# Patient Record
Sex: FEMALE | Race: WHITE | Employment: PART TIME | URBAN - METROPOLITAN AREA
[De-identification: names, ages, dates, MRNs, and addresses within clinical notes are randomized per-mention and may not be internally consistent; named-entity substitution may affect disease eponyms.]

---

## 2018-08-05 ENCOUNTER — HOSPITAL ENCOUNTER (EMERGENCY)
Age: 53
Discharge: HOME | End: 2018-08-05
Payer: COMMERCIAL

## 2018-08-05 ENCOUNTER — APPOINTMENT (OUTPATIENT)
Dept: GENERAL RADIOLOGY | Age: 53
End: 2018-08-05
Payer: COMMERCIAL

## 2018-08-05 VITALS
TEMPERATURE: 98.2 F | DIASTOLIC BLOOD PRESSURE: 66 MMHG | SYSTOLIC BLOOD PRESSURE: 136 MMHG | RESPIRATION RATE: 16 BRPM | HEART RATE: 70 BPM | OXYGEN SATURATION: 98 % | WEIGHT: 184 LBS | HEIGHT: 62 IN | BODY MASS INDEX: 33.86 KG/M2

## 2018-08-05 DIAGNOSIS — S16.1XXA ACUTE STRAIN OF NECK MUSCLE, INITIAL ENCOUNTER: ICD-10-CM
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
## 2018-08-05 DIAGNOSIS — M79.641 RIGHT HAND PAIN: ICD-10-CM

## 2018-08-05 PROCEDURE — 6360000002 HC RX W HCPCS: Performed by: PHYSICIAN ASSISTANT

## 2018-08-05 PROCEDURE — 96372 THER/PROPH/DIAG INJ SC/IM: CPT

## 2018-08-05 PROCEDURE — 73130 X-RAY EXAM OF HAND: CPT

## 2018-08-05 PROCEDURE — 99285 EMERGENCY DEPT VISIT HI MDM: CPT

## 2018-08-05 RX ORDER — ATORVASTATIN CALCIUM 40 MG/1
40 TABLET, FILM COATED ORAL DAILY
COMMUNITY

## 2018-08-05 RX ORDER — METOPROLOL TARTRATE 50 MG/1
10 TABLET, FILM COATED ORAL 2 TIMES DAILY
COMMUNITY

## 2018-08-05 RX ORDER — ORPHENADRINE CITRATE 30 MG/ML
60 INJECTION INTRAMUSCULAR; INTRAVENOUS ONCE
Status: COMPLETED | OUTPATIENT
Start: 2018-08-05 | End: 2018-08-05

## 2018-08-05 RX ORDER — LEVOTHYROXINE SODIUM 112 UG/1
112 TABLET ORAL DAILY
COMMUNITY

## 2018-08-05 RX ORDER — KETOROLAC TROMETHAMINE 30 MG/ML
30 INJECTION, SOLUTION INTRAMUSCULAR; INTRAVENOUS ONCE
Status: COMPLETED | OUTPATIENT
Start: 2018-08-05 | End: 2018-08-05

## 2018-08-05 RX ORDER — INSULIN GLARGINE 100 [IU]/ML
INJECTION, SOLUTION SUBCUTANEOUS NIGHTLY
COMMUNITY
End: 2020-02-13

## 2018-08-05 RX ORDER — NAPROXEN 500 MG/1
500 TABLET ORAL 2 TIMES DAILY
Qty: 14 TABLET | Refills: 0 | Status: SHIPPED | OUTPATIENT
Start: 2018-08-05 | End: 2018-11-17 | Stop reason: ALTCHOICE

## 2018-08-05 RX ORDER — GLIPIZIDE 10 MG/1
10 TABLET ORAL
COMMUNITY
End: 2020-02-13

## 2018-08-05 RX ORDER — ORPHENADRINE CITRATE 100 MG/1
100 TABLET, EXTENDED RELEASE ORAL 2 TIMES DAILY
Qty: 20 TABLET | Refills: 0 | Status: SHIPPED | OUTPATIENT
Start: 2018-08-05 | End: 2018-08-15

## 2018-08-05 RX ADMIN — KETOROLAC TROMETHAMINE 30 MG: 30 INJECTION, SOLUTION INTRAMUSCULAR at 17:36

## 2018-08-05 RX ADMIN — ORPHENADRINE CITRATE 60 MG: 30 INJECTION INTRAMUSCULAR; INTRAVENOUS at 17:36

## 2018-08-05 ASSESSMENT — PAIN DESCRIPTION - FREQUENCY: FREQUENCY: CONTINUOUS

## 2018-08-05 ASSESSMENT — PAIN DESCRIPTION - PAIN TYPE
TYPE: ACUTE PAIN
TYPE: ACUTE PAIN

## 2018-08-05 ASSESSMENT — PAIN DESCRIPTION - LOCATION
LOCATION: HAND
LOCATION: HAND

## 2018-08-05 ASSESSMENT — PAIN DESCRIPTION - ORIENTATION
ORIENTATION: RIGHT
ORIENTATION: RIGHT

## 2018-08-05 ASSESSMENT — PAIN DESCRIPTION - DESCRIPTORS: DESCRIPTORS: SHARP

## 2018-08-05 ASSESSMENT — PAIN SCALES - GENERAL
PAINLEVEL_OUTOF10: 10
PAINLEVEL_OUTOF10: 7
PAINLEVEL_OUTOF10: 10

## 2018-08-05 ASSESSMENT — PAIN DESCRIPTION - PROGRESSION: CLINICAL_PROGRESSION: GRADUALLY WORSENING
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
## 2018-08-05 NOTE — ED NOTES
Home going instructions with verbalized understanding , scripts given x 2     Dunia De La Garza RN  08/05/18 [id number]

## 2018-08-05 NOTE — LETTER
[address] [facility] Emergency Department  [facility] [address]  Phone: [phone]               August 5, 2018    Patient: Kayleen Wallace   YOB: 1965   Date of Visit: 8/5/2018       To Whom It May Concern:    Kayleen Wallace was seen and treated in our emergency department on 8/5/2018. Daughter here with her.       Sincerely,       Ankit Lopez RN         Signature:__________________________________

## 2018-11-17 ENCOUNTER — APPOINTMENT (OUTPATIENT)
Dept: GENERAL RADIOLOGY | Age: 53
End: 2018-11-17
Payer: COMMERCIAL

## 2018-11-17 ENCOUNTER — HOSPITAL ENCOUNTER (EMERGENCY)
Age: 53
Discharge: HOME | End: 2018-11-17
Attending: FAMILY MEDICINE
Payer: COMMERCIAL

## 2018-11-17 VITALS
DIASTOLIC BLOOD PRESSURE: 84 MMHG | HEIGHT: 62 IN | HEART RATE: 81 BPM | BODY MASS INDEX: 33.68 KG/M2 | SYSTOLIC BLOOD PRESSURE: 152 MMHG | TEMPERATURE: 98.1 F | OXYGEN SATURATION: 93 % | RESPIRATION RATE: 16 BRPM | WEIGHT: 183 LBS

## 2018-11-17 DIAGNOSIS — S20.212A CONTUSION OF RIB ON LEFT SIDE, INITIAL ENCOUNTER: Primary | ICD-10-CM

## 2018-11-17 PROCEDURE — 99284 EMERGENCY DEPT VISIT MOD MDM: CPT

## 2018-11-17 PROCEDURE — 71101 X-RAY EXAM UNILAT RIBS/CHEST: CPT

## 2018-11-17 PROCEDURE — 6370000000 HC RX 637 (ALT 250 FOR IP): Performed by: FAMILY MEDICINE

## 2018-11-17 RX ORDER — IBUPROFEN 600 MG/1
600 TABLET ORAL ONCE
Status: COMPLETED | OUTPATIENT
Start: 2018-11-17 | End: 2018-11-17

## 2018-11-17 RX ORDER — HYDROCODONE BITARTRATE AND ACETAMINOPHEN 5; 325 MG/1; MG/1
1 TABLET ORAL EVERY 6 HOURS PRN
Qty: 12 TABLET | Refills: 0 | Status: SHIPPED | OUTPATIENT
Start: 2018-11-17 | End: 2018-11-20

## 2018-11-17 RX ADMIN — IBUPROFEN 600 MG: 600 TABLET ORAL at 10:31

## 2018-11-17 ASSESSMENT — PAIN DESCRIPTION - ORIENTATION: ORIENTATION: LEFT

## 2018-11-17 ASSESSMENT — PAIN DESCRIPTION - LOCATION
LOCATION: CHEST;RIB CAGE
LOCATION: CHEST;RIB CAGE

## 2018-11-17 ASSESSMENT — PAIN DESCRIPTION - DESCRIPTORS
DESCRIPTORS: ACHING
DESCRIPTORS: ACHING

## 2018-11-17 ASSESSMENT — PAIN DESCRIPTION - PAIN TYPE
TYPE: ACUTE PAIN
TYPE: ACUTE PAIN

## 2018-11-17 ASSESSMENT — PAIN DESCRIPTION - ONSET: ONSET: ON-GOING

## 2018-11-17 ASSESSMENT — PAIN DESCRIPTION - PROGRESSION: CLINICAL_PROGRESSION: GRADUALLY IMPROVING

## 2018-11-17 ASSESSMENT — PAIN SCALES - GENERAL
PAINLEVEL_OUTOF10: 4
PAINLEVEL_OUTOF10: 8

## 2018-11-17 ASSESSMENT — PAIN DESCRIPTION - FREQUENCY
FREQUENCY: CONTINUOUS
FREQUENCY: CONTINUOUS

## 2018-11-19 ENCOUNTER — APPOINTMENT (OUTPATIENT)
Dept: CT IMAGING | Age: 53
End: 2018-11-19
Payer: COMMERCIAL

## 2018-11-19 ENCOUNTER — HOSPITAL ENCOUNTER (EMERGENCY)
Age: 53
Discharge: HOME | End: 2018-11-19
Payer: COMMERCIAL

## 2018-11-19 VITALS
SYSTOLIC BLOOD PRESSURE: 119 MMHG | HEIGHT: 62 IN | BODY MASS INDEX: 33.13 KG/M2 | HEART RATE: 70 BPM | OXYGEN SATURATION: 96 % | WEIGHT: 180 LBS | TEMPERATURE: 97.9 F | DIASTOLIC BLOOD PRESSURE: 53 MMHG | RESPIRATION RATE: 20 BRPM

## 2018-11-19 DIAGNOSIS — S20.212A CONTUSION OF RIB ON LEFT SIDE, INITIAL ENCOUNTER: Primary | ICD-10-CM

## 2018-11-19 DIAGNOSIS — E16.2 HYPOGLYCEMIA: ICD-10-CM

## 2018-11-19 LAB
CO2: 30 MMOL/L (ref 22–29)
GFR AFRICAN AMERICAN: >60
GFR NON-AFRICAN AMERICAN: >60 ML/MIN/1.73
GLUCOSE BLD-MCNC: 61 MG/DL (ref 74–99)
POC ANION GAP: 12 MMOL/L (ref 7–16)
POC BUN: 9 MG/DL (ref 8–23)
POC CHLORIDE: 100 MMOL/L (ref 100–108)
POC CREATININE: 0.8 MG/DL (ref 0.5–1)
POC POTASSIUM: 3.4 MMOL/L (ref 3.5–5)
POC SODIUM: 142 MMOL/L (ref 132–146)

## 2018-11-19 PROCEDURE — 6360000004 HC RX CONTRAST MEDICATION: Performed by: RADIOLOGY

## 2018-11-19 PROCEDURE — 82565 ASSAY OF CREATININE: CPT

## 2018-11-19 PROCEDURE — 74177 CT ABD & PELVIS W/CONTRAST: CPT

## 2018-11-19 PROCEDURE — 84520 ASSAY OF UREA NITROGEN: CPT

## 2018-11-19 PROCEDURE — 82947 ASSAY GLUCOSE BLOOD QUANT: CPT

## 2018-11-19 PROCEDURE — 99284 EMERGENCY DEPT VISIT MOD MDM: CPT

## 2018-11-19 PROCEDURE — 71260 CT THORAX DX C+: CPT

## 2018-11-19 PROCEDURE — 80051 ELECTROLYTE PANEL: CPT

## 2018-11-19 RX ADMIN — IOPAMIDOL 80 ML: 755 INJECTION, SOLUTION INTRAVENOUS at 16:34

## 2018-11-19 ASSESSMENT — PAIN DESCRIPTION - LOCATION: LOCATION: RIB CAGE

## 2018-11-19 ASSESSMENT — PAIN DESCRIPTION - DESCRIPTORS: DESCRIPTORS: ACHING

## 2018-11-19 ASSESSMENT — PAIN DESCRIPTION - FREQUENCY: FREQUENCY: CONTINUOUS

## 2018-11-19 ASSESSMENT — PAIN DESCRIPTION - ONSET: ONSET: ON-GOING

## 2018-11-19 ASSESSMENT — PAIN DESCRIPTION - ORIENTATION: ORIENTATION: LEFT

## 2018-11-19 ASSESSMENT — PAIN DESCRIPTION - PAIN TYPE: TYPE: ACUTE PAIN

## 2019-10-29 ENCOUNTER — HOSPITAL ENCOUNTER (OUTPATIENT)
Dept: MRI IMAGING | Age: 54
Discharge: HOME | End: 2019-10-31
Payer: COMMERCIAL

## 2019-10-29 DIAGNOSIS — M75.101 TEAR OF RIGHT ROTATOR CUFF, UNSPECIFIED TEAR EXTENT, UNSPECIFIED WHETHER TRAUMATIC: ICD-10-CM

## 2019-10-29 PROCEDURE — 73221 MRI JOINT UPR EXTREM W/O DYE: CPT

## 2019-11-01 ENCOUNTER — OFFICE VISIT (OUTPATIENT)
Dept: ORTHOPEDIC SURGERY | Age: 54
End: 2019-11-01
Payer: COMMERCIAL

## 2019-11-01 VITALS — WEIGHT: 178 LBS | BODY MASS INDEX: 32.76 KG/M2 | HEIGHT: 62 IN

## 2019-11-01 DIAGNOSIS — M75.111 NONTRAUMATIC INCOMPLETE TEAR OF RIGHT ROTATOR CUFF: Primary | ICD-10-CM

## 2019-11-01 PROCEDURE — 99204 OFFICE O/P NEW MOD 45 MIN: CPT | Performed by: ORTHOPAEDIC SURGERY

## 2020-02-13 ENCOUNTER — APPOINTMENT (OUTPATIENT)
Dept: CT IMAGING | Age: 55
End: 2020-02-13
Payer: COMMERCIAL

## 2020-02-13 ENCOUNTER — HOSPITAL ENCOUNTER (EMERGENCY)
Age: 55
Discharge: HOME | End: 2020-02-13
Attending: EMERGENCY MEDICINE
Payer: COMMERCIAL

## 2020-02-13 VITALS
TEMPERATURE: 98.2 F | HEART RATE: 76 BPM | SYSTOLIC BLOOD PRESSURE: 132 MMHG | BODY MASS INDEX: 32.2 KG/M2 | RESPIRATION RATE: 16 BRPM | DIASTOLIC BLOOD PRESSURE: 74 MMHG | HEIGHT: 62 IN | WEIGHT: 175 LBS | OXYGEN SATURATION: 97 %

## 2020-02-13 LAB
ANION GAP SERPL CALCULATED.3IONS-SCNC: 12 MMOL/L (ref 7–16)
BASOPHILS ABSOLUTE: 0.06 E9/L (ref 0–0.2)
BASOPHILS RELATIVE PERCENT: 0.7 % (ref 0–2)
BILIRUBIN URINE: NEGATIVE
BLOOD, URINE: NEGATIVE
BUN BLDV-MCNC: 10 MG/DL (ref 6–20)
CALCIUM SERPL-MCNC: 9.6 MG/DL (ref 8.6–10.2)
CHLORIDE BLD-SCNC: 96 MMOL/L (ref 98–107)
CLARITY: CLEAR
CO2: 26 MMOL/L (ref 22–29)
COLOR: YELLOW
CREAT SERPL-MCNC: 0.8 MG/DL (ref 0.5–1)
EOSINOPHILS ABSOLUTE: 0.26 E9/L (ref 0.05–0.5)
EOSINOPHILS RELATIVE PERCENT: 2.9 % (ref 0–6)
GFR AFRICAN AMERICAN: >60
GFR NON-AFRICAN AMERICAN: >60 ML/MIN/1.73
GLUCOSE BLD-MCNC: 415 MG/DL (ref 74–99)
GLUCOSE URINE: >=1000 MG/DL
HCT VFR BLD CALC: 43 % (ref 34–48)
HEMOGLOBIN: 15.1 G/DL (ref 11.5–15.5)
IMMATURE GRANULOCYTES #: 0.03 E9/L
IMMATURE GRANULOCYTES %: 0.3 % (ref 0–5)
KETONES, URINE: NEGATIVE MG/DL
LEUKOCYTE ESTERASE, URINE: NEGATIVE
LYMPHOCYTES ABSOLUTE: 2.41 E9/L (ref 1.5–4)
LYMPHOCYTES RELATIVE PERCENT: 27 % (ref 20–42)
MCH RBC QN AUTO: 31.9 PG (ref 26–35)
MCHC RBC AUTO-ENTMCNC: 35.1 % (ref 32–34.5)
MCV RBC AUTO: 90.7 FL (ref 80–99.9)
MONOCYTES ABSOLUTE: 0.65 E9/L (ref 0.1–0.95)
MONOCYTES RELATIVE PERCENT: 7.3 % (ref 2–12)
NEUTROPHILS ABSOLUTE: 5.53 E9/L (ref 1.8–7.3)
NEUTROPHILS RELATIVE PERCENT: 61.8 % (ref 43–80)
NITRITE, URINE: NEGATIVE
PDW BLD-RTO: 12.4 FL (ref 11.5–15)
PH UA: 6.5 (ref 5–9)
PLATELET # BLD: 248 E9/L (ref 130–450)
PMV BLD AUTO: 11.1 FL (ref 7–12)
POTASSIUM REFLEX MAGNESIUM: 4 MMOL/L (ref 3.5–5)
PROTEIN UA: NEGATIVE MG/DL
RBC # BLD: 4.74 E12/L (ref 3.5–5.5)
SODIUM BLD-SCNC: 134 MMOL/L (ref 132–146)
SPECIFIC GRAVITY UA: 1.02 (ref 1–1.03)
UROBILINOGEN, URINE: 1 E.U./DL
WBC # BLD: 8.9 E9/L (ref 4.5–11.5)

## 2020-02-13 PROCEDURE — 36415 COLL VENOUS BLD VENIPUNCTURE: CPT

## 2020-02-13 PROCEDURE — 6360000004 HC RX CONTRAST MEDICATION: Performed by: RADIOLOGY

## 2020-02-13 PROCEDURE — 74177 CT ABD & PELVIS W/CONTRAST: CPT

## 2020-02-13 PROCEDURE — 6360000002 HC RX W HCPCS: Performed by: EMERGENCY MEDICINE

## 2020-02-13 PROCEDURE — 80048 BASIC METABOLIC PNL TOTAL CA: CPT

## 2020-02-13 PROCEDURE — 99284 EMERGENCY DEPT VISIT MOD MDM: CPT

## 2020-02-13 PROCEDURE — 85025 COMPLETE CBC W/AUTO DIFF WBC: CPT

## 2020-02-13 PROCEDURE — 81003 URINALYSIS AUTO W/O SCOPE: CPT

## 2020-02-13 PROCEDURE — 96374 THER/PROPH/DIAG INJ IV PUSH: CPT

## 2020-02-13 PROCEDURE — 2580000003 HC RX 258: Performed by: RADIOLOGY

## 2020-02-13 RX ORDER — KETOROLAC TROMETHAMINE 30 MG/ML
15 INJECTION, SOLUTION INTRAMUSCULAR; INTRAVENOUS ONCE
Status: COMPLETED | OUTPATIENT
Start: 2020-02-13 | End: 2020-02-13

## 2020-02-13 RX ORDER — SODIUM CHLORIDE 0.9 % (FLUSH) 0.9 %
10 SYRINGE (ML) INJECTION PRN
Status: DISCONTINUED | OUTPATIENT
Start: 2020-02-13 | End: 2020-02-13 | Stop reason: HOSPADM

## 2020-02-13 RX ADMIN — IOPAMIDOL 100 ML: 755 INJECTION, SOLUTION INTRAVENOUS at 19:46

## 2020-02-13 RX ADMIN — Medication 10 ML: at 19:46

## 2020-02-13 RX ADMIN — KETOROLAC TROMETHAMINE 15 MG: 30 INJECTION, SOLUTION INTRAMUSCULAR at 18:33

## 2020-02-13 ASSESSMENT — PAIN DESCRIPTION - LOCATION
LOCATION: ABDOMEN
LOCATION: GROIN

## 2020-02-13 ASSESSMENT — PAIN DESCRIPTION - ONSET
ONSET: ON-GOING
ONSET: ON-GOING

## 2020-02-13 ASSESSMENT — PAIN - FUNCTIONAL ASSESSMENT: PAIN_FUNCTIONAL_ASSESSMENT: PREVENTS OR INTERFERES SOME ACTIVE ACTIVITIES AND ADLS

## 2020-02-13 ASSESSMENT — PAIN SCALES - GENERAL
PAINLEVEL_OUTOF10: 6
PAINLEVEL_OUTOF10: 6
PAINLEVEL_OUTOF10: 3

## 2020-02-13 ASSESSMENT — PAIN DESCRIPTION - PROGRESSION
CLINICAL_PROGRESSION: GRADUALLY IMPROVING
CLINICAL_PROGRESSION: RESOLVED

## 2020-02-13 ASSESSMENT — PAIN DESCRIPTION - ORIENTATION
ORIENTATION: LEFT;LOWER
ORIENTATION: LEFT

## 2020-02-13 ASSESSMENT — PAIN DESCRIPTION - PAIN TYPE
TYPE: ACUTE PAIN
TYPE: ACUTE PAIN

## 2020-02-13 ASSESSMENT — PAIN DESCRIPTION - DESCRIPTORS
DESCRIPTORS: CONSTANT
DESCRIPTORS: ACHING

## 2020-02-13 ASSESSMENT — PAIN DESCRIPTION - FREQUENCY
FREQUENCY: CONTINUOUS
FREQUENCY: CONTINUOUS

## 2020-02-13 NOTE — ED PROVIDER NOTES
35.1 (H) 32.0 - 34.5 %    RDW 12.4 11.5 - 15.0 fL    Platelets 167 686 - 910 E9/L    MPV 11.1 7.0 - 12.0 fL    Neutrophils % 61.8 43.0 - 80.0 %    Immature Granulocytes % 0.3 0.0 - 5.0 %    Lymphocytes % 27.0 20.0 - 42.0 %    Monocytes % 7.3 2.0 - 12.0 %    Eosinophils % 2.9 0.0 - 6.0 %    Basophils % 0.7 0.0 - 2.0 %    Neutrophils Absolute 5.53 1.80 - 7.30 E9/L    Immature Granulocytes # 0.03 E9/L    Lymphocytes Absolute 2.41 1.50 - 4.00 E9/L    Monocytes Absolute 0.65 0.10 - 0.95 E9/L    Eosinophils Absolute 0.26 0.05 - 0.50 E9/L    Basophils Absolute 0.06 0.00 - 0.20 E9/L   Urinalysis, reflex to microscopic   Result Value Ref Range    Color, UA Yellow Straw/Yellow    Clarity, UA Clear Clear    Glucose, Ur >=1000 (A) Negative mg/dL    Bilirubin Urine Negative Negative    Ketones, Urine Negative Negative mg/dL    Specific Gravity, UA 1.020 1.005 - 1.030    Blood, Urine Negative Negative    pH, UA 6.5 5.0 - 9.0    Protein, UA Negative Negative mg/dL    Urobilinogen, Urine 1.0 <2.0 E.U./dL    Nitrite, Urine Negative Negative    Leukocyte Esterase, Urine Negative Negative   Basic Metabolic Panel w/ Reflex to MG   Result Value Ref Range    Sodium 134 132 - 146 mmol/L    Potassium reflex Magnesium 4.0 3.5 - 5.0 mmol/L    Chloride 96 (L) 98 - 107 mmol/L    CO2 26 22 - 29 mmol/L    Anion Gap 12 7 - 16 mmol/L    Glucose 415 (H) 74 - 99 mg/dL    BUN 10 6 - 20 mg/dL    CREATININE 0.8 0.5 - 1.0 mg/dL    GFR Non-African American >60 >=60 mL/min/1.73    GFR African American >60     Calcium 9.6 8.6 - 10.2 mg/dL       RADIOLOGY:  Interpreted by Radiologist.  CT ABDOMEN PELVIS W IV CONTRAST Additional Contrast? None   Final Result   1. No acute findings within the abdomen or pelvis by CT. 2.  The appendix is not visualized however there are no inflammatory   changes in the right lower quadrant.    3.  Stable in size although mildly complex cyst within the right   inferior pole measuring 2.2 cm which could be followed up with a

## 2020-10-01 ENCOUNTER — HOSPITAL ENCOUNTER (OUTPATIENT)
Dept: GENERAL RADIOLOGY | Age: 55
Discharge: HOME | End: 2020-10-03
Payer: COMMERCIAL

## 2020-10-01 ENCOUNTER — HOSPITAL ENCOUNTER (OUTPATIENT)
Age: 55
Discharge: HOME | End: 2020-10-03
Payer: COMMERCIAL

## 2020-10-01 PROCEDURE — 73630 X-RAY EXAM OF FOOT: CPT

## 2021-08-28 ENCOUNTER — HOSPITAL ENCOUNTER (EMERGENCY)
Age: 56
Discharge: HOME | End: 2021-08-28
Attending: EMERGENCY MEDICINE
Payer: COMMERCIAL

## 2021-08-28 ENCOUNTER — APPOINTMENT (OUTPATIENT)
Dept: ULTRASOUND IMAGING | Age: 56
End: 2021-08-28
Payer: COMMERCIAL

## 2021-08-28 VITALS
WEIGHT: 178 LBS | RESPIRATION RATE: 16 BRPM | SYSTOLIC BLOOD PRESSURE: 135 MMHG | TEMPERATURE: 97.4 F | BODY MASS INDEX: 32.76 KG/M2 | HEIGHT: 62 IN | OXYGEN SATURATION: 96 % | DIASTOLIC BLOOD PRESSURE: 62 MMHG | HEART RATE: 94 BPM

## 2021-08-28 DIAGNOSIS — M25.559 PAIN IN JOINT INVOLVING PELVIC REGION AND THIGH, UNSPECIFIED LATERALITY: ICD-10-CM

## 2021-08-28 DIAGNOSIS — L53.9 ERYTHEMA: Primary | ICD-10-CM

## 2021-08-28 LAB
ALBUMIN SERPL-MCNC: 4.1 G/DL (ref 3.5–5.2)
ALP BLD-CCNC: 131 U/L (ref 35–104)
ALT SERPL-CCNC: 22 U/L (ref 0–32)
ANION GAP SERPL CALCULATED.3IONS-SCNC: 9 MMOL/L (ref 7–16)
APTT: 27.7 SEC (ref 24.5–35.1)
AST SERPL-CCNC: 17 U/L (ref 0–31)
BASOPHILS ABSOLUTE: 0.02 E9/L (ref 0–0.2)
BASOPHILS RELATIVE PERCENT: 0.5 % (ref 0–2)
BILIRUB SERPL-MCNC: 0.4 MG/DL (ref 0–1.2)
BILIRUBIN URINE: NEGATIVE
BLOOD, URINE: NEGATIVE
BUN BLDV-MCNC: 7 MG/DL (ref 6–20)
CALCIUM SERPL-MCNC: 9.4 MG/DL (ref 8.6–10.2)
CHLORIDE BLD-SCNC: 97 MMOL/L (ref 98–107)
CLARITY: CLEAR
CO2: 26 MMOL/L (ref 22–29)
COLOR: YELLOW
CREAT SERPL-MCNC: 0.7 MG/DL (ref 0.5–1)
EOSINOPHILS ABSOLUTE: 0.11 E9/L (ref 0.05–0.5)
EOSINOPHILS RELATIVE PERCENT: 2.5 % (ref 0–6)
GFR AFRICAN AMERICAN: >60
GFR NON-AFRICAN AMERICAN: >60 ML/MIN/1.73
GLUCOSE BLD-MCNC: 415 MG/DL (ref 74–99)
GLUCOSE URINE: >=1000 MG/DL
HCT VFR BLD CALC: 44.8 % (ref 34–48)
HEMOGLOBIN: 15.9 G/DL (ref 11.5–15.5)
IMMATURE GRANULOCYTES #: 0.02 E9/L
IMMATURE GRANULOCYTES %: 0.5 % (ref 0–5)
INR BLD: 1
KETONES, URINE: NEGATIVE MG/DL
LACTIC ACID: 1.5 MMOL/L (ref 0.5–2.2)
LEUKOCYTE ESTERASE, URINE: NEGATIVE
LIPASE: 41 U/L (ref 13–60)
LYMPHOCYTES ABSOLUTE: 1.05 E9/L (ref 1.5–4)
LYMPHOCYTES RELATIVE PERCENT: 24.2 % (ref 20–42)
MCH RBC QN AUTO: 31.9 PG (ref 26–35)
MCHC RBC AUTO-ENTMCNC: 35.5 % (ref 32–34.5)
MCV RBC AUTO: 90 FL (ref 80–99.9)
MONOCYTES ABSOLUTE: 0.39 E9/L (ref 0.1–0.95)
MONOCYTES RELATIVE PERCENT: 9 % (ref 2–12)
NEUTROPHILS ABSOLUTE: 2.74 E9/L (ref 1.8–7.3)
NEUTROPHILS RELATIVE PERCENT: 63.3 % (ref 43–80)
NITRITE, URINE: NEGATIVE
PDW BLD-RTO: 13.1 FL (ref 11.5–15)
PH UA: 7 (ref 5–9)
PLATELET # BLD: 245 E9/L (ref 130–450)
PMV BLD AUTO: 10.3 FL (ref 7–12)
POTASSIUM SERPL-SCNC: 4.3 MMOL/L (ref 3.5–5)
PROTEIN UA: NEGATIVE MG/DL
PROTHROMBIN TIME: 11.5 SEC (ref 9.3–12.4)
RBC # BLD: 4.98 E12/L (ref 3.5–5.5)
SODIUM BLD-SCNC: 132 MMOL/L (ref 132–146)
SPECIFIC GRAVITY UA: 1.01 (ref 1–1.03)
TOTAL PROTEIN: 7.7 G/DL (ref 6.4–8.3)
UROBILINOGEN, URINE: 0.2 E.U./DL
WBC # BLD: 4.3 E9/L (ref 4.5–11.5)

## 2021-08-28 PROCEDURE — 85730 THROMBOPLASTIN TIME PARTIAL: CPT

## 2021-08-28 PROCEDURE — 80053 COMPREHEN METABOLIC PANEL: CPT

## 2021-08-28 PROCEDURE — 83605 ASSAY OF LACTIC ACID: CPT

## 2021-08-28 PROCEDURE — 93970 EXTREMITY STUDY: CPT

## 2021-08-28 PROCEDURE — 81003 URINALYSIS AUTO W/O SCOPE: CPT

## 2021-08-28 PROCEDURE — 83690 ASSAY OF LIPASE: CPT

## 2021-08-28 PROCEDURE — 36415 COLL VENOUS BLD VENIPUNCTURE: CPT

## 2021-08-28 PROCEDURE — 85610 PROTHROMBIN TIME: CPT

## 2021-08-28 PROCEDURE — 85025 COMPLETE CBC W/AUTO DIFF WBC: CPT

## 2021-08-28 PROCEDURE — 99284 EMERGENCY DEPT VISIT MOD MDM: CPT

## 2021-08-28 RX ORDER — IBUPROFEN 800 MG/1
800 TABLET ORAL EVERY 6 HOURS PRN
Qty: 21 TABLET | Refills: 0 | Status: SHIPPED | OUTPATIENT
Start: 2021-08-28

## 2021-08-28 ASSESSMENT — PAIN DESCRIPTION - DESCRIPTORS: DESCRIPTORS: BURNING

## 2021-08-28 ASSESSMENT — PAIN SCALES - GENERAL
PAINLEVEL_OUTOF10: 8
PAINLEVEL_OUTOF10: 4

## 2021-08-28 ASSESSMENT — PAIN DESCRIPTION - LOCATION
LOCATION: LEG
LOCATION: LEG

## 2021-08-28 ASSESSMENT — PAIN DESCRIPTION - ORIENTATION
ORIENTATION: RIGHT;LEFT;LOWER
ORIENTATION: RIGHT;LEFT;LOWER

## 2021-08-28 ASSESSMENT — PAIN DESCRIPTION - FREQUENCY: FREQUENCY: CONTINUOUS

## 2021-08-28 NOTE — ED NOTES
FIRST PROVIDER CONTACT ASSESSMENT NOTE      Department of Emergency Medicine   Admit Date: 8/28/2021  7:15 PM    Chief Complaint: Other (warm redareas to lower ext) and Cellulitis (bilateral lower extremities)      History of Present Illness:    Courtney Hunter is a 60 y.o. female who presents to the ED for erythematous and painful rash to the bilateral lower legs no open area no drainage or discharge. It is not pruritic.   Denies any fever.        -----------------END OF FIRST PROVIDER CONTACT ASSESSMENT NOTE--------------  Electronically signed by SARAH Swanson CNP   DD: 8/28/21               SARAH Tao CNP  08/28/21 [id number]

## 2021-08-29 NOTE — ED NOTES
PT STATES PAIN INCREASES WHEN AREA TO LOWER LEGS TOUCHED. ALERT.  AMBULATORY     Rachel Espino LPN  08/51/43 8541

## 2021-08-29 NOTE — ED PROVIDER NOTES
ED Attending  CC: No    HPI:  8/28/21,   Time: 8:59 PM EDT         Roxi So is a 60 y.o. female presenting to the ED for erythema to the bilateral lower extremities, beginning few days ago. The complaint has been persistent, mild in severity, and worsened by nothing. Presents for concern and complaints of a painful erythematous patches which is located to the bilateral lower extremities which began a few days ago. She denies any causative etiology. Denies any pruritus. Denies any chest pain or shortness of breath. ROS:   Pertinent positives and negatives are stated within HPI, all other systems reviewed and are negative.  --------------------------------------------- PAST HISTORY ---------------------------------------------  Past Medical History:  has a past medical history of Diabetes mellitus ([facility] [address] Hyperlipidemia, Hypertension, and Restless leg. Past Surgical History:  has a past surgical history that includes Cholecystectomy; Rotator cuff repair; and Coronary angioplasty with stent. Social History:  reports that she has been smoking. She has never used smokeless tobacco. She reports current alcohol use. She reports that she does not use drugs. Family History: family history is not on file. The patients home medications have been reviewed.     Allergies: Codeine, Prednisone, and Tetracyclines & related    -------------------------------------------------- RESULTS -------------------------------------------------  All laboratory and radiology results have been personally reviewed by myself   LABS:  Results for orders placed or performed during the hospital encounter of 08/28/21   CBC Auto Differential   Result Value Ref Range    WBC 4.3 (L) 4.5 - 11.5 E9/L    RBC 4.98 3.50 - 5.50 E12/L    Hemoglobin 15.9 (H) 11.5 - 15.5 g/dL    Hematocrit 44.8 34.0 - 48.0 %    MCV 90.0 80.0 - 99.9 fL    MCH 31.9 26.0 - 35.0 pg    MCHC 35.5 (H) 32.0 - 34.5 %    RDW 13.1 11.5 - 15.0 fL    Platelets 163 384 - 450 E9/L    MPV 10.3 7.0 - 12.0 fL    Neutrophils % 63.3 43.0 - 80.0 %    Immature Granulocytes % 0.5 0.0 - 5.0 %    Lymphocytes % 24.2 20.0 - 42.0 %    Monocytes % 9.0 2.0 - 12.0 %    Eosinophils % 2.5 0.0 - 6.0 %    Basophils % 0.5 0.0 - 2.0 %    Neutrophils Absolute 2.74 1.80 - 7.30 E9/L    Immature Granulocytes # 0.02 E9/L    Lymphocytes Absolute 1.05 (L) 1.50 - 4.00 E9/L    Monocytes Absolute 0.39 0.10 - 0.95 E9/L    Eosinophils Absolute 0.11 0.05 - 0.50 E9/L    Basophils Absolute 0.02 0.00 - 0.20 E9/L   Comprehensive Metabolic Panel   Result Value Ref Range    Sodium 132 132 - 146 mmol/L    Potassium 4.3 3.5 - 5.0 mmol/L    Chloride 97 (L) 98 - 107 mmol/L    CO2 26 22 - 29 mmol/L    Anion Gap 9 7 - 16 mmol/L    Glucose 415 (H) 74 - 99 mg/dL    BUN 7 6 - 20 mg/dL    CREATININE 0.7 0.5 - 1.0 mg/dL    GFR Non-African American >60 >=60 mL/min/1.73    GFR African American >60     Calcium 9.4 8.6 - 10.2 mg/dL    Total Protein 7.7 6.4 - 8.3 g/dL    Albumin 4.1 3.5 - 5.2 g/dL    Total Bilirubin 0.4 0.0 - 1.2 mg/dL    Alkaline Phosphatase 131 (H) 35 - 104 U/L    ALT 22 0 - 32 U/L    AST 17 0 - 31 U/L   Lactic Acid, Plasma   Result Value Ref Range    Lactic Acid 1.5 0.5 - 2.2 mmol/L   Lipase   Result Value Ref Range    Lipase 41 13 - 60 U/L   Urinalysis   Result Value Ref Range    Color, UA Yellow Straw/Yellow    Clarity, UA Clear Clear    Glucose, Ur >=1000 (A) Negative mg/dL    Bilirubin Urine Negative Negative    Ketones, Urine Negative Negative mg/dL    Specific Gravity, UA 1.010 1.005 - 1.030    Blood, Urine Negative Negative    pH, UA 7.0 5.0 - 9.0    Protein, UA Negative Negative mg/dL    Urobilinogen, Urine 0.2 <2.0 E.U./dL    Nitrite, Urine Negative Negative    Leukocyte Esterase, Urine Negative Negative   Protime-INR   Result Value Ref Range    Protime 11.5 9.3 - 12.4 sec    INR 1.0    APTT   Result Value Ref Range    aPTT 27.7 24.5 - 35.1 sec       RADIOLOGY:  Interpreted by Radiologist.  US DUP LOWER EXTREMITIES BILATERAL VENOUS   Final Result   No evidence of DVT in either lower extremity.             ------------------------- NURSING NOTES AND VITALS REVIEWED ---------------------------   The nursing notes within the ED encounter and vital signs as below have been reviewed. /63   Pulse 78   Temp 97.5 °F (36.4 °C)   Resp 18   Ht 5' 2\" (1.575 m)   Wt 178 lb (80.7 kg)   SpO2 96%   BMI 32.56 kg/m²   Oxygen Saturation Interpretation: Normal      ---------------------------------------------------PHYSICAL EXAM--------------------------------------      Constitutional/General: Alert and oriented x3, well appearing, non toxic in NAD  Head: NC/AT  Eyes: PERRL, EOMI  Mouth: Oropharynx clear, handling secretions, no trismus  Neck: Supple, full ROM, no meningeal signs  Pulmonary: Lungs clear to auscultation bilaterally, no wheezes, rales, or rhonchi. Not in respiratory distress  Cardiovascular:  Regular rate and rhythm, no murmurs, gallops, or rubs. 2+ distal pulses  Abdomen: Soft, non tender, non distended,   Extremities: Moves all extremities x 4. Warm and well perfused. Has a large erythematous patches to the lower extremities which is flat nonurticarial in appearance there is no break or open areas on the skin. It is nonpruritic. Is located to the lower extremities bilaterally. No other locations noted. Pedal pulse are palpable 2+ capillary refill less than seconds. No calf tenderness on exam.  Skin: warm and dry without rash  Neurologic: GCS 15,  Psych: Normal Affect      ------------------------------ ED COURSE/MEDICAL DECISION MAKING----------------------  Medications - No data to display      Medical Decision Making:    Patient informed of negative ultrasound results, and normal lab work other than the elevated glucose. There is no anion gap no concern for ketoacidosis.   She was examined by Dr. Adarsh Manrique, recommend following up with primary care physician use Tylenol ibuprofen as needed for discomfort if any change or worsening symptoms to return back to the emergency room. Counseling: The emergency provider has spoken with the patient and discussed todays results, in addition to providing specific details for the plan of care and counseling regarding the diagnosis and prognosis. Questions are answered at this time and they are agreeable with the plan.      --------------------------------- IMPRESSION AND DISPOSITION ---------------------------------    IMPRESSION  1. Erythema    2.  Pain in joint involving pelvic region and thigh, unspecified laterality        DISPOSITION  Disposition: Discharge to home  Patient condition is good                  SARAH Tong CNP  08/28/21 [id number]

## 2022-10-24 ENCOUNTER — HOSPITAL ENCOUNTER (OUTPATIENT)
Dept: ULTRASOUND IMAGING | Age: 57
Discharge: HOME | End: 2022-10-26
Payer: COMMERCIAL

## 2022-10-24 DIAGNOSIS — I65.29 STENOSIS OF CAROTID ARTERY, UNSPECIFIED LATERALITY: ICD-10-CM

## 2022-10-24 PROCEDURE — 93880 EXTRACRANIAL BILAT STUDY: CPT

## 2022-10-28 PROBLEM — I10 PRIMARY HYPERTENSION: Status: ACTIVE | Noted: 2022-10-28

## 2022-10-28 PROBLEM — E66.8 MODERATE OBESITY: Status: ACTIVE | Noted: 2022-10-28

## 2022-10-28 PROBLEM — E78.00 PURE HYPERCHOLESTEROLEMIA: Status: ACTIVE | Noted: 2022-10-28

## 2022-10-28 PROBLEM — Z79.4 TYPE 2 DIABETES MELLITUS WITHOUT COMPLICATION, WITH LONG-TERM CURRENT USE OF INSULIN (HCC): Status: ACTIVE | Noted: 2022-10-28

## 2022-10-28 PROBLEM — E11.9 TYPE 2 DIABETES MELLITUS WITHOUT COMPLICATION, WITHOUT LONG-TERM CURRENT USE OF INSULIN (HCC): Status: ACTIVE | Noted: 2022-10-28

## 2022-10-28 PROBLEM — E66.9 MODERATE OBESITY: Status: ACTIVE | Noted: 2022-10-28

## 2022-11-18 ENCOUNTER — OFFICE VISIT (OUTPATIENT)
Dept: CARDIOLOGY CLINIC | Age: 57
End: 2022-11-18
Payer: COMMERCIAL

## 2022-11-18 VITALS
DIASTOLIC BLOOD PRESSURE: 60 MMHG | BODY MASS INDEX: 33.68 KG/M2 | WEIGHT: 183 LBS | HEIGHT: 62 IN | HEART RATE: 77 BPM | RESPIRATION RATE: 16 BRPM | SYSTOLIC BLOOD PRESSURE: 112 MMHG

## 2022-11-18 DIAGNOSIS — E78.00 PURE HYPERCHOLESTEROLEMIA: ICD-10-CM

## 2022-11-18 DIAGNOSIS — I25.118 CORONARY ARTERY DISEASE OF NATIVE ARTERY OF NATIVE HEART WITH STABLE ANGINA PECTORIS (HCC): Primary | ICD-10-CM

## 2022-11-18 DIAGNOSIS — J44.9 CHRONIC OBSTRUCTIVE PULMONARY DISEASE, UNSPECIFIED COPD TYPE (HCC): ICD-10-CM

## 2022-11-18 DIAGNOSIS — E11.9 TYPE 2 DIABETES MELLITUS WITHOUT COMPLICATION, WITH LONG-TERM CURRENT USE OF INSULIN (HCC): ICD-10-CM

## 2022-11-18 DIAGNOSIS — I10 PRIMARY HYPERTENSION: ICD-10-CM

## 2022-11-18 DIAGNOSIS — Z79.4 TYPE 2 DIABETES MELLITUS WITHOUT COMPLICATION, WITH LONG-TERM CURRENT USE OF INSULIN (HCC): ICD-10-CM

## 2022-11-18 DIAGNOSIS — E66.8 MODERATE OBESITY: ICD-10-CM

## 2022-11-18 PROCEDURE — 3074F SYST BP LT 130 MM HG: CPT | Performed by: INTERNAL MEDICINE

## 2022-11-18 PROCEDURE — 93000 ELECTROCARDIOGRAM COMPLETE: CPT | Performed by: INTERNAL MEDICINE

## 2022-11-18 PROCEDURE — 3078F DIAST BP <80 MM HG: CPT | Performed by: INTERNAL MEDICINE

## 2022-11-18 PROCEDURE — 99205 OFFICE O/P NEW HI 60 MIN: CPT | Performed by: INTERNAL MEDICINE

## 2022-11-18 RX ORDER — GABAPENTIN 400 MG/1
400 CAPSULE ORAL 3 TIMES DAILY
COMMUNITY

## 2022-11-18 RX ORDER — ROPINIROLE 3 MG/1
TABLET, FILM COATED ORAL
COMMUNITY
Start: 2022-10-10

## 2022-11-18 RX ORDER — LISINOPRIL 10 MG/1
10 TABLET ORAL DAILY
COMMUNITY

## 2022-11-18 RX ORDER — CITALOPRAM 40 MG/1
TABLET ORAL
COMMUNITY
Start: 2022-11-03

## 2022-11-18 RX ORDER — LEVOTHYROXINE SODIUM 0.2 MG/1
TABLET ORAL
COMMUNITY
Start: 2022-11-03

## 2022-11-18 RX ORDER — ALOGLIPTIN 25 MG/1
25 TABLET, FILM COATED ORAL DAILY
COMMUNITY

## 2022-11-18 RX ORDER — ATORVASTATIN CALCIUM 80 MG/1
80 TABLET, FILM COATED ORAL DAILY
Qty: 30 TABLET | Refills: 4 | Status: SHIPPED | COMMUNITY
Start: 2022-11-18

## 2022-11-18 RX ORDER — INSULIN DETEMIR 100 [IU]/ML
INJECTION, SOLUTION SUBCUTANEOUS 2 TIMES DAILY
COMMUNITY

## 2022-11-18 RX ORDER — PANTOPRAZOLE SODIUM 20 MG/1
20 TABLET, DELAYED RELEASE ORAL DAILY
COMMUNITY

## 2022-11-18 RX ORDER — NITROGLYCERIN 0.4 MG/1
0.4 TABLET SUBLINGUAL EVERY 5 MIN PRN
Qty: 25 TABLET | Refills: 3 | Status: SHIPPED | OUTPATIENT
Start: 2022-11-18

## 2022-11-18 RX ORDER — ROPINIROLE 1 MG/1
TABLET, FILM COATED ORAL
COMMUNITY
Start: 2022-10-10

## 2022-11-18 NOTE — PROGRESS NOTES
OUTPATIENT CARDIOLOGY CONSULT    Name: Riley Falcon    Age: 59 y.o. Date of Service: 11/18/2022    Reason for Consultation: Coronary atherosclerosis, chronic obstructive lung disease, hypertension, moderate obesity    Referring Physician: Mindy Stoll DO    History of Present Illness: The patient is a 49-year-old white female with a known history of coronary atherosclerosis and prior coronary angiography in August, 2015 at [facility] in [place], [place] with coronary intervention of her left anterior descending and further records of her cardiovascular care unavailable for review. She additionally has a history of hypertension, diabetes, hyperlipidemia and chronic obstructive lung disease with ongoing tobacco consumption. She relates her initial symptoms that of intrascapular discomfort associated with dyspnea and diaphoresis and within the past month, she has had 1 unprovoked episode of similar symptomatology with a duration of approximately 10 minutes prior to spontaneous resolution and with no subsequent recurrences. She is otherwise active with functional capabilities of approximately 5 METs and no additional manifestations of decompensated left ventricular systolic dysfunction or volume overload. At the time of her present evaluation she is normotensive with suboptimal control of her diabetes with most recent glycosylated hemoglobin levels of approximately 12% and a mixed hyperlipidemia picture with cholesterol levels in excess of 200 mg/dL and triglyceride levels in excess of 400 mg/dL      Review of Systems: The remainder of a complete multisystem review including consitutional, central nervous, respiratory, circulatory, gastrointestinal, genitourinary, endocrinologic, hematologic, musculoskeletal and psychiatric are negative.     Past Medical History:  Past Medical History:   Diagnosis Date    Anxiety     Arthritis     Asthma     Back pain     Depressive disorder GERD (gastroesophageal reflux disease)     Graves' disease     Heart disease     Hyperlipidemia     Hypertension     Hypothyroidism     Myocardial infarction (HCC)     Neuropathy     Osteoporosis     Pain in right hand     Restless leg     Type 2 diabetes mellitus without complication (HCC)        Past Surgical History:  Past Surgical History:   Procedure Laterality Date    CHOLECYSTECTOMY      CORONARY ANGIOPLASTY WITH STENT PLACEMENT      ELBOW ARTHROSCOPY      FINGER TRIGGER RELEASE      ROTATOR CUFF REPAIR         Family History:  Family History   Problem Relation Age of Onset    Kidney Disease Mother     Other Mother         CORONARY ARTERIOSCLEROSIS    Arthritis Mother     Osteoarthritis Mother     Rheum Arthritis Mother     Heart Disease Father     Thyroid Disease Sister     Arthritis Sister        Social History:  Social History     Socioeconomic History    Marital status:      Spouse name: Not on file    Number of children: Not on file    Years of education: Not on file    Highest education level: Not on file   Occupational History    Not on file   Tobacco Use    Smoking status: Every Day     Packs/day: 1.00     Types: Cigarettes    Smokeless tobacco: Never   Substance and Sexual Activity    Alcohol use: Yes     Comment: socially    Drug use: No    Sexual activity: Not on file   Other Topics Concern    Not on file   Social History Narrative    Not on file     Social Determinants of Health     Financial Resource Strain: Not on file   Food Insecurity: Not on file   Transportation Needs: Not on file   Physical Activity: Not on file   Stress: Not on file   Social Connections: Not on file   Intimate Partner Violence: Not on file   Housing Stability: Not on file       Allergies:   Allergies   Allergen Reactions    Bee Venom     Codeine Other (See Comments)     \"i just black out\"    Doxycycline     Metformin And Related     Prednisone Other (See Comments)     Muscle cramps    Tetracyclines & Related Other (See Comments)       Current Medications:  Current Outpatient Medications   Medication Sig Dispense Refill    alogliptin (NESINA) 25 MG TABS tablet Take 25 mg by mouth daily      citalopram (CELEXA) 40 MG tablet       levothyroxine (SYNTHROID) 200 MCG tablet       rOPINIRole (REQUIP) 3 MG tablet       rOPINIRole (REQUIP) 1 MG tablet       gabapentin (NEURONTIN) 400 MG capsule Take 400 mg by mouth 3 times daily. insulin detemir (LEVEMIR) 100 UNIT/ML injection vial Inject into the skin 2 times daily 90 units      lisinopril (PRINIVIL;ZESTRIL) 10 MG tablet Take 10 mg by mouth daily      pantoprazole (PROTONIX) 20 MG tablet Take 20 mg by mouth daily      metoprolol tartrate (LOPRESSOR) 50 MG tablet Take 25 mg by mouth 2 times daily      atorvastatin (LIPITOR) 40 MG tablet Take 40 mg by mouth daily      aspirin 81 MG tablet Take 81 mg by mouth daily      ibuprofen (ADVIL;MOTRIN) 800 MG tablet Take 1 tablet by mouth every 6 hours as needed for Pain (Patient not taking: Reported on 11/18/2022) 21 tablet 0    insulin glargine (LANTUS;BASAGLAR) 100 UNIT/ML injection pen Inject 30 Units into the skin 2 times daily 50 units AM and 30 units PM (Patient not taking: Reported on 11/18/2022)      levothyroxine (SYNTHROID) 112 MCG tablet Take 112 mcg by mouth Daily (Patient not taking: Reported on 11/18/2022)       No current facility-administered medications for this visit. Physical Exam:  /60   Pulse 77   Resp 16   Ht 5' 2\" (1.575 m)   Wt 183 lb (83 kg)   BMI 33.47 kg/m²   Wt Readings from Last 3 Encounters:   11/18/22 183 lb (83 kg)   08/28/21 178 lb (80.7 kg)   02/13/20 175 lb (79.4 kg)     The patient is awake, alert and in no discomfort or distress. No gross musculoskeletal deformity or lymphadenopathy are present. No significant skin or nail changes are present. Gross examination of head, eyes, nose and throat are negative. Jugular venous pressure is normal and no carotid bruits are present.  No thyromegaly is noted. Normal respiratory effort is noted with no accessory muscle usage present. Lung fields are clear to ascultation. Cardiac examination is notable for a regular rate and rhythm with no palpable thrill. No gallop rhythm or cardiac murmur are identified. A benign abdominal examination is present with the exception of obesity and no masses or organomegaly. A normal abdominal aortic pulsation is present. Intact pulses are present throughout all extremities and no peripheral edema is present. No focal neurologic deficits are present. Laboratory Tests:  Lab Results   Component Value Date    CREATININE 0.7 08/28/2021    BUN 7 08/28/2021     08/28/2021    K 4.3 08/28/2021    CL 97 (L) 08/28/2021    CO2 26 08/28/2021     No results found for: BNP  Lab Results   Component Value Date/Time    WBC 4.3 08/28/2021 07:18 PM    RBC 4.98 08/28/2021 07:18 PM    HGB 15.9 08/28/2021 07:18 PM    HCT 44.8 08/28/2021 07:18 PM    MCV 90.0 08/28/2021 07:18 PM    MCH 31.9 08/28/2021 07:18 PM    MCHC 35.5 08/28/2021 07:18 PM    RDW 13.1 08/28/2021 07:18 PM     08/28/2021 07:18 PM    MPV 10.3 08/28/2021 07:18 PM     No results for input(s): ALKPHOS, ALT, AST, PROT, BILITOT, BILIDIR, LABALBU in the last 72 hours.   No results found for: MG  Lab Results   Component Value Date/Time    PROTIME 11.5 08/28/2021 07:40 PM    INR 1.0 08/28/2021 07:40 PM     Most recent laboratory studies obtained from her primary care physician in October, 2022 include that of a hemoglobin level of 14.7 g/dL with normal renal function BUN and creatinine of 11 and 0.7 mg/dL with no electrolyte abnormalities and as above referenced a glycosylated hemoglobin level of approximately 12% and total cholesterol levels of in excess of 200 mg/dL as well as triglyceride levels in excess of 410 mg/dL    Cardiac Tests:  ECG: A resting electrocardiogram demonstrates evidence of sinus rhythm with left axis deviation and a delayed precordial transition      ASSESSMENT / PLAN: On a clinical basis, the patient presents with documented coronary atherosclerosis and prior percutaneous intervention as well as that of symptoms recently suggestive of an episode of unprovoked angina. Presently, with the exception of providing her a prescription for sublingual nitroglycerin, I have not altered her existing ischemic medical regimen and have extensively discussed her needs of careful symptom monitoring. Attempts are in progress to obtain records of her prior cardiovascular care, particularly that of her coronary angiography to assist further management. On both a diagnostic and prognostic basis, I recommended the performance of a gated vasodilator myocardial perfusion imaging study will provide additional recommendations as appropriate following its completion and review. I addition of extensively discussed her needs of appropriate lifestyle modification inclusive of smoking cessation both to reduce risk of further adverse effects on her chronic obstructive lung disease as well as that of her atherosclerotic risk in addition to that of weight reduction to benefit diastolic cardiac performance and reducing risk of the development of obstructive sleep apnea with associated adverse cardiovascular effects. Continued aggressive risk factor modification of blood pressure, diabetes and serum lipids will remain essential to reducing risk of future atherosclerotic development with recommended consideration of the addition of a SGLT2 inhibitor to her existing medical regimen both to assist management of her diabetes as well as reducing her risk of adverse cardiovascular effects and at present based on her most recent lipid status, I have increased her atorvastatin dosage to 80 mg daily with potential future management necessitating consideration of the addition of ezetimibe and/or a PCSK9 inhibitor to her existing medical regimen.   Unless dictated by the findings of perfusion imaging, I plan her clinical reassessment in approximately 3 months and would happily reevaluate her in the interim should additional cardiovascular difficulties or concerns arise. Follow-up office visit in 3 months. Thank you for allowing me to participate in your patient's care. Please feel free to contact me if you have any questions or concerns. Note: This report was completed utilizing a computerized voice recognition software. Every effort has been made to insure accuracy, however; inadvertent computerized transcription errors may be present. Arabella Frausto.  Becky Partida, [address] [place] [address]

## 2022-12-06 ENCOUNTER — TELEPHONE (OUTPATIENT)
Dept: CARDIOLOGY | Age: 57
End: 2022-12-06

## 2022-12-06 NOTE — TELEPHONE ENCOUNTER
Left message on voice mail to remind patient of [facility] stress test appointment on December 8, 2022 at 1030. Instructions for test,holding diabetic meds, and COVID-19 preprocedure information left on voice mail. Asked patient to call with any questions or if unable to keep appointment.

## 2022-12-07 ENCOUNTER — TELEPHONE (OUTPATIENT)
Dept: CARDIOLOGY | Age: 57
End: 2022-12-07

## 2022-12-07 NOTE — TELEPHONE ENCOUNTER
Patient cancelling stress test for tomorrow due to eligibility with insurance. Will call back to reschedule.

## 2022-12-08 ENCOUNTER — HOSPITAL ENCOUNTER (OUTPATIENT)
Dept: CARDIOLOGY | Age: 57
Discharge: HOME | End: 2022-12-08

## 2023-03-21 ENCOUNTER — TELEPHONE (OUTPATIENT)
Dept: CARDIOLOGY | Age: 58
End: 2023-03-21

## 2023-03-21 NOTE — TELEPHONE ENCOUNTER
Spoke with patient and confirmed pharmacological stress test appointment on 3/23/23 at 1030. Instructions for test including,hold diabetic medications the morning of test ,bring inhaler, and COVID-19 preprocedure information reviewed with patient, questions answered. Patient verbalized understanding.

## 2023-03-23 ENCOUNTER — HOSPITAL ENCOUNTER (OUTPATIENT)
Dept: CARDIOLOGY | Age: 58
Discharge: HOME | End: 2023-03-23
Payer: COMMERCIAL

## 2023-03-23 VITALS
SYSTOLIC BLOOD PRESSURE: 131 MMHG | RESPIRATION RATE: 16 BRPM | HEIGHT: 62 IN | HEART RATE: 71 BPM | DIASTOLIC BLOOD PRESSURE: 65 MMHG | BODY MASS INDEX: 33.68 KG/M2 | WEIGHT: 183 LBS

## 2023-03-23 PROCEDURE — A9502 TC99M TETROFOSMIN: HCPCS | Performed by: INTERNAL MEDICINE

## 2023-03-23 PROCEDURE — 78452 HT MUSCLE IMAGE SPECT MULT: CPT

## 2023-03-23 PROCEDURE — 2580000003 HC RX 258: Performed by: INTERNAL MEDICINE

## 2023-03-23 PROCEDURE — 3430000000 HC RX DIAGNOSTIC RADIOPHARMACEUTICAL: Performed by: INTERNAL MEDICINE

## 2023-03-23 PROCEDURE — 93017 CV STRESS TEST TRACING ONLY: CPT

## 2023-03-23 PROCEDURE — 6360000002 HC RX W HCPCS: Performed by: INTERNAL MEDICINE

## 2023-03-23 RX ORDER — SODIUM CHLORIDE 0.9 % (FLUSH) 0.9 %
10 SYRINGE (ML) INJECTION PRN
Status: DISCONTINUED | OUTPATIENT
Start: 2023-03-23 | End: 2023-03-24 | Stop reason: HOSPADM

## 2023-03-23 RX ORDER — MELOXICAM 15 MG/1
1 TABLET ORAL DAILY
COMMUNITY
Start: 2023-01-04

## 2023-03-23 RX ADMIN — TETROFOSMIN 34.8 MILLICURIE: 0.23 INJECTION, POWDER, LYOPHILIZED, FOR SOLUTION INTRAVENOUS at 11:29

## 2023-03-23 RX ADMIN — TETROFOSMIN 10.7 MILLICURIE: 0.23 INJECTION, POWDER, LYOPHILIZED, FOR SOLUTION INTRAVENOUS at 10:31

## 2023-03-23 RX ADMIN — Medication 10 ML: at 10:32

## 2023-03-23 RX ADMIN — REGADENOSON 0.4 MG: 0.08 INJECTION, SOLUTION INTRAVENOUS at 11:29

## 2023-03-23 RX ADMIN — Medication 10 ML: at 11:29

## 2023-03-23 NOTE — PROCEDURES
the short, vertical long, and horizontal long axis demonstrated A mild defect was present in the apex wall(s) that was  small size on the post regadeson images      The resting images are show no change. Gated SPECT left ventricular ejection fraction was calculated to be 67%, with normal myocardial thickening and wall motion. Impression:    Electrocardiographically normal regadenoson infusion with a clinically  non-ischemic response  Myocardial perfusion imaging was abnormal.    The abnormality was a a small sized fixed defect in the apex wall suggestive of a prior MI vs artifact. Overall left ventricular systolic function was normal without regional wall motion abnormalities. 4. Low risk general pharmacologic stress test.      No prior study available to compare. Thank you for sending your patient to this [employer].      Electronically signed by Keon Benitez MD on 3/24/2023 at 8:44 AM

## 2023-03-23 NOTE — DISCHARGE INSTRUCTIONS
[address]      Instructions to Patients    The following are the instructions for patients who have had a procedure in our office today. Patient name: Annalise Cody    Radionuclide Activity: 40mCi of 99mTc-Tetrofosmin    Date Administered: 3/23/2023    Expires: 48 hours after scheduled appointment time      Patient may resume normal activity unless otherwise instructed. Patient may resume medications as normal.  If the need should arise, patient may call [phone] between the hours of 7:00am-3:00pm.  After hours there is at least one physician on-call at all times for those patients needing assistance. Patients may call [phone] and the answering service will direct the patient to one of our physicians for assistance. After the patient's test if they are going to be leaving from an airport in the near future they should take this letter with them to verify the test and radionuclide used for their test.      This letter verifies that the above named bearer received an injection of a radionuclide for medical purpose/usage only.         Electronically signed by Juliette Velez on 3/23/2023 at 11:25 AM

## 2023-03-24 ENCOUNTER — TELEPHONE (OUTPATIENT)
Dept: CARDIOLOGY CLINIC | Age: 58
End: 2023-03-24

## 2023-03-24 NOTE — TELEPHONE ENCOUNTER
----- Message from Hi Rose MD sent at 3/24/2023  9:05 AM EDT -----  Please notify patient that stress test showed no abnormalities suggestive of progression of her known artery problems and normal heart function.   Continue as directed and follow-up as scheduled

## 2024-10-08 ENCOUNTER — HOSPITAL ENCOUNTER (INPATIENT)
Age: 59
LOS: 3 days | Discharge: HOME | DRG: 321 | End: 2024-10-11
Attending: INTERNAL MEDICINE | Admitting: STUDENT IN AN ORGANIZED HEALTH CARE EDUCATION/TRAINING PROGRAM
Payer: COMMERCIAL

## 2024-10-08 ENCOUNTER — APPOINTMENT (OUTPATIENT)
Age: 59
End: 2024-10-08
Payer: MEDICAID

## 2024-10-08 ENCOUNTER — APPOINTMENT (OUTPATIENT)
Dept: GENERAL RADIOLOGY | Age: 59
End: 2024-10-08
Payer: MEDICAID

## 2024-10-08 ENCOUNTER — HOSPITAL ENCOUNTER (EMERGENCY)
Age: 59
Discharge: ANOTHER HOSPITAL | End: 2024-10-08
Attending: EMERGENCY MEDICINE
Payer: MEDICAID

## 2024-10-08 VITALS
DIASTOLIC BLOOD PRESSURE: 66 MMHG | RESPIRATION RATE: 21 BRPM | SYSTOLIC BLOOD PRESSURE: 111 MMHG | BODY MASS INDEX: 27.6 KG/M2 | HEIGHT: 62 IN | TEMPERATURE: 97.4 F | WEIGHT: 150 LBS | OXYGEN SATURATION: 96 % | HEART RATE: 83 BPM

## 2024-10-08 DIAGNOSIS — I21.11 ACUTE ST ELEVATION MYOCARDIAL INFARCTION (STEMI) INVOLVING RIGHT CORONARY ARTERY (HCC): Primary | ICD-10-CM

## 2024-10-08 DIAGNOSIS — I21.3 ST ELEVATION MYOCARDIAL INFARCTION (STEMI), UNSPECIFIED ARTERY (HCC): Primary | ICD-10-CM

## 2024-10-08 DIAGNOSIS — N39.0 ACUTE UTI: ICD-10-CM

## 2024-10-08 DIAGNOSIS — I50.20 HFREF (HEART FAILURE WITH REDUCED EJECTION FRACTION) (HCC): ICD-10-CM

## 2024-10-08 DIAGNOSIS — R07.9 CHEST PAIN, UNSPECIFIED TYPE: ICD-10-CM

## 2024-10-08 DIAGNOSIS — I50.9 ACUTE CONGESTIVE HEART FAILURE, UNSPECIFIED HEART FAILURE TYPE (HCC): ICD-10-CM

## 2024-10-08 LAB
ALBUMIN SERPL-MCNC: 3.5 G/DL (ref 3.5–5.2)
ALP SERPL-CCNC: 208 U/L (ref 35–104)
ALT SERPL-CCNC: 21 U/L (ref 0–32)
ANION GAP SERPL CALCULATED.3IONS-SCNC: 8 MMOL/L (ref 7–16)
AST SERPL-CCNC: 18 U/L (ref 0–31)
B PARAP IS1001 DNA NPH QL NAA+NON-PROBE: NOT DETECTED
B PERT DNA SPEC QL NAA+PROBE: NOT DETECTED
BACTERIA URNS QL MICRO: ABNORMAL
BASOPHILS # BLD: 0.05 K/UL (ref 0–0.2)
BASOPHILS NFR BLD: 1 % (ref 0–2)
BILIRUB DIRECT SERPL-MCNC: <0.2 MG/DL (ref 0–0.3)
BILIRUB INDIRECT SERPL-MCNC: ABNORMAL MG/DL (ref 0–1)
BILIRUB SERPL-MCNC: 0.5 MG/DL (ref 0–1.2)
BILIRUB UR QL STRIP: NEGATIVE
BNP SERPL-MCNC: 2895 PG/ML (ref 0–450)
BUN SERPL-MCNC: 5 MG/DL (ref 6–23)
C PNEUM DNA NPH QL NAA+NON-PROBE: NOT DETECTED
CALCIUM SERPL-MCNC: 9.2 MG/DL (ref 8.6–10.2)
CHLORIDE SERPL-SCNC: 93 MMOL/L (ref 98–107)
CLARITY UR: ABNORMAL
CO2 SERPL-SCNC: 32 MMOL/L (ref 22–29)
COLOR UR: YELLOW
CREAT SERPL-MCNC: 0.5 MG/DL (ref 0.5–1)
ECHO AO ASC DIAM: 2.4 CM
ECHO AO ASCENDING AORTA INDEX: 1.42 CM/M2
ECHO AV AREA PEAK VELOCITY: 1.5 CM2
ECHO AV AREA VTI: 1.6 CM2
ECHO AV AREA/BSA PEAK VELOCITY: 0.9 CM2/M2
ECHO AV AREA/BSA VTI: 0.9 CM2/M2
ECHO AV MEAN GRADIENT: 9 MMHG
ECHO AV MEAN VELOCITY: 1.4 M/S
ECHO AV PEAK GRADIENT: 17 MMHG
ECHO AV PEAK VELOCITY: 2 M/S
ECHO AV VELOCITY RATIO: 0.5
ECHO AV VTI: 39.9 CM
ECHO BSA: 1.73 M2
ECHO EST RA PRESSURE: 8 MMHG
ECHO LA DIAMETER INDEX: 2.37 CM/M2
ECHO LA DIAMETER: 4 CM
ECHO LA VOL A-L A2C: 74 ML (ref 22–52)
ECHO LA VOL A-L A4C: 67 ML (ref 22–52)
ECHO LA VOL BP: 68 ML (ref 22–52)
ECHO LA VOL MOD A2C: 70 ML (ref 22–52)
ECHO LA VOL MOD A4C: 62 ML (ref 22–52)
ECHO LA VOL/BSA BIPLANE: 40 ML/M2 (ref 16–34)
ECHO LA VOLUME AREA LENGTH: 73 ML
ECHO LA VOLUME INDEX A-L A2C: 44 ML/M2 (ref 16–34)
ECHO LA VOLUME INDEX A-L A4C: 40 ML/M2 (ref 16–34)
ECHO LA VOLUME INDEX AREA LENGTH: 43 ML/M2 (ref 16–34)
ECHO LA VOLUME INDEX MOD A2C: 41 ML/M2 (ref 16–34)
ECHO LA VOLUME INDEX MOD A4C: 37 ML/M2 (ref 16–34)
ECHO LV EDV A2C: 115 ML
ECHO LV EDV A4C: 123 ML
ECHO LV EDV BP: 122 ML (ref 56–104)
ECHO LV EDV INDEX A4C: 73 ML/M2
ECHO LV EDV INDEX BP: 72 ML/M2
ECHO LV EDV NDEX A2C: 68 ML/M2
ECHO LV EJECTION FRACTION A2C: 47 %
ECHO LV EJECTION FRACTION A4C: 47 %
ECHO LV EJECTION FRACTION BIPLANE: 48 % (ref 55–100)
ECHO LV ESV A2C: 62 ML
ECHO LV ESV A4C: 66 ML
ECHO LV ESV BP: 63 ML (ref 19–49)
ECHO LV ESV INDEX A2C: 37 ML/M2
ECHO LV ESV INDEX A4C: 39 ML/M2
ECHO LV ESV INDEX BP: 37 ML/M2
ECHO LV FRACTIONAL SHORTENING: 14 % (ref 28–44)
ECHO LV INTERNAL DIMENSION DIASTOLE INDEX: 3.02 CM/M2
ECHO LV INTERNAL DIMENSION DIASTOLIC: 5.1 CM (ref 3.9–5.3)
ECHO LV INTERNAL DIMENSION SYSTOLIC INDEX: 2.6 CM/M2
ECHO LV INTERNAL DIMENSION SYSTOLIC: 4.4 CM
ECHO LV IVSD: 1 CM (ref 0.6–0.9)
ECHO LV MASS 2D: 188 G (ref 67–162)
ECHO LV MASS INDEX 2D: 111.3 G/M2 (ref 43–95)
ECHO LV POSTERIOR WALL DIASTOLIC: 1 CM (ref 0.6–0.9)
ECHO LV RELATIVE WALL THICKNESS RATIO: 0.39
ECHO LVOT AREA: 3.1 CM2
ECHO LVOT AV VTI INDEX: 0.5
ECHO LVOT DIAM: 2 CM
ECHO LVOT MEAN GRADIENT: 2 MMHG
ECHO LVOT PEAK GRADIENT: 4 MMHG
ECHO LVOT PEAK VELOCITY: 1 M/S
ECHO LVOT STROKE VOLUME INDEX: 37.2 ML/M2
ECHO LVOT SV: 62.8 ML
ECHO LVOT VTI: 20 CM
ECHO MV AREA PHT: 3.9 CM2
ECHO MV AREA VTI: 2 CM2
ECHO MV EROA PISA: 0.2 CM2
ECHO MV LVOT VTI INDEX: 1.57
ECHO MV MAX VELOCITY: 1.5 M/S
ECHO MV MEAN GRADIENT: 3 MMHG
ECHO MV MEAN VELOCITY: 0.8 M/S
ECHO MV PEAK GRADIENT: 9 MMHG
ECHO MV PRESSURE HALF TIME (PHT): 55.9 MS
ECHO MV REGURGITANT ALIASING (NYQUIST) VELOCITY: 36 CM/S
ECHO MV REGURGITANT RADIUS PISA: 0.59 CM
ECHO MV REGURGITANT VELOCITY PISA: 4.3 M/S
ECHO MV REGURGITANT VOLUME PISA: 23.77 ML
ECHO MV REGURGITANT VTIA: 129.9 CM
ECHO MV VTI: 31.3 CM
ECHO PV MAX VELOCITY: 1 M/S
ECHO PV MEAN GRADIENT: 2 MMHG
ECHO PV MEAN VELOCITY: 0.7 M/S
ECHO PV PEAK GRADIENT: 4 MMHG
ECHO PV VTI: 16.9 CM
ECHO PVEIN A DURATION: 99 MS
ECHO PVEIN A VELOCITY: 0.2 M/S
ECHO PVEIN PEAK D VELOCITY: 0.9 M/S
ECHO PVEIN PEAK S VELOCITY: 0.4 M/S
ECHO PVEIN S/D RATIO: 0.4
ECHO RIGHT VENTRICULAR SYSTOLIC PRESSURE (RVSP): 52 MMHG
ECHO RV LONGITUDINAL DIMENSION: 6.7 CM
ECHO RV MID DIMENSION: 1.9 CM
ECHO RV TAPSE: 2 CM (ref 1.7–?)
ECHO TV REGURGITANT MAX VELOCITY: 3.32 M/S
ECHO TV REGURGITANT PEAK GRADIENT: 44 MMHG
EKG ATRIAL RATE: 81 BPM
EKG P AXIS: 9 DEGREES
EKG P-R INTERVAL: 130 MS
EKG Q-T INTERVAL: 386 MS
EKG QRS DURATION: 78 MS
EKG QTC CALCULATION (BAZETT): 448 MS
EKG R AXIS: -26 DEGREES
EKG T AXIS: 56 DEGREES
EKG VENTRICULAR RATE: 81 BPM
EOSINOPHIL # BLD: 0.12 K/UL (ref 0.05–0.5)
EOSINOPHILS RELATIVE PERCENT: 1 % (ref 0–6)
EPI CELLS #/AREA URNS HPF: ABNORMAL /HPF
ERYTHROCYTE [DISTWIDTH] IN BLOOD BY AUTOMATED COUNT: 13.2 % (ref 11.5–15)
ERYTHROCYTE [DISTWIDTH] IN BLOOD BY AUTOMATED COUNT: 13.4 % (ref 11.5–15)
FLUAV RNA NPH QL NAA+NON-PROBE: NOT DETECTED
FLUAV RNA RESP QL NAA+PROBE: NOT DETECTED
FLUBV RNA NPH QL NAA+NON-PROBE: NOT DETECTED
FLUBV RNA RESP QL NAA+PROBE: NOT DETECTED
GFR, ESTIMATED: >90 ML/MIN/1.73M2
GLUCOSE BLD-MCNC: 134 MG/DL (ref 74–99)
GLUCOSE BLD-MCNC: 370 MG/DL (ref 74–99)
GLUCOSE BLD-MCNC: 395 MG/DL (ref 74–99)
GLUCOSE SERPL-MCNC: 117 MG/DL (ref 74–99)
GLUCOSE UR STRIP-MCNC: NEGATIVE MG/DL
HADV DNA NPH QL NAA+NON-PROBE: NOT DETECTED
HBA1C MFR BLD: 12.3 % (ref 4–5.6)
HCOV 229E RNA NPH QL NAA+NON-PROBE: NOT DETECTED
HCOV HKU1 RNA NPH QL NAA+NON-PROBE: NOT DETECTED
HCOV NL63 RNA NPH QL NAA+NON-PROBE: NOT DETECTED
HCOV OC43 RNA NPH QL NAA+NON-PROBE: NOT DETECTED
HCT VFR BLD AUTO: 32.7 % (ref 34–48)
HCT VFR BLD AUTO: 39.9 % (ref 34–48)
HGB BLD-MCNC: 11.2 G/DL (ref 11.5–15.5)
HGB BLD-MCNC: 13.4 G/DL (ref 11.5–15.5)
HGB UR QL STRIP.AUTO: ABNORMAL
HMPV RNA NPH QL NAA+NON-PROBE: NOT DETECTED
HPIV1 RNA NPH QL NAA+NON-PROBE: NOT DETECTED
HPIV2 RNA NPH QL NAA+NON-PROBE: NOT DETECTED
HPIV3 RNA NPH QL NAA+NON-PROBE: NOT DETECTED
HPIV4 RNA NPH QL NAA+NON-PROBE: NOT DETECTED
IMM GRANULOCYTES # BLD AUTO: 0.05 K/UL (ref 0–0.58)
IMM GRANULOCYTES NFR BLD: 1 % (ref 0–5)
INR PPP: 1.2
KETONES UR STRIP-MCNC: NEGATIVE MG/DL
LEFT VENTRICULAR EJECTION FRACTION HIGH VALUE: 45 %
LEFT VENTRICULAR EJECTION FRACTION MODE: NORMAL
LEUKOCYTE ESTERASE UR QL STRIP: ABNORMAL
LV EF: 40 %
LYMPHOCYTES NFR BLD: 1.18 K/UL (ref 1.5–4)
LYMPHOCYTES RELATIVE PERCENT: 12 % (ref 20–42)
M PNEUMO DNA NPH QL NAA+NON-PROBE: NOT DETECTED
MAGNESIUM SERPL-MCNC: 1.7 MG/DL (ref 1.6–2.6)
MCH RBC QN AUTO: 30.6 PG (ref 26–35)
MCH RBC QN AUTO: 31 PG (ref 26–35)
MCHC RBC AUTO-ENTMCNC: 33.6 G/DL (ref 32–34.5)
MCHC RBC AUTO-ENTMCNC: 34.3 G/DL (ref 32–34.5)
MCV RBC AUTO: 90.6 FL (ref 80–99.9)
MCV RBC AUTO: 91.1 FL (ref 80–99.9)
MONOCYTES NFR BLD: 0.58 K/UL (ref 0.1–0.95)
MONOCYTES NFR BLD: 6 % (ref 2–12)
NEUTROPHILS NFR BLD: 80 % (ref 43–80)
NEUTS SEG NFR BLD: 8.03 K/UL (ref 1.8–7.3)
NITRITE UR QL STRIP: NEGATIVE
PARTIAL THROMBOPLASTIN TIME: 29.5 SEC (ref 24.5–35.1)
PARTIAL THROMBOPLASTIN TIME: 31.4 SEC (ref 24.5–35.1)
PH UR STRIP: 7 [PH] (ref 5–9)
PLATELET # BLD AUTO: 354 K/UL (ref 130–450)
PLATELET # BLD AUTO: 416 K/UL (ref 130–450)
PMV BLD AUTO: 10 FL (ref 7–12)
PMV BLD AUTO: 10.3 FL (ref 7–12)
POTASSIUM SERPL-SCNC: 3.7 MMOL/L (ref 3.5–5)
PROCALCITONIN SERPL-MCNC: 0.54 NG/ML (ref 0–0.08)
PROT SERPL-MCNC: 7.8 G/DL (ref 6.4–8.3)
PROT UR STRIP-MCNC: NEGATIVE MG/DL
PROTHROMBIN TIME: 12.7 SEC (ref 9.3–12.4)
RBC # BLD AUTO: 3.61 M/UL (ref 3.5–5.5)
RBC # BLD AUTO: 4.38 M/UL (ref 3.5–5.5)
RBC #/AREA URNS HPF: ABNORMAL /HPF
RSV RNA NPH QL NAA+NON-PROBE: NOT DETECTED
RV+EV RNA NPH QL NAA+NON-PROBE: NOT DETECTED
SARS-COV-2 RNA NPH QL NAA+NON-PROBE: NOT DETECTED
SARS-COV-2 RNA RESP QL NAA+PROBE: NOT DETECTED
SODIUM SERPL-SCNC: 133 MMOL/L (ref 132–146)
SOURCE: NORMAL
SP GR UR STRIP: <1.005 (ref 1–1.03)
SPECIMEN DESCRIPTION: NORMAL
SPECIMEN DESCRIPTION: NORMAL
SPECIMEN SOURCE: NORMAL
STREP A, MOLECULAR: NEGATIVE
T3FREE SERPL-MCNC: 2.11 PG/ML (ref 2–4.4)
T4 FREE SERPL-MCNC: 0.9 NG/DL (ref 0.9–1.7)
TROPONIN I SERPL HS-MCNC: 438 NG/L (ref 0–9)
TROPONIN I SERPL HS-MCNC: 446 NG/L (ref 0–9)
TSH SERPL DL<=0.05 MIU/L-ACNC: 7.29 UIU/ML (ref 0.27–4.2)
UROBILINOGEN UR STRIP-ACNC: 1 EU/DL (ref 0–1)
WBC #/AREA URNS HPF: ABNORMAL /HPF
WBC OTHER # BLD: 10 K/UL (ref 4.5–11.5)
WBC OTHER # BLD: 9.4 K/UL (ref 4.5–11.5)

## 2024-10-08 PROCEDURE — 81001 URINALYSIS AUTO W/SCOPE: CPT

## 2024-10-08 PROCEDURE — 94640 AIRWAY INHALATION TREATMENT: CPT

## 2024-10-08 PROCEDURE — 71045 X-RAY EXAM CHEST 1 VIEW: CPT

## 2024-10-08 PROCEDURE — 0202U NFCT DS 22 TRGT SARS-COV-2: CPT

## 2024-10-08 PROCEDURE — 2140000000 HC CCU INTERMEDIATE R&B

## 2024-10-08 PROCEDURE — 87651 STREP A DNA AMP PROBE: CPT

## 2024-10-08 PROCEDURE — 83735 ASSAY OF MAGNESIUM: CPT

## 2024-10-08 PROCEDURE — 96374 THER/PROPH/DIAG INJ IV PUSH: CPT

## 2024-10-08 PROCEDURE — 87077 CULTURE AEROBIC IDENTIFY: CPT

## 2024-10-08 PROCEDURE — 87636 SARSCOV2 & INF A&B AMP PRB: CPT

## 2024-10-08 PROCEDURE — 36415 COLL VENOUS BLD VENIPUNCTURE: CPT

## 2024-10-08 PROCEDURE — 84439 ASSAY OF FREE THYROXINE: CPT

## 2024-10-08 PROCEDURE — 83036 HEMOGLOBIN GLYCOSYLATED A1C: CPT

## 2024-10-08 PROCEDURE — 85027 COMPLETE CBC AUTOMATED: CPT

## 2024-10-08 PROCEDURE — 82962 GLUCOSE BLOOD TEST: CPT

## 2024-10-08 PROCEDURE — 93005 ELECTROCARDIOGRAM TRACING: CPT

## 2024-10-08 PROCEDURE — 96376 TX/PRO/DX INJ SAME DRUG ADON: CPT

## 2024-10-08 PROCEDURE — 6360000002 HC RX W HCPCS: Performed by: STUDENT IN AN ORGANIZED HEALTH CARE EDUCATION/TRAINING PROGRAM

## 2024-10-08 PROCEDURE — 93306 TTE W/DOPPLER COMPLETE: CPT

## 2024-10-08 PROCEDURE — 6370000000 HC RX 637 (ALT 250 FOR IP): Performed by: HOSPITALIST

## 2024-10-08 PROCEDURE — 96365 THER/PROPH/DIAG IV INF INIT: CPT

## 2024-10-08 PROCEDURE — 6370000000 HC RX 637 (ALT 250 FOR IP)

## 2024-10-08 PROCEDURE — 82248 BILIRUBIN DIRECT: CPT

## 2024-10-08 PROCEDURE — 85610 PROTHROMBIN TIME: CPT

## 2024-10-08 PROCEDURE — 96366 THER/PROPH/DIAG IV INF ADDON: CPT

## 2024-10-08 PROCEDURE — 6360000002 HC RX W HCPCS

## 2024-10-08 PROCEDURE — 80053 COMPREHEN METABOLIC PANEL: CPT

## 2024-10-08 PROCEDURE — 99223 1ST HOSP IP/OBS HIGH 75: CPT | Performed by: STUDENT IN AN ORGANIZED HEALTH CARE EDUCATION/TRAINING PROGRAM

## 2024-10-08 PROCEDURE — 85025 COMPLETE CBC W/AUTO DIFF WBC: CPT

## 2024-10-08 PROCEDURE — 93010 ELECTROCARDIOGRAM REPORT: CPT | Performed by: INTERNAL MEDICINE

## 2024-10-08 PROCEDURE — 85730 THROMBOPLASTIN TIME PARTIAL: CPT

## 2024-10-08 PROCEDURE — 96375 TX/PRO/DX INJ NEW DRUG ADDON: CPT

## 2024-10-08 PROCEDURE — 84484 ASSAY OF TROPONIN QUANT: CPT

## 2024-10-08 PROCEDURE — 93306 TTE W/DOPPLER COMPLETE: CPT | Performed by: INTERNAL MEDICINE

## 2024-10-08 PROCEDURE — 84481 FREE ASSAY (FT-3): CPT

## 2024-10-08 PROCEDURE — 84443 ASSAY THYROID STIM HORMONE: CPT

## 2024-10-08 PROCEDURE — 2580000003 HC RX 258

## 2024-10-08 PROCEDURE — 84145 PROCALCITONIN (PCT): CPT

## 2024-10-08 PROCEDURE — 83880 ASSAY OF NATRIURETIC PEPTIDE: CPT

## 2024-10-08 PROCEDURE — 2580000003 HC RX 258: Performed by: STUDENT IN AN ORGANIZED HEALTH CARE EDUCATION/TRAINING PROGRAM

## 2024-10-08 PROCEDURE — 87086 URINE CULTURE/COLONY COUNT: CPT

## 2024-10-08 PROCEDURE — 99285 EMERGENCY DEPT VISIT HI MDM: CPT

## 2024-10-08 PROCEDURE — 6370000000 HC RX 637 (ALT 250 FOR IP): Performed by: STUDENT IN AN ORGANIZED HEALTH CARE EDUCATION/TRAINING PROGRAM

## 2024-10-08 RX ORDER — HEPARIN SODIUM 10000 [USP'U]/100ML
5-30 INJECTION, SOLUTION INTRAVENOUS CONTINUOUS
Status: DISCONTINUED | OUTPATIENT
Start: 2024-10-08 | End: 2024-10-08 | Stop reason: HOSPADM

## 2024-10-08 RX ORDER — ACETAMINOPHEN 650 MG/1
650 SUPPOSITORY RECTAL EVERY 6 HOURS PRN
Status: DISCONTINUED | OUTPATIENT
Start: 2024-10-08 | End: 2024-10-09

## 2024-10-08 RX ORDER — BENZONATATE 100 MG/1
100 CAPSULE ORAL 3 TIMES DAILY PRN
Status: DISCONTINUED | OUTPATIENT
Start: 2024-10-08 | End: 2024-10-11 | Stop reason: HOSPADM

## 2024-10-08 RX ORDER — SODIUM CHLORIDE 0.9 % (FLUSH) 0.9 %
5-40 SYRINGE (ML) INJECTION EVERY 12 HOURS SCHEDULED
Status: DISCONTINUED | OUTPATIENT
Start: 2024-10-08 | End: 2024-10-10

## 2024-10-08 RX ORDER — IPRATROPIUM BROMIDE AND ALBUTEROL SULFATE 2.5; .5 MG/3ML; MG/3ML
1 SOLUTION RESPIRATORY (INHALATION) EVERY 4 HOURS PRN
Status: DISCONTINUED | OUTPATIENT
Start: 2024-10-08 | End: 2024-10-11 | Stop reason: HOSPADM

## 2024-10-08 RX ORDER — HEPARIN SODIUM 1000 [USP'U]/ML
4000 INJECTION, SOLUTION INTRAVENOUS; SUBCUTANEOUS ONCE
Status: DISCONTINUED | OUTPATIENT
Start: 2024-10-08 | End: 2024-10-09

## 2024-10-08 RX ORDER — SODIUM CHLORIDE 0.9 % (FLUSH) 0.9 %
5-40 SYRINGE (ML) INJECTION PRN
Status: DISCONTINUED | OUTPATIENT
Start: 2024-10-08 | End: 2024-10-11 | Stop reason: HOSPADM

## 2024-10-08 RX ORDER — POLYETHYLENE GLYCOL 3350 17 G/17G
17 POWDER, FOR SOLUTION ORAL DAILY PRN
Status: DISCONTINUED | OUTPATIENT
Start: 2024-10-08 | End: 2024-10-09

## 2024-10-08 RX ORDER — DEXTROSE MONOHYDRATE 100 MG/ML
INJECTION, SOLUTION INTRAVENOUS CONTINUOUS PRN
Status: DISCONTINUED | OUTPATIENT
Start: 2024-10-08 | End: 2024-10-11 | Stop reason: HOSPADM

## 2024-10-08 RX ORDER — HEPARIN SODIUM 10000 [USP'U]/100ML
5-30 INJECTION, SOLUTION INTRAVENOUS CONTINUOUS
Status: DISCONTINUED | OUTPATIENT
Start: 2024-10-08 | End: 2024-10-09

## 2024-10-08 RX ORDER — GUAIFENESIN 400 MG/1
400 TABLET ORAL 4 TIMES DAILY PRN
Status: DISCONTINUED | OUTPATIENT
Start: 2024-10-08 | End: 2024-10-10

## 2024-10-08 RX ORDER — MAGNESIUM SULFATE IN WATER 40 MG/ML
2000 INJECTION, SOLUTION INTRAVENOUS PRN
Status: DISCONTINUED | OUTPATIENT
Start: 2024-10-08 | End: 2024-10-11 | Stop reason: HOSPADM

## 2024-10-08 RX ORDER — HEPARIN SODIUM 1000 [USP'U]/ML
4000 INJECTION, SOLUTION INTRAVENOUS; SUBCUTANEOUS ONCE
Status: COMPLETED | OUTPATIENT
Start: 2024-10-08 | End: 2024-10-08

## 2024-10-08 RX ORDER — INSULIN LISPRO 100 [IU]/ML
0-8 INJECTION, SOLUTION INTRAVENOUS; SUBCUTANEOUS
Status: DISCONTINUED | OUTPATIENT
Start: 2024-10-08 | End: 2024-10-09

## 2024-10-08 RX ORDER — PANTOPRAZOLE SODIUM 20 MG/1
20 TABLET, DELAYED RELEASE ORAL
Status: DISCONTINUED | OUTPATIENT
Start: 2024-10-09 | End: 2024-10-11 | Stop reason: HOSPADM

## 2024-10-08 RX ORDER — HEPARIN SODIUM 1000 [USP'U]/ML
4000 INJECTION, SOLUTION INTRAVENOUS; SUBCUTANEOUS PRN
Status: DISCONTINUED | OUTPATIENT
Start: 2024-10-08 | End: 2024-10-08 | Stop reason: HOSPADM

## 2024-10-08 RX ORDER — POTASSIUM CHLORIDE 7.45 MG/ML
10 INJECTION INTRAVENOUS PRN
Status: DISCONTINUED | OUTPATIENT
Start: 2024-10-08 | End: 2024-10-11 | Stop reason: HOSPADM

## 2024-10-08 RX ORDER — HEPARIN SODIUM 1000 [USP'U]/ML
2000 INJECTION, SOLUTION INTRAVENOUS; SUBCUTANEOUS PRN
Status: DISCONTINUED | OUTPATIENT
Start: 2024-10-08 | End: 2024-10-08 | Stop reason: HOSPADM

## 2024-10-08 RX ORDER — GLUCAGON 1 MG/ML
1 KIT INJECTION PRN
Status: DISCONTINUED | OUTPATIENT
Start: 2024-10-08 | End: 2024-10-11 | Stop reason: HOSPADM

## 2024-10-08 RX ORDER — 0.9 % SODIUM CHLORIDE 0.9 %
500 INTRAVENOUS SOLUTION INTRAVENOUS ONCE
Status: COMPLETED | OUTPATIENT
Start: 2024-10-08 | End: 2024-10-08

## 2024-10-08 RX ORDER — ALBUTEROL SULFATE 0.83 MG/ML
2.5 SOLUTION RESPIRATORY (INHALATION) EVERY 4 HOURS PRN
Status: DISCONTINUED | OUTPATIENT
Start: 2024-10-08 | End: 2024-10-11 | Stop reason: HOSPADM

## 2024-10-08 RX ORDER — ROPINIROLE 1 MG/1
1 TABLET, FILM COATED ORAL DAILY PRN
Status: DISCONTINUED | OUTPATIENT
Start: 2024-10-09 | End: 2024-10-11 | Stop reason: HOSPADM

## 2024-10-08 RX ORDER — DEXAMETHASONE SODIUM PHOSPHATE 10 MG/ML
10 INJECTION INTRAMUSCULAR; INTRAVENOUS ONCE
Status: COMPLETED | OUTPATIENT
Start: 2024-10-08 | End: 2024-10-08

## 2024-10-08 RX ORDER — SENNOSIDES A AND B 8.6 MG/1
1 TABLET, FILM COATED ORAL DAILY PRN
Status: DISCONTINUED | OUTPATIENT
Start: 2024-10-08 | End: 2024-10-11 | Stop reason: HOSPADM

## 2024-10-08 RX ORDER — ACETAMINOPHEN 325 MG/1
650 TABLET ORAL EVERY 6 HOURS PRN
Status: DISCONTINUED | OUTPATIENT
Start: 2024-10-08 | End: 2024-10-09

## 2024-10-08 RX ORDER — ONDANSETRON 2 MG/ML
4 INJECTION INTRAMUSCULAR; INTRAVENOUS EVERY 6 HOURS PRN
Status: DISCONTINUED | OUTPATIENT
Start: 2024-10-08 | End: 2024-10-11 | Stop reason: HOSPADM

## 2024-10-08 RX ORDER — INSULIN GLARGINE 100 [IU]/ML
75 INJECTION, SOLUTION SUBCUTANEOUS NIGHTLY
Status: DISCONTINUED | OUTPATIENT
Start: 2024-10-08 | End: 2024-10-08

## 2024-10-08 RX ORDER — POTASSIUM CHLORIDE 1500 MG/1
40 TABLET, EXTENDED RELEASE ORAL PRN
Status: DISCONTINUED | OUTPATIENT
Start: 2024-10-08 | End: 2024-10-11 | Stop reason: HOSPADM

## 2024-10-08 RX ORDER — FUROSEMIDE 10 MG/ML
20 INJECTION INTRAMUSCULAR; INTRAVENOUS DAILY
Status: DISCONTINUED | OUTPATIENT
Start: 2024-10-09 | End: 2024-10-09

## 2024-10-08 RX ORDER — IPRATROPIUM BROMIDE AND ALBUTEROL SULFATE 2.5; .5 MG/3ML; MG/3ML
3 SOLUTION RESPIRATORY (INHALATION) ONCE
Status: COMPLETED | OUTPATIENT
Start: 2024-10-08 | End: 2024-10-08

## 2024-10-08 RX ORDER — ASPIRIN 81 MG/1
81 TABLET ORAL DAILY
Status: DISCONTINUED | OUTPATIENT
Start: 2024-10-09 | End: 2024-10-11 | Stop reason: HOSPADM

## 2024-10-08 RX ORDER — LANOLIN ALCOHOL/MO/W.PET/CERES
3 CREAM (GRAM) TOPICAL NIGHTLY PRN
Status: DISCONTINUED | OUTPATIENT
Start: 2024-10-08 | End: 2024-10-11 | Stop reason: HOSPADM

## 2024-10-08 RX ORDER — GABAPENTIN 400 MG/1
400 CAPSULE ORAL 3 TIMES DAILY
Status: DISCONTINUED | OUTPATIENT
Start: 2024-10-08 | End: 2024-10-11 | Stop reason: HOSPADM

## 2024-10-08 RX ORDER — ATORVASTATIN CALCIUM 40 MG/1
80 TABLET, FILM COATED ORAL DAILY
Status: DISCONTINUED | OUTPATIENT
Start: 2024-10-09 | End: 2024-10-11 | Stop reason: HOSPADM

## 2024-10-08 RX ORDER — INSULIN GLARGINE 100 [IU]/ML
55 INJECTION, SOLUTION SUBCUTANEOUS 2 TIMES DAILY
Status: DISCONTINUED | OUTPATIENT
Start: 2024-10-08 | End: 2024-10-09

## 2024-10-08 RX ORDER — FUROSEMIDE 10 MG/ML
20 INJECTION INTRAMUSCULAR; INTRAVENOUS ONCE
Status: DISCONTINUED | OUTPATIENT
Start: 2024-10-08 | End: 2024-10-08

## 2024-10-08 RX ORDER — METOPROLOL TARTRATE 25 MG/1
25 TABLET, FILM COATED ORAL 2 TIMES DAILY
Status: DISCONTINUED | OUTPATIENT
Start: 2024-10-08 | End: 2024-10-09

## 2024-10-08 RX ORDER — FUROSEMIDE 10 MG/ML
40 INJECTION INTRAMUSCULAR; INTRAVENOUS ONCE
Status: COMPLETED | OUTPATIENT
Start: 2024-10-08 | End: 2024-10-08

## 2024-10-08 RX ORDER — HEPARIN SODIUM 1000 [USP'U]/ML
4000 INJECTION, SOLUTION INTRAVENOUS; SUBCUTANEOUS PRN
Status: DISCONTINUED | OUTPATIENT
Start: 2024-10-08 | End: 2024-10-11 | Stop reason: HOSPADM

## 2024-10-08 RX ORDER — SODIUM CHLORIDE 9 MG/ML
INJECTION, SOLUTION INTRAVENOUS PRN
Status: DISCONTINUED | OUTPATIENT
Start: 2024-10-08 | End: 2024-10-10

## 2024-10-08 RX ORDER — INSULIN LISPRO 100 [IU]/ML
0-4 INJECTION, SOLUTION INTRAVENOUS; SUBCUTANEOUS NIGHTLY
Status: DISCONTINUED | OUTPATIENT
Start: 2024-10-08 | End: 2024-10-09

## 2024-10-08 RX ORDER — LISINOPRIL 10 MG/1
10 TABLET ORAL DAILY
Status: DISCONTINUED | OUTPATIENT
Start: 2024-10-09 | End: 2024-10-09

## 2024-10-08 RX ORDER — INSULIN GLARGINE 100 [IU]/ML
55 INJECTION, SOLUTION SUBCUTANEOUS 2 TIMES DAILY
Status: DISCONTINUED | OUTPATIENT
Start: 2024-10-08 | End: 2024-10-08

## 2024-10-08 RX ORDER — SODIUM CHLORIDE 9 MG/ML
INJECTION, SOLUTION INTRAVENOUS
Status: COMPLETED
Start: 2024-10-08 | End: 2024-10-08

## 2024-10-08 RX ORDER — FUROSEMIDE 10 MG/ML
20 INJECTION INTRAMUSCULAR; INTRAVENOUS DAILY
Status: DISCONTINUED | OUTPATIENT
Start: 2024-10-08 | End: 2024-10-08

## 2024-10-08 RX ORDER — ONDANSETRON 4 MG/1
4 TABLET, ORALLY DISINTEGRATING ORAL EVERY 8 HOURS PRN
Status: DISCONTINUED | OUTPATIENT
Start: 2024-10-08 | End: 2024-10-11 | Stop reason: HOSPADM

## 2024-10-08 RX ORDER — NICOTINE 21 MG/24HR
1 PATCH, TRANSDERMAL 24 HOURS TRANSDERMAL DAILY
Status: DISCONTINUED | OUTPATIENT
Start: 2024-10-08 | End: 2024-10-11 | Stop reason: HOSPADM

## 2024-10-08 RX ORDER — HEPARIN SODIUM 1000 [USP'U]/ML
30 INJECTION, SOLUTION INTRAVENOUS; SUBCUTANEOUS PRN
Status: DISCONTINUED | OUTPATIENT
Start: 2024-10-08 | End: 2024-10-11 | Stop reason: HOSPADM

## 2024-10-08 RX ORDER — NITROGLYCERIN 0.4 MG/1
0.4 TABLET SUBLINGUAL EVERY 5 MIN PRN
Status: DISCONTINUED | OUTPATIENT
Start: 2024-10-08 | End: 2024-10-11 | Stop reason: HOSPADM

## 2024-10-08 RX ORDER — ENOXAPARIN SODIUM 100 MG/ML
40 INJECTION SUBCUTANEOUS DAILY
Status: CANCELLED | OUTPATIENT
Start: 2024-10-08

## 2024-10-08 RX ORDER — ASPIRIN 81 MG/1
324 TABLET, CHEWABLE ORAL ONCE
Status: COMPLETED | OUTPATIENT
Start: 2024-10-08 | End: 2024-10-08

## 2024-10-08 RX ORDER — LEVOTHYROXINE SODIUM 200 UG/1
200 TABLET ORAL DAILY
Status: DISCONTINUED | OUTPATIENT
Start: 2024-10-09 | End: 2024-10-11 | Stop reason: HOSPADM

## 2024-10-08 RX ORDER — INSULIN LISPRO 100 [IU]/ML
12 INJECTION, SOLUTION INTRAVENOUS; SUBCUTANEOUS
Status: DISCONTINUED | OUTPATIENT
Start: 2024-10-08 | End: 2024-10-09

## 2024-10-08 RX ADMIN — IPRATROPIUM BROMIDE AND ALBUTEROL SULFATE 3 DOSE: .5; 2.5 SOLUTION RESPIRATORY (INHALATION) at 08:51

## 2024-10-08 RX ADMIN — WATER 1000 MG: 1 INJECTION INTRAMUSCULAR; INTRAVENOUS; SUBCUTANEOUS at 11:18

## 2024-10-08 RX ADMIN — HEPARIN SODIUM 2000 UNITS: 1000 INJECTION INTRAVENOUS; SUBCUTANEOUS at 18:43

## 2024-10-08 RX ADMIN — Medication 500 ML: at 09:03

## 2024-10-08 RX ADMIN — INSULIN LISPRO 4 UNITS: 100 INJECTION, SOLUTION INTRAVENOUS; SUBCUTANEOUS at 21:46

## 2024-10-08 RX ADMIN — INSULIN GLARGINE 55 UNITS: 100 INJECTION, SOLUTION SUBCUTANEOUS at 17:43

## 2024-10-08 RX ADMIN — GABAPENTIN 400 MG: 400 CAPSULE ORAL at 21:46

## 2024-10-08 RX ADMIN — SODIUM CHLORIDE 500 ML: 9 INJECTION, SOLUTION INTRAVENOUS at 09:03

## 2024-10-08 RX ADMIN — SODIUM CHLORIDE, PRESERVATIVE FREE 10 ML: 5 INJECTION INTRAVENOUS at 21:46

## 2024-10-08 RX ADMIN — HEPARIN SODIUM 4000 UNITS: 1000 INJECTION INTRAVENOUS; SUBCUTANEOUS at 11:47

## 2024-10-08 RX ADMIN — FUROSEMIDE 40 MG: 10 INJECTION, SOLUTION INTRAMUSCULAR; INTRAVENOUS at 17:43

## 2024-10-08 RX ADMIN — DEXAMETHASONE SODIUM PHOSPHATE 10 MG: 10 INJECTION INTRAMUSCULAR; INTRAVENOUS at 11:44

## 2024-10-08 RX ADMIN — ROPINIROLE HYDROCHLORIDE 3 MG: 2 TABLET, FILM COATED ORAL at 23:33

## 2024-10-08 RX ADMIN — HEPARIN SODIUM AND DEXTROSE 12 UNITS/KG/HR: 10000; 5 INJECTION INTRAVENOUS at 11:51

## 2024-10-08 RX ADMIN — INSULIN LISPRO 8 UNITS: 100 INJECTION, SOLUTION INTRAVENOUS; SUBCUTANEOUS at 17:44

## 2024-10-08 RX ADMIN — INSULIN LISPRO 12 UNITS: 100 INJECTION, SOLUTION INTRAVENOUS; SUBCUTANEOUS at 17:43

## 2024-10-08 RX ADMIN — ASPIRIN 324 MG: 81 TABLET, CHEWABLE ORAL at 08:59

## 2024-10-08 RX ADMIN — HEPARIN SODIUM 12 UNITS/KG/HR: 10000 INJECTION, SOLUTION INTRAVENOUS at 17:45

## 2024-10-08 ASSESSMENT — LIFESTYLE VARIABLES
HOW OFTEN DO YOU HAVE A DRINK CONTAINING ALCOHOL: NEVER
HOW MANY STANDARD DRINKS CONTAINING ALCOHOL DO YOU HAVE ON A TYPICAL DAY: PATIENT DOES NOT DRINK

## 2024-10-08 NOTE — ED NOTES
33yoF, current smoker, with PMHx of IDDM, COPD, HTN, , PAD with L SFA occlusion and rest pain/hx L 5th metatarsal tissue loss, s/p L SFA stent 8/11/21 w/recurrent thrombosis, s/p L CFA-BK pop bypass w/in situ GSV by Dr Sharon Puckett 9/14/21  Pt presents today for results review of lower extremity arterial duplex done 5/31/22      -Educated patient on pathophysiology of peripheral arterial disease, management/treatment and importance of continued surveillance    -Discussed risk factor modification, including adequate glycemic and lipid control, smoking cessation, blood pressure, and lifestyle modifications    -Will obtain EZEKIEL in 3 months per protocol   -Continue medical optimization with daily ASA 81mg and statin therapy with LDL goal <100    -Continue Xarelto PAD dosing for bypass patency   -Follow up in 3 months after EZEKIEL for results review  -Instructed patient to call the office with questions, concerns, or new symptoms  Report to cath lab at this time.

## 2024-10-08 NOTE — ED PROVIDER NOTES
(A) 16 - 34 ml/m2    LA Size Index 2.37 cm/m2    Ascending Aorta Index 1.42 cm/m2    AV Velocity Ratio 0.50     LVOT:AV VTI Index 0.50     VENTURA/BSA VTI 0.9 cm2/m2    VENTURA/BSA Peak Velocity 0.9 cm2/m2    MR Regurg Volume PISA 23.77 mL    MV:LVOT VTI Index 1.57     TAPSE 2.0 1.7 cm       Radiology reviewed and interpreted by me:  XR CHEST PORTABLE   Final Result   Bilateral small pleural effusions with prominent interstitial markings and   mild cardiomegaly present.             Procedures     None    [facility] and ED course   History is obtained from patient for patient's acute onset of moderate viral-like symptoms    Differential diagnoses: EKG ordered to evaluate patient's current cardiac rate, rhythm, and QT interval. CBC was ordered as part of my assessment for possible infection, anemia or thrombocytopenia. BMP to assess electrolytes, kidney function or any metabolic derangements. Urinalysis to evaluate for a UTI. Troponin as a marker for myocardial ischemia or heart strain. Chest x-ray for any possible signs of, but without limitation to, pneumonia, pleural effusions, cardiomegaly, pneumothorax, atelectasis, rib or sternal abnormalities including fractures.     ED Course as of 10/08/24 1227   Tue Oct 08, 2024   0843 POSTERIOR EKG interpreted by me: Normal sinus rhythm, rate 83, left axis deviation, normal ID interval, normal QRS duration, QTc is 427, there are ST changes including ST elevations in the inferolateral leads including 2, 3, aVF, V4, V5, V6 with ST depressions in V1-V3 most notably in V2 []   0904 Patient without chest pain, discussed with interventional cardiology, plan to obtain echo here and workup with blood work, and case will be discussed again after that []   1224 Discussed case with internal medicine downw plan to transfer to intermediate floor []      ED Course User Index  [] Wil Echeverria MD       EKG obtained during ED visit is noted in the ED course.  I interpreted the patient's

## 2024-10-08 NOTE — PROGRESS NOTES
Messaged attending regarding admission orders and clarification if pt supposed to go to cath lab.     Per Dr. Eden, no need for emergent cath per cardiology     Will await admission orders    Justine Booth RN

## 2024-10-08 NOTE — H&P
deformity. Pupils equal, round, and reactive to light.  Extra ocular muscles intact. Conjunctivae/corneas clear.  Neck: Supple, with full range of motion. No jugular venous distention. Trachea midline.  Respiratory: Diminished breath sounds, bilateral wheezing scattered Rales and rhonchi  Cardiovascular: S1, S2, no murmur, rubs or gallops, 1+ pitting edema  Abdomen: Soft, nontender, no organomegaly  Musculoskeletal: No clubbing, cyanosis, no gross skeletal deformity. Brisk capillary refill.   Skin: Normal skin color.  No rashes or lesions.  Neurologic:  Neurovascularly intact without any focal sensory/motor deficits. Cranial nerves: II-XII intact, grossly non-focal.  Psychiatric: Alert and oriented, thought content appropriate, normal insight    Reviewed EKG and CXR personally    CBC:   Recent Labs     10/08/24  0846 10/08/24  1015   WBC 10.0 9.4   RBC 4.38 3.61   HGB 13.4 11.2*   HCT 39.9 32.7*   MCV 91.1 90.6   RDW 13.2 13.4    354     BMP:   Recent Labs     10/08/24  0846      K 3.7   CL 93*   CO2 32*   BUN 5*   CREATININE 0.5   MG 1.7     LFT:  Recent Labs     10/08/24  0846   ALKPHOS 208*   ALT 21   AST 18   BILITOT 0.5     CE:  No results for input(s): \"CKTOTAL\", \"TROPONINI\" in the last 72 hours.  PT/INR:   Recent Labs     10/08/24  0846 10/08/24  1015   INR 1.2  --    APTT  --  29.5     BNP: No results for input(s): \"BNP\" in the last 72 hours.  ESR: No results found for: \"SEDRATE\"  CRP: No results found for: \"CRP\"  D Dimer: No results found for: \"DDIMER\"   Folate and B12: No results found for: \"UOPCHAQT87\", No results found for: \"FOLATE\"  Lactic Acid:   Lab Results   Component Value Date    LACTA 1.5 08/28/2021     Thyroid Studies: No results found for: \"TSH\", \"C8BHKTQ\", \"THYROIDAB\"    Oupatient labs:  Lab Results   Component Value Date    INR 1.2 10/08/2024       Urinalysis:    Lab Results   Component Value Date/Time    NITRU NEGATIVE 10/08/2024 08:46 AM    WBCUA 10 TO 20 10/08/2024 08:46 AM     Coronary artery disease of native artery of native heart with stable angina pectoris (HCC)  Resolved Problems:    * No resolved hospital problems. *  Hypothyroidism  GERD  Anxiety and depression  Restless leg syndrome  Neuropathy  Acute CHF (HFrEF)  Hx of CAD with stents       PLAN:  -Monitor on telemetry floor  -Appears volume overloaded, IV Lasix 20 mg x 1  -Diurese as able, started on Lasix 20mg IV daily   -CHF clinic referral  -Cardiology following, appreciate recommendations  -Continue with heparin drip  -Continue aspirin, statin  - Optimize GDMT  -Monitor on telemetry  -Input/output, daily weight, fluid and salt restrictions  -PT/OT eval  -Keep n.p.o. midnight for tentative procedure tomorrow  -Resume home medication as appropriate  -Check A1c, lipid panel in am , TSH, free T3, free T4  -Endocrinology consulted for insulin regimen adjustment  -Start insulin regimen, MDSSI, adjust as needed, hypoglycemia protocol  -Will benefit from weight loss, outpatient sleep study  -Breathing treatment as needed, incentive spirometry, encourage deep breathing and coughing, flutter valve  -check RPP, procal, sputum culture   -Chest physiotherapy           Diet: ADULT DIET; Regular; Low Sodium (2 gm)  Diet NPO Exceptions are: Sips of Water with Meds  Code Status: Full Code      DVT Prophylaxis: []Lovenox [x]Heparin []PCD [] Warfarin/NOAC []Encouraged ambulation    Disposition: [x]Med/Surg [] Intermediate [] ICU/CCU  Admit status: [] Observation [x] Inpatient     +++++++++++++++++++++++++++++++++++++++++++++++++  Ha Carrillo MD  [facility]  [place], [place]  +++++++++++++++++++++++++++++++++++++++++++++++++  NOTE: This report was transcribed using voice recognition software. Every effort was made to ensure accuracy; however, inadvertent computerized transcription errors may be present.

## 2024-10-08 NOTE — PROGRESS NOTES
4 Eyes Skin Assessment     NAME:  Debra Maher  YOB: 1965  MEDICAL RECORD NUMBER:  [id number]    The patient is being assessed for  Admission    I agree that at least one RN has performed a thorough Head to Toe Skin Assessment on the patient. ALL assessment sites listed below have been assessed.      Areas assessed by both nurses:    Head, Face, Ears, Shoulders, Back, Chest, Arms, Elbows, Hands, Sacrum. Buttock, Coccyx, Ischium, and Legs. Feet and Heels        Does the Patient have a Wound? No noted wound(s)       Chris Prevention initiated by RN: No  Wound Care Orders initiated by RN: No    Pressure Injury (Stage 3,4, Unstageable, DTI, NWPT, and Complex wounds) if present, place Wound referral order by RN under : No    New Ostomies, if present place, Ostomy referral order under : No     Nurse 1 eSignature: Electronically signed by Justine Booth RN on 10/8/24 at 7:21 PM EDT    **SHARE this note so that the co-signing nurse can place an eSignature**    Nurse 2 eSignature: Electronically signed by Juana Whitaker RN on 10/8/24 at 7:33 PM EDT

## 2024-10-09 PROBLEM — E78.5 HYPERLIPIDEMIA: Status: ACTIVE | Noted: 2024-10-09

## 2024-10-09 PROBLEM — Z91.119 DIETARY NONCOMPLIANCE: Status: ACTIVE | Noted: 2024-10-09

## 2024-10-09 PROBLEM — E03.9 PRIMARY HYPOTHYROIDISM: Status: ACTIVE | Noted: 2024-10-09

## 2024-10-09 PROBLEM — E11.65 POORLY CONTROLLED TYPE 2 DIABETES MELLITUS (HCC): Status: ACTIVE | Noted: 2022-10-28

## 2024-10-09 LAB
ABO + RH BLD: NORMAL
ACTIVATED CLOTTING TIME, LOW RANGE: 215 SEC
ACTIVATED CLOTTING TIME, LOW RANGE: 254 SEC
ACTIVATED CLOTTING TIME, LOW RANGE: 379 SEC
ACTIVATED CLOTTING TIME, LOW RANGE: >400 SEC
ALBUMIN SERPL-MCNC: 3.1 G/DL (ref 3.5–5.2)
ALP SERPL-CCNC: 209 U/L (ref 35–104)
ALT SERPL-CCNC: 19 U/L (ref 0–32)
ANION GAP SERPL CALCULATED.3IONS-SCNC: 11 MMOL/L (ref 7–16)
ANION GAP SERPL CALCULATED.3IONS-SCNC: 11 MMOL/L (ref 7–16)
ARM BAND NUMBER: NORMAL
AST SERPL-CCNC: 19 U/L (ref 0–31)
BASOPHILS # BLD: 0.05 K/UL (ref 0–0.2)
BASOPHILS # BLD: 0.05 K/UL (ref 0–0.2)
BASOPHILS NFR BLD: 0 % (ref 0–2)
BASOPHILS NFR BLD: 1 % (ref 0–2)
BILIRUB DIRECT SERPL-MCNC: <0.2 MG/DL (ref 0–0.3)
BILIRUB INDIRECT SERPL-MCNC: ABNORMAL MG/DL (ref 0–1)
BILIRUB SERPL-MCNC: 0.4 MG/DL (ref 0–1.2)
BLOOD BANK SAMPLE EXPIRATION: NORMAL
BLOOD GROUP ANTIBODIES SERPL: NEGATIVE
BUN SERPL-MCNC: 10 MG/DL (ref 6–23)
BUN SERPL-MCNC: 8 MG/DL (ref 6–23)
CALCIUM SERPL-MCNC: 8.6 MG/DL (ref 8.6–10.2)
CALCIUM SERPL-MCNC: 9.2 MG/DL (ref 8.6–10.2)
CHLORIDE SERPL-SCNC: 96 MMOL/L (ref 98–107)
CHLORIDE SERPL-SCNC: 98 MMOL/L (ref 98–107)
CHOLEST SERPL-MCNC: 136 MG/DL
CO2 SERPL-SCNC: 29 MMOL/L (ref 22–29)
CO2 SERPL-SCNC: 29 MMOL/L (ref 22–29)
CREAT SERPL-MCNC: 0.5 MG/DL (ref 0.5–1)
CREAT SERPL-MCNC: 0.6 MG/DL (ref 0.5–1)
EKG ATRIAL RATE: 76 BPM
EKG P AXIS: 18 DEGREES
EKG P-R INTERVAL: 130 MS
EKG Q-T INTERVAL: 412 MS
EKG QRS DURATION: 76 MS
EKG QTC CALCULATION (BAZETT): 463 MS
EKG R AXIS: -27 DEGREES
EKG T AXIS: 51 DEGREES
EKG VENTRICULAR RATE: 76 BPM
EOSINOPHIL # BLD: 0.04 K/UL (ref 0.05–0.5)
EOSINOPHIL # BLD: 0.06 K/UL (ref 0.05–0.5)
EOSINOPHILS RELATIVE PERCENT: 0 % (ref 0–6)
EOSINOPHILS RELATIVE PERCENT: 1 % (ref 0–6)
ERYTHROCYTE [DISTWIDTH] IN BLOOD BY AUTOMATED COUNT: 13.6 % (ref 11.5–15)
ERYTHROCYTE [DISTWIDTH] IN BLOOD BY AUTOMATED COUNT: 13.8 % (ref 11.5–15)
GFR, ESTIMATED: >90 ML/MIN/1.73M2
GFR, ESTIMATED: >90 ML/MIN/1.73M2
GLUCOSE BLD-MCNC: 115 MG/DL (ref 74–99)
GLUCOSE BLD-MCNC: 134 MG/DL (ref 74–99)
GLUCOSE BLD-MCNC: 207 MG/DL (ref 74–99)
GLUCOSE BLD-MCNC: 62 MG/DL (ref 74–99)
GLUCOSE BLD-MCNC: 75 MG/DL (ref 74–99)
GLUCOSE BLD-MCNC: 85 MG/DL (ref 74–99)
GLUCOSE SERPL-MCNC: 189 MG/DL (ref 74–99)
GLUCOSE SERPL-MCNC: 67 MG/DL (ref 74–99)
HCT VFR BLD AUTO: 36.6 % (ref 34–48)
HCT VFR BLD AUTO: 38.1 % (ref 34–48)
HDLC SERPL-MCNC: 43 MG/DL
HGB BLD-MCNC: 12 G/DL (ref 11.5–15.5)
HGB BLD-MCNC: 12.2 G/DL (ref 11.5–15.5)
IMM GRANULOCYTES # BLD AUTO: 0.05 K/UL (ref 0–0.58)
IMM GRANULOCYTES # BLD AUTO: 0.06 K/UL (ref 0–0.58)
IMM GRANULOCYTES NFR BLD: 1 % (ref 0–5)
IMM GRANULOCYTES NFR BLD: 1 % (ref 0–5)
LACTATE BLDV-SCNC: 1.9 MMOL/L (ref 0.5–2.2)
LDLC SERPL CALC-MCNC: 71 MG/DL
LYMPHOCYTES NFR BLD: 1.39 K/UL (ref 1.5–4)
LYMPHOCYTES NFR BLD: 1.59 K/UL (ref 1.5–4)
LYMPHOCYTES RELATIVE PERCENT: 14 % (ref 20–42)
LYMPHOCYTES RELATIVE PERCENT: 16 % (ref 20–42)
MAGNESIUM SERPL-MCNC: 1.8 MG/DL (ref 1.6–2.6)
MCH RBC QN AUTO: 29.9 PG (ref 26–35)
MCH RBC QN AUTO: 30.5 PG (ref 26–35)
MCHC RBC AUTO-ENTMCNC: 32 G/DL (ref 32–34.5)
MCHC RBC AUTO-ENTMCNC: 32.8 G/DL (ref 32–34.5)
MCV RBC AUTO: 92.9 FL (ref 80–99.9)
MCV RBC AUTO: 93.4 FL (ref 80–99.9)
MONOCYTES NFR BLD: 0.54 K/UL (ref 0.1–0.95)
MONOCYTES NFR BLD: 0.55 K/UL (ref 0.1–0.95)
MONOCYTES NFR BLD: 5 % (ref 2–12)
MONOCYTES NFR BLD: 6 % (ref 2–12)
NEUTROPHILS NFR BLD: 77 % (ref 43–80)
NEUTROPHILS NFR BLD: 80 % (ref 43–80)
NEUTS SEG NFR BLD: 6.87 K/UL (ref 1.8–7.3)
NEUTS SEG NFR BLD: 9.01 K/UL (ref 1.8–7.3)
PARTIAL THROMBOPLASTIN TIME: 39.6 SEC (ref 24.5–35.1)
PARTIAL THROMBOPLASTIN TIME: 40.7 SEC (ref 24.5–35.1)
PHOSPHATE SERPL-MCNC: 3.9 MG/DL (ref 2.5–4.5)
PHOSPHATE SERPL-MCNC: 4.5 MG/DL (ref 2.5–4.5)
PLATELET # BLD AUTO: 375 K/UL (ref 130–450)
PLATELET # BLD AUTO: 400 K/UL (ref 130–450)
PMV BLD AUTO: 10.3 FL (ref 7–12)
PMV BLD AUTO: 10.7 FL (ref 7–12)
POTASSIUM SERPL-SCNC: 3.7 MMOL/L (ref 3.5–5)
POTASSIUM SERPL-SCNC: 3.9 MMOL/L (ref 3.5–5)
PROT SERPL-MCNC: 7 G/DL (ref 6.4–8.3)
RBC # BLD AUTO: 3.94 M/UL (ref 3.5–5.5)
RBC # BLD AUTO: 4.08 M/UL (ref 3.5–5.5)
SODIUM SERPL-SCNC: 136 MMOL/L (ref 132–146)
SODIUM SERPL-SCNC: 138 MMOL/L (ref 132–146)
TRIGL SERPL-MCNC: 112 MG/DL
TROPONIN I SERPL HS-MCNC: 483 NG/L (ref 0–9)
TSH SERPL DL<=0.05 MIU/L-ACNC: 9.51 UIU/ML (ref 0.27–4.2)
VLDLC SERPL CALC-MCNC: 22 MG/DL
WBC OTHER # BLD: 11.3 K/UL (ref 4.5–11.5)
WBC OTHER # BLD: 9 K/UL (ref 4.5–11.5)

## 2024-10-09 PROCEDURE — 80061 LIPID PANEL: CPT

## 2024-10-09 PROCEDURE — 92941 PRQ TRLML REVSC TOT OCCL AMI: CPT | Performed by: INTERNAL MEDICINE

## 2024-10-09 PROCEDURE — 2580000003 HC RX 258: Performed by: STUDENT IN AN ORGANIZED HEALTH CARE EDUCATION/TRAINING PROGRAM

## 2024-10-09 PROCEDURE — 82248 BILIRUBIN DIRECT: CPT

## 2024-10-09 PROCEDURE — 82962 GLUCOSE BLOOD TEST: CPT

## 2024-10-09 PROCEDURE — 7100000011 HC PHASE II RECOVERY - ADDTL 15 MIN: Performed by: INTERNAL MEDICINE

## 2024-10-09 PROCEDURE — 99291 CRITICAL CARE FIRST HOUR: CPT | Performed by: INTERNAL MEDICINE

## 2024-10-09 PROCEDURE — 6360000004 HC RX CONTRAST MEDICATION: Performed by: INTERNAL MEDICINE

## 2024-10-09 PROCEDURE — 92928 PRQ TCAT PLMT NTRAC ST 1 LES: CPT | Performed by: INTERNAL MEDICINE

## 2024-10-09 PROCEDURE — 94669 MECHANICAL CHEST WALL OSCILL: CPT

## 2024-10-09 PROCEDURE — 85347 COAGULATION TIME ACTIVATED: CPT

## 2024-10-09 PROCEDURE — 84443 ASSAY THYROID STIM HORMONE: CPT

## 2024-10-09 PROCEDURE — C1725 CATH, TRANSLUMIN NON-LASER: HCPCS | Performed by: INTERNAL MEDICINE

## 2024-10-09 PROCEDURE — C1769 GUIDE WIRE: HCPCS | Performed by: INTERNAL MEDICINE

## 2024-10-09 PROCEDURE — 6370000000 HC RX 637 (ALT 250 FOR IP): Performed by: INTERNAL MEDICINE

## 2024-10-09 PROCEDURE — 6370000000 HC RX 637 (ALT 250 FOR IP): Performed by: STUDENT IN AN ORGANIZED HEALTH CARE EDUCATION/TRAINING PROGRAM

## 2024-10-09 PROCEDURE — 6370000000 HC RX 637 (ALT 250 FOR IP): Performed by: HOSPITALIST

## 2024-10-09 PROCEDURE — 6360000002 HC RX W HCPCS

## 2024-10-09 PROCEDURE — 86850 RBC ANTIBODY SCREEN: CPT

## 2024-10-09 PROCEDURE — 93010 ELECTROCARDIOGRAM REPORT: CPT | Performed by: INTERNAL MEDICINE

## 2024-10-09 PROCEDURE — 84100 ASSAY OF PHOSPHORUS: CPT

## 2024-10-09 PROCEDURE — 99222 1ST HOSP IP/OBS MODERATE 55: CPT | Performed by: INTERNAL MEDICINE

## 2024-10-09 PROCEDURE — 97161 PT EVAL LOW COMPLEX 20 MIN: CPT

## 2024-10-09 PROCEDURE — 83735 ASSAY OF MAGNESIUM: CPT

## 2024-10-09 PROCEDURE — 86900 BLOOD TYPING SEROLOGIC ABO: CPT

## 2024-10-09 PROCEDURE — 2709999900 HC NON-CHARGEABLE SUPPLY: Performed by: INTERNAL MEDICINE

## 2024-10-09 PROCEDURE — APPSS180 APP SPLIT SHARED TIME > 60 MINUTES: Performed by: NURSE PRACTITIONER

## 2024-10-09 PROCEDURE — 36415 COLL VENOUS BLD VENIPUNCTURE: CPT

## 2024-10-09 PROCEDURE — C1874 STENT, COATED/COV W/DEL SYS: HCPCS | Performed by: INTERNAL MEDICINE

## 2024-10-09 PROCEDURE — 84484 ASSAY OF TROPONIN QUANT: CPT

## 2024-10-09 PROCEDURE — 83605 ASSAY OF LACTIC ACID: CPT

## 2024-10-09 PROCEDURE — 2500000003 HC RX 250 WO HCPCS: Performed by: INTERNAL MEDICINE

## 2024-10-09 PROCEDURE — 86901 BLOOD TYPING SEROLOGIC RH(D): CPT

## 2024-10-09 PROCEDURE — B2111ZZ FLUOROSCOPY OF MULTIPLE CORONARY ARTERIES USING LOW OSMOLAR CONTRAST: ICD-10-PCS | Performed by: INTERNAL MEDICINE

## 2024-10-09 PROCEDURE — 97165 OT EVAL LOW COMPLEX 30 MIN: CPT

## 2024-10-09 PROCEDURE — 5A2204Z RESTORATION OF CARDIAC RHYTHM, SINGLE: ICD-10-PCS | Performed by: STUDENT IN AN ORGANIZED HEALTH CARE EDUCATION/TRAINING PROGRAM

## 2024-10-09 PROCEDURE — 80048 BASIC METABOLIC PNL TOTAL CA: CPT

## 2024-10-09 PROCEDURE — 85025 COMPLETE CBC W/AUTO DIFF WBC: CPT

## 2024-10-09 PROCEDURE — 86738 MYCOPLASMA ANTIBODY: CPT

## 2024-10-09 PROCEDURE — 92960 CARDIOVERSION ELECTRIC EXT: CPT | Performed by: INTERNAL MEDICINE

## 2024-10-09 PROCEDURE — 2700000000 HC OXYGEN THERAPY PER DAY

## 2024-10-09 PROCEDURE — 97530 THERAPEUTIC ACTIVITIES: CPT

## 2024-10-09 PROCEDURE — 2000000000 HC ICU R&B

## 2024-10-09 PROCEDURE — 99232 SBSQ HOSP IP/OBS MODERATE 35: CPT | Performed by: STUDENT IN AN ORGANIZED HEALTH CARE EDUCATION/TRAINING PROGRAM

## 2024-10-09 PROCEDURE — 93454 CORONARY ARTERY ANGIO S&I: CPT | Performed by: INTERNAL MEDICINE

## 2024-10-09 PROCEDURE — 2580000003 HC RX 258: Performed by: INTERNAL MEDICINE

## 2024-10-09 PROCEDURE — C1894 INTRO/SHEATH, NON-LASER: HCPCS | Performed by: INTERNAL MEDICINE

## 2024-10-09 PROCEDURE — 3E033XZ INTRODUCTION OF VASOPRESSOR INTO PERIPHERAL VEIN, PERCUTANEOUS APPROACH: ICD-10-PCS | Performed by: STUDENT IN AN ORGANIZED HEALTH CARE EDUCATION/TRAINING PROGRAM

## 2024-10-09 PROCEDURE — 2580000003 HC RX 258

## 2024-10-09 PROCEDURE — 6360000002 HC RX W HCPCS: Performed by: STUDENT IN AN ORGANIZED HEALTH CARE EDUCATION/TRAINING PROGRAM

## 2024-10-09 PROCEDURE — 4A023N7 MEASUREMENT OF CARDIAC SAMPLING AND PRESSURE, LEFT HEART, PERCUTANEOUS APPROACH: ICD-10-PCS | Performed by: INTERNAL MEDICINE

## 2024-10-09 PROCEDURE — 6360000002 HC RX W HCPCS: Performed by: INTERNAL MEDICINE

## 2024-10-09 PROCEDURE — 99255 IP/OBS CONSLTJ NEW/EST HI 80: CPT | Performed by: INTERNAL MEDICINE

## 2024-10-09 PROCEDURE — 94640 AIRWAY INHALATION TREATMENT: CPT

## 2024-10-09 PROCEDURE — 93005 ELECTROCARDIOGRAM TRACING: CPT | Performed by: NURSE PRACTITIONER

## 2024-10-09 PROCEDURE — C1887 CATHETER, GUIDING: HCPCS | Performed by: INTERNAL MEDICINE

## 2024-10-09 PROCEDURE — 027034Z DILATION OF CORONARY ARTERY, ONE ARTERY WITH DRUG-ELUTING INTRALUMINAL DEVICE, PERCUTANEOUS APPROACH: ICD-10-PCS | Performed by: INTERNAL MEDICINE

## 2024-10-09 PROCEDURE — 80053 COMPREHEN METABOLIC PANEL: CPT

## 2024-10-09 PROCEDURE — 85730 THROMBOPLASTIN TIME PARTIAL: CPT

## 2024-10-09 PROCEDURE — 7100000010 HC PHASE II RECOVERY - FIRST 15 MIN: Performed by: INTERNAL MEDICINE

## 2024-10-09 DEVICE — STENT ONYXNG22534UX ONYX 2.25X34RX
Type: IMPLANTABLE DEVICE | Status: FUNCTIONAL
Brand: ONYX FRONTIER™

## 2024-10-09 RX ORDER — SODIUM CHLORIDE 9 MG/ML
INJECTION, SOLUTION INTRAVENOUS PRN
Status: DISCONTINUED | OUTPATIENT
Start: 2024-10-09 | End: 2024-10-10

## 2024-10-09 RX ORDER — DEXTROSE MONOHYDRATE 50 MG/ML
INJECTION, SOLUTION INTRAVENOUS CONTINUOUS
Status: ACTIVE | OUTPATIENT
Start: 2024-10-09 | End: 2024-10-10

## 2024-10-09 RX ORDER — ACETAMINOPHEN 325 MG/1
650 TABLET ORAL EVERY 4 HOURS PRN
Status: DISCONTINUED | OUTPATIENT
Start: 2024-10-09 | End: 2024-10-11 | Stop reason: HOSPADM

## 2024-10-09 RX ORDER — MIDAZOLAM HYDROCHLORIDE 1 MG/ML
INJECTION INTRAMUSCULAR; INTRAVENOUS PRN
Status: DISCONTINUED | OUTPATIENT
Start: 2024-10-09 | End: 2024-10-09 | Stop reason: HOSPADM

## 2024-10-09 RX ORDER — ACETAMINOPHEN 325 MG/1
650 TABLET ORAL EVERY 6 HOURS PRN
Status: DISCONTINUED | OUTPATIENT
Start: 2024-10-09 | End: 2024-10-09

## 2024-10-09 RX ORDER — 0.9 % SODIUM CHLORIDE 0.9 %
INTRAVENOUS SOLUTION INTRAVENOUS CONTINUOUS PRN
Status: COMPLETED | OUTPATIENT
Start: 2024-10-09 | End: 2024-10-09

## 2024-10-09 RX ORDER — 0.9 % SODIUM CHLORIDE 0.9 %
250 INTRAVENOUS SOLUTION INTRAVENOUS ONCE
Status: COMPLETED | OUTPATIENT
Start: 2024-10-09 | End: 2024-10-09

## 2024-10-09 RX ORDER — ACETAMINOPHEN 650 MG/1
650 SUPPOSITORY RECTAL EVERY 6 HOURS PRN
Status: DISCONTINUED | OUTPATIENT
Start: 2024-10-09 | End: 2024-10-09

## 2024-10-09 RX ORDER — CLOPIDOGREL 300 MG/1
600 TABLET, FILM COATED ORAL ONCE
Status: COMPLETED | OUTPATIENT
Start: 2024-10-10 | End: 2024-10-10

## 2024-10-09 RX ORDER — SODIUM CHLORIDE 0.9 % (FLUSH) 0.9 %
5-40 SYRINGE (ML) INJECTION EVERY 12 HOURS SCHEDULED
Status: DISCONTINUED | OUTPATIENT
Start: 2024-10-09 | End: 2024-10-10

## 2024-10-09 RX ORDER — VERAPAMIL HYDROCHLORIDE 2.5 MG/ML
INJECTION, SOLUTION INTRAVENOUS PRN
Status: DISCONTINUED | OUTPATIENT
Start: 2024-10-09 | End: 2024-10-09 | Stop reason: HOSPADM

## 2024-10-09 RX ORDER — SODIUM CHLORIDE 0.9 % (FLUSH) 0.9 %
5-40 SYRINGE (ML) INJECTION EVERY 12 HOURS SCHEDULED
Status: DISCONTINUED | OUTPATIENT
Start: 2024-10-09 | End: 2024-10-11 | Stop reason: HOSPADM

## 2024-10-09 RX ORDER — INSULIN LISPRO 100 [IU]/ML
0-4 INJECTION, SOLUTION INTRAVENOUS; SUBCUTANEOUS
Status: DISCONTINUED | OUTPATIENT
Start: 2024-10-09 | End: 2024-10-11 | Stop reason: HOSPADM

## 2024-10-09 RX ORDER — INSULIN GLARGINE 100 [IU]/ML
15 INJECTION, SOLUTION SUBCUTANEOUS EVERY MORNING
Status: DISCONTINUED | OUTPATIENT
Start: 2024-10-10 | End: 2024-10-10

## 2024-10-09 RX ORDER — SODIUM CHLORIDE 0.9 % (FLUSH) 0.9 %
5-40 SYRINGE (ML) INJECTION PRN
Status: DISCONTINUED | OUTPATIENT
Start: 2024-10-09 | End: 2024-10-11 | Stop reason: HOSPADM

## 2024-10-09 RX ORDER — 0.9 % SODIUM CHLORIDE 0.9 %
250 INTRAVENOUS SOLUTION INTRAVENOUS ONCE
Status: DISCONTINUED | OUTPATIENT
Start: 2024-10-09 | End: 2024-10-10

## 2024-10-09 RX ORDER — METOPROLOL SUCCINATE 50 MG/1
50 TABLET, EXTENDED RELEASE ORAL DAILY
Status: DISCONTINUED | OUTPATIENT
Start: 2024-10-10 | End: 2024-10-09

## 2024-10-09 RX ORDER — HEPARIN SODIUM 10000 [USP'U]/ML
INJECTION, SOLUTION INTRAVENOUS; SUBCUTANEOUS PRN
Status: DISCONTINUED | OUTPATIENT
Start: 2024-10-09 | End: 2024-10-09 | Stop reason: HOSPADM

## 2024-10-09 RX ORDER — IOPAMIDOL 755 MG/ML
INJECTION, SOLUTION INTRAVASCULAR PRN
Status: DISCONTINUED | OUTPATIENT
Start: 2024-10-09 | End: 2024-10-09 | Stop reason: HOSPADM

## 2024-10-09 RX ORDER — METOPROLOL SUCCINATE 25 MG/1
25 TABLET, EXTENDED RELEASE ORAL 2 TIMES DAILY
Status: DISCONTINUED | OUTPATIENT
Start: 2024-10-10 | End: 2024-10-11 | Stop reason: HOSPADM

## 2024-10-09 RX ORDER — CLOPIDOGREL BISULFATE 75 MG/1
75 TABLET ORAL DAILY
Status: DISCONTINUED | OUTPATIENT
Start: 2024-10-11 | End: 2024-10-11 | Stop reason: HOSPADM

## 2024-10-09 RX ORDER — POLYETHYLENE GLYCOL 3350 17 G/17G
17 POWDER, FOR SOLUTION ORAL DAILY PRN
Status: DISCONTINUED | OUTPATIENT
Start: 2024-10-09 | End: 2024-10-11 | Stop reason: HOSPADM

## 2024-10-09 RX ORDER — METOPROLOL SUCCINATE 50 MG/1
50 TABLET, EXTENDED RELEASE ORAL DAILY
Status: DISCONTINUED | OUTPATIENT
Start: 2024-10-09 | End: 2024-10-09

## 2024-10-09 RX ORDER — EPTIFIBATIDE 0.75 MG/ML
2 INJECTION, SOLUTION INTRAVENOUS CONTINUOUS
Status: ACTIVE | OUTPATIENT
Start: 2024-10-09 | End: 2024-10-09

## 2024-10-09 RX ORDER — EPTIFIBATIDE 0.75 MG/ML
INJECTION, SOLUTION INTRAVENOUS CONTINUOUS PRN
Status: COMPLETED | OUTPATIENT
Start: 2024-10-09 | End: 2024-10-09

## 2024-10-09 RX ADMIN — AMIODARONE HYDROCHLORIDE 0.5 MG/MIN: 50 INJECTION, SOLUTION INTRAVENOUS at 22:07

## 2024-10-09 RX ADMIN — AMIODARONE HYDROCHLORIDE 150 MG: 50 INJECTION, SOLUTION INTRAVENOUS at 16:16

## 2024-10-09 RX ADMIN — SODIUM CHLORIDE, PRESERVATIVE FREE 10 ML: 5 INJECTION INTRAVENOUS at 08:08

## 2024-10-09 RX ADMIN — INSULIN LISPRO 1 UNITS: 100 INJECTION, SOLUTION INTRAVENOUS; SUBCUTANEOUS at 17:51

## 2024-10-09 RX ADMIN — GABAPENTIN 400 MG: 400 CAPSULE ORAL at 20:18

## 2024-10-09 RX ADMIN — CEFTRIAXONE SODIUM 1000 MG: 1 INJECTION, POWDER, FOR SOLUTION INTRAMUSCULAR; INTRAVENOUS at 17:53

## 2024-10-09 RX ADMIN — METOPROLOL TARTRATE 25 MG: 25 TABLET, FILM COATED ORAL at 08:07

## 2024-10-09 RX ADMIN — LISINOPRIL 10 MG: 10 TABLET ORAL at 08:07

## 2024-10-09 RX ADMIN — HEPARIN SODIUM 16 UNITS/KG/HR: 10000 INJECTION, SOLUTION INTRAVENOUS at 00:59

## 2024-10-09 RX ADMIN — LEVOTHYROXINE SODIUM 200 MCG: 0.2 TABLET ORAL at 06:10

## 2024-10-09 RX ADMIN — IPRATROPIUM BROMIDE 0.5 MG: 0.5 SOLUTION RESPIRATORY (INHALATION) at 21:28

## 2024-10-09 RX ADMIN — TICAGRELOR 90 MG: 90 TABLET ORAL at 20:19

## 2024-10-09 RX ADMIN — GABAPENTIN 400 MG: 400 CAPSULE ORAL at 16:08

## 2024-10-09 RX ADMIN — SODIUM CHLORIDE, PRESERVATIVE FREE 10 ML: 5 INJECTION INTRAVENOUS at 20:19

## 2024-10-09 RX ADMIN — Medication 250 ML: at 13:57

## 2024-10-09 RX ADMIN — ROPINIROLE HYDROCHLORIDE 1 MG: 1 TABLET, FILM COATED ORAL at 16:19

## 2024-10-09 RX ADMIN — HEPARIN SODIUM 2000 UNITS: 1000 INJECTION INTRAVENOUS; SUBCUTANEOUS at 08:46

## 2024-10-09 RX ADMIN — GABAPENTIN 400 MG: 400 CAPSULE ORAL at 08:06

## 2024-10-09 RX ADMIN — SODIUM CHLORIDE 250 ML: 9 INJECTION, SOLUTION INTRAVENOUS at 11:48

## 2024-10-09 RX ADMIN — ROPINIROLE HYDROCHLORIDE 3 MG: 2 TABLET, FILM COATED ORAL at 21:28

## 2024-10-09 RX ADMIN — FUROSEMIDE 20 MG: 10 INJECTION, SOLUTION INTRAMUSCULAR; INTRAVENOUS at 08:07

## 2024-10-09 RX ADMIN — ASPIRIN 81 MG: 81 TABLET, COATED ORAL at 08:07

## 2024-10-09 RX ADMIN — PANTOPRAZOLE SODIUM 20 MG: 20 TABLET, DELAYED RELEASE ORAL at 06:10

## 2024-10-09 RX ADMIN — AMIODARONE HYDROCHLORIDE 1 MG/MIN: 50 INJECTION, SOLUTION INTRAVENOUS at 16:28

## 2024-10-09 RX ADMIN — INSULIN LISPRO 12 UNITS: 100 INJECTION, SOLUTION INTRAVENOUS; SUBCUTANEOUS at 17:51

## 2024-10-09 RX ADMIN — HEPARIN SODIUM 2000 UNITS: 1000 INJECTION INTRAVENOUS; SUBCUTANEOUS at 00:58

## 2024-10-09 RX ADMIN — DEXTROSE MONOHYDRATE: 50 INJECTION, SOLUTION INTRAVENOUS at 09:27

## 2024-10-09 RX ADMIN — ATORVASTATIN CALCIUM 80 MG: 80 TABLET, FILM COATED ORAL at 08:07

## 2024-10-09 RX ADMIN — HEPARIN SODIUM 18 UNITS/KG/HR: 10000 INJECTION, SOLUTION INTRAVENOUS at 08:44

## 2024-10-09 NOTE — PROGRESS NOTES
Physical Therapy  Physical Therapy Initial Assessment     Name: Debra Maher  : 1965  MRN: [id number]      Date of Service: 10/9/2024    Evaluating PT:  Geremias Cameron PT, DPT    Room #:  6501/6501-A  Diagnosis:  STEMI (ST elevation myocardial infarction) (HCC) [I21.3]  PMHx/PSHx:  anxiety, arthritis, asthma, depressive disorder, GERD, Graves disease, heart disease, heart disease, HLD, HTN MI, neuropathy, hypothyroidism, osteoporosis, DM, RLS, COPD  Procedure/Surgery:  N/A  Precautions:  fall risk, O2, monitor BP  Equipment Owned: none  Equipment Needs:  TBD    SUBJECTIVE:    Pt lives with handicap son in a 2 story home with a ramp to enter.  Bed/bath is on main floor.  Laundry is in the basement with a full flight of stairs and 1 handrail. Pt ambulated with no AD PTA.    OBJECTIVE:   Initial Evaluation  Date: 10/09/24 Treatment Short Term/ Long Term   Goals   AM-PAC 6 Clicks      Was pt agreeable to Eval/treatment? yes     Does pt have pain? No pain     Bed Mobility  Rolling: NT  Supine to sit: SBA  Sit to supine: NT  Scooting: SBA  Rolling: Ind  Supine to sit: Ind  Sit to supine: Ind  Scooting: Ind   Transfers Sit to stand: SBA  Stand to sit: SBA  Stand pivot: Christofer with no AD  Sit to stand: Ind  Stand to sit: Ind  Stand pivot: Ind with AAD   Ambulation    40ft x2 with no AD Christofer  300ft with AAD Ind   Stair negotiation: ascended and descended NT  12 steps with 1 handrail Ind     Strength/ROM:   BLE gross strength 4/5  BLE ROM WFL    Balance:   Static Sitting: Ind  Dynamic Sitting: SBA  Static Standing: Christofer  Dynamic Standing: Christofer    Pt is A & O x 4  Sensation:  Pt denies numbness and tingling to extremities  Edema:  None noted    Vitals:  SpO2 and HR were stable during session  BP supine 87/51, HR 65  BP sitting EOB 88/51, HR 69  BP standing 89/52, HR 67  BP sitting after ambulation 91/51, HR 66    Therapeutic Exercises:    Bed mobility: supine<>sit, cued for EOB positioning  Transfers: STS x2, cued for  evaluation 14684  [] PT Re-evaluation 78171  [] Gait training 60795 -- minutes  [] Manual therapy 48962 -- minutes  [x] Therapeutic activities 28167 9 minutes  [] Therapeutic exercises 96137 -- minutes  [] Neuromuscular reeducation 78761 -- minutes     Rubén Cain, JONATHAN Cameron, PT, DPT  [id number]

## 2024-10-09 NOTE — PROGRESS NOTES
transfer/mobility training/DME recommendations for increased independence, safety, and fall prevention  * Patient/Family education to increase follow through with safety techniques and functional independence  * Recommendation of environmental modifications for increased safety with functional transfers/mobility and ADLs  * Therapeutic exercise to improve motor endurance, ROM, and functional strength for ADLs/functional transfers  * Therapeutic activities to facilitate/challenge dynamic balance, stand tolerance for increased safety and independence with ADLs  * Therapeutic activities to facilitate gross/fine motor skills for increased independence with ADLs  * Neuro-muscular re-education: facilitation of righting/equilibrium reactions, midline orientation, scapular stability/mobility, normalization of muscle tone, and facilitation of volitional active controled movement    Recommended Adaptive Equipment:  TBD     Home Living: Pt lives with son (handicap) in a 2 story home with ramp entrance and 1st floor set-up except for laundry in basement     Bathroom setup: tub/shower combo    Equipment owned: none    Prior Level of Function: Independent  with ADLs , Independent  with IADLs; ambulated without AD   Driving: yes   Occupation: na    Pain Level: Pt denies pain this session    Cognition: A&O: 4/4; Follows 2 step directions   Memory:  good   Sequencing:  good   Problem solving:  good   Judgement/safety:  fair     Functional Assessment:  AM-PAC Daily Activity Raw Score: 19/24   Initial Eval Status  Date: 10/9/24 Treatment Status  Date: STGs = LTGs  Time frame: 10-14 days   Feeding Independent      Grooming Stand by Assist   Independent    UB Dressing Stand by Assist   Independent    LB Dressing Minimal Assist   Independent    Bathing Minimal Assist  Independent    Toileting Minimal Assist   Independent    Bed Mobility  Supine to sit: Stand by Assist   Sit to supine: NT  Supine to sit: Independent   Sit to supine:  tasks while cuing on sequencing, modified techniques, posture, safety and energy conservation techniques. Skilled monitoring of BP, HR, O2 sats and pts response to treatment (see above).        Rehab Potential: Good  for established goals     Patient / Family Goal: return home      Patient and/or family were instructed on functional diagnosis, prognosis/goals and OT plan of care. Demonstrated fair+ understanding.     Eval Complexity: Low    Time In: 935  Time Out: 1000  Total Treatment Time: 10 minutes    Min Units   OT Eval Low 97165  x  1   OT Eval Medium 81806      OT Eval High 51034      OT Re-Eval 49363       Therapeutic Ex 46821       Therapeutic Activities 95554  8  1   ADL/Self Care 56486  2     Orthotic Management 83605       Manual 61447     Neuro Re-Ed 76265       Non-Billable Time          Evaluation Time additionally includes thorough review of current medical information, gathering information on past medical history/social history and prior level of function, interpretation of standardized testing/informal observation of tasks, assessment of data and development of plan of care and goals.          Shaq Lombardo OTR/L #[id number]

## 2024-10-09 NOTE — PROGRESS NOTES
Patient admitted to MICU with the following belongings:  Glasses, Purse, Cell Phone, Cell Phone , Personal Electronic Device, Pants, Shirt, Socks, and Shoes,  for laptop. The following belongings admitted with the patient, ALL are with the patient at bedside

## 2024-10-09 NOTE — CONSULTS
10/09/2024 04:50 PM    CO2 29 10/09/2024 04:50 PM    BUN 10 10/09/2024 04:50 PM     CMP:    Lab Results   Component Value Date/Time     10/09/2024 04:50 PM    K 3.7 10/09/2024 04:50 PM    K 4.0 02/13/2020 06:33 PM    CL 96 10/09/2024 04:50 PM    CO2 29 10/09/2024 04:50 PM    BUN 10 10/09/2024 04:50 PM     CBC:    Lab Results   Component Value Date/Time    WBC 9.0 10/09/2024 04:50 PM    RBC 3.94 10/09/2024 04:50 PM    HGB 12.0 10/09/2024 04:50 PM    HCT 36.6 10/09/2024 04:50 PM    MCV 92.9 10/09/2024 04:50 PM    RDW 13.8 10/09/2024 04:50 PM     10/09/2024 04:50 PM     PT/INR:  No results found for: \"PTINR\"  PT/INR Warfarin:  No components found for: \"PTPATWAR\", \"PTINRWAR\"  PTT:    Lab Results   Component Value Date/Time    APTT 40.7 10/09/2024 07:01 AM    APTT 27.7 08/28/2021 07:40 PM     PTT Heparin:  No components found for: \"APTTHEP\"  Magnesium:    Lab Results   Component Value Date/Time    MG 1.8 10/09/2024 04:50 PM     TSH:    Lab Results   Component Value Date/Time    TSH 9.51 10/09/2024 04:50 PM     TROPONIN:  No components found for: \"TROP\"  BNP:  No results found for: \"BNP\"  FASTING LIPID PANEL:    Lab Results   Component Value Date/Time    CHOL 136 10/09/2024 07:01 AM    HDL 43 10/09/2024 07:01 AM    TRIG 112 10/09/2024 07:01 AM     No orders to display       I have personally reviewed the laboratory, cardiac diagnostic and radiographic testing as outlined above:    IMPRESSION:  Late presentation lateral wall ST elevation MI: Chest pain-free, actually she presented to the hospital with sore throat, stated she had systemic flulike symptoms several days ago, flat troponin, will need cardiac catheterization, procedure, alternatives, risks, benefits, discussed with her, she is willing to proceed.  Cardiomyopathy: EF 40-45% with wall motion abnormalities, as above.  History of CAD: S/p remote PCI to LAD, details are not known.  Mitral valve regurgitation: Mild to moderate  Tricuspid valve  transcribed using voice recognition software. Every effort was made to ensure accuracy; however, inadvertent computerized transcription errors may be present

## 2024-10-09 NOTE — DISCHARGE INSTRUCTIONS
HEART FAILURE  / CONGESTIVE HEART FAILURE  DISCHARGE INSTRUCTIONS:  GUIDELINES TO FOLLOW AT HOME    Self- Managed Care:     MEDICATIONS:  Take your medication as directed. If you are experiencing any side effects, inform your doctor, Do not stop taking any of your medications without letting your doctor know.   Check with your doctor before taking any over-the-counter medications / herbal / or dietary supplements. They may interfere with your other medications.  Do not take ibuprofen (Advil or Motrin) and naproxen (Aleve) without talking to your doctor first. They could make your heart failure worse.         WEIGHT MONITORING:   Weigh yourself everyday (with the same scale) around the same time of the day and write it down. (you can chart them on a calendar or keep track of them on paper.   Notify your doctor of a weight gain of 3 pounds or more in 1 day   OR a total of 5 pounds or more in 1 week    Take your weight record to your doctor visits  Also, the same goes if you loose more than 3# in one day, let your heart doctor know.         DIET:   Cardiac heart healthy diet- Low saturated / low trans fat, no added salt, caffeine restricted, Low sodium diet-   No more than 2,000mg (2 grams) of salt / sodium per day (which equals to a little less than  a teaspoon of salt)  If your doctor wants you on a fluid restriction...it is usually recommended a fluid limit of 2,000cc -  Fluid restriction- 2,000 ml (milliliters) = 64 ounces = you can have 8 glasses of fluid per day (each glass 8 ounces)    Follow a low salt diet - avoid using salt at the table, avoid / limit use of canned soups, processed / packaged foods, salted snacks, olives and pickles.  Do not use a salt substitute without checking with your doctor, they may contain a high amount of potassioum. (Mrs. Dash is safe to use).    Limit the use of alcohol       CALL YOUR DOCTOR THE FIRST DAY YOU NOTICE ANY OF THESE   SYMPTOMS:  You have a weight  whether you need another dose.       My Goal for Self-management of Heart Failure Includes 5 steps :    1. Notice a change in symptoms ( weight gain, short of breath, leg swelling, decreased activity level, bloating....)    2. Evaluate the change: (use the Heart Failure Zones )     3. Decide to take action: decide what your options are, such as: (call your doctor for an extra visit, take a prescribed medication, such as your water pill if your doctor has given you directions to do so, CALL YOUR DOCTOR)    4. Come up with a strategy:  (now you call the doctor for advice / appointment. This is where you take action!!!  Do not wait, catch the symptom early and treat it before it worsens.    5. Evaluate the response: The next day, check your Heart Failure Zones: are you in the GREEN ZONE (safe zone)?  Worsening symptoms of YELLOW ZONE? Or have you moved to the RED ZONE and need to call 911 or go to the Emergency Room for evaluation? Call your doctor's office to update them on your symptoms of heart failure.               Cardiac Rehabilitation: Discharge instructions        Cardiac rehabilitation is a program for people who have a heart problem, such as a heart attack, coronary stent placed, heart failure, or a heart valve disease. The program includes exercise, lifestyle changes, education, and emotional support. Cardiac rehab can help you improve the quality of your life through better overall health. It can help you lose weight and feel better about yourself.    On your cardiac rehab team, you may have your doctor, a nurse specialist, an exercise physiologist, and a dietitian. They will design your cardiac rehab program specifically for you. You will learn how to reduce your risk for heart problems, how to manage stress, and how to eat a heart-healthy diet. By the end of the program, you will be ready to maintain a healthier lifestyle on your own.    Follow-up care is a key part of your treatment and safety. Be sure

## 2024-10-09 NOTE — PROGRESS NOTES
4 Eyes Skin Assessment     NAME:  Debra Maher  YOB: 1965  MEDICAL RECORD NUMBER:  [id number]    The patient is being assessed for  Admission    I agree that at least one RN has performed a thorough Head to Toe Skin Assessment on the patient. ALL assessment sites listed below have been assessed.      Areas assessed by both nurses:    Head, Face, Ears, Shoulders, Back, Chest, Arms, Elbows, Hands, Sacrum. Buttock, Coccyx, Ischium, Legs. Feet and Heels, and Under Medical Devices         Does the Patient have a Wound? No noted wound(s)       Chris Prevention initiated by RN: No  Wound Care Orders initiated by RN: No    Pressure Injury (Stage 3,4, Unstageable, DTI, NWPT, and Complex wounds) if present, place Wound referral order by RN under : No    New Ostomies, if present place, Ostomy referral order under : No     Nurse 1 eSignature: Electronically signed by Gonzalo Escoto RN on 10/9/24 at 5:07 PM EDT    **SHARE this note so that the co-signing nurse can place an eSignature**    Nurse 2 eSignature: Electronically signed by Jacobo Leroy RN on 10/9/24 at 6:38 PM EDT

## 2024-10-09 NOTE — CARE COORDINATION
10/9/24  Spoke with patient regarding transition of care.  Patient is a transfer from [facility] for STEMI with plan for a LHC. Patient on a heparin drip.  Respiratory panel is negative. Patient is pending therapy evals.  Patient states she lives with and cares for her handicapped son.  Patient voices she was independent with all ADL's and drove prior to admit.  Patient does not own any DME.  PCP is Dr. Hernandez and pharmacy choice is [facility].  Discharge goal is to return home when medically stable.  Patient does not anticipate any needs post discharge.  SW/CM to follow.    Electronically signed by SUZANNE Garcia on 10/9/2024 at 10:43 AM     Case Management Assessment  Initial Evaluation    Date/Time of Evaluation: 10/9/2024 10:52 AM  Assessment Completed by: SUZANNE Garcia    If patient is discharged prior to next notation, then this note serves as note for discharge by case management.    Patient Name: Debra Maher                   YOB: 1965  Diagnosis: STEMI (ST elevation myocardial infarction) (HCC) [I21.3]                   Date / Time: 10/8/2024  2:22 PM    Patient Admission Status: Inpatient   Readmission Risk (Low < 19, Mod (19-27), High > 27): Readmission Risk Score: 8.1    Current PCP: Wolf Hernandez, DO  PCP verified by CM? Yes    Chart Reviewed: Yes      History Provided by: Patient  Patient Orientation: Alert and Oriented, Person, Place, Situation    Patient Cognition: Alert    Hospitalization in the last 30 days (Readmission):  No    If yes, Readmission Assessment in  Navigator will be completed.    Advance Directives:      Code Status: Full Code   Patient's Primary Decision Maker is:        Discharge Planning:    Patient lives with: Alone Type of Home: House  Primary Care Giver: Self  Patient Support Systems include: Friends/Neighbors   Current Financial resources:    Current community resources:    Current services prior to admission: None

## 2024-10-09 NOTE — DISCHARGE INSTR - DIET
extra small banana (1 carbohydrate serving)  1 tablespoon peanut butter   Daily Sum  Nutrient Unit Value   Macronutrients   Energy kcal 1989   Energy kJ 8315   Protein g 85   Total lipid (fat) g 78   Carbohydrate, by difference g 258   Fiber, total dietary g 42   Sugars, total g 68   Minerals   Calcium, Ca mg 1343   Iron, Fe mg 16   Sodium, Na mg 1805   Vitamins   Vitamin C, total ascorbic acid mg 167   Vitamin A, IU IU 98098   Vitamin D    Lipids   Fatty acids, total saturated g 12   Fatty acids, total monounsaturated g 31   Fatty acids, total polyunsaturated g 25   Cholesterol mg 0        Heart Healthy Consistent Carbohydrate Vegetarian (Lacto-Ovo) Sample 1-Day Menu View Nutrient Info  Breakfast 1 cup oatmeal, cooked (2 carbohydrate servings)  ¾ cup blueberries (1 carbohydrate serving)  11 almonds, without salt  1 cup 1% milk (1 carbohydrate serving)  1 cup coffee   Morning Snack 1 cup fat-free plain yogurt (1 carbohydrate serving)   Lunch 1 whole wheat bun (1½ carbohydrate servings)  1 black bean burger (1 carbohydrate servings)  1 slice cheddar cheese, low sodium  2 slices tomatoes  2 leaves lettuce  1 teaspoon mustard  1 small pear (1½ carbohydrate servings)  1 cup green tea, unsweetened   Afternoon Snack 1/3 cup trail mix with nuts, seeds, and raisins, without salt (1½ carbohydrate servinga)   Evening Meal ½ cup meatless chicken  2/3 cup brown rice, cooked (2 carbohydrate servings)  1 cup broccoli, cooked (2/3 carbohydrate serving)  ½ cup carrots, cooked (1/3 carbohydrate serving)  2 teaspoons olive oil  1 teaspoon balsamic vinegar  1 whole wheat dinner roll (1 carbohydrate serving)  1 teaspoon margarine, soft, tub  1 cup 1% milk (1 carbohydrate serving)   Evening Snack 1 extra small banana (1 carbohydrate serving)  1 tablespoon peanut butter   Daily Sum  Nutrient Unit Value   Macronutrients   Energy kcal 2093   Energy kJ 8755   Protein g 97   Total lipid (fat) g 81   Carbohydrate, by difference g 269    Fiber, total dietary g 39   Sugars, total g 96   Minerals   Calcium, Ca mg 1571   Iron, Fe mg 15   Sodium, Na mg 1950   Vitamins   Vitamin C, total ascorbic acid mg 139   Vitamin A, IU IU 85577   Vitamin D    Lipids   Fatty acids, total saturated g 20   Fatty acids, total monounsaturated g 33   Fatty acids, total polyunsaturated g 20   Cholesterol mg 57

## 2024-10-09 NOTE — CONSULTS
for CHF, made aware of signs and symptoms of worsening HF and to notify provider immediately of change in symptoms.     Heart Failure Home Instructions placed in patient's discharge instructions    Per AHA guidelines patient to be closely monitored following discharge with 7 day follow up appointment    Scheduling with the CHF clinic New consult to CHF clinic, appointment scheduled    Future Appointments   Date Time Provider Department Center   10/14/2024 11:30 AM Ashlee Hart, APRN - CNP [facility] [address]   10/23/2024  9:45 AM [facility] ROOM 1 [facility]           Patient Education:  Self Monitoring/management:  Reviewed the introduction to Heart Failure, the HF zones, signs and symptoms to report on day 1 of onset, medications, medication compliance, the importance of obtaining daily weights, following a low sodium diet, reading food labels for the sodium content, keeping physician appointments, and smoking cessation.  Discussed writing / tracking daily weights on a calendar / log because a 5 pound gain in 1 week can sneak up if you are not tracking it.   Advised patient they can reduce the risk for Heart Failure exacerbations by modifying / controlling the risk factors.  Discussed self-managed care which includes the following: take medications as prescribed, report any intolerable side effects of medications to the medical provider (PCP or cardiologist), do not just stop taking the medication; follow a cardiac heart healthy / low sodium diet; weigh yourself daily, exercise regularly- per doctor recommendation and not to smoke or use an excess amount of alcohol.  Discussed calling the cardiologist / doctor with a weight gain of 3 pounds in one day or a total of 5 pounds or more in one week. Also, if you should have a significant weight loss of 3# or more in one day to call the doctor, they may need to decrease or hold the diuretic dose. On days you feels nauseated and not eating / drinking, having

## 2024-10-09 NOTE — CONSULTS
[phone]   MPV 10.0 10.3 10.7     Recent Labs     10/08/24  0846 10/09/24  0701    138   K 3.7 3.9   CL 93* 98   CO2 32* 29   BUN 5* 8   CREATININE 0.5 0.5   GLUCOSE 117* 67*   CALCIUM 9.2 9.2   BILITOT 0.5 0.4   ALKPHOS 208* 209*   AST 18 19   ALT 21 19     No results found for: \"BHYDRXBUT\"  Lab Results   Component Value Date/Time    LABA1C 12.3 10/08/2024 04:06 PM     Lab Results   Component Value Date/Time    TSH 7.29 (H) 10/08/2024 04:06 PM    T4FREE 0.9 10/08/2024 04:06 PM    FT3 2.11 10/08/2024 04:06 PM     Lab Results   Component Value Date/Time    LABA1C 12.3 10/08/2024 04:06 PM    GLUCOSE 67 10/09/2024 07:01 AM     Lab Results   Component Value Date/Time    TRIG 112 10/09/2024 07:01 AM    HDL 43 10/09/2024 07:01 AM    CHOL 136 10/09/2024 07:01 AM       Blood culture   No results found for: \"BC\"    Radiology:  No orders to display       Medical Records/Labs/Images review:   I personally reviewed and summarized previous records   All labs and imaging were reviewed independently     ASSESSMENT & PLAN   Debra Maher, a 59 y.o.-old female seen today for inpatient diabetes management    Diabetes Mellitus type 2  Patient's diabetes is uncontrolled with HbA1c of 12.4 % and Her DM is brittle.  Blood glucose is below the target range; currently hypoglycemic at 67.  We recommend the following DM regimen  Start on Insulin Lantus 15 units daily from Tomorrow  Put on LDSS ACHS   Continue glucose check with meals and at bedtime  Will titrate insulin dose based on the blood glucose trend & insulin requirement  Upon discharge, I will arrange for the patient to be seen in the endocrinology clinic for routine diabetes maintenance and prevention    Dietary noncompliance  Discussed with patient the importance of eating consistent carbohydrate meals, avoiding high glycemic index food. Also, discussed with patient the risk and negative consequences of dietary noncompliance on blood glucose control, blood pressure and

## 2024-10-09 NOTE — CONSULTS
Met with patient and discussed that their physician has ordered a referral to our outpatient Phase II Cardiac Rehabilitation program. Reviewed the benefits of cardiac rehabilitation based on their diagnosis and personal risk factors. Patient demonstrates moderate interest in Cardiac Rehabilitation at this time. Cardiac Rehabilitation brochure provided to patient/family. The Cardiac Rehabilitation Program has been provided the patient's referral information and pertinent patient details and history. The patient may call [facility] Cardiac Rehabilitation at [phone] for additional information or questions. Contact information for [facility] Cardiac Rehabilitation and other choices close to the patient's residence have been provided in the discharge instructions so that the patient may call and schedule an appointment when cleared by their physician. Thank you for the referral.

## 2024-10-09 NOTE — PROGRESS NOTES
Nutrition Education    Provided educational handouts and sample meal plans on cardiac/diabetic diet.    Recommend outpatient nutritional counseling for further education.        Kojo Feliz RD, LD  Contact Number: [phone]

## 2024-10-09 NOTE — PROGRESS NOTES
[facility] Quality Flow/Interdisciplinary Rounds Progress Note        Quality Flow Rounds held on October 9, 2024    Disciplines Attending:  Bedside Nurse, , , and Nursing Unit Leadership    Debra Maher was admitted on 10/8/2024  2:22 PM    Anticipated Discharge Date:       Disposition:    Chris Score:  Chris Scale Score: 20    Readmission Risk              Risk of Unplanned Readmission:  10           Discussed patient goal for the day, patient clinical progression, and barriers to discharge.  The following Goal(s) of the Day/Commitment(s) have been identified:  Diagnostics - Report Results and Labs - Report Results      Justine Booth RN  October 9, 2024

## 2024-10-09 NOTE — PROGRESS NOTES
[facility] Hospitalist Progress Note    SYNOPSIS: Patient admitted on 10/8/2024 for STEMI (ST elevation myocardial infarction) (HCC)    Ms. Debra Maher, a 59 y.o. year old female  who  has a past medical history of Anxiety, Arthritis, Asthma, Back pain, Depressive disorder, GERD (gastroesophageal reflux disease), Graves' disease, Heart disease, Hyperlipidemia, Hypertension, Hypothyroidism, Myocardial infarction (HCC), Neuropathy, Osteoporosis, Pain in right hand, Restless leg, and Type 2 diabetes mellitus without complication (HCC).      This is a 59-year-old lady with past medical history mentioned above, who presented to [facility] ED with concerns of generalized body ache, dehydration, leg swelling for past couple of days.  She stated that it started off as a stomach bug 2 weeks ago.  Last week she started developing leg swelling and sore throat.  She denied any chest pain, only had mild shortness of breath.  She does have a history of COPD and does smoke cigarettes.  Denies any fever, chills, nausea, vomiting, diaphoresis.  She does not have any sick contacts recently.  Does not have any history of CHF.  She also endorses history of orthopnea and chest discomfort.     In the ED,  Vital signs stable, initial blood pressure 84/69, requiring 2 L oxygen via nasal cannula  Lab work-sodium 133 potassium 3.7, chloride 93, bicarb 32, BUN/creatinine 5/0.5, magnesium 1.7, blood glucose 117, proBNP 2895 troponin 446, 438, LFT unremarkable except for   CBC unremarkable, hemoglobin, hematocrit 11.2/32.7  PT/INR 12.7/1.2  COVID-19, influenza A/B- negative  Urinalysis negative for UTI, bacteriuria  Chest x-ray bilateral small pleural effusion with prominent interstitial markings and mild cardiomegaly.  EKG with normal sinus rhythm.  ST elevation MI on lateral leads  Echocardiogram LVEF 40 to 45%.  Severe hypokinesis of following segments:basal anterolateral, basal inferolateral, mid anterolateral,  paresthesia  Derm:  Rashes, ulcers, excoriations, bruising  Extremities:  Decreased ROM, peripheral edema, mottling      OBJECTIVE:    BP (!) 73/41   Pulse 63   Temp 97.9 °F (36.6 °C) (Temporal)   Resp 17   Ht 1.575 m (5' 2\")   Wt 68 kg (150 lb)   SpO2 93%   BMI 27.44 kg/m²     General appearance:  awake, alert, and oriented to person, place, time, and purpose; appears stated age and cooperative; no apparent distress no labored breathing  HEENT:  Conjunctivae/corneas clear.   Neck: Supple. No jugular venous distention.   Respiratory: symmetrical; clear to auscultation bilaterally; no wheezes; some rhonchi; minimal bibasilar rales  Cardiovascular: rhythm regular; rate controlled; no murmurs  Abdomen: Soft, nontender, nondistended  Extremities:  peripheral pulses present; no peripheral edema; no ulcers  Musculoskeletal: No clubbing, cyanosis, no bilateral lower extremity edema. Brisk capillary refill.   Skin:  No rashes  on visible skin  Neurologic: awake, alert and following commands     ASSESSMENT and PLAN:    Acute hypoxic respiratory failure  ST elevation MI  History coronary artery disease s/p stents  Type 2 diabetes mellitus, uncontrolled A1c 12  Hyperlipidemia  COPD  Morbid obesity  Hypothyroidism  GERD  Restless leg syndrome   Diabetic neuropathy  Acute CHF (HFrEF)  Anxiety/depression  Subclnical Hypothyroidism     Plan  -Monitor on telemetry floor  -Appears volume overloaded, s/p IV Lasix 40mg x1   -Diurese as able   -CHF clinic referral  -Cardiology following, appreciate recommendations  -Continue with heparin drip  -Continue aspirin, statin  -Optimize GDMT, holding parameters for antiHTN meds   -Monitor on telemetry  -Input/output, daily weight, fluid and salt restrictions  -PT/OT eval  -Resume home medication as appropriate  -A1c 12.3, lipid panel reviewed , TSH 7.29, normal free T3, free T4  -Endocrinology following, appreciate recommendations   -insulin regimen, MDSSI, adjust as needed,

## 2024-10-09 NOTE — PLAN OF CARE
Patient's chart updated to reflect:      .    - HF care plan, HF education points and HF discharge instructions.  -Orders: 2 gram sodium diet, daily weights, I/O.  -PCP and cardiology follow up appointments to be scheduled within 7 days of hospital discharge.  -CHF education session will be provided to the patient prior to hospital discharge.    Tonie Martinez RN   Heart Failure Navigator

## 2024-10-10 ENCOUNTER — APPOINTMENT (OUTPATIENT)
Dept: GENERAL RADIOLOGY | Age: 59
DRG: 321 | End: 2024-10-10
Attending: INTERNAL MEDICINE
Payer: COMMERCIAL

## 2024-10-10 LAB
ANION GAP SERPL CALCULATED.3IONS-SCNC: 13 MMOL/L (ref 7–16)
BASOPHILS # BLD: 0.05 K/UL (ref 0–0.2)
BASOPHILS NFR BLD: 1 % (ref 0–2)
BNP SERPL-MCNC: 1887 PG/ML (ref 0–125)
BUN SERPL-MCNC: 11 MG/DL (ref 6–23)
CALCIUM SERPL-MCNC: 8.3 MG/DL (ref 8.6–10.2)
CHLORIDE SERPL-SCNC: 97 MMOL/L (ref 98–107)
CO2 SERPL-SCNC: 26 MMOL/L (ref 22–29)
CREAT SERPL-MCNC: 0.7 MG/DL (ref 0.5–1)
EKG ATRIAL RATE: 156 BPM
EKG Q-T INTERVAL: 326 MS
EKG QRS DURATION: 74 MS
EKG QTC CALCULATION (BAZETT): 475 MS
EKG R AXIS: -28 DEGREES
EKG T AXIS: 78 DEGREES
EKG VENTRICULAR RATE: 128 BPM
EOSINOPHIL # BLD: 0.09 K/UL (ref 0.05–0.5)
EOSINOPHILS RELATIVE PERCENT: 1 % (ref 0–6)
ERYTHROCYTE [DISTWIDTH] IN BLOOD BY AUTOMATED COUNT: 13.9 % (ref 11.5–15)
GFR, ESTIMATED: >90 ML/MIN/1.73M2
GLUCOSE BLD-MCNC: 127 MG/DL (ref 74–99)
GLUCOSE BLD-MCNC: 177 MG/DL (ref 74–99)
GLUCOSE BLD-MCNC: 226 MG/DL (ref 74–99)
GLUCOSE BLD-MCNC: 262 MG/DL (ref 74–99)
GLUCOSE BLD-MCNC: 71 MG/DL (ref 74–99)
GLUCOSE BLD-MCNC: 98 MG/DL (ref 74–99)
GLUCOSE SERPL-MCNC: 160 MG/DL (ref 74–99)
HCT VFR BLD AUTO: 33.4 % (ref 34–48)
HGB BLD-MCNC: 10.7 G/DL (ref 11.5–15.5)
IMM GRANULOCYTES # BLD AUTO: 0.06 K/UL (ref 0–0.58)
IMM GRANULOCYTES NFR BLD: 1 % (ref 0–5)
LYMPHOCYTES NFR BLD: 1.15 K/UL (ref 1.5–4)
LYMPHOCYTES RELATIVE PERCENT: 15 % (ref 20–42)
MCH RBC QN AUTO: 29.9 PG (ref 26–35)
MCHC RBC AUTO-ENTMCNC: 32 G/DL (ref 32–34.5)
MCV RBC AUTO: 93.3 FL (ref 80–99.9)
MONOCYTES NFR BLD: 0.63 K/UL (ref 0.1–0.95)
MONOCYTES NFR BLD: 8 % (ref 2–12)
MYCOPLASMA AB,IGM: NORMAL
NEUTROPHILS NFR BLD: 74 % (ref 43–80)
NEUTS SEG NFR BLD: 5.49 K/UL (ref 1.8–7.3)
PHOSPHATE SERPL-MCNC: 4.4 MG/DL (ref 2.5–4.5)
PLATELET # BLD AUTO: 331 K/UL (ref 130–450)
PMV BLD AUTO: 10.2 FL (ref 7–12)
POTASSIUM SERPL-SCNC: 3.7 MMOL/L (ref 3.5–5)
RBC # BLD AUTO: 3.58 M/UL (ref 3.5–5.5)
SODIUM SERPL-SCNC: 136 MMOL/L (ref 132–146)
WBC OTHER # BLD: 7.5 K/UL (ref 4.5–11.5)

## 2024-10-10 PROCEDURE — 99233 SBSQ HOSP IP/OBS HIGH 50: CPT | Performed by: INTERNAL MEDICINE

## 2024-10-10 PROCEDURE — 99291 CRITICAL CARE FIRST HOUR: CPT | Performed by: INTERNAL MEDICINE

## 2024-10-10 PROCEDURE — 97535 SELF CARE MNGMENT TRAINING: CPT

## 2024-10-10 PROCEDURE — 71045 X-RAY EXAM CHEST 1 VIEW: CPT

## 2024-10-10 PROCEDURE — 97530 THERAPEUTIC ACTIVITIES: CPT

## 2024-10-10 PROCEDURE — 82962 GLUCOSE BLOOD TEST: CPT

## 2024-10-10 PROCEDURE — 83880 ASSAY OF NATRIURETIC PEPTIDE: CPT

## 2024-10-10 PROCEDURE — 6370000000 HC RX 637 (ALT 250 FOR IP): Performed by: STUDENT IN AN ORGANIZED HEALTH CARE EDUCATION/TRAINING PROGRAM

## 2024-10-10 PROCEDURE — 2700000000 HC OXYGEN THERAPY PER DAY

## 2024-10-10 PROCEDURE — 99232 SBSQ HOSP IP/OBS MODERATE 35: CPT | Performed by: STUDENT IN AN ORGANIZED HEALTH CARE EDUCATION/TRAINING PROGRAM

## 2024-10-10 PROCEDURE — 6360000002 HC RX W HCPCS: Performed by: STUDENT IN AN ORGANIZED HEALTH CARE EDUCATION/TRAINING PROGRAM

## 2024-10-10 PROCEDURE — 6360000002 HC RX W HCPCS

## 2024-10-10 PROCEDURE — 6370000000 HC RX 637 (ALT 250 FOR IP)

## 2024-10-10 PROCEDURE — 6370000000 HC RX 637 (ALT 250 FOR IP): Performed by: INTERNAL MEDICINE

## 2024-10-10 PROCEDURE — 2580000003 HC RX 258

## 2024-10-10 PROCEDURE — 80048 BASIC METABOLIC PNL TOTAL CA: CPT

## 2024-10-10 PROCEDURE — 94640 AIRWAY INHALATION TREATMENT: CPT

## 2024-10-10 PROCEDURE — 84100 ASSAY OF PHOSPHORUS: CPT

## 2024-10-10 PROCEDURE — 85025 COMPLETE CBC W/AUTO DIFF WBC: CPT

## 2024-10-10 PROCEDURE — 93010 ELECTROCARDIOGRAM REPORT: CPT | Performed by: INTERNAL MEDICINE

## 2024-10-10 PROCEDURE — 2060000000 HC ICU INTERMEDIATE R&B

## 2024-10-10 PROCEDURE — 99232 SBSQ HOSP IP/OBS MODERATE 35: CPT | Performed by: INTERNAL MEDICINE

## 2024-10-10 PROCEDURE — 6370000000 HC RX 637 (ALT 250 FOR IP): Performed by: HOSPITALIST

## 2024-10-10 PROCEDURE — 2580000003 HC RX 258: Performed by: INTERNAL MEDICINE

## 2024-10-10 RX ORDER — POTASSIUM CHLORIDE 1500 MG/1
40 TABLET, EXTENDED RELEASE ORAL ONCE
Status: COMPLETED | OUTPATIENT
Start: 2024-10-10 | End: 2024-10-10

## 2024-10-10 RX ORDER — GUAIFENESIN 400 MG/1
400 TABLET ORAL 2 TIMES DAILY WITH MEALS
Status: DISCONTINUED | OUTPATIENT
Start: 2024-10-10 | End: 2024-10-11 | Stop reason: HOSPADM

## 2024-10-10 RX ORDER — BENZONATATE 100 MG/1
100 CAPSULE ORAL 2 TIMES DAILY
Status: DISCONTINUED | OUTPATIENT
Start: 2024-10-10 | End: 2024-10-11 | Stop reason: HOSPADM

## 2024-10-10 RX ORDER — INSULIN GLARGINE 100 [IU]/ML
15 INJECTION, SOLUTION SUBCUTANEOUS NIGHTLY
Status: DISCONTINUED | OUTPATIENT
Start: 2024-10-10 | End: 2024-10-11 | Stop reason: HOSPADM

## 2024-10-10 RX ORDER — MAGNESIUM SULFATE IN WATER 40 MG/ML
2000 INJECTION, SOLUTION INTRAVENOUS ONCE
Status: COMPLETED | OUTPATIENT
Start: 2024-10-10 | End: 2024-10-10

## 2024-10-10 RX ADMIN — INSULIN LISPRO 1 UNITS: 100 INJECTION, SOLUTION INTRAVENOUS; SUBCUTANEOUS at 18:01

## 2024-10-10 RX ADMIN — GABAPENTIN 400 MG: 400 CAPSULE ORAL at 08:52

## 2024-10-10 RX ADMIN — BENZONATATE 100 MG: 100 CAPSULE ORAL at 08:52

## 2024-10-10 RX ADMIN — INSULIN GLARGINE 15 UNITS: 100 INJECTION, SOLUTION SUBCUTANEOUS at 23:43

## 2024-10-10 RX ADMIN — CLOPIDOGREL BISULFATE 600 MG: 300 TABLET, FILM COATED ORAL at 08:51

## 2024-10-10 RX ADMIN — GABAPENTIN 400 MG: 400 CAPSULE ORAL at 20:48

## 2024-10-10 RX ADMIN — BENZONATATE 100 MG: 100 CAPSULE ORAL at 15:36

## 2024-10-10 RX ADMIN — IPRATROPIUM BROMIDE 0.5 MG: 0.5 SOLUTION RESPIRATORY (INHALATION) at 20:44

## 2024-10-10 RX ADMIN — MAGNESIUM SULFATE HEPTAHYDRATE 2000 MG: 40 INJECTION, SOLUTION INTRAVENOUS at 09:11

## 2024-10-10 RX ADMIN — METOPROLOL SUCCINATE 25 MG: 50 TABLET, EXTENDED RELEASE ORAL at 20:08

## 2024-10-10 RX ADMIN — ROPINIROLE HYDROCHLORIDE 3 MG: 2 TABLET, FILM COATED ORAL at 22:15

## 2024-10-10 RX ADMIN — METOPROLOL SUCCINATE 25 MG: 50 TABLET, EXTENDED RELEASE ORAL at 08:52

## 2024-10-10 RX ADMIN — ATORVASTATIN CALCIUM 80 MG: 80 TABLET, FILM COATED ORAL at 08:52

## 2024-10-10 RX ADMIN — AMIODARONE HYDROCHLORIDE 0.5 MG/MIN: 50 INJECTION, SOLUTION INTRAVENOUS at 02:04

## 2024-10-10 RX ADMIN — GABAPENTIN 400 MG: 400 CAPSULE ORAL at 14:05

## 2024-10-10 RX ADMIN — GUAIFENESIN 400 MG: 400 TABLET ORAL at 15:36

## 2024-10-10 RX ADMIN — CEFTRIAXONE SODIUM 1000 MG: 1 INJECTION, POWDER, FOR SOLUTION INTRAMUSCULAR; INTRAVENOUS at 17:34

## 2024-10-10 RX ADMIN — ASPIRIN 81 MG: 81 TABLET, COATED ORAL at 08:52

## 2024-10-10 RX ADMIN — INSULIN LISPRO 2 UNITS: 100 INJECTION, SOLUTION INTRAVENOUS; SUBCUTANEOUS at 20:13

## 2024-10-10 RX ADMIN — IPRATROPIUM BROMIDE 0.5 MG: 0.5 SOLUTION RESPIRATORY (INHALATION) at 16:16

## 2024-10-10 RX ADMIN — IPRATROPIUM BROMIDE 0.5 MG: 0.5 SOLUTION RESPIRATORY (INHALATION) at 05:58

## 2024-10-10 RX ADMIN — SODIUM CHLORIDE, PRESERVATIVE FREE 10 ML: 5 INJECTION INTRAVENOUS at 20:10

## 2024-10-10 RX ADMIN — LEVOTHYROXINE SODIUM 200 MCG: 0.2 TABLET ORAL at 06:10

## 2024-10-10 RX ADMIN — INSULIN GLARGINE 15 UNITS: 100 INJECTION, SOLUTION SUBCUTANEOUS at 09:12

## 2024-10-10 RX ADMIN — POTASSIUM CHLORIDE 40 MEQ: 1500 TABLET, EXTENDED RELEASE ORAL at 08:52

## 2024-10-10 RX ADMIN — ROPINIROLE HYDROCHLORIDE 1 MG: 1 TABLET, FILM COATED ORAL at 14:05

## 2024-10-10 RX ADMIN — PANTOPRAZOLE SODIUM 20 MG: 20 TABLET, DELAYED RELEASE ORAL at 06:10

## 2024-10-10 RX ADMIN — BENZONATATE 100 MG: 100 CAPSULE ORAL at 20:08

## 2024-10-10 RX ADMIN — GUAIFENESIN 400 MG: 400 TABLET ORAL at 08:52

## 2024-10-10 NOTE — CARE COORDINATION
Care Coordination los 2 day.  Transfer from 65 on 10/9/24- post cath lab. Per ICU team, pt is out of afib, plan to stop IV amio today and start oral tomorrow. Would benefit from therapy evals when medically appropriate. Plan per previous  SW is home at discharge. Will follow for transition of care needs    Electronically signed by Tomeka Green RN on 10/10/2024 at 1:36 PM

## 2024-10-10 NOTE — PROGRESS NOTES
Her DM is brittle.  We recommend the following DM regimen  Lantus 15 units daily from Tomorrow  LDSS ACHS   Continue glucose check with meals and at bedtime  Will titrate insulin dose based on the blood glucose trend & insulin requirement  Upon discharge, I will arrange for the patient to be seen in the endocrinology clinic for routine diabetes maintenance and prevention    Dietary noncompliance  Discussed with patient the importance of eating consistent carbohydrate meals, avoiding high glycemic index food. Also, discussed with patient the risk and negative consequences of dietary noncompliance on blood glucose control, blood pressure and weight    Hypothyroidism   Most recent 10/07/2024: TSH 7.29 with Free T4 0.9; This seems to Non- compliance to medication  Continue with the Synthroid 200 mcg once a daily for now  Will need to adjust the Synthroid dose during discharge.  Will follow up TFT few weeks as a routine follow up after patient is discharged.      Hyperlipidemia  Continue with Lipitor 80 Mg once a daily.    Interdisciplinary plan for communication with healthcare providers:   Consult recommendations were discussed with the Primary Service/Nursing staff      The above issues were reviewed with the patient who understood and agreed with the plan.    Thank you for allowing us to participate in the care of this patient. Please do not hesitate to contact us with any additional questions.     ,I saw the patient and discussed the management with the resident physician Arnoldo Nevarez MD. I reviewed and agree with the findings and plan as documented in the resident's note     Felix Yu MD  Endocrinologist, [facility]   [facility]  [address] [facility] [place]  [address] [address]  Dept: [phone]  Loc: [phone]   Phone: [phone]  Fax: [phone]  --------------------------------------------  An electronic signature was used to authenticate

## 2024-10-10 NOTE — PROGRESS NOTES
A  Simulated seated EOB Independent    Toileting Minimal Assist   Min A per previous session Independent    Bed Mobility  Supine to sit: Stand by Assist   Sit to supine: NT Supine to sit: SBA  Supine to sit: Independent   Sit to supine: Independent    Functional Transfers Stand by Assist   Sit<>stand: SBA  Stand pivot: Min A w/o AD Independent    Functional Mobility Minimal Assist      Ambulated in room and hallway without AD: + unsteadiness with LOB noted Min A w/o AD & SBA w/ IV pole to<>from hallway  Independent    Balance Sitting:     Static:  SBA    Dynamic:SBA  Standing: SBA - min A Sitting:     Static: Sup    Dynamic:SBA  Standing: Min A w/o AD; SBA w/ IV pole      Activity Tolerance F  Fair+ tolerance w/ light activity G   Visual/  Perceptual Glasses: yes  wfl                       Hand Dominance right    Strength ROM Additional Info:    RUE   4/5 wfl good  and wfl FMC/dexterity noted during ADL tasks      LUE 4/5 wfl good  and wfl FMC/dexterity noted during ADL tasks      Hearing: wfl  Sensation:wfl  Tone: wfl  Edema:none noted      RASS: 0  CAM-ICU: (NT) Delirium    Vitals:   HR at rest: 76 bpm HR at end of session: 75 bpm   SpO2 at rest: --% SpO2 at end of session 98% on RA   BP at rest: 105/49 mmHg BP at end of session 108/55 mmHg   BP seated /59.    Comments:  Cleared by RN to see pt. Upon arrival patient supine in bed and agreeable to OT session. At end of session, patient seated in bedside chair with call light and phone within reach, all lines and tubes intact.  Overall patient demonstrated decreased independence and safety during completion of ADL/functional transfer/mobility tasks. Therapist facilitated ADL tasks, functional transfers, functional mobility, bed mobility to address safety awareness, implementation of fall prevention strategies, & functional engagement throughout ADLs. Pt would benefit from continued skilled OT to increase safety and independence with completion of  ADL/IADL tasks for functional independence and quality of life.    Treatment: OT treatment provided this date includes:   ADL-  Instruction/training on safety and adapted techniques for completion of ADLs: Therapist facilitated & pt educated on activity modifications/adaptations to promote implementation of fall prevention strategies, EC/WS strategies, & safety awareness throughout ADLs.   Mobility-  Instruction/training on safety and improved independence with bed mobility/functional transfers and functional mobility w/o AD - intermittent use of IV pole to maintain balance.   Sitting EOB ~10 minutes to improve dynamic sitting balance and activity tolerance during ADLs.   Activity tolerance- Instruction/training on energy conservation/work simplification for completion of ADLs.    Skilled positioning/alignment-  Therapist facilitated proper positioning/alignment throughout session to maintain skin/joint integrity & proper body mechanics.   Skilled monitoring of vitals - To maximize safe participation throughout functional activities.    Pt has made fair progress towards set goals.   Continue with current plan of care      Treatment Time In:1:25p            Treatment Time Out: 1:48a                Treatment Charges: Mins Units   Ther Ex  [id number]     Manual Therapy 59324     Thera Activities 30198 15 1   ADL/Home Mgt 15788 8 1   Neuro Re-ed [id number]     Group Therapy      Orthotic manage/training  [id number]     Non-Billable Time     Total Timed Treatment 23 2         Maria Isabel Lu OTR/L; [id number]

## 2024-10-10 NOTE — PLAN OF CARE
Problem: Chronic Conditions and Co-morbidities  Goal: Patient's chronic conditions and co-morbidity symptoms are monitored and maintained or improved  10/9/2024 2101 by Judah Sr RN  Outcome: Progressing  Flowsheets (Taken 10/9/2024 2000)  Care Plan - Patient's Chronic Conditions and Co-Morbidity Symptoms are Monitored and Maintained or Improved:   Monitor and assess patient's chronic conditions and comorbid symptoms for stability, deterioration, or improvement   Collaborate with multidisciplinary team to address chronic and comorbid conditions and prevent exacerbation or deterioration  10/9/2024 0824 by Patrica Morris RN  Outcome: Progressing     Problem: Discharge Planning  Goal: Discharge to home or other facility with appropriate resources  10/9/2024 2101 by Judah Sr RN  Outcome: Progressing  Flowsheets (Taken 10/9/2024 2000)  Discharge to home or other facility with appropriate resources: Identify barriers to discharge with patient and caregiver  10/9/2024 0824 by Patrica Morris RN  Outcome: Progressing     Problem: Safety - Adult  Goal: Free from fall injury  10/9/2024 2101 by Judah Sr RN  Outcome: Progressing  Flowsheets (Taken 10/9/2024 2100)  Free From Fall Injury: Instruct family/caregiver on patient safety  10/9/2024 0824 by Patrica Morris RN  Outcome: Progressing     Problem: ABCDS Injury Assessment  Goal: Absence of physical injury  Outcome: Progressing  Flowsheets (Taken 10/9/2024 2100)  Absence of Physical Injury: Implement safety measures based on patient assessment

## 2024-10-10 NOTE — CONSULTS
Met with patient and discussed that their physician has ordered a referral to our outpatient Phase II Cardiac Rehabilitation program. Reviewed the benefits of cardiac rehabilitation based on their diagnosis and personal risk factors. Patient demonstrates moderate interest in Cardiac Rehabilitation at this time.  Cardiac Rehabilitation brochure provided to patient/family. The Cardiac Rehabilitation Program has been provided the patient's referral information and pertinent patient details and history. The patient may call [facility] at [phone] for additional information or questions. Contact information for [facility] and other choices close to the patient's residence have been provided in the discharge instructions so that the patient may call and schedule an appointment when cleared by their physician. Thank you for the referral.

## 2024-10-10 NOTE — PROGRESS NOTES
Physical Therapy    Physical Therapy Treatment     Name: Debra Maher  : 1965  MRN: [id number]      Date of Service: 10/10/2024    Evaluating PT:  Geremias Cameron PT, DPT    Room #:  4426/4426-A  Diagnosis:  STEMI (ST elevation myocardial infarction) (HCC) [I21.3]  PMHx/PSHx:  anxiety, arthritis, asthma, depressive disorder, GERD, Graves disease, heart disease, heart disease, HLD, HTN MI, neuropathy, hypothyroidism, osteoporosis, DM, RLS, COPD  Procedure/Surgery:  Heart cath 10/9  Precautions:  Falls, O2, Monitor BP  Equipment Owned: None  Equipment Needs:  TBD    SUBJECTIVE:    Pt lives with handicap son in a 2 story home with a ramp to enter.  Bed/bath is on main floor.  Laundry is in the basement with a full flight of stairs and 1 handrail. Pt ambulated with no AD PTA.    OBJECTIVE:   Initial Evaluation  Date: 10/09/24 Treatment  10/10/24  Short Term/ Long Term   Goals   AM-PAC 6 Clicks     Was pt agreeable to Eval/treatment? yes Yes     Does pt have pain? No pain No c/o pain     Bed Mobility  Rolling: NT  Supine to sit: SBA  Sit to supine: NT  Scooting: SBA Rolling: NT  Supine to sit: SBA  Sit to supine: NT  Scooting: SBA Rolling: Ind  Supine to sit: Ind  Sit to supine: Ind  Scooting: Ind   Transfers Sit to stand: SBA  Stand to sit: SBA  Stand pivot: Christofer with no AD Sit to stand: SBA  Stand to sit: SBA  Stand pivot: Min A with no AD; SBA with IV pole  Sit to stand: Ind  Stand to sit: Ind  Stand pivot: Ind with AAD   Ambulation    40ft x2 with no AD Christofer 30 feet with no AD Min A;    90 feet with IV pole SBA  300ft with AAD Ind   Stair negotiation: ascended and descended NT NT 12 steps with 1 handrail Ind     Strength/ROM:   BLE gross strength WFL  BLE ROM WFL    Balance:   Static Sitting: SBA  Dynamic Sitting: SBA    Static Standing: SBA  Dynamic Standing: Min A with no AD;  SBA with IV pole     Pt is A & O x 4  Sensation:  Pt denies numbness and tingling to extremities  Edema:  None

## 2024-10-10 NOTE — PROGRESS NOTES
[place], [place]  +++++++++++++++++++++++++++++++++++++++++++++++++  NOTE: This report was transcribed using voice recognition software. Every effort was made to ensure accuracy; however, inadvertent computerized transcription errors may be present.

## 2024-10-10 NOTE — PROGRESS NOTES
[facility]  Internal Medicine Residency Program  Progress Note - ICU      Patient:  Debra Maher 59 y.o. female   MRN: [id number]       Date of Service: 10/10/2024    CC: No chief complaint on file.    Overnight events: SBP >80, off Levo     Subjective     Patient was seen and examined at bedside. She is in sinus rhythm off pressures, mild productive cough, saturating 92 % on room air.     Objective     I & O - 24hr:    Intake/Output Summary (Last 24 hours) at 10/10/2024 0826  Last data filed at 10/10/2024 0700  Gross per 24 hour   Intake 655.76 ml   Output 2110 ml   Net -1454.24 ml     Net IO Since Admission: -1,182.17 mL [10/10/24 0826]    Physical Exam  Vitals: BP (!) 112/51   Pulse 73   Temp 98 °F (36.7 °C) (Temporal)   Resp 20   Ht 1.575 m (5' 2\")   Wt 70.2 kg (154 lb 12.2 oz)   SpO2 92%   BMI 28.31 kg/m²     General Appearance: alert, appears stated age, and cooperative  HEENT:  Head: Normocephalic, no lesions, without obvious abnormality.  Lung: rales bibasilar and rhonchi bilaterally  Heart: regular rate and rhythm and S1, S2 normal  Abdomen: soft, non-tender; bowel sounds normal; no masses,  no organomegaly  Extremities:  extremities normal, atraumatic, no cyanosis or edema  Neurologic: Alert, oriented, thought content appropriate    Diet:   ADULT DIET; Regular; 4 carb choices (60 gm/meal); Low Fat/Low Chol/High Fiber/2 gm Na      Pertinent Labs & Imaging Studies     Labs    Recent Labs     10/09/24  0701 10/09/24  1650 10/10/24  0416   WBC 11.3 9.0 7.5   HGB 12.2 12.0 10.7*   HCT 38.1 36.6 33.4*   MCV 93.4 92.9 93.3    375 331     Recent Labs     10/08/24  0846 10/09/24  0701 10/09/24  1650 10/10/24  0416    138 136 136   K 3.7 3.9 3.7 3.7   CL 93* 98 96* 97*   CO2 32* 29 29 26   BUN 5* 8 10 11   CREATININE 0.5 0.5 0.6 0.7   MG 1.7  --  1.8  --    PHOS  --  3.9 4.5 4.4     Recent Labs     10/09/24  0701 10/09/24  1650 10/10/24  0416   GLUCOSE 67* 189* 160*

## 2024-10-11 VITALS
SYSTOLIC BLOOD PRESSURE: 108 MMHG | OXYGEN SATURATION: 92 % | WEIGHT: 154.76 LBS | HEIGHT: 62 IN | DIASTOLIC BLOOD PRESSURE: 53 MMHG | TEMPERATURE: 97.8 F | HEART RATE: 75 BPM | BODY MASS INDEX: 28.48 KG/M2 | RESPIRATION RATE: 16 BRPM

## 2024-10-11 LAB
ANION GAP SERPL CALCULATED.3IONS-SCNC: 11 MMOL/L (ref 7–16)
BASOPHILS # BLD: 0.05 K/UL (ref 0–0.2)
BASOPHILS NFR BLD: 1 % (ref 0–2)
BUN SERPL-MCNC: 10 MG/DL (ref 6–23)
CALCIUM SERPL-MCNC: 8.5 MG/DL (ref 8.6–10.2)
CHLORIDE SERPL-SCNC: 99 MMOL/L (ref 98–107)
CO2 SERPL-SCNC: 26 MMOL/L (ref 22–29)
CREAT SERPL-MCNC: 0.6 MG/DL (ref 0.5–1)
EOSINOPHIL # BLD: 0.08 K/UL (ref 0.05–0.5)
EOSINOPHILS RELATIVE PERCENT: 1 % (ref 0–6)
ERYTHROCYTE [DISTWIDTH] IN BLOOD BY AUTOMATED COUNT: 13.8 % (ref 11.5–15)
GFR, ESTIMATED: >90 ML/MIN/1.73M2
GLUCOSE BLD-MCNC: 251 MG/DL (ref 74–99)
GLUCOSE SERPL-MCNC: 221 MG/DL (ref 74–99)
HCT VFR BLD AUTO: 38.5 % (ref 34–48)
HGB BLD-MCNC: 12.1 G/DL (ref 11.5–15.5)
IMM GRANULOCYTES # BLD AUTO: 0.05 K/UL (ref 0–0.58)
IMM GRANULOCYTES NFR BLD: 1 % (ref 0–5)
LYMPHOCYTES NFR BLD: 1.14 K/UL (ref 1.5–4)
LYMPHOCYTES RELATIVE PERCENT: 15 % (ref 20–42)
MCH RBC QN AUTO: 30.2 PG (ref 26–35)
MCHC RBC AUTO-ENTMCNC: 31.4 G/DL (ref 32–34.5)
MCV RBC AUTO: 96 FL (ref 80–99.9)
MICROORGANISM SPEC CULT: ABNORMAL
MICROORGANISM SPEC CULT: ABNORMAL
MONOCYTES NFR BLD: 0.67 K/UL (ref 0.1–0.95)
MONOCYTES NFR BLD: 9 % (ref 2–12)
NEUTROPHILS NFR BLD: 73 % (ref 43–80)
NEUTS SEG NFR BLD: 5.44 K/UL (ref 1.8–7.3)
PHOSPHATE SERPL-MCNC: 3.6 MG/DL (ref 2.5–4.5)
PLATELET # BLD AUTO: 357 K/UL (ref 130–450)
PMV BLD AUTO: 10.3 FL (ref 7–12)
POTASSIUM SERPL-SCNC: 4.8 MMOL/L (ref 3.5–5)
RBC # BLD AUTO: 4.01 M/UL (ref 3.5–5.5)
SERVICE CMNT-IMP: ABNORMAL
SODIUM SERPL-SCNC: 136 MMOL/L (ref 132–146)
SPECIMEN DESCRIPTION: ABNORMAL
WBC OTHER # BLD: 7.4 K/UL (ref 4.5–11.5)

## 2024-10-11 PROCEDURE — 94669 MECHANICAL CHEST WALL OSCILL: CPT

## 2024-10-11 PROCEDURE — 36415 COLL VENOUS BLD VENIPUNCTURE: CPT

## 2024-10-11 PROCEDURE — 99232 SBSQ HOSP IP/OBS MODERATE 35: CPT | Performed by: INTERNAL MEDICINE

## 2024-10-11 PROCEDURE — 85025 COMPLETE CBC W/AUTO DIFF WBC: CPT

## 2024-10-11 PROCEDURE — 2700000000 HC OXYGEN THERAPY PER DAY

## 2024-10-11 PROCEDURE — 82962 GLUCOSE BLOOD TEST: CPT

## 2024-10-11 PROCEDURE — 97530 THERAPEUTIC ACTIVITIES: CPT

## 2024-10-11 PROCEDURE — 6370000000 HC RX 637 (ALT 250 FOR IP): Performed by: INTERNAL MEDICINE

## 2024-10-11 PROCEDURE — 99239 HOSP IP/OBS DSCHRG MGMT >30: CPT | Performed by: STUDENT IN AN ORGANIZED HEALTH CARE EDUCATION/TRAINING PROGRAM

## 2024-10-11 PROCEDURE — 94640 AIRWAY INHALATION TREATMENT: CPT

## 2024-10-11 PROCEDURE — 97535 SELF CARE MNGMENT TRAINING: CPT

## 2024-10-11 PROCEDURE — 84100 ASSAY OF PHOSPHORUS: CPT

## 2024-10-11 PROCEDURE — 80048 BASIC METABOLIC PNL TOTAL CA: CPT

## 2024-10-11 PROCEDURE — 6370000000 HC RX 637 (ALT 250 FOR IP): Performed by: STUDENT IN AN ORGANIZED HEALTH CARE EDUCATION/TRAINING PROGRAM

## 2024-10-11 PROCEDURE — 6360000002 HC RX W HCPCS

## 2024-10-11 PROCEDURE — 6370000000 HC RX 637 (ALT 250 FOR IP)

## 2024-10-11 RX ORDER — CLOPIDOGREL BISULFATE 75 MG/1
75 TABLET ORAL DAILY
Qty: 30 TABLET | Refills: 3 | Status: SHIPPED | OUTPATIENT
Start: 2024-10-12 | End: 2024-10-11

## 2024-10-11 RX ORDER — LANOLIN ALCOHOL/MO/W.PET/CERES
3 CREAM (GRAM) TOPICAL NIGHTLY PRN
Qty: 30 TABLET | Refills: 0 | Status: SHIPPED | OUTPATIENT
Start: 2024-10-11 | End: 2024-11-10

## 2024-10-11 RX ORDER — CLOPIDOGREL BISULFATE 75 MG/1
75 TABLET ORAL DAILY
Qty: 90 TABLET | Refills: 3 | Status: SHIPPED | OUTPATIENT
Start: 2024-10-12

## 2024-10-11 RX ORDER — GUAIFENESIN 400 MG/1
400 TABLET ORAL 2 TIMES DAILY WITH MEALS
Qty: 56 TABLET | Refills: 0 | Status: SHIPPED | OUTPATIENT
Start: 2024-10-11

## 2024-10-11 RX ORDER — ALBUTEROL SULFATE 90 UG/1
2 INHALANT RESPIRATORY (INHALATION) 4 TIMES DAILY PRN
Qty: 18 G | Refills: 0 | Status: SHIPPED | OUTPATIENT
Start: 2024-10-11

## 2024-10-11 RX ORDER — METOPROLOL SUCCINATE 25 MG/1
25 TABLET, EXTENDED RELEASE ORAL 2 TIMES DAILY
Qty: 30 TABLET | Refills: 3 | Status: SHIPPED | OUTPATIENT
Start: 2024-10-11

## 2024-10-11 RX ORDER — INSULIN GLARGINE 100 [IU]/ML
15 INJECTION, SOLUTION SUBCUTANEOUS NIGHTLY
Qty: 5 ADJUSTABLE DOSE PRE-FILLED PEN SYRINGE | Refills: 3 | Status: SHIPPED | OUTPATIENT
Start: 2024-10-11

## 2024-10-11 RX ORDER — ATORVASTATIN CALCIUM 80 MG/1
80 TABLET, FILM COATED ORAL DAILY
Qty: 90 TABLET | Refills: 4 | Status: SHIPPED | OUTPATIENT
Start: 2024-10-11

## 2024-10-11 RX ORDER — SENNOSIDES A AND B 8.6 MG/1
1 TABLET, FILM COATED ORAL DAILY PRN
Qty: 30 TABLET | Refills: 0 | Status: SHIPPED | OUTPATIENT
Start: 2024-10-11 | End: 2024-11-10

## 2024-10-11 RX ORDER — BENZONATATE 100 MG/1
100 CAPSULE ORAL 2 TIMES DAILY
Qty: 40 CAPSULE | Refills: 0 | Status: SHIPPED | OUTPATIENT
Start: 2024-10-11 | End: 2024-10-31

## 2024-10-11 RX ORDER — NICOTINE 21 MG/24HR
1 PATCH, TRANSDERMAL 24 HOURS TRANSDERMAL DAILY
Qty: 30 PATCH | Refills: 3 | Status: SHIPPED | OUTPATIENT
Start: 2024-10-12

## 2024-10-11 RX ORDER — PEN NEEDLE, DIABETIC 32 GX 1/4"
NEEDLE, DISPOSABLE MISCELLANEOUS
Qty: 100 EACH | Refills: 3 | Status: SHIPPED | OUTPATIENT
Start: 2024-10-11

## 2024-10-11 RX ADMIN — BENZONATATE 100 MG: 100 CAPSULE ORAL at 08:46

## 2024-10-11 RX ADMIN — GUAIFENESIN 400 MG: 400 TABLET ORAL at 08:46

## 2024-10-11 RX ADMIN — IPRATROPIUM BROMIDE 0.5 MG: 0.5 SOLUTION RESPIRATORY (INHALATION) at 10:17

## 2024-10-11 RX ADMIN — CLOPIDOGREL BISULFATE 75 MG: 75 TABLET ORAL at 08:46

## 2024-10-11 RX ADMIN — ATORVASTATIN CALCIUM 80 MG: 80 TABLET, FILM COATED ORAL at 08:46

## 2024-10-11 RX ADMIN — INSULIN LISPRO 2 UNITS: 100 INJECTION, SOLUTION INTRAVENOUS; SUBCUTANEOUS at 08:47

## 2024-10-11 RX ADMIN — ASPIRIN 81 MG: 81 TABLET, COATED ORAL at 08:47

## 2024-10-11 RX ADMIN — INSULIN LISPRO 2 UNITS: 100 INJECTION, SOLUTION INTRAVENOUS; SUBCUTANEOUS at 11:44

## 2024-10-11 RX ADMIN — METOPROLOL SUCCINATE 25 MG: 50 TABLET, EXTENDED RELEASE ORAL at 08:46

## 2024-10-11 RX ADMIN — LEVOTHYROXINE SODIUM 200 MCG: 0.2 TABLET ORAL at 08:46

## 2024-10-11 RX ADMIN — GABAPENTIN 400 MG: 400 CAPSULE ORAL at 08:46

## 2024-10-11 NOTE — PROGRESS NOTES
Patient is seen in follow-up for    Subjective:     Ms. Maher      ROS:  CONSTITUTIONAL:  negative for  fevers, chills  HEENT:  negative for earaches, nasal congestion and epistaxis  RESPIRATORY:  negative for  dry cough, cough with sputum,wheezing and hemoptysis  GASTROINTESTINAL:  negative for nausea, vomiting  MUSCULOSKELETAL:  negative for  myalgias, arthralgias  NEUROLOGICAL:  negative for visual disturbance, dysphagia    Medication side effects: {side effects:52834::\"none\"}    Scheduled Meds:   guaiFENesin  400 mg Oral BID WC    benzonatate  100 mg Oral BID    insulin glargine  15 Units SubCUTAneous Nightly    insulin lispro  0-4 Units SubCUTAneous 4x Daily AC & HS    ipratropium  0.5 mg Nebulization 4x Daily RT    sodium chloride flush  5-40 mL IntraVENous 2 times per day    metoprolol succinate  25 mg Oral BID    cefTRIAXone (ROCEPHIN) IV  1,000 mg IntraVENous Q24H    clopidogrel  75 mg Oral Daily    nicotine  1 patch TransDERmal Daily    aspirin  81 mg Oral Daily    atorvastatin  80 mg Oral Daily    gabapentin  400 mg Oral TID    levothyroxine  200 mcg Oral Daily    pantoprazole  20 mg Oral QAM AC     Continuous Infusions:   amiodarone Stopped (10/10/24 1958)    dextrose       PRN Meds:sodium chloride flush, polyethylene glycol, sodium chloride flush, acetaminophen, sodium chloride flush, potassium chloride **OR** potassium alternative oral replacement **OR** potassium chloride, magnesium sulfate, ondansetron **OR** ondansetron, melatonin, senna, heparin (porcine), heparin (porcine), nitroGLYCERIN, glucose, dextrose bolus **OR** dextrose bolus, glucagon (rDNA), dextrose, benzonatate, [Held by provider] albuterol, [Held by provider] ipratropium 0.5 mg-albuterol 2.5 mg, rOPINIRole, rOPINIRole    I/O last 3 completed shifts:  In: 655.8 [I.V.:655.8]  Out: 3050 [Urine:3050]  No intake/output data recorded.      Objective:      Physical Exam:   {Exam, Complete:65971}  CONSTITUTIONAL:  awake, alert, cooperative, no

## 2024-10-11 NOTE — PROGRESS NOTES
CLINICAL PHARMACY NOTE: MEDS TO BEDS    Total # of Prescriptions Filled:  14   The following medications were delivered to the patient:  Clopidogrel 75 mg  Eliquis 5 mg  True plus [id number]  Metoprolol succ er 25  Benzonatate 100 mg  Chest congestion relief 400 mg  Melatonin 3 mg  Clopidogrel 75 mg  Senna 8.6 mg  Atorvastatin 80 mg  Basaglar kwikpen   Albuterol fernando hfa  Nicotine 21mg/24hr    Additional Documentation:   Delivered tp pt

## 2024-10-11 NOTE — PLAN OF CARE
Problem: Chronic Conditions and Co-morbidities  Goal: Patient's chronic conditions and co-morbidity symptoms are monitored and maintained or improved  Outcome: Progressing     Problem: Discharge Planning  Goal: Discharge to home or other facility with appropriate resources  Outcome: Progressing     Problem: Safety - Adult  Goal: Free from fall injury  Outcome: Progressing  Flowsheets (Taken 10/10/2024 2109)  Free From Fall Injury: Instruct family/caregiver on patient safety     Problem: ABCDS Injury Assessment  Goal: Absence of physical injury  Outcome: Progressing  Flowsheets (Taken 10/10/2024 2109)  Absence of Physical Injury: Implement safety measures based on patient assessment

## 2024-10-11 NOTE — DISCHARGE SUMMARY
Hospitalist Discharge Summary    Patient ID: Debra Maher   Patient : 1965  Patient's PCP: Wolf Hernandez DO    Admit Date: 10/8/2024   Admitting Physician: Ha Carrillo MD    Discharge Date:  10/11/2024   Discharge Physician: Ha Carrillo MD   Discharge Condition: Stable  Discharge Disposition: Home      Hospital course in brief:  (Please refer to daily progress notes for a comprehensive review of the hospitalization by requesting medical records)    Ms. Debra Maher, a 59 y.o. year old female  who  has a past medical history of Anxiety, Arthritis, Asthma, Back pain, Depressive disorder, GERD (gastroesophageal reflux disease), Graves' disease, Heart disease, Hyperlipidemia, Hypertension, Hypothyroidism, Myocardial infarction (HCC), Neuropathy, Osteoporosis, Pain in right hand, Restless leg, and Type 2 diabetes mellitus without complication (HCC).      This is a 59-year-old lady with past medical history mentioned above, who presented to [facility] ED with concerns of generalized body ache, dehydration, leg swelling for past couple of days.  She stated that it started off as a stomach bug 2 weeks ago.  Last week she started developing leg swelling and sore throat.  She denied any chest pain, only had mild shortness of breath.  She does have a history of COPD and does smoke cigarettes.  Denies any fever, chills, nausea, vomiting, diaphoresis.  She does not have any sick contacts recently.  Does not have any history of CHF.  She also endorses history of orthopnea and chest discomfort.     In the ED,  Vital signs stable, initial blood pressure 84/69, requiring 2 L oxygen via nasal cannula  Lab work-sodium 133 potassium 3.7, chloride 93, bicarb 32, BUN/creatinine 5/0.5, magnesium 1.7, blood glucose 117, proBNP 2895 troponin 446, 438, LFT unremarkable except for   CBC unremarkable, hemoglobin, hematocrit 11.2/32.7  PT/INR 12.7/1.2  COVID-19, influenza A/B- negative  Urinalysis  ALLERGIES:  Ciprofloxacin; Clindamycin; Nitrofurantoin monohyd/m-cryst; Penicillins; Sulfa (sulfonamide antibiotics); Cephalosporins; Hydrocod-cpm-pe-acetaminophen; and Hydrocortisone    CHIEF COMPLAINT:  This 69 y.o. female comes for evaluation for excision of a biopsy-proven melanoma in situ on the left arm. Consultation requested by Ashlie Collazo MD.    HISTORY OF PRESENT ILLNESS:   Location: left arm  Duration: several years  Quality: was persistent  Context: status post biopsy by Dr. Collazo; path = as noted below, [facility]     Prior Treatment: none  See also the handwritten notes scanned to chart for additional details.    Patient Name MIKY BLANDON Accession # [id number]  FINAL PATHOLOGIC DIAGNOSIS  1. Skin, left arm, shave biopsy:  - EVOLVING MALIGNANT MELANOMA IN SITU, ARISING IN ASSOCIATION WITH A DYSPLASTIC COMPOUND  NEVUS.  - TUMOR EXTENDS TO THE LATERAL MARGIN.  MICROSCOPIC DESCRIPTION: Sections show a proliferation of variably atypical junctional melanocytes consisting  of rare small nests and prominent single cells, exhibiting confluent growth along the dermoepidermal junction and  pagetoid spread. There is a small and well circumscribed dermal component to this melanocytic proliferation  consisting of nests and cords of melanocytes within the dermis showing architectural symmetry, maturation, and no  cytological atypia. Mart 1 immunohistochemical stain highlights the compound melanocytic proliferation, including  areas of confluent growth of melanocytes along the dermoepidermal junction and pagetoid spread. HMB-45  exhibits similar though somewhat sparser staining within the junctional component of the lesion. It fails to stain the  dermal component of the lesion. Ki-67 shows a negligible proliferative index within the dermal component of the  lesion. Appropriately reactive controls were reviewed.  Diagnosed by: Eileen Chirinos M.D.  (Electronically Signed: 2018-09-11  "16:22:29)    Defibrillator: No  Pacemaker: No  Artificial heart valves: No  Artificial joints: No    REVIEW OF SYSTEMS:   General: general health good  Skin: has no previous history of skin cancer(s)  CV: has a "heart aneurysm"; no hypertension, no artificial valves, has no chest pain  Resp: has exertional shortness of breath  Endo: has no diabetes  Hem/Lymph: taking prescribed anticoagulants-ASPIRIN, has easy bruising/bleeding  Allergy/Immuno: has allergies as noted above  GI: has no history of hepatitis  MS: as noted above     PAST MEDICAL HISTORY:  Past Medical History:   Diagnosis Date    Depression     Diverticulosis     Genital herpes     GERD (gastroesophageal reflux disease)     Gout     History of use of hearing aid in left ear     Osteopenia     RAD (reactive airway disease)     Rheumatic fever     UC (ulcerative colitis)        PAST SURGICAL HISTORY:  Past Surgical History:   Procedure Laterality Date    APPENDECTOMY      CHOLECYSTECTOMY      COLONOSCOPY  2013    every 2 yrs    HYSTERECTOMY      parital - benign - fibroids    Rectocele  1983    with hysterectomy        SOCIAL HISTORY:  Dependencies: smoking status as noted below  Social History     Tobacco Use    Smoking status: Never Smoker    Smokeless tobacco: Never Used   Substance Use Topics    Alcohol use: Yes     Comment: rarely     Drug use: No       PERTINENT MEDICATIONS:  See medications list.    Current Outpatient Medications:     acyclovir 5% (ZOVIRAX) 5 % ointment, Apply topically 5 (five) times daily., Disp: 15 g, Rfl: 1    ascorbic acid (VITAMIN C) 500 MG tablet, Take 500 mg by mouth once daily., Disp: , Rfl:     aspirin (ECOTRIN) 81 MG EC tablet, Take 81 mg by mouth once daily., Disp: , Rfl:     fish oil-omega-3 fatty acids 300-1,000 mg capsule, Take by mouth once daily., Disp: , Rfl:     calcium-vitamin D3 500 mg(1,250mg) -200 unit per tablet, Take 1 tablet by mouth 2 (two) times daily with meals., Disp: , Rfl:    "  conjugated estrogens (PREMARIN) vaginal cream, Place 0.5 g vaginally 3 (three) times a week., Disp: 30 g, Rfl: 3    cyanocobalamin, vitamin B-12, (VITAMIN B-12) 50 mcg tablet, Take 50 mcg by mouth once daily., Disp: , Rfl:     gabapentin (NEURONTIN) 300 MG capsule, Take 1 capsule (300 mg total) by mouth every evening., Disp: 30 capsule, Rfl: 1    mesalamine (ASACOL) 800 mg TbEC, Take 800 mg by mouth 2 (two) times daily. , Disp: , Rfl:     milk thistle 175 mg tablet, Take 175 mg by mouth once daily., Disp: , Rfl:     multivitamin capsule, Take 1 capsule by mouth once daily., Disp: , Rfl:     sertraline (ZOLOFT) 100 MG tablet, TAKE ONE TABLET BY MOUTH TWICE DAILY, Disp: 180 tablet, Rfl: 1    TURMERIC ROOT EXTRACT ORAL, Take by mouth once daily., Disp: , Rfl:     valACYclovir (VALTREX) 1000 MG tablet, Take 1 tablet (1,000 mg total) by mouth daily as needed (outbreak)., Disp: 10 tablet, Rfl: 0      ALLERGIES:  Ciprofloxacin; Clindamycin; Nitrofurantoin monohyd/m-cryst; Penicillins; Sulfa (sulfonamide antibiotics); Cephalosporins; Hydrocod-cpm-pe-acetaminophen; and Hydrocortisone    EXAM:  See also the handwritten notes/diagrams scanned to chart for additional details.  Constitutional  General appearance: well-developed, well-nourished, well-kempt older white female    Eyes  Inspection of conjunctivae and lids reveals no abnormalities; sclerae anicteric  Neurologic/Psychiatric  Alert,  normal orientation to time, place, person  Normal mood and affect with no evidence of depression, anxiety, agitation  Skin: see photo(s)  Head: examination reveals moderate chronic solar damage  Neck: examination reveals moderate chronic solar damage  Right upper extremity: examination reveals moderate chronic solar damage; few ecchymosis/ecchymoses   Left upper extremity: examination reveals moderate chronic solar damage; few ecchymosis/ecchymoses; on her left lateral arm there is an approximately 1-1.2 cm pink scar which she  confirms as the site of recent biopsy    ASSESSMENT: melanoma in situ of the left arm  chronic solar damage to areas as noted above    PLAN:  The diagnosis and management options, and risks and benefits of the alternatives, including observation/non-treatment and surgical excision with paraffin-embedded section margin evaluation, either standard excision or staged excision with overnight tissue processing, were discussed at length with the patient. In particular, the discussion included, but was not limited to, the following:    I reviewed with her the diagnosis of melanoma in situ. I discussed the anticipated natural history of such lesions. I discussed the possibility that such lesions, if left untreated, could evolve into an invasive melanoma. I reassured her that the lesion was not an invasive melanoma at present.    I also discussed with her the management options, including but not limited to those detailed below.    One alternative at this point would be to defer further treatment and observe the lesion. With some small in situ melanomas of this kind, it is possible that a biopsy can be sufficient to definitively treat them. Alternatively, some such lesions may be slow growing and may not require immediate treatment. The potential advantage of this choice would be to avoid the need for possibly unnecessary additional surgery. Among the potential disadvantages of this would be the possibility of recurrence/enlargement of the lesion, and/or progression and more extensive spread of the lesion or recurrence at a later date, which might necessitate a larger and more complex surgery.    Excisional surgery can be an effective treatment for melanoma in situ. This would involve excision of the lesion with margin evaluation by submitting the specimen to a pathologist for marginal assessment by fixed-tissue processing. The potential advantage of this technique is that it offers a way of treating the lesion with  histologic confirmation of tumor removal. Among the disadvantages of this treatment are the possible need for re-excision if marginal involvement is identified, and the general potential risks of surgery, including allergic reactions to the anesthetic and other materials used, infection, injury to nerves in the area with consequent loss of sensation or muscle function, and scarring or distortion of surrounding structures.    In light of the size and nature of this lesion, surgical excision with margin evaluation for definitive removal was felt to be the most appropriate treatment choice.    All questions were answered to her satisfaction. She fully understands the aims, risks, alternatives, and possible complications, and has elected to proceed with the surgery, and verbally consented to do so. The procedure will be scheduled in the near future.    Routine pre-op instructions were given to her.    I instructed her to continue  ASA prior to surgery.  --------------------------------------  Note: Some or all of this note may have been generated using voice recognition software. There may be voice recognition errors including grammatical and/or spelling errors found in the text. Attempts were made to correct these errors prior to signature.          * This list has 2 medication(s) that are the same as other medications prescribed for you. Read the directions carefully, and ask your doctor or other care provider to review them with you.                STOP taking these medications      aspirin 81 MG tablet     Levemir 100 UNIT/ML injection vial  Generic drug: insulin detemir     lisinopril 10 MG tablet  Commonly known as: PRINIVIL;ZESTRIL     meloxicam 15 MG tablet  Commonly known as: MOBIC     metoprolol tartrate 50 MG tablet  Commonly known as: LOPRESSOR               Where to Get Your Medications        These medications were sent to [facility] [address] - P [phone] - F [phone] [address], [facility] [address]      Phone: [phone]   albuterol sulfate  (90 Base) MCG/ACT inhaler  apixaban 5 MG Tabs tablet  atorvastatin 80 MG tablet  BD Pen Needle Micro U/F 32G X 6 MM Misc  benzonatate 100 MG capsule  clopidogrel 75 MG tablet  empagliflozin 10 MG tablet  guaiFENesin 400 MG tablet  Lantus SoloStar 100 UNIT/ML injection pen  melatonin 3 MG Tabs tablet  metoprolol succinate 25 MG extended release tablet  nicotine 21 MG/24HR  senna 8.6 MG tablet         Time Spent on discharge is more than 45 minutes in the examination, evaluation, counseling and review of medications and discharge plan.    +++++++++++++++++++++++++++++++++++++++++++++++++  Ha Carrillo MD  [facility] - [place], [place]  +++++++++++++++++++++++++++++++++++++++++++++++++  NOTE: This report was transcribed using voice recognition software. Every effort was made to ensure accuracy; however, inadvertent computerized transcription errors may be present.

## 2024-10-11 NOTE — PROGRESS NOTES
Pulse ox was 94% on room air at rest.  Ambulated patient on room air.  Oxygen saturation was 76% on room air while ambulating.  Oxygen applied  Recovery pulse ox was 93% on 2 liters of oxygen while ambulating.

## 2024-10-11 NOTE — PROGRESS NOTES
Message sent to Cardiology regarding Dr. Carrillo asking if patient will need to go home on PO amiodarone.

## 2024-10-11 NOTE — PROGRESS NOTES
OCCUPATIONAL THERAPY TREATMENT NOTE    [facility]  OT BEDSIDE TREATMENT NOTE      Date:10/11/2024  Patient Name: Debra Maher  MRN: [id number]  : 1965  Room: [facility]     Evaluating OT: Shaq Lombardo OTR/L #[id number]      Referring Provider: Ha Carrillo MD   Specific Provider Orders/Date: OT eval and treat 10/8/24     Diagnosis: STEMI (ST elevation myocardial infarction) (HCC) [I21.3]   Pt admitted to hospital with body aches, dehydration and leg swelling      Pertinent Medical History:  has a past medical history of Anxiety, Arthritis, Asthma, Back pain, CAD (coronary artery disease), Depressive disorder, GERD (gastroesophageal reflux disease), Graves' disease, Heart disease, Hyperlipidemia, Hypertension, Hypothyroidism, Myocardial infarction (HCC), Neuropathy, Osteoporosis, Pain in right hand, Restless leg, and Type 2 diabetes mellitus without complication (HCC).     Surgeries: 10/9/24 Heart cath.     Precautions:  Fall Risk, monitor BP,    Assessment of current deficits    [x] Functional mobility          [x]ADLs           [x] Strength                  []Cognition    [x] Functional transfers        [x] IADLs         [x] Safety Awareness   [x]Endurance    [] Fine Coordination                        [x] Balance      [] Vision/perception   []Sensation      []Gross Motor Coordination            [] ROM           [] Delirium                   [] Motor Control      OT PLAN OF CARE   OT POC based on physician orders, patient diagnosis and results of clinical assessment     Frequency/Duration 1-3 days/wk for 2 weeks PRN   Specific OT Treatment Interventions to include:   * Instruction/training on adapted ADL techniques and AE recommendations to increase functional independence within precautions       * Training on energy conservation strategies, correct breathing pattern and techniques to improve independence/tolerance for self-care routine  * Functional transfer/mobility training/DME  seated EOB Independent    Toileting Minimal Assist   SBA   Independent    Bed Mobility  Supine to sit: Stand by Assist   Sit to supine: NT Supine to sit: SBA  Supine to sit: Independent   Sit to supine: Independent    Functional Transfers Stand by Assist   Sit<>stand: SBA  Stand pivot: Min A w/o AD Independent    Functional Mobility Minimal Assist      Ambulated in room and hallway without AD: + unsteadiness with LOB noted SBA w/o AD  For > household distance. Independent    Balance Sitting:     Static:  SBA    Dynamic:SBA  Standing: SBA - min A Sitting:     Static: Sup    Dynamic:SBA  Standing: Min A w/o AD; SBA w/ IV pole      Activity Tolerance F  Fair+ tolerance w/ light activity G   Visual/  Perceptual Glasses: yes  wfl                       Hand Dominance right    Strength ROM Additional Info:    RUE   4/5 wfl good  and wfl FMC/dexterity noted during ADL tasks      LUE 4/5 wfl good  and wfl FMC/dexterity noted during ADL tasks      Hearing: wfl  Sensation:wfl  Tone: wfl  Edema:none noted      RASS: 0  CAM-ICU: (NT) Delirium    Vitals:   Pt on RA at beginning of session with decrease in SpO2 during functional mobility to 77%, recovering to 96% with seated rest break, 2L O2, and PLB techniques.    Comments:  Cleared by RN to see pt. Upon arrival patient supine in bed and agreeable to OT session. At end of session, patient seated in at bedside with call light and phone within reach, all lines and tubes intact.  Overall patient demonstrated decreased independence and safety during completion of ADL/functional transfer/mobility tasks. Therapist facilitated ADL tasks, functional transfers, functional mobility, bed mobility to address safety awareness, implementation of fall prevention strategies, & functional engagement throughout ADLs. Pt would benefit from continued skilled OT to increase safety and independence with completion of ADL/IADL tasks for functional independence and quality of

## 2024-10-11 NOTE — PROGRESS NOTES
Physical Therapy    Physical Therapy Treatment     Name: Debra Maher  : 1965  MRN: [id number]      Date of Service: 10/11/2024    Evaluating PT:  Geremias Cameron PT, DPT    Room #:  8513/8513-A  Diagnosis:  STEMI (ST elevation myocardial infarction) (HCC) [I21.3]  PMHx/PSHx:  anxiety, arthritis, asthma, depressive disorder, GERD, Graves disease, heart disease, heart disease, HLD, HTN MI, neuropathy, hypothyroidism, osteoporosis, DM, RLS, COPD  Procedure/Surgery:  Heart cath 10/9  Precautions:  Falls, O2, Monitor BP  Equipment Owned: None  Equipment Needs:  TBD    SUBJECTIVE:    Pt lives with handicap son in a 2 story home with a ramp to enter.  Bed/bath is on main floor.  Laundry is in the basement with a full flight of stairs and 1 handrail. Pt ambulated with no AD PTA.    OBJECTIVE:   Initial Evaluation  Date: 10/09/24 Treatment  10/11/24  Short Term/ Long Term   Goals   AM-PAC 6 Clicks     Was pt agreeable to Eval/treatment? yes Yes     Does pt have pain? No pain No c/o pain     Bed Mobility  Rolling: NT  Supine to sit: SBA  Sit to supine: NT  Scooting: SBA Rolling: NT  Supine to sit: SBA  Sit to supine: NT  Scooting: SBA Rolling: Ind  Supine to sit: Ind  Sit to supine: Ind  Scooting: Ind   Transfers Sit to stand: SBA  Stand to sit: SBA  Stand pivot: Christofer with no AD Sit to stand: SBA  Stand to sit: SBA  Stand pivot: SBA with no AD Sit to stand: Ind  Stand to sit: Ind  Stand pivot: Ind with AAD   Ambulation    40ft x2 with no AD Christofer 100 feet x 2 reps with SBA no AD 300ft with AAD Ind   Stair negotiation: ascended and descended NT 4 reps with 1 rail Min A 12 steps with 1 handrail Ind     Strength/ROM:   BLE gross strength WFL  BLE ROM WFL    Balance:   Static Sitting: SBA  Dynamic Sitting: SBA    Static Standing: SBA  Dynamic Standing: SBA    Pt is A & O x 4  Sensation:  Pt denies numbness and tingling to extremities  Edema:  None noted    Vitals:  SpO2 77% on room air ambulating and negotiating  stairs  SpO2 recovered to 88% on room air with seated rest and PLB  SpO2 recovered to 98% with placement of 2 L nasal cannula         Therapeutic Exercises:    STS x 3    Patient education  Pt educated on safety during mobility, wearing O2 and monitoring SpO2, pacing activity, taking adequate rest breaks     Patient response to education:   Pt verbalized understanding Pt demonstrated skill Pt requires further education in this area   yes partial yes     ASSESSMENT:    Conditions Requiring Skilled Therapeutic Intervention:    [x]Decreased strength     [x]Decreased ROM  [x]Decreased functional mobility  [x]Decreased balance   [x]Decreased endurance   [x]Decreased posture  []Decreased sensation  [x]Decreased coordination   []Decreased vision  [x]Decreased safety awareness   []Increased pain       Comments Upon entry pt agreeable, states she has been up throughout the day.   Pt demonstrates improved balance today with ambulation and stair negotiation.  Pt reported SOB after completing stairs so given seated rest and SPO2 was noted to be at 77%.  OT monitored pt and cued on PLB while PT gathered nasal cannula and O2 tank.  Pt recovered to 88% on RA but requiring 2 L to get back to 98%.  RN was notified.  Pt ambulated back to room on 2 L and maintained SpO2>93%.  Pt left back in room with needs met.     Treatment:  Patient practiced and was instructed in the following treatment:    Bed mobility training - pt given verbal and tactile cues to facilitate proper sequencing and safety during supine>sit to complete task   STS and pivot transfer training - pt educated on proper hand and foot placement, safety, and sequencing to safely complete sit<>stand and pivot transfers to complete task safely   Gait training- pt was given verbal and tactile cues to facilitate postural correction, energy conservation, safety during ambulation as well as provided with physical assistance.  Stair training - Pt educated on proper safety and

## 2024-10-12 LAB — ECHO BSA: 1.73 M2

## 2024-10-12 NOTE — PROGRESS NOTES
Patient is seen in follow-up for her late presentation ST elevation MI    Subjective:     Ms. Maher feels good today  Sitting up in bed no apparent distress    ROS:  CONSTITUTIONAL:  negative for  fevers, chills  HEENT:  negative for earaches, nasal congestion and epistaxis  RESPIRATORY:  negative for  dry cough, cough with sputum,wheezing and hemoptysis  GASTROINTESTINAL:  negative for nausea, vomiting  MUSCULOSKELETAL:  negative for  myalgias, arthralgias  NEUROLOGICAL:  negative for visual disturbance, dysphagia    Medication side effects: none    Scheduled Meds:  Continuous Infusions:  PRN Meds:    I/O last 3 completed shifts:  In: -   Out: 1450 [Urine:1450]  No intake/output data recorded.      Objective:      Physical Exam:   BP (!) 108/53   Pulse 75   Temp 97.8 °F (36.6 °C) (Oral)   Resp 16   Ht 1.575 m (5' 2\")   Wt 70.2 kg (154 lb 12.2 oz)   SpO2 92%   BMI 28.31 kg/m²   CONSTITUTIONAL:  awake, alert, cooperative, no apparent distress, and appears stated age  HEAD:  normocepalic, without obvious abnormality, atraumatic  NECK:  Supple, symmetrical, trachea midline, no adenopathy, thyroid symmetric, not enlarged and no tenderness, skin normal  LUNGS:  No increased work of breathing, No accessory muscle use or intercostal retractions, good air exchange, clear to auscultation bilaterally, no crackles or wheezing  CARDIOVASCULAR:  Normal apical impulse, regular rate and rhythm, normal S1 and S2, no S3 or S4, 3/6 systolic murmur at the apex, no edema, no JVD, no carotid bruit.  ABDOMEN:  Soft, nontender, no masses, no hepatomegaly, no splenomegaly, BS+  MUSCULOSKELETAL:  No clubbing no cyanosis.there is no redness, warmth, or swelling of the joints  full range of motion noted  NEUROLOGIC:  Alert, awake,oriented x3  SKIN:  no bruising or bleeding, normal skin color, texture, turgor and no redness, warmth, or swelling      Cardiographics  I personally reviewed the telemetry monitor strips with the following  interpretation: Sinus rhythm    Echocardiogram: 10/8/2024,    Left Ventricle: Mildly reduced left ventricular systolic function with a visually estimated EF of 40 - 45%. Left ventricle size is normal. Normal wall thickness. Severe hypokinesis of the following segments: basal anterolateral, basal inferolateral, mid anterolateral, mid inferolateral and apical lateral. Abnormal diastolic function.    Right Ventricle: Normal systolic function. TAPSE is 2.0 cm.    Aortic Valve: Mild stenosis of the aortic valve. AV mean gradient is 9 mmHg. AV peak velocity is 2.0 m/s. LVOT:AV VTI Index is 0.50. AV area by continuity VTI is 1.6 cm2. AV Stroke Volume index is 37.2 mL/m2.    Mitral Valve: Mild to moderate regurgitation with a posterior directed jet.    Tricuspid Valve: Mild regurgitation. The estimated RVSP is 52 mmHg.    Left Atrium: Left atrium is moderately dilated.    Extracardiac: Left pleural effusion.    Imaging  XR CHEST PORTABLE   Final Result   Cardiomegaly with increased interstitial markings seen diffusely throughout   the lung fields bilaterally with small bilateral pleural effusions. Findings   to suggest interstitial edema.             Lab Review   Lab Results   Component Value Date/Time     10/11/2024 05:11 AM    K 4.8 10/11/2024 05:11 AM    K 4.0 02/13/2020 06:33 PM    CL 99 10/11/2024 05:11 AM    CO2 26 10/11/2024 05:11 AM    BUN 10 10/11/2024 05:11 AM    CREATININE 0.6 10/11/2024 05:11 AM    GLUCOSE 221 10/11/2024 05:11 AM    CALCIUM 8.5 10/11/2024 05:11 AM     Lab Results   Component Value Date/Time    WBC 7.4 10/11/2024 05:11 AM    HGB 12.1 10/11/2024 05:11 AM    HCT 38.5 10/11/2024 05:11 AM    MCV 96.0 10/11/2024 05:11 AM     10/11/2024 05:11 AM     I have personally reviewed the laboratory, cardiac diagnostic and radiographic testing as outlined above:    Assessment:     Late presentation lateral wall ST elevation MI: S/p PCI to totally occluded left circumflex artery, doing fine,

## 2024-10-12 NOTE — PROGRESS NOTES
Patient is seen in follow-up for her late presentation ST elevation MI    Subjective:     Ms. Maher feels good today  Sitting up in bed no apparent distress    ROS:  CONSTITUTIONAL:  negative for  fevers, chills  HEENT:  negative for earaches, nasal congestion and epistaxis  RESPIRATORY:  negative for  dry cough, cough with sputum,wheezing and hemoptysis  GASTROINTESTINAL:  negative for nausea, vomiting  MUSCULOSKELETAL:  negative for  myalgias, arthralgias  NEUROLOGICAL:  negative for visual disturbance, dysphagia    Medication side effects: none    Scheduled Meds:  Continuous Infusions:  PRN Meds:    I/O last 3 completed shifts:  In: -   Out: 1450 [Urine:1450]  No intake/output data recorded.      Objective:      Physical Exam:   BP (!) 108/53   Pulse 75   Temp 97.8 °F (36.6 °C) (Oral)   Resp 16   Ht 1.575 m (5' 2\")   Wt 70.2 kg (154 lb 12.2 oz)   SpO2 92%   BMI 28.31 kg/m²   CONSTITUTIONAL:  awake, alert, cooperative, no apparent distress, and appears stated age  HEAD:  normocepalic, without obvious abnormality, atraumatic  NECK:  Supple, symmetrical, trachea midline, no adenopathy, thyroid symmetric, not enlarged and no tenderness, skin normal  LUNGS:  No increased work of breathing, No accessory muscle use or intercostal retractions, good air exchange, clear to auscultation bilaterally, no crackles or wheezing  CARDIOVASCULAR:  Normal apical impulse, regular rate and rhythm, normal S1 and S2, no S3 or S4, 3/6 systolic murmur at the apex, no edema, no JVD, no carotid bruit.  ABDOMEN:  Soft, nontender, no masses, no hepatomegaly, no splenomegaly, BS+  MUSCULOSKELETAL:  No clubbing no cyanosis.there is no redness, warmth, or swelling of the joints  full range of motion noted  NEUROLOGIC:  Alert, awake,oriented x3  SKIN:  no bruising or bleeding, normal skin color, texture, turgor and no redness, warmth, or swelling      Cardiographics  I personally reviewed the telemetry monitor strips with the following  continue current treatment  Paroxysmal atrial fibrillation: Now in sinus rhythm.  Cardiomyopathy: EF 40-45% with wall motion abnormalities, as above.  History of CAD: S/p remote PCI to LAD, s/p PCI to left circumflex artery on 10/9/2024,  Mitral valve regurgitation: Mild to moderate  Tricuspid valve regurgitation: Mild  Hypertension: Controlled  Hyperlipidemia: On statin  Tobacco abuse:   Type 2 diabetes mellitus  History of Graves' disease, now she has hypothyroidism on replacement therapy      Recommendations:     1.  Discontinue amiodarone  2.  Increase ambulation as tolerated  3.  Risk of instent thrombosis due to premature discontinuation of either Eliquis or Plavix discussed with patient at length  4.  Outpatient cardiac rehab  5.  Can be discharged from cardiology standpoint on Toprol, Eliquis and Plavix    Discussed with patient    Electronically signed by Noemy Watson MD on 10/11/2024 at 9:56 PM  NOTE: This report was transcribed using voice recognition software. Every effort was made to ensure accuracy; however, inadvertent computerized transcription errors may be present

## 2024-10-14 ENCOUNTER — OFFICE VISIT (OUTPATIENT)
Age: 59
End: 2024-10-14
Payer: COMMERCIAL

## 2024-10-14 VITALS
BODY MASS INDEX: 26.16 KG/M2 | SYSTOLIC BLOOD PRESSURE: 113 MMHG | HEART RATE: 75 BPM | DIASTOLIC BLOOD PRESSURE: 57 MMHG | OXYGEN SATURATION: 95 % | WEIGHT: 143 LBS | RESPIRATION RATE: 16 BRPM

## 2024-10-14 DIAGNOSIS — I25.5 ISCHEMIC CARDIOMYOPATHY: ICD-10-CM

## 2024-10-14 DIAGNOSIS — I50.22 HEART FAILURE WITH MID-RANGE EJECTION FRACTION (HFMEF) (HCC): Primary | ICD-10-CM

## 2024-10-14 DIAGNOSIS — I48.0 PAF (PAROXYSMAL ATRIAL FIBRILLATION) (HCC): ICD-10-CM

## 2024-10-14 PROCEDURE — 36415 COLL VENOUS BLD VENIPUNCTURE: CPT | Performed by: NURSE PRACTITIONER

## 2024-10-14 PROCEDURE — 99204 OFFICE O/P NEW MOD 45 MIN: CPT | Performed by: NURSE PRACTITIONER

## 2024-10-14 RX ORDER — FUROSEMIDE 10 MG/ML
40 INJECTION INTRAMUSCULAR; INTRAVENOUS ONCE
Status: COMPLETED | OUTPATIENT
Start: 2024-10-14 | End: 2024-10-14

## 2024-10-14 RX ORDER — SODIUM CHLORIDE 0.9 % (FLUSH) 0.9 %
5-40 SYRINGE (ML) INJECTION ONCE
Status: COMPLETED | OUTPATIENT
Start: 2024-10-14 | End: 2024-10-14

## 2024-10-14 RX ADMIN — FUROSEMIDE 40 MG: 10 INJECTION, SOLUTION INTRAMUSCULAR; INTRAVENOUS at 12:36

## 2024-10-14 RX ADMIN — Medication 10 ML: at 12:36

## 2024-10-14 NOTE — PROGRESS NOTES
Congestive Heart Failure Clinic   [facility]       Reason for Visit: Heart Failure     Primary Cardiologist: Dr. Lanier        History of Present Illness:     Ms. Maher is a 59 year old female with PMHx of ischemic cardiomyopathy, CAD, chronic HFmrEF, mild-moderate VHD, PAF, HTN, HLD, T2DM, history of Graves' disease and longstanding tobacco abuse.    She was recently hospitalized for late presentation lateral wall ST elevation MI: S/p PCI to totally occluded left circumflex artery (10/12/2024) and new LV dysfunction ( EF 40-45%). She  is following up today in postacute hospitalization follow-up.  Since discharge from the hospital she reports chronic dyspnea with exertion, shortness of breath, or decline in overall functional capacity. She denies orthopnea, PND, nocturnal cough or hemoptysis. She denies abdominal distention or bloating, early satiety, anorexia/change in appetite, unintentional weight loss. She does lower extremity edema. She denies exertional lightheadedness.  She denies palpitations, syncope or near syncope.     Review of systems is negative for chest pain, pressure, discomfort. When ambulating on an incline, She does not leg claudication. History is negative for neurological symptoms including transient loss of vision, asymmetric weakness, aphasia, dysphasia, numbness, tingling.       Patient Active Problem List    Diagnosis Date Noted    Chronic obstructive pulmonary disease (HCC) 11/18/2022     Priority: Medium    Coronary artery disease of native artery of native heart with stable angina pectoris ([facility]) 11/18/2022     Priority: Medium    Primary hypertension 10/28/2022     Priority: Medium    Type 2 diabetes mellitus without complication, without long-term current use of insulin ([facility]) 10/28/2022     Priority: Medium    Pure hypercholesterolemia 10/28/2022     Priority: Medium    Moderate obesity 10/28/2022     Priority: Medium    Syncope 12/16/2024

## 2024-10-14 NOTE — PATIENT INSTRUCTIONS
Get blood work and dose of IV lasix in CHF clinic today  Continue current cardiac medications, once we review your blood work we will call you with medication changes  Anticipate starting Entresto and lasix as needed  Cardiac rehab   Elevate your legs as much as possible  Please continue to attempt to quit smoking   Follow up in CHF clinic in 7-10 days - sooner if needed please call   Follow up with Dr. Lanier as scheduled  Weigh yourself daily    -Stay Hydrated, 64 oz     -Diet should sodium restricted to 2 grams    -Again watch your daily weight trends and if you gain water weight please follow below instructions.    -If you gain 3-5 pounds in 2-3 days OR notice that you are retaining fluid in anyway just like you did before then take an extra dose of your water pill (furosemide/Lasix) every day until you lose the weight or feel better.     -If you notice that you have taken more than 2 extra doses in 1 week then please call and let us know.    -If at any time you feel that you are retaining fluid, your medications are not working, or you feel ill in anyway, then please call us for either same day appointment or the next day, and for instructions. Our goal is to keep you out of the emergency room and the hospital and we have ways to do it. You just need to call us in a timely manner.     -If you become sick for other reasons, and notice that you are not urinating as much, the urine is very dark, you have significant diarrhea or vomiting, then please DO NOT take your water pill and CALL US immediately.

## 2024-10-15 DIAGNOSIS — I50.20 HFREF (HEART FAILURE WITH REDUCED EJECTION FRACTION) (HCC): Primary | ICD-10-CM

## 2024-10-15 LAB
ANION GAP SERPL CALCULATED.3IONS-SCNC: 11 MMOL/L (ref 7–16)
BUN BLDV-MCNC: 19 MG/DL (ref 6–23)
CALCIUM SERPL-MCNC: 9.3 MG/DL (ref 8.6–10.2)
CHLORIDE BLD-SCNC: 92 MMOL/L (ref 98–107)
CO2: 32 MMOL/L (ref 22–29)
CREAT SERPL-MCNC: 0.8 MG/DL (ref 0.5–1)
GFR, ESTIMATED: >60 ML/MIN/1.73M2
GLUCOSE BLD-MCNC: 402 MG/DL (ref 74–99)
NT PRO BNP: 1873 PG/ML (ref 0–450)
POTASSIUM SERPL-SCNC: 4.2 MMOL/L (ref 3.5–5)
SODIUM BLD-SCNC: 135 MMOL/L (ref 132–146)

## 2024-10-15 RX ORDER — FUROSEMIDE 20 MG
20 TABLET ORAL DAILY PRN
Qty: 90 TABLET | Refills: 1 | Status: SHIPPED
Start: 2024-10-15 | End: 2024-10-16 | Stop reason: SDUPTHER

## 2024-10-15 NOTE — RESULT ENCOUNTER NOTE
Labs reviewed  Please have patient start Entresto 24/26 mg BID  Lasix 20 mg daily as needed for swelling and weight gain  Follow up labs in 1 week         Thank you

## 2024-10-16 ENCOUNTER — TELEPHONE (OUTPATIENT)
Dept: OTHER | Age: 59
End: 2024-10-16

## 2024-10-16 DIAGNOSIS — I50.20 HFREF (HEART FAILURE WITH REDUCED EJECTION FRACTION) (HCC): ICD-10-CM

## 2024-10-16 RX ORDER — FUROSEMIDE 20 MG
20 TABLET ORAL DAILY PRN
Qty: 90 TABLET | Refills: 3 | Status: SHIPPED | OUTPATIENT
Start: 2024-10-16

## 2024-10-16 NOTE — TELEPHONE ENCOUNTER
----- Message from SARAH Moscoso CNP sent at 10/15/2024  5:00 PM EDT -----  Labs reviewed  Please have patient start Entresto 24/26 mg BID  Lasix 20 mg daily as needed for swelling and weight gain  Follow up labs in 1 week         Thank you

## 2024-10-16 NOTE — TELEPHONE ENCOUNTER
9:31 AM 10/16/2024 Called patient re: new medication changes. I have reviewed the provider's instructions with the patient, answering all questions to her satisfaction.    Pt prefers to have her medication sent to [facility] pharmacy. Sent an in-basket message to Marlene Sheriff to re send scripts to the preferred pharmacy.     Future Appointments   Date Time Provider Department Center   10/23/2024  9:45 AM [facility] ROOM 1 [facility]   11/26/2024 12:15 PM Felix Yu MD BD ENDO [facility]   12/16/2024 11:30 AM Cornelius Lanier MD [facility]

## 2024-10-17 ENCOUNTER — TELEPHONE (OUTPATIENT)
Dept: OTHER | Age: 59
End: 2024-10-17

## 2024-10-17 NOTE — TELEPHONE ENCOUNTER
Called patient to inform her that Entresto 24/26 mg requires a prior authorization through her insurance and could take up to 5 business days for an approval. Explained that we will call once we know the medication is approved. Patient expressed verbal understanding.

## 2024-10-23 ENCOUNTER — HOSPITAL ENCOUNTER (OUTPATIENT)
Dept: OTHER | Age: 59
Setting detail: THERAPIES SERIES
Discharge: HOME | End: 2024-10-23
Payer: COMMERCIAL

## 2024-10-23 ENCOUNTER — TELEPHONE (OUTPATIENT)
Dept: OTHER | Age: 59
End: 2024-10-23

## 2024-10-23 VITALS
RESPIRATION RATE: 16 BRPM | BODY MASS INDEX: 26.85 KG/M2 | WEIGHT: 146.8 LBS | SYSTOLIC BLOOD PRESSURE: 102 MMHG | DIASTOLIC BLOOD PRESSURE: 58 MMHG | HEART RATE: 68 BPM | OXYGEN SATURATION: 98 %

## 2024-10-23 LAB
ANION GAP SERPL CALCULATED.3IONS-SCNC: 9 MMOL/L (ref 7–16)
BNP SERPL-MCNC: 1614 PG/ML (ref 0–450)
BUN SERPL-MCNC: 8 MG/DL (ref 6–23)
CALCIUM SERPL-MCNC: 8.8 MG/DL (ref 8.6–10.2)
CHLORIDE SERPL-SCNC: 99 MMOL/L (ref 98–107)
CO2 SERPL-SCNC: 26 MMOL/L (ref 22–29)
CREAT SERPL-MCNC: 0.6 MG/DL (ref 0.5–1)
GFR, ESTIMATED: >90 ML/MIN/1.73M2
GLUCOSE SERPL-MCNC: 413 MG/DL (ref 74–99)
POTASSIUM SERPL-SCNC: 4.1 MMOL/L (ref 3.5–5)
SODIUM SERPL-SCNC: 134 MMOL/L (ref 132–146)

## 2024-10-23 PROCEDURE — 99205 OFFICE O/P NEW HI 60 MIN: CPT

## 2024-10-23 PROCEDURE — 80048 BASIC METABOLIC PNL TOTAL CA: CPT

## 2024-10-23 PROCEDURE — 83880 ASSAY OF NATRIURETIC PEPTIDE: CPT

## 2024-10-23 PROCEDURE — 36415 COLL VENOUS BLD VENIPUNCTURE: CPT

## 2024-10-23 ASSESSMENT — PATIENT HEALTH QUESTIONNAIRE - PHQ9
SUM OF ALL RESPONSES TO PHQ QUESTIONS 1-9: 0
2. FEELING DOWN, DEPRESSED OR HOPELESS: NOT AT ALL
SUM OF ALL RESPONSES TO PHQ9 QUESTIONS 1 & 2: 0
SUM OF ALL RESPONSES TO PHQ QUESTIONS 1-9: 0
1. LITTLE INTEREST OR PLEASURE IN DOING THINGS: NOT AT ALL

## 2024-10-23 NOTE — TELEPHONE ENCOUNTER
Called patient to notify her blood sugar was 413, she stated she ate breakfast prior to blood being drawn and her repeat blood glucose when she returned home was 220 at 1300 today.

## 2024-10-23 NOTE — PROGRESS NOTES
Congestive Heart Failure Clinic   [facility]      Referring Provider:   Primary Care Physician: Wolf Hernandez DO   Cardiologist:   Nephrologist: n/a      HISTORY OF PRESENT ILLNESS:     Debra Maher is a 59 y.o. (1965) female with a history of HFmrEF(EF 41%-49%)  Pre Cupid:     Lab Results   Component Value Date    LVEF 40 10/08/2024     Post Cupid:    Lab Results   Component Value Date    EFBP 48 (A) 10/08/2024         She presents to the CHF clinic for ongoing evaluation and monitoring of heart failure.    In the CHF clinic today she denies any adverse symptoms except:  [] Dizziness or lightheadedness   [] Syncope or near syncope  [] Recent Fall  [] Shortness of breath at rest   [] Dyspnea with exertion  [] Decline in functional capacity (unable to perform activities they had previously been able to do)  [x] Fatigue   [] Orthopnea  [] PND  [] Nocturnal cough  [] Hemoptysis  [] Chest pain, pressure, or discomfort  [] Palpitations  [] Abdominal distention  [] Abdominal bloating  [] Early satiety  [] Blood in stool   [] Diarrhea  [] Constipation  [] Nausea/Vomiting  [] Decreased urinary response to oral diuretic   [] Scrotal swelling   [] Lower extremity edema  [] Used PRN doses of oral diuretic   [] Weight gain    Wt Readings from Last 3 Encounters:   10/23/24 66.6 kg (146 lb 12.8 oz)   10/14/24 64.9 kg (143 lb)   10/10/24 70.2 kg (154 lb 12.2 oz)           SOCIAL HISTORY:  [x] Denies tobacco, alcohol or illicit drug abuse  [] Tobacco use:  [] ETOH use:  [] Illicit drug use:        MEDICATIONS:    Allergies   Allergen Reactions    Bee Venom     Codeine Other (See Comments)     \"i just black out\"    Doxycycline     Metformin And Related     Prednisone Other (See Comments)     Muscle cramps    Tetracyclines & Related Other (See Comments)     Prior to Visit Medications    Medication Sig Taking? Authorizing Provider   insulin glargine

## 2024-11-04 RX ORDER — NITROGLYCERIN 0.4 MG/1
0.4 TABLET SUBLINGUAL EVERY 5 MIN PRN
Qty: 25 TABLET | Refills: 3 | Status: SHIPPED | OUTPATIENT
Start: 2024-11-04

## 2024-11-05 ENCOUNTER — HOSPITAL ENCOUNTER (OUTPATIENT)
Dept: CARDIAC REHAB | Age: 59
Setting detail: THERAPIES SERIES
Discharge: HOME | End: 2024-11-05
Payer: COMMERCIAL

## 2024-11-05 VITALS
WEIGHT: 140.7 LBS | HEIGHT: 62 IN | BODY MASS INDEX: 25.89 KG/M2 | RESPIRATION RATE: 16 BRPM | DIASTOLIC BLOOD PRESSURE: 62 MMHG | SYSTOLIC BLOOD PRESSURE: 112 MMHG | HEART RATE: 76 BPM | OXYGEN SATURATION: 98 %

## 2024-11-05 PROCEDURE — 93797 PHYS/QHP OP CAR RHAB WO ECG: CPT

## 2024-11-05 PROCEDURE — 93798 PHYS/QHP OP CAR RHAB W/ECG: CPT

## 2024-11-05 ASSESSMENT — PATIENT HEALTH QUESTIONNAIRE - PHQ9
SUM OF ALL RESPONSES TO PHQ QUESTIONS 1-9: 1
1. LITTLE INTEREST OR PLEASURE IN DOING THINGS: NOT AT ALL
3. TROUBLE FALLING OR STAYING ASLEEP: NOT AT ALL
8. MOVING OR SPEAKING SO SLOWLY THAT OTHER PEOPLE COULD HAVE NOTICED. OR THE OPPOSITE, BEING SO FIGETY OR RESTLESS THAT YOU HAVE BEEN MOVING AROUND A LOT MORE THAN USUAL: NOT AT ALL
SUM OF ALL RESPONSES TO PHQ9 QUESTIONS 1 & 2: 0
2. FEELING DOWN, DEPRESSED OR HOPELESS: NOT AT ALL
7. TROUBLE CONCENTRATING ON THINGS, SUCH AS READING THE NEWSPAPER OR WATCHING TELEVISION: NOT AT ALL
SUM OF ALL RESPONSES TO PHQ QUESTIONS 1-9: 1
SUM OF ALL RESPONSES TO PHQ QUESTIONS 1-9: 1
5. POOR APPETITE OR OVEREATING: NOT AT ALL
4. FEELING TIRED OR HAVING LITTLE ENERGY: SEVERAL DAYS
9. THOUGHTS THAT YOU WOULD BE BETTER OFF DEAD, OR OF HURTING YOURSELF: NOT AT ALL
SUM OF ALL RESPONSES TO PHQ QUESTIONS 1-9: 1
10. IF YOU CHECKED OFF ANY PROBLEMS, HOW DIFFICULT HAVE THESE PROBLEMS MADE IT FOR YOU TO DO YOUR WORK, TAKE CARE OF THINGS AT HOME, OR GET ALONG WITH OTHER PEOPLE: NOT DIFFICULT AT ALL
6. FEELING BAD ABOUT YOURSELF - OR THAT YOU ARE A FAILURE OR HAVE LET YOURSELF OR YOUR FAMILY DOWN: NOT AT ALL

## 2024-11-05 ASSESSMENT — NEW YORK HEART ASSOCIATION (NYHA) CLASSIFICATION: NYHA FUNCTIONAL CLASS: CLASS I

## 2024-11-05 NOTE — PROGRESS NOTES
Skin:  tenderness and no erythema, rash or wounds noted. Neurovascular: Motor deficit: none. Sensory deficit:   none. Pulse deficit: none. Capillary refill: normal.  Knee:              Tenderness:  none. Swelling: None. Deformity: no.             ROM: full range of motion. Skin:  no erythema, rash or wounds noted. Foot:              Tenderness:  moderate. Swelling: Moderate. Deformity: no.             ROM: full range of motion, diminished range with pain. Skin:  tenderness and no erythema, rash or wounds noted. Gait:  limp. Lymphatics: No lymphangitis or adenopathy noted. Neurological:  Oriented. Motor functions intact. Lab / Imaging Results   (All laboratory and radiology results have been personally reviewed by myself)  Labs:  Results for orders placed or performed during the hospital encounter of 10/12/18   POC Pregnancy Urine Qual   Result Value Ref Range    Preg Test, Ur negative     QC OK? yes      Imaging: All Radiology results interpreted by Radiologist unless otherwise noted. XR ANKLE LEFT (MIN 3 VIEWS)   Final Result   1. There is no fracture or dislocation. 2. Lateral soft tissue swelling. ED Course / Medical Decision Making   Medications - No data to display     Consults:   None    Procedure(s):   none    MDM:       Films were obtained based on moderate suspicion for bony injury as per history/physical findings . Plan is subsequently for symptom control, limited use and  immobilization with appropriate outpatient follow-up. Counseling: The emergency provider has spoken with the patient and discussed todays results, in addition to providing specific details for the plan of care and counseling regarding the diagnosis and prognosis. Questions are answered at this time and they are agreeable with the plan. Assessment      1.  Sprain of left Med(s) Alogliptin 25mg, Empagliflozin 10mg, Lanuts SoloStar 100 unit/ml   Diabetes Med(s) Change   (new pt)   Demonstrates Medication Compliance Yes   Diabetes Management Education   Resource Information Provided Yes   Diabetes Education Referral No   Diabetes Management Goals   Patient Target Goals HgA1c<7%;Follow a carbohydrate controlled diet;Adherence to and understanding of diabetes medication treatment plan   Patient Treatment Goal Patient to learn and implement benefits and habits for improved diabetes management.   Goal Status Initial   Progress Towards Goal Patient to get scheduled with dept dietitian by next 30 day ITP review.   Other Core Component - Lipid Management   Lipids Managed as Core Component No   Not Managed as a Core Component   Date Lipids Drawn 10/09/24   Total 136   HDL 43   LDL 71   Triglycerides 112   Lipid Med(s) Atorvastatin 80mg   Lipid Med Change(s)   (New Pt)   Nutrition Intervention   Dietitian Consult   (Patient to be scheduled)   Education   Learning Barrier Ready to learn   Cardiac Knowledge Test Score 9   Education Schedule Given Yes   Other Core Component - Hypertension   Hypertension Yes   Is BP WDL Yes   Hypertension Managed as Core Component No   Medications   Resource Information Provided Yes   Med(s) Change   (New Pt)   BP Meds Metoprolol Succinate 25mg, Entresto 24-26mg,   ACE/ARB/ARNI Prescribed Yes   ACE/ARB/ARNI Adherent Yes   BB Prescribed Yes   BB Adherent Yes   Antiplatelet/Anticoagulant Prescribed Yes  (Eliquis 5mg, Clopidofrel 75mg)   Antiplatelet/Anticoagulant Adherent Yes   Statins/Anti-hyperlipidemic Prescribed Yes  (Atorvastatin 80mg)   Statins/Anti-hyperlipidemic Adherent Yes   Statins/Anti-hyperlipidemic Intensity High   Other Core Component - Medication Compliance   Medication Compliance Managed as Core Component No   Other Core Component - Other Risk Factor   Other Modifiable Risk Factor Managed as Core Component No

## 2024-11-06 ENCOUNTER — TELEPHONE (OUTPATIENT)
Dept: OTHER | Age: 59
End: 2024-11-06

## 2024-11-06 ENCOUNTER — HOSPITAL ENCOUNTER (OUTPATIENT)
Dept: OTHER | Age: 59
Setting detail: THERAPIES SERIES
Discharge: HOME | End: 2024-11-06
Payer: COMMERCIAL

## 2024-11-06 ENCOUNTER — TELEPHONE (OUTPATIENT)
Dept: ENDOCRINOLOGY | Age: 59
End: 2024-11-06

## 2024-11-06 VITALS
DIASTOLIC BLOOD PRESSURE: 50 MMHG | BODY MASS INDEX: 26.16 KG/M2 | OXYGEN SATURATION: 98 % | HEART RATE: 76 BPM | WEIGHT: 143.03 LBS | SYSTOLIC BLOOD PRESSURE: 94 MMHG | RESPIRATION RATE: 18 BRPM

## 2024-11-06 LAB
ANION GAP SERPL CALCULATED.3IONS-SCNC: 11 MMOL/L (ref 7–16)
BNP SERPL-MCNC: 1353 PG/ML (ref 0–450)
BUN SERPL-MCNC: 11 MG/DL (ref 6–23)
CALCIUM SERPL-MCNC: 9.2 MG/DL (ref 8.6–10.2)
CHLORIDE SERPL-SCNC: 97 MMOL/L (ref 98–107)
CO2 SERPL-SCNC: 24 MMOL/L (ref 22–29)
CREAT SERPL-MCNC: 0.6 MG/DL (ref 0.5–1)
GFR, ESTIMATED: >90 ML/MIN/1.73M2
GLUCOSE SERPL-MCNC: 261 MG/DL (ref 74–99)
POTASSIUM SERPL-SCNC: 4.2 MMOL/L (ref 3.5–5)
SODIUM SERPL-SCNC: 132 MMOL/L (ref 132–146)

## 2024-11-06 PROCEDURE — 36415 COLL VENOUS BLD VENIPUNCTURE: CPT

## 2024-11-06 PROCEDURE — 99214 OFFICE O/P EST MOD 30 MIN: CPT

## 2024-11-06 PROCEDURE — 83880 ASSAY OF NATRIURETIC PEPTIDE: CPT

## 2024-11-06 PROCEDURE — 80048 BASIC METABOLIC PNL TOTAL CA: CPT

## 2024-11-06 NOTE — TELEPHONE ENCOUNTER
----- Message from SARAH Chavez - CNS sent at 11/6/2024  1:05 PM EST -----  CHF clinic visit and labs reviewed   VS: 94/50, 76    Defer GDMT titration due to hypotension   Please remind her to stagger medications

## 2024-11-06 NOTE — TELEPHONE ENCOUNTER
1:23 PM 11/6/2024 Called patient re: lab results and to stagger medications. I have attempted to contact this patient by phone with the following results: no answer and mailbox is full.     Future Appointments   Date Time Provider Department Center   11/21/2024  9:45 AM [facility]   12/16/2024 11:30 AM Cornelius Lanier MD [facility]

## 2024-11-06 NOTE — RESULT ENCOUNTER NOTE
CHF clinic visit and labs reviewed   VS: 94/50, 76    Defer GDMT titration due to hypotension   Please remind her to stagger medications

## 2024-11-06 NOTE — PROGRESS NOTES
(H)   10/14/2024 1,873 (H)      CBC:  WBC (k/uL)   Date Value   10/11/2024 7.4     Hemoglobin (g/dL)   Date Value   10/11/2024 12.1     Hematocrit (%)   Date Value   10/11/2024 38.5     Platelets (k/uL)   Date Value   10/11/2024 357     Iron Studies:  No results found for: \"FERRITIN\", \"IRON\", \"TIBC\"  Hepatic:  AST (U/L)   Date Value   10/09/2024 19     ALT (U/L)   Date Value   10/09/2024 19     Total Bilirubin (mg/dL)   Date Value   10/09/2024 0.4     Alkaline Phosphatase (U/L)   Date Value   10/09/2024 209 (H)     INR:  INR (no units)   Date Value   10/08/2024 1.2         Wt Readings from Last 3 Encounters:   11/06/24 64.9 kg (143 lb 0.4 oz)   11/05/24 63.8 kg (140 lb 11.2 oz)   10/23/24 66.6 kg (146 lb 12.8 oz)           ASSESSMENT/PLAN:    [x] Euvolemic          [] Hypervolemic, with increase from baseline:  [] Shortness of breath/HERNANDEZ  [] JVD  [x] HJR  [] Abnormal lung assessment:   [] Orthopnea  [] PND  [] Decreased urinary response to oral diuretic   [] Scrotal swelling   [] Lower extremity edema  [] Compression stockings provided  [] Decline in functional capacity (unable to perform activities they had previously been able to do)  [] Weight gain     [] IV diuretics given No  [] Provider notified of recurrent IV diuretic use    Additional Notes:     Patient presents to the CHF Clinic three week follow up after starting entresto 24-26 mg BID and add PRN lasix. Pt denies any PRN lasix doses and understands how to utilized the medication. A cardiac referral was placed and she will be starting on 10/30/24. Pt has began therapy and will exercise on T/TH's.  Appear Euvolemic. Stable weights. No complaints.   Follow up in two weeks or PRN.     [x]Lab work obtained    [x] Patient/Family Educated On:  [x] HF zones (Green, Yellow, Red) and aware of when to take action   [x] Daily weights  [] Scale provided   [x] Low sodium diet (2000 mg)  Barriers to compliance  [] Refuses to monitor diet  [] Socioeconomic

## 2024-11-06 NOTE — TELEPHONE ENCOUNTER
Cancelled appointment for 11/26/25 for Uncontrolled DM hospital follow up. Please advise patient for rescheduling recommendations [phone].    US tech at the bedside to perform ECHO

## 2024-11-07 ENCOUNTER — TELEPHONE (OUTPATIENT)
Dept: OTHER | Age: 59
End: 2024-11-07

## 2024-11-07 NOTE — TELEPHONE ENCOUNTER
9:06 AM 11/7/2024 Called patient re: staggering medications. I have reviewed the provider's instructions with the patient, answering all questions to her satisfaction.

## 2024-11-19 ENCOUNTER — TELEPHONE (OUTPATIENT)
Dept: CARDIAC REHAB | Age: 59
End: 2024-11-19

## 2024-11-21 ENCOUNTER — APPOINTMENT (OUTPATIENT)
Dept: OTHER | Age: 59
End: 2024-11-21
Payer: COMMERCIAL

## 2024-11-25 ENCOUNTER — HOSPITAL ENCOUNTER (OUTPATIENT)
Dept: CARDIAC REHAB | Age: 59
Setting detail: THERAPIES SERIES
Discharge: HOME | End: 2024-11-25

## 2024-11-25 ASSESSMENT — NEW YORK HEART ASSOCIATION (NYHA) CLASSIFICATION: NYHA FUNCTIONAL CLASS: CLASS I

## 2024-11-25 NOTE — PROGRESS NOTES
11/25/24 1106   Treatment Diagnosis   Treatment Diagnosis 1 STEMI   STEMI Date 10/08/24   Treatment Diagnosis 2 PCI   PCI Date 10/08/24   Referral Date 10/09/24   Significant Cardiovascular History History of heart failure;Previous PCI;Previous myocardial infarction   NYHA Heart Failure Classification Class I   Co-morbidities Diabetes   Individual Treatment Plan   ITP Visit Type Discharge, did not complete program   Visit #/Total Visits 2/36   Psychosocial Assessment   Stages of Change   (Unable to obtain current PHQ9 score)

## 2024-12-11 ENCOUNTER — HOSPITAL ENCOUNTER (OUTPATIENT)
Dept: OTHER | Age: 59
Setting detail: THERAPIES SERIES
Discharge: HOME | End: 2024-12-11
Payer: COMMERCIAL

## 2024-12-11 VITALS — DIASTOLIC BLOOD PRESSURE: 55 MMHG | SYSTOLIC BLOOD PRESSURE: 119 MMHG | HEART RATE: 77 BPM | RESPIRATION RATE: 18 BRPM

## 2024-12-11 LAB
ANION GAP SERPL CALCULATED.3IONS-SCNC: 10 MMOL/L (ref 7–16)
BNP SERPL-MCNC: 1198 PG/ML (ref 0–450)
BUN SERPL-MCNC: 6 MG/DL (ref 6–23)
CALCIUM SERPL-MCNC: 9.3 MG/DL (ref 8.6–10.2)
CHLORIDE SERPL-SCNC: 101 MMOL/L (ref 98–107)
CO2 SERPL-SCNC: 26 MMOL/L (ref 22–29)
CREAT SERPL-MCNC: 0.6 MG/DL (ref 0.5–1)
GFR, ESTIMATED: >90 ML/MIN/1.73M2
GLUCOSE SERPL-MCNC: 243 MG/DL (ref 74–99)
POTASSIUM SERPL-SCNC: 3.6 MMOL/L (ref 3.5–5)
SODIUM SERPL-SCNC: 137 MMOL/L (ref 132–146)

## 2024-12-11 PROCEDURE — 99214 OFFICE O/P EST MOD 30 MIN: CPT

## 2024-12-11 PROCEDURE — 80048 BASIC METABOLIC PNL TOTAL CA: CPT

## 2024-12-11 PROCEDURE — 36415 COLL VENOUS BLD VENIPUNCTURE: CPT

## 2024-12-11 PROCEDURE — 83880 ASSAY OF NATRIURETIC PEPTIDE: CPT

## 2024-12-11 NOTE — PROGRESS NOTES
alcohol use. Pt states she has been drinking about 64 oz of fluid ( soda, water and juice).   Ortho's performed today +  ( 119/55 mmHg  HR 77) -- Pt took all her medications one hour prior to her appointment today.   Encouraged to stay hydrated.   Stagger medications.   Wear compression stockings.   Denies any current dizziness, chest pain, palpations or lightheadedness.  Syncopal espiodes has not occurred since then.   Pt has an OV with DR. Lanier on 12/16. 11:22 AM Sent an in-basket message to Dr. Lanier re: + ortho's and syncopal episodes.   Follow up in two weeks or PRN.     [x]Lab work obtained    [x] Patient/Family Educated On:  [x] HF zones (Green, Yellow, Red) and aware of when to take action   [x] Daily weights  [] Scale provided   [x] Low sodium diet (2000 mg)  Barriers to compliance  [] Refuses to monitor diet  [] Socioeconomic difficulties  [] Unable to cook for self (use of frozen meals, can goods, etc)  [] CHF CHW consulted  [] Low sodium meal delivery options given to patient  [] Dietician consulted   [] Low sodium recipes provided  [] Sodium free seasoning provided   [x] Fluid intake 6-8 cups (around 64 oz)  [x] Reviewed currently prescribed medications with patient, educated on importance of compliance and answered any questions regarding their medication  [] Pill box provided to patient  [] Patient using pill packing pharmacy   [] CPAP/BiPAP use  [] Low impact exercise / cardiac rehab   [] LifeVest use  [x] Patient aware of signs and symptoms of worsening HF, CHF clinic phone number provided and made aware to call clinic for sooner if evaluation if needed     [] Difficulty affording medications  [] CHF CHW consulted  [] Prescription assistance information given   [] [facility] medication assistance program information given   [] Sample medications provided to patient to help bridge gap until affordability N/A    Scheduled to follow up in CHF clinic on:     Future Appointments   Date

## 2024-12-16 ENCOUNTER — TELEPHONE (OUTPATIENT)
Dept: CARDIOLOGY | Age: 59
End: 2024-12-16

## 2024-12-16 ENCOUNTER — OFFICE VISIT (OUTPATIENT)
Dept: CARDIOLOGY CLINIC | Age: 59
End: 2024-12-16
Payer: COMMERCIAL

## 2024-12-16 VITALS
WEIGHT: 136 LBS | SYSTOLIC BLOOD PRESSURE: 122 MMHG | HEIGHT: 62 IN | BODY MASS INDEX: 25.03 KG/M2 | RESPIRATION RATE: 16 BRPM | DIASTOLIC BLOOD PRESSURE: 70 MMHG | HEART RATE: 87 BPM

## 2024-12-16 DIAGNOSIS — I48.0 PAROXYSMAL ATRIAL FIBRILLATION (HCC): ICD-10-CM

## 2024-12-16 DIAGNOSIS — I25.118 CORONARY ARTERY DISEASE OF NATIVE ARTERY OF NATIVE HEART WITH STABLE ANGINA PECTORIS (HCC): Primary | ICD-10-CM

## 2024-12-16 DIAGNOSIS — R55 SYNCOPE, UNSPECIFIED SYNCOPE TYPE: ICD-10-CM

## 2024-12-16 DIAGNOSIS — I25.5 ISCHEMIC CARDIOMYOPATHY: ICD-10-CM

## 2024-12-16 DIAGNOSIS — E11.9 TYPE 2 DIABETES MELLITUS WITHOUT COMPLICATION, WITHOUT LONG-TERM CURRENT USE OF INSULIN (HCC): ICD-10-CM

## 2024-12-16 DIAGNOSIS — I10 PRIMARY HYPERTENSION: ICD-10-CM

## 2024-12-16 DIAGNOSIS — J44.9 CHRONIC OBSTRUCTIVE PULMONARY DISEASE, UNSPECIFIED COPD TYPE (HCC): ICD-10-CM

## 2024-12-16 DIAGNOSIS — E78.00 PURE HYPERCHOLESTEROLEMIA: ICD-10-CM

## 2024-12-16 PROCEDURE — G8427 DOCREV CUR MEDS BY ELIG CLIN: HCPCS | Performed by: INTERNAL MEDICINE

## 2024-12-16 PROCEDURE — 3078F DIAST BP <80 MM HG: CPT | Performed by: INTERNAL MEDICINE

## 2024-12-16 PROCEDURE — 3046F HEMOGLOBIN A1C LEVEL >9.0%: CPT | Performed by: INTERNAL MEDICINE

## 2024-12-16 PROCEDURE — 3074F SYST BP LT 130 MM HG: CPT | Performed by: INTERNAL MEDICINE

## 2024-12-16 PROCEDURE — 99215 OFFICE O/P EST HI 40 MIN: CPT | Performed by: INTERNAL MEDICINE

## 2024-12-16 PROCEDURE — 3017F COLORECTAL CA SCREEN DOC REV: CPT | Performed by: INTERNAL MEDICINE

## 2024-12-16 PROCEDURE — G8484 FLU IMMUNIZE NO ADMIN: HCPCS | Performed by: INTERNAL MEDICINE

## 2024-12-16 PROCEDURE — 3023F SPIROM DOC REV: CPT | Performed by: INTERNAL MEDICINE

## 2024-12-16 PROCEDURE — 93000 ELECTROCARDIOGRAM COMPLETE: CPT | Performed by: INTERNAL MEDICINE

## 2024-12-16 PROCEDURE — 2022F DILAT RTA XM EVC RTNOPTHY: CPT | Performed by: INTERNAL MEDICINE

## 2024-12-16 PROCEDURE — G8420 CALC BMI NORM PARAMETERS: HCPCS | Performed by: INTERNAL MEDICINE

## 2024-12-16 PROCEDURE — 4004F PT TOBACCO SCREEN RCVD TLK: CPT | Performed by: INTERNAL MEDICINE

## 2024-12-16 NOTE — TELEPHONE ENCOUNTER
CALLED PATIENT 1X AND LEFT MESSAGE TO SCHEDULE ECHO.    Electronically signed by Binta Ojeda on 12/16/2024 at 1:48 PM

## 2024-12-16 NOTE — PROGRESS NOTES
OUTPATIENT CARDIOLOGY FOLLOW-UP    Name: Debra Maher    Age: 59 y.o.    Primary Care Physician: Wolf Hernandez DO    Date of Service: 12/16/2024    Chief Complaint: Coronary atherosclerosis, ischemic cardiomyopathy, syncope, paroxysmal atrial fibrillation, chronic obstructive lung disease, hypertension    Interim History: Since her prior evaluation of approximately 2 years earlier, the patient was hospitalized within the past 2 months initially with symptoms compatible with that of a viral syndrome and at which time evidence of an ST elevation inferolateral myocardial infarction was noted with coronary angiography demonstrating angiographically insignificant disease within the left anterior descending distribution in the face of prior intervention as well as within the right coronary distribution and with total occlusion of the proximal circumflex with recanalization and percutaneous coronary invention with an echocardiogram demonstrating evidence of anterolateral, lateral and inferolateral hypokinesis with mild to moderate left ventricular systolic dysfunction and estimated ejection fraction of 40-45% with moderate left atrial enlargement, mild to moderate mitral regurgitation and moderate pulmonary hypertension with right ventricular systolic pressures of approximately 50-55 mmHg and periinfarct paroxysmal atrial fibrillation with conversion to sinus rhythm with the use of antiarrhythmic therapy.  Subsequent to hospital discharge on optimal medical therapy she denies further symptoms of anginal-like chest discomfort or other ischemic equivalents nor manifestations of decompensated left ventricular systolic dysfunction or volume overload.  She has remained euvolemic during follow-up at the heart failure clinic with needs of discontinuation of her SGLT2 inhibitor on an economic basis.  While on a cruise approximately 1 month earlier, she apparently suffered a syncopal episode with reported associated

## 2024-12-30 ENCOUNTER — TELEPHONE (OUTPATIENT)
Dept: OTHER | Age: 59
End: 2024-12-30

## 2024-12-30 ENCOUNTER — HOSPITAL ENCOUNTER (OUTPATIENT)
Dept: OTHER | Age: 59
Setting detail: THERAPIES SERIES
Discharge: HOME | End: 2024-12-30
Payer: COMMERCIAL

## 2024-12-30 VITALS
HEART RATE: 79 BPM | DIASTOLIC BLOOD PRESSURE: 60 MMHG | BODY MASS INDEX: 25.79 KG/M2 | OXYGEN SATURATION: 98 % | SYSTOLIC BLOOD PRESSURE: 106 MMHG | WEIGHT: 141 LBS | RESPIRATION RATE: 16 BRPM

## 2024-12-30 LAB
ANION GAP SERPL CALCULATED.3IONS-SCNC: 10 MMOL/L (ref 7–16)
BNP SERPL-MCNC: 825 PG/ML (ref 0–450)
BUN SERPL-MCNC: 10 MG/DL (ref 6–23)
CALCIUM SERPL-MCNC: 9.5 MG/DL (ref 8.6–10.2)
CHLORIDE SERPL-SCNC: 90 MMOL/L (ref 98–107)
CO2 SERPL-SCNC: 26 MMOL/L (ref 22–29)
CREAT SERPL-MCNC: 0.7 MG/DL (ref 0.5–1)
GFR, ESTIMATED: >90 ML/MIN/1.73M2
GLUCOSE SERPL-MCNC: 510 MG/DL (ref 74–99)
POTASSIUM SERPL-SCNC: 4.7 MMOL/L (ref 3.5–5)
SODIUM SERPL-SCNC: 126 MMOL/L (ref 132–146)

## 2024-12-30 PROCEDURE — 80048 BASIC METABOLIC PNL TOTAL CA: CPT

## 2024-12-30 PROCEDURE — 99214 OFFICE O/P EST MOD 30 MIN: CPT

## 2024-12-30 PROCEDURE — 36415 COLL VENOUS BLD VENIPUNCTURE: CPT

## 2024-12-30 PROCEDURE — 83880 ASSAY OF NATRIURETIC PEPTIDE: CPT

## 2024-12-30 NOTE — TELEPHONE ENCOUNTER
----- Message from SARAH Moscoso CNP sent at 12/30/2024 10:14 AM EST -----  Labs and CHF clinic note reviewed  Uncontrolled BGL with hyponatremia  Advise ED evaluation     Spoke with patient regarding abnormal lab work and advised ED evaluation. Patient stated \"I don't think I can go today.\" Patient given abnormal lab values and the need to be evaluated by a physician. She said she will think about it.

## 2024-12-30 NOTE — TELEPHONE ENCOUNTER
Received a call from laboratory that patient's glucose is 510 and sodium is 126. Called patient to assess how she is feeling and to report abnormal lab work. No answer and voicemail left to call the CHF Clinic immediately.

## 2024-12-30 NOTE — RESULT ENCOUNTER NOTE
Labs and CHF clinic note reviewed  Uncontrolled BGL with hyponatremia  Advise ED evaluation     Thank you

## 2024-12-30 NOTE — PROGRESS NOTES
Congestive Heart Failure Clinic   [facility]      Referring Provider:   Primary Care Physician: Wolf Hernandez DO   Cardiologist:   Nephrologist: n/a      HISTORY OF PRESENT ILLNESS:     Debra Maher is a 59 y.o. 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    In the CHF clinic today she denies any adverse symptoms except:  [] Dizziness or lightheadedness   [x] Syncope or near syncope --> x2 on her cruise ship (November)  [] Recent Fall  [] Shortness of breath at rest   [x] Dyspnea with exertion  [] Decline in functional capacity (unable to perform activities they had previously been able to do)  [x] Fatigue --- improving  [] Orthopnea  [] PND   [] Nocturnal cough  [] Hemoptysis  [x] Chest pain, pressure, or discomfort-last Tuesday  [] Palpitations  [] Abdominal distention  [] Abdominal bloating  [] Early satiety  [] Blood in stool   [] Diarrhea  [] Constipation  [] Nausea/Vomiting  [] Decreased urinary response to oral diuretic   [] Scrotal swelling   [] Lower extremity edema  [] Used PRN doses of oral diuretic   [] Weight gain    Wt Readings from Last 3 Encounters:   12/30/24 64 kg (141 lb)   12/16/24 61.7 kg (136 lb)   11/06/24 64.9 kg (143 lb 0.4 oz)     SOCIAL HISTORY:  [x] Denies tobacco, alcohol or illicit drug abuse  [] Tobacco use:  [] ETOH use:  [] Illicit drug use:        MEDICATIONS:    Allergies   Allergen Reactions    Bee Venom     Codeine Other (See Comments)     \"i just black out\"    Doxycycline     Metformin And Related     Prednisone Other (See Comments)     Muscle cramps    Tetracyclines & Related Other (See Comments)    Amoxicillin Rash and Other (See Comments)

## 2025-01-03 ENCOUNTER — HOSPITAL ENCOUNTER (EMERGENCY)
Age: 60
Discharge: HOME | End: 2025-01-03
Attending: EMERGENCY MEDICINE
Payer: COMMERCIAL

## 2025-01-03 ENCOUNTER — TELEPHONE (OUTPATIENT)
Dept: OTHER | Age: 60
End: 2025-01-03

## 2025-01-03 ENCOUNTER — APPOINTMENT (OUTPATIENT)
Dept: CT IMAGING | Age: 60
End: 2025-01-03
Payer: COMMERCIAL

## 2025-01-03 ENCOUNTER — APPOINTMENT (OUTPATIENT)
Dept: GENERAL RADIOLOGY | Age: 60
End: 2025-01-03
Payer: COMMERCIAL

## 2025-01-03 VITALS
TEMPERATURE: 97.1 F | HEART RATE: 87 BPM | OXYGEN SATURATION: 99 % | RESPIRATION RATE: 16 BRPM | DIASTOLIC BLOOD PRESSURE: 59 MMHG | SYSTOLIC BLOOD PRESSURE: 104 MMHG

## 2025-01-03 DIAGNOSIS — R31.9 URINARY TRACT INFECTION WITH HEMATURIA, SITE UNSPECIFIED: Primary | ICD-10-CM

## 2025-01-03 DIAGNOSIS — N39.0 URINARY TRACT INFECTION WITH HEMATURIA, SITE UNSPECIFIED: Primary | ICD-10-CM

## 2025-01-03 DIAGNOSIS — J01.10 ACUTE FRONTAL SINUSITIS, RECURRENCE NOT SPECIFIED: ICD-10-CM

## 2025-01-03 LAB
ALBUMIN SERPL-MCNC: 3.6 G/DL (ref 3.5–5.2)
ALP SERPL-CCNC: 177 U/L (ref 35–104)
ALT SERPL-CCNC: 27 U/L (ref 0–32)
ANION GAP SERPL CALCULATED.3IONS-SCNC: 12 MMOL/L (ref 7–16)
AST SERPL-CCNC: 25 U/L (ref 0–31)
BACTERIA URNS QL MICRO: ABNORMAL
BASOPHILS # BLD: 0.06 K/UL (ref 0–0.2)
BASOPHILS NFR BLD: 1 % (ref 0–2)
BILIRUB SERPL-MCNC: 0.3 MG/DL (ref 0–1.2)
BILIRUB UR QL STRIP: NEGATIVE
BUN SERPL-MCNC: 7 MG/DL (ref 6–23)
CALCIUM SERPL-MCNC: 9.2 MG/DL (ref 8.6–10.2)
CHLORIDE SERPL-SCNC: 97 MMOL/L (ref 98–107)
CLARITY UR: ABNORMAL
CO2 SERPL-SCNC: 26 MMOL/L (ref 22–29)
COLOR UR: YELLOW
CREAT SERPL-MCNC: 0.7 MG/DL (ref 0.5–1)
EKG ATRIAL RATE: 85 BPM
EKG P AXIS: 72 DEGREES
EKG P-R INTERVAL: 154 MS
EKG Q-T INTERVAL: 356 MS
EKG QRS DURATION: 80 MS
EKG QTC CALCULATION (BAZETT): 423 MS
EKG R AXIS: -49 DEGREES
EKG T AXIS: 22 DEGREES
EKG VENTRICULAR RATE: 85 BPM
EOSINOPHIL # BLD: 0.11 K/UL (ref 0.05–0.5)
EOSINOPHILS RELATIVE PERCENT: 2 % (ref 0–6)
EPI CELLS #/AREA URNS HPF: ABNORMAL /HPF
ERYTHROCYTE [DISTWIDTH] IN BLOOD BY AUTOMATED COUNT: 13.7 % (ref 11.5–15)
GFR, ESTIMATED: >90 ML/MIN/1.73M2
GLUCOSE BLD-MCNC: 163 MG/DL (ref 74–99)
GLUCOSE BLD-MCNC: 220 MG/DL (ref 74–99)
GLUCOSE SERPL-MCNC: 185 MG/DL (ref 74–99)
GLUCOSE UR STRIP-MCNC: >=1000 MG/DL
HCT VFR BLD AUTO: 40.2 % (ref 34–48)
HGB BLD-MCNC: 13.9 G/DL (ref 11.5–15.5)
HGB UR QL STRIP.AUTO: ABNORMAL
IMM GRANULOCYTES # BLD AUTO: 0.03 K/UL (ref 0–0.58)
IMM GRANULOCYTES NFR BLD: 0 % (ref 0–5)
KETONES UR STRIP-MCNC: NEGATIVE MG/DL
LEUKOCYTE ESTERASE UR QL STRIP: ABNORMAL
LYMPHOCYTES NFR BLD: 1.2 K/UL (ref 1.5–4)
LYMPHOCYTES RELATIVE PERCENT: 17 % (ref 20–42)
MCH RBC QN AUTO: 29.5 PG (ref 26–35)
MCHC RBC AUTO-ENTMCNC: 34.6 G/DL (ref 32–34.5)
MCV RBC AUTO: 85.4 FL (ref 80–99.9)
MONOCYTES NFR BLD: 0.65 K/UL (ref 0.1–0.95)
MONOCYTES NFR BLD: 9 % (ref 2–12)
NEUTROPHILS NFR BLD: 71 % (ref 43–80)
NEUTS SEG NFR BLD: 4.91 K/UL (ref 1.8–7.3)
NITRITE UR QL STRIP: POSITIVE
PH UR STRIP: 5.5 [PH] (ref 5–9)
PLATELET # BLD AUTO: 354 K/UL (ref 130–450)
PMV BLD AUTO: 10.1 FL (ref 7–12)
POTASSIUM SERPL-SCNC: 4.1 MMOL/L (ref 3.5–5)
PROT SERPL-MCNC: 7.3 G/DL (ref 6.4–8.3)
PROT UR STRIP-MCNC: NEGATIVE MG/DL
RBC # BLD AUTO: 4.71 M/UL (ref 3.5–5.5)
RBC #/AREA URNS HPF: ABNORMAL /HPF
SODIUM SERPL-SCNC: 135 MMOL/L (ref 132–146)
SP GR UR STRIP: <1.005 (ref 1–1.03)
TROPONIN I SERPL HS-MCNC: 15 NG/L (ref 0–9)
TROPONIN I SERPL HS-MCNC: 16 NG/L (ref 0–9)
UROBILINOGEN UR STRIP-ACNC: 0.2 EU/DL (ref 0–1)
WBC #/AREA URNS HPF: ABNORMAL /HPF
WBC OTHER # BLD: 7 K/UL (ref 4.5–11.5)

## 2025-01-03 PROCEDURE — 99285 EMERGENCY DEPT VISIT HI MDM: CPT

## 2025-01-03 PROCEDURE — 71045 X-RAY EXAM CHEST 1 VIEW: CPT

## 2025-01-03 PROCEDURE — 6360000004 HC RX CONTRAST MEDICATION: Performed by: RADIOLOGY

## 2025-01-03 PROCEDURE — 84484 ASSAY OF TROPONIN QUANT: CPT

## 2025-01-03 PROCEDURE — 70496 CT ANGIOGRAPHY HEAD: CPT

## 2025-01-03 PROCEDURE — 87086 URINE CULTURE/COLONY COUNT: CPT

## 2025-01-03 PROCEDURE — 87088 URINE BACTERIA CULTURE: CPT

## 2025-01-03 PROCEDURE — 82962 GLUCOSE BLOOD TEST: CPT

## 2025-01-03 PROCEDURE — 2500000003 HC RX 250 WO HCPCS: Performed by: EMERGENCY MEDICINE

## 2025-01-03 PROCEDURE — 70498 CT ANGIOGRAPHY NECK: CPT

## 2025-01-03 PROCEDURE — 80053 COMPREHEN METABOLIC PANEL: CPT

## 2025-01-03 PROCEDURE — 85025 COMPLETE CBC W/AUTO DIFF WBC: CPT

## 2025-01-03 PROCEDURE — 6360000002 HC RX W HCPCS: Performed by: EMERGENCY MEDICINE

## 2025-01-03 PROCEDURE — 93005 ELECTROCARDIOGRAM TRACING: CPT

## 2025-01-03 PROCEDURE — 96375 TX/PRO/DX INJ NEW DRUG ADDON: CPT

## 2025-01-03 PROCEDURE — 2580000003 HC RX 258: Performed by: EMERGENCY MEDICINE

## 2025-01-03 PROCEDURE — 81001 URINALYSIS AUTO W/SCOPE: CPT

## 2025-01-03 PROCEDURE — 96374 THER/PROPH/DIAG INJ IV PUSH: CPT

## 2025-01-03 RX ORDER — 0.9 % SODIUM CHLORIDE 0.9 %
500 INTRAVENOUS SOLUTION INTRAVENOUS ONCE
Status: COMPLETED | OUTPATIENT
Start: 2025-01-03 | End: 2025-01-03

## 2025-01-03 RX ORDER — KETOROLAC TROMETHAMINE 15 MG/ML
15 INJECTION, SOLUTION INTRAMUSCULAR; INTRAVENOUS ONCE
Status: COMPLETED | OUTPATIENT
Start: 2025-01-03 | End: 2025-01-03

## 2025-01-03 RX ORDER — CEFDINIR 300 MG/1
300 CAPSULE ORAL 2 TIMES DAILY
Qty: 14 CAPSULE | Refills: 0 | Status: SHIPPED | OUTPATIENT
Start: 2025-01-03 | End: 2025-01-10

## 2025-01-03 RX ORDER — IOPAMIDOL 755 MG/ML
75 INJECTION, SOLUTION INTRAVASCULAR
Status: COMPLETED | OUTPATIENT
Start: 2025-01-03 | End: 2025-01-03

## 2025-01-03 RX ADMIN — WATER 1000 MG: 1 INJECTION INTRAMUSCULAR; INTRAVENOUS; SUBCUTANEOUS at 14:50

## 2025-01-03 RX ADMIN — KETOROLAC TROMETHAMINE 15 MG: 15 INJECTION, SOLUTION INTRAMUSCULAR; INTRAVENOUS at 14:50

## 2025-01-03 RX ADMIN — IOPAMIDOL 75 ML: 755 INJECTION, SOLUTION INTRAVENOUS at 12:20

## 2025-01-03 RX ADMIN — SODIUM CHLORIDE 500 ML: 9 INJECTION, SOLUTION INTRAVENOUS at 14:35

## 2025-01-03 NOTE — ED PROVIDER NOTES
or any disagreements were addressed in the HPI.        REVIEW OF SYSTEMS :           Positives and Pertinent negatives as per HPI.     SURGICAL HISTORY     Past Surgical History:   Procedure Laterality Date    CARDIAC PROCEDURE N/A 10/9/2024    Left heart cath / coronary angiography performed by Noemy Watson MD at [facility] CARDIAC CATH LAB    CARDIAC PROCEDURE N/A 10/9/2024    Percutaneous coronary intervention performed by Noemy Watson MD at [facility] CARDIAC CATH LAB    CARDIAC PROCEDURE N/A 10/9/2024    Insert stent snow coronary performed by Noemy Watson MD at [facility] CARDIAC CATH LAB    CHOLECYSTECTOMY      CORONARY ANGIOPLASTY WITH STENT PLACEMENT      ELBOW ARTHROSCOPY      EP DEVICE PROCEDURE N/A 10/9/2024    Ep cardioversion performed by Noemy Watson MD at [facility] CARDIAC CATH LAB    FINGER TRIGGER RELEASE      ROTATOR CUFF REPAIR         CURRENTMEDICATIONS       Discharge Medication List as of 1/3/2025  5:25 PM        CONTINUE these medications which have NOT CHANGED    Details   nitroGLYCERIN (NITROSTAT) 0.4 MG SL tablet Place 1 tablet under the tongue every 5 minutes as needed for Chest pain, Disp-25 tablet, R-3Normal      furosemide (LASIX) 20 MG tablet Take 1 tablet by mouth daily as needed (weight gain, shortness of breath, swellin), Disp-90 tablet, R-3Normal      sacubitril-valsartan (ENTRESTO) 24-26 MG per tablet Take 1 tablet by mouth 2 times daily, Disp-60 tablet, R-3Normal      insulin glargine (LANTUS SOLOSTAR) 100 UNIT/ML injection pen Inject 15 Units into the skin nightly, Disp-5 Adjustable Dose Pre-filled Pen Syringe, R-3Normal      Insulin Pen Needle (BD PEN NEEDLE MICRO U/F) 32G X 6 MM MISC Uses with insulin daily, Disp-100 each, R-3Normal      metoprolol succinate (TOPROL XL) 25 MG extended release tablet Take 1 tablet by mouth 2 times daily, Disp-30 tablet, R-3Normal      albuterol sulfate HFA (VENTOLIN HFA) 108 (90 Base) MCG/ACT inhaler Inhale 2 puffs into the lungs 4 times daily as needed for  Wheezing or Shortness of Breath, Disp-18 g, R-0Normal      apixaban (ELIQUIS) 5 MG TABS tablet Take 1 tablet by mouth 2 times daily, Disp-180 tablet, R-3Normal      clopidogrel (PLAVIX) 75 MG tablet Take 1 tablet by mouth daily, Disp-90 tablet, R-3Normal      atorvastatin (LIPITOR) 80 MG tablet Take 1 tablet by mouth daily, Disp-90 tablet, R-4Normal      alogliptin (NESINA) 25 MG TABS tablet Take 1 tablet by mouth dailyHistorical Med      citalopram (CELEXA) 40 MG tablet Take 1 tablet by mouth dailyHistorical Med      levothyroxine (SYNTHROID) 200 MCG tablet Take 1 tablet by mouth DailyHistorical Med      rOPINIRole (REQUIP) 1 MG tablet Takes daily as neededHistorical Med      gabapentin (NEURONTIN) 400 MG capsule Take 1 capsule by mouth 3 times daily.Historical Med      pantoprazole (PROTONIX) 20 MG tablet Take 1 tablet by mouth dailyHistorical Med             ALLERGIES     Bee venom, Codeine, Doxycycline, Metformin and related, Prednisone, Tetracyclines & related, and Amoxicillin    FAMILYHISTORY       Family History   Problem Relation Age of Onset    Kidney Disease Mother     Other Mother         CORONARY ARTERIOSCLEROSIS    Arthritis Mother     Osteoarthritis Mother     Rheum Arthritis Mother     Heart Disease Father         CABG    Thyroid Disease Sister     Arthritis Sister         SOCIAL HISTORY       Social History     Tobacco Use    Smoking status: Every Day     Current packs/day: 0.50     Types: Cigarettes    Smokeless tobacco: Never   Vaping Use    Vaping status: Never Used   Substance Use Topics    Alcohol use: Not Currently     Alcohol/week: 1.0 standard drink of alcohol     Types: 1 Cans of beer per week     Comment: special    Drug use: No       SCREENINGS        Langdon Coma Scale  Eye Opening: Spontaneous  Best Verbal Response: Oriented  Best Motor Response: Obeys commands  Jan Coma Scale Score: 15                WA Assessment  BP: (!) 104/59  Pulse: 87           PHYSICAL EXAM  1 or more

## 2025-01-03 NOTE — ED NOTES
Radiology Procedure Waiver   Name: Debra Maher  : 1965  MRN: [id number]    Date:  1/3/25    Time: 11:57 AM EST    Benefits of immediately proceeding with Radiology exam(s) without pre-testing outweigh the risks or are not indicated as specified below and therefore the following is/are being waived:    [] Pregnancy test   [] Patients LMP on-time and regular.   [] Patient had Tubal Ligation or has other Contraception Device.   [] Patient  is Menopausal or Premenarcheal.    [] Patient had Full or Partial Hysterectomy.    [] Protocol for Iodine allergy    [] MRI Questionnaire     [x] BUN/Creatinine   [] Patient age w/no hx of renal dysfunction.   [] Patient on Dialysis.   [x] Recent Normal Labs.  Electronically signed by Jacobo Alejandre DO on 1/3/25 at 11:57 AM EST

## 2025-01-03 NOTE — TELEPHONE ENCOUNTER
Patient called into the CHF Clinic and stated her blood pressure is 86/44 and she admits to feeling off. Advised patient to call 911 and seek ER since she was advised on 12/30/24 to go to ER for abnormal lab work. She verbalized an understanding.

## 2025-01-05 LAB
MICROORGANISM SPEC CULT: ABNORMAL
MICROORGANISM SPEC CULT: ABNORMAL
SERVICE CMNT-IMP: ABNORMAL
SERVICE CMNT-IMP: ABNORMAL
SPECIMEN DESCRIPTION: ABNORMAL
SPECIMEN DESCRIPTION: ABNORMAL

## 2025-01-06 ENCOUNTER — TELEPHONE (OUTPATIENT)
Dept: CARDIOLOGY CLINIC | Age: 60
End: 2025-01-06

## 2025-01-06 DIAGNOSIS — I48.0 PAROXYSMAL ATRIAL FIBRILLATION (HCC): ICD-10-CM

## 2025-01-06 LAB
EKG ATRIAL RATE: 85 BPM
EKG P AXIS: 72 DEGREES
EKG P-R INTERVAL: 154 MS
EKG Q-T INTERVAL: 356 MS
EKG QRS DURATION: 80 MS
EKG QTC CALCULATION (BAZETT): 423 MS
EKG R AXIS: -49 DEGREES
EKG T AXIS: 22 DEGREES
EKG VENTRICULAR RATE: 85 BPM

## 2025-01-06 PROCEDURE — 93010 ELECTROCARDIOGRAM REPORT: CPT | Performed by: INTERNAL MEDICINE

## 2025-01-06 NOTE — TELEPHONE ENCOUNTER
----- Message from Dr. Cornelius Lanier MD sent at 1/6/2025  6:03 AM EST -----  Please notify patient that arrhythmia monitor was reviewed and demonstrates no electrical events contributing to her syncope.  Continue as directed and follow-up as scheduled

## 2025-01-09 ENCOUNTER — TELEPHONE (OUTPATIENT)
Dept: CARDIOLOGY | Age: 60
End: 2025-01-09

## 2025-01-09 NOTE — TELEPHONE ENCOUNTER
Attempted to contact pt to reschedule appt set for 1/14/25 due to pending insurance authorization for echo through [employer]. Unable to leave VM due to mailbox being full.

## 2025-01-13 ENCOUNTER — TELEPHONE (OUTPATIENT)
Dept: CARDIOLOGY | Age: 60
End: 2025-01-13

## 2025-01-13 NOTE — TELEPHONE ENCOUNTER
CANCELLED ECHO DUE TO AUTHORIZATION PENDING. SPOKE WITH PATIENT INFORMING HER OF THE PENDING AUTHORIZATION.  EXPLAINED TO PATIENT WE WILL CALL TO RESCHEDULE AS SOON AS POSSIBLE.

## 2025-01-27 ENCOUNTER — TELEPHONE (OUTPATIENT)
Dept: OTHER | Age: 60
End: 2025-01-27

## 2025-01-27 NOTE — TELEPHONE ENCOUNTER
7:22 AM 1/27/2025 Cancelled the CHF clinic appointment today. Denies any  s/s symptomatic CHF. Prefers Monday's only.       Future Appointments   Date Time Provider Department Center   2/17/2025  8:15 AM [facility] CHF ROOM 2 [facility]   3/19/2025  8:30 AM Cornelius Lanier MD [facility]

## 2025-02-17 ENCOUNTER — TELEPHONE (OUTPATIENT)
Dept: OTHER | Age: 60
End: 2025-02-17

## 2025-02-17 NOTE — TELEPHONE ENCOUNTER
3:40 PM  Attempted to reach pt to reschedule cancelled CHF clinic appt. No answer. VM left to call back clinic to reschedule.

## 2025-02-26 ENCOUNTER — HOSPITAL ENCOUNTER (OUTPATIENT)
Dept: CARDIOLOGY | Age: 60
Discharge: HOME | End: 2025-02-28
Attending: INTERNAL MEDICINE
Payer: COMMERCIAL

## 2025-02-26 VITALS — BODY MASS INDEX: 25.95 KG/M2 | WEIGHT: 141 LBS | HEIGHT: 62 IN

## 2025-02-26 DIAGNOSIS — I25.118 CORONARY ARTERY DISEASE OF NATIVE ARTERY OF NATIVE HEART WITH STABLE ANGINA PECTORIS: ICD-10-CM

## 2025-02-26 LAB
ECHO AV MEAN GRADIENT: 6 MMHG
ECHO AV MEAN VELOCITY: 1.1 M/S
ECHO AV PEAK GRADIENT: 9 MMHG
ECHO AV PEAK VELOCITY: 1.5 M/S
ECHO AV VTI: 33.7 CM
ECHO BSA: 1.67 M2
ECHO LA DIAMETER INDEX: 2.06 CM/M2
ECHO LA DIAMETER: 3.4 CM
ECHO LA VOL A-L A2C: 59 ML (ref 22–52)
ECHO LA VOL A-L A4C: 57 ML (ref 22–52)
ECHO LA VOL MOD A2C: 56 ML (ref 22–52)
ECHO LA VOL MOD A4C: 52 ML (ref 22–52)
ECHO LA VOLUME AREA LENGTH: 59 ML
ECHO LA VOLUME INDEX A-L A2C: 36 ML/M2 (ref 16–34)
ECHO LA VOLUME INDEX A-L A4C: 35 ML/M2 (ref 16–34)
ECHO LA VOLUME INDEX AREA LENGTH: 36 ML/M2 (ref 16–34)
ECHO LA VOLUME INDEX MOD A2C: 34 ML/M2 (ref 16–34)
ECHO LA VOLUME INDEX MOD A4C: 32 ML/M2 (ref 16–34)
ECHO LV EJECTION FRACTION A2C: 36 %
ECHO LV EJECTION FRACTION A4C: 35 %
ECHO LV FRACTIONAL SHORTENING: 15 % (ref 28–44)
ECHO LV INTERNAL DIMENSION DIASTOLE INDEX: 3.21 CM/M2
ECHO LV INTERNAL DIMENSION DIASTOLIC: 5.3 CM (ref 3.9–5.3)
ECHO LV INTERNAL DIMENSION SYSTOLIC INDEX: 2.73 CM/M2
ECHO LV INTERNAL DIMENSION SYSTOLIC: 4.5 CM
ECHO LV IVSD: 1.2 CM (ref 0.6–0.9)
ECHO LV IVSS: 1.4 CM
ECHO LV MASS 2D: 227.7 G (ref 67–162)
ECHO LV MASS INDEX 2D: 138 G/M2 (ref 43–95)
ECHO LV POSTERIOR WALL DIASTOLIC: 1 CM (ref 0.6–0.9)
ECHO LV POSTERIOR WALL SYSTOLIC: 1 CM
ECHO LV RELATIVE WALL THICKNESS RATIO: 0.38
ECHO MV AREA PHT: 2.4 CM2
ECHO MV MAX VELOCITY: 1.3 M/S
ECHO MV MEAN GRADIENT: 3 MMHG
ECHO MV MEAN VELOCITY: 0.8 M/S
ECHO MV PEAK GRADIENT: 7 MMHG
ECHO MV PRESSURE HALF TIME (PHT): 91.7 MS
ECHO MV VTI: 29.4 CM
ECHO TV REGURGITANT MAX VELOCITY: 2.93 M/S
ECHO TV REGURGITANT PEAK GRADIENT: 34 MMHG
LEFT VENTRICULAR EJECTION FRACTION MODE: NORMAL
LV EF: 40 %

## 2025-02-26 PROCEDURE — 93308 TTE F-UP OR LMTD: CPT

## 2025-03-03 LAB
ECHO AV MEAN GRADIENT: 6 MMHG
ECHO AV MEAN VELOCITY: 1.1 M/S
ECHO AV PEAK GRADIENT: 9 MMHG
ECHO AV PEAK VELOCITY: 1.5 M/S
ECHO AV VTI: 33.7 CM
ECHO BSA: 1.67 M2
ECHO LA DIAMETER INDEX: 2.06 CM/M2
ECHO LA DIAMETER: 3.4 CM
ECHO LA VOL A-L A2C: 59 ML (ref 22–52)
ECHO LA VOL A-L A4C: 57 ML (ref 22–52)
ECHO LA VOL MOD A2C: 56 ML (ref 22–52)
ECHO LA VOL MOD A4C: 52 ML (ref 22–52)
ECHO LA VOLUME AREA LENGTH: 59 ML
ECHO LA VOLUME INDEX A-L A2C: 36 ML/M2 (ref 16–34)
ECHO LA VOLUME INDEX A-L A4C: 35 ML/M2 (ref 16–34)
ECHO LA VOLUME INDEX AREA LENGTH: 36 ML/M2 (ref 16–34)
ECHO LA VOLUME INDEX MOD A2C: 34 ML/M2 (ref 16–34)
ECHO LA VOLUME INDEX MOD A4C: 32 ML/M2 (ref 16–34)
ECHO LV EF PHYSICIAN: 38 %
ECHO LV EJECTION FRACTION A2C: 36 %
ECHO LV EJECTION FRACTION A4C: 35 %
ECHO LV FRACTIONAL SHORTENING: 15 % (ref 28–44)
ECHO LV INTERNAL DIMENSION DIASTOLE INDEX: 3.21 CM/M2
ECHO LV INTERNAL DIMENSION DIASTOLIC: 5.3 CM (ref 3.9–5.3)
ECHO LV INTERNAL DIMENSION SYSTOLIC INDEX: 2.73 CM/M2
ECHO LV INTERNAL DIMENSION SYSTOLIC: 4.5 CM
ECHO LV IVSD: 1.2 CM (ref 0.6–0.9)
ECHO LV IVSS: 1.4 CM
ECHO LV MASS 2D: 227.7 G (ref 67–162)
ECHO LV MASS INDEX 2D: 138 G/M2 (ref 43–95)
ECHO LV POSTERIOR WALL DIASTOLIC: 1 CM (ref 0.6–0.9)
ECHO LV POSTERIOR WALL SYSTOLIC: 1 CM
ECHO LV RELATIVE WALL THICKNESS RATIO: 0.38
ECHO MV AREA PHT: 2.4 CM2
ECHO MV MAX VELOCITY: 1.3 M/S
ECHO MV MEAN GRADIENT: 3 MMHG
ECHO MV MEAN VELOCITY: 0.8 M/S
ECHO MV PEAK GRADIENT: 7 MMHG
ECHO MV PRESSURE HALF TIME (PHT): 91.7 MS
ECHO MV VTI: 29.4 CM
ECHO TV REGURGITANT MAX VELOCITY: 2.93 M/S
ECHO TV REGURGITANT PEAK GRADIENT: 34 MMHG

## 2025-03-10 ENCOUNTER — RESULTS FOLLOW-UP (OUTPATIENT)
Dept: CARDIOLOGY | Age: 60
End: 2025-03-10

## 2025-03-10 NOTE — TELEPHONE ENCOUNTER
----- Message from LUAN RODRIGUEZ MA sent at 3/10/2025  6:20 AM EDT -----  Regarding: FW: Echo    ----- Message -----  From: Federico Quijano MD  Sent: 3/8/2025   7:58 AM EDT  To: Bel Fagan MA  Subject: Echo                                             Echo showed heart function 35-40%  F/u with Dr Lanier  ----- Message -----  From: Federico Quijano MD  Sent: 3/3/2025   6:03 AM EST  To: Cornelius Lanier MD

## 2025-03-11 ENCOUNTER — HOSPITAL ENCOUNTER (INPATIENT)
Age: 60
LOS: 5 days | Discharge: ANOTHER HOSPITAL | DRG: 280 | End: 2025-03-16
Attending: EMERGENCY MEDICINE | Admitting: INTERNAL MEDICINE
Payer: COMMERCIAL

## 2025-03-11 ENCOUNTER — APPOINTMENT (OUTPATIENT)
Dept: CT IMAGING | Age: 60
DRG: 280 | End: 2025-03-11
Payer: COMMERCIAL

## 2025-03-11 ENCOUNTER — APPOINTMENT (OUTPATIENT)
Dept: GENERAL RADIOLOGY | Age: 60
DRG: 280 | End: 2025-03-11
Payer: COMMERCIAL

## 2025-03-11 DIAGNOSIS — I50.23 ACUTE ON CHRONIC SYSTOLIC HEART FAILURE (HCC): ICD-10-CM

## 2025-03-11 DIAGNOSIS — I42.9 CARDIOMYOPATHY, UNSPECIFIED TYPE (HCC): ICD-10-CM

## 2025-03-11 DIAGNOSIS — R79.89 ELEVATED TROPONIN: ICD-10-CM

## 2025-03-11 DIAGNOSIS — I50.9 CONGESTIVE HEART FAILURE, UNSPECIFIED HF CHRONICITY, UNSPECIFIED HEART FAILURE TYPE (HCC): ICD-10-CM

## 2025-03-11 DIAGNOSIS — R55 SYNCOPE AND COLLAPSE: ICD-10-CM
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
## 2025-03-11 DIAGNOSIS — S20.212A CONTUSION OF LEFT CHEST WALL, INITIAL ENCOUNTER: ICD-10-CM

## 2025-03-11 PROBLEM — I50.21 ACUTE SYSTOLIC HEART FAILURE (HCC): Status: ACTIVE | Noted: 2025-03-11

## 2025-03-11 LAB
ABO + RH BLD: NORMAL
ALBUMIN SERPL-MCNC: 3.7 G/DL (ref 3.5–5.2)
ALP SERPL-CCNC: 105 U/L (ref 35–104)
ALT SERPL-CCNC: 13 U/L (ref 0–32)
ANION GAP SERPL CALCULATED.3IONS-SCNC: 11 MMOL/L (ref 7–16)
ARM BAND NUMBER: NORMAL
AST SERPL-CCNC: 12 U/L (ref 0–31)
BASOPHILS # BLD: 0.05 K/UL (ref 0–0.2)
BASOPHILS NFR BLD: 1 % (ref 0–2)
BILIRUB DIRECT SERPL-MCNC: <0.2 MG/DL (ref 0–0.3)
BILIRUB INDIRECT SERPL-MCNC: ABNORMAL MG/DL (ref 0–1)
BILIRUB SERPL-MCNC: 0.6 MG/DL (ref 0–1.2)
BLOOD BANK SAMPLE EXPIRATION: NORMAL
BLOOD GROUP ANTIBODIES SERPL: NEGATIVE
BNP SERPL-MCNC: 3376 PG/ML (ref 0–450)
BUN SERPL-MCNC: 8 MG/DL (ref 6–23)
CALCIUM SERPL-MCNC: 9.1 MG/DL (ref 8.6–10.2)
CHLORIDE SERPL-SCNC: 96 MMOL/L (ref 98–107)
CO2 SERPL-SCNC: 27 MMOL/L (ref 22–29)
CREAT SERPL-MCNC: 0.6 MG/DL (ref 0.5–1)
EOSINOPHIL # BLD: 0.08 K/UL (ref 0.05–0.5)
EOSINOPHILS RELATIVE PERCENT: 1 % (ref 0–6)
ERYTHROCYTE [DISTWIDTH] IN BLOOD BY AUTOMATED COUNT: 14.1 % (ref 11.5–15)
GFR, ESTIMATED: >90 ML/MIN/1.73M2
GLUCOSE BLD-MCNC: 469 MG/DL (ref 74–99)
GLUCOSE SERPL-MCNC: 481 MG/DL (ref 74–99)
HCT VFR BLD AUTO: 37.6 % (ref 34–48)
HGB BLD-MCNC: 13.2 G/DL (ref 11.5–15.5)
IMM GRANULOCYTES # BLD AUTO: 0.05 K/UL (ref 0–0.58)
IMM GRANULOCYTES NFR BLD: 1 % (ref 0–5)
INR PPP: 1.1
LACTATE BLDV-SCNC: 1.6 MMOL/L (ref 0.5–2.2)
LIPASE SERPL-CCNC: 53 U/L (ref 13–60)
LYMPHOCYTES NFR BLD: 1.51 K/UL (ref 1.5–4)
LYMPHOCYTES RELATIVE PERCENT: 23 % (ref 20–42)
MAGNESIUM SERPL-MCNC: 1.6 MG/DL (ref 1.6–2.6)
MCH RBC QN AUTO: 31.2 PG (ref 26–35)
MCHC RBC AUTO-ENTMCNC: 35.1 G/DL (ref 32–34.5)
MCV RBC AUTO: 88.9 FL (ref 80–99.9)
MONOCYTES NFR BLD: 0.43 K/UL (ref 0.1–0.95)
MONOCYTES NFR BLD: 6 % (ref 2–12)
NEUTROPHILS NFR BLD: 68 % (ref 43–80)
NEUTS SEG NFR BLD: 4.55 K/UL (ref 1.8–7.3)
PARTIAL THROMBOPLASTIN TIME: 29.5 SEC (ref 24.5–35.1)
PLATELET # BLD AUTO: 270 K/UL (ref 130–450)
PMV BLD AUTO: 10.5 FL (ref 7–12)
POTASSIUM SERPL-SCNC: 3.8 MMOL/L (ref 3.5–5)
PROT SERPL-MCNC: 6.6 G/DL (ref 6.4–8.3)
PROTHROMBIN TIME: 11.5 SEC (ref 9.3–12.4)
RBC # BLD AUTO: 4.23 M/UL (ref 3.5–5.5)
SODIUM SERPL-SCNC: 134 MMOL/L (ref 132–146)
TROPONIN I SERPL HS-MCNC: 124 NG/L (ref 0–9)
TROPONIN I SERPL HS-MCNC: 134 NG/L (ref 0–9)
TROPONIN I SERPL HS-MCNC: 146 NG/L (ref 0–9)
WBC OTHER # BLD: 6.7 K/UL (ref 4.5–11.5)

## 2025-03-11 PROCEDURE — 6360000004 HC RX CONTRAST MEDICATION: Performed by: RADIOLOGY

## 2025-03-11 PROCEDURE — 6370000000 HC RX 637 (ALT 250 FOR IP)

## 2025-03-11 PROCEDURE — 2500000003 HC RX 250 WO HCPCS: Performed by: INTERNAL MEDICINE

## 2025-03-11 PROCEDURE — 93005 ELECTROCARDIOGRAM TRACING: CPT | Performed by: EMERGENCY MEDICINE

## 2025-03-11 PROCEDURE — 6370000000 HC RX 637 (ALT 250 FOR IP): Performed by: EMERGENCY MEDICINE

## 2025-03-11 PROCEDURE — 83605 ASSAY OF LACTIC ACID: CPT

## 2025-03-11 PROCEDURE — 2580000003 HC RX 258: Performed by: EMERGENCY MEDICINE

## 2025-03-11 PROCEDURE — 84484 ASSAY OF TROPONIN QUANT: CPT

## 2025-03-11 PROCEDURE — 71111 X-RAY EXAM RIBS/CHEST4/> VWS: CPT

## 2025-03-11 PROCEDURE — 86901 BLOOD TYPING SEROLOGIC RH(D): CPT

## 2025-03-11 PROCEDURE — 71260 CT THORAX DX C+: CPT

## 2025-03-11 PROCEDURE — 87081 CULTURE SCREEN ONLY: CPT

## 2025-03-11 PROCEDURE — 82248 BILIRUBIN DIRECT: CPT

## 2025-03-11 PROCEDURE — 6360000002 HC RX W HCPCS: Performed by: EMERGENCY MEDICINE

## 2025-03-11 PROCEDURE — 99223 1ST HOSP IP/OBS HIGH 75: CPT | Performed by: INTERNAL MEDICINE

## 2025-03-11 PROCEDURE — 83880 ASSAY OF NATRIURETIC PEPTIDE: CPT

## 2025-03-11 PROCEDURE — 6360000002 HC RX W HCPCS

## 2025-03-11 PROCEDURE — 2000000000 HC ICU R&B

## 2025-03-11 PROCEDURE — 72125 CT NECK SPINE W/O DYE: CPT

## 2025-03-11 PROCEDURE — 86900 BLOOD TYPING SEROLOGIC ABO: CPT

## 2025-03-11 PROCEDURE — 6370000000 HC RX 637 (ALT 250 FOR IP): Performed by: INTERNAL MEDICINE

## 2025-03-11 PROCEDURE — 6360000002 HC RX W HCPCS: Performed by: INTERNAL MEDICINE

## 2025-03-11 PROCEDURE — 96374 THER/PROPH/DIAG INJ IV PUSH: CPT

## 2025-03-11 PROCEDURE — 85025 COMPLETE CBC W/AUTO DIFF WBC: CPT

## 2025-03-11 PROCEDURE — 71045 X-RAY EXAM CHEST 1 VIEW: CPT

## 2025-03-11 PROCEDURE — 85610 PROTHROMBIN TIME: CPT

## 2025-03-11 PROCEDURE — 72170 X-RAY EXAM OF PELVIS: CPT

## 2025-03-11 PROCEDURE — 96375 TX/PRO/DX INJ NEW DRUG ADDON: CPT

## 2025-03-11 PROCEDURE — 94640 AIRWAY INHALATION TREATMENT: CPT

## 2025-03-11 PROCEDURE — 80053 COMPREHEN METABOLIC PANEL: CPT

## 2025-03-11 PROCEDURE — 83690 ASSAY OF LIPASE: CPT

## 2025-03-11 PROCEDURE — 82962 GLUCOSE BLOOD TEST: CPT

## 2025-03-11 PROCEDURE — 6830039000 HC L3 TRAUMA ALERT

## 2025-03-11 PROCEDURE — 71275 CT ANGIOGRAPHY CHEST: CPT

## 2025-03-11 PROCEDURE — 86850 RBC ANTIBODY SCREEN: CPT

## 2025-03-11 PROCEDURE — 70450 CT HEAD/BRAIN W/O DYE: CPT

## 2025-03-11 PROCEDURE — 83735 ASSAY OF MAGNESIUM: CPT

## 2025-03-11 PROCEDURE — 85730 THROMBOPLASTIN TIME PARTIAL: CPT

## 2025-03-11 PROCEDURE — 99285 EMERGENCY DEPT VISIT HI MDM: CPT

## 2025-03-11 PROCEDURE — 74177 CT ABD & PELVIS W/CONTRAST: CPT

## 2025-03-11 RX ORDER — METOPROLOL SUCCINATE 25 MG/1
25 TABLET, EXTENDED RELEASE ORAL DAILY
Status: DISCONTINUED | OUTPATIENT
Start: 2025-03-12 | End: 2025-03-16 | Stop reason: HOSPADM

## 2025-03-11 RX ORDER — ROPINIROLE 1 MG/1
1 TABLET, FILM COATED ORAL DAILY
Status: DISCONTINUED | OUTPATIENT
Start: 2025-03-12 | End: 2025-03-12

## 2025-03-11 RX ORDER — ROPINIROLE 3 MG/1
3 TABLET, FILM COATED ORAL NIGHTLY
Status: ON HOLD | COMMUNITY

## 2025-03-11 RX ORDER — ENOXAPARIN SODIUM 100 MG/ML
40 INJECTION SUBCUTANEOUS DAILY
Status: DISCONTINUED | OUTPATIENT
Start: 2025-03-11 | End: 2025-03-12

## 2025-03-11 RX ORDER — ATORVASTATIN CALCIUM 80 MG/1
80 TABLET, FILM COATED ORAL DAILY
Status: ON HOLD | COMMUNITY

## 2025-03-11 RX ORDER — ROPINIROLE 2 MG/1
3 TABLET, FILM COATED ORAL NIGHTLY
Status: DISCONTINUED | OUTPATIENT
Start: 2025-03-11 | End: 2025-03-16 | Stop reason: HOSPADM

## 2025-03-11 RX ORDER — ROPINIROLE 1 MG/1
1 TABLET, FILM COATED ORAL 3 TIMES DAILY
Status: ON HOLD | COMMUNITY

## 2025-03-11 RX ORDER — CITALOPRAM HYDROBROMIDE 40 MG/1
40 TABLET ORAL DAILY
Status: ON HOLD | COMMUNITY

## 2025-03-11 RX ORDER — POLYETHYLENE GLYCOL 3350 17 G/17G
17 POWDER, FOR SOLUTION ORAL DAILY PRN
Status: DISCONTINUED | OUTPATIENT
Start: 2025-03-11 | End: 2025-03-16 | Stop reason: HOSPADM

## 2025-03-11 RX ORDER — INSULIN GLARGINE 100 [IU]/ML
15 INJECTION, SOLUTION SUBCUTANEOUS NIGHTLY
Status: ON HOLD | COMMUNITY

## 2025-03-11 RX ORDER — KETOROLAC TROMETHAMINE 30 MG/ML
15 INJECTION, SOLUTION INTRAMUSCULAR; INTRAVENOUS EVERY 6 HOURS PRN
Status: DISCONTINUED | OUTPATIENT
Start: 2025-03-11 | End: 2025-03-16 | Stop reason: HOSPADM

## 2025-03-11 RX ORDER — CITALOPRAM HYDROBROMIDE 20 MG/1
40 TABLET ORAL DAILY
Status: DISCONTINUED | OUTPATIENT
Start: 2025-03-12 | End: 2025-03-16 | Stop reason: HOSPADM

## 2025-03-11 RX ORDER — BUDESONIDE 0.5 MG/2ML
0.5 INHALANT ORAL
Status: DISCONTINUED | OUTPATIENT
Start: 2025-03-11 | End: 2025-03-16 | Stop reason: HOSPADM

## 2025-03-11 RX ORDER — FUROSEMIDE 10 MG/ML
20 INJECTION INTRAMUSCULAR; INTRAVENOUS 2 TIMES DAILY
Status: DISCONTINUED | OUTPATIENT
Start: 2025-03-11 | End: 2025-03-14

## 2025-03-11 RX ORDER — 0.9 % SODIUM CHLORIDE 0.9 %
1000 INTRAVENOUS SOLUTION INTRAVENOUS ONCE
Status: COMPLETED | OUTPATIENT
Start: 2025-03-11 | End: 2025-03-11

## 2025-03-11 RX ORDER — PROMETHAZINE HYDROCHLORIDE 25 MG/1
12.5 TABLET ORAL EVERY 6 HOURS PRN
Status: DISCONTINUED | OUTPATIENT
Start: 2025-03-11 | End: 2025-03-16 | Stop reason: HOSPADM

## 2025-03-11 RX ORDER — DEXTROSE MONOHYDRATE 100 MG/ML
INJECTION, SOLUTION INTRAVENOUS CONTINUOUS PRN
Status: DISCONTINUED | OUTPATIENT
Start: 2025-03-11 | End: 2025-03-16 | Stop reason: HOSPADM

## 2025-03-11 RX ORDER — SODIUM CHLORIDE 9 MG/ML
INJECTION, SOLUTION INTRAVENOUS PRN
Status: DISCONTINUED | OUTPATIENT
Start: 2025-03-11 | End: 2025-03-12

## 2025-03-11 RX ORDER — NITROGLYCERIN 0.4 MG/1
0.4 TABLET SUBLINGUAL EVERY 5 MIN PRN
Status: ON HOLD | COMMUNITY

## 2025-03-11 RX ORDER — METOPROLOL SUCCINATE 25 MG/1
25 TABLET, EXTENDED RELEASE ORAL DAILY
Status: ON HOLD | COMMUNITY

## 2025-03-11 RX ORDER — ATORVASTATIN CALCIUM 40 MG/1
80 TABLET, FILM COATED ORAL DAILY
Status: DISCONTINUED | OUTPATIENT
Start: 2025-03-12 | End: 2025-03-16 | Stop reason: HOSPADM

## 2025-03-11 RX ORDER — PANTOPRAZOLE SODIUM 20 MG/1
20 TABLET, DELAYED RELEASE ORAL DAILY
Status: ON HOLD | COMMUNITY

## 2025-03-11 RX ORDER — FENTANYL CITRATE 50 UG/ML
25 INJECTION, SOLUTION INTRAMUSCULAR; INTRAVENOUS ONCE
Refills: 0 | Status: COMPLETED | OUTPATIENT
Start: 2025-03-11 | End: 2025-03-11

## 2025-03-11 RX ORDER — LEVOTHYROXINE SODIUM 100 UG/1
200 TABLET ORAL DAILY
Status: DISCONTINUED | OUTPATIENT
Start: 2025-03-12 | End: 2025-03-16 | Stop reason: HOSPADM

## 2025-03-11 RX ORDER — FENTANYL CITRATE 50 UG/ML
50 INJECTION, SOLUTION INTRAMUSCULAR; INTRAVENOUS
Status: DISCONTINUED | OUTPATIENT
Start: 2025-03-11 | End: 2025-03-16 | Stop reason: HOSPADM

## 2025-03-11 RX ORDER — SODIUM CHLORIDE 0.9 % (FLUSH) 0.9 %
10 SYRINGE (ML) INJECTION EVERY 12 HOURS SCHEDULED
Status: DISCONTINUED | OUTPATIENT
Start: 2025-03-11 | End: 2025-03-12

## 2025-03-11 RX ORDER — IPRATROPIUM BROMIDE AND ALBUTEROL SULFATE 2.5; .5 MG/3ML; MG/3ML
1 SOLUTION RESPIRATORY (INHALATION)
Status: DISCONTINUED | OUTPATIENT
Start: 2025-03-11 | End: 2025-03-16 | Stop reason: HOSPADM

## 2025-03-11 RX ORDER — IPRATROPIUM BROMIDE AND ALBUTEROL SULFATE 2.5; .5 MG/3ML; MG/3ML
2 SOLUTION RESPIRATORY (INHALATION) ONCE
Status: COMPLETED | OUTPATIENT
Start: 2025-03-11 | End: 2025-03-11

## 2025-03-11 RX ORDER — ACETAMINOPHEN 650 MG/1
650 SUPPOSITORY RECTAL EVERY 6 HOURS PRN
Status: DISCONTINUED | OUTPATIENT
Start: 2025-03-11 | End: 2025-03-16 | Stop reason: HOSPADM

## 2025-03-11 RX ORDER — GABAPENTIN 400 MG/1
400 CAPSULE ORAL 3 TIMES DAILY
Status: DISCONTINUED | OUTPATIENT
Start: 2025-03-11 | End: 2025-03-16 | Stop reason: HOSPADM

## 2025-03-11 RX ORDER — GABAPENTIN 400 MG/1
400 CAPSULE ORAL 3 TIMES DAILY
Status: ON HOLD | COMMUNITY

## 2025-03-11 RX ORDER — FUROSEMIDE 20 MG/1
20 TABLET ORAL DAILY PRN
Status: ON HOLD | COMMUNITY

## 2025-03-11 RX ORDER — ONDANSETRON 2 MG/ML
4 INJECTION INTRAMUSCULAR; INTRAVENOUS EVERY 6 HOURS PRN
Status: DISCONTINUED | OUTPATIENT
Start: 2025-03-11 | End: 2025-03-16 | Stop reason: HOSPADM

## 2025-03-11 RX ORDER — INSULIN LISPRO 100 [IU]/ML
0-4 INJECTION, SOLUTION INTRAVENOUS; SUBCUTANEOUS
Status: DISCONTINUED | OUTPATIENT
Start: 2025-03-11 | End: 2025-03-12

## 2025-03-11 RX ORDER — IOPAMIDOL 755 MG/ML
75 INJECTION, SOLUTION INTRAVASCULAR
Status: COMPLETED | OUTPATIENT
Start: 2025-03-11 | End: 2025-03-11

## 2025-03-11 RX ORDER — SODIUM CHLORIDE 0.9 % (FLUSH) 0.9 %
10 SYRINGE (ML) INJECTION PRN
Status: DISCONTINUED | OUTPATIENT
Start: 2025-03-11 | End: 2025-03-12

## 2025-03-11 RX ORDER — ACETAMINOPHEN 325 MG/1
650 TABLET ORAL EVERY 6 HOURS PRN
Status: DISCONTINUED | OUTPATIENT
Start: 2025-03-11 | End: 2025-03-16 | Stop reason: HOSPADM

## 2025-03-11 RX ORDER — IOPAMIDOL 755 MG/ML
70 INJECTION, SOLUTION INTRAVASCULAR
Status: COMPLETED | OUTPATIENT
Start: 2025-03-11 | End: 2025-03-11

## 2025-03-11 RX ORDER — ASPIRIN 81 MG/1
324 TABLET, CHEWABLE ORAL ONCE
Status: COMPLETED | OUTPATIENT
Start: 2025-03-11 | End: 2025-03-11

## 2025-03-11 RX ORDER — ROPINIROLE 1 MG/1
1 TABLET, FILM COATED ORAL EVERY 12 HOURS
Status: DISCONTINUED | OUTPATIENT
Start: 2025-03-12 | End: 2025-03-11

## 2025-03-11 RX ORDER — GLUCAGON 1 MG/ML
1 KIT INJECTION PRN
Status: DISCONTINUED | OUTPATIENT
Start: 2025-03-11 | End: 2025-03-16 | Stop reason: HOSPADM

## 2025-03-11 RX ORDER — LEVOTHYROXINE SODIUM 200 UG/1
200 TABLET ORAL DAILY
Status: ON HOLD | COMMUNITY

## 2025-03-11 RX ORDER — CLOPIDOGREL BISULFATE 75 MG/1
75 TABLET ORAL DAILY
Status: ON HOLD | COMMUNITY

## 2025-03-11 RX ORDER — FUROSEMIDE 10 MG/ML
10 INJECTION INTRAMUSCULAR; INTRAVENOUS ONCE
Status: COMPLETED | OUTPATIENT
Start: 2025-03-11 | End: 2025-03-11

## 2025-03-11 RX ADMIN — SODIUM CHLORIDE, PRESERVATIVE FREE 10 ML: 5 INJECTION INTRAVENOUS at 22:17

## 2025-03-11 RX ADMIN — WATER 20 MG: 1 INJECTION INTRAMUSCULAR; INTRAVENOUS; SUBCUTANEOUS at 23:11

## 2025-03-11 RX ADMIN — IPRATROPIUM BROMIDE AND ALBUTEROL SULFATE 1 DOSE: .5; 3 SOLUTION RESPIRATORY (INHALATION) at 23:04

## 2025-03-11 RX ADMIN — ASPIRIN 324 MG: 81 TABLET, CHEWABLE ORAL at 15:42

## 2025-03-11 RX ADMIN — KETOROLAC TROMETHAMINE 15 MG: 30 INJECTION, SOLUTION INTRAMUSCULAR; INTRAVENOUS at 22:16

## 2025-03-11 RX ADMIN — FENTANYL CITRATE 25 MCG: 50 INJECTION, SOLUTION INTRAMUSCULAR; INTRAVENOUS at 15:42

## 2025-03-11 RX ADMIN — FUROSEMIDE 10 MG: 10 INJECTION, SOLUTION INTRAMUSCULAR; INTRAVENOUS at 16:20

## 2025-03-11 RX ADMIN — IPRATROPIUM BROMIDE AND ALBUTEROL SULFATE 1 DOSE: .5; 3 SOLUTION RESPIRATORY (INHALATION) at 20:48

## 2025-03-11 RX ADMIN — IOPAMIDOL 70 ML: 755 INJECTION, SOLUTION INTRAVENOUS at 13:41

## 2025-03-11 RX ADMIN — FUROSEMIDE 20 MG: 10 INJECTION, SOLUTION INTRAMUSCULAR; INTRAVENOUS at 23:11

## 2025-03-11 RX ADMIN — INSULIN LISPRO 4 UNITS: 100 INJECTION, SOLUTION INTRAVENOUS; SUBCUTANEOUS at 22:14

## 2025-03-11 RX ADMIN — GABAPENTIN 400 MG: 400 CAPSULE ORAL at 23:41

## 2025-03-11 RX ADMIN — BUDESONIDE 500 MCG: 0.5 INHALANT RESPIRATORY (INHALATION) at 20:48

## 2025-03-11 RX ADMIN — IPRATROPIUM BROMIDE AND ALBUTEROL SULFATE 2 DOSE: .5; 2.5 SOLUTION RESPIRATORY (INHALATION) at 16:08

## 2025-03-11 RX ADMIN — ENOXAPARIN SODIUM 40 MG: 100 INJECTION SUBCUTANEOUS at 23:10

## 2025-03-11 RX ADMIN — SODIUM CHLORIDE 1000 ML: 0.9 INJECTION, SOLUTION INTRAVENOUS at 13:21

## 2025-03-11 RX ADMIN — IOPAMIDOL 75 ML: 755 INJECTION, SOLUTION INTRAVENOUS at 16:38

## 2025-03-11 RX ADMIN — ROPINIROLE HYDROCHLORIDE 3 MG: 2 TABLET, FILM COATED ORAL at 23:41

## 2025-03-11 RX ADMIN — SODIUM CHLORIDE 1000 ML: 0.9 INJECTION, SOLUTION INTRAVENOUS at 12:09

## 2025-03-11 RX ADMIN — FENTANYL CITRATE 50 MCG: 50 INJECTION, SOLUTION INTRAMUSCULAR; INTRAVENOUS at 23:40

## 2025-03-11 ASSESSMENT — PAIN SCALES - GENERAL
PAINLEVEL_OUTOF10: 10
PAINLEVEL_OUTOF10: 10
PAINLEVEL_OUTOF10: 6

## 2025-03-11 ASSESSMENT — PAIN DESCRIPTION - LOCATION
LOCATION: BACK
LOCATION: BACK

## 2025-03-11 ASSESSMENT — LIFESTYLE VARIABLES
HOW OFTEN DO YOU HAVE A DRINK CONTAINING ALCOHOL: MONTHLY OR LESS
HOW MANY STANDARD DRINKS CONTAINING ALCOHOL DO YOU HAVE ON A TYPICAL DAY: PATIENT DOES NOT DRINK

## 2025-03-11 ASSESSMENT — PAIN DESCRIPTION - ORIENTATION: ORIENTATION: LEFT
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
## 2025-03-12 PROBLEM — J96.01 ACUTE HYPOXIC RESPIRATORY FAILURE: Status: ACTIVE | Noted: 2025-03-12

## 2025-03-12 PROBLEM — R79.89 ELEVATED TROPONIN: Status: ACTIVE | Noted: 2025-03-12

## 2025-03-12 PROBLEM — I50.9 CONGESTIVE HEART FAILURE (HCC): Status: ACTIVE | Noted: 2025-03-12

## 2025-03-12 PROBLEM — I50.23 ACUTE ON CHRONIC SYSTOLIC HEART FAILURE (HCC): Status: ACTIVE | Noted: 2025-03-11

## 2025-03-12 PROBLEM — J44.1 COPD WITH ACUTE EXACERBATION (HCC): Status: ACTIVE | Noted: 2025-03-12

## 2025-03-12 PROBLEM — R55 SYNCOPE AND COLLAPSE: Status: ACTIVE | Noted: 2025-03-12

## 2025-03-12 PROBLEM — S20.212A CONTUSION OF LEFT CHEST WALL: Status: ACTIVE | Noted: 2025-03-12

## 2025-03-12 PROBLEM — I25.5 ISCHEMIC CARDIOMYOPATHY: Status: ACTIVE | Noted: 2025-03-12

## 2025-03-12 LAB
ALBUMIN SERPL-MCNC: 2.9 G/DL (ref 3.5–5.2)
ALP SERPL-CCNC: 80 U/L (ref 35–104)
ALT SERPL-CCNC: 9 U/L (ref 0–32)
AMPHET UR QL SCN: NEGATIVE
ANION GAP SERPL CALCULATED.3IONS-SCNC: 11 MMOL/L (ref 7–16)
ANION GAP SERPL CALCULATED.3IONS-SCNC: 11 MMOL/L (ref 7–16)
ANION GAP SERPL CALCULATED.3IONS-SCNC: 12 MMOL/L (ref 7–16)
AST SERPL-CCNC: 8 U/L (ref 0–31)
B-OH-BUTYR SERPL-MCNC: 0.55 MMOL/L (ref 0.02–0.27)
BACTERIA URNS QL MICRO: ABNORMAL
BARBITURATES UR QL SCN: NEGATIVE
BASOPHILS # BLD: 0 K/UL (ref 0–0.2)
BASOPHILS NFR BLD: 0 % (ref 0–2)
BENZODIAZ UR QL: NEGATIVE
BILIRUB SERPL-MCNC: 0.4 MG/DL (ref 0–1.2)
BILIRUB UR QL STRIP: NEGATIVE
BUN SERPL-MCNC: 16 MG/DL (ref 6–23)
BUN SERPL-MCNC: 18 MG/DL (ref 6–23)
BUN SERPL-MCNC: 9 MG/DL (ref 6–23)
BUPRENORPHINE UR QL: NEGATIVE
CALCIUM SERPL-MCNC: 7 MG/DL (ref 8.6–10.2)
CALCIUM SERPL-MCNC: 8.5 MG/DL (ref 8.6–10.2)
CALCIUM SERPL-MCNC: 9.2 MG/DL (ref 8.6–10.2)
CANNABINOIDS UR QL SCN: NEGATIVE
CHLORIDE SERPL-SCNC: 107 MMOL/L (ref 98–107)
CHLORIDE SERPL-SCNC: 95 MMOL/L (ref 98–107)
CHLORIDE SERPL-SCNC: 98 MMOL/L (ref 98–107)
CLARITY UR: ABNORMAL
CO2 SERPL-SCNC: 20 MMOL/L (ref 22–29)
CO2 SERPL-SCNC: 24 MMOL/L (ref 22–29)
CO2 SERPL-SCNC: 25 MMOL/L (ref 22–29)
COCAINE UR QL SCN: NEGATIVE
COLOR UR: YELLOW
CREAT SERPL-MCNC: 0.5 MG/DL (ref 0.5–1)
CREAT SERPL-MCNC: 0.8 MG/DL (ref 0.5–1)
CREAT SERPL-MCNC: 0.9 MG/DL (ref 0.5–1)
EKG ATRIAL RATE: 71 BPM
EKG P AXIS: 25 DEGREES
EKG P-R INTERVAL: 152 MS
EKG Q-T INTERVAL: 380 MS
EKG QRS DURATION: 84 MS
EKG QTC CALCULATION (BAZETT): 412 MS
EKG R AXIS: -39 DEGREES
EKG T AXIS: 110 DEGREES
EKG VENTRICULAR RATE: 71 BPM
EOSINOPHIL # BLD: 0 K/UL (ref 0.05–0.5)
EOSINOPHILS RELATIVE PERCENT: 0 % (ref 0–6)
EPI CELLS #/AREA URNS HPF: ABNORMAL /HPF
ERYTHROCYTE [DISTWIDTH] IN BLOOD BY AUTOMATED COUNT: 14.1 % (ref 11.5–15)
FENTANYL UR QL: POSITIVE
GFR, ESTIMATED: 74 ML/MIN/1.73M2
GFR, ESTIMATED: 79 ML/MIN/1.73M2
GFR, ESTIMATED: >90 ML/MIN/1.73M2
GLUCOSE BLD-MCNC: 142 MG/DL (ref 74–99)
GLUCOSE BLD-MCNC: 181 MG/DL (ref 74–99)
GLUCOSE BLD-MCNC: 217 MG/DL (ref 74–99)
GLUCOSE BLD-MCNC: 291 MG/DL (ref 74–99)
GLUCOSE BLD-MCNC: 357 MG/DL (ref 74–99)
GLUCOSE BLD-MCNC: 396 MG/DL (ref 74–99)
GLUCOSE BLD-MCNC: 446 MG/DL (ref 74–99)
GLUCOSE BLD-MCNC: 486 MG/DL (ref 74–99)
GLUCOSE BLD-MCNC: 499 MG/DL (ref 74–99)
GLUCOSE BLD-MCNC: >500 MG/DL (ref 74–99)
GLUCOSE SERPL-MCNC: 283 MG/DL (ref 74–99)
GLUCOSE SERPL-MCNC: 399 MG/DL (ref 74–99)
GLUCOSE SERPL-MCNC: 468 MG/DL (ref 74–99)
GLUCOSE SERPL-MCNC: 550 MG/DL (ref 74–99)
GLUCOSE UR STRIP-MCNC: 500 MG/DL
HBA1C MFR BLD: 12.7 % (ref 4–5.6)
HCT VFR BLD AUTO: 37.5 % (ref 34–48)
HGB BLD-MCNC: 12.9 G/DL (ref 11.5–15.5)
HGB UR QL STRIP.AUTO: NEGATIVE
KETONES UR STRIP-MCNC: NEGATIVE MG/DL
LEUKOCYTE ESTERASE UR QL STRIP: ABNORMAL
LYMPHOCYTES NFR BLD: 0.26 K/UL (ref 1.5–4)
LYMPHOCYTES RELATIVE PERCENT: 4 % (ref 20–42)
MAGNESIUM SERPL-MCNC: 1.3 MG/DL (ref 1.6–2.6)
MAGNESIUM SERPL-MCNC: 2.3 MG/DL (ref 1.6–2.6)
MAGNESIUM SERPL-MCNC: 2.6 MG/DL (ref 1.6–2.6)
MCH RBC QN AUTO: 31.2 PG (ref 26–35)
MCHC RBC AUTO-ENTMCNC: 34.4 G/DL (ref 32–34.5)
MCV RBC AUTO: 90.6 FL (ref 80–99.9)
METHADONE UR QL: NEGATIVE
MONOCYTES NFR BLD: 0.07 K/UL (ref 0.1–0.95)
MONOCYTES NFR BLD: 1 % (ref 2–12)
NEUTROPHILS NFR BLD: 96 % (ref 43–80)
NEUTS SEG NFR BLD: 7.27 K/UL (ref 1.8–7.3)
NITRITE UR QL STRIP: NEGATIVE
OPIATES UR QL SCN: NEGATIVE
OXYCODONE UR QL SCN: NEGATIVE
PCP UR QL SCN: NEGATIVE
PH UR STRIP: 5.5 [PH] (ref 5–8)
PHOSPHATE SERPL-MCNC: 3 MG/DL (ref 2.5–4.5)
PHOSPHATE SERPL-MCNC: 3.2 MG/DL (ref 2.5–4.5)
PHOSPHATE SERPL-MCNC: 3.5 MG/DL (ref 2.5–4.5)
PLATELET # BLD AUTO: 249 K/UL (ref 130–450)
PMV BLD AUTO: 10.6 FL (ref 7–12)
POTASSIUM SERPL-SCNC: 3.5 MMOL/L (ref 3.5–5)
POTASSIUM SERPL-SCNC: 4.8 MMOL/L (ref 3.5–5)
POTASSIUM SERPL-SCNC: 5.1 MMOL/L (ref 3.5–5)
PROT SERPL-MCNC: 5.2 G/DL (ref 6.4–8.3)
PROT UR STRIP-MCNC: NEGATIVE MG/DL
RBC # BLD AUTO: 4.14 M/UL (ref 3.5–5.5)
RBC # BLD: ABNORMAL 10*6/UL
RBC # BLD: ABNORMAL 10*6/UL
RBC #/AREA URNS HPF: ABNORMAL /HPF
SODIUM SERPL-SCNC: 132 MMOL/L (ref 132–146)
SODIUM SERPL-SCNC: 133 MMOL/L (ref 132–146)
SODIUM SERPL-SCNC: 138 MMOL/L (ref 132–146)
SP GR UR STRIP: 1.01 (ref 1–1.03)
T4 FREE SERPL-MCNC: 1.5 NG/DL (ref 0.9–1.7)
TEST INFORMATION: ABNORMAL
TSH SERPL DL<=0.05 MIU/L-ACNC: 6.57 UIU/ML (ref 0.27–4.2)
UROBILINOGEN UR STRIP-ACNC: 0.2 EU/DL (ref 0–1)
WBC #/AREA URNS HPF: ABNORMAL /HPF
WBC OTHER # BLD: 7.6 K/UL (ref 4.5–11.5)

## 2025-03-12 PROCEDURE — 94640 AIRWAY INHALATION TREATMENT: CPT

## 2025-03-12 PROCEDURE — 99223 1ST HOSP IP/OBS HIGH 75: CPT | Performed by: INTERNAL MEDICINE

## 2025-03-12 PROCEDURE — 6360000002 HC RX W HCPCS: Performed by: INTERNAL MEDICINE

## 2025-03-12 PROCEDURE — 81001 URINALYSIS AUTO W/SCOPE: CPT

## 2025-03-12 PROCEDURE — 2700000000 HC OXYGEN THERAPY PER DAY

## 2025-03-12 PROCEDURE — 82010 KETONE BODYS QUAN: CPT

## 2025-03-12 PROCEDURE — 80307 DRUG TEST PRSMV CHEM ANLYZR: CPT

## 2025-03-12 PROCEDURE — 6360000002 HC RX W HCPCS: Performed by: NURSE PRACTITIONER

## 2025-03-12 PROCEDURE — 2000000000 HC ICU R&B

## 2025-03-12 PROCEDURE — 80053 COMPREHEN METABOLIC PANEL: CPT

## 2025-03-12 PROCEDURE — 2580000003 HC RX 258: Performed by: INTERNAL MEDICINE

## 2025-03-12 PROCEDURE — 2500000003 HC RX 250 WO HCPCS: Performed by: FAMILY MEDICINE

## 2025-03-12 PROCEDURE — 87086 URINE CULTURE/COLONY COUNT: CPT

## 2025-03-12 PROCEDURE — 36415 COLL VENOUS BLD VENIPUNCTURE: CPT

## 2025-03-12 PROCEDURE — 2500000003 HC RX 250 WO HCPCS: Performed by: INTERNAL MEDICINE

## 2025-03-12 PROCEDURE — 6370000000 HC RX 637 (ALT 250 FOR IP): Performed by: NURSE PRACTITIONER

## 2025-03-12 PROCEDURE — P9047 ALBUMIN (HUMAN), 25%, 50ML: HCPCS

## 2025-03-12 PROCEDURE — 84100 ASSAY OF PHOSPHORUS: CPT

## 2025-03-12 PROCEDURE — 6360000002 HC RX W HCPCS

## 2025-03-12 PROCEDURE — 82962 GLUCOSE BLOOD TEST: CPT

## 2025-03-12 PROCEDURE — 83036 HEMOGLOBIN GLYCOSYLATED A1C: CPT

## 2025-03-12 PROCEDURE — 6370000000 HC RX 637 (ALT 250 FOR IP): Performed by: INTERNAL MEDICINE

## 2025-03-12 PROCEDURE — 82947 ASSAY GLUCOSE BLOOD QUANT: CPT

## 2025-03-12 PROCEDURE — APPSS180 APP SPLIT SHARED TIME > 60 MINUTES: Performed by: NURSE PRACTITIONER

## 2025-03-12 PROCEDURE — 83735 ASSAY OF MAGNESIUM: CPT

## 2025-03-12 PROCEDURE — 80048 BASIC METABOLIC PNL TOTAL CA: CPT

## 2025-03-12 PROCEDURE — 6370000000 HC RX 637 (ALT 250 FOR IP)

## 2025-03-12 PROCEDURE — 94660 CPAP INITIATION&MGMT: CPT

## 2025-03-12 PROCEDURE — 84443 ASSAY THYROID STIM HORMONE: CPT

## 2025-03-12 PROCEDURE — 85025 COMPLETE CBC W/AUTO DIFF WBC: CPT

## 2025-03-12 PROCEDURE — 84439 ASSAY OF FREE THYROXINE: CPT

## 2025-03-12 RX ORDER — DEXTROSE MONOHYDRATE AND SODIUM CHLORIDE 5; .45 G/100ML; G/100ML
INJECTION, SOLUTION INTRAVENOUS CONTINUOUS PRN
Status: DISCONTINUED | OUTPATIENT
Start: 2025-03-12 | End: 2025-03-14

## 2025-03-12 RX ORDER — 0.9 % SODIUM CHLORIDE 0.9 %
15 INTRAVENOUS SOLUTION INTRAVENOUS ONCE
Status: DISCONTINUED | OUTPATIENT
Start: 2025-03-12 | End: 2025-03-13

## 2025-03-12 RX ORDER — SODIUM CHLORIDE 0.9 % (FLUSH) 0.9 %
5-40 SYRINGE (ML) INJECTION PRN
Status: DISCONTINUED | OUTPATIENT
Start: 2025-03-12 | End: 2025-03-16 | Stop reason: HOSPADM

## 2025-03-12 RX ORDER — INSULIN LISPRO 100 [IU]/ML
0-8 INJECTION, SOLUTION INTRAVENOUS; SUBCUTANEOUS
Status: DISCONTINUED | OUTPATIENT
Start: 2025-03-12 | End: 2025-03-12

## 2025-03-12 RX ORDER — SODIUM CHLORIDE 9 MG/ML
INJECTION, SOLUTION INTRAVENOUS CONTINUOUS
Status: DISCONTINUED | OUTPATIENT
Start: 2025-03-12 | End: 2025-03-13

## 2025-03-12 RX ORDER — ROPINIROLE 1 MG/1
1 TABLET, FILM COATED ORAL 3 TIMES DAILY
Status: DISCONTINUED | OUTPATIENT
Start: 2025-03-12 | End: 2025-03-16 | Stop reason: HOSPADM

## 2025-03-12 RX ORDER — MAGNESIUM SULFATE IN WATER 40 MG/ML
4000 INJECTION, SOLUTION INTRAVENOUS ONCE
Status: COMPLETED | OUTPATIENT
Start: 2025-03-12 | End: 2025-03-12

## 2025-03-12 RX ORDER — POTASSIUM CHLORIDE 1500 MG/1
40 TABLET, EXTENDED RELEASE ORAL ONCE
Status: COMPLETED | OUTPATIENT
Start: 2025-03-12 | End: 2025-03-12

## 2025-03-12 RX ORDER — SODIUM CHLORIDE 0.9 % (FLUSH) 0.9 %
5-40 SYRINGE (ML) INJECTION 2 TIMES DAILY
Status: DISCONTINUED | OUTPATIENT
Start: 2025-03-12 | End: 2025-03-16 | Stop reason: HOSPADM

## 2025-03-12 RX ORDER — INSULIN GLARGINE 100 [IU]/ML
10 INJECTION, SOLUTION SUBCUTANEOUS 2 TIMES DAILY
Status: DISCONTINUED | OUTPATIENT
Start: 2025-03-12 | End: 2025-03-12

## 2025-03-12 RX ORDER — ENOXAPARIN SODIUM 100 MG/ML
1 INJECTION SUBCUTANEOUS 2 TIMES DAILY
Status: DISCONTINUED | OUTPATIENT
Start: 2025-03-12 | End: 2025-03-16 | Stop reason: HOSPADM

## 2025-03-12 RX ORDER — ASPIRIN 81 MG/1
81 TABLET, CHEWABLE ORAL DAILY
Status: DISCONTINUED | OUTPATIENT
Start: 2025-03-12 | End: 2025-03-12

## 2025-03-12 RX ORDER — POTASSIUM CHLORIDE 7.45 MG/ML
10 INJECTION INTRAVENOUS PRN
Status: DISCONTINUED | OUTPATIENT
Start: 2025-03-12 | End: 2025-03-13

## 2025-03-12 RX ORDER — MAGNESIUM SULFATE IN WATER 40 MG/ML
2000 INJECTION, SOLUTION INTRAVENOUS PRN
Status: DISCONTINUED | OUTPATIENT
Start: 2025-03-12 | End: 2025-03-13

## 2025-03-12 RX ORDER — ALBUMIN (HUMAN) 12.5 G/50ML
25 SOLUTION INTRAVENOUS ONCE
Status: COMPLETED | OUTPATIENT
Start: 2025-03-12 | End: 2025-03-13

## 2025-03-12 RX ORDER — CLOPIDOGREL BISULFATE 75 MG/1
75 TABLET ORAL DAILY
Status: DISCONTINUED | OUTPATIENT
Start: 2025-03-12 | End: 2025-03-16 | Stop reason: HOSPADM

## 2025-03-12 RX ADMIN — FUROSEMIDE 20 MG: 10 INJECTION, SOLUTION INTRAMUSCULAR; INTRAVENOUS at 08:51

## 2025-03-12 RX ADMIN — IPRATROPIUM BROMIDE AND ALBUTEROL SULFATE 1 DOSE: .5; 3 SOLUTION RESPIRATORY (INHALATION) at 09:44

## 2025-03-12 RX ADMIN — POTASSIUM CHLORIDE 10 MEQ: 7.46 INJECTION, SOLUTION INTRAVENOUS at 21:46

## 2025-03-12 RX ADMIN — CITALOPRAM HYDROBROMIDE 40 MG: 20 TABLET ORAL at 08:52

## 2025-03-12 RX ADMIN — SODIUM CHLORIDE: 0.9 INJECTION, SOLUTION INTRAVENOUS at 18:37

## 2025-03-12 RX ADMIN — IPRATROPIUM BROMIDE AND ALBUTEROL SULFATE 1 DOSE: .5; 3 SOLUTION RESPIRATORY (INHALATION) at 18:14

## 2025-03-12 RX ADMIN — FUROSEMIDE 20 MG: 10 INJECTION, SOLUTION INTRAMUSCULAR; INTRAVENOUS at 19:50

## 2025-03-12 RX ADMIN — METOPROLOL SUCCINATE 25 MG: 25 TABLET, EXTENDED RELEASE ORAL at 08:52

## 2025-03-12 RX ADMIN — ROPINIROLE HYDROCHLORIDE 3 MG: 2 TABLET, FILM COATED ORAL at 20:43

## 2025-03-12 RX ADMIN — WATER 20 MG: 1 INJECTION INTRAMUSCULAR; INTRAVENOUS; SUBCUTANEOUS at 07:58

## 2025-03-12 RX ADMIN — INSULIN HUMAN 10 UNITS: 100 INJECTION, SOLUTION PARENTERAL at 13:22

## 2025-03-12 RX ADMIN — FENTANYL CITRATE 50 MCG: 50 INJECTION, SOLUTION INTRAMUSCULAR; INTRAVENOUS at 08:48

## 2025-03-12 RX ADMIN — GABAPENTIN 400 MG: 400 CAPSULE ORAL at 13:39

## 2025-03-12 RX ADMIN — BUDESONIDE 500 MCG: 0.5 INHALANT RESPIRATORY (INHALATION) at 18:15

## 2025-03-12 RX ADMIN — MAGNESIUM SULFATE IN WATER FOR 4000 MG: 40 INJECTION INTRAVENOUS at 07:50

## 2025-03-12 RX ADMIN — GABAPENTIN 400 MG: 400 CAPSULE ORAL at 20:43

## 2025-03-12 RX ADMIN — SODIUM CHLORIDE, PRESERVATIVE FREE 10 ML: 5 INJECTION INTRAVENOUS at 18:14

## 2025-03-12 RX ADMIN — LEVOTHYROXINE SODIUM 200 MCG: 0.1 TABLET ORAL at 06:28

## 2025-03-12 RX ADMIN — POTASSIUM CHLORIDE 10 MEQ: 7.46 INJECTION, SOLUTION INTRAVENOUS at 20:52

## 2025-03-12 RX ADMIN — DEXTROSE AND SODIUM CHLORIDE: 5; .45 INJECTION, SOLUTION INTRAVENOUS at 20:55

## 2025-03-12 RX ADMIN — SODIUM CHLORIDE: 0.9 INJECTION, SOLUTION INTRAVENOUS at 14:41

## 2025-03-12 RX ADMIN — IPRATROPIUM BROMIDE AND ALBUTEROL SULFATE 1 DOSE: .5; 3 SOLUTION RESPIRATORY (INHALATION) at 14:35

## 2025-03-12 RX ADMIN — GABAPENTIN 400 MG: 400 CAPSULE ORAL at 08:53

## 2025-03-12 RX ADMIN — WATER 1000 MG: 1 INJECTION INTRAMUSCULAR; INTRAVENOUS; SUBCUTANEOUS at 10:19

## 2025-03-12 RX ADMIN — INSULIN GLARGINE 10 UNITS: 100 INJECTION, SOLUTION SUBCUTANEOUS at 10:22

## 2025-03-12 RX ADMIN — ASPIRIN 81 MG: 81 TABLET, CHEWABLE ORAL at 08:53

## 2025-03-12 RX ADMIN — IPRATROPIUM BROMIDE AND ALBUTEROL SULFATE 1 DOSE: .5; 3 SOLUTION RESPIRATORY (INHALATION) at 06:14

## 2025-03-12 RX ADMIN — INSULIN LISPRO 4 UNITS: 100 INJECTION, SOLUTION INTRAVENOUS; SUBCUTANEOUS at 07:54

## 2025-03-12 RX ADMIN — SODIUM CHLORIDE, PRESERVATIVE FREE 10 ML: 5 INJECTION INTRAVENOUS at 19:29

## 2025-03-12 RX ADMIN — FENTANYL CITRATE 50 MCG: 50 INJECTION, SOLUTION INTRAMUSCULAR; INTRAVENOUS at 12:22

## 2025-03-12 RX ADMIN — ROPINIROLE HYDROCHLORIDE 1 MG: 1 TABLET, FILM COATED ORAL at 08:53

## 2025-03-12 RX ADMIN — SODIUM CHLORIDE, PRESERVATIVE FREE 10 ML: 5 INJECTION INTRAVENOUS at 19:50

## 2025-03-12 RX ADMIN — ENOXAPARIN SODIUM 70 MG: 100 INJECTION SUBCUTANEOUS at 20:42

## 2025-03-12 RX ADMIN — SODIUM CHLORIDE, PRESERVATIVE FREE 10 ML: 5 INJECTION INTRAVENOUS at 19:28

## 2025-03-12 RX ADMIN — WATER 20 MG: 1 INJECTION INTRAMUSCULAR; INTRAVENOUS; SUBCUTANEOUS at 20:42

## 2025-03-12 RX ADMIN — SODIUM CHLORIDE, PRESERVATIVE FREE 10 ML: 5 INJECTION INTRAVENOUS at 08:50

## 2025-03-12 RX ADMIN — POTASSIUM BICARBONATE 50 MEQ: 978 TABLET, EFFERVESCENT ORAL at 07:55

## 2025-03-12 RX ADMIN — ATORVASTATIN CALCIUM 80 MG: 40 TABLET, FILM COATED ORAL at 08:51

## 2025-03-12 RX ADMIN — POTASSIUM CHLORIDE 10 MEQ: 7.46 INJECTION, SOLUTION INTRAVENOUS at 16:49

## 2025-03-12 RX ADMIN — CLOPIDOGREL BISULFATE 75 MG: 75 TABLET ORAL at 12:22

## 2025-03-12 RX ADMIN — INSULIN HUMAN 10 UNITS: 100 INJECTION, SOLUTION PARENTERAL at 12:15

## 2025-03-12 RX ADMIN — POTASSIUM CHLORIDE 40 MEQ: 1500 TABLET, EXTENDED RELEASE ORAL at 08:50

## 2025-03-12 RX ADMIN — ROPINIROLE HYDROCHLORIDE 1 MG: 1 TABLET, FILM COATED ORAL at 12:43

## 2025-03-12 RX ADMIN — POTASSIUM CHLORIDE 10 MEQ: 7.46 INJECTION, SOLUTION INTRAVENOUS at 15:52

## 2025-03-12 RX ADMIN — ENOXAPARIN SODIUM 40 MG: 100 INJECTION SUBCUTANEOUS at 08:53

## 2025-03-12 RX ADMIN — FENTANYL CITRATE 50 MCG: 50 INJECTION, SOLUTION INTRAMUSCULAR; INTRAVENOUS at 04:35

## 2025-03-12 RX ADMIN — ROPINIROLE HYDROCHLORIDE 1 MG: 1 TABLET, FILM COATED ORAL at 18:12

## 2025-03-12 RX ADMIN — BUDESONIDE 500 MCG: 0.5 INHALANT RESPIRATORY (INHALATION) at 06:14

## 2025-03-12 RX ADMIN — SODIUM CHLORIDE 8.5 UNITS/HR: 9 INJECTION, SOLUTION INTRAVENOUS at 14:45

## 2025-03-12 RX ADMIN — ALBUMIN (HUMAN) 25 G: 0.25 INJECTION, SOLUTION INTRAVENOUS at 22:29

## 2025-03-12 RX ADMIN — FENTANYL CITRATE 50 MCG: 50 INJECTION, SOLUTION INTRAMUSCULAR; INTRAVENOUS at 18:13

## 2025-03-12 ASSESSMENT — PAIN DESCRIPTION - ONSET: ONSET: ON-GOING

## 2025-03-12 ASSESSMENT — PAIN DESCRIPTION - ORIENTATION
ORIENTATION: LEFT
ORIENTATION: RIGHT;LEFT
ORIENTATION: LEFT

## 2025-03-12 ASSESSMENT — PAIN DESCRIPTION - DESCRIPTORS
DESCRIPTORS: ACHING;DISCOMFORT;NAGGING
DESCRIPTORS: ACHING;SPASM;STABBING;THROBBING
DESCRIPTORS: ACHING;DISCOMFORT;NAGGING

## 2025-03-12 ASSESSMENT — PAIN DESCRIPTION - LOCATION
LOCATION: CHEST
LOCATION: BACK
LOCATION: FLANK
LOCATION: FLANK

## 2025-03-12 ASSESSMENT — PAIN - FUNCTIONAL ASSESSMENT
PAIN_FUNCTIONAL_ASSESSMENT: PREVENTS OR INTERFERES WITH MANY ACTIVE NOT PASSIVE ACTIVITIES
PAIN_FUNCTIONAL_ASSESSMENT: PREVENTS OR INTERFERES WITH MANY ACTIVE NOT PASSIVE ACTIVITIES
PAIN_FUNCTIONAL_ASSESSMENT: ACTIVITIES ARE NOT PREVENTED

## 2025-03-12 ASSESSMENT — PAIN DESCRIPTION - FREQUENCY: FREQUENCY: CONTINUOUS

## 2025-03-12 ASSESSMENT — PAIN SCALES - GENERAL
PAINLEVEL_OUTOF10: 10
PAINLEVEL_OUTOF10: 8

## 2025-03-12 ASSESSMENT — PAIN DESCRIPTION - PAIN TYPE: TYPE: ACUTE PAIN
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
## 2025-03-12 NOTE — H&P
03/12/25  0432   WBC 6.7 7.6   RBC 4.23 4.14   HGB 13.2 12.9   HCT 37.6 37.5   MCV 88.9 90.6   MCH 31.2 31.2   MCHC 35.1* 34.4   RDW 14.1 14.1    249   MPV 10.5 10.6     Recent Labs     03/11/25  1151 03/12/25  0432    138   K 3.8 3.5   CL 96* 107   CO2 27 20*   BUN 8 9   CREATININE 0.6 0.5   GLUCOSE 481* 399*   CALCIUM 9.1 7.0*     Recent Labs     03/11/25  2210   POCGLU 469*     Results for orders placed or performed during the hospital encounter of 03/11/25   CBC with Auto Differential   Result Value Ref Range    WBC 6.7 4.5 - 11.5 k/uL    RBC 4.23 3.50 - 5.50 m/uL    Hemoglobin 13.2 11.5 - 15.5 g/dL    Hematocrit 37.6 34.0 - 48.0 %    MCV 88.9 80.0 - 99.9 fL    MCH 31.2 26.0 - 35.0 pg    MCHC 35.1 (H) 32.0 - 34.5 g/dL    RDW 14.1 11.5 - 15.0 %    Platelets 270 130 - 450 k/uL    MPV 10.5 7.0 - 12.0 fL    Neutrophils % 68 43.0 - 80.0 %    Lymphocytes % 23 20.0 - 42.0 %    Monocytes % 6 2.0 - 12.0 %    Eosinophils % 1 0 - 6 %    Basophils % 1 0.0 - 2.0 %    Immature Granulocytes % 1 0.0 - 5.0 %    Neutrophils Absolute 4.55 1.80 - 7.30 k/uL    Lymphocytes Absolute 1.51 1.50 - 4.00 k/uL    Monocytes Absolute 0.43 0.10 - 0.95 k/uL    Eosinophils Absolute 0.08 0.05 - 0.50 k/uL    Basophils Absolute 0.05 0.00 - 0.20 k/uL    Immature Granulocytes Absolute 0.05 0.00 - 0.58 k/uL   Basic Metabolic Panel   Result Value Ref Range    Sodium 134 132 - 146 mmol/L    Potassium 3.8 3.5 - 5.0 mmol/L    Chloride 96 (L) 98 - 107 mmol/L    CO2 27 22 - 29 mmol/L    Anion Gap 11 7 - 16 mmol/L    Glucose 481 (H) 74 - 99 mg/dL    BUN 8 6 - 23 mg/dL    Creatinine 0.6 0.50 - 1.00 mg/dL    Est, Glom Filt Rate >90 >60 mL/min/1.73m2    Calcium 9.1 8.6 - 10.2 mg/dL   Protime-INR   Result Value Ref Range    Protime 11.5 9.3 - 12.4 sec    INR 1.1    APTT   Result Value Ref Range    APTT 29.5 24.5 - 35.1 sec   Magnesium   Result Value Ref Range    Magnesium 1.6 1.6 - 2.6 mg/dL   Hepatic Function Panel   Result Value Ref Range

## 2025-03-13 ENCOUNTER — APPOINTMENT (OUTPATIENT)
Dept: GENERAL RADIOLOGY | Age: 60
DRG: 280 | End: 2025-03-13
Payer: COMMERCIAL

## 2025-03-13 LAB
ALBUMIN SERPL-MCNC: 3.8 G/DL (ref 3.5–5.2)
ALP SERPL-CCNC: 156 U/L (ref 35–104)
ALT SERPL-CCNC: 60 U/L (ref 0–32)
ANION GAP SERPL CALCULATED.3IONS-SCNC: 10 MMOL/L (ref 7–16)
ANION GAP SERPL CALCULATED.3IONS-SCNC: 9 MMOL/L (ref 7–16)
AST SERPL-CCNC: 96 U/L (ref 0–31)
B-OH-BUTYR SERPL-MCNC: 0.1 MMOL/L (ref 0.02–0.27)
BASOPHILS # BLD: 0.02 K/UL (ref 0–0.2)
BASOPHILS NFR BLD: 0 % (ref 0–2)
BILIRUB SERPL-MCNC: 0.5 MG/DL (ref 0–1.2)
BUN SERPL-MCNC: 19 MG/DL (ref 6–23)
BUN SERPL-MCNC: 20 MG/DL (ref 6–23)
CALCIUM SERPL-MCNC: 8.5 MG/DL (ref 8.6–10.2)
CALCIUM SERPL-MCNC: 8.7 MG/DL (ref 8.6–10.2)
CHLORIDE SERPL-SCNC: 99 MMOL/L (ref 98–107)
CHLORIDE SERPL-SCNC: 99 MMOL/L (ref 98–107)
CO2 SERPL-SCNC: 22 MMOL/L (ref 22–29)
CO2 SERPL-SCNC: 24 MMOL/L (ref 22–29)
CREAT SERPL-MCNC: 0.8 MG/DL (ref 0.5–1)
CREAT SERPL-MCNC: 0.8 MG/DL (ref 0.5–1)
EOSINOPHIL # BLD: 0.02 K/UL (ref 0.05–0.5)
EOSINOPHILS RELATIVE PERCENT: 0 % (ref 0–6)
ERYTHROCYTE [DISTWIDTH] IN BLOOD BY AUTOMATED COUNT: 14.5 % (ref 11.5–15)
GFR, ESTIMATED: 81 ML/MIN/1.73M2
GFR, ESTIMATED: 89 ML/MIN/1.73M2
GLUCOSE BLD-MCNC: 114 MG/DL (ref 74–99)
GLUCOSE BLD-MCNC: 121 MG/DL (ref 74–99)
GLUCOSE BLD-MCNC: 136 MG/DL (ref 74–99)
GLUCOSE BLD-MCNC: 140 MG/DL (ref 74–99)
GLUCOSE BLD-MCNC: 159 MG/DL (ref 74–99)
GLUCOSE BLD-MCNC: 170 MG/DL (ref 74–99)
GLUCOSE BLD-MCNC: 172 MG/DL (ref 74–99)
GLUCOSE BLD-MCNC: 183 MG/DL (ref 74–99)
GLUCOSE BLD-MCNC: 208 MG/DL (ref 74–99)
GLUCOSE BLD-MCNC: 224 MG/DL (ref 74–99)
GLUCOSE BLD-MCNC: 236 MG/DL (ref 74–99)
GLUCOSE BLD-MCNC: 253 MG/DL (ref 74–99)
GLUCOSE BLD-MCNC: 283 MG/DL (ref 74–99)
GLUCOSE BLD-MCNC: 411 MG/DL (ref 74–99)
GLUCOSE SERPL-MCNC: 185 MG/DL (ref 74–99)
GLUCOSE SERPL-MCNC: 217 MG/DL (ref 74–99)
HBA1C MFR BLD: 12.3 % (ref 4–5.6)
HCT VFR BLD AUTO: 37.7 % (ref 34–48)
HGB BLD-MCNC: 12.7 G/DL (ref 11.5–15.5)
IMM GRANULOCYTES # BLD AUTO: 0.05 K/UL (ref 0–0.58)
IMM GRANULOCYTES NFR BLD: 0 % (ref 0–5)
LYMPHOCYTES NFR BLD: 1.12 K/UL (ref 1.5–4)
LYMPHOCYTES RELATIVE PERCENT: 10 % (ref 20–42)
MAGNESIUM SERPL-MCNC: 2.2 MG/DL (ref 1.6–2.6)
MAGNESIUM SERPL-MCNC: 2.2 MG/DL (ref 1.6–2.6)
MCH RBC QN AUTO: 31.4 PG (ref 26–35)
MCHC RBC AUTO-ENTMCNC: 33.7 G/DL (ref 32–34.5)
MCV RBC AUTO: 93.3 FL (ref 80–99.9)
MICROORGANISM SPEC CULT: NORMAL
MONOCYTES NFR BLD: 0.87 K/UL (ref 0.1–0.95)
MONOCYTES NFR BLD: 8 % (ref 2–12)
NEUTROPHILS NFR BLD: 81 % (ref 43–80)
NEUTS SEG NFR BLD: 9.06 K/UL (ref 1.8–7.3)
PHOSPHATE SERPL-MCNC: 3.9 MG/DL (ref 2.5–4.5)
PHOSPHATE SERPL-MCNC: 4 MG/DL (ref 2.5–4.5)
PLATELET # BLD AUTO: 308 K/UL (ref 130–450)
PMV BLD AUTO: 10.5 FL (ref 7–12)
POTASSIUM SERPL-SCNC: 5.6 MMOL/L (ref 3.5–5)
POTASSIUM SERPL-SCNC: 5.8 MMOL/L (ref 3.5–5)
PROT SERPL-MCNC: 6.6 G/DL (ref 6.4–8.3)
RBC # BLD AUTO: 4.04 M/UL (ref 3.5–5.5)
SODIUM SERPL-SCNC: 131 MMOL/L (ref 132–146)
SODIUM SERPL-SCNC: 132 MMOL/L (ref 132–146)
SPECIMEN DESCRIPTION: NORMAL
WBC OTHER # BLD: 11.1 K/UL (ref 4.5–11.5)

## 2025-03-13 PROCEDURE — 6360000002 HC RX W HCPCS: Performed by: INTERNAL MEDICINE

## 2025-03-13 PROCEDURE — 80048 BASIC METABOLIC PNL TOTAL CA: CPT

## 2025-03-13 PROCEDURE — 2500000003 HC RX 250 WO HCPCS: Performed by: INTERNAL MEDICINE

## 2025-03-13 PROCEDURE — 85025 COMPLETE CBC W/AUTO DIFF WBC: CPT

## 2025-03-13 PROCEDURE — 6370000000 HC RX 637 (ALT 250 FOR IP): Performed by: NURSE PRACTITIONER

## 2025-03-13 PROCEDURE — 6370000000 HC RX 637 (ALT 250 FOR IP): Performed by: INTERNAL MEDICINE

## 2025-03-13 PROCEDURE — 2700000000 HC OXYGEN THERAPY PER DAY

## 2025-03-13 PROCEDURE — 2500000003 HC RX 250 WO HCPCS: Performed by: FAMILY MEDICINE

## 2025-03-13 PROCEDURE — 99231 SBSQ HOSP IP/OBS SF/LOW 25: CPT | Performed by: FAMILY MEDICINE

## 2025-03-13 PROCEDURE — 6360000002 HC RX W HCPCS: Performed by: NURSE PRACTITIONER

## 2025-03-13 PROCEDURE — 94660 CPAP INITIATION&MGMT: CPT

## 2025-03-13 PROCEDURE — 80053 COMPREHEN METABOLIC PANEL: CPT

## 2025-03-13 PROCEDURE — 2500000003 HC RX 250 WO HCPCS

## 2025-03-13 PROCEDURE — 6370000000 HC RX 637 (ALT 250 FOR IP)

## 2025-03-13 PROCEDURE — 83735 ASSAY OF MAGNESIUM: CPT

## 2025-03-13 PROCEDURE — 83036 HEMOGLOBIN GLYCOSYLATED A1C: CPT

## 2025-03-13 PROCEDURE — 99233 SBSQ HOSP IP/OBS HIGH 50: CPT | Performed by: INTERNAL MEDICINE

## 2025-03-13 PROCEDURE — 99291 CRITICAL CARE FIRST HOUR: CPT | Performed by: INTERNAL MEDICINE

## 2025-03-13 PROCEDURE — 94640 AIRWAY INHALATION TREATMENT: CPT

## 2025-03-13 PROCEDURE — 84100 ASSAY OF PHOSPHORUS: CPT

## 2025-03-13 PROCEDURE — 82962 GLUCOSE BLOOD TEST: CPT

## 2025-03-13 PROCEDURE — 6360000002 HC RX W HCPCS

## 2025-03-13 PROCEDURE — 2580000003 HC RX 258: Performed by: INTERNAL MEDICINE

## 2025-03-13 PROCEDURE — 71045 X-RAY EXAM CHEST 1 VIEW: CPT

## 2025-03-13 PROCEDURE — 2060000000 HC ICU INTERMEDIATE R&B

## 2025-03-13 PROCEDURE — 82010 KETONE BODYS QUAN: CPT

## 2025-03-13 RX ORDER — INSULIN GLARGINE 100 [IU]/ML
8 INJECTION, SOLUTION SUBCUTANEOUS 2 TIMES DAILY
Status: DISCONTINUED | OUTPATIENT
Start: 2025-03-13 | End: 2025-03-14

## 2025-03-13 RX ORDER — ACETYLCYSTEINE 100 MG/ML
4 SOLUTION ORAL; RESPIRATORY (INHALATION) EVERY 4 HOURS
Status: DISCONTINUED | OUTPATIENT
Start: 2025-03-13 | End: 2025-03-15

## 2025-03-13 RX ORDER — NOREPINEPHRINE BITARTRATE 0.06 MG/ML
1-100 INJECTION, SOLUTION INTRAVENOUS CONTINUOUS
Status: DISCONTINUED | OUTPATIENT
Start: 2025-03-13 | End: 2025-03-13

## 2025-03-13 RX ORDER — FUROSEMIDE 10 MG/ML
40 INJECTION INTRAMUSCULAR; INTRAVENOUS ONCE
Status: COMPLETED | OUTPATIENT
Start: 2025-03-13 | End: 2025-03-13

## 2025-03-13 RX ORDER — DEXTROSE MONOHYDRATE 25 G/50ML
12.5 INJECTION, SOLUTION INTRAVENOUS ONCE
Status: COMPLETED | OUTPATIENT
Start: 2025-03-13 | End: 2025-03-13

## 2025-03-13 RX ORDER — INSULIN LISPRO 100 [IU]/ML
0-8 INJECTION, SOLUTION INTRAVENOUS; SUBCUTANEOUS
Status: DISCONTINUED | OUTPATIENT
Start: 2025-03-13 | End: 2025-03-16 | Stop reason: HOSPADM

## 2025-03-13 RX ORDER — METRONIDAZOLE 500 MG/100ML
500 INJECTION, SOLUTION INTRAVENOUS EVERY 8 HOURS
Status: DISCONTINUED | OUTPATIENT
Start: 2025-03-13 | End: 2025-03-14

## 2025-03-13 RX ADMIN — SODIUM CHLORIDE 2.3 UNITS/HR: 9 INJECTION, SOLUTION INTRAVENOUS at 09:11

## 2025-03-13 RX ADMIN — DEXTROSE AND SODIUM CHLORIDE: 5; .45 INJECTION, SOLUTION INTRAVENOUS at 03:52

## 2025-03-13 RX ADMIN — GABAPENTIN 400 MG: 400 CAPSULE ORAL at 21:20

## 2025-03-13 RX ADMIN — BUDESONIDE 500 MCG: 0.5 INHALANT RESPIRATORY (INHALATION) at 17:02

## 2025-03-13 RX ADMIN — CITALOPRAM HYDROBROMIDE 40 MG: 20 TABLET ORAL at 09:14

## 2025-03-13 RX ADMIN — FENTANYL CITRATE 50 MCG: 50 INJECTION, SOLUTION INTRAMUSCULAR; INTRAVENOUS at 11:05

## 2025-03-13 RX ADMIN — FUROSEMIDE 40 MG: 10 INJECTION, SOLUTION INTRAMUSCULAR; INTRAVENOUS at 14:50

## 2025-03-13 RX ADMIN — ROPINIROLE HYDROCHLORIDE 1 MG: 1 TABLET, FILM COATED ORAL at 12:00

## 2025-03-13 RX ADMIN — INSULIN GLARGINE 8 UNITS: 100 INJECTION, SOLUTION SUBCUTANEOUS at 21:22

## 2025-03-13 RX ADMIN — INSULIN HUMAN 5 UNITS: 100 INJECTION, SOLUTION PARENTERAL at 06:46

## 2025-03-13 RX ADMIN — INSULIN LISPRO 4 UNITS: 100 INJECTION, SOLUTION INTRAVENOUS; SUBCUTANEOUS at 21:22

## 2025-03-13 RX ADMIN — FENTANYL CITRATE 50 MCG: 50 INJECTION, SOLUTION INTRAMUSCULAR; INTRAVENOUS at 06:57

## 2025-03-13 RX ADMIN — METRONIDAZOLE 500 MG: 500 INJECTION, SOLUTION INTRAVENOUS at 22:56

## 2025-03-13 RX ADMIN — IPRATROPIUM BROMIDE AND ALBUTEROL SULFATE 1 DOSE: .5; 3 SOLUTION RESPIRATORY (INHALATION) at 13:29

## 2025-03-13 RX ADMIN — FENTANYL CITRATE 50 MCG: 50 INJECTION, SOLUTION INTRAMUSCULAR; INTRAVENOUS at 02:55

## 2025-03-13 RX ADMIN — WATER 20 MG: 1 INJECTION INTRAMUSCULAR; INTRAVENOUS; SUBCUTANEOUS at 21:19

## 2025-03-13 RX ADMIN — ONDANSETRON 4 MG: 2 INJECTION, SOLUTION INTRAMUSCULAR; INTRAVENOUS at 13:46

## 2025-03-13 RX ADMIN — FENTANYL CITRATE 50 MCG: 50 INJECTION, SOLUTION INTRAMUSCULAR; INTRAVENOUS at 21:35

## 2025-03-13 RX ADMIN — NOREPINEPHRINE BITARTRATE 5 MCG/MIN: 64 SOLUTION INTRAVENOUS at 01:36

## 2025-03-13 RX ADMIN — IPRATROPIUM BROMIDE AND ALBUTEROL SULFATE 1 DOSE: .5; 3 SOLUTION RESPIRATORY (INHALATION) at 06:24

## 2025-03-13 RX ADMIN — WATER 1000 MG: 1 INJECTION INTRAMUSCULAR; INTRAVENOUS; SUBCUTANEOUS at 09:12

## 2025-03-13 RX ADMIN — INSULIN LISPRO 2 UNITS: 100 INJECTION, SOLUTION INTRAVENOUS; SUBCUTANEOUS at 16:00

## 2025-03-13 RX ADMIN — INSULIN GLARGINE 8 UNITS: 100 INJECTION, SOLUTION SUBCUTANEOUS at 10:07

## 2025-03-13 RX ADMIN — WATER 20 MG: 1 INJECTION INTRAMUSCULAR; INTRAVENOUS; SUBCUTANEOUS at 09:25

## 2025-03-13 RX ADMIN — KETOROLAC TROMETHAMINE 15 MG: 30 INJECTION, SOLUTION INTRAMUSCULAR; INTRAVENOUS at 18:07

## 2025-03-13 RX ADMIN — DEXTROSE MONOHYDRATE 12.5 G: 25 INJECTION, SOLUTION INTRAVENOUS at 06:51

## 2025-03-13 RX ADMIN — FUROSEMIDE 20 MG: 10 INJECTION, SOLUTION INTRAMUSCULAR; INTRAVENOUS at 17:56

## 2025-03-13 RX ADMIN — IPRATROPIUM BROMIDE AND ALBUTEROL SULFATE 1 DOSE: .5; 3 SOLUTION RESPIRATORY (INHALATION) at 09:33

## 2025-03-13 RX ADMIN — ROPINIROLE HYDROCHLORIDE 1 MG: 1 TABLET, FILM COATED ORAL at 15:58

## 2025-03-13 RX ADMIN — IPRATROPIUM BROMIDE AND ALBUTEROL SULFATE 1 DOSE: .5; 3 SOLUTION RESPIRATORY (INHALATION) at 22:39

## 2025-03-13 RX ADMIN — ENOXAPARIN SODIUM 70 MG: 100 INJECTION SUBCUTANEOUS at 21:18

## 2025-03-13 RX ADMIN — ATORVASTATIN CALCIUM 80 MG: 40 TABLET, FILM COATED ORAL at 09:13

## 2025-03-13 RX ADMIN — LEVOTHYROXINE SODIUM 200 MCG: 0.1 TABLET ORAL at 05:44

## 2025-03-13 RX ADMIN — GABAPENTIN 400 MG: 400 CAPSULE ORAL at 13:48

## 2025-03-13 RX ADMIN — ROPINIROLE HYDROCHLORIDE 1 MG: 1 TABLET, FILM COATED ORAL at 09:13

## 2025-03-13 RX ADMIN — ROPINIROLE HYDROCHLORIDE 3 MG: 2 TABLET, FILM COATED ORAL at 21:19

## 2025-03-13 RX ADMIN — GABAPENTIN 400 MG: 400 CAPSULE ORAL at 09:13

## 2025-03-13 RX ADMIN — IPRATROPIUM BROMIDE AND ALBUTEROL SULFATE 1 DOSE: .5; 3 SOLUTION RESPIRATORY (INHALATION) at 17:02

## 2025-03-13 RX ADMIN — ACETYLCYSTEINE 400 MG: 100 INHALANT RESPIRATORY (INHALATION) at 17:02

## 2025-03-13 RX ADMIN — ENOXAPARIN SODIUM 70 MG: 100 INJECTION SUBCUTANEOUS at 09:12

## 2025-03-13 RX ADMIN — METRONIDAZOLE 500 MG: 500 INJECTION, SOLUTION INTRAVENOUS at 15:09

## 2025-03-13 RX ADMIN — CLOPIDOGREL BISULFATE 75 MG: 75 TABLET ORAL at 09:14

## 2025-03-13 RX ADMIN — SODIUM CHLORIDE, PRESERVATIVE FREE 10 ML: 5 INJECTION INTRAVENOUS at 16:14

## 2025-03-13 RX ADMIN — SODIUM CHLORIDE, PRESERVATIVE FREE 10 ML: 5 INJECTION INTRAVENOUS at 09:15

## 2025-03-13 RX ADMIN — BUDESONIDE 500 MCG: 0.5 INHALANT RESPIRATORY (INHALATION) at 06:24

## 2025-03-13 RX ADMIN — FUROSEMIDE 20 MG: 10 INJECTION, SOLUTION INTRAMUSCULAR; INTRAVENOUS at 09:13

## 2025-03-13 RX ADMIN — ACETYLCYSTEINE 400 MG: 100 INHALANT RESPIRATORY (INHALATION) at 22:39

## 2025-03-13 ASSESSMENT — PAIN DESCRIPTION - DESCRIPTORS
DESCRIPTORS: CRAMPING;DISCOMFORT;GNAWING
DESCRIPTORS: ACHING;SORE;DISCOMFORT;THROBBING
DESCRIPTORS: DISCOMFORT;NAGGING;TENDER
DESCRIPTORS: ACHING;DISCOMFORT;NAGGING
DESCRIPTORS: ACHING;DISCOMFORT;SORE

## 2025-03-13 ASSESSMENT — PAIN DESCRIPTION - ORIENTATION
ORIENTATION: LEFT

## 2025-03-13 ASSESSMENT — PAIN SCALES - GENERAL
PAINLEVEL_OUTOF10: 2
PAINLEVEL_OUTOF10: 10
PAINLEVEL_OUTOF10: 0
PAINLEVEL_OUTOF10: 0
PAINLEVEL_OUTOF10: 10
PAINLEVEL_OUTOF10: 5
PAINLEVEL_OUTOF10: 10
PAINLEVEL_OUTOF10: 6

## 2025-03-13 ASSESSMENT — PAIN DESCRIPTION - LOCATION
LOCATION: FLANK
LOCATION: RIB CAGE
LOCATION: BACK

## 2025-03-13 ASSESSMENT — PAIN - FUNCTIONAL ASSESSMENT: PAIN_FUNCTIONAL_ASSESSMENT: PREVENTS OR INTERFERES WITH MANY ACTIVE NOT PASSIVE ACTIVITIES

## 2025-03-14 ENCOUNTER — APPOINTMENT (OUTPATIENT)
Dept: GENERAL RADIOLOGY | Age: 60
DRG: 280 | End: 2025-03-14
Payer: COMMERCIAL

## 2025-03-14 LAB
ALBUMIN SERPL-MCNC: 3.9 G/DL (ref 3.5–5.2)
ALP SERPL-CCNC: 139 U/L (ref 35–104)
ALT SERPL-CCNC: 45 U/L (ref 0–32)
ANION GAP SERPL CALCULATED.3IONS-SCNC: 9 MMOL/L (ref 7–16)
AST SERPL-CCNC: 26 U/L (ref 0–31)
BASOPHILS # BLD: 0 K/UL (ref 0–0.2)
BASOPHILS NFR BLD: 0 % (ref 0–2)
BILIRUB SERPL-MCNC: 0.5 MG/DL (ref 0–1.2)
BUN SERPL-MCNC: 22 MG/DL (ref 6–23)
CALCIUM SERPL-MCNC: 8.9 MG/DL (ref 8.6–10.2)
CHLORIDE SERPL-SCNC: 96 MMOL/L (ref 98–107)
CO2 SERPL-SCNC: 27 MMOL/L (ref 22–29)
CREAT SERPL-MCNC: 0.7 MG/DL (ref 0.5–1)
EOSINOPHIL # BLD: 0.08 K/UL (ref 0.05–0.5)
EOSINOPHILS RELATIVE PERCENT: 1 % (ref 0–6)
ERYTHROCYTE [DISTWIDTH] IN BLOOD BY AUTOMATED COUNT: 14.5 % (ref 11.5–15)
GFR, ESTIMATED: >90 ML/MIN/1.73M2
GLUCOSE BLD-MCNC: 221 MG/DL (ref 74–99)
GLUCOSE BLD-MCNC: 232 MG/DL (ref 74–99)
GLUCOSE BLD-MCNC: 314 MG/DL (ref 74–99)
GLUCOSE BLD-MCNC: 342 MG/DL (ref 74–99)
GLUCOSE SERPL-MCNC: 265 MG/DL (ref 74–99)
HCT VFR BLD AUTO: 39.1 % (ref 34–48)
HGB BLD-MCNC: 13.1 G/DL (ref 11.5–15.5)
LYMPHOCYTES NFR BLD: 0.57 K/UL (ref 1.5–4)
LYMPHOCYTES RELATIVE PERCENT: 6 % (ref 20–42)
MAGNESIUM SERPL-MCNC: 1.9 MG/DL (ref 1.6–2.6)
MCH RBC QN AUTO: 31.4 PG (ref 26–35)
MCHC RBC AUTO-ENTMCNC: 33.5 G/DL (ref 32–34.5)
MCV RBC AUTO: 93.8 FL (ref 80–99.9)
MICROORGANISM SPEC CULT: ABNORMAL
MICROORGANISM SPEC CULT: ABNORMAL
MONOCYTES NFR BLD: 0.49 K/UL (ref 0.1–0.95)
MONOCYTES NFR BLD: 5 % (ref 2–12)
NEUTROPHILS NFR BLD: 88 % (ref 43–80)
NEUTS SEG NFR BLD: 8.35 K/UL (ref 1.8–7.3)
PHOSPHATE SERPL-MCNC: 4.5 MG/DL (ref 2.5–4.5)
PLATELET # BLD AUTO: 241 K/UL (ref 130–450)
PMV BLD AUTO: 10.8 FL (ref 7–12)
POTASSIUM SERPL-SCNC: 5.6 MMOL/L (ref 3.5–5)
PROT SERPL-MCNC: 7.1 G/DL (ref 6.4–8.3)
RBC # BLD AUTO: 4.17 M/UL (ref 3.5–5.5)
RBC # BLD: NORMAL 10*6/UL
SERVICE CMNT-IMP: ABNORMAL
SODIUM SERPL-SCNC: 132 MMOL/L (ref 132–146)
SPECIMEN DESCRIPTION: ABNORMAL
WBC OTHER # BLD: 9.5 K/UL (ref 4.5–11.5)

## 2025-03-14 PROCEDURE — 6370000000 HC RX 637 (ALT 250 FOR IP)

## 2025-03-14 PROCEDURE — 6370000000 HC RX 637 (ALT 250 FOR IP): Performed by: INTERNAL MEDICINE

## 2025-03-14 PROCEDURE — 6370000000 HC RX 637 (ALT 250 FOR IP): Performed by: NURSE PRACTITIONER

## 2025-03-14 PROCEDURE — 6360000002 HC RX W HCPCS: Performed by: NURSE PRACTITIONER

## 2025-03-14 PROCEDURE — 2700000000 HC OXYGEN THERAPY PER DAY

## 2025-03-14 PROCEDURE — 2500000003 HC RX 250 WO HCPCS: Performed by: INTERNAL MEDICINE

## 2025-03-14 PROCEDURE — 6360000002 HC RX W HCPCS: Performed by: INTERNAL MEDICINE

## 2025-03-14 PROCEDURE — 2500000003 HC RX 250 WO HCPCS: Performed by: FAMILY MEDICINE

## 2025-03-14 PROCEDURE — 36415 COLL VENOUS BLD VENIPUNCTURE: CPT

## 2025-03-14 PROCEDURE — 2500000003 HC RX 250 WO HCPCS

## 2025-03-14 PROCEDURE — 84100 ASSAY OF PHOSPHORUS: CPT

## 2025-03-14 PROCEDURE — 94660 CPAP INITIATION&MGMT: CPT

## 2025-03-14 PROCEDURE — 99233 SBSQ HOSP IP/OBS HIGH 50: CPT | Performed by: INTERNAL MEDICINE

## 2025-03-14 PROCEDURE — P9047 ALBUMIN (HUMAN), 25%, 50ML: HCPCS

## 2025-03-14 PROCEDURE — 99291 CRITICAL CARE FIRST HOUR: CPT | Performed by: INTERNAL MEDICINE

## 2025-03-14 PROCEDURE — 83735 ASSAY OF MAGNESIUM: CPT

## 2025-03-14 PROCEDURE — 82962 GLUCOSE BLOOD TEST: CPT

## 2025-03-14 PROCEDURE — 6360000002 HC RX W HCPCS

## 2025-03-14 PROCEDURE — 99238 HOSP IP/OBS DSCHRG MGMT 30/<: CPT | Performed by: FAMILY MEDICINE

## 2025-03-14 PROCEDURE — 80053 COMPREHEN METABOLIC PANEL: CPT

## 2025-03-14 PROCEDURE — 71045 X-RAY EXAM CHEST 1 VIEW: CPT

## 2025-03-14 PROCEDURE — 94640 AIRWAY INHALATION TREATMENT: CPT

## 2025-03-14 PROCEDURE — 2060000000 HC ICU INTERMEDIATE R&B

## 2025-03-14 PROCEDURE — 85025 COMPLETE CBC W/AUTO DIFF WBC: CPT

## 2025-03-14 RX ORDER — FUROSEMIDE 10 MG/ML
20 INJECTION INTRAMUSCULAR; INTRAVENOUS ONCE
Status: COMPLETED | OUTPATIENT
Start: 2025-03-14 | End: 2025-03-14

## 2025-03-14 RX ORDER — FUROSEMIDE 10 MG/ML
40 INJECTION INTRAMUSCULAR; INTRAVENOUS 2 TIMES DAILY
Status: DISCONTINUED | OUTPATIENT
Start: 2025-03-14 | End: 2025-03-15

## 2025-03-14 RX ORDER — INSULIN GLARGINE 100 [IU]/ML
10 INJECTION, SOLUTION SUBCUTANEOUS 2 TIMES DAILY
Status: DISCONTINUED | OUTPATIENT
Start: 2025-03-14 | End: 2025-03-15

## 2025-03-14 RX ORDER — MAGNESIUM SULFATE 1 G/100ML
1000 INJECTION INTRAVENOUS ONCE
Status: COMPLETED | OUTPATIENT
Start: 2025-03-14 | End: 2025-03-14

## 2025-03-14 RX ORDER — DEXTROSE MONOHYDRATE 25 G/50ML
12.5 INJECTION, SOLUTION INTRAVENOUS ONCE
Status: COMPLETED | OUTPATIENT
Start: 2025-03-14 | End: 2025-03-14

## 2025-03-14 RX ORDER — ALBUMIN (HUMAN) 12.5 G/50ML
25 SOLUTION INTRAVENOUS ONCE
Status: COMPLETED | OUTPATIENT
Start: 2025-03-14 | End: 2025-03-14

## 2025-03-14 RX ADMIN — WATER 20 MG: 1 INJECTION INTRAMUSCULAR; INTRAVENOUS; SUBCUTANEOUS at 20:50

## 2025-03-14 RX ADMIN — GABAPENTIN 400 MG: 400 CAPSULE ORAL at 20:51

## 2025-03-14 RX ADMIN — INSULIN GLARGINE 10 UNITS: 100 INJECTION, SOLUTION SUBCUTANEOUS at 20:50

## 2025-03-14 RX ADMIN — INSULIN HUMAN 5 UNITS: 100 INJECTION, SOLUTION PARENTERAL at 06:38

## 2025-03-14 RX ADMIN — INSULIN LISPRO 2 UNITS: 100 INJECTION, SOLUTION INTRAVENOUS; SUBCUTANEOUS at 20:49

## 2025-03-14 RX ADMIN — KETOROLAC TROMETHAMINE 15 MG: 30 INJECTION, SOLUTION INTRAMUSCULAR; INTRAVENOUS at 14:32

## 2025-03-14 RX ADMIN — ATORVASTATIN CALCIUM 80 MG: 40 TABLET, FILM COATED ORAL at 08:09

## 2025-03-14 RX ADMIN — FENTANYL CITRATE 50 MCG: 50 INJECTION, SOLUTION INTRAMUSCULAR; INTRAVENOUS at 02:19

## 2025-03-14 RX ADMIN — BUDESONIDE 500 MCG: 0.5 INHALANT RESPIRATORY (INHALATION) at 16:03

## 2025-03-14 RX ADMIN — GABAPENTIN 400 MG: 400 CAPSULE ORAL at 14:30

## 2025-03-14 RX ADMIN — WATER 20 MG: 1 INJECTION INTRAMUSCULAR; INTRAVENOUS; SUBCUTANEOUS at 08:23

## 2025-03-14 RX ADMIN — INSULIN LISPRO 2 UNITS: 100 INJECTION, SOLUTION INTRAVENOUS; SUBCUTANEOUS at 16:11

## 2025-03-14 RX ADMIN — ACETYLCYSTEINE 400 MG: 100 INHALANT RESPIRATORY (INHALATION) at 04:23

## 2025-03-14 RX ADMIN — ROPINIROLE HYDROCHLORIDE 3 MG: 2 TABLET, FILM COATED ORAL at 20:54

## 2025-03-14 RX ADMIN — KETOROLAC TROMETHAMINE 15 MG: 30 INJECTION, SOLUTION INTRAMUSCULAR; INTRAVENOUS at 08:29

## 2025-03-14 RX ADMIN — FUROSEMIDE 20 MG: 10 INJECTION, SOLUTION INTRAMUSCULAR; INTRAVENOUS at 10:10

## 2025-03-14 RX ADMIN — INSULIN LISPRO 6 UNITS: 100 INJECTION, SOLUTION INTRAVENOUS; SUBCUTANEOUS at 08:07

## 2025-03-14 RX ADMIN — METOPROLOL SUCCINATE 25 MG: 25 TABLET, EXTENDED RELEASE ORAL at 08:09

## 2025-03-14 RX ADMIN — IPRATROPIUM BROMIDE AND ALBUTEROL SULFATE 1 DOSE: .5; 3 SOLUTION RESPIRATORY (INHALATION) at 09:08

## 2025-03-14 RX ADMIN — METRONIDAZOLE 500 MG: 500 INJECTION, SOLUTION INTRAVENOUS at 06:11

## 2025-03-14 RX ADMIN — BUDESONIDE 500 MCG: 0.5 INHALANT RESPIRATORY (INHALATION) at 04:23

## 2025-03-14 RX ADMIN — FUROSEMIDE 20 MG: 10 INJECTION, SOLUTION INTRAMUSCULAR; INTRAVENOUS at 08:10

## 2025-03-14 RX ADMIN — ROPINIROLE HYDROCHLORIDE 1 MG: 1 TABLET, FILM COATED ORAL at 08:09

## 2025-03-14 RX ADMIN — GABAPENTIN 400 MG: 400 CAPSULE ORAL at 08:09

## 2025-03-14 RX ADMIN — INSULIN LISPRO 6 UNITS: 100 INJECTION, SOLUTION INTRAVENOUS; SUBCUTANEOUS at 10:58

## 2025-03-14 RX ADMIN — CITALOPRAM HYDROBROMIDE 40 MG: 20 TABLET ORAL at 08:09

## 2025-03-14 RX ADMIN — FENTANYL CITRATE 50 MCG: 50 INJECTION, SOLUTION INTRAMUSCULAR; INTRAVENOUS at 22:25

## 2025-03-14 RX ADMIN — ACETYLCYSTEINE 400 MG: 100 INHALANT RESPIRATORY (INHALATION) at 01:25

## 2025-03-14 RX ADMIN — ENOXAPARIN SODIUM 70 MG: 100 INJECTION SUBCUTANEOUS at 08:09

## 2025-03-14 RX ADMIN — ACETAMINOPHEN 650 MG: 325 TABLET ORAL at 06:43

## 2025-03-14 RX ADMIN — INSULIN GLARGINE 8 UNITS: 100 INJECTION, SOLUTION SUBCUTANEOUS at 08:23

## 2025-03-14 RX ADMIN — ACETYLCYSTEINE 400 MG: 100 INHALANT RESPIRATORY (INHALATION) at 09:08

## 2025-03-14 RX ADMIN — DEXTROSE MONOHYDRATE 12.5 G: 25 INJECTION, SOLUTION INTRAVENOUS at 06:38

## 2025-03-14 RX ADMIN — LEVOTHYROXINE SODIUM 200 MCG: 0.1 TABLET ORAL at 06:08

## 2025-03-14 RX ADMIN — MIDODRINE HYDROCHLORIDE 15 MG: 10 TABLET ORAL at 18:41

## 2025-03-14 RX ADMIN — IPRATROPIUM BROMIDE AND ALBUTEROL SULFATE 1 DOSE: .5; 3 SOLUTION RESPIRATORY (INHALATION) at 04:23

## 2025-03-14 RX ADMIN — ONDANSETRON 4 MG: 2 INJECTION, SOLUTION INTRAMUSCULAR; INTRAVENOUS at 19:21

## 2025-03-14 RX ADMIN — ALBUMIN (HUMAN) 25 G: 25 SOLUTION INTRAVENOUS at 22:48

## 2025-03-14 RX ADMIN — WATER 1000 MG: 1 INJECTION INTRAMUSCULAR; INTRAVENOUS; SUBCUTANEOUS at 10:10

## 2025-03-14 RX ADMIN — ROPINIROLE HYDROCHLORIDE 1 MG: 1 TABLET, FILM COATED ORAL at 12:09

## 2025-03-14 RX ADMIN — CLOPIDOGREL BISULFATE 75 MG: 75 TABLET ORAL at 08:09

## 2025-03-14 RX ADMIN — ENOXAPARIN SODIUM 70 MG: 100 INJECTION SUBCUTANEOUS at 20:50

## 2025-03-14 RX ADMIN — IPRATROPIUM BROMIDE AND ALBUTEROL SULFATE 1 DOSE: .5; 3 SOLUTION RESPIRATORY (INHALATION) at 21:42

## 2025-03-14 RX ADMIN — SODIUM CHLORIDE, PRESERVATIVE FREE 10 ML: 5 INJECTION INTRAVENOUS at 08:17

## 2025-03-14 RX ADMIN — IPRATROPIUM BROMIDE AND ALBUTEROL SULFATE 1 DOSE: .5; 3 SOLUTION RESPIRATORY (INHALATION) at 16:03

## 2025-03-14 RX ADMIN — ROPINIROLE HYDROCHLORIDE 1 MG: 1 TABLET, FILM COATED ORAL at 16:11

## 2025-03-14 RX ADMIN — MAGNESIUM SULFATE 1000 MG: 1 INJECTION INTRAVENOUS at 06:36

## 2025-03-14 RX ADMIN — ACETYLCYSTEINE 400 MG: 100 INHALANT RESPIRATORY (INHALATION) at 16:03

## 2025-03-14 ASSESSMENT — PAIN SCALES - GENERAL
PAINLEVEL_OUTOF10: 1
PAINLEVEL_OUTOF10: 9
PAINLEVEL_OUTOF10: 4
PAINLEVEL_OUTOF10: 2
PAINLEVEL_OUTOF10: 9
PAINLEVEL_OUTOF10: 10
PAINLEVEL_OUTOF10: 7
PAINLEVEL_OUTOF10: 9
PAINLEVEL_OUTOF10: 3
PAINLEVEL_OUTOF10: 0

## 2025-03-14 ASSESSMENT — PAIN SCALES - WONG BAKER: WONGBAKER_NUMERICALRESPONSE: NO HURT

## 2025-03-14 ASSESSMENT — PAIN DESCRIPTION - LOCATION
LOCATION: RIB CAGE
LOCATION: RIB CAGE
LOCATION: HIP;RIB CAGE
LOCATION: RIB CAGE
LOCATION: RIB CAGE
LOCATION: BACK;RIB CAGE
LOCATION: HEAD

## 2025-03-14 ASSESSMENT — PAIN DESCRIPTION - DESCRIPTORS
DESCRIPTORS: ACHING;DISCOMFORT;SORE
DESCRIPTORS: DISCOMFORT;ACHING;THROBBING
DESCRIPTORS: ACHING;DULL;DISCOMFORT
DESCRIPTORS: ACHING;DISCOMFORT;TENDER;SORE
DESCRIPTORS: GNAWING;DISCOMFORT;CRAMPING
DESCRIPTORS: DISCOMFORT;CRAMPING;THROBBING
DESCRIPTORS: ACHING;THROBBING;TENDER;SORE

## 2025-03-14 ASSESSMENT — PAIN DESCRIPTION - ORIENTATION
ORIENTATION: LEFT
ORIENTATION: ANTERIOR;POSTERIOR
ORIENTATION: RIGHT
ORIENTATION: LEFT
ORIENTATION: LEFT
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
## 2025-03-15 ENCOUNTER — APPOINTMENT (OUTPATIENT)
Dept: GENERAL RADIOLOGY | Age: 60
DRG: 280 | End: 2025-03-15
Payer: COMMERCIAL

## 2025-03-15 LAB
ALBUMIN SERPL-MCNC: 3.8 G/DL (ref 3.5–5.2)
ALP SERPL-CCNC: 108 U/L (ref 35–104)
ALT SERPL-CCNC: 30 U/L (ref 0–32)
ANION GAP SERPL CALCULATED.3IONS-SCNC: 10 MMOL/L (ref 7–16)
ANION GAP SERPL CALCULATED.3IONS-SCNC: 7 MMOL/L (ref 7–16)
ANION GAP SERPL CALCULATED.3IONS-SCNC: 9 MMOL/L (ref 7–16)
AST SERPL-CCNC: 14 U/L (ref 0–31)
BASOPHILS # BLD: 0 K/UL (ref 0–0.2)
BASOPHILS NFR BLD: 0 % (ref 0–2)
BILIRUB SERPL-MCNC: 0.5 MG/DL (ref 0–1.2)
BUN SERPL-MCNC: 27 MG/DL (ref 6–23)
BUN SERPL-MCNC: 30 MG/DL (ref 6–23)
BUN SERPL-MCNC: 31 MG/DL (ref 6–23)
CALCIUM SERPL-MCNC: 9 MG/DL (ref 8.6–10.2)
CALCIUM SERPL-MCNC: 9 MG/DL (ref 8.6–10.2)
CALCIUM SERPL-MCNC: 9.1 MG/DL (ref 8.6–10.2)
CHLORIDE SERPL-SCNC: 93 MMOL/L (ref 98–107)
CHLORIDE SERPL-SCNC: 94 MMOL/L (ref 98–107)
CHLORIDE SERPL-SCNC: 94 MMOL/L (ref 98–107)
CO2 SERPL-SCNC: 29 MMOL/L (ref 22–29)
CO2 SERPL-SCNC: 32 MMOL/L (ref 22–29)
CO2 SERPL-SCNC: 32 MMOL/L (ref 22–29)
CREAT SERPL-MCNC: 0.6 MG/DL (ref 0.5–1)
CREAT SERPL-MCNC: 0.7 MG/DL (ref 0.5–1)
CREAT SERPL-MCNC: 0.7 MG/DL (ref 0.5–1)
EOSINOPHIL # BLD: 0 K/UL (ref 0.05–0.5)
EOSINOPHILS RELATIVE PERCENT: 0 % (ref 0–6)
ERYTHROCYTE [DISTWIDTH] IN BLOOD BY AUTOMATED COUNT: 14.4 % (ref 11.5–15)
GFR, ESTIMATED: >90 ML/MIN/1.73M2
GLUCOSE BLD-MCNC: 204 MG/DL (ref 74–99)
GLUCOSE BLD-MCNC: 259 MG/DL (ref 74–99)
GLUCOSE BLD-MCNC: 323 MG/DL (ref 74–99)
GLUCOSE BLD-MCNC: 344 MG/DL (ref 74–99)
GLUCOSE SERPL-MCNC: 268 MG/DL (ref 74–99)
GLUCOSE SERPL-MCNC: 279 MG/DL (ref 74–99)
GLUCOSE SERPL-MCNC: 288 MG/DL (ref 74–99)
HCT VFR BLD AUTO: 36.5 % (ref 34–48)
HGB BLD-MCNC: 12.3 G/DL (ref 11.5–15.5)
LYMPHOCYTES NFR BLD: 0.64 K/UL (ref 1.5–4)
LYMPHOCYTES RELATIVE PERCENT: 6 % (ref 20–42)
MAGNESIUM SERPL-MCNC: 2 MG/DL (ref 1.6–2.6)
MCH RBC QN AUTO: 31.5 PG (ref 26–35)
MCHC RBC AUTO-ENTMCNC: 33.7 G/DL (ref 32–34.5)
MCV RBC AUTO: 93.6 FL (ref 80–99.9)
MONOCYTES NFR BLD: 0.18 K/UL (ref 0.1–0.95)
MONOCYTES NFR BLD: 2 % (ref 2–12)
NEUTROPHILS NFR BLD: 92 % (ref 43–80)
NEUTS SEG NFR BLD: 9.68 K/UL (ref 1.8–7.3)
PHOSPHATE SERPL-MCNC: 3.8 MG/DL (ref 2.5–4.5)
PLATELET # BLD AUTO: 217 K/UL (ref 130–450)
PMV BLD AUTO: 10.6 FL (ref 7–12)
POTASSIUM SERPL-SCNC: 4.6 MMOL/L (ref 3.5–5)
POTASSIUM SERPL-SCNC: 5 MMOL/L (ref 3.5–5)
POTASSIUM SERPL-SCNC: 6.2 MMOL/L (ref 3.5–5)
PROT SERPL-MCNC: 6.5 G/DL (ref 6.4–8.3)
RBC # BLD AUTO: 3.9 M/UL (ref 3.5–5.5)
RBC # BLD: ABNORMAL 10*6/UL
SODIUM SERPL-SCNC: 132 MMOL/L (ref 132–146)
SODIUM SERPL-SCNC: 133 MMOL/L (ref 132–146)
SODIUM SERPL-SCNC: 135 MMOL/L (ref 132–146)
WBC OTHER # BLD: 10.5 K/UL (ref 4.5–11.5)

## 2025-03-15 PROCEDURE — 85025 COMPLETE CBC W/AUTO DIFF WBC: CPT

## 2025-03-15 PROCEDURE — 84100 ASSAY OF PHOSPHORUS: CPT

## 2025-03-15 PROCEDURE — 2500000003 HC RX 250 WO HCPCS: Performed by: INTERNAL MEDICINE

## 2025-03-15 PROCEDURE — 99233 SBSQ HOSP IP/OBS HIGH 50: CPT | Performed by: INTERNAL MEDICINE

## 2025-03-15 PROCEDURE — 82962 GLUCOSE BLOOD TEST: CPT

## 2025-03-15 PROCEDURE — 2060000000 HC ICU INTERMEDIATE R&B

## 2025-03-15 PROCEDURE — 6370000000 HC RX 637 (ALT 250 FOR IP)

## 2025-03-15 PROCEDURE — 83735 ASSAY OF MAGNESIUM: CPT

## 2025-03-15 PROCEDURE — 80048 BASIC METABOLIC PNL TOTAL CA: CPT

## 2025-03-15 PROCEDURE — 99233 SBSQ HOSP IP/OBS HIGH 50: CPT | Performed by: STUDENT IN AN ORGANIZED HEALTH CARE EDUCATION/TRAINING PROGRAM

## 2025-03-15 PROCEDURE — 6360000002 HC RX W HCPCS: Performed by: NURSE PRACTITIONER

## 2025-03-15 PROCEDURE — 94660 CPAP INITIATION&MGMT: CPT

## 2025-03-15 PROCEDURE — 6360000002 HC RX W HCPCS

## 2025-03-15 PROCEDURE — 2500000003 HC RX 250 WO HCPCS: Performed by: FAMILY MEDICINE

## 2025-03-15 PROCEDURE — 6360000002 HC RX W HCPCS: Performed by: INTERNAL MEDICINE

## 2025-03-15 PROCEDURE — 6370000000 HC RX 637 (ALT 250 FOR IP): Performed by: INTERNAL MEDICINE

## 2025-03-15 PROCEDURE — 71045 X-RAY EXAM CHEST 1 VIEW: CPT

## 2025-03-15 PROCEDURE — 80053 COMPREHEN METABOLIC PANEL: CPT

## 2025-03-15 PROCEDURE — 99291 CRITICAL CARE FIRST HOUR: CPT | Performed by: INTERNAL MEDICINE

## 2025-03-15 PROCEDURE — 2580000003 HC RX 258: Performed by: INTERNAL MEDICINE

## 2025-03-15 PROCEDURE — 94640 AIRWAY INHALATION TREATMENT: CPT

## 2025-03-15 PROCEDURE — 2700000000 HC OXYGEN THERAPY PER DAY

## 2025-03-15 PROCEDURE — 6370000000 HC RX 637 (ALT 250 FOR IP): Performed by: NURSE PRACTITIONER

## 2025-03-15 RX ORDER — METOLAZONE 2.5 MG/1
2.5 TABLET ORAL 2 TIMES DAILY
Status: DISCONTINUED | OUTPATIENT
Start: 2025-03-15 | End: 2025-03-16 | Stop reason: HOSPADM

## 2025-03-15 RX ORDER — INSULIN GLARGINE 100 [IU]/ML
15 INJECTION, SOLUTION SUBCUTANEOUS 2 TIMES DAILY
Status: DISCONTINUED | OUTPATIENT
Start: 2025-03-15 | End: 2025-03-16 | Stop reason: HOSPADM

## 2025-03-15 RX ORDER — PROCHLORPERAZINE EDISYLATE 5 MG/ML
10 INJECTION INTRAMUSCULAR; INTRAVENOUS EVERY 6 HOURS PRN
Status: DISCONTINUED | OUTPATIENT
Start: 2025-03-15 | End: 2025-03-16 | Stop reason: HOSPADM

## 2025-03-15 RX ORDER — DOPAMINE HYDROCHLORIDE 320 MG/100ML
2 INJECTION, SOLUTION INTRAVENOUS CONTINUOUS
Status: DISCONTINUED | OUTPATIENT
Start: 2025-03-15 | End: 2025-03-16 | Stop reason: HOSPADM

## 2025-03-15 RX ADMIN — ROPINIROLE HYDROCHLORIDE 1 MG: 1 TABLET, FILM COATED ORAL at 09:52

## 2025-03-15 RX ADMIN — IPRATROPIUM BROMIDE AND ALBUTEROL SULFATE 1 DOSE: .5; 3 SOLUTION RESPIRATORY (INHALATION) at 08:52

## 2025-03-15 RX ADMIN — KETOROLAC TROMETHAMINE 15 MG: 30 INJECTION, SOLUTION INTRAMUSCULAR; INTRAVENOUS at 06:40

## 2025-03-15 RX ADMIN — METOLAZONE 2.5 MG: 2.5 TABLET ORAL at 12:20

## 2025-03-15 RX ADMIN — INSULIN LISPRO 6 UNITS: 100 INJECTION, SOLUTION INTRAVENOUS; SUBCUTANEOUS at 17:34

## 2025-03-15 RX ADMIN — PROCHLORPERAZINE EDISYLATE 10 MG: 5 INJECTION INTRAMUSCULAR; INTRAVENOUS at 18:31

## 2025-03-15 RX ADMIN — FENTANYL CITRATE 50 MCG: 50 INJECTION, SOLUTION INTRAMUSCULAR; INTRAVENOUS at 13:57

## 2025-03-15 RX ADMIN — SODIUM CHLORIDE, PRESERVATIVE FREE 10 ML: 5 INJECTION INTRAVENOUS at 09:51

## 2025-03-15 RX ADMIN — GABAPENTIN 400 MG: 400 CAPSULE ORAL at 20:21

## 2025-03-15 RX ADMIN — BUDESONIDE 500 MCG: 0.5 INHALANT RESPIRATORY (INHALATION) at 05:02

## 2025-03-15 RX ADMIN — INSULIN LISPRO 4 UNITS: 100 INJECTION, SOLUTION INTRAVENOUS; SUBCUTANEOUS at 12:26

## 2025-03-15 RX ADMIN — ONDANSETRON 4 MG: 2 INJECTION, SOLUTION INTRAMUSCULAR; INTRAVENOUS at 04:23

## 2025-03-15 RX ADMIN — IPRATROPIUM BROMIDE AND ALBUTEROL SULFATE 1 DOSE: .5; 3 SOLUTION RESPIRATORY (INHALATION) at 16:59

## 2025-03-15 RX ADMIN — ROPINIROLE HYDROCHLORIDE 3 MG: 2 TABLET, FILM COATED ORAL at 20:26

## 2025-03-15 RX ADMIN — SODIUM CHLORIDE, PRESERVATIVE FREE 10 ML: 5 INJECTION INTRAVENOUS at 20:23

## 2025-03-15 RX ADMIN — IPRATROPIUM BROMIDE AND ALBUTEROL SULFATE 1 DOSE: .5; 3 SOLUTION RESPIRATORY (INHALATION) at 05:02

## 2025-03-15 RX ADMIN — ACETYLCYSTEINE 400 MG: 100 INHALANT RESPIRATORY (INHALATION) at 05:02

## 2025-03-15 RX ADMIN — BUDESONIDE 500 MCG: 0.5 INHALANT RESPIRATORY (INHALATION) at 16:59

## 2025-03-15 RX ADMIN — CITALOPRAM HYDROBROMIDE 40 MG: 20 TABLET ORAL at 09:52

## 2025-03-15 RX ADMIN — IPRATROPIUM BROMIDE AND ALBUTEROL SULFATE 1 DOSE: .5; 3 SOLUTION RESPIRATORY (INHALATION) at 00:47

## 2025-03-15 RX ADMIN — INSULIN GLARGINE 15 UNITS: 100 INJECTION, SOLUTION SUBCUTANEOUS at 20:20

## 2025-03-15 RX ADMIN — MIDODRINE HYDROCHLORIDE 15 MG: 10 TABLET ORAL at 12:20

## 2025-03-15 RX ADMIN — ENOXAPARIN SODIUM 70 MG: 100 INJECTION SUBCUTANEOUS at 20:21

## 2025-03-15 RX ADMIN — ACETYLCYSTEINE 400 MG: 100 INHALANT RESPIRATORY (INHALATION) at 00:48

## 2025-03-15 RX ADMIN — ATORVASTATIN CALCIUM 80 MG: 40 TABLET, FILM COATED ORAL at 09:52

## 2025-03-15 RX ADMIN — GABAPENTIN 400 MG: 400 CAPSULE ORAL at 09:52

## 2025-03-15 RX ADMIN — WATER 20 MG: 1 INJECTION INTRAMUSCULAR; INTRAVENOUS; SUBCUTANEOUS at 08:44

## 2025-03-15 RX ADMIN — MIDODRINE HYDROCHLORIDE 15 MG: 10 TABLET ORAL at 17:11

## 2025-03-15 RX ADMIN — ROPINIROLE HYDROCHLORIDE 1 MG: 1 TABLET, FILM COATED ORAL at 12:20

## 2025-03-15 RX ADMIN — DOPAMINE HYDROCHLORIDE IN DEXTROSE 2 MCG/KG/MIN: 3.2 INJECTION, SOLUTION INTRAVENOUS at 12:18

## 2025-03-15 RX ADMIN — INSULIN GLARGINE 10 UNITS: 100 INJECTION, SOLUTION SUBCUTANEOUS at 08:55

## 2025-03-15 RX ADMIN — INSULIN LISPRO 6 UNITS: 100 INJECTION, SOLUTION INTRAVENOUS; SUBCUTANEOUS at 08:56

## 2025-03-15 RX ADMIN — GABAPENTIN 400 MG: 400 CAPSULE ORAL at 13:30

## 2025-03-15 RX ADMIN — ENOXAPARIN SODIUM 70 MG: 100 INJECTION SUBCUTANEOUS at 08:44

## 2025-03-15 RX ADMIN — IPRATROPIUM BROMIDE AND ALBUTEROL SULFATE 1 DOSE: .5; 3 SOLUTION RESPIRATORY (INHALATION) at 12:58

## 2025-03-15 RX ADMIN — PROCHLORPERAZINE EDISYLATE 10 MG: 5 INJECTION INTRAMUSCULAR; INTRAVENOUS at 06:49

## 2025-03-15 RX ADMIN — MIDODRINE HYDROCHLORIDE 15 MG: 10 TABLET ORAL at 09:52

## 2025-03-15 RX ADMIN — INSULIN LISPRO 2 UNITS: 100 INJECTION, SOLUTION INTRAVENOUS; SUBCUTANEOUS at 21:21

## 2025-03-15 RX ADMIN — KETOROLAC TROMETHAMINE 15 MG: 30 INJECTION, SOLUTION INTRAMUSCULAR; INTRAVENOUS at 18:24

## 2025-03-15 RX ADMIN — ACETYLCYSTEINE 400 MG: 100 INHALANT RESPIRATORY (INHALATION) at 08:52

## 2025-03-15 RX ADMIN — ROPINIROLE HYDROCHLORIDE 1 MG: 1 TABLET, FILM COATED ORAL at 17:11

## 2025-03-15 RX ADMIN — LEVOTHYROXINE SODIUM 200 MCG: 0.1 TABLET ORAL at 05:28

## 2025-03-15 RX ADMIN — FUROSEMIDE 5 MG/HR: 10 INJECTION, SOLUTION INTRAMUSCULAR; INTRAVENOUS at 13:12

## 2025-03-15 RX ADMIN — CLOPIDOGREL BISULFATE 75 MG: 75 TABLET ORAL at 09:52

## 2025-03-15 RX ADMIN — METOLAZONE 2.5 MG: 2.5 TABLET ORAL at 20:22

## 2025-03-15 ASSESSMENT — PAIN DESCRIPTION - DESCRIPTORS
DESCRIPTORS: GNAWING;DISCOMFORT;CRAMPING
DESCRIPTORS: ACHING;NAGGING;DISCOMFORT
DESCRIPTORS: ACHING;DISCOMFORT;NAGGING

## 2025-03-15 ASSESSMENT — PAIN SCALES - GENERAL
PAINLEVEL_OUTOF10: 0
PAINLEVEL_OUTOF10: 9
PAINLEVEL_OUTOF10: 9
PAINLEVEL_OUTOF10: 0

## 2025-03-15 ASSESSMENT — PAIN DESCRIPTION - ORIENTATION
ORIENTATION: RIGHT;LEFT;LOWER
ORIENTATION: LEFT
ORIENTATION: LEFT

## 2025-03-15 ASSESSMENT — PAIN DESCRIPTION - LOCATION
LOCATION: RIB CAGE
LOCATION: BACK
LOCATION: RIB CAGE

## 2025-03-16 ENCOUNTER — HOSPITAL ENCOUNTER (INPATIENT)
Age: 60
LOS: 9 days | Discharge: HOME | DRG: 276 | End: 2025-03-25
Attending: STUDENT IN AN ORGANIZED HEALTH CARE EDUCATION/TRAINING PROGRAM | Admitting: INTERNAL MEDICINE
Payer: COMMERCIAL

## 2025-03-16 VITALS
RESPIRATION RATE: 15 BRPM | TEMPERATURE: 98.5 F | OXYGEN SATURATION: 90 % | DIASTOLIC BLOOD PRESSURE: 61 MMHG | HEART RATE: 95 BPM | SYSTOLIC BLOOD PRESSURE: 99 MMHG | WEIGHT: 147.2 LBS | BODY MASS INDEX: 27.09 KG/M2 | HEIGHT: 62 IN

## 2025-03-16 DIAGNOSIS — R06.02 SHORTNESS OF BREATH: Primary | ICD-10-CM

## 2025-03-16 DIAGNOSIS — J42 CHRONIC BRONCHITIS, UNSPECIFIED CHRONIC BRONCHITIS TYPE (HCC): ICD-10-CM

## 2025-03-16 DIAGNOSIS — I48.0 PAROXYSMAL ATRIAL FIBRILLATION (HCC): ICD-10-CM

## 2025-03-16 PROBLEM — I42.9 CARDIOMYOPATHY: Status: ACTIVE | Noted: 2025-03-16

## 2025-03-16 LAB
AADO2: 610.9 MMHG
ALBUMIN SERPL-MCNC: 3.9 G/DL (ref 3.5–5.2)
ALBUMIN SERPL-MCNC: 4 G/DL (ref 3.5–5.2)
ALP SERPL-CCNC: 124 U/L (ref 35–104)
ALP SERPL-CCNC: 131 U/L (ref 35–104)
ALT SERPL-CCNC: 31 U/L (ref 0–32)
ALT SERPL-CCNC: 34 U/L (ref 0–32)
ANION GAP SERPL CALCULATED.3IONS-SCNC: 12 MMOL/L (ref 7–16)
ANION GAP SERPL CALCULATED.3IONS-SCNC: 17 MMOL/L (ref 7–16)
AST SERPL-CCNC: 17 U/L (ref 0–31)
AST SERPL-CCNC: 33 U/L (ref 0–31)
B.E.: 16.1 MMOL/L (ref -3–3)
BASOPHILS # BLD: 0.02 K/UL (ref 0–0.2)
BASOPHILS # BLD: 0.04 K/UL (ref 0–0.2)
BASOPHILS NFR BLD: 0 % (ref 0–2)
BASOPHILS NFR BLD: 0 % (ref 0–2)
BILIRUB SERPL-MCNC: 0.7 MG/DL (ref 0–1.2)
BILIRUB SERPL-MCNC: 0.9 MG/DL (ref 0–1.2)
BUN SERPL-MCNC: 26 MG/DL (ref 6–23)
BUN SERPL-MCNC: 31 MG/DL (ref 6–23)
CALCIUM SERPL-MCNC: 10 MG/DL (ref 8.6–10.2)
CALCIUM SERPL-MCNC: 9.4 MG/DL (ref 8.6–10.2)
CHLORIDE SERPL-SCNC: 83 MMOL/L (ref 98–107)
CHLORIDE SERPL-SCNC: 92 MMOL/L (ref 98–107)
CO2 SERPL-SCNC: 34 MMOL/L (ref 22–29)
CO2 SERPL-SCNC: 37 MMOL/L (ref 22–29)
COHB: 0.9 % (ref 0–1.5)
CREAT SERPL-MCNC: 0.6 MG/DL (ref 0.5–1)
CREAT SERPL-MCNC: 0.6 MG/DL (ref 0.5–1)
CRITICAL: ABNORMAL
DATE ANALYZED: ABNORMAL
DATE OF COLLECTION: ABNORMAL
EOSINOPHIL # BLD: 0.02 K/UL (ref 0.05–0.5)
EOSINOPHIL # BLD: 0.04 K/UL (ref 0.05–0.5)
EOSINOPHILS RELATIVE PERCENT: 0 % (ref 0–6)
EOSINOPHILS RELATIVE PERCENT: 0 % (ref 0–6)
ERYTHROCYTE [DISTWIDTH] IN BLOOD BY AUTOMATED COUNT: 14.2 % (ref 11.5–15)
ERYTHROCYTE [DISTWIDTH] IN BLOOD BY AUTOMATED COUNT: 14.5 % (ref 11.5–15)
FIO2: 100 %
GFR, ESTIMATED: >90 ML/MIN/1.73M2
GFR, ESTIMATED: >90 ML/MIN/1.73M2
GLUCOSE BLD-MCNC: 175 MG/DL (ref 74–99)
GLUCOSE BLD-MCNC: 182 MG/DL (ref 74–99)
GLUCOSE BLD-MCNC: 188 MG/DL (ref 74–99)
GLUCOSE BLD-MCNC: 237 MG/DL (ref 74–99)
GLUCOSE SERPL-MCNC: 149 MG/DL (ref 74–99)
GLUCOSE SERPL-MCNC: 173 MG/DL (ref 74–99)
HCO3: 41.9 MMOL/L (ref 22–26)
HCT VFR BLD AUTO: 41.8 % (ref 34–48)
HCT VFR BLD AUTO: 42.7 % (ref 34–48)
HGB BLD-MCNC: 14.2 G/DL (ref 11.5–15.5)
HGB BLD-MCNC: 14.7 G/DL (ref 11.5–15.5)
HHB: 12.4 % (ref 0–5)
IMM GRANULOCYTES # BLD AUTO: 0.03 K/UL (ref 0–0.58)
IMM GRANULOCYTES # BLD AUTO: 0.04 K/UL (ref 0–0.58)
IMM GRANULOCYTES NFR BLD: 0 % (ref 0–5)
IMM GRANULOCYTES NFR BLD: 0 % (ref 0–5)
LAB: ABNORMAL
LACTATE BLDV-SCNC: 1.2 MMOL/L (ref 0.5–2.2)
LYMPHOCYTES NFR BLD: 1.13 K/UL (ref 1.5–4)
LYMPHOCYTES NFR BLD: 1.16 K/UL (ref 1.5–4)
LYMPHOCYTES RELATIVE PERCENT: 10 % (ref 20–42)
LYMPHOCYTES RELATIVE PERCENT: 10 % (ref 20–42)
Lab: 1538
MAGNESIUM SERPL-MCNC: 1.5 MG/DL (ref 1.6–2.6)
MCH RBC QN AUTO: 31.3 PG (ref 26–35)
MCH RBC QN AUTO: 31.3 PG (ref 26–35)
MCHC RBC AUTO-ENTMCNC: 34 G/DL (ref 32–34.5)
MCHC RBC AUTO-ENTMCNC: 34.4 G/DL (ref 32–34.5)
MCV RBC AUTO: 90.9 FL (ref 80–99.9)
MCV RBC AUTO: 92.3 FL (ref 80–99.9)
METHB: 0.3 % (ref 0–1.5)
MODE: ABNORMAL
MONOCYTES NFR BLD: 1.03 K/UL (ref 0.1–0.95)
MONOCYTES NFR BLD: 1.15 K/UL (ref 0.1–0.95)
MONOCYTES NFR BLD: 10 % (ref 2–12)
MONOCYTES NFR BLD: 9 % (ref 2–12)
NEUTROPHILS NFR BLD: 80 % (ref 43–80)
NEUTROPHILS NFR BLD: 80 % (ref 43–80)
NEUTS SEG NFR BLD: 8.67 K/UL (ref 1.8–7.3)
NEUTS SEG NFR BLD: 9.41 K/UL (ref 1.8–7.3)
O2 SATURATION: 87.4 % (ref 92–98.5)
O2HB: 86.4 % (ref 94–97)
OPERATOR ID: ABNORMAL
PATIENT TEMP: 37 C
PCO2: 53.2 MMHG (ref 35–45)
PFO2: 0.49 MMHG/%
PH BLOOD GAS: 7.51 (ref 7.35–7.45)
PHOSPHATE SERPL-MCNC: 3.2 MG/DL (ref 2.5–4.5)
PLATELET # BLD AUTO: 312 K/UL (ref 130–450)
PLATELET # BLD AUTO: 357 K/UL (ref 130–450)
PMV BLD AUTO: 10.2 FL (ref 7–12)
PMV BLD AUTO: 10.3 FL (ref 7–12)
PO2: 48.9 MMHG (ref 75–100)
POTASSIUM SERPL-SCNC: 3.8 MMOL/L (ref 3.5–5)
POTASSIUM SERPL-SCNC: 4 MMOL/L (ref 3.5–5)
PROT SERPL-MCNC: 7.1 G/DL (ref 6.4–8.3)
PROT SERPL-MCNC: 7.5 G/DL (ref 6.4–8.3)
RBC # BLD AUTO: 4.53 M/UL (ref 3.5–5.5)
RBC # BLD AUTO: 4.7 M/UL (ref 3.5–5.5)
RI(T): 12.49
SODIUM SERPL-SCNC: 137 MMOL/L (ref 132–146)
SODIUM SERPL-SCNC: 138 MMOL/L (ref 132–146)
SOURCE, BLOOD GAS: ABNORMAL
THB: 15.2 G/DL (ref 11.5–16.5)
TIME ANALYZED: 1552
WBC OTHER # BLD: 10.9 K/UL (ref 4.5–11.5)
WBC OTHER # BLD: 11.8 K/UL (ref 4.5–11.5)

## 2025-03-16 PROCEDURE — 80053 COMPREHEN METABOLIC PANEL: CPT

## 2025-03-16 PROCEDURE — 82805 BLOOD GASES W/O2 SATURATION: CPT

## 2025-03-16 PROCEDURE — 82962 GLUCOSE BLOOD TEST: CPT

## 2025-03-16 PROCEDURE — 2500000003 HC RX 250 WO HCPCS: Performed by: STUDENT IN AN ORGANIZED HEALTH CARE EDUCATION/TRAINING PROGRAM

## 2025-03-16 PROCEDURE — 2700000000 HC OXYGEN THERAPY PER DAY

## 2025-03-16 PROCEDURE — 84100 ASSAY OF PHOSPHORUS: CPT

## 2025-03-16 PROCEDURE — 6360000002 HC RX W HCPCS

## 2025-03-16 PROCEDURE — 2500000003 HC RX 250 WO HCPCS: Performed by: INTERNAL MEDICINE

## 2025-03-16 PROCEDURE — 6360000002 HC RX W HCPCS: Performed by: INTERNAL MEDICINE

## 2025-03-16 PROCEDURE — 6370000000 HC RX 637 (ALT 250 FOR IP): Performed by: STUDENT IN AN ORGANIZED HEALTH CARE EDUCATION/TRAINING PROGRAM

## 2025-03-16 PROCEDURE — 6370000000 HC RX 637 (ALT 250 FOR IP)

## 2025-03-16 PROCEDURE — 2500000003 HC RX 250 WO HCPCS: Performed by: FAMILY MEDICINE

## 2025-03-16 PROCEDURE — 5A09357 ASSISTANCE WITH RESPIRATORY VENTILATION, LESS THAN 24 CONSECUTIVE HOURS, CONTINUOUS POSITIVE AIRWAY PRESSURE: ICD-10-PCS | Performed by: INTERNAL MEDICINE

## 2025-03-16 PROCEDURE — 6370000000 HC RX 637 (ALT 250 FOR IP): Performed by: INTERNAL MEDICINE

## 2025-03-16 PROCEDURE — 51702 INSERT TEMP BLADDER CATH: CPT

## 2025-03-16 PROCEDURE — 83605 ASSAY OF LACTIC ACID: CPT

## 2025-03-16 PROCEDURE — 36415 COLL VENOUS BLD VENIPUNCTURE: CPT

## 2025-03-16 PROCEDURE — 85025 COMPLETE CBC W/AUTO DIFF WBC: CPT

## 2025-03-16 PROCEDURE — 2580000003 HC RX 258: Performed by: INTERNAL MEDICINE

## 2025-03-16 PROCEDURE — 6360000002 HC RX W HCPCS: Performed by: STUDENT IN AN ORGANIZED HEALTH CARE EDUCATION/TRAINING PROGRAM

## 2025-03-16 PROCEDURE — 94660 CPAP INITIATION&MGMT: CPT

## 2025-03-16 PROCEDURE — 94640 AIRWAY INHALATION TREATMENT: CPT

## 2025-03-16 PROCEDURE — 99223 1ST HOSP IP/OBS HIGH 75: CPT | Performed by: INTERNAL MEDICINE

## 2025-03-16 PROCEDURE — 2000000000 HC ICU R&B

## 2025-03-16 PROCEDURE — 99239 HOSP IP/OBS DSCHRG MGMT >30: CPT | Performed by: STUDENT IN AN ORGANIZED HEALTH CARE EDUCATION/TRAINING PROGRAM

## 2025-03-16 PROCEDURE — 6360000002 HC RX W HCPCS: Performed by: NURSE PRACTITIONER

## 2025-03-16 PROCEDURE — 83735 ASSAY OF MAGNESIUM: CPT

## 2025-03-16 PROCEDURE — 5A0955A ASSISTANCE WITH RESPIRATORY VENTILATION, GREATER THAN 96 CONSECUTIVE HOURS, HIGH NASAL FLOW/VELOCITY: ICD-10-PCS | Performed by: INTERNAL MEDICINE

## 2025-03-16 PROCEDURE — 99291 CRITICAL CARE FIRST HOUR: CPT | Performed by: INTERNAL MEDICINE

## 2025-03-16 RX ORDER — DEXTROSE MONOHYDRATE 100 MG/ML
INJECTION, SOLUTION INTRAVENOUS CONTINUOUS PRN
Status: CANCELLED | OUTPATIENT
Start: 2025-03-16

## 2025-03-16 RX ORDER — ROPINIROLE 1 MG/1
3 TABLET, FILM COATED ORAL NIGHTLY
Status: CANCELLED | OUTPATIENT
Start: 2025-03-16

## 2025-03-16 RX ORDER — ONDANSETRON 2 MG/ML
4 INJECTION INTRAMUSCULAR; INTRAVENOUS EVERY 6 HOURS PRN
Status: DISCONTINUED | OUTPATIENT
Start: 2025-03-16 | End: 2025-03-25 | Stop reason: HOSPADM

## 2025-03-16 RX ORDER — ATORVASTATIN CALCIUM 40 MG/1
80 TABLET, FILM COATED ORAL NIGHTLY
Status: DISCONTINUED | OUTPATIENT
Start: 2025-03-16 | End: 2025-03-16

## 2025-03-16 RX ORDER — SODIUM CHLORIDE 0.9 % (FLUSH) 0.9 %
5-40 SYRINGE (ML) INJECTION 2 TIMES DAILY
Status: CANCELLED | OUTPATIENT
Start: 2025-03-16

## 2025-03-16 RX ORDER — ROPINIROLE 1 MG/1
1 TABLET, FILM COATED ORAL 3 TIMES DAILY
Status: DISCONTINUED | OUTPATIENT
Start: 2025-03-16 | End: 2025-03-25 | Stop reason: HOSPADM

## 2025-03-16 RX ORDER — PROMETHAZINE HYDROCHLORIDE 25 MG/1
12.5 TABLET ORAL EVERY 6 HOURS PRN
Status: DISCONTINUED | OUTPATIENT
Start: 2025-03-16 | End: 2025-03-25 | Stop reason: HOSPADM

## 2025-03-16 RX ORDER — ACETAMINOPHEN 325 MG/1
650 TABLET ORAL EVERY 6 HOURS PRN
Status: DISCONTINUED | OUTPATIENT
Start: 2025-03-16 | End: 2025-03-25 | Stop reason: HOSPADM

## 2025-03-16 RX ORDER — INSULIN GLARGINE 100 [IU]/ML
15 INJECTION, SOLUTION SUBCUTANEOUS 2 TIMES DAILY
Status: CANCELLED | OUTPATIENT
Start: 2025-03-16

## 2025-03-16 RX ORDER — KETOROLAC TROMETHAMINE 30 MG/ML
15 INJECTION, SOLUTION INTRAMUSCULAR; INTRAVENOUS EVERY 6 HOURS PRN
Status: ACTIVE | OUTPATIENT
Start: 2025-03-16 | End: 2025-03-16

## 2025-03-16 RX ORDER — MAGNESIUM SULFATE IN WATER 40 MG/ML
4000 INJECTION, SOLUTION INTRAVENOUS ONCE
Status: COMPLETED | OUTPATIENT
Start: 2025-03-16 | End: 2025-03-17

## 2025-03-16 RX ORDER — INSULIN GLARGINE 100 [IU]/ML
15 INJECTION, SOLUTION SUBCUTANEOUS 2 TIMES DAILY
Status: DISCONTINUED | OUTPATIENT
Start: 2025-03-16 | End: 2025-03-20

## 2025-03-16 RX ORDER — CITALOPRAM HYDROBROMIDE 20 MG/1
40 TABLET ORAL DAILY
Status: DISCONTINUED | OUTPATIENT
Start: 2025-03-17 | End: 2025-03-25 | Stop reason: HOSPADM

## 2025-03-16 RX ORDER — GLUCAGON 1 MG/ML
1 KIT INJECTION PRN
Status: DISCONTINUED | OUTPATIENT
Start: 2025-03-16 | End: 2025-03-25 | Stop reason: HOSPADM

## 2025-03-16 RX ORDER — ONDANSETRON 2 MG/ML
4 INJECTION INTRAMUSCULAR; INTRAVENOUS EVERY 6 HOURS PRN
Status: CANCELLED | OUTPATIENT
Start: 2025-03-16

## 2025-03-16 RX ORDER — DOPAMINE HYDROCHLORIDE 160 MG/100ML
2 INJECTION, SOLUTION INTRAVENOUS CONTINUOUS
Status: DISCONTINUED | OUTPATIENT
Start: 2025-03-16 | End: 2025-03-16

## 2025-03-16 RX ORDER — GABAPENTIN 400 MG/1
400 CAPSULE ORAL 3 TIMES DAILY
Status: CANCELLED | OUTPATIENT
Start: 2025-03-16

## 2025-03-16 RX ORDER — ACETAMINOPHEN 650 MG/1
650 SUPPOSITORY RECTAL EVERY 6 HOURS PRN
Status: DISCONTINUED | OUTPATIENT
Start: 2025-03-16 | End: 2025-03-25 | Stop reason: HOSPADM

## 2025-03-16 RX ORDER — MIDODRINE HYDROCHLORIDE 5 MG/1
15 TABLET ORAL
Status: DISCONTINUED | OUTPATIENT
Start: 2025-03-16 | End: 2025-03-16

## 2025-03-16 RX ORDER — ARFORMOTEROL TARTRATE 15 UG/2ML
15 SOLUTION RESPIRATORY (INHALATION)
Status: DISCONTINUED | OUTPATIENT
Start: 2025-03-16 | End: 2025-03-25 | Stop reason: HOSPADM

## 2025-03-16 RX ORDER — POLYETHYLENE GLYCOL 3350 17 G/17G
17 POWDER, FOR SOLUTION ORAL DAILY PRN
Status: CANCELLED | OUTPATIENT
Start: 2025-03-16

## 2025-03-16 RX ORDER — INSULIN LISPRO 100 [IU]/ML
0-8 INJECTION, SOLUTION INTRAVENOUS; SUBCUTANEOUS
Status: DISCONTINUED | OUTPATIENT
Start: 2025-03-16 | End: 2025-03-19

## 2025-03-16 RX ORDER — ROPINIROLE 1 MG/1
1 TABLET, FILM COATED ORAL 3 TIMES DAILY
Status: CANCELLED | OUTPATIENT
Start: 2025-03-16

## 2025-03-16 RX ORDER — INSULIN LISPRO 100 [IU]/ML
0-8 INJECTION, SOLUTION INTRAVENOUS; SUBCUTANEOUS
Status: CANCELLED | OUTPATIENT
Start: 2025-03-16

## 2025-03-16 RX ORDER — ATORVASTATIN CALCIUM 40 MG/1
80 TABLET, FILM COATED ORAL NIGHTLY
Status: DISCONTINUED | OUTPATIENT
Start: 2025-03-17 | End: 2025-03-25 | Stop reason: HOSPADM

## 2025-03-16 RX ORDER — BUDESONIDE 0.5 MG/2ML
0.5 INHALANT ORAL
Status: CANCELLED | OUTPATIENT
Start: 2025-03-16

## 2025-03-16 RX ORDER — SODIUM CHLORIDE 0.9 % (FLUSH) 0.9 %
5-40 SYRINGE (ML) INJECTION PRN
Status: DISCONTINUED | OUTPATIENT
Start: 2025-03-16 | End: 2025-03-20

## 2025-03-16 RX ORDER — ACETAMINOPHEN 325 MG/1
650 TABLET ORAL EVERY 6 HOURS PRN
Status: CANCELLED | OUTPATIENT
Start: 2025-03-16

## 2025-03-16 RX ORDER — POLYETHYLENE GLYCOL 3350 17 G/17G
17 POWDER, FOR SOLUTION ORAL DAILY PRN
Status: DISCONTINUED | OUTPATIENT
Start: 2025-03-16 | End: 2025-03-25 | Stop reason: HOSPADM

## 2025-03-16 RX ORDER — GLUCAGON 1 MG/ML
1 KIT INJECTION PRN
Status: CANCELLED | OUTPATIENT
Start: 2025-03-16

## 2025-03-16 RX ORDER — MAGNESIUM SULFATE IN WATER 40 MG/ML
4000 INJECTION, SOLUTION INTRAVENOUS ONCE
Status: COMPLETED | OUTPATIENT
Start: 2025-03-16 | End: 2025-03-16

## 2025-03-16 RX ORDER — SODIUM CHLORIDE 0.9 % (FLUSH) 0.9 %
5-40 SYRINGE (ML) INJECTION PRN
Status: CANCELLED | OUTPATIENT
Start: 2025-03-16

## 2025-03-16 RX ORDER — KETOROLAC TROMETHAMINE 15 MG/ML
15 INJECTION, SOLUTION INTRAMUSCULAR; INTRAVENOUS EVERY 6 HOURS PRN
Status: CANCELLED | OUTPATIENT
Start: 2025-03-16 | End: 2025-03-16

## 2025-03-16 RX ORDER — IPRATROPIUM BROMIDE AND ALBUTEROL SULFATE 2.5; .5 MG/3ML; MG/3ML
1 SOLUTION RESPIRATORY (INHALATION)
Status: DISCONTINUED | OUTPATIENT
Start: 2025-03-16 | End: 2025-03-25 | Stop reason: HOSPADM

## 2025-03-16 RX ORDER — ACETAMINOPHEN 650 MG/1
650 SUPPOSITORY RECTAL EVERY 6 HOURS PRN
Status: CANCELLED | OUTPATIENT
Start: 2025-03-16

## 2025-03-16 RX ORDER — DEXTROSE MONOHYDRATE 100 MG/ML
INJECTION, SOLUTION INTRAVENOUS CONTINUOUS PRN
Status: DISCONTINUED | OUTPATIENT
Start: 2025-03-16 | End: 2025-03-25 | Stop reason: HOSPADM

## 2025-03-16 RX ORDER — METOLAZONE 2.5 MG/1
2.5 TABLET ORAL 2 TIMES DAILY
Status: CANCELLED | OUTPATIENT
Start: 2025-03-16

## 2025-03-16 RX ORDER — ENOXAPARIN SODIUM 100 MG/ML
1 INJECTION SUBCUTANEOUS 2 TIMES DAILY
Status: CANCELLED | OUTPATIENT
Start: 2025-03-16

## 2025-03-16 RX ORDER — BUDESONIDE 0.5 MG/2ML
0.5 INHALANT ORAL
Status: DISCONTINUED | OUTPATIENT
Start: 2025-03-16 | End: 2025-03-25 | Stop reason: HOSPADM

## 2025-03-16 RX ORDER — LEVOTHYROXINE SODIUM 200 UG/1
200 TABLET ORAL DAILY
Status: DISCONTINUED | OUTPATIENT
Start: 2025-03-17 | End: 2025-03-25 | Stop reason: HOSPADM

## 2025-03-16 RX ORDER — PROCHLORPERAZINE EDISYLATE 5 MG/ML
10 INJECTION INTRAMUSCULAR; INTRAVENOUS EVERY 6 HOURS PRN
Status: DISCONTINUED | OUTPATIENT
Start: 2025-03-16 | End: 2025-03-25 | Stop reason: HOSPADM

## 2025-03-16 RX ORDER — IPRATROPIUM BROMIDE AND ALBUTEROL SULFATE 2.5; .5 MG/3ML; MG/3ML
1 SOLUTION RESPIRATORY (INHALATION)
Status: CANCELLED | OUTPATIENT
Start: 2025-03-16

## 2025-03-16 RX ORDER — SODIUM CHLORIDE 0.9 % (FLUSH) 0.9 %
5-40 SYRINGE (ML) INJECTION 2 TIMES DAILY
Status: DISCONTINUED | OUTPATIENT
Start: 2025-03-16 | End: 2025-03-20

## 2025-03-16 RX ORDER — ATORVASTATIN CALCIUM 40 MG/1
80 TABLET, FILM COATED ORAL DAILY
Status: CANCELLED | OUTPATIENT
Start: 2025-03-17

## 2025-03-16 RX ORDER — CITALOPRAM HYDROBROMIDE 20 MG/1
40 TABLET ORAL DAILY
Status: CANCELLED | OUTPATIENT
Start: 2025-03-17

## 2025-03-16 RX ORDER — PROCHLORPERAZINE EDISYLATE 5 MG/ML
10 INJECTION INTRAMUSCULAR; INTRAVENOUS EVERY 6 HOURS PRN
Status: CANCELLED | OUTPATIENT
Start: 2025-03-16

## 2025-03-16 RX ORDER — FENTANYL CITRATE 50 UG/ML
50 INJECTION, SOLUTION INTRAMUSCULAR; INTRAVENOUS
Refills: 0 | Status: CANCELLED | OUTPATIENT
Start: 2025-03-16

## 2025-03-16 RX ORDER — DOPAMINE HYDROCHLORIDE 320 MG/100ML
2 INJECTION, SOLUTION INTRAVENOUS CONTINUOUS
Status: CANCELLED | OUTPATIENT
Start: 2025-03-16

## 2025-03-16 RX ORDER — METOLAZONE 2.5 MG/1
2.5 TABLET ORAL 2 TIMES DAILY
Status: DISCONTINUED | OUTPATIENT
Start: 2025-03-16 | End: 2025-03-17

## 2025-03-16 RX ORDER — PROMETHAZINE HYDROCHLORIDE 25 MG/1
12.5 TABLET ORAL EVERY 6 HOURS PRN
Status: CANCELLED | OUTPATIENT
Start: 2025-03-16

## 2025-03-16 RX ORDER — FENTANYL CITRATE 50 UG/ML
50 INJECTION, SOLUTION INTRAMUSCULAR; INTRAVENOUS
Refills: 0 | Status: DISCONTINUED | OUTPATIENT
Start: 2025-03-16 | End: 2025-03-22

## 2025-03-16 RX ORDER — LEVOTHYROXINE SODIUM 100 UG/1
200 TABLET ORAL DAILY
Status: CANCELLED | OUTPATIENT
Start: 2025-03-17

## 2025-03-16 RX ORDER — ENOXAPARIN SODIUM 100 MG/ML
1 INJECTION SUBCUTANEOUS 2 TIMES DAILY
Status: DISCONTINUED | OUTPATIENT
Start: 2025-03-16 | End: 2025-03-18

## 2025-03-16 RX ADMIN — IPRATROPIUM BROMIDE AND ALBUTEROL SULFATE 1 DOSE: .5; 3 SOLUTION RESPIRATORY (INHALATION) at 06:14

## 2025-03-16 RX ADMIN — GABAPENTIN 400 MG: 100 CAPSULE ORAL at 20:55

## 2025-03-16 RX ADMIN — BUDESONIDE 500 MCG: 0.5 INHALANT RESPIRATORY (INHALATION) at 06:14

## 2025-03-16 RX ADMIN — SODIUM CHLORIDE, PRESERVATIVE FREE 10 ML: 5 INJECTION INTRAVENOUS at 08:14

## 2025-03-16 RX ADMIN — FENTANYL CITRATE 50 MCG: 50 INJECTION, SOLUTION INTRAMUSCULAR; INTRAVENOUS at 09:14

## 2025-03-16 RX ADMIN — MAGNESIUM SULFATE HEPTAHYDRATE 4000 MG: 40 INJECTION, SOLUTION INTRAVENOUS at 21:08

## 2025-03-16 RX ADMIN — INSULIN GLARGINE 15 UNITS: 100 INJECTION, SOLUTION SUBCUTANEOUS at 08:22

## 2025-03-16 RX ADMIN — ROPINIROLE HYDROCHLORIDE 1 MG: 2 TABLET, FILM COATED ORAL at 15:19

## 2025-03-16 RX ADMIN — FENTANYL CITRATE 50 MCG: 50 INJECTION INTRAMUSCULAR; INTRAVENOUS at 16:15

## 2025-03-16 RX ADMIN — INSULIN LISPRO 2 UNITS: 100 INJECTION, SOLUTION INTRAVENOUS; SUBCUTANEOUS at 06:53

## 2025-03-16 RX ADMIN — ACETAZOLAMIDE SODIUM 250 MG: 500 INJECTION, POWDER, LYOPHILIZED, FOR SOLUTION INTRAVENOUS at 20:55

## 2025-03-16 RX ADMIN — FENTANYL CITRATE 50 MCG: 50 INJECTION, SOLUTION INTRAMUSCULAR; INTRAVENOUS at 01:36

## 2025-03-16 RX ADMIN — IPRATROPIUM BROMIDE AND ALBUTEROL SULFATE 1 DOSE: 2.5; .5 SOLUTION RESPIRATORY (INHALATION) at 19:50

## 2025-03-16 RX ADMIN — BUDESONIDE INHALATION 500 MCG: 0.5 SUSPENSION RESPIRATORY (INHALATION) at 19:49

## 2025-03-16 RX ADMIN — GABAPENTIN 400 MG: 100 CAPSULE ORAL at 15:18

## 2025-03-16 RX ADMIN — ROPINIROLE HYDROCHLORIDE 1 MG: 1 TABLET, FILM COATED ORAL at 12:19

## 2025-03-16 RX ADMIN — INSULIN GLARGINE 15 UNITS: 100 INJECTION, SOLUTION SUBCUTANEOUS at 20:54

## 2025-03-16 RX ADMIN — METOLAZONE 2.5 MG: 2.5 TABLET ORAL at 20:55

## 2025-03-16 RX ADMIN — INSULIN LISPRO 2 UNITS: 100 INJECTION, SOLUTION INTRAVENOUS; SUBCUTANEOUS at 12:18

## 2025-03-16 RX ADMIN — FUROSEMIDE 5 MG/HR: 10 INJECTION, SOLUTION INTRAMUSCULAR; INTRAVENOUS at 09:17

## 2025-03-16 RX ADMIN — WATER 40 MG: 1 INJECTION INTRAMUSCULAR; INTRAVENOUS; SUBCUTANEOUS at 22:55

## 2025-03-16 RX ADMIN — CITALOPRAM HYDROBROMIDE 40 MG: 20 TABLET ORAL at 08:13

## 2025-03-16 RX ADMIN — METOLAZONE 2.5 MG: 2.5 TABLET ORAL at 08:13

## 2025-03-16 RX ADMIN — ATORVASTATIN CALCIUM 80 MG: 40 TABLET, FILM COATED ORAL at 08:13

## 2025-03-16 RX ADMIN — SODIUM CHLORIDE, PRESERVATIVE FREE 10 ML: 5 INJECTION INTRAVENOUS at 21:20

## 2025-03-16 RX ADMIN — GABAPENTIN 400 MG: 400 CAPSULE ORAL at 08:13

## 2025-03-16 RX ADMIN — ARFORMOTEROL TARTRATE 15 MCG: 15 SOLUTION RESPIRATORY (INHALATION) at 19:49

## 2025-03-16 RX ADMIN — ROPINIROLE HYDROCHLORIDE 1 MG: 1 TABLET, FILM COATED ORAL at 08:11

## 2025-03-16 RX ADMIN — MAGNESIUM SULFATE IN WATER FOR 4000 MG: 40 INJECTION INTRAVENOUS at 08:09

## 2025-03-16 RX ADMIN — LEVOTHYROXINE SODIUM 200 MCG: 0.1 TABLET ORAL at 06:05

## 2025-03-16 RX ADMIN — MIDODRINE HYDROCHLORIDE 15 MG: 10 TABLET ORAL at 12:19

## 2025-03-16 RX ADMIN — FENTANYL CITRATE 50 MCG: 50 INJECTION INTRAMUSCULAR; INTRAVENOUS at 20:54

## 2025-03-16 RX ADMIN — ROPINIROLE HYDROCHLORIDE 3 MG: 2 TABLET, FILM COATED ORAL at 21:03

## 2025-03-16 RX ADMIN — INSULIN LISPRO 2 UNITS: 100 INJECTION, SOLUTION INTRAVENOUS; SUBCUTANEOUS at 20:54

## 2025-03-16 RX ADMIN — IPRATROPIUM BROMIDE AND ALBUTEROL SULFATE 1 DOSE: .5; 3 SOLUTION RESPIRATORY (INHALATION) at 09:31

## 2025-03-16 RX ADMIN — WATER 40 MG: 1 INJECTION INTRAMUSCULAR; INTRAVENOUS; SUBCUTANEOUS at 15:19

## 2025-03-16 RX ADMIN — MIDODRINE HYDROCHLORIDE 15 MG: 10 TABLET ORAL at 08:12

## 2025-03-16 ASSESSMENT — PAIN DESCRIPTION - LOCATION
LOCATION: RIB CAGE
LOCATION: ABDOMEN

## 2025-03-16 ASSESSMENT — PAIN SCALES - GENERAL
PAINLEVEL_OUTOF10: 9
PAINLEVEL_OUTOF10: 0
PAINLEVEL_OUTOF10: 0
PAINLEVEL_OUTOF10: 8
PAINLEVEL_OUTOF10: 0
PAINLEVEL_OUTOF10: 4
PAINLEVEL_OUTOF10: 8
PAINLEVEL_OUTOF10: 0
PAINLEVEL_OUTOF10: 5
PAINLEVEL_OUTOF10: 0
PAINLEVEL_OUTOF10: 9
PAINLEVEL_OUTOF10: 5
PAINLEVEL_OUTOF10: 4

## 2025-03-16 ASSESSMENT — PAIN DESCRIPTION - ORIENTATION
ORIENTATION: LEFT
ORIENTATION: LEFT
ORIENTATION: LEFT;RIGHT
ORIENTATION: LEFT
ORIENTATION: RIGHT

## 2025-03-16 ASSESSMENT — PAIN - FUNCTIONAL ASSESSMENT
PAIN_FUNCTIONAL_ASSESSMENT: PREVENTS OR INTERFERES SOME ACTIVE ACTIVITIES AND ADLS
PAIN_FUNCTIONAL_ASSESSMENT: PREVENTS OR INTERFERES SOME ACTIVE ACTIVITIES AND ADLS
PAIN_FUNCTIONAL_ASSESSMENT: ACTIVITIES ARE NOT PREVENTED

## 2025-03-16 ASSESSMENT — PAIN DESCRIPTION - DESCRIPTORS
DESCRIPTORS: ACHING;DISCOMFORT;STABBING
DESCRIPTORS: ACHING;BURNING
DESCRIPTORS: ACHING;DISCOMFORT
DESCRIPTORS: ACHING;DISCOMFORT;SORE
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
## 2025-03-16 NOTE — PLAN OF CARE
Problem: Discharge Planning  Goal: Discharge to home or other facility with appropriate resources  3/14/2025 0044 by Mirtha Packer RN  Outcome: Progressing     Problem: Safety - Adult  Goal: Free from fall injury  3/14/2025 0044 by Mirtha Packer RN  Outcome: Progressing     Problem: Respiratory - Adult  Goal: Achieves optimal ventilation and oxygenation  3/14/2025 0044 by Mirtha Packer, RN  Outcome: Progressing     
  Problem: Discharge Planning  Goal: Discharge to home or other facility with appropriate resources  3/14/2025 0855 by Caroline Sandoval RN  Outcome: Progressing  3/14/2025 0044 by Mirtha Packer RN  Outcome: Progressing  3/14/2025 0018 by Mirtha Packer RN  Outcome: Progressing     Problem: Safety - Adult  Goal: Free from fall injury  3/14/2025 0855 by Caroline Sandoval RN  Outcome: Progressing  3/14/2025 0044 by Mirtha Packer RN  Outcome: Progressing  3/14/2025 0018 by Mirtha Pacekr RN  Outcome: Progressing     Problem: Respiratory - Adult  Goal: Achieves optimal ventilation and oxygenation  3/14/2025 0855 by Caroline Sandoval RN  Outcome: Progressing  3/14/2025 0044 by Mirtha Packer RN  Outcome: Progressing  3/14/2025 0018 by Mirtha Packer RN  Outcome: Progressing     Problem: Metabolic/Fluid and Electrolytes - Adult  Goal: Glucose maintained within prescribed range  Outcome: Progressing     Problem: Metabolic/Fluid and Electrolytes - Adult  Goal: Electrolytes maintained within normal limits  Outcome: Progressing     Problem: ABCDS Injury Assessment  Goal: Absence of physical injury  Outcome: Progressing     Problem: Chronic Conditions and Co-morbidities  Goal: Patient's chronic conditions and co-morbidity symptoms are monitored and maintained or improved  Outcome: Progressing     Problem: Pain  Goal: Verbalizes/displays adequate comfort level or baseline comfort level  Outcome: Progressing     Problem: Skin/Tissue Integrity  Goal: Skin integrity remains intact  Description: 1.  Monitor for areas of redness and/or skin breakdown  2.  Assess vascular access sites hourly  3.  Every 4-6 hours minimum:  Change oxygen saturation probe site  4.  Every 4-6 hours:  If on nasal continuous positive airway pressure, respiratory therapy assess nares and determine need for appliance change or resting period  Outcome: Progressing     Problem: Anxiety  Goal: Will report anxiety at manageable 
  Problem: Discharge Planning  Goal: Discharge to home or other facility with appropriate resources  Outcome: Progressing     Problem: Safety - Adult  Goal: Free from fall injury  3/16/2025 1110 by Chrystal Read RN  Outcome: Progressing  3/16/2025 0023 by Alyx Reynoso RN  Outcome: Progressing     Problem: Respiratory - Adult  Goal: Achieves optimal ventilation and oxygenation  3/16/2025 1110 by Chrystal Read RN  Outcome: Progressing  3/16/2025 0023 by Alyx Reynoso RN  Outcome: Progressing     Problem: Metabolic/Fluid and Electrolytes - Adult  Goal: Glucose maintained within prescribed range  3/16/2025 1110 by Chrystal Read RN  Outcome: Progressing  3/16/2025 0023 by Alyx Reynoso RN  Outcome: Progressing  Goal: Electrolytes maintained within normal limits  Outcome: Progressing     Problem: ABCDS Injury Assessment  Goal: Absence of physical injury  Outcome: Progressing     Problem: Chronic Conditions and Co-morbidities  Goal: Patient's chronic conditions and co-morbidity symptoms are monitored and maintained or improved  Outcome: Progressing     Problem: Pain  Goal: Verbalizes/displays adequate comfort level or baseline comfort level  Outcome: Progressing     Problem: Skin/Tissue Integrity  Goal: Skin integrity remains intact  Description: 1.  Monitor for areas of redness and/or skin breakdown  2.  Assess vascular access sites hourly  3.  Every 4-6 hours minimum:  Change oxygen saturation probe site  4.  Every 4-6 hours:  If on nasal continuous positive airway pressure, respiratory therapy assess nares and determine need for appliance change or resting period  Outcome: Progressing     Problem: Anxiety  Goal: Will report anxiety at manageable levels  Description: INTERVENTIONS:  1. Administer medication as ordered  2. Teach and rehearse alternative coping skills  3. Provide emotional support with 1:1 interaction with staff  Outcome: Progressing     Problem: Coping  Goal: Pt/Family 
  Problem: Discharge Planning  Goal: Discharge to home or other facility with appropriate resources  Outcome: Progressing     Problem: Safety - Adult  Goal: Free from fall injury  Outcome: Progressing     Problem: Respiratory - Adult  Goal: Achieves optimal ventilation and oxygenation  Outcome: Progressing     
  Problem: Respiratory - Adult  Goal: Achieves optimal ventilation and oxygenation  3/16/2025 0023 by Alyx Reynoso RN  Outcome: Progressing  3/15/2025 1033 by Chrystal Read RN  Outcome: Progressing     Problem: Infection - Adult  Goal: Absence of infection at discharge  3/16/2025 0023 by Alyx Reynoso RN  Outcome: Progressing  3/15/2025 1033 by Chrystal Read RN  Outcome: Progressing     Problem: Safety - Adult  Goal: Free from fall injury  3/16/2025 0023 by Alyx Reynoso RN  Outcome: Progressing  3/15/2025 1033 by Chrystal Read RN  Outcome: Progressing     
  Problem: Respiratory - Adult  Goal: Achieves optimal ventilation and oxygenation  Outcome: Progressing     Problem: Metabolic/Fluid and Electrolytes - Adult  Goal: Glucose maintained within prescribed range  Outcome: Progressing     Problem: Safety - Adult  Goal: Free from fall injury  Outcome: Progressing     
  Problem: Safety - Adult  Goal: Free from fall injury  3/12/2025 1050 by Cornelius Ornelas RN  Outcome: Progressing  3/12/2025 0008 by Magi Galloway RN  Outcome: Progressing     Problem: Respiratory - Adult  Goal: Achieves optimal ventilation and oxygenation  3/12/2025 1050 by Cornelius Ornelas RN  Outcome: Progressing  3/12/2025 0008 by Magi Galloway RN  Outcome: Progressing     Problem: Metabolic/Fluid and Electrolytes - Adult  Goal: Glucose maintained within prescribed range  3/12/2025 1050 by Cornelius Ornelas RN  Outcome: Progressing  3/12/2025 0008 by Magi Galloway RN  Outcome: Progressing     Problem: ABCDS Injury Assessment  Goal: Absence of physical injury  Outcome: Progressing     Problem: Nutrition Deficit:  Goal: Optimize nutritional status  3/12/2025 1050 by Cornelius Ornelas RN  Outcome: Progressing  3/12/2025 1015 by Candie Baird, TAMIKA, LD  Outcome: Progressing     
  Problem: Safety - Adult  Goal: Free from fall injury  Outcome: Progressing     Problem: Respiratory - Adult  Goal: Achieves optimal ventilation and oxygenation  Outcome: Progressing     
Patient's chart updated to reflect:        - HF care plan, HF education points and HF discharge instructions.  -Orders: daily weights, I/O.  -PCP and cardiology follow up appointments to be scheduled within 7 days of hospital discharge.  -CHF education session will be provided to the patient prior to hospital discharge.    most recent EF:  Lab Results   Component Value Date    LVEF 40 02/26/2025    LVEFMODE Echo 02/26/2025       Rosy Tatum, RN MSN,RN  Heart Failure Navigator    No future appointments.       
Provide emotional support, including active listening and acknowledgement of concerns of patient and caregivers  3. Reduce environmental stimuli, as able  4. Instruct patient/family in relaxation techniques, as appropriate  5. Assess for spiritual pain/suffering and initiate Spiritual Care, Psychosocial Clinical Specialist consults as needed  Outcome: Progressing     Problem: Infection - Adult  Goal: Absence of infection at discharge  Outcome: Progressing  Goal: Absence of infection during hospitalization  Outcome: Progressing     Problem: Cardiovascular - Adult  Goal: Maintains optimal cardiac output and hemodynamic stability  Outcome: Progressing     
comorbid symptoms are exacerbated and prevent overall improvement and discharge     Problem: Pain  Goal: Verbalizes/displays adequate comfort level or baseline comfort level  Outcome: Progressing  Flowsheets (Taken 3/12/2025 1601)  Verbalizes/displays adequate comfort level or baseline comfort level: Encourage patient to monitor pain and request assistance     Problem: Skin/Tissue Integrity  Goal: Skin integrity remains intact  Description: 1.  Monitor for areas of redness and/or skin breakdown  2.  Assess vascular access sites hourly  3.  Every 4-6 hours minimum:  Change oxygen saturation probe site  4.  Every 4-6 hours:  If on nasal continuous positive airway pressure, respiratory therapy assess nares and determine need for appliance change or resting period  Outcome: Progressing     
expanders as ordered   Administer vasoactive medications as ordered     Problem: Nutrition Deficit:  Goal: Optimize nutritional status  3/12/2025 2116 by ProtomasterPepito, RN  Outcome: Completed

## 2025-03-16 NOTE — H&P
coughing, sputum production, hemoptysis, wheezing, orthopnea.  Gastrointestinal:  Nausea, vomiting, diarrhea, heartburn, constipation, abdominal pain, hematemesis, hematochezia, melena, acholic stools  Genito-Urinary:  Dysuria, urgency, frequency, hematuria  Musculoskeletal:  Joint pain, joint stiffness, joint swelling, muscle pain  Neurology:  Headache, focal neurological deficits, weakness, numbness, paresthesia  Derm:  Rashes, ulcers, excoriations, bruising  Extremities:  Decreased ROM, peripheral edema, mottling    Physical Examination  Vitals:  BP (!) 140/74   Pulse (!) 104   Temp 98 °F (36.7 °C) (Temporal)   Resp 21   Ht 1.575 m (5' 2\")   Wt 64.8 kg (142 lb 13.7 oz)   SpO2 (!) 87%   BMI 26.13 kg/m²   General Appearance:  awake, alert, and oriented to person, place, time, and purpose; appears stated age and cooperative;  Optiflow  HEENT:  NCAT; PERRL; conjunctiva pink, sclera clear  Neck:  no adenopathy, bruit, JVD, tenderness, masses, or nodules; supple, symmetrical, trachea midline, thyroid not enlarged  Lung:  clear to auscultation bilaterally; no use of accessory muscles; no rhonchi, rales, or crackles  Heart:   coarse bilaterally  Abdomen:  soft, nontender, nondistended; normoactive bowel sounds; no organomegaly  Extremities:  extremities normal, atraumatic, no cyanosis or edema  Musculokeletal:  no joint swelling, no muscle tenderness. ROM normal in all joints of extremities.   Neurologic:  mental status A&Ox3, thought content appropriate; CN II-XII grossly intact; sensation intact, motor strength 5/5 globally; no slurred speech    Laboratory Data  Recent Results (from the past 24 hours)   Basic Metabolic Panel    Collection Time: 03/15/25  5:31 PM   Result Value Ref Range    Sodium 132 132 - 146 mmol/L    Potassium 6.2 (H) 3.5 - 5.0 mmol/L    Chloride 93 (L) 98 - 107 mmol/L    CO2 29 22 - 29 mmol/L    Anion Gap 10 7 - 16 mmol/L    Glucose 288 (H) 74 - 99 mg/dL    BUN 30 (H) 6 - 23 mg/dL

## 2025-03-17 ENCOUNTER — APPOINTMENT (OUTPATIENT)
Dept: GENERAL RADIOLOGY | Age: 60
DRG: 276 | End: 2025-03-17
Attending: STUDENT IN AN ORGANIZED HEALTH CARE EDUCATION/TRAINING PROGRAM
Payer: COMMERCIAL

## 2025-03-17 ENCOUNTER — APPOINTMENT (OUTPATIENT)
Age: 60
DRG: 276 | End: 2025-03-17
Attending: INTERNAL MEDICINE
Payer: COMMERCIAL

## 2025-03-17 PROBLEM — J96.00 ACUTE RESPIRATORY FAILURE: Status: ACTIVE | Noted: 2025-03-17

## 2025-03-17 PROBLEM — I50.20 HFREF (HEART FAILURE WITH REDUCED EJECTION FRACTION) (HCC): Status: ACTIVE | Noted: 2025-03-17

## 2025-03-17 PROBLEM — I48.0 PAROXYSMAL ATRIAL FIBRILLATION (HCC): Status: ACTIVE | Noted: 2025-03-17

## 2025-03-17 PROBLEM — R06.02 SHORTNESS OF BREATH: Status: ACTIVE | Noted: 2025-03-17

## 2025-03-17 LAB
AADO2: 602.8 MMHG
ALBUMIN SERPL-MCNC: 3.9 G/DL (ref 3.5–5.2)
ALP SERPL-CCNC: 124 U/L (ref 35–104)
ALT SERPL-CCNC: 32 U/L (ref 0–32)
ANION GAP SERPL CALCULATED.3IONS-SCNC: 12 MMOL/L (ref 7–16)
ANION GAP SERPL CALCULATED.3IONS-SCNC: 14 MMOL/L (ref 7–16)
ANION GAP SERPL CALCULATED.3IONS-SCNC: 18 MMOL/L (ref 7–16)
AST SERPL-CCNC: 22 U/L (ref 0–31)
B.E.: 17.2 MMOL/L (ref -3–3)
BASOPHILS # BLD: 0.01 K/UL (ref 0–0.2)
BASOPHILS NFR BLD: 0 % (ref 0–2)
BILIRUB SERPL-MCNC: 1 MG/DL (ref 0–1.2)
BNP SERPL-MCNC: 4240 PG/ML (ref 0–125)
BUN SERPL-MCNC: 34 MG/DL (ref 6–23)
BUN SERPL-MCNC: 38 MG/DL (ref 6–23)
BUN SERPL-MCNC: 40 MG/DL (ref 6–23)
CALCIUM SERPL-MCNC: 9.5 MG/DL (ref 8.6–10.2)
CALCIUM SERPL-MCNC: 9.5 MG/DL (ref 8.6–10.2)
CALCIUM SERPL-MCNC: 9.7 MG/DL (ref 8.6–10.2)
CHLORIDE SERPL-SCNC: 79 MMOL/L (ref 98–107)
CHLORIDE SERPL-SCNC: 82 MMOL/L (ref 98–107)
CHLORIDE SERPL-SCNC: 83 MMOL/L (ref 98–107)
CO2 SERPL-SCNC: 36 MMOL/L (ref 22–29)
CO2 SERPL-SCNC: 38 MMOL/L (ref 22–29)
CO2 SERPL-SCNC: 40 MMOL/L (ref 22–29)
COHB: 1 % (ref 0–1.5)
CREAT SERPL-MCNC: 0.7 MG/DL (ref 0.5–1)
CREAT SERPL-MCNC: 0.8 MG/DL (ref 0.5–1)
CREAT SERPL-MCNC: 0.8 MG/DL (ref 0.5–1)
CRITICAL: ABNORMAL
CRP SERPL HS-MCNC: 31 MG/L (ref 0–5)
CRP SERPL HS-MCNC: 32 MG/L (ref 0–5)
DATE ANALYZED: ABNORMAL
DATE OF COLLECTION: ABNORMAL
ECHO AO ASC DIAM: 2.4 CM
ECHO AO ASCENDING AORTA INDEX: 1.45 CM/M2
ECHO AV AREA PEAK VELOCITY: 1.5 CM2
ECHO AV AREA VTI: 1.6 CM2
ECHO AV AREA/BSA PEAK VELOCITY: 0.9 CM2/M2
ECHO AV AREA/BSA VTI: 1 CM2/M2
ECHO AV CUSP MM: 1.5 CM
ECHO AV MEAN GRADIENT: 6 MMHG
ECHO AV MEAN VELOCITY: 1.1 M/S
ECHO AV PEAK GRADIENT: 10 MMHG
ECHO AV PEAK VELOCITY: 1.6 M/S
ECHO AV VELOCITY RATIO: 0.5
ECHO AV VTI: 28.7 CM
ECHO BSA: 1.68 M2
ECHO EST RA PRESSURE: 3 MMHG
ECHO LA DIAMETER INDEX: 2.48 CM/M2
ECHO LA DIAMETER: 4.1 CM
ECHO LA VOL A-L A2C: 34 ML (ref 22–52)
ECHO LA VOL A-L A4C: 63 ML (ref 22–52)
ECHO LA VOL BP: 47 ML (ref 22–52)
ECHO LA VOL MOD A2C: 33 ML (ref 22–52)
ECHO LA VOL MOD A4C: 60 ML (ref 22–52)
ECHO LA VOL/BSA BIPLANE: 28 ML/M2 (ref 16–34)
ECHO LA VOLUME AREA LENGTH: 50 ML
ECHO LA VOLUME INDEX A-L A2C: 21 ML/M2 (ref 16–34)
ECHO LA VOLUME INDEX A-L A4C: 38 ML/M2 (ref 16–34)
ECHO LA VOLUME INDEX AREA LENGTH: 30 ML/M2 (ref 16–34)
ECHO LA VOLUME INDEX MOD A2C: 20 ML/M2 (ref 16–34)
ECHO LA VOLUME INDEX MOD A4C: 36 ML/M2 (ref 16–34)
ECHO LV EDV A2C: 98 ML
ECHO LV EDV A4C: 107 ML
ECHO LV EDV BP: 104 ML (ref 56–104)
ECHO LV EDV INDEX A4C: 65 ML/M2
ECHO LV EDV INDEX BP: 63 ML/M2
ECHO LV EDV NDEX A2C: 59 ML/M2
ECHO LV EF PHYSICIAN: 30 %
ECHO LV EJECTION FRACTION A2C: 42 %
ECHO LV EJECTION FRACTION A4C: 35 %
ECHO LV EJECTION FRACTION BIPLANE: 39 % (ref 55–100)
ECHO LV ESV A2C: 57 ML
ECHO LV ESV A4C: 69 ML
ECHO LV ESV BP: 63 ML (ref 19–49)
ECHO LV ESV INDEX A2C: 35 ML/M2
ECHO LV ESV INDEX A4C: 42 ML/M2
ECHO LV ESV INDEX BP: 38 ML/M2
ECHO LV FRACTIONAL SHORTENING: 18 % (ref 28–44)
ECHO LV INTERNAL DIMENSION DIASTOLE INDEX: 2.67 CM/M2
ECHO LV INTERNAL DIMENSION DIASTOLIC: 4.4 CM (ref 3.9–5.3)
ECHO LV INTERNAL DIMENSION SYSTOLIC INDEX: 2.18 CM/M2
ECHO LV INTERNAL DIMENSION SYSTOLIC: 3.6 CM
ECHO LV IVSD: 1 CM (ref 0.6–0.9)
ECHO LV IVSS: 1 CM
ECHO LV MASS 2D: 147.8 G (ref 67–162)
ECHO LV MASS INDEX 2D: 89.6 G/M2 (ref 43–95)
ECHO LV POSTERIOR WALL DIASTOLIC: 1 CM (ref 0.6–0.9)
ECHO LV POSTERIOR WALL SYSTOLIC: 1.1 CM
ECHO LV RELATIVE WALL THICKNESS RATIO: 0.45
ECHO LVOT AREA: 3.1 CM2
ECHO LVOT AV VTI INDEX: 0.5
ECHO LVOT DIAM: 2 CM
ECHO LVOT MEAN GRADIENT: 1 MMHG
ECHO LVOT PEAK GRADIENT: 2 MMHG
ECHO LVOT PEAK VELOCITY: 0.8 M/S
ECHO LVOT STROKE VOLUME INDEX: 27.4 ML/M2
ECHO LVOT SV: 45.2 ML
ECHO LVOT VTI: 14.4 CM
ECHO MV "A" WAVE DURATION: 122.7 MSEC
ECHO MV A VELOCITY: 0.72 M/S
ECHO MV AREA PHT: 5.4 CM2
ECHO MV AREA VTI: 1.9 CM2
ECHO MV E DECELERATION TIME (DT): 174.7 MS
ECHO MV E VELOCITY: 0.94 M/S
ECHO MV E/A RATIO: 1.31
ECHO MV LVOT VTI INDEX: 1.65
ECHO MV MAX VELOCITY: 1.2 M/S
ECHO MV MEAN GRADIENT: 2 MMHG
ECHO MV MEAN VELOCITY: 0.7 M/S
ECHO MV PEAK GRADIENT: 5 MMHG
ECHO MV PRESSURE HALF TIME (PHT): 40.5 MS
ECHO MV VTI: 23.8 CM
ECHO PULMONARY ARTERY SYSTOLIC PRESSURE (PASP): 39 MMHG
ECHO PV MAX VELOCITY: 0.9 M/S
ECHO PV MEAN GRADIENT: 2 MMHG
ECHO PV MEAN VELOCITY: 0.6 M/S
ECHO PV PEAK GRADIENT: 3 MMHG
ECHO PV VTI: 16.7 CM
ECHO PVEIN A DURATION: 85.6 MS
ECHO PVEIN A VELOCITY: 0.2 M/S
ECHO PVEIN PEAK D VELOCITY: 0.5 M/S
ECHO PVEIN PEAK S VELOCITY: 0.3 M/S
ECHO PVEIN S/D RATIO: 0.6
ECHO RIGHT VENTRICULAR SYSTOLIC PRESSURE (RVSP): 39 MMHG
ECHO RV INTERNAL DIMENSION: 2.5 CM
ECHO TV REGURGITANT MAX VELOCITY: 3.01 M/S
ECHO TV REGURGITANT PEAK GRADIENT: 36 MMHG
EOSINOPHIL # BLD: 0 K/UL (ref 0.05–0.5)
EOSINOPHILS RELATIVE PERCENT: 0 % (ref 0–6)
ERYTHROCYTE [DISTWIDTH] IN BLOOD BY AUTOMATED COUNT: 13.7 % (ref 11.5–15)
ERYTHROCYTE [DISTWIDTH] IN BLOOD BY AUTOMATED COUNT: 13.9 % (ref 11.5–15)
ERYTHROCYTE [SEDIMENTATION RATE] IN BLOOD BY WESTERGREN METHOD: 57 MM/HR (ref 0–20)
FIO2: 100 %
GFR, ESTIMATED: 81 ML/MIN/1.73M2
GFR, ESTIMATED: 81 ML/MIN/1.73M2
GFR, ESTIMATED: >90 ML/MIN/1.73M2
GLUCOSE BLD-MCNC: 190 MG/DL (ref 74–99)
GLUCOSE BLD-MCNC: 268 MG/DL (ref 74–99)
GLUCOSE BLD-MCNC: 274 MG/DL (ref 74–99)
GLUCOSE BLD-MCNC: 279 MG/DL (ref 74–99)
GLUCOSE SERPL-MCNC: 148 MG/DL (ref 74–99)
GLUCOSE SERPL-MCNC: 195 MG/DL (ref 74–99)
GLUCOSE SERPL-MCNC: 278 MG/DL (ref 74–99)
HCO3: 43.2 MMOL/L (ref 22–26)
HCT VFR BLD AUTO: 39.7 % (ref 34–48)
HCT VFR BLD AUTO: 42.5 % (ref 34–48)
HGB BLD-MCNC: 13.8 G/DL (ref 11.5–15.5)
HGB BLD-MCNC: 14.6 G/DL (ref 11.5–15.5)
HHB: 10.5 % (ref 0–5)
IMM GRANULOCYTES # BLD AUTO: 0.06 K/UL (ref 0–0.58)
IMM GRANULOCYTES NFR BLD: 0 % (ref 0–5)
INR PPP: 1.1
LAB: ABNORMAL
LYMPHOCYTES NFR BLD: 0.65 K/UL (ref 1.5–4)
LYMPHOCYTES RELATIVE PERCENT: 5 % (ref 20–42)
Lab: 1210
MAGNESIUM SERPL-MCNC: 2.3 MG/DL (ref 1.6–2.6)
MAGNESIUM SERPL-MCNC: 3 MG/DL (ref 1.6–2.6)
MCH RBC QN AUTO: 30.9 PG (ref 26–35)
MCH RBC QN AUTO: 31.4 PG (ref 26–35)
MCHC RBC AUTO-ENTMCNC: 34.4 G/DL (ref 32–34.5)
MCHC RBC AUTO-ENTMCNC: 34.8 G/DL (ref 32–34.5)
MCV RBC AUTO: 89.9 FL (ref 80–99.9)
MCV RBC AUTO: 90.4 FL (ref 80–99.9)
METHB: 0.2 % (ref 0–1.5)
MODE: ABNORMAL
MONOCYTES NFR BLD: 0.76 K/UL (ref 0.1–0.95)
MONOCYTES NFR BLD: 5 % (ref 2–12)
NEUTROPHILS NFR BLD: 90 % (ref 43–80)
NEUTS SEG NFR BLD: 12.8 K/UL (ref 1.8–7.3)
O2 SATURATION: 89.4 % (ref 92–98.5)
O2HB: 88.3 % (ref 94–97)
OPERATOR ID: 7490
PATIENT TEMP: 37 C
PCO2: 55.4 MMHG (ref 35–45)
PFO2: 0.55 MMHG/%
PH BLOOD GAS: 7.51 (ref 7.35–7.45)
PHOSPHATE SERPL-MCNC: 4.7 MG/DL (ref 2.5–4.5)
PLATELET # BLD AUTO: 326 K/UL (ref 130–450)
PLATELET # BLD AUTO: 352 K/UL (ref 130–450)
PMV BLD AUTO: 10.3 FL (ref 7–12)
PMV BLD AUTO: 10.5 FL (ref 7–12)
PO2: 54.8 MMHG (ref 75–100)
POTASSIUM SERPL-SCNC: 3.1 MMOL/L (ref 3.5–5)
POTASSIUM SERPL-SCNC: 3.6 MMOL/L (ref 3.5–5)
POTASSIUM SERPL-SCNC: 3.7 MMOL/L (ref 3.5–5)
PROCALCITONIN SERPL-MCNC: 0.09 NG/ML (ref 0–0.08)
PROT SERPL-MCNC: 7.2 G/DL (ref 6.4–8.3)
PROTHROMBIN TIME: 11.8 SEC (ref 9.3–12.4)
RBC # BLD AUTO: 4.39 M/UL (ref 3.5–5.5)
RBC # BLD AUTO: 4.73 M/UL (ref 3.5–5.5)
RI(T): 11
SODIUM SERPL-SCNC: 130 MMOL/L (ref 132–146)
SODIUM SERPL-SCNC: 133 MMOL/L (ref 132–146)
SODIUM SERPL-SCNC: 139 MMOL/L (ref 132–146)
SOURCE, BLOOD GAS: ABNORMAL
THB: 14.6 G/DL (ref 11.5–16.5)
TIME ANALYZED: 1214
WBC OTHER # BLD: 11.9 K/UL (ref 4.5–11.5)
WBC OTHER # BLD: 14.3 K/UL (ref 4.5–11.5)

## 2025-03-17 PROCEDURE — 2000000000 HC ICU R&B

## 2025-03-17 PROCEDURE — 6370000000 HC RX 637 (ALT 250 FOR IP): Performed by: STUDENT IN AN ORGANIZED HEALTH CARE EDUCATION/TRAINING PROGRAM

## 2025-03-17 PROCEDURE — 86140 C-REACTIVE PROTEIN: CPT

## 2025-03-17 PROCEDURE — 6360000002 HC RX W HCPCS: Performed by: STUDENT IN AN ORGANIZED HEALTH CARE EDUCATION/TRAINING PROGRAM

## 2025-03-17 PROCEDURE — 85610 PROTHROMBIN TIME: CPT

## 2025-03-17 PROCEDURE — 85652 RBC SED RATE AUTOMATED: CPT

## 2025-03-17 PROCEDURE — 99254 IP/OBS CNSLTJ NEW/EST MOD 60: CPT | Performed by: INTERNAL MEDICINE

## 2025-03-17 PROCEDURE — APPSS60 APP SPLIT SHARED TIME 46-60 MINUTES

## 2025-03-17 PROCEDURE — 84145 PROCALCITONIN (PCT): CPT

## 2025-03-17 PROCEDURE — 82805 BLOOD GASES W/O2 SATURATION: CPT

## 2025-03-17 PROCEDURE — 84100 ASSAY OF PHOSPHORUS: CPT

## 2025-03-17 PROCEDURE — 83735 ASSAY OF MAGNESIUM: CPT

## 2025-03-17 PROCEDURE — 99291 CRITICAL CARE FIRST HOUR: CPT | Performed by: STUDENT IN AN ORGANIZED HEALTH CARE EDUCATION/TRAINING PROGRAM

## 2025-03-17 PROCEDURE — 94640 AIRWAY INHALATION TREATMENT: CPT

## 2025-03-17 PROCEDURE — 82962 GLUCOSE BLOOD TEST: CPT

## 2025-03-17 PROCEDURE — 94660 CPAP INITIATION&MGMT: CPT

## 2025-03-17 PROCEDURE — 6370000000 HC RX 637 (ALT 250 FOR IP): Performed by: INTERNAL MEDICINE

## 2025-03-17 PROCEDURE — 85025 COMPLETE CBC W/AUTO DIFF WBC: CPT

## 2025-03-17 PROCEDURE — 2500000003 HC RX 250 WO HCPCS: Performed by: NURSE PRACTITIONER

## 2025-03-17 PROCEDURE — 2500000003 HC RX 250 WO HCPCS: Performed by: STUDENT IN AN ORGANIZED HEALTH CARE EDUCATION/TRAINING PROGRAM

## 2025-03-17 PROCEDURE — 2700000000 HC OXYGEN THERAPY PER DAY

## 2025-03-17 PROCEDURE — 83880 ASSAY OF NATRIURETIC PEPTIDE: CPT

## 2025-03-17 PROCEDURE — 6360000002 HC RX W HCPCS: Performed by: NURSE PRACTITIONER

## 2025-03-17 PROCEDURE — 85027 COMPLETE CBC AUTOMATED: CPT

## 2025-03-17 PROCEDURE — 80048 BASIC METABOLIC PNL TOTAL CA: CPT

## 2025-03-17 PROCEDURE — 93306 TTE W/DOPPLER COMPLETE: CPT

## 2025-03-17 PROCEDURE — 71045 X-RAY EXAM CHEST 1 VIEW: CPT

## 2025-03-17 PROCEDURE — 94669 MECHANICAL CHEST WALL OSCILL: CPT

## 2025-03-17 PROCEDURE — 2500000003 HC RX 250 WO HCPCS: Performed by: INTERNAL MEDICINE

## 2025-03-17 PROCEDURE — 80053 COMPREHEN METABOLIC PANEL: CPT

## 2025-03-17 PROCEDURE — 6360000002 HC RX W HCPCS: Performed by: INTERNAL MEDICINE

## 2025-03-17 PROCEDURE — 99291 CRITICAL CARE FIRST HOUR: CPT | Performed by: INTERNAL MEDICINE

## 2025-03-17 PROCEDURE — 99233 SBSQ HOSP IP/OBS HIGH 50: CPT | Performed by: INTERNAL MEDICINE

## 2025-03-17 PROCEDURE — 2580000003 HC RX 258: Performed by: STUDENT IN AN ORGANIZED HEALTH CARE EDUCATION/TRAINING PROGRAM

## 2025-03-17 RX ORDER — METOPROLOL SUCCINATE 50 MG/1
25 TABLET, EXTENDED RELEASE ORAL DAILY
Status: DISCONTINUED | OUTPATIENT
Start: 2025-03-17 | End: 2025-03-25 | Stop reason: HOSPADM

## 2025-03-17 RX ORDER — MAGNESIUM SULFATE IN WATER 40 MG/ML
2000 INJECTION, SOLUTION INTRAVENOUS PRN
Status: DISCONTINUED | OUTPATIENT
Start: 2025-03-17 | End: 2025-03-25 | Stop reason: HOSPADM

## 2025-03-17 RX ORDER — POTASSIUM CHLORIDE 7.45 MG/ML
10 INJECTION INTRAVENOUS
Status: DISPENSED | OUTPATIENT
Start: 2025-03-17 | End: 2025-03-17

## 2025-03-17 RX ORDER — POTASSIUM CHLORIDE 7.45 MG/ML
10 INJECTION INTRAVENOUS PRN
Status: DISCONTINUED | OUTPATIENT
Start: 2025-03-17 | End: 2025-03-25 | Stop reason: HOSPADM

## 2025-03-17 RX ADMIN — ROPINIROLE HYDROCHLORIDE 1 MG: 2 TABLET, FILM COATED ORAL at 07:49

## 2025-03-17 RX ADMIN — INSULIN GLARGINE 15 UNITS: 100 INJECTION, SOLUTION SUBCUTANEOUS at 20:25

## 2025-03-17 RX ADMIN — BUDESONIDE INHALATION 500 MCG: 0.5 SUSPENSION RESPIRATORY (INHALATION) at 08:26

## 2025-03-17 RX ADMIN — INSULIN LISPRO 4 UNITS: 100 INJECTION, SOLUTION INTRAVENOUS; SUBCUTANEOUS at 20:26

## 2025-03-17 RX ADMIN — ARFORMOTEROL TARTRATE 15 MCG: 15 SOLUTION RESPIRATORY (INHALATION) at 08:26

## 2025-03-17 RX ADMIN — POTASSIUM CHLORIDE 10 MEQ: 7.46 INJECTION, SOLUTION INTRAVENOUS at 13:29

## 2025-03-17 RX ADMIN — CITALOPRAM HYDROBROMIDE 40 MG: 20 TABLET ORAL at 07:49

## 2025-03-17 RX ADMIN — IPRATROPIUM BROMIDE AND ALBUTEROL SULFATE 1 DOSE: 2.5; .5 SOLUTION RESPIRATORY (INHALATION) at 19:52

## 2025-03-17 RX ADMIN — INSULIN GLARGINE 15 UNITS: 100 INJECTION, SOLUTION SUBCUTANEOUS at 07:49

## 2025-03-17 RX ADMIN — WATER 40 MG: 1 INJECTION INTRAMUSCULAR; INTRAVENOUS; SUBCUTANEOUS at 06:12

## 2025-03-17 RX ADMIN — FENTANYL CITRATE 50 MCG: 50 INJECTION INTRAMUSCULAR; INTRAVENOUS at 18:23

## 2025-03-17 RX ADMIN — ACETAZOLAMIDE SODIUM 250 MG: 500 INJECTION, POWDER, LYOPHILIZED, FOR SOLUTION INTRAVENOUS at 09:41

## 2025-03-17 RX ADMIN — WATER 40 MG: 1 INJECTION INTRAMUSCULAR; INTRAVENOUS; SUBCUTANEOUS at 18:44

## 2025-03-17 RX ADMIN — POTASSIUM CHLORIDE 10 MEQ: 7.46 INJECTION, SOLUTION INTRAVENOUS at 16:12

## 2025-03-17 RX ADMIN — POTASSIUM BICARBONATE 40 MEQ: 782 TABLET, EFFERVESCENT ORAL at 20:25

## 2025-03-17 RX ADMIN — ARFORMOTEROL TARTRATE 15 MCG: 15 SOLUTION RESPIRATORY (INHALATION) at 19:52

## 2025-03-17 RX ADMIN — POTASSIUM BICARBONATE 40 MEQ: 782 TABLET, EFFERVESCENT ORAL at 10:04

## 2025-03-17 RX ADMIN — FUROSEMIDE 5 MG/HR: 10 INJECTION, SOLUTION INTRAVENOUS at 01:50

## 2025-03-17 RX ADMIN — POTASSIUM CHLORIDE 10 MEQ: 7.46 INJECTION, SOLUTION INTRAVENOUS at 12:07

## 2025-03-17 RX ADMIN — ROPINIROLE HYDROCHLORIDE 3 MG: 2 TABLET, FILM COATED ORAL at 20:25

## 2025-03-17 RX ADMIN — POTASSIUM CHLORIDE 10 MEQ: 7.46 INJECTION, SOLUTION INTRAVENOUS at 06:23

## 2025-03-17 RX ADMIN — SODIUM CHLORIDE, PRESERVATIVE FREE 10 ML: 5 INJECTION INTRAVENOUS at 20:25

## 2025-03-17 RX ADMIN — IPRATROPIUM BROMIDE AND ALBUTEROL SULFATE 1 DOSE: 2.5; .5 SOLUTION RESPIRATORY (INHALATION) at 15:34

## 2025-03-17 RX ADMIN — ATORVASTATIN CALCIUM 80 MG: 40 TABLET, FILM COATED ORAL at 20:25

## 2025-03-17 RX ADMIN — ROPINIROLE HYDROCHLORIDE 1 MG: 2 TABLET, FILM COATED ORAL at 16:10

## 2025-03-17 RX ADMIN — IPRATROPIUM BROMIDE AND ALBUTEROL SULFATE 1 DOSE: 2.5; .5 SOLUTION RESPIRATORY (INHALATION) at 08:26

## 2025-03-17 RX ADMIN — INSULIN LISPRO 4 UNITS: 100 INJECTION, SOLUTION INTRAVENOUS; SUBCUTANEOUS at 17:47

## 2025-03-17 RX ADMIN — INSULIN LISPRO 4 UNITS: 100 INJECTION, SOLUTION INTRAVENOUS; SUBCUTANEOUS at 12:11

## 2025-03-17 RX ADMIN — FENTANYL CITRATE 50 MCG: 50 INJECTION INTRAMUSCULAR; INTRAVENOUS at 23:17

## 2025-03-17 RX ADMIN — LEVOTHYROXINE SODIUM 200 MCG: 0.2 TABLET ORAL at 06:12

## 2025-03-17 RX ADMIN — METOLAZONE 2.5 MG: 2.5 TABLET ORAL at 07:49

## 2025-03-17 RX ADMIN — GABAPENTIN 400 MG: 100 CAPSULE ORAL at 20:25

## 2025-03-17 RX ADMIN — IPRATROPIUM BROMIDE AND ALBUTEROL SULFATE 1 DOSE: 2.5; .5 SOLUTION RESPIRATORY (INHALATION) at 11:42

## 2025-03-17 RX ADMIN — FENTANYL CITRATE 50 MCG: 50 INJECTION INTRAMUSCULAR; INTRAVENOUS at 09:41

## 2025-03-17 RX ADMIN — SODIUM CHLORIDE, PRESERVATIVE FREE 10 ML: 5 INJECTION INTRAVENOUS at 07:51

## 2025-03-17 RX ADMIN — BUDESONIDE INHALATION 500 MCG: 0.5 SUSPENSION RESPIRATORY (INHALATION) at 19:52

## 2025-03-17 RX ADMIN — GABAPENTIN 400 MG: 100 CAPSULE ORAL at 07:49

## 2025-03-17 RX ADMIN — FENTANYL CITRATE 50 MCG: 50 INJECTION INTRAMUSCULAR; INTRAVENOUS at 04:09

## 2025-03-17 RX ADMIN — INSULIN LISPRO 2 UNITS: 100 INJECTION, SOLUTION INTRAVENOUS; SUBCUTANEOUS at 06:12

## 2025-03-17 RX ADMIN — FENTANYL CITRATE 50 MCG: 50 INJECTION INTRAMUSCULAR; INTRAVENOUS at 13:23

## 2025-03-17 ASSESSMENT — PAIN SCALES - GENERAL
PAINLEVEL_OUTOF10: 7
PAINLEVEL_OUTOF10: 5
PAINLEVEL_OUTOF10: 6
PAINLEVEL_OUTOF10: 0
PAINLEVEL_OUTOF10: 10
PAINLEVEL_OUTOF10: 5
PAINLEVEL_OUTOF10: 3
PAINLEVEL_OUTOF10: 7
PAINLEVEL_OUTOF10: 0
PAINLEVEL_OUTOF10: 6

## 2025-03-17 ASSESSMENT — PAIN DESCRIPTION - LOCATION
LOCATION: RIB CAGE
LOCATION: ABDOMEN
LOCATION: ABDOMEN
LOCATION: LEG
LOCATION: ABDOMEN

## 2025-03-17 ASSESSMENT — PAIN DESCRIPTION - ORIENTATION
ORIENTATION: LEFT;RIGHT
ORIENTATION: LEFT
ORIENTATION: LEFT;MID
ORIENTATION: LEFT
ORIENTATION: LEFT;MID
ORIENTATION: RIGHT;LEFT
ORIENTATION: LEFT;RIGHT

## 2025-03-17 ASSESSMENT — PAIN DESCRIPTION - FREQUENCY: FREQUENCY: CONTINUOUS

## 2025-03-17 ASSESSMENT — PAIN DESCRIPTION - DESCRIPTORS
DESCRIPTORS: ACHING;THROBBING;BURNING
DESCRIPTORS: ACHING;SORE;DISCOMFORT
DESCRIPTORS: ACHING;CRAMPING;DISCOMFORT
DESCRIPTORS: ACHING;THROBBING;BURNING
DESCRIPTORS: ACHING;DISCOMFORT;CRAMPING
DESCRIPTORS: ACHING;CRAMPING;BURNING
DESCRIPTORS: ACHING;DISCOMFORT

## 2025-03-17 ASSESSMENT — PAIN - FUNCTIONAL ASSESSMENT
PAIN_FUNCTIONAL_ASSESSMENT: ACTIVITIES ARE NOT PREVENTED
PAIN_FUNCTIONAL_ASSESSMENT: PREVENTS OR INTERFERES SOME ACTIVE ACTIVITIES AND ADLS
PAIN_FUNCTIONAL_ASSESSMENT: ACTIVITIES ARE NOT PREVENTED

## 2025-03-17 ASSESSMENT — PAIN DESCRIPTION - ONSET: ONSET: ON-GOING

## 2025-03-17 NOTE — CARE COORDINATION
Case Management Assessment  Initial Evaluation    Date/Time of Evaluation: 3/17/2025 4:02 PM  Assessment Completed by: Tomeka rGeen RN    If patient is discharged prior to next notation, then this note serves as note for discharge by case management.    Patient Name: Debra Maher                   YOB: 1965  Diagnosis: Syncope [R55]  Acute respiratory failure (HCC) [J96.00]                   Date / Time: 3/16/2025  2:08 PM    Patient Admission Status: Inpatient   Readmission Risk (Low < 19, Mod (19-27), High > 27): Readmission Risk Score: 16.4    Current PCP: Wolf Hernandez, DO  PCP verified by CM? Yes    Chart Reviewed: Yes      History Provided by: Patient  Patient Orientation: Alert and Oriented    Patient Cognition: Alert    Hospitalization in the last 30 days (Readmission):  Yes    If yes, Readmission Assessment in CM Navigator will be completed.    Advance Directives:      Code Status: Full Code   Patient's Primary Decision Maker is: Patient Declined (Legal Next of Kin Remains as Decision Maker)      Discharge Planning:    Patient lives with: Alone Type of Home: House  Primary Care Giver: Self  Patient Support Systems include: Friends/Neighbors   Current Financial resources:    Current community resources:    Current services prior to admission: None            Current DME:              Type of Home Care services:  None    ADLS  Prior functional level: Independent in ADLs/IADLs  Current functional level:      PT AM-PAC:   /24  OT AM-PAC:   /24    Family can provide assistance at DC: No  Would you like Case Management to discuss the discharge plan with any other family members/significant others, and if so, who? No  Plans to Return to Present Housing: Yes  Other Identified Issues/Barriers to RETURNING to current housing: dc  Potential Assistance needed at discharge: N/A            Potential DME:    Patient expects to discharge to: House  Plan for transportation at discharge:
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
## 2025-03-17 NOTE — PROCEDURES
PROCEDURE NOTE  Date: 3/17/2025   Name: Debra Maher  YOB: 1965    Procedures: Right Thoracentesis  Discussed patient and IR procedure with bedside RN, all questions answered. Need PT/INR drawn and within parameters, will call if able to send for patient.Patient is an add on case.

## 2025-03-18 ENCOUNTER — APPOINTMENT (OUTPATIENT)
Dept: INTERVENTIONAL RADIOLOGY/VASCULAR | Age: 60
DRG: 276 | End: 2025-03-18
Attending: STUDENT IN AN ORGANIZED HEALTH CARE EDUCATION/TRAINING PROGRAM
Payer: COMMERCIAL

## 2025-03-18 ENCOUNTER — APPOINTMENT (OUTPATIENT)
Dept: GENERAL RADIOLOGY | Age: 60
DRG: 276 | End: 2025-03-18
Attending: STUDENT IN AN ORGANIZED HEALTH CARE EDUCATION/TRAINING PROGRAM
Payer: COMMERCIAL

## 2025-03-18 LAB
ALBUMIN SERPL-MCNC: 4.1 G/DL (ref 3.5–5.2)
ALP SERPL-CCNC: 108 U/L (ref 35–104)
ALT SERPL-CCNC: 27 U/L (ref 0–32)
ANION GAP SERPL CALCULATED.3IONS-SCNC: 14 MMOL/L (ref 7–16)
ANION GAP SERPL CALCULATED.3IONS-SCNC: 16 MMOL/L (ref 7–16)
ANION GAP SERPL CALCULATED.3IONS-SCNC: 17 MMOL/L (ref 7–16)
APPEARANCE FLD: NORMAL
AST SERPL-CCNC: 15 U/L (ref 0–31)
BASOPHILS # BLD: 0.01 K/UL (ref 0–0.2)
BASOPHILS NFR BLD: 0 % (ref 0–2)
BILIRUB SERPL-MCNC: 1 MG/DL (ref 0–1.2)
BODY FLD TYPE: NORMAL
BUN SERPL-MCNC: 36 MG/DL (ref 6–23)
BUN SERPL-MCNC: 38 MG/DL (ref 6–23)
BUN SERPL-MCNC: 41 MG/DL (ref 6–23)
CALCIUM SERPL-MCNC: 9.6 MG/DL (ref 8.6–10.2)
CALCIUM SERPL-MCNC: 9.7 MG/DL (ref 8.6–10.2)
CALCIUM SERPL-MCNC: 9.8 MG/DL (ref 8.6–10.2)
CHLORIDE SERPL-SCNC: 84 MMOL/L (ref 98–107)
CHLORIDE SERPL-SCNC: 84 MMOL/L (ref 98–107)
CHLORIDE SERPL-SCNC: 85 MMOL/L (ref 98–107)
CHOLEST SERPL-MCNC: 145 MG/DL
CLOT CHECK: NORMAL
CO2 SERPL-SCNC: 29 MMOL/L (ref 22–29)
CO2 SERPL-SCNC: 32 MMOL/L (ref 22–29)
CO2 SERPL-SCNC: 34 MMOL/L (ref 22–29)
COLOR FLD: YELLOW
CREAT SERPL-MCNC: 0.7 MG/DL (ref 0.5–1)
EOSINOPHIL # BLD: 0 K/UL (ref 0.05–0.5)
EOSINOPHILS RELATIVE PERCENT: 0 % (ref 0–6)
ERYTHROCYTE [DISTWIDTH] IN BLOOD BY AUTOMATED COUNT: 13.7 % (ref 11.5–15)
GFR, ESTIMATED: >90 ML/MIN/1.73M2
GLUCOSE BLD-MCNC: 161 MG/DL (ref 74–99)
GLUCOSE BLD-MCNC: 203 MG/DL (ref 74–99)
GLUCOSE BLD-MCNC: 209 MG/DL (ref 74–99)
GLUCOSE BLD-MCNC: 331 MG/DL (ref 74–99)
GLUCOSE BLD-MCNC: 338 MG/DL (ref 74–99)
GLUCOSE FLD-MCNC: 160 MG/DL
GLUCOSE SERPL-MCNC: 158 MG/DL (ref 74–99)
GLUCOSE SERPL-MCNC: 177 MG/DL (ref 74–99)
GLUCOSE SERPL-MCNC: 215 MG/DL (ref 74–99)
HCT VFR BLD AUTO: 39.6 % (ref 34–48)
HDLC SERPL-MCNC: 56 MG/DL
HGB BLD-MCNC: 13.7 G/DL (ref 11.5–15.5)
IMM GRANULOCYTES # BLD AUTO: 0.06 K/UL (ref 0–0.58)
IMM GRANULOCYTES NFR BLD: 1 % (ref 0–5)
INR PPP: 1
LDH FLD L TO P-CCNC: 151 U/L
LDLC SERPL CALC-MCNC: 71 MG/DL
LYMPHOCYTES NFR BLD: 0.94 K/UL (ref 1.5–4)
LYMPHOCYTES RELATIVE PERCENT: 9 % (ref 20–42)
MAGNESIUM SERPL-MCNC: 2.1 MG/DL (ref 1.6–2.6)
MCH RBC QN AUTO: 31.1 PG (ref 26–35)
MCHC RBC AUTO-ENTMCNC: 34.6 G/DL (ref 32–34.5)
MCV RBC AUTO: 89.8 FL (ref 80–99.9)
MONOCYTES NFR BLD: 0.73 K/UL (ref 0.1–0.95)
MONOCYTES NFR BLD: 7 % (ref 2–12)
MONOCYTES NFR FLD: 90 %
NEUTROPHILS NFR BLD: 83 % (ref 43–80)
NEUTROPHILS NFR FLD: 11 %
NEUTS SEG NFR BLD: 8.41 K/UL (ref 1.8–7.3)
PARTIAL THROMBOPLASTIN TIME: 27 SEC (ref 24.5–35.1)
PHOSPHATE SERPL-MCNC: 3.8 MG/DL (ref 2.5–4.5)
PLATELET # BLD AUTO: 327 K/UL (ref 130–450)
PMV BLD AUTO: 10.1 FL (ref 7–12)
POTASSIUM SERPL-SCNC: 3 MMOL/L (ref 3.5–5)
POTASSIUM SERPL-SCNC: 3.6 MMOL/L (ref 3.5–5)
POTASSIUM SERPL-SCNC: 3.7 MMOL/L (ref 3.5–5)
PROT FLD-MCNC: 3 G/DL
PROT SERPL-MCNC: 7.1 G/DL (ref 6.4–8.3)
PROTHROMBIN TIME: 11.3 SEC (ref 9.3–12.4)
RBC # BLD AUTO: 4.41 M/UL (ref 3.5–5.5)
RBC # FLD: <2000 CELLS/UL
SODIUM SERPL-SCNC: 131 MMOL/L (ref 132–146)
SODIUM SERPL-SCNC: 132 MMOL/L (ref 132–146)
SODIUM SERPL-SCNC: 132 MMOL/L (ref 132–146)
SPECIMEN TYPE: NORMAL
T4 FREE SERPL-MCNC: 1.6 NG/DL (ref 0.9–1.7)
TRIGL SERPL-MCNC: 89 MG/DL
TSH SERPL DL<=0.05 MIU/L-ACNC: 2.01 UIU/ML (ref 0.27–4.2)
VLDLC SERPL CALC-MCNC: 18 MG/DL
WBC # FLD: 230 CELLS/UL
WBC OTHER # BLD: 10.2 K/UL (ref 4.5–11.5)

## 2025-03-18 PROCEDURE — 6360000002 HC RX W HCPCS: Performed by: INTERNAL MEDICINE

## 2025-03-18 PROCEDURE — 84439 ASSAY OF FREE THYROXINE: CPT

## 2025-03-18 PROCEDURE — 6360000002 HC RX W HCPCS: Performed by: STUDENT IN AN ORGANIZED HEALTH CARE EDUCATION/TRAINING PROGRAM

## 2025-03-18 PROCEDURE — 85025 COMPLETE CBC W/AUTO DIFF WBC: CPT

## 2025-03-18 PROCEDURE — 85730 THROMBOPLASTIN TIME PARTIAL: CPT

## 2025-03-18 PROCEDURE — 0W993ZZ DRAINAGE OF RIGHT PLEURAL CAVITY, PERCUTANEOUS APPROACH: ICD-10-PCS | Performed by: PHYSICIAN ASSISTANT

## 2025-03-18 PROCEDURE — 99232 SBSQ HOSP IP/OBS MODERATE 35: CPT | Performed by: INTERNAL MEDICINE

## 2025-03-18 PROCEDURE — 82962 GLUCOSE BLOOD TEST: CPT

## 2025-03-18 PROCEDURE — 94669 MECHANICAL CHEST WALL OSCILL: CPT

## 2025-03-18 PROCEDURE — 83615 LACTATE (LD) (LDH) ENZYME: CPT

## 2025-03-18 PROCEDURE — 87070 CULTURE OTHR SPECIMN AEROBIC: CPT

## 2025-03-18 PROCEDURE — 83986 ASSAY PH BODY FLUID NOS: CPT

## 2025-03-18 PROCEDURE — 2500000003 HC RX 250 WO HCPCS: Performed by: NURSE PRACTITIONER

## 2025-03-18 PROCEDURE — 2700000000 HC OXYGEN THERAPY PER DAY

## 2025-03-18 PROCEDURE — 99291 CRITICAL CARE FIRST HOUR: CPT | Performed by: INTERNAL MEDICINE

## 2025-03-18 PROCEDURE — 6370000000 HC RX 637 (ALT 250 FOR IP): Performed by: STUDENT IN AN ORGANIZED HEALTH CARE EDUCATION/TRAINING PROGRAM

## 2025-03-18 PROCEDURE — 6370000000 HC RX 637 (ALT 250 FOR IP)

## 2025-03-18 PROCEDURE — 32555 ASPIRATE PLEURA W/ IMAGING: CPT

## 2025-03-18 PROCEDURE — 84443 ASSAY THYROID STIM HORMONE: CPT

## 2025-03-18 PROCEDURE — 80053 COMPREHEN METABOLIC PANEL: CPT

## 2025-03-18 PROCEDURE — 71045 X-RAY EXAM CHEST 1 VIEW: CPT

## 2025-03-18 PROCEDURE — 84100 ASSAY OF PHOSPHORUS: CPT

## 2025-03-18 PROCEDURE — 2500000003 HC RX 250 WO HCPCS: Performed by: STUDENT IN AN ORGANIZED HEALTH CARE EDUCATION/TRAINING PROGRAM

## 2025-03-18 PROCEDURE — 85610 PROTHROMBIN TIME: CPT

## 2025-03-18 PROCEDURE — 2000000000 HC ICU R&B

## 2025-03-18 PROCEDURE — 82945 GLUCOSE OTHER FLUID: CPT

## 2025-03-18 PROCEDURE — 89051 BODY FLUID CELL COUNT: CPT

## 2025-03-18 PROCEDURE — 94640 AIRWAY INHALATION TREATMENT: CPT

## 2025-03-18 PROCEDURE — C1729 CATH, DRAINAGE: HCPCS

## 2025-03-18 PROCEDURE — 83735 ASSAY OF MAGNESIUM: CPT

## 2025-03-18 PROCEDURE — 99233 SBSQ HOSP IP/OBS HIGH 50: CPT | Performed by: STUDENT IN AN ORGANIZED HEALTH CARE EDUCATION/TRAINING PROGRAM

## 2025-03-18 PROCEDURE — 87205 SMEAR GRAM STAIN: CPT

## 2025-03-18 PROCEDURE — 84157 ASSAY OF PROTEIN OTHER: CPT

## 2025-03-18 PROCEDURE — 6360000002 HC RX W HCPCS: Performed by: PHYSICIAN ASSISTANT

## 2025-03-18 PROCEDURE — 80061 LIPID PANEL: CPT

## 2025-03-18 PROCEDURE — APPSS180 APP SPLIT SHARED TIME > 60 MINUTES: Performed by: NURSE PRACTITIONER

## 2025-03-18 PROCEDURE — 2500000003 HC RX 250 WO HCPCS: Performed by: INTERNAL MEDICINE

## 2025-03-18 PROCEDURE — 6360000002 HC RX W HCPCS: Performed by: NURSE PRACTITIONER

## 2025-03-18 PROCEDURE — 94660 CPAP INITIATION&MGMT: CPT

## 2025-03-18 PROCEDURE — 80048 BASIC METABOLIC PNL TOTAL CA: CPT

## 2025-03-18 RX ORDER — LIDOCAINE HYDROCHLORIDE 20 MG/ML
INJECTION, SOLUTION INFILTRATION; PERINEURAL PRN
Status: COMPLETED | OUTPATIENT
Start: 2025-03-18 | End: 2025-03-18

## 2025-03-18 RX ORDER — ENOXAPARIN SODIUM 100 MG/ML
40 INJECTION SUBCUTANEOUS DAILY
Status: DISCONTINUED | OUTPATIENT
Start: 2025-03-19 | End: 2025-03-19

## 2025-03-18 RX ORDER — BUMETANIDE 0.25 MG/ML
1 INJECTION, SOLUTION INTRAMUSCULAR; INTRAVENOUS 2 TIMES DAILY
Status: DISCONTINUED | OUTPATIENT
Start: 2025-03-18 | End: 2025-03-25 | Stop reason: HOSPADM

## 2025-03-18 RX ADMIN — LEVOTHYROXINE SODIUM 200 MCG: 0.2 TABLET ORAL at 05:54

## 2025-03-18 RX ADMIN — WATER 40 MG: 1 INJECTION INTRAMUSCULAR; INTRAVENOUS; SUBCUTANEOUS at 05:54

## 2025-03-18 RX ADMIN — SODIUM CHLORIDE, PRESERVATIVE FREE 10 ML: 5 INJECTION INTRAVENOUS at 07:44

## 2025-03-18 RX ADMIN — ACETAZOLAMIDE SODIUM 250 MG: 500 INJECTION, POWDER, LYOPHILIZED, FOR SOLUTION INTRAVENOUS at 10:35

## 2025-03-18 RX ADMIN — INSULIN LISPRO 6 UNITS: 100 INJECTION, SOLUTION INTRAVENOUS; SUBCUTANEOUS at 16:51

## 2025-03-18 RX ADMIN — INSULIN GLARGINE 15 UNITS: 100 INJECTION, SOLUTION SUBCUTANEOUS at 07:43

## 2025-03-18 RX ADMIN — BUDESONIDE INHALATION 500 MCG: 0.5 SUSPENSION RESPIRATORY (INHALATION) at 18:24

## 2025-03-18 RX ADMIN — FENTANYL CITRATE 50 MCG: 50 INJECTION INTRAMUSCULAR; INTRAVENOUS at 11:50

## 2025-03-18 RX ADMIN — IPRATROPIUM BROMIDE AND ALBUTEROL SULFATE 1 DOSE: 2.5; .5 SOLUTION RESPIRATORY (INHALATION) at 10:19

## 2025-03-18 RX ADMIN — BUMETANIDE 1 MG: 0.25 INJECTION INTRAMUSCULAR; INTRAVENOUS at 16:42

## 2025-03-18 RX ADMIN — ACETAZOLAMIDE SODIUM 250 MG: 500 INJECTION, POWDER, LYOPHILIZED, FOR SOLUTION INTRAVENOUS at 21:17

## 2025-03-18 RX ADMIN — POTASSIUM BICARBONATE 40 MEQ: 782 TABLET, EFFERVESCENT ORAL at 05:54

## 2025-03-18 RX ADMIN — INSULIN LISPRO 2 UNITS: 100 INJECTION, SOLUTION INTRAVENOUS; SUBCUTANEOUS at 05:54

## 2025-03-18 RX ADMIN — FENTANYL CITRATE 50 MCG: 50 INJECTION INTRAMUSCULAR; INTRAVENOUS at 16:42

## 2025-03-18 RX ADMIN — ROPINIROLE HYDROCHLORIDE 1 MG: 2 TABLET, FILM COATED ORAL at 07:42

## 2025-03-18 RX ADMIN — METOPROLOL SUCCINATE ER TABLETS 25 MG: 50 TABLET, FILM COATED, EXTENDED RELEASE ORAL at 07:46

## 2025-03-18 RX ADMIN — INSULIN LISPRO 2 UNITS: 100 INJECTION, SOLUTION INTRAVENOUS; SUBCUTANEOUS at 21:13

## 2025-03-18 RX ADMIN — ROPINIROLE HYDROCHLORIDE 1 MG: 2 TABLET, FILM COATED ORAL at 11:48

## 2025-03-18 RX ADMIN — ROPINIROLE HYDROCHLORIDE 3 MG: 2 TABLET, FILM COATED ORAL at 21:13

## 2025-03-18 RX ADMIN — FENTANYL CITRATE 50 MCG: 50 INJECTION INTRAMUSCULAR; INTRAVENOUS at 07:43

## 2025-03-18 RX ADMIN — ARFORMOTEROL TARTRATE 15 MCG: 15 SOLUTION RESPIRATORY (INHALATION) at 18:24

## 2025-03-18 RX ADMIN — INSULIN GLARGINE 15 UNITS: 100 INJECTION, SOLUTION SUBCUTANEOUS at 21:13

## 2025-03-18 RX ADMIN — BUMETANIDE 1 MG: 0.25 INJECTION INTRAMUSCULAR; INTRAVENOUS at 10:35

## 2025-03-18 RX ADMIN — GABAPENTIN 400 MG: 100 CAPSULE ORAL at 21:14

## 2025-03-18 RX ADMIN — SODIUM CHLORIDE, PRESERVATIVE FREE 10 ML: 5 INJECTION INTRAVENOUS at 21:14

## 2025-03-18 RX ADMIN — FENTANYL CITRATE 50 MCG: 50 INJECTION INTRAMUSCULAR; INTRAVENOUS at 22:38

## 2025-03-18 RX ADMIN — FENTANYL CITRATE 50 MCG: 50 INJECTION INTRAMUSCULAR; INTRAVENOUS at 03:38

## 2025-03-18 RX ADMIN — ROPINIROLE HYDROCHLORIDE 1 MG: 2 TABLET, FILM COATED ORAL at 16:42

## 2025-03-18 RX ADMIN — LIDOCAINE HYDROCHLORIDE 10 ML: 20 INJECTION, SOLUTION INFILTRATION; PERINEURAL at 09:11

## 2025-03-18 RX ADMIN — GABAPENTIN 400 MG: 100 CAPSULE ORAL at 07:43

## 2025-03-18 RX ADMIN — IPRATROPIUM BROMIDE AND ALBUTEROL SULFATE 1 DOSE: 2.5; .5 SOLUTION RESPIRATORY (INHALATION) at 18:24

## 2025-03-18 RX ADMIN — WATER 40 MG: 1 INJECTION INTRAMUSCULAR; INTRAVENOUS; SUBCUTANEOUS at 18:17

## 2025-03-18 RX ADMIN — IPRATROPIUM BROMIDE AND ALBUTEROL SULFATE 1 DOSE: 2.5; .5 SOLUTION RESPIRATORY (INHALATION) at 15:19

## 2025-03-18 RX ADMIN — ATORVASTATIN CALCIUM 80 MG: 40 TABLET, FILM COATED ORAL at 21:13

## 2025-03-18 RX ADMIN — FENTANYL CITRATE 50 MCG: 50 INJECTION INTRAMUSCULAR; INTRAVENOUS at 19:46

## 2025-03-18 RX ADMIN — CITALOPRAM HYDROBROMIDE 40 MG: 20 TABLET ORAL at 07:42

## 2025-03-18 ASSESSMENT — PAIN DESCRIPTION - DESCRIPTORS
DESCRIPTORS: ACHING;DISCOMFORT;CRAMPING
DESCRIPTORS: ACHING;BURNING;THROBBING
DESCRIPTORS: ACHING;THROBBING;BURNING
DESCRIPTORS: ACHING;THROBBING;BURNING
DESCRIPTORS: DISCOMFORT
DESCRIPTORS: DISCOMFORT

## 2025-03-18 ASSESSMENT — PAIN DESCRIPTION - ORIENTATION
ORIENTATION: LEFT;MID
ORIENTATION: LEFT;MID
ORIENTATION: LEFT
ORIENTATION: LEFT
ORIENTATION: LEFT;RIGHT
ORIENTATION: LEFT;MID

## 2025-03-18 ASSESSMENT — PAIN - FUNCTIONAL ASSESSMENT
PAIN_FUNCTIONAL_ASSESSMENT: ACTIVITIES ARE NOT PREVENTED

## 2025-03-18 ASSESSMENT — PAIN SCALES - GENERAL
PAINLEVEL_OUTOF10: 4
PAINLEVEL_OUTOF10: 8
PAINLEVEL_OUTOF10: 8
PAINLEVEL_OUTOF10: 7
PAINLEVEL_OUTOF10: 8
PAINLEVEL_OUTOF10: 2
PAINLEVEL_OUTOF10: 8
PAINLEVEL_OUTOF10: 2
PAINLEVEL_OUTOF10: 8
PAINLEVEL_OUTOF10: 8
PAINLEVEL_OUTOF10: 9
PAINLEVEL_OUTOF10: 8
PAINLEVEL_OUTOF10: 9

## 2025-03-18 ASSESSMENT — PAIN DESCRIPTION - LOCATION
LOCATION: ABDOMEN
LOCATION: RIB CAGE
LOCATION: ABDOMEN
LOCATION: RIB CAGE

## 2025-03-18 NOTE — BRIEF OP NOTE
Brief-Op Note  ____________________________________________________________      IR  U/S GUIDED THORACENTESIS  [facility] 4WE MICU    Patient Name: Debra Maher   YOB: 1965  Medical Record Number: [id number]  Date of Procedure: 3/18/25  Room/Bed: [address] [place] [address]    Preoperative diagnosis: right Pleural Effusion    Postoperative diagnosis: Same    Consent: The patient was counseled regarding the procedure, it's indications, risks, potential complications and alternatives and any questions were answered.     Procedure:  Image Guided right Thoracentesis.    Anesthesia: Local anesthesia.    Performed by: PAMELA Mcbride under on-site supervision by Emeterio Arzola MD.    Estimated blood loss: Less than 10 mL    Complications: None    Specimen Obtained: A total volume of  450mL was withdrawn    Electronically signed by PAMELA Mcbride   DD: 3/18/25  11:09 AM

## 2025-03-18 NOTE — OP NOTE
Operative Note  ____________________________________________________________      IR U/S GUIDED THORACENTESIS  [facility] 4WE MICU    Patient Name: Debra Maher   YOB: 1965  Medical Record Number: [id number]  Date of Procedure: 3/18/25  [facility]    Preoperative diagnosis: right Pleural Effusion    Postoperative diagnosis: Same    Consent: The patient was counseled regarding the procedure, it's indications, risks, potential complications and alternatives and any questions were answered. Consent was obtained for image guided right thoracentesis for Pleural effusion.     Procedure:  Image Guided right Thoracentesis.    Performed by: PAMELA Mcbride under on-site supervision by Emeterio Arzola MD.    Anesthesia: Local    Estimated blood loss: Less than 10 mL    Complications: None    Specimen Obtained: Pleural Fluid    Procedure: Prior to start of procedure, routine scanning of the posterior thorax was performed using real-time ultrasound and revealed sufficient amount of pleural fluid present. Decision was made to proceed with procedure.  After obtaining consent, a \"Time-out\" was called to verify the correct patient, procedure/location, allergies, relevant medications held for procedure and that all equipment is functioning and available. The patient was then situated in a seated upright position and the appropriate landmarks were identified using ultrasound. The site of maximum fluid collection was marked. The skin over the puncture site in the right lower posterior hemithorax region was prepped with surgical skin prep and draped in a sterile fashion. Local anesthesia was administered by infiltration using 2% Lidocaine without epinephrine administered subcutaneously.  A 6 Panamanian safe-t-centesis catheter was then advanced into the pleural cavity and the needle was withdrawn.  Pleural fluid return was clear straw colored. The catheter was then withdrawn and a sterile dressing was placed over the site.

## 2025-03-18 NOTE — DISCHARGE INSTRUCTIONS
[facility] Electrophysiology ICD Discharge Instructions      Medications:  ***    Incision Care:  Brown (Aquacel) bandage: Leave this dressing on for 7 days. Remove this dressing on Wednesday 3/26/25.  Steri Strips: Located underneath the brown bandage. Do NOT remove the steri strips, they will either fall off on their own, or be removed at your follow up appointment.   Bathing: Keep the incision dry. You may shower tomorrow evening, but do NOT spray the water directly at the site or scrub at the site. Pat dry only. Do NOT submerge incision site for at least 2 weeks and until cleared by Device Clinic.  Daily monitoring: Check the area daily. Notify the office at [phone] or [phone] if you develop any redness, swelling, drainage, warmth, or fever greater than 100 degrees.  No lotions, powders, or creams to site.       Activity:  You may continue regular activity; but limit strenuous movements or stretching of the arm closest to your ICD.    Wear the arm immobilizer at all times for 48 hours and then at night for 4 weeks if you feel you may not be able to follow arm restrictions at night.    Avoid pulling yourself up with that arm.    Do not raise elbow higher than shoulder height, do not lift anything weighing more than 3 pounds with your arm, and do not performing any stretching motions with your arm for 6 weeks.    Do not do any activities such as golfing, vacuuming, or mowing the lawn for 6 weeks.    Prevent any hard blows to the ICD.      Driving:  AFTER cleared by Device Clinic appointment.  Start with local/short trips to familiar places. Avoid highway/ high-speed driving for the first few days after you resume driving.  DO NOT drive until you have stopped taking prescription pain medications.     Possible Defibrillator Interferences:  Avoid high frequency ham radios, arc welders, battery powered tools, and strong electromagnetic fields.  Avoid any device that may electrically stimulate your body;

## 2025-03-18 NOTE — PROCEDURES
PROCEDURE NOTE  Date: 3/18/2025   Name: Debra Maher  YOB: 1965    Procedures      Patient arrived to Radiology department for a Right Tjoracentesis. Allergies, home medications, H&P and fasting instructions reviewed with patient. Vital signs taken.   IV flushed and prn adapter attached. Procedural instructions given, questions answered, understanding expressed and consent signed. Patient given fluoroscopy education, no questions at this time.

## 2025-03-19 ENCOUNTER — APPOINTMENT (OUTPATIENT)
Dept: GENERAL RADIOLOGY | Age: 60
DRG: 276 | End: 2025-03-19
Attending: STUDENT IN AN ORGANIZED HEALTH CARE EDUCATION/TRAINING PROGRAM
Payer: COMMERCIAL

## 2025-03-19 ENCOUNTER — ANESTHESIA EVENT (OUTPATIENT)
Age: 60
End: 2025-03-19
Payer: COMMERCIAL

## 2025-03-19 ENCOUNTER — ANESTHESIA (OUTPATIENT)
Age: 60
End: 2025-03-19
Payer: COMMERCIAL

## 2025-03-19 PROBLEM — I45.9 STOKES-ADAMS SYNCOPE: Status: ACTIVE | Noted: 2025-03-19

## 2025-03-19 LAB
ALBUMIN SERPL-MCNC: 4.6 G/DL (ref 3.5–5.2)
ALP SERPL-CCNC: 100 U/L (ref 35–104)
ALT SERPL-CCNC: 21 U/L (ref 0–32)
ANION GAP SERPL CALCULATED.3IONS-SCNC: 17 MMOL/L (ref 7–16)
ANION GAP SERPL CALCULATED.3IONS-SCNC: 18 MMOL/L (ref 7–16)
AST SERPL-CCNC: 14 U/L (ref 0–31)
BASOPHILS # BLD: 0.01 K/UL (ref 0–0.2)
BASOPHILS NFR BLD: 0 % (ref 0–2)
BILIRUB SERPL-MCNC: 1.2 MG/DL (ref 0–1.2)
BUN SERPL-MCNC: 39 MG/DL (ref 6–23)
BUN SERPL-MCNC: 39 MG/DL (ref 6–23)
CALCIUM SERPL-MCNC: 9.7 MG/DL (ref 8.6–10.2)
CALCIUM SERPL-MCNC: 9.9 MG/DL (ref 8.6–10.2)
CHLORIDE SERPL-SCNC: 83 MMOL/L (ref 98–107)
CHLORIDE SERPL-SCNC: 86 MMOL/L (ref 98–107)
CO2 SERPL-SCNC: 28 MMOL/L (ref 22–29)
CO2 SERPL-SCNC: 29 MMOL/L (ref 22–29)
CREAT SERPL-MCNC: 0.6 MG/DL (ref 0.5–1)
CREAT SERPL-MCNC: 0.7 MG/DL (ref 0.5–1)
ECHO BSA: 1.68 M2
EOSINOPHIL # BLD: 0.01 K/UL (ref 0.05–0.5)
EOSINOPHILS RELATIVE PERCENT: 0 % (ref 0–6)
ERYTHROCYTE [DISTWIDTH] IN BLOOD BY AUTOMATED COUNT: 13.3 % (ref 11.5–15)
GFR, ESTIMATED: >90 ML/MIN/1.73M2
GFR, ESTIMATED: >90 ML/MIN/1.73M2
GLUCOSE BLD-MCNC: 147 MG/DL (ref 74–99)
GLUCOSE BLD-MCNC: 211 MG/DL (ref 74–99)
GLUCOSE BLD-MCNC: 216 MG/DL (ref 74–99)
GLUCOSE BLD-MCNC: 285 MG/DL (ref 74–99)
GLUCOSE SERPL-MCNC: 203 MG/DL (ref 74–99)
GLUCOSE SERPL-MCNC: 234 MG/DL (ref 74–99)
HCT VFR BLD AUTO: 38.8 % (ref 34–48)
HGB BLD-MCNC: 13.7 G/DL (ref 11.5–15.5)
IMM GRANULOCYTES # BLD AUTO: 0.06 K/UL (ref 0–0.58)
IMM GRANULOCYTES NFR BLD: 1 % (ref 0–5)
INR PPP: 1
LYMPHOCYTES NFR BLD: 1.05 K/UL (ref 1.5–4)
LYMPHOCYTES RELATIVE PERCENT: 9 % (ref 20–42)
MAGNESIUM SERPL-MCNC: 2.1 MG/DL (ref 1.6–2.6)
MAGNESIUM SERPL-MCNC: 2.2 MG/DL (ref 1.6–2.6)
MCH RBC QN AUTO: 30.7 PG (ref 26–35)
MCHC RBC AUTO-ENTMCNC: 35.3 G/DL (ref 32–34.5)
MCV RBC AUTO: 87 FL (ref 80–99.9)
MONOCYTES NFR BLD: 0.8 K/UL (ref 0.1–0.95)
MONOCYTES NFR BLD: 7 % (ref 2–12)
NEUTROPHILS NFR BLD: 84 % (ref 43–80)
NEUTS SEG NFR BLD: 9.75 K/UL (ref 1.8–7.3)
PARTIAL THROMBOPLASTIN TIME: 26.6 SEC (ref 24.5–35.1)
PHOSPHATE SERPL-MCNC: 3.7 MG/DL (ref 2.5–4.5)
PLATELET # BLD AUTO: 364 K/UL (ref 130–450)
PMV BLD AUTO: 10.3 FL (ref 7–12)
POTASSIUM SERPL-SCNC: 2.8 MMOL/L (ref 3.5–5)
POTASSIUM SERPL-SCNC: 4.3 MMOL/L (ref 3.5–5)
PROT SERPL-MCNC: 7.7 G/DL (ref 6.4–8.3)
PROTHROMBIN TIME: 11.2 SEC (ref 9.3–12.4)
RBC # BLD AUTO: 4.46 M/UL (ref 3.5–5.5)
REPORT STATUS: NORMAL
SODIUM SERPL-SCNC: 129 MMOL/L (ref 132–146)
SODIUM SERPL-SCNC: 132 MMOL/L (ref 132–146)
WBC OTHER # BLD: 11.7 K/UL (ref 4.5–11.5)

## 2025-03-19 PROCEDURE — 2700000000 HC OXYGEN THERAPY PER DAY

## 2025-03-19 PROCEDURE — 7100000011 HC PHASE II RECOVERY - ADDTL 15 MIN: Performed by: INTERNAL MEDICINE

## 2025-03-19 PROCEDURE — 6360000002 HC RX W HCPCS: Performed by: INTERNAL MEDICINE

## 2025-03-19 PROCEDURE — 2709999900 HC NON-CHARGEABLE SUPPLY: Performed by: INTERNAL MEDICINE

## 2025-03-19 PROCEDURE — 71045 X-RAY EXAM CHEST 1 VIEW: CPT

## 2025-03-19 PROCEDURE — 6370000000 HC RX 637 (ALT 250 FOR IP): Performed by: STUDENT IN AN ORGANIZED HEALTH CARE EDUCATION/TRAINING PROGRAM

## 2025-03-19 PROCEDURE — 80048 BASIC METABOLIC PNL TOTAL CA: CPT

## 2025-03-19 PROCEDURE — 6360000004 HC RX CONTRAST MEDICATION: Performed by: INTERNAL MEDICINE

## 2025-03-19 PROCEDURE — P9047 ALBUMIN (HUMAN), 25%, 50ML: HCPCS

## 2025-03-19 PROCEDURE — 6360000002 HC RX W HCPCS: Performed by: STUDENT IN AN ORGANIZED HEALTH CARE EDUCATION/TRAINING PROGRAM

## 2025-03-19 PROCEDURE — 94640 AIRWAY INHALATION TREATMENT: CPT

## 2025-03-19 PROCEDURE — 0JH608Z INSERTION OF DEFIBRILLATOR GENERATOR INTO CHEST SUBCUTANEOUS TISSUE AND FASCIA, OPEN APPROACH: ICD-10-PCS | Performed by: INTERNAL MEDICINE

## 2025-03-19 PROCEDURE — 2500000003 HC RX 250 WO HCPCS: Performed by: INTERNAL MEDICINE

## 2025-03-19 PROCEDURE — 83735 ASSAY OF MAGNESIUM: CPT

## 2025-03-19 PROCEDURE — 85730 THROMBOPLASTIN TIME PARTIAL: CPT

## 2025-03-19 PROCEDURE — 84100 ASSAY OF PHOSPHORUS: CPT

## 2025-03-19 PROCEDURE — 6370000000 HC RX 637 (ALT 250 FOR IP): Performed by: INTERNAL MEDICINE

## 2025-03-19 PROCEDURE — 82962 GLUCOSE BLOOD TEST: CPT

## 2025-03-19 PROCEDURE — 02H63KZ INSERTION OF DEFIBRILLATOR LEAD INTO RIGHT ATRIUM, PERCUTANEOUS APPROACH: ICD-10-PCS | Performed by: INTERNAL MEDICINE

## 2025-03-19 PROCEDURE — 33249 INSJ/RPLCMT DEFIB W/LEAD(S): CPT | Performed by: INTERNAL MEDICINE

## 2025-03-19 PROCEDURE — 99291 CRITICAL CARE FIRST HOUR: CPT | Performed by: STUDENT IN AN ORGANIZED HEALTH CARE EDUCATION/TRAINING PROGRAM

## 2025-03-19 PROCEDURE — C1898 LEAD, PMKR, OTHER THAN TRANS: HCPCS | Performed by: INTERNAL MEDICINE

## 2025-03-19 PROCEDURE — C1721 AICD, DUAL CHAMBER: HCPCS | Performed by: INTERNAL MEDICINE

## 2025-03-19 PROCEDURE — 2500000003 HC RX 250 WO HCPCS: Performed by: NURSE PRACTITIONER

## 2025-03-19 PROCEDURE — 80053 COMPREHEN METABOLIC PANEL: CPT

## 2025-03-19 PROCEDURE — 3E0102A INTRODUCTION OF ANTI-INFECTIVE ENVELOPE INTO SUBCUTANEOUS TISSUE, OPEN APPROACH: ICD-10-PCS | Performed by: INTERNAL MEDICINE

## 2025-03-19 PROCEDURE — 7100000010 HC PHASE II RECOVERY - FIRST 15 MIN: Performed by: INTERNAL MEDICINE

## 2025-03-19 PROCEDURE — B51N1ZZ FLUOROSCOPY OF LEFT UPPER EXTREMITY VEINS USING LOW OSMOLAR CONTRAST: ICD-10-PCS | Performed by: INTERNAL MEDICINE

## 2025-03-19 PROCEDURE — C1892 INTRO/SHEATH,FIXED,PEEL-AWAY: HCPCS | Performed by: INTERNAL MEDICINE

## 2025-03-19 PROCEDURE — 6360000002 HC RX W HCPCS: Performed by: NURSE PRACTITIONER

## 2025-03-19 PROCEDURE — 2580000003 HC RX 258

## 2025-03-19 PROCEDURE — 2720000010 HC SURG SUPPLY STERILE: Performed by: INTERNAL MEDICINE

## 2025-03-19 PROCEDURE — 3700000001 HC ADD 15 MINUTES (ANESTHESIA): Performed by: INTERNAL MEDICINE

## 2025-03-19 PROCEDURE — 85610 PROTHROMBIN TIME: CPT

## 2025-03-19 PROCEDURE — 2500000003 HC RX 250 WO HCPCS: Performed by: STUDENT IN AN ORGANIZED HEALTH CARE EDUCATION/TRAINING PROGRAM

## 2025-03-19 PROCEDURE — 99233 SBSQ HOSP IP/OBS HIGH 50: CPT | Performed by: STUDENT IN AN ORGANIZED HEALTH CARE EDUCATION/TRAINING PROGRAM

## 2025-03-19 PROCEDURE — C1894 INTRO/SHEATH, NON-LASER: HCPCS | Performed by: INTERNAL MEDICINE

## 2025-03-19 PROCEDURE — 94660 CPAP INITIATION&MGMT: CPT

## 2025-03-19 PROCEDURE — 2000000000 HC ICU R&B

## 2025-03-19 PROCEDURE — C1777 LEAD, AICD, ENDO SINGLE COIL: HCPCS | Performed by: INTERNAL MEDICINE

## 2025-03-19 PROCEDURE — 85025 COMPLETE CBC W/AUTO DIFF WBC: CPT

## 2025-03-19 PROCEDURE — 02HK3KZ INSERTION OF DEFIBRILLATOR LEAD INTO RIGHT VENTRICLE, PERCUTANEOUS APPROACH: ICD-10-PCS | Performed by: INTERNAL MEDICINE

## 2025-03-19 PROCEDURE — 6370000000 HC RX 637 (ALT 250 FOR IP)

## 2025-03-19 PROCEDURE — 99291 CRITICAL CARE FIRST HOUR: CPT | Performed by: INTERNAL MEDICINE

## 2025-03-19 PROCEDURE — 6360000002 HC RX W HCPCS

## 2025-03-19 PROCEDURE — C1889 IMPLANT/INSERT DEVICE, NOC: HCPCS | Performed by: INTERNAL MEDICINE

## 2025-03-19 PROCEDURE — 3700000000 HC ANESTHESIA ATTENDED CARE: Performed by: INTERNAL MEDICINE

## 2025-03-19 DEVICE — ENVELOPE CMRM6133 ABSORB LRG MR
Type: IMPLANTABLE DEVICE | Status: FUNCTIONAL
Brand: TYRX™

## 2025-03-19 DEVICE — INTEGRATED BIPOLAR PACE/SENSE AND DEFIBRILLATION LEAD
Type: IMPLANTABLE DEVICE | Status: FUNCTIONAL
Brand: RELIANCE 4-FRONT™

## 2025-03-19 DEVICE — IMPLANTABLE CARDIOVERTER DEFIBRILLATOR DR
Type: IMPLANTABLE DEVICE | Status: FUNCTIONAL
Brand: VIGILANT™ EL ICD DR

## 2025-03-19 DEVICE — PACE/SENSE LEAD
Type: IMPLANTABLE DEVICE | Status: FUNCTIONAL
Brand: INGEVITY™+

## 2025-03-19 RX ORDER — VANCOMYCIN HYDROCHLORIDE 1 G/20ML
INJECTION, POWDER, LYOPHILIZED, FOR SOLUTION INTRAVENOUS
Status: DISCONTINUED | OUTPATIENT
Start: 2025-03-19 | End: 2025-03-19 | Stop reason: SDUPTHER

## 2025-03-19 RX ORDER — ENOXAPARIN SODIUM 100 MG/ML
40 INJECTION SUBCUTANEOUS DAILY
Status: DISCONTINUED | OUTPATIENT
Start: 2025-03-21 | End: 2025-03-25 | Stop reason: HOSPADM

## 2025-03-19 RX ORDER — SODIUM CHLORIDE 9 MG/ML
INJECTION, SOLUTION INTRAVENOUS
Status: DISCONTINUED | OUTPATIENT
Start: 2025-03-19 | End: 2025-03-19 | Stop reason: SDUPTHER

## 2025-03-19 RX ORDER — SODIUM CHLORIDE 0.9 % (FLUSH) 0.9 %
5-40 SYRINGE (ML) INJECTION EVERY 12 HOURS SCHEDULED
Status: DISCONTINUED | OUTPATIENT
Start: 2025-03-19 | End: 2025-03-25 | Stop reason: HOSPADM

## 2025-03-19 RX ORDER — MIDAZOLAM HYDROCHLORIDE 1 MG/ML
INJECTION, SOLUTION INTRAMUSCULAR; INTRAVENOUS
Status: DISCONTINUED | OUTPATIENT
Start: 2025-03-19 | End: 2025-03-19 | Stop reason: SDUPTHER

## 2025-03-19 RX ORDER — IOPAMIDOL 755 MG/ML
INJECTION, SOLUTION INTRAVASCULAR PRN
Status: DISCONTINUED | OUTPATIENT
Start: 2025-03-19 | End: 2025-03-19 | Stop reason: HOSPADM

## 2025-03-19 RX ORDER — SODIUM CHLORIDE 0.9 % (FLUSH) 0.9 %
5-40 SYRINGE (ML) INJECTION PRN
Status: DISCONTINUED | OUTPATIENT
Start: 2025-03-19 | End: 2025-03-25 | Stop reason: HOSPADM

## 2025-03-19 RX ORDER — PREDNISONE 20 MG/1
40 TABLET ORAL DAILY
Status: COMPLETED | OUTPATIENT
Start: 2025-03-20 | End: 2025-03-24

## 2025-03-19 RX ORDER — MIDODRINE HYDROCHLORIDE 10 MG/1
10 TABLET ORAL
Status: DISCONTINUED | OUTPATIENT
Start: 2025-03-19 | End: 2025-03-25 | Stop reason: HOSPADM

## 2025-03-19 RX ORDER — ALBUMIN (HUMAN) 12.5 G/50ML
25 SOLUTION INTRAVENOUS ONCE
Status: COMPLETED | OUTPATIENT
Start: 2025-03-19 | End: 2025-03-19

## 2025-03-19 RX ORDER — INSULIN LISPRO 100 [IU]/ML
0-16 INJECTION, SOLUTION INTRAVENOUS; SUBCUTANEOUS
Status: DISCONTINUED | OUTPATIENT
Start: 2025-03-19 | End: 2025-03-23

## 2025-03-19 RX ORDER — POTASSIUM CHLORIDE 7.45 MG/ML
10 INJECTION INTRAVENOUS
Status: DISPENSED | OUTPATIENT
Start: 2025-03-19 | End: 2025-03-19

## 2025-03-19 RX ORDER — GLUCAGON 1 MG/ML
1 KIT INJECTION PRN
Status: DISCONTINUED | OUTPATIENT
Start: 2025-03-19 | End: 2025-03-19

## 2025-03-19 RX ORDER — SODIUM CHLORIDE 9 MG/ML
INJECTION, SOLUTION INTRAVENOUS PRN
Status: DISCONTINUED | OUTPATIENT
Start: 2025-03-19 | End: 2025-03-25 | Stop reason: HOSPADM

## 2025-03-19 RX ORDER — FENTANYL CITRATE 50 UG/ML
INJECTION, SOLUTION INTRAMUSCULAR; INTRAVENOUS
Status: DISCONTINUED | OUTPATIENT
Start: 2025-03-19 | End: 2025-03-19 | Stop reason: SDUPTHER

## 2025-03-19 RX ORDER — CEFAZOLIN SODIUM 1 G/3ML
INJECTION, POWDER, FOR SOLUTION INTRAMUSCULAR; INTRAVENOUS
Status: DISCONTINUED | OUTPATIENT
Start: 2025-03-19 | End: 2025-03-19 | Stop reason: SDUPTHER

## 2025-03-19 RX ORDER — DEXTROSE MONOHYDRATE 100 MG/ML
INJECTION, SOLUTION INTRAVENOUS CONTINUOUS PRN
Status: DISCONTINUED | OUTPATIENT
Start: 2025-03-19 | End: 2025-03-19

## 2025-03-19 RX ADMIN — INSULIN LISPRO 4 UNITS: 100 INJECTION, SOLUTION INTRAVENOUS; SUBCUTANEOUS at 11:12

## 2025-03-19 RX ADMIN — VANCOMYCIN HYDROCHLORIDE 1000 MG: 1 INJECTION, POWDER, LYOPHILIZED, FOR SOLUTION INTRAVENOUS at 16:26

## 2025-03-19 RX ADMIN — BUDESONIDE INHALATION 500 MCG: 0.5 SUSPENSION RESPIRATORY (INHALATION) at 08:53

## 2025-03-19 RX ADMIN — GABAPENTIN 400 MG: 100 CAPSULE ORAL at 13:31

## 2025-03-19 RX ADMIN — CITALOPRAM HYDROBROMIDE 40 MG: 20 TABLET ORAL at 08:45

## 2025-03-19 RX ADMIN — GABAPENTIN 400 MG: 100 CAPSULE ORAL at 08:45

## 2025-03-19 RX ADMIN — FENTANYL CITRATE 50 MCG: 50 INJECTION INTRAMUSCULAR; INTRAVENOUS at 05:48

## 2025-03-19 RX ADMIN — INSULIN GLARGINE 15 UNITS: 100 INJECTION, SOLUTION SUBCUTANEOUS at 20:08

## 2025-03-19 RX ADMIN — BUDESONIDE INHALATION 500 MCG: 0.5 SUSPENSION RESPIRATORY (INHALATION) at 20:39

## 2025-03-19 RX ADMIN — INSULIN LISPRO 4 UNITS: 100 INJECTION, SOLUTION INTRAVENOUS; SUBCUTANEOUS at 20:07

## 2025-03-19 RX ADMIN — IPRATROPIUM BROMIDE AND ALBUTEROL SULFATE 1 DOSE: 2.5; .5 SOLUTION RESPIRATORY (INHALATION) at 08:53

## 2025-03-19 RX ADMIN — ATORVASTATIN CALCIUM 80 MG: 40 TABLET, FILM COATED ORAL at 20:08

## 2025-03-19 RX ADMIN — GABAPENTIN 400 MG: 100 CAPSULE ORAL at 20:08

## 2025-03-19 RX ADMIN — ROPINIROLE HYDROCHLORIDE 3 MG: 2 TABLET, FILM COATED ORAL at 20:11

## 2025-03-19 RX ADMIN — POTASSIUM CHLORIDE 10 MEQ: 7.46 INJECTION, SOLUTION INTRAVENOUS at 02:59

## 2025-03-19 RX ADMIN — FENTANYL CITRATE 50 MCG: 50 INJECTION INTRAMUSCULAR; INTRAVENOUS at 01:53

## 2025-03-19 RX ADMIN — SODIUM CHLORIDE, PRESERVATIVE FREE 10 ML: 5 INJECTION INTRAVENOUS at 20:09

## 2025-03-19 RX ADMIN — ACETAZOLAMIDE SODIUM 250 MG: 500 INJECTION, POWDER, LYOPHILIZED, FOR SOLUTION INTRAVENOUS at 10:05

## 2025-03-19 RX ADMIN — ARFORMOTEROL TARTRATE 15 MCG: 15 SOLUTION RESPIRATORY (INHALATION) at 20:39

## 2025-03-19 RX ADMIN — FENTANYL CITRATE 50 MCG: 50 INJECTION INTRAMUSCULAR; INTRAVENOUS at 23:09

## 2025-03-19 RX ADMIN — ROPINIROLE HYDROCHLORIDE 1 MG: 2 TABLET, FILM COATED ORAL at 07:34

## 2025-03-19 RX ADMIN — ROPINIROLE HYDROCHLORIDE 1 MG: 2 TABLET, FILM COATED ORAL at 18:39

## 2025-03-19 RX ADMIN — FENTANYL CITRATE 50 MCG: 50 INJECTION INTRAMUSCULAR; INTRAVENOUS at 09:11

## 2025-03-19 RX ADMIN — INSULIN LISPRO 4 UNITS: 100 INJECTION, SOLUTION INTRAVENOUS; SUBCUTANEOUS at 05:55

## 2025-03-19 RX ADMIN — ACETAZOLAMIDE SODIUM 250 MG: 500 INJECTION, POWDER, LYOPHILIZED, FOR SOLUTION INTRAVENOUS at 20:09

## 2025-03-19 RX ADMIN — FENTANYL CITRATE 25 MCG: 50 INJECTION, SOLUTION INTRAMUSCULAR; INTRAVENOUS at 17:17

## 2025-03-19 RX ADMIN — MIDODRINE HYDROCHLORIDE 10 MG: 10 TABLET ORAL at 11:44

## 2025-03-19 RX ADMIN — ROPINIROLE HYDROCHLORIDE 1 MG: 2 TABLET, FILM COATED ORAL at 11:44

## 2025-03-19 RX ADMIN — FENTANYL CITRATE 50 MCG: 50 INJECTION INTRAMUSCULAR; INTRAVENOUS at 20:09

## 2025-03-19 RX ADMIN — WATER 1000 MG: 1 INJECTION INTRAMUSCULAR; INTRAVENOUS; SUBCUTANEOUS at 23:19

## 2025-03-19 RX ADMIN — IPRATROPIUM BROMIDE AND ALBUTEROL SULFATE 1 DOSE: 2.5; .5 SOLUTION RESPIRATORY (INHALATION) at 20:39

## 2025-03-19 RX ADMIN — LEVOTHYROXINE SODIUM 200 MCG: 0.2 TABLET ORAL at 05:59

## 2025-03-19 RX ADMIN — CEFAZOLIN 2 G: 1 INJECTION, POWDER, FOR SOLUTION INTRAMUSCULAR; INTRAVENOUS at 16:28

## 2025-03-19 RX ADMIN — POTASSIUM BICARBONATE 40 MEQ: 782 TABLET, EFFERVESCENT ORAL at 01:33

## 2025-03-19 RX ADMIN — MIDODRINE HYDROCHLORIDE 10 MG: 10 TABLET ORAL at 18:40

## 2025-03-19 RX ADMIN — BUMETANIDE 1 MG: 0.25 INJECTION INTRAMUSCULAR; INTRAVENOUS at 18:42

## 2025-03-19 RX ADMIN — FENTANYL CITRATE 25 MCG: 50 INJECTION, SOLUTION INTRAMUSCULAR; INTRAVENOUS at 16:31

## 2025-03-19 RX ADMIN — WATER 40 MG: 1 INJECTION INTRAMUSCULAR; INTRAVENOUS; SUBCUTANEOUS at 18:41

## 2025-03-19 RX ADMIN — WATER 40 MG: 1 INJECTION INTRAMUSCULAR; INTRAVENOUS; SUBCUTANEOUS at 05:48

## 2025-03-19 RX ADMIN — POTASSIUM CHLORIDE 10 MEQ: 7.46 INJECTION, SOLUTION INTRAVENOUS at 04:07

## 2025-03-19 RX ADMIN — ACETAMINOPHEN 650 MG: 325 TABLET ORAL at 11:54

## 2025-03-19 RX ADMIN — ALBUMIN (HUMAN) 25 G: 0.25 INJECTION, SOLUTION INTRAVENOUS at 01:47

## 2025-03-19 RX ADMIN — POTASSIUM CHLORIDE 10 MEQ: 7.46 INJECTION, SOLUTION INTRAVENOUS at 01:43

## 2025-03-19 RX ADMIN — SODIUM CHLORIDE: 9 INJECTION, SOLUTION INTRAVENOUS at 16:19

## 2025-03-19 RX ADMIN — ACETAMINOPHEN 650 MG: 325 TABLET ORAL at 18:40

## 2025-03-19 RX ADMIN — IPRATROPIUM BROMIDE AND ALBUTEROL SULFATE 1 DOSE: 2.5; .5 SOLUTION RESPIRATORY (INHALATION) at 12:19

## 2025-03-19 RX ADMIN — ARFORMOTEROL TARTRATE 15 MCG: 15 SOLUTION RESPIRATORY (INHALATION) at 08:53

## 2025-03-19 RX ADMIN — SODIUM CHLORIDE, PRESERVATIVE FREE 10 ML: 5 INJECTION INTRAVENOUS at 08:46

## 2025-03-19 RX ADMIN — MIDAZOLAM 1 MG: 1 INJECTION INTRAMUSCULAR; INTRAVENOUS at 16:31

## 2025-03-19 ASSESSMENT — ENCOUNTER SYMPTOMS: SHORTNESS OF BREATH: 1

## 2025-03-19 ASSESSMENT — PAIN DESCRIPTION - LOCATION
LOCATION: HIP
LOCATION: HEAD
LOCATION: CHEST;SHOULDER
LOCATION: RIB CAGE
LOCATION: CHEST;SHOULDER
LOCATION: RIB CAGE
LOCATION: HEAD

## 2025-03-19 ASSESSMENT — PAIN - FUNCTIONAL ASSESSMENT
PAIN_FUNCTIONAL_ASSESSMENT: ACTIVITIES ARE NOT PREVENTED
PAIN_FUNCTIONAL_ASSESSMENT: PREVENTS OR INTERFERES SOME ACTIVE ACTIVITIES AND ADLS
PAIN_FUNCTIONAL_ASSESSMENT: PREVENTS OR INTERFERES SOME ACTIVE ACTIVITIES AND ADLS
PAIN_FUNCTIONAL_ASSESSMENT: ACTIVITIES ARE NOT PREVENTED
PAIN_FUNCTIONAL_ASSESSMENT: ACTIVITIES ARE NOT PREVENTED

## 2025-03-19 ASSESSMENT — PAIN SCALES - GENERAL
PAINLEVEL_OUTOF10: 10
PAINLEVEL_OUTOF10: 10
PAINLEVEL_OUTOF10: 3
PAINLEVEL_OUTOF10: 4
PAINLEVEL_OUTOF10: 0
PAINLEVEL_OUTOF10: 8
PAINLEVEL_OUTOF10: 0
PAINLEVEL_OUTOF10: 8
PAINLEVEL_OUTOF10: 8
PAINLEVEL_OUTOF10: 0
PAINLEVEL_OUTOF10: 6
PAINLEVEL_OUTOF10: 3
PAINLEVEL_OUTOF10: 0
PAINLEVEL_OUTOF10: 2
PAINLEVEL_OUTOF10: 8
PAINLEVEL_OUTOF10: 8
PAINLEVEL_OUTOF10: 2
PAINLEVEL_OUTOF10: 2
PAINLEVEL_OUTOF10: 9
PAINLEVEL_OUTOF10: 4
PAINLEVEL_OUTOF10: 0
PAINLEVEL_OUTOF10: 2
PAINLEVEL_OUTOF10: 4
PAINLEVEL_OUTOF10: 8
PAINLEVEL_OUTOF10: 10
PAINLEVEL_OUTOF10: 2
PAINLEVEL_OUTOF10: 0
PAINLEVEL_OUTOF10: 0

## 2025-03-19 ASSESSMENT — PAIN DESCRIPTION - DESCRIPTORS
DESCRIPTORS: ACHING;PRESSURE;NAGGING
DESCRIPTORS: ACHING;SHARP;DISCOMFORT
DESCRIPTORS: ACHING;DISCOMFORT
DESCRIPTORS: ACHING;SHARP;DISCOMFORT
DESCRIPTORS: DISCOMFORT;POUNDING;THROBBING
DESCRIPTORS: ACHING
DESCRIPTORS: DISCOMFORT

## 2025-03-19 ASSESSMENT — PAIN DESCRIPTION - ORIENTATION
ORIENTATION: MID
ORIENTATION: LEFT
ORIENTATION: MID
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
## 2025-03-20 ENCOUNTER — APPOINTMENT (OUTPATIENT)
Dept: GENERAL RADIOLOGY | Age: 60
DRG: 276 | End: 2025-03-20
Attending: STUDENT IN AN ORGANIZED HEALTH CARE EDUCATION/TRAINING PROGRAM
Payer: COMMERCIAL

## 2025-03-20 PROBLEM — Z95.810 PRESENCE OF DOUBLE CHAMBER IMPLANTABLE CARDIOVERTER-DEFIBRILLATOR (ICD): Status: ACTIVE | Noted: 2025-03-20

## 2025-03-20 LAB
ALBUMIN SERPL-MCNC: 4.4 G/DL (ref 3.5–5.2)
ALP SERPL-CCNC: 112 U/L (ref 35–104)
ALT SERPL-CCNC: 17 U/L (ref 0–32)
ANION GAP SERPL CALCULATED.3IONS-SCNC: 15 MMOL/L (ref 7–16)
ANION GAP SERPL CALCULATED.3IONS-SCNC: 17 MMOL/L (ref 7–16)
AST SERPL-CCNC: 17 U/L (ref 0–31)
BASOPHILS # BLD: 0.02 K/UL (ref 0–0.2)
BASOPHILS NFR BLD: 0 % (ref 0–2)
BILIRUB SERPL-MCNC: 0.8 MG/DL (ref 0–1.2)
BUN SERPL-MCNC: 32 MG/DL (ref 6–23)
BUN SERPL-MCNC: 33 MG/DL (ref 6–23)
CALCIUM SERPL-MCNC: 9.4 MG/DL (ref 8.6–10.2)
CALCIUM SERPL-MCNC: 9.5 MG/DL (ref 8.6–10.2)
CHLORIDE SERPL-SCNC: 86 MMOL/L (ref 98–107)
CHLORIDE SERPL-SCNC: 87 MMOL/L (ref 98–107)
CO2 SERPL-SCNC: 27 MMOL/L (ref 22–29)
CO2 SERPL-SCNC: 29 MMOL/L (ref 22–29)
CREAT SERPL-MCNC: 0.6 MG/DL (ref 0.5–1)
CREAT SERPL-MCNC: 0.7 MG/DL (ref 0.5–1)
EOSINOPHIL # BLD: 0.01 K/UL (ref 0.05–0.5)
EOSINOPHILS RELATIVE PERCENT: 0 % (ref 0–6)
ERYTHROCYTE [DISTWIDTH] IN BLOOD BY AUTOMATED COUNT: 13.5 % (ref 11.5–15)
GFR, ESTIMATED: >90 ML/MIN/1.73M2
GFR, ESTIMATED: >90 ML/MIN/1.73M2
GLUCOSE BLD-MCNC: 213 MG/DL (ref 74–99)
GLUCOSE BLD-MCNC: 268 MG/DL (ref 74–99)
GLUCOSE BLD-MCNC: 290 MG/DL (ref 74–99)
GLUCOSE BLD-MCNC: 331 MG/DL (ref 74–99)
GLUCOSE SERPL-MCNC: 179 MG/DL (ref 74–99)
GLUCOSE SERPL-MCNC: 269 MG/DL (ref 74–99)
HCT VFR BLD AUTO: 40.1 % (ref 34–48)
HGB BLD-MCNC: 14.3 G/DL (ref 11.5–15.5)
IMM GRANULOCYTES # BLD AUTO: 0.07 K/UL (ref 0–0.58)
IMM GRANULOCYTES NFR BLD: 1 % (ref 0–5)
INR PPP: 1.1
LYMPHOCYTES NFR BLD: 0.92 K/UL (ref 1.5–4)
LYMPHOCYTES RELATIVE PERCENT: 7 % (ref 20–42)
MAGNESIUM SERPL-MCNC: 1.9 MG/DL (ref 1.6–2.6)
MAGNESIUM SERPL-MCNC: 2.2 MG/DL (ref 1.6–2.6)
MCH RBC QN AUTO: 31.4 PG (ref 26–35)
MCHC RBC AUTO-ENTMCNC: 35.7 G/DL (ref 32–34.5)
MCV RBC AUTO: 87.9 FL (ref 80–99.9)
MICROORGANISM SPEC CULT: NO GROWTH
MICROORGANISM/AGENT SPEC: NORMAL
MONOCYTES NFR BLD: 1.16 K/UL (ref 0.1–0.95)
MONOCYTES NFR BLD: 9 % (ref 2–12)
NEUTROPHILS NFR BLD: 84 % (ref 43–80)
NEUTS SEG NFR BLD: 11.53 K/UL (ref 1.8–7.3)
PARTIAL THROMBOPLASTIN TIME: 24.6 SEC (ref 24.5–35.1)
PHOSPHATE SERPL-MCNC: 2.9 MG/DL (ref 2.5–4.5)
PHOSPHATE SERPL-MCNC: 3.3 MG/DL (ref 2.5–4.5)
PLATELET # BLD AUTO: 382 K/UL (ref 130–450)
PMV BLD AUTO: 10.4 FL (ref 7–12)
POTASSIUM SERPL-SCNC: 3.2 MMOL/L (ref 3.5–5)
POTASSIUM SERPL-SCNC: 3.3 MMOL/L (ref 3.5–5)
PROT SERPL-MCNC: 7.4 G/DL (ref 6.4–8.3)
PROTHROMBIN TIME: 11.7 SEC (ref 9.3–12.4)
RBC # BLD AUTO: 4.56 M/UL (ref 3.5–5.5)
SODIUM SERPL-SCNC: 130 MMOL/L (ref 132–146)
SODIUM SERPL-SCNC: 131 MMOL/L (ref 132–146)
SPECIMEN DESCRIPTION: NORMAL
WBC OTHER # BLD: 13.7 K/UL (ref 4.5–11.5)

## 2025-03-20 PROCEDURE — 6370000000 HC RX 637 (ALT 250 FOR IP): Performed by: INTERNAL MEDICINE

## 2025-03-20 PROCEDURE — 80048 BASIC METABOLIC PNL TOTAL CA: CPT

## 2025-03-20 PROCEDURE — 83735 ASSAY OF MAGNESIUM: CPT

## 2025-03-20 PROCEDURE — 2500000003 HC RX 250 WO HCPCS: Performed by: INTERNAL MEDICINE

## 2025-03-20 PROCEDURE — 97162 PT EVAL MOD COMPLEX 30 MIN: CPT

## 2025-03-20 PROCEDURE — 94660 CPAP INITIATION&MGMT: CPT

## 2025-03-20 PROCEDURE — 94669 MECHANICAL CHEST WALL OSCILL: CPT

## 2025-03-20 PROCEDURE — 99291 CRITICAL CARE FIRST HOUR: CPT | Performed by: INTERNAL MEDICINE

## 2025-03-20 PROCEDURE — 84100 ASSAY OF PHOSPHORUS: CPT

## 2025-03-20 PROCEDURE — 85610 PROTHROMBIN TIME: CPT

## 2025-03-20 PROCEDURE — 80053 COMPREHEN METABOLIC PANEL: CPT

## 2025-03-20 PROCEDURE — 6360000002 HC RX W HCPCS: Performed by: INTERNAL MEDICINE

## 2025-03-20 PROCEDURE — 2700000000 HC OXYGEN THERAPY PER DAY

## 2025-03-20 PROCEDURE — 6370000000 HC RX 637 (ALT 250 FOR IP): Performed by: NURSE PRACTITIONER

## 2025-03-20 PROCEDURE — 85730 THROMBOPLASTIN TIME PARTIAL: CPT

## 2025-03-20 PROCEDURE — 85025 COMPLETE CBC W/AUTO DIFF WBC: CPT

## 2025-03-20 PROCEDURE — 2000000000 HC ICU R&B

## 2025-03-20 PROCEDURE — 82962 GLUCOSE BLOOD TEST: CPT

## 2025-03-20 PROCEDURE — 94640 AIRWAY INHALATION TREATMENT: CPT

## 2025-03-20 PROCEDURE — 97166 OT EVAL MOD COMPLEX 45 MIN: CPT

## 2025-03-20 PROCEDURE — 71046 X-RAY EXAM CHEST 2 VIEWS: CPT

## 2025-03-20 PROCEDURE — 99291 CRITICAL CARE FIRST HOUR: CPT | Performed by: STUDENT IN AN ORGANIZED HEALTH CARE EDUCATION/TRAINING PROGRAM

## 2025-03-20 PROCEDURE — 97530 THERAPEUTIC ACTIVITIES: CPT

## 2025-03-20 PROCEDURE — 6360000002 HC RX W HCPCS: Performed by: STUDENT IN AN ORGANIZED HEALTH CARE EDUCATION/TRAINING PROGRAM

## 2025-03-20 RX ORDER — PANTOPRAZOLE SODIUM 40 MG/1
40 TABLET, DELAYED RELEASE ORAL
Status: DISCONTINUED | OUTPATIENT
Start: 2025-03-20 | End: 2025-03-25 | Stop reason: HOSPADM

## 2025-03-20 RX ORDER — INSULIN GLARGINE 100 [IU]/ML
18 INJECTION, SOLUTION SUBCUTANEOUS 2 TIMES DAILY
Status: DISCONTINUED | OUTPATIENT
Start: 2025-03-20 | End: 2025-03-21

## 2025-03-20 RX ORDER — MAGNESIUM SULFATE IN WATER 40 MG/ML
2000 INJECTION, SOLUTION INTRAVENOUS ONCE
Status: COMPLETED | OUTPATIENT
Start: 2025-03-20 | End: 2025-03-20

## 2025-03-20 RX ORDER — POTASSIUM CHLORIDE 1500 MG/1
40 TABLET, EXTENDED RELEASE ORAL ONCE
Status: DISCONTINUED | OUTPATIENT
Start: 2025-03-20 | End: 2025-03-20

## 2025-03-20 RX ORDER — POTASSIUM CHLORIDE 1500 MG/1
40 TABLET, EXTENDED RELEASE ORAL
Status: DISCONTINUED | OUTPATIENT
Start: 2025-03-20 | End: 2025-03-20

## 2025-03-20 RX ADMIN — FENTANYL CITRATE 50 MCG: 50 INJECTION INTRAMUSCULAR; INTRAVENOUS at 18:42

## 2025-03-20 RX ADMIN — ARFORMOTEROL TARTRATE 15 MCG: 15 SOLUTION RESPIRATORY (INHALATION) at 20:03

## 2025-03-20 RX ADMIN — ATORVASTATIN CALCIUM 80 MG: 40 TABLET, FILM COATED ORAL at 22:02

## 2025-03-20 RX ADMIN — MAGNESIUM SULFATE HEPTAHYDRATE 2000 MG: 40 INJECTION, SOLUTION INTRAVENOUS at 09:29

## 2025-03-20 RX ADMIN — FENTANYL CITRATE 50 MCG: 50 INJECTION INTRAMUSCULAR; INTRAVENOUS at 12:00

## 2025-03-20 RX ADMIN — FENTANYL CITRATE 50 MCG: 50 INJECTION INTRAMUSCULAR; INTRAVENOUS at 15:02

## 2025-03-20 RX ADMIN — ACETAMINOPHEN 650 MG: 325 TABLET ORAL at 14:08

## 2025-03-20 RX ADMIN — POLYETHYLENE GLYCOL 3350 17 G: 17 POWDER, FOR SOLUTION ORAL at 09:57

## 2025-03-20 RX ADMIN — IPRATROPIUM BROMIDE AND ALBUTEROL SULFATE 1 DOSE: 2.5; .5 SOLUTION RESPIRATORY (INHALATION) at 12:15

## 2025-03-20 RX ADMIN — POTASSIUM CHLORIDE 10 MEQ: 7.46 INJECTION, SOLUTION INTRAVENOUS at 22:39

## 2025-03-20 RX ADMIN — ROPINIROLE HYDROCHLORIDE 1 MG: 2 TABLET, FILM COATED ORAL at 16:55

## 2025-03-20 RX ADMIN — FENTANYL CITRATE 50 MCG: 50 INJECTION INTRAMUSCULAR; INTRAVENOUS at 02:23

## 2025-03-20 RX ADMIN — INSULIN GLARGINE 15 UNITS: 100 INJECTION, SOLUTION SUBCUTANEOUS at 09:24

## 2025-03-20 RX ADMIN — INSULIN LISPRO 8 UNITS: 100 INJECTION, SOLUTION INTRAVENOUS; SUBCUTANEOUS at 06:03

## 2025-03-20 RX ADMIN — CITALOPRAM HYDROBROMIDE 40 MG: 20 TABLET ORAL at 07:50

## 2025-03-20 RX ADMIN — INSULIN GLARGINE 18 UNITS: 100 INJECTION, SOLUTION SUBCUTANEOUS at 22:01

## 2025-03-20 RX ADMIN — FENTANYL CITRATE 50 MCG: 50 INJECTION INTRAMUSCULAR; INTRAVENOUS at 05:26

## 2025-03-20 RX ADMIN — FENTANYL CITRATE 50 MCG: 50 INJECTION INTRAMUSCULAR; INTRAVENOUS at 08:43

## 2025-03-20 RX ADMIN — GABAPENTIN 400 MG: 100 CAPSULE ORAL at 22:01

## 2025-03-20 RX ADMIN — PANTOPRAZOLE SODIUM 40 MG: 40 TABLET, DELAYED RELEASE ORAL at 09:23

## 2025-03-20 RX ADMIN — WATER 1000 MG: 1 INJECTION INTRAMUSCULAR; INTRAVENOUS; SUBCUTANEOUS at 06:01

## 2025-03-20 RX ADMIN — WATER 1000 MG: 1 INJECTION INTRAMUSCULAR; INTRAVENOUS; SUBCUTANEOUS at 16:52

## 2025-03-20 RX ADMIN — BUMETANIDE 1 MG: 0.25 INJECTION INTRAMUSCULAR; INTRAVENOUS at 16:52

## 2025-03-20 RX ADMIN — INSULIN LISPRO 12 UNITS: 100 INJECTION, SOLUTION INTRAVENOUS; SUBCUTANEOUS at 12:51

## 2025-03-20 RX ADMIN — INSULIN LISPRO 8 UNITS: 100 INJECTION, SOLUTION INTRAVENOUS; SUBCUTANEOUS at 22:01

## 2025-03-20 RX ADMIN — POTASSIUM CHLORIDE 10 MEQ: 7.46 INJECTION, SOLUTION INTRAVENOUS at 03:34

## 2025-03-20 RX ADMIN — ARFORMOTEROL TARTRATE 15 MCG: 15 SOLUTION RESPIRATORY (INHALATION) at 09:04

## 2025-03-20 RX ADMIN — LEVOTHYROXINE SODIUM 200 MCG: 0.2 TABLET ORAL at 05:27

## 2025-03-20 RX ADMIN — INSULIN LISPRO 8 UNITS: 100 INJECTION, SOLUTION INTRAVENOUS; SUBCUTANEOUS at 16:52

## 2025-03-20 RX ADMIN — ROPINIROLE HYDROCHLORIDE 1 MG: 2 TABLET, FILM COATED ORAL at 07:50

## 2025-03-20 RX ADMIN — ROPINIROLE HYDROCHLORIDE 3 MG: 2 TABLET, FILM COATED ORAL at 22:03

## 2025-03-20 RX ADMIN — METOPROLOL SUCCINATE ER TABLETS 25 MG: 50 TABLET, FILM COATED, EXTENDED RELEASE ORAL at 07:48

## 2025-03-20 RX ADMIN — IPRATROPIUM BROMIDE AND ALBUTEROL SULFATE 1 DOSE: 2.5; .5 SOLUTION RESPIRATORY (INHALATION) at 20:02

## 2025-03-20 RX ADMIN — GABAPENTIN 400 MG: 100 CAPSULE ORAL at 07:46

## 2025-03-20 RX ADMIN — BUDESONIDE INHALATION 500 MCG: 0.5 SUSPENSION RESPIRATORY (INHALATION) at 20:03

## 2025-03-20 RX ADMIN — ACETAMINOPHEN 650 MG: 325 TABLET ORAL at 07:46

## 2025-03-20 RX ADMIN — BUMETANIDE 1 MG: 0.25 INJECTION INTRAMUSCULAR; INTRAVENOUS at 07:47

## 2025-03-20 RX ADMIN — IPRATROPIUM BROMIDE AND ALBUTEROL SULFATE 1 DOSE: 2.5; .5 SOLUTION RESPIRATORY (INHALATION) at 16:24

## 2025-03-20 RX ADMIN — POTASSIUM CHLORIDE 10 MEQ: 7.46 INJECTION, SOLUTION INTRAVENOUS at 06:01

## 2025-03-20 RX ADMIN — MIDODRINE HYDROCHLORIDE 10 MG: 10 TABLET ORAL at 07:48

## 2025-03-20 RX ADMIN — MIDODRINE HYDROCHLORIDE 10 MG: 10 TABLET ORAL at 12:49

## 2025-03-20 RX ADMIN — GABAPENTIN 400 MG: 100 CAPSULE ORAL at 14:08

## 2025-03-20 RX ADMIN — MIDODRINE HYDROCHLORIDE 10 MG: 10 TABLET ORAL at 16:52

## 2025-03-20 RX ADMIN — ROPINIROLE HYDROCHLORIDE 1 MG: 2 TABLET, FILM COATED ORAL at 12:48

## 2025-03-20 RX ADMIN — POTASSIUM BICARBONATE 40 MEQ: 782 TABLET, EFFERVESCENT ORAL at 10:31

## 2025-03-20 RX ADMIN — SODIUM CHLORIDE, PRESERVATIVE FREE 10 ML: 5 INJECTION INTRAVENOUS at 09:30

## 2025-03-20 RX ADMIN — BUDESONIDE INHALATION 500 MCG: 0.5 SUSPENSION RESPIRATORY (INHALATION) at 09:04

## 2025-03-20 RX ADMIN — POTASSIUM CHLORIDE 10 MEQ: 7.46 INJECTION, SOLUTION INTRAVENOUS at 20:56

## 2025-03-20 RX ADMIN — SODIUM CHLORIDE, PRESERVATIVE FREE 10 ML: 5 INJECTION INTRAVENOUS at 09:24

## 2025-03-20 RX ADMIN — IPRATROPIUM BROMIDE AND ALBUTEROL SULFATE 1 DOSE: 2.5; .5 SOLUTION RESPIRATORY (INHALATION) at 09:03

## 2025-03-20 RX ADMIN — PREDNISONE 40 MG: 20 TABLET ORAL at 07:46

## 2025-03-20 RX ADMIN — POTASSIUM CHLORIDE 10 MEQ: 7.46 INJECTION, SOLUTION INTRAVENOUS at 23:45

## 2025-03-20 RX ADMIN — POTASSIUM CHLORIDE 10 MEQ: 7.46 INJECTION, SOLUTION INTRAVENOUS at 04:44

## 2025-03-20 RX ADMIN — FENTANYL CITRATE 50 MCG: 50 INJECTION INTRAMUSCULAR; INTRAVENOUS at 22:35

## 2025-03-20 ASSESSMENT — PAIN DESCRIPTION - DESCRIPTORS
DESCRIPTORS: ACHING
DESCRIPTORS: ACHING;DISCOMFORT
DESCRIPTORS: ACHING;DISCOMFORT;SORE
DESCRIPTORS: ACHING;DISCOMFORT;SORE
DESCRIPTORS: ACHING;DISCOMFORT

## 2025-03-20 ASSESSMENT — PAIN - FUNCTIONAL ASSESSMENT
PAIN_FUNCTIONAL_ASSESSMENT: PREVENTS OR INTERFERES SOME ACTIVE ACTIVITIES AND ADLS

## 2025-03-20 ASSESSMENT — PAIN DESCRIPTION - LOCATION
LOCATION: CHEST;SHOULDER
LOCATION: RIB CAGE

## 2025-03-20 ASSESSMENT — PAIN DESCRIPTION - ORIENTATION
ORIENTATION: LEFT

## 2025-03-20 ASSESSMENT — PAIN SCALES - GENERAL
PAINLEVEL_OUTOF10: 10
PAINLEVEL_OUTOF10: 10
PAINLEVEL_OUTOF10: 2
PAINLEVEL_OUTOF10: 3
PAINLEVEL_OUTOF10: 4
PAINLEVEL_OUTOF10: 3
PAINLEVEL_OUTOF10: 10
PAINLEVEL_OUTOF10: 10
PAINLEVEL_OUTOF10: 9
PAINLEVEL_OUTOF10: 10

## 2025-03-20 NOTE — CARE COORDINATION
Care Coordination: LOS 4 day.  9 ltr nc  ptx 10/24 otx 14/24.   S/P thoracentesis 3/18- 450 cc. IV bumex bid. S/P Dual chamber ICD 3/19 EP sign off- follow up device clinic on 4/1/2025. Met with pt to discuss transition of care needs. Plan remains home and friend will  at discharge. Declines C. If home 02 is needed, she has no preference of company- pt has [employer] insurance. Again sent to [facility]    Electronically signed by Tomeka Green RN on 3/20/2025 at 3:22 PM

## 2025-03-20 NOTE — ANESTHESIA POSTPROCEDURE EVALUATION
Department of Anesthesiology  Postprocedure Note    Patient: Debar Maher  MRN: [id number]  YOB: 1965  Date of evaluation: 3/19/2025    Procedure Summary       Date: 03/19/25 Room / Location: [facility] [place] EP LAB 1 / [facility] [place] CARDIAC CATH LAB    Anesthesia Start: 1612 Anesthesia Stop: 1800    Procedure: Insert ICD dual Diagnosis:       Ischemic cardiomyopathy      (Ischemic cardiomyopathy  Ventricular tachycardia)    Providers: Nelida Leon MD Responsible Provider: Yazmin Zamora DO    Anesthesia Type: MAC ASA Status: 4            Anesthesia Type: MAC    Margaret Phase I:      Margaret Phase II:      Anesthesia Post Evaluation    Patient location during evaluation: PACU  Patient participation: complete - patient participated  Level of consciousness: awake  Airway patency: patent  Nausea & Vomiting: no nausea and no vomiting  Cardiovascular status: hemodynamically stable  Respiratory status: acceptable  Hydration status: stable  Pain management: adequate    There were no known notable events for this encounter.

## 2025-03-20 NOTE — PROGRESS NOTES
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  2. No evidence of an acute intra-abdominal or pelvic process.  3. Bilateral nonobstructive punctate nephrolithiasis.  4. No acute fracture.     CT chest with contrast: 3/11/2025:  1. Moderate-sized bilateral pleural effusions with some increased markings   seen inferiorly within the lower lung fields as well as some ground-glass   opacity in the right middle lobe and lung bases. Findings most suggestive of   mild interstitial edema. A superimposed infiltrate cannot be excluded and   clinical correlation is needed.   2. Extensive coronary artery calcification.  No acute chest wall abnormality   peer         CT chest with contrast: 3/11/2025:  1. Moderate-sized bilateral pleural effusions with minor bibasilar   compressive atelectasis as well as  atelectatic changes in both the right   middle lobe and 
carotid bruits  Chest: Decreased breath sounds, +ve wheezing, +ve crackles with +ve left ribs tenderness over ribs   Cardiovascular: Normal S1 , S2, regular rate and rhythm, no murmur, rub or gallop  Abdomen: Normal sounds present, soft, lax with no tenderness, no hepatosplenomegaly, and no masses  Extremities: +ve edema. Pulses are equally present.   Skin: intact, no rashes   Neurologic: Alert and oriented x 3, No focal deficit     Investigations:  Labs, radiological imaging and cardiac work up were personally reviewed and independently interpreted.        ICU STAFF PHYSICIAN NOTE OF PERSONAL INVOLVEMENT IN CARE  As the attending physician, I certify that I personally reviewed the patient's history and personally examined the patient to confirm the physical findings described above, and that I reviewed the relevant imaging studies and available reports.  I also discussed the differential diagnosis and all of the proposed management plans with the patient and individuals accompanying the patient to this visit.  They had the opportunity to ask questions about the proposed management plans and to have those questions answered.    This patient has a high probability of sudden, clinically significant deterioration, which requires the highest level of physician preparedness to intervene urgently.  I managed/supervised life or organ supporting interventions that required frequent physician assessment.  I devoted my full attention to the direct care of this patient for the amount of time indicated below.  Time I spent with family or surrogate(s) is included only if the patient was incapable of providing the necessary information or participating in medical decision-making.  Time devoted to teaching and to any procedures I billed separately is not included.  Critical Care Time: 32 minutes      Electronically signed by Breanna Wills MD on 3/14/2025 at 9:56 AM   
HFrEF  Bilateral pleural effusions noted on CTA chest  proBNP 3376  ACC stage C / NYHA class II  Mild-moderate MR, mild AS noted on TTE 10/8/2024  PAF  Maintaining sinus rhythm.  On OAC with eliquis  HTN -- SBP , most recent /66  HLD -- on statin therapy  T2DM with hyperglycemia on admission.  Hx of Graves disease, now hypothyroid - on HRT. Elevated TSH with normal free T4.  Long standing ongoing tobacco abuse   Hypomagnesemia -- Mg 1.6 --> 1.3 --> replaced --> 2.2  Hypoalbuminemia  Hyperkalemia -- K 3.8 --> 3.5 --> 5.1 --> 4.8 --> 5.6 --> 5.8     Continue IV diuresis for today / monitor BMP and I&O's  Continue plavix  Hold eliquis / continue lovenox until no further procedures planned  Continue Toprol XL as able / entresto and SGLT2i currently on hold  Treatment of hyperkalemia  Monitor labs  Aggressive risk factor modifications  Monitor telemetry (SR, no new arrhythmias)  Serial echocardiograms / I personally reviewed images from 2/26/25 echocardiogram today (EF ~ 35% with WMA's)  Transfer to [facility] for EP evaluation (I discussed case with EP on 3/12/25) / ICD indications reviewed with the patient  Care per ICU team    Greater than 50 minutes was spent counseling the patient, reviewing the rationale for the above recommendations and reviewing the patient's current medication list, problem list and results of all previously ordered testing.      Vin Killian MD  [facility]    
circumflex artery (10/12/2024).    Ischemic cardiomyopathy, most recent TTE 2/26/25 showing LVEF 35-40%.  Acute on chronic HFrEF  Bilateral pleural effusions noted on CTA chest  proBNP 3376  ACC stage C / NYHA class II  Mild-moderate MR, mild AS noted on TTE 10/8/2024  PAF  Maintaining sinus rhythm.  On OAC with eliquis  HTN --current blood pressure 125/69, on 2 mcg/kg/min of dobutamine and midodrine  HLD -- on statin therapy  T2DM with hyperglycemia on admission.  Hx of Graves disease, now hypothyroid - on HRT. Elevated TSH with normal free T4.  Long standing ongoing tobacco abuse   Hypomagnesemia -- Mg 1.6 --> 1.3 --> replaced --> 2.2 --> 1.9>> 2  Hypoalbuminemia  Hyperkalemia -- K 3.8 --> 3.5 --> 5.1 --> 4.8 --> 5.6 --> 5.8 --> 5.6>> 6.2       Plan:   Continue IV diuresis for today / monitor BMP and I&O's  Consider restarting Plavix plavix  Wean off dopamine and midodrine, hemodynamically stable current blood pressure 125/69.  Hold eliquis / continue lovenox until no further procedures planned  Continue Toprol XL as able /continue to hold entresto and SGLT2i currently on hold  Treatment of hyperkalemia as per critical care/primary service.  Monitor labs  Aggressive risk factor modifications  Monitor telemetry (SR, no new arrhythmias)  Serial echocardiograms / I personally reviewed images from 2/26/25 echocardiogram today (EF ~ 35% with WMA's)  Transfer to [facility] for EP evaluation (Dr. Killian already discussed case with EP service on 3/12/25) / ICD indications reviewed with the patient  Rest of the care per ICU team      Greater than 50 minutes was spent counseling the patient, reviewing the rationale for the above recommendations and reviewing the patient's current medication list, problem list and results of all previously ordered testing.      Vin Killian MD  [facility]    
reviewed and independently interpreted.        ICU STAFF PHYSICIAN NOTE OF PERSONAL INVOLVEMENT IN CARE  As the attending physician, I certify that I personally reviewed the patient's history and personally examined the patient to confirm the physical findings described above, and that I reviewed the relevant imaging studies and available reports.  I also discussed the differential diagnosis and all of the proposed management plans with the patient and individuals accompanying the patient to this visit.  They had the opportunity to ask questions about the proposed management plans and to have those questions answered.    This patient has a high probability of sudden, clinically significant deterioration, which requires the highest level of physician preparedness to intervene urgently.  I managed/supervised life or organ supporting interventions that required frequent physician assessment.  I devoted my full attention to the direct care of this patient for the amount of time indicated below.  Time I spent with family or surrogate(s) is included only if the patient was incapable of providing the necessary information or participating in medical decision-making.  Time devoted to teaching and to any procedures I billed separately is not included.  Critical Care Time: 32 minutes      Electronically signed by Breanna Wills MD on 3/16/2025 at 10:26 AM

## 2025-03-21 LAB
ALBUMIN SERPL-MCNC: 4.2 G/DL (ref 3.5–5.2)
ALP SERPL-CCNC: 116 U/L (ref 35–104)
ALT SERPL-CCNC: 13 U/L (ref 0–32)
ANION GAP SERPL CALCULATED.3IONS-SCNC: 17 MMOL/L (ref 7–16)
AST SERPL-CCNC: 18 U/L (ref 0–31)
BASOPHILS # BLD: 0 K/UL (ref 0–0.2)
BASOPHILS NFR BLD: 0 % (ref 0–2)
BILIRUB SERPL-MCNC: 0.7 MG/DL (ref 0–1.2)
BUN SERPL-MCNC: 27 MG/DL (ref 6–23)
CALCIUM SERPL-MCNC: 9.7 MG/DL (ref 8.6–10.2)
CHLORIDE SERPL-SCNC: 90 MMOL/L (ref 98–107)
CO2 SERPL-SCNC: 27 MMOL/L (ref 22–29)
CREAT SERPL-MCNC: 0.6 MG/DL (ref 0.5–1)
EOSINOPHIL # BLD: 0.13 K/UL (ref 0.05–0.5)
EOSINOPHILS RELATIVE PERCENT: 1 % (ref 0–6)
ERYTHROCYTE [DISTWIDTH] IN BLOOD BY AUTOMATED COUNT: 13.4 % (ref 11.5–15)
GFR, ESTIMATED: >90 ML/MIN/1.73M2
GLUCOSE BLD-MCNC: 116 MG/DL (ref 74–99)
GLUCOSE BLD-MCNC: 222 MG/DL (ref 74–99)
GLUCOSE BLD-MCNC: 356 MG/DL (ref 74–99)
GLUCOSE BLD-MCNC: 359 MG/DL (ref 74–99)
GLUCOSE BLD-MCNC: 391 MG/DL (ref 74–99)
GLUCOSE BLD-MCNC: 96 MG/DL (ref 74–99)
GLUCOSE SERPL-MCNC: 85 MG/DL (ref 74–99)
HCT VFR BLD AUTO: 40.4 % (ref 34–48)
HGB BLD-MCNC: 14.4 G/DL (ref 11.5–15.5)
INR PPP: 1
LYMPHOCYTES NFR BLD: 3.51 K/UL (ref 1.5–4)
LYMPHOCYTES RELATIVE PERCENT: 23 % (ref 20–42)
MAGNESIUM SERPL-MCNC: 2.1 MG/DL (ref 1.6–2.6)
MCH RBC QN AUTO: 30.6 PG (ref 26–35)
MCHC RBC AUTO-ENTMCNC: 35.6 G/DL (ref 32–34.5)
MCV RBC AUTO: 85.8 FL (ref 80–99.9)
METAMYELOCYTES ABSOLUTE COUNT: 0.26 K/UL (ref 0–0.12)
METAMYELOCYTES: 2 % (ref 0–1)
MONOCYTES NFR BLD: 1.04 K/UL (ref 0.1–0.95)
MONOCYTES NFR BLD: 7 % (ref 2–12)
NEUTROPHILS NFR BLD: 67 % (ref 43–80)
NEUTS SEG NFR BLD: 10.15 K/UL (ref 1.8–7.3)
PARTIAL THROMBOPLASTIN TIME: 25.6 SEC (ref 24.5–35.1)
PHOSPHATE SERPL-MCNC: 2.1 MG/DL (ref 2.5–4.5)
PLATELET # BLD AUTO: 368 K/UL (ref 130–450)
PMV BLD AUTO: 10.4 FL (ref 7–12)
POTASSIUM SERPL-SCNC: 3.6 MMOL/L (ref 3.5–5)
PROT SERPL-MCNC: 7.2 G/DL (ref 6.4–8.3)
PROTHROMBIN TIME: 11.2 SEC (ref 9.3–12.4)
RBC # BLD AUTO: 4.71 M/UL (ref 3.5–5.5)
RBC # BLD: ABNORMAL 10*6/UL
RBC # BLD: ABNORMAL 10*6/UL
SODIUM SERPL-SCNC: 134 MMOL/L (ref 132–146)
WBC OTHER # BLD: 15.1 K/UL (ref 4.5–11.5)

## 2025-03-21 PROCEDURE — 6360000002 HC RX W HCPCS: Performed by: INTERNAL MEDICINE

## 2025-03-21 PROCEDURE — 85610 PROTHROMBIN TIME: CPT

## 2025-03-21 PROCEDURE — 2000000000 HC ICU R&B

## 2025-03-21 PROCEDURE — 6370000000 HC RX 637 (ALT 250 FOR IP): Performed by: INTERNAL MEDICINE

## 2025-03-21 PROCEDURE — 97530 THERAPEUTIC ACTIVITIES: CPT

## 2025-03-21 PROCEDURE — 2700000000 HC OXYGEN THERAPY PER DAY

## 2025-03-21 PROCEDURE — 80053 COMPREHEN METABOLIC PANEL: CPT

## 2025-03-21 PROCEDURE — 94669 MECHANICAL CHEST WALL OSCILL: CPT

## 2025-03-21 PROCEDURE — 6370000000 HC RX 637 (ALT 250 FOR IP): Performed by: STUDENT IN AN ORGANIZED HEALTH CARE EDUCATION/TRAINING PROGRAM

## 2025-03-21 PROCEDURE — 97535 SELF CARE MNGMENT TRAINING: CPT

## 2025-03-21 PROCEDURE — 85025 COMPLETE CBC W/AUTO DIFF WBC: CPT

## 2025-03-21 PROCEDURE — 2500000003 HC RX 250 WO HCPCS: Performed by: INTERNAL MEDICINE

## 2025-03-21 PROCEDURE — 84100 ASSAY OF PHOSPHORUS: CPT

## 2025-03-21 PROCEDURE — 94640 AIRWAY INHALATION TREATMENT: CPT

## 2025-03-21 PROCEDURE — 83735 ASSAY OF MAGNESIUM: CPT

## 2025-03-21 PROCEDURE — 99291 CRITICAL CARE FIRST HOUR: CPT | Performed by: INTERNAL MEDICINE

## 2025-03-21 PROCEDURE — 6370000000 HC RX 637 (ALT 250 FOR IP): Performed by: NURSE PRACTITIONER

## 2025-03-21 PROCEDURE — 85730 THROMBOPLASTIN TIME PARTIAL: CPT

## 2025-03-21 PROCEDURE — 82962 GLUCOSE BLOOD TEST: CPT

## 2025-03-21 PROCEDURE — 2580000003 HC RX 258: Performed by: INTERNAL MEDICINE

## 2025-03-21 RX ORDER — INSULIN GLARGINE 100 [IU]/ML
18 INJECTION, SOLUTION SUBCUTANEOUS NIGHTLY
Status: DISCONTINUED | OUTPATIENT
Start: 2025-03-21 | End: 2025-03-24

## 2025-03-21 RX ORDER — INSULIN LISPRO 100 [IU]/ML
5 INJECTION, SOLUTION INTRAVENOUS; SUBCUTANEOUS ONCE
Status: COMPLETED | OUTPATIENT
Start: 2025-03-21 | End: 2025-03-21

## 2025-03-21 RX ADMIN — GABAPENTIN 400 MG: 100 CAPSULE ORAL at 15:15

## 2025-03-21 RX ADMIN — FENTANYL CITRATE 50 MCG: 50 INJECTION INTRAMUSCULAR; INTRAVENOUS at 02:35

## 2025-03-21 RX ADMIN — INSULIN LISPRO 5 UNITS: 100 INJECTION, SOLUTION INTRAVENOUS; SUBCUTANEOUS at 21:46

## 2025-03-21 RX ADMIN — IPRATROPIUM BROMIDE AND ALBUTEROL SULFATE 1 DOSE: 2.5; .5 SOLUTION RESPIRATORY (INHALATION) at 20:17

## 2025-03-21 RX ADMIN — SODIUM CHLORIDE, PRESERVATIVE FREE 10 ML: 5 INJECTION INTRAVENOUS at 08:39

## 2025-03-21 RX ADMIN — GABAPENTIN 400 MG: 100 CAPSULE ORAL at 08:30

## 2025-03-21 RX ADMIN — POTASSIUM CHLORIDE 10 MEQ: 7.46 INJECTION, SOLUTION INTRAVENOUS at 01:07

## 2025-03-21 RX ADMIN — ACETAMINOPHEN 650 MG: 325 TABLET ORAL at 15:45

## 2025-03-21 RX ADMIN — SODIUM PHOSPHATE, MONOBASIC, MONOHYDRATE AND SODIUM PHOSPHATE, DIBASIC, ANHYDROUS 15 MMOL: 142; 276 INJECTION, SOLUTION INTRAVENOUS at 08:44

## 2025-03-21 RX ADMIN — ROPINIROLE HYDROCHLORIDE 3 MG: 2 TABLET, FILM COATED ORAL at 21:10

## 2025-03-21 RX ADMIN — ATORVASTATIN CALCIUM 80 MG: 40 TABLET, FILM COATED ORAL at 21:09

## 2025-03-21 RX ADMIN — MIDODRINE HYDROCHLORIDE 10 MG: 10 TABLET ORAL at 12:02

## 2025-03-21 RX ADMIN — INSULIN LISPRO 16 UNITS: 100 INJECTION, SOLUTION INTRAVENOUS; SUBCUTANEOUS at 21:18

## 2025-03-21 RX ADMIN — ROPINIROLE HYDROCHLORIDE 1 MG: 2 TABLET, FILM COATED ORAL at 12:02

## 2025-03-21 RX ADMIN — PANTOPRAZOLE SODIUM 40 MG: 40 TABLET, DELAYED RELEASE ORAL at 06:41

## 2025-03-21 RX ADMIN — INSULIN LISPRO 16 UNITS: 100 INJECTION, SOLUTION INTRAVENOUS; SUBCUTANEOUS at 12:15

## 2025-03-21 RX ADMIN — ACETAMINOPHEN 650 MG: 325 TABLET ORAL at 08:29

## 2025-03-21 RX ADMIN — FENTANYL CITRATE 50 MCG: 50 INJECTION INTRAMUSCULAR; INTRAVENOUS at 12:02

## 2025-03-21 RX ADMIN — FENTANYL CITRATE 50 MCG: 50 INJECTION INTRAMUSCULAR; INTRAVENOUS at 07:39

## 2025-03-21 RX ADMIN — LEVOTHYROXINE SODIUM 200 MCG: 0.2 TABLET ORAL at 06:41

## 2025-03-21 RX ADMIN — BUDESONIDE INHALATION 500 MCG: 0.5 SUSPENSION RESPIRATORY (INHALATION) at 08:13

## 2025-03-21 RX ADMIN — FENTANYL CITRATE 50 MCG: 50 INJECTION INTRAMUSCULAR; INTRAVENOUS at 21:46

## 2025-03-21 RX ADMIN — ARFORMOTEROL TARTRATE 15 MCG: 15 SOLUTION RESPIRATORY (INHALATION) at 08:13

## 2025-03-21 RX ADMIN — IPRATROPIUM BROMIDE AND ALBUTEROL SULFATE 1 DOSE: 2.5; .5 SOLUTION RESPIRATORY (INHALATION) at 08:13

## 2025-03-21 RX ADMIN — ARFORMOTEROL TARTRATE 15 MCG: 15 SOLUTION RESPIRATORY (INHALATION) at 20:17

## 2025-03-21 RX ADMIN — BUMETANIDE 1 MG: 0.25 INJECTION INTRAMUSCULAR; INTRAVENOUS at 08:34

## 2025-03-21 RX ADMIN — MICONAZOLE NITRATE: 20.6 POWDER TOPICAL at 06:39

## 2025-03-21 RX ADMIN — GABAPENTIN 400 MG: 100 CAPSULE ORAL at 21:10

## 2025-03-21 RX ADMIN — PREDNISONE 40 MG: 20 TABLET ORAL at 08:29

## 2025-03-21 RX ADMIN — FENTANYL CITRATE 50 MCG: 50 INJECTION INTRAMUSCULAR; INTRAVENOUS at 18:31

## 2025-03-21 RX ADMIN — ROPINIROLE HYDROCHLORIDE 1 MG: 2 TABLET, FILM COATED ORAL at 17:31

## 2025-03-21 RX ADMIN — INSULIN GLARGINE 18 UNITS: 100 INJECTION, SOLUTION SUBCUTANEOUS at 21:09

## 2025-03-21 RX ADMIN — CITALOPRAM HYDROBROMIDE 40 MG: 20 TABLET ORAL at 08:30

## 2025-03-21 RX ADMIN — BUDESONIDE INHALATION 500 MCG: 0.5 SUSPENSION RESPIRATORY (INHALATION) at 20:17

## 2025-03-21 RX ADMIN — IPRATROPIUM BROMIDE AND ALBUTEROL SULFATE 1 DOSE: 2.5; .5 SOLUTION RESPIRATORY (INHALATION) at 16:26

## 2025-03-21 RX ADMIN — MIDODRINE HYDROCHLORIDE 10 MG: 10 TABLET ORAL at 15:15

## 2025-03-21 RX ADMIN — ENOXAPARIN SODIUM 40 MG: 100 INJECTION SUBCUTANEOUS at 08:30

## 2025-03-21 RX ADMIN — IPRATROPIUM BROMIDE AND ALBUTEROL SULFATE 1 DOSE: 2.5; .5 SOLUTION RESPIRATORY (INHALATION) at 11:36

## 2025-03-21 RX ADMIN — FENTANYL CITRATE 50 MCG: 50 INJECTION INTRAMUSCULAR; INTRAVENOUS at 15:14

## 2025-03-21 RX ADMIN — ROPINIROLE HYDROCHLORIDE 1 MG: 2 TABLET, FILM COATED ORAL at 08:30

## 2025-03-21 RX ADMIN — SODIUM CHLORIDE, PRESERVATIVE FREE 10 ML: 5 INJECTION INTRAVENOUS at 21:10

## 2025-03-21 RX ADMIN — MIDODRINE HYDROCHLORIDE 10 MG: 10 TABLET ORAL at 08:29

## 2025-03-21 RX ADMIN — BUMETANIDE 1 MG: 0.25 INJECTION INTRAMUSCULAR; INTRAVENOUS at 17:31

## 2025-03-21 RX ADMIN — INSULIN LISPRO 4 UNITS: 100 INJECTION, SOLUTION INTRAVENOUS; SUBCUTANEOUS at 17:36

## 2025-03-21 ASSESSMENT — PAIN - FUNCTIONAL ASSESSMENT
PAIN_FUNCTIONAL_ASSESSMENT: PREVENTS OR INTERFERES SOME ACTIVE ACTIVITIES AND ADLS

## 2025-03-21 ASSESSMENT — PAIN SCALES - GENERAL
PAINLEVEL_OUTOF10: 8
PAINLEVEL_OUTOF10: 9
PAINLEVEL_OUTOF10: 10
PAINLEVEL_OUTOF10: 9
PAINLEVEL_OUTOF10: 3
PAINLEVEL_OUTOF10: 10
PAINLEVEL_OUTOF10: 10

## 2025-03-21 ASSESSMENT — PAIN DESCRIPTION - FREQUENCY: FREQUENCY: INTERMITTENT

## 2025-03-21 ASSESSMENT — PAIN DESCRIPTION - LOCATION
LOCATION: CHEST;SHOULDER
LOCATION: ABDOMEN;RIB CAGE
LOCATION: ABDOMEN;RIB CAGE
LOCATION: ABDOMEN
LOCATION: RIB CAGE

## 2025-03-21 ASSESSMENT — PAIN DESCRIPTION - DESCRIPTORS
DESCRIPTORS: ACHING;BURNING;THROBBING
DESCRIPTORS: ACHING;DISCOMFORT
DESCRIPTORS: ACHING;DISCOMFORT;SHARP

## 2025-03-21 ASSESSMENT — PAIN DESCRIPTION - ORIENTATION
ORIENTATION: LEFT
ORIENTATION: LEFT;MID;LOWER
ORIENTATION: LEFT;LOWER
ORIENTATION: LEFT
ORIENTATION: LEFT;MID

## 2025-03-21 ASSESSMENT — PAIN DESCRIPTION - PAIN TYPE: TYPE: ACUTE PAIN

## 2025-03-21 ASSESSMENT — PAIN DESCRIPTION - ONSET: ONSET: ON-GOING

## 2025-03-21 ASSESSMENT — PAIN DESCRIPTION - DIRECTION: RADIATING_TOWARDS: NOT RADIATING

## 2025-03-22 ENCOUNTER — APPOINTMENT (OUTPATIENT)
Dept: GENERAL RADIOLOGY | Age: 60
DRG: 276 | End: 2025-03-22
Attending: STUDENT IN AN ORGANIZED HEALTH CARE EDUCATION/TRAINING PROGRAM
Payer: COMMERCIAL

## 2025-03-22 LAB
ALBUMIN SERPL-MCNC: 3.9 G/DL (ref 3.5–5.2)
ALP SERPL-CCNC: 113 U/L (ref 35–104)
ALT SERPL-CCNC: 13 U/L (ref 0–32)
ANION GAP SERPL CALCULATED.3IONS-SCNC: 12 MMOL/L (ref 7–16)
ANION GAP SERPL CALCULATED.3IONS-SCNC: 17 MMOL/L (ref 7–16)
AST SERPL-CCNC: 20 U/L (ref 0–31)
BASOPHILS # BLD: 0.03 K/UL (ref 0–0.2)
BASOPHILS NFR BLD: 0 % (ref 0–2)
BILIRUB SERPL-MCNC: 0.7 MG/DL (ref 0–1.2)
BUN SERPL-MCNC: 23 MG/DL (ref 6–23)
BUN SERPL-MCNC: 26 MG/DL (ref 6–23)
CALCIUM SERPL-MCNC: 8.9 MG/DL (ref 8.6–10.2)
CALCIUM SERPL-MCNC: 9.1 MG/DL (ref 8.6–10.2)
CHLORIDE SERPL-SCNC: 89 MMOL/L (ref 98–107)
CHLORIDE SERPL-SCNC: 91 MMOL/L (ref 98–107)
CO2 SERPL-SCNC: 28 MMOL/L (ref 22–29)
CO2 SERPL-SCNC: 29 MMOL/L (ref 22–29)
CREAT SERPL-MCNC: 0.6 MG/DL (ref 0.5–1)
CREAT SERPL-MCNC: 0.6 MG/DL (ref 0.5–1)
EOSINOPHIL # BLD: 0.09 K/UL (ref 0.05–0.5)
EOSINOPHILS RELATIVE PERCENT: 1 % (ref 0–6)
ERYTHROCYTE [DISTWIDTH] IN BLOOD BY AUTOMATED COUNT: 13.4 % (ref 11.5–15)
GFR, ESTIMATED: >90 ML/MIN/1.73M2
GFR, ESTIMATED: >90 ML/MIN/1.73M2
GLUCOSE BLD-MCNC: 184 MG/DL (ref 74–99)
GLUCOSE BLD-MCNC: 280 MG/DL (ref 74–99)
GLUCOSE BLD-MCNC: 281 MG/DL (ref 74–99)
GLUCOSE BLD-MCNC: 290 MG/DL (ref 74–99)
GLUCOSE BLD-MCNC: 312 MG/DL (ref 74–99)
GLUCOSE BLD-MCNC: 340 MG/DL (ref 74–99)
GLUCOSE BLD-MCNC: 89 MG/DL (ref 74–99)
GLUCOSE SERPL-MCNC: 271 MG/DL (ref 74–99)
GLUCOSE SERPL-MCNC: 57 MG/DL (ref 74–99)
HCT VFR BLD AUTO: 39.1 % (ref 34–48)
HGB BLD-MCNC: 14 G/DL (ref 11.5–15.5)
IMM GRANULOCYTES # BLD AUTO: 0.07 K/UL (ref 0–0.58)
IMM GRANULOCYTES NFR BLD: 1 % (ref 0–5)
INR PPP: 1
LYMPHOCYTES NFR BLD: 2.34 K/UL (ref 1.5–4)
LYMPHOCYTES RELATIVE PERCENT: 18 % (ref 20–42)
MAGNESIUM SERPL-MCNC: 1.9 MG/DL (ref 1.6–2.6)
MCH RBC QN AUTO: 30.6 PG (ref 26–35)
MCHC RBC AUTO-ENTMCNC: 35.8 G/DL (ref 32–34.5)
MCV RBC AUTO: 85.6 FL (ref 80–99.9)
MONOCYTES NFR BLD: 1.47 K/UL (ref 0.1–0.95)
MONOCYTES NFR BLD: 11 % (ref 2–12)
NEUTROPHILS NFR BLD: 70 % (ref 43–80)
NEUTS SEG NFR BLD: 9.11 K/UL (ref 1.8–7.3)
PARTIAL THROMBOPLASTIN TIME: 25.6 SEC (ref 24.5–35.1)
PHOSPHATE SERPL-MCNC: 1.9 MG/DL (ref 2.5–4.5)
PLATELET # BLD AUTO: 336 K/UL (ref 130–450)
PMV BLD AUTO: 10.4 FL (ref 7–12)
POTASSIUM SERPL-SCNC: 2.6 MMOL/L (ref 3.5–5)
POTASSIUM SERPL-SCNC: 4.6 MMOL/L (ref 3.5–5)
PROT SERPL-MCNC: 6.8 G/DL (ref 6.4–8.3)
PROTHROMBIN TIME: 11 SEC (ref 9.3–12.4)
RBC # BLD AUTO: 4.57 M/UL (ref 3.5–5.5)
SODIUM SERPL-SCNC: 131 MMOL/L (ref 132–146)
SODIUM SERPL-SCNC: 135 MMOL/L (ref 132–146)
WBC OTHER # BLD: 13.1 K/UL (ref 4.5–11.5)

## 2025-03-22 PROCEDURE — 6360000002 HC RX W HCPCS: Performed by: INTERNAL MEDICINE

## 2025-03-22 PROCEDURE — 6370000000 HC RX 637 (ALT 250 FOR IP): Performed by: INTERNAL MEDICINE

## 2025-03-22 PROCEDURE — 2000000000 HC ICU R&B

## 2025-03-22 PROCEDURE — 84100 ASSAY OF PHOSPHORUS: CPT

## 2025-03-22 PROCEDURE — 94669 MECHANICAL CHEST WALL OSCILL: CPT

## 2025-03-22 PROCEDURE — 85610 PROTHROMBIN TIME: CPT

## 2025-03-22 PROCEDURE — 6370000000 HC RX 637 (ALT 250 FOR IP): Performed by: CLINICAL NURSE SPECIALIST

## 2025-03-22 PROCEDURE — 6370000000 HC RX 637 (ALT 250 FOR IP): Performed by: STUDENT IN AN ORGANIZED HEALTH CARE EDUCATION/TRAINING PROGRAM

## 2025-03-22 PROCEDURE — 2500000003 HC RX 250 WO HCPCS: Performed by: INTERNAL MEDICINE

## 2025-03-22 PROCEDURE — 2700000000 HC OXYGEN THERAPY PER DAY

## 2025-03-22 PROCEDURE — 80048 BASIC METABOLIC PNL TOTAL CA: CPT

## 2025-03-22 PROCEDURE — 85730 THROMBOPLASTIN TIME PARTIAL: CPT

## 2025-03-22 PROCEDURE — 94640 AIRWAY INHALATION TREATMENT: CPT

## 2025-03-22 PROCEDURE — 6370000000 HC RX 637 (ALT 250 FOR IP): Performed by: NURSE PRACTITIONER

## 2025-03-22 PROCEDURE — 80053 COMPREHEN METABOLIC PANEL: CPT

## 2025-03-22 PROCEDURE — 71045 X-RAY EXAM CHEST 1 VIEW: CPT

## 2025-03-22 PROCEDURE — 99291 CRITICAL CARE FIRST HOUR: CPT | Performed by: INTERNAL MEDICINE

## 2025-03-22 PROCEDURE — 82962 GLUCOSE BLOOD TEST: CPT

## 2025-03-22 PROCEDURE — 83735 ASSAY OF MAGNESIUM: CPT

## 2025-03-22 PROCEDURE — 85025 COMPLETE CBC W/AUTO DIFF WBC: CPT

## 2025-03-22 RX ORDER — POTASSIUM CHLORIDE 1500 MG/1
40 TABLET, EXTENDED RELEASE ORAL ONCE
Status: COMPLETED | OUTPATIENT
Start: 2025-03-22 | End: 2025-03-22

## 2025-03-22 RX ORDER — OXYCODONE AND ACETAMINOPHEN 5; 325 MG/1; MG/1
1 TABLET ORAL EVERY 4 HOURS PRN
Refills: 0 | Status: DISCONTINUED | OUTPATIENT
Start: 2025-03-22 | End: 2025-03-25 | Stop reason: HOSPADM

## 2025-03-22 RX ORDER — GUAIFENESIN 400 MG/1
400 TABLET ORAL 3 TIMES DAILY
Status: DISCONTINUED | OUTPATIENT
Start: 2025-03-22 | End: 2025-03-25 | Stop reason: HOSPADM

## 2025-03-22 RX ORDER — MICONAZOLE NITRATE 20 MG/G
CREAM TOPICAL EVERY 8 HOURS
Status: DISCONTINUED | OUTPATIENT
Start: 2025-03-22 | End: 2025-03-25 | Stop reason: HOSPADM

## 2025-03-22 RX ORDER — SENNOSIDES A AND B 8.6 MG/1
1 TABLET, FILM COATED ORAL 2 TIMES DAILY
Status: DISCONTINUED | OUTPATIENT
Start: 2025-03-22 | End: 2025-03-25 | Stop reason: HOSPADM

## 2025-03-22 RX ADMIN — OXYCODONE AND ACETAMINOPHEN 1 TABLET: 325; 5 TABLET ORAL at 15:00

## 2025-03-22 RX ADMIN — IPRATROPIUM BROMIDE AND ALBUTEROL SULFATE 1 DOSE: 2.5; .5 SOLUTION RESPIRATORY (INHALATION) at 08:50

## 2025-03-22 RX ADMIN — FENTANYL CITRATE 50 MCG: 50 INJECTION INTRAMUSCULAR; INTRAVENOUS at 03:27

## 2025-03-22 RX ADMIN — CITALOPRAM HYDROBROMIDE 40 MG: 20 TABLET ORAL at 10:04

## 2025-03-22 RX ADMIN — GABAPENTIN 400 MG: 100 CAPSULE ORAL at 20:16

## 2025-03-22 RX ADMIN — ROPINIROLE HYDROCHLORIDE 3 MG: 2 TABLET, FILM COATED ORAL at 20:16

## 2025-03-22 RX ADMIN — SENNOSIDES 8.6 MG: 8.6 TABLET, FILM COATED ORAL at 20:16

## 2025-03-22 RX ADMIN — BUMETANIDE 1 MG: 0.25 INJECTION INTRAMUSCULAR; INTRAVENOUS at 18:49

## 2025-03-22 RX ADMIN — ARFORMOTEROL TARTRATE 15 MCG: 15 SOLUTION RESPIRATORY (INHALATION) at 21:14

## 2025-03-22 RX ADMIN — INSULIN LISPRO 12 UNITS: 100 INJECTION, SOLUTION INTRAVENOUS; SUBCUTANEOUS at 20:15

## 2025-03-22 RX ADMIN — MIDODRINE HYDROCHLORIDE 10 MG: 10 TABLET ORAL at 18:42

## 2025-03-22 RX ADMIN — SODIUM CHLORIDE, PRESERVATIVE FREE 10 ML: 5 INJECTION INTRAVENOUS at 08:06

## 2025-03-22 RX ADMIN — FENTANYL CITRATE 50 MCG: 50 INJECTION INTRAMUSCULAR; INTRAVENOUS at 08:37

## 2025-03-22 RX ADMIN — GABAPENTIN 400 MG: 100 CAPSULE ORAL at 10:03

## 2025-03-22 RX ADMIN — ROPINIROLE HYDROCHLORIDE 1 MG: 2 TABLET, FILM COATED ORAL at 13:08

## 2025-03-22 RX ADMIN — OXYCODONE AND ACETAMINOPHEN 1 TABLET: 325; 5 TABLET ORAL at 20:16

## 2025-03-22 RX ADMIN — SODIUM CHLORIDE, PRESERVATIVE FREE 10 ML: 5 INJECTION INTRAVENOUS at 20:17

## 2025-03-22 RX ADMIN — ROPINIROLE HYDROCHLORIDE 1 MG: 2 TABLET, FILM COATED ORAL at 08:05

## 2025-03-22 RX ADMIN — PANTOPRAZOLE SODIUM 40 MG: 40 TABLET, DELAYED RELEASE ORAL at 06:26

## 2025-03-22 RX ADMIN — POTASSIUM CHLORIDE 40 MEQ: 1500 TABLET, EXTENDED RELEASE ORAL at 11:14

## 2025-03-22 RX ADMIN — ACETAMINOPHEN 650 MG: 325 TABLET ORAL at 04:10

## 2025-03-22 RX ADMIN — BUDESONIDE INHALATION 500 MCG: 0.5 SUSPENSION RESPIRATORY (INHALATION) at 08:50

## 2025-03-22 RX ADMIN — GUAIFENESIN 400 MG: 400 TABLET ORAL at 20:16

## 2025-03-22 RX ADMIN — SENNOSIDES 8.6 MG: 8.6 TABLET, FILM COATED ORAL at 11:14

## 2025-03-22 RX ADMIN — PREDNISONE 40 MG: 20 TABLET ORAL at 10:03

## 2025-03-22 RX ADMIN — IPRATROPIUM BROMIDE AND ALBUTEROL SULFATE 1 DOSE: 2.5; .5 SOLUTION RESPIRATORY (INHALATION) at 15:57

## 2025-03-22 RX ADMIN — POTASSIUM CHLORIDE 10 MEQ: 7.46 INJECTION, SOLUTION INTRAVENOUS at 06:22

## 2025-03-22 RX ADMIN — IPRATROPIUM BROMIDE AND ALBUTEROL SULFATE 1 DOSE: 2.5; .5 SOLUTION RESPIRATORY (INHALATION) at 21:14

## 2025-03-22 RX ADMIN — INSULIN LISPRO 8 UNITS: 100 INJECTION, SOLUTION INTRAVENOUS; SUBCUTANEOUS at 10:08

## 2025-03-22 RX ADMIN — INSULIN GLARGINE 18 UNITS: 100 INJECTION, SOLUTION SUBCUTANEOUS at 20:15

## 2025-03-22 RX ADMIN — GUAIFENESIN 400 MG: 400 TABLET ORAL at 15:00

## 2025-03-22 RX ADMIN — POTASSIUM CHLORIDE 10 MEQ: 7.46 INJECTION, SOLUTION INTRAVENOUS at 10:19

## 2025-03-22 RX ADMIN — ARFORMOTEROL TARTRATE 15 MCG: 15 SOLUTION RESPIRATORY (INHALATION) at 08:50

## 2025-03-22 RX ADMIN — POTASSIUM CHLORIDE 10 MEQ: 7.46 INJECTION, SOLUTION INTRAVENOUS at 08:04

## 2025-03-22 RX ADMIN — BUDESONIDE INHALATION 500 MCG: 0.5 SUSPENSION RESPIRATORY (INHALATION) at 21:14

## 2025-03-22 RX ADMIN — ENOXAPARIN SODIUM 40 MG: 100 INJECTION SUBCUTANEOUS at 10:12

## 2025-03-22 RX ADMIN — IPRATROPIUM BROMIDE AND ALBUTEROL SULFATE 1 DOSE: 2.5; .5 SOLUTION RESPIRATORY (INHALATION) at 03:35

## 2025-03-22 RX ADMIN — LEVOTHYROXINE SODIUM 200 MCG: 0.2 TABLET ORAL at 06:25

## 2025-03-22 RX ADMIN — IPRATROPIUM BROMIDE AND ALBUTEROL SULFATE 1 DOSE: 2.5; .5 SOLUTION RESPIRATORY (INHALATION) at 12:52

## 2025-03-22 RX ADMIN — INSULIN LISPRO 12 UNITS: 100 INJECTION, SOLUTION INTRAVENOUS; SUBCUTANEOUS at 18:59

## 2025-03-22 RX ADMIN — MIDODRINE HYDROCHLORIDE 10 MG: 10 TABLET ORAL at 06:34

## 2025-03-22 RX ADMIN — ATORVASTATIN CALCIUM 80 MG: 40 TABLET, FILM COATED ORAL at 20:16

## 2025-03-22 RX ADMIN — ROPINIROLE HYDROCHLORIDE 1 MG: 2 TABLET, FILM COATED ORAL at 17:00

## 2025-03-22 RX ADMIN — INSULIN LISPRO 4 UNITS: 100 INJECTION, SOLUTION INTRAVENOUS; SUBCUTANEOUS at 13:09

## 2025-03-22 RX ADMIN — GABAPENTIN 400 MG: 100 CAPSULE ORAL at 15:00

## 2025-03-22 RX ADMIN — MIDODRINE HYDROCHLORIDE 10 MG: 10 TABLET ORAL at 11:14

## 2025-03-22 ASSESSMENT — PAIN DESCRIPTION - ORIENTATION
ORIENTATION: LEFT;RIGHT
ORIENTATION: LEFT;LOWER
ORIENTATION: LEFT
ORIENTATION: OTHER (COMMENT)
ORIENTATION: LEFT

## 2025-03-22 ASSESSMENT — PAIN DESCRIPTION - DESCRIPTORS
DESCRIPTORS: ACHING;SORE
DESCRIPTORS: ACHING
DESCRIPTORS: ACHING;DISCOMFORT
DESCRIPTORS: ACHING;SORE;SPASM

## 2025-03-22 ASSESSMENT — PAIN - FUNCTIONAL ASSESSMENT
PAIN_FUNCTIONAL_ASSESSMENT: ACTIVITIES ARE NOT PREVENTED
PAIN_FUNCTIONAL_ASSESSMENT: ACTIVITIES ARE NOT PREVENTED
PAIN_FUNCTIONAL_ASSESSMENT: PREVENTS OR INTERFERES SOME ACTIVE ACTIVITIES AND ADLS
PAIN_FUNCTIONAL_ASSESSMENT: ACTIVITIES ARE NOT PREVENTED
PAIN_FUNCTIONAL_ASSESSMENT: PREVENTS OR INTERFERES SOME ACTIVE ACTIVITIES AND ADLS

## 2025-03-22 ASSESSMENT — PAIN SCALES - GENERAL
PAINLEVEL_OUTOF10: 8
PAINLEVEL_OUTOF10: 6
PAINLEVEL_OUTOF10: 7
PAINLEVEL_OUTOF10: 3
PAINLEVEL_OUTOF10: 9
PAINLEVEL_OUTOF10: 6
PAINLEVEL_OUTOF10: 0
PAINLEVEL_OUTOF10: 2
PAINLEVEL_OUTOF10: 2
PAINLEVEL_OUTOF10: 10
PAINLEVEL_OUTOF10: 10
PAINLEVEL_OUTOF10: 0
PAINLEVEL_OUTOF10: 10

## 2025-03-22 ASSESSMENT — PAIN DESCRIPTION - LOCATION
LOCATION: RIB CAGE;OTHER (COMMENT)
LOCATION: RIB CAGE
LOCATION: ABDOMEN;RIB CAGE
LOCATION: RIB CAGE
LOCATION: HEAD

## 2025-03-23 ENCOUNTER — APPOINTMENT (OUTPATIENT)
Dept: GENERAL RADIOLOGY | Age: 60
DRG: 276 | End: 2025-03-23
Attending: STUDENT IN AN ORGANIZED HEALTH CARE EDUCATION/TRAINING PROGRAM
Payer: COMMERCIAL

## 2025-03-23 LAB
ALBUMIN SERPL-MCNC: 3.9 G/DL (ref 3.5–5.2)
ALP SERPL-CCNC: 111 U/L (ref 35–104)
ALT SERPL-CCNC: 12 U/L (ref 0–32)
ANION GAP SERPL CALCULATED.3IONS-SCNC: 14 MMOL/L (ref 7–16)
AST SERPL-CCNC: 19 U/L (ref 0–31)
BASOPHILS # BLD: 0.01 K/UL (ref 0–0.2)
BASOPHILS NFR BLD: 0 % (ref 0–2)
BILIRUB SERPL-MCNC: 0.7 MG/DL (ref 0–1.2)
BUN SERPL-MCNC: 20 MG/DL (ref 6–23)
CALCIUM SERPL-MCNC: 9 MG/DL (ref 8.6–10.2)
CHLORIDE SERPL-SCNC: 89 MMOL/L (ref 98–107)
CO2 SERPL-SCNC: 30 MMOL/L (ref 22–29)
CREAT SERPL-MCNC: 0.5 MG/DL (ref 0.5–1)
EOSINOPHIL # BLD: 0.05 K/UL (ref 0.05–0.5)
EOSINOPHILS RELATIVE PERCENT: 0 % (ref 0–6)
ERYTHROCYTE [DISTWIDTH] IN BLOOD BY AUTOMATED COUNT: 13.5 % (ref 11.5–15)
GFR, ESTIMATED: >90 ML/MIN/1.73M2
GLUCOSE BLD-MCNC: 155 MG/DL (ref 74–99)
GLUCOSE BLD-MCNC: 344 MG/DL (ref 74–99)
GLUCOSE BLD-MCNC: 401 MG/DL (ref 74–99)
GLUCOSE BLD-MCNC: 443 MG/DL (ref 74–99)
GLUCOSE SERPL-MCNC: 131 MG/DL (ref 74–99)
HCT VFR BLD AUTO: 37.6 % (ref 34–48)
HGB BLD-MCNC: 13.2 G/DL (ref 11.5–15.5)
IMM GRANULOCYTES # BLD AUTO: 0.06 K/UL (ref 0–0.58)
IMM GRANULOCYTES NFR BLD: 1 % (ref 0–5)
INR PPP: 1
LYMPHOCYTES NFR BLD: 2.04 K/UL (ref 1.5–4)
LYMPHOCYTES RELATIVE PERCENT: 16 % (ref 20–42)
MAGNESIUM SERPL-MCNC: 1.8 MG/DL (ref 1.6–2.6)
MCH RBC QN AUTO: 30.9 PG (ref 26–35)
MCHC RBC AUTO-ENTMCNC: 35.1 G/DL (ref 32–34.5)
MCV RBC AUTO: 88.1 FL (ref 80–99.9)
MONOCYTES NFR BLD: 1.18 K/UL (ref 0.1–0.95)
MONOCYTES NFR BLD: 9 % (ref 2–12)
NEUTROPHILS NFR BLD: 74 % (ref 43–80)
NEUTS SEG NFR BLD: 9.62 K/UL (ref 1.8–7.3)
PARTIAL THROMBOPLASTIN TIME: 25.4 SEC (ref 24.5–35.1)
PHOSPHATE SERPL-MCNC: 1.7 MG/DL (ref 2.5–4.5)
PLATELET # BLD AUTO: 315 K/UL (ref 130–450)
PMV BLD AUTO: 10.5 FL (ref 7–12)
POTASSIUM SERPL-SCNC: 3.3 MMOL/L (ref 3.5–5)
PROT SERPL-MCNC: 6.6 G/DL (ref 6.4–8.3)
PROTHROMBIN TIME: 10.9 SEC (ref 9.3–12.4)
RBC # BLD AUTO: 4.27 M/UL (ref 3.5–5.5)
SODIUM SERPL-SCNC: 133 MMOL/L (ref 132–146)
WBC OTHER # BLD: 13 K/UL (ref 4.5–11.5)

## 2025-03-23 PROCEDURE — 6360000002 HC RX W HCPCS: Performed by: INTERNAL MEDICINE

## 2025-03-23 PROCEDURE — 6370000000 HC RX 637 (ALT 250 FOR IP): Performed by: INTERNAL MEDICINE

## 2025-03-23 PROCEDURE — 6370000000 HC RX 637 (ALT 250 FOR IP): Performed by: CLINICAL NURSE SPECIALIST

## 2025-03-23 PROCEDURE — 2060000000 HC ICU INTERMEDIATE R&B

## 2025-03-23 PROCEDURE — 83735 ASSAY OF MAGNESIUM: CPT

## 2025-03-23 PROCEDURE — 82962 GLUCOSE BLOOD TEST: CPT

## 2025-03-23 PROCEDURE — 94669 MECHANICAL CHEST WALL OSCILL: CPT

## 2025-03-23 PROCEDURE — 2700000000 HC OXYGEN THERAPY PER DAY

## 2025-03-23 PROCEDURE — 84100 ASSAY OF PHOSPHORUS: CPT

## 2025-03-23 PROCEDURE — 85730 THROMBOPLASTIN TIME PARTIAL: CPT

## 2025-03-23 PROCEDURE — 80053 COMPREHEN METABOLIC PANEL: CPT

## 2025-03-23 PROCEDURE — 94640 AIRWAY INHALATION TREATMENT: CPT

## 2025-03-23 PROCEDURE — 99291 CRITICAL CARE FIRST HOUR: CPT | Performed by: INTERNAL MEDICINE

## 2025-03-23 PROCEDURE — 71045 X-RAY EXAM CHEST 1 VIEW: CPT

## 2025-03-23 PROCEDURE — 85610 PROTHROMBIN TIME: CPT

## 2025-03-23 PROCEDURE — 2500000003 HC RX 250 WO HCPCS: Performed by: INTERNAL MEDICINE

## 2025-03-23 PROCEDURE — 85025 COMPLETE CBC W/AUTO DIFF WBC: CPT

## 2025-03-23 PROCEDURE — 6370000000 HC RX 637 (ALT 250 FOR IP): Performed by: NURSE PRACTITIONER

## 2025-03-23 PROCEDURE — 6370000000 HC RX 637 (ALT 250 FOR IP): Performed by: STUDENT IN AN ORGANIZED HEALTH CARE EDUCATION/TRAINING PROGRAM

## 2025-03-23 RX ORDER — INSULIN LISPRO 100 [IU]/ML
0-6 INJECTION, SOLUTION INTRAVENOUS; SUBCUTANEOUS
Status: DISCONTINUED | OUTPATIENT
Start: 2025-03-23 | End: 2025-03-24

## 2025-03-23 RX ORDER — INSULIN LISPRO 100 [IU]/ML
5 INJECTION, SOLUTION INTRAVENOUS; SUBCUTANEOUS
Status: DISCONTINUED | OUTPATIENT
Start: 2025-03-23 | End: 2025-03-23

## 2025-03-23 RX ORDER — POTASSIUM CHLORIDE 1500 MG/1
40 TABLET, EXTENDED RELEASE ORAL ONCE
Status: COMPLETED | OUTPATIENT
Start: 2025-03-23 | End: 2025-03-23

## 2025-03-23 RX ORDER — INSULIN LISPRO 100 [IU]/ML
5 INJECTION, SOLUTION INTRAVENOUS; SUBCUTANEOUS ONCE
Status: COMPLETED | OUTPATIENT
Start: 2025-03-23 | End: 2025-03-23

## 2025-03-23 RX ORDER — INSULIN LISPRO 100 [IU]/ML
4 INJECTION, SOLUTION INTRAVENOUS; SUBCUTANEOUS
Status: DISCONTINUED | OUTPATIENT
Start: 2025-03-23 | End: 2025-03-24

## 2025-03-23 RX ADMIN — ROPINIROLE HYDROCHLORIDE 1 MG: 2 TABLET, FILM COATED ORAL at 08:50

## 2025-03-23 RX ADMIN — INSULIN LISPRO 4 UNITS: 100 INJECTION, SOLUTION INTRAVENOUS; SUBCUTANEOUS at 13:01

## 2025-03-23 RX ADMIN — GUAIFENESIN 400 MG: 400 TABLET ORAL at 14:35

## 2025-03-23 RX ADMIN — PANTOPRAZOLE SODIUM 40 MG: 40 TABLET, DELAYED RELEASE ORAL at 06:36

## 2025-03-23 RX ADMIN — IPRATROPIUM BROMIDE AND ALBUTEROL SULFATE 1 DOSE: 2.5; .5 SOLUTION RESPIRATORY (INHALATION) at 16:42

## 2025-03-23 RX ADMIN — INSULIN LISPRO 6 UNITS: 100 INJECTION, SOLUTION INTRAVENOUS; SUBCUTANEOUS at 20:59

## 2025-03-23 RX ADMIN — OXYCODONE AND ACETAMINOPHEN 1 TABLET: 325; 5 TABLET ORAL at 08:55

## 2025-03-23 RX ADMIN — MIDODRINE HYDROCHLORIDE 10 MG: 10 TABLET ORAL at 08:47

## 2025-03-23 RX ADMIN — ROPINIROLE HYDROCHLORIDE 3 MG: 2 TABLET, FILM COATED ORAL at 20:49

## 2025-03-23 RX ADMIN — IPRATROPIUM BROMIDE AND ALBUTEROL SULFATE 1 DOSE: 2.5; .5 SOLUTION RESPIRATORY (INHALATION) at 20:04

## 2025-03-23 RX ADMIN — MICONAZOLE NITRATE: 20.6 POWDER TOPICAL at 08:48

## 2025-03-23 RX ADMIN — SODIUM CHLORIDE, PRESERVATIVE FREE 10 ML: 5 INJECTION INTRAVENOUS at 08:48

## 2025-03-23 RX ADMIN — INSULIN LISPRO 4 UNITS: 100 INJECTION, SOLUTION INTRAVENOUS; SUBCUTANEOUS at 13:02

## 2025-03-23 RX ADMIN — ROPINIROLE HYDROCHLORIDE 1 MG: 2 TABLET, FILM COATED ORAL at 11:27

## 2025-03-23 RX ADMIN — GUAIFENESIN 400 MG: 400 TABLET ORAL at 08:47

## 2025-03-23 RX ADMIN — INSULIN LISPRO 4 UNITS: 100 INJECTION, SOLUTION INTRAVENOUS; SUBCUTANEOUS at 17:32

## 2025-03-23 RX ADMIN — ARFORMOTEROL TARTRATE 15 MCG: 15 SOLUTION RESPIRATORY (INHALATION) at 20:04

## 2025-03-23 RX ADMIN — GABAPENTIN 400 MG: 100 CAPSULE ORAL at 14:34

## 2025-03-23 RX ADMIN — GUAIFENESIN 400 MG: 400 TABLET ORAL at 20:49

## 2025-03-23 RX ADMIN — ATORVASTATIN CALCIUM 80 MG: 40 TABLET, FILM COATED ORAL at 20:49

## 2025-03-23 RX ADMIN — GABAPENTIN 400 MG: 100 CAPSULE ORAL at 20:49

## 2025-03-23 RX ADMIN — PREDNISONE 40 MG: 20 TABLET ORAL at 08:47

## 2025-03-23 RX ADMIN — SENNOSIDES 8.6 MG: 8.6 TABLET, FILM COATED ORAL at 20:50

## 2025-03-23 RX ADMIN — ARFORMOTEROL TARTRATE 15 MCG: 15 SOLUTION RESPIRATORY (INHALATION) at 08:02

## 2025-03-23 RX ADMIN — CITALOPRAM HYDROBROMIDE 40 MG: 20 TABLET ORAL at 08:50

## 2025-03-23 RX ADMIN — SENNOSIDES 8.6 MG: 8.6 TABLET, FILM COATED ORAL at 08:47

## 2025-03-23 RX ADMIN — OXYCODONE AND ACETAMINOPHEN 1 TABLET: 325; 5 TABLET ORAL at 03:33

## 2025-03-23 RX ADMIN — MICONAZOLE NITRATE: 20 CREAM TOPICAL at 20:49

## 2025-03-23 RX ADMIN — ROPINIROLE HYDROCHLORIDE 1 MG: 2 TABLET, FILM COATED ORAL at 16:39

## 2025-03-23 RX ADMIN — GABAPENTIN 400 MG: 100 CAPSULE ORAL at 08:47

## 2025-03-23 RX ADMIN — INSULIN GLARGINE 18 UNITS: 100 INJECTION, SOLUTION SUBCUTANEOUS at 20:59

## 2025-03-23 RX ADMIN — INSULIN LISPRO 6 UNITS: 100 INJECTION, SOLUTION INTRAVENOUS; SUBCUTANEOUS at 17:32

## 2025-03-23 RX ADMIN — IPRATROPIUM BROMIDE AND ALBUTEROL SULFATE 1 DOSE: 2.5; .5 SOLUTION RESPIRATORY (INHALATION) at 08:02

## 2025-03-23 RX ADMIN — METOPROLOL SUCCINATE ER TABLETS 25 MG: 50 TABLET, FILM COATED, EXTENDED RELEASE ORAL at 08:46

## 2025-03-23 RX ADMIN — MICONAZOLE NITRATE: 20 CREAM TOPICAL at 04:26

## 2025-03-23 RX ADMIN — INSULIN LISPRO 5 UNITS: 100 INJECTION, SOLUTION INTRAVENOUS; SUBCUTANEOUS at 17:32

## 2025-03-23 RX ADMIN — MIDODRINE HYDROCHLORIDE 10 MG: 10 TABLET ORAL at 11:27

## 2025-03-23 RX ADMIN — IPRATROPIUM BROMIDE AND ALBUTEROL SULFATE 1 DOSE: 2.5; .5 SOLUTION RESPIRATORY (INHALATION) at 12:11

## 2025-03-23 RX ADMIN — ENOXAPARIN SODIUM 40 MG: 100 INJECTION SUBCUTANEOUS at 08:47

## 2025-03-23 RX ADMIN — BUMETANIDE 1 MG: 0.25 INJECTION INTRAMUSCULAR; INTRAVENOUS at 08:47

## 2025-03-23 RX ADMIN — BUDESONIDE INHALATION 500 MCG: 0.5 SUSPENSION RESPIRATORY (INHALATION) at 20:04

## 2025-03-23 RX ADMIN — MICONAZOLE NITRATE: 20 CREAM TOPICAL at 13:03

## 2025-03-23 RX ADMIN — SODIUM CHLORIDE, PRESERVATIVE FREE 10 ML: 5 INJECTION INTRAVENOUS at 20:49

## 2025-03-23 RX ADMIN — POTASSIUM CHLORIDE 40 MEQ: 1500 TABLET, EXTENDED RELEASE ORAL at 11:26

## 2025-03-23 RX ADMIN — MIDODRINE HYDROCHLORIDE 10 MG: 10 TABLET ORAL at 16:39

## 2025-03-23 RX ADMIN — LEVOTHYROXINE SODIUM 200 MCG: 0.2 TABLET ORAL at 06:36

## 2025-03-23 RX ADMIN — BUDESONIDE INHALATION 500 MCG: 0.5 SUSPENSION RESPIRATORY (INHALATION) at 08:03

## 2025-03-23 ASSESSMENT — PAIN DESCRIPTION - LOCATION
LOCATION: FLANK
LOCATION: FLANK

## 2025-03-23 ASSESSMENT — PAIN DESCRIPTION - ONSET: ONSET: ON-GOING

## 2025-03-23 ASSESSMENT — PAIN - FUNCTIONAL ASSESSMENT
PAIN_FUNCTIONAL_ASSESSMENT: ACTIVITIES ARE NOT PREVENTED
PAIN_FUNCTIONAL_ASSESSMENT: PREVENTS OR INTERFERES SOME ACTIVE ACTIVITIES AND ADLS

## 2025-03-23 ASSESSMENT — PAIN SCALES - GENERAL
PAINLEVEL_OUTOF10: 0
PAINLEVEL_OUTOF10: 10
PAINLEVEL_OUTOF10: 0
PAINLEVEL_OUTOF10: 2
PAINLEVEL_OUTOF10: 10
PAINLEVEL_OUTOF10: 0
PAINLEVEL_OUTOF10: 0
PAINLEVEL_OUTOF10: 5

## 2025-03-23 ASSESSMENT — PAIN DESCRIPTION - ORIENTATION
ORIENTATION: LEFT
ORIENTATION: LEFT

## 2025-03-23 ASSESSMENT — PAIN DESCRIPTION - DESCRIPTORS
DESCRIPTORS: ACHING;THROBBING;SHARP
DESCRIPTORS: THROBBING;ACHING;DISCOMFORT

## 2025-03-23 ASSESSMENT — PAIN DESCRIPTION - FREQUENCY: FREQUENCY: INTERMITTENT
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
## 2025-03-23 NOTE — FLOWSHEET NOTE
Patient up to chair assist X1 , slight dizziness, O2 needed titrated up to 7Lt from 5Lt previously, POX dropped to 88%. BP also dropped, then increased gradually while patient was sitting in the chair. Tolerated 1 hour up and requested to go back to bed.

## 2025-03-24 PROBLEM — R73.9 STEROID-INDUCED HYPERGLYCEMIA: Status: ACTIVE | Noted: 2025-03-24

## 2025-03-24 PROBLEM — T38.0X5A STEROID-INDUCED HYPERGLYCEMIA: Status: ACTIVE | Noted: 2025-03-24

## 2025-03-24 PROBLEM — Z91.119 DIETARY NONCOMPLIANCE: Status: ACTIVE | Noted: 2025-03-24

## 2025-03-24 PROBLEM — E11.65 POORLY CONTROLLED DIABETES MELLITUS (HCC): Status: ACTIVE | Noted: 2025-03-24

## 2025-03-24 LAB
ALBUMIN SERPL-MCNC: 3.4 G/DL (ref 3.5–5.2)
ALP SERPL-CCNC: 106 U/L (ref 35–104)
ALT SERPL-CCNC: 15 U/L (ref 0–32)
ANION GAP SERPL CALCULATED.3IONS-SCNC: 14 MMOL/L (ref 7–16)
AST SERPL-CCNC: 17 U/L (ref 0–31)
B-OH-BUTYR SERPL-MCNC: 0.03 MMOL/L (ref 0.02–0.27)
B.E.: 5.4 MMOL/L (ref -3–3)
BASOPHILS # BLD: 0.02 K/UL (ref 0–0.2)
BASOPHILS NFR BLD: 0 % (ref 0–2)
BILIRUB SERPL-MCNC: 0.6 MG/DL (ref 0–1.2)
BUN SERPL-MCNC: 18 MG/DL (ref 6–23)
CALCIUM SERPL-MCNC: 9 MG/DL (ref 8.6–10.2)
CHLORIDE SERPL-SCNC: 92 MMOL/L (ref 98–107)
CO2 SERPL-SCNC: 26 MMOL/L (ref 22–29)
COHB: 0.5 % (ref 0–1.5)
CORTIS SERPL-MCNC: 2.7 UG/DL (ref 2.7–18.4)
CORTISOL COLLECTION INFO: NORMAL
CREAT SERPL-MCNC: 0.5 MG/DL (ref 0.5–1)
CRITICAL: ABNORMAL
DATE ANALYZED: ABNORMAL
DATE OF COLLECTION: ABNORMAL
EOSINOPHIL # BLD: 0.04 K/UL (ref 0.05–0.5)
EOSINOPHILS RELATIVE PERCENT: 0 % (ref 0–6)
ERYTHROCYTE [DISTWIDTH] IN BLOOD BY AUTOMATED COUNT: 13.3 % (ref 11.5–15)
GFR, ESTIMATED: >90 ML/MIN/1.73M2
GLUCOSE BLD-MCNC: 176 MG/DL (ref 74–99)
GLUCOSE BLD-MCNC: 270 MG/DL (ref 74–99)
GLUCOSE BLD-MCNC: 362 MG/DL (ref 74–99)
GLUCOSE BLD-MCNC: 392 MG/DL (ref 74–99)
GLUCOSE BLD-MCNC: 418 MG/DL (ref 74–99)
GLUCOSE SERPL-MCNC: 231 MG/DL (ref 74–99)
HCO3: 29.4 MMOL/L (ref 22–26)
HCT VFR BLD AUTO: 34.2 % (ref 34–48)
HGB BLD-MCNC: 12 G/DL (ref 11.5–15.5)
HHB: 2.2 % (ref 0–5)
IMM GRANULOCYTES # BLD AUTO: 0.11 K/UL (ref 0–0.58)
IMM GRANULOCYTES NFR BLD: 1 % (ref 0–5)
INR PPP: 1
LAB: ABNORMAL
LYMPHOCYTES NFR BLD: 1.95 K/UL (ref 1.5–4)
LYMPHOCYTES RELATIVE PERCENT: 14 % (ref 20–42)
Lab: 2220
MAGNESIUM SERPL-MCNC: 1.7 MG/DL (ref 1.6–2.6)
MCH RBC QN AUTO: 30.9 PG (ref 26–35)
MCHC RBC AUTO-ENTMCNC: 35.1 G/DL (ref 32–34.5)
MCV RBC AUTO: 88.1 FL (ref 80–99.9)
METHB: 0.3 % (ref 0–1.5)
MODE: ABNORMAL
MONOCYTES NFR BLD: 1.24 K/UL (ref 0.1–0.95)
MONOCYTES NFR BLD: 9 % (ref 2–12)
NEUTROPHILS NFR BLD: 76 % (ref 43–80)
NEUTS SEG NFR BLD: 10.69 K/UL (ref 1.8–7.3)
O2 SATURATION: 97.8 % (ref 92–98.5)
O2HB: 97 % (ref 94–97)
OPERATOR ID: 2593
PARTIAL THROMBOPLASTIN TIME: 25.5 SEC (ref 24.5–35.1)
PATIENT TEMP: 37 C
PCO2: 41 MMHG (ref 35–45)
PH BLOOD GAS: 7.47 (ref 7.35–7.45)
PHOSPHATE SERPL-MCNC: 1.4 MG/DL (ref 2.5–4.5)
PLATELET # BLD AUTO: 281 K/UL (ref 130–450)
PMV BLD AUTO: 10.4 FL (ref 7–12)
PO2: 104 MMHG (ref 75–100)
POTASSIUM SERPL-SCNC: 3.6 MMOL/L (ref 3.5–5)
PROT SERPL-MCNC: 6.3 G/DL (ref 6.4–8.3)
PROTHROMBIN TIME: 11 SEC (ref 9.3–12.4)
RBC # BLD AUTO: 3.88 M/UL (ref 3.5–5.5)
SODIUM SERPL-SCNC: 132 MMOL/L (ref 132–146)
SOURCE, BLOOD GAS: ABNORMAL
THB: 11.8 G/DL (ref 11.5–16.5)
TIME ANALYZED: 2222
WBC OTHER # BLD: 14.1 K/UL (ref 4.5–11.5)

## 2025-03-24 PROCEDURE — 6370000000 HC RX 637 (ALT 250 FOR IP): Performed by: INTERNAL MEDICINE

## 2025-03-24 PROCEDURE — 85610 PROTHROMBIN TIME: CPT

## 2025-03-24 PROCEDURE — 82010 KETONE BODYS QUAN: CPT

## 2025-03-24 PROCEDURE — 80053 COMPREHEN METABOLIC PANEL: CPT

## 2025-03-24 PROCEDURE — 6360000002 HC RX W HCPCS: Performed by: INTERNAL MEDICINE

## 2025-03-24 PROCEDURE — 2700000000 HC OXYGEN THERAPY PER DAY

## 2025-03-24 PROCEDURE — 97530 THERAPEUTIC ACTIVITIES: CPT

## 2025-03-24 PROCEDURE — 85025 COMPLETE CBC W/AUTO DIFF WBC: CPT

## 2025-03-24 PROCEDURE — 6370000000 HC RX 637 (ALT 250 FOR IP): Performed by: CLINICAL NURSE SPECIALIST

## 2025-03-24 PROCEDURE — 6370000000 HC RX 637 (ALT 250 FOR IP)

## 2025-03-24 PROCEDURE — 2500000003 HC RX 250 WO HCPCS: Performed by: INTERNAL MEDICINE

## 2025-03-24 PROCEDURE — 6370000000 HC RX 637 (ALT 250 FOR IP): Performed by: NURSE PRACTITIONER

## 2025-03-24 PROCEDURE — 94640 AIRWAY INHALATION TREATMENT: CPT

## 2025-03-24 PROCEDURE — 2060000000 HC ICU INTERMEDIATE R&B

## 2025-03-24 PROCEDURE — 82962 GLUCOSE BLOOD TEST: CPT

## 2025-03-24 PROCEDURE — 97535 SELF CARE MNGMENT TRAINING: CPT

## 2025-03-24 PROCEDURE — 85730 THROMBOPLASTIN TIME PARTIAL: CPT

## 2025-03-24 PROCEDURE — 84100 ASSAY OF PHOSPHORUS: CPT

## 2025-03-24 PROCEDURE — 94669 MECHANICAL CHEST WALL OSCILL: CPT

## 2025-03-24 PROCEDURE — 83735 ASSAY OF MAGNESIUM: CPT

## 2025-03-24 PROCEDURE — 82533 TOTAL CORTISOL: CPT

## 2025-03-24 PROCEDURE — 82805 BLOOD GASES W/O2 SATURATION: CPT

## 2025-03-24 PROCEDURE — 2580000003 HC RX 258: Performed by: INTERNAL MEDICINE

## 2025-03-24 RX ORDER — INSULIN LISPRO 100 [IU]/ML
5 INJECTION, SOLUTION INTRAVENOUS; SUBCUTANEOUS ONCE
Status: COMPLETED | OUTPATIENT
Start: 2025-03-24 | End: 2025-03-24

## 2025-03-24 RX ORDER — INSULIN LISPRO 100 [IU]/ML
0-8 INJECTION, SOLUTION INTRAVENOUS; SUBCUTANEOUS
Status: DISCONTINUED | OUTPATIENT
Start: 2025-03-24 | End: 2025-03-24

## 2025-03-24 RX ORDER — INSULIN GLARGINE 100 [IU]/ML
20 INJECTION, SOLUTION SUBCUTANEOUS NIGHTLY
Status: DISCONTINUED | OUTPATIENT
Start: 2025-03-24 | End: 2025-03-25

## 2025-03-24 RX ORDER — INSULIN LISPRO 100 [IU]/ML
0-12 INJECTION, SOLUTION INTRAVENOUS; SUBCUTANEOUS
Status: DISCONTINUED | OUTPATIENT
Start: 2025-03-25 | End: 2025-03-25

## 2025-03-24 RX ORDER — INSULIN LISPRO 100 [IU]/ML
6 INJECTION, SOLUTION INTRAVENOUS; SUBCUTANEOUS
Status: DISCONTINUED | OUTPATIENT
Start: 2025-03-24 | End: 2025-03-25

## 2025-03-24 RX ADMIN — SODIUM CHLORIDE, PRESERVATIVE FREE 10 ML: 5 INJECTION INTRAVENOUS at 19:53

## 2025-03-24 RX ADMIN — SODIUM CHLORIDE, PRESERVATIVE FREE 10 ML: 5 INJECTION INTRAVENOUS at 09:02

## 2025-03-24 RX ADMIN — INSULIN LISPRO 8 UNITS: 100 INJECTION, SOLUTION INTRAVENOUS; SUBCUTANEOUS at 21:39

## 2025-03-24 RX ADMIN — ARFORMOTEROL TARTRATE 15 MCG: 15 SOLUTION RESPIRATORY (INHALATION) at 19:43

## 2025-03-24 RX ADMIN — BUDESONIDE INHALATION 500 MCG: 0.5 SUSPENSION RESPIRATORY (INHALATION) at 19:43

## 2025-03-24 RX ADMIN — INSULIN LISPRO 5 UNITS: 100 INJECTION, SOLUTION INTRAVENOUS; SUBCUTANEOUS at 22:56

## 2025-03-24 RX ADMIN — SODIUM PHOSPHATE, MONOBASIC, MONOHYDRATE AND SODIUM PHOSPHATE, DIBASIC, ANHYDROUS 30 MMOL: 142; 276 INJECTION, SOLUTION INTRAVENOUS at 14:08

## 2025-03-24 RX ADMIN — ARFORMOTEROL TARTRATE 15 MCG: 15 SOLUTION RESPIRATORY (INHALATION) at 08:05

## 2025-03-24 RX ADMIN — INSULIN LISPRO 8 UNITS: 100 INJECTION, SOLUTION INTRAVENOUS; SUBCUTANEOUS at 18:31

## 2025-03-24 RX ADMIN — PREDNISONE 40 MG: 20 TABLET ORAL at 09:01

## 2025-03-24 RX ADMIN — PANTOPRAZOLE SODIUM 40 MG: 40 TABLET, DELAYED RELEASE ORAL at 05:58

## 2025-03-24 RX ADMIN — INSULIN LISPRO 4 UNITS: 100 INJECTION, SOLUTION INTRAVENOUS; SUBCUTANEOUS at 09:08

## 2025-03-24 RX ADMIN — MIDODRINE HYDROCHLORIDE 10 MG: 10 TABLET ORAL at 09:01

## 2025-03-24 RX ADMIN — SENNOSIDES 8.6 MG: 8.6 TABLET, FILM COATED ORAL at 09:02

## 2025-03-24 RX ADMIN — BUMETANIDE 1 MG: 0.25 INJECTION INTRAMUSCULAR; INTRAVENOUS at 09:13

## 2025-03-24 RX ADMIN — SENNOSIDES 8.6 MG: 8.6 TABLET, FILM COATED ORAL at 19:53

## 2025-03-24 RX ADMIN — IPRATROPIUM BROMIDE AND ALBUTEROL SULFATE 1 DOSE: 2.5; .5 SOLUTION RESPIRATORY (INHALATION) at 11:25

## 2025-03-24 RX ADMIN — INSULIN LISPRO 4 UNITS: 100 INJECTION, SOLUTION INTRAVENOUS; SUBCUTANEOUS at 13:53

## 2025-03-24 RX ADMIN — GABAPENTIN 400 MG: 100 CAPSULE ORAL at 13:54

## 2025-03-24 RX ADMIN — GABAPENTIN 400 MG: 100 CAPSULE ORAL at 19:53

## 2025-03-24 RX ADMIN — CITALOPRAM HYDROBROMIDE 40 MG: 20 TABLET ORAL at 09:05

## 2025-03-24 RX ADMIN — ROPINIROLE HYDROCHLORIDE 1 MG: 2 TABLET, FILM COATED ORAL at 09:00

## 2025-03-24 RX ADMIN — INSULIN LISPRO 3 UNITS: 100 INJECTION, SOLUTION INTRAVENOUS; SUBCUTANEOUS at 05:58

## 2025-03-24 RX ADMIN — IPRATROPIUM BROMIDE AND ALBUTEROL SULFATE 1 DOSE: 2.5; .5 SOLUTION RESPIRATORY (INHALATION) at 19:43

## 2025-03-24 RX ADMIN — MICONAZOLE NITRATE: 20 CREAM TOPICAL at 04:24

## 2025-03-24 RX ADMIN — MICONAZOLE NITRATE: 20 CREAM TOPICAL at 21:10

## 2025-03-24 RX ADMIN — OXYCODONE AND ACETAMINOPHEN 1 TABLET: 325; 5 TABLET ORAL at 17:28

## 2025-03-24 RX ADMIN — ROPINIROLE HYDROCHLORIDE 1 MG: 2 TABLET, FILM COATED ORAL at 18:09

## 2025-03-24 RX ADMIN — MICONAZOLE NITRATE: 20 CREAM TOPICAL at 13:00

## 2025-03-24 RX ADMIN — GUAIFENESIN 400 MG: 400 TABLET ORAL at 09:01

## 2025-03-24 RX ADMIN — BUDESONIDE INHALATION 500 MCG: 0.5 SUSPENSION RESPIRATORY (INHALATION) at 08:05

## 2025-03-24 RX ADMIN — ENOXAPARIN SODIUM 40 MG: 100 INJECTION SUBCUTANEOUS at 09:09

## 2025-03-24 RX ADMIN — IPRATROPIUM BROMIDE AND ALBUTEROL SULFATE 1 DOSE: 2.5; .5 SOLUTION RESPIRATORY (INHALATION) at 15:46

## 2025-03-24 RX ADMIN — GUAIFENESIN 400 MG: 400 TABLET ORAL at 13:54

## 2025-03-24 RX ADMIN — ATORVASTATIN CALCIUM 80 MG: 40 TABLET, FILM COATED ORAL at 19:53

## 2025-03-24 RX ADMIN — MIDODRINE HYDROCHLORIDE 10 MG: 10 TABLET ORAL at 13:53

## 2025-03-24 RX ADMIN — OXYCODONE AND ACETAMINOPHEN 1 TABLET: 325; 5 TABLET ORAL at 02:05

## 2025-03-24 RX ADMIN — GABAPENTIN 400 MG: 100 CAPSULE ORAL at 09:08

## 2025-03-24 RX ADMIN — MIDODRINE HYDROCHLORIDE 10 MG: 10 TABLET ORAL at 18:09

## 2025-03-24 RX ADMIN — ROPINIROLE HYDROCHLORIDE 3 MG: 2 TABLET, FILM COATED ORAL at 19:52

## 2025-03-24 RX ADMIN — METOPROLOL SUCCINATE ER TABLETS 25 MG: 50 TABLET, FILM COATED, EXTENDED RELEASE ORAL at 09:03

## 2025-03-24 RX ADMIN — INSULIN GLARGINE 20 UNITS: 100 INJECTION, SOLUTION SUBCUTANEOUS at 21:30

## 2025-03-24 RX ADMIN — INSULIN LISPRO 5 UNITS: 100 INJECTION, SOLUTION INTRAVENOUS; SUBCUTANEOUS at 13:52

## 2025-03-24 RX ADMIN — INSULIN LISPRO 6 UNITS: 100 INJECTION, SOLUTION INTRAVENOUS; SUBCUTANEOUS at 18:31

## 2025-03-24 RX ADMIN — ROPINIROLE HYDROCHLORIDE 1 MG: 2 TABLET, FILM COATED ORAL at 13:54

## 2025-03-24 RX ADMIN — GUAIFENESIN 400 MG: 400 TABLET ORAL at 19:53

## 2025-03-24 RX ADMIN — LEVOTHYROXINE SODIUM 200 MCG: 0.2 TABLET ORAL at 05:58

## 2025-03-24 RX ADMIN — IPRATROPIUM BROMIDE AND ALBUTEROL SULFATE 1 DOSE: 2.5; .5 SOLUTION RESPIRATORY (INHALATION) at 08:05

## 2025-03-24 RX ADMIN — BUMETANIDE 1 MG: 0.25 INJECTION INTRAMUSCULAR; INTRAVENOUS at 18:31

## 2025-03-24 ASSESSMENT — PAIN DESCRIPTION - ORIENTATION
ORIENTATION: LEFT
ORIENTATION: LEFT

## 2025-03-24 ASSESSMENT — PAIN SCALES - GENERAL
PAINLEVEL_OUTOF10: 0
PAINLEVEL_OUTOF10: 5
PAINLEVEL_OUTOF10: 3
PAINLEVEL_OUTOF10: 8
PAINLEVEL_OUTOF10: 0
PAINLEVEL_OUTOF10: 10

## 2025-03-24 ASSESSMENT — PAIN - FUNCTIONAL ASSESSMENT: PAIN_FUNCTIONAL_ASSESSMENT: ACTIVITIES ARE NOT PREVENTED

## 2025-03-24 ASSESSMENT — PAIN DESCRIPTION - DESCRIPTORS
DESCRIPTORS: ACHING;SORE;TENDER;DISCOMFORT
DESCRIPTORS: ACHING;SORE;SPASM

## 2025-03-24 ASSESSMENT — PAIN DESCRIPTION - LOCATION
LOCATION: RIB CAGE
LOCATION: RIB CAGE

## 2025-03-24 NOTE — CARE COORDINATION
Care Coordination: LOS 8 day.  4 ltr nc. Bumex iv bid. S/P Dual chamber ICD implant-  working with therapy this morning. If home 02 needed, pt does have [place], (sent to [employer] last week)- she has no preference of DME. Plan is home with disabled son, friend will  at discharge.    Electronically signed by Tomeka Green RN on 3/24/2025 at 10:30 AM     ADDENDUM: met with pt regarding possible home 02- she does not feel she will need it at discharge, she will see how she is doing by tomorrow and reconsider. She feels she did well with therapy. Will follow    PULSE OX ON ROOM AIR SITTING____%  PULSE OX ON ____LITERS SITTING RECOVERY ____%  PULSE OX ON ROOM AIR AMBULATING ____%  PULSE OX ON ____LITERS AMBULATING RECOVERY ___%  RN/ Please document Pulse Ox in PG note the following way;(Cut and Paste, Fill in Blanks). If home o2 is needed.  If > 4 ltr needed to recover, please show insignificant recovery on 4 ltr and then increase for recovery rate

## 2025-03-25 VITALS
RESPIRATION RATE: 19 BRPM | HEIGHT: 62 IN | TEMPERATURE: 98.4 F | SYSTOLIC BLOOD PRESSURE: 121 MMHG | WEIGHT: 135.58 LBS | BODY MASS INDEX: 24.95 KG/M2 | OXYGEN SATURATION: 94 % | HEART RATE: 72 BPM | DIASTOLIC BLOOD PRESSURE: 65 MMHG

## 2025-03-25 LAB
BASOPHILS # BLD: 0.02 K/UL (ref 0–0.2)
BASOPHILS NFR BLD: 0 % (ref 0–2)
EOSINOPHIL # BLD: 0.06 K/UL (ref 0.05–0.5)
EOSINOPHILS RELATIVE PERCENT: 1 % (ref 0–6)
ERYTHROCYTE [DISTWIDTH] IN BLOOD BY AUTOMATED COUNT: 13.5 % (ref 11.5–15)
GLUCOSE BLD-MCNC: 180 MG/DL (ref 74–99)
GLUCOSE BLD-MCNC: 236 MG/DL (ref 74–99)
HCT VFR BLD AUTO: 33.5 % (ref 34–48)
HGB BLD-MCNC: 11.6 G/DL (ref 11.5–15.5)
IMM GRANULOCYTES # BLD AUTO: 0.08 K/UL (ref 0–0.58)
IMM GRANULOCYTES NFR BLD: 1 % (ref 0–5)
LYMPHOCYTES NFR BLD: 2.74 K/UL (ref 1.5–4)
LYMPHOCYTES RELATIVE PERCENT: 22 % (ref 20–42)
MCH RBC QN AUTO: 30.9 PG (ref 26–35)
MCHC RBC AUTO-ENTMCNC: 34.6 G/DL (ref 32–34.5)
MCV RBC AUTO: 89.1 FL (ref 80–99.9)
MONOCYTES NFR BLD: 1.09 K/UL (ref 0.1–0.95)
MONOCYTES NFR BLD: 9 % (ref 2–12)
NEUTROPHILS NFR BLD: 68 % (ref 43–80)
NEUTS SEG NFR BLD: 8.54 K/UL (ref 1.8–7.3)
PLATELET # BLD AUTO: 277 K/UL (ref 130–450)
PMV BLD AUTO: 10.4 FL (ref 7–12)
RBC # BLD AUTO: 3.76 M/UL (ref 3.5–5.5)
WBC OTHER # BLD: 12.5 K/UL (ref 4.5–11.5)

## 2025-03-25 PROCEDURE — 6360000002 HC RX W HCPCS: Performed by: INTERNAL MEDICINE

## 2025-03-25 PROCEDURE — 94640 AIRWAY INHALATION TREATMENT: CPT

## 2025-03-25 PROCEDURE — 6370000000 HC RX 637 (ALT 250 FOR IP): Performed by: INTERNAL MEDICINE

## 2025-03-25 PROCEDURE — 6370000000 HC RX 637 (ALT 250 FOR IP): Performed by: CLINICAL NURSE SPECIALIST

## 2025-03-25 PROCEDURE — 2700000000 HC OXYGEN THERAPY PER DAY

## 2025-03-25 PROCEDURE — 6370000000 HC RX 637 (ALT 250 FOR IP)

## 2025-03-25 PROCEDURE — 85025 COMPLETE CBC W/AUTO DIFF WBC: CPT

## 2025-03-25 PROCEDURE — 82962 GLUCOSE BLOOD TEST: CPT

## 2025-03-25 PROCEDURE — 6370000000 HC RX 637 (ALT 250 FOR IP): Performed by: NURSE PRACTITIONER

## 2025-03-25 RX ORDER — HYDROCORTISONE 20 MG/1
40 TABLET ORAL 2 TIMES DAILY
Status: DISCONTINUED | OUTPATIENT
Start: 2025-03-25 | End: 2025-03-25 | Stop reason: HOSPADM

## 2025-03-25 RX ORDER — ALBUTEROL SULFATE 0.83 MG/ML
2.5 SOLUTION RESPIRATORY (INHALATION) EVERY 6 HOURS PRN
Qty: 120 EACH | Refills: 3 | Status: SHIPPED | OUTPATIENT
Start: 2025-03-25

## 2025-03-25 RX ORDER — INSULIN GLARGINE 100 [IU]/ML
20 INJECTION, SOLUTION SUBCUTANEOUS NIGHTLY
Qty: 10 ML | Refills: 3 | Status: SHIPPED | OUTPATIENT
Start: 2025-03-25

## 2025-03-25 RX ORDER — INSULIN LISPRO 100 [IU]/ML
0-12 INJECTION, SOLUTION INTRAVENOUS; SUBCUTANEOUS
Qty: 1 EACH | Refills: 0 | Status: SHIPPED | OUTPATIENT
Start: 2025-03-25

## 2025-03-25 RX ORDER — INSULIN GLARGINE 100 [IU]/ML
25 INJECTION, SOLUTION SUBCUTANEOUS NIGHTLY
Status: DISCONTINUED | OUTPATIENT
Start: 2025-03-25 | End: 2025-03-25 | Stop reason: HOSPADM

## 2025-03-25 RX ORDER — INSULIN LISPRO 100 [IU]/ML
8 INJECTION, SOLUTION INTRAVENOUS; SUBCUTANEOUS
Status: DISCONTINUED | OUTPATIENT
Start: 2025-03-25 | End: 2025-03-25 | Stop reason: HOSPADM

## 2025-03-25 RX ORDER — ALBUTEROL SULFATE 90 UG/1
2 INHALANT RESPIRATORY (INHALATION) EVERY 6 HOURS PRN
Qty: 54 G | Refills: 1 | Status: SHIPPED | OUTPATIENT
Start: 2025-03-25

## 2025-03-25 RX ORDER — INSULIN LISPRO 100 [IU]/ML
0-16 INJECTION, SOLUTION INTRAVENOUS; SUBCUTANEOUS
Status: DISCONTINUED | OUTPATIENT
Start: 2025-03-25 | End: 2025-03-25 | Stop reason: HOSPADM

## 2025-03-25 RX ORDER — BUMETANIDE 2 MG/1
1 TABLET ORAL 2 TIMES DAILY
Qty: 30 TABLET | Refills: 3 | Status: SHIPPED | OUTPATIENT
Start: 2025-03-25

## 2025-03-25 RX ADMIN — BUMETANIDE 1 MG: 0.25 INJECTION INTRAMUSCULAR; INTRAVENOUS at 08:43

## 2025-03-25 RX ADMIN — ARFORMOTEROL TARTRATE 15 MCG: 15 SOLUTION RESPIRATORY (INHALATION) at 08:31

## 2025-03-25 RX ADMIN — ENOXAPARIN SODIUM 40 MG: 100 INJECTION SUBCUTANEOUS at 08:43

## 2025-03-25 RX ADMIN — INSULIN LISPRO 6 UNITS: 100 INJECTION, SOLUTION INTRAVENOUS; SUBCUTANEOUS at 08:42

## 2025-03-25 RX ADMIN — INSULIN LISPRO 4 UNITS: 100 INJECTION, SOLUTION INTRAVENOUS; SUBCUTANEOUS at 05:33

## 2025-03-25 RX ADMIN — PANTOPRAZOLE SODIUM 40 MG: 40 TABLET, DELAYED RELEASE ORAL at 05:32

## 2025-03-25 RX ADMIN — MICONAZOLE NITRATE: 20 CREAM TOPICAL at 04:24

## 2025-03-25 RX ADMIN — GABAPENTIN 400 MG: 100 CAPSULE ORAL at 08:43

## 2025-03-25 RX ADMIN — BUDESONIDE INHALATION 500 MCG: 0.5 SUSPENSION RESPIRATORY (INHALATION) at 08:31

## 2025-03-25 RX ADMIN — LEVOTHYROXINE SODIUM 200 MCG: 0.2 TABLET ORAL at 05:32

## 2025-03-25 RX ADMIN — INSULIN LISPRO 2 UNITS: 100 INJECTION, SOLUTION INTRAVENOUS; SUBCUTANEOUS at 12:27

## 2025-03-25 RX ADMIN — ROPINIROLE HYDROCHLORIDE 1 MG: 2 TABLET, FILM COATED ORAL at 08:42

## 2025-03-25 RX ADMIN — CITALOPRAM HYDROBROMIDE 40 MG: 20 TABLET ORAL at 08:43

## 2025-03-25 RX ADMIN — GUAIFENESIN 400 MG: 400 TABLET ORAL at 08:43

## 2025-03-25 RX ADMIN — METOPROLOL SUCCINATE ER TABLETS 25 MG: 50 TABLET, FILM COATED, EXTENDED RELEASE ORAL at 08:44

## 2025-03-25 RX ADMIN — IPRATROPIUM BROMIDE AND ALBUTEROL SULFATE 1 DOSE: 2.5; .5 SOLUTION RESPIRATORY (INHALATION) at 08:31

## 2025-03-25 RX ADMIN — SENNOSIDES 8.6 MG: 8.6 TABLET, FILM COATED ORAL at 08:43

## 2025-03-25 RX ADMIN — INSULIN LISPRO 6 UNITS: 100 INJECTION, SOLUTION INTRAVENOUS; SUBCUTANEOUS at 12:28

## 2025-03-25 RX ADMIN — ROPINIROLE HYDROCHLORIDE 1 MG: 2 TABLET, FILM COATED ORAL at 12:28

## 2025-03-25 ASSESSMENT — PAIN SCALES - GENERAL
PAINLEVEL_OUTOF10: 0

## 2025-03-25 NOTE — PLAN OF CARE
Problem: Chronic Conditions and Co-morbidities  Goal: Patient's chronic conditions and co-morbidity symptoms are monitored and maintained or improved  3/17/2025 2129 by Judah Sr RN  Outcome: Progressing  Flowsheets (Taken 3/17/2025 2000)  Care Plan - Patient's Chronic Conditions and Co-Morbidity Symptoms are Monitored and Maintained or Improved:   Monitor and assess patient's chronic conditions and comorbid symptoms for stability, deterioration, or improvement   Collaborate with multidisciplinary team to address chronic and comorbid conditions and prevent exacerbation or deterioration  3/17/2025 0937 by Lexis Singh, RN  Outcome: Progressing     Problem: Discharge Planning  Goal: Discharge to home or other facility with appropriate resources  3/17/2025 2129 by Judah Sr RN  Outcome: Progressing  Flowsheets (Taken 3/17/2025 2000)  Discharge to home or other facility with appropriate resources: Identify barriers to discharge with patient and caregiver  3/17/2025 0937 by Lexis Singh, RN  Outcome: Progressing     Problem: Skin/Tissue Integrity  Goal: Skin integrity remains intact  Description: 1.  Monitor for areas of redness and/or skin breakdown  2.  Assess vascular access sites hourly  3.  Every 4-6 hours minimum:  Change oxygen saturation probe site  4.  Every 4-6 hours:  If on nasal continuous positive airway pressure, respiratory therapy assess nares and determine need for appliance change or resting period  3/17/2025 2129 by Judah Sr, RN  Outcome: Progressing  Flowsheets (Taken 3/17/2025 2000)  Skin Integrity Remains Intact:   Monitor for areas of redness and/or skin breakdown   Every 4-6 hours minimum: Change oxygen saturation probe site   Every 4-6 hours: If on nasal continuous positive airway pressure, respiratory therapy assesses nares and determine need for appliance change or resting period  3/17/2025 0937 by Lexis Singh, RN  Outcome: Progressing     Problem: ABCDS Injury 
  Problem: Chronic Conditions and Co-morbidities  Goal: Patient's chronic conditions and co-morbidity symptoms are monitored and maintained or improved  3/18/2025 0841 by Lexis Singh RN  Outcome: Progressing  3/17/2025 2129 by Judah Sr RN  Outcome: Progressing  Flowsheets (Taken 3/17/2025 2000)  Care Plan - Patient's Chronic Conditions and Co-Morbidity Symptoms are Monitored and Maintained or Improved:   Monitor and assess patient's chronic conditions and comorbid symptoms for stability, deterioration, or improvement   Collaborate with multidisciplinary team to address chronic and comorbid conditions and prevent exacerbation or deterioration     Problem: Discharge Planning  Goal: Discharge to home or other facility with appropriate resources  3/18/2025 0841 by Lexis Singh RN  Outcome: Progressing  3/17/2025 2129 by Judah Sr RN  Outcome: Progressing  Flowsheets (Taken 3/17/2025 2000)  Discharge to home or other facility with appropriate resources: Identify barriers to discharge with patient and caregiver     Problem: Skin/Tissue Integrity  Goal: Skin integrity remains intact  Description: 1.  Monitor for areas of redness and/or skin breakdown  2.  Assess vascular access sites hourly  3.  Every 4-6 hours minimum:  Change oxygen saturation probe site  4.  Every 4-6 hours:  If on nasal continuous positive airway pressure, respiratory therapy assess nares and determine need for appliance change or resting period  3/18/2025 0841 by Lexis Singh RN  Outcome: Progressing  3/17/2025 2129 by Judah Sr RN  Outcome: Progressing  Flowsheets (Taken 3/17/2025 2000)  Skin Integrity Remains Intact:   Monitor for areas of redness and/or skin breakdown   Every 4-6 hours minimum: Change oxygen saturation probe site   Every 4-6 hours: If on nasal continuous positive airway pressure, respiratory therapy assesses nares and determine need for appliance change or resting period     Problem: ABCDS Injury 
  Problem: Chronic Conditions and Co-morbidities  Goal: Patient's chronic conditions and co-morbidity symptoms are monitored and maintained or improved  3/18/2025 2146 by Enma Chavarria RN  Outcome: Progressing     Problem: Discharge Planning  Goal: Discharge to home or other facility with appropriate resources  3/18/2025 2146 by Enma Chavarria RN  Outcome: Progressing     Problem: Skin/Tissue Integrity  Goal: Skin integrity remains intact  Description: 1.  Monitor for areas of redness and/or skin breakdown  2.  Assess vascular access sites hourly  3.  Every 4-6 hours minimum:  Change oxygen saturation probe site  4.  Every 4-6 hours:  If on nasal continuous positive airway pressure, respiratory therapy assess nares and determine need for appliance change or resting period  3/18/2025 2146 by Enma Chavarria RN  Outcome: Progressing     Problem: ABCDS Injury Assessment  Goal: Absence of physical injury  3/18/2025 2146 by Enma Chavarria RN  Outcome: Progressing     Problem: Safety - Adult  Goal: Free from fall injury  3/18/2025 2146 by Enam Chavarria RN  Outcome: Progressing     Problem: Pain  Goal: Verbalizes/displays adequate comfort level or baseline comfort level  3/18/2025 2146 by Enma Chavarria RN  Outcome: Progressing     Problem: Nutrition Deficit:  Goal: Optimize nutritional status  Outcome: Progressing  Flowsheets (Taken 3/18/2025 1238 by Lynsey Webb, MS, RD, LD)  Nutrient intake appropriate for improving, restoring, or maintaining nutritional needs: Recommend appropriate diets, oral nutritional supplements, and vitamin/mineral supplements     Problem: Cardiovascular - Adult  Goal: Maintains optimal cardiac output and hemodynamic stability  Outcome: Not Progressing     Problem: Cardiovascular - Adult  Goal: Absence of cardiac dysrhythmias or at baseline  Outcome: Progressing     Problem: Respiratory - Adult  Goal: Achieves optimal ventilation and 
  Problem: Chronic Conditions and Co-morbidities  Goal: Patient's chronic conditions and co-morbidity symptoms are monitored and maintained or improved  3/19/2025 0823 by Vasquez Oneal RN  Outcome: Progressing     Problem: Skin/Tissue Integrity  Goal: Skin integrity remains intact  Description: 1.  Monitor for areas of redness and/or skin breakdown  2.  Assess vascular access sites hourly  3.  Every 4-6 hours minimum:  Change oxygen saturation probe site  4.  Every 4-6 hours:  If on nasal continuous positive airway pressure, respiratory therapy assess nares and determine need for appliance change or resting period  3/19/2025 0823 by Vasquez Oneal RN  Outcome: Progressing     Problem: ABCDS Injury Assessment  Goal: Absence of physical injury  3/19/2025 0823 by Vasquez Onela RN  Outcome: Progressing     Problem: Safety - Adult  Goal: Free from fall injury  3/19/2025 0823 by Vasquez Oneal RN  Outcome: Progressing     Problem: Pain  Goal: Verbalizes/displays adequate comfort level or baseline comfort level  3/19/2025 0823 by Vasquez Oneal RN  Outcome: Progressing     Problem: Nutrition Deficit:  Goal: Optimize nutritional status  3/19/2025 0823 by Vasquez Oneal RN  Outcome: Progressing     Problem: Respiratory - Adult  Goal: Achieves optimal ventilation and oxygenation  3/19/2025 0823 by Vasquez Oneal RN  Outcome: Progressing       Problem: Cardiovascular - Adult  Goal: Maintains optimal cardiac output and hemodynamic stability  3/18/2025 2146 by Enma Chavarria RN  Outcome: Not Progressing     
  Problem: Chronic Conditions and Co-morbidities  Goal: Patient's chronic conditions and co-morbidity symptoms are monitored and maintained or improved  3/19/2025 2053 by Pastora Borjas RN  Outcome: Progressing  3/19/2025 0823 by Vasquez Onael RN  Outcome: Progressing     Problem: Discharge Planning  Goal: Discharge to home or other facility with appropriate resources  Outcome: Progressing     Problem: Skin/Tissue Integrity  Goal: Skin integrity remains intact  Description: 1.  Monitor for areas of redness and/or skin breakdown  2.  Assess vascular access sites hourly  3.  Every 4-6 hours minimum:  Change oxygen saturation probe site  4.  Every 4-6 hours:  If on nasal continuous positive airway pressure, respiratory therapy assess nares and determine need for appliance change or resting period  3/19/2025 2053 by Pastora Borjas RN  Outcome: Progressing  3/19/2025 0823 by Vasquez Oneal RN  Outcome: Progressing     Problem: ABCDS Injury Assessment  Goal: Absence of physical injury  3/19/2025 2053 by Pastora Borjas RN  Outcome: Progressing  3/19/2025 0823 by Vasquez Oneal RN  Outcome: Progressing     Problem: Safety - Adult  Goal: Free from fall injury  3/19/2025 2053 by Pastora Borjas RN  Outcome: Progressing  3/19/2025 0823 by Vasquez Oneal RN  Outcome: Progressing     Problem: Pain  Goal: Verbalizes/displays adequate comfort level or baseline comfort level  3/19/2025 2053 by Pastora Borjas RN  Outcome: Progressing  3/19/2025 0823 by Vasquez Oneal RN  Outcome: Progressing     Problem: Nutrition Deficit:  Goal: Optimize nutritional status  3/19/2025 2053 by Pastora Borjas RN  Outcome: Progressing  3/19/2025 0823 by Vasquez Oneal RN  Outcome: Progressing     Problem: Cardiovascular - Adult  Goal: Maintains optimal cardiac output and hemodynamic stability  Outcome: Progressing  Goal: Absence of cardiac dysrhythmias or at baseline  Outcome: Progressing     Problem: Respiratory - Adult  Goal: 
  Problem: Chronic Conditions and Co-morbidities  Goal: Patient's chronic conditions and co-morbidity symptoms are monitored and maintained or improved  3/22/2025 1613 by Gabriella Vega RN  Outcome: Not Progressing  3/22/2025 1613 by Gabriella Vega RN  Outcome: Progressing     Problem: Pain  Goal: Verbalizes/displays adequate comfort level or baseline comfort level  3/22/2025 1613 by Gabriella Vega RN  Outcome: Not Progressing  3/22/2025 1613 by Gabriella Vega RN  Outcome: Progressing     Problem: Cardiovascular - Adult  Goal: Maintains optimal cardiac output and hemodynamic stability  3/22/2025 1613 by Gabriella Vega RN  Outcome: Not Progressing  3/22/2025 1613 by Gabriella Vega RN  Outcome: Progressing     
  Problem: Chronic Conditions and Co-morbidities  Goal: Patient's chronic conditions and co-morbidity symptoms are monitored and maintained or improved  3/24/2025 2359 by Sejal Lawson RN  Outcome: Not Progressing  3/24/2025 2353 by Sejal Lawson RN  Outcome: Progressing  Flowsheets (Taken 3/24/2025 2000)  Care Plan - Patient's Chronic Conditions and Co-Morbidity Symptoms are Monitored and Maintained or Improved:   Monitor and assess patient's chronic conditions and comorbid symptoms for stability, deterioration, or improvement   Update acute care plan with appropriate goals if chronic or comorbid symptoms are exacerbated and prevent overall improvement and discharge   Collaborate with multidisciplinary team to address chronic and comorbid conditions and prevent exacerbation or deterioration     Problem: Discharge Planning  Goal: Discharge to home or other facility with appropriate resources  3/24/2025 2359 by Sejal Lawson RN  Outcome: Not Progressing  3/24/2025 2353 by Sejal Lawson RN  Outcome: Progressing  Flowsheets (Taken 3/24/2025 2000)  Discharge to home or other facility with appropriate resources:   Identify barriers to discharge with patient and caregiver   Arrange for needed discharge resources and transportation as appropriate   Identify discharge learning needs (meds, wound care, etc)   Arrange for interpreters to assist at discharge as needed   Refer to discharge planning if patient needs post-hospital services based on physician order or complex needs related to functional status, cognitive ability or social support system     Problem: Nutrition Deficit:  Goal: Optimize nutritional status  3/24/2025 2359 by Sejal Lawson RN  Outcome: Not Progressing  3/24/2025 2353 by Sejal Lawson RN  Outcome: Progressing  Flowsheets (Taken 3/24/2025 1158 by Kojo Feliz, RD, LD)  Nutrient intake appropriate for improving, restoring, or maintaining nutritional needs: Recommend 
  Problem: Chronic Conditions and Co-morbidities  Goal: Patient's chronic conditions and co-morbidity symptoms are monitored and maintained or improved  3/25/2025 1332 by Pastora Lowery RN  Outcome: Completed  3/24/2025 2359 by Sejal Lawson RN  Outcome: Not Progressing  3/24/2025 2353 by Sejal Lawson RN  Outcome: Progressing  Flowsheets (Taken 3/24/2025 2000)  Care Plan - Patient's Chronic Conditions and Co-Morbidity Symptoms are Monitored and Maintained or Improved:   Monitor and assess patient's chronic conditions and comorbid symptoms for stability, deterioration, or improvement   Update acute care plan with appropriate goals if chronic or comorbid symptoms are exacerbated and prevent overall improvement and discharge   Collaborate with multidisciplinary team to address chronic and comorbid conditions and prevent exacerbation or deterioration     Problem: Discharge Planning  Goal: Discharge to home or other facility with appropriate resources  3/25/2025 1332 by Pastora Lowery RN  Outcome: Completed  3/24/2025 2359 by Sejal Lawson RN  Outcome: Not Progressing  3/24/2025 2353 by Sejal Lawson RN  Outcome: Progressing  Flowsheets (Taken 3/24/2025 2000)  Discharge to home or other facility with appropriate resources:   Identify barriers to discharge with patient and caregiver   Arrange for needed discharge resources and transportation as appropriate   Identify discharge learning needs (meds, wound care, etc)   Arrange for interpreters to assist at discharge as needed   Refer to discharge planning if patient needs post-hospital services based on physician order or complex needs related to functional status, cognitive ability or social support system     Problem: Nutrition Deficit:  Goal: Optimize nutritional status  3/25/2025 1332 by Pastora Lowery RN  Outcome: Completed  3/24/2025 2359 by Sejal Lawson RN  Outcome: Not Progressing  3/24/2025 2353 by Sejal Lawson 
  Problem: Chronic Conditions and Co-morbidities  Goal: Patient's chronic conditions and co-morbidity symptoms are monitored and maintained or improved  Outcome: Progressing     Problem: Discharge Planning  Goal: Discharge to home or other facility with appropriate resources  3/20/2025 2307 by Pastora Borjas RN  Outcome: Progressing  3/20/2025 1110 by Ariadna Stallworth RN  Outcome: Progressing  Flowsheets (Taken 3/20/2025 0800)  Discharge to home or other facility with appropriate resources: Identify barriers to discharge with patient and caregiver     Problem: Skin/Tissue Integrity  Goal: Skin integrity remains intact  Description: 1.  Monitor for areas of redness and/or skin breakdown  2.  Assess vascular access sites hourly  3.  Every 4-6 hours minimum:  Change oxygen saturation probe site  4.  Every 4-6 hours:  If on nasal continuous positive airway pressure, respiratory therapy assess nares and determine need for appliance change or resting period  3/20/2025 2307 by Pastora Borjas RN  Outcome: Progressing  3/20/2025 1110 by Aridana Stallworth RN  Outcome: Progressing  Flowsheets (Taken 3/20/2025 0800)  Skin Integrity Remains Intact:   Monitor for areas of redness and/or skin breakdown   Assess vascular access sites hourly   Every 4-6 hours: If on nasal continuous positive airway pressure, respiratory therapy assesses nares and determine need for appliance change or resting period   Every 4-6 hours minimum: Change oxygen saturation probe site     Problem: ABCDS Injury Assessment  Goal: Absence of physical injury  3/20/2025 2307 by Pastora Borjas RN  Outcome: Progressing  3/20/2025 1110 by Ariadna Stallworth RN  Outcome: Progressing     Problem: Safety - Adult  Goal: Free from fall injury  3/20/2025 2307 by Pastora Borjas RN  Outcome: Progressing  3/20/2025 1110 by Ariadna Stallworth RN  Outcome: Progressing     Problem: Pain  Goal: Verbalizes/displays adequate comfort level or baseline comfort level  3/20/2025 1110 by 
  Problem: Chronic Conditions and Co-morbidities  Goal: Patient's chronic conditions and co-morbidity symptoms are monitored and maintained or improved  Outcome: Progressing  Flowsheets (Taken 3/16/2025 2000)  Care Plan - Patient's Chronic Conditions and Co-Morbidity Symptoms are Monitored and Maintained or Improved:   Monitor and assess patient's chronic conditions and comorbid symptoms for stability, deterioration, or improvement   Collaborate with multidisciplinary team to address chronic and comorbid conditions and prevent exacerbation or deterioration     Problem: Discharge Planning  Goal: Discharge to home or other facility with appropriate resources  Outcome: Progressing  Flowsheets (Taken 3/16/2025 2000)  Discharge to home or other facility with appropriate resources: Identify barriers to discharge with patient and caregiver     Problem: Skin/Tissue Integrity  Goal: Skin integrity remains intact  Description: 1.  Monitor for areas of redness and/or skin breakdown  2.  Assess vascular access sites hourly  3.  Every 4-6 hours minimum:  Change oxygen saturation probe site  4.  Every 4-6 hours:  If on nasal continuous positive airway pressure, respiratory therapy assess nares and determine need for appliance change or resting period  Outcome: Progressing  Flowsheets (Taken 3/16/2025 2000)  Skin Integrity Remains Intact:   Monitor for areas of redness and/or skin breakdown   Every 4-6 hours minimum: Change oxygen saturation probe site   Every 4-6 hours: If on nasal continuous positive airway pressure, respiratory therapy assesses nares and determine need for appliance change or resting period     Problem: ABCDS Injury Assessment  Goal: Absence of physical injury  Outcome: Progressing  Flowsheets (Taken 3/16/2025 2100)  Absence of Physical Injury: Implement safety measures based on patient assessment     Problem: Safety - Adult  Goal: Free from fall injury  Outcome: Progressing  Flowsheets (Taken 3/16/2025 
  Problem: Chronic Conditions and Co-morbidities  Goal: Patient's chronic conditions and co-morbidity symptoms are monitored and maintained or improved  Outcome: Progressing  Flowsheets (Taken 3/21/2025 0800 by Ariadna Stallworth RN)  Care Plan - Patient's Chronic Conditions and Co-Morbidity Symptoms are Monitored and Maintained or Improved: Monitor and assess patient's chronic conditions and comorbid symptoms for stability, deterioration, or improvement     Problem: Discharge Planning  Goal: Discharge to home or other facility with appropriate resources  3/21/2025 2033 by Dariana Haskins RN  Outcome: Progressing     Problem: Skin/Tissue Integrity  Goal: Skin integrity remains intact  Description: 1.  Monitor for areas of redness and/or skin breakdown  2.  Assess vascular access sites hourly  3.  Every 4-6 hours minimum:  Change oxygen saturation probe site  4.  Every 4-6 hours:  If on nasal continuous positive airway pressure, respiratory therapy assess nares and determine need for appliance change or resting period  3/21/2025 2033 by Dariana Haskins RN  Outcome: Progressing     Problem: ABCDS Injury Assessment  Goal: Absence of physical injury  3/21/2025 2033 by Dariana Haskins RN  Outcome: Progressing     Problem: Safety - Adult  Goal: Free from fall injury  3/21/2025 2033 by Dariana Haskins RN  Outcome: Progressing     Problem: Pain  Goal: Verbalizes/displays adequate comfort level or baseline comfort level  3/21/2025 2033 by Dariana Haskins RN  Outcome: Progressing     Problem: Nutrition Deficit:  Goal: Optimize nutritional status  Outcome: Progressing     Problem: Cardiovascular - Adult  Goal: Maintains optimal cardiac output and hemodynamic stability  Outcome: Progressing  Flowsheets (Taken 3/21/2025 0800 by Ariadna Stallworth RN)  Maintains optimal cardiac output and hemodynamic stability: Monitor blood pressure and heart rate     Problem: Respiratory - Adult  Goal: Achieves optimal ventilation and oxygenation  Outcome: 
  Problem: Chronic Conditions and Co-morbidities  Goal: Patient's chronic conditions and co-morbidity symptoms are monitored and maintained or improved  Outcome: Progressing  Flowsheets (Taken 3/24/2025 2000)  Care Plan - Patient's Chronic Conditions and Co-Morbidity Symptoms are Monitored and Maintained or Improved:   Monitor and assess patient's chronic conditions and comorbid symptoms for stability, deterioration, or improvement   Update acute care plan with appropriate goals if chronic or comorbid symptoms are exacerbated and prevent overall improvement and discharge   Collaborate with multidisciplinary team to address chronic and comorbid conditions and prevent exacerbation or deterioration     Problem: Discharge Planning  Goal: Discharge to home or other facility with appropriate resources  Outcome: Progressing  Flowsheets (Taken 3/24/2025 2000)  Discharge to home or other facility with appropriate resources:   Identify barriers to discharge with patient and caregiver   Arrange for needed discharge resources and transportation as appropriate   Identify discharge learning needs (meds, wound care, etc)   Arrange for interpreters to assist at discharge as needed   Refer to discharge planning if patient needs post-hospital services based on physician order or complex needs related to functional status, cognitive ability or social support system     Problem: Skin/Tissue Integrity  Goal: Skin integrity remains intact  Description: 1.  Monitor for areas of redness and/or skin breakdown  2.  Assess vascular access sites hourly  3.  Every 4-6 hours minimum:  Change oxygen saturation probe site  4.  Every 4-6 hours:  If on nasal continuous positive airway pressure, respiratory therapy assess nares and determine need for appliance change or resting period  Outcome: Progressing  Flowsheets (Taken 3/24/2025 2000)  Skin Integrity Remains Intact: Monitor for areas of redness and/or skin breakdown     Problem: ABCDS Injury 
  Problem: Discharge Planning  Goal: Discharge to home or other facility with appropriate resources  3/21/2025 1007 by Ariadna Stallworth RN  Outcome: Progressing  Flowsheets (Taken 3/21/2025 0800)  Discharge to home or other facility with appropriate resources: Identify barriers to discharge with patient and caregiver     Problem: Skin/Tissue Integrity  Goal: Skin integrity remains intact  Description: 1.  Monitor for areas of redness and/or skin breakdown  2.  Assess vascular access sites hourly  3.  Every 4-6 hours minimum:  Change oxygen saturation probe site  4.  Every 4-6 hours:  If on nasal continuous positive airway pressure, respiratory therapy assess nares and determine need for appliance change or resting period  3/21/2025 1007 by Ariadna Stallworth, RN  Outcome: Progressing  Flowsheets (Taken 3/21/2025 0800)  Skin Integrity Remains Intact:   Monitor for areas of redness and/or skin breakdown   Assess vascular access sites hourly   Every 4-6 hours minimum: Change oxygen saturation probe site   Every 4-6 hours: If on nasal continuous positive airway pressure, respiratory therapy assesses nares and determine need for appliance change or resting period     Problem: ABCDS Injury Assessment  Goal: Absence of physical injury  3/21/2025 1007 by Ariadna Stallworth, RN  Outcome: Progressing     Problem: Safety - Adult  Goal: Free from fall injury  3/21/2025 1007 by Ariadna Stallworth RN  Outcome: Progressing     Problem: Pain  Goal: Verbalizes/displays adequate comfort level or baseline comfort level  Outcome: Progressing     
  Problem: Discharge Planning  Goal: Discharge to home or other facility with appropriate resources  Outcome: Progressing  Flowsheets (Taken 3/20/2025 0800)  Discharge to home or other facility with appropriate resources: Identify barriers to discharge with patient and caregiver     Problem: Skin/Tissue Integrity  Goal: Skin integrity remains intact  Description: 1.  Monitor for areas of redness and/or skin breakdown  2.  Assess vascular access sites hourly  3.  Every 4-6 hours minimum:  Change oxygen saturation probe site  4.  Every 4-6 hours:  If on nasal continuous positive airway pressure, respiratory therapy assess nares and determine need for appliance change or resting period  Outcome: Progressing  Flowsheets (Taken 3/20/2025 0800)  Skin Integrity Remains Intact:   Monitor for areas of redness and/or skin breakdown   Assess vascular access sites hourly   Every 4-6 hours: If on nasal continuous positive airway pressure, respiratory therapy assesses nares and determine need for appliance change or resting period   Every 4-6 hours minimum: Change oxygen saturation probe site     Problem: ABCDS Injury Assessment  Goal: Absence of physical injury  Outcome: Progressing     Problem: Safety - Adult  Goal: Free from fall injury  Outcome: Progressing     Problem: Pain  Goal: Verbalizes/displays adequate comfort level or baseline comfort level  Outcome: Progressing     Problem: Cardiovascular - Adult  Goal: Maintains optimal cardiac output and hemodynamic stability  Outcome: Progressing  Flowsheets (Taken 3/20/2025 0800)  Maintains optimal cardiac output and hemodynamic stability: Monitor blood pressure and heart rate     Problem: Respiratory - Adult  Goal: Achieves optimal ventilation and oxygenation  Outcome: Progressing  Flowsheets (Taken 3/20/2025 0800)  Achieves optimal ventilation and oxygenation: Assess for changes in respiratory status     
  Problem: Pain  Goal: Verbalizes/displays adequate comfort level or baseline comfort level  Outcome: Not Progressing     Problem: Respiratory - Adult  Goal: Achieves optimal ventilation and oxygenation  Outcome: Not Progressing     Problem: Gastrointestinal - Adult  Goal: Minimal or absence of nausea and vomiting  Outcome: Not Progressing  Goal: Maintains or returns to baseline bowel function  Outcome: Not Progressing     Problem: Metabolic/Fluid and Electrolytes - Adult  Goal: Electrolytes maintained within normal limits  Outcome: Not Progressing  Goal: Hemodynamic stability and optimal renal function maintained  Outcome: Not Progressing  Goal: Glucose maintained within prescribed range  Outcome: Not Progressing     
Assessment  Goal: Absence of physical injury  3/17/2025 0937 by Lexis Singh RN  Outcome: Progressing  3/16/2025 2140 by Judah Sr RN  Outcome: Progressing  Flowsheets (Taken 3/16/2025 2100)  Absence of Physical Injury: Implement safety measures based on patient assessment     Problem: Safety - Adult  Goal: Free from fall injury  3/17/2025 0937 by Lexis Singh RN  Outcome: Progressing  3/16/2025 2140 by Judah Sr RN  Outcome: Progressing  Flowsheets (Taken 3/16/2025 2100)  Free From Fall Injury: Instruct family/caregiver on patient safety     Problem: Pain  Goal: Verbalizes/displays adequate comfort level or baseline comfort level  3/17/2025 0937 by Lexis Singh RN  Outcome: Progressing  3/16/2025 2140 by Judah Sr RN  Outcome: Progressing  Flowsheets (Taken 3/16/2025 2000)  Verbalizes/displays adequate comfort level or baseline comfort level:   Encourage patient to monitor pain and request assistance   Assess pain using appropriate pain scale   Administer analgesics based on type and severity of pain and evaluate response   Implement non-pharmacological measures as appropriate and evaluate response     
Assess and document skin integrity   TWICE DAILY: Assess and document dressing/incision, wound bed, drain sites and surrounding tissue   Implement wound care per orders     Problem: Musculoskeletal - Adult  Goal: Return mobility to safest level of function  Outcome: Progressing  Flowsheets (Taken 3/22/2025 2000)  Return Mobility to Safest Level of Function:   Assess patient stability and activity tolerance for standing, transferring and ambulating with or without assistive devices   Assist with transfers and ambulation using safe patient handling equipment as needed   Obtain physical therapy/occupational therapy consults as needed   Apply continuous passive motion per provider or physical therapy orders to increase flexion toward goal  Goal: Maintain proper alignment of affected body part  Outcome: Progressing  Flowsheets (Taken 3/22/2025 2000)  Maintain proper alignment of affected body part: Support and protect limb and body alignment per provider's orders     Problem: Gastrointestinal - Adult  Goal: Minimal or absence of nausea and vomiting  Outcome: Progressing  Flowsheets (Taken 3/22/2025 2000)  Minimal or absence of nausea and vomiting:   Administer IV fluids as ordered to ensure adequate hydration   Advance diet as tolerated, if ordered   Nutrition consult to assist patient with adequate nutrition and appropriate food choices     Problem: Genitourinary - Adult  Goal: Absence of urinary retention  Outcome: Progressing  Flowsheets (Taken 3/22/2025 2000)  Absence of urinary retention:   Assess patient’s ability to void and empty bladder   Monitor intake/output and perform bladder scan as needed   Place urinary catheter per Licensed Independent Practitioner order if needed     Problem: Infection - Adult  Goal: Absence of infection at discharge  Outcome: Progressing  Flowsheets (Taken 3/22/2025 2000)  Absence of infection at discharge:   Assess and monitor for signs and symptoms of infection   Monitor 
increase activity and self care as tolerated  3/23/2025 1851 by Gabriella Vega RN  Outcome: Progressing     Problem: Gastrointestinal - Adult  Goal: Minimal or absence of nausea and vomiting  3/23/2025 2204 by Sejal Lawson RN  Outcome: Progressing  Flowsheets (Taken 3/23/2025 2000)  Minimal or absence of nausea and vomiting:   Advance diet as tolerated, if ordered   Nutrition consult to assist patient with adequate nutrition and appropriate food choices  3/23/2025 1851 by Gabriella Vega RN  Outcome: Not Progressing  Goal: Maintains adequate nutritional intake  Recent Flowsheet Documentation  Taken 3/23/2025 2000 by Sejal Lawson RN  Maintains adequate nutritional intake:   Obtain nutritional consult as needed   Monitor intake and output, weight and lab values  3/23/2025 1851 by Gabriella Vega RN  Outcome: Progressing     Problem: Genitourinary - Adult  Goal: Absence of urinary retention  3/23/2025 2204 by Sejal Lawson RN  Outcome: Progressing  Flowsheets (Taken 3/23/2025 2000)  Absence of urinary retention: Assess patient’s ability to void and empty bladder  3/23/2025 1851 by Gabriella Vega RN  Outcome: Progressing     Problem: Infection - Adult  Goal: Absence of infection at discharge  Recent Flowsheet Documentation  Taken 3/23/2025 2000 by Sejal Lawson RN  Absence of infection at discharge:   Assess and monitor for signs and symptoms of infection   Monitor lab/diagnostic results   Monitor all insertion sites i.e., indwelling lines, tubes and drains   [place] appropriate cooling/warming therapies per order   Administer medications as ordered  3/23/2025 1851 by Gabriella Vega RN  Outcome: Progressing  Goal: Absence of infection during hospitalization  Recent Flowsheet Documentation  Taken 3/23/2025 2000 by Sejal Lawson RN  Absence of infection during hospitalization:   Assess and monitor for signs and symptoms of infection   Monitor all insertion sites i.e., indwelling lines,

## 2025-03-25 NOTE — PROGRESS NOTES
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 Electronically signed by Saul Anaya RN on 3/16/25 at 2:11 PM EDT    **SHARE this note so that the co-signing nurse can place an eSignature**    Nurse 2 eSignature: Electronically signed by Shauna Pascual RN on 3/16/25 at 2:50 PM EDT              
Patient arrived back to MICU  from CVL s/p permanent pacer placement. Connected to monitor, on 10L HFNC. Vital signs stable.   
Physical Therapy    Physical Therapy Daily Treatment Note       Name: Debra Maher  : 1965  MRN: [id number]      Date of Service: 3/24/2025    Evaluating PT:  Cornelius Bain, PT, DPT  [id number]     Room #:  4407/4407-A  Diagnosis:  Syncope [R55]  Acute respiratory failure [J96.00]  PMHx/PSHx:   has a past medical history of Atrial fibrillation (HCC), CAD (coronary artery disease), CHF (congestive heart failure) ([facility]), Chronic back pain, COPD (chronic obstructive pulmonary disease) ([facility]), Depression, Diabetes mellitus (HCC), GERD (gastroesophageal reflux disease), Graves disease, Restless leg syndrome, Syncope, Thyroid disease, and Tobacco abuse.   Procedure/Surgery:  Thoracentesis 3/18;  Insert ICD dual 3/19   Precautions:  Falls,  ICD precautions, Ambulate patient   Equipment Needs:  TBD     SUBJECTIVE:    Pt lives with her 40 year old son (handicapped) in a 1 story home with 0 stairs to enter.  Bedroom and bathroom are on the 1st level.  Pt ambulated with independent, no AD, works/drives PTA.    OBJECTIVE:   Initial Evaluation  Date: 3/20/25  Treatment  3/24/25 Short Term/ Long Term   Goals   AM-PAC 6 Clicks 10/24 16/24    Was pt agreeable to Eval/treatment? Yes  Yes     Does pt have pain? No c/o pain  No c/o pain     Bed Mobility  Rolling: Min A  Supine to sit: Min A  Sit to supine: Min A  Scooting: Min A Rolling: SBA  Supine to sit: SBA  Sit to supine: SBA  Scooting: SBA Rolling: Independent   Supine to sit: Independent   Sit to supine: Independent   Scooting: Independent    Transfers Sit to stand: NT  Stand to sit: NT  Stand pivot: NT Sit to stand: SBA  Stand to sit: SBA  Stand pivot: SBA Sit to stand: Independent   Stand to sit: Independent   Stand pivot: Independent    Ambulation    NT 50 feet x3 reps with no AD CGA (wheelchair follow) >200 feet with AAD Modified Independent     Stair negotiation: ascended and descended  NT NT >3 steps with 1 rail Modified Independent     ROM BUE:  Per OT eval  BLE:  WFL  
Physical Therapy    Physical Therapy Initial Assessment     Name: Debra Mahre  : 1965  MRN: [id number]      Date of Service: 3/20/2025    Evaluating PT:  Cornelius Bain, PT, DPT  [id number]     Room #:  4407/4407-A  Diagnosis:  Syncope [R55]  Acute respiratory failure (HCC) [J96.00]  PMHx/PSHx:   has a past medical history of Atrial fibrillation (HCC), CAD (coronary artery disease), CHF (congestive heart failure) (HCC), Chronic back pain, COPD (chronic obstructive pulmonary disease) (HCC), Depression, Diabetes mellitus (HCC), GERD (gastroesophageal reflux disease), Graves disease, Restless leg syndrome, Syncope, Thyroid disease, and Tobacco abuse.   Procedure/Surgery:  Thoracentesis 3/18;  Insert ICD dual 3/19   Precautions:  Falls, Shoulder immobilizer, ICD precautions, Ambulate patient   Equipment Needs:  TBD     SUBJECTIVE:    Pt lives with her 40 year old son (handicapped) in a 1 story home with 0 stairs to enter.  Bedroom and bathroom are on the 1st level.  Pt ambulated with independent, no AD, works/drives PTA.    OBJECTIVE:   Initial Evaluation  Date: 3/20/25  Treatment Short Term/ Long Term   Goals   AM-PAC 6 Clicks 10/24     Was pt agreeable to Eval/treatment? Yes      Does pt have pain? No c/o pain      Bed Mobility  Rolling: Min A  Supine to sit: Min A  Sit to supine: Min A  Scooting: Min A  Rolling: Independent   Supine to sit: Independent   Sit to supine: Independent   Scooting: Independent    Transfers Sit to stand: NT  Stand to sit: NT  Stand pivot: NT  Sit to stand: Independent   Stand to sit: Independent   Stand pivot: Independent    Ambulation    NT  >200 feet with AAD Modified Independent     Stair negotiation: ascended and descended  NT  >3 steps with 1 rail Modified Independent     ROM BUE:  Per OT eval  BLE:  WFL     Strength BUE:  Per OT eval  BLE:  WFL     Balance Sitting EOB:  SBA   Dynamic Standing:  NT  Sitting EOB:  Independent    Dynamic Standing:  Modified Independent with AAD if 
Physical Therapy    Pt on PT caseload.  Attempted PT treatment.  Pt in her street clothes sitting EOB awaiting discharge.      Cornelius Bain, PT, DPT  [id number]     
[facility]  Department of Internal Medicine   Internal Medicine Residency   MICU Progress Note    Patient:  Debra Maher 59 y.o. female  MRN: [id number]     Date of Service: 3/24/2025    Allergy: Bee venom, Amoxicillin, Codeine, Doxycycline, Prednisone, Tetracyclines & related, and Metformin and related    Subjective   -Patient seen and examined.  -No acute events overnight were reported.  -No major issues  -Transfer order placed.  Objective     VS: BP (!) 79/56   Pulse 60   Temp 98.4 °F (36.9 °C) (Oral)   Resp 17   Ht 1.575 m (5' 2\")   Wt 61.5 kg (135 lb 9.3 oz)   SpO2 100%   BMI 24.80 kg/m²           I & O - 24hr:   Intake/Output Summary (Last 24 hours) at 3/24/2025 0859  Last data filed at 3/24/2025 0400  Gross per 24 hour   Intake 480 ml   Output 1300 ml   Net -820 ml       Physical Exam:  General Appearance: alert, appears stated age, and cooperative  Neck: no adenopathy, no carotid bruit, no JVD, supple, symmetrical, trachea midline, and thyroid not enlarged, symmetric, no tenderness/mass/nodules  Lung: clear to auscultation bilaterally  Heart: regular rate and rhythm, S1, S2 normal, no murmur, click, rub or gallop  Abdomen: soft, non-tender; bowel sounds normal; no masses,  no organomegaly  Extremities:  extremities normal, atraumatic, no cyanosis or edema  Musculoskeletal: No joint swelling, no muscle tenderness. ROM normal in all joints of extremities.   Neurologic: Mental status: Alert, oriented, thought content appropriate    Lines     site day    Art line   None    TLC None    PICC None    Hemoaccess None      ABG:     Lab Results   Component Value Date/Time    PH 7.510 03/17/2025 12:10 PM    PCO2 55.4 03/17/2025 12:10 PM    PO2 54.8 03/17/2025 12:10 PM    HCO3 43.2 03/17/2025 12:10 PM    BE 17.2 03/17/2025 12:10 PM    THB 14.6 03/17/2025 12:10 PM    O2SAT 89.4 03/17/2025 12:10 PM        Medications     ATB:   Antibiotics  Days   None                Labs   CBC:   Lab Results 
Report called to [place], Portable chest x ray completed in CVL holding area.   
[facility]  Department of Internal Medicine   Internal Medicine Residency   MICU Progress Note    Patient:  Debra Maher 59 y.o. female  MRN: [id number]     Date of Service: 3/25/2025    Allergy: Bee venom, Amoxicillin, Codeine, Doxycycline, Prednisone, Tetracyclines & related, and Metformin and related    Subjective   -Patient seen and examined.  -No acute events overnight were reported.  -Denies major issues    Objective     VS: /65   Pulse 72   Temp 98.4 °F (36.9 °C) (Oral)   Resp 19   Ht 1.575 m (5' 2\")   Wt 61.5 kg (135 lb 9.3 oz)   SpO2 94%   BMI 24.80 kg/m²           I & O - 24hr:   Intake/Output Summary (Last 24 hours) at 3/25/2025 1335  Last data filed at 3/25/2025 1300  Gross per 24 hour   Intake 1706.79 ml   Output 1100 ml   Net 606.79 ml       Physical Exam:  General Appearance: alert, appears stated age, and cooperative  Neck: no adenopathy, no carotid bruit, no JVD, supple, symmetrical, trachea midline, and thyroid not enlarged, symmetric, no tenderness/mass/nodules  Lung: clear to auscultation bilaterally  Heart: regular rate and rhythm, S1, S2 normal, no murmur, click, rub or gallop  Abdomen: soft, non-tender; bowel sounds normal; no masses,  no organomegaly  Extremities:  extremities normal, atraumatic, no cyanosis or edema  Musculoskeletal: No joint swelling, no muscle tenderness. ROM normal in all joints of extremities.   Neurologic: Mental status: Alert, oriented, thought content appropriate    Lines     site day    Art line   None    TLC None    PICC None    Hemoaccess None      ABG:     Lab Results   Component Value Date/Time    PH 7.474 03/24/2025 10:20 PM    PCO2 41.0 03/24/2025 10:20 PM    PO2 104.0 03/24/2025 10:20 PM    HCO3 29.4 03/24/2025 10:20 PM    BE 5.4 03/24/2025 10:20 PM    THB 11.8 03/24/2025 10:20 PM    O2SAT 97.8 03/24/2025 10:20 PM        Medications       ATB:   Antibiotics  Days   None              Labs   CBC:   Lab Results   Component Value 
SpO2 dropping to 87-88%, patient refused bipap at this time. HF NC increased to 15L, SpO2 now 95%.  
Spiritual Health History and Assessment/Progress Note  [facility] [place]    (P) Initial Encounter, Spiritual/Emotional Needs,  ,  ,      Name: Debra Maher MRN: [id number]    Age: 59 y.o.     Sex: female   Language: English   Orthodoxy: None   Acute respiratory failure (HCC)     Date: 3/20/2025                           Spiritual Assessment began in [facility] MICU        Referral/Consult From: (P) Rounding   Encounter Overview/Reason: (P) Initial Encounter, Spiritual/Emotional Needs  Service Provided For: (P) Patient    The patient stated theat she believes in God but has not gone to [employer] since she was much younger.    Camelia, Belief, Meaning:   Patient identifies as spiritual, is connected with a camelia tradition or spiritual practice, and has beliefs or practices that help with coping during difficult times  Family/Friends No family/friends present      Importance and Influence:  Patient has spiritual/personal beliefs that influence decisions regarding their health  Family/Friends No family/friends present    Community:  Patient feels well-supported. Support system includes: Friends  Family/Friends No family/friends present    Assessment and Plan of Care:     Patient Interventions include: Facilitated expression of thoughts and feelings, Explored spiritual coping/struggle/distress, Engaged in theological reflection, and Affirmed coping skills/support systems  Family/Friends Interventions include: No family/friends present    Patient Plan of Care: Other: The  will follow as needed.  Family/Friends Plan of Care: No family/friends present    Electronically signed by Chaplain Deborah on 3/20/2025 at 12:20 PM    
When drawing morning labs unable to fill green topped tube and patient would not allow another attempt at blood draw.  
night sweats, and fatigue  Skin: Denies pigmentation, dark lesions, and rashes   HEENT: Denies hearing loss, tinnitus, ear drainage, epistaxis, sore throat, and hoarseness.  Cardiovascular: Denies palpitations, chest pain, and chest pressure.  Respiratory: Denies cough, dyspnea at rest, hemoptysis, apnea, and choking.  Improved dyspnea, tolerating diuresis  Gastrointestinal: Denies nausea, vomiting, poor appetite, diarrhea, heartburn or reflux  Genitourinary: Denies dysuria, frequency, urgency or hematuria  Musculoskeletal: Denies myalgias, muscle weakness, and bone pain  Neurological: Denies dizziness, vertigo, headache, and focal weakness  Psychological: Denies anxiety and depression  Endocrine: Denies heat intolerance and cold intolerance  Hematopoietic/Lymphatic: Denies bleeding problems and blood transfusions    24-hour events:  DDD ICD 3/19/2025    Objective   OBJECTIVE:   /63   Pulse 70   Temp 98 °F (36.7 °C) (Temporal)   Resp 18   Ht 1.575 m (5' 2\")   Wt 65 kg (143 lb 4.8 oz)   SpO2 98%   BMI 26.21 kg/m²   SpO2 Readings from Last 1 Encounters:   03/20/25 98%        I/O:    Intake/Output Summary (Last 24 hours) at 3/20/2025 0821  Last data filed at 3/19/2025 1900  Gross per 24 hour   Intake 510 ml   Output 905 ml   Net -395 ml     Vent Information  Equipment Changed: Humidification       IPAP: 12 cmH20  CPAP/EPAP: 5 cmH2O     CURRENT MEDS :  Scheduled Meds:   magnesium sulfate  2,000 mg IntraVENous Once    insulin lispro  0-16 Units SubCUTAneous 4x Daily AC & HS    midodrine  10 mg Oral TID WC    predniSONE  40 mg Oral Daily    [START ON 3/21/2025] enoxaparin  40 mg SubCUTAneous Daily    sodium chloride flush  5-40 mL IntraVENous 2 times per day    ceFAZolin  1,000 mg IntraVENous Q8H    bumetanide  1 mg IntraVENous BID    metoprolol succinate  25 mg Oral Daily    budesonide  0.5 mg Nebulization BID RT    citalopram  40 mg Oral Daily    gabapentin  400 mg Oral TID    insulin glargine  15 Units 
pattern and techniques to improve independence/tolerance for self-care routine  * Functional transfer/mobility training/DME recommendations for increased independence, safety, and fall prevention  * Patient/Family education to increase follow through with safety techniques and functional independence  * Recommendation of environmental modifications for increased safety with functional transfers/mobility and ADLs  * Cognitive retraining/development of therapeutic activities to improve problem solving, judgement, memory, and attention for increased safety/participation in ADL/IADL tasks  * Sensory re-education to improve body/limb awareness, maintain/improve skin integrity, and improve hand/UE motor function  * Visual-perceptual training to improve environmental scanning, visual attention/focus, and oculomotor skills for increased safety/independence with functional transfers/mobility and ADLs  * Splinting/positioning for increased function, prevention of contractures, and improve skin integrity  * Therapeutic exercise to improve motor endurance, ROM, and functional strength for ADLs/functional transfers  * Therapeutic activities to facilitate/challenge dynamic balance, stand tolerance for increased safety and independence with ADLs  * Therapeutic activities to facilitate gross/fine motor skills for increased independence with ADLs  * Neuro-muscular re-education: facilitation of righting/equilibrium reactions, midline orientation, scapular stability/mobility, normalization of muscle tone, and facilitation of volitional active controled movement  * Positioning to improve skin integrity, interaction with environment and functional independence  * Delirium prevention/treatment  * Manual techniques for edema management    Recommended Adaptive Equipment: TBD     Home Living: Pt lives with her 40 year old son (handicapped) in a 1 story home with 0 stairs to enter.  Bedroom and bathroom are on the 1st level.  Bathroom setup: 
GERD (gastroesophageal reflux disease)     Graves disease     Restless leg syndrome     Syncope     Thyroid disease     Tobacco abuse      Past Surgical History:   Procedure Laterality Date    CARDIAC CATHETERIZATION  10/12/2024    CARDIOVERSION  10/12/2024    DC cardioversion    EP DEVICE PROCEDURE N/A 3/19/2025    Insert ICD dual performed [place]elida [place] [facility] [place]    THORACENTESIS Right 2025    450cc Yellow fluid      History reviewed. No pertinent family history.  There is no family history of sudden cardiac arrest    Social History     Tobacco Use    Smoking status: Former     Types: Cigarettes     Start date:      Quit date:      Years since quittin.2    Smokeless tobacco: Never   Substance Use Topics    Alcohol use: Not on file       Current Facility-Administered Medications   Medication Dose Route Frequency Provider Last Rate Last Admin    pantoprazole (PROTONIX) tablet 40 mg  40 mg Oral QAM AC Sarbjit Loredo, APRN - CNP   40 mg at 25 0923    insulin glargine (LANTUS) injection vial 18 Units  18 Units SubCUTAneous BID Pito Pompa MD        insulin lispro (HUMALOG,ADMELOG) injection vial 0-16 Units  0-16 Units SubCUTAneous 4x Daily AC & HS Nelida Leon MD   12 Units at 25 1251    midodrine (PROAMATINE) tablet 10 mg  10 mg Oral TID WC Nelida Leon MD   10 mg at 25 1249    predniSONE (DELTASONE) tablet 40 mg  40 mg Oral Daily Nelida Leon MD   40 mg at 25 0746    [START ON 3/21/2025] enoxaparin (LOVENOX) injection 40 mg  40 mg SubCUTAneous Daily Nelida Leon MD        sodium chloride flush 0.9 % injection 5-40 mL  5-40 mL IntraVENous 2 times per day Nelida Leon MD   10 mL at 25 0924    sodium chloride flush 0.9 % injection 5-40 mL  5-40 mL IntraVENous PRN Nelida Loen MD        0.9 % sodium chloride infusion   IntraVENous PRN Nelida Leon MD        ceFAZolin (ANCEF) 1,000 mg in sterile water 10 mL IV syringe  1,000 mg IntraVENous 
PRN Nelida Leon MD        ipratropium 0.5 mg-albuterol 2.5 mg (DUONEB) nebulizer solution 1 Dose  1 Dose Inhalation Q4H WA RT Nelida Leon MD   1 Dose at 03/24/25 0805    levothyroxine (SYNTHROID) tablet 200 mcg  200 mcg Oral Daily Nelida Leon MD   200 mcg at 03/24/25 0558    polyethylene glycol (GLYCOLAX) packet 17 g  17 g Oral Daily PRN Nelida Leon MD   17 g at 03/20/25 0957    prochlorperazine (COMPAZINE) injection 10 mg  10 mg IntraVENous Q6H PRN Nelida Leon MD        promethazine (PHENERGAN) tablet 12.5 mg  12.5 mg Oral Q6H PRN Nelida Leon MD        Or    ondansetron (ZOFRAN) injection 4 mg  4 mg IntraVENous Q6H PRN Nelida Leon MD        rOPINIRole (REQUIP) tablet 1 mg  1 mg Oral TID Nelida Leon MD   1 mg at 03/24/25 0900    rOPINIRole (REQUIP) tablet 3 mg  3 mg Oral Nightly Nelida Leon MD   3 mg at 03/23/25 2049    atorvastatin (LIPITOR) tablet 80 mg  80 mg Oral Nightly Nelida Leon MD   80 mg at 03/23/25 2049    arformoterol tartrate (BROVANA) nebulizer solution 15 mcg  15 mcg Nebulization BID RT Nelida Leon MD   15 mcg at 03/24/25 0805        XR CHEST PORTABLE   Final Result   Improved aeration of the right lower lobe compared to 03/22/2025.         XR CHEST PORTABLE   Final Result   Persistent right lower lobe airspace opacity with improved aeration compared   to 03/20/2025.         XR CHEST (2 VW)   Final Result   No significant change over the past 24 hours.         XR CHEST PORTABLE   Final Result   1. Decreased left pleural effusion. No sign of pneumothorax on the left.   2. Persistent moderate consolidation of the retrocardiac left lower lobe.   This could be either atelectasis or pneumonia.   3. Persistent mild atelectasis in the right lung base.         XR CHEST PORTABLE   Final Result   1. Trace bilateral pleural effusions/thickening.   2. Mild central bronchial wall thickening and chronic interstitial coarsening.         XR CHEST PORTABLE   Final Result   Persistent 
met.    Treatment:  Patient practiced and was instructed in the following treatment:    Bed mobility training - pt given verbal and tactile cues to facilitate proper sequencing and safety during rolling and supine<>sit as well as provided with physical assistance to complete task    Sitting EOB for upright tolerance, postural awareness and BLE ROM   STS and pivot transfer training - pt educated on proper hand and foot placement, safety and sequencing, and use of no AD to safely complete sit<>stand and pivot transfers with hands on assistance to complete task safely    Skilled positioning - Pt placed in the chair position with pillows utilized to facilitate upright posture, joint and skin integrity, and interaction with environment.          PHYSICAL THERAPY PLAN OF CARE:  Pt is making slow progress towards established goals.  Continue PT POC.     Specific instructions for next treatment:  progress as tolerated, monitor SpO2       Time in  1405  Time out  1445    Total Treatment Time  40 minutes     CPT codes:  [] Low Complexity PT evaluation 69922  [] Moderate Complexity PT evaluation 44888  [] High Complexity PT evaluation 25340  [] PT Re-evaluation 86586  [] Gait training 27242 -- minutes  [] Manual therapy 33566 -- minutes  [x] Therapeutic activities 92111 40 minutes  [] Therapeutic exercises 30411 - minutes  [] Neuromuscular reeducation 98563 -- minutes     Cornelius Bain, PT, DPT  [id number]        
   budesonide  0.5 mg Nebulization BID RT    citalopram  40 mg Oral Daily    gabapentin  400 mg Oral TID    ipratropium 0.5 mg-albuterol 2.5 mg  1 Dose Inhalation Q4H WA RT    levothyroxine  200 mcg Oral Daily    rOPINIRole  1 mg Oral TID    rOPINIRole  3 mg Oral Nightly    atorvastatin  80 mg Oral Nightly    arformoterol tartrate  15 mcg Nebulization BID RT     PRN Meds: oxyCODONE-acetaminophen, miconazole, sodium chloride flush, sodium chloride, potassium chloride, magnesium sulfate, sodium phosphate 15 mmol in sodium chloride 0.9 % 250 mL IVPB, acetaminophen **OR** acetaminophen, dextrose, dextrose bolus **OR** dextrose bolus, glucagon (rDNA), glucose, polyethylene glycol, prochlorperazine, promethazine **OR** ondansetron    Labs:     Recent Labs     03/20/25  0503 03/21/25  0439 03/22/25  0400   WBC 13.7* 15.1* 13.1*   HGB 14.3 14.4 14.0   HCT 40.1 40.4 39.1    368 336       Recent Labs     03/20/25  1934 03/21/25  0439 03/22/25  0400   * 134 135   K 3.2* 3.6 2.6*   CL 86* 90* 89*   CO2 29 27 29   BUN 33* 27* 26*   CREATININE 0.6 0.6 0.6   CALCIUM 9.4 9.7 9.1   PHOS 2.9 2.1* 1.9*       Recent Labs     03/20/25  0503 03/21/25  0439 03/22/25  0400   ALKPHOS 112* 116* 113*   ALT 17 13 13   AST 17 18 20   BILITOT 0.8 0.7 0.7       Recent Labs     03/20/25  0503 03/21/25  0439 03/22/25  0400   INR 1.1 1.0 1.0       No results for input(s): \"CKTOTAL\", \"TROPONINI\" in the last 72 hours.    Chronic labs:    Lab Results   Component Value Date    CHOL 145 03/18/2025    TRIG 89 03/18/2025    HDL 56 03/18/2025    TSH 2.01 03/18/2025    INR 1.0 03/22/2025    LABA1C 12.3 (H) 03/13/2025       Radiology: REVIEWED DAILY    +++++++++++++++++++++++++++++++++++++++++++++++++  Ha Carrillo MD  [facility]- [place], [place]  +++++++++++++++++++++++++++++++++++++++++++++++++  NOTE: This report was transcribed using voice recognition software. Every effort was made 
MD  [facility] Hospitalist   [place], [place]  +++++++++++++++++++++++++++++++++++++++++++++++++  NOTE: This report was transcribed using voice recognition software. Every effort was made to ensure accuracy; however, inadvertent computerized transcription errors may be present.       
hours.        3/19/2025  IMPRESSION:  1. Decreased left pleural effusion. No sign of pneumothorax on the left.  2. Persistent moderate consolidation of the retrocardiac left lower lobe.  This could be either atelectasis or pneumonia.  3. Persistent mild atelectasis in the right lung base.      Chest x-ray 3/18/2025  Post right thoracentesis      CXR 3/17/25:  FINDINGS:  Single frontal view of the chest demonstrate bilateral pleural effusions with  bibasilar infiltrates present.  There is no evidence of a pneumothorax.  The  soft tissues and the osseous structures appear unremarkable.  The cardiac  size is normal.     IMPRESSION:  Bilateral pleural effusions with bibasilar infiltrates.      Pulmonary CTA 3/11/2025:  FINDINGS:  Pulmonary Arteries: Pulmonary arteries are adequately opacified for  evaluation.  No evidence of intraluminal filling defect to suggest pulmonary  embolism.  Main pulmonary artery is normal in caliber.     Mediastinum: No evidence of mediastinal lymphadenopathy.  The heart and  pericardium demonstrate no acute abnormality.  There is no acute abnormality  of the thoracic aorta.     Lungs/pleura: There are moderate bilateral pleural effusions with associated  atelectasis.  There is diffuse interlobular septal thickening.  Patchy  ground-glass opacities are again seen within the dependent middle lobe and  lingula.  No pneumothorax.     Upper Abdomen:  Limited images of the upper abdomen demonstrate no acute  abnormality.     Soft Tissues/Bones: No acute bone or soft tissue abnormality.     IMPRESSION:  1. No evidence of pulmonary embolism.  2. Moderate bilateral pleural effusions with associated atelectasis.  3. Diffuse interlobular septal thickening with patchy ground-glass opacities  in the dependent middle lobe and lingula. Findings are suggestive of  pulmonary edema.      Echo 3/17/25:  Left Ventricle: Severely reduced left ventricular systolic function with a visually estimated EF of 30 - 
04:09 AM         Latest Reference Range & Units 03/17/25 12:10   Source:  Blood Arterial   pH, Blood Gas 7.350 - 7.450  7.510 (H)   PCO2 35.0 - 45.0 mmHg 55.4 (H)   pO2 75.0 - 100.0 mmHg 54.8 (L)   HCO3 22.0 - 26.0 mmol/L 43.2 (H)   Base Excess -3.0 - 3.0 mmol/L 17.2 (H)   O2 Sat 92.0 - 98.5 % 89.4 (L)   (H): Data is abnormally high  (L): Data is abnormally low    Micro:  Component  Ref Range & Units (hover)  Resulting Agency   Specimen Description .CLEAN CATCH URINE .VOIDED URINE [facility]   Special Requests Site: Urine [facility]   Culture  Abnormal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

## 2025-03-25 NOTE — CARE COORDINATION
Care Coordination: LOS 9 day. Dc order noted, DME order for nebulizer with albuterol. Spoke to pt, she states she does have a working nebulizer at home with albuterol, Messaged NP and updated her as well. Upon further questioning, pt tells me they have 2 nebulizers that are actually her sons and she calls the doctor and gets refils on albuterol and uses it.  I asked her to  the script as it should be in her name. She verbalized understanding    Electronically signed by Tomeka Green RN on 3/25/2025 at 2:40 PM

## 2025-03-27 ENCOUNTER — TELEPHONE (OUTPATIENT)
Age: 60
End: 2025-03-27

## 2025-03-27 NOTE — TELEPHONE ENCOUNTER
I called Debra to see how she's doing since her ICD insertion on 3/29/35.  She states she's doing fine & that the aquacel dressing was removed yesterday with steris intact. She denies any fevers, redness, bleeding, drainage, or swelling at ICD incision site. I reminded her to continue to wear her sling at night for 4 weeks; along with not abducting the L arm above the shoulder. She's also aware of [facility] Tuesday 4/1/25 at 10:00 am.  She offers no complaints & is thankful for the phone call.

## 2025-03-27 NOTE — CONSULTS
Problem Relation Age of Onset    Kidney Disease Mother     Other Mother         CORONARY ARTERIOSCLEROSIS    Arthritis Mother     Osteoarthritis Mother     Rheum Arthritis Mother     Heart Disease Father         CABG    Thyroid Disease Sister     Arthritis Sister        REVIEW OF SYSTEMS:    Constitutional: No fever, no chills  HEENT: No blurred vision, + sore throat, no rhinorrhea.  Respiratory: + cough, no sputum production, no pleuritic chest pain, no shortness of breath  Cardiology: No angina, + dyspnea on exertion, no palpitation, no leg swelling.   Gastroenterology: No abdominal pain, no nausea or vomiting; no constipation or diarrhea. No hematochezia   Genitourinary: No dysuria, + frequency; no hematuria  Neurology: no focal weakness in extremities, + tingling or numbness sensation  Hematology: no increased bleeding, no bruising, no lymphadenopathy  Skin: no skin changes noticed by patient    Physical Exam   Vitals: BP (!) 98/54   Pulse (!) 122   Temp 97.9 °F (36.6 °C) (Temporal)   Resp 19   Ht 1.575 m (5' 2\")   Wt 68 kg (150 lb)   SpO2 97%   BMI 27.44 kg/m²  Ideal body weight: 50.1 kg (110 lb 7.2 oz)    I & O - 24hr:    Intake/Output Summary (Last 24 hours) at 10/9/2024 1636  Last data filed at 10/9/2024 1407  Gross per 24 hour   Intake 272.07 ml   Output 10 ml   Net 262.07 ml     Net IO Since Admission: 262.07 mL [10/09/24 1636]    General Appearance: alert, appears stated age, and cooperative  HEENT:  Head: Normocephalic, no lesions, without obvious abnormality.  Lung: rales bibasilar  Heart: irregularly irregular rhythm and S1, S2 normal  Abdomen: soft, non-tender; bowel sounds normal; no masses,  no organomegaly  Extremities:  extremities normal, atraumatic, no cyanosis or edema  Neurologic: Alert, oriented, thought content appropriate    Lines     site day    Art line   None    TLC None    PICC None    Hemoaccess None          ABG:   No results found for: \"PHART\", \"PH\", \"NSK1TIB\", \"PCO2\",  Name band; patient's plan of action with the resident. This note reflects my plan of care as I have edited the note to reflect my findings and my assessment and plan. I spent 38 minutes of critical care time completing this encounter. Total CC time included the following:  Independently interviewing the patient (HPI, ROS, PMH, PSH, FMH, SH, allergies and medications).  Direct Bed side Patient care   Independently performing a medical appropriate examination  Ordering medications, tests and/or procedures  Formulating the assessment/plan and reviewing the rationale for the above recommendations  Reviewing available records, results of all previously ordered testing/procedures and current problem list  Counseling/educating the patient/ family   Coordinating care with other healthcare professionals  Documenting clinical information in the patient's electronic health records    Dev Kat MD   10/10/2024  8:24 AM

## 2025-04-01 ENCOUNTER — RESULTS FOLLOW-UP (OUTPATIENT)
Age: 60
End: 2025-04-01

## 2025-04-01 ENCOUNTER — HOSPITAL ENCOUNTER (OUTPATIENT)
Dept: OTHER | Age: 60
Setting detail: THERAPIES SERIES
Discharge: HOME | End: 2025-04-01
Payer: COMMERCIAL

## 2025-04-01 ENCOUNTER — TELEPHONE (OUTPATIENT)
Dept: OTHER | Age: 60
End: 2025-04-01

## 2025-04-01 ENCOUNTER — CLINICAL SUPPORT (OUTPATIENT)
Dept: NON INVASIVE DIAGNOSTICS | Age: 60
End: 2025-04-01

## 2025-04-01 VITALS
DIASTOLIC BLOOD PRESSURE: 54 MMHG | RESPIRATION RATE: 18 BRPM | BODY MASS INDEX: 25.79 KG/M2 | WEIGHT: 141 LBS | HEART RATE: 100 BPM | OXYGEN SATURATION: 98 % | SYSTOLIC BLOOD PRESSURE: 89 MMHG

## 2025-04-01 DIAGNOSIS — Z95.810 ICD (IMPLANTABLE CARDIOVERTER-DEFIBRILLATOR) IN PLACE: ICD-10-CM

## 2025-04-01 DIAGNOSIS — I25.5 ISCHEMIC CARDIOMYOPATHY: Primary | ICD-10-CM

## 2025-04-01 DIAGNOSIS — I50.20 HFREF (HEART FAILURE WITH REDUCED EJECTION FRACTION) (HCC): ICD-10-CM

## 2025-04-01 DIAGNOSIS — I48.0 PAROXYSMAL ATRIAL FIBRILLATION (HCC): ICD-10-CM

## 2025-04-01 LAB
ANION GAP SERPL CALCULATED.3IONS-SCNC: 12 MMOL/L (ref 7–16)
BNP SERPL-MCNC: 5103 PG/ML (ref 0–450)
BUN SERPL-MCNC: 11 MG/DL (ref 6–23)
CALCIUM SERPL-MCNC: 8.7 MG/DL (ref 8.6–10.2)
CHLORIDE SERPL-SCNC: 96 MMOL/L (ref 98–107)
CO2 SERPL-SCNC: 26 MMOL/L (ref 22–29)
CREAT SERPL-MCNC: 0.7 MG/DL (ref 0.5–1)
GFR, ESTIMATED: >90 ML/MIN/1.73M2
GLUCOSE SERPL-MCNC: 495 MG/DL (ref 74–99)
POTASSIUM SERPL-SCNC: 4 MMOL/L (ref 3.5–5)
SODIUM SERPL-SCNC: 134 MMOL/L (ref 132–146)

## 2025-04-01 PROCEDURE — 99214 OFFICE O/P EST MOD 30 MIN: CPT

## 2025-04-01 PROCEDURE — 83880 ASSAY OF NATRIURETIC PEPTIDE: CPT

## 2025-04-01 PROCEDURE — 36415 COLL VENOUS BLD VENIPUNCTURE: CPT

## 2025-04-01 PROCEDURE — 80048 BASIC METABOLIC PNL TOTAL CA: CPT

## 2025-04-01 RX ORDER — METOPROLOL SUCCINATE 25 MG/1
25 TABLET, EXTENDED RELEASE ORAL NIGHTLY
Qty: 90 TABLET | Refills: 1 | Status: SHIPPED
Start: 2025-04-01 | End: 2025-04-07 | Stop reason: SDUPTHER

## 2025-04-01 RX ORDER — CLOPIDOGREL BISULFATE 75 MG/1
75 TABLET ORAL DAILY
Qty: 90 TABLET | Refills: 3 | Status: SHIPPED | OUTPATIENT
Start: 2025-04-01

## 2025-04-01 ASSESSMENT — PATIENT HEALTH QUESTIONNAIRE - PHQ9
1. LITTLE INTEREST OR PLEASURE IN DOING THINGS: NOT AT ALL
SUM OF ALL RESPONSES TO PHQ QUESTIONS 1-9: 0
2. FEELING DOWN, DEPRESSED OR HOPELESS: NOT AT ALL
SUM OF ALL RESPONSES TO PHQ QUESTIONS 1-9: 0

## 2025-04-01 NOTE — PROGRESS NOTES
Congestive Heart Failure Clinic   [facility]      Referring Provider:   Primary Care Physician: Wolf Hernandez DO   Cardiologist:   Nephrologist: n/a      HISTORY OF PRESENT ILLNESS:     Debra Maher is a 59 y.o. (1965) female with a history of HFmrEF(EF 41%-49%)  Pre Cupid:     Lab Results   Component Value Date    LVEF 40 02/26/2025     Post Cupid:    Lab Results   Component Value Date    EFBP 39 (A) 03/17/2025     She presents to the CHF clinic for ongoing evaluation and monitoring of heart failure.    In the CHF clinic today she denies any adverse symptoms except:  [] Dizziness or lightheadedness   [] Syncope or near syncope   [] Recent Fall  [] Shortness of breath at rest   [x] Dyspnea with exertion  [] Decline in functional capacity (unable to perform activities they had previously been able to do)  [x] Fatigue   [] Orthopnea  [] PND   [] Nocturnal cough  [] Hemoptysis  [] Chest pain, pressure, or discomfort   [x] Palpitations intermittent- no pattern   [] Abdominal distention  [] Abdominal bloating  [] Early satiety  [] Blood in stool   [] Diarrhea  [] Constipation  [] Nausea/Vomiting  [] Decreased urinary response to oral diuretic   [] Scrotal swelling   [] Lower extremity edema  [] Used PRN doses of oral diuretic   [] Weight gain    Wt Readings from Last 3 Encounters:   04/01/25 64 kg (141 lb)   03/25/25 61.5 kg (135 lb 9.3 oz)   03/16/25 66.8 kg (147 lb 3.2 oz)     SOCIAL HISTORY:  [x] Denies tobacco, alcohol or illicit drug abuse  [] Tobacco use:  [] ETOH use:  [] Illicit drug use:        MEDICATIONS:    Allergies   Allergen Reactions    Bee Venom Anaphylaxis    Amoxicillin Hives    Bee Venom     Codeine Other (See Comments)     \"i just black out\"    Codeine Other (See Comments)     \"Passed Out\"     Doxycycline     Doxycycline Hives    Metformin And Related     Prednisone Other (See Comments)     Muscle cramps

## 2025-04-01 NOTE — RESULT ENCOUNTER NOTE
Labs and CHF Clinic note reviewed    Wt Readings from Last 3 Encounters:  04/01/25 : 64 kg (141 lb)  03/25/25 : 61.5 kg (135 lb 9.3 oz)  03/16/25 : 66.8 kg (147 lb 3.2 oz)    BP Readings from Last 1 Encounters:  04/01/25 : (!) 89/54    Pulse Readings from Last 1 Encounters:  04/01/25 : 100      Eliquis stopped inpatient per EP, no documented recurrent atrial fibrillation. Looks like plavix was placed on hold when she arrived to hospital as a trauma and then not resumed. Hx of DAMIÁN to LCx in 10/2024.   Spoke with general cardiology (Pat Trevino NP) who evaluated patient in hospital as Dr. Lanier is off - in agreeable to resume plavix 75 mg daily     Patient hypotensive and tachycardic in CHF clinic today, Toprol was reportedly stopped inpatient due to hypotension.     Will decrease Entresto to 0.5 tablet of 24/26 mg BID  Restart Toprol 25 mg nightly   Will continue bumex for now given hypervolemia and rising pro-bnp  Return to CHF clinic Monday as scheduled, consider reducing bumex if remains hypotensive and looks ok from a volume standpoint.     Thank you

## 2025-04-01 NOTE — TELEPHONE ENCOUNTER
general cardiology (Pat Trevino, ANA) who evaluated patient in hospital as Dr. Lanier is off - in agreeable to resume plavix 75 mg daily      Patient hypotensive and tachycardic in CHF clinic today, Toprol was reportedly stopped inpatient due to hypotension.      Will decrease Entresto to 0.5 tablet of 24/26 mg BID  Restart Toprol 25 mg nightly   Will continue bumex for now given hypervolemia and rising pro-bnp    I called Debra with the following updates and she verbalizes understanding.

## 2025-04-07 ENCOUNTER — OFFICE VISIT (OUTPATIENT)
Age: 60
End: 2025-04-07
Payer: COMMERCIAL

## 2025-04-07 ENCOUNTER — TELEPHONE (OUTPATIENT)
Age: 60
End: 2025-04-07

## 2025-04-07 VITALS
BODY MASS INDEX: 27.03 KG/M2 | SYSTOLIC BLOOD PRESSURE: 80 MMHG | DIASTOLIC BLOOD PRESSURE: 46 MMHG | HEART RATE: 63 BPM | RESPIRATION RATE: 16 BRPM | OXYGEN SATURATION: 97 % | WEIGHT: 147.8 LBS

## 2025-04-07 DIAGNOSIS — I50.20 HFREF (HEART FAILURE WITH REDUCED EJECTION FRACTION) (HCC): Primary | ICD-10-CM

## 2025-04-07 LAB
ANION GAP SERPL CALCULATED.3IONS-SCNC: 12 MMOL/L (ref 7–16)
BUN BLDV-MCNC: 22 MG/DL (ref 6–23)
CALCIUM SERPL-MCNC: 9.4 MG/DL (ref 8.6–10.2)
CHLORIDE BLD-SCNC: 101 MMOL/L (ref 98–107)
CO2: 27 MMOL/L (ref 22–29)
CREAT SERPL-MCNC: 0.9 MG/DL (ref 0.5–1)
GFR, ESTIMATED: 73 ML/MIN/1.73M2
GLUCOSE BLD-MCNC: 106 MG/DL (ref 74–99)
NT PRO BNP: 3485 PG/ML (ref 0–125)
POTASSIUM SERPL-SCNC: 4.1 MMOL/L (ref 3.5–5)
SODIUM BLD-SCNC: 140 MMOL/L (ref 132–146)

## 2025-04-07 PROCEDURE — 99214 OFFICE O/P EST MOD 30 MIN: CPT | Performed by: NURSE PRACTITIONER

## 2025-04-07 PROCEDURE — 36415 COLL VENOUS BLD VENIPUNCTURE: CPT | Performed by: NURSE PRACTITIONER

## 2025-04-07 RX ORDER — METOPROLOL SUCCINATE 25 MG/1
12.5 TABLET, EXTENDED RELEASE ORAL NIGHTLY
Qty: 45 TABLET | Refills: 1 | Status: SHIPPED | OUTPATIENT
Start: 2025-04-07

## 2025-04-07 RX ORDER — SODIUM CHLORIDE 0.9 % (FLUSH) 0.9 %
5-40 SYRINGE (ML) INJECTION ONCE
Status: COMPLETED | OUTPATIENT
Start: 2025-04-07 | End: 2025-04-07

## 2025-04-07 RX ORDER — BUMETANIDE 0.25 MG/ML
2 INJECTION, SOLUTION INTRAMUSCULAR; INTRAVENOUS ONCE
Status: COMPLETED | OUTPATIENT
Start: 2025-04-07 | End: 2025-04-07

## 2025-04-07 RX ORDER — MIDODRINE HYDROCHLORIDE 5 MG/1
5 TABLET ORAL 3 TIMES DAILY
Qty: 90 TABLET | Refills: 3 | Status: SHIPPED | OUTPATIENT
Start: 2025-04-07

## 2025-04-07 RX ADMIN — Medication 10 ML: at 10:10

## 2025-04-07 RX ADMIN — BUMETANIDE 2 MG: 0.25 INJECTION, SOLUTION INTRAMUSCULAR; INTRAVENOUS at 10:10

## 2025-04-07 NOTE — PROGRESS NOTES
(10/2024)  PAF (10/2025 in setting of STEMI with no reoccurrence) - therefore off OAC. EP monitoring device.   COPD with ongoing tobacco use   T2DM  Graves' disease, status post treatment now on Synthroid   Pleural Effusion - s/p thoracentesis 3/18/2025   RLS  Depression      PLAN:  Get blood work and give a dose IV Bumex in CHF clinic today  Restart midodrine 5 mg three times daily (was receiving 10 mg TID inpatient however was not discharged on medication)  Decrease Toprol 12.5 mg nightly  Follow up in CHF clinic in 24-48 hours, if you develop any symptoms of dizziness/lightheadedness please report to the ED  If unable to titration GDMT with ongoing hypotension, consider AD[facility] referral.   Follow up with [facility] in 1 month     Ashlee SMITH-CNP  [facility]

## 2025-04-07 NOTE — PATIENT INSTRUCTIONS
Get blood work and give a dose IV Bumex in CHF clinic today  Start midodrine 5 mg three times daily   Follow up in CHF clinic in 24-48 hours, if you develop any symptoms of dizziness/lightheadedness please report to the ED  Follow up with Dr. Killian in 1 month  Weigh yourself daily    -Stay Hydrated, 64 oz     -Diet should sodium restricted to 2 grams    -Again watch your daily weight trends and if you gain water weight please follow below instructions.    -If at any time you feel that you are retaining fluid, your medications are not working, or you feel ill in anyway, then please call us for either same day appointment or the next day, and for instructions. Our goal is to keep you out of the emergency room and the hospital and we have ways to do it. You just need to call us in a timely manner.     -If you become sick for other reasons, and notice that you are not urinating as much, the urine is very dark, you have significant diarrhea or vomiting, then please DO NOT take your water pill and CALL US immediately.

## 2025-04-09 ENCOUNTER — HOSPITAL ENCOUNTER (OUTPATIENT)
Dept: OTHER | Age: 60
Setting detail: THERAPIES SERIES
Discharge: HOME | End: 2025-04-09
Payer: COMMERCIAL

## 2025-04-09 ENCOUNTER — RESULTS FOLLOW-UP (OUTPATIENT)
Age: 60
End: 2025-04-09

## 2025-04-09 VITALS
DIASTOLIC BLOOD PRESSURE: 53 MMHG | BODY MASS INDEX: 26.34 KG/M2 | RESPIRATION RATE: 16 BRPM | HEART RATE: 80 BPM | WEIGHT: 144 LBS | SYSTOLIC BLOOD PRESSURE: 93 MMHG | OXYGEN SATURATION: 94 %

## 2025-04-09 LAB
ALBUMIN SERPL-MCNC: 3.6 G/DL (ref 3.5–5.2)
ALP SERPL-CCNC: 101 U/L (ref 35–104)
ALT SERPL-CCNC: 14 U/L (ref 0–32)
ANION GAP SERPL CALCULATED.3IONS-SCNC: 12 MMOL/L (ref 7–16)
AST SERPL-CCNC: 13 U/L (ref 0–31)
BILIRUB SERPL-MCNC: 0.6 MG/DL (ref 0–1.2)
BNP SERPL-MCNC: 4588 PG/ML (ref 0–450)
BUN SERPL-MCNC: 19 MG/DL (ref 6–23)
CALCIUM SERPL-MCNC: 9.2 MG/DL (ref 8.6–10.2)
CHLORIDE SERPL-SCNC: 98 MMOL/L (ref 98–107)
CO2 SERPL-SCNC: 28 MMOL/L (ref 22–29)
CREAT SERPL-MCNC: 0.9 MG/DL (ref 0.5–1)
GFR, ESTIMATED: 76 ML/MIN/1.73M2
GLUCOSE SERPL-MCNC: 140 MG/DL (ref 74–99)
POTASSIUM SERPL-SCNC: 4.2 MMOL/L (ref 3.5–5)
PROT SERPL-MCNC: 6.5 G/DL (ref 6.4–8.3)
SODIUM SERPL-SCNC: 138 MMOL/L (ref 132–146)

## 2025-04-09 PROCEDURE — 96374 THER/PROPH/DIAG INJ IV PUSH: CPT

## 2025-04-09 PROCEDURE — 99214 OFFICE O/P EST MOD 30 MIN: CPT

## 2025-04-09 PROCEDURE — 80053 COMPREHEN METABOLIC PANEL: CPT

## 2025-04-09 PROCEDURE — 83880 ASSAY OF NATRIURETIC PEPTIDE: CPT

## 2025-04-09 PROCEDURE — 2500000003 HC RX 250 WO HCPCS: Performed by: NURSE PRACTITIONER

## 2025-04-09 PROCEDURE — 36415 COLL VENOUS BLD VENIPUNCTURE: CPT

## 2025-04-09 PROCEDURE — 6360000002 HC RX W HCPCS: Performed by: NURSE PRACTITIONER

## 2025-04-09 RX ORDER — BUMETANIDE 0.25 MG/ML
2 INJECTION, SOLUTION INTRAMUSCULAR; INTRAVENOUS ONCE
Status: DISCONTINUED | OUTPATIENT
Start: 2025-04-09 | End: 2025-04-09 | Stop reason: ALTCHOICE

## 2025-04-09 RX ORDER — BUMETANIDE 2 MG/1
2 TABLET ORAL 2 TIMES DAILY
Qty: 60 TABLET | Refills: 3 | Status: SHIPPED | OUTPATIENT
Start: 2025-04-09

## 2025-04-09 RX ORDER — SODIUM CHLORIDE 0.9 % (FLUSH) 0.9 %
5-40 SYRINGE (ML) INJECTION 2 TIMES DAILY
Status: DISCONTINUED | OUTPATIENT
Start: 2025-04-09 | End: 2025-04-09 | Stop reason: ALTCHOICE

## 2025-04-09 RX ADMIN — SODIUM CHLORIDE, PRESERVATIVE FREE 10 ML: 5 INJECTION INTRAVENOUS at 11:25

## 2025-04-09 RX ADMIN — BUMETANIDE 2 MG: 0.25 INJECTION INTRAMUSCULAR; INTRAVENOUS at 11:24

## 2025-04-09 NOTE — PROGRESS NOTES
Congestive Heart Failure Clinic   [facility]      Referring Provider:   Primary Care Physician: Wolf Hernandez DO   Cardiologist:   Nephrologist: n/a      HISTORY OF PRESENT ILLNESS:     Debra Maher is a 59 y.o. (1965) female with a history of HFmrEF(EF 41%-49%)  Pre Cupid:     Lab Results   Component Value Date    LVEF 40 02/26/2025     Post Cupid:    Lab Results   Component Value Date    EFBP 39 (A) 03/17/2025     She presents to the CHF clinic for ongoing evaluation and monitoring of heart failure.    In the CHF clinic today she denies any adverse symptoms except:  [] Dizziness or lightheadedness-inproved  [] Syncope or near syncope   [] Recent Fall  [] Shortness of breath at rest   [x] Dyspnea with exertion  [] Decline in functional capacity (unable to perform activities they had previously been able to do)  [x] Fatigue   [] Orthopnea  [] PND   [] Nocturnal cough  [] Hemoptysis  [] Chest pain, pressure, or discomfort   [] Palpitations intermittent- no pattern   [] Abdominal distention  [] Abdominal bloating  [] Early satiety  [] Blood in stool   [] Diarrhea  [] Constipation  [] Nausea/Vomiting  [] Decreased urinary response to oral diuretic   [] Scrotal swelling   [] Lower extremity edema  [] Used PRN doses of oral diuretic   [] Weight gain    Wt Readings from Last 3 Encounters:   04/09/25 65.3 kg (144 lb)   04/07/25 67 kg (147 lb 12.8 oz)   04/01/25 64 kg (141 lb)     SOCIAL HISTORY:  [x] Denies tobacco, alcohol or illicit drug abuse  [] Tobacco use:  [] ETOH use:  [] Illicit drug use:        MEDICATIONS:    Allergies   Allergen Reactions    Bee Venom Anaphylaxis    Amoxicillin Hives    Bee Venom     Codeine Other (See Comments)     \"i just black out\"    Codeine Other (See Comments)     \"Passed Out\"     Doxycycline     Doxycycline Hives    Metformin And Related     Prednisone Other (See Comments)     Muscle cramps

## 2025-04-09 NOTE — RESULT ENCOUNTER NOTE
Why is patient not taking Jardiance? Should only cost her $10/month    Please have her increase bumex 2 mg BID and follow up in CHF Clinic Monday     Elevate legs as much as possible and ACE wrap to lower extremities

## 2025-04-11 PROBLEM — R79.89 ELEVATED TROPONIN: Status: RESOLVED | Noted: 2025-03-12 | Resolved: 2025-04-11

## 2025-04-14 ENCOUNTER — HOSPITAL ENCOUNTER (OUTPATIENT)
Dept: OTHER | Age: 60
Setting detail: THERAPIES SERIES
End: 2025-04-14
Payer: COMMERCIAL

## 2025-04-22 ENCOUNTER — RESULTS FOLLOW-UP (OUTPATIENT)
Age: 60
End: 2025-04-22

## 2025-04-22 ENCOUNTER — HOSPITAL ENCOUNTER (OUTPATIENT)
Dept: OTHER | Age: 60
Setting detail: THERAPIES SERIES
Discharge: HOME | End: 2025-04-22
Payer: COMMERCIAL

## 2025-04-22 VITALS
HEART RATE: 74 BPM | SYSTOLIC BLOOD PRESSURE: 96 MMHG | RESPIRATION RATE: 18 BRPM | BODY MASS INDEX: 23.96 KG/M2 | OXYGEN SATURATION: 96 % | DIASTOLIC BLOOD PRESSURE: 48 MMHG | WEIGHT: 131 LBS

## 2025-04-22 DIAGNOSIS — I50.20 HFREF (HEART FAILURE WITH REDUCED EJECTION FRACTION) (HCC): Primary | ICD-10-CM

## 2025-04-22 LAB
ANION GAP SERPL CALCULATED.3IONS-SCNC: 15 MMOL/L (ref 7–16)
BNP SERPL-MCNC: ABNORMAL PG/ML (ref 0–450)
BUN SERPL-MCNC: 12 MG/DL (ref 6–23)
CALCIUM SERPL-MCNC: 9.4 MG/DL (ref 8.6–10.2)
CHLORIDE SERPL-SCNC: 95 MMOL/L (ref 98–107)
CO2 SERPL-SCNC: 27 MMOL/L (ref 22–29)
CREAT SERPL-MCNC: 0.9 MG/DL (ref 0.5–1)
GFR, ESTIMATED: 76 ML/MIN/1.73M2
GLUCOSE SERPL-MCNC: 214 MG/DL (ref 74–99)
POTASSIUM SERPL-SCNC: 3.2 MMOL/L (ref 3.5–5)
SODIUM SERPL-SCNC: 137 MMOL/L (ref 132–146)

## 2025-04-22 PROCEDURE — 83880 ASSAY OF NATRIURETIC PEPTIDE: CPT

## 2025-04-22 PROCEDURE — 36415 COLL VENOUS BLD VENIPUNCTURE: CPT

## 2025-04-22 PROCEDURE — 99214 OFFICE O/P EST MOD 30 MIN: CPT

## 2025-04-22 PROCEDURE — 80048 BASIC METABOLIC PNL TOTAL CA: CPT

## 2025-04-22 RX ORDER — SPIRONOLACTONE 25 MG/1
12.5 TABLET ORAL DAILY
Qty: 45 TABLET | Refills: 1 | Status: SHIPPED | OUTPATIENT
Start: 2025-04-22 | End: 2025-05-20 | Stop reason: SDUPTHER

## 2025-04-22 NOTE — RESULT ENCOUNTER NOTE
Labs and CHF Clinic note reviewed    Wt Readings from Last 3 Encounters:  04/22/25 : 59.4 kg (131 lb)  04/09/25 : 65.3 kg (144 lb)  04/07/25 : 67 kg (147 lb 12.8 oz)    BP Readings from Last 1 Encounters:  04/22/25 : (!) 96/48    Pulse Readings from Last 1 Encounters:  04/22/25 : 74      Current GDMT:  ARNI/ACE I/ARB: Entresto 24/26 mg (1/2 tablet) BID  Hydralazine/Nitrates : No  Beta blocker: Metoprolol Succinate (Toprol)  Aldosterone antagonist: No  SGLT2i: Jardiance 10 mg daily  Diuretic: Bumex 2 mg BID  Supplemental Potassium: No  On midodrine 5 mg TID    Start spironolactone 12.5 mg with lunch   Follow up BMP in 1 week   Follow up in CHF clinic as scheduled (2 weeks is able)    Thank you

## 2025-04-22 NOTE — PROGRESS NOTES
Congestive Heart Failure Clinic   [facility]      Referring Provider:   Primary Care Physician: Wolf Hernandez DO   Cardiologist:   Nephrologist: n/a      HISTORY OF PRESENT ILLNESS:     Debra Maher is a 59 y.o. (1965) female with a history of HFmrEF(EF 41%-49%)  Pre Cupid:     Lab Results   Component Value Date    LVEF 40 02/26/2025     Post Cupid:    Lab Results   Component Value Date    EFBP 39 (A) 03/17/2025     She presents to the CHF clinic for ongoing evaluation and monitoring of heart failure.    In the CHF clinic today she denies any adverse symptoms except:  [] Dizziness or lightheadedness-inproved  [] Syncope or near syncope   [] Recent Fall  [] Shortness of breath at rest   [x] Dyspnea with exertion  [] Decline in functional capacity (unable to perform activities they had previously been able to do)  [x] Fatigue   [] Orthopnea  [] PND   [] Nocturnal cough  [] Hemoptysis  [] Chest pain, pressure, or discomfort   [] Palpitations intermittent- no pattern   [] Abdominal distention  [] Abdominal bloating  [] Early satiety  [] Blood in stool   [] Diarrhea  [] Constipation  [] Nausea/Vomiting  [] Decreased urinary response to oral diuretic   [] Scrotal swelling   [] Lower extremity edema  [] Used PRN doses of oral diuretic   [] Weight gain    Wt Readings from Last 3 Encounters:   04/22/25 59.4 kg (131 lb)   04/09/25 65.3 kg (144 lb)   04/07/25 67 kg (147 lb 12.8 oz)     SOCIAL HISTORY:  [x] Denies tobacco, alcohol or illicit drug abuse  [] Tobacco use:  [] ETOH use:  [] Illicit drug use:        MEDICATIONS:    Allergies   Allergen Reactions    Bee Venom Anaphylaxis    Amoxicillin Hives    Bee Venom     Codeine Other (See Comments)     \"i just black out\"    Codeine Other (See Comments)     \"Passed Out\"     Doxycycline     Doxycycline Hives    Metformin And Related     Prednisone Other (See Comments)     Muscle cramps

## 2025-05-02 ENCOUNTER — CLINICAL SUPPORT (OUTPATIENT)
Dept: NON INVASIVE DIAGNOSTICS | Age: 60
End: 2025-05-02

## 2025-05-02 DIAGNOSIS — I25.5 ISCHEMIC CARDIOMYOPATHY: Primary | ICD-10-CM

## 2025-05-02 DIAGNOSIS — Z95.810 ICD (IMPLANTABLE CARDIOVERTER-DEFIBRILLATOR) IN PLACE: ICD-10-CM

## 2025-05-02 DIAGNOSIS — I48.0 PAROXYSMAL ATRIAL FIBRILLATION (HCC): ICD-10-CM

## 2025-05-02 DIAGNOSIS — I50.20 HFREF (HEART FAILURE WITH REDUCED EJECTION FRACTION) (HCC): ICD-10-CM

## 2025-05-15 ENCOUNTER — HOSPITAL ENCOUNTER (OUTPATIENT)
Dept: OTHER | Age: 60
Setting detail: THERAPIES SERIES
End: 2025-05-15
Payer: COMMERCIAL

## 2025-05-20 ENCOUNTER — RESULTS FOLLOW-UP (OUTPATIENT)
Age: 60
End: 2025-05-20

## 2025-05-20 ENCOUNTER — HOSPITAL ENCOUNTER (OUTPATIENT)
Dept: OTHER | Age: 60
Setting detail: THERAPIES SERIES
Discharge: HOME | End: 2025-05-20
Payer: COMMERCIAL

## 2025-05-20 VITALS
RESPIRATION RATE: 17 BRPM | WEIGHT: 126 LBS | BODY MASS INDEX: 23.05 KG/M2 | OXYGEN SATURATION: 95 % | HEART RATE: 87 BPM | SYSTOLIC BLOOD PRESSURE: 104 MMHG | DIASTOLIC BLOOD PRESSURE: 64 MMHG

## 2025-05-20 DIAGNOSIS — I25.5 ISCHEMIC CARDIOMYOPATHY: Primary | ICD-10-CM

## 2025-05-20 DIAGNOSIS — I50.20 HFREF (HEART FAILURE WITH REDUCED EJECTION FRACTION) (HCC): Primary | ICD-10-CM

## 2025-05-20 LAB
ANION GAP SERPL CALCULATED.3IONS-SCNC: 12 MMOL/L (ref 7–16)
BNP SERPL-MCNC: 8634 PG/ML (ref 0–450)
BUN SERPL-MCNC: 14 MG/DL (ref 6–23)
CALCIUM SERPL-MCNC: 9.4 MG/DL (ref 8.6–10.2)
CHLORIDE SERPL-SCNC: 89 MMOL/L (ref 98–107)
CO2 SERPL-SCNC: 32 MMOL/L (ref 22–29)
CREAT SERPL-MCNC: 0.9 MG/DL (ref 0.5–1)
GFR, ESTIMATED: 72 ML/MIN/1.73M2
GLUCOSE SERPL-MCNC: 271 MG/DL (ref 74–99)
POTASSIUM SERPL-SCNC: 3.3 MMOL/L (ref 3.5–5)
SODIUM SERPL-SCNC: 133 MMOL/L (ref 132–146)

## 2025-05-20 PROCEDURE — 83880 ASSAY OF NATRIURETIC PEPTIDE: CPT

## 2025-05-20 PROCEDURE — 80048 BASIC METABOLIC PNL TOTAL CA: CPT

## 2025-05-20 PROCEDURE — 96374 THER/PROPH/DIAG INJ IV PUSH: CPT

## 2025-05-20 PROCEDURE — 6360000002 HC RX W HCPCS: Performed by: NURSE PRACTITIONER

## 2025-05-20 PROCEDURE — 2500000003 HC RX 250 WO HCPCS: Performed by: NURSE PRACTITIONER

## 2025-05-20 PROCEDURE — 99214 OFFICE O/P EST MOD 30 MIN: CPT

## 2025-05-20 PROCEDURE — 36415 COLL VENOUS BLD VENIPUNCTURE: CPT

## 2025-05-20 RX ORDER — SPIRONOLACTONE 25 MG/1
25 TABLET ORAL DAILY
Qty: 90 TABLET | Refills: 1 | Status: SHIPPED | OUTPATIENT
Start: 2025-05-20

## 2025-05-20 RX ORDER — SODIUM CHLORIDE 0.9 % (FLUSH) 0.9 %
10 SYRINGE (ML) INJECTION PRN
Status: DISCONTINUED | OUTPATIENT
Start: 2025-05-20 | End: 2025-05-20 | Stop reason: ALTCHOICE

## 2025-05-20 RX ORDER — BUMETANIDE 0.25 MG/ML
2 INJECTION, SOLUTION INTRAMUSCULAR; INTRAVENOUS ONCE
Status: DISCONTINUED | OUTPATIENT
Start: 2025-05-20 | End: 2025-05-20 | Stop reason: ALTCHOICE

## 2025-05-20 RX ADMIN — BUMETANIDE 2 MG: 0.25 INJECTION INTRAMUSCULAR; INTRAVENOUS at 08:39

## 2025-05-20 RX ADMIN — SODIUM CHLORIDE, PRESERVATIVE FREE 10 ML: 5 INJECTION INTRAVENOUS at 08:40

## 2025-05-20 NOTE — TELEPHONE ENCOUNTER
Patient scheduled [facility] Dr. Jim Choudhary for evaluation of ADHF.    July 3, 2025 1pm first available     Records faxed [phone]    Phone [phone]    Patient notified

## 2025-05-20 NOTE — TELEPHONE ENCOUNTER
----- Message from SARAH Moscoso CNP sent at 5/20/2025  3:29 PM EDT -----  Franca (in Lashaun's absence)    Please refer patient to [facility] to Dr. Jim Choudhary for evaluation of ADHF.     Thank you !!  ----- Message -----  From: Zabrina Dickson RN  Sent: 5/20/2025   3:13 PM EDT  To: SARAH Christensen CNP    Patient is agreeable to ADHF  ----- Message -----  From: Ashlee Hart APRN - CNP  Sent: 5/20/2025   1:05 PM EDT  To: Zabrina Dickson RN    Labs and CHF clinic note reviewed  Increase spironolactone 25 mg daily   Follow up BMP in 1 week     Recommend ADHF referral is she open to go to [place]?    Thank you

## 2025-05-20 NOTE — PROGRESS NOTES
4,588 (H)      CBC:  WBC (k/uL)   Date Value   03/25/2025 12.5 (H)     Hemoglobin (g/dL)   Date Value   03/25/2025 11.6     Hematocrit (%)   Date Value   03/25/2025 33.5 (L)     Platelets (k/uL)   Date Value   03/25/2025 277     Iron Studies:  No results found for: \"FERRITIN\", \"IRON\", \"TIBC\"  Hepatic:  AST (U/L)   Date Value   04/09/2025 13     ALT (U/L)   Date Value   04/09/2025 14     Total Bilirubin (mg/dL)   Date Value   04/09/2025 0.6     Alkaline Phosphatase (U/L)   Date Value   04/09/2025 101     INR:  INR (no units)   Date Value   03/24/2025 1.0         Wt Readings from Last 3 Encounters:   05/20/25 57.2 kg (126 lb)   04/22/25 59.4 kg (131 lb)   04/09/25 65.3 kg (144 lb)           ASSESSMENT/PLAN:    [] Euvolemic          [x] Hypervolemic, with increase from baseline:  [] Shortness of breath/HERNANDEZ  [] JVD  [] HJR  [] Abnormal lung assessment:   [] Orthopnea  [] PND  [x] Decreased urinary response to oral diuretic   [] Scrotal swelling   [x] Lower extremity edema  [] Compression stockings provided  [x] Decline in functional capacity (unable to perform activities they had previously been able to do)  [] Weight gain     [x] IV diuretics given YES Bumex 2 mg IVP   [] Provider notified of recurrent IV diuretic use    Additional Notes: Patient presents for follow up appointment with a weight loss of 5 pounds. She reports a decreased appetite lately and severe fatigue over the past few weeks. She has worsening edema on assessment. IV Bumex has been administered in the clinic and a short term follow up has been scheduled for close monitoring.     Updated orders from Ashlee MCCULLOUGH:  Increase spironolactone 25 mg daily   Follow up BMP in 1 week       I called Debra with the updates and she verbalizes understanding.     [x]Lab work obtained    [x] Patient/Family Educated On:  [x] HF zones (Green, Yellow, Red) and aware of when to take action   [x] Daily weights  [] Scale provided   [x] Low sodium diet (2000

## 2025-05-20 NOTE — RESULT ENCOUNTER NOTE
Labs and CHF clinic note reviewed  Increase spironolactone 25 mg daily   Follow up BMP in 1 week     Recommend ADHF referral is she open to go to [place]?    Thank you

## 2025-05-22 ENCOUNTER — TELEPHONE (OUTPATIENT)
Dept: CARDIOLOGY | Facility: HOSPITAL | Age: 60
End: 2025-05-22

## 2025-05-30 ENCOUNTER — OFFICE VISIT (OUTPATIENT)
Dept: CARDIOLOGY | Facility: HOSPITAL | Age: 60
End: 2025-05-30
Payer: COMMERCIAL

## 2025-05-30 VITALS
WEIGHT: 124 LBS | OXYGEN SATURATION: 97 % | SYSTOLIC BLOOD PRESSURE: 102 MMHG | HEART RATE: 96 BPM | BODY MASS INDEX: 22.82 KG/M2 | HEIGHT: 62 IN | DIASTOLIC BLOOD PRESSURE: 70 MMHG

## 2025-05-30 DIAGNOSIS — I47.20 VENTRICULAR TACHYCARDIA (MULTI): ICD-10-CM

## 2025-05-30 DIAGNOSIS — I50.20 ACC/AHA STAGE C SYSTOLIC HEART FAILURE: Primary | ICD-10-CM

## 2025-05-30 DIAGNOSIS — I25.5 ISCHEMIC CARDIOMYOPATHY: ICD-10-CM

## 2025-05-30 PROCEDURE — 3008F BODY MASS INDEX DOCD: CPT | Performed by: INTERNAL MEDICINE

## 2025-05-30 PROCEDURE — 99202 OFFICE O/P NEW SF 15 MIN: CPT

## 2025-05-30 PROCEDURE — 1036F TOBACCO NON-USER: CPT | Performed by: INTERNAL MEDICINE

## 2025-05-30 PROCEDURE — 99205 OFFICE O/P NEW HI 60 MIN: CPT | Performed by: INTERNAL MEDICINE

## 2025-05-30 RX ORDER — FLUCONAZOLE 150 MG/1
1 TABLET ORAL
COMMUNITY
Start: 2025-05-09

## 2025-05-30 RX ORDER — SULFAMETHOXAZOLE AND TRIMETHOPRIM 800; 160 MG/1; MG/1
TABLET ORAL
COMMUNITY
Start: 2025-02-13

## 2025-05-30 RX ORDER — BUDESONIDE AND FORMOTEROL FUMARATE DIHYDRATE 80; 4.5 UG/1; UG/1
AEROSOL RESPIRATORY (INHALATION)
COMMUNITY
Start: 2025-05-28

## 2025-05-30 RX ORDER — CITALOPRAM 40 MG/1
1 TABLET ORAL
COMMUNITY
Start: 2025-03-27

## 2025-05-30 RX ORDER — ONDANSETRON 4 MG/1
TABLET, FILM COATED ORAL
COMMUNITY
Start: 2024-09-30

## 2025-05-30 RX ORDER — ROPINIROLE 3 MG/1
TABLET, FILM COATED ORAL
COMMUNITY
Start: 2025-05-28

## 2025-05-30 RX ORDER — FUROSEMIDE 20 MG/1
20 TABLET ORAL AS NEEDED
COMMUNITY
Start: 2024-11-20 | End: 2025-05-30 | Stop reason: WASHOUT

## 2025-05-30 RX ORDER — ATORVASTATIN CALCIUM 80 MG/1
80 TABLET, FILM COATED ORAL DAILY
COMMUNITY

## 2025-05-30 RX ORDER — GABAPENTIN 400 MG/1
CAPSULE ORAL
COMMUNITY

## 2025-05-30 RX ORDER — ALOGLIPTIN 25 MG/1
25 TABLET, FILM COATED ORAL DAILY
COMMUNITY

## 2025-05-30 RX ORDER — ALBUTEROL SULFATE 90 UG/1
2 INHALANT RESPIRATORY (INHALATION) EVERY 6 HOURS PRN
COMMUNITY
Start: 2025-03-25

## 2025-05-30 RX ORDER — BUMETANIDE 2 MG/1
2 TABLET ORAL 2 TIMES DAILY
COMMUNITY
Start: 2025-04-09

## 2025-05-30 RX ORDER — ROPINIROLE 1 MG/1
1 TABLET, FILM COATED ORAL 3 TIMES DAILY
COMMUNITY

## 2025-05-30 RX ORDER — CLOPIDOGREL BISULFATE 75 MG/1
75 TABLET ORAL DAILY
COMMUNITY
Start: 2025-04-01

## 2025-05-30 RX ORDER — METOPROLOL SUCCINATE 25 MG/1
12.5 TABLET, EXTENDED RELEASE ORAL
COMMUNITY
Start: 2025-04-07

## 2025-05-30 RX ORDER — INSULIN GLARGINE 100 [IU]/ML
15 INJECTION, SOLUTION SUBCUTANEOUS NIGHTLY
COMMUNITY
Start: 2024-10-11

## 2025-05-30 RX ORDER — NITROGLYCERIN 0.4 MG/1
0.4 TABLET SUBLINGUAL EVERY 5 MIN PRN
COMMUNITY
Start: 2024-11-04

## 2025-05-30 RX ORDER — LOPERAMIDE HYDROCHLORIDE 2 MG/1
CAPSULE ORAL
COMMUNITY
Start: 2024-10-03

## 2025-05-30 RX ORDER — PANTOPRAZOLE SODIUM 20 MG/1
20 TABLET, DELAYED RELEASE ORAL DAILY
COMMUNITY

## 2025-05-30 RX ORDER — CEFDINIR 300 MG/1
1 CAPSULE ORAL
COMMUNITY
Start: 2025-01-03

## 2025-05-30 RX ORDER — ALBUTEROL SULFATE 0.83 MG/ML
3 SOLUTION RESPIRATORY (INHALATION) EVERY 6 HOURS PRN
COMMUNITY
Start: 2025-03-25

## 2025-05-30 RX ORDER — MIDODRINE HYDROCHLORIDE 5 MG/1
5 TABLET ORAL 3 TIMES DAILY
COMMUNITY
Start: 2025-04-07

## 2025-05-30 RX ORDER — SPIRONOLACTONE 25 MG/1
25 TABLET ORAL DAILY
COMMUNITY
Start: 2025-05-20

## 2025-05-30 RX ORDER — IBUPROFEN 200 MG
TABLET ORAL
COMMUNITY
Start: 2025-04-25

## 2025-05-30 ASSESSMENT — COLUMBIA-SUICIDE SEVERITY RATING SCALE - C-SSRS
6. HAVE YOU EVER DONE ANYTHING, STARTED TO DO ANYTHING, OR PREPARED TO DO ANYTHING TO END YOUR LIFE?: NO
1. IN THE PAST MONTH, HAVE YOU WISHED YOU WERE DEAD OR WISHED YOU COULD GO TO SLEEP AND NOT WAKE UP?: NO
2. HAVE YOU ACTUALLY HAD ANY THOUGHTS OF KILLING YOURSELF?: NO

## 2025-05-30 ASSESSMENT — PATIENT HEALTH QUESTIONNAIRE - PHQ9
2. FEELING DOWN, DEPRESSED OR HOPELESS: NOT AT ALL
SUM OF ALL RESPONSES TO PHQ9 QUESTIONS 1 AND 2: 0
1. LITTLE INTEREST OR PLEASURE IN DOING THINGS: NOT AT ALL

## 2025-05-30 ASSESSMENT — ENCOUNTER SYMPTOMS
DEPRESSION: 0
LOSS OF SENSATION IN FEET: 0
OCCASIONAL FEELINGS OF UNSTEADINESS: 1

## 2025-05-30 ASSESSMENT — PAIN SCALES - GENERAL: PAINLEVEL_OUTOF10: 0-NO PAIN

## 2025-05-30 NOTE — LETTER
May 30, 2025     Sally Glover, APRN-CNP  [address]  [facility] [address]    Patient: Thao Evans   YOB: 1965   Date of Visit: 5/30/2025       Dear Dr. Sally Glover:    Thank you for referring Thao Evans to me for evaluation. Below are my notes for this consultation.  If you have questions, please do not hesitate to call me. I look forward to following your patient along with you.       Sincerely,     Jesus Deluca,       CC: No Recipients  ______________________________________________________________________________________        [facility] Advanced [facility] Failure [facility]  Primary Care Physician: No primary care provider on file.  Referring Provider/Cardiologist: [place] ([facility])    Date of Visit: 05/30/2025  2:00 PM EDT  Location of visit: [facility]     HPI:   Ms. Evans is a 60F with a PMHx sig for CAD s/p PCI (LAD; 2015, Lcx; 2025), stage C systolic HF/ICM/HFrEF s/p ICD, h/o VT with presumed associated syncope, poorly controlled DM, pAF (off of DOAC given the absence of recurrence), dyslipidemia, essential HTN, COPD, and h/o Graves who was referred to the Advanced Heart Failure clinic for consultation.     Currently followed in the HF clinic at [facility] by Sally Glover. She presents today with her sister. She reports progressive symptoms over the last several months. Her most recent hospitalization was in March 2025 for syncope/VT where she underwent an ICD placement and PCI to her LCx.     She reports she is currently intolerant of GDMT and is on midodrine for BP support since her hospital discharge.     She has ongoing fatigue, dyspnea, and early satiety. She also reports progressive weight loss (~60 lbs over the last 6 months). As a result, she states her A1c has dropped from 12 to 8% as of this past week.        PMHx:  CAD s/p PCI (LAD; 2015, Lcx; 2025), stage C systolic HF/ICM/HFrEF s/p ICD, h/o VT with presumed associated syncope, poorly  "controlled DM, pAF (off of DOAC given the absence of recurrence), dyslipidemia, essential HTN, COPD, h/o Graves    SocHx:  Former smoker (mid-May 2025), rare etOH, denies illicits  Lives with son in [place]    FamHx:  Father with CAD        Current Medications[1]    Allergies[2]      Visit Vitals  /70 (BP Location: Left arm, Patient Position: Sitting, BP Cuff Size: Adult)   Pulse 96   Ht 1.575 m (5' 2\")   Wt 56.2 kg (124 lb)   SpO2 97%   BMI 22.68 kg/m²   Smoking Status Former   BSA 1.57 m²        Physical Exam:  On exam Ms. Evans appears somewhat older than her stated age, is alert and oriented x3, and in no acute distress. Her sclera are anicteric and her oropharynx has moist mucous membranes. Her neck is supple and without thyromegaly. The JVP is ~6 cm of water above the right atrium. Her cardiac exam has regular rhythm, normal S1, S2. No S3/4. There are no murmurs. Her lungs are clear to auscultation bilaterally and there is no dullness to percussion. Her abdomen is soft, nontender with normoactive bowel sounds. There is no HJR. The extremities are warm and with 1+ pitting edema. The skin is dry. There is no rash present. The distal pulses are 2+ in all four extremities. Her mood and affect are appropriate for todays encounter.         Cardiac Labs/Diagnostics:    Echo (3/17/25):  LVEF 30-35%  Normal RV size with low normal function  Mild MR  Small pericardial effusion    Cardiac cath (10/12/24):  Patent LAD stent  Occluded pLCx s/p PCI      Impression/Plan:  Ms. Evans is a 60F with a PMHx sig for CAD s/p PCI (LAD; 2015, Lcx; 2025), stage C systolic HF/ICM/HFrEF s/p ICD, h/o VT with presumed associated syncope, poorly controlled DM, pAF (off of DOAC given the absence of recurrence), dyslipidemia, essential HTN, COPD, and h/o Graves who was referred to the Advanced Heart Failure clinic for consultation. At the current time she has functional class III symptoms and appears euvolemic on exam.     1) Stage " C/D acute on chronic systolic HF/ICM/HFrEF (LVEF 30-35%; 3/2025) s/p ICD  Most recent hospitalization in March '25 where she underwent placement of an ICD for VT and PCI to her LCx. Since then she reports progressive symptoms and intolerance of GDMT; currently on midodrine. She also reports significant weight loss (~60 lbs over the last 6 months). She denies any ICD shocks or known recurrence of VT. I discussed admission to the HFICU with a plan for PAC guided evaluation given her inability to tolerate GDMT and symptoms, as we need to understand why she cannot tolerate GDMT (? related to diabetes or progressive RV dysfunction, etc). She and her sister are in agreement. Will work to arrange a direct admission. She will remain on her current medications until admission.         F/U: HOMER Zavala,  Thank you for referring Ms. Evans to the  Advanced Heart Failure Clinic. Please let me know if you have any questions.       ____________________________________________________________  Jesus Deluca, DO  Section of Advanced Heart Failure and Cardiac Transplantation  Division of Cardiovascular Medicine  [facility] [place]  [facility]         [1]  Current Outpatient Medications   Medication Sig Dispense Refill   • albuterol 2.5 mg /3 mL (0.083 %) nebulizer solution Take 3 mL by nebulization every 6 hours if needed for wheezing or shortness of breath.     • albuterol 90 mcg/actuation inhaler Inhale 2 puffs every 6 hours if needed for wheezing or shortness of breath.     • alogliptin (Nesina) 25 mg tablet Take 1 tablet (25 mg) by mouth once daily.     • atorvastatin (Lipitor) 80 mg tablet Take 1 tablet (80 mg) by mouth once daily.     • budesonide-formoterol (Symbicort) 80-4.5 mcg/actuation inhaler INHALE 2 PUFF BY INHALATION ROUTE 2 TIMES EVERY DAY IN THE MORNING AND EVENING     • bumetanide (Bumex) 2 mg tablet Take 1 tablet (2 mg) by mouth 2 times a day.     • cefdinir  (Omnicef) 300 mg capsule Take 1 capsule (300 mg) by mouth every 12 hours.     • citalopram (CeleXA) 40 mg tablet Take 1 tablet (40 mg) by mouth early in the morning..     • clopidogrel (Plavix) 75 mg tablet Take 1 tablet (75 mg) by mouth once daily.     • empagliflozin (Jardiance) 10 mg tablet Take 1 tablet (10 mg) by mouth once daily.     • fluconazole (Diflucan) 150 mg tablet Take 1 tablet (150 mg) by mouth early in the morning..     • furosemide (Lasix) 20 mg tablet Take 1 tablet (20 mg) by mouth if needed.     • gabapentin (Neurontin) 400 mg capsule TAKE 1 CAPSULE BY MOUTH 3 TIMES EVERY DAY FOR DIABETIC NEUROPATHY     • insulin glargine (Lantus Solostar U-100 Insulin) 100 unit/mL (3 mL) pen Inject 15 Units under the skin once daily at bedtime.     • loperamide (Imodium) 2 mg capsule TAKE 2 TABLETS BY MOUTH AFTER 1ST LOOSE STOOL AND 1 TABLET AFTER EACH NEXT BOWEL MOVEMENT; DO NOT EXCEED 16 MG IN 24HRS AS NEEDED FOR DIARRHEA     • metoprolol succinate XL (Toprol-XL) 25 mg 24 hr tablet Take 0.5 tablets (12.5 mg) by mouth.     • midodrine (Proamatine) 5 mg tablet Take 1 tablet (5 mg) by mouth 3 times a day.     • nicotine (Nicoderm CQ) 21 mg/24 hr patch APPLY 1 PATCH TO SKIN DAILY AND REMOVE AT BEDTIME     • nitroglycerin (Nitrostat) 0.4 mg SL tablet Place 1 tablet (0.4 mg) under the tongue every 5 minutes if needed for chest pain.     • ondansetron (Zofran) 4 mg tablet TAKE 1 TABLET BY MOUTH EVERY DAY AS NEEDED FOR NAUSEA     • pantoprazole (ProtoNix) 20 mg EC tablet Take 1 tablet (20 mg) by mouth once daily.     • rOPINIRole (Requip) 1 mg tablet Take 1 tablet (1 mg) by mouth 3 times a day.     • rOPINIRole (Requip) 3 mg tablet take 1 tablet by oral route every day at bedtime     • sacubitriL-valsartan (Entresto) 24-26 mg tablet Take 0.5 tablets by mouth 2 times a day.     • spironolactone (Aldactone) 25 mg tablet Take 1 tablet (25 mg) by mouth once daily.     • sulfamethoxazole-trimethoprim (Bactrim DS) 800-160 mg  "tablet TAKE ONE (1) TABLET BY MOUTH EVERY TWELVE (12) HOURS *ANTIBIOTIC*       No current facility-administered medications for this visit.   [2]  Allergies  Allergen Reactions   • Bee Venom Protein (Honey Bee) Anaphylaxis   • Codeine Other     \"Passed Out\"     \"i just black out\"   • Doxycycline Hives   • Prednisone Hives     Muscle cramps   • Amoxicillin Hives and Rash     \"Felt warm and overheated all over\"        [1]  Current Outpatient Medications   Medication Sig Dispense Refill   • albuterol 2.5 mg /3 mL (0.083 %) nebulizer solution Take 3 mL by nebulization every 6 hours if needed for wheezing or shortness of breath.     • albuterol 90 mcg/actuation inhaler Inhale 2 puffs every 6 hours if needed for wheezing or shortness of breath.     • alogliptin (Nesina) 25 mg tablet Take 1 tablet (25 mg) by mouth once daily.     • atorvastatin (Lipitor) 80 mg tablet Take 1 tablet (80 mg) by mouth once daily.     • budesonide-formoterol (Symbicort) 80-4.5 mcg/actuation inhaler INHALE 2 PUFF BY INHALATION ROUTE 2 TIMES EVERY DAY IN THE MORNING AND EVENING     • bumetanide (Bumex) 2 mg tablet Take 1 tablet (2 mg) by mouth 2 times a day.     • cefdinir (Omnicef) 300 mg capsule Take 1 capsule (300 mg) by mouth every 12 hours.     • citalopram (CeleXA) 40 mg tablet Take 1 tablet (40 mg) by mouth early in the morning..     • clopidogrel (Plavix) 75 mg tablet Take 1 tablet (75 mg) by mouth once daily.     • empagliflozin (Jardiance) 10 mg tablet Take 1 tablet (10 mg) by mouth once daily.     • fluconazole (Diflucan) 150 mg tablet Take 1 tablet (150 mg) by mouth early in the morning..     • furosemide (Lasix) 20 mg tablet Take 1 tablet (20 mg) by mouth if needed.     • gabapentin (Neurontin) 400 mg capsule TAKE 1 CAPSULE BY MOUTH 3 TIMES EVERY DAY FOR DIABETIC NEUROPATHY     • insulin glargine (Lantus Solostar U-100 Insulin) 100 unit/mL (3 mL) pen Inject 15 Units under the skin once daily at bedtime.     • loperamide (Imodium) 2 " "mg capsule TAKE 2 TABLETS BY MOUTH AFTER 1ST LOOSE STOOL AND 1 TABLET AFTER EACH NEXT BOWEL MOVEMENT; DO NOT EXCEED 16 MG IN 24HRS AS NEEDED FOR DIARRHEA     • metoprolol succinate XL (Toprol-XL) 25 mg 24 hr tablet Take 0.5 tablets (12.5 mg) by mouth.     • midodrine (Proamatine) 5 mg tablet Take 1 tablet (5 mg) by mouth 3 times a day.     • nicotine (Nicoderm CQ) 21 mg/24 hr patch APPLY 1 PATCH TO SKIN DAILY AND REMOVE AT BEDTIME     • nitroglycerin (Nitrostat) 0.4 mg SL tablet Place 1 tablet (0.4 mg) under the tongue every 5 minutes if needed for chest pain.     • ondansetron (Zofran) 4 mg tablet TAKE 1 TABLET BY MOUTH EVERY DAY AS NEEDED FOR NAUSEA     • pantoprazole (ProtoNix) 20 mg EC tablet Take 1 tablet (20 mg) by mouth once daily.     • rOPINIRole (Requip) 1 mg tablet Take 1 tablet (1 mg) by mouth 3 times a day.     • rOPINIRole (Requip) 3 mg tablet take 1 tablet by oral route every day at bedtime     • sacubitriL-valsartan (Entresto) 24-26 mg tablet Take 0.5 tablets by mouth 2 times a day.     • spironolactone (Aldactone) 25 mg tablet Take 1 tablet (25 mg) by mouth once daily.     • sulfamethoxazole-trimethoprim (Bactrim DS) 800-160 mg tablet TAKE ONE (1) TABLET BY MOUTH EVERY TWELVE (12) HOURS *ANTIBIOTIC*       No current facility-administered medications for this visit.   [2]  Allergies  Allergen Reactions   • Bee Venom Protein (Honey Bee) Anaphylaxis   • Codeine Other     \"Passed Out\"     \"i just black out\"   • Doxycycline Hives   • Prednisone Hives     Muscle cramps   • Amoxicillin Hives and Rash     \"Felt warm and overheated all over\"   "

## 2025-05-30 NOTE — PROGRESS NOTES
"    [facility] Failure [facility]  Primary Care Physician: No primary care provider on file.  Referring Provider/Cardiologist: Belen ([facility])    Date of Visit: 05/30/2025  2:00 PM EDT  Location of visit: [facility]     HPI:   Ms. Evans is a 60F with a PMHx sig for CAD s/p PCI (LAD; 2015, Lcx; 2025), stage C systolic HF/ICM/HFrEF s/p ICD, h/o VT with presumed associated syncope, poorly controlled DM, pAF (off of DOAC given the absence of recurrence), dyslipidemia, essential HTN, COPD, and h/o Graves who was referred to the Advanced Heart Failure clinic for consultation.     Currently followed in the HF clinic at [facility] by Sally Glover. She presents today with her sister. She reports progressive symptoms over the last several months. Her most recent hospitalization was in March 2025 for syncope/VT where she underwent an ICD placement and PCI to her LCx.     She reports she is currently intolerant of GDMT and is on midodrine for BP support since her hospital discharge.     She has ongoing fatigue, dyspnea, and early satiety. She also reports progressive weight loss (~60 lbs over the last 6 months). As a result, she states her A1c has dropped from 12 to 8% as of this past week.        PMHx:  CAD s/p PCI (LAD; 2015, Lcx; 2025), stage C systolic HF/ICM/HFrEF s/p ICD, h/o VT with presumed associated syncope, poorly controlled DM, pAF (off of DOAC given the absence of recurrence), dyslipidemia, essential HTN, COPD, h/o Graves    SocHx:  Former smoker (mid-May 2025), rare etOH, denies illicits  Lives with son in [place]    FamHx:  Father with CAD        Current Medications[1]    Allergies[2]      Visit Vitals  /70 (BP Location: Left arm, Patient Position: Sitting, BP Cuff Size: Adult)   Pulse 96   Ht 1.575 m (5' 2\")   Wt 56.2 kg (124 lb)   SpO2 97%   BMI 22.68 kg/m²   Smoking Status Former   BSA 1.57 m²        Physical Exam:  On exam Ms. Evans appears somewhat older " than her stated age, is alert and oriented x3, and in no acute distress. Her sclera are anicteric and her oropharynx has moist mucous membranes. Her neck is supple and without thyromegaly. The JVP is ~6 cm of water above the right atrium. Her cardiac exam has regular rhythm, normal S1, S2. No S3/4. There are no murmurs. Her lungs are clear to auscultation bilaterally and there is no dullness to percussion. Her abdomen is soft, nontender with normoactive bowel sounds. There is no HJR. The extremities are warm and with 1+ pitting edema. The skin is dry. There is no rash present. The distal pulses are 2+ in all four extremities. Her mood and affect are appropriate for todays encounter.         Cardiac Labs/Diagnostics:    Echo (3/17/25):  LVEF 30-35%  Normal RV size with low normal function  Mild MR  Small pericardial effusion    Cardiac cath (10/12/24):  Patent LAD stent  Occluded pLCx s/p PCI      Impression/Plan:  Ms. Evans is a 60F with a PMHx sig for CAD s/p PCI (LAD; 2015, Lcx; 2025), stage C systolic HF/ICM/HFrEF s/p ICD, h/o VT with presumed associated syncope, poorly controlled DM, pAF (off of DOAC given the absence of recurrence), dyslipidemia, essential HTN, COPD, and h/o Graves who was referred to the Advanced Heart Failure clinic for consultation. At the current time she has functional class III symptoms and appears euvolemic on exam.     1) Stage C/D acute on chronic systolic HF/ICM/HFrEF (LVEF 30-35%; 3/2025) s/p ICD  Most recent hospitalization in March '25 where she underwent placement of an ICD for VT and PCI to her LCx. Since then she reports progressive symptoms and intolerance of GDMT; currently on midodrine. She also reports significant weight loss (~60 lbs over the last 6 months). She denies any ICD shocks or known recurrence of VT. I discussed admission to the HFICU with a plan for PAC guided evaluation given her inability to tolerate GDMT and symptoms, as we need to understand why she cannot  tolerate GDMT (? related to diabetes or progressive RV dysfunction, etc). She and her sister are in agreement. Will work to arrange a direct admission. She will remain on her current medications until admission.         F/U: HOMER Zavala,  Thank you for referring Ms. Evans to the  Advanced Heart Failure Clinic. Please let me know if you have any questions.       ____________________________________________________________  Jesus Deluca, DO  Section of Advanced Heart Failure and Cardiac Transplantation  Division of Cardiovascular Medicine  [facility]         [1]   Current Outpatient Medications   Medication Sig Dispense Refill    albuterol 2.5 mg /3 mL (0.083 %) nebulizer solution Take 3 mL by nebulization every 6 hours if needed for wheezing or shortness of breath.      albuterol 90 mcg/actuation inhaler Inhale 2 puffs every 6 hours if needed for wheezing or shortness of breath.      alogliptin (Nesina) 25 mg tablet Take 1 tablet (25 mg) by mouth once daily.      atorvastatin (Lipitor) 80 mg tablet Take 1 tablet (80 mg) by mouth once daily.      budesonide-formoterol (Symbicort) 80-4.5 mcg/actuation inhaler INHALE 2 PUFF BY INHALATION ROUTE 2 TIMES EVERY DAY IN THE MORNING AND EVENING      bumetanide (Bumex) 2 mg tablet Take 1 tablet (2 mg) by mouth 2 times a day.      cefdinir (Omnicef) 300 mg capsule Take 1 capsule (300 mg) by mouth every 12 hours.      citalopram (CeleXA) 40 mg tablet Take 1 tablet (40 mg) by mouth early in the morning..      clopidogrel (Plavix) 75 mg tablet Take 1 tablet (75 mg) by mouth once daily.      empagliflozin (Jardiance) 10 mg tablet Take 1 tablet (10 mg) by mouth once daily.      fluconazole (Diflucan) 150 mg tablet Take 1 tablet (150 mg) by mouth early in the morning..      furosemide (Lasix) 20 mg tablet Take 1 tablet (20 mg) by mouth if needed.      gabapentin (Neurontin) 400 mg capsule TAKE 1 CAPSULE BY MOUTH 3  "TIMES EVERY DAY FOR DIABETIC NEUROPATHY      insulin glargine (Lantus Solostar U-100 Insulin) 100 unit/mL (3 mL) pen Inject 15 Units under the skin once daily at bedtime.      loperamide (Imodium) 2 mg capsule TAKE 2 TABLETS BY MOUTH AFTER 1ST LOOSE STOOL AND 1 TABLET AFTER EACH NEXT BOWEL MOVEMENT; DO NOT EXCEED 16 MG IN 24HRS AS NEEDED FOR DIARRHEA      metoprolol succinate XL (Toprol-XL) 25 mg 24 hr tablet Take 0.5 tablets (12.5 mg) by mouth.      midodrine (Proamatine) 5 mg tablet Take 1 tablet (5 mg) by mouth 3 times a day.      nicotine (Nicoderm CQ) 21 mg/24 hr patch APPLY 1 PATCH TO SKIN DAILY AND REMOVE AT BEDTIME      nitroglycerin (Nitrostat) 0.4 mg SL tablet Place 1 tablet (0.4 mg) under the tongue every 5 minutes if needed for chest pain.      ondansetron (Zofran) 4 mg tablet TAKE 1 TABLET BY MOUTH EVERY DAY AS NEEDED FOR NAUSEA      pantoprazole (ProtoNix) 20 mg EC tablet Take 1 tablet (20 mg) by mouth once daily.      rOPINIRole (Requip) 1 mg tablet Take 1 tablet (1 mg) by mouth 3 times a day.      rOPINIRole (Requip) 3 mg tablet take 1 tablet by oral route every day at bedtime      sacubitriL-valsartan (Entresto) 24-26 mg tablet Take 0.5 tablets by mouth 2 times a day.      spironolactone (Aldactone) 25 mg tablet Take 1 tablet (25 mg) by mouth once daily.      sulfamethoxazole-trimethoprim (Bactrim DS) 800-160 mg tablet TAKE ONE (1) TABLET BY MOUTH EVERY TWELVE (12) HOURS *ANTIBIOTIC*       No current facility-administered medications for this visit.   [2]   Allergies  Allergen Reactions    Bee Venom Protein (Honey Bee) Anaphylaxis    Codeine Other     \"Passed Out\"     \"i just black out\"    Doxycycline Hives    Prednisone Hives     Muscle cramps    Amoxicillin Hives and Rash     \"Felt warm and overheated all over\"     "

## 2025-06-02 ENCOUNTER — APPOINTMENT (OUTPATIENT)
Dept: RADIOLOGY | Facility: HOSPITAL | Age: 60
DRG: 291 | End: 2025-06-02
Payer: COMMERCIAL

## 2025-06-02 ENCOUNTER — APPOINTMENT (OUTPATIENT)
Dept: CARDIOLOGY | Facility: HOSPITAL | Age: 60
DRG: 291 | End: 2025-06-02
Payer: COMMERCIAL

## 2025-06-02 ENCOUNTER — HOSPITAL ENCOUNTER (INPATIENT)
Facility: HOSPITAL | Age: 60
End: 2025-06-02
Attending: INTERNAL MEDICINE
Payer: COMMERCIAL

## 2025-06-02 DIAGNOSIS — Z01.810 ENCOUNTER FOR PREPROCEDURAL CARDIOVASCULAR EXAMINATION: ICD-10-CM

## 2025-06-02 DIAGNOSIS — I50.20 ACC/AHA STAGE D SYSTOLIC HEART FAILURE: ICD-10-CM

## 2025-06-02 DIAGNOSIS — N30.00 ACUTE CYSTITIS WITHOUT HEMATURIA: ICD-10-CM

## 2025-06-02 DIAGNOSIS — I50.23 ACUTE ON CHRONIC SYSTOLIC HEART FAILURE: Primary | ICD-10-CM

## 2025-06-02 DIAGNOSIS — Z01.818 ENCOUNTER FOR OTHER PREPROCEDURAL EXAMINATION: ICD-10-CM

## 2025-06-02 DIAGNOSIS — Z95.810 ICD (IMPLANTABLE CARDIOVERTER-DEFIBRILLATOR) IN PLACE: ICD-10-CM

## 2025-06-02 DIAGNOSIS — I25.5 ISCHEMIC CARDIOMYOPATHY: ICD-10-CM

## 2025-06-02 DIAGNOSIS — E03.9 ACQUIRED HYPOTHYROIDISM: ICD-10-CM

## 2025-06-02 DIAGNOSIS — I73.9 PERIPHERAL VASCULAR DISEASE, UNSPECIFIED: ICD-10-CM

## 2025-06-02 PROBLEM — E11.65 TYPE 2 DIABETES MELLITUS WITH HYPERGLYCEMIA, WITH LONG-TERM CURRENT USE OF INSULIN: Status: ACTIVE | Noted: 2025-06-02

## 2025-06-02 PROBLEM — Z79.4 TYPE 2 DIABETES MELLITUS WITH HYPERGLYCEMIA, WITH LONG-TERM CURRENT USE OF INSULIN: Status: ACTIVE | Noted: 2025-06-02

## 2025-06-02 LAB
ABO GROUP (TYPE) IN BLOOD: NORMAL
ALBUMIN SERPL BCP-MCNC: 3.9 G/DL (ref 3.4–5)
ALP SERPL-CCNC: 104 U/L (ref 33–136)
ALT SERPL W P-5'-P-CCNC: 8 U/L (ref 7–45)
ANION GAP BLDMV CALCULATED.4IONS-SCNC: -5 MMO/L (ref 10–25)
ANION GAP BLDMV CALCULATED.4IONS-SCNC: 1 MMO/L (ref 10–25)
ANION GAP SERPL CALC-SCNC: 15 MMOL/L (ref 10–20)
ANTIBODY SCREEN: NORMAL
AORTIC VALVE MEAN GRADIENT: 4 MMHG
AORTIC VALVE PEAK VELOCITY: 1.49 M/S
APTT PPP: 26 SECONDS (ref 26–36)
AST SERPL W P-5'-P-CCNC: 12 U/L (ref 9–39)
AV PEAK GRADIENT: 9 MMHG
AVA (PEAK VEL): 1.23 CM2
AVA (VTI): 1.12 CM2
BASE EXCESS BLDMV CALC-SCNC: 13.1 MMOL/L (ref -2–3)
BASE EXCESS BLDMV CALC-SCNC: 17.7 MMOL/L (ref -2–3)
BASOPHILS # BLD AUTO: 0.07 X10*3/UL (ref 0–0.1)
BASOPHILS NFR BLD AUTO: 0.7 %
BILIRUB SERPL-MCNC: 1.7 MG/DL (ref 0–1.2)
BNP SERPL-MCNC: 1292 PG/ML (ref 0–99)
BODY TEMPERATURE: 37 DEGREES CELSIUS
BODY TEMPERATURE: 37 DEGREES CELSIUS
BUN SERPL-MCNC: 15 MG/DL (ref 6–23)
CA-I BLDMV-SCNC: 1.17 MMOL/L (ref 1.1–1.33)
CA-I BLDMV-SCNC: 1.22 MMOL/L (ref 1.1–1.33)
CALCIUM SERPL-MCNC: 9.9 MG/DL (ref 8.6–10.6)
CARDIAC TROPONIN I PNL SERPL HS: 22 NG/L (ref 0–34)
CHLORIDE BLD-SCNC: 90 MMOL/L (ref 98–107)
CHLORIDE BLD-SCNC: 91 MMOL/L (ref 98–107)
CHLORIDE SERPL-SCNC: 85 MMOL/L (ref 98–107)
CO2 SERPL-SCNC: 36 MMOL/L (ref 21–32)
CREAT SERPL-MCNC: 0.81 MG/DL (ref 0.5–1.05)
EGFRCR SERPLBLD CKD-EPI 2021: 82 ML/MIN/1.73M*2
EJECTION FRACTION: 23 %
EOSINOPHIL # BLD AUTO: 0.05 X10*3/UL (ref 0–0.7)
EOSINOPHIL NFR BLD AUTO: 0.5 %
ERYTHROCYTE [DISTWIDTH] IN BLOOD BY AUTOMATED COUNT: 13.5 % (ref 11.5–14.5)
EST. AVERAGE GLUCOSE BLD GHB EST-MCNC: 200 MG/DL
FERRITIN SERPL-MCNC: 232 NG/ML (ref 8–150)
FOLATE SERPL-MCNC: 20.4 NG/ML
GLUCOSE BLD MANUAL STRIP-MCNC: 187 MG/DL (ref 74–99)
GLUCOSE BLD MANUAL STRIP-MCNC: 308 MG/DL (ref 74–99)
GLUCOSE BLD-MCNC: 165 MG/DL (ref 74–99)
GLUCOSE BLD-MCNC: 314 MG/DL (ref 74–99)
GLUCOSE SERPL-MCNC: 193 MG/DL (ref 74–99)
HBA1C MFR BLD: 8.6 % (ref ?–5.7)
HCO3 BLDMV-SCNC: 39.6 MMOL/L (ref 22–26)
HCO3 BLDMV-SCNC: 43.5 MMOL/L (ref 22–26)
HCT VFR BLD AUTO: 46.8 % (ref 36–46)
HCT VFR BLD EST: 41 % (ref 36–46)
HCT VFR BLD EST: 46 % (ref 36–46)
HGB BLD-MCNC: 15 G/DL (ref 12–16)
HGB BLDMV-MCNC: 13.5 G/DL (ref 12–16)
HGB BLDMV-MCNC: 15.2 G/DL (ref 12–16)
IMM GRANULOCYTES # BLD AUTO: 0.05 X10*3/UL (ref 0–0.7)
IMM GRANULOCYTES NFR BLD AUTO: 0.5 % (ref 0–0.9)
INHALED O2 CONCENTRATION: 21 %
INHALED O2 CONCENTRATION: 21 %
INR PPP: 1 (ref 0.9–1.1)
IRON SATN MFR SERPL: 17 % (ref 25–45)
IRON SERPL-MCNC: 47 UG/DL (ref 35–150)
LACTATE BLDMV-SCNC: 0.6 MMOL/L (ref 0.4–2)
LACTATE BLDMV-SCNC: 1 MMOL/L (ref 0.4–2)
LACTATE SERPL-SCNC: 1.9 MMOL/L (ref 0.4–2)
LEFT ATRIUM VOLUME AREA LENGTH INDEX BSA: 26.8 ML/M2
LEFT VENTRICLE INTERNAL DIMENSION DIASTOLE: 4.1 CM (ref 3.5–6)
LEFT VENTRICULAR OUTFLOW TRACT DIAMETER: 1.8 CM
LYMPHOCYTES # BLD AUTO: 1.26 X10*3/UL (ref 1.2–4.8)
LYMPHOCYTES NFR BLD AUTO: 11.9 %
MAGNESIUM SERPL-MCNC: 1.86 MG/DL (ref 1.6–2.4)
MCH RBC QN AUTO: 29.2 PG (ref 26–34)
MCHC RBC AUTO-ENTMCNC: 32.1 G/DL (ref 32–36)
MCV RBC AUTO: 91 FL (ref 80–100)
MITRAL VALVE E/A RATIO: 2.9
MONOCYTES # BLD AUTO: 1.08 X10*3/UL (ref 0.1–1)
MONOCYTES NFR BLD AUTO: 10.2 %
NEUTROPHILS # BLD AUTO: 8.05 X10*3/UL (ref 1.2–7.7)
NEUTROPHILS NFR BLD AUTO: 76.2 %
NRBC BLD-RTO: 0 /100 WBCS (ref 0–0)
OXYHGB MFR BLDMV: 58.5 % (ref 45–75)
OXYHGB MFR BLDMV: 64.8 % (ref 45–75)
PCO2 BLDMV: 52 MM HG (ref 41–51)
PCO2 BLDMV: 57 MM HG (ref 41–51)
PH BLDMV: 7.45 PH (ref 7.33–7.43)
PH BLDMV: 7.53 PH (ref 7.33–7.43)
PHOSPHATE SERPL-MCNC: 5 MG/DL (ref 2.5–4.9)
PLATELET # BLD AUTO: 237 X10*3/UL (ref 150–450)
PO2 BLDMV: 36 MM HG (ref 35–45)
PO2 BLDMV: 45 MM HG (ref 35–45)
POTASSIUM BLDMV-SCNC: 2.9 MMOL/L (ref 3.5–5.3)
POTASSIUM BLDMV-SCNC: 3.6 MMOL/L (ref 3.5–5.3)
POTASSIUM SERPL-SCNC: 2.8 MMOL/L (ref 3.5–5.3)
PROT SERPL-MCNC: 7.9 G/DL (ref 6.4–8.2)
PROTHROMBIN TIME: 11.4 SECONDS (ref 9.8–12.4)
RBC # BLD AUTO: 5.14 X10*6/UL (ref 4–5.2)
RH FACTOR (ANTIGEN D): NORMAL
RIGHT VENTRICLE FREE WALL PEAK S': 10.1 CM/S
RIGHT VENTRICLE PEAK SYSTOLIC PRESSURE: 41 MMHG
SAO2 % BLDMV: 62 % (ref 45–75)
SAO2 % BLDMV: 68 % (ref 45–75)
SODIUM BLDMV-SCNC: 127 MMOL/L (ref 136–145)
SODIUM BLDMV-SCNC: 127 MMOL/L (ref 136–145)
SODIUM SERPL-SCNC: 133 MMOL/L (ref 136–145)
T4 FREE SERPL-MCNC: 2.05 NG/DL (ref 0.78–1.48)
T4 FREE SERPL-MCNC: 2.14 NG/DL (ref 0.78–1.48)
TIBC SERPL-MCNC: 283 UG/DL (ref 240–445)
TRICUSPID ANNULAR PLANE SYSTOLIC EXCURSION: 1.6 CM
TSH SERPL-ACNC: 0.2 MIU/L (ref 0.44–3.98)
UIBC SERPL-MCNC: 236 UG/DL (ref 110–370)
VIT B12 SERPL-MCNC: 353 PG/ML (ref 211–911)
WBC # BLD AUTO: 10.6 X10*3/UL (ref 4.4–11.3)

## 2025-06-02 PROCEDURE — 82947 ASSAY GLUCOSE BLOOD QUANT: CPT

## 2025-06-02 PROCEDURE — 84295 ASSAY OF SERUM SODIUM: CPT | Performed by: STUDENT IN AN ORGANIZED HEALTH CARE EDUCATION/TRAINING PROGRAM

## 2025-06-02 PROCEDURE — 82435 ASSAY OF BLOOD CHLORIDE: CPT

## 2025-06-02 PROCEDURE — 84484 ASSAY OF TROPONIN QUANT: CPT

## 2025-06-02 PROCEDURE — 82728 ASSAY OF FERRITIN: CPT

## 2025-06-02 PROCEDURE — 71045 X-RAY EXAM CHEST 1 VIEW: CPT

## 2025-06-02 PROCEDURE — 93503 INSERT/PLACE HEART CATHETER: CPT

## 2025-06-02 PROCEDURE — 87081 CULTURE SCREEN ONLY: CPT

## 2025-06-02 PROCEDURE — 84443 ASSAY THYROID STIM HORMONE: CPT

## 2025-06-02 PROCEDURE — 2500000002 HC RX 250 W HCPCS SELF ADMINISTERED DRUGS (ALT 637 FOR MEDICARE OP, ALT 636 FOR OP/ED)

## 2025-06-02 PROCEDURE — 83735 ASSAY OF MAGNESIUM: CPT

## 2025-06-02 PROCEDURE — 99223 1ST HOSP IP/OBS HIGH 75: CPT

## 2025-06-02 PROCEDURE — 2500000004 HC RX 250 GENERAL PHARMACY W/ HCPCS (ALT 636 FOR OP/ED): Performed by: STUDENT IN AN ORGANIZED HEALTH CARE EDUCATION/TRAINING PROGRAM

## 2025-06-02 PROCEDURE — 93306 TTE W/DOPPLER COMPLETE: CPT | Performed by: INTERNAL MEDICINE

## 2025-06-02 PROCEDURE — 2020000001 HC ICU ROOM DAILY

## 2025-06-02 PROCEDURE — 2500000002 HC RX 250 W HCPCS SELF ADMINISTERED DRUGS (ALT 637 FOR MEDICARE OP, ALT 636 FOR OP/ED): Performed by: STUDENT IN AN ORGANIZED HEALTH CARE EDUCATION/TRAINING PROGRAM

## 2025-06-02 PROCEDURE — 80069 RENAL FUNCTION PANEL: CPT | Mod: CCI

## 2025-06-02 PROCEDURE — 86900 BLOOD TYPING SEROLOGIC ABO: CPT

## 2025-06-02 PROCEDURE — 85730 THROMBOPLASTIN TIME PARTIAL: CPT

## 2025-06-02 PROCEDURE — 93283 PRGRMG EVAL IMPLANTABLE DFB: CPT

## 2025-06-02 PROCEDURE — 80053 COMPREHEN METABOLIC PANEL: CPT

## 2025-06-02 PROCEDURE — 36556 INSERT NON-TUNNEL CV CATH: CPT

## 2025-06-02 PROCEDURE — 93010 ELECTROCARDIOGRAM REPORT: CPT | Performed by: INTERNAL MEDICINE

## 2025-06-02 PROCEDURE — 83880 ASSAY OF NATRIURETIC PEPTIDE: CPT

## 2025-06-02 PROCEDURE — 2500000001 HC RX 250 WO HCPCS SELF ADMINISTERED DRUGS (ALT 637 FOR MEDICARE OP): Performed by: STUDENT IN AN ORGANIZED HEALTH CARE EDUCATION/TRAINING PROGRAM

## 2025-06-02 PROCEDURE — 2500000001 HC RX 250 WO HCPCS SELF ADMINISTERED DRUGS (ALT 637 FOR MEDICARE OP)

## 2025-06-02 PROCEDURE — 2500000004 HC RX 250 GENERAL PHARMACY W/ HCPCS (ALT 636 FOR OP/ED)

## 2025-06-02 PROCEDURE — 83605 ASSAY OF LACTIC ACID: CPT

## 2025-06-02 PROCEDURE — 82746 ASSAY OF FOLIC ACID SERUM: CPT

## 2025-06-02 PROCEDURE — 37799 UNLISTED PX VASCULAR SURGERY: CPT

## 2025-06-02 PROCEDURE — 83036 HEMOGLOBIN GLYCOSYLATED A1C: CPT

## 2025-06-02 PROCEDURE — 2500000005 HC RX 250 GENERAL PHARMACY W/O HCPCS: Performed by: NURSE PRACTITIONER

## 2025-06-02 PROCEDURE — 84439 ASSAY OF FREE THYROXINE: CPT | Performed by: STUDENT IN AN ORGANIZED HEALTH CARE EDUCATION/TRAINING PROGRAM

## 2025-06-02 PROCEDURE — 2500000004 HC RX 250 GENERAL PHARMACY W/ HCPCS (ALT 636 FOR OP/ED): Performed by: NURSE PRACTITIONER

## 2025-06-02 PROCEDURE — C8929 TTE W OR WO FOL WCON,DOPPLER: HCPCS

## 2025-06-02 PROCEDURE — 99291 CRITICAL CARE FIRST HOUR: CPT | Performed by: INTERNAL MEDICINE

## 2025-06-02 PROCEDURE — 02HQ33Z INSERTION OF INFUSION DEVICE INTO RIGHT PULMONARY ARTERY, PERCUTANEOUS APPROACH: ICD-10-PCS

## 2025-06-02 PROCEDURE — 86850 RBC ANTIBODY SCREEN: CPT

## 2025-06-02 PROCEDURE — 99291 CRITICAL CARE FIRST HOUR: CPT

## 2025-06-02 PROCEDURE — 84480 ASSAY TRIIODOTHYRONINE (T3): CPT

## 2025-06-02 PROCEDURE — 4B02XTZ MEASUREMENT OF CARDIAC DEFIBRILLATOR, EXTERNAL APPROACH: ICD-10-PCS

## 2025-06-02 PROCEDURE — 85025 COMPLETE CBC W/AUTO DIFF WBC: CPT

## 2025-06-02 PROCEDURE — 36415 COLL VENOUS BLD VENIPUNCTURE: CPT

## 2025-06-02 PROCEDURE — 82607 VITAMIN B-12: CPT

## 2025-06-02 PROCEDURE — 84100 ASSAY OF PHOSPHORUS: CPT

## 2025-06-02 PROCEDURE — 84439 ASSAY OF FREE THYROXINE: CPT

## 2025-06-02 PROCEDURE — 84132 ASSAY OF SERUM POTASSIUM: CPT | Performed by: STUDENT IN AN ORGANIZED HEALTH CARE EDUCATION/TRAINING PROGRAM

## 2025-06-02 PROCEDURE — 83550 IRON BINDING TEST: CPT

## 2025-06-02 PROCEDURE — 85610 PROTHROMBIN TIME: CPT

## 2025-06-02 RX ORDER — INSULIN GLARGINE 100 [IU]/ML
8 INJECTION, SOLUTION SUBCUTANEOUS NIGHTLY
Status: DISCONTINUED | OUTPATIENT
Start: 2025-06-02 | End: 2025-06-03

## 2025-06-02 RX ORDER — SODIUM CHLORIDE, SODIUM LACTATE, POTASSIUM CHLORIDE, CALCIUM CHLORIDE 600; 310; 30; 20 MG/100ML; MG/100ML; MG/100ML; MG/100ML
10 INJECTION, SOLUTION INTRAVENOUS CONTINUOUS
Status: ACTIVE | OUTPATIENT
Start: 2025-06-02 | End: 2025-06-05

## 2025-06-02 RX ORDER — DEXTROSE 50 % IN WATER (D50W) INTRAVENOUS SYRINGE
25
Status: ACTIVE | OUTPATIENT
Start: 2025-06-02

## 2025-06-02 RX ORDER — MAGNESIUM SULFATE HEPTAHYDRATE 40 MG/ML
2 INJECTION, SOLUTION INTRAVENOUS ONCE
Status: COMPLETED | OUTPATIENT
Start: 2025-06-02 | End: 2025-06-02

## 2025-06-02 RX ORDER — POLYETHYLENE GLYCOL 3350 17 G/17G
17 POWDER, FOR SOLUTION ORAL DAILY PRN
Status: ACTIVE | OUTPATIENT
Start: 2025-06-02

## 2025-06-02 RX ORDER — POTASSIUM CHLORIDE 20 MEQ/1
40 TABLET, EXTENDED RELEASE ORAL ONCE
Status: COMPLETED | OUTPATIENT
Start: 2025-06-02 | End: 2025-06-02

## 2025-06-02 RX ORDER — SODIUM NITROPRUSSIDE IN 0.9% SODIUM CHLORIDE 0.5 MG/ML
.25-5 INJECTION INTRAVENOUS CONTINUOUS
Status: DISCONTINUED | OUTPATIENT
Start: 2025-06-02 | End: 2025-06-07

## 2025-06-02 RX ORDER — POTASSIUM CHLORIDE 29.8 MG/ML
40 INJECTION INTRAVENOUS ONCE
Status: COMPLETED | OUTPATIENT
Start: 2025-06-02 | End: 2025-06-02

## 2025-06-02 RX ORDER — BUMETANIDE 1 MG/1
2 TABLET ORAL 2 TIMES DAILY
Status: DISCONTINUED | OUTPATIENT
Start: 2025-06-02 | End: 2025-06-08

## 2025-06-02 RX ORDER — ACETAMINOPHEN 325 MG/1
650 TABLET ORAL EVERY 6 HOURS PRN
Status: DISPENSED | OUTPATIENT
Start: 2025-06-02

## 2025-06-02 RX ORDER — METOPROLOL SUCCINATE 25 MG/1
12.5 TABLET, EXTENDED RELEASE ORAL DAILY
Status: ACTIVE | OUTPATIENT
Start: 2025-06-02

## 2025-06-02 RX ORDER — CITALOPRAM 40 MG/1
40 TABLET ORAL
Status: DISPENSED | OUTPATIENT
Start: 2025-06-03

## 2025-06-02 RX ORDER — FLUTICASONE FUROATE AND VILANTEROL 100; 25 UG/1; UG/1
1 POWDER RESPIRATORY (INHALATION)
Status: DISPENSED | OUTPATIENT
Start: 2025-06-02

## 2025-06-02 RX ORDER — INSULIN LISPRO 100 [IU]/ML
0-5 INJECTION, SOLUTION INTRAVENOUS; SUBCUTANEOUS
Status: DISPENSED | OUTPATIENT
Start: 2025-06-02

## 2025-06-02 RX ORDER — INSULIN LISPRO 100 [IU]/ML
3 INJECTION, SOLUTION INTRAVENOUS; SUBCUTANEOUS
Status: DISCONTINUED | OUTPATIENT
Start: 2025-06-02 | End: 2025-06-05

## 2025-06-02 RX ORDER — OXYCODONE HYDROCHLORIDE 5 MG/1
5 TABLET ORAL EVERY 6 HOURS PRN
Refills: 0 | Status: DISCONTINUED | OUTPATIENT
Start: 2025-06-02 | End: 2025-06-02

## 2025-06-02 RX ORDER — CLOPIDOGREL BISULFATE 75 MG/1
75 TABLET ORAL DAILY
Status: DISPENSED | OUTPATIENT
Start: 2025-06-02

## 2025-06-02 RX ORDER — DEXTROSE 50 % IN WATER (D50W) INTRAVENOUS SYRINGE
12.5
Status: ACTIVE | OUTPATIENT
Start: 2025-06-02

## 2025-06-02 RX ORDER — ENOXAPARIN SODIUM 100 MG/ML
40 INJECTION SUBCUTANEOUS EVERY 24 HOURS
Status: DISPENSED | OUTPATIENT
Start: 2025-06-02

## 2025-06-02 RX ORDER — ROPINIROLE 1 MG/1
1 TABLET, FILM COATED ORAL 3 TIMES DAILY
Status: DISPENSED | OUTPATIENT
Start: 2025-06-02

## 2025-06-02 RX ORDER — AMOXICILLIN 250 MG
2 CAPSULE ORAL 2 TIMES DAILY
Status: DISPENSED | OUTPATIENT
Start: 2025-06-02

## 2025-06-02 RX ORDER — ALOGLIPTIN 25 MG/1
25 TABLET, FILM COATED ORAL DAILY
Status: DISCONTINUED | OUTPATIENT
Start: 2025-06-02 | End: 2025-06-02

## 2025-06-02 RX ORDER — INSULIN GLARGINE 100 [IU]/ML
7 INJECTION, SOLUTION SUBCUTANEOUS NIGHTLY
Status: DISCONTINUED | OUTPATIENT
Start: 2025-06-02 | End: 2025-06-02

## 2025-06-02 RX ORDER — ROPINIROLE 3 MG/1
3 TABLET, FILM COATED ORAL NIGHTLY
Status: DISPENSED | OUTPATIENT
Start: 2025-06-02

## 2025-06-02 RX ORDER — ALBUTEROL SULFATE 0.83 MG/ML
3 SOLUTION RESPIRATORY (INHALATION) EVERY 6 HOURS PRN
Status: ACTIVE | OUTPATIENT
Start: 2025-06-02

## 2025-06-02 RX ORDER — OXYCODONE HYDROCHLORIDE 5 MG/1
5 TABLET ORAL ONCE
Refills: 0 | Status: COMPLETED | OUTPATIENT
Start: 2025-06-02 | End: 2025-06-02

## 2025-06-02 RX ORDER — MIDODRINE HYDROCHLORIDE 5 MG/1
5 TABLET ORAL 3 TIMES DAILY
Status: DISPENSED | OUTPATIENT
Start: 2025-06-02

## 2025-06-02 RX ORDER — SPIRONOLACTONE 25 MG/1
25 TABLET ORAL DAILY
Status: DISPENSED | OUTPATIENT
Start: 2025-06-02

## 2025-06-02 RX ORDER — LIDOCAINE HYDROCHLORIDE 10 MG/ML
INJECTION, SOLUTION EPIDURAL; INFILTRATION; INTRACAUDAL; PERINEURAL
Status: COMPLETED
Start: 2025-06-02 | End: 2025-06-02

## 2025-06-02 RX ORDER — ATORVASTATIN CALCIUM 80 MG/1
80 TABLET, FILM COATED ORAL DAILY
Status: DISPENSED | OUTPATIENT
Start: 2025-06-02

## 2025-06-02 RX ADMIN — ACETAMINOPHEN 650 MG: 325 TABLET ORAL at 22:12

## 2025-06-02 RX ADMIN — SODIUM CHLORIDE, POTASSIUM CHLORIDE, SODIUM LACTATE AND CALCIUM CHLORIDE 10 ML/HR: 600; 310; 30; 20 INJECTION, SOLUTION INTRAVENOUS at 16:44

## 2025-06-02 RX ADMIN — CLOPIDOGREL BISULFATE 75 MG: 75 TABLET, FILM COATED ORAL at 14:35

## 2025-06-02 RX ADMIN — SENNOSIDES AND DOCUSATE SODIUM 2 TABLET: 50; 8.6 TABLET ORAL at 14:34

## 2025-06-02 RX ADMIN — POTASSIUM CHLORIDE 40 MEQ: 1500 TABLET, EXTENDED RELEASE ORAL at 18:50

## 2025-06-02 RX ADMIN — ATORVASTATIN CALCIUM 80 MG: 80 TABLET, FILM COATED ORAL at 14:56

## 2025-06-02 RX ADMIN — PERFLUTREN 3 ML OF DILUTION: 6.52 INJECTION, SUSPENSION INTRAVENOUS at 17:21

## 2025-06-02 RX ADMIN — SPIRONOLACTONE 25 MG: 25 TABLET, FILM COATED ORAL at 14:34

## 2025-06-02 RX ADMIN — GABAPENTIN 400 MG: 300 CAPSULE ORAL at 14:34

## 2025-06-02 RX ADMIN — ROPINIROLE HYDROCHLORIDE 1 MG: 3 TABLET, FILM COATED ORAL at 20:02

## 2025-06-02 RX ADMIN — ROPINIROLE HYDROCHLORIDE 3 MG: 3 TABLET, FILM COATED ORAL at 22:12

## 2025-06-02 RX ADMIN — INSULIN GLARGINE 8 UNITS: 100 INJECTION, SOLUTION SUBCUTANEOUS at 20:02

## 2025-06-02 RX ADMIN — ROPINIROLE HYDROCHLORIDE 1 MG: 3 TABLET, FILM COATED ORAL at 15:51

## 2025-06-02 RX ADMIN — Medication 2 L/MIN: at 22:09

## 2025-06-02 RX ADMIN — SODIUM NITROPRUSSIDE IN 0.9% SODIUM CHLORIDE 0.25 MCG/KG/MIN: 0.5 INJECTION INTRAVENOUS at 21:04

## 2025-06-02 RX ADMIN — Medication 2 L/MIN: at 22:12

## 2025-06-02 RX ADMIN — MAGNESIUM SULFATE HEPTAHYDRATE 2 G: 40 INJECTION, SOLUTION INTRAVENOUS at 18:51

## 2025-06-02 RX ADMIN — OXYCODONE HYDROCHLORIDE 5 MG: 5 TABLET ORAL at 18:30

## 2025-06-02 RX ADMIN — GABAPENTIN 400 MG: 300 CAPSULE ORAL at 22:12

## 2025-06-02 RX ADMIN — ENOXAPARIN SODIUM 40 MG: 100 INJECTION SUBCUTANEOUS at 14:34

## 2025-06-02 RX ADMIN — LIDOCAINE HYDROCHLORIDE 6 ML: 10 INJECTION, SOLUTION EPIDURAL; INFILTRATION; INTRACAUDAL; PERINEURAL at 15:50

## 2025-06-02 RX ADMIN — EMPAGLIFLOZIN 10 MG: 10 TABLET, FILM COATED ORAL at 14:35

## 2025-06-02 RX ADMIN — MIDODRINE HYDROCHLORIDE 5 MG: 5 TABLET ORAL at 14:35

## 2025-06-02 RX ADMIN — INSULIN LISPRO 1 UNITS: 100 INJECTION, SOLUTION INTRAVENOUS; SUBCUTANEOUS at 14:52

## 2025-06-02 RX ADMIN — POTASSIUM CHLORIDE 40 MEQ: 29.8 INJECTION, SOLUTION INTRAVENOUS at 18:54

## 2025-06-02 SDOH — ECONOMIC STABILITY: INCOME INSECURITY: IN THE PAST 12 MONTHS HAS THE ELECTRIC, GAS, OIL, OR WATER COMPANY THREATENED TO SHUT OFF SERVICES IN YOUR HOME?: NO

## 2025-06-02 SDOH — SOCIAL STABILITY: SOCIAL INSECURITY: ARE YOU MARRIED, WIDOWED, DIVORCED, SEPARATED, NEVER MARRIED, OR LIVING WITH A PARTNER?: DIVORCED

## 2025-06-02 SDOH — SOCIAL STABILITY: SOCIAL INSECURITY: HAVE YOU HAD THOUGHTS OF HARMING ANYONE ELSE?: NO

## 2025-06-02 SDOH — HEALTH STABILITY: MENTAL HEALTH
DO YOU FEEL STRESS - TENSE, RESTLESS, NERVOUS, OR ANXIOUS, OR UNABLE TO SLEEP AT NIGHT BECAUSE YOUR MIND IS TROUBLED ALL THE TIME - THESE DAYS?: NOT AT ALL

## 2025-06-02 SDOH — SOCIAL STABILITY: SOCIAL NETWORK: IN A TYPICAL WEEK, HOW MANY TIMES DO YOU TALK ON THE PHONE WITH FAMILY, FRIENDS, OR NEIGHBORS?: THREE TIMES A WEEK

## 2025-06-02 SDOH — HEALTH STABILITY: PHYSICAL HEALTH
HOW OFTEN DO YOU NEED TO HAVE SOMEONE HELP YOU WHEN YOU READ INSTRUCTIONS, PAMPHLETS, OR OTHER WRITTEN MATERIAL FROM YOUR DOCTOR OR PHARMACY?: NEVER

## 2025-06-02 SDOH — SOCIAL STABILITY: SOCIAL INSECURITY
WITHIN THE LAST YEAR, HAVE YOU BEEN HUMILIATED OR EMOTIONALLY ABUSED IN OTHER WAYS BY YOUR PARTNER OR EX-PARTNER?: PATIENT DECLINED

## 2025-06-02 SDOH — ECONOMIC STABILITY: FOOD INSECURITY: WITHIN THE PAST 12 MONTHS, THE FOOD YOU BOUGHT JUST DIDN'T LAST AND YOU DIDN'T HAVE MONEY TO GET MORE.: NEVER TRUE

## 2025-06-02 SDOH — SOCIAL STABILITY: SOCIAL INSECURITY: DO YOU FEEL UNSAFE GOING BACK TO THE PLACE WHERE YOU ARE LIVING?: NO

## 2025-06-02 SDOH — SOCIAL STABILITY: SOCIAL INSECURITY: DO YOU FEEL ANYONE HAS EXPLOITED OR TAKEN ADVANTAGE OF YOU FINANCIALLY OR OF YOUR PERSONAL PROPERTY?: NO

## 2025-06-02 SDOH — SOCIAL STABILITY: SOCIAL INSECURITY: WITHIN THE LAST YEAR, HAVE YOU BEEN AFRAID OF YOUR PARTNER OR EX-PARTNER?: PATIENT DECLINED

## 2025-06-02 SDOH — SOCIAL STABILITY: SOCIAL INSECURITY: ARE THERE ANY APPARENT SIGNS OF INJURIES/BEHAVIORS THAT COULD BE RELATED TO ABUSE/NEGLECT?: NO

## 2025-06-02 SDOH — SOCIAL STABILITY: SOCIAL INSECURITY: ABUSE: ADULT

## 2025-06-02 SDOH — SOCIAL STABILITY: SOCIAL INSECURITY
WITHIN THE LAST YEAR, HAVE YOU BEEN RAPED OR FORCED TO HAVE ANY KIND OF SEXUAL ACTIVITY BY YOUR PARTNER OR EX-PARTNER?: PATIENT DECLINED

## 2025-06-02 SDOH — SOCIAL STABILITY: SOCIAL NETWORK: HOW OFTEN DO YOU GET TOGETHER WITH FRIENDS OR RELATIVES?: ONCE A WEEK

## 2025-06-02 SDOH — HEALTH STABILITY: PHYSICAL HEALTH: ON AVERAGE, HOW MANY MINUTES DO YOU ENGAGE IN EXERCISE AT THIS LEVEL?: 0 MIN

## 2025-06-02 SDOH — SOCIAL STABILITY: SOCIAL NETWORK: HOW OFTEN DO YOU ATTEND MEETINGS OF THE CLUBS OR ORGANIZATIONS YOU BELONG TO?: NEVER

## 2025-06-02 SDOH — SOCIAL STABILITY: SOCIAL INSECURITY
WITHIN THE LAST YEAR, HAVE YOU BEEN KICKED, HIT, SLAPPED, OR OTHERWISE PHYSICALLY HURT BY YOUR PARTNER OR EX-PARTNER?: PATIENT DECLINED

## 2025-06-02 SDOH — ECONOMIC STABILITY: FOOD INSECURITY: WITHIN THE PAST 12 MONTHS, YOU WORRIED THAT YOUR FOOD WOULD RUN OUT BEFORE YOU GOT THE MONEY TO BUY MORE.: NEVER TRUE

## 2025-06-02 SDOH — SOCIAL STABILITY: SOCIAL INSECURITY: ARE YOU OR HAVE YOU BEEN THREATENED OR ABUSED PHYSICALLY, EMOTIONALLY, OR SEXUALLY BY ANYONE?: NO

## 2025-06-02 SDOH — SOCIAL STABILITY: SOCIAL NETWORK
DO YOU BELONG TO ANY CLUBS OR ORGANIZATIONS SUCH AS CHURCH GROUPS, UNIONS, FRATERNAL OR ATHLETIC GROUPS, OR SCHOOL GROUPS?: NO

## 2025-06-02 SDOH — HEALTH STABILITY: PHYSICAL HEALTH: ON AVERAGE, HOW MANY DAYS PER WEEK DO YOU ENGAGE IN MODERATE TO STRENUOUS EXERCISE (LIKE A BRISK WALK)?: 0 DAYS

## 2025-06-02 SDOH — SOCIAL STABILITY: SOCIAL NETWORK: HOW OFTEN DO YOU ATTEND CHURCH OR RELIGIOUS SERVICES?: NEVER

## 2025-06-02 SDOH — SOCIAL STABILITY: SOCIAL INSECURITY: WERE YOU ABLE TO COMPLETE ALL THE BEHAVIORAL HEALTH SCREENINGS?: NO

## 2025-06-02 SDOH — SOCIAL STABILITY: SOCIAL INSECURITY: DOES ANYONE TRY TO KEEP YOU FROM HAVING/CONTACTING OTHER FRIENDS OR DOING THINGS OUTSIDE YOUR HOME?: NO

## 2025-06-02 SDOH — SOCIAL STABILITY: SOCIAL INSECURITY: HAS ANYONE EVER THREATENED TO HURT YOUR FAMILY OR YOUR PETS?: NO

## 2025-06-02 SDOH — SOCIAL STABILITY: SOCIAL INSECURITY: HAVE YOU HAD ANY THOUGHTS OF HARMING ANYONE ELSE?: NO

## 2025-06-02 ASSESSMENT — ENCOUNTER SYMPTOMS
PALPITATIONS: 0
DYSURIA: 0
DIAPHORESIS: 0
LIGHT-HEADEDNESS: 0
UNEXPECTED WEIGHT CHANGE: 0
FATIGUE: 1
COUGH: 0
EYE PAIN: 0
ACTIVITY CHANGE: 0
TROUBLE SWALLOWING: 0
DIARRHEA: 0
FREQUENCY: 0
VOMITING: 0
ABDOMINAL PAIN: 0
CONFUSION: 0
HEADACHES: 0
NUMBNESS: 0
EYE REDNESS: 0
POLYPHAGIA: 0
JOINT SWELLING: 0
CHEST TIGHTNESS: 0
SHORTNESS OF BREATH: 0
BRUISES/BLEEDS EASILY: 0
APPETITE CHANGE: 0
POLYDIPSIA: 0
DIZZINESS: 0
NAUSEA: 0
SORE THROAT: 0
AGITATION: 0

## 2025-06-02 ASSESSMENT — ACTIVITIES OF DAILY LIVING (ADL)
FEEDING YOURSELF: INDEPENDENT
GROOMING: INDEPENDENT
JUDGMENT_ADEQUATE_SAFELY_COMPLETE_DAILY_ACTIVITIES: YES
HEARING - RIGHT EAR: FUNCTIONAL
BATHING: INDEPENDENT
LACK_OF_TRANSPORTATION: NO
HEARING - LEFT EAR: FUNCTIONAL
ADEQUATE_TO_COMPLETE_ADL: YES
TOILETING: INDEPENDENT
PATIENT'S MEMORY ADEQUATE TO SAFELY COMPLETE DAILY ACTIVITIES?: YES
WALKS IN HOME: INDEPENDENT
DRESSING YOURSELF: INDEPENDENT

## 2025-06-02 ASSESSMENT — LIFESTYLE VARIABLES
SKIP TO QUESTIONS 9-10: 1
SUBSTANCE_ABUSE_PAST_12_MONTHS: NO
AUDIT-C TOTAL SCORE: 0
PRESCIPTION_ABUSE_PAST_12_MONTHS: NO
HOW OFTEN DO YOU HAVE A DRINK CONTAINING ALCOHOL: NEVER
AUDIT-C TOTAL SCORE: 0
HOW MANY STANDARD DRINKS CONTAINING ALCOHOL DO YOU HAVE ON A TYPICAL DAY: PATIENT DOES NOT DRINK
HOW OFTEN DO YOU HAVE 6 OR MORE DRINKS ON ONE OCCASION: NEVER

## 2025-06-02 ASSESSMENT — COGNITIVE AND FUNCTIONAL STATUS - GENERAL
DAILY ACTIVITIY SCORE: 24
PATIENT BASELINE BEDBOUND: NO
MOBILITY SCORE: 24

## 2025-06-02 ASSESSMENT — PATIENT HEALTH QUESTIONNAIRE - PHQ9
2. FEELING DOWN, DEPRESSED OR HOPELESS: NOT AT ALL
1. LITTLE INTEREST OR PLEASURE IN DOING THINGS: NOT AT ALL
SUM OF ALL RESPONSES TO PHQ9 QUESTIONS 1 & 2: 0

## 2025-06-02 ASSESSMENT — COLUMBIA-SUICIDE SEVERITY RATING SCALE - C-SSRS
1. IN THE PAST MONTH, HAVE YOU WISHED YOU WERE DEAD OR WISHED YOU COULD GO TO SLEEP AND NOT WAKE UP?: NO
2. HAVE YOU ACTUALLY HAD ANY THOUGHTS OF KILLING YOURSELF?: NO
6. HAVE YOU EVER DONE ANYTHING, STARTED TO DO ANYTHING, OR PREPARED TO DO ANYTHING TO END YOUR LIFE?: NO

## 2025-06-02 ASSESSMENT — PAIN - FUNCTIONAL ASSESSMENT
PAIN_FUNCTIONAL_ASSESSMENT: 0-10

## 2025-06-02 ASSESSMENT — PAIN SCALES - GENERAL
PAINLEVEL_OUTOF10: 5 - MODERATE PAIN
PAINLEVEL_OUTOF10: 0 - NO PAIN

## 2025-06-02 ASSESSMENT — PAIN DESCRIPTION - LOCATION: LOCATION: BACK

## 2025-06-02 NOTE — H&P
[facility]  HFICU HISTORY AND PHYSICAL     Thao Evans/[id number]    Admit Date: 6/2/2025  Hospital Length of Stay: 0   ICU Length of Stay: 1h   Primary Service: HFICU  Primary HF Cardiologist: Dr Deluca   Referring: Dr Deluca     HPI:   Ms. Evans is a 60F with a PMHx sig for CAD s/p PCI (LAD; 2015, Lcx; 2025), stage C systolic HF/ICM/HFrEF s/p ICD, h/o VT with presumed associated syncope, poorly controlled DM, pAF (off of DOAC given the absence of recurrence), dyslipidemia, essential HTN, COPD, and h/o Graves who was recently referred to the Advanced Heart Failure clinic and seen by Dr. Deluca. Her most recent hospitalization was in March 2025 for syncope/VT where she underwent an ICD placement and PCI to her LCx. She reported progressive symptoms over the last several months with ongoing fatigue, dyspnea, and early satiety causing significant progressive weight loss (~60 lbs over the last 6 months). She has also been currently intolerant of GDMT and is on midodrine for BP support since her hospital discharge. Because of worsening symptoms and intolerance of GDMT, she was offered direct admission to HFICU with plan for PAC guided evaluation.     On arrival to HFICU, patient complains of shortness of breath, orthopnea which has been progressively worse since her catheterization/stents, and lightheadedness/dizziness when standing quickly. She has a decrease in PO appetite which has lead to a significant weight loss over the last two months (approx 60 lbs she states?). On exam she is overall luke warm to touch, hands and feet are cool. She has no extremity edema. On CXR it appears that she has bilateral pleural effusions. Plan is for SGC insertion.       Cardiac Tests:  CTA Chest PE 3/11/2025 from OSH -   FINDINGS:   Pulmonary Arteries: Pulmonary arteries are adequately opacified for   evaluation.  No evidence of intraluminal filling defect to suggest pulmonary   embolism.  Main  pulmonary artery is normal in caliber.     Mediastinum: No evidence of mediastinal lymphadenopathy.  The heart and   pericardium demonstrate no acute abnormality.  There is no acute abnormality   of the thoracic aorta.     Lungs/pleura: There are moderate bilateral pleural effusions with associated   atelectasis.  There is diffuse interlobular septal thickening.  Patchy   ground-glass opacities are again seen within the dependent middle lobe and   lingula.  No pneumothorax.     Echocardiogram:  3/17/25 Complete TTE from OSH -    Left Ventricle: Severely reduced left ventricular systolic function   with a visually estimated EF of 30 - 35%. Left ventricle size is normal.   Normal wall thickness. Findings consistent with concentric remodeling.   There are regional wall motion abnormalities. Indeterminate diastolic   function.    Right Ventricle: Right ventricle size is normal. Low normal systolic   function.    Mitral Valve: Mild annular calcification. Mild regurgitation.     Tricuspid Valve: Mild to moderate regurgitation. Mildly elevated RVSP,   consistent with mild pulmonary hypertension. Est RA pressure is 3 mmHg.   The estimated PASP is 39 mmHg.     Left Atrium: Left atrium size is normal.     Right Atrium: Right atrium size is normal.     Pericardium: Small (<1 cm) pericardial effusion present. No indication   of cardiac tamponade.     Image quality is adequate.     Cardiac Catheterization:  10/9/2024 - [facility] from OSH  Coronary Findings Diagnostic Dominance: Right   Left Main: The vessel is moderate in size. The vessel exhibits minimal luminal irregularities.   Left Anterior Descending: The vessel is moderate in size. Mid LAD lesion, 40% stenosed. First Diagonal Branch: The vessel is moderate in size. Second Diagonal Branch: The vessel is small. Third Diagonal Branch: The vessel is small.   Left Circumflex: The vessel is moderate in size. Prox Cx lesion, 100% stenosed. Diagnostic coronary angiography shows negative  for . Lesion is the culprit lesion. The lesion is type C and concentric. The lesion was not previously treated. The stenosis was measured by a visual reading. Pressure wire was not required.. There is moderate plaque burden detected.   Right Coronary Artery: The vessel is moderate in size. Mid RCA lesion, 20% stenosed. Right Posterior Descending Artery: The vessel is small. Right Posterior Atrioventricular Artery: The vessel is small. First Right Posterolateral Branch: The vessel is small. Second Right Posterolateral Branch: The vessel is small. Third Right Posterolateral Branch: The vessel is small.     Intervention   Prox Cx lesion: Stent: A single stent was placed. Drug-eluting stent was successfully placed. The strut is well apposed. Post-Intervention Lesion Assessment: The intervention was successful. Intentional subintimal strategy was not used. Embolic protection device was not deployed. The guidewire was not threaded through a graft to reach the lesion. The guidewire crossed the lesion. Device was deployed. There is no pre-intervention MARION flow. Post-intervention MARION flow is 3. There were no complications. Pressure wire/FFR was not measured. Ultrasound (IVUS) was not performed. CFVR was not performed. No optical coherence tomography (OCT) was performed. There is a 0% residual stenosis post intervention.       Past Medical History:  Medical History[1]    Past Surgical History:  Surgical History[2]    Family History:  Family History[3]    Social History:  Social History     Socioeconomic History    Marital status: Unknown     Spouse name: Not on file    Number of children: Not on file    Years of education: Not on file    Highest education level: Not on file   Occupational History    Not on file   Tobacco Use    Smoking status: Former     Types: Cigarettes     Passive exposure: Never    Smokeless tobacco: Never   Substance and Sexual Activity    Alcohol use: Not on file    Drug use: Not on file    Sexual  activity: Not on file   Other Topics Concern    Not on file   Social History Narrative    Not on file     Social Drivers of Health     Financial Resource Strain: Low Risk  (6/2/2025)    Overall Financial Resource Strain (CARDIA)     Difficulty of Paying Living Expenses: Not hard at all   Food Insecurity: No Food Insecurity (6/2/2025)    Hunger Vital Sign     Worried About Running Out of Food in the Last Year: Never true     Ran Out of Food in the Last Year: Never true   Transportation Needs: No Transportation Needs (6/2/2025)    PRAPARE - Transportation     Lack of Transportation (Medical): No     Lack of Transportation (Non-Medical): No   Physical Activity: Inactive (6/2/2025)    Exercise Vital Sign     Days of Exercise per Week: 0 days     Minutes of Exercise per Session: 0 min   Stress: No Stress Concern Present (6/2/2025)    [employer] [place] [employer] - Occupational Stress Questionnaire     Feeling of Stress : Not at all   Social Connections: Socially Isolated (6/2/2025)    Social Connection and Isolation Panel [NHANES]     Frequency of Communication with Friends and Family: Three times a week     Frequency of Social Gatherings with Friends and Family: Once a week     Attends Sikh Services: Never     Active Member of Clubs or Organizations: No     Attends Club or Organization Meetings: Never     Marital Status:    Intimate Partner Violence: Patient Declined (6/2/2025)    Humiliation, Afraid, Rape, and Kick questionnaire     Fear of Current or Ex-Partner: Patient declined     Emotionally Abused: Patient declined     Physically Abused: Patient declined     Sexually Abused: Patient declined   Housing Stability: Low Risk  (6/2/2025)    Housing Stability Vital Sign     Unable to Pay for Housing in the Last Year: No     Number of Times Moved in the Last Year: 0     Homeless in the Last Year: No       Allergies:  RX Allergies[4]    Prior to Admission Meds:  Prescriptions Prior to  "Admission[5]    Current Medications:  Infusions:     Scheduled:  atorvastatin, 80 mg, Daily  [Held by provider] bumetanide, 2 mg, BID  [START ON 6/3/2025] citalopram, 40 mg, Daily  clopidogrel, 75 mg, Daily  empagliflozin, 10 mg, Daily  enoxaparin, 40 mg, q24h  fluticasone furoate-vilanteroL, 1 puff, Daily  gabapentin, 400 mg, q8h MELYSSA  insulin glargine, 7 Units, Nightly  insulin lispro, 0-5 Units, TID AC  [Held by provider] metoprolol succinate XL, 12.5 mg, Daily  midodrine, 5 mg, TID  rOPINIRole, 1 mg, TID  rOPINIRole, 3 mg, Nightly  sennosides-docusate sodium, 2 tablet, BID  spironolactone, 25 mg, Daily      PRN:  acetaminophen, 650 mg, q6h PRN  albuterol, 3 mL, q6h PRN  dextrose, 12.5 g, q15 min PRN  dextrose, 25 g, q15 min PRN  glucagon, 1 mg, q15 min PRN  glucagon, 1 mg, q15 min PRN  polyethylene glycol, 17 g, Daily PRN        PHYSICAL EXAM:   Visit Vitals  BP 73/59 (BP Location: Left arm, Patient Position: Sitting)   Pulse 86   Temp 37.1 °C (98.8 °F) (Temporal)   Resp 16   Ht 1.575 m (5' 2\")   Wt 54.4 kg (120 lb)   SpO2 96%   BMI 21.95 kg/m²   Smoking Status Former   BSA 1.54 m²       Wt Readings from Last 5 Encounters:   06/02/25 54.4 kg (120 lb)   05/30/25 56.2 kg (124 lb)       INTAKE/OUTPUT:  No intake/output data recorded.     Physical Exam  Constitutional:       General: She is not in acute distress.  HENT:      Mouth/Throat:      Mouth: Mucous membranes are dry.   Eyes:      Pupils: Pupils are equal, round, and reactive to light.   Cardiovascular:      Rate and Rhythm: Normal rate and regular rhythm.      Pulses: Normal pulses.      Heart sounds: Normal heart sounds.   Pulmonary:      Effort: Pulmonary effort is normal. No respiratory distress.   Abdominal:      General: Abdomen is flat. Bowel sounds are normal.      Palpations: Abdomen is soft.   Musculoskeletal:         General: No swelling.      Cervical back: Normal range of motion and neck supple.   Skin:     General: Skin is warm.      Capillary " "Refill: Capillary refill takes 2 to 3 seconds.      Comments: Cool hands and feet    Neurological:      General: No focal deficit present.      Mental Status: She is alert and oriented to person, place, and time.   Psychiatric:         Mood and Affect: Mood normal.         Behavior: Behavior normal.         Review of Systems    DATA:  CMP:No results for input(s): \"NA\", \"K\", \"CL\", \"CO2\", \"ANIONGAP\", \"BUN\", \"CREATININE\", \"EGFR\", \"MG\" in the last 09811 hours.  No results for input(s): \"ALBUMIN\", \"ALT\", \"AST\", \"BILITOT\", \"LIPASE\" in the last 40646 hours.    No lab exists for component: \"CA\"  CBC:No results for input(s): \"WBC\", \"HGB\", \"HCT\", \"PLT\", \"MCV\" in the last 26555 hours.  COAG: No results for input(s): \"PTT\", \"INR\", \"ARIXTRA\" in the last 61302 hours.  ABO: No results for input(s): \"ABO\" in the last 52640 hours.  HEME/ENDO:  Recent Labs     03/13/25  0531 03/12/25  1430   HGBA1C 12.3* 12.7*      CARDIAC: No results for input(s): \"LDH\", \"CKMB\", \"TROPHS\", \"BNP\" in the last 02998 hours.    No lab exists for component: \"CK\", \"CKMBP\"  No results for input(s): \"LACMX\", \"LACTATEART\", \"SO2MV\", \"O2CMX\" in the last 77350 hours.    No lab exists for component: \"S\"  No results for input(s): \"TACROLIMUS\", \"SIROLIMUS\", \"CYCLOSPORINE\" in the last 95769 hours.      ASSESSMENT AND PLAN:   Ms. Evans is a 60F with a PMHx sig for CAD s/p PCI (LAD; 2015, Lcx; 2025), stage C systolic HF/ICM/HFrEF s/p ICD, h/o VT with presumed associated syncope, poorly controlled DM, pAF (off of DOAC given the absence of recurrence), dyslipidemia, essential HTN, COPD, and h/o Graves with progressive HF symptoms and intolerant of GDMT - on midodrine who presents to HFICU as a direct admission for PAC guided evaluation.     Neuro:  #Restless legs  #Anxiety  #Depression  - C/w home celexa   - C/w home gabapentin  - C/w home requip   - Serial neuro and pain assessments   - PO Tylenol PRN for pain  - PT/OT Consult, OOB to chair  - CAM ICU score every " shift  - Sleep/wake cycle normalization     # Physical Status  - Not obese, BMI 22.68 kg/m2   - Reduced Mobility     #Substance abuse  -Alcohol dependence: rare use  -Tobacco dependence: previous smoker     Cardiovascular:  #HFrEF EF 30-35%, ICM, Stage C   #Hypotension, HLD   - TTE 3/2025 at OSH: LVEF 30-35%, normal RV size with low normal function, mild MR, small pericardial effusion  - Repeat TTE ordered  - Admit weight kg admit BNP pending   - Home medications: meto XL 12.5mg daily, spironolactone 25mg daily, jardiance 10mg daily, bumex 2mg bid, midodrine 5mg TID.  - Plan for PAC on arrival to HFICU  - C/w empa and sandi, holding BB for now   - C/w midodrine 5 mg TID   - Holding diuretics until we collect invasive hemodynamics   - Daily standing weights, 2gm sodium diet, 2L fluid restriction, strict I&Os    #CAD s/p PCI (LAD in 2015, LCx in 2025)  - C/w home asa 81mg daily, Plavix   - C/w home statin     #H/o VT  #Paroxysmal A Fib  - Device: s/p CRT-D 3/2025, [employer]   - Holding home BB until we get invasive hemodynamics   - AC: off DOAC given absence of recurrence    #Electrolyte Disturbances  -K goal >4, Mg>2    Pulmonary:   #COPD  #Bilateral pleural effusions (new)  - C/w home inhalers  - Consider consult to IR for tapping effusions   - Monitor and maintain SpO2 > 92%    GI:  #GERD  - C/w home ppi  - Bowel regimen: prn miralax, juliocesar-colace BID   - Diet: carb controlled    :  #No renal dysfunction at baseline  - Baseline Cr 0.5-0.9  - Admit BUN/Cr pending   - I/Os  - Avoid hypotension and nephrotoxic agents    Heme:  #No hematology pathology at baseline  - Labs: CBC, TIBC, ferritin, serum Fe, folate, B12   pending  - If %sat low, order venofer    Endo:  #DM, T2  - hgbA1c pending, last redorded 3 months ago was 12.6   - home medications; insulin - glargine, jardiance, nesina.   - ISS while inpatient, adjust as needed  - Endocrine consult, appreciate recs     #H/o Grave's disease   -TSH, Free T4      "ID:  - Afebrile, nontoxic   - No s/s infx  - Trend temps q4h    PHYSICAL AND OCCUPATIONAL THERAPY: ordered    LINES:  PIVs     DVT: lovenox   VAP BUNDLE: NA  CENTRAL LINE BUNDLE: NA  ULCER PPX: PPI  GLYCEMIC CONTROL: SSI/lantus - endocrine consult   BOWEL CARE: juliocesar-colace, miralax PRN   INDWELLING CATHETER: NA  NUTRITION: Adult diet Consistent Carb; CCD 75 gm/meal      EMERGENCY CONTACT: Extended Emergency Contact Information  Primary Emergency Contact: Daisy Velasco  Mobile Phone: [phone]  Relation: Sister  FAMILY UPDATE: no family at bedside  CODE STATUS: Full Code  DISPO: admit to HFICU    Patient seen and assessed with Dr. Kelly     I personally spent 60 minutes of critical care time directly and personally managing the patient exclusive of separately billable procedures   _________________________________________________  Cordelia Cramer, APRN-CNP         [1] History reviewed. No pertinent past medical history.  [2] History reviewed. No pertinent surgical history.  [3] No family history on file.  [4]   Allergies  Allergen Reactions    Bee Venom Protein (Honey Bee) Anaphylaxis    Codeine Other     \"Passed Out\"     \"i just black out\"    Doxycycline Hives    Prednisone Hives     Muscle cramps    Amoxicillin Hives and Rash     \"Felt warm and overheated all over\"   [5]   Medications Prior to Admission   Medication Sig Dispense Refill Last Dose/Taking    albuterol 2.5 mg /3 mL (0.083 %) nebulizer solution Take 3 mL by nebulization every 6 hours if needed for wheezing or shortness of breath.       albuterol 90 mcg/actuation inhaler Inhale 2 puffs every 6 hours if needed for wheezing or shortness of breath.       alogliptin (Nesina) 25 mg tablet Take 1 tablet (25 mg) by mouth once daily.       atorvastatin (Lipitor) 80 mg tablet Take 1 tablet (80 mg) by mouth once daily.       budesonide-formoterol (Symbicort) 80-4.5 mcg/actuation inhaler INHALE 2 PUFF BY INHALATION ROUTE 2 TIMES EVERY DAY IN THE MORNING " AND EVENING       bumetanide (Bumex) 2 mg tablet Take 1 tablet (2 mg) by mouth 2 times a day.       cefdinir (Omnicef) 300 mg capsule Take 1 capsule (300 mg) by mouth every 12 hours.       citalopram (CeleXA) 40 mg tablet Take 1 tablet (40 mg) by mouth early in the morning..       clopidogrel (Plavix) 75 mg tablet Take 1 tablet (75 mg) by mouth once daily.       empagliflozin (Jardiance) 10 mg tablet Take 1 tablet (10 mg) by mouth once daily.       fluconazole (Diflucan) 150 mg tablet Take 1 tablet (150 mg) by mouth early in the morning..       gabapentin (Neurontin) 400 mg capsule TAKE 1 CAPSULE BY MOUTH 3 TIMES EVERY DAY FOR DIABETIC NEUROPATHY       insulin glargine (Lantus Solostar U-100 Insulin) 100 unit/mL (3 mL) pen Inject 15 Units under the skin once daily at bedtime.       loperamide (Imodium) 2 mg capsule TAKE 2 TABLETS BY MOUTH AFTER 1ST LOOSE STOOL AND 1 TABLET AFTER EACH NEXT BOWEL MOVEMENT; DO NOT EXCEED 16 MG IN 24HRS AS NEEDED FOR DIARRHEA       metoprolol succinate XL (Toprol-XL) 25 mg 24 hr tablet Take 0.5 tablets (12.5 mg) by mouth.       midodrine (Proamatine) 5 mg tablet Take 1 tablet (5 mg) by mouth 3 times a day.       nicotine (Nicoderm CQ) 21 mg/24 hr patch APPLY 1 PATCH TO SKIN DAILY AND REMOVE AT BEDTIME       nitroglycerin (Nitrostat) 0.4 mg SL tablet Place 1 tablet (0.4 mg) under the tongue every 5 minutes if needed for chest pain.       ondansetron (Zofran) 4 mg tablet TAKE 1 TABLET BY MOUTH EVERY DAY AS NEEDED FOR NAUSEA       pantoprazole (ProtoNix) 20 mg EC tablet Take 1 tablet (20 mg) by mouth once daily.       rOPINIRole (Requip) 1 mg tablet Take 1 tablet (1 mg) by mouth 3 times a day.       rOPINIRole (Requip) 3 mg tablet take 1 tablet by oral route every day at bedtime       spironolactone (Aldactone) 25 mg tablet Take 1 tablet (25 mg) by mouth once daily.       sulfamethoxazole-trimethoprim (Bactrim DS) 800-160 mg tablet TAKE ONE (1) TABLET BY MOUTH EVERY TWELVE (12) HOURS  *ANTIBIOTIC*

## 2025-06-02 NOTE — PROGRESS NOTES
Pharmacy Medication History Review    Thao Evans is a 60 y.o. female admitted for ACC/AHA stage D systolic heart failure. Pharmacy reviewed the patient's [id number] medications and allergies for accuracy.    Medications ADDED  N/A  Medications CHANGED  Metoprolol succinate ER 25 mg added frequency once daily  Medications REMOVED/NOT TAKING   Cefdinir 300 mg  Fluconazole 150 mg  Loperamide 2 mg  Nicotine patch 21 mg/24 hr  Ondansetron 4 mg     The list below reflects the updated PTA list.   Prior to Admission Medications   Prescriptions Last Dose Informant   albuterol 2.5 mg /3 mL (0.083 %) nebulizer solution  Self   Sig: Take 3 mL by nebulization every 6 hours if needed for wheezing or shortness of breath.   albuterol 90 mcg/actuation inhaler  Self   Sig: Inhale 2 puffs every 6 hours if needed for wheezing or shortness of breath.   alogliptin (Nesina) 25 mg tablet 6/2/2025 Morning Self   Sig: Take 1 tablet (25 mg) by mouth once daily.   atorvastatin (Lipitor) 80 mg tablet 6/2/2025 Morning Self   Sig: Take 1 tablet (80 mg) by mouth once daily.   budesonide-formoterol (Symbicort) 80-4.5 mcg/actuation inhaler 6/2/2025 Self   Sig: INHALE 2 PUFF BY INHALATION ROUTE 2 TIMES EVERY DAY IN THE MORNING AND EVENING   bumetanide (Bumex) 2 mg tablet 6/2/2025 Morning Self   Sig: Take 1 tablet (2 mg) by mouth 2 times a day.   citalopram (CeleXA) 40 mg tablet 6/2/2025 Morning Self   Sig: Take 1 tablet (40 mg) by mouth early in the morning..   clopidogrel (Plavix) 75 mg tablet 6/2/2025 Morning Self   Sig: Take 1 tablet (75 mg) by mouth once daily.   empagliflozin (Jardiance) 10 mg tablet 6/2/2025 Morning Self   Sig: Take 1 tablet (10 mg) by mouth once daily.   gabapentin (Neurontin) 400 mg capsule 6/2/2025 Morning Self   Sig: TAKE 1 CAPSULE BY MOUTH 3 TIMES EVERY DAY FOR DIABETIC NEUROPATHY   insulin glargine (Lantus Solostar U-100 Insulin) 100 unit/mL (3 mL) pen 6/1/2025 Bedtime Self   Sig: Inject 15 Units under the skin  "once daily at bedtime.   metoprolol succinate XL (Toprol-XL) 25 mg 24 hr tablet 6/1/2025 Bedtime Self   Sig: Take 0.5 tablets (12.5 mg) by mouth once daily at bedtime.   midodrine (Proamatine) 5 mg tablet 6/2/2025 Morning Self   Sig: Take 1 tablet (5 mg) by mouth 3 times a day.   nitroglycerin (Nitrostat) 0.4 mg SL tablet  Self   Sig: Place 1 tablet (0.4 mg) under the tongue every 5 minutes if needed for chest pain.   pantoprazole (ProtoNix) 20 mg EC tablet 6/2/2025 Morning Self   Sig: Take 1 tablet (20 mg) by mouth once daily.   rOPINIRole (Requip) 1 mg tablet 6/2/2025 Self   Sig: Take 1 tablet (1 mg) by mouth 3 times a day.   rOPINIRole (Requip) 3 mg tablet 6/1/2025 Bedtime Self   Sig: take 1 tablet by oral route every day at bedtime   spironolactone (Aldactone) 25 mg tablet 6/2/2025 Morning Self   Sig: Take 1 tablet (25 mg) by mouth once daily.      Facility-Administered Medications: None       The list below reflects the updated allergy list. Please review each documented allergy for additional clarification and justification.  Allergies  Reviewed by James Conley RN on 6/2/2025        Severity Reactions Comments    Bee Venom Protein (honey Bee) High Anaphylaxis     Codeine Not Specified Other \"Passed Out\"  \"i just black out\"    Doxycycline Not Specified Hives     Prednisone Not Specified Hives Muscle cramps    Amoxicillin Low Hives, Rash \"Felt warm and overheated all over\"            Patient accepts M2B at discharge. Pharmacy has been updated to [facility].    Sources  [facility]  Pharmacy dispense history  Patient Interview Good historian  Care Everywhere   Nurse practitioner note [facility] 5/20/25    Additional Comments  N/A    Derek Matias, PharmD  [facility]  [id number] [address]  [facility], [address]  [place] [address]  Phone: [phone]  Please reach out via Secure Chat for questions, or if no response call [employer] or [employer]   "

## 2025-06-02 NOTE — PROCEDURES
Central Line    Date/Time: 6/2/2025 3:53 PM    Performed by: ODALYS Martinez  Authorized by: ODALYS Martinez    Consent:     Consent obtained:  Written    Consent given by:  Patient    Risks, benefits, and alternatives were discussed: yes      Risks discussed:  Arterial puncture, bleeding, incorrect placement, infection, nerve damage and pneumothorax    Alternatives discussed:  No treatment and delayed treatment  Universal protocol:     Procedure explained and questions answered to patient or proxy's satisfaction: yes      Relevant documents present and verified: yes      Test results available: yes      Imaging studies available: yes      Required blood products, implants, devices, and special equipment available: yes      Site/side marked: yes      Immediately prior to procedure, a time out was called: yes      Patient identity confirmed:  Verbally with patient, arm band, provided demographic data and hospital-assigned identification number  Pre-procedure details:     Indication(s): central venous access and hemodynamic monitoring      Hand hygiene: Hand hygiene performed prior to insertion      Sterile barrier technique: All elements of maximal sterile technique followed      Skin preparation:  Chlorhexidine    Skin preparation agent: Skin preparation agent completely dried prior to procedure    Sedation:     Sedation type:  None  Anesthesia:     Anesthesia method:  Local infiltration    Local anesthetic:  Lidocaine 1% w/o epi  Procedure details:     Location:  R internal jugular    Patient position:  Supine    Procedural supplies:  Cordis    Catheter size:  9 Fr    Landmarks identified: yes      Ultrasound guidance: yes      Ultrasound guidance timing: prior to insertion and real time      Sterile ultrasound techniques: Sterile gel and sterile probe covers were used      Number of attempts:  1    Successful placement: yes    Post-procedure details:     Post-procedure:  Dressing applied and  line sutured    Assessment:  Blood return through all ports and no pneumothorax on x-ray    Procedure completion:  Tolerated well, no immediate complications    A time out was preformed. The patients right neck region was prepped and draped in a sterile fashion using chlorhexidine scrub. Anesthia was achieved with 1% lidocaine. The right internal jugular vein was accessed using a micropuncture needle and sheath. Venous blood was withdrawn and the sheath was advanced into the vein and the needle was withdrawn. A larger guidewire was advanced through the sheath. A small incision was made with a 10 blade scalpel and the sheath was exchanged for a dilator with overlying [place] Islander #8 catheter over the guidewire until appropriate dilation was obtained. Dilator was flushed appropriately, taking care to first withdraw any free air in system. We manipulated a [place]-Femi catheter through the venous system, to the pulmonary capillary wedge position monitoring for appropriate RA and RV wave-forms. No ectopy was noted on the monitor. A Wedge pressure was obtained. We reviewed the data and felt that it was adequate. We cleaned and dressed the access site. Post procedure chest x-ray was ordered and will be reviewed for correct placement and signs of pneumothorax. Patient tolerated the procedure well.     [place] was locked at: 45 cm    Opening swan numbers:  BP:  97/55  MAP:  68  CVP:  9  PAP:  48/20 (31)  PAWP:  19   SVR:  1817  CI/CO:  1.87 / 2.86  SVO2:  62%  Medications: PO empagliflozin 10 mg daily; PO midodrine 5 mg TID; PO spironolactone 25 mg daily

## 2025-06-02 NOTE — CONSULTS
Inpatient consult to Endocrinology  Consult performed by: Luisa Dupree PA-C  Consult ordered by: MARLI High-CNP        Reason For Consult  a1c of 12.3 -> help with BS     History Of Present Illness  Ms. Evans is a 60F with a PMHx sig for CAD s/p PCI (LAD; 2015, Lcx; 2025), stage C systolic HF/ICM/HFrEF s/p ICD, h/o VT with presumed associated syncope, poorly controlled DM, pAF (off of DOAC given the absence of recurrence), dyslipidemia, essential HTN, COPD, and h/o Graves who was referred to the Advanced Heart Failure clinic for consultation.      Currently followed in the HF clinic at [facility] by Sally Glover. She presents today with her sister. She reports progressive symptoms over the last several months. Her most recent hospitalization was in March 2025 for syncope/VT where she underwent an ICD placement and PCI to her LCx.      She reports she is currently intolerant of GDMT and is on midodrine for BP support since her hospital discharge.      She has ongoing fatigue, dyspnea, and early satiety. She also reports progressive weight loss (~60 lbs over the last 6 months). As a result, she states her A1c has dropped from 12 to 8% as of this past week.      Diabetes History  Type of diabetes: DM2   Year diagnosed or age: 10-15 years ago  Hospitalizations for DKA or HHS: no  Complications: CAD  Seen by PCP or Endocrinology: PCP  Frequency of glucose checks: twice daily  Glucose review: per patient 150s-200s  Frequency of Hypoglycemia: rare, has not happened in months  Hypoglycemia unawareness: no  Severe hypoglycemia requiring assistance from others:  no    Home Medications  Basal: lantus 15u (of note pt thought this was lispro but reviewed home med rec- has only ever taken basal insulin outpt)  Orals: jardiance 10mg, alogliptin 25mg  Previous meds:  metformin and GLP1 stopped due to GI side effects       Past Medical History  She has a past medical history of CAD (coronary artery disease),  CHF (congestive heart failure), COPD (chronic obstructive pulmonary disease) (Multi), Graves disease, Hypotension, and PAF (paroxysmal atrial fibrillation) (Multi).    Surgical History  She has no past surgical history on file.     Social History  She reports that she has quit smoking. Her smoking use included cigarettes. She has never been exposed to tobacco smoke. She has never used smokeless tobacco. No history on file for alcohol use and drug use.    Family History  Family History[1]     Allergies  Bee venom protein (honey bee), Codeine, Doxycycline, Prednisone, and Amoxicillin    Review of Systems   Constitutional:  Positive for fatigue. Negative for activity change, appetite change, diaphoresis and unexpected weight change.   HENT:  Negative for congestion, sore throat and trouble swallowing.    Eyes:  Negative for pain, redness and visual disturbance.   Respiratory:  Negative for cough, chest tightness and shortness of breath.    Cardiovascular:  Negative for chest pain, palpitations and leg swelling.   Gastrointestinal:  Negative for abdominal pain, diarrhea, nausea and vomiting.   Endocrine: Negative for cold intolerance, heat intolerance, polydipsia, polyphagia and polyuria.   Genitourinary:  Negative for dysuria, frequency and urgency.   Musculoskeletal:  Negative for gait problem and joint swelling.   Skin:  Negative for pallor and rash.   Allergic/Immunologic: Negative for immunocompromised state.   Neurological:  Negative for dizziness, light-headedness, numbness and headaches.   Hematological:  Does not bruise/bleed easily.   Psychiatric/Behavioral:  Negative for agitation, behavioral problems and confusion.    All other systems reviewed and are negative.       Physical Exam  Vitals reviewed.   Constitutional:       General: She is not in acute distress.     Appearance: Normal appearance.   HENT:      Head: Normocephalic and atraumatic.      Nose: Nose normal.      Mouth/Throat:      Mouth: Mucous  "membranes are moist.   Eyes:      Extraocular Movements: Extraocular movements intact.      Conjunctiva/sclera: Conjunctivae normal.      Pupils: Pupils are equal, round, and reactive to light.   Cardiovascular:      Pulses: Normal pulses.   Pulmonary:      Effort: Pulmonary effort is normal. No respiratory distress.   Abdominal:      General: Abdomen is flat. There is no distension.   Musculoskeletal:         General: Normal range of motion.   Skin:     General: Skin is warm and dry.      Findings: No rash.   Neurological:      Mental Status: She is alert and oriented to person, place, and time.   Psychiatric:         Mood and Affect: Mood normal.         Behavior: Behavior normal.          ROS, PMH, FH/SH, surgical history and allergies have been reviewed.    Last Recorded Vitals  Blood pressure 73/59, pulse 86, temperature 37.1 °C (98.8 °F), temperature source Temporal, resp. rate 16, height 1.575 m (5' 2\"), weight 54.4 kg (120 lb), SpO2 96%.    Lab Review  No results found for: \"BILITOT\", \"CALCIUM\", \"CO2\", \"CL\", \"CREATININE\", \"GLUCOSE\", \"ALKPHOS\", \"K\", \"PROT\", \"NA\", \"AST\", \"ALT\", \"BUN\", \"ANIONGAP\", \"MG\", \"PHOS\", \"GGT\", \"LDH\", \"ALBUMIN\", \"AMYLASE\", \"LIPASE\", \"GFRF\", \"GFRMALE\"  No results found for: \"TRIG\", \"CHOL\", \"LDLCALC\", \"HDL\"  Lab Results   Component Value Date    HGBA1C 8.6 (H) 06/02/2025    HGBA1C 12.3 (H) 03/13/2025    HGBA1C 12.7 (H) 03/12/2025     The ASCVD Risk score (Shanta MENDOZA, et al., 2019) failed to calculate for the following reasons:    Risk score cannot be calculated because patient has a medical history suggesting prior/existing ASCVD    Scheduled medications  Scheduled Medications[2]  Continuous medications  Continuous Medications[3]  PRN medications  PRN Medications[4]       Assessment/Plan   Assessment & Plan  ACC/AHA stage D systolic heart failure    Acute on chronic systolic heart failure    Type 2 diabetes mellitus with hyperglycemia, with long-term current use of " insulin      PLAN  Steroids: none  Nutrition: 75g carb diet    - adjust glargine to 8u HS       If NPO: reduce to   - start lispro 3u with meals plus scale       If NPO or glucose < 90: hold  - continue lispro corrective scale #1 with meals, adjust to q4h if NPO or if persistently hyperglycemic   = 0u  151-200 = 1u  201-250 = 2u  251-300 = 3u  301-350 = 4u  351-400 = 5u    -Accuchecks (not BMP) TIDAC and QHS- kindly ensure QHS Accucheck is drawn; it is often missed   - Goal -180  -Hypoglycemia protocol  -Will continue to follow and titrate insulin accordingly    SGLT2i tx may not be utilized in inpt setting unless the following conditions are met. Only HF Cardiology may initiate inpatient SGLT2i use.   1) pt is eating and drinking by mouth with a total daily carb intake exceeding 60g of CHO  2) pt is urinating independently of urinary catheters  3) pt can ambulate freely to the restroom in order to maintain personal hygiene  4) no current concern for UTI/genital or inguinal candidiasis  5) no plans for NPO within the next 48-72hr      Discharge planning:   [] patient may expect to discharge home on glargine (final doses TBD by titration) and jardiance, STOP alogliptin with HF  []will provide CGM sample prior to discharge   []will enroll pt in  pharmacy platinum plan program  []follow up with PCP, anna love in diabetes [facility]         I spent 80 minutes in the professional and overall care of this patient.      Luisa Dupree PA-C         [1] No family history on file.  [2] atorvastatin, 80 mg, oral, Daily  [Held by provider] bumetanide, 2 mg, oral, BID  [START ON 6/3/2025] citalopram, 40 mg, oral, Daily  clopidogrel, 75 mg, oral, Daily  empagliflozin, 10 mg, oral, Daily  enoxaparin, 40 mg, subcutaneous, q24h  fluticasone furoate-vilanteroL, 1 puff, inhalation, Daily  gabapentin, 400 mg, oral, q8h MELYSSA  insulin glargine, 7 Units, subcutaneous, Nightly  insulin lispro, 0-5 Units, subcutaneous, TID  AC  lidocaine PF, , ,   [Held by provider] metoprolol succinate XL, 12.5 mg, oral, Daily  midodrine, 5 mg, oral, TID  rOPINIRole, 1 mg, oral, TID  rOPINIRole, 3 mg, oral, Nightly  sennosides-docusate sodium, 2 tablet, oral, BID  spironolactone, 25 mg, oral, Daily     [3]    [4] PRN medications: acetaminophen, albuterol, dextrose, dextrose, glucagon, glucagon, lidocaine PF, polyethylene glycol

## 2025-06-02 NOTE — SIGNIFICANT EVENT
06/02/25 1516   Pre-Procedure Checklist   Emergent Line Insertion No   Type of Line to be Placed Introducer   Consent Obtained Yes   Emergency Medication Necessary No   Patient Identified with 2 Independent Identifiers Yes   Review of Allergies, Anticoagulation, Relevant Labs, ECG/Telemetry Yes   Risks/Benefits/Alternatives Discussed with Patient/POA/Legal Representative Yes   Stop Sign on Door Yes   Time Out Performed Yes   Catheter Exchange No   Positioning and Preparation Checklist   All People, Including Patient, in the Room with Cap and Mask Yes   Fluoroscopy Used to Identify Vessel and Guide Insertion; Sterile Cover Used No   Ultrasound Used to Identify Vessel and Guide Insertion; Sterile Cover Used Yes   Full Barrier Precautions Followed (Mask, Cap, Gown, Gloves) Yes   Hands Washed Yes   Monitors Attached with Sound Alarms On Yes   Full Body Sterile Drape (Head-to-Toe) Used to Cover Patient Yes   [place] Position (For IJ and Subclavian) Yes   CHG Skin Prep Used and Allowed to Air Dry Prior to Skin Procedure Yes   Procedure Checklist   Blood Aspirated From All Lumens, All Ports Subsequently Flushed Yes   Catheter Caps Placed On All Lumens; Lumens Clamped Yes   Maintain Guidewire Control Throughout, Ensuring Guidewire Removal Yes   Maintain Sterile Field Throughout Insertion Yes   Catheter Secured Yes   Post-Procedure Checklist   Date and Time Written on Dressing Yes   Sharp and Wire Count and Safe Disposal of all Sharps/Wires Yes   Sterile Dressing Applied Per Protocol Yes   X-ray Ordered or ECG Image Yes

## 2025-06-02 NOTE — SIGNIFICANT EVENT
Opening swan numbers:    BP 97/55 (68), CVP 3, PAP 39/18 (27), CO/CI 2.86/1.87, SVR 1817, SVO2 62 % on midodrine 5 mg, empa 10, sandi 25

## 2025-06-03 ENCOUNTER — APPOINTMENT (OUTPATIENT)
Dept: CARDIOLOGY | Facility: HOSPITAL | Age: 60
DRG: 291 | End: 2025-06-03
Payer: COMMERCIAL

## 2025-06-03 ENCOUNTER — APPOINTMENT (OUTPATIENT)
Dept: RADIOLOGY | Facility: HOSPITAL | Age: 60
DRG: 291 | End: 2025-06-03
Payer: COMMERCIAL

## 2025-06-03 ENCOUNTER — DOCUMENTATION (OUTPATIENT)
Dept: TRANSPLANT | Facility: HOSPITAL | Age: 60
End: 2025-06-03
Payer: COMMERCIAL

## 2025-06-03 ENCOUNTER — HOSPITAL ENCOUNTER (INPATIENT)
Age: 60
End: 2025-06-03
Payer: COMMERCIAL

## 2025-06-03 DIAGNOSIS — Z01.818 PRE-TRANSPLANT EVALUATION FOR HEART TRANSPLANT: ICD-10-CM

## 2025-06-03 LAB
ALBUMIN SERPL BCP-MCNC: 3.2 G/DL (ref 3.4–5)
ALBUMIN SERPL BCP-MCNC: 3.4 G/DL (ref 3.4–5)
ANION GAP BLDMV CALCULATED.4IONS-SCNC: 4 MMO/L (ref 10–25)
ANION GAP BLDMV CALCULATED.4IONS-SCNC: 6 MMO/L (ref 10–25)
ANION GAP BLDMV CALCULATED.4IONS-SCNC: 6 MMO/L (ref 10–25)
ANION GAP BLDMV CALCULATED.4IONS-SCNC: 8 MMO/L (ref 10–25)
ANION GAP SERPL CALC-SCNC: 12 MMOL/L
ANION GAP SERPL CALC-SCNC: 12 MMOL/L (ref 10–20)
APPEARANCE UR: CLEAR
BASE EXCESS BLDMV CALC-SCNC: 2.8 MMOL/L (ref -2–3)
BASE EXCESS BLDMV CALC-SCNC: 5.6 MMOL/L (ref -2–3)
BASE EXCESS BLDMV CALC-SCNC: 6.6 MMOL/L (ref -2–3)
BASE EXCESS BLDMV CALC-SCNC: 9.2 MMOL/L (ref -2–3)
BASOPHILS # BLD AUTO: 0.06 X10*3/UL (ref 0–0.1)
BASOPHILS NFR BLD AUTO: 0.7 %
BILIRUB UR STRIP.AUTO-MCNC: NEGATIVE MG/DL
BODY TEMPERATURE: 37 DEGREES CELSIUS
BUN SERPL-MCNC: 15 MG/DL (ref 6–23)
BUN SERPL-MCNC: 17 MG/DL (ref 6–23)
CA-I BLDMV-SCNC: 1.07 MMOL/L (ref 1.1–1.33)
CA-I BLDMV-SCNC: 1.17 MMOL/L (ref 1.1–1.33)
CA-I BLDMV-SCNC: 1.19 MMOL/L (ref 1.1–1.33)
CA-I BLDMV-SCNC: 1.2 MMOL/L (ref 1.1–1.33)
CALCIUM SERPL-MCNC: 8.6 MG/DL (ref 8.6–10.6)
CALCIUM SERPL-MCNC: 8.7 MG/DL (ref 8.6–10.6)
CARDIOLIPIN IGA SERPL-ACNC: 1.3 APL U/ML
CARDIOLIPIN IGG SER IA-ACNC: <1.6 GPL U/ML
CARDIOLIPIN IGM SER IA-ACNC: 2 MPL U/ML
CHLORIDE BLD-SCNC: 95 MMOL/L (ref 98–107)
CHLORIDE BLD-SCNC: 96 MMOL/L (ref 98–107)
CHLORIDE BLD-SCNC: 97 MMOL/L (ref 98–107)
CHLORIDE BLD-SCNC: 99 MMOL/L (ref 98–107)
CHLORIDE SERPL-SCNC: 89 MMOL/L (ref 98–107)
CHLORIDE SERPL-SCNC: 92 MMOL/L (ref 98–107)
CMV IGG AVIDITY SERPL IA-RTO: REACTIVE %
CO2 SERPL-SCNC: 33 MMOL/L (ref 21–32)
CO2 SERPL-SCNC: 33 MMOL/L (ref 21–32)
COLOR UR: ABNORMAL
CREAT SERPL-MCNC: 0.76 MG/DL (ref 0.5–1.05)
CREAT SERPL-MCNC: 0.85 MG/DL (ref 0.5–1.05)
EBV EA IGG SER QL: NEGATIVE
EBV NA AB SER QL: POSITIVE
EBV VCA IGG SER IA-ACNC: POSITIVE
EBV VCA IGM SER IA-ACNC: NEGATIVE
EGFRCR SERPLBLD CKD-EPI 2021: 78 ML/MIN/1.73M*2
EGFRCR SERPLBLD CKD-EPI 2021: 89 ML/MIN/1.73M*2
EOSINOPHIL # BLD AUTO: 0.04 X10*3/UL (ref 0–0.7)
EOSINOPHIL NFR BLD AUTO: 0.5 %
ERYTHROCYTE [DISTWIDTH] IN BLOOD BY AUTOMATED COUNT: 13.2 % (ref 11.5–14.5)
GLUCOSE BLD MANUAL STRIP-MCNC: 189 MG/DL (ref 74–99)
GLUCOSE BLD MANUAL STRIP-MCNC: 236 MG/DL (ref 74–99)
GLUCOSE BLD MANUAL STRIP-MCNC: 244 MG/DL (ref 74–99)
GLUCOSE BLD MANUAL STRIP-MCNC: 270 MG/DL (ref 74–99)
GLUCOSE BLD-MCNC: 203 MG/DL (ref 74–99)
GLUCOSE BLD-MCNC: 213 MG/DL (ref 74–99)
GLUCOSE BLD-MCNC: 222 MG/DL (ref 74–99)
GLUCOSE BLD-MCNC: 253 MG/DL (ref 74–99)
GLUCOSE SERPL-MCNC: 261 MG/DL (ref 74–99)
GLUCOSE SERPL-MCNC: 277 MG/DL (ref 74–99)
GLUCOSE UR STRIP.AUTO-MCNC: ABNORMAL MG/DL
HAV AB SER QL IA: REACTIVE
HBV CORE AB SER QL: NONREACTIVE
HBV SURFACE AB SER-ACNC: 13.2 MIU/ML
HBV SURFACE AG SERPL QL IA: NONREACTIVE
HCO3 BLDMV-SCNC: 27.9 MMOL/L (ref 22–26)
HCO3 BLDMV-SCNC: 30.6 MMOL/L (ref 22–26)
HCO3 BLDMV-SCNC: 32.4 MMOL/L (ref 22–26)
HCO3 BLDMV-SCNC: 35.7 MMOL/L (ref 22–26)
HCT VFR BLD AUTO: 42.1 % (ref 36–46)
HCT VFR BLD EST: 38 % (ref 36–46)
HCT VFR BLD EST: 41 % (ref 36–46)
HCT VFR BLD EST: 41 % (ref 36–46)
HCT VFR BLD EST: 43 % (ref 36–46)
HCV AB SER QL: NONREACTIVE
HERPES SIMPLEX VIRUS 1 IGG: 3.2 INDEX
HERPES SIMPLEX VIRUS 2 IGG: >8 INDEX
HGB BLD-MCNC: 13.6 G/DL (ref 12–16)
HGB BLDMV-MCNC: 12.6 G/DL (ref 12–16)
HGB BLDMV-MCNC: 13.7 G/DL (ref 12–16)
HGB BLDMV-MCNC: 13.7 G/DL (ref 12–16)
HGB BLDMV-MCNC: 14.2 G/DL (ref 12–16)
HIV 1+2 AB+HIV1 P24 AG SERPL QL IA: NONREACTIVE
IGA SERPL-MCNC: 302 MG/DL (ref 70–400)
IGG SERPL-MCNC: 968 MG/DL (ref 700–1600)
IGG1 SER-MCNC: 839 MG/DL (ref 490–1140)
IGG2 SER-MCNC: 225 MG/DL (ref 150–640)
IGG3 SER-MCNC: 32 MG/DL (ref 11–85)
IGG4 SER-MCNC: 46 MG/DL (ref 3–200)
IGM SERPL-MCNC: 104 MG/DL (ref 40–230)
IMM GRANULOCYTES # BLD AUTO: 0.02 X10*3/UL (ref 0–0.7)
IMM GRANULOCYTES NFR BLD AUTO: 0.2 % (ref 0–0.9)
INHALED O2 CONCENTRATION: 21 %
INHALED O2 CONCENTRATION: 24 %
INHALED O2 CONCENTRATION: 28 %
INHALED O2 CONCENTRATION: 32 %
KETONES UR STRIP.AUTO-MCNC: NEGATIVE MG/DL
LACTATE BLDMV-SCNC: 0.5 MMOL/L (ref 0.4–2)
LACTATE BLDMV-SCNC: 0.6 MMOL/L (ref 0.4–2)
LACTATE BLDMV-SCNC: 0.7 MMOL/L (ref 0.4–2)
LACTATE BLDMV-SCNC: 0.7 MMOL/L (ref 0.4–2)
LDH SERPL L TO P-CCNC: 213 U/L (ref 84–246)
LEUKOCYTE ESTERASE UR QL STRIP.AUTO: ABNORMAL
LYMPHOCYTES # BLD AUTO: 0.74 X10*3/UL (ref 1.2–4.8)
LYMPHOCYTES NFR BLD AUTO: 9.1 %
MAGNESIUM SERPL-MCNC: 2.33 MG/DL (ref 1.6–2.4)
MCH RBC QN AUTO: 30 PG (ref 26–34)
MCHC RBC AUTO-ENTMCNC: 32.3 G/DL (ref 32–36)
MCV RBC AUTO: 93 FL (ref 80–100)
MEV IGG SER QL IA: POSITIVE
MONOCYTES # BLD AUTO: 0.76 X10*3/UL (ref 0.1–1)
MONOCYTES NFR BLD AUTO: 9.3 %
MUMPS IGG ANTIBODY INDEX: 1.7 IA
MUV IGG SER IA-ACNC: POSITIVE
NEUTROPHILS # BLD AUTO: 6.54 X10*3/UL (ref 1.2–7.7)
NEUTROPHILS NFR BLD AUTO: 80.2 %
NITRITE UR QL STRIP.AUTO: NEGATIVE
NRBC BLD-RTO: 0 /100 WBCS (ref 0–0)
OXYHGB MFR BLDMV: 53.2 % (ref 45–75)
OXYHGB MFR BLDMV: 59.8 % (ref 45–75)
OXYHGB MFR BLDMV: 63 % (ref 45–75)
OXYHGB MFR BLDMV: 66.2 % (ref 45–75)
PCO2 BLDMV: 44 MM HG (ref 41–51)
PCO2 BLDMV: 45 MM HG (ref 41–51)
PCO2 BLDMV: 50 MM HG (ref 41–51)
PCO2 BLDMV: 55 MM HG (ref 41–51)
PH BLDMV: 7.41 PH (ref 7.33–7.43)
PH BLDMV: 7.42 PH (ref 7.33–7.43)
PH BLDMV: 7.42 PH (ref 7.33–7.43)
PH BLDMV: 7.44 PH (ref 7.33–7.43)
PH UR STRIP.AUTO: 6.5 [PH]
PHOSPHATE SERPL-MCNC: 3.4 MG/DL (ref 2.5–4.9)
PHOSPHATE SERPL-MCNC: 4.1 MG/DL (ref 2.5–4.9)
PLATELET # BLD AUTO: 203 X10*3/UL (ref 150–450)
PO2 BLDMV: 36 MM HG (ref 35–45)
PO2 BLDMV: 40 MM HG (ref 35–45)
PO2 BLDMV: 42 MM HG (ref 35–45)
PO2 BLDMV: 42 MM HG (ref 35–45)
POTASSIUM BLDMV-SCNC: 3.9 MMOL/L (ref 3.5–5.3)
POTASSIUM BLDMV-SCNC: 4.6 MMOL/L (ref 3.5–5.3)
POTASSIUM BLDMV-SCNC: 4.9 MMOL/L (ref 3.5–5.3)
POTASSIUM BLDMV-SCNC: 5.5 MMOL/L (ref 3.5–5.3)
POTASSIUM SERPL-SCNC: 3.5 MMOL/L (ref 3.5–5.3)
POTASSIUM SERPL-SCNC: 5 MMOL/L (ref 3.5–5.3)
PREALB SERPL-MCNC: 13.6 MG/DL (ref 18–40)
PROT UR STRIP.AUTO-MCNC: NEGATIVE MG/DL
RBC # BLD AUTO: 4.53 X10*6/UL (ref 4–5.2)
RBC # UR STRIP.AUTO: ABNORMAL MG/DL
RBC #/AREA URNS AUTO: ABNORMAL /HPF
RUBEOLA IGG ANTIBODY INDEX: 3.8 IA
RUBV IGG SERPL IA-ACNC: 2.5 IA
RUBV IGG SERPL QL IA: POSITIVE
SAO2 % BLDMV: 55 % (ref 45–75)
SAO2 % BLDMV: 62 % (ref 45–75)
SAO2 % BLDMV: 66 % (ref 45–75)
SAO2 % BLDMV: 68 % (ref 45–75)
SODIUM BLDMV-SCNC: 129 MMOL/L (ref 136–145)
SODIUM BLDMV-SCNC: 129 MMOL/L (ref 136–145)
SODIUM BLDMV-SCNC: 130 MMOL/L (ref 136–145)
SODIUM BLDMV-SCNC: 131 MMOL/L (ref 136–145)
SODIUM SERPL-SCNC: 130 MMOL/L (ref 136–145)
SODIUM SERPL-SCNC: 132 MMOL/L (ref 136–145)
SP GR UR STRIP.AUTO: <1.005
SQUAMOUS #/AREA URNS AUTO: ABNORMAL /HPF
T GONDII IGG SER-ACNC: REACTIVE
T3 SERPL-MCNC: 87 NG/DL (ref 60–200)
THYROPEROXIDASE AB SERPL-ACNC: >1000 IU/ML
TREPONEMA PALLIDUM IGG+IGM AB [PRESENCE] IN SERUM OR PLASMA BY IMMUNOASSAY: REACTIVE
UROBILINOGEN UR STRIP.AUTO-MCNC: NORMAL MG/DL
VARICELLA ZOSTER IGG INDEX: 1.9 IA
VZV IGG SER QL IA: POSITIVE
WBC # BLD AUTO: 8.2 X10*3/UL (ref 4.4–11.3)
WBC #/AREA URNS AUTO: >50 /HPF
WBC CLUMPS #/AREA URNS AUTO: ABNORMAL /HPF

## 2025-06-03 PROCEDURE — 81001 URINALYSIS AUTO W/SCOPE: CPT | Performed by: STUDENT IN AN ORGANIZED HEALTH CARE EDUCATION/TRAINING PROGRAM

## 2025-06-03 PROCEDURE — 86780 TREPONEMA PALLIDUM: CPT | Performed by: STUDENT IN AN ORGANIZED HEALTH CARE EDUCATION/TRAINING PROGRAM

## 2025-06-03 PROCEDURE — 2020000001 HC ICU ROOM DAILY

## 2025-06-03 PROCEDURE — 87522 HEPATITIS C REVRS TRNSCRPJ: CPT | Performed by: STUDENT IN AN ORGANIZED HEALTH CARE EDUCATION/TRAINING PROGRAM

## 2025-06-03 PROCEDURE — 82784 ASSAY IGA/IGD/IGG/IGM EACH: CPT | Performed by: STUDENT IN AN ORGANIZED HEALTH CARE EDUCATION/TRAINING PROGRAM

## 2025-06-03 PROCEDURE — 37799 UNLISTED PX VASCULAR SURGERY: CPT | Performed by: STUDENT IN AN ORGANIZED HEALTH CARE EDUCATION/TRAINING PROGRAM

## 2025-06-03 PROCEDURE — 2500000001 HC RX 250 WO HCPCS SELF ADMINISTERED DRUGS (ALT 637 FOR MEDICARE OP)

## 2025-06-03 PROCEDURE — 97165 OT EVAL LOW COMPLEX 30 MIN: CPT | Mod: GO

## 2025-06-03 PROCEDURE — 99232 SBSQ HOSP IP/OBS MODERATE 35: CPT | Performed by: INTERNAL MEDICINE

## 2025-06-03 PROCEDURE — 2500000005 HC RX 250 GENERAL PHARMACY W/O HCPCS: Performed by: NURSE PRACTITIONER

## 2025-06-03 PROCEDURE — 81241 F5 GENE: CPT | Performed by: STUDENT IN AN ORGANIZED HEALTH CARE EDUCATION/TRAINING PROGRAM

## 2025-06-03 PROCEDURE — 87340 HEPATITIS B SURFACE AG IA: CPT | Performed by: STUDENT IN AN ORGANIZED HEALTH CARE EDUCATION/TRAINING PROGRAM

## 2025-06-03 PROCEDURE — 86777 TOXOPLASMA ANTIBODY: CPT | Performed by: STUDENT IN AN ORGANIZED HEALTH CARE EDUCATION/TRAINING PROGRAM

## 2025-06-03 PROCEDURE — 86762 RUBELLA ANTIBODY: CPT | Performed by: STUDENT IN AN ORGANIZED HEALTH CARE EDUCATION/TRAINING PROGRAM

## 2025-06-03 PROCEDURE — 80307 DRUG TEST PRSMV CHEM ANLYZR: CPT | Performed by: STUDENT IN AN ORGANIZED HEALTH CARE EDUCATION/TRAINING PROGRAM

## 2025-06-03 PROCEDURE — 86663 EPSTEIN-BARR ANTIBODY: CPT | Performed by: STUDENT IN AN ORGANIZED HEALTH CARE EDUCATION/TRAINING PROGRAM

## 2025-06-03 PROCEDURE — 82947 ASSAY GLUCOSE BLOOD QUANT: CPT

## 2025-06-03 PROCEDURE — 2500000002 HC RX 250 W HCPCS SELF ADMINISTERED DRUGS (ALT 637 FOR MEDICARE OP, ALT 636 FOR OP/ED)

## 2025-06-03 PROCEDURE — 86803 HEPATITIS C AB TEST: CPT | Performed by: STUDENT IN AN ORGANIZED HEALTH CARE EDUCATION/TRAINING PROGRAM

## 2025-06-03 PROCEDURE — 2500000004 HC RX 250 GENERAL PHARMACY W/ HCPCS (ALT 636 FOR OP/ED): Performed by: NURSE PRACTITIONER

## 2025-06-03 PROCEDURE — 86778 TOXOPLASMA ANTIBODY IGM: CPT | Performed by: STUDENT IN AN ORGANIZED HEALTH CARE EDUCATION/TRAINING PROGRAM

## 2025-06-03 PROCEDURE — 86695 HERPES SIMPLEX TYPE 1 TEST: CPT | Performed by: STUDENT IN AN ORGANIZED HEALTH CARE EDUCATION/TRAINING PROGRAM

## 2025-06-03 PROCEDURE — 87389 HIV-1 AG W/HIV-1&-2 AB AG IA: CPT | Performed by: STUDENT IN AN ORGANIZED HEALTH CARE EDUCATION/TRAINING PROGRAM

## 2025-06-03 PROCEDURE — 87086 URINE CULTURE/COLONY COUNT: CPT | Performed by: STUDENT IN AN ORGANIZED HEALTH CARE EDUCATION/TRAINING PROGRAM

## 2025-06-03 PROCEDURE — 86708 HEPATITIS A ANTIBODY: CPT | Performed by: STUDENT IN AN ORGANIZED HEALTH CARE EDUCATION/TRAINING PROGRAM

## 2025-06-03 PROCEDURE — 86318 IA INFECTIOUS AGENT ANTIBODY: CPT | Performed by: STUDENT IN AN ORGANIZED HEALTH CARE EDUCATION/TRAINING PROGRAM

## 2025-06-03 PROCEDURE — 2500000002 HC RX 250 W HCPCS SELF ADMINISTERED DRUGS (ALT 637 FOR MEDICARE OP, ALT 636 FOR OP/ED): Performed by: STUDENT IN AN ORGANIZED HEALTH CARE EDUCATION/TRAINING PROGRAM

## 2025-06-03 PROCEDURE — 86481 TB AG RESPONSE T-CELL SUSP: CPT | Performed by: STUDENT IN AN ORGANIZED HEALTH CARE EDUCATION/TRAINING PROGRAM

## 2025-06-03 PROCEDURE — 86704 HEP B CORE ANTIBODY TOTAL: CPT | Performed by: STUDENT IN AN ORGANIZED HEALTH CARE EDUCATION/TRAINING PROGRAM

## 2025-06-03 PROCEDURE — 84134 ASSAY OF PREALBUMIN: CPT | Performed by: STUDENT IN AN ORGANIZED HEALTH CARE EDUCATION/TRAINING PROGRAM

## 2025-06-03 PROCEDURE — G0452 MOLECULAR PATHOLOGY INTERPR: HCPCS | Performed by: STUDENT IN AN ORGANIZED HEALTH CARE EDUCATION/TRAINING PROGRAM

## 2025-06-03 PROCEDURE — 85305 CLOT INHIBIT PROT S TOTAL: CPT | Performed by: STUDENT IN AN ORGANIZED HEALTH CARE EDUCATION/TRAINING PROGRAM

## 2025-06-03 PROCEDURE — 86645 CMV ANTIBODY IGM: CPT | Performed by: STUDENT IN AN ORGANIZED HEALTH CARE EDUCATION/TRAINING PROGRAM

## 2025-06-03 PROCEDURE — 80323 ALKALOIDS NOS: CPT | Performed by: STUDENT IN AN ORGANIZED HEALTH CARE EDUCATION/TRAINING PROGRAM

## 2025-06-03 PROCEDURE — 85018 HEMOGLOBIN: CPT

## 2025-06-03 PROCEDURE — 2500000002 HC RX 250 W HCPCS SELF ADMINISTERED DRUGS (ALT 637 FOR MEDICARE OP, ALT 636 FOR OP/ED): Performed by: NURSE PRACTITIONER

## 2025-06-03 PROCEDURE — 86664 EPSTEIN-BARR NUCLEAR ANTIGEN: CPT | Performed by: STUDENT IN AN ORGANIZED HEALTH CARE EDUCATION/TRAINING PROGRAM

## 2025-06-03 PROCEDURE — 86787 VARICELLA-ZOSTER ANTIBODY: CPT | Performed by: STUDENT IN AN ORGANIZED HEALTH CARE EDUCATION/TRAINING PROGRAM

## 2025-06-03 PROCEDURE — 83735 ASSAY OF MAGNESIUM: CPT | Performed by: STUDENT IN AN ORGANIZED HEALTH CARE EDUCATION/TRAINING PROGRAM

## 2025-06-03 PROCEDURE — 37799 UNLISTED PX VASCULAR SURGERY: CPT

## 2025-06-03 PROCEDURE — 2500000004 HC RX 250 GENERAL PHARMACY W/ HCPCS (ALT 636 FOR OP/ED)

## 2025-06-03 PROCEDURE — 86147 CARDIOLIPIN ANTIBODY EA IG: CPT | Performed by: STUDENT IN AN ORGANIZED HEALTH CARE EDUCATION/TRAINING PROGRAM

## 2025-06-03 PROCEDURE — 86765 RUBEOLA ANTIBODY: CPT | Performed by: STUDENT IN AN ORGANIZED HEALTH CARE EDUCATION/TRAINING PROGRAM

## 2025-06-03 PROCEDURE — 71045 X-RAY EXAM CHEST 1 VIEW: CPT | Performed by: RADIOLOGY

## 2025-06-03 PROCEDURE — 81382 HLA II TYPING 1 LOC HR: CPT | Mod: OUT | Performed by: STUDENT IN AN ORGANIZED HEALTH CARE EDUCATION/TRAINING PROGRAM

## 2025-06-03 PROCEDURE — 71045 X-RAY EXAM CHEST 1 VIEW: CPT

## 2025-06-03 PROCEDURE — 85302 CLOT INHIBIT PROT C ANTIGEN: CPT | Performed by: STUDENT IN AN ORGANIZED HEALTH CARE EDUCATION/TRAINING PROGRAM

## 2025-06-03 PROCEDURE — 93005 ELECTROCARDIOGRAM TRACING: CPT

## 2025-06-03 PROCEDURE — 86735 MUMPS ANTIBODY: CPT | Performed by: STUDENT IN AN ORGANIZED HEALTH CARE EDUCATION/TRAINING PROGRAM

## 2025-06-03 PROCEDURE — 86644 CMV ANTIBODY: CPT | Performed by: STUDENT IN AN ORGANIZED HEALTH CARE EDUCATION/TRAINING PROGRAM

## 2025-06-03 PROCEDURE — 99291 CRITICAL CARE FIRST HOUR: CPT | Performed by: INTERNAL MEDICINE

## 2025-06-03 PROCEDURE — 84100 ASSAY OF PHOSPHORUS: CPT | Performed by: NURSE PRACTITIONER

## 2025-06-03 PROCEDURE — 86833 HLA CLASS II HIGH DEFIN QUAL: CPT | Mod: OUT | Performed by: STUDENT IN AN ORGANIZED HEALTH CARE EDUCATION/TRAINING PROGRAM

## 2025-06-03 PROCEDURE — 86706 HEP B SURFACE ANTIBODY: CPT | Performed by: STUDENT IN AN ORGANIZED HEALTH CARE EDUCATION/TRAINING PROGRAM

## 2025-06-03 PROCEDURE — 83615 LACTATE (LD) (LDH) ENZYME: CPT | Performed by: STUDENT IN AN ORGANIZED HEALTH CARE EDUCATION/TRAINING PROGRAM

## 2025-06-03 PROCEDURE — 2500000001 HC RX 250 WO HCPCS SELF ADMINISTERED DRUGS (ALT 637 FOR MEDICARE OP): Performed by: STUDENT IN AN ORGANIZED HEALTH CARE EDUCATION/TRAINING PROGRAM

## 2025-06-03 PROCEDURE — 86376 MICROSOMAL ANTIBODY EACH: CPT

## 2025-06-03 PROCEDURE — 85025 COMPLETE CBC W/AUTO DIFF WBC: CPT | Performed by: STUDENT IN AN ORGANIZED HEALTH CARE EDUCATION/TRAINING PROGRAM

## 2025-06-03 PROCEDURE — 86696 HERPES SIMPLEX TYPE 2 TEST: CPT | Performed by: STUDENT IN AN ORGANIZED HEALTH CARE EDUCATION/TRAINING PROGRAM

## 2025-06-03 RX ORDER — INSULIN GLARGINE 100 [IU]/ML
10 INJECTION, SOLUTION SUBCUTANEOUS NIGHTLY
Status: DISPENSED | OUTPATIENT
Start: 2025-06-03

## 2025-06-03 RX ORDER — LEVOTHYROXINE SODIUM 200 UG/1
200 TABLET ORAL DAILY
Status: ON HOLD | COMMUNITY

## 2025-06-03 RX ORDER — POTASSIUM CHLORIDE 29.8 MG/ML
40 INJECTION INTRAVENOUS ONCE
Status: COMPLETED | OUTPATIENT
Start: 2025-06-03 | End: 2025-06-03

## 2025-06-03 RX ORDER — HYDRALAZINE HYDROCHLORIDE 25 MG/1
25 TABLET, FILM COATED ORAL EVERY 8 HOURS SCHEDULED
Status: DISCONTINUED | OUTPATIENT
Start: 2025-06-03 | End: 2025-06-04

## 2025-06-03 RX ORDER — POTASSIUM CHLORIDE 20 MEQ/1
40 TABLET, EXTENDED RELEASE ORAL ONCE
Status: COMPLETED | OUTPATIENT
Start: 2025-06-03 | End: 2025-06-03

## 2025-06-03 RX ORDER — ISOSORBIDE DINITRATE 10 MG/1
5 TABLET ORAL
Status: DISCONTINUED | OUTPATIENT
Start: 2025-06-03 | End: 2025-06-03

## 2025-06-03 RX ORDER — HYDRALAZINE HYDROCHLORIDE 10 MG/1
10 TABLET, FILM COATED ORAL EVERY 8 HOURS SCHEDULED
Status: DISCONTINUED | OUTPATIENT
Start: 2025-06-03 | End: 2025-06-03

## 2025-06-03 RX ORDER — ISOSORBIDE DINITRATE 10 MG/1
10 TABLET ORAL
Status: DISCONTINUED | OUTPATIENT
Start: 2025-06-03 | End: 2025-06-04

## 2025-06-03 RX ADMIN — Medication 2 L/MIN: at 20:00

## 2025-06-03 RX ADMIN — GABAPENTIN 400 MG: 300 CAPSULE ORAL at 21:16

## 2025-06-03 RX ADMIN — HYDRALAZINE HYDROCHLORIDE 25 MG: 25 TABLET ORAL at 21:16

## 2025-06-03 RX ADMIN — INSULIN GLARGINE 10 UNITS: 100 INJECTION, SOLUTION SUBCUTANEOUS at 21:18

## 2025-06-03 RX ADMIN — ACETAMINOPHEN 650 MG: 325 TABLET ORAL at 05:29

## 2025-06-03 RX ADMIN — INSULIN LISPRO 1 UNITS: 100 INJECTION, SOLUTION INTRAVENOUS; SUBCUTANEOUS at 11:47

## 2025-06-03 RX ADMIN — INSULIN LISPRO 3 UNITS: 100 INJECTION, SOLUTION INTRAVENOUS; SUBCUTANEOUS at 08:00

## 2025-06-03 RX ADMIN — POTASSIUM CHLORIDE 40 MEQ: 1500 TABLET, EXTENDED RELEASE ORAL at 01:38

## 2025-06-03 RX ADMIN — SPIRONOLACTONE 25 MG: 25 TABLET, FILM COATED ORAL at 08:04

## 2025-06-03 RX ADMIN — CLOPIDOGREL BISULFATE 75 MG: 75 TABLET, FILM COATED ORAL at 08:00

## 2025-06-03 RX ADMIN — INSULIN LISPRO 3 UNITS: 100 INJECTION, SOLUTION INTRAVENOUS; SUBCUTANEOUS at 11:48

## 2025-06-03 RX ADMIN — ISOSORBIDE DINITRATE 10 MG: 10 TABLET ORAL at 18:25

## 2025-06-03 RX ADMIN — ISOSORBIDE DINITRATE 5 MG: 10 TABLET ORAL at 14:18

## 2025-06-03 RX ADMIN — ROPINIROLE HYDROCHLORIDE 1 MG: 3 TABLET, FILM COATED ORAL at 08:00

## 2025-06-03 RX ADMIN — HYDRALAZINE HYDROCHLORIDE 10 MG: 10 TABLET ORAL at 14:19

## 2025-06-03 RX ADMIN — ENOXAPARIN SODIUM 40 MG: 100 INJECTION SUBCUTANEOUS at 14:19

## 2025-06-03 RX ADMIN — POTASSIUM CHLORIDE 40 MEQ: 29.8 INJECTION, SOLUTION INTRAVENOUS at 01:40

## 2025-06-03 RX ADMIN — GABAPENTIN 400 MG: 300 CAPSULE ORAL at 05:30

## 2025-06-03 RX ADMIN — SODIUM NITROPRUSSIDE IN 0.9% SODIUM CHLORIDE 2 MCG/KG/MIN: 0.5 INJECTION INTRAVENOUS at 21:17

## 2025-06-03 RX ADMIN — ROPINIROLE HYDROCHLORIDE 1 MG: 3 TABLET, FILM COATED ORAL at 18:25

## 2025-06-03 RX ADMIN — ROPINIROLE HYDROCHLORIDE 1 MG: 3 TABLET, FILM COATED ORAL at 14:19

## 2025-06-03 RX ADMIN — GABAPENTIN 400 MG: 300 CAPSULE ORAL at 14:18

## 2025-06-03 RX ADMIN — EMPAGLIFLOZIN 10 MG: 10 TABLET, FILM COATED ORAL at 08:00

## 2025-06-03 RX ADMIN — SODIUM NITROPRUSSIDE IN 0.9% SODIUM CHLORIDE 1.5 MCG/KG/MIN: 0.5 INJECTION INTRAVENOUS at 10:40

## 2025-06-03 RX ADMIN — IRON SUCROSE 200 MG: 20 INJECTION, SOLUTION INTRAVENOUS at 06:27

## 2025-06-03 RX ADMIN — ISOSORBIDE DINITRATE 5 MG: 10 TABLET ORAL at 10:53

## 2025-06-03 RX ADMIN — SODIUM CHLORIDE, SODIUM LACTATE, POTASSIUM CHLORIDE, AND CALCIUM CHLORIDE 500 ML: 600; 310; 30; 20 INJECTION, SOLUTION INTRAVENOUS at 00:16

## 2025-06-03 RX ADMIN — ROPINIROLE HYDROCHLORIDE 3 MG: 3 TABLET, FILM COATED ORAL at 21:17

## 2025-06-03 RX ADMIN — CITALOPRAM HYDROBROMIDE 40 MG: 40 TABLET ORAL at 05:30

## 2025-06-03 RX ADMIN — INSULIN LISPRO 2 UNITS: 100 INJECTION, SOLUTION INTRAVENOUS; SUBCUTANEOUS at 08:01

## 2025-06-03 RX ADMIN — INSULIN LISPRO 3 UNITS: 100 INJECTION, SOLUTION INTRAVENOUS; SUBCUTANEOUS at 17:16

## 2025-06-03 RX ADMIN — INSULIN LISPRO 2 UNITS: 100 INJECTION, SOLUTION INTRAVENOUS; SUBCUTANEOUS at 17:15

## 2025-06-03 RX ADMIN — ATORVASTATIN CALCIUM 80 MG: 80 TABLET, FILM COATED ORAL at 08:00

## 2025-06-03 ASSESSMENT — PAIN SCALES - GENERAL
PAINLEVEL_OUTOF10: 0 - NO PAIN
PAINLEVEL_OUTOF10: 6
PAINLEVEL_OUTOF10: 0 - NO PAIN

## 2025-06-03 ASSESSMENT — PAIN - FUNCTIONAL ASSESSMENT
PAIN_FUNCTIONAL_ASSESSMENT: 0-10

## 2025-06-03 ASSESSMENT — ACTIVITIES OF DAILY LIVING (ADL)
ADL_ASSISTANCE: INDEPENDENT
BATHING_ASSISTANCE: STAND BY

## 2025-06-03 ASSESSMENT — COGNITIVE AND FUNCTIONAL STATUS - GENERAL
HELP NEEDED FOR BATHING: A LITTLE
DRESSING REGULAR LOWER BODY CLOTHING: A LITTLE
DAILY ACTIVITIY SCORE: 22

## 2025-06-03 ASSESSMENT — PAIN DESCRIPTION - LOCATION: LOCATION: NECK

## 2025-06-03 ASSESSMENT — PAIN DESCRIPTION - ORIENTATION: ORIENTATION: RIGHT

## 2025-06-03 NOTE — PROGRESS NOTES
Topic: Pre Advanced Therapy Patient Education   Attendees: Zoya Harrison RN, Thao Evans (patient), Babatunde Mclean (virtual telephone witness)    Patient given the [facility] Pre Advanced Therapy Education binder. Patient received education regarding the following topics for their pre Heart Transplant and pre Left Ventricular Assist Device evaluation:    The evaluation process including advanced therapy team members and roles, required testing and consults, selection criteria and suitability for OHT and LVAD, OHT listing process and organ offer, hands on LVAD equipment review, psychological and financial considerations for a successful outcome with advanced therapies, and patient responsibilities including adherence to a strict medical regimen.    An overview of the surgical procedure for OHT and LVAD.    The potential for certain risk factors including infection, pneumonia, blood clot formation, organ rejection, failure, and possibility of re transplantation, lifetime immunosuppression and associated risks with OHT, arrhythmias and cardiovascular collapse, multi-organ system failure, depression, post- traumatic stress disorder, anxiety, issues of dependence or feelings of guilt, right ventricular failure with LVAD, and death.   National and Transplant [place] outcomes for one year patient and graft survival from the most recent SRTR program specific report.    Donor risk factors that could affect the success of the transplant and health of the patient including donor age, donor medical and social history, condition of the organ, and the risk of horacio cancer, HIV, Hepatitis B or C, and/or malaria if the infection is not detectable at the time of donation.    Transplants not performed in a Medicare-approved transplant center could affect the patients ability to have immunosuppression medication paid for under Medicare part B.    STS Intermacs and  LVAD implant outcomes for one year patient  survival.    The medical necessity of electricity and a working telephone with implantation of the LVAD.    Available alternatives to OHT and LVAD.    The patient's right to withdraw consent for the evaluations at any time during the process.     Patient was given the opportunity to have questions answered.   Consent signed for Heart Transplant and LVAD?: Yes  If no, which consent was signed?:  Current barriers:   Consent obtained by: Zoya Harrison RN

## 2025-06-03 NOTE — CONSULTS
"Nutrition Initial Assessment:   Nutrition Assessment    Reason for Assessment: Admission nursing screening    Patient is a 60 y.o. female presenting with worsening HF symptoms including fatigue, dyspnea, early satiety.  Currently has a swan in place and is on a nitroprusside drip.     Past medical history includes CAD s/p PCI (LAD; 2015, Lcx; 2025), stage C systolic HF/ICM/HFrEF s/p ICD, h/o VT with presumed associated syncope, poorly controlled DM, pAF (off of DOAC given the absence of recurrence), dyslipidemia, essential HTN, COPD, and h/o Graves       Nutrition History:  Food and Nutrient History: Met with patient.  She reports a poor appetite and intake for a few months PTA.  Says she has lost close to 40# because of this.  Says she has not had an appetite for food and she may take a few bites of things but gets full fast.  Denies GI issues like N/V/D or constipation.  No trouble chewing or swallowing.  Offered patient Ensure/Boost while admitted but she declines for right now.  Discussed that if her intake has not improved at next RDN visit, she will need to consider starting a protein supplement and she understands.       Anthropometrics:  Height: 157.5 cm (5' 2\")   Weight: 54.4 kg (120 lb)      IBW/kg (Dietitian Calculated): 50 kg  Percent of IBW: 109 %     Weight History:     Wt Readings from Last 5 Encounters:   06/02/25 54.4 kg (120 lb)   05/30/25 56.2 kg (124 lb)     Pt reports a usual body weight of 72.7kg a few months ago.  This would be a loss of 25%.     Weight Change %:       Nutrition Focused Physical Exam Findings:    Subcutaneous Fat Loss:   Orbital Fat Pads: Mild-Moderate (slight dark circles and slight hollowing)  Buccal Fat Pads: Mild-Moderate (flat cheeks, minimal bounce)  Triceps: Mild-Moderate (less than ample fat tissue)  Muscle Wasting:  Temporalis: Mild-Moderate (slight depression)  Pectoralis (Clavicular Region): Mild-Moderate (some protrusion of clavicle)  Deltoid/Trapezius: " "Mild-Moderate (slight protrusion of acromion process)  Quadriceps: Severe (depressions on inner and outer thigh)  Gastrocnemius: Severe (minimal muscle definition)  Edema:  Edema: none  Physical Findings:  Skin: Negative    Nutrition Significant Labs:  A1C:  Lab Results   Component Value Date    HGBA1C 8.6 (H) 06/02/2025   , BG POCT trend:   Results from last 7 days   Lab Units 06/03/25  1101 06/03/25  0739 06/02/25  1938 06/02/25  1450   POCT GLUCOSE mg/dL 189* 244* 308* 187*    , Renal Lab Trend:   Results from last 7 days   Lab Units 06/03/25  0842 06/02/25  2349 06/02/25  1346   POTASSIUM mmol/L 5.0 3.5 2.8*   PHOSPHORUS mg/dL 3.4 4.1 5.0*   SODIUM mmol/L 132* 130* 133*   MAGNESIUM mg/dL 2.33  --  1.86   EGFR mL/min/1.73m*2 78 89 82   BUN mg/dL 15 17 15   CREATININE mg/dL 0.85 0.76 0.81    , Vit D: No results found for: \"VITD25\"     Nutrition Specific Medications:  Scheduled medications  atorvastatin, 80 mg, oral, Daily  [Held by provider] bumetanide, 2 mg, oral, BID  citalopram, 40 mg, oral, Daily  clopidogrel, 75 mg, oral, Daily  empagliflozin, 10 mg, oral, Daily  enoxaparin, 40 mg, subcutaneous, q24h  fluticasone furoate-vilanteroL, 1 puff, inhalation, Daily  gabapentin, 400 mg, oral, q8h MELYSAS  hydrALAZINE, 10 mg, oral, q8h MELYSSA  insulin glargine, 10 Units, subcutaneous, Nightly  insulin lispro, 0-5 Units, subcutaneous, TID AC  insulin lispro, 3 Units, subcutaneous, TID AC  iron sucrose, 200 mg, intravenous, Daily  isosorbide dinitrate, 5 mg, oral, TID  [Held by provider] metoprolol succinate XL, 12.5 mg, oral, Daily  [Held by provider] midodrine, 5 mg, oral, TID  rOPINIRole, 1 mg, oral, TID  rOPINIRole, 3 mg, oral, Nightly  sennosides-docusate sodium, 2 tablet, oral, BID  spironolactone, 25 mg, oral, Daily      Continuous medications  lactated Ringer's, 10 mL/hr, Last Rate: 10 mL/hr (06/03/25 1243)  nitroprusside, 0.25-5 mcg/kg/min, Last Rate: 1.5 mcg/kg/min (06/03/25 1243)      PRN medications  PRN " Medications[1]     I/O:   Last BM Date: 06/03/25;          Dietary Orders (From admission, onward)       Start     Ordered    06/02/25 1422  Adult diet Consistent Carb; CCD 75 gm/meal  Diet effective now        Question Answer Comment   Diet type Consistent Carb    Carb diet selection: CCD 75 gm/meal        06/02/25 1421    06/02/25 1328  May Participate in Room Service  ( ROOM SERVICE MAY PARTICIPATE)  Once        Question:  .  Answer:  Yes    06/02/25 1327                     Estimated Needs:   Total Energy Estimated Needs in 24 hours (kCal):  (3852-9876)  Method for Estimating Needs: MSJ= 1255  Total Protein Estimated Needs in 24 Hours (g):  (65-75)  Method for Estimating 24 Hour Protein Needs: 50kg x 1.3-1.5            Nutrition Diagnosis   Malnutrition Diagnosis  Patient has Malnutrition Diagnosis: Yes  Diagnosis Status: New  Malnutrition Diagnosis: Severe malnutrition related to chronic disease or condition  As Evidenced by: pt reports a poor appetite and intake for the last few months with a noted 20% weight loss from her usual body weight; she has areas of moderate to severe fat and muscle loss on physical exam          Nutrition Interventions/Recommendations         Nutrition Prescription:   Continue PO diet as tolerated  RDN to reassess at follow-up if pt is interested in supplements.  Check Vitamin D level.          Nutrition Interventions:    None at this time.        Nutrition Education:   Not appropriate        Nutrition Monitoring and Evaluation   Food/Nutrient Related History Monitoring  Monitoring and Evaluation Plan: Estimated Energy Intake  Estimated Energy Intake: Energy intake 50 -75% of estimated energy needs           Time Spent (min): 60 minutes                 [1] PRN medications: acetaminophen, albuterol, dextrose, dextrose, glucagon, glucagon, oxygen, polyethylene glycol

## 2025-06-03 NOTE — PROGRESS NOTES
Per EV Debra Standard Silver JOSE Plan a/e 02/01/25 no ded 75% 2000.00 oop max. Faxed OP heart eval request to Debra at [phone].

## 2025-06-03 NOTE — PROGRESS NOTES
Occupational Therapy    Evaluation    Patient Name: Thao Evans  MRN: [id number]  Today's Date: 6/3/2025  Room: 07/07  Time Calculation  Start Time: 1036  Stop Time: 1048  Time Calculation (min): 12 min    Assessment  IP OT Assessment  OT Assessment: Overall decreased strength and endurance limiting occupational performance.  Prognosis: Good  Barriers to Discharge Home: No anticipated barriers  Evaluation/Treatment Tolerance: Patient tolerated treatment well  Medical Staff Made Aware: Yes  End of Session Communication: Bedside nurse  End of Session Patient Position: Up in chair, Alarm off, not on at start of session  Plan:  Inpatient Plan  Treatment Interventions: ADL retraining, Functional transfer training, Neuromuscular reeducation, Compensatory technique education  OT Frequency: 1 time per week  OT Discharge Recommendations: No OT needed after discharge  Equipment Recommended upon Discharge:  (None)  OT Recommended Transfer Status: Stand by assist  OT - OK to Discharge: Yes  OT Assessment  OT Assessment Results: Decreased ADL status, Decreased endurance, Decreased functional mobility  Prognosis: Good  Evaluation/Treatment Tolerance: Patient tolerated treatment well  Medical Staff Made Aware: Yes    Subjective   Current Problem:  1. Acute on chronic systolic heart failure        2. Ischemic cardiomyopathy  Transthoracic echo (TTE) complete    Transthoracic echo (TTE) complete    Cardiac device check - Inpatient    Cardiac device check - Inpatient      3. ACC/AHA stage D systolic heart failure  Transthoracic echo (TTE) complete    Transthoracic echo (TTE) complete        General:  Reason for Referral: 61 y/o female p/w worsening symptoms and intolerance of GDMT, she was offered direct admission to HFICU with plan for PAC guided evaluation.  Past Medical History Relevant to Rehab: CAD s/p PCI (LAD; 2015, Lcx; 2025), stage C systolic HF/ICM/HFrEF s/p ICD, h/o VT with presumed associated syncope, poorly controlled  DM, pAF (off of DOAC given the absence of recurrence), dyslipidemia, essential HTN, COPD, and h/o Graves  Prior to Session Communication: Bedside nurse  Patient Position Received: Bed, 3 rail up, Alarm off, not on at start of session  Family/Caregiver Present: No  General Comment: Pt is pleasant and cooperative. She puts forth good effort towards task completion.   Precautions:  Medical Precautions: Cardiac precautions, Fall precautions  Vital Signs:   06/03/25 1036 06/03/25 1048   Vital Signs   Vitals Session Pre OT Post OT   Heart Rate 91 90   Resp 23 (!) 27   SpO2 93 % 93 %   /60 104/63   MAP (mmHg) 76 76   BP Method Automatic Automatic   Patient Position Sitting Sitting     Vitals stable during session      Pain:  Pain Assessment  Pain Assessment: 0-10  0-10 (Numeric) Pain Score: 0 - No pain  Lines/Tubes/Drains:  Introducer 06/02/25 Internal jugular Right (Active)   Number of days: 1       Pulmonary Artery Catheter Internal jugular (Active)   Number of days: 0         Objective   Cognition:  Overall Cognitive Status: Within Functional Limits  Orientation Level: Oriented X4     Confusion Assessment Method (CAM)  Acute Onset and Fluctuating Course (1A): No  Saldaña Agitation Sedation Scale  Saldaña Agitation Sedation Scale (RASS): Alert and calm  Home Living:  Type of Home: House  Lives With:  (Adult son (with special needs))  Home Adaptive Equipment: None  Home Layout: One level  Home Access: No concerns, Level entry  Bathroom Shower/Tub: Tub/shower unit  Bathroom Equipment: Grab bars in shower, Shower chair with back  Home Living Comments: Pt provides full care for w/c bound son with special needs   Prior Function:  Level of [place]: Independent with ADLs and functional transfers, Independent with homemaking with ambulation  Receives Help From: Family  ADL Assistance: Independent  Homemaking Assistance: Independent  Ambulatory Assistance: Independent  Prior Function Comments: (+)drives, denies  falls  IADL History:     ADL:  Grooming Assistance: Independent  Bathing Assistance: Stand by  UE Dressing Assistance: Independent  LE Dressing Assistance: Stand by  Toileting Assistance with Device: Stand by  Activity Tolerance:  Endurance: Decreased tolerance for upright activites  Early Mobility/Exercise Safety Screen: Proceed with mobilization - No exclusion criteria met  Balance:  Static Sitting Balance  Static Sitting-Level of Assistance: Independent  Static Standing Balance  Static Standing-Level of Assistance: Close supervision  Bed Mobility/Transfers: Bed Mobility  Bed Mobility: No  Functional Mobility  Functional Mobility Performed: Yes  Functional Mobility 1  Device 1: No device  Assistance 1: Close supervision  Comments 1: Pt ambulated bed<>bathroom. No LOB or instability.   and Transfers  Transfer: Yes  Transfer 1  Transfer From 1: Sit to  Transfer to 1: Stand  Technique 1: Sit to stand, Stand to sit  Transfer Level of Assistance 1: Close supervision  Transfers 2  Transfer From 2: Bed to  Transfer to 2: Chair with arms  Technique 2: Stand pivot  Transfer Level of Assistance 2: Close supervision  IADL's:      Vision:     and Vision - Complex Assessment  Ocular Range of Motion: Within Functional Limits  Sensation:  Light Touch: No apparent deficits  Strength:  Strength Comments: BUE strength overall WFL  Perception:  Inattention/Neglect: Appears intact  Coordination:  Movements are Fluid and Coordinated: Yes   Hand Function:  Hand Function  Gross Grasp: Functional  Coordination: Functional  Extremities:  ,  ,  , and      Outcome Measures: [facility] Daily Activity  Putting on and taking off regular lower body clothing: A little  Bathing (including washing, rinsing, drying): A little  Putting on and taking off regular upper body clothing: None  Toileting, which includes using toilet, bedpan or urinal: None  Taking care of personal grooming such as brushing teeth: None  Eating Meals: None  Daily Activity - Total  Score: 22    Confusion Assessment Method-ICU (CAM-ICU)  Feature 1: Acute Onset or Fluctuating Course: Negative  Feature 3: Altered Level of Consciousness: Negative  Overall CAM-ICU: Negative   ICU Mobility Screen  Early Mobility/Exercise Safety Screen: Proceed with mobilization - No exclusion criteria met  ICU Mobility Scale: Walking independently with a gait aid,   Saldaña Agitation Sedation Scale  Saldaña Agitation Sedation Scale (RASS): Alert and calm      Education Documentation  Body Mechanics, taught by Shonna Ram OT at 6/3/2025  1:17 PM.  Learner: Patient  Readiness: Acceptance  Method: Explanation  Response: Verbalizes Understanding    Precautions, taught by Shonna Ram OT at 6/3/2025  1:17 PM.  Learner: Patient  Readiness: Acceptance  Method: Explanation  Response: Verbalizes Understanding    ADL Training, taught by Shonna Ram OT at 6/3/2025  1:17 PM.  Learner: Patient  Readiness: Acceptance  Method: Explanation  Response: Verbalizes Understanding    Education Comments  No comments found.        Goals:     Encounter Problems       Encounter Problems (Active)       ADLs       Patient will complete toileting, including clothing management and hygiene, with MOD I in order to maximize functional Indep with task completion.        Start:  06/03/25    Expected End:  06/17/25            Patient will complete lower body dressing with MOD I  for donning and doffing all LE clothes in order to increase Indep with task participation.        Start:  06/03/25    Expected End:  06/17/25               COGNITION/SAFETY       Pt will identify and incorporate 3 EC/WS strategies into daily routines for increased safety and Indep.        Start:  06/03/25    Expected End:  06/17/25               EXERCISE/STRENGTHENING       Pt will increase endurance to tolerate 15-20min of activity with no more than 1 rest break in order to increase ability to engage in ADL completion.        Start:  06/03/25    Expected End:   06/17/25               MOBILITY       Pt will demo increased functional mobility to tolerate tasks necessary to complete ADL routine with MOD I.       Start:  06/03/25    Expected End:  06/17/25 06/03/25 at 1:18 PM   Shonna Ram OT   Rehab Office: [phone]

## 2025-06-03 NOTE — PROGRESS NOTES
HFICU Attending Note    Assessment & Plan  ACC/AHA stage D systolic heart failure    Acute on chronic systolic heart failure    Type 2 diabetes mellitus with hyperglycemia, with long-term current use of insulin      60F with a PMHx sig for CAD s/p PCI (LAD; 2015, Lcx; 2025), stage C systolic HF/ICM/HFrEF s/p ICD, h/o VT with presumed associated syncope, poorly controlled DM, pAF (off of DOAC given the absence of recurrence), dyslipidemia, essential HTN, COPD, and h/o Graves with progressive HF symptoms and intolerant of GDMT - on midodrine who presents to HFICU as a direct admission for PAC guided evaluation     This critically ill patient continues to be at-risk for clinically significant deterioration / failure due to the above mentioned dysfunctional, unstable organ systems.  I have personally identified and managed all complex critical care issues to prevent aforementioned clinical deterioration.  Critical care time is spent at bedside and/or the immediate area and has included, but is not limited to, the review of diagnostic tests, labs, radiographs, serial assessments of hemodynamics, respiratory status, ventilatory management, and family updates.  Time spent in procedures and teaching are reported separately.    Critical care time: __35__ minutes

## 2025-06-03 NOTE — PROGRESS NOTES
[facility]  HFICU PROGRESS NOTE    Thao Evans/[id number]    Admit Date: 6/2/2025  Hospital Length of Stay: 1   ICU Length of Stay: 22h   Primary Service: HFICU  Primary HF Cardiologist: Dr. Deluca  Referring: Dr. Glover ([place])    INTERVAL EVENTS / PERTINENT ROS:   Started on nipride OVN and given 500cc of IVF with low CVP. Feels well this AM. Denies any chest discomfort, SOB. Denies any orthopnea OVN as well. Denies pre-syncopal symptoms. No other acute complaints.     Plan:  - Wean nipride gtt, start po afterload reduction with isordil/hydral  - Open advanced therapies evaluation, email sent.     MEDICATIONS  Infusions:  lactated Ringer's, Last Rate: 10 mL/hr (06/03/25 1041)  nitroprusside, Last Rate: 1.5 mcg/kg/min (06/03/25 1040)      Scheduled:  atorvastatin, 80 mg, Daily  [Held by provider] bumetanide, 2 mg, BID  citalopram, 40 mg, Daily  clopidogrel, 75 mg, Daily  empagliflozin, 10 mg, Daily  enoxaparin, 40 mg, q24h  fluticasone furoate-vilanteroL, 1 puff, Daily  gabapentin, 400 mg, q8h MELYSSA  hydrALAZINE, 10 mg, q8h MELYSSA  insulin glargine, 10 Units, Nightly  insulin lispro, 0-5 Units, TID AC  insulin lispro, 3 Units, TID AC  iron sucrose, 200 mg, Daily  isosorbide dinitrate, 5 mg, TID  [Held by provider] metoprolol succinate XL, 12.5 mg, Daily  [Held by provider] midodrine, 5 mg, TID  rOPINIRole, 1 mg, TID  rOPINIRole, 3 mg, Nightly  sennosides-docusate sodium, 2 tablet, BID  spironolactone, 25 mg, Daily      PRN:  acetaminophen, 650 mg, q6h PRN  albuterol, 3 mL, q6h PRN  dextrose, 12.5 g, q15 min PRN  dextrose, 25 g, q15 min PRN  glucagon, 1 mg, q15 min PRN  glucagon, 1 mg, q15 min PRN  oxygen, , Continuous PRN - O2/gases  polyethylene glycol, 17 g, Daily PRN      Invasive Hemodynamics:    Most Recent Range Past 24hrs   BP (Art)   No data recorded   MAP(Art)   No data recorded   RA/CVP   No data recorded   PA 65/36 PAP  Min: 38/20  Max: 73/43   PA(mean) 47 mmHg PAP  "(Mean)  Min: 24 mmHg  Max: 55 mmHg   PCWP 21 mmHg PCWP (mmHg)  Min: 12 mmHg  Max: 21 mmHg   CO 3.4 L/min CO (L/min)  Min: 3.23 L/min  Max: 3.4 L/min   CI 2.2 L/min/m2 CI (L/min/m2)  Min: 2.11 L/min/m2  Max: 2.2 L/min/m2   Mixed Venous 66 % SVO2 (%)  Min: 66 %  Max: 68 %   SVR  1480 (dyne*sec)/cm5 SVR (dyne*sec)/cm5  Min: 1480 (dyne*sec)/cm5  Max: 1659 (dyne*sec)/cm5    (dyne*sec)/cm5 PVR (dyne*sec)/cm5  Min: 334 (dyne*sec)/cm5  Max: 347 (dyne*sec)/cm5     PHYSICAL EXAM:   Visit Vitals  /63   Pulse 89   Temp 36.1 °C (97 °F)   Resp 19   Ht 1.575 m (5' 2\")   Wt 54.4 kg (120 lb)   SpO2 93%   BMI 21.95 kg/m²   Smoking Status Former   BSA 1.54 m²       Wt Readings from Last 5 Encounters:   06/02/25 54.4 kg (120 lb)   05/30/25 56.2 kg (124 lb)       INTAKE/OUTPUT:  I/O last 3 completed shifts:  In: 1058 (19.4 mL/kg) [P.O.:240; I.V.:218 (4 mL/kg); IV Piggyback:600]  Out: 2225 (40.9 mL/kg) [Urine:2225 (1.1 mL/kg/hr)]  Weight: 54.4 kg      Physical Exam  Constitutional:       General: She is not in acute distress.     Appearance: Normal appearance. She is not ill-appearing.   HENT:      Mouth/Throat:      Mouth: Mucous membranes are moist.   Eyes:      General: No scleral icterus.     Extraocular Movements: Extraocular movements intact.      Conjunctiva/sclera: Conjunctivae normal.   Cardiovascular:      Rate and Rhythm: Normal rate and regular rhythm.      Heart sounds: Normal heart sounds. No murmur heard.  Pulmonary:      Effort: Pulmonary effort is normal. No respiratory distress.      Breath sounds: Normal breath sounds.   Abdominal:      General: Abdomen is flat. There is no distension.      Palpations: Abdomen is soft.      Tenderness: There is no abdominal tenderness.   Musculoskeletal:      Right lower leg: No edema.      Left lower leg: No edema.   Skin:     General: Skin is warm and dry.   Neurological:      General: No focal deficit present.      Mental Status: She is alert and oriented to person, " place, and time. Mental status is at baseline.   Psychiatric:         Mood and Affect: Mood normal.         Behavior: Behavior normal.         Thought Content: Thought content normal.         DATA:  CMP:  Results from last 7 days   Lab Units 06/03/25  0842 06/02/25  2349 06/02/25  1346   SODIUM mmol/L 132* 130* 133*   POTASSIUM mmol/L 5.0 3.5 2.8*   CHLORIDE mmol/L 92* 89* 85*   CO2 mmol/L 33* 33* 36*   ANION GAP mmol/L 12 12 15   BUN mg/dL 15 17 15   CREATININE mg/dL 0.85 0.76 0.81   EGFR mL/min/1.73m*2 78 89 82   MAGNESIUM mg/dL 2.33  --  1.86   ALBUMIN g/dL 3.4 3.2* 3.9   ALT U/L  --   --  8   AST U/L  --   --  12   BILIRUBIN TOTAL mg/dL  --   --  1.7*     CBC:  Results from last 7 days   Lab Units 06/03/25  0842 06/02/25  1346   WBC AUTO x10*3/uL 8.2 10.6   HEMOGLOBIN g/dL 13.6 15.0   HEMATOCRIT % 42.1 46.8*   PLATELETS AUTO x10*3/uL 203 237   MCV fL 93 91     COAG:   Results from last 7 days   Lab Units 06/02/25  1346   INR  1.0     ABO:   ABO TYPE   Date Value Ref Range Status   06/02/2025 O  Final     HEME/ENDO:  Results from last 7 days   Lab Units 06/02/25  1346   FERRITIN ng/mL 232*   IRON SATURATION % 17*   TSH mIU/L 0.20*   HEMOGLOBIN A1C % 8.6*      CARDIAC:   Results from last 7 days   Lab Units 06/02/25  1346   TROPHSCMC ng/L 22   BNP pg/mL 1,292*       ASSESSMENT AND PLAN:   Ms. Evans is a 60F with a PMHx sig for CAD s/p PCI (LAD; 2015, Lcx; 2025), stage C systolic HF/ICM/HFrEF s/p ICD, h/o VT with presumed associated syncope, poorly controlled DM, pAF (off of DOAC given the absence of recurrence), dyslipidemia, essential HTN, COPD, and hypothyroidism with progressive HF symptoms and intolerant of GDMT - on midodrine who presents to HFICU as a direct admission for PAC guided evaluation.      Neuro:  - Serial neuro and pain assessments   - PO Tylenol PRN for pain  - PT/OT Consult, OOB to chair  - CAM ICU score every shift  - Sleep/wake cycle normalization    #Restless legs  - C/w home requip      #Anxiety  #Depression  - C/w home celexa   - C/w home gabapentin     # Physical Status  - Not obese, BMI 22.68 kg/m2   - Reduced Mobility with HF symptoms     #Substance abuse  -Alcohol dependence: rare use  -Tobacco dependence: previous smoker, quit 1.5 months prior to admission date.      Cardiovascular:  #HFrEF EF 30-35%, ICM, Stage C  - TTE 3/2025 at OSH: LVEF 30-35%, normal RV size with low normal function, mild MR, small pericardial effusion  - TTE 6/2/2025 on arrival: LVEF 20-25%/LVIDd 4.1cm, normal RVSF, mild MR/TR, trivial pericardial effusion.  - Admit weight: 54.4kg admit BNP 1292   - [place] [facility] [place]: BP 97/55 (68), CVP 3, PAP 39/18 (27), CO/CI 2.86/1.87, SVR 1817, SVO2 62% on midodrine 5 mg, empa 10, sandi 25.  - Daily [facility] [place] [facility] 6/3: BP 85/58 (67), CVP 5, PAP 53/23 (35), CO 3.35/CI 2.19, SVR 1480, SVO2 66% on nipride gtt 1.5, empa 10, sandi 25.   - Home medications: meto XL 12.5mg daily, spironolactone 25mg daily, jardiance 10mg daily, bumex 2mg bid, midodrine 5mg TID.  - Wean nipride gtt  - Start isordil 5mg TID, hydralazine 10mg TID and uptitrate as able.   - C/w empa and sandi, holding BB for now   - Diuretics: prn hemodynamic measurement, hold for now.   - Open advanced therapies evaluation, email sent out 6/3.  - Daily standing weights, 2gm sodium diet, 2L fluid restriction, strict I&Os    LVAD or Transplant: will order the things below when patient educated and consented.  -BT or BTT: O  -Email sent on 6/3  -Labs  -RHC  -LHC  -CPET  -ECHO  -CT chest  -US abdomen/aorta/iliac/IVC  -Carotid US  -JONATHAN (lower)  -2 view CXR  -Device interrogation  -PFTs  -Colonoscopy/Occult stool  -LVAD/TX education  -CT surgery consult  -Palliative consult  -Nutrition consult   -Dental consult/Orthopantogram   -Physical and Occupational Therapy     #Hypotension  - home midodrine stopped    #HLD   - c/w home statin     #CAD s/p PCI (LAD in 2015, LCx in 2024)  - C/w home asa 81mg daily, Plavix   - C/w home statin      #H/o VT  #Paroxysmal A Fib  - Device: s/p CRT-D 3/2025, [employer]   - Holding home BB until we get invasive hemodynamics   - AC: off DOAC given absence of recurrence     #Electrolyte Disturbances  -K goal >4, Mg>2     Pulmonary:   #COPD  - C/w home inhalers  - Consider consult to IR for tapping effusions   - Monitor and maintain SpO2 > 92%     GI:  #GERD  - C/w home ppi  - Bowel regimen: prn miralax, juliocesar-colace BID   - Diet: carb controlled     :  #No renal dysfunction at baseline  - Baseline Cr 0.5-0.9  - Admit BUN 15/Cr 0.81   - I/Os  - Avoid hypotension and nephrotoxic agents     Heme:  #Fe deficiency in setting of CHF  - Fe 47, %sat 17%, TIBC 283, ferritin 232  - IV venofer ordered 6/3 for total of 5 doses (est end date 6/7)     Endo:  #DM, T2  - hgbA1c 8.6, previously was 12.3 in 3/2025  - home medications: insulin - glargine, jardiance, nesina.  - ISS while inpatient, adjust as needed - glargine 10 units nightly, 3 units premeals, ISS coverage.   - Endocrine consulted     #Hypothyroidism  -TSH 0.2, Free T4 2.05  -hold home levothyroxine 200mcg daily. Will repeat TSH, reflex T4 on Thursday and will touch base with endocrinology prior to adjustment - discussed with endo.       ID:  - Afebrile, nontoxic   - No s/s infx  - Trend temps q4h     PHYSICAL AND OCCUPATIONAL THERAPY: ordered    LINES:  PIVs   [place] Cath: 6/2 -     DVT: lovenox   VAP BUNDLE: NA  CENTRAL LINE BUNDLE: NA  ULCER PPX: home PPI  GLYCEMIC CONTROL: SSI/lantus  BOWEL CARE: juliocesar-colace, miralax PRN   INDWELLING CATHETER: NA  NUTRITION: Adult diet Consistent Carb; CCD 75 gm/meal    EMERGENCY CONTACT: Extended Emergency Contact Information  Primary Emergency Contact: Daisy Velasco  Mobile Phone: [phone]  Relation: Sister  FAMILY UPDATE:  CODE STATUS: Full Code  DISPO: HFICU    Patient seen and assessed with Dr. Kelly.  _________________________________________________  Shonna Sharp DO

## 2025-06-03 NOTE — PROGRESS NOTES
"Thao Evans is a 60 y.o. female on day 1 of admission presenting with ACC/AHA stage D systolic heart failure.    Subjective   NAEON. Pt seen and examined at bedside today. Doing ok in NAD.    I have reviewed histories, allergies and medications have been reviewed and there are no changes       Objective       Last Recorded Vitals  Blood pressure 100/59, pulse 100, temperature 37 °C (98.6 °F), resp. rate 21, height 1.575 m (5' 2\"), weight 54.4 kg (120 lb), SpO2 95%.  Intake/Output last 3 Shifts:  I/O last 3 completed shifts:  In: 2015 (37 mL/kg) [P.O.:940; I.V.:475 (8.7 mL/kg); IV Piggyback:600]  Out: 3925 (72.1 mL/kg) [Urine:3925 (2 mL/kg/hr)]  Weight: 54.4 kg     Relevant Results  Results from last 7 days   Lab Units 06/03/25  1615 06/03/25  1101 06/03/25  0842 06/03/25  0739 06/02/25  2349 06/02/25  1938 06/02/25  1450 06/02/25  1346   POCT GLUCOSE mg/dL 236* 189*  --  244*  --  308* 187*  --    GLUCOSE mg/dL  --   --  261*  --  277*  --   --  193*      Latest Reference Range & Units 06/03/25 08:42   GLUCOSE 74 - 99 mg/dL 261 (H)   SODIUM 136 - 145 mmol/L 132 (L)   POTASSIUM 3.5 - 5.3 mmol/L 5.0   CHLORIDE 98 - 107 mmol/L 92 (L)   Bicarbonate 21 - 32 mmol/L 33 (H)   Anion Gap mmol/L 12   Blood Urea Nitrogen 6 - 23 mg/dL 15   Creatinine 0.50 - 1.05 mg/dL 0.85   EGFR >60 mL/min/1.73m*2 78   Calcium 8.6 - 10.6 mg/dL 8.7   PHOSPHORUS 2.5 - 4.9 mg/dL 3.4   Albumin 3.4 - 5.0 g/dL 3.4         Latest Reference Range & Units 06/02/25 13:46   Thyroxine, Free 0.78 - 1.48 ng/dL  0.78 - 1.48 ng/dL 2.05 (H)  2.14 (H)   Thyroid Stimulating Hormone 0.44 - 3.98 mIU/L 0.20 (L)       Assessment & Plan  ACC/AHA stage D systolic heart failure    Acute on chronic systolic heart failure    Type 2 diabetes mellitus with hyperglycemia, with long-term current use of insulin      Ms. Evans is a 60F with a PMHx sig for CAD s/p PCI (LAD; 2015, Lcx; 2025), stage C systolic HF/ICM/HFrEF s/p ICD, h/o VT with presumed associated syncope, poorly " controlled DM, pAF (off of DOAC given the absence of recurrence), dyslipidemia, essential HTN, COPD, and hypothyroidism, with progressive HF symptoms and intolerant of GDMT - on midodrine who presents to HFICU as a direct admission for PAC guided evaluation.    Endocrine is consulted for hyperglycemia and DM management and hypothyroidism management.        Diabetes History  Type of diabetes: DM2   Year diagnosed or age: 10-15 years ago  Hospitalizations for DKA or HHS: no  Complications: CAD  Seen by PCP or Endocrinology: PCP  Frequency of glucose checks: twice daily  Glucose review: per patient 150s-200s  Frequency of Hypoglycemia: rare, has not happened in months  Hypoglycemia unawareness: no  Severe hypoglycemia requiring assistance from others:  no  Recent A1c is 8.6 in 6/2/25     Home Medications  Basal: lantus 15u (of note pt thought this was lispro but reviewed home med rec- has only ever taken basal insulin outpt)  Orals: jardiance 10mg, alogliptin 25mg  Previous meds:  metformin and GLP1 stopped due to GI side effects    She has ongoing fatigue, dyspnea, and early satiety. She also reports progressive weight loss (~60 lbs over the last 6 months). As a result, she states her A1c has dropped from 12 to 8% as of this past week.        Thyroid Hx:  Patient with Hx of hypothyroidism x 15 years, managed by her PCP  Home dose levothyroxine 200 mcg daily. Takes it adequately   Reports significant weight loss over the past few months (~60 lbs over the last 6 months)  Labs on 6/2 resulted in low TSH at 0.20 and elevated FT4 at 2.05  She has been off levothyroxine since was admitted. Last took on 5/30      #Hyperglycemia 2/2 poorly controlled diabetes, and stress state  -Increase Glargine to 10 units qhs  -c/w prandial Lispro 3 units with meals  -Low dose SSI #1 (1U of Lispro for every 50 above 150) TID w/ meals only; avoid night time correction   -Accuchecks (not BMP) TID and QHS-BG inpatient target  140-180  -Hypoglycemia protocol  -Diabetic Diet- low carb 60 CHO  -Will continue to follow and titrate insulin accordingly      #Hypothyroidism:  -Labs on 6/2 resulted in low TSH at 0.20 and elevated FT4 at 2.05  Due to recent weight loss her current home dose  200 mcg levothyroxine is too high at this time resulting in hyperthyroidism. She Last took LT4 on 5/30  -keep holding levothyroxine for now and recheck TFTs on 6/5  -levothyroxine dose TBD after repeat labs     Plan conveyed to primary team  Case seen, examined, and discussed with Dr. Lundgrin Diana Sawass Najjar, MD  Endocrine fellow

## 2025-06-04 ENCOUNTER — APPOINTMENT (OUTPATIENT)
Dept: RADIOLOGY | Facility: HOSPITAL | Age: 60
DRG: 291 | End: 2025-06-04
Payer: COMMERCIAL

## 2025-06-04 ENCOUNTER — APPOINTMENT (OUTPATIENT)
Dept: RESPIRATORY THERAPY | Facility: HOSPITAL | Age: 60
DRG: 291 | End: 2025-06-04
Payer: COMMERCIAL

## 2025-06-04 ENCOUNTER — LAB REQUISITION (OUTPATIENT)
Dept: LAB | Facility: CLINIC | Age: 60
DRG: 291 | End: 2025-06-04
Payer: COMMERCIAL

## 2025-06-04 ENCOUNTER — APPOINTMENT (OUTPATIENT)
Dept: VASCULAR MEDICINE | Facility: HOSPITAL | Age: 60
DRG: 291 | End: 2025-06-04
Payer: COMMERCIAL

## 2025-06-04 ENCOUNTER — TELEPHONE (OUTPATIENT)
Dept: OTHER | Age: 60
End: 2025-06-04

## 2025-06-04 ENCOUNTER — APPOINTMENT (OUTPATIENT)
Dept: VASCULAR MEDICINE | Facility: HOSPITAL | Age: 60
End: 2025-06-04
Payer: COMMERCIAL

## 2025-06-04 ENCOUNTER — DOCUMENTATION (OUTPATIENT)
Dept: TRANSPLANT | Facility: HOSPITAL | Age: 60
End: 2025-06-04

## 2025-06-04 DIAGNOSIS — I50.9 HEART FAILURE, UNSPECIFIED: ICD-10-CM

## 2025-06-04 LAB
ALBUMIN SERPL BCP-MCNC: 3.1 G/DL (ref 3.4–5)
ANION GAP BLDMV CALCULATED.4IONS-SCNC: 5 MMO/L (ref 10–25)
ANION GAP BLDMV CALCULATED.4IONS-SCNC: 7 MMO/L (ref 10–25)
ANION GAP SERPL CALC-SCNC: 12 MMOL/L (ref 10–20)
BASE EXCESS BLDMV CALC-SCNC: 2.8 MMOL/L (ref -2–3)
BASE EXCESS BLDMV CALC-SCNC: 3.5 MMOL/L (ref -2–3)
BASE EXCESS BLDMV CALC-SCNC: 4.8 MMOL/L (ref -2–3)
BASE EXCESS BLDMV CALC-SCNC: 5.1 MMOL/L (ref -2–3)
BASOPHILS # BLD AUTO: 0.04 X10*3/UL (ref 0–0.1)
BASOPHILS NFR BLD AUTO: 0.6 %
BODY TEMPERATURE: 37 DEGREES CELSIUS
BUN SERPL-MCNC: 10 MG/DL (ref 6–23)
CA-I BLDMV-SCNC: 1.12 MMOL/L (ref 1.1–1.33)
CA-I BLDMV-SCNC: 1.16 MMOL/L (ref 1.1–1.33)
CA-I BLDMV-SCNC: 1.17 MMOL/L (ref 1.1–1.33)
CA-I BLDMV-SCNC: 1.21 MMOL/L (ref 1.1–1.33)
CALCIUM SERPL-MCNC: 8 MG/DL (ref 8.6–10.6)
CHLORIDE BLD-SCNC: 101 MMOL/L (ref 98–107)
CHLORIDE BLD-SCNC: 98 MMOL/L (ref 98–107)
CHLORIDE BLD-SCNC: 98 MMOL/L (ref 98–107)
CHLORIDE BLD-SCNC: 99 MMOL/L (ref 98–107)
CHLORIDE SERPL-SCNC: 100 MMOL/L (ref 98–107)
CO2 SERPL-SCNC: 28 MMOL/L (ref 21–32)
CREAT SERPL-MCNC: 0.61 MG/DL (ref 0.5–1.05)
EGFRCR SERPLBLD CKD-EPI 2021: >90 ML/MIN/1.73M*2
EOSINOPHIL # BLD AUTO: 0.09 X10*3/UL (ref 0–0.7)
EOSINOPHIL NFR BLD AUTO: 1.3 %
ERYTHROCYTE [DISTWIDTH] IN BLOOD BY AUTOMATED COUNT: 13.2 % (ref 11.5–14.5)
GLUCOSE BLD MANUAL STRIP-MCNC: 149 MG/DL (ref 74–99)
GLUCOSE BLD MANUAL STRIP-MCNC: 197 MG/DL (ref 74–99)
GLUCOSE BLD MANUAL STRIP-MCNC: 211 MG/DL (ref 74–99)
GLUCOSE BLD-MCNC: 152 MG/DL (ref 74–99)
GLUCOSE BLD-MCNC: 186 MG/DL (ref 74–99)
GLUCOSE BLD-MCNC: 203 MG/DL (ref 74–99)
GLUCOSE BLD-MCNC: 233 MG/DL (ref 74–99)
GLUCOSE SERPL-MCNC: 168 MG/DL (ref 74–99)
HCO3 BLDMV-SCNC: 28.5 MMOL/L (ref 22–26)
HCO3 BLDMV-SCNC: 29.1 MMOL/L (ref 22–26)
HCO3 BLDMV-SCNC: 30.5 MMOL/L (ref 22–26)
HCO3 BLDMV-SCNC: 31 MMOL/L (ref 22–26)
HCT VFR BLD AUTO: 36.7 % (ref 36–46)
HCT VFR BLD EST: 38 % (ref 36–46)
HCT VFR BLD EST: 38 % (ref 36–46)
HCT VFR BLD EST: 39 % (ref 36–46)
HCT VFR BLD EST: 47 % (ref 36–46)
HCV RNA SERPL NAA+PROBE-ACNC: NOT DETECTED K[IU]/ML
HCV RNA SERPL NAA+PROBE-LOG IU: NORMAL {LOG_IU}/ML
HGB BLD-MCNC: 12.3 G/DL (ref 12–16)
HGB BLDMV-MCNC: 12.5 G/DL (ref 12–16)
HGB BLDMV-MCNC: 12.7 G/DL (ref 12–16)
HGB BLDMV-MCNC: 13 G/DL (ref 12–16)
HGB BLDMV-MCNC: 15.7 G/DL (ref 12–16)
HLA CLS I TYP PNL BLD/T DONR HIGH RES: NORMAL
HLA RESULTS: NORMAL
HLA-A+B+C AB NFR SER: NORMAL %
HLA-DP+DQ+DR AB NFR SER: NORMAL %
HLA-DP2 QL: NORMAL
HLA-DQB1 HIGH RES: NORMAL
HLA-DRB1 HIGH RES: NORMAL
HOLD SPECIMEN: NORMAL
IMM GRANULOCYTES # BLD AUTO: 0.02 X10*3/UL (ref 0–0.7)
IMM GRANULOCYTES NFR BLD AUTO: 0.3 % (ref 0–0.9)
INHALED O2 CONCENTRATION: 24 %
INHALED O2 CONCENTRATION: 32 %
INHALED O2 CONCENTRATION: 36 %
INHALED O2 CONCENTRATION: 40 %
LACTATE BLDMV-SCNC: 0.5 MMOL/L (ref 0.4–2)
LACTATE BLDMV-SCNC: 0.7 MMOL/L (ref 0.4–2)
LYMPHOCYTES # BLD AUTO: 0.94 X10*3/UL (ref 1.2–4.8)
LYMPHOCYTES NFR BLD AUTO: 13.5 %
MAGNESIUM SERPL-MCNC: 2.19 MG/DL (ref 1.6–2.4)
MCH RBC QN AUTO: 29.7 PG (ref 26–34)
MCHC RBC AUTO-ENTMCNC: 33.5 G/DL (ref 32–36)
MCV RBC AUTO: 89 FL (ref 80–100)
MONOCYTES # BLD AUTO: 0.88 X10*3/UL (ref 0.1–1)
MONOCYTES NFR BLD AUTO: 12.6 %
NEUTROPHILS # BLD AUTO: 5 X10*3/UL (ref 1.2–7.7)
NEUTROPHILS NFR BLD AUTO: 71.7 %
NRBC BLD-RTO: 0 /100 WBCS (ref 0–0)
OXYHGB MFR BLDMV: 63.7 % (ref 45–75)
OXYHGB MFR BLDMV: 63.7 % (ref 45–75)
OXYHGB MFR BLDMV: 70.8 % (ref 45–75)
OXYHGB MFR BLDMV: 74.1 % (ref 45–75)
PCO2 BLDMV: 47 MM HG (ref 41–51)
PCO2 BLDMV: 50 MM HG (ref 41–51)
PH BLDMV: 7.39 PH (ref 7.33–7.43)
PH BLDMV: 7.4 PH (ref 7.33–7.43)
PH BLDMV: 7.4 PH (ref 7.33–7.43)
PH BLDMV: 7.42 PH (ref 7.33–7.43)
PHOSPHATE SERPL-MCNC: 3 MG/DL (ref 2.5–4.9)
PLATELET # BLD AUTO: 186 X10*3/UL (ref 150–450)
PO2 BLDMV: 41 MM HG (ref 35–45)
PO2 BLDMV: 42 MM HG (ref 35–45)
PO2 BLDMV: 46 MM HG (ref 35–45)
PO2 BLDMV: 48 MM HG (ref 35–45)
POTASSIUM BLDMV-SCNC: 4.1 MMOL/L (ref 3.5–5.3)
POTASSIUM BLDMV-SCNC: 4.1 MMOL/L (ref 3.5–5.3)
POTASSIUM BLDMV-SCNC: 4.4 MMOL/L (ref 3.5–5.3)
POTASSIUM BLDMV-SCNC: 4.7 MMOL/L (ref 3.5–5.3)
POTASSIUM SERPL-SCNC: 4 MMOL/L (ref 3.5–5.3)
RBC # BLD AUTO: 4.14 X10*6/UL (ref 4–5.2)
RPR SER QL: REACTIVE
RPR SER-TITR: ABNORMAL {TITER}
SAO2 % BLDMV: 65 % (ref 45–75)
SAO2 % BLDMV: 66 % (ref 45–75)
SAO2 % BLDMV: 73 % (ref 45–75)
SAO2 % BLDMV: 76 % (ref 45–75)
SODIUM BLDMV-SCNC: 130 MMOL/L (ref 136–145)
SODIUM BLDMV-SCNC: 131 MMOL/L (ref 136–145)
SODIUM BLDMV-SCNC: 131 MMOL/L (ref 136–145)
SODIUM BLDMV-SCNC: 132 MMOL/L (ref 136–145)
SODIUM SERPL-SCNC: 136 MMOL/L (ref 136–145)
STAPHYLOCOCCUS SPEC CULT: NORMAL
WBC # BLD AUTO: 7 X10*3/UL (ref 4.4–11.3)

## 2025-06-04 PROCEDURE — 99232 SBSQ HOSP IP/OBS MODERATE 35: CPT | Performed by: INTERNAL MEDICINE

## 2025-06-04 PROCEDURE — 2500000004 HC RX 250 GENERAL PHARMACY W/ HCPCS (ALT 636 FOR OP/ED): Performed by: NURSE PRACTITIONER

## 2025-06-04 PROCEDURE — 99291 CRITICAL CARE FIRST HOUR: CPT

## 2025-06-04 PROCEDURE — 94727 GAS DIL/WSHOT DETER LNG VOL: CPT | Performed by: INTERNAL MEDICINE

## 2025-06-04 PROCEDURE — 71046 X-RAY EXAM CHEST 2 VIEWS: CPT

## 2025-06-04 PROCEDURE — 70355 PANORAMIC X-RAY OF JAWS: CPT | Performed by: RADIOLOGY

## 2025-06-04 PROCEDURE — 93979 VASCULAR STUDY: CPT | Performed by: SURGERY

## 2025-06-04 PROCEDURE — 85025 COMPLETE CBC W/AUTO DIFF WBC: CPT | Performed by: STUDENT IN AN ORGANIZED HEALTH CARE EDUCATION/TRAINING PROGRAM

## 2025-06-04 PROCEDURE — 93922 UPR/L XTREMITY ART 2 LEVELS: CPT | Performed by: SURGERY

## 2025-06-04 PROCEDURE — 83520 IMMUNOASSAY QUANT NOS NONAB: CPT | Performed by: STUDENT IN AN ORGANIZED HEALTH CARE EDUCATION/TRAINING PROGRAM

## 2025-06-04 PROCEDURE — 94640 AIRWAY INHALATION TREATMENT: CPT

## 2025-06-04 PROCEDURE — 99291 CRITICAL CARE FIRST HOUR: CPT | Performed by: INTERNAL MEDICINE

## 2025-06-04 PROCEDURE — 82947 ASSAY GLUCOSE BLOOD QUANT: CPT | Performed by: STUDENT IN AN ORGANIZED HEALTH CARE EDUCATION/TRAINING PROGRAM

## 2025-06-04 PROCEDURE — 2500000002 HC RX 250 W HCPCS SELF ADMINISTERED DRUGS (ALT 637 FOR MEDICARE OP, ALT 636 FOR OP/ED)

## 2025-06-04 PROCEDURE — 82947 ASSAY GLUCOSE BLOOD QUANT: CPT

## 2025-06-04 PROCEDURE — 2500000005 HC RX 250 GENERAL PHARMACY W/O HCPCS: Performed by: NURSE PRACTITIONER

## 2025-06-04 PROCEDURE — 70355 PANORAMIC X-RAY OF JAWS: CPT

## 2025-06-04 PROCEDURE — 37799 UNLISTED PX VASCULAR SURGERY: CPT

## 2025-06-04 PROCEDURE — 76700 US EXAM ABDOM COMPLETE: CPT | Performed by: RADIOLOGY

## 2025-06-04 PROCEDURE — 83735 ASSAY OF MAGNESIUM: CPT | Performed by: STUDENT IN AN ORGANIZED HEALTH CARE EDUCATION/TRAINING PROGRAM

## 2025-06-04 PROCEDURE — 76700 US EXAM ABDOM COMPLETE: CPT

## 2025-06-04 PROCEDURE — 2020000001 HC ICU ROOM DAILY

## 2025-06-04 PROCEDURE — 2500000001 HC RX 250 WO HCPCS SELF ADMINISTERED DRUGS (ALT 637 FOR MEDICARE OP): Performed by: STUDENT IN AN ORGANIZED HEALTH CARE EDUCATION/TRAINING PROGRAM

## 2025-06-04 PROCEDURE — 93922 UPR/L XTREMITY ART 2 LEVELS: CPT

## 2025-06-04 PROCEDURE — 94010 BREATHING CAPACITY TEST: CPT | Performed by: INTERNAL MEDICINE

## 2025-06-04 PROCEDURE — 84132 ASSAY OF SERUM POTASSIUM: CPT

## 2025-06-04 PROCEDURE — 93880 EXTRACRANIAL BILAT STUDY: CPT | Performed by: SURGERY

## 2025-06-04 PROCEDURE — 2500000001 HC RX 250 WO HCPCS SELF ADMINISTERED DRUGS (ALT 637 FOR MEDICARE OP)

## 2025-06-04 PROCEDURE — 2500000004 HC RX 250 GENERAL PHARMACY W/ HCPCS (ALT 636 FOR OP/ED)

## 2025-06-04 PROCEDURE — 71046 X-RAY EXAM CHEST 2 VIEWS: CPT | Performed by: RADIOLOGY

## 2025-06-04 PROCEDURE — 84295 ASSAY OF SERUM SODIUM: CPT

## 2025-06-04 PROCEDURE — 93979 VASCULAR STUDY: CPT

## 2025-06-04 PROCEDURE — 2500000002 HC RX 250 W HCPCS SELF ADMINISTERED DRUGS (ALT 637 FOR MEDICARE OP, ALT 636 FOR OP/ED): Performed by: STUDENT IN AN ORGANIZED HEALTH CARE EDUCATION/TRAINING PROGRAM

## 2025-06-04 PROCEDURE — 94727 GAS DIL/WSHOT DETER LNG VOL: CPT

## 2025-06-04 PROCEDURE — 93880 EXTRACRANIAL BILAT STUDY: CPT

## 2025-06-04 RX ORDER — HYDRALAZINE HYDROCHLORIDE 50 MG/1
50 TABLET, FILM COATED ORAL EVERY 8 HOURS SCHEDULED
Status: DISCONTINUED | OUTPATIENT
Start: 2025-06-04 | End: 2025-06-04

## 2025-06-04 RX ORDER — ISOSORBIDE DINITRATE 20 MG/1
40 TABLET ORAL
Status: DISCONTINUED | OUTPATIENT
Start: 2025-06-04 | End: 2025-06-04

## 2025-06-04 RX ORDER — GUAIFENESIN 600 MG/1
600 TABLET, EXTENDED RELEASE ORAL 2 TIMES DAILY PRN
Status: DISPENSED | OUTPATIENT
Start: 2025-06-04

## 2025-06-04 RX ORDER — HYDRALAZINE HYDROCHLORIDE 25 MG/1
25 TABLET, FILM COATED ORAL ONCE
Status: COMPLETED | OUTPATIENT
Start: 2025-06-04 | End: 2025-06-04

## 2025-06-04 RX ORDER — HYDRALAZINE HYDROCHLORIDE 100 MG/1
100 TABLET, FILM COATED ORAL EVERY 8 HOURS SCHEDULED
Status: DISCONTINUED | OUTPATIENT
Start: 2025-06-04 | End: 2025-06-05

## 2025-06-04 RX ORDER — ISOSORBIDE DINITRATE 20 MG/1
20 TABLET ORAL
Status: DISCONTINUED | OUTPATIENT
Start: 2025-06-04 | End: 2025-06-04

## 2025-06-04 RX ORDER — ISOSORBIDE DINITRATE 40 MG/1
40 TABLET ORAL
Status: DISPENSED | OUTPATIENT
Start: 2025-06-05

## 2025-06-04 RX ADMIN — SPIRONOLACTONE 25 MG: 25 TABLET, FILM COATED ORAL at 11:45

## 2025-06-04 RX ADMIN — CITALOPRAM HYDROBROMIDE 40 MG: 40 TABLET ORAL at 05:49

## 2025-06-04 RX ADMIN — ISOSORBIDE DINITRATE 30 MG: 10 TABLET ORAL at 19:13

## 2025-06-04 RX ADMIN — GABAPENTIN 400 MG: 300 CAPSULE ORAL at 14:38

## 2025-06-04 RX ADMIN — ROPINIROLE HYDROCHLORIDE 1 MG: 3 TABLET, FILM COATED ORAL at 08:58

## 2025-06-04 RX ADMIN — ROPINIROLE HYDROCHLORIDE 1 MG: 3 TABLET, FILM COATED ORAL at 13:22

## 2025-06-04 RX ADMIN — ACETAMINOPHEN, CAFFEINE 2 TABLET: 500; 65 TABLET, FILM COATED ORAL at 19:20

## 2025-06-04 RX ADMIN — ATORVASTATIN CALCIUM 80 MG: 80 TABLET, FILM COATED ORAL at 08:58

## 2025-06-04 RX ADMIN — INSULIN LISPRO 2 UNITS: 100 INJECTION, SOLUTION INTRAVENOUS; SUBCUTANEOUS at 11:40

## 2025-06-04 RX ADMIN — ISOSORBIDE DINITRATE 20 MG: 20 TABLET ORAL at 08:58

## 2025-06-04 RX ADMIN — SODIUM NITROPRUSSIDE IN 0.9% SODIUM CHLORIDE 2 MCG/KG/MIN: 0.5 INJECTION INTRAVENOUS at 10:40

## 2025-06-04 RX ADMIN — HYDRALAZINE HYDROCHLORIDE 100 MG: 100 TABLET ORAL at 21:21

## 2025-06-04 RX ADMIN — IRON SUCROSE 200 MG: 20 INJECTION, SOLUTION INTRAVENOUS at 05:49

## 2025-06-04 RX ADMIN — ROPINIROLE HYDROCHLORIDE 3 MG: 3 TABLET, FILM COATED ORAL at 21:21

## 2025-06-04 RX ADMIN — ISOSORBIDE DINITRATE 30 MG: 10 TABLET ORAL at 14:38

## 2025-06-04 RX ADMIN — GABAPENTIN 400 MG: 300 CAPSULE ORAL at 21:21

## 2025-06-04 RX ADMIN — GABAPENTIN 400 MG: 300 CAPSULE ORAL at 05:49

## 2025-06-04 RX ADMIN — EMPAGLIFLOZIN 10 MG: 10 TABLET, FILM COATED ORAL at 08:57

## 2025-06-04 RX ADMIN — INSULIN LISPRO 3 UNITS: 100 INJECTION, SOLUTION INTRAVENOUS; SUBCUTANEOUS at 11:40

## 2025-06-04 RX ADMIN — CLOPIDOGREL BISULFATE 75 MG: 75 TABLET, FILM COATED ORAL at 08:57

## 2025-06-04 RX ADMIN — Medication 5 L/MIN: at 20:00

## 2025-06-04 RX ADMIN — INSULIN LISPRO 3 UNITS: 100 INJECTION, SOLUTION INTRAVENOUS; SUBCUTANEOUS at 07:45

## 2025-06-04 RX ADMIN — FLUTICASONE FUROATE AND VILANTEROL TRIFENATATE 1 PUFF: 100; 25 POWDER RESPIRATORY (INHALATION) at 07:04

## 2025-06-04 RX ADMIN — INSULIN GLARGINE 10 UNITS: 100 INJECTION, SOLUTION SUBCUTANEOUS at 21:22

## 2025-06-04 RX ADMIN — HYDRALAZINE HYDROCHLORIDE 25 MG: 25 TABLET ORAL at 05:49

## 2025-06-04 RX ADMIN — SODIUM CHLORIDE, SODIUM LACTATE, POTASSIUM CHLORIDE, AND CALCIUM CHLORIDE 250 ML: .6; .31; .03; .02 INJECTION, SOLUTION INTRAVENOUS at 19:53

## 2025-06-04 RX ADMIN — SODIUM NITROPRUSSIDE IN 0.9% SODIUM CHLORIDE 1.5 MCG/KG/MIN: 0.5 INJECTION INTRAVENOUS at 17:32

## 2025-06-04 RX ADMIN — ROPINIROLE HYDROCHLORIDE 1 MG: 3 TABLET, FILM COATED ORAL at 19:13

## 2025-06-04 RX ADMIN — HYDRALAZINE HYDROCHLORIDE 100 MG: 100 TABLET ORAL at 14:38

## 2025-06-04 RX ADMIN — HYDRALAZINE HYDROCHLORIDE 25 MG: 25 TABLET ORAL at 08:57

## 2025-06-04 RX ADMIN — SODIUM NITROPRUSSIDE IN 0.9% SODIUM CHLORIDE 2.5 MCG/KG/MIN: 0.5 INJECTION INTRAVENOUS at 03:20

## 2025-06-04 RX ADMIN — INSULIN LISPRO 1 UNITS: 100 INJECTION, SOLUTION INTRAVENOUS; SUBCUTANEOUS at 07:45

## 2025-06-04 RX ADMIN — ENOXAPARIN SODIUM 40 MG: 100 INJECTION SUBCUTANEOUS at 13:22

## 2025-06-04 RX ADMIN — INSULIN LISPRO 3 UNITS: 100 INJECTION, SOLUTION INTRAVENOUS; SUBCUTANEOUS at 18:14

## 2025-06-04 ASSESSMENT — PAIN - FUNCTIONAL ASSESSMENT
PAIN_FUNCTIONAL_ASSESSMENT: 0-10

## 2025-06-04 ASSESSMENT — COGNITIVE AND FUNCTIONAL STATUS - GENERAL
MOBILITY SCORE: 21
WALKING IN HOSPITAL ROOM: A LITTLE
STANDING UP FROM CHAIR USING ARMS: A LITTLE
MOVING TO AND FROM BED TO CHAIR: A LITTLE
DAILY ACTIVITIY SCORE: 24

## 2025-06-04 ASSESSMENT — PAIN SCALES - GENERAL
PAINLEVEL_OUTOF10: 0 - NO PAIN

## 2025-06-04 NOTE — CONSULTS
Inpatient consult to Palliative Care  Consult performed by: MARLI Ponce-CNP  Consult ordered by: Shonna Sharp DO          Palliative Medicine Consult  Complex medical decision making, symptom management, patient/family support    History obtained from chart review including ED note, H&P, patient's daily progress notes, review of lab/test results, and discussion with primary team and bedside RN.    Subjective    History of Present Illness  Ms. Thao Evans is a 60F with a PMHx sig for CAD s/p PCI (LAD; 2015, Lcx; 2025), stage C systolic HF/ICM/HFrEF s/p ICD, h/o VT with presumed associated syncope, poorly controlled DM, pAF (off of DOAC given the absence of recurrence), dyslipidemia, essential HTN, COPD, and hypothyroidism with progressive HF symptoms and intolerant of GDMT - on midodrine who presents to HFICU as a direct admission for PAC guided evaluation. Advanced therapies evaluation was initiated 6/3 for OHT/LVAD.     Introduction to Palliative Medicine  Met with pt and friend Babatunde at bedside.   Patient alert and oriented, has capacity to make their own medical decisions at this time.   Staff present: Shoshana Salter CNP.  Palliative Medicine was introduced as a specialty service for patients with serious illness to help with symptom management, improve quality of life, assist with goals of care conversations, navigate complex decision making, and provide support to patients and families. Support and empathy was provided throughout the encounter. Provided reflective listening and presence.     Symptoms  Pain: denies  Dyspnea: denies  Fatigue: yes, has lacked energy since October 2024  Insomnia: yes, r/t interruptions  Drowsiness: denies  Constipation: denies  Nausea: denies  Appetite: at baseline, eats little, about 1/3 of meal or less.  Anxiety: more nervous and scared about her health/heart  Depression: denies    Palliative Medicine Social History:  The patient is not  and has a son. Pt is a  "caregiver to her 40 yr old son living with cerebral palsy who is WCB. Pt elects her friend Babatunde as ROYCE over a relative or NOK. 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 Pt reports she has not had much energy since but has still been able to be independent with all ADLs/needs and care for her son. Pt reports being very busy with \"life\" but does enjoy the outdoors, gardening and flowers. Pt is not spiritual/Quaker.      Objective    Last Recorded Vitals  BP 99/59   Pulse 96   Temp 36.3 °C (97.3 °F)   Resp 20   Ht 1.575 m (5' 2\")   Wt 56.5 kg (124 lb 9 oz)   SpO2 96%   BMI 22.78 kg/m²      Physical Exam  Constitutional:       Appearance: She is normal weight.   HENT:      Head: Normocephalic.      Mouth/Throat:      Pharynx: Oropharynx is clear.   Cardiovascular:      Heart sounds: Murmur heard.   Pulmonary:      Effort: Pulmonary effort is normal.      Breath sounds: Normal breath sounds.   Abdominal:      General: Bowel sounds are normal. There is distension.   Musculoskeletal:         General: Normal range of motion.   Skin:     General: Skin is warm and dry.   Neurological:      General: No focal deficit present.      Mental Status: She is alert. Mental status is at baseline.   Psychiatric:         Mood and Affect: Mood normal.         Behavior: Behavior normal.         Thought Content: Thought content normal.         Judgment: Judgment normal.          Relevant Results  Results for orders placed or performed during the hospital encounter of 06/02/25 (from the past 24 hours)   POCT GLUCOSE   Result Value Ref Range    POCT Glucose 189 (H) 74 - 99 mg/dL   Blood Gas Mixed Venous Full Panel   Result Value Ref Range    POCT pH, Mixed 7.42 7.33 - 7.43 pH    POCT pCO2, Mixed 50 41 - 51 mm Hg    POCT pO2, Mixed 40 35 - 45 mm Hg    POCT SO2, Mixed 62 45 - 75 %    POCT Oxy Hemoglobin, Mixed 59.8 45.0 - 75.0 %    POCT Hematocrit Calculated, Mixed " 41.0 36.0 - 46.0 %    POCT Sodium, Mixed 130 (L) 136 - 145 mmol/L    POCT Potassium, Mixed 4.6 3.5 - 5.3 mmol/L    POCT Chloride, Mixed 96 (L) 98 - 107 mmol/L    POCT Ionized Calcium, Mixed 1.19 1.10 - 1.33 mmol/L    POCT Glucose, Mixed 222 (H) 74 - 99 mg/dL    POCT Lactate, Mixed 0.7 0.4 - 2.0 mmol/L    POCT Base Excess, Mixed 6.6 (H) -2.0 - 3.0 mmol/L    POCT HCO3 Calculated, Mixed 32.4 (H) 22.0 - 26.0 mmol/L    POCT Hemoglobin, Mixed 13.7 12.0 - 16.0 g/dL    POCT Anion Gap, Mixed 6 (L) 10 - 25 mmo/L    Patient Temperature 37.0 degrees Celsius    FiO2 21 %   POCT GLUCOSE   Result Value Ref Range    POCT Glucose 236 (H) 74 - 99 mg/dL   Blood Gas Mixed Venous Full Panel   Result Value Ref Range    POCT pH, Mixed 7.44 (H) 7.33 - 7.43 pH    POCT pCO2, Mixed 45 41 - 51 mm Hg    POCT pO2, Mixed 36 35 - 45 mm Hg    POCT SO2, Mixed 55 45 - 75 %    POCT Oxy Hemoglobin, Mixed 53.2 45.0 - 75.0 %    POCT Hematocrit Calculated, Mixed 41.0 36.0 - 46.0 %    POCT Sodium, Mixed 129 (L) 136 - 145 mmol/L    POCT Potassium, Mixed 4.9 3.5 - 5.3 mmol/L    POCT Chloride, Mixed 97 (L) 98 - 107 mmol/L    POCT Ionized Calcium, Mixed 1.17 1.10 - 1.33 mmol/L    POCT Glucose, Mixed 213 (H) 74 - 99 mg/dL    POCT Lactate, Mixed 0.7 0.4 - 2.0 mmol/L    POCT Base Excess, Mixed 5.6 (H) -2.0 - 3.0 mmol/L    POCT HCO3 Calculated, Mixed 30.6 (H) 22.0 - 26.0 mmol/L    POCT Hemoglobin, Mixed 13.7 12.0 - 16.0 g/dL    POCT Anion Gap, Mixed 6 (L) 10 - 25 mmo/L    Patient Temperature 37.0 degrees Celsius    FiO2 32 %   Anti-Cardiolipin Antibody (IgA, IgG, and IgM)   Result Value Ref Range    Anticardiolipin IgA 1.3 <20.0 APL U/mL    Anticardiolipin IgG <1.6 <20.0 GPL U/mL    Anticardiolipin IgM 2.0 <20.0 MPL U/mL   Cytomegalovirus IgG   Result Value Ref Range    Cytomegalovirus IgG Reactive (A) Nonreactive   Kelly-Barr Virus Antibody Panel   Result Value Ref Range    EBV VCA, IgG  Positive (A) Negative    EBV VCA, IgM  Negative Negative    EBV Early  Antigen Antibody, IgG Negative Negative    EBV Nuclear Antigen Antibody, IgG Positive (A) Negative   Hepatitis A Antibody, Total   Result Value Ref Range    Hepatitis A  AB-Total Reactive (A) Nonreactive   Hepatitis B Core Antibody, Total   Result Value Ref Range    Hepatitis B Core AB- Total Nonreactive Nonreactive   Hepatitis B Surface Antibody   Result Value Ref Range    Hepatitis B Surface AB 13.2 (H) <10.0 mIU/mL   Hepatitis B Surface Antigen   Result Value Ref Range    Hepatitis B Surface AG Nonreactive Nonreactive   Hepatitis C Antibody   Result Value Ref Range    Hepatitis C AB Nonreactive Nonreactive   HIV 1/2 Antigen/Antibody Screen with Reflex to Confirmation   Result Value Ref Range    HIV 1/2 Antigen/Antibody Screen with Reflex to Confirmation Nonreactive Nonreactive   HSV1 IgG and HSV2 IgG   Result Value Ref Range    HSV 1, IgG 3.2 (H) <0.9 INDEX    HSV 2, IgG >8.0 (H) <0.9 INDEX   IgM   Result Value Ref Range    IgM 104 40 - 230 mg/dL   IgA   Result Value Ref Range    IgA 302 70 - 400 mg/dL   IgG Subclasses (1, 2, 3, and 4)   Result Value Ref Range    IgG 1 839 490 - 1,140 mg/dL    IgG 2 225 150 - 640 mg/dL    IgG 3 32 11 - 85 mg/dL    IgG 4 46 3 - 200 mg/dL    IgG 968 700 - 1,600 mg/dL   Lactate Dehydrogenase   Result Value Ref Range     84 - 246 U/L   Mumps Antibody, IgG   Result Value Ref Range    Mumps, IgG Positive (A) Negative    Mumps, IgG Index 1.7 (H) <=0.8 IA   Prealbumin   Result Value Ref Range    Prealbumin 13.6 (L) 18.0 - 40.0 mg/dL   Rubella Antibody, IgG   Result Value Ref Range    Rubella, IgG Positive Negative    Rubella, IgG Index 2.5 <=0.7 IA IA   Rubeola Antibody, IgG   Result Value Ref Range    Rubeola, IgG Positive     Rubeola, IgG Index 3.8 (H) <=0.8 IA   Syphilis Screen with Reflex   Result Value Ref Range    Syphilis Total Ab Reactive (A) Nonreactive   Toxoplasma IgG   Result Value Ref Range    Toxoplasma IgG Reactive (A) Nonreactive   Varicella Zoster Antibody, IgG    Result Value Ref Range    Varicella Zoster, IgG Positive (A) Negative    Varicella Zoster, IgG Index 1.9 (H) <=0.8 IA   HLA New Non-Kidney Solid Organ Evaluation Panel   Result Value Ref Range    HLA-A,B,C High Resolution Typing      HLA-DRB1 High Resolution Typing      HLA-DQB1 High Resolution Typing      HLA-DPB1 High Resolution Typing      HLA Results Collection only     HLA Class I SP AB ID, HD      HLA Class II SP AB ID, HD     Thyroid Peroxidase Antibody   Result Value Ref Range    Thyroid Peroxidase (TPO) Antibody >1,000 (H) <=60 IU/mL   RPR   Result Value Ref Range    RPR  Reactive (A) Nonreactive    RPR Titer  1:64    POCT GLUCOSE   Result Value Ref Range    POCT Glucose 270 (H) 74 - 99 mg/dL   Urinalysis with Reflex Culture and Microscopic   Result Value Ref Range    Color, Urine Light-Yellow Light-Yellow, Yellow, Dark-Yellow    Appearance, Urine Clear Clear    Specific Gravity, Urine <1.005 (A) 1.005 - 1.035    pH, Urine 6.5 5.0, 5.5, 6.0, 6.5, 7.0, 7.5, 8.0    Protein, Urine NEGATIVE NEGATIVE, 10 (TRACE), 20 (TRACE) mg/dL    Glucose, Urine OVER (4+) (A) Normal mg/dL    Blood, Urine 1.0 (3+) (A) NEGATIVE mg/dL    Ketones, Urine NEGATIVE NEGATIVE mg/dL    Bilirubin, Urine NEGATIVE NEGATIVE mg/dL    Urobilinogen, Urine Normal Normal mg/dL    Nitrite, Urine NEGATIVE NEGATIVE    Leukocyte Esterase, Urine 500 Cesilia/uL (A) NEGATIVE   Extra Urine Gray Tube   Result Value Ref Range    Extra Tube Hold for add-ons.    Microscopic Only, Urine   Result Value Ref Range    WBC, Urine >50 (A) 1-5, NONE /HPF    WBC Clumps, Urine OCCASIONAL Reference range not established. /HPF    RBC, Urine 3-5 NONE, 1-2, 3-5 /HPF    Squamous Epithelial Cells, Urine 1-9 (SPARSE) Reference range not established. /HPF   Blood Gas Mixed Venous Full Panel   Result Value Ref Range    POCT pH, Mixed 7.41 7.33 - 7.43 pH    POCT pCO2, Mixed 44 41 - 51 mm Hg    POCT pO2, Mixed 42 35 - 45 mm Hg    POCT SO2, Mixed 68 45 - 75 %    POCT Oxy  Hemoglobin, Mixed 66.2 45.0 - 75.0 %    POCT Hematocrit Calculated, Mixed 38.0 36.0 - 46.0 %    POCT Sodium, Mixed 131 (L) 136 - 145 mmol/L    POCT Potassium, Mixed 3.9 3.5 - 5.3 mmol/L    POCT Chloride, Mixed 99 98 - 107 mmol/L    POCT Ionized Calcium, Mixed 1.07 (L) 1.10 - 1.33 mmol/L    POCT Glucose, Mixed 253 (H) 74 - 99 mg/dL    POCT Lactate, Mixed 0.5 0.4 - 2.0 mmol/L    POCT Base Excess, Mixed 2.8 -2.0 - 3.0 mmol/L    POCT HCO3 Calculated, Mixed 27.9 (H) 22.0 - 26.0 mmol/L    POCT Hemoglobin, Mixed 12.6 12.0 - 16.0 g/dL    POCT Anion Gap, Mixed 8 (L) 10 - 25 mmo/L    Patient Temperature 37.0 degrees Celsius    FiO2 24 %   CBC and Auto Differential   Result Value Ref Range    WBC 7.0 4.4 - 11.3 x10*3/uL    nRBC 0.0 0.0 - 0.0 /100 WBCs    RBC 4.14 4.00 - 5.20 x10*6/uL    Hemoglobin 12.3 12.0 - 16.0 g/dL    Hematocrit 36.7 36.0 - 46.0 %    MCV 89 80 - 100 fL    MCH 29.7 26.0 - 34.0 pg    MCHC 33.5 32.0 - 36.0 g/dL    RDW 13.2 11.5 - 14.5 %    Platelets 186 150 - 450 x10*3/uL    Neutrophils % 71.7 40.0 - 80.0 %    Immature Granulocytes %, Automated 0.3 0.0 - 0.9 %    Lymphocytes % 13.5 13.0 - 44.0 %    Monocytes % 12.6 2.0 - 10.0 %    Eosinophils % 1.3 0.0 - 6.0 %    Basophils % 0.6 0.0 - 2.0 %    Neutrophils Absolute 5.00 1.20 - 7.70 x10*3/uL    Immature Granulocytes Absolute, Automated 0.02 0.00 - 0.70 x10*3/uL    Lymphocytes Absolute 0.94 (L) 1.20 - 4.80 x10*3/uL    Monocytes Absolute 0.88 0.10 - 1.00 x10*3/uL    Eosinophils Absolute 0.09 0.00 - 0.70 x10*3/uL    Basophils Absolute 0.04 0.00 - 0.10 x10*3/uL   Renal function panel   Result Value Ref Range    Glucose 168 (H) 74 - 99 mg/dL    Sodium 136 136 - 145 mmol/L    Potassium 4.0 3.5 - 5.3 mmol/L    Chloride 100 98 - 107 mmol/L    Bicarbonate 28 21 - 32 mmol/L    Anion Gap 12 10 - 20 mmol/L    Urea Nitrogen 10 6 - 23 mg/dL    Creatinine 0.61 0.50 - 1.05 mg/dL    eGFR >90 >60 mL/min/1.73m*2    Calcium 8.0 (L) 8.6 - 10.6 mg/dL    Phosphorus 3.0 2.5 - 4.9  mg/dL    Albumin 3.1 (L) 3.4 - 5.0 g/dL   Magnesium   Result Value Ref Range    Magnesium 2.19 1.60 - 2.40 mg/dL   Blood Gas Mixed Venous Full Panel   Result Value Ref Range    POCT pH, Mixed 7.40 7.33 - 7.43 pH    POCT pCO2, Mixed 50 41 - 51 mm Hg    POCT pO2, Mixed 41 35 - 45 mm Hg    POCT SO2, Mixed 66 45 - 75 %    POCT Oxy Hemoglobin, Mixed 63.7 45.0 - 75.0 %    POCT Hematocrit Calculated, Mixed 47.0 (H) 36.0 - 46.0 %    POCT Sodium, Mixed 131 (L) 136 - 145 mmol/L    POCT Potassium, Mixed 4.1 3.5 - 5.3 mmol/L    POCT Chloride, Mixed 99 98 - 107 mmol/L    POCT Ionized Calcium, Mixed 1.21 1.10 - 1.33 mmol/L    POCT Glucose, Mixed 186 (H) 74 - 99 mg/dL    POCT Lactate, Mixed 0.5 0.4 - 2.0 mmol/L    POCT Base Excess, Mixed 4.8 (H) -2.0 - 3.0 mmol/L    POCT HCO3 Calculated, Mixed 31.0 (H) 22.0 - 26.0 mmol/L    POCT Hemoglobin, Mixed 15.7 12.0 - 16.0 g/dL    POCT Anion Gap, Mixed 5 (L) 10 - 25 mmo/L    Patient Temperature 37.0 degrees Celsius    FiO2 24 %   POCT GLUCOSE   Result Value Ref Range    POCT Glucose 197 (H) 74 - 99 mg/dL      XR chest 1 view  Narrative: Interpreted By:  Reyes Glez  and Beatrice Godoy   STUDY:  XR CHEST 1 VIEW;  6/3/2025 7:48 am      INDICATION:  Signs/Symptoms:SGC am rounds.          COMPARISON:  XR CHEST 1 VIEW 6/2/2025      ACCESSION NUMBER(S):  [id number]      ORDERING CLINICIAN:  MERY KIMBALL      FINDINGS:  AP radiograph of the chest was provided.      Left subclavian approach dual lead ICD/pacemaker with leads  projecting over the expected location of the right atrium and right  ventricle. Similar position of right IJ approach [place]-Femi catheter  with distal tip projecting over the distal right main pulmonary  artery.      CARDIOMEDIASTINAL SILHOUETTE:  Cardiomediastinal silhouette is obscured. Aortic knob calcific  atherosclerosis.      LUNGS:  Unchanged left pleural effusion and interlobular septal prominence.  No pneumothorax. Trace right pleural effusion.       ABDOMEN:  No remarkable upper abdominal findings.      BONES:  No acute osseous changes.      Impression: 1.  Right IJ approach [place]-Femi catheter tip projects over the  proximal right inferior lobar artery.  2. No significant change in moderate left pleural effusion with left  basilar infiltrate not excluded.  3. Small right pleural effusion.  4. Mild interstitial edema.      I personally reviewed the images/study and agree with the findings as  stated by Walt Barron MD (Resident Physician). This study was  interpreted at [facility],  [place], [place].      MACRO:  None      Signed by: Reyes Young 6/3/2025 3:37 PM  Dictation workstation:   [id number]     No results found for this or any previous visit (from the past 4464 hours).     Allergies  Bee venom protein (honey bee), Codeine, Doxycycline, Prednisone, and Amoxicillin    Scheduled medications  Scheduled Medications[1]  Continuous medications  Continuous Medications[2]  PRN medications  PRN Medications[3]     Assessment/Plan    Ms. Thao Evans is a 60F with a PMHx sig for CAD s/p PCI (LAD; 2015, Lcx; 2025), stage C systolic HF/ICM/HFrEF s/p ICD, h/o VT with presumed associated syncope, poorly controlled DM, pAF (off of DOAC given the absence of recurrence), dyslipidemia, essential HTN, COPD, and hypothyroidism with progressive HF symptoms and intolerant of GDMT - on midodrine who presents to HFICU as a direct admission for PAC guided evaluation. Advanced therapies evaluation was initiated 6/3 for OHT/LVAD.     6/4: Palliative consultation, AT evaluation completed.    Palliative Performance Scale (PPS):  70    ----------------------------------------------------------------------------------------------------------------------------------------------------------------------------------------------------------------------------------------------------------------------------------------------------------------------------------------------------------------------  Advanced Care Planning  Patient and/or family consented to a voluntary Advanced Care Planning meeting.   Serious Illness Assessment and Counseling:  Life Limiting Disease: HFrEF, advanced therapies evaluation posing threat to life or function.     Disease Specific Information Provided/Prognosis Discussed: Patient's current clinical condition, including diagnosis, prognosis, and management plan were discussed.   Counseling provided on HF and trajectory.  Counseling provided on guarded prognosis and what to expect with disease progression of HF.   Counseling provided on the irreversible and progressive nature of  heart failure.     Understanding/Overall Impression: Patient expressing clear understanding of overall health status and severity of illness.     Goals/Hopes: Discussion ensued about patient's goals for their medical care going forward. Allowed patient time to talk about her current quality of life, disease course/progression, and symptom and treatment burden. Discussed goal is to get her energy back.    Fears/Worries/Concerns: Either of the advanced therapies/going through such a surgery.    Patient's Perception of Functional Status: Low energy level but independent prior to hospitalization.    Minimal Acceptable Quality of Life/Maximal Vincent Tolerable for the Possibility of More Time: Counseling provided on the concept of MAO/Maximal Vincent. Patient expressing that they would never want to be in a health state where she was without mobility/dependent on other's for ADLs/toileting needs/bed bound. Patient deems that this would not be an acceptable  quality of life.   Pt's friend Babatunde and elected MPOA is present. Pt and she have not ever talked about pt's health preferences/wishes/wants/do no wants pertaining her her health care (ie trach, peg, if in a vegetative state). They state they will be discussing this further to Babatunde is clear on pt's wishes/deemed QOL.    Advanced Directives:  Surrogate Health Care Decision Maker: Babatunde Mclean (friend) [phone]  HPOA: not on file  Living will: not on file  Patient does not have a HPOA or living will. Counseling provided on benefit of completing advanced directives in order to establish person to make one's medical decision if patient were unable to speak for themselves and to make their health care wishes and treatment limitations to both hospital staff and their designated HPOA. Social work to help patient complete prior to discharge.     Code Status: Decision to keep code status FULL CODE at this time.       I spent 42 minutes in providing separately identifiable ACP services with the patient and/or surrogate decision maker in a voluntary conversation discussing the patient's wishes and goals as detailed in the above note.   ----------------------------------------------------------------------------------------------------------------------------------------------------------------------------------------------------------------------------------------------------------------------------------------------------------------------------------------------------------------------    #Complex Medical Decision Making  #Goals of Care  #Advanced Care Planning  - Code status: FULL CODE  - Surrogate decision maker: Babatunde Mclean (edita) [phone]  - Goals are mix of survival and time and improved quality of life  - 6/4: Met with pt and friendBabatunde, at bedside. Able to glean more information r/t pt's life, wishes, goals, worries, and health preferences. See above/consult note for supportive detail.     Addressed  ROYCE and discussing pt's health preferences.     Preparedness Plan:  This NP had an extensive conversation with the patient in the presence of the elected MPOABabatunde. Palliative care was introduced and it was explained what a preparedness plan entails. All questions were addressed and answered. Patient gave a verbal understanding of the answers provided.     Goals/expectations of OHT/ LVAD implantation: Increased Qol and to feel myrna.    Hemodialysis: Patient verbalized understanding that preexisting renal disease or renal injury during/after OHT/VAD implantation puts patients at risk for dialysis dependent renal failure. PT EXPRESSED SHE ACCEPTS THE RISK.    ICH/stroke: Patient is aware AND ACCEPTING of risk for bleeding or stroke.     LVAD failure: Patient verbalized she IS ACCEPTING OF heroic measures, compressions and intubation, in the event of LVAD failure.     LVAD infection/need for long term antibiotics: Patient is aware of the risk for infection of driveline, device, and blood. Pt is ACCEPTING to being treated with long term antibiotics if this were to occur.    Artifical nutrition and hydration: Patient would ACCEPT a long term feeding tube in the event complications would occur that would require placement.    Blood transfusions: Patient ACCEPTS receiving blood transfusions if needed.     Organ Donation: Patient verbalized SHE WOULD NOT LIKE TO PARTICIPATE IN organ donation in the event this would become a possibility.     Mechanical ventilation: Patient ACCEPTS TO undergo short term ventilation. Pt expressed she would ACCEPT long term mechanical ventilation/trach placement if this were required.    Postoperative rehab plan: PT IS AGREEABLE TO REHABILITATION PRIOR TO GOING HOME IF INDICATED.    Spiritual preferences: NONE    If device implantation over time does not help pt meet pt's goals of care and complications affect her/his view on quality of life, pt was asked about next steps. PT REPLIED SHE  WOULD BE HOPEFUL FOR OHT versus STOPPING THE LVAD.      #HFrEF  #Psychosocial Support  - Agreeable to OHT/LVAD placement when necessary.  - Ongoing pt and family support and care navigation.  - Music Therapy- declined at this time.  - Spiritual Care Support  - Art Therapy- enjoys flowers/gardening.   - Pet Therapy- declined    Plan of Care discussed with: Updated primary and bedside RN on goals of care decision, medication adjustments, and code status     Medical Decision Making was high level due to high complexity of problems, extensive data review, and high risk of management/treatment.     - HFrEF, advanced therapies evaluation posing threat to life or function.   - Reviewed external notes from   - Reviewed results from  which were used in decision making for   - Recommended the following tests:   - Assessment required independent historian: primary  - Independent interpretation of test: labs and imaging  - Discussion of management with: primary and sw  - Drug therapy requiring intensive monitoring for toxicity: insulin  - Decision regarding elective major surgery with identified patient or procedure risk factors: agreeable to oht/vad   - Decision regarding emergency major surgery:   - Decision regarding hospitalization or escalation of hospital-level care:   - Decision not to resuscitate or to de-escalate care because of poor prognosis:   - Parenteral controlled substances:     Thank you for allowing us to participate in the care of this patient. Palliative will continue to follow as needed. Palliative medicine is available Monday-Friday, 8a-6p. Please contact team with any questions or concerns.  Team pager [phone] (weekdays)  Chepe Salter CNP (on EPIC secure chat)         [1] atorvastatin, 80 mg, oral, Daily  [Held by provider] bumetanide, 2 mg, oral, BID  citalopram, 40 mg, oral, Daily  clopidogrel, 75 mg, oral, Daily  empagliflozin, 10 mg, oral, Daily  enoxaparin, 40 mg, subcutaneous, q24h  fluticasone  furoate-vilanteroL, 1 puff, inhalation, Daily  gabapentin, 400 mg, oral, q8h MELYSSA  hydrALAZINE, 50 mg, oral, q8h MELYSSA  insulin glargine, 10 Units, subcutaneous, Nightly  insulin lispro, 0-5 Units, subcutaneous, TID AC  insulin lispro, 3 Units, subcutaneous, TID AC  iron sucrose, 200 mg, intravenous, Daily  isosorbide dinitrate, 20 mg, oral, TID  [Held by provider] metoprolol succinate XL, 12.5 mg, oral, Daily  [Held by provider] midodrine, 5 mg, oral, TID  rOPINIRole, 1 mg, oral, TID  rOPINIRole, 3 mg, oral, Nightly  sennosides-docusate sodium, 2 tablet, oral, BID  spironolactone, 25 mg, oral, Daily    [2] lactated Ringer's, 10 mL/hr, Last Rate: 10 mL/hr (06/04/25 0600)  nitroprusside, 0.25-5 mcg/kg/min, Last Rate: 2.5 mcg/kg/min (06/04/25 0600)    [3] PRN medications: acetaminophen, albuterol, dextrose, dextrose, glucagon, glucagon, oxygen, polyethylene glycol

## 2025-06-04 NOTE — PROGRESS NOTES
Rec'd fax back from [facility] stating IP txp eval services will be covered under IP approval.  If patient discharges prior to being listed for heart transplant then an OP eval will need to be requested after discharge.  Listing approval is also needed.

## 2025-06-04 NOTE — PROGRESS NOTES
HFICU Attending Note    Assessment & Plan  ACC/AHA stage D systolic heart failure    Acute on chronic systolic heart failure    Type 2 diabetes mellitus with hyperglycemia, with long-term current use of insulin    #HFrEF EF 30-35%, ICM, Stage C  - TTE 3/2025 at OSH: LVEF 30-35%, normal RV size with low normal function, mild MR, small pericardial effusion  - TTE 6/2/2025 on arrival: LVEF 20-25%/LVIDd 4.1cm, normal RVSF, mild MR/TR, trivial pericardial effusion.  - Admit weight: 54.4kg admit BNP 1292   - Opening SGC #s 6/2: BP 97/55 (68), CVP 3, PAP 39/18 (27), CO/CI 2.86/1.87, SVR 1817, SVO2 62% on midodrine 5 mg, empa 10, sandi 25.  - Home medications: meto XL 12.5mg daily, spironolactone 25mg daily, jardiance 10mg daily, bumex 2mg bid, midodrine 5mg TID.  - Wean nipride gtt  - uptitrate isordil 10mg TID, hydralazine 25mg TID and uptitrate as able.   - C/w empa and sandi, holding BB for now   - Diuretics: prn hemodynamic measurement, hold for now.   - advanced therapies evaluation, email sent out 6/3.      This critically ill patient continues to be at-risk for clinically significant deterioration / failure due to the above mentioned dysfunctional, unstable organ systems.  I have personally identified and managed all complex critical care issues to prevent aforementioned clinical deterioration.  Critical care time is spent at bedside and/or the immediate area and has included, but is not limited to, the review of diagnostic tests, labs, radiographs, serial assessments of hemodynamics, respiratory status, ventilatory management, and family updates.  Time spent in procedures and teaching are reported separately.    Critical care time: _45___ minutes

## 2025-06-04 NOTE — PROGRESS NOTES
"Thao Evans is a 60 y.o. female on day 2 of admission presenting with ACC/AHA stage D systolic heart failure.    Subjective   PRAKASHEON, pt doing ok in NAD.   I have reviewed histories, allergies and medications have been reviewed and there are no changes       Objective       Last Recorded Vitals  Blood pressure 101/60, pulse 102, temperature 36.3 °C (97.3 °F), resp. rate 18, height 1.575 m (5' 2\"), weight 56.5 kg (124 lb 9 oz), SpO2 92%.  Intake/Output last 3 Shifts:  I/O last 3 completed shifts:  In: 2033.1 (36 mL/kg) [P.O.:700; I.V.:733.1 (13 mL/kg); IV Piggyback:600]  Out: 5225 (92.5 mL/kg) [Urine:5225 (2.6 mL/kg/hr)]  Weight: 56.5 kg     Relevant Results  Results from last 7 days   Lab Units 06/04/25  0734 06/04/25  0547 06/03/25  2013 06/03/25  1615 06/03/25  1101 06/03/25  0842 06/03/25  0739 06/02/25  2349 06/02/25  1450 06/02/25  1346   POCT GLUCOSE mg/dL 197*  --  270* 236* 189*  --  244*  --    < >  --    GLUCOSE mg/dL  --  168*  --   --   --  261*  --  277*  --  193*    < > = values in this interval not displayed.       Assessment & Plan  ACC/AHA stage D systolic heart failure    Acute on chronic systolic heart failure    Type 2 diabetes mellitus with hyperglycemia, with long-term current use of insulin        Ms. Evans is a 60F with a PMHx sig for CAD s/p PCI (LAD; 2015, Lcx; 2025), stage C systolic HF/ICM/HFrEF s/p ICD, h/o VT with presumed associated syncope, poorly controlled DM, pAF (off of DOAC given the absence of recurrence), dyslipidemia, essential HTN, COPD, and hypothyroidism, with progressive HF symptoms and intolerant of GDMT - on midodrine who presents to HFICU as a direct admission for PAC guided evaluation.    Endocrine is consulted for hyperglycemia and DM management and hypothyroidism management.         Diabetes History  Type of diabetes: DM2   Year diagnosed or age: 10-15 years ago  Hospitalizations for DKA or HHS: no  Complications: CAD  Seen by PCP or Endocrinology: PCP  Frequency of " glucose checks: twice daily  Glucose review: per patient 150s-200s  Frequency of Hypoglycemia: rare, has not happened in months  Hypoglycemia unawareness: no  Severe hypoglycemia requiring assistance from others:  no  Recent A1c is 8.6 in 6/2/25     Home Medications  Basal: lantus 15u (of note pt thought this was lispro but reviewed home med rec- has only ever taken basal insulin outpt)  Orals: jardiance 10mg, alogliptin 25mg  Previous meds:  metformin and GLP1 stopped due to GI side effects     She has ongoing fatigue, dyspnea, and early satiety. She also reports progressive weight loss (~60 lbs over the last 6 months). As a result, she states her A1c has dropped from 12 to 8% as of this past week.          Thyroid Hx:  -Patient with Hx of hypothyroidism x 15 years, managed by her PCP  -Home dose levothyroxine 200 mcg daily. Takes it adequately .  -She stated that her levothyroxine dose has been progressively increased over time, but does not recall when her TFTs were last checked (prior to this admission).   -Reports significant weight loss over the past few months (~60 lbs over the last 6 months)  -Labs on 6/2 resulted in low TSH at 0.20 and elevated FT4 at 2.05, She is clinically & biochemically hyperthyroid, indicating overtreatment of her long-standing hypothyroidism.   -She has been off levothyroxine since was admitted. Last took on 5/30        #Hyperglycemia 2/2 poorly controlled diabetes, and stress state  -continue Glargine 10 units qhs  -increase prandial Lispro to 5 units with meals  -Low dose SSI #1 (1U of Lispro for every 50 above 150) TID w/ meals only; avoid night time correction   -Accuchecks (not BMP) TID and QHS-BG inpatient target 140-180  -Hypoglycemia protocol  -Diabetic Diet- low carb 60 CHO  -Will continue to follow and titrate insulin accordingly        #Hypothyroidism:  -Labs on 6/2 resulted in low TSH at 0.20 and elevated FT4 at 2.05  Due to recent weight loss her current home dose  200  mcg levothyroxine is too high at this time resulting in hyperthyroidism. She is clinically & biochemically hyperthyroid  She Last took LT4 on 5/30  -keep holding levothyroxine for now and recheck TFTs on 6/5  -levothyroxine dose TBD after repeat labs on 6/5     Plan conveyed to primary team  Case seen, examined, and discussed with Dr. Lundgrin Diana Sawass Najjar, MD  Endocrine fellow

## 2025-06-04 NOTE — PROGRESS NOTES
"ENCOUNTER    Visit Type Initial Visit  Location: [facility] - inpatient - HFICU    Barriers to Communication / Understanding: none    Accompanied By: N/A    Organ For Transplant: Heart    Device For Implant: VAD    Ethnicity:  White    ADLs: independent, just gets \"tired out\" very easily    Instrumental ADLs: independent, drives, cares for son    Level of Activity Active    DME: none    Knowledge of Health: DM2 - managed via PCP - on oral meds and insulin - sugar monitored with CGM, but still \"all over the place\"    Why Do You Have End Stage Organ Disease: unknown     Knowledge of Transplant / VAD:  Yes Patient Is Able To Make An Informed Decision: Pt was not able to recall education points independently, but recalled them with review from     Patient Understands the Risks of Transplant / VAD:   Yes Rejection  Yes Infection Yes Complications  Yes Death    Patient Understands Recovery and Follow-Up from Transplant / VAD  Yes Length Of State Yes Appointments  Yes Labs  Yes Rehabilitation     Patient Has Identified Goals of Transplant / VAD: get strength and energy back     Overall Compliance  good       Compliance With Medications: good   Managed By: self with weekly pill sorter    Understanding Of Medication:  good   Compliance With Appointments: printed calendar   How Does Patient Handle Health Problems: call doctor office or go to ER    Heart    Cardiac Rehab: Pt states she had been referred, but was unable to participate due to no energy    Fluid Restriction:   None    Anticoagulation Service:   Plavix     SOCIAL HISTORY  : no    Education: graduated HS   Literate: yes, no issues or related dx  Computer literate: yes, has smart phone   Internet access: yes     Sources of Income:    Patient's Current Employment: 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     Will spouse / SO have paid status from employment during recovery: N/A    Other Sources of Income: son gets SSI    Does patient have financial concerns: no      Is patient able to meet current monthly expenses Yes    Resources: son gets food stamps    Patient was provided information on transplant fundraising: yes    Insurance: [employer] plan - pays $0 premium due to income based subsidies    FAMILY SYSTEM    Pt reports being  ~ 15 years ago   Never remarried  No current S.O.   Son John's dad does not have any contact with Pt or assist with John    Children:    John - age 40 - diagnosed with cerebral palsy  Always lived with Pt  Doesn't walk/talk   Bed/WC confined  Does go to day program Mon-Fri 7:30-3  Program transports him   She transfers him herself, no min lift  Feeding tube to supplement + he can eat pureed by mouth  When she's at home, she's 100% his caregiver  She has mult friends helping with his care now   [facility]     Pt had 2 sons that passed away     Parents:    Mom passed age 60 kidney failure  Dad passed age 65 complications of salmonella     Siblings:    1 sister Daisy Velasco - lives [place] - keeps up with Pt, knows she is in hospital     Support & Recovery Plan:  Adequate    Primary Support:    Name: Babatunde Mclean [phone]  Doesn't work  Drives     Vielka Neely [phone]  Doesn't work   Drives     No health problems   Phone:   Age:   Relationship to Patient ***  If employed, can they take time off work {YES,NO:19542} ***  If so, is it paid time off {YES,NO:75885} ***  If not, will this impact your finances {YES,NO:75134} ***  Did they attend education classes {YES,NO:30039} ***  Do they have other caregiver responsibilities (child or eldercare)  Do they have their own conditions which may  prevent them from providing care for you (Medical, psychological, physical limitations)  Are they available on short notice {YES,NO:47049} ***  Are they reliable {YES,NO:30462} ***  Are they responsible {YES,NO:79627} ***  Are they able to understand and process new information {YES,NO:03815}   Do they have reliable transportation or will you allow them to use your vehicle {YES,NO:92161} ***  Are they currently involved in your care {YES,NO:21237} ***  Comments    Secondary Support    Name ***   Phone ***    Age ***    Relationship to Patient ***  If employed, can they take time off work {YES,NO:34733} ***  If so, is it paid time off {YES,NO:09915} ***  If not, will this impact your finances {YES,NO:81584} ***  Did they attend education classes {YES,NO:59864} ***  Do they have other caregiver responsibilities (child or eldercare)  Do they have their own conditions which may prevent them from providing care for you (Medical, psychological, physical limitations)  Are they available on short notice {YES,NO:98638} ***  Are they reliable {YES,NO:66006} ***  Are they responsible {YES,NO:45015} ***  Are they able to understand and process new information {YES,NO:92950}   Do they have reliable transportation or will you allow them to use your vehicle {YES,NO:69158} ***  Are they currently involved in your care {YES,NO:15938} ***  Comments    Housing:  Adequate: yes  Type of Home: owns a house, 3 stories - apt on 2nd floor she rents out  She stays on 1st floor + basement  Distance to [facility]: 1.5 hours  Pets: none   Does Patient Feel Safe in Home: yes     Transportation:  Adequate  # Licensed Drivers in the Home: 1 - Pt  Does Patient Drive: yes  # Reliable Vehicles: 1  Does Patient use Public Transportation: none  Does Patient use Medical Transportation: none  Comments: Pt's vehicle can drive her disabled son John     MENTAL HEALTH    The patient reports their mood as good with a shrug.     Reported Mental Health Diagnosis:  none, but on anti-depressant Celexa via PCP, since 2016 when other son passed away   Family History of Mental Health Concerns: none  What are patient psychosocial stressors: none, just her health     Cognition: no changes/concerns noted by Pt     Current Medications:    Mental Health Meds: Celexa via PCP since 2016  Sleep Meds: none   Pain Meds: none   OTC Meds: none  Past Medications: none     Counseling: none     IOP  Has patient ever been hospitalized for mental health reasons: no    Suicide Assessment:  History of Suicide Ideation: none  History of Suicide Attempt: none  History of Suicidal Ideation in the past 3 months: none    Patient's Reported Trauma History: 2 sons that have passed away    What are patient's coping behaviors: embroidery machines, anything outside when it's nice, maintains a garden     Pentecostalism / Spirituality: none    Attitude toward interviewer Cooperative    Eye Contact Patient maintained good eye contact throughout appointment    Appearance: neatly groomed, wearing hospital gown and eye glasses, appeared somewhat frail    Affect Appropriate    Thought Process Appropriate    Substance Use /Abuse History:    Smoked cigarettes since ~ age 18, 1 PPD, last smoked 3-4 weeks ago    Social alcohol use    No current/history of illicit substance use     Prescription Drug Abuse:   Has patient experienced feelings of addiction: NO  Has patient experienced symptoms of withdrawal: NO  Has patient experienced any side effects? e.g.  hallucinations or delusions: NO    LEGAL ISSUES  None     Citizenship:  [place]  Born in [place]     Advance Directives:   No HPOA   Would want 1) Lance  Don't have anything in place for son legal paperwork wise, but it would also be Lance  Don't have guardianship through court system  She is John's rep payee for social security     IMPRESSION    This Pt is a 60 year old female being evaluated for heart transplant and LVAD on inpatient basis. ADOLFO met with Pt at bedside in  HFICU. She was unaccompanied.     Pt resides in [place], [place] ~ 1 hr and 15 minutes from [facility] in a home she's owned for last 6 years. 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 Pt's son is non-verbal and non-ambulatory and tube fed. Pt transfers her son between his bed and wheelchair. While Pt is in the hospital, she has two close friends Babatunde and Vielka who alternate caring for her son.     Pt is employed as paid caregiver for her son via the [place] home care waiver program. Pt is not receiving any disability benefits for herself. Pt's son receives SSI.    PLAN    Pt is SIPAT score of ___ indicating she is a ___ candidate for transplant and moderate psychosocial risk. SW will remain available to Pt and helpers throughout eval process and present to selection committee when applicable.     AVNI Lozano  Transplant/LVAD    since 2016 when one of her sons passed away. Pt reports no history of any counseling or psychiatry. Pt scored 7 on PHQ9 indicating mild symptoms of depression and 0 on CHULA indicating no clinical symptoms of anxiety. Pt enjoys embroidering, doing anything outside when the weather is nice, and maintaining her garden. Pt reports smoking cigarettes from age 18, ~ 1 PPD, stopping ~ 4 weeks ago. Pt reports social alcohol use and no current or former illicit substance use history whatsoever. Pt denied any criminal history. Pt does not have HPOA docs already, but would like to complete them appointing her friend Babatunde as HPOA. Pt is not na legal guardian for her son John because she states the need has never arisen, but she does manage 100% of his affairs and is his rep payee for SSI payments.     PLAN    Pt is SIPAT score of 25 indicating she is a minimally acceptable candidate for transplant and moderate psychosocial risk. SW will remain available to Pt and helpers throughout eval process and present to selection committee when applicable.     AVNI Lozano  Transplant/LVAD

## 2025-06-04 NOTE — PROGRESS NOTES
[facility]  HFICU PROGRESS NOTE    Thao Evans/[id number]    Admit Date: 6/2/2025  Hospital Length of Stay: 2   ICU Length of Stay: 1d 20h   Primary Service: HFICU  Primary HF Cardiologist: Dr. Deluca  Referring: Dr. Glover ([place])    INTERVAL EVENTS / PERTINENT ROS:     Patient continues on nipride infusion, continues to be on 2.5 mcg/kg/min.     Plan:  - Wean nipride gtt, continue po afterload reduction with isordil/hydral  - Continue advanced therapies evaluation     MEDICATIONS  Infusions:  lactated Ringer's, Last Rate: 10 mL/hr (06/04/25 0600)  nitroprusside, Last Rate: 2.5 mcg/kg/min (06/04/25 0600)      Scheduled:  atorvastatin, 80 mg, Daily  [Held by provider] bumetanide, 2 mg, BID  citalopram, 40 mg, Daily  clopidogrel, 75 mg, Daily  empagliflozin, 10 mg, Daily  enoxaparin, 40 mg, q24h  fluticasone furoate-vilanteroL, 1 puff, Daily  gabapentin, 400 mg, q8h MELYSSA  hydrALAZINE, 50 mg, q8h MELYSSA  insulin glargine, 10 Units, Nightly  insulin lispro, 0-5 Units, TID AC  insulin lispro, 3 Units, TID AC  iron sucrose, 200 mg, Daily  isosorbide dinitrate, 20 mg, TID  [Held by provider] metoprolol succinate XL, 12.5 mg, Daily  [Held by provider] midodrine, 5 mg, TID  rOPINIRole, 1 mg, TID  rOPINIRole, 3 mg, Nightly  sennosides-docusate sodium, 2 tablet, BID  spironolactone, 25 mg, Daily      PRN:  acetaminophen, 650 mg, q6h PRN  albuterol, 3 mL, q6h PRN  dextrose, 12.5 g, q15 min PRN  dextrose, 25 g, q15 min PRN  glucagon, 1 mg, q15 min PRN  glucagon, 1 mg, q15 min PRN  oxygen, , Continuous PRN - O2/gases  polyethylene glycol, 17 g, Daily PRN      Invasive Hemodynamics:    Most Recent Range Past 24hrs   BP (Art)   No data recorded   MAP(Art)   No data recorded   RA/CVP   No data recorded   PA 58/28 PAP  Min: 21/7  Max: 66/39   PA(mean) 41 mmHg PAP (Mean)  Min: 31 mmHg  Max: 52 mmHg   PCWP 20 mmHg PCWP (mmHg)  Min: 20 mmHg  Max: 20 mmHg   CO 4.2 L/min CO (L/min)  Min: 2.97 L/min   "Max: 4.5 L/min   CI 2.7 L/min/m2 CI (L/min/m2)  Min: 1.94 L/min/m2  Max: 3 L/min/m2   Mixed Venous 66 % SVO2 (%)  Min: 55 %  Max: 68 %   SVR  1213 (dyne*sec)/cm5 SVR (dyne*sec)/cm5  Min: 1066 (dyne*sec)/cm5  Max: 1938 (dyne*sec)/cm5    (dyne*sec)/cm5 PVR (dyne*sec)/cm5  Min: 284 (dyne*sec)/cm5  Max: 303 (dyne*sec)/cm5     PHYSICAL EXAM:   Visit Vitals  BP 99/59   Pulse 96   Temp 36.3 °C (97.3 °F)   Resp 20   Ht 1.575 m (5' 2\")   Wt 56.5 kg (124 lb 9 oz)   SpO2 96%   BMI 22.78 kg/m²   Smoking Status Former   BSA 1.57 m²       Wt Readings from Last 5 Encounters:   06/04/25 56.5 kg (124 lb 9 oz)   05/30/25 56.2 kg (124 lb)       INTAKE/OUTPUT:  I/O last 3 completed shifts:  In: 2033.1 (36 mL/kg) [P.O.:700; I.V.:733.1 (13 mL/kg); IV Piggyback:600]  Out: 5225 (92.5 mL/kg) [Urine:5225 (2.6 mL/kg/hr)]  Weight: 56.5 kg      Physical Exam  Constitutional:       General: She is not in acute distress.     Appearance: Normal appearance. She is not ill-appearing.   HENT:      Mouth/Throat:      Mouth: Mucous membranes are moist.   Eyes:      General: No scleral icterus.     Extraocular Movements: Extraocular movements intact.      Conjunctiva/sclera: Conjunctivae normal.   Cardiovascular:      Rate and Rhythm: Normal rate and regular rhythm.      Heart sounds: Normal heart sounds. No murmur heard.  Pulmonary:      Effort: Pulmonary effort is normal. No respiratory distress.      Breath sounds: Normal breath sounds.   Abdominal:      General: Abdomen is flat. There is no distension.      Palpations: Abdomen is soft.      Tenderness: There is no abdominal tenderness.   Musculoskeletal:      Right lower leg: No edema.      Left lower leg: No edema.   Skin:     General: Skin is warm and dry.   Neurological:      General: No focal deficit present.      Mental Status: She is alert and oriented to person, place, and time. Mental status is at baseline.   Psychiatric:         Mood and Affect: Mood normal.         Behavior: " Behavior normal.         Thought Content: Thought content normal.         DATA:  CMP:  Results from last 7 days   Lab Units 06/04/25  0547 06/03/25  0842 06/02/25  2349 06/02/25  1346   SODIUM mmol/L 136 132* 130* 133*   POTASSIUM mmol/L 4.0 5.0 3.5 2.8*   CHLORIDE mmol/L 100 92* 89* 85*   CO2 mmol/L 28 33* 33* 36*   ANION GAP mmol/L 12 12 12 15   BUN mg/dL 10 15 17 15   CREATININE mg/dL 0.61 0.85 0.76 0.81   EGFR mL/min/1.73m*2 >90 78 89 82   MAGNESIUM mg/dL 2.19 2.33  --  1.86   ALBUMIN g/dL 3.1* 3.4 3.2* 3.9   ALT U/L  --   --   --  8   AST U/L  --   --   --  12   BILIRUBIN TOTAL mg/dL  --   --   --  1.7*     CBC:  Results from last 7 days   Lab Units 06/04/25  0547 06/03/25  0842 06/02/25  1346   WBC AUTO x10*3/uL 7.0 8.2 10.6   HEMOGLOBIN g/dL 12.3 13.6 15.0   HEMATOCRIT % 36.7 42.1 46.8*   PLATELETS AUTO x10*3/uL 186 203 237   MCV fL 89 93 91     COAG:   Results from last 7 days   Lab Units 06/02/25  1346   INR  1.0     ABO:   ABO TYPE   Date Value Ref Range Status   06/02/2025 O  Final     HEME/ENDO:  Results from last 7 days   Lab Units 06/02/25  1346   FERRITIN ng/mL 232*   IRON SATURATION % 17*   TSH mIU/L 0.20*   HEMOGLOBIN A1C % 8.6*      CARDIAC:   Results from last 7 days   Lab Units 06/03/25  1743 06/02/25  1346   LD U/L 213  --    TROPHSCMC ng/L  --  22   BNP pg/mL  --  1,292*       ASSESSMENT AND PLAN:   Ms. Evans is a 60F with a PMHx sig for CAD s/p PCI (LAD; 2015, Lcx; 2025), stage C systolic HF/ICM/HFrEF s/p ICD, h/o VT with presumed associated syncope, poorly controlled DM, pAF (off of DOAC given the absence of recurrence), dyslipidemia, essential HTN, COPD, and hypothyroidism with progressive HF symptoms and intolerant of GDMT - on midodrine who presents to HFICU as a direct admission for PAC guided evaluation. Advanced therapies evaluation was initiated 6/3 for OHT/LVAD.      Neuro:  - Serial neuro and pain assessments   - PO Tylenol PRN for pain  - PT/OT Consult, OOB to chair  - CAM ICU score  every shift  - Sleep/wake cycle normalization    #Restless legs  - C/w home requip     #Anxiety  #Depression  - C/w home celexa   - C/w home gabapentin     # Physical Status  - Not obese, BMI 22.68 kg/m2   - Reduced Mobility with HF symptoms     #Substance abuse  -Alcohol dependence: rare use  -Tobacco dependence: previous smoker, quit 1.5 months prior to admission date.      Cardiovascular:  #HFrEF EF 30-35%, ICM, Stage C  - TTE 3/2025 at OSH: LVEF 30-35%, normal RV size with low normal function, mild MR, small pericardial effusion  - TTE 6/2/2025 on arrival: LVEF 20-25%/LVIDd 4.1cm, normal RVSF, mild MR/TR, trivial pericardial effusion.  - Admit weight: 54.4kg admit BNP 1292   - Opening SGC #s 6/2: BP 97/55 (68), CVP 3, PAP 39/18 (27), CO/CI 2.86/1.87, SVR 1817, SVO2 62% on midodrine 5 mg, empa 10, sandi 25.  - Daily SGC #s 6/4: MAP 69, CVP 5, PAP 55/22 (36), CO/CI 4.22/2.71, SVR 1213, SVO2 66% on nipride gtt 2.5, empa 10, sandi 25. Hydralazine 25 mg, Isosorbide 10 mg    - Home medications: meto XL 12.5mg daily, spironolactone 25mg daily, jardiance 10mg daily, bumex 2mg bid, midodrine 5mg TID.  - C/w nipride gtt and wean as able   - C/w hydralazine and isosorbide uptitration to wean off nipride gtt as able   - C/w empa and sandi, holding BB for now   - Diuretics: prn hemodynamic measurement, hold for now.   - Open advanced therapies evaluation, email sent out 6/3, consent signed 6/3   - Daily standing weights, 2gm sodium diet, 2L fluid restriction, strict I&Os    LVAD or Transplant: will order the things below when patient educated and consented.  -DT or BTT: O  -Email sent on 6/3, consent signed 6/3   -Labs 6/3  -RHC complete  -LHC 10/9/25  -ECHO 6/2/25  -CT chest ordered  -US abdomen/aorta/iliac/IVC ordered  -Carotid US ordered  -JONATHAN (lower) ordered  -2 view CXR ordered  -Device interrogation 5/2/25  -PFTs ordered  -Colonoscopy/Occult stool ordered  -LVAD/TX education ordered  -CT surgery consult  ordered  -Palliative consult ordered  -Nutrition consult complete 6/3/25  -Dental consult/Orthopantogram ordered  -Physical and Occupational Therapy following     #Hypotension  - Home midodrine stopped    #HLD   - C/w home statin     #CAD s/p PCI (LAD in 2015, LCx in 2024)  - C/w home asa 81mg daily, Plavix   - C/w home statin     #H/o VT  #Paroxysmal A Fib  - Device: s/p CRT-D 3/2025, 6Scan   - Holding home BB until we get invasive hemodynamics   - AC: off DOAC given absence of recurrence     #Electrolyte Disturbances  - K goal >4, Mg>2     Pulmonary:   #COPD  - C/w home inhalers  - Consider consult to IR for tapping effusions   - Monitor and maintain SpO2 > 92%     GI:  #GERD  - C/w home ppi  - Bowel regimen: prn miralax, juliocesar-colace BID   - Diet: carb controlled     :  #No renal dysfunction at baseline  - Baseline Cr 0.5-0.9  - Admit BUN 15/Cr 0.81   - I/Os  - Avoid hypotension and nephrotoxic agents     Heme:  #Fe deficiency in setting of CHF  - Fe 47, %sat 17%, TIBC 283, ferritin 232  - IV venofer ordered 6/3 for total of 5 doses (est end date 6/7)     Endo:  #DM, T2  - hgbA1c 8.6, previously was 12.3 in 3/2025  - home medications: insulin - glargine, jardiance, nesina.  - ISS while inpatient, adjust as needed - glargine 10 units nightly, 3 units premeals, ISS coverage.   - Endocrine consulted     #Hypothyroidism  - TSH 0.2, Free T4 2.05  - Hold home levothyroxine 200mcg daily. Will repeat TSH, reflex T4 on Thursday and will touch base with endocrinology prior to adjustment - discussed with endo.       ID:  - Afebrile, nontoxic   - No s/s infx  - Trend temps q4h     PHYSICAL AND OCCUPATIONAL THERAPY: ordered    LINES:  PIVs   [place] Cath: 6/2 -     DVT: lovenox   VAP BUNDLE: NA  CENTRAL LINE BUNDLE: NA  ULCER PPX: home PPI  GLYCEMIC CONTROL: SSI/lantus  BOWEL CARE: juliocesar-colace, miralax PRN   INDWELLING CATHETER: NA  NUTRITION: Adult diet Consistent Carb; CCD 75 gm/meal    EMERGENCY CONTACT: Extended  Emergency Contact Information  Primary Emergency Contact: Daisy Velasco  Mobile Phone: [phone]  Relation: Sister  FAMILY UPDATE: family at bedside   CODE STATUS: Full Code  DISPO: HFICU    Patient seen and assessed with Dr. Kelly.  _________________________________________________  MARLI High-CNP

## 2025-06-04 NOTE — PROCEDURES
Spirometry and lung volume testing via nitrogen washout method completed. Patient was unable to perform diffusion testing due to insufficient FVC. Patient height was measured at 63 inches.

## 2025-06-05 ENCOUNTER — APPOINTMENT (OUTPATIENT)
Dept: RADIOLOGY | Facility: HOSPITAL | Age: 60
DRG: 291 | End: 2025-06-05
Payer: COMMERCIAL

## 2025-06-05 LAB
ABO GROUP (TYPE) IN BLOOD: NORMAL
ALBUMIN SERPL BCP-MCNC: 3.4 G/DL (ref 3.4–5)
ALBUMIN SERPL BCP-MCNC: 3.4 G/DL (ref 3.4–5)
AMPHETAMINES SERPL QL SCN: NEGATIVE NG/ML
ANION GAP BLDMV CALCULATED.4IONS-SCNC: 10 MMO/L (ref 10–25)
ANION GAP BLDMV CALCULATED.4IONS-SCNC: 7 MMO/L (ref 10–25)
ANION GAP BLDMV CALCULATED.4IONS-SCNC: 7 MMO/L (ref 10–25)
ANION GAP BLDMV CALCULATED.4IONS-SCNC: 9 MMO/L (ref 10–25)
ANION GAP SERPL CALC-SCNC: 12 MMOL/L (ref 10–20)
ANION GAP SERPL CALC-SCNC: 14 MMOL/L (ref 10–20)
ANNOTATION COMMENT IMP: NORMAL
BARBITURATES SERPL QL SCN: NEGATIVE NG/ML
BASE EXCESS BLDMV CALC-SCNC: 1.2 MMOL/L (ref -2–3)
BASE EXCESS BLDMV CALC-SCNC: 3 MMOL/L (ref -2–3)
BASE EXCESS BLDMV CALC-SCNC: 3.8 MMOL/L (ref -2–3)
BASE EXCESS BLDMV CALC-SCNC: 5.1 MMOL/L (ref -2–3)
BASOPHILS # BLD AUTO: 0.03 X10*3/UL (ref 0–0.1)
BASOPHILS NFR BLD AUTO: 0.4 %
BENZODIAZ SERPL QL SCN: NEGATIVE NG/ML
BODY TEMPERATURE: 37 DEGREES CELSIUS
BUN SERPL-MCNC: 11 MG/DL (ref 6–23)
BUN SERPL-MCNC: 14 MG/DL (ref 6–23)
BUPRENORPHINE SERPL-MCNC: NEGATIVE NG/ML
CA-I BLDMV-SCNC: 1.18 MMOL/L (ref 1.1–1.33)
CA-I BLDMV-SCNC: 1.18 MMOL/L (ref 1.1–1.33)
CA-I BLDMV-SCNC: 1.19 MMOL/L (ref 1.1–1.33)
CA-I BLDMV-SCNC: 1.2 MMOL/L (ref 1.1–1.33)
CALCIUM SERPL-MCNC: 8.6 MG/DL (ref 8.6–10.6)
CALCIUM SERPL-MCNC: 8.7 MG/DL (ref 8.6–10.6)
CANNABINOIDS SERPL QL SCN: NEGATIVE NG/ML
CHLORIDE BLD-SCNC: 96 MMOL/L (ref 98–107)
CHLORIDE BLD-SCNC: 97 MMOL/L (ref 98–107)
CHLORIDE BLD-SCNC: 97 MMOL/L (ref 98–107)
CHLORIDE BLD-SCNC: 99 MMOL/L (ref 98–107)
CHLORIDE SERPL-SCNC: 100 MMOL/L (ref 98–107)
CHLORIDE SERPL-SCNC: 94 MMOL/L (ref 98–107)
CMV IGM SERPL-ACNC: <8 AU/ML
CO2 SERPL-SCNC: 26 MMOL/L (ref 21–32)
CO2 SERPL-SCNC: 30 MMOL/L (ref 21–32)
COCAINE SERPL QL SCN: NEGATIVE NG/ML
CREAT SERPL-MCNC: 0.65 MG/DL (ref 0.5–1.05)
CREAT SERPL-MCNC: 0.72 MG/DL (ref 0.5–1.05)
EGFRCR SERPLBLD CKD-EPI 2021: >90 ML/MIN/1.73M*2
EGFRCR SERPLBLD CKD-EPI 2021: >90 ML/MIN/1.73M*2
EOSINOPHIL # BLD AUTO: 0.02 X10*3/UL (ref 0–0.7)
EOSINOPHIL NFR BLD AUTO: 0.2 %
ERYTHROCYTE [DISTWIDTH] IN BLOOD BY AUTOMATED COUNT: 13.4 % (ref 11.5–14.5)
GLUCOSE BLD MANUAL STRIP-MCNC: 141 MG/DL (ref 74–99)
GLUCOSE BLD MANUAL STRIP-MCNC: 174 MG/DL (ref 74–99)
GLUCOSE BLD MANUAL STRIP-MCNC: 207 MG/DL (ref 74–99)
GLUCOSE BLD MANUAL STRIP-MCNC: 252 MG/DL (ref 74–99)
GLUCOSE BLD-MCNC: 126 MG/DL (ref 74–99)
GLUCOSE BLD-MCNC: 178 MG/DL (ref 74–99)
GLUCOSE BLD-MCNC: 188 MG/DL (ref 74–99)
GLUCOSE BLD-MCNC: 222 MG/DL (ref 74–99)
GLUCOSE SERPL-MCNC: 178 MG/DL (ref 74–99)
GLUCOSE SERPL-MCNC: 180 MG/DL (ref 74–99)
HCO3 BLDMV-SCNC: 27.6 MMOL/L (ref 22–26)
HCO3 BLDMV-SCNC: 29.8 MMOL/L (ref 22–26)
HCO3 BLDMV-SCNC: 30.1 MMOL/L (ref 22–26)
HCO3 BLDMV-SCNC: 31.4 MMOL/L (ref 22–26)
HCT VFR BLD AUTO: 35.4 % (ref 36–46)
HCT VFR BLD EST: 36 % (ref 36–46)
HCT VFR BLD EST: 37 % (ref 36–46)
HCT VFR BLD EST: 38 % (ref 36–46)
HCT VFR BLD EST: 39 % (ref 36–46)
HGB BLD-MCNC: 11.9 G/DL (ref 12–16)
HGB BLDMV-MCNC: 12.1 G/DL (ref 12–16)
HGB BLDMV-MCNC: 12.2 G/DL (ref 12–16)
HGB BLDMV-MCNC: 12.6 G/DL (ref 12–16)
HGB BLDMV-MCNC: 13 G/DL (ref 12–16)
IMM GRANULOCYTES # BLD AUTO: 0.03 X10*3/UL (ref 0–0.7)
IMM GRANULOCYTES NFR BLD AUTO: 0.4 % (ref 0–0.9)
INHALED O2 CONCENTRATION: 36 %
INHALED O2 CONCENTRATION: 38 %
INHALED O2 CONCENTRATION: 38 %
INHALED O2 CONCENTRATION: 40 %
LACTATE BLDMV-SCNC: 0.6 MMOL/L (ref 0.4–2)
LACTATE BLDMV-SCNC: 0.6 MMOL/L (ref 0.4–2)
LACTATE BLDMV-SCNC: 0.8 MMOL/L (ref 0.4–2)
LACTATE BLDMV-SCNC: 0.9 MMOL/L (ref 0.4–2)
LYMPHOCYTES # BLD AUTO: 0.49 X10*3/UL (ref 1.2–4.8)
LYMPHOCYTES NFR BLD AUTO: 6 %
MAGNESIUM SERPL-MCNC: 2.08 MG/DL (ref 1.6–2.4)
MCH RBC QN AUTO: 29.8 PG (ref 26–34)
MCHC RBC AUTO-ENTMCNC: 33.6 G/DL (ref 32–36)
MCV RBC AUTO: 89 FL (ref 80–100)
METHADONE SERPL QL SCN: NEGATIVE NG/ML
METHAMPHET SERPL QL: NEGATIVE NG/ML
MONOCYTES # BLD AUTO: 0.72 X10*3/UL (ref 0.1–1)
MONOCYTES NFR BLD AUTO: 8.8 %
NEUTROPHILS # BLD AUTO: 6.89 X10*3/UL (ref 1.2–7.7)
NEUTROPHILS NFR BLD AUTO: 84.2 %
NIL(NEG) CONTROL SPOT COUNT: NORMAL
NRBC BLD-RTO: 0 /100 WBCS (ref 0–0)
OPIATES SERPL QL SCN: NEGATIVE NG/ML
OXYCODONE SERPL QL: NEGATIVE NG/ML
OXYHGB MFR BLDMV: 65 % (ref 45–75)
OXYHGB MFR BLDMV: 66.8 % (ref 45–75)
OXYHGB MFR BLDMV: 67.9 % (ref 45–75)
OXYHGB MFR BLDMV: 71.9 % (ref 45–75)
PANEL A SPOT COUNT: 0
PANEL B SPOT COUNT: 0
PCO2 BLDMV: 50 MM HG (ref 41–51)
PCO2 BLDMV: 52 MM HG (ref 41–51)
PCO2 BLDMV: 53 MM HG (ref 41–51)
PCO2 BLDMV: 54 MM HG (ref 41–51)
PCP SERPL QL SCN: NEGATIVE NG/ML
PH BLDMV: 7.35 PH (ref 7.33–7.43)
PH BLDMV: 7.35 PH (ref 7.33–7.43)
PH BLDMV: 7.37 PH (ref 7.33–7.43)
PH BLDMV: 7.38 PH (ref 7.33–7.43)
PHOSPHATE SERPL-MCNC: 3.5 MG/DL (ref 2.5–4.9)
PHOSPHATE SERPL-MCNC: 4.2 MG/DL (ref 2.5–4.9)
PLATELET # BLD AUTO: 179 X10*3/UL (ref 150–450)
PO2 BLDMV: 43 MM HG (ref 35–45)
PO2 BLDMV: 44 MM HG (ref 35–45)
PO2 BLDMV: 45 MM HG (ref 35–45)
PO2 BLDMV: 47 MM HG (ref 35–45)
POS CONTROL SPOT COUNT: NORMAL
POTASSIUM BLDMV-SCNC: 3.9 MMOL/L (ref 3.5–5.3)
POTASSIUM BLDMV-SCNC: 4.1 MMOL/L (ref 3.5–5.3)
POTASSIUM BLDMV-SCNC: 4.2 MMOL/L (ref 3.5–5.3)
POTASSIUM BLDMV-SCNC: 4.7 MMOL/L (ref 3.5–5.3)
POTASSIUM SERPL-SCNC: 3.6 MMOL/L (ref 3.5–5.3)
POTASSIUM SERPL-SCNC: 4.5 MMOL/L (ref 3.5–5.3)
RBC # BLD AUTO: 3.99 X10*6/UL (ref 4–5.2)
RH FACTOR (ANTIGEN D): NORMAL
SAO2 % BLDMV: 67 % (ref 45–75)
SAO2 % BLDMV: 68 % (ref 45–75)
SAO2 % BLDMV: 70 % (ref 45–75)
SAO2 % BLDMV: 74 % (ref 45–75)
SODIUM BLDMV-SCNC: 130 MMOL/L (ref 136–145)
SODIUM BLDMV-SCNC: 131 MMOL/L (ref 136–145)
SODIUM BLDMV-SCNC: 131 MMOL/L (ref 136–145)
SODIUM BLDMV-SCNC: 132 MMOL/L (ref 136–145)
SODIUM SERPL-SCNC: 132 MMOL/L (ref 136–145)
SODIUM SERPL-SCNC: 135 MMOL/L (ref 136–145)
T-SPOT. TB INTERPRETATION: NEGATIVE
T3 SERPL-MCNC: 67 NG/DL (ref 60–200)
T4 FREE SERPL-MCNC: 1.23 NG/DL (ref 0.78–1.48)
TSH SERPL-ACNC: 0.19 MIU/L (ref 0.44–3.98)
WBC # BLD AUTO: 8.2 X10*3/UL (ref 4.4–11.3)

## 2025-06-05 PROCEDURE — 2500000004 HC RX 250 GENERAL PHARMACY W/ HCPCS (ALT 636 FOR OP/ED): Performed by: NURSE PRACTITIONER

## 2025-06-05 PROCEDURE — 84443 ASSAY THYROID STIM HORMONE: CPT | Performed by: STUDENT IN AN ORGANIZED HEALTH CARE EDUCATION/TRAINING PROGRAM

## 2025-06-05 PROCEDURE — 2500000004 HC RX 250 GENERAL PHARMACY W/ HCPCS (ALT 636 FOR OP/ED)

## 2025-06-05 PROCEDURE — 82947 ASSAY GLUCOSE BLOOD QUANT: CPT | Performed by: STUDENT IN AN ORGANIZED HEALTH CARE EDUCATION/TRAINING PROGRAM

## 2025-06-05 PROCEDURE — 99291 CRITICAL CARE FIRST HOUR: CPT | Performed by: INTERNAL MEDICINE

## 2025-06-05 PROCEDURE — 87626 HPV SEP HI-RSK TYP&POOL RSLT: CPT

## 2025-06-05 PROCEDURE — 94640 AIRWAY INHALATION TREATMENT: CPT

## 2025-06-05 PROCEDURE — 2500000001 HC RX 250 WO HCPCS SELF ADMINISTERED DRUGS (ALT 637 FOR MEDICARE OP)

## 2025-06-05 PROCEDURE — 74176 CT ABD & PELVIS W/O CONTRAST: CPT | Performed by: RADIOLOGY

## 2025-06-05 PROCEDURE — 84439 ASSAY OF FREE THYROXINE: CPT | Performed by: STUDENT IN AN ORGANIZED HEALTH CARE EDUCATION/TRAINING PROGRAM

## 2025-06-05 PROCEDURE — 88175 CYTOPATH C/V AUTO FLUID REDO: CPT | Mod: TC,GCY

## 2025-06-05 PROCEDURE — 71250 CT THORAX DX C-: CPT

## 2025-06-05 PROCEDURE — 84100 ASSAY OF PHOSPHORUS: CPT | Performed by: STUDENT IN AN ORGANIZED HEALTH CARE EDUCATION/TRAINING PROGRAM

## 2025-06-05 PROCEDURE — 71045 X-RAY EXAM CHEST 1 VIEW: CPT

## 2025-06-05 PROCEDURE — 88141 CYTOPATH C/V INTERPRET: CPT | Performed by: PATHOLOGY

## 2025-06-05 PROCEDURE — 86833 HLA CLASS II HIGH DEFIN QUAL: CPT | Mod: OUT | Performed by: STUDENT IN AN ORGANIZED HEALTH CARE EDUCATION/TRAINING PROGRAM

## 2025-06-05 PROCEDURE — 97162 PT EVAL MOD COMPLEX 30 MIN: CPT | Mod: GP

## 2025-06-05 PROCEDURE — 82947 ASSAY GLUCOSE BLOOD QUANT: CPT

## 2025-06-05 PROCEDURE — 2500000002 HC RX 250 W HCPCS SELF ADMINISTERED DRUGS (ALT 637 FOR MEDICARE OP, ALT 636 FOR OP/ED)

## 2025-06-05 PROCEDURE — 2500000001 HC RX 250 WO HCPCS SELF ADMINISTERED DRUGS (ALT 637 FOR MEDICARE OP): Performed by: STUDENT IN AN ORGANIZED HEALTH CARE EDUCATION/TRAINING PROGRAM

## 2025-06-05 PROCEDURE — 37799 UNLISTED PX VASCULAR SURGERY: CPT | Performed by: STUDENT IN AN ORGANIZED HEALTH CARE EDUCATION/TRAINING PROGRAM

## 2025-06-05 PROCEDURE — 2020000001 HC ICU ROOM DAILY

## 2025-06-05 PROCEDURE — 85025 COMPLETE CBC W/AUTO DIFF WBC: CPT | Performed by: STUDENT IN AN ORGANIZED HEALTH CARE EDUCATION/TRAINING PROGRAM

## 2025-06-05 PROCEDURE — 71250 CT THORAX DX C-: CPT | Performed by: RADIOLOGY

## 2025-06-05 PROCEDURE — 71045 X-RAY EXAM CHEST 1 VIEW: CPT | Performed by: RADIOLOGY

## 2025-06-05 PROCEDURE — 2500000004 HC RX 250 GENERAL PHARMACY W/ HCPCS (ALT 636 FOR OP/ED): Performed by: STUDENT IN AN ORGANIZED HEALTH CARE EDUCATION/TRAINING PROGRAM

## 2025-06-05 PROCEDURE — 84480 ASSAY TRIIODOTHYRONINE (T3): CPT | Performed by: STUDENT IN AN ORGANIZED HEALTH CARE EDUCATION/TRAINING PROGRAM

## 2025-06-05 PROCEDURE — 86901 BLOOD TYPING SEROLOGIC RH(D): CPT | Performed by: STUDENT IN AN ORGANIZED HEALTH CARE EDUCATION/TRAINING PROGRAM

## 2025-06-05 PROCEDURE — 2500000002 HC RX 250 W HCPCS SELF ADMINISTERED DRUGS (ALT 637 FOR MEDICARE OP, ALT 636 FOR OP/ED): Performed by: STUDENT IN AN ORGANIZED HEALTH CARE EDUCATION/TRAINING PROGRAM

## 2025-06-05 PROCEDURE — 97110 THERAPEUTIC EXERCISES: CPT | Mod: GP

## 2025-06-05 PROCEDURE — 37799 UNLISTED PX VASCULAR SURGERY: CPT

## 2025-06-05 PROCEDURE — 83735 ASSAY OF MAGNESIUM: CPT | Performed by: STUDENT IN AN ORGANIZED HEALTH CARE EDUCATION/TRAINING PROGRAM

## 2025-06-05 PROCEDURE — 99232 SBSQ HOSP IP/OBS MODERATE 35: CPT | Performed by: INTERNAL MEDICINE

## 2025-06-05 PROCEDURE — 80069 RENAL FUNCTION PANEL: CPT

## 2025-06-05 RX ORDER — FUROSEMIDE 10 MG/ML
40 INJECTION INTRAMUSCULAR; INTRAVENOUS ONCE
Status: COMPLETED | OUTPATIENT
Start: 2025-06-05 | End: 2025-06-05

## 2025-06-05 RX ORDER — SODIUM CHLORIDE, SODIUM LACTATE, POTASSIUM CHLORIDE, CALCIUM CHLORIDE 600; 310; 30; 20 MG/100ML; MG/100ML; MG/100ML; MG/100ML
10 INJECTION, SOLUTION INTRAVENOUS CONTINUOUS
Status: DISCONTINUED | OUTPATIENT
Start: 2025-06-05 | End: 2025-06-07

## 2025-06-05 RX ORDER — INSULIN LISPRO 100 [IU]/ML
5 INJECTION, SOLUTION INTRAVENOUS; SUBCUTANEOUS
Status: DISCONTINUED | OUTPATIENT
Start: 2025-06-05 | End: 2025-06-06

## 2025-06-05 RX ORDER — HYDRALAZINE HYDROCHLORIDE 25 MG/1
25 TABLET, FILM COATED ORAL EVERY 8 HOURS SCHEDULED
Status: ACTIVE | OUTPATIENT
Start: 2025-06-05

## 2025-06-05 RX ORDER — LEVOTHYROXINE SODIUM 88 UG/1
88 TABLET ORAL DAILY
Status: DISPENSED | OUTPATIENT
Start: 2025-06-06

## 2025-06-05 RX ORDER — POTASSIUM CHLORIDE 20 MEQ/1
40 TABLET, EXTENDED RELEASE ORAL ONCE
Status: COMPLETED | OUTPATIENT
Start: 2025-06-05 | End: 2025-06-05

## 2025-06-05 RX ORDER — HYDRALAZINE HYDROCHLORIDE 25 MG/1
25 TABLET, FILM COATED ORAL ONCE
Status: DISCONTINUED | OUTPATIENT
Start: 2025-06-05 | End: 2025-06-05

## 2025-06-05 RX ADMIN — ISOSORBIDE DINITRATE 40 MG: 20 TABLET ORAL at 08:41

## 2025-06-05 RX ADMIN — INSULIN LISPRO 3 UNITS: 100 INJECTION, SOLUTION INTRAVENOUS; SUBCUTANEOUS at 12:08

## 2025-06-05 RX ADMIN — ROPINIROLE HYDROCHLORIDE 1 MG: 3 TABLET, FILM COATED ORAL at 08:41

## 2025-06-05 RX ADMIN — GABAPENTIN 400 MG: 300 CAPSULE ORAL at 14:09

## 2025-06-05 RX ADMIN — SPIRONOLACTONE 25 MG: 25 TABLET, FILM COATED ORAL at 08:41

## 2025-06-05 RX ADMIN — INSULIN LISPRO 1 UNITS: 100 INJECTION, SOLUTION INTRAVENOUS; SUBCUTANEOUS at 17:01

## 2025-06-05 RX ADMIN — ISOSORBIDE DINITRATE 40 MG: 20 TABLET ORAL at 01:28

## 2025-06-05 RX ADMIN — EMPAGLIFLOZIN 10 MG: 10 TABLET, FILM COATED ORAL at 08:41

## 2025-06-05 RX ADMIN — INSULIN LISPRO 2 UNITS: 100 INJECTION, SOLUTION INTRAVENOUS; SUBCUTANEOUS at 08:42

## 2025-06-05 RX ADMIN — SENNOSIDES AND DOCUSATE SODIUM 2 TABLET: 50; 8.6 TABLET ORAL at 08:41

## 2025-06-05 RX ADMIN — SACUBITRIL AND VALSARTAN 1 TABLET: 49; 51 TABLET, FILM COATED ORAL at 20:20

## 2025-06-05 RX ADMIN — SODIUM NITROPRUSSIDE IN 0.9% SODIUM CHLORIDE 2 MCG/KG/MIN: 0.5 INJECTION INTRAVENOUS at 10:05

## 2025-06-05 RX ADMIN — ROPINIROLE HYDROCHLORIDE 1 MG: 3 TABLET, FILM COATED ORAL at 21:42

## 2025-06-05 RX ADMIN — ROPINIROLE HYDROCHLORIDE 1 MG: 3 TABLET, FILM COATED ORAL at 14:09

## 2025-06-05 RX ADMIN — INSULIN GLARGINE 10 UNITS: 100 INJECTION, SOLUTION SUBCUTANEOUS at 20:25

## 2025-06-05 RX ADMIN — FLUTICASONE FUROATE AND VILANTEROL TRIFENATATE 1 PUFF: 100; 25 POWDER RESPIRATORY (INHALATION) at 07:09

## 2025-06-05 RX ADMIN — CLOPIDOGREL BISULFATE 75 MG: 75 TABLET, FILM COATED ORAL at 08:41

## 2025-06-05 RX ADMIN — ENOXAPARIN SODIUM 40 MG: 100 INJECTION SUBCUTANEOUS at 14:09

## 2025-06-05 RX ADMIN — INSULIN LISPRO 5 UNITS: 100 INJECTION, SOLUTION INTRAVENOUS; SUBCUTANEOUS at 17:01

## 2025-06-05 RX ADMIN — SODIUM CHLORIDE, POTASSIUM CHLORIDE, SODIUM LACTATE AND CALCIUM CHLORIDE 10 ML/HR: 600; 310; 30; 20 INJECTION, SOLUTION INTRAVENOUS at 17:07

## 2025-06-05 RX ADMIN — ROPINIROLE HYDROCHLORIDE 3 MG: 3 TABLET, FILM COATED ORAL at 21:42

## 2025-06-05 RX ADMIN — HYDRALAZINE HYDROCHLORIDE 100 MG: 100 TABLET ORAL at 06:12

## 2025-06-05 RX ADMIN — INSULIN LISPRO 3 UNITS: 100 INJECTION, SOLUTION INTRAVENOUS; SUBCUTANEOUS at 08:42

## 2025-06-05 RX ADMIN — CITALOPRAM HYDROBROMIDE 40 MG: 40 TABLET ORAL at 06:13

## 2025-06-05 RX ADMIN — GABAPENTIN 400 MG: 300 CAPSULE ORAL at 21:45

## 2025-06-05 RX ADMIN — FUROSEMIDE 40 MG: 10 INJECTION INTRAMUSCULAR; INTRAVENOUS at 08:42

## 2025-06-05 RX ADMIN — SACUBITRIL AND VALSARTAN 1 TABLET: 49; 51 TABLET, FILM COATED ORAL at 12:08

## 2025-06-05 RX ADMIN — GABAPENTIN 400 MG: 300 CAPSULE ORAL at 06:12

## 2025-06-05 RX ADMIN — SODIUM NITROPRUSSIDE IN 0.9% SODIUM CHLORIDE 2 MCG/KG/MIN: 0.5 INJECTION INTRAVENOUS at 01:28

## 2025-06-05 RX ADMIN — IRON SUCROSE 200 MG: 20 INJECTION, SOLUTION INTRAVENOUS at 05:57

## 2025-06-05 RX ADMIN — HYDRALAZINE HYDROCHLORIDE 25 MG: 25 TABLET ORAL at 18:22

## 2025-06-05 RX ADMIN — POTASSIUM CHLORIDE 40 MEQ: 1500 TABLET, EXTENDED RELEASE ORAL at 21:43

## 2025-06-05 RX ADMIN — ATORVASTATIN CALCIUM 80 MG: 80 TABLET, FILM COATED ORAL at 08:41

## 2025-06-05 ASSESSMENT — ANXIETY QUESTIONNAIRES
1. FEELING NERVOUS, ANXIOUS, OR ON EDGE: NOT AT ALL
GAD7 TOTAL SCORE: 0
2. NOT BEING ABLE TO STOP OR CONTROL WORRYING: NOT AT ALL
6. BECOMING EASILY ANNOYED OR IRRITABLE: NOT AT ALL
7. FEELING AFRAID AS IF SOMETHING AWFUL MIGHT HAPPEN: NOT AT ALL
3. WORRYING TOO MUCH ABOUT DIFFERENT THINGS: NOT AT ALL
5. BEING SO RESTLESS THAT IT IS HARD TO SIT STILL: NOT AT ALL
IF YOU CHECKED OFF ANY PROBLEMS ON THIS QUESTIONNAIRE, HOW DIFFICULT HAVE THESE PROBLEMS MADE IT FOR YOU TO DO YOUR WORK, TAKE CARE OF THINGS AT HOME, OR GET ALONG WITH OTHER PEOPLE: NOT DIFFICULT AT ALL
4. TROUBLE RELAXING: NOT AT ALL

## 2025-06-05 ASSESSMENT — PATIENT HEALTH QUESTIONNAIRE - PHQ9
7. TROUBLE CONCENTRATING ON THINGS, SUCH AS READING THE NEWSPAPER OR WATCHING TELEVISION: NOT AT ALL
9. THOUGHTS THAT YOU WOULD BE BETTER OFF DEAD, OR OF HURTING YOURSELF: NOT AT ALL
2. FEELING DOWN, DEPRESSED OR HOPELESS: NOT AT ALL
1. LITTLE INTEREST OR PLEASURE IN DOING THINGS: NOT AT ALL
5. POOR APPETITE OR OVEREATING: MORE THAN HALF THE DAYS
8. MOVING OR SPEAKING SO SLOWLY THAT OTHER PEOPLE COULD HAVE NOTICED. OR THE OPPOSITE, BEING SO FIGETY OR RESTLESS THAT YOU HAVE BEEN MOVING AROUND A LOT MORE THAN USUAL: NOT AT ALL
6. FEELING BAD ABOUT YOURSELF - OR THAT YOU ARE A FAILURE OR HAVE LET YOURSELF OR YOUR FAMILY DOWN: NOT AT ALL
4. FEELING TIRED OR HAVING LITTLE ENERGY: MORE THAN HALF THE DAYS
SUM OF ALL RESPONSES TO PHQ QUESTIONS 1-9: 7
3. TROUBLE FALLING OR STAYING ASLEEP OR SLEEPING TOO MUCH: NEARLY EVERY DAY
SUM OF ALL RESPONSES TO PHQ9 QUESTIONS 1 & 2: 0

## 2025-06-05 ASSESSMENT — COGNITIVE AND FUNCTIONAL STATUS - GENERAL
WALKING IN HOSPITAL ROOM: A LITTLE
MOBILITY SCORE: 18
TURNING FROM BACK TO SIDE WHILE IN FLAT BAD: A LITTLE
CLIMB 3 TO 5 STEPS WITH RAILING: A LOT
MOVING TO AND FROM BED TO CHAIR: A LITTLE
STANDING UP FROM CHAIR USING ARMS: A LITTLE

## 2025-06-05 ASSESSMENT — PAIN SCALES - GENERAL
PAINLEVEL_OUTOF10: 0 - NO PAIN

## 2025-06-05 ASSESSMENT — PAIN - FUNCTIONAL ASSESSMENT
PAIN_FUNCTIONAL_ASSESSMENT: 0-10

## 2025-06-05 ASSESSMENT — ACTIVITIES OF DAILY LIVING (ADL): ADL_ASSISTANCE: INDEPENDENT

## 2025-06-05 NOTE — CONSULTS
"Reason For Consult  Ap smear     History Of Present Illness  Thao Evans is a 60 y.o. female  with PMH CAD s/p PCI, stage C systolic HFrEF, DM who is admitted for PAC guided evaluation. Gynecology team consulted for pap smear for pre-surgical clearance. Patient states she has been menopausal since age 48 without any post-menopausal vaginal bleeding or gynecologic concerns.     OBHX: P3, x1 CS, X2  (one in s/o stillbirth)   GynHx: denies h/o abnml pap, not up to date, denies h/o STD  PMH: per EMR   PSH: cholecystectomy, elbow surgery   All :   SocHx: denies tobacco ,alcohol and illicit drug use      Past Medical History  She has a past medical history of CAD (coronary artery disease), CHF (congestive heart failure), COPD (chronic obstructive pulmonary disease) (Multi), Graves disease, Hypotension, and PAF (paroxysmal atrial fibrillation) (Multi).    Surgical History  She has no past surgical history on file.     Social History  She reports that she has quit smoking. Her smoking use included cigarettes. She has never been exposed to tobacco smoke. She has never used smokeless tobacco. No history on file for alcohol use and drug use.    Family History  Family History[1]     Allergies  Bee venom protein (honey bee), Codeine, Doxycycline, Prednisone, and Amoxicillin    Review of Systems  All other review of sysmtems negative      Physical Exam  General: no acute distress   HEENT: normocephalic, atraumatic , satting on 95% on 5L NC   Abdomen: soft, non-tender  SSE: normal appearing cervix, moderate amount of white discharge in posterior vault, no bleeding noted   Extremities: no lesions, skin discoloration or calf tenderness   Neuro: at baseline     Last Recorded Vitals  Blood pressure 105/63, pulse (!) 116, temperature 36.7 °C (98.1 °F), temperature source Temporal, resp. rate 17, height 1.575 m (5' 2\"), weight 57.5 kg (126 lb 10.5 oz), SpO2 95%.    Relevant Results  .  Results for orders placed or performed " during the hospital encounter of 06/02/25 (from the past 24 hours)   POCT GLUCOSE   Result Value Ref Range    POCT Glucose 149 (H) 74 - 99 mg/dL   Blood Gas Mixed Venous Full Panel   Result Value Ref Range    POCT pH, Mixed 7.40 7.33 - 7.43 pH    POCT pCO2, Mixed 47 41 - 51 mm Hg    POCT pO2, Mixed 46 (H) 35 - 45 mm Hg    POCT SO2, Mixed 73 45 - 75 %    POCT Oxy Hemoglobin, Mixed 70.8 45.0 - 75.0 %    POCT Hematocrit Calculated, Mixed 39.0 36.0 - 46.0 %    POCT Sodium, Mixed 130 (L) 136 - 145 mmol/L    POCT Potassium, Mixed 4.4 3.5 - 5.3 mmol/L    POCT Chloride, Mixed 98 98 - 107 mmol/L    POCT Ionized Calcium, Mixed 1.16 1.10 - 1.33 mmol/L    POCT Glucose, Mixed 233 (H) 74 - 99 mg/dL    POCT Lactate, Mixed 0.7 0.4 - 2.0 mmol/L    POCT Base Excess, Mixed 3.5 (H) -2.0 - 3.0 mmol/L    POCT HCO3 Calculated, Mixed 29.1 (H) 22.0 - 26.0 mmol/L    POCT Hemoglobin, Mixed 13.0 12.0 - 16.0 g/dL    POCT Anion Gap, Mixed 7 (L) 10 - 25 mmo/L    Patient Temperature 37.0 degrees Celsius    FiO2 40 %   POCT GLUCOSE   Result Value Ref Range    POCT Glucose 211 (H) 74 - 99 mg/dL   Blood Gas Mixed Venous Full Panel   Result Value Ref Range    POCT pH, Mixed 7.39 7.33 - 7.43 pH    POCT pCO2, Mixed 47 41 - 51 mm Hg    POCT pO2, Mixed 48 (H) 35 - 45 mm Hg    POCT SO2, Mixed 76 (H) 45 - 75 %    POCT Oxy Hemoglobin, Mixed 74.1 45.0 - 75.0 %    POCT Hematocrit Calculated, Mixed 38.0 36.0 - 46.0 %    POCT Sodium, Mixed 132 (L) 136 - 145 mmol/L    POCT Potassium, Mixed 4.1 3.5 - 5.3 mmol/L    POCT Chloride, Mixed 101 98 - 107 mmol/L    POCT Ionized Calcium, Mixed 1.12 1.10 - 1.33 mmol/L    POCT Glucose, Mixed 152 (H) 74 - 99 mg/dL    POCT Lactate, Mixed 0.5 0.4 - 2.0 mmol/L    POCT Base Excess, Mixed 2.8 -2.0 - 3.0 mmol/L    POCT HCO3 Calculated, Mixed 28.5 (H) 22.0 - 26.0 mmol/L    POCT Hemoglobin, Mixed 12.7 12.0 - 16.0 g/dL    POCT Anion Gap, Mixed 7 (L) 10 - 25 mmo/L    Patient Temperature 37.0 degrees Celsius    FiO2 36 %   CBC and Auto  Differential   Result Value Ref Range    WBC 8.2 4.4 - 11.3 x10*3/uL    nRBC 0.0 0.0 - 0.0 /100 WBCs    RBC 3.99 (L) 4.00 - 5.20 x10*6/uL    Hemoglobin 11.9 (L) 12.0 - 16.0 g/dL    Hematocrit 35.4 (L) 36.0 - 46.0 %    MCV 89 80 - 100 fL    MCH 29.8 26.0 - 34.0 pg    MCHC 33.6 32.0 - 36.0 g/dL    RDW 13.4 11.5 - 14.5 %    Platelets 179 150 - 450 x10*3/uL    Neutrophils % 84.2 40.0 - 80.0 %    Immature Granulocytes %, Automated 0.4 0.0 - 0.9 %    Lymphocytes % 6.0 13.0 - 44.0 %    Monocytes % 8.8 2.0 - 10.0 %    Eosinophils % 0.2 0.0 - 6.0 %    Basophils % 0.4 0.0 - 2.0 %    Neutrophils Absolute 6.89 1.20 - 7.70 x10*3/uL    Immature Granulocytes Absolute, Automated 0.03 0.00 - 0.70 x10*3/uL    Lymphocytes Absolute 0.49 (L) 1.20 - 4.80 x10*3/uL    Monocytes Absolute 0.72 0.10 - 1.00 x10*3/uL    Eosinophils Absolute 0.02 0.00 - 0.70 x10*3/uL    Basophils Absolute 0.03 0.00 - 0.10 x10*3/uL   Renal function panel   Result Value Ref Range    Glucose 180 (H) 74 - 99 mg/dL    Sodium 135 (L) 136 - 145 mmol/L    Potassium 4.5 3.5 - 5.3 mmol/L    Chloride 100 98 - 107 mmol/L    Bicarbonate 26 21 - 32 mmol/L    Anion Gap 14 10 - 20 mmol/L    Urea Nitrogen 11 6 - 23 mg/dL    Creatinine 0.65 0.50 - 1.05 mg/dL    eGFR >90 >60 mL/min/1.73m*2    Calcium 8.7 8.6 - 10.6 mg/dL    Phosphorus 4.2 2.5 - 4.9 mg/dL    Albumin 3.4 3.4 - 5.0 g/dL   Magnesium   Result Value Ref Range    Magnesium 2.08 1.60 - 2.40 mg/dL   TSH   Result Value Ref Range    Thyroid Stimulating Hormone 0.19 (L) 0.44 - 3.98 mIU/L   T4, free   Result Value Ref Range    Thyroxine, Free 1.23 0.78 - 1.48 ng/dL   Triiodothyronine, Total   Result Value Ref Range    Triiodothyronine 67 60 - 200 ng/dL   Blood Gas Mixed Venous Full Panel   Result Value Ref Range    POCT pH, Mixed 7.35 7.33 - 7.43 pH    POCT pCO2, Mixed 50 41 - 51 mm Hg    POCT pO2, Mixed 47 (H) 35 - 45 mm Hg    POCT SO2, Mixed 74 45 - 75 %    POCT Oxy Hemoglobin, Mixed 71.9 45.0 - 75.0 %    POCT Hematocrit  Calculated, Mixed 38.0 36.0 - 46.0 %    POCT Sodium, Mixed 132 (L) 136 - 145 mmol/L    POCT Potassium, Mixed 4.7 3.5 - 5.3 mmol/L    POCT Chloride, Mixed 99 98 - 107 mmol/L    POCT Ionized Calcium, Mixed 1.20 1.10 - 1.33 mmol/L    POCT Glucose, Mixed 188 (H) 74 - 99 mg/dL    POCT Lactate, Mixed 0.6 0.4 - 2.0 mmol/L    POCT Base Excess, Mixed 1.2 -2.0 - 3.0 mmol/L    POCT HCO3 Calculated, Mixed 27.6 (H) 22.0 - 26.0 mmol/L    POCT Hemoglobin, Mixed 12.6 12.0 - 16.0 g/dL    POCT Anion Gap, Mixed 10 10 - 25 mmo/L    Patient Temperature 37.0 degrees Celsius    FiO2 36 %   POCT GLUCOSE   Result Value Ref Range    POCT Glucose 207 (H) 74 - 99 mg/dL   ABO/Rh   Result Value Ref Range    ABO TYPE O     Rh TYPE POS    POCT GLUCOSE   Result Value Ref Range    POCT Glucose 252 (H) 74 - 99 mg/dL          Assessment/Plan     Thao Evans is a 60 y.o. female  with PMH CAD s/p PCI, stage C systolic HFrEF, DM who is admitted for PAC guided evaluation. Gynecology team consulted for pap smear for pre-surgical clearance for heart transplant.     Gynecologic Surgical Clearance  -Pap smear performed. Gyn team to follow up results   - Recommend outpatient gynecology follow up for routine care     Stage C HRrEF   -continue care by primary team    Gynecology team signing off. If you have any questions or concerns, please feel free to contact us at [phone]    D/w Dr. Marrero,    Lacey Mata MD, PGY-3          [1] No family history on file.

## 2025-06-05 NOTE — PROGRESS NOTES
"Thao Evans is a 60 y.o. female on day 3 of admission presenting with ACC/AHA stage D systolic heart failure.    Subjective   SRUTHION, pt doing ok in NAD.   I have reviewed histories, allergies and medications have been reviewed and there are no changes       Objective       Last Recorded Vitals  Blood pressure 105/63, pulse (!) 116, temperature 36.2 °C (97.2 °F), resp. rate 17, height 1.575 m (5' 2\"), weight 57.5 kg (126 lb 10.5 oz), SpO2 95%.  Intake/Output last 3 Shifts:  I/O last 3 completed shifts:  In: 1131 (19.7 mL/kg) [I.V.:881 (15.3 mL/kg); IV Piggyback:250]  Out: 3900 (67.9 mL/kg) [Urine:3900 (1.9 mL/kg/hr)]  Weight: 57.4 kg     Relevant Results  Results from last 7 days   Lab Units 06/05/25  1520 06/05/25  1158 06/05/25  0731 06/05/25  0607 06/04/25  2046 06/04/25  1736 06/04/25  0734 06/04/25  0547 06/03/25  1101 06/03/25  0842 06/03/25  0739 06/02/25  2349 06/02/25  1450 06/02/25  1346   POCT GLUCOSE mg/dL 174* 252* 207*  --  211* 149*   < >  --    < >  --    < >  --    < >  --    GLUCOSE mg/dL  --   --   --  180*  --   --   --  168*  --  261*  --  277*  --  193*    < > = values in this interval not displayed.     Component  Ref Range & Units 06:07  (6/5/25) 3 d ago  (6/2/25) 3 d ago  (6/2/25)   Thyroxine, Free  0.78 - 1.48 ng/dL 1.23 2.05 High           Assessment & Plan  ACC/AHA stage D systolic heart failure    Acute on chronic systolic heart failure    Type 2 diabetes mellitus with hyperglycemia, with long-term current use of insulin    Ms. Evans is a 60F with a PMHx sig for CAD s/p PCI (LAD; 2015, Lcx; 2025), stage C systolic HF/ICM/HFrEF s/p ICD, h/o VT with presumed associated syncope, poorly controlled DM, pAF (off of DOAC given the absence of recurrence), dyslipidemia, essential HTN, COPD, and hypothyroidism, with progressive HF symptoms and intolerant of GDMT - on midodrine who presents to HFICU as a direct admission for PAC guided evaluation.    Endocrine is consulted for hyperglycemia and DM " management and hypothyroidism management.         Diabetes History  Type of diabetes: DM2   Year diagnosed or age: 10-15 years ago  Hospitalizations for DKA or HHS: no  Complications: CAD  Seen by PCP or Endocrinology: PCP  Frequency of glucose checks: twice daily  Glucose review: per patient 150s-200s  Frequency of Hypoglycemia: rare, has not happened in months  Hypoglycemia unawareness: no  Severe hypoglycemia requiring assistance from others:  no  Recent A1c is 8.6 in 6/2/25     Home Medications  Basal: lantus 15u (of note pt thought this was lispro but reviewed home med rec- has only ever taken basal insulin outpt)  Orals: jardiance 10mg, alogliptin 25mg  Previous meds:  metformin and GLP1 stopped due to GI side effects     She has ongoing fatigue, dyspnea, and early satiety. She also reports progressive weight loss (~60 lbs over the last 6 months). As a result, she states her A1c has dropped from 12 to 8% as of this past week.          Thyroid Hx:  -Patient with Hx of hypothyroidism x 15 years, managed by her PCP  -Home dose levothyroxine 200 mcg daily. Takes it adequately .  -She stated that her levothyroxine dose has been progressively increased over time, but does not recall when her TFTs were last checked (prior to this admission).   -Reports significant weight loss over the past few months (~60 lbs over the last 6 months)  -Labs on 6/2 resulted in low TSH at 0.20 and elevated FT4 at 2.05, She is clinically & biochemically hyperthyroid, indicating overtreatment of her long-standing hypothyroidism.   -She has been off levothyroxine since was admitted. Last took on 5/30        #Hyperglycemia 2/2 poorly controlled diabetes, and stress state  -continue Glargine 10 units qhs  -increase prandial Lispro to 5 units with meals  -Low dose SSI #1 (1U of Lispro for every 50 above 150) TID w/ meals only; avoid night time correction   -Accuchecks (not BMP) TID and QHS-BG inpatient target 140-180  -Hypoglycemia  protocol  -Diabetic Diet- low carb 60 CHO  -Will continue to follow and titrate insulin accordingly        #Hypothyroidism:  -Labs on 6/2 resulted in low TSH at 0.20 and elevated FT4 at 2.05  Due to recent weight loss her current home dose  200 mcg levothyroxine is too high at this time resulting in hyperthyroidism. She is clinically & biochemically hyperthyroid  She Last took LT4 on 5/30  -Repeat FT4 on 6/5 resulted in normal range at 1.23  -Start levothyroxine 88 mcg tomorrow am, to be given on empty stomach   -repeat TFTs in 6-8 weeks in outpatient with PCP     Plan conveyed to primary team  Case seen, examined, and discussed with Dr. Lundgrin Diana Sawass Najjar, MD  Endocrine fellow       Diana Sawass Najjar, MD

## 2025-06-05 NOTE — PROGRESS NOTES
[facility]  HFICU PROGRESS NOTE    Thao Evans/[id number]    Admit Date: 6/2/2025  Hospital Length of Stay: 3   ICU Length of Stay: 2d 22h   Primary Service: HFICU  Primary HF Cardiologist: Dr. Deluca  Referring: Dr. Gloevr ([place])    INTERVAL EVENTS / PERTINENT ROS:     Nauseous this AM, threw up as soon as her IV Fe infusion started. Breathing stable, now on 5LNC. Otherwise no other acute complaints.     Plan:  - Wean nipride gtt.  - Titrate po afterload reduction with isordil/hydral and start entresto.   - Diuresis with IV lasix 40mg this AM with elevated WP and increasing PAPs.   - Gynecology consulted for PAP smear.   - Continue advanced therapies evaluation.     MEDICATIONS  Infusions:  lactated Ringer's, Last Rate: 10 mL/hr (06/05/25 0600)  nitroprusside, Last Rate: 2 mcg/kg/min (06/05/25 1005)      Scheduled:  atorvastatin, 80 mg, Daily  [Held by provider] bumetanide, 2 mg, BID  citalopram, 40 mg, Daily  clopidogrel, 75 mg, Daily  empagliflozin, 10 mg, Daily  enoxaparin, 40 mg, q24h  fluticasone furoate-vilanteroL, 1 puff, Daily  gabapentin, 400 mg, q8h MELYSSA  hydrALAZINE, 100 mg, q8h MELYSSA  insulin glargine, 10 Units, Nightly  insulin lispro, 0-5 Units, TID AC  insulin lispro, 3 Units, TID AC  [Held by provider] iron sucrose, 200 mg, Daily  isosorbide dinitrate, 40 mg, TID  [Held by provider] metoprolol succinate XL, 12.5 mg, Daily  [Held by provider] midodrine, 5 mg, TID  rOPINIRole, 1 mg, TID  rOPINIRole, 3 mg, Nightly  sacubitriL-valsartan, 1 tablet, BID  sennosides-docusate sodium, 2 tablet, BID  spironolactone, 25 mg, Daily      PRN:  acetaminophen, 650 mg, q6h PRN  albuterol, 3 mL, q6h PRN  dextrose, 12.5 g, q15 min PRN  dextrose, 25 g, q15 min PRN  glucagon, 1 mg, q15 min PRN  glucagon, 1 mg, q15 min PRN  guaiFENesin, 600 mg, BID PRN  oxygen, , Continuous PRN - O2/gases  polyethylene glycol, 17 g, Daily PRN      Invasive Hemodynamics:    Most Recent Range Past 24hrs  "  BP (Art)   No data recorded   MAP(Art) 163 mmHg Arterial Line MAP 1 (mmHg)   Min: 163 mmHg  Max: 163 mmHg   RA/CVP   No data recorded   PA 67/28 PAP  Min: 48/22  Max: 81/49   PA(mean) 46 mmHg PAP (Mean)  Min: 33 mmHg  Max: 63 mmHg   PCWP 26 mmHg PCWP (mmHg)  Min: 23 mmHg  Max: 26 mmHg   CO 5.6 L/min CO (L/min)  Min: 4.52 L/min  Max: 5.8 L/min   CI 3.6 L/min/m2 CI (L/min/m2)  Min: 2.9 L/min/m2  Max: 3.7 L/min/m2   Mixed Venous 74 % SVO2 (%)  Min: 65 %  Max: 76 %   SVR  915 (dyne*sec)/cm5 SVR (dyne*sec)/cm5  Min: 892 (dyne*sec)/cm5  Max: 1327 (dyne*sec)/cm5    (dyne*sec)/cm5 PVR (dyne*sec)/cm5  Min: 237 (dyne*sec)/cm5  Max: 257 (dyne*sec)/cm5     PHYSICAL EXAM:   Visit Vitals  /63   Pulse (!) 116   Temp 36.7 °C (98.1 °F) (Temporal)   Resp 17   Ht 1.575 m (5' 2\")   Wt 57.5 kg (126 lb 10.5 oz)   SpO2 95%   BMI 23.17 kg/m²   Smoking Status Former   BSA 1.59 m²       Wt Readings from Last 5 Encounters:   06/05/25 57.5 kg (126 lb 10.5 oz)   05/30/25 56.2 kg (124 lb)       INTAKE/OUTPUT:  I/O last 3 completed shifts:  In: 1131 (19.7 mL/kg) [I.V.:881 (15.3 mL/kg); IV Piggyback:250]  Out: 3900 (67.9 mL/kg) [Urine:3900 (1.9 mL/kg/hr)]  Weight: 57.4 kg      Physical Exam  Constitutional:       General: She is not in acute distress.     Appearance: Normal appearance. She is not ill-appearing.   HENT:      Mouth/Throat:      Mouth: Mucous membranes are moist.   Eyes:      General: No scleral icterus.     Extraocular Movements: Extraocular movements intact.      Conjunctiva/sclera: Conjunctivae normal.   Cardiovascular:      Rate and Rhythm: Normal rate and regular rhythm.      Heart sounds: Normal heart sounds. No murmur heard.  Pulmonary:      Effort: Pulmonary effort is normal. No respiratory distress.      Breath sounds: Normal breath sounds.      Comments: On 5LNC.  Abdominal:      General: Abdomen is flat. There is no distension.      Palpations: Abdomen is soft.      Tenderness: There is no abdominal " tenderness.   Musculoskeletal:      Right lower leg: No edema.      Left lower leg: No edema.   Skin:     General: Skin is warm and dry.   Neurological:      General: No focal deficit present.      Mental Status: She is alert and oriented to person, place, and time. Mental status is at baseline.   Psychiatric:         Mood and Affect: Mood normal.         Behavior: Behavior normal.         Thought Content: Thought content normal.         DATA:  CMP:  Results from last 7 days   Lab Units 06/05/25  0607 06/04/25  0547 06/03/25  0842 06/02/25  2349 06/02/25  1346   SODIUM mmol/L 135* 136 132* 130* 133*   POTASSIUM mmol/L 4.5 4.0 5.0 3.5 2.8*   CHLORIDE mmol/L 100 100 92* 89* 85*   CO2 mmol/L 26 28 33* 33* 36*   ANION GAP mmol/L 14 12 12 12 15   BUN mg/dL 11 10 15 17 15   CREATININE mg/dL 0.65 0.61 0.85 0.76 0.81   EGFR mL/min/1.73m*2 >90 >90 78 89 82   MAGNESIUM mg/dL 2.08 2.19 2.33  --  1.86   ALBUMIN g/dL 3.4 3.1* 3.4 3.2* 3.9   ALT U/L  --   --   --   --  8   AST U/L  --   --   --   --  12   BILIRUBIN TOTAL mg/dL  --   --   --   --  1.7*     CBC:  Results from last 7 days   Lab Units 06/05/25  0607 06/04/25  0547 06/03/25  0842 06/02/25  1346   WBC AUTO x10*3/uL 8.2 7.0 8.2 10.6   HEMOGLOBIN g/dL 11.9* 12.3 13.6 15.0   HEMATOCRIT % 35.4* 36.7 42.1 46.8*   PLATELETS AUTO x10*3/uL 179 186 203 237   MCV fL 89 89 93 91     COAG:   Results from last 7 days   Lab Units 06/02/25  1346   INR  1.0     ABO:   ABO TYPE   Date Value Ref Range Status   06/02/2025 O  Final     HEME/ENDO:  Results from last 7 days   Lab Units 06/05/25  0607 06/02/25  1346   FERRITIN ng/mL  --  232*   IRON SATURATION %  --  17*   TSH mIU/L 0.19* 0.20*   HEMOGLOBIN A1C %  --  8.6*      CARDIAC:   Results from last 7 days   Lab Units 06/03/25  1743 06/02/25  1346   LD U/L 213  --    TROPHSCMC ng/L  --  22   BNP pg/mL  --  1,292*       ASSESSMENT AND PLAN:   Ms. Evans is a 60F with a PMHx sig for CAD s/p PCI (LAD; 2015, Lcx; 2024), stage C systolic  HF/ICM/HFrEF s/p ICD, h/o VT with presumed associated syncope, poorly controlled DM, pAF (off of DOAC given the absence of recurrence), dyslipidemia, essential HTN, COPD, and hypothyroidism with progressive HF symptoms and intolerant of GDMT - on midodrine who presents to HFICU as a direct admission for PAC guided evaluation. Advanced therapies evaluation was initiated 6/3 for OHT/LVAD.      Neuro:  - Serial neuro and pain assessments   - PO Tylenol PRN for pain  - PT/OT Consult, OOB to chair  - CAM ICU score every shift  - Sleep/wake cycle normalization    #Restless legs  - C/w home requip     #Anxiety  #Depression  - C/w home celexa   - C/w home gabapentin     # Physical Status  - Not obese, BMI 22.68 kg/m2   - Reduced Mobility with HF symptoms     #Substance abuse  -Alcohol dependence: rare use  -Tobacco dependence: previous smoker, quit 1.5 months prior to admission date.      Cardiovascular:  #HFrEF EF 30-35%, ICM, Stage C  - TTE 3/2025 at OSH: LVEF 30-35%, normal RV size with low normal function, mild MR, small pericardial effusion  - TTE 6/2/2025 on arrival: LVEF 20-25%/LVIDd 4.1cm, normal RVSF, mild MR/TR, trivial pericardial effusion.  - Admit weight: 54.4kg admit BNP 1292   - Opening SGC #s 6/2: BP 97/55 (68), CVP 3, PAP 39/18 (27), CO/CI 2.86/1.87, SVR 1817, SVO2 62% on midodrine 5 mg, empa 10, sandi 25.  - Daily SGC #s 6/5: 100/62 (74), CVP 10, PAP 63/29 (44), CO/CI 5.59/3.56. SVO2 66%. On nipride gtt 2, empa 10, sandi 25, hydral 100 mg, isordil 40 mg.   - Home medications: meto XL 12.5mg daily, spironolactone 25mg daily, jardiance 10mg daily, bumex 2mg bid, midodrine 5mg TID.  - wean nipride gtt  - start entresto 49-51mg bid.   - C/w hydralazine 100 and isosorbide 40 TID. Will adjust as needed for GDMT titrations.  - C/w empa 10 and sandi 25mg daily   - holding BB for now   - Diuretics: prn hemodynamic measurement, with increasing O2 requirements/worsening edema on CXR, climbing PCWP will diuresis with  IV lasix 40mg today.   - Open advanced therapies evaluation, email sent out 6/3, consent signed 6/3   - Daily standing weights, 2gm sodium diet, 2L fluid restriction, strict I&Os    LVAD or Transplant: will order the things below when patient educated and consented.  -DT or BTT: O  -Email sent on 6/3, consent signed 6/3   -Labs 6/3  -RHC  -LHC 10/9/25  -ECHO 6/2/25  -CT chest ordered  -US abdomen/aorta/iliac/IVC completed 6/4  -Carotid US completed 6/4  -JONATHAN (lower) completed 6/4  -2 view CXR completed 6/4  -Device interrogation 5/2  -PFTs completed 6/4  -Colonoscopy/Occult stool - discussed with GI, will hold off on inpatient unless approved and will be staying inpatient prior to OR.   -Cancer screening - pap smear, consulted GYN. Mammogram ordered.  -LVAD/TX education  -CT surgery consult ordered  -Palliative consult following.  -Nutrition consult complete 6/3  -Dental consult/Orthopantogram - panorex negative.   -Physical and Occupational Therapy following.    #Hypotension  - Home midodrine stopped    #HLD   - C/w home statin     #CAD s/p PCI (LAD in 2015, LCx in 2024)  - C/w home asa 81mg daily, Plavix   - C/w home statin     #H/o VT  #Paroxysmal A Fib  - Device: s/p CRT-D 3/2025, [employer]   - Holding home BB until we get invasive hemodynamics   - AC: off DOAC given absence of recurrence     #Electrolyte Disturbances  - K goal >4, Mg>2     Pulmonary:   #acute hypoxic respiratory failure - on 5LNC.  #COPD  #Pulmonary edema/pleural effusions  - C/w home inhalers  - IV diuretics as above  - Consider consult to IR for tapping effusions   - Monitor and maintain SpO2 > 92%     GI:  #GERD  - C/w home ppi  - Bowel regimen: prn miralax, juliocesar-colace BID   - Diet: carb controlled     :  #No renal dysfunction at baseline  - Baseline Cr 0.5-0.9  - Admit BUN 15/Cr 0.81   - I/Os  - Avoid hypotension and nephrotoxic agents     Heme:  #Fe deficiency in setting of CHF  - Fe 47, %sat 17%, TIBC 283, ferritin 232  - IV  venofer ordered 6/3 for total of 5 doses (est end date 6/7), hold 6/5 with some nausea after dose this AM.      Endo:  #DM, T2  - hgbA1c 8.6, previously was 12.3 in 3/2025  - home medications: insulin - glargine, jardiance, nesina.  - ISS while inpatient, adjust as needed - glargine 10 units nightly, 3 units premeals, ISS coverage.   - Endocrine consulted     #Hypothyroidism  - TSH 0.2, Free T4 2.05  - Repeat TSH 0.19, free T4 1.23/T3 67.  - Hold home levothyroxine 200mcg daily. Will touch base with restarting at decreased dose with endocrinology today.       ID:  - Afebrile, nontoxic   - No s/s infx  - Trend temps q4h     PHYSICAL AND OCCUPATIONAL THERAPY: ordered    LINES:  PIVs   [place] [facility]: 6/2 -     DVT: lovenox   VAP BUNDLE: NA  CENTRAL LINE BUNDLE: NA  ULCER PPX: home PPI  GLYCEMIC CONTROL: SSI/lantus  BOWEL CARE: juliocesar-colace, miralax PRN   INDWELLING CATHETER: NA  NUTRITION: Adult diet Consistent Carb; CCD 75 gm/meal    EMERGENCY CONTACT: Extended Emergency Contact Information  Primary Emergency Contact: Daisy Velasco  Mobile Phone: [phone]  Relation: Sister  FAMILY UPDATE:  CODE STATUS: Full Code  DISPO: HFICU    Patient seen and assessed with Dr. Kelly.  _________________________________________________  Shonna Sharp DO

## 2025-06-05 NOTE — PROGRESS NOTES
Social Work Note  - HFICU Treatment Plan: direct admit from PAC guided evaluation, in  HFICU for advance therapy evaluation for OHT/LVAD, patient on 5LNC,not on oxygen at home   - Payor source: [employer]    - Support: sister Daisy is listed as NOK and primary contact   -Planned Disposition: Rehab recommendation - Low intensity - patient was informed that outpatient cardiac rehab is being recommended post hospitalization. She is in agreement.   - Additional discharge concerns: none noted at this time, SW will continue to follow   QUYEN Lincoln LSW   - SDOH and Discharge assessments were completed by nursing. When I spoke went over the information from the assessments. NO SDOH or discharge concerns were indentified  Living arrangements - Patient lives alone   Primary support -  SisterDaisy is her emergency contact  Supplies - none . patient was independent prior to admission.   Medical care: Patient PCP is Dr Peace in [place], [place]   Pharmacy: [facility]   Safety - No recent falls. No safety concerns  Housing/food/utiliies/ transportation: No concerns with obtaining food, paying for housing and utilites or going to appointments. She drives herself  Financial Status: [employer] no financial issues were expressed     Request resource: none at this time  NATHAN Lincoln

## 2025-06-06 ENCOUNTER — APPOINTMENT (OUTPATIENT)
Dept: RADIOLOGY | Facility: HOSPITAL | Age: 60
DRG: 291 | End: 2025-06-06
Payer: COMMERCIAL

## 2025-06-06 LAB
ALBUMIN SERPL BCP-MCNC: 3.3 G/DL (ref 3.4–5)
ANION GAP BLDMV CALCULATED.4IONS-SCNC: 6 MMO/L (ref 10–25)
ANION GAP BLDMV CALCULATED.4IONS-SCNC: 8 MMO/L (ref 10–25)
ANION GAP SERPL CALC-SCNC: 12 MMOL/L (ref 10–20)
APPEARANCE UR: CLEAR
BACTERIA UR CULT: ABNORMAL
BASE EXCESS BLDMV CALC-SCNC: 2.4 MMOL/L (ref -2–3)
BASE EXCESS BLDMV CALC-SCNC: 3.8 MMOL/L (ref -2–3)
BASOPHILS # BLD AUTO: 0.04 X10*3/UL (ref 0–0.1)
BASOPHILS NFR BLD AUTO: 0.6 %
BILIRUB UR STRIP.AUTO-MCNC: NEGATIVE MG/DL
BODY TEMPERATURE: 37 DEGREES CELSIUS
BODY TEMPERATURE: 37 DEGREES CELSIUS
BUN SERPL-MCNC: 15 MG/DL (ref 6–23)
CA-I BLDMV-SCNC: 1.2 MMOL/L (ref 1.1–1.33)
CA-I BLDMV-SCNC: 1.23 MMOL/L (ref 1.1–1.33)
CALCIUM SERPL-MCNC: 8.4 MG/DL (ref 8.6–10.6)
CHLORIDE BLD-SCNC: 99 MMOL/L (ref 98–107)
CHLORIDE BLD-SCNC: 99 MMOL/L (ref 98–107)
CHLORIDE SERPL-SCNC: 98 MMOL/L (ref 98–107)
CO2 SERPL-SCNC: 29 MMOL/L (ref 21–32)
COLOR UR: ABNORMAL
CREAT SERPL-MCNC: 0.64 MG/DL (ref 0.5–1.05)
EGFRCR SERPLBLD CKD-EPI 2021: >90 ML/MIN/1.73M*2
EOSINOPHIL # BLD AUTO: 0.07 X10*3/UL (ref 0–0.7)
EOSINOPHIL NFR BLD AUTO: 1.1 %
ERYTHROCYTE [DISTWIDTH] IN BLOOD BY AUTOMATED COUNT: 13.2 % (ref 11.5–14.5)
GLUCOSE BLD MANUAL STRIP-MCNC: 135 MG/DL (ref 74–99)
GLUCOSE BLD MANUAL STRIP-MCNC: 172 MG/DL (ref 74–99)
GLUCOSE BLD MANUAL STRIP-MCNC: 197 MG/DL (ref 74–99)
GLUCOSE BLD MANUAL STRIP-MCNC: 229 MG/DL (ref 74–99)
GLUCOSE BLD MANUAL STRIP-MCNC: 231 MG/DL (ref 74–99)
GLUCOSE BLD-MCNC: 133 MG/DL (ref 74–99)
GLUCOSE BLD-MCNC: 251 MG/DL (ref 74–99)
GLUCOSE SERPL-MCNC: 121 MG/DL (ref 74–99)
GLUCOSE UR STRIP.AUTO-MCNC: ABNORMAL MG/DL
HCO3 BLDMV-SCNC: 29.1 MMOL/L (ref 22–26)
HCO3 BLDMV-SCNC: 30.5 MMOL/L (ref 22–26)
HCT VFR BLD AUTO: 35.6 % (ref 36–46)
HCT VFR BLD EST: 34 % (ref 36–46)
HCT VFR BLD EST: 38 % (ref 36–46)
HGB BLD-MCNC: 11.7 G/DL (ref 12–16)
HGB BLDMV-MCNC: 11.3 G/DL (ref 12–16)
HGB BLDMV-MCNC: 12.6 G/DL (ref 12–16)
HLA RESULTS: NORMAL
HLA-A+B+C AB NFR SER: NORMAL %
HLA-DP+DQ+DR AB NFR SER: NORMAL %
IMM GRANULOCYTES # BLD AUTO: 0.03 X10*3/UL (ref 0–0.7)
IMM GRANULOCYTES NFR BLD AUTO: 0.5 % (ref 0–0.9)
INHALED O2 CONCENTRATION: 24 %
INHALED O2 CONCENTRATION: 40 %
KETONES UR STRIP.AUTO-MCNC: NEGATIVE MG/DL
LACTATE BLDMV-SCNC: 0.4 MMOL/L (ref 0.4–2)
LACTATE BLDMV-SCNC: 1.1 MMOL/L (ref 0.4–2)
LEUKOCYTE ESTERASE UR QL STRIP.AUTO: ABNORMAL
LYMPHOCYTES # BLD AUTO: 0.88 X10*3/UL (ref 1.2–4.8)
LYMPHOCYTES NFR BLD AUTO: 13.5 %
MAGNESIUM SERPL-MCNC: 2.28 MG/DL (ref 1.6–2.4)
MCH RBC QN AUTO: 29.4 PG (ref 26–34)
MCHC RBC AUTO-ENTMCNC: 32.9 G/DL (ref 32–36)
MCV RBC AUTO: 89 FL (ref 80–100)
MGC ASCENT PFT - FEV1 - PRE: 0.74
MGC ASCENT PFT - FEV1 - PREDICTED: 2.24
MGC ASCENT PFT - FVC - PRE: 0.94
MGC ASCENT PFT - FVC - PREDICTED: 2.82
MONOCYTES # BLD AUTO: 0.7 X10*3/UL (ref 0.1–1)
MONOCYTES NFR BLD AUTO: 10.7 %
NEUTROPHILS # BLD AUTO: 4.8 X10*3/UL (ref 1.2–7.7)
NEUTROPHILS NFR BLD AUTO: 73.6 %
NITRITE UR QL STRIP.AUTO: NEGATIVE
NRBC BLD-RTO: 0 /100 WBCS (ref 0–0)
OXYHGB MFR BLDMV: 60.3 % (ref 45–75)
OXYHGB MFR BLDMV: 66.6 % (ref 45–75)
PCO2 BLDMV: 54 MM HG (ref 41–51)
PCO2 BLDMV: 54 MM HG (ref 41–51)
PH BLDMV: 7.34 PH (ref 7.33–7.43)
PH BLDMV: 7.36 PH (ref 7.33–7.43)
PH UR STRIP.AUTO: 6.5 [PH]
PHOSPHATE SERPL-MCNC: 3.1 MG/DL (ref 2.5–4.9)
PLATELET # BLD AUTO: 173 X10*3/UL (ref 150–450)
PO2 BLDMV: 39 MM HG (ref 35–45)
PO2 BLDMV: 43 MM HG (ref 35–45)
POTASSIUM BLDMV-SCNC: 4.1 MMOL/L (ref 3.5–5.3)
POTASSIUM BLDMV-SCNC: 4.8 MMOL/L (ref 3.5–5.3)
POTASSIUM SERPL-SCNC: 4.8 MMOL/L (ref 3.5–5.3)
PROT C AG ACT/NOR PPP IA: 82 % (ref 63–153)
PROT S AG ACT/NOR PPP IA: 137 % (ref 63–126)
PROT UR STRIP.AUTO-MCNC: NEGATIVE MG/DL
RBC # BLD AUTO: 3.98 X10*6/UL (ref 4–5.2)
RBC # UR STRIP.AUTO: ABNORMAL MG/DL
RBC #/AREA URNS AUTO: ABNORMAL /HPF
SAO2 % BLDMV: 62 % (ref 45–75)
SAO2 % BLDMV: 68 % (ref 45–75)
SODIUM BLDMV-SCNC: 131 MMOL/L (ref 136–145)
SODIUM BLDMV-SCNC: 131 MMOL/L (ref 136–145)
SODIUM SERPL-SCNC: 134 MMOL/L (ref 136–145)
SP GR UR STRIP.AUTO: 1.01
T GONDII IGM SER-ACNC: 11.2 AU/ML
TSH RECEP AB SER-ACNC: 1.23 IU/L
UROBILINOGEN UR STRIP.AUTO-MCNC: NORMAL MG/DL
WBC # BLD AUTO: 6.5 X10*3/UL (ref 4.4–11.3)
WBC #/AREA URNS AUTO: ABNORMAL /HPF
WBC CLUMPS #/AREA URNS AUTO: ABNORMAL /HPF
YEAST BUDDING #/AREA UR COMP ASSIST: PRESENT /HPF

## 2025-06-06 PROCEDURE — 99232 SBSQ HOSP IP/OBS MODERATE 35: CPT | Performed by: INTERNAL MEDICINE

## 2025-06-06 PROCEDURE — 2500000002 HC RX 250 W HCPCS SELF ADMINISTERED DRUGS (ALT 637 FOR MEDICARE OP, ALT 636 FOR OP/ED)

## 2025-06-06 PROCEDURE — 2500000001 HC RX 250 WO HCPCS SELF ADMINISTERED DRUGS (ALT 637 FOR MEDICARE OP): Performed by: PHYSICIAN ASSISTANT

## 2025-06-06 PROCEDURE — 99291 CRITICAL CARE FIRST HOUR: CPT | Performed by: INTERNAL MEDICINE

## 2025-06-06 PROCEDURE — 71045 X-RAY EXAM CHEST 1 VIEW: CPT

## 2025-06-06 PROCEDURE — 83605 ASSAY OF LACTIC ACID: CPT

## 2025-06-06 PROCEDURE — 82435 ASSAY OF BLOOD CHLORIDE: CPT

## 2025-06-06 PROCEDURE — 2500000002 HC RX 250 W HCPCS SELF ADMINISTERED DRUGS (ALT 637 FOR MEDICARE OP, ALT 636 FOR OP/ED): Performed by: PHYSICIAN ASSISTANT

## 2025-06-06 PROCEDURE — 2500000005 HC RX 250 GENERAL PHARMACY W/O HCPCS: Performed by: NURSE PRACTITIONER

## 2025-06-06 PROCEDURE — 85025 COMPLETE CBC W/AUTO DIFF WBC: CPT | Performed by: STUDENT IN AN ORGANIZED HEALTH CARE EDUCATION/TRAINING PROGRAM

## 2025-06-06 PROCEDURE — 2500000001 HC RX 250 WO HCPCS SELF ADMINISTERED DRUGS (ALT 637 FOR MEDICARE OP)

## 2025-06-06 PROCEDURE — 2500000004 HC RX 250 GENERAL PHARMACY W/ HCPCS (ALT 636 FOR OP/ED)

## 2025-06-06 PROCEDURE — 81001 URINALYSIS AUTO W/SCOPE: CPT | Performed by: STUDENT IN AN ORGANIZED HEALTH CARE EDUCATION/TRAINING PROGRAM

## 2025-06-06 PROCEDURE — 82947 ASSAY GLUCOSE BLOOD QUANT: CPT

## 2025-06-06 PROCEDURE — G0545 PR INHERENT VISIT TO INPT: HCPCS | Performed by: STUDENT IN AN ORGANIZED HEALTH CARE EDUCATION/TRAINING PROGRAM

## 2025-06-06 PROCEDURE — 77066 DX MAMMO INCL CAD BI: CPT

## 2025-06-06 PROCEDURE — 37799 UNLISTED PX VASCULAR SURGERY: CPT

## 2025-06-06 PROCEDURE — 94640 AIRWAY INHALATION TREATMENT: CPT

## 2025-06-06 PROCEDURE — 2500000004 HC RX 250 GENERAL PHARMACY W/ HCPCS (ALT 636 FOR OP/ED): Performed by: PHYSICIAN ASSISTANT

## 2025-06-06 PROCEDURE — 2500000002 HC RX 250 W HCPCS SELF ADMINISTERED DRUGS (ALT 637 FOR MEDICARE OP, ALT 636 FOR OP/ED): Performed by: STUDENT IN AN ORGANIZED HEALTH CARE EDUCATION/TRAINING PROGRAM

## 2025-06-06 PROCEDURE — 2500000004 HC RX 250 GENERAL PHARMACY W/ HCPCS (ALT 636 FOR OP/ED): Mod: JW | Performed by: STUDENT IN AN ORGANIZED HEALTH CARE EDUCATION/TRAINING PROGRAM

## 2025-06-06 PROCEDURE — 77062 BREAST TOMOSYNTHESIS BI: CPT | Performed by: STUDENT IN AN ORGANIZED HEALTH CARE EDUCATION/TRAINING PROGRAM

## 2025-06-06 PROCEDURE — 83735 ASSAY OF MAGNESIUM: CPT | Performed by: STUDENT IN AN ORGANIZED HEALTH CARE EDUCATION/TRAINING PROGRAM

## 2025-06-06 PROCEDURE — 1100000001 HC PRIVATE ROOM DAILY

## 2025-06-06 PROCEDURE — 82435 ASSAY OF BLOOD CHLORIDE: CPT | Performed by: STUDENT IN AN ORGANIZED HEALTH CARE EDUCATION/TRAINING PROGRAM

## 2025-06-06 PROCEDURE — 77066 DX MAMMO INCL CAD BI: CPT | Performed by: STUDENT IN AN ORGANIZED HEALTH CARE EDUCATION/TRAINING PROGRAM

## 2025-06-06 PROCEDURE — 2500000001 HC RX 250 WO HCPCS SELF ADMINISTERED DRUGS (ALT 637 FOR MEDICARE OP): Performed by: STUDENT IN AN ORGANIZED HEALTH CARE EDUCATION/TRAINING PROGRAM

## 2025-06-06 PROCEDURE — 99223 1ST HOSP IP/OBS HIGH 75: CPT | Performed by: STUDENT IN AN ORGANIZED HEALTH CARE EDUCATION/TRAINING PROGRAM

## 2025-06-06 RX ORDER — CEFTRIAXONE 1 G/50ML
1 INJECTION, SOLUTION INTRAVENOUS EVERY 24 HOURS
Status: DISCONTINUED | OUTPATIENT
Start: 2025-06-06 | End: 2025-06-07

## 2025-06-06 RX ORDER — FUROSEMIDE 10 MG/ML
20 INJECTION INTRAMUSCULAR; INTRAVENOUS ONCE
Status: COMPLETED | OUTPATIENT
Start: 2025-06-06 | End: 2025-06-06

## 2025-06-06 RX ORDER — INSULIN LISPRO 100 [IU]/ML
6 INJECTION, SOLUTION INTRAVENOUS; SUBCUTANEOUS
Status: DISPENSED | OUTPATIENT
Start: 2025-06-07

## 2025-06-06 RX ADMIN — ROPINIROLE HYDROCHLORIDE 1 MG: 3 TABLET, FILM COATED ORAL at 08:24

## 2025-06-06 RX ADMIN — SACUBITRIL AND VALSARTAN 1 TABLET: 49; 51 TABLET, FILM COATED ORAL at 21:13

## 2025-06-06 RX ADMIN — LEVOTHYROXINE SODIUM 88 MCG: 0.09 TABLET ORAL at 05:43

## 2025-06-06 RX ADMIN — INSULIN LISPRO 1 UNITS: 100 INJECTION, SOLUTION INTRAVENOUS; SUBCUTANEOUS at 18:01

## 2025-06-06 RX ADMIN — FLUTICASONE FUROATE AND VILANTEROL TRIFENATATE 1 PUFF: 100; 25 POWDER RESPIRATORY (INHALATION) at 09:02

## 2025-06-06 RX ADMIN — INSULIN LISPRO 2 UNITS: 100 INJECTION, SOLUTION INTRAVENOUS; SUBCUTANEOUS at 12:17

## 2025-06-06 RX ADMIN — IRON SUCROSE 200 MG: 20 INJECTION, SOLUTION INTRAVENOUS at 08:19

## 2025-06-06 RX ADMIN — CITALOPRAM HYDROBROMIDE 40 MG: 40 TABLET ORAL at 05:43

## 2025-06-06 RX ADMIN — INSULIN GLARGINE 10 UNITS: 100 INJECTION, SOLUTION SUBCUTANEOUS at 21:13

## 2025-06-06 RX ADMIN — INSULIN LISPRO 5 UNITS: 100 INJECTION, SOLUTION INTRAVENOUS; SUBCUTANEOUS at 08:22

## 2025-06-06 RX ADMIN — CEFTRIAXONE SODIUM 1 G: 1 INJECTION, SOLUTION INTRAVENOUS at 16:41

## 2025-06-06 RX ADMIN — CLOPIDOGREL BISULFATE 75 MG: 75 TABLET, FILM COATED ORAL at 08:19

## 2025-06-06 RX ADMIN — ROPINIROLE HYDROCHLORIDE 3 MG: 3 TABLET, FILM COATED ORAL at 21:46

## 2025-06-06 RX ADMIN — Medication 3 L/MIN: at 09:02

## 2025-06-06 RX ADMIN — EMPAGLIFLOZIN 10 MG: 10 TABLET, FILM COATED ORAL at 08:19

## 2025-06-06 RX ADMIN — GABAPENTIN 400 MG: 300 CAPSULE ORAL at 13:12

## 2025-06-06 RX ADMIN — SPIRONOLACTONE 25 MG: 25 TABLET, FILM COATED ORAL at 08:19

## 2025-06-06 RX ADMIN — GABAPENTIN 400 MG: 300 CAPSULE ORAL at 21:13

## 2025-06-06 RX ADMIN — ATORVASTATIN CALCIUM 80 MG: 80 TABLET, FILM COATED ORAL at 08:19

## 2025-06-06 RX ADMIN — SODIUM CHLORIDE, POTASSIUM CHLORIDE, SODIUM LACTATE AND CALCIUM CHLORIDE 10 ML/HR: 600; 310; 30; 20 INJECTION, SOLUTION INTRAVENOUS at 16:42

## 2025-06-06 RX ADMIN — ENOXAPARIN SODIUM 40 MG: 100 INJECTION SUBCUTANEOUS at 13:12

## 2025-06-06 RX ADMIN — INSULIN LISPRO 5 UNITS: 100 INJECTION, SOLUTION INTRAVENOUS; SUBCUTANEOUS at 12:17

## 2025-06-06 RX ADMIN — GABAPENTIN 400 MG: 300 CAPSULE ORAL at 05:43

## 2025-06-06 RX ADMIN — ROPINIROLE HYDROCHLORIDE 1 MG: 3 TABLET, FILM COATED ORAL at 13:12

## 2025-06-06 RX ADMIN — SACUBITRIL AND VALSARTAN 1 TABLET: 49; 51 TABLET, FILM COATED ORAL at 08:19

## 2025-06-06 RX ADMIN — FUROSEMIDE 20 MG: 10 INJECTION, SOLUTION INTRAMUSCULAR; INTRAVENOUS at 11:51

## 2025-06-06 ASSESSMENT — ENCOUNTER SYMPTOMS
FACIAL ASYMMETRY: 0
CONFUSION: 0
SHORTNESS OF BREATH: 0
TROUBLE SWALLOWING: 0
NUMBNESS: 0
CHILLS: 0
DIAPHORESIS: 0
DIFFICULTY URINATING: 0
DIZZINESS: 0
COUGH: 0
HEADACHES: 0
ABDOMINAL PAIN: 0
LIGHT-HEADEDNESS: 0
FEVER: 0
DIARRHEA: 0

## 2025-06-06 ASSESSMENT — COGNITIVE AND FUNCTIONAL STATUS - GENERAL
DAILY ACTIVITIY SCORE: 24
STANDING UP FROM CHAIR USING ARMS: A LITTLE
MOVING TO AND FROM BED TO CHAIR: A LITTLE
MOBILITY SCORE: 21
WALKING IN HOSPITAL ROOM: A LITTLE

## 2025-06-06 ASSESSMENT — PAIN SCALES - GENERAL
PAINLEVEL_OUTOF10: 0 - NO PAIN

## 2025-06-06 ASSESSMENT — PAIN - FUNCTIONAL ASSESSMENT
PAIN_FUNCTIONAL_ASSESSMENT: 0-10

## 2025-06-06 NOTE — SIGNIFICANT EVENT
Patient transferred to the floor in stable condition to the [facility] Cardiology service. Upon evaluation, patient is HDS and in no acute distress with no c/o pain or discomfort. Based on chart review and physical examination, patient is appropriate for the HVI/HF floor service at this time. Transfer orders reviewed and updated. Plan of care as stated in transfer note.  To be seen and discussed with staff attending physician in the AM      Ms. Evans is a 60F with a PMHx sig for CAD s/p PCI (LAD; 2015, Lcx; 2024), stage C systolic HF/ICM/HFrEF s/p ICD, h/o VT with presumed associated syncope, poorly controlled DM, pAF (off of DOAC given the absence of recurrence), dyslipidemia, essential HTN, COPD, and hypothyroidism with progressive HF symptoms and intolerant of GDMT - on midodrine who presents to HFICU as a direct admission for PAC guided evaluation. Advanced therapies evaluation was initiated 6/3 for OHT/LVAD.

## 2025-06-06 NOTE — PROGRESS NOTES
[facility]  HFICU PROGRESS NOTE    Thao Evans/[id number]    Admit Date: 6/2/2025  Hospital Length of Stay: 4   ICU Length of Stay: 3d 20h   Primary Service: HFICU  Primary HF Cardiologist: Dr. Deluca  Referring: Dr. Glover ([place])    INTERVAL EVENTS / PERTINENT ROS:     Doing well this AM. Breathing stable, now on 3LNC. Some burning with urination. Otherwise no other acute complaints.     Plan:  - Diuresis with IV lasix 20mg this AM with elevated WP and increasing PAPs.   - Recheck SGC #s at noon and consider pulling SGC and transfer to medical floor if indices stable.   - UA with reflex to culture. Start ceftriaxone for 3 day, hold jardiance while being treated.   - Continue advanced therapies evaluation.     MEDICATIONS  Infusions:  lactated Ringer's, Last Rate: 10 mL/hr (06/05/25 1707)  [Held by provider] nitroprusside, Last Rate: Stopped (06/05/25 1330)      Scheduled:  atorvastatin, 80 mg, Daily  [Held by provider] bumetanide, 2 mg, BID  citalopram, 40 mg, Daily  clopidogrel, 75 mg, Daily  empagliflozin, 10 mg, Daily  enoxaparin, 40 mg, q24h  fluticasone furoate-vilanteroL, 1 puff, Daily  gabapentin, 400 mg, q8h MELYSSA  [Held by provider] hydrALAZINE, 25 mg, q8h MELYSSA  insulin glargine, 10 Units, Nightly  insulin lispro, 0-5 Units, TID AC  insulin lispro, 5 Units, TID AC  iron sucrose, 200 mg, Daily  [Held by provider] isosorbide dinitrate, 40 mg, TID  levothyroxine, 88 mcg, Daily  [Held by provider] metoprolol succinate XL, 12.5 mg, Daily  [Held by provider] midodrine, 5 mg, TID  rOPINIRole, 1 mg, TID  rOPINIRole, 3 mg, Nightly  sacubitriL-valsartan, 1 tablet, BID  sennosides-docusate sodium, 2 tablet, BID  spironolactone, 25 mg, Daily      PRN:  acetaminophen, 650 mg, q6h PRN  albuterol, 3 mL, q6h PRN  dextrose, 12.5 g, q15 min PRN  dextrose, 25 g, q15 min PRN  glucagon, 1 mg, q15 min PRN  glucagon, 1 mg, q15 min PRN  guaiFENesin, 600 mg, BID PRN  oxygen, , Continuous PRN -  "O2/gases  polyethylene glycol, 17 g, Daily PRN      Invasive Hemodynamics:    Most Recent Range Past 24hrs   BP (Art)   No data recorded   MAP(Art) 163 mmHg No data recorded   RA/CVP   No data recorded   PA 52/22 PAP  Min: 52/22  Max: 71/30   PA(mean) 35 mmHg PAP (Mean)  Min: 33 mmHg  Max: 48 mmHg   PCWP 19 mmHg PCWP (mmHg)  Min: 16 mmHg  Max: 19 mmHg   CO 4.31 L/min CO (L/min)  Min: 4.04 L/min  Max: 4.31 L/min   CI 2.75 L/min/m2 CI (L/min/m2)  Min: 2.57 L/min/m2  Max: 2.8 L/min/m2   Mixed Venous 74 % No data recorded   SVR  1243 (dyne*sec)/cm5 SVR (dyne*sec)/cm5  Min: 1016 (dyne*sec)/cm5  Max: 1346 (dyne*sec)/cm5    (dyne*sec)/cm5 PVR (dyne*sec)/cm5  Min: 297 (dyne*sec)/cm5  Max: 376 (dyne*sec)/cm5     PHYSICAL EXAM:   Visit Vitals  /66   Pulse 94   Temp 36 °C (96.8 °F) (Temporal)   Resp 21   Ht 1.575 m (5' 2\")   Wt 57.5 kg (126 lb 10.5 oz)   SpO2 98%   BMI 23.17 kg/m²   Smoking Status Former   BSA 1.59 m²       Wt Readings from Last 5 Encounters:   06/05/25 57.5 kg (126 lb 10.5 oz)   05/30/25 56.2 kg (124 lb)       INTAKE/OUTPUT:  I/O last 3 completed shifts:  In: 581 (10.1 mL/kg) [I.V.:331 (5.8 mL/kg); IV Piggyback:250]  Out: 2800 (48.7 mL/kg) [Urine:2800 (1.4 mL/kg/hr)]  Weight: 57.4 kg      Physical Exam  Constitutional:       General: She is not in acute distress.     Appearance: Normal appearance. She is not ill-appearing.   HENT:      Mouth/Throat:      Mouth: Mucous membranes are moist.   Eyes:      General: No scleral icterus.     Extraocular Movements: Extraocular movements intact.      Conjunctiva/sclera: Conjunctivae normal.   Cardiovascular:      Rate and Rhythm: Normal rate and regular rhythm.      Heart sounds: Normal heart sounds. No murmur heard.  Pulmonary:      Effort: Pulmonary effort is normal. No respiratory distress.      Breath sounds: Normal breath sounds.      Comments: On 3LNC.  Abdominal:      General: Abdomen is flat. There is no distension.      Palpations: Abdomen is " soft.      Tenderness: There is no abdominal tenderness.   Musculoskeletal:      Right lower leg: No edema.      Left lower leg: No edema.   Skin:     General: Skin is warm and dry.   Neurological:      General: No focal deficit present.      Mental Status: She is alert and oriented to person, place, and time. Mental status is at baseline.   Psychiatric:         Mood and Affect: Mood normal.         Behavior: Behavior normal.         Thought Content: Thought content normal.         DATA:  CMP:  Results from last 7 days   Lab Units 06/05/25  1808 06/05/25  0607 06/04/25  0547 06/03/25  0842 06/02/25  2349 06/02/25  1346   SODIUM mmol/L 132* 135* 136 132* 130* 133*   POTASSIUM mmol/L 3.6 4.5 4.0 5.0 3.5 2.8*   CHLORIDE mmol/L 94* 100 100 92* 89* 85*   CO2 mmol/L 30 26 28 33* 33* 36*   ANION GAP mmol/L 12 14 12 12 12 15   BUN mg/dL 14 11 10 15 17 15   CREATININE mg/dL 0.72 0.65 0.61 0.85 0.76 0.81   EGFR mL/min/1.73m*2 >90 >90 >90 78 89 82   MAGNESIUM mg/dL  --  2.08 2.19 2.33  --  1.86   ALBUMIN g/dL 3.4 3.4 3.1* 3.4 3.2* 3.9   ALT U/L  --   --   --   --   --  8   AST U/L  --   --   --   --   --  12   BILIRUBIN TOTAL mg/dL  --   --   --   --   --  1.7*     CBC:  Results from last 7 days   Lab Units 06/06/25  0519 06/05/25  0607 06/04/25  0547 06/03/25  0842 06/02/25  1346   WBC AUTO x10*3/uL 6.5 8.2 7.0 8.2 10.6   HEMOGLOBIN g/dL 11.7* 11.9* 12.3 13.6 15.0   HEMATOCRIT % 35.6* 35.4* 36.7 42.1 46.8*   PLATELETS AUTO x10*3/uL 173 179 186 203 237   MCV fL 89 89 89 93 91     COAG:   Results from last 7 days   Lab Units 06/02/25  1346   INR  1.0     ABO:   ABO TYPE   Date Value Ref Range Status   06/05/2025 O  Final     HEME/ENDO:  Results from last 7 days   Lab Units 06/05/25  0607 06/02/25  1346   FERRITIN ng/mL  --  232*   IRON SATURATION %  --  17*   TSH mIU/L 0.19* 0.20*   HEMOGLOBIN A1C %  --  8.6*      CARDIAC:   Results from last 7 days   Lab Units 06/03/25  1743 06/02/25  1346   LD U/L 213  --    TROPHSCMC ng/L   --  22   BNP pg/mL  --  1,292*       ASSESSMENT AND PLAN:   Ms. Evans is a 60F with a PMHx sig for CAD s/p PCI (LAD; 2015, Lcx; 2024), stage C systolic HF/ICM/HFrEF s/p ICD, h/o VT with presumed associated syncope, poorly controlled DM, pAF (off of DOAC given the absence of recurrence), dyslipidemia, essential HTN, COPD, and hypothyroidism with progressive HF symptoms and intolerant of GDMT - on midodrine who presents to HFICU as a direct admission for PAC guided evaluation. Advanced therapies evaluation was initiated 6/3 for OHT/LVAD.      Neuro:  - Serial neuro and pain assessments   - PO Tylenol PRN for pain  - PT/OT Consult, OOB to chair  - CAM ICU score every shift  - Sleep/wake cycle normalization    #Restless legs  - C/w home requip     #Anxiety  #Depression  - C/w home celexa   - C/w home gabapentin     # Physical Status  - Not obese, BMI 22.68 kg/m2   - Reduced Mobility with HF symptoms     #Substance abuse  -Alcohol dependence: rare use  -Tobacco dependence: previous smoker, quit 1.5 months prior to admission date.      Cardiovascular:  #HFrEF EF 30-35%, ICM, Stage C  - TTE 3/2025 at OSH: LVEF 30-35%, normal RV size with low normal function, mild MR, small pericardial effusion  - TTE 6/2/2025 on arrival: LVEF 20-25%/LVIDd 4.1cm, normal RVSF, mild MR/TR, trivial pericardial effusion.  - Admit weight: 54.4kg admit BNP 1292   - Opening SGC #s 6/2: BP 97/55 (68), CVP 3, PAP 39/18 (27), CO/CI 2.86/1.87, SVR 1817, SVO2 62% on midodrine 5 mg, empa 10, sandi 25.  - Daily SGC #s 6/6: 97/62 (72), CVP 5, PAP 52/22 (35), CO/CI 4.31/2.75. SVO2 68%. On entresto 49-51mg bid,  empa 10, sandi 25.  - Home medications: meto XL 12.5mg daily, spironolactone 25mg daily, jardiance 10mg daily, bumex 2mg bid, midodrine 5mg TID.  - weaned nipride gtt 6/5  - C/w entresto 49-51mg bid.   - C/w hydralazine 100 and isosorbide 40 TID. Will adjust as needed for GDMT titrations.  - Hold empa 10 while getting abx for UTI.   - C/w sandi 25mg  daily   - holding BB for now   - Diuretics: prn hemodynamic measurement, with increasing O2 requirements/worsening edema on CXR, climbing PCWP will diuresis with IV lasix 20mg today and continue to assess.   - Open advanced therapies evaluation, email sent out 6/3, consent signed 6/3   - Daily standing weights, 2gm sodium diet, 2L fluid restriction, strict I&Os    LVAD or Transplant: will order the things below when patient educated and consented.  -DT or BTT: O  -Email sent on 6/3, consent signed 6/3   -Labs 6/3  -RHC  -LHC 10/9/25  -ECHO 6/2/25  -CT chest ordered  -US abdomen/aorta/iliac/IVC completed 6/4  -Carotid US completed 6/4  -JONATHAN (lower) completed 6/4  -2 view CXR completed 6/4  -Device interrogation 5/2  -PFTs completed 6/4  -Colonoscopy/Occult stool - discussed with GI, will hold off on inpatient unless approved and will be staying inpatient prior to OR.   -Cancer screening - pap smear, consulted GYN and pending. Mammogram ordered.  -LVAD/TX education  -CT surgery consult ordered  -Palliative consult following.  -Nutrition consult complete 6/3  -Dental consult/Orthopantogram - panorex negative.   -Physical and Occupational Therapy following.    #Hypotension  - Home midodrine stopped    #HLD   - C/w home statin     #CAD s/p PCI (LAD in 2015, LCx in 2024)  - C/w home asa 81mg daily, Plavix   - C/w home statin     #H/o VT  #Paroxysmal A Fib  - Device: s/p CRT-D 3/2025, [employer]   - Holding home BB until we get invasive hemodynamics   - AC: off DOAC given absence of recurrence     #Electrolyte Disturbances  - K goal >4, Mg>2     Pulmonary:   #acute hypoxic respiratory failure - on 5LNC.  #COPD  #Pulmonary edema/pleural effusions  - C/w home inhalers  - IV diuretics as above  - Consider consult to IR for tapping effusions   - Monitor and maintain SpO2 > 92%     GI:  #GERD  - C/w home ppi  - Bowel regimen: prn miralax, juliocesar-colace BID   - Diet: carb controlled     :  #No renal dysfunction at  baseline  - Baseline Cr 0.5-0.9  - Admit BUN 15/Cr 0.81   - I/Os  - Avoid hypotension and nephrotoxic agents     Heme:  #Fe deficiency in setting of CHF  - Fe 47, %sat 17%, TIBC 283, ferritin 232  - IV venofer ordered 6/3 for total of 5 doses (est end date 6/7), initially held 6/5 with some nausea after dose, but will retrial at slower rate.      Endo:  #DM, T2  - hgbA1c 8.6, previously was 12.3 in 3/2025  - home medications: insulin - glargine, jardiance, nesina.  - ISS while inpatient, adjust as needed - glargine 10 units nightly, 3 units premeals, ISS coverage.   - Endocrine consulted     #Hypothyroidism  - TSH 0.2, Free T4 2.05  - Repeat TSH 0.19, free T4 1.23/T3 67.  - c/w levothyroxine 88mcg daily per endocrinology.   - will need follow up with endocrinology at discharge.      ID:  - Afebrile, nontoxic   - Trend temps q4h    #UTI  Burning with urination  -UA with leuk est, WBC.  -Urine cx from 6/3 (advanced therapies evaluation with e.coli)   -start ceftriaxone 1g every day x3 days.  -hold jardiance while being treated.    #Positive Syphilis screen  -consult ID about further evaluation and management.      PHYSICAL AND OCCUPATIONAL THERAPY: ordered    LINES:  PIVs   [place] Cath: 6/2 - (possibly pull this afternoon).     DVT: lovenox   VAP BUNDLE: NA  CENTRAL LINE BUNDLE: NA  ULCER PPX: home PPI  GLYCEMIC CONTROL: SSI/lantus  BOWEL CARE: juliocesar-colace, miralax PRN   INDWELLING CATHETER: NA  NUTRITION: Adult diet Consistent Carb; CCD 75 gm/meal    EMERGENCY CONTACT: Extended Emergency Contact Information  Primary Emergency Contact: Daisy Velasco  Mobile Phone: [phone]  Relation: Sister  FAMILY UPDATE:  CODE STATUS: Full Code  DISPO: HFICU. Possible transfer to medical floor this afternoon if SGC pulled.     Patient seen and assessed with Dr. Kelly.  _________________________________________________  Shonna Sharp DO

## 2025-06-06 NOTE — PROGRESS NOTES
"Thao Evans is a 60 y.o. female on day 4 of admission presenting with ACC/AHA stage D systolic heart failure.    Subjective   NAEON, pt doing ok in NAD.        Objective         Last Recorded Vitals  Blood pressure 103/63, pulse 96, temperature 36.3 °C (97.3 °F), temperature source Temporal, resp. rate 19, height 1.575 m (5' 2\"), weight 57.5 kg (126 lb 10.5 oz), SpO2 95%.  Intake/Output last 3 Shifts:  I/O last 3 completed shifts:  In: 581 (10.1 mL/kg) [I.V.:331 (5.8 mL/kg); IV Piggyback:250]  Out: 2800 (48.7 mL/kg) [Urine:2800 (1.4 mL/kg/hr)]  Weight: 57.4 kg      Latest Reference Range & Units 06/06/25 05:19   GLUCOSE 74 - 99 mg/dL 121 (H)   SODIUM 136 - 145 mmol/L 134 (L)   POTASSIUM 3.5 - 5.3 mmol/L 4.8   CHLORIDE 98 - 107 mmol/L 98   Bicarbonate 21 - 32 mmol/L 29   Anion Gap 10 - 20 mmol/L 12   Blood Urea Nitrogen 6 - 23 mg/dL 15   Creatinine 0.50 - 1.05 mg/dL 0.64   EGFR >60 mL/min/1.73m*2 >90   Calcium 8.6 - 10.6 mg/dL 8.4 (L)   PHOSPHORUS 2.5 - 4.9 mg/dL 3.1   Albumin 3.4 - 5.0 g/dL 3.3 (L)   MAGNESIUM 1.60 - 2.40 mg/dL 2.28         Assessment & Plan  ACC/AHA stage D systolic heart failure    Acute on chronic systolic heart failure    Type 2 diabetes mellitus with hyperglycemia, with long-term current use of insulin      Ms. Evans is a 60F with a PMHx sig for CAD s/p PCI (LAD; 2015, Lcx; 2025), stage C systolic HF/ICM/HFrEF s/p ICD, h/o VT with presumed associated syncope, poorly controlled DM, pAF (off of DOAC given the absence of recurrence), dyslipidemia, essential HTN, COPD, and hypothyroidism, with progressive HF symptoms and intolerant of GDMT - on midodrine who presents to HFICU as a direct admission for PAC guided evaluation.    Endocrine is consulted for hyperglycemia and DM management and hypothyroidism management.         Diabetes History  Type of diabetes: DM2   Year diagnosed or age: 10-15 years ago  Hospitalizations for DKA or HHS: no  Complications: CAD  Seen by PCP or Endocrinology: " PCP  Frequency of glucose checks: twice daily  Glucose review: per patient 150s-200s  Frequency of Hypoglycemia: rare, has not happened in months  Hypoglycemia unawareness: no  Severe hypoglycemia requiring assistance from others:  no  Recent A1c is 8.6 in 6/2/25     Home Medications  Basal: lantus 15u (of note pt thought this was lispro but reviewed home med rec- has only ever taken basal insulin outpt)  Orals: jardiance 10mg, alogliptin 25mg  Previous meds:  metformin and GLP1 stopped due to GI side effects     She has ongoing fatigue, dyspnea, and early satiety. She also reports progressive weight loss (~60 lbs over the last 6 months). As a result, she states her A1c has dropped from 12 to 8% as of this past week.          Thyroid Hx:  -Patient with Hx of hypothyroidism x 15 years, managed by her PCP  -Home dose levothyroxine 200 mcg daily. Takes it adequately .  -She stated that her levothyroxine dose has been progressively increased over time, but does not recall when her TFTs were last checked (prior to this admission).   -Reports significant weight loss over the past few months (~60 lbs over the last 6 months)  -Labs on 6/2 resulted in low TSH at 0.20 and elevated FT4 at 2.05, She is clinically & biochemically hyperthyroid, indicating overtreatment of her long-standing hypothyroidism.   -She has been off levothyroxine since was admitted. Last took on 5/30        #Hyperglycemia 2/2 poorly controlled diabetes, and stress state  -continue Glargine 10 units qhs  -increase prandial Lispro to 6 units with meals  -Low dose SSI #1 (1U of Lispro for every 50 above 150) TID w/ meals only; avoid night time correction   -Accuchecks (not BMP) TID and QHS-BG inpatient target 140-180  -Hypoglycemia protocol  -Diabetic Diet- low carb 60 CHO  -Will continue to follow and titrate insulin accordingly        #Hypothyroidism:  -Labs on 6/2 resulted in low TSH at 0.20 and elevated FT4 at 2.05  Due to recent weight loss her current  home dose  200 mcg levothyroxine is too high at this time resulting in hyperthyroidism. She is clinically & biochemically hyperthyroid  She Last took LT4 on 5/30  - FT4 on 6/5 resulted in normal range at 1.23  -c/w levothyroxine 88 mcg daily, to be given on empty stomach   -repeat TFTs in 6-8 weeks in outpatient with PCP     Plan conveyed to primary team  Case seen, examined, and discussed with Dr. Lundgrin Diana Sawass Najjar, MD  Endocrine fellow

## 2025-06-06 NOTE — PROGRESS NOTES
Physical Therapy    Physical Therapy Evaluation & Treatment    Patient Name: Thao Evans  MRN: [id number]  Department: [facility] CT  Room: 07/07-A  Today's Date: 6/5/2025   Time Calculation  Start Time: 1031  Stop Time: 1105  Time Calculation (min): 34 min    Assessment/Plan   PT Assessment  PT Assessment Results: Decreased strength, Decreased endurance, Impaired balance, Decreased mobility  Rehab Prognosis: Good  Barriers to Discharge Home: No anticipated barriers  Evaluation/Treatment Tolerance: Patient limited by fatigue  Medical Staff Made Aware: Yes  End of Session Communication: Bedside nurse  End of Session Patient Position: Up in chair, Alarm off, not on at start of session   IP OR SWING BED PT PLAN  Inpatient or Swing Bed: Inpatient  PT Plan  Treatment/Interventions: Transfer training, Gait training, Stair training, Balance training, Strengthening, Endurance training  PT Plan: Ongoing PT  PT Frequency: 4 times per week  PT Discharge Recommendations: Other (comment) (cardiac rehab)  PT Recommended Transfer Status: Assist x1  PT - OK to Discharge: Yes (pending medical team clearance and POC set)      Subjective     PT Visit Info:  PT Received On: 06/05/25  General Visit Information:  General  Reason for Referral: admitted to the HFICU for advanced therapies work-up  Past Medical History Relevant to Rehab: CAD s/p PCI (LAD; 2015, Lcx; 2025), stage C systolic HF/ICM/HFrEF s/p ICD, h/o VT with presumed associated syncope, poorly controlled DM, pAF (off of DOAC given the absence of recurrence), dyslipidemia, essential HTN, COPD, and h/o Graves. EF 20-25%.  Family/Caregiver Present: No  Prior to Session Communication: Bedside nurse  Patient Position Received: Bed, 3 rail up, Alarm off, not on at start of session  General Comment: Pt is pleasant and cooperative. Agreeable to PT. on 5L at rest, up to 6L during ambulation (portable tank only offers 4 or 6L). RN aware. on IV nipride 2mcg.  +swan    Home Living:  Home  Living  Type of Home: House  Lives With:  (Adult son (with special needs))  Home Adaptive Equipment: None  Home Layout: One level  Home Access: No concerns, Level entry  Bathroom Shower/Tub: Tub/shower unit  Bathroom Equipment: Grab bars in shower, Shower chair with back  Home Living Comments: Cares for son, who needs physical assist for all mobility. Has a lift at home, but does not really use it.  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 Had to stop in March 2/2 health.)  Leisure: gardening  Prior Function Comments: (+)drives, denies falls    Precautions:  Precautions  Medical Precautions: Cardiac precautions, Fall precautions           Objective   Pain:  Pain Assessment  Pain Assessment: 0-10  0-10 (Numeric) Pain Score: 0 - No pain    Cognition:  Cognition  Overall Cognitive Status: Within Functional Limits  Arousal/Alertness: Appropriate responses to stimuli  Orientation Level: Oriented X4  Following Commands: Follows all commands and directions without difficulty    Vitals  PRE: /63, , spO2 93% on 5L  DURING: HR max 121, spO2 min 90% on 6L  POST: /58, , spO2 95% on 5L, RR 26    General Assessments:         Activity Tolerance  Endurance: Tolerates 10 - 20 min exercise with multiple rests  Early Mobility/Exercise Safety Screen: Proceed with mobilization - No exclusion criteria met  Rate of Perceived Dyspnea (RPD): 0/10 (sit to stand), 1/10(ambulation)  Rate of Perceived Exertion (RPE): 12/20 (ambulation), 9/20 (sit to stand)    Static Sitting Balance  Static Sitting-Balance Support: No upper extremity  supported, Feet supported  Static Sitting-Level of Assistance: Independent  Dynamic Sitting Balance  Dynamic Sitting-Balance Support: No upper extremity supported, Feet supported  Dynamic Sitting-Level of Assistance: Independent    Static Standing Balance  Static Standing-Balance Support: Left upper extremity supported  Static Standing-Level of Assistance: Close supervision  Dynamic Standing Balance  Dynamic Standing-Balance Support: Left upper extremity supported  Dynamic Standing-Level of Assistance: Contact guard    Functional Assessments:  Bed Mobility  Bed Mobility: Yes  Bed Mobility 1  Bed Mobility 1: Supine to sitting  Level of Assistance 1: Contact guard    Transfers  Transfer: Yes  Transfer 1  Transfer From 1: Sit to  Transfer to 1: Stand  Technique 1: Sit to stand  Transfer Level of Assistance 1: Close supervision  Transfers 2  Transfer From 2: Stand to  Transfer to 2: Sit  Technique 2: Stand to sit  Transfer Level of Assistance 2: Close supervision  Transfers 3  Transfer From 3: Stand to  Transfer to 3: Toilet  Transfer Device 3:  (grab bars)  Transfer Level of Assistance 3: Contact guard  Transfers 4  Transfer From 4: Toilet to  Transfer to 4: Stand  Transfer Device 4:  (grab bars)  Transfer Level of Assistance 4: Contact guard    Ambulation/Gait Training  Ambulation/Gait Training Performed: Yes  Ambulation/Gait Training 1  Surface 1: Level tile  Device 1: IV Pole  Assistance 1: Contact guard  Quality of Gait 1: Narrow base of support, Decreased step length  Comments/Distance (ft) 1: 95 ft, pt with increased fatigue B LE    Extremity/Trunk Assessments:  RLE   RLE :  (ROM WFL; MMT hip abd/add 4-/5, knee ext/flex 4-/5, ankle 4/5)  LLE   LLE :  (ROM WFL; MMT hip abd/add 4-/5, knee ext/flex 4-/5, ankle 4/5)    Treatments:  Therapeutic Exercise  Therapeutic Exercise Performed: Yes  Therapeutic Exercise Activity 1: PT assisted with use of incentive spirometer. Pt able to complete 5x. PT assisted with miguel a  cough to try to expectorate mucus. Pt felt it 'almost' come out, but will work with spirometer later and perform effective cough.    Outcome Measures:  [facility] Basic Mobility  Turning from your back to your side while in a flat bed without using bedrails: None  Moving from lying on your back to sitting on the side of a flat bed without using bedrails: A little  Moving to and from bed to chair (including a wheelchair): A little  Standing up from a chair using your arms (e.g. wheelchair or bedside chair): A little  To walk in hospital room: A little  Climbing 3-5 steps with railing: A lot  Basic Mobility - Total Score: 18           Early Mobility/Exercise Safety Screen: Proceed with mobilization - No exclusion criteria met     E = Exercise and Early Mobility  Early Mobility/Exercise Safety Screen: Proceed with mobilization - No exclusion criteria met  [place]  Sitting Balance: Steady, safe  Arises: Able without using arms  Attempts to Arise: Able to arise, one attempt  Immediate Standing Balance (First 5 Seconds): Steady but uses walker or other support  Standing Balance: Narrow stance without support  Nudged: Steady without walker or other support  Eyes Closed: Steady  Turned 360 Degrees: Steadiness: Unsteady (Grabs, staggers)  Turned 360 Degrees: Continuity of Steps: Continuous  Sitting Down: Safe, smooth motion  Balance Score: 14  Initiation of Gait: No hesitancy  Step Height: R Swing Foot: Right foot complete clears floor  Step Length: R Swing Foot: Passes left stance foot  Step Height: L Swing Foot: Left foot does not clear floor completely with step  Step Length: L Swing Foot: Passes right stance foot  Step Symmetry: Right and left step appear equal  Step Continuity: Steps appear continuous  Path: Mild/moderate deviation or uses walking aid  Trunk: No sway but flexion of knees or back or spreads arms out while walking  Walking Time: Heels almost touching while walking  Gait Score: 9  Total Score: 23    Other  Measures  5x Sit to Stand: from chair, no UE assist.   time: 13.8 seconds, , spO2 93% on 6L    Encounter Problems       Encounter Problems (Active)       PT Problem       indep in sit<>stand       Start:  06/05/25    Expected End:  06/19/25            Pt will perform a 6MWT, with distance greater than 950 ft  with stable vitals, RPD 3/10 or less, RPE 13/20 or less.        Start:  06/05/25    Expected End:  06/19/25            Pt will perform a 5x STS, in 10 seconds or less, with RPD 3/10 or less, RPE 13/20 or less with stable vitals.        Start:  06/05/25    Expected End:  06/19/25            pt will negotiate 12 stairs indep for improved community mobility with HR and stable vitals        Start:  06/05/25    Expected End:  06/19/25               Pain - Adult              Education Documentation  Mobility Training, taught by Teresita Ferris, PT at 6/5/2025  9:05 PM.  Learner: Patient  Readiness: Acceptance  Method: Explanation  Response: Verbalizes Understanding  Comment: POC while IP, reviewed RPD/RPE scales    Education Comments  No comments found.

## 2025-06-06 NOTE — CARE PLAN
The clinical goals for the shift include pt will have reduced SVR by end of shift on 6/6/2025 at 0700.    Over the shift, the patient did not make progress toward the following goals. Barriers to progression include waiting for entresto to take affect but has only received one dose. Recommendations to address these barriers include allow additional time for SVR to come down before adjusting orals.      Problem: Pain - Adult  Goal: Verbalizes/displays adequate comfort level or baseline comfort level  Outcome: Progressing     Problem: Safety - Adult  Goal: Free from fall injury  Outcome: Progressing     Problem: Discharge Planning  Goal: Discharge to home or other facility with appropriate resources  Outcome: Progressing     Problem: Chronic Conditions and Co-morbidities  Goal: Patient's chronic conditions and co-morbidity symptoms are monitored and maintained or improved  Outcome: Progressing     Problem: Nutrition  Goal: Nutrient intake appropriate for maintaining nutritional needs  Outcome: Progressing     Problem: Heart Failure  Goal: Improved gas exchange this shift  Outcome: Progressing  Goal: Improved urinary output this shift  Outcome: Progressing  Goal: Reduction in peripheral edema within 24 hours  Outcome: Progressing  Goal: Report improvement of dyspnea/breathlessness this shift  Outcome: Progressing  Goal: Weight from fluid excess reduced over 2-3 days, then stabilize  Outcome: Progressing  Goal: Increase self care and/or family involvement in 24 hours  Outcome: Progressing

## 2025-06-06 NOTE — SIGNIFICANT EVENT
Closing SGC#s 6/6/2025:    BP 88/51 (63), CVP 11, PAP 62/30 (43), CO 3.98/CI 2.54.   SVO2 62%.    On entresto 49-51mg bid, sandi 25mg daily, jardiance 10mg daily.

## 2025-06-06 NOTE — CARE PLAN
The patient's goals for the shift include GET SOMETHING TO EAT    The clinical goals for the shift include patient will have pain control    Problem: Pain - Adult  Goal: Verbalizes/displays adequate comfort level or baseline comfort level  Outcome: Progressing     Problem: Safety - Adult  Goal: Free from fall injury  Outcome: Progressing     Problem: Discharge Planning  Goal: Discharge to home or other facility with appropriate resources  Outcome: Progressing     Problem: Chronic Conditions and Co-morbidities  Goal: Patient's chronic conditions and co-morbidity symptoms are monitored and maintained or improved  Outcome: Progressing     Problem: Nutrition  Goal: Nutrient intake appropriate for maintaining nutritional needs  Outcome: Progressing

## 2025-06-07 ENCOUNTER — APPOINTMENT (OUTPATIENT)
Dept: RADIOLOGY | Facility: HOSPITAL | Age: 60
DRG: 291 | End: 2025-06-07
Payer: COMMERCIAL

## 2025-06-07 LAB
ALBUMIN SERPL BCP-MCNC: 3.2 G/DL (ref 3.4–5)
ALBUMIN SERPL BCP-MCNC: 3.4 G/DL (ref 3.4–5)
ALP SERPL-CCNC: 83 U/L (ref 33–136)
ALT SERPL W P-5'-P-CCNC: 10 U/L (ref 7–45)
ANABASINE UR-MCNC: <5 NG/ML
ANION GAP SERPL CALC-SCNC: 11 MMOL/L (ref 10–20)
ANION GAP SERPL CALC-SCNC: 12 MMOL/L (ref 10–20)
APPEARANCE UR: ABNORMAL
AST SERPL W P-5'-P-CCNC: 12 U/L (ref 9–39)
BASOPHILS # BLD AUTO: 0.07 X10*3/UL (ref 0–0.1)
BASOPHILS NFR BLD AUTO: 1 %
BILIRUB SERPL-MCNC: 0.3 MG/DL (ref 0–1.2)
BILIRUB UR STRIP.AUTO-MCNC: NEGATIVE MG/DL
BUN SERPL-MCNC: 14 MG/DL (ref 6–23)
BUN SERPL-MCNC: 15 MG/DL (ref 6–23)
CALCIUM SERPL-MCNC: 8.5 MG/DL (ref 8.6–10.6)
CALCIUM SERPL-MCNC: 8.6 MG/DL (ref 8.6–10.6)
CHLORIDE SERPL-SCNC: 100 MMOL/L (ref 98–107)
CHLORIDE SERPL-SCNC: 100 MMOL/L (ref 98–107)
CO2 SERPL-SCNC: 28 MMOL/L (ref 21–32)
CO2 SERPL-SCNC: 28 MMOL/L (ref 21–32)
COLOR UR: COLORLESS
COTININE UR-MCNC: 62 NG/ML
CREAT SERPL-MCNC: 0.62 MG/DL (ref 0.5–1.05)
CREAT SERPL-MCNC: 0.73 MG/DL (ref 0.5–1.05)
EGFRCR SERPLBLD CKD-EPI 2021: >90 ML/MIN/1.73M*2
EGFRCR SERPLBLD CKD-EPI 2021: >90 ML/MIN/1.73M*2
EOSINOPHIL # BLD AUTO: 0.14 X10*3/UL (ref 0–0.7)
EOSINOPHIL NFR BLD AUTO: 2 %
ERYTHROCYTE [DISTWIDTH] IN BLOOD BY AUTOMATED COUNT: 13.2 % (ref 11.5–14.5)
ERYTHROCYTE [DISTWIDTH] IN BLOOD BY AUTOMATED COUNT: 13.5 % (ref 11.5–14.5)
GLUCOSE BLD MANUAL STRIP-MCNC: 122 MG/DL (ref 74–99)
GLUCOSE BLD MANUAL STRIP-MCNC: 181 MG/DL (ref 74–99)
GLUCOSE BLD MANUAL STRIP-MCNC: 194 MG/DL (ref 74–99)
GLUCOSE BLD MANUAL STRIP-MCNC: 89 MG/DL (ref 74–99)
GLUCOSE SERPL-MCNC: 115 MG/DL (ref 74–99)
GLUCOSE SERPL-MCNC: 202 MG/DL (ref 74–99)
GLUCOSE UR STRIP.AUTO-MCNC: ABNORMAL MG/DL
HCT VFR BLD AUTO: 38.3 % (ref 36–46)
HCT VFR BLD AUTO: 39.1 % (ref 36–46)
HGB BLD-MCNC: 12 G/DL (ref 12–16)
HGB BLD-MCNC: 12.3 G/DL (ref 12–16)
HOLD SPECIMEN: NORMAL
IMM GRANULOCYTES # BLD AUTO: 0.03 X10*3/UL (ref 0–0.7)
IMM GRANULOCYTES NFR BLD AUTO: 0.4 % (ref 0–0.9)
KETONES UR STRIP.AUTO-MCNC: NEGATIVE MG/DL
LEUKOCYTE ESTERASE UR QL STRIP.AUTO: ABNORMAL
LYMPHOCYTES # BLD AUTO: 1.33 X10*3/UL (ref 1.2–4.8)
LYMPHOCYTES NFR BLD AUTO: 19.2 %
MAGNESIUM SERPL-MCNC: 1.91 MG/DL (ref 1.6–2.4)
MAGNESIUM SERPL-MCNC: 2.15 MG/DL (ref 1.6–2.4)
MCH RBC QN AUTO: 29.4 PG (ref 26–34)
MCH RBC QN AUTO: 30.1 PG (ref 26–34)
MCHC RBC AUTO-ENTMCNC: 31.3 G/DL (ref 32–36)
MCHC RBC AUTO-ENTMCNC: 31.5 G/DL (ref 32–36)
MCV RBC AUTO: 93 FL (ref 80–100)
MCV RBC AUTO: 96 FL (ref 80–100)
MONOCYTES # BLD AUTO: 0.78 X10*3/UL (ref 0.1–1)
MONOCYTES NFR BLD AUTO: 11.3 %
NEUTROPHILS # BLD AUTO: 4.58 X10*3/UL (ref 1.2–7.7)
NEUTROPHILS NFR BLD AUTO: 66.1 %
NICOTINE UR-MCNC: <15 NG/ML
NITRITE UR QL STRIP.AUTO: NEGATIVE
NRBC BLD-RTO: 0 /100 WBCS (ref 0–0)
NRBC BLD-RTO: 0 /100 WBCS (ref 0–0)
PH UR STRIP.AUTO: 6.5 [PH]
PHOSPHATE SERPL-MCNC: 2.8 MG/DL (ref 2.5–4.9)
PLATELET # BLD AUTO: 220 X10*3/UL (ref 150–450)
PLATELET # BLD AUTO: 232 X10*3/UL (ref 150–450)
POTASSIUM SERPL-SCNC: 3.5 MMOL/L (ref 3.5–5.3)
POTASSIUM SERPL-SCNC: 4.5 MMOL/L (ref 3.5–5.3)
PROT SERPL-MCNC: 6.3 G/DL (ref 6.4–8.2)
PROT UR STRIP.AUTO-MCNC: NEGATIVE MG/DL
RBC # BLD AUTO: 3.99 X10*6/UL (ref 4–5.2)
RBC # BLD AUTO: 4.19 X10*6/UL (ref 4–5.2)
RBC # UR STRIP.AUTO: ABNORMAL MG/DL
RBC #/AREA URNS AUTO: ABNORMAL /HPF
SODIUM SERPL-SCNC: 135 MMOL/L (ref 136–145)
SODIUM SERPL-SCNC: 135 MMOL/L (ref 136–145)
SP GR UR STRIP.AUTO: 1.01
SQUAMOUS #/AREA URNS AUTO: ABNORMAL /HPF
TRANS-3-OH-COTININE UR-MCNC: 668 NG/ML
UROBILINOGEN UR STRIP.AUTO-MCNC: NORMAL MG/DL
WBC # BLD AUTO: 6.7 X10*3/UL (ref 4.4–11.3)
WBC # BLD AUTO: 6.9 X10*3/UL (ref 4.4–11.3)
WBC #/AREA URNS AUTO: >50 /HPF
WBC CLUMPS #/AREA URNS AUTO: ABNORMAL /HPF

## 2025-06-07 PROCEDURE — 71045 X-RAY EXAM CHEST 1 VIEW: CPT

## 2025-06-07 PROCEDURE — 2500000002 HC RX 250 W HCPCS SELF ADMINISTERED DRUGS (ALT 637 FOR MEDICARE OP, ALT 636 FOR OP/ED): Performed by: STUDENT IN AN ORGANIZED HEALTH CARE EDUCATION/TRAINING PROGRAM

## 2025-06-07 PROCEDURE — G0545 PR INHERENT VISIT TO INPT: HCPCS | Performed by: STUDENT IN AN ORGANIZED HEALTH CARE EDUCATION/TRAINING PROGRAM

## 2025-06-07 PROCEDURE — 2500000002 HC RX 250 W HCPCS SELF ADMINISTERED DRUGS (ALT 637 FOR MEDICARE OP, ALT 636 FOR OP/ED): Performed by: PHYSICIAN ASSISTANT

## 2025-06-07 PROCEDURE — 71045 X-RAY EXAM CHEST 1 VIEW: CPT | Performed by: STUDENT IN AN ORGANIZED HEALTH CARE EDUCATION/TRAINING PROGRAM

## 2025-06-07 PROCEDURE — 83735 ASSAY OF MAGNESIUM: CPT | Performed by: PHYSICIAN ASSISTANT

## 2025-06-07 PROCEDURE — 85027 COMPLETE CBC AUTOMATED: CPT | Performed by: PHYSICIAN ASSISTANT

## 2025-06-07 PROCEDURE — 82947 ASSAY GLUCOSE BLOOD QUANT: CPT

## 2025-06-07 PROCEDURE — 84075 ASSAY ALKALINE PHOSPHATASE: CPT | Performed by: PHYSICIAN ASSISTANT

## 2025-06-07 PROCEDURE — 2500000004 HC RX 250 GENERAL PHARMACY W/ HCPCS (ALT 636 FOR OP/ED): Performed by: PHYSICIAN ASSISTANT

## 2025-06-07 PROCEDURE — 2500000001 HC RX 250 WO HCPCS SELF ADMINISTERED DRUGS (ALT 637 FOR MEDICARE OP): Performed by: PHYSICIAN ASSISTANT

## 2025-06-07 PROCEDURE — 87086 URINE CULTURE/COLONY COUNT: CPT | Performed by: STUDENT IN AN ORGANIZED HEALTH CARE EDUCATION/TRAINING PROGRAM

## 2025-06-07 PROCEDURE — 80069 RENAL FUNCTION PANEL: CPT | Performed by: PHYSICIAN ASSISTANT

## 2025-06-07 PROCEDURE — 82435 ASSAY OF BLOOD CHLORIDE: CPT | Performed by: PHYSICIAN ASSISTANT

## 2025-06-07 PROCEDURE — 81001 URINALYSIS AUTO W/SCOPE: CPT | Performed by: STUDENT IN AN ORGANIZED HEALTH CARE EDUCATION/TRAINING PROGRAM

## 2025-06-07 PROCEDURE — 36415 COLL VENOUS BLD VENIPUNCTURE: CPT | Performed by: PHYSICIAN ASSISTANT

## 2025-06-07 PROCEDURE — 99233 SBSQ HOSP IP/OBS HIGH 50: CPT | Performed by: INTERNAL MEDICINE

## 2025-06-07 PROCEDURE — 85025 COMPLETE CBC W/AUTO DIFF WBC: CPT | Performed by: PHYSICIAN ASSISTANT

## 2025-06-07 PROCEDURE — 99233 SBSQ HOSP IP/OBS HIGH 50: CPT | Performed by: STUDENT IN AN ORGANIZED HEALTH CARE EDUCATION/TRAINING PROGRAM

## 2025-06-07 PROCEDURE — 1100000001 HC PRIVATE ROOM DAILY

## 2025-06-07 PROCEDURE — 94640 AIRWAY INHALATION TREATMENT: CPT

## 2025-06-07 RX ORDER — CEFTRIAXONE 2 G/50ML
2 INJECTION, SOLUTION INTRAVENOUS EVERY 24 HOURS
Status: DISPENSED | OUTPATIENT
Start: 2025-06-07 | End: 2025-06-20

## 2025-06-07 RX ORDER — FUROSEMIDE 10 MG/ML
20 INJECTION INTRAMUSCULAR; INTRAVENOUS ONCE
Status: COMPLETED | OUTPATIENT
Start: 2025-06-07 | End: 2025-06-07

## 2025-06-07 RX ORDER — IRON POLYSACCHARIDE COMPLEX 150 MG
150 CAPSULE ORAL DAILY
Status: DISCONTINUED | OUTPATIENT
Start: 2025-06-08 | End: 2025-06-07

## 2025-06-07 RX ORDER — PANTOPRAZOLE SODIUM 20 MG/1
20 TABLET, DELAYED RELEASE ORAL DAILY
Status: DISPENSED | OUTPATIENT
Start: 2025-06-08

## 2025-06-07 RX ADMIN — ROPINIROLE HYDROCHLORIDE 1 MG: 3 TABLET, FILM COATED ORAL at 15:09

## 2025-06-07 RX ADMIN — INSULIN LISPRO 6 UNITS: 100 INJECTION, SOLUTION INTRAVENOUS; SUBCUTANEOUS at 16:36

## 2025-06-07 RX ADMIN — FLUTICASONE FUROATE AND VILANTEROL TRIFENATATE 1 PUFF: 100; 25 POWDER RESPIRATORY (INHALATION) at 10:39

## 2025-06-07 RX ADMIN — CLOPIDOGREL BISULFATE 75 MG: 75 TABLET, FILM COATED ORAL at 10:11

## 2025-06-07 RX ADMIN — INSULIN LISPRO 1 UNITS: 100 INJECTION, SOLUTION INTRAVENOUS; SUBCUTANEOUS at 16:36

## 2025-06-07 RX ADMIN — INSULIN GLARGINE 10 UNITS: 100 INJECTION, SOLUTION SUBCUTANEOUS at 22:04

## 2025-06-07 RX ADMIN — SACUBITRIL AND VALSARTAN 1 TABLET: 49; 51 TABLET, FILM COATED ORAL at 22:04

## 2025-06-07 RX ADMIN — ROPINIROLE HYDROCHLORIDE 1 MG: 3 TABLET, FILM COATED ORAL at 18:21

## 2025-06-07 RX ADMIN — LEVOTHYROXINE SODIUM 88 MCG: 0.09 TABLET ORAL at 06:13

## 2025-06-07 RX ADMIN — INSULIN LISPRO 6 UNITS: 100 INJECTION, SOLUTION INTRAVENOUS; SUBCUTANEOUS at 12:00

## 2025-06-07 RX ADMIN — GABAPENTIN 400 MG: 300 CAPSULE ORAL at 22:04

## 2025-06-07 RX ADMIN — ROPINIROLE HYDROCHLORIDE 3 MG: 3 TABLET, FILM COATED ORAL at 22:04

## 2025-06-07 RX ADMIN — CITALOPRAM HYDROBROMIDE 40 MG: 40 TABLET ORAL at 10:15

## 2025-06-07 RX ADMIN — SPIRONOLACTONE 25 MG: 25 TABLET, FILM COATED ORAL at 10:11

## 2025-06-07 RX ADMIN — ATORVASTATIN CALCIUM 80 MG: 80 TABLET, FILM COATED ORAL at 10:11

## 2025-06-07 RX ADMIN — SACUBITRIL AND VALSARTAN 1 TABLET: 49; 51 TABLET, FILM COATED ORAL at 10:11

## 2025-06-07 RX ADMIN — INSULIN LISPRO 6 UNITS: 100 INJECTION, SOLUTION INTRAVENOUS; SUBCUTANEOUS at 10:11

## 2025-06-07 RX ADMIN — ENOXAPARIN SODIUM 40 MG: 100 INJECTION SUBCUTANEOUS at 15:09

## 2025-06-07 RX ADMIN — GUAIFENESIN 600 MG: 600 TABLET ORAL at 10:12

## 2025-06-07 RX ADMIN — GABAPENTIN 400 MG: 300 CAPSULE ORAL at 06:13

## 2025-06-07 RX ADMIN — FUROSEMIDE 20 MG: 10 INJECTION, SOLUTION INTRAVENOUS at 16:37

## 2025-06-07 RX ADMIN — INSULIN LISPRO 1 UNITS: 100 INJECTION, SOLUTION INTRAVENOUS; SUBCUTANEOUS at 10:10

## 2025-06-07 RX ADMIN — ROPINIROLE HYDROCHLORIDE 1 MG: 3 TABLET, FILM COATED ORAL at 10:16

## 2025-06-07 RX ADMIN — CEFTRIAXONE SODIUM 2 G: 2 INJECTION, SOLUTION INTRAVENOUS at 18:20

## 2025-06-07 RX ADMIN — SENNOSIDES AND DOCUSATE SODIUM 2 TABLET: 50; 8.6 TABLET ORAL at 10:11

## 2025-06-07 RX ADMIN — GABAPENTIN 400 MG: 300 CAPSULE ORAL at 15:13

## 2025-06-07 ASSESSMENT — COGNITIVE AND FUNCTIONAL STATUS - GENERAL
WALKING IN HOSPITAL ROOM: A LITTLE
DAILY ACTIVITIY SCORE: 24
MOVING TO AND FROM BED TO CHAIR: A LITTLE
MOBILITY SCORE: 21
MOVING TO AND FROM BED TO CHAIR: A LITTLE
STANDING UP FROM CHAIR USING ARMS: A LITTLE
STANDING UP FROM CHAIR USING ARMS: A LITTLE
WALKING IN HOSPITAL ROOM: A LITTLE

## 2025-06-07 ASSESSMENT — PAIN SCALES - GENERAL
PAINLEVEL_OUTOF10: 0 - NO PAIN
PAINLEVEL_OUTOF10: 0 - NO PAIN

## 2025-06-07 ASSESSMENT — PAIN - FUNCTIONAL ASSESSMENT: PAIN_FUNCTIONAL_ASSESSMENT: 0-10

## 2025-06-07 NOTE — HOSPITAL COURSE
Ms. Evans is a 60F with a PMHx sig for CAD s/p PCI (LAD; 2015, Lcx; 2024), stage C systolic HF/ICM/HFrEF s/p ICD, h/o VT with presumed associated syncope, poorly controlled DM, pAF (off of DOAC given the absence of recurrence), dyslipidemia, essential HTN, COPD, and hypothyroidism with progressive HF symptoms and intolerant of GDMT - on midodrine who presented to HFICU as a direct admission for PAC guided evaluation. Advanced therapies evaluation was initiated 6/3 for OHT/LVAD.       Floor course:    Ongoing diuresis with IV lasix given ongoing NC O2 requirements.     Started on ceftriaxone for UTI. Dose escalated given UTI and positive syphilis screening. No PCN given allergy at this time.    Patient presented to advanced HF committee on Tuesday ***      Discharge weight: ### kg    After all labs and VS were reviewed the decision was made that the patient was medically stable for discharge.  The patient was discharged in satisfactory condition.    More than 60 minutes were spent in coordinating patient discharge.    encouraged.      Discharge weight: 58.2kg    After all labs and VS were reviewed the decision was made that the patient was medically stable for discharge.  The patient was discharged in satisfactory condition.    More than 60 minutes were spent in coordinating patient discharge.

## 2025-06-07 NOTE — CARE PLAN
The patient's goals for the shift include GET SOMETHING TO EAT    The clinical goals for the shift include pt to have adequate pain control    Problem: Pain - Adult  Goal: Verbalizes/displays adequate comfort level or baseline comfort level  Outcome: Progressing     Problem: Safety - Adult  Goal: Free from fall injury  Outcome: Progressing     Problem: Discharge Planning  Goal: Discharge to home or other facility with appropriate resources  Outcome: Progressing     Problem: Chronic Conditions and Co-morbidities  Goal: Patient's chronic conditions and co-morbidity symptoms are monitored and maintained or improved  Outcome: Progressing     Problem: Nutrition  Goal: Nutrient intake appropriate for maintaining nutritional needs  Outcome: Progressing

## 2025-06-07 NOTE — PROGRESS NOTES
"Thao Evans is a 60 y.o. female on day 5 of admission presenting with ACC/AHA stage D systolic heart failure.    Subjective   Interval History: afebrile overnight, VSS. Seen this AM, feeling well, NAD, breathing ok.     Review of Systems    Objective   Range of Vitals (last 24 hours)  Heart Rate:  []   Temp:  [35.8 °C (96.4 °F)-36.6 °C (97.9 °F)]   Resp:  [0-26]   BP: ()/(49-63)   Weight:  [59 kg (130 lb 1.1 oz)]   SpO2:  [89 %-100 %]   Daily Weight  06/07/25 : 59 kg (130 lb 1.1 oz)    Body mass index is 23.79 kg/m².    Physical Exam    Comfortable appearing, laying in bed, NAD  MMM, has dentures, no ulcerations or lesions   Lungs CTA anteriorly, no wheezing or rhonchi  RRR, S1/S2, no appreciable murmurs  Abd soft, nontender   LE warm to touch, no edema  Alert, answers questions appropriately    Antibiotics  cefTRIAXone - 1 gram/50 mL    Relevant Results  Labs  Results from last 72 hours   Lab Units 06/07/25  0607 06/06/25  0519 06/05/25  0607   WBC AUTO x10*3/uL 6.7 6.5 8.2   HEMOGLOBIN g/dL 12.3 11.7* 11.9*   HEMATOCRIT % 39.1 35.6* 35.4*   PLATELETS AUTO x10*3/uL 220 173 179   NEUTROS PCT AUTO %  --  73.6 84.2   LYMPHS PCT AUTO %  --  13.5 6.0   MONOS PCT AUTO %  --  10.7 8.8   EOS PCT AUTO %  --  1.1 0.2     Results from last 72 hours   Lab Units 06/06/25  0519 06/05/25  1808 06/05/25  0607   SODIUM mmol/L 134* 132* 135*   POTASSIUM mmol/L 4.8 3.6 4.5   CHLORIDE mmol/L 98 94* 100   CO2 mmol/L 29 30 26   BUN mg/dL 15 14 11   CREATININE mg/dL 0.64 0.72 0.65   GLUCOSE mg/dL 121* 178* 180*   CALCIUM mg/dL 8.4* 8.6 8.7   ANION GAP mmol/L 12 12 14   EGFR mL/min/1.73m*2 >90 >90 >90   PHOSPHORUS mg/dL 3.1 3.5 4.2     Results from last 72 hours   Lab Units 06/06/25  0519 06/05/25  1808 06/05/25  0607   ALBUMIN g/dL 3.3* 3.4 3.4     Estimated Creatinine Clearance: 72.1 mL/min (by C-G formula based on SCr of 0.64 mg/dL).  No results found for: \"CRP\"  Microbiology  Susceptibility data from last 14 " days.  Collected Specimen Info Organism Amoxicillin/Clavulanate Ampicillin Ampicillin/Sulbactam Cefazolin Cefazolin (uncomplicated UTIs only) Ciprofloxacin Gentamicin Levofloxacin Nitrofurantoin Piperacillin/Tazobactam   06/03/25 Urine from Clean Catch/Voided Escherichia coli  S  R  I  S  S  S  S  S  S  S     Collected Specimen Info Organism Trimethoprim/Sulfamethoxazole   06/03/25 Urine from Clean Catch/Voided Escherichia coli  S         Imaging         Assessment/Plan     Thao Evans is a 61 y/o woman pmhx sig for CAD, HFrEF with ICM s/p ICD, T2DM last A1c 8.8%, admitted for HF evaluation and work up for advanced therapies, screening syphilis Ab positive w/ RPR 1:64, with known exposure within the past 6 mos. No prior testing or treatment per her report, will try to confirm w/ [facility] although may not be able to reach someone until Monday.     Discussed her amoxicillin reaction which was a few mos ago per her report, prescribed for sinusitis; which she described as an immediate reaction with her hands feeling hot and red; no rash, hives, swelling, itching, lip swelling, trouble breathing, nausea, vomiting, lightheadedness, or other symptoms. She reports after this she stopped the med and was changed to TMP-SMX. I did call her pharmacy, [facility], and can confirm her TMP-SMX prescription, but they have no record of filling amoxicillin or amox-clav before that; when asked the pt remembers an rx in Jan for cefdinir, but is firm that she took amoxicillin after that. Prior to this she had taken amoxicillin many times w/o issue per her report. Her reaction to doxycycline was feeling like her mouth was burning, no mucosal lesions, ulcerations, or sloughing. With regards to syphilis, she does not appear to have now, or in the past, symptoms of rash, HA, vision changes, hearing loss, abd pain, F/C, or genital lesions. At this time since she is asymptomatic and in the absence of  confirming old vs new infection, would treat as late latent infection. Treatment for this is either 3 doses IM PCN once weekly, or alternative regimens 4 weeks po doxycycline, or IV ceftriaxone for 10-14 days. For monitoring she needs repeat RPR in 6 mos, with subsequent checks thereafter.     Discussed her atypical amoxicillin reaction with Allergy in the setting of preparing for amoxicillin challenge.  With her underlying severe heart failure, she is high risk for amoxicillin challenge, and the safer alternative would be desensitization in the ICU.  Unfortunately, she would likely need desensitization prior to each IM penicillin injection.  I discussed these options with the patient, and that the alternative was treated with ceftriaxone now, with monitoring of her RPR in the future.  Since it would be in her benefit to ultimately undergo penicillin allergy delabeling, we discussed it may be beneficial for her to have a formal consult with Allergy as an outpatient to determine the best mode of desensitization, and she agrees.    #Late latent syphilis   #Possible E coli UTI  #Adverse reaction to amoxicillin      Recommendations:  -Continue IV ceftriaxone 2gm q24H for UTI and syphilis, would plan for total 14d course, through 6/20.  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  - While on antibiotics, please continue to monitor CBC with differential and CMP  - Will need repeat RPR in 6 months    Discussed with primary team.  Will sign off at this time, please reach out with any questions      This is a complex infectious disease issue and the following was performed today (for more details please see the above note):   Management decisions reflecting the added complexity (e.g., changes in antimicrobial therapy, infection control strategies). and Disease transmission risk  assessment and mitigation (coordination with labs, public health agencies, or interdisciplinary teams).      Alla Wilkins, DO

## 2025-06-07 NOTE — SIGNIFICANT EVENT
Rapid Response Nurse Note: RADAR alert: 7    Pager time:   Arrival time:   Event end time:   Location: [facility]  [x] Triage by phone or secure messaging    Rapid response initiated by:  [] Rapid response RN [] Family [] Nursing Supervisor [] Physician   [x] RADAR auto page [] Sepsis auto-page [] RN [] RT   [] NP/PA [] Other:     Primary reason for call:   [] BAT [] New CPAP/BiPAP [] Bleeding [] Change in mental status   [] Chest pain [] Code blue [] FiO2 >/= 50% [] HR </= 40 bpm   [] HR >/= 130 bpm [] Hyperglycemia [] Hypoglycemia [x] RADAR    [] RR </= 8 bpm [] RR >/= 30 bpm [] SBP </= 90 mmHg [] SpO2 < 90%   [] Seizure [] Sepsis [] Shortness of breath  [] Staff concern: see comments     Initial VS and/or RADAR VS: T 35.8 °C; ; RR 18; BP 90/63; SPO2 93%.      Interventions:  [x] None [] ABG/VBG [] Assist w/ICU transfer [] BAT paged    [] Bag mask [] Blood [] Cardioversion [] Code Blue   [] Code blue for intubation [] Code status changed [] Chest x-ray [] EKG   [] IV fluid/bolus [] KUB x-ray [] Labs/cultures [] Medication   [] Nebulizer treatment [] NIPPV (CPAP/BiPAP) [] Oxygen [] Oral airway   [] Peripheral IV [] Palliative care consult [] CT/MRI [] Sepsis protocol    [] Suctioned [] Other:     Outcome:  [] Coded and  [] Code blue for intubation [] Coded and transferred to ICU []  on division   [x] Remained on division (no change) [] Remained on division + additional monitoring [] Remained in ED [] Transferred to ED   [] Transferred to ICU [] Transferred to inpatient status [] Transferred for interventions (procedure) [] Transferred to ICU stepdown    [] Transferred to surgery [] Transferred to telemetry [] Sepsis protocol [] STEMI protocol   [] Stroke protocol [x] Bedside nurse instructed to page rapid response for any concerns or acute change in condition/VS     Additional Comments: Reviewed above RADAR VS with bedside RN via phone.  Vital signs within patient's current trends.  No  acute change in condition.  No interventions by rapid response team indicated at this time.  Staff to page rapid response for any concerns or acute change in condition or VS.

## 2025-06-07 NOTE — SIGNIFICANT EVENT
Rapid Response Nurse Note: RADAR alert: 7    Pager time: [phone]  Arrival time: 1215  Event end time: 1220  Location: [place] [id number]  [] Triage by phone or secure messaging    Rapid response initiated by:  [] Rapid response RN [] Family [] Nursing Supervisor [] Physician   [x] RADAR auto page [] Sepsis auto-page [] RN [] RT   [] NP/PA [] Other:     Primary reason for call:   [] BAT [] New CPAP/BiPAP [] Bleeding [] Change in mental status   [] Chest pain [] Code blue [] FiO2 >/= 50% [] HR </= 40 bpm   [] HR >/= 130 bpm [] Hyperglycemia [] Hypoglycemia [x] RADAR    [] RR </= 8 bpm [] RR >/= 30 bpm [] SBP </= 90 mmHg [] SpO2 < 90%   [] Seizure [] Sepsis [] Shortness of breath  [] Staff concern: see comments     Initial VS and/or RADAR VS: T 36.5 °C; HR 95; RR 18; BP 92/54; SPO2 93%.    Providers present at bedside (if applicable):     Name of ICU Provider contacted (if applicable):     Interventions:  [x] None [] ABG/VBG [] Assist w/ICU transfer [] BAT paged    [] Bag mask [] Blood [] Cardioversion [] Code Blue   [] Code blue for intubation [] Code status changed [] Chest x-ray [] EKG   [] IV fluid/bolus [] KUB x-ray [] Labs/cultures [] Medication   [] Nebulizer treatment [] NIPPV (CPAP/BiPAP) [] Oxygen [] Oral airway   [] Peripheral IV [] Palliative care consult [] CT/MRI [] Sepsis protocol    [] Suctioned [] Other:     Outcome:  [] Coded and  [] Code blue for intubation [] Coded and transferred to ICU []  on division   [x] Remained on division (no change) [] Remained on division + additional monitoring [] Remained in ED [] Transferred to ED   [] Transferred to ICU [] Transferred to inpatient status [] Transferred for interventions (procedure) [] Transferred to ICU stepdown    [] Transferred to surgery [] Transferred to telemetry [] Sepsis protocol [] STEMI protocol   [] Stroke protocol [x] Bedside nurse instructed to page rapid response for any concerns or acute change in condition/VS     Additional Comments:      Radar auto-page received for a radar score of 7 with the above listed vital signs.  Vital signs were confirmed and reviewed with the primary RN.  She is at her current baseline and the primary RN has no present concerns.  There are no indications for interventions by Rapid Response at this time.  RN to contact Rapid Response with any future concerns or signs of clinical decompensation.

## 2025-06-07 NOTE — SIGNIFICANT EVENT
Rapid Response Nurse Note: RADAR alert: 7    Pager time: [phone]  Arrival time: 449  Event end time: 508  Location: [facility]  [x] Triage by phone or secure messaging    Rapid response initiated by:  [] Rapid response RN [] Family [] Nursing Supervisor [] Physician   [x] RADAR auto page [] Sepsis auto-page [] RN [] RT   [] NP/PA [] Other:     Primary reason for call:   [] BAT [] New CPAP/BiPAP [] Bleeding [] Change in mental status   [] Chest pain [] Code blue [] FiO2 >/= 50% [] HR </= 40 bpm   [] HR >/= 130 bpm [] Hyperglycemia [] Hypoglycemia [x] RADAR    [] RR </= 8 bpm [] RR >/= 30 bpm [] SBP </= 90 mmHg [] SpO2 < 90%   [] Seizure [] Sepsis [] Shortness of breath  [] Staff concern: see comments     Initial VS and/or RADAR VS: T 36.2 °C; ; RR 18; BP 92/56; SPO2 92%.      Interventions:  [x] None [] ABG/VBG [] Assist w/ICU transfer [] BAT paged    [] Bag mask [] Blood [] Cardioversion [] Code Blue   [] Code blue for intubation [] Code status changed [] Chest x-ray [] EKG   [] IV fluid/bolus [] KUB x-ray [] Labs/cultures [] Medication   [] Nebulizer treatment [] NIPPV (CPAP/BiPAP) [] Oxygen [] Oral airway   [] Peripheral IV [] Palliative care consult [] CT/MRI [] Sepsis protocol    [] Suctioned [] Other:     Outcome:  [] Coded and  [] Code blue for intubation [] Coded and transferred to ICU []  on division   [] Remained on division (no change) [] Remained on division + additional monitoring [] Remained in ED [] Transferred to ED   [] Transferred to ICU [] Transferred to inpatient status [] Transferred for interventions (procedure) [] Transferred to ICU stepdown    [] Transferred to surgery [] Transferred to telemetry [] Sepsis protocol [] STEMI protocol   [] Stroke protocol [x] Bedside nurse instructed to page rapid response for any concerns or acute change in condition/VS     Additional Comments: Reviewed above RADAR VS with bedside RN via phone.  Vital signs within patient's current trends.  No  acute change in condition.  No interventions by rapid response team indicated at this time.  Staff to page rapid response for any concerns or acute change in condition or VS.

## 2025-06-07 NOTE — CONSULTS
Inpatient consult to Infectious Diseases  Consult performed by: Kaleb Salvador MD  Consult ordered by: Melissa Triplett PA-C        Referred by Melissa Triplett PA-C     Primary MD: No primary care provider on file.    Reason For Consult  Syphilis     History Of Present Illness  Thao Evans is a 60 y.o. female presenting with decompensated CHF.    Has history of CAD, uncontrolled diabetes, COPD, and paroxysmal atrial fibrillation.  She also has stage C (now stage D ) congestive heart failure with a previous echo in 2024 showing EF 40 to 45%.   She had syncope episode with a V. tach in March this year and had ICD placed, She also had left circumflex PCI at the same time.  She has been having several months of fatigue and unintended weight loss as well as shortness of breath with orthopnea.   She was directly admitted to ICU getting nitroprusside drip and now transferred to regular floor.  She is now on heart transplant list with a possible LVAD bridge.  As part of pretransplant evaluation, she had syphilis screening which came back positive with both RPR and total antibody test.   Her RPR titer was 1: 64.   She also developed suprapubic pressure yesterday and UA showed WBC 20-50 with E. Coli on the culture and started on IV ceftriaxone.    She never had syphilis screening test in the past.  She has been sexually active with one male partner for last 5 or 6 months.   And her partner recently told him that his syphilis test was positive .   she took either Augmentin or amoxicillin 2 months ago for respiratory infection and immediately felt burning sensation on both hands which resolved spontaneously.  Prior to that, she took amoxicillin many times without any problem.  She also had mild rash from doxycycline.     Denies any recent fever or chill or rash or genital lesion.  Also denies any headache or neck stiffness or blurry vision or decreased hearing.       Past Medical History  She has a past medical history of  CAD (coronary artery disease), CHF (congestive heart failure), COPD (chronic obstructive pulmonary disease) (Multi), Graves disease, Hypotension, and PAF (paroxysmal atrial fibrillation) (Multi).    Surgical History  She has no past surgical history on file.     Social History     Occupational History    Not on file   Tobacco Use    Smoking status: Former     Types: Cigarettes     Passive exposure: Never    Smokeless tobacco: Never   Substance and Sexual Activity    Alcohol use: Not on file    Drug use: Not on file    Sexual activity: Not on file     Travel History   Travel since 05/06/25    No documented travel since 05/06/25            Pets: unknown  Hobbies: unknown    Family History  Family History[1]  Allergies  Bee venom protein (honey bee), Codeine, Doxycycline, Prednisone, and Amoxicillin     Immunization History   Administered Date(s) Administered    Moderna SARS-CoV-2 Vaccination 04/15/2021, 05/13/2021     Medications  Home medications:  Prescriptions Prior to Admission[2]  Current medications:  Scheduled medications  Scheduled Medications[3]  Continuous medications  Continuous Medications[4]  PRN medications  PRN Medications[5]    Review of Systems   Constitutional:  Negative for chills, diaphoresis and fever.   HENT:  Negative for hearing loss and trouble swallowing.    Eyes:  Negative for visual disturbance.   Respiratory:  Negative for cough and shortness of breath.    Gastrointestinal:  Negative for abdominal pain and diarrhea.   Genitourinary:  Negative for difficulty urinating.   Skin:  Negative for rash.   Neurological:  Negative for dizziness, facial asymmetry, light-headedness, numbness and headaches.   Psychiatric/Behavioral:  Negative for confusion.         Objective  Range of Vitals (last 24 hours)  Heart Rate:  []   Temp:  [35.8 °C (96.4 °F)-36.6 °C (97.9 °F)]   Resp:  [0-28]   BP: ()/(49-70)   SpO2:  [89 %-100 %]   Daily Weight  06/05/25 : 57.5 kg (126 lb 10.5 oz)    Body mass  "index is 23.17 kg/m².     Physical Exam  Vitals reviewed.   Constitutional:       General: She is not in acute distress.     Appearance: She is not ill-appearing.   Eyes:      Conjunctiva/sclera: Conjunctivae normal.   Cardiovascular:      Rate and Rhythm: Normal rate and regular rhythm.      Pulses: Normal pulses.      Heart sounds: Normal heart sounds.   Pulmonary:      Effort: Pulmonary effort is normal.      Breath sounds: Normal breath sounds.   Abdominal:      General: Abdomen is flat.      Palpations: Abdomen is soft.   Musculoskeletal:         General: No swelling.      Cervical back: Neck supple.   Skin:     General: Skin is warm.   Neurological:      General: No focal deficit present.      Mental Status: She is alert.   Psychiatric:         Mood and Affect: Mood normal.          Relevant Results  Outside Hospital Results  No  Labs  Results from last 72 hours   Lab Units 06/06/25 0519 06/05/25 0607 06/04/25  0547   WBC AUTO x10*3/uL 6.5 8.2 7.0   HEMOGLOBIN g/dL 11.7* 11.9* 12.3   HEMATOCRIT % 35.6* 35.4* 36.7   PLATELETS AUTO x10*3/uL 173 179 186   NEUTROS PCT AUTO % 73.6 84.2 71.7   LYMPHS PCT AUTO % 13.5 6.0 13.5   MONOS PCT AUTO % 10.7 8.8 12.6   EOS PCT AUTO % 1.1 0.2 1.3     Results from last 72 hours   Lab Units 06/06/25 0519 06/05/25 1808 06/05/25  0607   SODIUM mmol/L 134* 132* 135*   POTASSIUM mmol/L 4.8 3.6 4.5   CHLORIDE mmol/L 98 94* 100   CO2 mmol/L 29 30 26   BUN mg/dL 15 14 11   CREATININE mg/dL 0.64 0.72 0.65   GLUCOSE mg/dL 121* 178* 180*   CALCIUM mg/dL 8.4* 8.6 8.7   ANION GAP mmol/L 12 12 14   EGFR mL/min/1.73m*2 >90 >90 >90   PHOSPHORUS mg/dL 3.1 3.5 4.2     Results from last 72 hours   Lab Units 06/06/25 0519 06/05/25 1808 06/05/25  0607   ALBUMIN g/dL 3.3* 3.4 3.4     Estimated Creatinine Clearance: 72.1 mL/min (by C-G formula based on SCr of 0.64 mg/dL).  No results found for: \"CRP\", \"SEDRATE\"  HIV 1/2 Antigen/Antibody Screen with Reflex to Confirmation   Date Value Ref Range " Status   06/03/2025 Nonreactive Nonreactive Final     Hepatitis C AB   Date Value Ref Range Status   06/03/2025 Nonreactive Nonreactive Final     Comment:     Results from patients taking biotin supplements or receiving high-dose biotin therapy should be interpreted with caution due to possible interference with this test. Providers may contact their local laboratory for further information.     Microbiology  Susceptibility data from last 90 days.  Collected Specimen Info Organism Amoxicillin/Clavulanate Ampicillin Ampicillin/Sulbactam Cefazolin Cefazolin (uncomplicated UTIs only) Ciprofloxacin Gentamicin Levofloxacin Nitrofurantoin Piperacillin/Tazobactam   06/03/25 Urine from Clean Catch/Voided Escherichia coli  S  R  I  S  S  S  S  S  S  S     Collected Specimen Info Organism Trimethoprim/Sulfamethoxazole   06/03/25 Urine from Clean Catch/Voided Escherichia coli  S       Temp Readings from Last 5 Encounters:   06/06/25 35.8 °C (96.4 °F) (Temporal)       Estimated Creatinine Clearance: 72.1 mL/min (by C-G formula based on SCr of 0.64 mg/dL).      INFECTIOUS DISEASE SYNOPSIS AND ASSESSMENT  60 years old female with underlying cardiomyopathy, uncontrolled diabetes presented with decompensated congestive heart failure.   Found to have positive syphilis test with RPR titer 1:64.  She never had syphilis screening before.  But given relatively high RPR titer with current partner with positive syphilis test.  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  But given the fact we do not have a previous syphilis test result, we still shall treat her as late latent syphilis.    She does not have any signs or symptoms of neurosyphilis or ocular syphilis.     She has nonspecific mild reaction (burning sensation on both hands )to amoxicillin (or Augmentin) 2 months ago which does not sound like true allergy.   She is on ceftriaxone for UTI which will cover for late latent syphilis if treated for 10-14 days.     Positive  syphilis test with RPR titer 1:64   Recent exposure to syphilis  Nonspecific reaction to amoxicillin  Congestive heart failure      RECOMMENDATION  Cont IV ceftriaxone, increase to 2gm q24H for now for UTI (and syphilis)  Will confirm with pt in AM re: agreement to allergy testing. If so please use Antibiotic Drug Challenge Order set in EMR and procedure from Inpatient drug challenge procedure:  Confirm no antihistamines or glucocorticoids have been received within 7 days of drug challenge  Hold AM beta blocker if possible  Anaphylaxis kit at bedside  Check baseline vitals  Administer single full dose of challenge drug (500mg amoxicillin)  Recheck vital signs in 1 hour            The case was discussed with my attending , Dr. Alla Wilkins who agreed with my assessment and plan.    BEN VALDEZ M.D /  Infectious disease consult team A  Infectious disease fellow   Please try to reach me through [employer] for any questions.      I saw and evaluated the patient. I personally obtained the key and critical portions of the history and physical exam or was physically present for key and critical portions performed by the resident/fellow. I reviewed the resident/fellow's documentation and discussed the patient with the resident/fellow. I agree with the resident/fellow's medical decision making as documented in the note.     61 y/o woman pmhx sig for CAD, HFrEF with ICM s/p ICD, T2DM last A1c 8.8%, admitted for HF evaluation and work up for advanced therapies. In preparation for possible transplant she had positive syphilis Ab and RPR 1:64. Speaking w/ her today she denies ever being diagnosed or treated for syphilis, but does say her partner of 5-6 mos told her a few weeks ago he was diagnosed. She has not yet had any treatment. We did also discuss PrEP with her (HIV screen neg), and encouraged her to let us know if she was interested.     She reports reaction to doxycycline as feeling like her mouth was hot (no mucosal  sloughing or ulcers), and reaction w/ amoxicillin was hands turning red w/ a burning sensation a few mos ago. Never had rash, hives, lip swelling, trouble breathing, nausea, vomiting, or other symptoms. She reports taking amoxicillin many times before this without issue, which makes me wonder if she took amox-clav and had a reaction to the clavulanate. Regardless with her reaction being atypical and not sounding IgE mediated, I think amoxicillin challenge would be indicated in order to give her the first line treatment for syphilis and avoid PICC line. Will discuss with her again tomorrow to be able to perform this with more support staff. Currently she denies any systemic symptoms suspicious of secondary or neurosyphilis, and would plan to treat as late latent as above.    This is a complex infectious disease issue and the following was performed today (for more details please see the above note):   Disease transmission risk assessment and mitigation (coordination with labs, public health agencies, or interdisciplinary teams).      Alla Wilkins DO         [1] No family history on file.  [2]   Medications Prior to Admission   Medication Sig Dispense Refill Last Dose/Taking    alogliptin (Nesina) 25 mg tablet Take 1 tablet (25 mg) by mouth once daily.   6/2/2025 Morning    atorvastatin (Lipitor) 80 mg tablet Take 1 tablet (80 mg) by mouth once daily.   6/2/2025 Morning    budesonide-formoterol (Symbicort) 80-4.5 mcg/actuation inhaler INHALE 2 PUFF BY INHALATION ROUTE 2 TIMES EVERY DAY IN THE MORNING AND EVENING   6/2/2025    bumetanide (Bumex) 2 mg tablet Take 1 tablet (2 mg) by mouth 2 times a day.   6/2/2025 Morning    citalopram (CeleXA) 40 mg tablet Take 1 tablet (40 mg) by mouth early in the morning..   6/2/2025 Morning    clopidogrel (Plavix) 75 mg tablet Take 1 tablet (75 mg) by mouth once daily.   6/2/2025 Morning    empagliflozin (Jardiance) 10 mg tablet Take 1 tablet (10 mg) by mouth once daily.    6/2/2025 Morning    gabapentin (Neurontin) 400 mg capsule TAKE 1 CAPSULE BY MOUTH 3 TIMES EVERY DAY FOR DIABETIC NEUROPATHY   6/2/2025 Morning    insulin glargine (Lantus Solostar U-100 Insulin) 100 unit/mL (3 mL) pen Inject 15 Units under the skin once daily at bedtime.   6/1/2025 Bedtime    metoprolol succinate XL (Toprol-XL) 25 mg 24 hr tablet Take 0.5 tablets (12.5 mg) by mouth once daily at bedtime.   6/1/2025 Bedtime    midodrine (Proamatine) 5 mg tablet Take 1 tablet (5 mg) by mouth 3 times a day.   6/2/2025 Morning    pantoprazole (ProtoNix) 20 mg EC tablet Take 1 tablet (20 mg) by mouth once daily.   6/2/2025 Morning    rOPINIRole (Requip) 1 mg tablet Take 1 tablet (1 mg) by mouth 3 times a day.   6/2/2025    rOPINIRole (Requip) 3 mg tablet take 1 tablet by oral route every day at bedtime   6/1/2025 Bedtime    spironolactone (Aldactone) 25 mg tablet Take 1 tablet (25 mg) by mouth once daily.   6/2/2025 Morning    albuterol 2.5 mg /3 mL (0.083 %) nebulizer solution Take 3 mL by nebulization every 6 hours if needed for wheezing or shortness of breath.       albuterol 90 mcg/actuation inhaler Inhale 2 puffs every 6 hours if needed for wheezing or shortness of breath.       levothyroxine (Synthroid, Levoxyl) 200 mcg tablet Take 1 tablet (200 mcg) by mouth early in the morning.. Take on an empty stomach at the same time each day, either 30 to 60 minutes prior to breakfast       nitroglycerin (Nitrostat) 0.4 mg SL tablet Place 1 tablet (0.4 mg) under the tongue every 5 minutes if needed for chest pain.      [3] atorvastatin, 80 mg, oral, Daily  [Held by provider] bumetanide, 2 mg, oral, BID  cefTRIAXone, 1 g, intravenous, q24h  citalopram, 40 mg, oral, Daily  clopidogrel, 75 mg, oral, Daily  [Held by provider] empagliflozin, 10 mg, oral, Daily  enoxaparin, 40 mg, subcutaneous, q24h  fluticasone furoate-vilanteroL, 1 puff, inhalation, Daily  gabapentin, 400 mg, oral, q8h MELYSSA  [Held by provider] hydrALAZINE, 25 mg,  oral, q8h MELYSSA  insulin glargine, 10 Units, subcutaneous, Nightly  insulin lispro, 0-5 Units, subcutaneous, TID AC  [START ON 6/7/2025] insulin lispro, 6 Units, subcutaneous, TID AC  [Held by provider] isosorbide dinitrate, 40 mg, oral, TID  levothyroxine, 88 mcg, oral, Daily  [Held by provider] metoprolol succinate XL, 12.5 mg, oral, Daily  [Held by provider] midodrine, 5 mg, oral, TID  rOPINIRole, 1 mg, oral, TID  rOPINIRole, 3 mg, oral, Nightly  sacubitriL-valsartan, 1 tablet, oral, BID  sennosides-docusate sodium, 2 tablet, oral, BID  spironolactone, 25 mg, oral, Daily    [4] lactated Ringer's, 10 mL/hr, Last Rate: 10 mL/hr (06/06/25 1642)  [Held by provider] nitroprusside, 0.25-5 mcg/kg/min, Last Rate: Stopped (06/05/25 1330)    [5] PRN medications: acetaminophen, albuterol, dextrose, dextrose, glucagon, glucagon, guaiFENesin, oxygen, polyethylene glycol

## 2025-06-07 NOTE — CARE PLAN
Problem: Pain - Adult  Goal: Verbalizes/displays adequate comfort level or baseline comfort level  Outcome: Progressing     Problem: Safety - Adult  Goal: Free from fall injury  Outcome: Progressing     Problem: Discharge Planning  Goal: Discharge to home or other facility with appropriate resources  Outcome: Progressing     Problem: Chronic Conditions and Co-morbidities  Goal: Patient's chronic conditions and co-morbidity symptoms are monitored and maintained or improved  Outcome: Progressing     Problem: Nutrition  Goal: Nutrient intake appropriate for maintaining nutritional needs  Outcome: Progressing     Problem: Heart Failure  Goal: Improved gas exchange this shift  Outcome: Progressing  Goal: Improved urinary output this shift  Outcome: Progressing  Goal: Reduction in peripheral edema within 24 hours  Outcome: Progressing  Goal: Report improvement of dyspnea/breathlessness this shift  Outcome: Progressing  Goal: Weight from fluid excess reduced over 2-3 days, then stabilize  Outcome: Progressing  Goal: Increase self care and/or family involvement in 24 hours  Outcome: Progressing   The patient's goals for the shift include GET SOMETHING TO EAT    The clinical goals for the shift include patient will have pain control

## 2025-06-07 NOTE — PROGRESS NOTES
Subjective:  No chest pain, shortness of breath, or orthopnea    Today in brief:  - escalating ceftriaxone to 2g today per ID (given UTI and syphilis)  - Give additional IV lasix 20mg for ongoing pulmonary edema and NC O2 requirements. Monitor RFP and UOP.  -  Consulted IR for possible pleural effusion  thoracentesis.  -  Advanced therapies evaluation initiated. Meeting on Tuesday to discuss candidacy. She will remain inpatient until then and subsequent with further diuresis and evaluation of her ongoing hypoxic respiratory failure management. Remains on 2L [place] O2. Not on oxygen at home at baseline.    Objective:  Vitals:    06/07/25 0440   BP: 92/56   Pulse: 102   Resp: 18   Temp: 36.2 °C (97.2 °F)   SpO2: 92%     Weight         6/2/2025  1300 6/4/2025  0600 6/5/2025  0600 6/7/2025  0440    Weight: 54.4 kg (120 lb) 56.5 kg (124 lb 9 oz) 57.5 kg (126 lb 10.5 oz) 59 kg (130 lb 1.1 oz)            Intake/Output Summary (Last 24 hours) at 6/7/2025 0834  Last data filed at 6/7/2025 0440  Gross per 24 hour   Intake --   Output 700 ml   Net -700 ml     Recent Results (from the past 24 hours)   POCT GLUCOSE    Collection Time: 06/06/25 12:15 PM   Result Value Ref Range    POCT Glucose 229 (H) 74 - 99 mg/dL   Urinalysis with Reflex Microscopic    Collection Time: 06/06/25 12:44 PM   Result Value Ref Range    Color, Urine Light-Yellow Light-Yellow, Yellow, Dark-Yellow    Appearance, Urine Clear Clear    Specific Gravity, Urine 1.010 1.005 - 1.035    pH, Urine 6.5 5.0, 5.5, 6.0, 6.5, 7.0, 7.5, 8.0    Protein, Urine NEGATIVE NEGATIVE, 10 (TRACE), 20 (TRACE) mg/dL    Glucose, Urine OVER (4+) (A) Normal mg/dL    Blood, Urine 0.03 (TRACE) (A) NEGATIVE mg/dL    Ketones, Urine NEGATIVE NEGATIVE mg/dL    Bilirubin, Urine NEGATIVE NEGATIVE mg/dL    Urobilinogen, Urine Normal Normal mg/dL    Nitrite, Urine NEGATIVE NEGATIVE    Leukocyte Esterase, Urine 250 Cesilia/uL (A) NEGATIVE   Microscopic Only, Urine    Collection Time: 06/06/25 12:44  PM   Result Value Ref Range    WBC, Urine 21-50 (A) 1-5, NONE /HPF    WBC Clumps, Urine RARE Reference range not established. /HPF    RBC, Urine 1-2 NONE, 1-2, 3-5 /HPF    Budding Yeast, Urine PRESENT (A) NONE /HPF   Blood Gas Mixed Venous Full Panel    Collection Time: 06/06/25  1:12 PM   Result Value Ref Range    POCT pH, Mixed 7.36 7.33 - 7.43 pH    POCT pCO2, Mixed 54 (H) 41 - 51 mm Hg    POCT pO2, Mixed 39 35 - 45 mm Hg    POCT SO2, Mixed 62 45 - 75 %    POCT Oxy Hemoglobin, Mixed 60.3 45.0 - 75.0 %    POCT Hematocrit Calculated, Mixed 38.0 36.0 - 46.0 %    POCT Sodium, Mixed 131 (L) 136 - 145 mmol/L    POCT Potassium, Mixed 4.1 3.5 - 5.3 mmol/L    POCT Chloride, Mixed 99 98 - 107 mmol/L    POCT Ionized Calcium, Mixed 1.20 1.10 - 1.33 mmol/L    POCT Glucose, Mixed 251 (H) 74 - 99 mg/dL    POCT Lactate, Mixed 1.1 0.4 - 2.0 mmol/L    POCT Base Excess, Mixed 3.8 (H) -2.0 - 3.0 mmol/L    POCT HCO3 Calculated, Mixed 30.5 (H) 22.0 - 26.0 mmol/L    POCT Hemoglobin, Mixed 12.6 12.0 - 16.0 g/dL    POCT Anion Gap, Mixed 6 (L) 10 - 25 mmo/L    Patient Temperature 37.0 degrees Celsius    FiO2 24 %   POCT GLUCOSE    Collection Time: 06/06/25  2:59 PM   Result Value Ref Range    POCT Glucose 197 (H) 74 - 99 mg/dL   POCT GLUCOSE    Collection Time: 06/06/25  4:46 PM   Result Value Ref Range    POCT Glucose 172 (H) 74 - 99 mg/dL   POCT GLUCOSE    Collection Time: 06/06/25  8:53 PM   Result Value Ref Range    POCT Glucose 231 (H) 74 - 99 mg/dL   Urinalysis with Reflex Culture and Microscopic    Collection Time: 06/07/25 12:22 AM   Result Value Ref Range    Color, Urine Colorless (N) Light-Yellow, Yellow, Dark-Yellow    Appearance, Urine Turbid (N) Clear    Specific Gravity, Urine 1.015 1.005 - 1.035    pH, Urine 6.5 5.0, 5.5, 6.0, 6.5, 7.0, 7.5, 8.0    Protein, Urine NEGATIVE NEGATIVE, 10 (TRACE), 20 (TRACE) mg/dL    Glucose, Urine OVER (4+) (A) Normal mg/dL    Blood, Urine 0.06 (1+) (A) NEGATIVE mg/dL    Ketones, Urine  NEGATIVE NEGATIVE mg/dL    Bilirubin, Urine NEGATIVE NEGATIVE mg/dL    Urobilinogen, Urine Normal Normal mg/dL    Nitrite, Urine NEGATIVE NEGATIVE    Leukocyte Esterase, Urine 500 Cesilia/uL (A) NEGATIVE   Microscopic Only, Urine    Collection Time: 06/07/25 12:22 AM   Result Value Ref Range    WBC, Urine >50 (A) 1-5, NONE /HPF    WBC Clumps, Urine FEW Reference range not established. /HPF    RBC, Urine 11-20 (A) NONE, 1-2, 3-5 /HPF    Squamous Epithelial Cells, Urine 1-9 (SPARSE) Reference range not established. /HPF   CBC    Collection Time: 06/07/25  6:07 AM   Result Value Ref Range    WBC 6.7 4.4 - 11.3 x10*3/uL    nRBC 0.0 0.0 - 0.0 /100 WBCs    RBC 4.19 4.00 - 5.20 x10*6/uL    Hemoglobin 12.3 12.0 - 16.0 g/dL    Hematocrit 39.1 36.0 - 46.0 %    MCV 93 80 - 100 fL    MCH 29.4 26.0 - 34.0 pg    MCHC 31.5 (L) 32.0 - 36.0 g/dL    RDW 13.5 11.5 - 14.5 %    Platelets 220 150 - 450 x10*3/uL   Renal function panel    Collection Time: 06/07/25  6:07 AM   Result Value Ref Range    Glucose 202 (H) 74 - 99 mg/dL    Sodium 135 (L) 136 - 145 mmol/L    Potassium 4.5 3.5 - 5.3 mmol/L    Chloride 100 98 - 107 mmol/L    Bicarbonate 28 21 - 32 mmol/L    Anion Gap 12 10 - 20 mmol/L    Urea Nitrogen 14 6 - 23 mg/dL    Creatinine 0.73 0.50 - 1.05 mg/dL    eGFR >90 >60 mL/min/1.73m*2    Calcium 8.6 8.6 - 10.6 mg/dL    Phosphorus 2.8 2.5 - 4.9 mg/dL    Albumin 3.4 3.4 - 5.0 g/dL   Magnesium    Collection Time: 06/07/25  6:07 AM   Result Value Ref Range    Magnesium 2.15 1.60 - 2.40 mg/dL   POCT GLUCOSE    Collection Time: 06/07/25  6:36 AM   Result Value Ref Range    POCT Glucose 194 (H) 74 - 99 mg/dL     Inpatient Medications:  Scheduled Medications[1]  PRN Medications[2]  Continuous Medications[3]    Telemetry 6/7/2025 (personally reviewed): SR 90-ST 100s    Physical exam:  General: NAD  Head/ neck: no JVD  Cardiac: RRR, regular S1 S2 , no murmur, no rub, no gallop  Pulm: CTA bilaterally, no wheezes, rales or rhonchi.    Vascular:  Radial 2+ bilaterally  GI: Non distended  Extremities: no LE edema   Neuro: no focal neuro deficits   Psych: appropriate mood and behavior   Skin: warm and dry     Assessment/Plan   Ms. Evans is a 60F with a PMHx sig for CAD s/p PCI (LAD; 2015, Lcx; 2024), stage C systolic HF/ICM/HFrEF s/p ICD, h/o VT with presumed associated syncope, poorly controlled DM, pAF (off of DOAC given the absence of recurrence), dyslipidemia, essential HTN, COPD, and hypothyroidism with progressive HF symptoms and intolerant of GDMT - on midodrine who presented to HFICU as a direct admission for PAC guided evaluation. Advanced therapies evaluation was initiated 6/3 for OHT/LVAD.     Ischemic HFrEF 30-35%, Stage C  - TTE 3/2025 at OSH: LVEF 30-35%, normal RV size with low normal function, mild MR, small pericardial effusion  - TTE 6/2/25 on arrival: LVEF 20-25%/LVIDd 4.1cm,  mild MR/TR  - Admit wt: 54.4kg admit BNP 1292H  - Opening SGC #s 6/2: BP 97/55 (68), CVP 3, PAP 39/18 (27), CO/CI 2.86/1.87, SVR 1817, SVO2 62% on midodrine 5 mg, empa 10, sandi 25.  - weaned nipride gtt 6/5  - Closing SGC#s 6/6/25: BP 88/51 (63), CVP 11, PAP 62/30 (43), CO 3.98/CI 2.54. SVO2 62%.   - Home medications: meto XL 12.5mg daily, spironolactone 25mg daily, jardiance 10mg daily, bumex 2mg bid, midodrine 5mg TID.  - C/w entresto 49-51mg bid.   - Hold empa 10 while getting abx for UTI.   - C/w sandi 25mg daily   - holding BB for now   - Give additional IV lasix 20mg for ongoing pulmonary edema and NC O2 requirements. Monitor RFP and UOP.  -  Consulted IR for possible pleural effusion  thoracentesis.  - Advanced therapies evaluation initiated. Meeting on Tuesday to discuss candidacy. She will remain inpatient until then and subsequent with further diuresis and evaluation of her ongoing hypoxic respiratory failure management. Remains on 2L [place] O2. Not on oxygen at home at baseline.  - Daily standing weights, 2gm sodium diet, 2L fluid restriction, strict  I&Os    Acute hypoxic respiratory failure - on 2LNC.  COPD  Pulmonary edema/pleural effusions  - C/w home inhalers  - IV diuretics as above  - Consult to IR for pleural effusion thoracentesis candidacy   - Monitor and maintain SpO2 > 92%     Symptomatic UTI  -UA with leuk est, WBC.  -Urine cx from 6/3 (advanced therapies evaluation with e.coli)   - on ceftriaxone > escalating to 2g today per ID (given UTI and syphilis)  -hold jardiance while being treated.     Positive Syphilis screen  -ID following.   - Reported allergy to PCN. Will treat with ceftriaxone (second line) at this time pending further allergy team input     Hx of Hypotension  - Home midodrine discontinued.  - Last 24hrs SBps here [id number]     CAD s/p PCI (LAD 2015, LCx 2024)  Hyperlipidemia  - No evidence of angina  - C/w home Plavix & statin    H/o VT  Paroxysmal A Fib  s/p CRT-D 3/2025, [employer]   - AC: off DOAC given absence of recurrence  - K goal >4, Mg>2     T2DM  - HgbA1c 8.6, previously was 12.3 in 3/2025  - Home medications: insulin glargine, jardiance, alogliptin (nesina).  - Endocrine following  - inpatient: glargine 10 units nightly, 6 units premeals, ISS coverage.     Hypothyroidism  - TSH 0.2L, Free T4 2.05H  - Repeat TSH 0.19L, free T4 1.23WNL/T3 67.  - c/w levothyroxine 88mcg daily per endocrinology.   - will need follow up with endocrinology at discharge.    GERD  - C/w home ppi  - Bowel regimen: prn miralax, juliocesar-colace BID     Iron deficiency  - Fe, TIBC, and ferritin WNL., %sat 17 Low  - IV venofer ordered 6/3     Prior tobacco use  - previous smoker, quit 1.5 months prior to admission date.      Anxiety  Depression  - C/w home celexa   - C/w home gabapentin    Restless legs  - C/w home requip     DVT ppx: lovenox  DISPO: pending respiratory improvement and advanced HF discussion next Tuesday  Code status: Full Code    Patient seen and discussed with Dr. Sakina Galvan.  Melissa Triplett PA-C         [1]   Scheduled  medications   Medication Dose Route Frequency    atorvastatin  80 mg oral Daily    [Held by provider] bumetanide  2 mg oral BID    cefTRIAXone  1 g intravenous q24h    citalopram  40 mg oral Daily    clopidogrel  75 mg oral Daily    [Held by provider] empagliflozin  10 mg oral Daily    enoxaparin  40 mg subcutaneous q24h    fluticasone furoate-vilanteroL  1 puff inhalation Daily    gabapentin  400 mg oral q8h MELYSSA    [Held by provider] hydrALAZINE  25 mg oral q8h MELYSSA    insulin glargine  10 Units subcutaneous Nightly    insulin lispro  0-5 Units subcutaneous TID AC    insulin lispro  6 Units subcutaneous TID AC    [Held by provider] isosorbide dinitrate  40 mg oral TID    levothyroxine  88 mcg oral Daily    [Held by provider] metoprolol succinate XL  12.5 mg oral Daily    [Held by provider] midodrine  5 mg oral TID    rOPINIRole  1 mg oral TID    rOPINIRole  3 mg oral Nightly    sacubitriL-valsartan  1 tablet oral BID    sennosides-docusate sodium  2 tablet oral BID    spironolactone  25 mg oral Daily   [2]   PRN medications   Medication    acetaminophen    albuterol    dextrose    dextrose    glucagon    glucagon    guaiFENesin    oxygen    polyethylene glycol   [3]   Continuous Medications   Medication Dose Last Rate    lactated Ringer's  10 mL/hr 10 mL/hr (06/06/25 1642)    [Held by provider] nitroprusside  0.25-5 mcg/kg/min Stopped (06/05/25 1330)

## 2025-06-08 ENCOUNTER — APPOINTMENT (OUTPATIENT)
Dept: RADIOLOGY | Facility: HOSPITAL | Age: 60
DRG: 291 | End: 2025-06-08
Payer: COMMERCIAL

## 2025-06-08 VITALS
BODY MASS INDEX: 23.94 KG/M2 | WEIGHT: 130.07 LBS | DIASTOLIC BLOOD PRESSURE: 58 MMHG | TEMPERATURE: 96.6 F | HEART RATE: 81 BPM | HEIGHT: 62 IN | RESPIRATION RATE: 18 BRPM | OXYGEN SATURATION: 93 % | SYSTOLIC BLOOD PRESSURE: 95 MMHG

## 2025-06-08 LAB
ALBUMIN SERPL BCP-MCNC: 3.2 G/DL (ref 3.4–5)
ALP SERPL-CCNC: 79 U/L (ref 33–136)
ALT SERPL W P-5'-P-CCNC: 11 U/L (ref 7–45)
ANION GAP SERPL CALC-SCNC: 11 MMOL/L (ref 10–20)
AST SERPL W P-5'-P-CCNC: 10 U/L (ref 9–39)
BACTERIA UR CULT: ABNORMAL
BASOPHILS # BLD AUTO: 0.06 X10*3/UL (ref 0–0.1)
BASOPHILS NFR BLD AUTO: 0.9 %
BILIRUB SERPL-MCNC: 0.2 MG/DL (ref 0–1.2)
BUN SERPL-MCNC: 16 MG/DL (ref 6–23)
CALCIUM SERPL-MCNC: 8.3 MG/DL (ref 8.6–10.6)
CHLORIDE SERPL-SCNC: 101 MMOL/L (ref 98–107)
CO2 SERPL-SCNC: 28 MMOL/L (ref 21–32)
CREAT SERPL-MCNC: 0.71 MG/DL (ref 0.5–1.05)
EGFRCR SERPLBLD CKD-EPI 2021: >90 ML/MIN/1.73M*2
EOSINOPHIL # BLD AUTO: 0.16 X10*3/UL (ref 0–0.7)
EOSINOPHIL NFR BLD AUTO: 2.5 %
ERYTHROCYTE [DISTWIDTH] IN BLOOD BY AUTOMATED COUNT: 13.2 % (ref 11.5–14.5)
GLUCOSE BLD MANUAL STRIP-MCNC: 149 MG/DL (ref 74–99)
GLUCOSE BLD MANUAL STRIP-MCNC: 167 MG/DL (ref 74–99)
GLUCOSE BLD MANUAL STRIP-MCNC: 188 MG/DL (ref 74–99)
GLUCOSE BLD MANUAL STRIP-MCNC: 218 MG/DL (ref 74–99)
GLUCOSE SERPL-MCNC: 227 MG/DL (ref 74–99)
HCT VFR BLD AUTO: 39.8 % (ref 36–46)
HGB BLD-MCNC: 12.2 G/DL (ref 12–16)
IMM GRANULOCYTES # BLD AUTO: 0.04 X10*3/UL (ref 0–0.7)
IMM GRANULOCYTES NFR BLD AUTO: 0.6 % (ref 0–0.9)
LDH SERPL L TO P-CCNC: 166 U/L (ref 84–246)
LYMPHOCYTES # BLD AUTO: 1.22 X10*3/UL (ref 1.2–4.8)
LYMPHOCYTES NFR BLD AUTO: 19 %
MAGNESIUM SERPL-MCNC: 2.52 MG/DL (ref 1.6–2.4)
MCH RBC QN AUTO: 29.1 PG (ref 26–34)
MCHC RBC AUTO-ENTMCNC: 30.7 G/DL (ref 32–36)
MCV RBC AUTO: 95 FL (ref 80–100)
MONOCYTES # BLD AUTO: 0.6 X10*3/UL (ref 0.1–1)
MONOCYTES NFR BLD AUTO: 9.3 %
NEUTROPHILS # BLD AUTO: 4.35 X10*3/UL (ref 1.2–7.7)
NEUTROPHILS NFR BLD AUTO: 67.7 %
NRBC BLD-RTO: 0 /100 WBCS (ref 0–0)
PLATELET # BLD AUTO: 249 X10*3/UL (ref 150–450)
POTASSIUM SERPL-SCNC: 4.4 MMOL/L (ref 3.5–5.3)
PROT SERPL-MCNC: 6.3 G/DL (ref 6.4–8.2)
RBC # BLD AUTO: 4.19 X10*6/UL (ref 4–5.2)
SODIUM SERPL-SCNC: 136 MMOL/L (ref 136–145)
WBC # BLD AUTO: 6.4 X10*3/UL (ref 4.4–11.3)

## 2025-06-08 PROCEDURE — 2500000002 HC RX 250 W HCPCS SELF ADMINISTERED DRUGS (ALT 637 FOR MEDICARE OP, ALT 636 FOR OP/ED): Performed by: NURSE PRACTITIONER

## 2025-06-08 PROCEDURE — 2500000002 HC RX 250 W HCPCS SELF ADMINISTERED DRUGS (ALT 637 FOR MEDICARE OP, ALT 636 FOR OP/ED): Performed by: PHYSICIAN ASSISTANT

## 2025-06-08 PROCEDURE — 2500000005 HC RX 250 GENERAL PHARMACY W/O HCPCS: Performed by: PHYSICIAN ASSISTANT

## 2025-06-08 PROCEDURE — 83615 LACTATE (LD) (LDH) ENZYME: CPT | Performed by: NURSE PRACTITIONER

## 2025-06-08 PROCEDURE — 71045 X-RAY EXAM CHEST 1 VIEW: CPT

## 2025-06-08 PROCEDURE — 2500000004 HC RX 250 GENERAL PHARMACY W/ HCPCS (ALT 636 FOR OP/ED): Performed by: NURSE PRACTITIONER

## 2025-06-08 PROCEDURE — 36415 COLL VENOUS BLD VENIPUNCTURE: CPT | Performed by: PHYSICIAN ASSISTANT

## 2025-06-08 PROCEDURE — 82947 ASSAY GLUCOSE BLOOD QUANT: CPT

## 2025-06-08 PROCEDURE — 83735 ASSAY OF MAGNESIUM: CPT | Performed by: PHYSICIAN ASSISTANT

## 2025-06-08 PROCEDURE — 1100000001 HC PRIVATE ROOM DAILY

## 2025-06-08 PROCEDURE — 99233 SBSQ HOSP IP/OBS HIGH 50: CPT | Performed by: STUDENT IN AN ORGANIZED HEALTH CARE EDUCATION/TRAINING PROGRAM

## 2025-06-08 PROCEDURE — 80053 COMPREHEN METABOLIC PANEL: CPT | Performed by: PHYSICIAN ASSISTANT

## 2025-06-08 PROCEDURE — 71045 X-RAY EXAM CHEST 1 VIEW: CPT | Performed by: STUDENT IN AN ORGANIZED HEALTH CARE EDUCATION/TRAINING PROGRAM

## 2025-06-08 PROCEDURE — 85025 COMPLETE CBC W/AUTO DIFF WBC: CPT | Performed by: PHYSICIAN ASSISTANT

## 2025-06-08 PROCEDURE — 2500000002 HC RX 250 W HCPCS SELF ADMINISTERED DRUGS (ALT 637 FOR MEDICARE OP, ALT 636 FOR OP/ED): Performed by: STUDENT IN AN ORGANIZED HEALTH CARE EDUCATION/TRAINING PROGRAM

## 2025-06-08 PROCEDURE — 2500000001 HC RX 250 WO HCPCS SELF ADMINISTERED DRUGS (ALT 637 FOR MEDICARE OP): Performed by: PHYSICIAN ASSISTANT

## 2025-06-08 PROCEDURE — 2500000004 HC RX 250 GENERAL PHARMACY W/ HCPCS (ALT 636 FOR OP/ED): Performed by: PHYSICIAN ASSISTANT

## 2025-06-08 RX ORDER — POTASSIUM CHLORIDE 20 MEQ/1
40 TABLET, EXTENDED RELEASE ORAL
Status: COMPLETED | OUTPATIENT
Start: 2025-06-08 | End: 2025-06-08

## 2025-06-08 RX ORDER — MAGNESIUM SULFATE HEPTAHYDRATE 40 MG/ML
2 INJECTION, SOLUTION INTRAVENOUS ONCE
Status: COMPLETED | OUTPATIENT
Start: 2025-06-08 | End: 2025-06-08

## 2025-06-08 RX ORDER — BUMETANIDE 1 MG/1
1 TABLET ORAL 2 TIMES DAILY
Status: ACTIVE | OUTPATIENT
Start: 2025-06-08

## 2025-06-08 RX ADMIN — POTASSIUM CHLORIDE 40 MEQ: 1500 TABLET, EXTENDED RELEASE ORAL at 12:01

## 2025-06-08 RX ADMIN — ROPINIROLE HYDROCHLORIDE 1 MG: 3 TABLET, FILM COATED ORAL at 13:57

## 2025-06-08 RX ADMIN — INSULIN LISPRO 1 UNITS: 100 INJECTION, SOLUTION INTRAVENOUS; SUBCUTANEOUS at 12:02

## 2025-06-08 RX ADMIN — Medication 2 L/MIN: at 08:18

## 2025-06-08 RX ADMIN — INSULIN LISPRO 2 UNITS: 100 INJECTION, SOLUTION INTRAVENOUS; SUBCUTANEOUS at 16:48

## 2025-06-08 RX ADMIN — PANTOPRAZOLE SODIUM 20 MG: 20 TABLET, DELAYED RELEASE ORAL at 08:22

## 2025-06-08 RX ADMIN — ATORVASTATIN CALCIUM 80 MG: 80 TABLET, FILM COATED ORAL at 08:22

## 2025-06-08 RX ADMIN — INSULIN LISPRO 6 UNITS: 100 INJECTION, SOLUTION INTRAVENOUS; SUBCUTANEOUS at 12:02

## 2025-06-08 RX ADMIN — CITALOPRAM HYDROBROMIDE 40 MG: 40 TABLET ORAL at 08:23

## 2025-06-08 RX ADMIN — LEVOTHYROXINE SODIUM 88 MCG: 0.09 TABLET ORAL at 06:15

## 2025-06-08 RX ADMIN — SPIRONOLACTONE 25 MG: 25 TABLET, FILM COATED ORAL at 08:23

## 2025-06-08 RX ADMIN — POTASSIUM CHLORIDE 40 MEQ: 1500 TABLET, EXTENDED RELEASE ORAL at 08:22

## 2025-06-08 RX ADMIN — SACUBITRIL AND VALSARTAN 1 TABLET: 49; 51 TABLET, FILM COATED ORAL at 08:22

## 2025-06-08 RX ADMIN — CEFTRIAXONE SODIUM 2 G: 2 INJECTION, SOLUTION INTRAVENOUS at 16:16

## 2025-06-08 RX ADMIN — GABAPENTIN 400 MG: 300 CAPSULE ORAL at 13:57

## 2025-06-08 RX ADMIN — SACUBITRIL AND VALSARTAN 1 TABLET: 49; 51 TABLET, FILM COATED ORAL at 21:00

## 2025-06-08 RX ADMIN — ENOXAPARIN SODIUM 40 MG: 100 INJECTION SUBCUTANEOUS at 13:57

## 2025-06-08 RX ADMIN — GABAPENTIN 400 MG: 300 CAPSULE ORAL at 05:31

## 2025-06-08 RX ADMIN — ROPINIROLE HYDROCHLORIDE 1 MG: 3 TABLET, FILM COATED ORAL at 08:22

## 2025-06-08 RX ADMIN — INSULIN LISPRO 6 UNITS: 100 INJECTION, SOLUTION INTRAVENOUS; SUBCUTANEOUS at 16:48

## 2025-06-08 RX ADMIN — FLUTICASONE FUROATE AND VILANTEROL TRIFENATATE 1 PUFF: 100; 25 POWDER RESPIRATORY (INHALATION) at 08:21

## 2025-06-08 RX ADMIN — INSULIN LISPRO 6 UNITS: 100 INJECTION, SOLUTION INTRAVENOUS; SUBCUTANEOUS at 08:23

## 2025-06-08 RX ADMIN — ROPINIROLE HYDROCHLORIDE 1 MG: 3 TABLET, FILM COATED ORAL at 21:00

## 2025-06-08 RX ADMIN — CLOPIDOGREL BISULFATE 75 MG: 75 TABLET, FILM COATED ORAL at 08:23

## 2025-06-08 RX ADMIN — INSULIN GLARGINE 10 UNITS: 100 INJECTION, SOLUTION SUBCUTANEOUS at 21:04

## 2025-06-08 RX ADMIN — ROPINIROLE HYDROCHLORIDE 3 MG: 3 TABLET, FILM COATED ORAL at 21:00

## 2025-06-08 RX ADMIN — GABAPENTIN 400 MG: 300 CAPSULE ORAL at 21:00

## 2025-06-08 RX ADMIN — MAGNESIUM SULFATE HEPTAHYDRATE 2 G: 40 INJECTION, SOLUTION INTRAVENOUS at 08:25

## 2025-06-08 ASSESSMENT — COGNITIVE AND FUNCTIONAL STATUS - GENERAL
CLIMB 3 TO 5 STEPS WITH RAILING: A LITTLE
DAILY ACTIVITIY SCORE: 24
CLIMB 3 TO 5 STEPS WITH RAILING: A LITTLE
DAILY ACTIVITIY SCORE: 24
MOBILITY SCORE: 23
MOBILITY SCORE: 23

## 2025-06-08 ASSESSMENT — PAIN - FUNCTIONAL ASSESSMENT
PAIN_FUNCTIONAL_ASSESSMENT: 0-10

## 2025-06-08 ASSESSMENT — PAIN SCALES - GENERAL
PAINLEVEL_OUTOF10: 0 - NO PAIN

## 2025-06-08 NOTE — CARE PLAN
Problem: Pain - Adult  Goal: Verbalizes/displays adequate comfort level or baseline comfort level  Outcome: Progressing     Problem: Safety - Adult  Goal: Free from fall injury  Outcome: Progressing     Problem: Discharge Planning  Goal: Discharge to home or other facility with appropriate resources  Outcome: Progressing     Problem: Chronic Conditions and Co-morbidities  Goal: Patient's chronic conditions and co-morbidity symptoms are monitored and maintained or improved  Outcome: Progressing     Problem: Nutrition  Goal: Nutrient intake appropriate for maintaining nutritional needs  Outcome: Progressing     Problem: Heart Failure  Goal: Improved gas exchange this shift  Outcome: Progressing  Goal: Improved urinary output this shift  Outcome: Progressing  Goal: Reduction in peripheral edema within 24 hours  Outcome: Progressing  Goal: Report improvement of dyspnea/breathlessness this shift  Outcome: Progressing  Goal: Weight from fluid excess reduced over 2-3 days, then stabilize  Outcome: Progressing  Goal: Increase self care and/or family involvement in 24 hours  Outcome: Progressing   The patient's goals for the shift include GET SOMETHING TO EAT    The clinical goals for the shift include pt to have adequate pain control

## 2025-06-08 NOTE — PROGRESS NOTES
Subjective:  Patient seen this AM, notes some SOB that improves with oxygen.    Today in brief:  - resume bumex at 1mg PO BID  - plan for thoracentesis of Left side tomorrow, IR order in; body fluid orders in, also ordered cytology     Objective:  Vitals:    06/08/25 0818   BP: 96/60   Pulse: 93   Resp: 17   Temp: 36.2 °C (97.2 °F)   SpO2: 97%     Weight         6/2/2025  1300 6/4/2025  0600 6/5/2025  0600 6/7/2025  0440    Weight: 54.4 kg (120 lb) 56.5 kg (124 lb 9 oz) 57.5 kg (126 lb 10.5 oz) 59 kg (130 lb 1.1 oz)            Intake/Output Summary (Last 24 hours) at 6/8/2025 1008  Last data filed at 6/8/2025 0817  Gross per 24 hour   Intake 222 ml   Output --   Net 222 ml     Recent Results (from the past 24 hours)   POCT GLUCOSE    Collection Time: 06/07/25 12:10 PM   Result Value Ref Range    POCT Glucose 122 (H) 74 - 99 mg/dL   POCT GLUCOSE    Collection Time: 06/07/25  4:00 PM   Result Value Ref Range    POCT Glucose 181 (H) 74 - 99 mg/dL   Magnesium    Collection Time: 06/07/25  7:20 PM   Result Value Ref Range    Magnesium 1.91 1.60 - 2.40 mg/dL   CBC and Auto Differential    Collection Time: 06/07/25  7:20 PM   Result Value Ref Range    WBC 6.9 4.4 - 11.3 x10*3/uL    nRBC 0.0 0.0 - 0.0 /100 WBCs    RBC 3.99 (L) 4.00 - 5.20 x10*6/uL    Hemoglobin 12.0 12.0 - 16.0 g/dL    Hematocrit 38.3 36.0 - 46.0 %    MCV 96 80 - 100 fL    MCH 30.1 26.0 - 34.0 pg    MCHC 31.3 (L) 32.0 - 36.0 g/dL    RDW 13.2 11.5 - 14.5 %    Platelets 232 150 - 450 x10*3/uL    Neutrophils % 66.1 40.0 - 80.0 %    Immature Granulocytes %, Automated 0.4 0.0 - 0.9 %    Lymphocytes % 19.2 13.0 - 44.0 %    Monocytes % 11.3 2.0 - 10.0 %    Eosinophils % 2.0 0.0 - 6.0 %    Basophils % 1.0 0.0 - 2.0 %    Neutrophils Absolute 4.58 1.20 - 7.70 x10*3/uL    Immature Granulocytes Absolute, Automated 0.03 0.00 - 0.70 x10*3/uL    Lymphocytes Absolute 1.33 1.20 - 4.80 x10*3/uL    Monocytes Absolute 0.78 0.10 - 1.00 x10*3/uL    Eosinophils Absolute 0.14 0.00  - 0.70 x10*3/uL    Basophils Absolute 0.07 0.00 - 0.10 x10*3/uL   Comprehensive metabolic panel    Collection Time: 06/07/25  7:20 PM   Result Value Ref Range    Glucose 115 (H) 74 - 99 mg/dL    Sodium 135 (L) 136 - 145 mmol/L    Potassium 3.5 3.5 - 5.3 mmol/L    Chloride 100 98 - 107 mmol/L    Bicarbonate 28 21 - 32 mmol/L    Anion Gap 11 10 - 20 mmol/L    Urea Nitrogen 15 6 - 23 mg/dL    Creatinine 0.62 0.50 - 1.05 mg/dL    eGFR >90 >60 mL/min/1.73m*2    Calcium 8.5 (L) 8.6 - 10.6 mg/dL    Albumin 3.2 (L) 3.4 - 5.0 g/dL    Alkaline Phosphatase 83 33 - 136 U/L    Total Protein 6.3 (L) 6.4 - 8.2 g/dL    AST 12 9 - 39 U/L    Bilirubin, Total 0.3 0.0 - 1.2 mg/dL    ALT 10 7 - 45 U/L   POCT GLUCOSE    Collection Time: 06/07/25  9:00 PM   Result Value Ref Range    POCT Glucose 89 74 - 99 mg/dL   POCT GLUCOSE    Collection Time: 06/08/25  6:38 AM   Result Value Ref Range    POCT Glucose 149 (H) 74 - 99 mg/dL     Inpatient Medications:  Scheduled Medications[1]  PRN Medications[2]  Continuous Medications[3]    Telemetry 6/8/2025 (personally reviewed): SR 90-ST 100s    Physical exam:  General: NAD, lying in bed   Head/ neck: JVD to midneck at 60 degrees  Cardiac: RRR, regular S1 S2 , no murmur, no rub, no gallop  Pulm: rales bilaterally; 3L O2 via nc   GI: Non distended  Extremities: no LE edema   Neuro: no focal neuro deficits   Psych: appropriate mood and behavior   Skin: warm and dry     Assessment/Plan   Ms. Evans is a 60F with a PMHx sig for CAD s/p PCI (LAD; 2015, Lcx; 3/2025), stage C systolic HF/ICM/HFrEF s/p ICD, h/o VT with presumed associated syncope, poorly controlled DM, pAF (off of DOAC given the absence of recurrence), dyslipidemia, essential HTN, COPD, and hypothyroidism with progressive HF symptoms and intolerant of GDMT - on midodrine who presented to HFICU as a direct admission for PAC guided evaluation. Advanced therapies evaluation was initiated 6/3 for OHT/LVAD.     Ischemic HFrEF 30-35%, Stage C  -  TTE 3/2025 at OSH: LVEF 30-35%, normal RV size with low normal function, mild MR, small pericardial effusion  - TTE 6/2/25 on arrival: LVEF 20-25%/LVIDd 4.1cm,  mild MR/TR  - Admit wt: 54.4kg admit BNP 1292H  - Opening SGC #s 6/2: BP 97/55 (68), CVP 3, PAP 39/18 (27), CO/CI 2.86/1.87, SVR 1817, SVO2 62% on midodrine 5 mg, empa 10, sandi 25.  - weaned nipride gtt 6/5  - Closing SGC#s 6/6/25: BP 88/51 (63), CVP 11, PAP 62/30 (43), CO 3.98/CI 2.54. SVO2 62%.   - Home medications: meto XL 12.5mg daily, spironolactone 25mg daily, jardiance 10mg daily, bumex 2mg bid, midodrine 5mg TID.  - C/w entresto 49-51mg bid.   - Hold empa 10 while getting abx for UTI.   - C/w sandi 25mg daily   - holding BB for now   - resume PO Bumex 1mg BID  - Advanced therapies evaluation initiated. Meeting on Tuesday to discuss candidacy. She will remain inpatient until then and subsequent with further diuresis and evaluation of her ongoing hypoxic respiratory failure management  - Daily standing weights, 2gm sodium diet, 2L fluid restriction, strict I&Os    Acute hypoxic respiratory failure - on 2LNC.  COPD  Pulmonary edema/pleural effusions  - C/w home inhalers  - IV diuretics as above  - Consulted to IR for pleural effusion thoracentesis candidacy   - ordered body fluids labs and cytology for eval   - Monitor and maintain SpO2 > 92%     Symptomatic UTI, improved  Syphilis   -UA with leuk est, WBC.  -Urine cx from 6/3 (advanced therapies evaluation with e.coli)   - ID following: rec 2 weeks (stop date 6/20) of ceftriaxone 2g q24 hours (given UTI and syphilis)  -hold jardiance while being treated  - reports resolved urinary symptoms as of 6/8     Hx of Hypotension  - Home midodrine discontinued.  - Last 24hrs SBps here [id number]     CAD s/p PCI (LAD 2015, LCx 3/2025)  Hyperlipidemia  - No evidence of angina  - C/w home Plavix & statin    H/o VT  Paroxysmal A Fib  s/p CRT-D 3/2025, [employer]   - AC: off DOAC given absence of  recurrence  - K goal >4, Mg>2     T2DM  - HgbA1c 8.6, previously was 12.3 in 3/2025  - Home medications: insulin glargine, jardiance, alogliptin (nesina).  - Endocrine following  - inpatient: glargine 10 units nightly, 6 units premeals, ISS coverage.     Hypothyroidism  - TSH 0.2L, Free T4 2.05H  - Repeat TSH 0.19L, free T4 1.23WNL/T3 67.  - c/w levothyroxine 88mcg daily per endocrinology.   - will need follow up with endocrinology at discharge.    GERD  - C/w home ppi  - Bowel regimen: prn miralax, juliocesar-colace BID     Iron deficiency  - Fe, TIBC, and ferritin WNL., %sat 17 Low  - IV venofer ordered 6/3     Prior tobacco use  - previous smoker of 40 years, quit 1.5 months prior to admission date.      Anxiety  Depression  - C/w home celexa   - C/w home gabapentin    Restless legs  - C/w home requip     DVT ppx: lovenox  DISPO: pending respiratory improvement and advanced HF discussion Tuesday  Code status: Full Code    Patient seen and discussed with Dr. Everardo Dahl, APRN-CNP         [1]   Scheduled medications   Medication Dose Route Frequency    atorvastatin  80 mg oral Daily    [Held by provider] bumetanide  1 mg oral BID    cefTRIAXone  2 g intravenous q24h    citalopram  40 mg oral Daily    clopidogrel  75 mg oral Daily    [Held by provider] empagliflozin  10 mg oral Daily    enoxaparin  40 mg subcutaneous q24h    fluticasone furoate-vilanteroL  1 puff inhalation Daily    gabapentin  400 mg oral q8h MELYSSA    [Held by provider] hydrALAZINE  25 mg oral q8h MELYSSA    insulin glargine  10 Units subcutaneous Nightly    insulin lispro  0-5 Units subcutaneous TID AC    insulin lispro  6 Units subcutaneous TID AC    [Held by provider] isosorbide dinitrate  40 mg oral TID    levothyroxine  88 mcg oral Daily    magnesium sulfate  2 g intravenous Once    [Held by provider] metoprolol succinate XL  12.5 mg oral Daily    [Held by provider] midodrine  5 mg oral TID    pantoprazole  20 mg oral Daily    potassium  chloride CR  40 mEq oral q3h    rOPINIRole  1 mg oral TID    rOPINIRole  3 mg oral Nightly    sacubitriL-valsartan  1 tablet oral BID    sennosides-docusate sodium  2 tablet oral BID    spironolactone  25 mg oral Daily   [2]   PRN medications   Medication    acetaminophen    albuterol    dextrose    dextrose    glucagon    glucagon    guaiFENesin    oxygen    polyethylene glycol   [3]   Continuous Medications   Medication Dose Last Rate

## 2025-06-09 ENCOUNTER — APPOINTMENT (OUTPATIENT)
Dept: RADIOLOGY | Facility: HOSPITAL | Age: 60
DRG: 291 | End: 2025-06-09
Payer: COMMERCIAL

## 2025-06-09 ENCOUNTER — LAB REQUISITION (OUTPATIENT)
Dept: LAB | Facility: CLINIC | Age: 60
DRG: 291 | End: 2025-06-09
Payer: COMMERCIAL

## 2025-06-09 DIAGNOSIS — I50.9 HEART FAILURE, UNSPECIFIED: ICD-10-CM

## 2025-06-09 LAB
ALBUMIN SERPL BCP-MCNC: 3.3 G/DL (ref 3.4–5)
ALP SERPL-CCNC: 77 U/L (ref 33–136)
ALT SERPL W P-5'-P-CCNC: 9 U/L (ref 7–45)
ANION GAP SERPL CALC-SCNC: 11 MMOL/L (ref 10–20)
AST SERPL W P-5'-P-CCNC: 10 U/L (ref 9–39)
BACTERIA UR CULT: ABNORMAL
BASOPHILS # BLD AUTO: 0.08 X10*3/UL (ref 0–0.1)
BASOPHILS NFR BLD AUTO: 0.9 %
BILIRUB SERPL-MCNC: 0.2 MG/DL (ref 0–1.2)
BUN SERPL-MCNC: 15 MG/DL (ref 6–23)
CALCIUM SERPL-MCNC: 8.4 MG/DL (ref 8.6–10.6)
CHLORIDE SERPL-SCNC: 102 MMOL/L (ref 98–107)
CLARITY FLD: CLEAR
CO2 SERPL-SCNC: 27 MMOL/L (ref 21–32)
COLOR FLD: YELLOW
CREAT SERPL-MCNC: 0.62 MG/DL (ref 0.5–1.05)
EGFRCR SERPLBLD CKD-EPI 2021: >90 ML/MIN/1.73M*2
ELECTRONICALLY SIGNED BY: NORMAL
EOSINOPHIL # BLD AUTO: 0.16 X10*3/UL (ref 0–0.7)
EOSINOPHIL NFR BLD AUTO: 1.8 %
EOSINOPHIL NFR FLD MANUAL: ABNORMAL %
ERYTHROCYTE [DISTWIDTH] IN BLOOD BY AUTOMATED COUNT: 13.4 % (ref 11.5–14.5)
FACTOR V LEIDEN INTERPRETATION: NORMAL
FACTOR V LEIDEN RESULT: NORMAL
GLUCOSE BLD MANUAL STRIP-MCNC: 140 MG/DL (ref 74–99)
GLUCOSE BLD MANUAL STRIP-MCNC: 140 MG/DL (ref 74–99)
GLUCOSE BLD MANUAL STRIP-MCNC: 148 MG/DL (ref 74–99)
GLUCOSE BLD MANUAL STRIP-MCNC: 196 MG/DL (ref 74–99)
GLUCOSE SERPL-MCNC: 135 MG/DL (ref 74–99)
HCT VFR BLD AUTO: 43.8 % (ref 36–46)
HGB BLD-MCNC: 13.2 G/DL (ref 12–16)
HLA CLS I TYP PNL BLD/T DONR HIGH RES: NORMAL
HLA RESULTS: NORMAL
HLA-A+B+C AB NFR SER: NORMAL %
HLA-DP+DQ+DR AB NFR SER: NORMAL %
HLA-DP2 QL: NORMAL
HLA-DQB1 HIGH RES: NORMAL
HLA-DRB1 HIGH RES: NORMAL
IMM GRANULOCYTES # BLD AUTO: 0.04 X10*3/UL (ref 0–0.7)
IMM GRANULOCYTES NFR BLD AUTO: 0.4 % (ref 0–0.9)
LDH FLD L TO P-CCNC: 49 U/L
LYMPHOCYTES # BLD AUTO: 1.44 X10*3/UL (ref 1.2–4.8)
LYMPHOCYTES NFR BLD AUTO: 16.1 %
LYMPHOCYTES NFR FLD MANUAL: 56 %
MAGNESIUM SERPL-MCNC: 2.25 MG/DL (ref 1.6–2.4)
MCH RBC QN AUTO: 29.4 PG (ref 26–34)
MCHC RBC AUTO-ENTMCNC: 30.1 G/DL (ref 32–36)
MCV RBC AUTO: 98 FL (ref 80–100)
MONOCYTES # BLD AUTO: 0.71 X10*3/UL (ref 0.1–1)
MONOCYTES NFR BLD AUTO: 8 %
MONOS+MACROS NFR FLD MANUAL: 26 %
NEUTROPHILS # BLD AUTO: 6.5 X10*3/UL (ref 1.2–7.7)
NEUTROPHILS NFR BLD AUTO: 72.8 %
NEUTROPHILS NFR FLD MANUAL: 15 %
NRBC BLD-RTO: 0 /100 WBCS (ref 0–0)
OTHER CELLS NFR FLD MANUAL: 3 % (ref ?–0)
PLATELET # BLD AUTO: 265 X10*3/UL (ref 150–450)
POTASSIUM SERPL-SCNC: 4.2 MMOL/L (ref 3.5–5.3)
PROT FLD-MCNC: 1.8 G/DL
PROT SERPL-MCNC: 6.5 G/DL (ref 6.4–8.2)
RBC # BLD AUTO: 4.49 X10*6/UL (ref 4–5.2)
RBC # FLD AUTO: <2000 /UL
SODIUM SERPL-SCNC: 136 MMOL/L (ref 136–145)
TOTAL CELLS COUNTED FLD: 100
WBC # BLD AUTO: 8.9 X10*3/UL (ref 4.4–11.3)
WBC # FLD MANUAL: 314 /UL

## 2025-06-09 PROCEDURE — 99221 1ST HOSP IP/OBS SF/LOW 40: CPT | Performed by: NURSE PRACTITIONER

## 2025-06-09 PROCEDURE — 87075 CULTR BACTERIA EXCEPT BLOOD: CPT | Performed by: STUDENT IN AN ORGANIZED HEALTH CARE EDUCATION/TRAINING PROGRAM

## 2025-06-09 PROCEDURE — 2500000002 HC RX 250 W HCPCS SELF ADMINISTERED DRUGS (ALT 637 FOR MEDICARE OP, ALT 636 FOR OP/ED): Performed by: PHYSICIAN ASSISTANT

## 2025-06-09 PROCEDURE — 71045 X-RAY EXAM CHEST 1 VIEW: CPT

## 2025-06-09 PROCEDURE — 2500000001 HC RX 250 WO HCPCS SELF ADMINISTERED DRUGS (ALT 637 FOR MEDICARE OP): Performed by: PHYSICIAN ASSISTANT

## 2025-06-09 PROCEDURE — 88112 CYTOPATH CELL ENHANCE TECH: CPT | Mod: TC,MCY | Performed by: STUDENT IN AN ORGANIZED HEALTH CARE EDUCATION/TRAINING PROGRAM

## 2025-06-09 PROCEDURE — 71045 X-RAY EXAM CHEST 1 VIEW: CPT | Performed by: RADIOLOGY

## 2025-06-09 PROCEDURE — 71046 X-RAY EXAM CHEST 2 VIEWS: CPT

## 2025-06-09 PROCEDURE — 0W9B3ZZ DRAINAGE OF LEFT PLEURAL CAVITY, PERCUTANEOUS APPROACH: ICD-10-PCS | Performed by: NURSE PRACTITIONER

## 2025-06-09 PROCEDURE — 2500000004 HC RX 250 GENERAL PHARMACY W/ HCPCS (ALT 636 FOR OP/ED): Performed by: PHYSICIAN ASSISTANT

## 2025-06-09 PROCEDURE — 1100000001 HC PRIVATE ROOM DAILY

## 2025-06-09 PROCEDURE — 88305 TISSUE EXAM BY PATHOLOGIST: CPT | Performed by: PATHOLOGY

## 2025-06-09 PROCEDURE — 88104 CYTOPATH FL NONGYN SMEARS: CPT | Performed by: STUDENT IN AN ORGANIZED HEALTH CARE EDUCATION/TRAINING PROGRAM

## 2025-06-09 PROCEDURE — 2500000002 HC RX 250 W HCPCS SELF ADMINISTERED DRUGS (ALT 637 FOR MEDICARE OP, ALT 636 FOR OP/ED): Performed by: STUDENT IN AN ORGANIZED HEALTH CARE EDUCATION/TRAINING PROGRAM

## 2025-06-09 PROCEDURE — 88112 CYTOPATH CELL ENHANCE TECH: CPT | Performed by: PATHOLOGY

## 2025-06-09 PROCEDURE — 85025 COMPLETE CBC W/AUTO DIFF WBC: CPT | Performed by: PHYSICIAN ASSISTANT

## 2025-06-09 PROCEDURE — 87070 CULTURE OTHR SPECIMN AEROBIC: CPT | Performed by: STUDENT IN AN ORGANIZED HEALTH CARE EDUCATION/TRAINING PROGRAM

## 2025-06-09 PROCEDURE — 99233 SBSQ HOSP IP/OBS HIGH 50: CPT | Performed by: STUDENT IN AN ORGANIZED HEALTH CARE EDUCATION/TRAINING PROGRAM

## 2025-06-09 PROCEDURE — 80053 COMPREHEN METABOLIC PANEL: CPT | Performed by: PHYSICIAN ASSISTANT

## 2025-06-09 PROCEDURE — 82947 ASSAY GLUCOSE BLOOD QUANT: CPT

## 2025-06-09 PROCEDURE — 88341 IMHCHEM/IMCYTCHM EA ADD ANTB: CPT | Performed by: PATHOLOGY

## 2025-06-09 PROCEDURE — 83615 LACTATE (LD) (LDH) ENZYME: CPT | Performed by: STUDENT IN AN ORGANIZED HEALTH CARE EDUCATION/TRAINING PROGRAM

## 2025-06-09 PROCEDURE — 99233 SBSQ HOSP IP/OBS HIGH 50: CPT | Performed by: INTERNAL MEDICINE

## 2025-06-09 PROCEDURE — 2500000004 HC RX 250 GENERAL PHARMACY W/ HCPCS (ALT 636 FOR OP/ED): Performed by: NURSE PRACTITIONER

## 2025-06-09 PROCEDURE — 89051 BODY FLUID CELL COUNT: CPT | Performed by: STUDENT IN AN ORGANIZED HEALTH CARE EDUCATION/TRAINING PROGRAM

## 2025-06-09 PROCEDURE — 32555 ASPIRATE PLEURA W/ IMAGING: CPT

## 2025-06-09 PROCEDURE — 36415 COLL VENOUS BLD VENIPUNCTURE: CPT | Performed by: PHYSICIAN ASSISTANT

## 2025-06-09 PROCEDURE — 84157 ASSAY OF PROTEIN OTHER: CPT | Performed by: STUDENT IN AN ORGANIZED HEALTH CARE EDUCATION/TRAINING PROGRAM

## 2025-06-09 PROCEDURE — 88342 IMHCHEM/IMCYTCHM 1ST ANTB: CPT | Mod: TC,MCY | Performed by: STUDENT IN AN ORGANIZED HEALTH CARE EDUCATION/TRAINING PROGRAM

## 2025-06-09 PROCEDURE — 83735 ASSAY OF MAGNESIUM: CPT | Performed by: PHYSICIAN ASSISTANT

## 2025-06-09 PROCEDURE — 88342 IMHCHEM/IMCYTCHM 1ST ANTB: CPT | Performed by: PATHOLOGY

## 2025-06-09 PROCEDURE — 83880 ASSAY OF NATRIURETIC PEPTIDE: CPT

## 2025-06-09 RX ORDER — FUROSEMIDE 10 MG/ML
40 INJECTION INTRAMUSCULAR; INTRAVENOUS ONCE
Status: COMPLETED | OUTPATIENT
Start: 2025-06-09 | End: 2025-06-09

## 2025-06-09 RX ORDER — BUMETANIDE 1 MG/1
1 TABLET ORAL 2 TIMES DAILY
Status: DISCONTINUED | OUTPATIENT
Start: 2025-06-09 | End: 2025-06-10

## 2025-06-09 RX ADMIN — CLOPIDOGREL BISULFATE 75 MG: 75 TABLET, FILM COATED ORAL at 08:16

## 2025-06-09 RX ADMIN — CITALOPRAM HYDROBROMIDE 40 MG: 40 TABLET ORAL at 06:05

## 2025-06-09 RX ADMIN — INSULIN LISPRO 6 UNITS: 100 INJECTION, SOLUTION INTRAVENOUS; SUBCUTANEOUS at 17:47

## 2025-06-09 RX ADMIN — LEVOTHYROXINE SODIUM 88 MCG: 0.09 TABLET ORAL at 06:05

## 2025-06-09 RX ADMIN — INSULIN GLARGINE 10 UNITS: 100 INJECTION, SOLUTION SUBCUTANEOUS at 20:25

## 2025-06-09 RX ADMIN — CEFTRIAXONE SODIUM 2 G: 2 INJECTION, SOLUTION INTRAVENOUS at 15:40

## 2025-06-09 RX ADMIN — INSULIN LISPRO 1 UNITS: 100 INJECTION, SOLUTION INTRAVENOUS; SUBCUTANEOUS at 12:35

## 2025-06-09 RX ADMIN — GABAPENTIN 400 MG: 300 CAPSULE ORAL at 06:05

## 2025-06-09 RX ADMIN — INSULIN LISPRO 6 UNITS: 100 INJECTION, SOLUTION INTRAVENOUS; SUBCUTANEOUS at 12:35

## 2025-06-09 RX ADMIN — ATORVASTATIN CALCIUM 80 MG: 80 TABLET, FILM COATED ORAL at 08:16

## 2025-06-09 RX ADMIN — SACUBITRIL AND VALSARTAN 1 TABLET: 49; 51 TABLET, FILM COATED ORAL at 08:16

## 2025-06-09 RX ADMIN — ACETAMINOPHEN 650 MG: 325 TABLET ORAL at 08:31

## 2025-06-09 RX ADMIN — ROPINIROLE 1 MG: 1 TABLET, FILM COATED ORAL at 06:05

## 2025-06-09 RX ADMIN — SPIRONOLACTONE 25 MG: 25 TABLET, FILM COATED ORAL at 08:16

## 2025-06-09 RX ADMIN — ROPINIROLE 1 MG: 1 TABLET, FILM COATED ORAL at 12:35

## 2025-06-09 RX ADMIN — FLUTICASONE FUROATE AND VILANTEROL TRIFENATATE 1 PUFF: 100; 25 POWDER RESPIRATORY (INHALATION) at 08:18

## 2025-06-09 RX ADMIN — ROPINIROLE 1 MG: 1 TABLET, FILM COATED ORAL at 17:49

## 2025-06-09 RX ADMIN — SACUBITRIL AND VALSARTAN 1 TABLET: 49; 51 TABLET, FILM COATED ORAL at 20:24

## 2025-06-09 RX ADMIN — PANTOPRAZOLE SODIUM 20 MG: 20 TABLET, DELAYED RELEASE ORAL at 08:16

## 2025-06-09 RX ADMIN — INSULIN LISPRO 6 UNITS: 100 INJECTION, SOLUTION INTRAVENOUS; SUBCUTANEOUS at 08:16

## 2025-06-09 RX ADMIN — GABAPENTIN 400 MG: 300 CAPSULE ORAL at 21:13

## 2025-06-09 RX ADMIN — ROPINIROLE HYDROCHLORIDE 3 MG: 3 TABLET, FILM COATED ORAL at 20:24

## 2025-06-09 RX ADMIN — GABAPENTIN 400 MG: 300 CAPSULE ORAL at 12:35

## 2025-06-09 RX ADMIN — FUROSEMIDE 40 MG: 10 INJECTION, SOLUTION INTRAMUSCULAR; INTRAVENOUS at 10:48

## 2025-06-09 RX ADMIN — ENOXAPARIN SODIUM 40 MG: 100 INJECTION SUBCUTANEOUS at 12:34

## 2025-06-09 ASSESSMENT — COGNITIVE AND FUNCTIONAL STATUS - GENERAL
CLIMB 3 TO 5 STEPS WITH RAILING: A LITTLE
MOBILITY SCORE: 24
DAILY ACTIVITIY SCORE: 24
DAILY ACTIVITIY SCORE: 24
MOBILITY SCORE: 23

## 2025-06-09 ASSESSMENT — PAIN - FUNCTIONAL ASSESSMENT
PAIN_FUNCTIONAL_ASSESSMENT: 0-10

## 2025-06-09 ASSESSMENT — PAIN SCALES - GENERAL
PAINLEVEL_OUTOF10: 0 - NO PAIN
PAINLEVEL_OUTOF10: 2
PAINLEVEL_OUTOF10: 2
PAINLEVEL_OUTOF10: 4

## 2025-06-09 NOTE — POST-PROCEDURE NOTE
INTERVENTIONAL RADIOLOGY ADVANCED PRACTICE PROCEDURE  [facility]    A time out was performed and Left Hemithorax was examined with US and appropriate entry point was confirmed and marked.   The patient was prepped and draped in a sterile manner, 1% lidocaine was used to anesthesize the skin and subcutaneous tissue.   A 5F Centesis needle was then introduced through the skin into the pleural space, the centesis catheter was then threaded without difficulty.   1000 ml of yellow fluid was removed without difficulty. The catheter was then removed.   No immediate complications were noted during and immediately following the procedure.

## 2025-06-09 NOTE — CARE PLAN
The patient's goals for the shift include GET SOMETHING TO EAT    The clinical goals for the shift include pt will remain HDS throughout this shift    Over the shift, the patient had no significant event this shift.      Problem: Pain - Adult  Goal: Verbalizes/displays adequate comfort level or baseline comfort level  Outcome: Progressing     Problem: Safety - Adult  Goal: Free from fall injury  Outcome: Progressing     Problem: Discharge Planning  Goal: Discharge to home or other facility with appropriate resources  Outcome: Progressing     Problem: Chronic Conditions and Co-morbidities  Goal: Patient's chronic conditions and co-morbidity symptoms are monitored and maintained or improved  Outcome: Progressing     Problem: Nutrition  Goal: Nutrient intake appropriate for maintaining nutritional needs  Outcome: Progressing     Problem: Heart Failure  Goal: Improved gas exchange this shift  Outcome: Progressing  Goal: Improved urinary output this shift  Outcome: Progressing  Goal: Reduction in peripheral edema within 24 hours  Outcome: Progressing  Goal: Report improvement of dyspnea/breathlessness this shift  Outcome: Progressing  Goal: Weight from fluid excess reduced over 2-3 days, then stabilize  Outcome: Progressing  Goal: Increase self care and/or family involvement in 24 hours  Outcome: Progressing

## 2025-06-09 NOTE — PROGRESS NOTES
Advanced Heart Failure Progress Note   [facility]  Primary HF Cardiologist: Dr. Deluca  Referring: Dr. Glover ([place])  Reason for consult: advanced therapies    Subjective   Subjective Data:  S/p L thoracentesis yesterday with removal of 1L of fluid    Overnight Events:    No acute events overnight  Objective   Physical Exam  Vitals:    06/09/25 1125   BP: (!) 90/49   Pulse: 86   Resp: 18   Temp: 36 °C (96.8 °F)   SpO2: 94%     Constitutional:       General: She is not in acute distress.     Appearance: Normal appearance. She is not ill-appearing.   HENT:      Mouth/Throat:      Mouth: Mucous membranes are moist.   Eyes:      General: No scleral icterus.     Extraocular Movements: Extraocular movements intact.      Conjunctiva/sclera: Conjunctivae normal.   Cardiovascular:      Rate and Rhythm: Normal rate and regular rhythm.      Heart sounds: Normal heart sounds. No murmur heard.  Pulmonary:      Effort: Pulmonary effort is normal. No respiratory distress.      Breath sounds: Normal breath sounds.      Comments: On room air  Abdominal:      General: Abdomen is flat. There is no distension.      Palpations: Abdomen is soft.      Tenderness: There is no abdominal tenderness.   Musculoskeletal:      Right lower leg: No edema.      Left lower leg: No edema.   Skin:     General: Skin is warm and dry.   Neurological:      General: No focal deficit present.      Mental Status: She is alert and oriented to person, place, and time. Mental status is at baseline.   Psychiatric:         Mood and Affect: Mood normal.         Behavior: Behavior normal.         Thought Content: Thought content normal.     I have personally reviewed the following images and laboratory findings:    ECG: sinus rhythm .    Results for orders placed during the hospital encounter of 06/02/25    Transthoracic echo (TTE) complete    Narrative  [facility], [address] [place] [id number]  Tel  [phone] and Fax [phone]    TRANSTHORACIC ECHOCARDIOGRAM REPORT      Patient Name:       ANETA BECKER         [place] Physician:    [id number] Jose Gonzalez MD  Study Date:         6/2/2025            Ordering Provider:    [id number] HAYDEN BARCLAY  MRN/PID:            [id number]            Fellow:  Accession#:         [id number]        Nurse:  Date of Birth/Age:  1965 / 60      Sonographer:          Diane donaldson RDCS, KRYSTAL  Gender assigned at  F                   Additional Staff:  Birth:  Height:             157.48 cm           Admit Date:           5/30/2025  Weight:             54.43 kg            Admission Status:     Inpatient -  Routine  BSA / BMI:          1.54 m2 / 21.95     Encounter#:           [id number]  kg/m2  Blood Pressure:     97/55 mmHg          Department Location:  [address]    Study Type:    TRANSTHORACIC ECHO (TTE) COMPLETE  Diagnosis/ICD: Unspecified systolic (congestive) heart failure (CHF)-I50.20  Indication:    Congestive Heart Failure  CPT Code:      Echo Complete w Full Doppler-[id number]    Patient History:  Diabetes:          Yes  Pertinent History: Cardiomyopathy and CHF.    Study Detail: The following Echo studies were performed: 2D, M-Mode, Doppler and  color flow. Definity used as a contrast agent for endocardial  border definition. Total contrast used for this procedure was 2 mL  via IV push.      PHYSICIAN INTERPRETATION:  Left Ventricle: Left ventricular ejection fraction is severely decreased by visual estimate at 20-25%. There is global hypokinesis of the left ventricle with minor regional variations. The left ventricular cavity size is mildly dilated. There is normal septal and normal posterior left ventricular wall thickness. There is left ventricular concentric remodeling. Spectral Doppler shows a Grade III (restrictive pattern) of left ventricular diastolic filling with an elevated left atrial pressure. There is no  definite left ventricular thrombus visualized. There is relative preservation of the basal myocardial segment systolic function.  Left Atrium: The left atrial size is normal.  Right Ventricle: The right ventricle is normal in size. There is normal right ventricular global systolic function.  Right Atrium: The right atrium is normal in size.  Aortic Valve: The aortic valve is trileaflet. The aortic valve area by VTI is 1.12 cmï¿½ with a peak velocity of 1.49 m/s. The peak and mean gradients are 9 mmHg and 4 mmHg, respectively, with a dimensionless index of 0.49. There is no evidence of aortic valve regurgitation.  Mitral Valve: The mitral valve is mildly thickened. There is mild mitral valve regurgitation. The E Vmax is 0.95 m/s.  Tricuspid Valve: The tricuspid valve is structurally normal. There is mild tricuspid regurgitation.  Pulmonic Valve: The pulmonic valve is structurally normal. There is physiologic pulmonic valve regurgitation.  Pericardium: Trivial pericardial effusion.  Aorta: The aortic root is normal.  Pulmonary Artery: The tricuspid regurgitant velocity is 3.09 m/s, and with an estimated right atrial pressure of 8, the estimated pulmonary artery pressure is mild to moderately elevated with the RVSP at 46 mmHg.  Systemic Veins: The inferior vena cava appears normal in size, with IVC inspiratory collapse less than 50%.  In comparison to the previous echocardiogram(s): There are no prior studies on this patient for comparison purposes.      CONCLUSIONS:  1. Left ventricular ejection fraction is severely decreased by visual estimate at 20-25%.  2. There is global hypokinesis of the left ventricle with minor regional variations.  3. Spectral Doppler shows a Grade III (restrictive pattern) of left ventricular diastolic filling with an elevated left atrial pressure.  4. Left ventricular cavity size is mildly dilated.  5. No left ventricular thrombus visualized.  6. There is relative preservation of the basal  myocardial segment systolic function.  7. There is normal right ventricular global systolic function.  8. Mild to moderately elevated pulmonary artery pressure.    RECOMMENDATIONS:    QUANTITATIVE DATA SUMMARY:    2D MEASUREMENTS:         Normal Ranges:  Ao Root d:       2.10 cm (2.0-3.7cm)  LAs:             3.70 cm (2.7-4.0cm)  IVSd:            0.90 cm (0.6-1.1cm)  LVPWd:           0.90 cm (0.6-1.1cm)  LVIDd:           4.10 cm (3.9-5.9cm)  LVIDs:           3.70 cm  LV Mass Index:   74 g/m2  LV % FS          9.8 %      LEFT ATRIUM:                  Normal Ranges:  LA Vol A4C:        38.8 ml    (22+/-6mL/m2)  LA Vol A2C:        43.3 ml  LA Vol BP:         41.2 ml  LA Vol Index A4C:  25.2ml/m2  LA Vol Index A2C:  28.1 ml/m2  LA Vol Index BP:   26.8 ml/m2  LA Area A4C:       14.9 cm2  LA Area A2C:       15.8 cm2  LA Major Axis A4C: 4.9 cm  LA Major Axis A2C: 4.9 cm  LA Volume Index:   26.8 ml/m2      RIGHT ATRIUM:          Normal Ranges:  RA Area A4C:  14.7 cm2      LV SYSTOLIC FUNCTION:  Normal Ranges:  EF-Visual:      23 %  LV EF Reported: 23 %      LV DIASTOLIC FUNCTION:             Normal Ranges:  MV Peak E:             0.95 m/s    (0.7-1.2 m/s)  MV Peak A:             0.33 m/s    (0.42-0.7 m/s)  E/A Ratio:             2.90        (1.0-2.2)  MV e'                  0.034 m/s   (>8.0)  MV lateral e'          0.03 m/s  MV medial e'           0.04 m/s  MV A Dur:              124.00 msec  E/e' Ratio:            27.61       (<8.0)  MV DT:                 145 msec    (150-240 msec)      MITRAL VALVE:          Normal Ranges:  MV DT:        111 msec (150-240msec)      AORTIC VALVE:                     Normal Ranges:  AoV Vmax:                1.49 m/s (<=1.7m/s)  AoV Peak P.9 mmHg (<20mmHg)  AoV Mean P.0 mmHg (1.7-11.5mmHg)  LVOT Max Paco:            0.81 m/s (<=1.1m/s)  AoV VTI:                 27.40 cm (18-25cm)  LVOT VTI:                13.50 cm  LVOT Diameter:           1.80 cm   "(1.8-2.4cm)  AoV Area, VTI:           1.12 cm2 (2.5-5.5cm2)  AoV Area,Vmax:           1.23 cm2 (2.5-4.5cm2)  AoV Dimensionless Index: 0.49      RIGHT VENTRICLE:  RV Basal 3.70 cm  RV Mid   2.40 cm  RV Major 6.3 cm  TAPSE:   15.6 mm  RV s'    0.10 m/s      TRICUSPID VALVE/RVSP:          Normal Ranges:  Peak TR Velocity:     3.09 m/s  Est. RA Pressure:     8  RV Syst Pressure:     46       (< 30mmHg)  IVC Diam:             1.50 cm      PULMONIC VALVE:          Normal Ranges:  PV Max Paco:     0.7 m/s  (0.6-0.9m/s)  PV Max P.9 mmHg      [id number] Jose Gonzalez MD  Electronically signed on 2025 at 6:00:20 PM        ** Final **       Imaging  Chest x-ray: pleural effusion: on the left     Lab Review   Lab Results   Component Value Date     2025     (L) 2025     (L) 2025    K 4.4 2025    K 3.5 2025    K 4.5 2025    CO2 28 2025    CO2 28 2025    CO2 28 2025    BUN 16 2025    BUN 15 2025    BUN 14 2025    CREATININE 0.71 2025    CREATININE 0.62 2025    CREATININE 0.73 2025    GLUCOSE 227 (H) 2025    GLUCOSE 115 (H) 2025    GLUCOSE 202 (H) 2025    CALCIUM 8.3 (L) 2025    CALCIUM 8.5 (L) 2025    CALCIUM 8.6 2025     No results found for: \"CKTOTAL\", \"CKMB\", \"CKMBINDEX\", \"TROPONINI\"  Lab Results   Component Value Date    WBC 6.4 2025    HGB 12.2 2025    HCT 39.8 2025    MCV 95 2025     2025       Troponin I, High Sensitivity (CMC)   Date/Time Value Ref Range Status   2025 01:46 PM 22 0 - 34 ng/L Final     BNP   Date/Time Value Ref Range Status   2025 01:46 PM 1,292 (H) 0 - 99 pg/mL Final        Assessment and Plan   60F with a PMHx sig for CAD s/p PCI (LAD; , Lcx; ), stage C systolic HF/ICM/HFrEF s/p ICD, h/o VT with presumed associated syncope, poorly controlled DM, pAF (off of DOAC given the absence of recurrence), " dyslipidemia, essential HTN, COPD, and hypothyroidism with progressive HF symptoms and intolerant of GDMT - on midodrine who presents to HFICU as a direct admission for PAC guided evaluation. Advanced therapies evaluation was initiated 6/3 for OHT/LVAD. Transferred from HFICU to LT 5 06/06 under HHVI service. LT5 course c/b acute hypoxic respiratory failure due to L pleural effusion s/p thoracentesis now with 1 L fluid removed, now on room air.    Ischemic cardiomyopathy/ HFrEF 30-35%/AHA Stage D  - TTE 3/2025 at OSH: LVEF 30-35%, normal RV size with low normal function, mild MR, small pericardial effusion  - TTE 6/2/25 on admission: LVEF 20-25%/LVIDd 4.1cm,  mild MR/TR  - Admit wt: 54.4kg admit BNP 1292H  - Opening [facility] #s 6/2: BP 97/55 (68), CVP 3, PAP 39/18 (27), CO/CI 2.86/1.87, SVR 1817, SVO2 62% on midodrine 5 mg, empa 10, sandi 25.  -  nipride gtt weaned off 6/5  - Closing SGC#s 6/6/25: BP 88/51 (63), CVP 11, PAP 62/30 (43), CO 3.98/CI 2.54. SVO2 62%.   - Home medications: meto XL 12.5mg daily, spironolactone 25mg daily, jardiance 10mg daily, bumex 2mg bid, midodrine 5mg TID.    - C/w entresto 49-51mg bid.   - continue to hold empagliflozin 10mg  - C/w sandi 25mg daily   - Holding BB for now   - Diuresis per HHVI team  -please repeat PFTs today  - Daily standing weights, 2gm sodium diet, 2L fluid restriction, strict I&Os    Plan to present her tomorrow at the selection committee meeting for OHT/LVAD. We will continue to follow.     Thank you for the opportunity to involve us in the care of this fine individual. This plan was discussed with Dr. Mcgee, HF attending. For any questions or concerns, feel free to reach out via [employer] chat or page at [phone].    Vianca Car MD   Heart Failure Fellow  PGY-4

## 2025-06-09 NOTE — PROGRESS NOTES
Subjective:  SOB is much better after L thoracentesis. Now on room air.    Today in brief:  - Await advanced therapies discussion regarding candidacy for LVAD/OHT tomorrow 6/10  - Did not receive diuresis yesterday. Give lasix 40mg IV (had good response to this earliet in admission) for residual central overload.  - s/p L thoracentesis with improvement in breathing. Lung bases bilaterally still quite. Although she is now on room air.  - Obtain complete PFTs and ABG for presentation tomorrow    Objective:  Vitals:    06/09/25 0822   BP: 97/60   Pulse: 92   Resp: 18   Temp: 35.8 °C (96.4 °F)   SpO2: 95%     Weight         6/2/2025  1300 6/4/2025  0600 6/5/2025  0600 6/7/2025  0440 6/9/2025  0436    Weight: 54.4 kg (120 lb) 56.5 kg (124 lb 9 oz) 57.5 kg (126 lb 10.5 oz) 59 kg (130 lb 1.1 oz) 60.8 kg (134 lb 0.6 oz)            Intake/Output Summary (Last 24 hours) at 6/9/2025 0829  Last data filed at 6/9/2025 0436  Gross per 24 hour   Intake 719.75 ml   Output 1570 ml   Net -850.25 ml     Recent Results (from the past 24 hours)   POCT GLUCOSE    Collection Time: 06/08/25 11:57 AM   Result Value Ref Range    POCT Glucose 167 (H) 74 - 99 mg/dL   POCT GLUCOSE    Collection Time: 06/08/25  4:35 PM   Result Value Ref Range    POCT Glucose 218 (H) 74 - 99 mg/dL   Magnesium    Collection Time: 06/08/25  6:46 PM   Result Value Ref Range    Magnesium 2.52 (H) 1.60 - 2.40 mg/dL   CBC and Auto Differential    Collection Time: 06/08/25  6:46 PM   Result Value Ref Range    WBC 6.4 4.4 - 11.3 x10*3/uL    nRBC 0.0 0.0 - 0.0 /100 WBCs    RBC 4.19 4.00 - 5.20 x10*6/uL    Hemoglobin 12.2 12.0 - 16.0 g/dL    Hematocrit 39.8 36.0 - 46.0 %    MCV 95 80 - 100 fL    MCH 29.1 26.0 - 34.0 pg    MCHC 30.7 (L) 32.0 - 36.0 g/dL    RDW 13.2 11.5 - 14.5 %    Platelets 249 150 - 450 x10*3/uL    Neutrophils % 67.7 40.0 - 80.0 %    Immature Granulocytes %, Automated 0.6 0.0 - 0.9 %    Lymphocytes % 19.0 13.0 - 44.0 %    Monocytes % 9.3 2.0 - 10.0 %     Eosinophils % 2.5 0.0 - 6.0 %    Basophils % 0.9 0.0 - 2.0 %    Neutrophils Absolute 4.35 1.20 - 7.70 x10*3/uL    Immature Granulocytes Absolute, Automated 0.04 0.00 - 0.70 x10*3/uL    Lymphocytes Absolute 1.22 1.20 - 4.80 x10*3/uL    Monocytes Absolute 0.60 0.10 - 1.00 x10*3/uL    Eosinophils Absolute 0.16 0.00 - 0.70 x10*3/uL    Basophils Absolute 0.06 0.00 - 0.10 x10*3/uL   Comprehensive metabolic panel    Collection Time: 06/08/25  6:46 PM   Result Value Ref Range    Glucose 227 (H) 74 - 99 mg/dL    Sodium 136 136 - 145 mmol/L    Potassium 4.4 3.5 - 5.3 mmol/L    Chloride 101 98 - 107 mmol/L    Bicarbonate 28 21 - 32 mmol/L    Anion Gap 11 10 - 20 mmol/L    Urea Nitrogen 16 6 - 23 mg/dL    Creatinine 0.71 0.50 - 1.05 mg/dL    eGFR >90 >60 mL/min/1.73m*2    Calcium 8.3 (L) 8.6 - 10.6 mg/dL    Albumin 3.2 (L) 3.4 - 5.0 g/dL    Alkaline Phosphatase 79 33 - 136 U/L    Total Protein 6.3 (L) 6.4 - 8.2 g/dL    AST 10 9 - 39 U/L    Bilirubin, Total 0.2 0.0 - 1.2 mg/dL    ALT 11 7 - 45 U/L   Lactate Dehydrogenase    Collection Time: 06/08/25  6:46 PM   Result Value Ref Range     84 - 246 U/L   POCT GLUCOSE    Collection Time: 06/08/25  9:04 PM   Result Value Ref Range    POCT Glucose 188 (H) 74 - 99 mg/dL   POCT GLUCOSE    Collection Time: 06/09/25  6:42 AM   Result Value Ref Range    POCT Glucose 140 (H) 74 - 99 mg/dL     Inpatient Medications:  Scheduled Medications[1]  PRN Medications[2]  Continuous Medications[3]    Telemetry 6/9/2025 (personally reviewed): SR 70-80s    Physical exam:  General: NAD, lying in bed   Head/ neck: +JVD   Cardiac: RRR, regular S1 S2 , no murmur, no rub, no gallop  Pulm: Quiet bases bilaterally R >L. No WOB. On room air.  GI: Non distended  Extremities: no LE edema   Neuro: no focal neuro deficits   Psych: appropriate mood and behavior   Skin: warm and dry     Assessment/Plan   Ms. Evans is a 60F with a PMHx sig for CAD s/p PCI (LAD; 2015, Lcx; 3/2025), stage C systolic HF/ICM/HFrEF  s/p ICD, h/o VT with presumed associated syncope, poorly controlled DM, pAF (off of DOAC given the absence of recurrence), dyslipidemia, essential HTN, COPD, and hypothyroidism with progressive HF symptoms and intolerance of GDMT - on midodrine. Presented to HFICU as a direct admission for PAC guided evaluation. Advanced therapies evaluation was initiated 6/3 for OHT/LVAD.     Ischemic HFrEF 30-35%, Stage C  - TTE 3/2025 at OSH: LVEF 30-35%, normal RV size with low normal function, mild MR, small pericardial effusion  - TTE 6/2/25 on arrival: LVEF 20-25%/LVIDd 4.1cm,  mild MR/TR  - Admit wt: 54.4kg admit BNP 1292H  - Opening [facility] #s 6/2: BP 97/55 (68), CVP 3, PAP 39/18 (27), CO/CI 2.86/1.87, SVR 1817, SVO2 62% on midodrine 5 mg, empa 10, sandi 25.  - weaned nipride gtt 6/5  - Closing SGC#s 6/6/25: BP 88/51 (63), CVP 11, PAP 62/30 (43), CO 3.98/CI 2.54. SVO2 62%.   - Home medications: meto XL 12.5mg daily, spironolactone 25mg daily, jardiance 10mg daily, bumex 2mg bid, midodrine 5mg TID.  - C/w entresto 49-51mg bid.   - Hold empa 10 while getting abx for UTI.   - C/w sandi 25mg daily   - Holding BB for now   - Did not resume bumex yesterday. Will give lasix 40mg IV x1 today (good UOP with that 6/5)  - Advanced therapies evaluation initiated. Meeting on Tuesday to discuss candidacy. She will remain inpatient until then and subsequent with further diuresis and evaluation of her ongoing hypoxic respiratory failure management  - Daily standing weights, 2gm sodium diet, 2L fluid restriction, strict I&Os    Acute hypoxic respiratory failure, improving  COPD  Pulmonary edema/pleural effusions  - C/w home inhalers  - IV diuretics as above  - s/p L thoracentesis 6/9:  -1L yellow fluid. Labs c/w transudative effusion.  - Weaned from 2LNC down to room air  - 2vCXR  6/9: Marked decrease in L pleural effusion. Stable mild pulm edema and R pleural effusion  - additional cytology labs pending  - Monitor and maintain SpO2 > 92%      Symptomatic UTI, improved  Syphilis   - UA with leuk est, WBC.  - Urine cx from 6/3 (advanced therapies evaluation with e.coli)   - ID following: rec 2 weeks (stop date 6/20) of ceftriaxone 2g q24 hours (given UTI and syphilis)  - Hold jardiance while being treated  - Reports resolved urinary symptoms as of 6/8     Hx of Hypotension  - Home midodrine discontinued.  - Last 24hrs SBPs here 90     CAD s/p PCI (LAD 2015, LCx 3/2025)  Hyperlipidemia  - No evidence of angina  - C/w home Plavix & statin    H/o VT  Paroxysmal A Fib  s/p CRT-D 3/2025, [facility]   - AC: off DOAC given absence of recurrence  - K goal >4, Mg>2     T2DM  - HgbA1c 8.6, previously was 12.3 in 3/2025  - Home medications: insulin glargine, jardiance, alogliptin (nesina).  - Endocrine following  - inpatient: glargine 10 units nightly, 6 units premeals, ISS coverage.     Hypothyroidism  - TSH 0.2L, Free T4 2.05H  - Repeat TSH 0.19L, free T4 1.23WNL/T3 67.  - c/w levothyroxine 88mcg daily per endocrinology.   - will need follow up with endocrinology at discharge.    GERD  - C/w home ppi  - Bowel regimen: prn miralax, juliocesar-colace BID     Iron deficiency  - Fe, TIBC, and ferritin WNL., %sat 17 Low  - IV venofer ordered 6/3     Prior tobacco use  - previous smoker of 40 years, quit 1.5 months prior to admission date.      Anxiety  Depression  - C/w home celexa   - C/w home gabapentin    Restless legs  - C/w home requip     DVT ppx: lovenox  DISPO: pending respiratory improvement and advanced HF discussion Tuesday  Code status: Full Code    Patient seen and discussed with Dr. Bridger Triplett PA-C         [1]   Scheduled medications   Medication Dose Route Frequency    atorvastatin  80 mg oral Daily    [Held by provider] bumetanide  1 mg oral BID    cefTRIAXone  2 g intravenous q24h    citalopram  40 mg oral Daily    clopidogrel  75 mg oral Daily    [Held by provider] empagliflozin  10 mg oral Daily    enoxaparin  40 mg  subcutaneous q24h    fluticasone furoate-vilanteroL  1 puff inhalation Daily    gabapentin  400 mg oral q8h MELYSSA    [Held by provider] hydrALAZINE  25 mg oral q8h MELYSSA    insulin glargine  10 Units subcutaneous Nightly    insulin lispro  0-5 Units subcutaneous TID AC    insulin lispro  6 Units subcutaneous TID AC    [Held by provider] isosorbide dinitrate  40 mg oral TID    levothyroxine  88 mcg oral Daily    [Held by provider] metoprolol succinate XL  12.5 mg oral Daily    [Held by provider] midodrine  5 mg oral TID    pantoprazole  20 mg oral Daily    rOPINIRole  1 mg oral TID    rOPINIRole  3 mg oral Nightly    sacubitriL-valsartan  1 tablet oral BID    sennosides-docusate sodium  2 tablet oral BID    spironolactone  25 mg oral Daily   [2]   PRN medications   Medication    acetaminophen    albuterol    dextrose    dextrose    glucagon    glucagon    guaiFENesin    oxygen    polyethylene glycol   [3]   Continuous Medications   Medication Dose Last Rate

## 2025-06-09 NOTE — PROGRESS NOTES
06/09/25 1337   Rapid Rounds   Attendance Provider;Nurse;Care Transitions;Pharmacist   Expected Discharge Disposition Home H   Today we still await: Clinical stability;Diagnostic workup   Review at Escalation Rounds Clinically complex

## 2025-06-09 NOTE — CARE PLAN
Ms. Evans is a 60F with a PMHx sig for CAD s/p PCI (LAD; 2015, Lcx; 2024), stage C systolic HF/ICM/HFrEF s/p ICD, h/o VT with presumed associated syncope, poorly controlled DM, pAF (off of DOAC given the absence of recurrence), dyslipidemia, essential HTN, COPD, and hypothyroidism with progressive HF symptoms and intolerant of GDMT - on midodrine who presents to HFICU as a direct admission for PAC guided evaluation. Advanced therapies evaluation was initiated 6/3 for OHT/LVAD.     She is currently stable out of the ICU. PFT was poor and she underwent thoracentesis. Still has residual pleural effusion. Stopped smoking recently, having repeat PFT today.    Most recent PAP 62/30 on GDMT, no inotropes.

## 2025-06-09 NOTE — CONSULTS
"Subjective   Interval History: has complaints SOB.     Objective   Vital signs in last 24 hours:  BP 97/60 (BP Location: Left arm, Patient Position: Lying)   Pulse 92   Temp 35.8 °C (96.4 °F) (Temporal)   Resp 18   Wt 60.8 kg (134 lb 0.6 oz)   SpO2 95%     Intake/Output last 3 shifts:  I/O last 3 completed shifts:  In: 941.8 (15.5 mL/kg) [P.O.:888; I.V.:53.8 (0.9 mL/kg)]  Out: 1570 (25.8 mL/kg) [Urine:1570 (0.7 mL/kg/hr)]  Weight: 60.8 kg   Intake/Output this shift:  No intake/output data recorded.    Physical Exam  Neuro: oriented to person, place, time, and general circumstances  HEENT: normocephalic, atraumatic  Pulm: clear to auscultation bilaterally, no wheezes, good air entry  Cardiac: Regular rate and rhythm or without murmur or extra heart sounds  Abdomen: soft, nontender, normal bowel sounds  Pulses: 2+ and symmetric    Relevant Results  LABS:  Lab Results   Component Value Date    WBC 6.4 06/08/2025    HGB 12.2 06/08/2025    HCT 39.8 06/08/2025    MCV 95 06/08/2025     06/08/2025      Results from last 72 hours   Lab Units 06/08/25  1846   SODIUM mmol/L 136   POTASSIUM mmol/L 4.4   CHLORIDE mmol/L 101   CO2 mmol/L 28   BUN mg/dL 16   CREATININE mg/dL 0.71   GLUCOSE mg/dL 227*   CALCIUM mg/dL 8.3*   ANION GAP mmol/L 11   EGFR mL/min/1.73m*2 >90     Results from last 72 hours   Lab Units 06/08/25  1846   ALK PHOS U/L 79   BILIRUBIN TOTAL mg/dL 0.2   PROTEIN TOTAL g/dL 6.3*   ALT U/L 11   AST U/L 10   ALBUMIN g/dL 3.2*           No lab exists for component: \"PT\"    MICRO:  Susceptibility data from last 14 days.  Collected Specimen Info Organism Amoxicillin/Clavulanate Ampicillin Ampicillin/Sulbactam Cefazolin Cefazolin (uncomplicated UTIs only) Ciprofloxacin Gentamicin Levofloxacin Nitrofurantoin Piperacillin/Tazobactam   06/07/25 Urine from Clean Catch/Voided Escherichia coli             06/03/25 Urine from Clean Catch/Voided Escherichia coli  S  R  I  S  S  S  S  S  S  S     Collected Specimen " Info Organism Trimethoprim/Sulfamethoxazole   06/07/25 Urine from Clean Catch/Voided Escherichia coli    06/03/25 Urine from Clean Catch/Voided Escherichia coli  S       IMAGING:  XR chest 1 view   Final Result   1. No significant change in diffuse pulmonary edema as well as   moderate bibasilar pleural effusions and associated   atelectasis/consolidation, left more than right.   2. Medical devices as above. No pneumothorax.        Signed by: Reggie eDnis 6/8/2025 10:13 AM   Dictation workstation:   [id number]      XR chest 1 view   Final Result   1. No significant change in diffuse pulmonary edema as well as   moderate bibasilar pleural effusions and associated   atelectasis/consolidation, left more than right.   2. Medical devices as above. [place]-Femi catheter has been removed.        Signed by: Reggie Denis 6/7/2025 10:30 AM   Dictation workstation:   [id number]      BI mammo bilateral diagnostic tomosynthesis   Final Result   No mammographic evidence of malignancy.        BI-RADS CATEGORY:   BI-RADS Category:  1 Negative.   Recommendation:  Annual Screening.   Recommended Date:  1 Year.   Laterality:  Bilateral.        For any future breast imaging appointments, please call [phone]).        I personally reviewed the images/study and I agree with the findings   as stated by Suleiman Clifford MD (Radiology Resident).        MACRO:   None        Signed by: Neo Magallanes 6/6/2025 3:53 PM   Dictation workstation:   [id number]      XR chest 1 view   Final Result   1. Stable bilateral moderate-sized pleural effusions with associated   atelectasis. There is also likely superimposed mild pulmonary edema.   2. Right IJ approach [place]-Femi catheter tip projects over the distal   right interlobar artery, unchanged from prior             I have reviewed the images/study and I agree with the findings as   stated by Dr. Walt Barron.        Signed by: Aj Alaniz 6/6/2025 5:01 PM   Dictation workstation:    [id number]      CT chest abdomen pelvis wo IV contrast   Final Result   1. Mild thoracic and moderate abdominopelvic atherosclerosis disease.   2. Mild cardiomegaly and severe coronary artery calcifications.   3. Findings compatible with fluid overload with moderate bilateral   pleural effusions with associated atelectasis, pulmonary interstitial   edema as well as mesenteric and body wall edema.   4. Patchy right lung opacities are likely secondary to pulmonary   alveolar edema, however multifocal infection/inflammation could have   a similar appearance, correlate with patient's symptomatology is   recommended.   5. Indeterminate is enlarged subcarinal and right lower paratracheal   lymph nodes, likely reactive. Recommend attention on follow-up   imaging to document stability/resolution.   6. Colonic diverticulosis without evidence of acute diverticulitis.   7. Intraluminal gas within the bladder, correlate with recent   instrumentation/catheterization.   8. Adrenal form thickening of the left adrenal gland, likely relating   to adrenal gland hyperplasia.   9. Additional chronic and incidental findings as described including   multiple subacute bilateral nondisplaced healing rib fractures and   hepatosplenomegaly.        I personally reviewed the images/study and I agree with the findings   as stated by Resident Melba Aguilar.        MACRO:   None.        Signed by: Stone Buenrostro 6/6/2025 8:08 AM   Dictation workstation:   [id number]      XR chest 1 view   Final Result   1.  Continued edema and effusions and correlate with cardiac and   fluid status. [place]-Femi catheter in the interlobar pulmonary artery.             Signed by: Aly Powers 6/5/2025 2:00 PM   Dictation workstation:   [id number]      US abdomen complete   Final Result   1.  Normal size and appearance of the liver.   2. Splenomegaly.   3. Debris within the urinary bladder, correlate with urinalysis and   patient's symptomatology.   4. Tiny  bilateral renal calculi without hydronephrosis. Bilateral   renal cysts.             I personally reviewed the images/study and I agree with the findings   as stated by Radiology resident.        MACRO:   None        Signed by: Abraham Lara 6/4/2025 6:50 PM   Dictation workstation:   [id number]      Vascular US aorta iliac duplex limited   Final Result      Vascular US carotid artery duplex bilateral   Final Result      Vascular US Ankle Brachial Index (JONATHAN) Without Exercise   Final Result      XR chest 2 views   Final Result   1.  Continued edema and effusions and correlate with cardiac and   fluid status.   2. [place]-Femi catheter overlies the interlobar pulmonary artery.             Signed by: Aly Powers 6/5/2025 1:03 PM   Dictation workstation:   [id number]      XR panorex   Final Result        Patient is edentulous.        No mandibular abnormality seen.        Signed by: Glenn Sandoval 6/4/2025 12:54 PM   Dictation workstation:   [id number]      XR chest 1 view   Final Result   1.  Right IJ approach [place]-Femi catheter tip projects over the   proximal right inferior lobar artery.   2. No significant change in moderate left pleural effusion with left   basilar infiltrate not excluded.   3. Small right pleural effusion.   4. Mild interstitial edema.        I personally reviewed the images/study and agree with the findings as   stated by Walt Barron MD (Resident Physician). This study was   interpreted at [facility],   [place], [place].        MACRO:   None        Signed by: Reyes Young 6/3/2025 3:37 PM   Dictation workstation:   [id number]      Transthoracic echo (TTE) complete   Final Result      Cardiac device check - Inpatient         XR chest 1 view   Final Result   1.  Mild degree of pulmonary edema with a small left and trace   right-sided pleural effusion.   2. ICD in place with leads projecting over the right atrium and right   ventricle.   3.  Interval placement of a pulmonary artery catheter with the tip   projecting over the right interlobar artery.        I personally reviewed the images/study and I agree with the findings   as stated by Shahida Ruiz MD (resident).        MACRO:   None        Signed by: Aj Alaniz 6/2/2025 4:49 PM   Dictation workstation:   [id number]      XR chest 1 view   Final Result   1.  Mild degree of pulmonary edema with a small left and trace   right-sided pleural effusion.   2. ICD in place with leads projecting over the right atrium and right   ventricle.   3. Interval placement of a pulmonary artery catheter with the tip   projecting over the right interlobar artery.        I personally reviewed the images/study and I agree with the findings   as stated by Shahida Ruiz MD (resident).        MACRO:   None        Signed by: Aj Alaniz 6/2/2025 4:49 PM   Dictation workstation:   [id number]      XR chest 1 view    (Results Pending)   US thoracentesis    (Results Pending)       Assessment/Plan   Thoracentesis. Please refer to imaging study for further detail.     LOS: 7 days     MARLI Perez-CNP      I personally spent over half of a total 35 minutes in counseling and discussion with the patient and coordination of care as described above.

## 2025-06-09 NOTE — CARE PLAN
Patient had L thoracentesis with 1 L removed, has been weaned off O2, IV abx given, diuresis via lasix, chxray done, abgs done. Plan for pulmonary function test @ 0800      The patient's goals for the shift include GET SOMETHING TO EAT    The clinical goals for the shift include Patient will not c/o sob, will go for a walk with physical therapy      Problem: Pain - Adult  Goal: Verbalizes/displays adequate comfort level or baseline comfort level  Outcome: Progressing     Problem: Safety - Adult  Goal: Free from fall injury  Outcome: Progressing     Problem: Discharge Planning  Goal: Discharge to home or other facility with appropriate resources  Outcome: Progressing     Problem: Chronic Conditions and Co-morbidities  Goal: Patient's chronic conditions and co-morbidity symptoms are monitored and maintained or improved  Outcome: Progressing     Problem: Nutrition  Goal: Nutrient intake appropriate for maintaining nutritional needs  Outcome: Progressing     Problem: Heart Failure  Goal: Improved gas exchange this shift  Outcome: Progressing  Goal: Improved urinary output this shift  Outcome: Progressing  Goal: Reduction in peripheral edema within 24 hours  Outcome: Progressing  Goal: Report improvement of dyspnea/breathlessness this shift  Outcome: Progressing  Goal: Weight from fluid excess reduced over 2-3 days, then stabilize  Outcome: Progressing  Goal: Increase self care and/or family involvement in 24 hours  Outcome: Progressing

## 2025-06-09 NOTE — PROGRESS NOTES
Art Therapy Note    Thao Evans     Therapy Session  Referral Type: New referral this admission  Visit Type: New visit  Session Start Time: 1436  Session End Time: 1459  Intervention Delivery: In-person  Family Present for Session: None              Treatment/Interventions  Areas of Focus: Anxiety reduction, Coping, Stress reduction  Art Therapy Interventions: Assessment, Education/instruction    Post-assessment  Total Session Time (min): 23 minutes    Narrative  Assessment Detail: Pt was sitting up off side of bed when ATR visited to make introductions. Pt was focused on cleaning up some discharge that had gotten on tubing after voiding and wasn't using sanitizing wipes w/bleach so ATR, after not locating any in pt room alerted a nurse who went to supply area to get a container of them. ATR asked to alert pt's nurse that she needed assistance and ATR assisted pt until her nurse was able to come to the room. Pt needed her sheets changed and ATR was able to introduce AT services and benefits after. Pt was interested and requested materials which ATR left with the pt so she would be able to use over the weekend. ATr will f/up with pt early next week.    Education Documentation  No documentation found.

## 2025-06-10 ENCOUNTER — APPOINTMENT (OUTPATIENT)
Dept: RESPIRATORY THERAPY | Facility: HOSPITAL | Age: 60
DRG: 291 | End: 2025-06-10
Payer: COMMERCIAL

## 2025-06-10 LAB
ALBUMIN SERPL BCP-MCNC: 3.1 G/DL (ref 3.4–5)
ALP SERPL-CCNC: 74 U/L (ref 33–136)
ALT SERPL W P-5'-P-CCNC: 8 U/L (ref 7–45)
ANION GAP SERPL CALC-SCNC: 11 MMOL/L (ref 10–20)
AST SERPL W P-5'-P-CCNC: 10 U/L (ref 9–39)
BASOPHILS # BLD AUTO: 0.07 X10*3/UL (ref 0–0.1)
BASOPHILS NFR BLD AUTO: 1.2 %
BILIRUB SERPL-MCNC: 0.2 MG/DL (ref 0–1.2)
BNP SERPL-MCNC: 1456 PG/ML (ref 0–99)
BUN SERPL-MCNC: 12 MG/DL (ref 6–23)
CALCIUM SERPL-MCNC: 8.1 MG/DL (ref 8.6–10.6)
CHLORIDE SERPL-SCNC: 102 MMOL/L (ref 98–107)
CO2 SERPL-SCNC: 28 MMOL/L (ref 21–32)
CREAT SERPL-MCNC: 0.65 MG/DL (ref 0.5–1.05)
EGFRCR SERPLBLD CKD-EPI 2021: >90 ML/MIN/1.73M*2
EOSINOPHIL # BLD AUTO: 0.15 X10*3/UL (ref 0–0.7)
EOSINOPHIL NFR BLD AUTO: 2.5 %
ERYTHROCYTE [DISTWIDTH] IN BLOOD BY AUTOMATED COUNT: 13.6 % (ref 11.5–14.5)
GLUCOSE BLD MANUAL STRIP-MCNC: 154 MG/DL (ref 74–99)
GLUCOSE BLD MANUAL STRIP-MCNC: 192 MG/DL (ref 74–99)
GLUCOSE BLD MANUAL STRIP-MCNC: 209 MG/DL (ref 74–99)
GLUCOSE BLD MANUAL STRIP-MCNC: 256 MG/DL (ref 74–99)
GLUCOSE SERPL-MCNC: 219 MG/DL (ref 74–99)
HCT VFR BLD AUTO: 41.3 % (ref 36–46)
HGB BLD-MCNC: 12.4 G/DL (ref 12–16)
IMM GRANULOCYTES # BLD AUTO: 0.04 X10*3/UL (ref 0–0.7)
IMM GRANULOCYTES NFR BLD AUTO: 0.7 % (ref 0–0.9)
LYMPHOCYTES # BLD AUTO: 1.3 X10*3/UL (ref 1.2–4.8)
LYMPHOCYTES NFR BLD AUTO: 21.6 %
MAGNESIUM SERPL-MCNC: 1.93 MG/DL (ref 1.6–2.4)
MCH RBC QN AUTO: 28.8 PG (ref 26–34)
MCHC RBC AUTO-ENTMCNC: 30 G/DL (ref 32–36)
MCV RBC AUTO: 96 FL (ref 80–100)
MGC ASCENT PFT - FEV1 - POST: 0.75
MGC ASCENT PFT - FEV1 - PRE: 0.67
MGC ASCENT PFT - FEV1 - PREDICTED: 2.24
MGC ASCENT PFT - FVC - POST: 1.01
MGC ASCENT PFT - FVC - PRE: 0.98
MGC ASCENT PFT - FVC - PREDICTED: 2.81
MONOCYTES # BLD AUTO: 0.41 X10*3/UL (ref 0.1–1)
MONOCYTES NFR BLD AUTO: 6.8 %
NEUTROPHILS # BLD AUTO: 4.05 X10*3/UL (ref 1.2–7.7)
NEUTROPHILS NFR BLD AUTO: 67.2 %
NRBC BLD-RTO: 0 /100 WBCS (ref 0–0)
PLATELET # BLD AUTO: 294 X10*3/UL (ref 150–450)
POTASSIUM SERPL-SCNC: 4.1 MMOL/L (ref 3.5–5.3)
PROT SERPL-MCNC: 6.3 G/DL (ref 6.4–8.2)
RBC # BLD AUTO: 4.31 X10*6/UL (ref 4–5.2)
SODIUM SERPL-SCNC: 137 MMOL/L (ref 136–145)
WBC # BLD AUTO: 6 X10*3/UL (ref 4.4–11.3)

## 2025-06-10 PROCEDURE — 94729 DIFFUSING CAPACITY: CPT | Performed by: INTERNAL MEDICINE

## 2025-06-10 PROCEDURE — 94726 PLETHYSMOGRAPHY LUNG VOLUMES: CPT | Performed by: INTERNAL MEDICINE

## 2025-06-10 PROCEDURE — 2500000002 HC RX 250 W HCPCS SELF ADMINISTERED DRUGS (ALT 637 FOR MEDICARE OP, ALT 636 FOR OP/ED): Performed by: PHYSICIAN ASSISTANT

## 2025-06-10 PROCEDURE — 2500000001 HC RX 250 WO HCPCS SELF ADMINISTERED DRUGS (ALT 637 FOR MEDICARE OP): Performed by: PHYSICIAN ASSISTANT

## 2025-06-10 PROCEDURE — 99233 SBSQ HOSP IP/OBS HIGH 50: CPT

## 2025-06-10 PROCEDURE — 2500000001 HC RX 250 WO HCPCS SELF ADMINISTERED DRUGS (ALT 637 FOR MEDICARE OP)

## 2025-06-10 PROCEDURE — 80053 COMPREHEN METABOLIC PANEL: CPT | Performed by: PHYSICIAN ASSISTANT

## 2025-06-10 PROCEDURE — 36415 COLL VENOUS BLD VENIPUNCTURE: CPT | Performed by: PHYSICIAN ASSISTANT

## 2025-06-10 PROCEDURE — 94729 DIFFUSING CAPACITY: CPT

## 2025-06-10 PROCEDURE — 2500000004 HC RX 250 GENERAL PHARMACY W/ HCPCS (ALT 636 FOR OP/ED): Performed by: PHYSICIAN ASSISTANT

## 2025-06-10 PROCEDURE — 83735 ASSAY OF MAGNESIUM: CPT | Performed by: PHYSICIAN ASSISTANT

## 2025-06-10 PROCEDURE — 85025 COMPLETE CBC W/AUTO DIFF WBC: CPT | Performed by: PHYSICIAN ASSISTANT

## 2025-06-10 PROCEDURE — 2500000002 HC RX 250 W HCPCS SELF ADMINISTERED DRUGS (ALT 637 FOR MEDICARE OP, ALT 636 FOR OP/ED): Performed by: STUDENT IN AN ORGANIZED HEALTH CARE EDUCATION/TRAINING PROGRAM

## 2025-06-10 PROCEDURE — 82947 ASSAY GLUCOSE BLOOD QUANT: CPT

## 2025-06-10 PROCEDURE — 2500000004 HC RX 250 GENERAL PHARMACY W/ HCPCS (ALT 636 FOR OP/ED): Performed by: NURSE PRACTITIONER

## 2025-06-10 PROCEDURE — 99232 SBSQ HOSP IP/OBS MODERATE 35: CPT | Performed by: INTERNAL MEDICINE

## 2025-06-10 PROCEDURE — 1100000001 HC PRIVATE ROOM DAILY

## 2025-06-10 PROCEDURE — 94060 EVALUATION OF WHEEZING: CPT | Performed by: INTERNAL MEDICINE

## 2025-06-10 RX ORDER — BUMETANIDE 1 MG/1
1 TABLET ORAL
Status: DISCONTINUED | OUTPATIENT
Start: 2025-06-10 | End: 2025-06-17 | Stop reason: HOSPADM

## 2025-06-10 RX ORDER — NAPROXEN SODIUM 220 MG/1
81 TABLET, FILM COATED ORAL DAILY
Status: DISCONTINUED | OUTPATIENT
Start: 2025-06-11 | End: 2025-06-17 | Stop reason: HOSPADM

## 2025-06-10 RX ADMIN — INSULIN LISPRO 1 UNITS: 100 INJECTION, SOLUTION INTRAVENOUS; SUBCUTANEOUS at 16:35

## 2025-06-10 RX ADMIN — INSULIN LISPRO 6 UNITS: 100 INJECTION, SOLUTION INTRAVENOUS; SUBCUTANEOUS at 12:26

## 2025-06-10 RX ADMIN — CITALOPRAM HYDROBROMIDE 40 MG: 40 TABLET ORAL at 05:50

## 2025-06-10 RX ADMIN — LEVOTHYROXINE SODIUM 88 MCG: 0.09 TABLET ORAL at 05:50

## 2025-06-10 RX ADMIN — ENOXAPARIN SODIUM 40 MG: 100 INJECTION SUBCUTANEOUS at 12:27

## 2025-06-10 RX ADMIN — ATORVASTATIN CALCIUM 80 MG: 80 TABLET, FILM COATED ORAL at 09:05

## 2025-06-10 RX ADMIN — FLUTICASONE FUROATE AND VILANTEROL TRIFENATATE 1 PUFF: 100; 25 POWDER RESPIRATORY (INHALATION) at 09:08

## 2025-06-10 RX ADMIN — ALBUTEROL SULFATE 3 ML: 2.5 SOLUTION RESPIRATORY (INHALATION) at 08:04

## 2025-06-10 RX ADMIN — SPIRONOLACTONE 25 MG: 25 TABLET, FILM COATED ORAL at 09:05

## 2025-06-10 RX ADMIN — INSULIN LISPRO 6 UNITS: 100 INJECTION, SOLUTION INTRAVENOUS; SUBCUTANEOUS at 16:35

## 2025-06-10 RX ADMIN — SACUBITRIL AND VALSARTAN 1 TABLET: 49; 51 TABLET, FILM COATED ORAL at 22:06

## 2025-06-10 RX ADMIN — GABAPENTIN 400 MG: 300 CAPSULE ORAL at 15:13

## 2025-06-10 RX ADMIN — ROPINIROLE HYDROCHLORIDE 3 MG: 3 TABLET, FILM COATED ORAL at 21:21

## 2025-06-10 RX ADMIN — CLOPIDOGREL BISULFATE 75 MG: 75 TABLET, FILM COATED ORAL at 09:05

## 2025-06-10 RX ADMIN — SACUBITRIL AND VALSARTAN 1 TABLET: 49; 51 TABLET, FILM COATED ORAL at 09:05

## 2025-06-10 RX ADMIN — ROPINIROLE 1 MG: 1 TABLET, FILM COATED ORAL at 18:03

## 2025-06-10 RX ADMIN — GABAPENTIN 400 MG: 300 CAPSULE ORAL at 05:50

## 2025-06-10 RX ADMIN — PANTOPRAZOLE SODIUM 20 MG: 20 TABLET, DELAYED RELEASE ORAL at 09:05

## 2025-06-10 RX ADMIN — ROPINIROLE 1 MG: 1 TABLET, FILM COATED ORAL at 06:44

## 2025-06-10 RX ADMIN — ROPINIROLE 1 MG: 1 TABLET, FILM COATED ORAL at 12:26

## 2025-06-10 RX ADMIN — INSULIN LISPRO 6 UNITS: 100 INJECTION, SOLUTION INTRAVENOUS; SUBCUTANEOUS at 09:05

## 2025-06-10 RX ADMIN — INSULIN LISPRO 2 UNITS: 100 INJECTION, SOLUTION INTRAVENOUS; SUBCUTANEOUS at 09:05

## 2025-06-10 RX ADMIN — CEFTRIAXONE SODIUM 2 G: 2 INJECTION, SOLUTION INTRAVENOUS at 15:17

## 2025-06-10 RX ADMIN — INSULIN GLARGINE 10 UNITS: 100 INJECTION, SOLUTION SUBCUTANEOUS at 21:22

## 2025-06-10 RX ADMIN — GABAPENTIN 400 MG: 300 CAPSULE ORAL at 21:21

## 2025-06-10 RX ADMIN — BUMETANIDE 1 MG: 1 TABLET ORAL at 16:36

## 2025-06-10 ASSESSMENT — ENCOUNTER SYMPTOMS
NUMBNESS: 0
CHILLS: 0
DIARRHEA: 0
CHEST TIGHTNESS: 0
BLOOD IN STOOL: 0
BACK PAIN: 0
FEVER: 0
COUGH: 0
PALPITATIONS: 0
BRUISES/BLEEDS EASILY: 0
SEIZURES: 0
VOICE CHANGE: 0
WEAKNESS: 0
NECK STIFFNESS: 0
HEMATURIA: 0
FLANK PAIN: 0
UNEXPECTED WEIGHT CHANGE: 0
SHORTNESS OF BREATH: 0
NECK PAIN: 0
JOINT SWELLING: 0
DYSURIA: 0
NAUSEA: 0
WOUND: 0
TROUBLE SWALLOWING: 0
CONSTIPATION: 0
ABDOMINAL PAIN: 0
WHEEZING: 0
VOMITING: 0
POLYDIPSIA: 0
DIFFICULTY URINATING: 0
DIZZINESS: 0

## 2025-06-10 ASSESSMENT — COGNITIVE AND FUNCTIONAL STATUS - GENERAL
DAILY ACTIVITIY SCORE: 24
MOBILITY SCORE: 24

## 2025-06-10 ASSESSMENT — PAIN - FUNCTIONAL ASSESSMENT: PAIN_FUNCTIONAL_ASSESSMENT: 0-10

## 2025-06-10 ASSESSMENT — PAIN SCALES - GENERAL: PAINLEVEL_OUTOF10: 0 - NO PAIN

## 2025-06-10 NOTE — PROGRESS NOTES
Advanced Heart Failure Progress Note   [facility]  Primary HF Cardiologist: Dr. Deluca  Reason for consult: advanced therapies    Subjective   Subjective Data:  Fev1/fvc slight decline but still preserved  Overall, minimal improvement in FEV1 and FVC between the two PFTs  No change in TLC or RV  Bronchodilator response was minimal  Combination of restrictive and severe small airway disease    Overnight Events:    No acute events overnight  Objective   Physical Exam  Vitals:    06/10/25 0600   BP: 98/59   Pulse: 81   Resp: 18   Temp: 36.1 °C (97 °F)   SpO2: 93%     Constitutional:       General: She is not in acute distress.     Appearance: Normal appearance. She is not ill-appearing.   HENT:      Mouth/Throat:      Mouth: Mucous membranes are moist.   Eyes:      General: No scleral icterus.     Extraocular Movements: Extraocular movements intact.      Conjunctiva/sclera: Conjunctivae normal.   Cardiovascular:      Rate and Rhythm: Normal rate and regular rhythm.      Heart sounds: Normal heart sounds. No murmur heard.  Pulmonary:      Effort: Pulmonary effort is normal. No respiratory distress.      Breath sounds: Normal breath sounds.      Comments: On room air  Abdominal:      General: Abdomen is flat. There is no distension.      Palpations: Abdomen is soft.      Tenderness: There is no abdominal tenderness.   Musculoskeletal:      Right lower leg: No edema.      Left lower leg: No edema.   Skin:     General: Skin is warm and dry.   Neurological:      General: No focal deficit present.      Mental Status: She is alert and oriented to person, place, and time. Mental status is at baseline.   Psychiatric:         Mood and Affect: Mood normal.         Behavior: Behavior normal.         Thought Content: Thought content normal.     I have personally reviewed the following images and laboratory findings:    ECG: sinus rhythm .    Results for orders placed during the hospital encounter of  06/02/25    Transthoracic echo (TTE) complete    Narrative  [facility], [address] [place] [address], [place] [address]  Tel [phone] and Fax [phone]    TRANSTHORACIC ECHOCARDIOGRAM REPORT      Patient Name:       ANETA BECKER         Erin Physician:    [id number] Jose Gonzalez MD  Study Date:         6/2/2025            Ordering Provider:    [id number] HAYDEN BARCLAY  MRN/PID:            [id number]            Fellow:  Accession#:         [id number]        Nurse:  Date of Birth/Age:  1965 / 60      Sonographer:          Diane donaldson                                     RDCS, FASNATHAN  Gender assigned at  F                   Additional Staff:  Birth:  Height:             157.48 cm           Admit Date:           5/30/2025  Weight:             54.43 kg            Admission Status:     Inpatient -  Routine  BSA / BMI:          1.54 m2 / 21.95     Encounter#:           [id number]  kg/m2  Blood Pressure:     97/55 mmHg          Department Location:  [address]    Study Type:    TRANSTHORACIC ECHO (TTE) COMPLETE  Diagnosis/ICD: Unspecified systolic (congestive) heart failure (CHF)-I50.20  Indication:    Congestive Heart Failure  CPT Code:      Echo Complete w Full Doppler-25403    Patient History:  Diabetes:          Yes  Pertinent History: Cardiomyopathy and CHF.    Study Detail: The following Echo studies were performed: 2D, M-Mode, Doppler and  color flow. Definity used as a contrast agent for endocardial  border definition. Total contrast used for this procedure was 2 mL  via IV push.      PHYSICIAN INTERPRETATION:  Left Ventricle: Left ventricular ejection fraction is severely decreased by visual estimate at 20-25%. There is global hypokinesis of the left ventricle with minor regional variations. The left ventricular cavity size is mildly dilated. There is normal septal and normal posterior left ventricular wall thickness. There is left ventricular concentric remodeling. Spectral Doppler  shows a Grade III (restrictive pattern) of left ventricular diastolic filling with an elevated left atrial pressure. There is no definite left ventricular thrombus visualized. There is relative preservation of the basal myocardial segment systolic function.  Left Atrium: The left atrial size is normal.  Right Ventricle: The right ventricle is normal in size. There is normal right ventricular global systolic function.  Right Atrium: The right atrium is normal in size.  Aortic Valve: The aortic valve is trileaflet. The aortic valve area by VTI is 1.12 cmï¿½ with a peak velocity of 1.49 m/s. The peak and mean gradients are 9 mmHg and 4 mmHg, respectively, with a dimensionless index of 0.49. There is no evidence of aortic valve regurgitation.  Mitral Valve: The mitral valve is mildly thickened. There is mild mitral valve regurgitation. The E Vmax is 0.95 m/s.  Tricuspid Valve: The tricuspid valve is structurally normal. There is mild tricuspid regurgitation.  Pulmonic Valve: The pulmonic valve is structurally normal. There is physiologic pulmonic valve regurgitation.  Pericardium: Trivial pericardial effusion.  Aorta: The aortic root is normal.  Pulmonary Artery: The tricuspid regurgitant velocity is 3.09 m/s, and with an estimated right atrial pressure of 8, the estimated pulmonary artery pressure is mild to moderately elevated with the RVSP at 46 mmHg.  Systemic Veins: The inferior vena cava appears normal in size, with IVC inspiratory collapse less than 50%.  In comparison to the previous echocardiogram(s): There are no prior studies on this patient for comparison purposes.      CONCLUSIONS:  1. Left ventricular ejection fraction is severely decreased by visual estimate at 20-25%.  2. There is global hypokinesis of the left ventricle with minor regional variations.  3. Spectral Doppler shows a Grade III (restrictive pattern) of left ventricular diastolic filling with an elevated left atrial pressure.  4. Left  ventricular cavity size is mildly dilated.  5. No left ventricular thrombus visualized.  6. There is relative preservation of the basal myocardial segment systolic function.  7. There is normal right ventricular global systolic function.  8. Mild to moderately elevated pulmonary artery pressure.    RECOMMENDATIONS:    QUANTITATIVE DATA SUMMARY:    2D MEASUREMENTS:         Normal Ranges:  Ao Root d:       2.10 cm (2.0-3.7cm)  LAs:             3.70 cm (2.7-4.0cm)  IVSd:            0.90 cm (0.6-1.1cm)  LVPWd:           0.90 cm (0.6-1.1cm)  LVIDd:           4.10 cm (3.9-5.9cm)  LVIDs:           3.70 cm  LV Mass Index:   74 g/m2  LV % FS          9.8 %      LEFT ATRIUM:                  Normal Ranges:  LA Vol A4C:        38.8 ml    (22+/-6mL/m2)  LA Vol A2C:        43.3 ml  LA Vol BP:         41.2 ml  LA Vol Index A4C:  25.2ml/m2  LA Vol Index A2C:  28.1 ml/m2  LA Vol Index BP:   26.8 ml/m2  LA Area A4C:       14.9 cm2  LA Area A2C:       15.8 cm2  LA Major Axis A4C: 4.9 cm  LA Major Axis A2C: 4.9 cm  LA Volume Index:   26.8 ml/m2      RIGHT ATRIUM:          Normal Ranges:  RA Area A4C:  14.7 cm2      LV SYSTOLIC FUNCTION:  Normal Ranges:  EF-Visual:      23 %  LV EF Reported: 23 %      LV DIASTOLIC FUNCTION:             Normal Ranges:  MV Peak E:             0.95 m/s    (0.7-1.2 m/s)  MV Peak A:             0.33 m/s    (0.42-0.7 m/s)  E/A Ratio:             2.90        (1.0-2.2)  MV e'                  0.034 m/s   (>8.0)  MV lateral e'          0.03 m/s  MV medial e'           0.04 m/s  MV A Dur:              124.00 msec  E/e' Ratio:            27.61       (<8.0)  MV DT:                 145 msec    (150-240 msec)      MITRAL VALVE:          Normal Ranges:  MV DT:        111 msec (150-240msec)      AORTIC VALVE:                     Normal Ranges:  AoV Vmax:                1.49 m/s (<=1.7m/s)  AoV Peak P.9 mmHg (<20mmHg)  AoV Mean P.0 mmHg (1.7-11.5mmHg)  LVOT Max Paco:            0.81 m/s  "(<=1.1m/s)  AoV VTI:                 27.40 cm (18-25cm)  LVOT VTI:                13.50 cm  LVOT Diameter:           1.80 cm  (1.8-2.4cm)  AoV Area, VTI:           1.12 cm2 (2.5-5.5cm2)  AoV Area,Vmax:           1.23 cm2 (2.5-4.5cm2)  AoV Dimensionless Index: 0.49      RIGHT VENTRICLE:  RV Basal 3.70 cm  RV Mid   2.40 cm  RV Major 6.3 cm  TAPSE:   15.6 mm  RV s'    0.10 m/s      TRICUSPID VALVE/RVSP:          Normal Ranges:  Peak TR Velocity:     3.09 m/s  Est. RA Pressure:     8  RV Syst Pressure:     46       (< 30mmHg)  IVC Diam:             1.50 cm      PULMONIC VALVE:          Normal Ranges:  PV Max Paco:     0.7 m/s  (0.6-0.9m/s)  PV Max P.9 mmHg      [id number] Jose Gonzalez MD  Electronically signed on 2025 at 6:00:20 PM        ** Final **       Imaging  Chest x-ray: pleural effusion: on the left     Lab Review   Lab Results   Component Value Date     2025     2025     (L) 2025    K 4.2 2025    K 4.4 2025    K 3.5 2025    CO2 27 2025    CO2 28 2025    CO2 28 2025    BUN 15 2025    BUN 16 2025    BUN 15 2025    CREATININE 0.62 2025    CREATININE 0.71 2025    CREATININE 0.62 2025    GLUCOSE 135 (H) 2025    GLUCOSE 227 (H) 2025    GLUCOSE 115 (H) 2025    CALCIUM 8.4 (L) 2025    CALCIUM 8.3 (L) 2025    CALCIUM 8.5 (L) 2025     No results found for: \"CKTOTAL\", \"CKMB\", \"CKMBINDEX\", \"TROPONINI\"  Lab Results   Component Value Date    WBC 8.9 2025    HGB 13.2 2025    HCT 43.8 2025    MCV 98 2025     2025       Troponin I, High Sensitivity (CMC)   Date/Time Value Ref Range Status   2025 01:46 PM 22 0 - 34 ng/L Final     BNP   Date/Time Value Ref Range Status   2025 01:46 PM 1,292 (H) 0 - 99 pg/mL Final        Assessment and Plan   60F with a PMHx sig for CAD s/p PCI (LAD; , Lcx; ), stage C systolic HF/ICM/HFrEF " s/p ICD, h/o VT with presumed associated syncope, poorly controlled DM, pAF (off of DOAC given the absence of recurrence), dyslipidemia, essential HTN, COPD, and hypothyroidism with progressive HF symptoms and intolerant of GDMT - on midodrine who presents to HFICU as a direct admission for PAC guided evaluation. Advanced therapies evaluation was initiated 6/3 for OHT/LVAD. [facility] LT[facility] course c/b acute hypoxic respiratory failure due to L pleural effusion s/p thoracentesis now with 1 L fluid removed, now on room air.    Ischemic cardiomyopathy/ HFrEF 30-35%/AHA Stage D  - TTE 3/2025 at OSH: LVEF 30-35%, normal RV size with low normal function, mild MR, small pericardial effusion  - TTE 6/2/25 on admission: LVEF 20-25%/LVIDd 4.1cm,  mild MR/TR  - Admit wt: 54.4kg admit BNP 1292H  - Opening [facility] 6/2: BP 97/55 (68), CVP 3, PAP 39/18 (27), CO/CI 2.86/1.87, SVR 1817, SVO2 62% on midodrine 5 mg, empa 10, sandi 25.  -  nipride gtt weaned off 6/5  - Closing [facility] 6/6/25: BP 88/51 (63), CVP 11, PAP 62/30 (43), CO 3.98/CI 2.54. SVO2 62%.   - Home medications: meto XL 12.5mg daily, spironolactone 25mg daily, jardiance 10mg daily, bumex 2mg bid, midodrine 5mg TID.    - C/w entresto 49-51mg bid.   - continue to hold empagliflozin 10mg  - C/w sandi 25mg daily   - Holding BB for now   - Diuresis per HHVI team  -PFTs overall unchanged from prior. PFTs with restrictive pattern. Noted that his CT chest is not overtly impressive for ILD. Patient with hx of 40 pack year smoking. Please request pulmonary consult to weigh in on his PFTs and his risk assessment for advanced therapies from a pulmonary standpoint.  -other issues of controversy: elevated PAP (TPG  only 8 but PVR 4.3 LARA on arrival. PAP closing still high 62/30 (43)   -- Daily standing weights, 2gm sodium diet, 2L fluid restriction, strict I&Os      Thank you for the opportunity to involve us in the care of this fine  individual. This plan was discussed with Dr. Mcgee, HF attending. For any questions or concerns, feel free to reach out via [employer] chat or page at [phone].    Vianca Car MD   Heart Failure Fellow  PGY-4

## 2025-06-10 NOTE — CARE PLAN
Patient had no c/o sob, discomfort throughout shift, resting comfortably, call light within reach.    The patient's goals for the shift include GET SOMETHING TO EAT    The clinical goals for the shift include Patient will not c/o sob, chest discomfort throughout shift        Problem: Pain - Adult  Goal: Verbalizes/displays adequate comfort level or baseline comfort level  Outcome: Progressing     Problem: Safety - Adult  Goal: Free from fall injury  Outcome: Progressing     Problem: Discharge Planning  Goal: Discharge to home or other facility with appropriate resources  Outcome: Progressing     Problem: Chronic Conditions and Co-morbidities  Goal: Patient's chronic conditions and co-morbidity symptoms are monitored and maintained or improved  Outcome: Progressing     Problem: Nutrition  Goal: Nutrient intake appropriate for maintaining nutritional needs  Outcome: Progressing     Problem: Heart Failure  Goal: Improved gas exchange this shift  Outcome: Progressing  Goal: Improved urinary output this shift  Outcome: Progressing  Goal: Reduction in peripheral edema within 24 hours  Outcome: Progressing  Goal: Report improvement of dyspnea/breathlessness this shift  Outcome: Progressing  Goal: Weight from fluid excess reduced over 2-3 days, then stabilize  Outcome: Progressing  Goal: Increase self care and/or family involvement in 24 hours  Outcome: Progressing

## 2025-06-10 NOTE — PROGRESS NOTES
Subjective:  Patient reports improved breathing after thoracentesis yesterday. States she does not feel fluid overloaded today. Awaiting plan from heart failure advanced therapies committee.     Today in brief:  - unable to be presented at advanced therapy committee today due to time-> pending updated plan from HF consult team  - poor response to 40 mg IV lasix X1 yesterday (overall 450 net positive)-> resumed bumex 1 mg BID today  - PFTs completed this AM, largely unchanged-> pulmonary consulted per HF recommendations (as part of advanced therapies work-up)  - prelin pulmonary rec-> consult IR for R sided pleural effusion   - aspirin 81 mg daily started (recent PCI 3/2025)    Objective:  Vitals:    06/10/25 0600   BP: 98/59   Pulse: 81   Resp: 18   Temp: 36.1 °C (97 °F)   SpO2: 93%     Weight         6/4/2025  0600 6/5/2025  0600 6/7/2025  0440 6/9/2025  0436 6/10/2025  0600    Weight: 56.5 kg (124 lb 9 oz) 57.5 kg (126 lb 10.5 oz) 59 kg (130 lb 1.1 oz) 60.8 kg (134 lb 0.6 oz) 60.9 kg (134 lb 4.2 oz)            Intake/Output Summary (Last 24 hours) at 6/10/2025 1157  Last data filed at 6/10/2025 0600  Gross per 24 hour   Intake 240 ml   Output 300 ml   Net -60 ml     Recent Results (from the past 24 hours)   Magnesium    Collection Time: 06/09/25  4:13 PM   Result Value Ref Range    Magnesium 2.25 1.60 - 2.40 mg/dL   CBC and Auto Differential    Collection Time: 06/09/25  4:13 PM   Result Value Ref Range    WBC 8.9 4.4 - 11.3 x10*3/uL    nRBC 0.0 0.0 - 0.0 /100 WBCs    RBC 4.49 4.00 - 5.20 x10*6/uL    Hemoglobin 13.2 12.0 - 16.0 g/dL    Hematocrit 43.8 36.0 - 46.0 %    MCV 98 80 - 100 fL    MCH 29.4 26.0 - 34.0 pg    MCHC 30.1 (L) 32.0 - 36.0 g/dL    RDW 13.4 11.5 - 14.5 %    Platelets 265 150 - 450 x10*3/uL    Neutrophils % 72.8 40.0 - 80.0 %    Immature Granulocytes %, Automated 0.4 0.0 - 0.9 %    Lymphocytes % 16.1 13.0 - 44.0 %    Monocytes % 8.0 2.0 - 10.0 %    Eosinophils % 1.8 0.0 - 6.0 %    Basophils % 0.9  0.0 - 2.0 %    Neutrophils Absolute 6.50 1.20 - 7.70 x10*3/uL    Immature Granulocytes Absolute, Automated 0.04 0.00 - 0.70 x10*3/uL    Lymphocytes Absolute 1.44 1.20 - 4.80 x10*3/uL    Monocytes Absolute 0.71 0.10 - 1.00 x10*3/uL    Eosinophils Absolute 0.16 0.00 - 0.70 x10*3/uL    Basophils Absolute 0.08 0.00 - 0.10 x10*3/uL   Comprehensive metabolic panel    Collection Time: 06/09/25  4:13 PM   Result Value Ref Range    Glucose 135 (H) 74 - 99 mg/dL    Sodium 136 136 - 145 mmol/L    Potassium 4.2 3.5 - 5.3 mmol/L    Chloride 102 98 - 107 mmol/L    Bicarbonate 27 21 - 32 mmol/L    Anion Gap 11 10 - 20 mmol/L    Urea Nitrogen 15 6 - 23 mg/dL    Creatinine 0.62 0.50 - 1.05 mg/dL    eGFR >90 >60 mL/min/1.73m*2    Calcium 8.4 (L) 8.6 - 10.6 mg/dL    Albumin 3.3 (L) 3.4 - 5.0 g/dL    Alkaline Phosphatase 77 33 - 136 U/L    Total Protein 6.5 6.4 - 8.2 g/dL    AST 10 9 - 39 U/L    Bilirubin, Total 0.2 0.0 - 1.2 mg/dL    ALT 9 7 - 45 U/L   B-type natriuretic peptide    Collection Time: 06/09/25  4:13 PM   Result Value Ref Range    BNP 1,456 (H) 0 - 99 pg/mL   POCT GLUCOSE    Collection Time: 06/09/25  4:33 PM   Result Value Ref Range    POCT Glucose 148 (H) 74 - 99 mg/dL   POCT GLUCOSE    Collection Time: 06/09/25  9:24 PM   Result Value Ref Range    POCT Glucose 140 (H) 74 - 99 mg/dL   POCT GLUCOSE    Collection Time: 06/10/25  5:48 AM   Result Value Ref Range    POCT Glucose 209 (H) 74 - 99 mg/dL   Pulmonary function testing    Collection Time: 06/10/25  9:00 AM   Result Value Ref Range    FVC - Predicted 2.81     FEV1 - Predicted 2.24     FVC - PRE 0.98     FEV1 - Pre 0.67     FVC - Post 1.01     FEV1 - Post 0.75      Inpatient Medications:  Scheduled Medications[1]  PRN Medications[2]  Continuous Medications[3]    Telemetry 6/10/2025 (personally reviewed): SR-80s    Physical exam:  General: NAD, lying in bed   Head/ neck: elevated JVD above clavicle at 45 degrees   Cardiac: RRR, regular S1 S2 , no murmur, no rub, no  gallop  Pulm: Posterior rales BL, on room air   GI: Non distended  Extremities: no LE edema   Neuro: no focal neuro deficits   Psych: appropriate mood and behavior   Skin: warm and dry     Assessment/Plan   Thao Evans is a 60F with a PMHx sig for CAD s/p PCI (LAD; 2015, Lcx; 3/2025), stage C systolic HF/ICM/HFrEF s/p ICD, h/o VT with presumed associated syncope, poorly controlled DM, pAF (off of DOAC given the absence of recurrence), dyslipidemia, essential HTN, COPD, and hypothyroidism with progressive HF symptoms and intolerance of GDMT - on midodrine. Presented to HFICU as a direct admission for PAC guided evaluation. Advanced therapies evaluation was initiated 6/3 for OHT/LVAD.     Acute on chronic systolic and diastolic heart failure  Ischemic HFrEF 30-35%, Stage C  - TTE 3/2025 at OSH: LVEF 30-35%, normal RV size with low normal function, mild MR, small pericardial effusion  - TTE 6/2/25 on arrival: LVEF 20-25%/LVIDd 4.1cm,  mild MR/TR  - Admit wt: 54.4kg   - admit BNP 1292H  - Opening SGC #s 6/2: BP 97/55 (68), CVP 3, PAP 39/18 (27), CO/CI 2.86/1.87, SVR 1817, SVO2 62% on midodrine 5 mg, empa 10, sandi 25.  - weaned nipride gtt 6/5  - Closing SGC#s 6/6/25: BP 88/51 (63), CVP 11, PAP 62/30 (43), CO 3.98/CI 2.54. SVO2 62%.   - GDMT:   - Continue entresto 49-51mg BID   - Hold empa 10 while getting abx for UTI.   - Continue sandi 25mg daily   - Holding BB for now   - Diuresis:   - poor response to 40 mg IV lasix X1 yesterday (overall 450 net positive)  - resumed bumex 1 mg BID today  - Advanced therapies evaluation initiated  - unable to be presented at advanced therapy committee today due to time-> pending updated plan from HF consult team  - Home medications: metop XL 12.5mg daily, spironolactone 25mg daily, jardiance 10mg daily, bumex 2mg bid, midodrine 5mg TID.  - Daily standing weights, 2gm sodium diet, 2L fluid restriction, strict I&Os    Acute hypoxic respiratory failure, improving  COPD  Pulmonary  edema/pleural effusions  - C/w home inhalers  - IV diuretics as above  - s/p L thoracentesis on 6/9:  -1L yellow fluid. Labs c/w transudative effusion.  - 2vCXR  6/9: Marked decrease in L pleural effusion. Stable mild pulm edema and R pleural effusion  - additional cytology labs pending  - PFTs on 6/6 showing severe restrictive defect  - PFTs repeated today 6/10 (s/p thoracentesis) without much improvement  - Pulm consulted 6/10, appreciate recommendations   - prelim recs: consult IR for R sided thoracentesis  - Monitor and maintain SpO2 > 92%  - currently on room air     Symptomatic UTI, improved  Syphilis   - UA with +LE, WBC  - Urine cx from 6/3 (advanced therapies evaluation with e.coli)   - ID following: rec 2 weeks (stop date 6/20) of ceftriaxone 2g q24 hours (given UTI and syphilis)  - if plan is to discharge prior to Abx completion date, patient will need midline placed   - Hold jardiance while being treated  - Reports resolved urinary symptoms as of 6/8     Hx of Hypotension  - Home midodrine discontinued  - Last 24hrs SBPs 92-95 (denies any lightheadedness or dizziness)     CAD s/p PCI (LAD 2015, LCx 3/2025)  Hyperlipidemia  - No reported angina  - C/w home Plavix & statin  - per patient, had been discharged after SABINA placement on Eliquis and plavix (no aspirin to avoid triple therapy), but then eliquis was discontinued due to lack of recurrence of atrial fib. Will start aspirin 81 mg for DAPT given recent stent placement.     H/o VT  Paroxysmal A Fib  s/p CRT-D 3/2025, [employer]   - AC: off DOAC given absence of recurrence  - K goal >4, Mg>2     T2DM  - HgbA1c 8.6, previously was 12.3 in 3/2025  - Home medications: insulin glargine, jardiance, alogliptin (nesina).  - Endocrine following  - inpatient: glargine 10 units nightly, lispro 6 units premeals, SSI coverage.     Hypothyroidism  - TSH 0.2L, Free T4 2.05H  - Repeat TSH 0.19L, free T4 1.23WNL/T3 67.  - c/w levothyroxine 88mcg daily per  endocrinology.   - will need repeat TFTs in 6-8 weeks in outpatient with PCP   - will need follow up with endocrinology at discharge    GERD  - C/w home protonix 20 mg daily   - Bowel regimen: prn miralax, juliocesar-colace BID     Iron deficiency  - Fe, TIBC, and ferritin WNL., %sat 17 Low  - IV venofer ordered 6/3- 6/6     Prior tobacco use  - previous smoker of 40 years  - quit 1.5 months prior to admission date     Anxiety  Depression  - C/w home celexa 40 mg daily  - C/w home gabapentin 400 mg q8 hours    Restless legs  - C/w home requip     DVT ppx: lovenox subcutaneous   DISPO: pending pulmonary consult recommendations; right sided thoracentesis; further GDMT/ fluid status optimization; HF/ advanced therapy discussion   Code status: Full Code    Patient seen and discussed with Dr. Bridger Vela, APRN-CNP         [1]   Scheduled medications   Medication Dose Route Frequency    [START ON 6/11/2025] aspirin  81 mg oral Daily    atorvastatin  80 mg oral Daily    [Held by provider] bumetanide  1 mg oral BID    cefTRIAXone  2 g intravenous q24h    citalopram  40 mg oral Daily    clopidogrel  75 mg oral Daily    empagliflozin  10 mg oral Daily    enoxaparin  40 mg subcutaneous q24h    fluticasone furoate-vilanteroL  1 puff inhalation Daily    gabapentin  400 mg oral q8h [facility]    insulin glargine  10 Units subcutaneous Nightly    insulin lispro  0-5 Units subcutaneous TID AC    insulin lispro  6 Units subcutaneous TID AC    levothyroxine  88 mcg oral Daily    [Held by provider] metoprolol succinate XL  12.5 mg oral Daily    [Held by provider] midodrine  5 mg oral TID    pantoprazole  20 mg oral Daily    rOPINIRole  1 mg oral TID    rOPINIRole  3 mg oral Nightly    sacubitriL-valsartan  1 tablet oral BID    sennosides-docusate sodium  2 tablet oral BID    spironolactone  25 mg oral Daily   [2]   PRN medications   Medication    acetaminophen    albuterol    dextrose    dextrose    glucagon    glucagon     guaiFENesin    oxygen    polyethylene glycol   [3]   Continuous Medications   Medication Dose Last Rate

## 2025-06-10 NOTE — PROGRESS NOTES
Thao Evans is a 60 y.o. female on day 8 of admission presenting with ACC/AHA stage D systolic heart failure.  ADOLFO met with pt to complete HCPOA paperwork.  She desginated her friend Babatunde Mclean as her surrogate decision maker and Vielka Neely and her alternate.  Pt was provided copies and the original.  Sw placed a hard copy in  the chart and will scan a copy into EPIC.     NEHEMIAH JUDGE

## 2025-06-10 NOTE — CARE PLAN
The patient's goals for the shift include GET SOMETHING TO EAT    The clinical goals for the shift include pt will not have any respiratory distress    Over the shift, the patient did not make progress toward the following goals. Barriers to progression include recent thoracentesis. Recommendations to address these barriers include assess vitals signs during shift.

## 2025-06-10 NOTE — PROGRESS NOTES
"Palliative Medicine following for:  Complex medical decision making, symptom management, patient/family support    History obtained from chart review including ED note, H&P, patient's daily progress notes, review of lab/test results, and discussion with primary team and bedside RN.    Subjective      Patient seen at bedside for supportive visit  Denies pain, dyspnea, nausea vomiting, reports appetite is good, mildly anxious due to awaiting advanced therapy committee decision  Not sleeping well due to nursing interruptions    Objective    Last Recorded Vitals  BP 98/59 (BP Location: Right arm, Patient Position: Lying)   Pulse 81   Temp 36.1 °C (97 °F) (Temporal)   Resp 18   Ht 1.575 m (5' 2\")   Wt 60.9 kg (134 lb 4.2 oz)   SpO2 93%   BMI 24.56 kg/m²      Physical Exam  HENT:      Head: Normocephalic.      Mouth/Throat:      Mouth: Mucous membranes are moist.   Pulmonary:      Effort: Pulmonary effort is normal.   Abdominal:      Palpations: Abdomen is soft.   Skin:     General: Skin is dry.   Neurological:      Mental Status: She is alert and oriented to person, place, and time. Mental status is at baseline.   Psychiatric:         Mood and Affect: Mood normal.          Relevant Results   Results for orders placed or performed during the hospital encounter of 06/02/25 (from the past 24 hours)   Magnesium   Result Value Ref Range    Magnesium 2.25 1.60 - 2.40 mg/dL   CBC and Auto Differential   Result Value Ref Range    WBC 8.9 4.4 - 11.3 x10*3/uL    nRBC 0.0 0.0 - 0.0 /100 WBCs    RBC 4.49 4.00 - 5.20 x10*6/uL    Hemoglobin 13.2 12.0 - 16.0 g/dL    Hematocrit 43.8 36.0 - 46.0 %    MCV 98 80 - 100 fL    MCH 29.4 26.0 - 34.0 pg    MCHC 30.1 (L) 32.0 - 36.0 g/dL    RDW 13.4 11.5 - 14.5 %    Platelets 265 150 - 450 x10*3/uL    Neutrophils % 72.8 40.0 - 80.0 %    Immature Granulocytes %, Automated 0.4 0.0 - 0.9 %    Lymphocytes % 16.1 13.0 - 44.0 %    Monocytes % 8.0 2.0 - 10.0 %    Eosinophils % 1.8 0.0 - 6.0 %    " Basophils % 0.9 0.0 - 2.0 %    Neutrophils Absolute 6.50 1.20 - 7.70 x10*3/uL    Immature Granulocytes Absolute, Automated 0.04 0.00 - 0.70 x10*3/uL    Lymphocytes Absolute 1.44 1.20 - 4.80 x10*3/uL    Monocytes Absolute 0.71 0.10 - 1.00 x10*3/uL    Eosinophils Absolute 0.16 0.00 - 0.70 x10*3/uL    Basophils Absolute 0.08 0.00 - 0.10 x10*3/uL   Comprehensive metabolic panel   Result Value Ref Range    Glucose 135 (H) 74 - 99 mg/dL    Sodium 136 136 - 145 mmol/L    Potassium 4.2 3.5 - 5.3 mmol/L    Chloride 102 98 - 107 mmol/L    Bicarbonate 27 21 - 32 mmol/L    Anion Gap 11 10 - 20 mmol/L    Urea Nitrogen 15 6 - 23 mg/dL    Creatinine 0.62 0.50 - 1.05 mg/dL    eGFR >90 >60 mL/min/1.73m*2    Calcium 8.4 (L) 8.6 - 10.6 mg/dL    Albumin 3.3 (L) 3.4 - 5.0 g/dL    Alkaline Phosphatase 77 33 - 136 U/L    Total Protein 6.5 6.4 - 8.2 g/dL    AST 10 9 - 39 U/L    Bilirubin, Total 0.2 0.0 - 1.2 mg/dL    ALT 9 7 - 45 U/L   B-type natriuretic peptide   Result Value Ref Range    BNP 1,456 (H) 0 - 99 pg/mL   POCT GLUCOSE   Result Value Ref Range    POCT Glucose 148 (H) 74 - 99 mg/dL   POCT GLUCOSE   Result Value Ref Range    POCT Glucose 140 (H) 74 - 99 mg/dL   POCT GLUCOSE   Result Value Ref Range    POCT Glucose 209 (H) 74 - 99 mg/dL   Pulmonary function testing   Result Value Ref Range    FVC - Predicted 2.81     FEV1 - Predicted 2.24     FVC - PRE 0.98     FEV1 - Pre 0.67     FVC - Post 1.01     FEV1 - Post 0.75         Allergies  Bee venom protein (honey bee), Codeine, Doxycycline, Prednisone, and Amoxicillin  Medications  Scheduled medications  Scheduled Medications[1]  Continuous medications  Continuous Medications[2]  PRN medications  PRN Medications[3]     Assessment/Plan    Ms. Thao Evans is a 60F with a PMHx sig for CAD s/p PCI (LAD; 2015, Lcx; 2025), stage C systolic HF/ICM/HFrEF s/p ICD, h/o VT with presumed associated syncope, poorly controlled DM, pAF (off of DOAC given the absence of recurrence), dyslipidemia,  essential HTN, COPD, and hypothyroidism with progressive HF symptoms and intolerant of GDMT - on midodrine who presents to HFICU as a direct admission for PAC guided evaluation. Advanced therapies evaluation was initiated 6/3 for OHT/LVAD.   Please see palliative consult note from 6/4 for advanced therapy discussion      6/10 -   Patient seen at bedside today for supportive visit.  Patient was supposed to be discussed at advanced therapy committee meeting today, but unfortunately was not yet discussed.  Heart failure plan pending.  Patient wishes to go home.  HCPOA filled out with social work today.     #Goals of Care  - Code status: Full code  - Surrogate decision maker: Cedrick Fine [phone], HCPOA  - Goals are mix of survival and time and improved quality of life  - Patient is agreeable to outpatient palliative care support pending clinical course    #Psychosocial Support  - Music Therapy  - Spiritual Care Support  - Art Therapy  - Palliative SW Support    Plan of Care discussed with: Updated MD and bedside RN on goals of care decision, medication adjustments, and code status     Medical Decision Making was high level due to high complexity of problems, extensive data review, and high risk of management/treatment.       Thank you for allowing us to care for this patient. Palliative Team will continue to follow as needed. Please contact team with any questions or concerns.   Team pager [phone] (weekdays)  Eve Sánchez DNP, APRN-CNP       [1] [START ON 6/11/2025] aspirin, 81 mg, oral, Daily  atorvastatin, 80 mg, oral, Daily  [Held by provider] bumetanide, 1 mg, oral, BID  cefTRIAXone, 2 g, intravenous, q24h  citalopram, 40 mg, oral, Daily  clopidogrel, 75 mg, oral, Daily  empagliflozin, 10 mg, oral, Daily  enoxaparin, 40 mg, subcutaneous, q24h  fluticasone furoate-vilanteroL, 1 puff, inhalation, Daily  gabapentin, 400 mg, oral, q8h [facility]  insulin glargine, 10 Units, subcutaneous, Nightly  insulin lispro, 0-5 Units,  subcutaneous, TID AC  insulin lispro, 6 Units, subcutaneous, TID AC  levothyroxine, 88 mcg, oral, Daily  [Held by provider] metoprolol succinate XL, 12.5 mg, oral, Daily  [Held by provider] midodrine, 5 mg, oral, TID  pantoprazole, 20 mg, oral, Daily  rOPINIRole, 1 mg, oral, TID  rOPINIRole, 3 mg, oral, Nightly  sacubitriL-valsartan, 1 tablet, oral, BID  sennosides-docusate sodium, 2 tablet, oral, BID  spironolactone, 25 mg, oral, Daily    [2]    [3] PRN medications: acetaminophen, albuterol, dextrose, dextrose, glucagon, glucagon, guaiFENesin, oxygen, polyethylene glycol

## 2025-06-10 NOTE — CONSULTS
Reason For Consult  Advanced therapy consideration for heart failure; Hx COPD, restrictive lung disease on PFTs, pleural effusion     History Of Present Illness  Thao Evans is a 60 y.o. female with PMHx sig for CAD s/p PCI (LAD; 2015, Lcx; 3/2025), stage C systolic HF/ICM/HFrEF s/p ICD, h/o VT with presumed associated syncope, poorly controlled DM, pAF (off of DOAC given the absence of recurrence), dyslipidemia, essential HTN, ?COPD, and hypothyroidism who presented with progressive HF symptoms and intolerance of GDMT requiring midodrine. She originally was admitted to HFICU as a direct admission for PAC guided evaluation, and then was transferred to  after optimization. She is now undergoing consideration for advanced therapies like heart transplant or LVAD. During this evaluation, she had PFT done on 6/4 which showed restrictive lung disease. She underwent thoracentesis on 6/9 on the left side (transudative) given bilateral effusions, so repeat PFT were performed today on 6/10 which again showed restriction. Given these findinsg, pulmonary was consulted.    PFT show restrictive lung disease on 6/4 with TLC 41% by N2 washout. After L sided thora, TLC was 43% by pleth.     Patient reports feeling well. She denies any respiratory concerns today     Past Medical History  She has a past medical history of CAD (coronary artery disease), CHF (congestive heart failure), COPD (chronic obstructive pulmonary disease) (Multi), Graves disease, Hypotension, and PAF (paroxysmal atrial fibrillation) (Multi).    Surgical History  She has no past surgical history on file.     Social History  She reports that she has quit smoking. Her smoking use included cigarettes. She has never been exposed to tobacco smoke. She has never used smokeless tobacco. No history on file for alcohol use and drug use.    Family History  Family History[1]     Allergies  Bee venom protein (honey bee), Codeine, Doxycycline, Prednisone, and  Amoxicillin    Review of Systems  Review of Systems   Constitutional:  Negative for chills, fever and unexpected weight change.   HENT:  Negative for congestion, drooling, postnasal drip, trouble swallowing and voice change.    Respiratory:  Negative for cough, chest tightness, shortness of breath and wheezing.    Cardiovascular:  Negative for chest pain, palpitations and leg swelling.   Gastrointestinal:  Negative for abdominal pain, blood in stool, constipation, diarrhea, nausea and vomiting.   Endocrine: Negative for cold intolerance, heat intolerance, polydipsia and polyuria.   Genitourinary:  Negative for difficulty urinating, dysuria, flank pain and hematuria.   Musculoskeletal:  Negative for back pain, joint swelling, neck pain and neck stiffness.   Skin:  Negative for rash and wound.   Neurological:  Negative for dizziness, seizures, syncope, weakness and numbness.   Hematological:  Does not bruise/bleed easily.          Physical Exam  Physical Exam  Constitutional:       Appearance: She is not ill-appearing.   Cardiovascular:      Rate and Rhythm: Normal rate and regular rhythm.      Pulses: Normal pulses.      Heart sounds: Normal heart sounds. No murmur heard.  Pulmonary:      Effort: Pulmonary effort is normal. No respiratory distress.      Breath sounds: Normal breath sounds. No wheezing or rhonchi.   Chest:      Chest wall: No tenderness.   Abdominal:      General: Abdomen is flat. Bowel sounds are normal. There is no distension.      Palpations: Abdomen is soft.      Tenderness: There is no abdominal tenderness. There is no guarding or rebound.   Musculoskeletal:         General: No swelling or tenderness.      Right lower leg: No edema.      Left lower leg: No edema.   Skin:     General: Skin is warm and dry.   Neurological:      General: No focal deficit present.      Mental Status: She is alert and oriented to person, place, and time. Mental status is at baseline.            Last Recorded  "Vitals  Blood pressure 92/56, pulse 88, temperature 36.5 °C (97.7 °F), temperature source Temporal, resp. rate 18, height 1.575 m (5' 2\"), weight 60.9 kg (134 lb 4.2 oz), SpO2 92%.    Relevant Results  CT C/A/P 6/5  IMPRESSION:  1. Mild thoracic and moderate abdominopelvic atherosclerosis disease.  2. Mild cardiomegaly and severe coronary artery calcifications.  3. Findings compatible with fluid overload with moderate bilateral  pleural effusions with associated atelectasis, pulmonary interstitial  edema as well as mesenteric and body wall edema.  4. Patchy right lung opacities are likely secondary to pulmonary  alveolar edema, however multifocal infection/inflammation could have  a similar appearance, correlate with patient's symptomatology is  recommended.  5. Indeterminate is enlarged subcarinal and right lower paratracheal  lymph nodes, likely reactive. Recommend attention on follow-up  imaging to document stability/resolution.  6. Colonic diverticulosis without evidence of acute diverticulitis.  7. Intraluminal gas within the bladder, correlate with recent  instrumentation/catheterization.  8. Adrenal form thickening of the left adrenal gland, likely relating  to adrenal gland hyperplasia.  9. Additional chronic and incidental findings as described including  multiple subacute bilateral nondisplaced healing rib fractures and  hepatosplenomegaly.    PFT 6/4/25  FEV1/FVC 0.79 98%  FEV1 0.74 32%  FVC 0.94 33%  TLC 41% (N2)  RV/% (N2)     PFT 6/10/25  FEV1/FVC 0.69 86%  FEV1 0.67 30%  FVC 0.98 34%  No BD response  TLC 43% pleth  RV/%  DLCO 36%     Assessment/Plan     Thao Evans is a 60 y.o. female with PMHx sig for CAD s/p PCI (LAD; 2015, Lcx; 3/2025), stage C systolic HF/ICM/HFrEF s/p ICD, h/o VT with presumed associated syncope, poorly controlled DM, pAF (off of DOAC given the absence of recurrence), dyslipidemia, essential HTN, and hypothyroidism who presented with progressive HF symptoms and is " now being evaluated for advanced therapies like OHT/LVAD. Pulmonary involved for restrictive lung disease on PFT    On review of imaging, there is no interstitial lung disease to suggest an intrapulmonary cause of restriction. There is bilateral effusions and bilateral edema c/f ADHF, especially since thoracentesis on 6/9 was transudative. PFT are likely unchanged today when compared to 6/4 because the patient still has significant R sided effusion and associated atelectesis    #restriction on PFT  #bilateral effusions  #compressive atelectesis 2/2 effusions  #pulmonary edema    -would engage IR for thoracentesis of the right side. If they are unavailable, can proceed with thoracentesis in the bronch suite. Primary team to page us back if thoracentesis is needed by pulmonary, depending on IR availability  -continued volume management per primary  -would wait to repeat PFT until patient is more euvolemic. Please page pulmonary with repeat results when pt is euvolemic if needed  -no pulmonary disease to suggest contraindications from pulmonary standpoint to undergo surgery    Seen and discussed with Dr. Hunt    Pulmonary will sign off. Please page [phone] during day hours for further questions or concerns, or call the MICU fellow at [phone] for any emergent issues overnight.      Pallavi Sharma, MD         [1] No family history on file.

## 2025-06-10 NOTE — CARE PLAN
The patient's goals for the shift include GET SOMETHING TO EAT    The clinical goals for the shift include pt will not have any respiratory distress    Over the shift, the patient did not make progress toward the following goals. Barriers to progression include . Recommendations to address these barriers include .

## 2025-06-11 ENCOUNTER — APPOINTMENT (OUTPATIENT)
Dept: RADIOLOGY | Facility: HOSPITAL | Age: 60
DRG: 291 | End: 2025-06-11
Payer: COMMERCIAL

## 2025-06-11 LAB
ALBUMIN SERPL BCP-MCNC: 3.1 G/DL (ref 3.4–5)
ALP SERPL-CCNC: 70 U/L (ref 33–136)
ALT SERPL W P-5'-P-CCNC: 10 U/L (ref 7–45)
ANION GAP SERPL CALC-SCNC: 10 MMOL/L (ref 10–20)
AST SERPL W P-5'-P-CCNC: 11 U/L (ref 9–39)
BASOPHILS # BLD AUTO: 0.07 X10*3/UL (ref 0–0.1)
BASOPHILS NFR BLD AUTO: 0.8 %
BILIRUB SERPL-MCNC: 0.2 MG/DL (ref 0–1.2)
BUN SERPL-MCNC: 13 MG/DL (ref 6–23)
CALCIUM SERPL-MCNC: 8.7 MG/DL (ref 8.6–10.6)
CHLORIDE SERPL-SCNC: 101 MMOL/L (ref 98–107)
CO2 SERPL-SCNC: 31 MMOL/L (ref 21–32)
CREAT SERPL-MCNC: 0.92 MG/DL (ref 0.5–1.05)
EGFRCR SERPLBLD CKD-EPI 2021: 71 ML/MIN/1.73M*2
EOSINOPHIL # BLD AUTO: 0.13 X10*3/UL (ref 0–0.7)
EOSINOPHIL NFR BLD AUTO: 1.6 %
ERYTHROCYTE [DISTWIDTH] IN BLOOD BY AUTOMATED COUNT: 13.6 % (ref 11.5–14.5)
GLUCOSE BLD MANUAL STRIP-MCNC: 145 MG/DL (ref 74–99)
GLUCOSE BLD MANUAL STRIP-MCNC: 149 MG/DL (ref 74–99)
GLUCOSE BLD MANUAL STRIP-MCNC: 154 MG/DL (ref 74–99)
GLUCOSE BLD MANUAL STRIP-MCNC: 162 MG/DL (ref 74–99)
GLUCOSE BLD MANUAL STRIP-MCNC: 178 MG/DL (ref 74–99)
GLUCOSE SERPL-MCNC: 105 MG/DL (ref 74–99)
HCT VFR BLD AUTO: 41.5 % (ref 36–46)
HGB BLD-MCNC: 13 G/DL (ref 12–16)
HLA RESULTS: NORMAL
HLA-A+B+C AB NFR SER: NORMAL %
HLA-DP+DQ+DR AB NFR SER: NORMAL %
IMM GRANULOCYTES # BLD AUTO: 0.05 X10*3/UL (ref 0–0.7)
IMM GRANULOCYTES NFR BLD AUTO: 0.6 % (ref 0–0.9)
LYMPHOCYTES # BLD AUTO: 1.78 X10*3/UL (ref 1.2–4.8)
LYMPHOCYTES NFR BLD AUTO: 21.4 %
MAGNESIUM SERPL-MCNC: 1.48 MG/DL (ref 1.6–2.4)
MCH RBC QN AUTO: 30.1 PG (ref 26–34)
MCHC RBC AUTO-ENTMCNC: 31.3 G/DL (ref 32–36)
MCV RBC AUTO: 96 FL (ref 80–100)
MONOCYTES # BLD AUTO: 0.6 X10*3/UL (ref 0.1–1)
MONOCYTES NFR BLD AUTO: 7.2 %
NEUTROPHILS # BLD AUTO: 5.68 X10*3/UL (ref 1.2–7.7)
NEUTROPHILS NFR BLD AUTO: 68.4 %
NRBC BLD-RTO: 0 /100 WBCS (ref 0–0)
PATH REVIEW-CELL CT,FLUID: NORMAL
PLATELET # BLD AUTO: 312 X10*3/UL (ref 150–450)
POTASSIUM SERPL-SCNC: 3.6 MMOL/L (ref 3.5–5.3)
PROT SERPL-MCNC: 6 G/DL (ref 6.4–8.2)
RBC # BLD AUTO: 4.32 X10*6/UL (ref 4–5.2)
SODIUM SERPL-SCNC: 138 MMOL/L (ref 136–145)
WBC # BLD AUTO: 8.3 X10*3/UL (ref 4.4–11.3)

## 2025-06-11 PROCEDURE — 32555 ASPIRATE PLEURA W/ IMAGING: CPT

## 2025-06-11 PROCEDURE — 2500000002 HC RX 250 W HCPCS SELF ADMINISTERED DRUGS (ALT 637 FOR MEDICARE OP, ALT 636 FOR OP/ED): Performed by: PHYSICIAN ASSISTANT

## 2025-06-11 PROCEDURE — 1100000001 HC PRIVATE ROOM DAILY

## 2025-06-11 PROCEDURE — 36415 COLL VENOUS BLD VENIPUNCTURE: CPT | Mod: PARLAB | Performed by: PHYSICIAN ASSISTANT

## 2025-06-11 PROCEDURE — 2500000001 HC RX 250 WO HCPCS SELF ADMINISTERED DRUGS (ALT 637 FOR MEDICARE OP): Performed by: PHYSICIAN ASSISTANT

## 2025-06-11 PROCEDURE — 2500000004 HC RX 250 GENERAL PHARMACY W/ HCPCS (ALT 636 FOR OP/ED): Performed by: NURSE PRACTITIONER

## 2025-06-11 PROCEDURE — 2500000001 HC RX 250 WO HCPCS SELF ADMINISTERED DRUGS (ALT 637 FOR MEDICARE OP)

## 2025-06-11 PROCEDURE — 2500000004 HC RX 250 GENERAL PHARMACY W/ HCPCS (ALT 636 FOR OP/ED): Performed by: PHYSICIAN ASSISTANT

## 2025-06-11 PROCEDURE — 85025 COMPLETE CBC W/AUTO DIFF WBC: CPT | Mod: PARLAB | Performed by: PHYSICIAN ASSISTANT

## 2025-06-11 PROCEDURE — 71046 X-RAY EXAM CHEST 2 VIEWS: CPT

## 2025-06-11 PROCEDURE — 0W9B3ZZ DRAINAGE OF LEFT PLEURAL CAVITY, PERCUTANEOUS APPROACH: ICD-10-PCS | Performed by: NURSE PRACTITIONER

## 2025-06-11 PROCEDURE — 2500000002 HC RX 250 W HCPCS SELF ADMINISTERED DRUGS (ALT 637 FOR MEDICARE OP, ALT 636 FOR OP/ED): Performed by: STUDENT IN AN ORGANIZED HEALTH CARE EDUCATION/TRAINING PROGRAM

## 2025-06-11 PROCEDURE — 82947 ASSAY GLUCOSE BLOOD QUANT: CPT

## 2025-06-11 PROCEDURE — 99232 SBSQ HOSP IP/OBS MODERATE 35: CPT | Performed by: INTERNAL MEDICINE

## 2025-06-11 PROCEDURE — 80053 COMPREHEN METABOLIC PANEL: CPT | Mod: PARLAB | Performed by: PHYSICIAN ASSISTANT

## 2025-06-11 PROCEDURE — 83735 ASSAY OF MAGNESIUM: CPT | Mod: PARLAB | Performed by: PHYSICIAN ASSISTANT

## 2025-06-11 PROCEDURE — 94640 AIRWAY INHALATION TREATMENT: CPT

## 2025-06-11 RX ORDER — CEFTRIAXONE 2 G/50ML
2 INJECTION, SOLUTION INTRAVENOUS EVERY 24 HOURS
Qty: 500 ML | Refills: 0 | Status: SHIPPED
Start: 2025-06-11 | End: 2025-06-13

## 2025-06-11 RX ORDER — CEFTRIAXONE 2 G/50ML
2 INJECTION, SOLUTION INTRAVENOUS EVERY 24 HOURS
Status: DISCONTINUED | OUTPATIENT
Start: 2025-06-11 | End: 2025-06-11

## 2025-06-11 RX ADMIN — INSULIN LISPRO 6 UNITS: 100 INJECTION, SOLUTION INTRAVENOUS; SUBCUTANEOUS at 16:37

## 2025-06-11 RX ADMIN — ROPINIROLE 1 MG: 1 TABLET, FILM COATED ORAL at 06:00

## 2025-06-11 RX ADMIN — ATORVASTATIN CALCIUM 80 MG: 80 TABLET, FILM COATED ORAL at 09:06

## 2025-06-11 RX ADMIN — ROPINIROLE 1 MG: 1 TABLET, FILM COATED ORAL at 18:23

## 2025-06-11 RX ADMIN — INSULIN LISPRO 6 UNITS: 100 INJECTION, SOLUTION INTRAVENOUS; SUBCUTANEOUS at 07:50

## 2025-06-11 RX ADMIN — FLUTICASONE FUROATE AND VILANTEROL TRIFENATATE 1 PUFF: 100; 25 POWDER RESPIRATORY (INHALATION) at 08:19

## 2025-06-11 RX ADMIN — INSULIN LISPRO 1 UNITS: 100 INJECTION, SOLUTION INTRAVENOUS; SUBCUTANEOUS at 16:37

## 2025-06-11 RX ADMIN — INSULIN GLARGINE 10 UNITS: 100 INJECTION, SOLUTION SUBCUTANEOUS at 21:11

## 2025-06-11 RX ADMIN — ACETAMINOPHEN 650 MG: 325 TABLET ORAL at 18:23

## 2025-06-11 RX ADMIN — PANTOPRAZOLE SODIUM 20 MG: 20 TABLET, DELAYED RELEASE ORAL at 09:06

## 2025-06-11 RX ADMIN — ENOXAPARIN SODIUM 40 MG: 100 INJECTION SUBCUTANEOUS at 14:50

## 2025-06-11 RX ADMIN — CEFTRIAXONE SODIUM 2 G: 2 INJECTION, SOLUTION INTRAVENOUS at 14:53

## 2025-06-11 RX ADMIN — GABAPENTIN 400 MG: 300 CAPSULE ORAL at 21:10

## 2025-06-11 RX ADMIN — SACUBITRIL AND VALSARTAN 1 TABLET: 49; 51 TABLET, FILM COATED ORAL at 21:09

## 2025-06-11 RX ADMIN — ACETAMINOPHEN 650 MG: 325 TABLET ORAL at 12:40

## 2025-06-11 RX ADMIN — ASPIRIN 81 MG: 81 TABLET, CHEWABLE ORAL at 09:06

## 2025-06-11 RX ADMIN — ROPINIROLE 1 MG: 1 TABLET, FILM COATED ORAL at 12:40

## 2025-06-11 RX ADMIN — GABAPENTIN 400 MG: 300 CAPSULE ORAL at 05:58

## 2025-06-11 RX ADMIN — SPIRONOLACTONE 25 MG: 25 TABLET, FILM COATED ORAL at 09:06

## 2025-06-11 RX ADMIN — GABAPENTIN 400 MG: 300 CAPSULE ORAL at 14:52

## 2025-06-11 RX ADMIN — SACUBITRIL AND VALSARTAN 1 TABLET: 49; 51 TABLET, FILM COATED ORAL at 09:06

## 2025-06-11 RX ADMIN — CLOPIDOGREL BISULFATE 75 MG: 75 TABLET, FILM COATED ORAL at 09:06

## 2025-06-11 RX ADMIN — CITALOPRAM HYDROBROMIDE 40 MG: 40 TABLET ORAL at 05:58

## 2025-06-11 RX ADMIN — INSULIN LISPRO 6 UNITS: 100 INJECTION, SOLUTION INTRAVENOUS; SUBCUTANEOUS at 11:47

## 2025-06-11 RX ADMIN — LEVOTHYROXINE SODIUM 88 MCG: 0.09 TABLET ORAL at 05:58

## 2025-06-11 RX ADMIN — BUMETANIDE 1 MG: 1 TABLET ORAL at 16:37

## 2025-06-11 RX ADMIN — ROPINIROLE HYDROCHLORIDE 3 MG: 3 TABLET, FILM COATED ORAL at 21:10

## 2025-06-11 RX ADMIN — BUMETANIDE 1 MG: 1 TABLET ORAL at 07:50

## 2025-06-11 ASSESSMENT — PAIN DESCRIPTION - DESCRIPTORS
DESCRIPTORS: ACHING;DISCOMFORT
DESCRIPTORS: ACHING;DISCOMFORT

## 2025-06-11 ASSESSMENT — PAIN SCALES - GENERAL
PAINLEVEL_OUTOF10: 3
PAINLEVEL_OUTOF10: 0 - NO PAIN
PAINLEVEL_OUTOF10: 3
PAINLEVEL_OUTOF10: 0 - NO PAIN

## 2025-06-11 ASSESSMENT — PAIN DESCRIPTION - LOCATION
LOCATION: BACK
LOCATION: CHEST

## 2025-06-11 ASSESSMENT — PAIN - FUNCTIONAL ASSESSMENT
PAIN_FUNCTIONAL_ASSESSMENT: 0-10

## 2025-06-11 ASSESSMENT — PAIN DESCRIPTION - ORIENTATION
ORIENTATION: RIGHT
ORIENTATION: RIGHT

## 2025-06-11 NOTE — POST-PROCEDURE NOTE
INTERVENTIONAL RADIOLOGY ADVANCED PRACTICE PROCEDURE  [facility]    A time out was performed and Left Hemithorax was examined with US and appropriate entry point was confirmed and marked.   The patient was prepped and draped in a sterile manner, 1% lidocaine was used to anesthesize the skin and subcutaneous tissue.   A 5F Centesis needle was then introduced through the skin into the pleural space, the centesis catheter was then threaded without difficulty.   1200 ml of yellow fluid was removed without difficulty. The catheter was then removed.   No immediate complications were noted during and immediately following the procedure.

## 2025-06-11 NOTE — CARE PLAN
Problem: Pain - Adult  Goal: Verbalizes/displays adequate comfort level or baseline comfort level  Outcome: Progressing     Problem: Safety - Adult  Goal: Free from fall injury  Outcome: Progressing     Problem: Discharge Planning  Goal: Discharge to home or other facility with appropriate resources  Outcome: Progressing     Problem: Chronic Conditions and Co-morbidities  Goal: Patient's chronic conditions and co-morbidity symptoms are monitored and maintained or improved  Outcome: Progressing     Problem: Nutrition  Goal: Nutrient intake appropriate for maintaining nutritional needs  Outcome: Progressing     Problem: Heart Failure  Goal: Improved gas exchange this shift  Outcome: Progressing  Goal: Improved urinary output this shift  Outcome: Progressing  Goal: Reduction in peripheral edema within 24 hours  Outcome: Progressing  Goal: Report improvement of dyspnea/breathlessness this shift  Outcome: Progressing  Goal: Weight from fluid excess reduced over 2-3 days, then stabilize  Outcome: Progressing  Goal: Increase self care and/or family involvement in 24 hours  Outcome: Progressing

## 2025-06-11 NOTE — CARE PLAN
Problem: Safety - Adult  Goal: Free from fall injury  Outcome: Progressing  Flowsheets (Taken 6/11/2025 0620)  Free from fall injury:   Instruct family/caregiver on patient safety   Based on caregiver fall risk screen, instruct family/caregiver to ask for assistance with transferring infant if caregiver noted to have fall risk factors  Note: Patient remained free of falls/injury during shift. Bed in lowest position. 2/4 side rails up. Bed breaks locked. Call light and bed side table within reach. Patient calls out appropriately.     Problem: Heart Failure  Goal: Improved gas exchange this shift  Outcome: Progressing  Goal: Improved urinary output this shift  Outcome: Progressing  Goal: Report improvement of dyspnea/breathlessness this shift  Outcome: Progressing  Goal: Increase self care and/or family involvement in 24 hours  Outcome: Progressing   End of shift note:  B/P soft over night with SBP in the low 100s. No acute issues. Patient remains on room air and denies SOB. Good urine output this shift.

## 2025-06-11 NOTE — PROGRESS NOTES
"Nutrition Follow Up Assessment:   Nutrition Assessment         Patient is a 60 y.o. female on day 9 of admission presenting with ACC/AHA stage D systolic HF    6/06 - transferred from HFICU to LT 5, undergoing advanced therapies eval initiated 6/03 6/09 - L thoracentesis, 1 L of fluid removed      Nutrition History:  Food and Nutrient History: Unable to visit pt, MELI for thoracentesis. Per chart, pt ate all of her breakfast this morning. Pt with documented meals of 75% x 1 and 100% x 3 (350 kcal, 510 kcal). Will trial Ensure Plus once daily.       Anthropometrics:  Height: 157.5 cm (5' 2\")   Weight: 60.1 kg (132 lb 7.9 oz)   BMI (Calculated): 24.23  IBW/kg (Dietitian Calculated): 50 kg  Percent of IBW: 120 %      Weight History:   Date/Time Weight   06/11/25 0544 60.1 kg (132 lb 7.9 oz)   06/10/25 0600 60.9 kg (134 lb 4.2 oz)   06/09/25 0436 60.8 kg (134 lb 0.6 oz)   06/07/25 0440 59 kg (130 lb 1.1 oz)   06/05/25 0600 57.5 kg (126 lb 10.5 oz)   06/04/25 0600 56.5 kg (124 lb 9 oz)   06/02/25 1300 54.4 kg (120 lb)     Weight Change %:  Weight History / % Weight Change: Pt with a 6.4% weight gain throughout admission, likely iso fluid  Significant Weight Gain: Fluid related    Nutrition Focused Physical Exam Findings:    Edema:  Edema:  (Nonpitting)  Edema Location: generalized  Physical Findings:  Skin: Negative  Digestive System Findings:  (None per chart)  Mouth Findings:  (None per chart)    Nutrition Significant Labs:  CBC Trend:   Results from last 7 days   Lab Units 06/10/25  1734 06/09/25  1613 06/08/25  1846 06/07/25  1920   WBC AUTO x10*3/uL 6.0 8.9 6.4 6.9   RBC AUTO x10*6/uL 4.31 4.49 4.19 3.99*   HEMOGLOBIN g/dL 12.4 13.2 12.2 12.0   HEMATOCRIT % 41.3 43.8 39.8 38.3   MCV fL 96 98 95 96   PLATELETS AUTO x10*3/uL 294 265 249 232    , BMP Trend:   Results from last 7 days   Lab Units 06/10/25  1734 06/09/25  1613 06/08/25  1846 06/07/25  1920   GLUCOSE mg/dL 219* 135* 227* 115*   CALCIUM mg/dL 8.1* 8.4* " "8.3* 8.5*   SODIUM mmol/L 137 136 136 135*   POTASSIUM mmol/L 4.1 4.2 4.4 3.5   CO2 mmol/L 28 27 28 28   CHLORIDE mmol/L 102 102 101 100   BUN mg/dL 12 15 16 15   CREATININE mg/dL 0.65 0.62 0.71 0.62    , A1C:  Lab Results   Component Value Date    HGBA1C 8.6 (H) 06/02/2025   , BG POCT trend:   Results from last 7 days   Lab Units 06/11/25  1141 06/11/25  0746 06/11/25  0610 06/10/25  2104 06/10/25  1616   POCT GLUCOSE mg/dL 145* 149* 162* 154* 192*    , Liver Function Trend:   Results from last 7 days   Lab Units 06/10/25  1734 06/09/25  1613 06/08/25  1846 06/07/25  1920   ALK PHOS U/L 74 77 79 83   AST U/L 10 10 10 12   ALT U/L 8 9 11 10   BILIRUBIN TOTAL mg/dL 0.2 0.2 0.2 0.3    , Renal Lab Trend:   Results from last 7 days   Lab Units 06/10/25  1734 06/09/25  1613 06/08/25  1846 06/07/25  1920 06/07/25  0607   POTASSIUM mmol/L 4.1 4.2 4.4 3.5 4.5   PHOSPHORUS mg/dL  --   --   --   --  2.8   SODIUM mmol/L 137 136 136 135* 135*   MAGNESIUM mg/dL 1.93 2.25 2.52* 1.91 2.15   EGFR mL/min/1.73m*2 >90 >90 >90 >90 >90   BUN mg/dL 12 15 16 15 14   CREATININE mg/dL 0.65 0.62 0.71 0.62 0.73    , Vit D: No results found for: \"VITD25\"     Nutrition Specific Medications:  Scheduled medications  aspirin, 81 mg, oral, Daily  atorvastatin, 80 mg, oral, Daily  bumetanide, 1 mg, oral, BID  cefTRIAXone, 2 g, intravenous, q24h  citalopram, 40 mg, oral, Daily  clopidogrel, 75 mg, oral, Daily  [Held by provider] empagliflozin, 10 mg, oral, Daily  enoxaparin, 40 mg, subcutaneous, q24h  fluticasone furoate-vilanteroL, 1 puff, inhalation, Daily  gabapentin, 400 mg, oral, q8h MELYSSA  insulin glargine, 10 Units, subcutaneous, Nightly  insulin lispro, 0-5 Units, subcutaneous, TID AC  insulin lispro, 6 Units, subcutaneous, TID AC  levothyroxine, 88 mcg, oral, Daily  [Held by provider] metoprolol succinate XL, 12.5 mg, oral, Daily  [Held by provider] midodrine, 5 mg, oral, TID  pantoprazole, 20 mg, oral, Daily  rOPINIRole, 1 mg, oral, " TID  rOPINIRole, 3 mg, oral, Nightly  sacubitriL-valsartan, 1 tablet, oral, BID  sennosides-docusate sodium, 2 tablet, oral, BID  spironolactone, 25 mg, oral, Daily    Continuous medications  Continuous Medications[1]  PRN medications  PRN medications: acetaminophen, albuterol, dextrose, dextrose, glucagon, glucagon, guaiFENesin, oxygen, polyethylene glycol      I/O:   Last BM Date: 06/08/25;      Dietary Orders (From admission, onward)       Start     Ordered    06/02/25 1422  Adult diet Consistent Carb; CCD 75 gm/meal  Diet effective now        Question Answer Comment   Diet type Consistent Carb    Carb diet selection: CCD 75 gm/meal        06/02/25 1421    06/02/25 1328  May Participate in Room Service  ( ROOM SERVICE MAY PARTICIPATE)  Once        Question:  .  Answer:  Yes    06/02/25 1327                     Estimated Needs:   Total Energy Estimated Needs in 24 hours (kCal):  (4463-3622)  Method for Estimating Needs: MSJ= 1255  Total Protein Estimated Needs in 24 Hours (g):  (65-75)  Method for Estimating 24 Hour Protein Needs: 50kg x 1.3-1.5              Nutrition Diagnosis   Malnutrition Diagnosis  Patient has Malnutrition Diagnosis: Yes  Diagnosis Status: Active  Malnutrition Diagnosis: Severe malnutrition related to chronic disease or condition  As Evidenced by: pt reports a poor appetite and intake for the last few months with a noted 20% weight loss from her usual body weight; she has areas of moderate to severe fat and muscle loss on physical exam            Nutrition Interventions/Recommendations   Nutrition prescription for oral nutrition    Nutrition Recommendations:    Continue diet as tolerated.   Continue daily weights.   Recommend obtaining vit D levels iso severe malnutrition.    Nutrition Interventions/Goals:   Additional Interventions: Ordered Ensure Plus (350 kcal, 13 g pro) once daily      Nutrition Monitoring and Evaluation   Food/Nutrient Related History Monitoring  Monitoring and  Evaluation Plan: Estimated Energy Intake  Estimated Energy Intake: Energy intake greater or equal to 75% of estimated energy needs    Anthropometric Measurements  Monitoring and Evaluation Plan: Body weight  Body Weight: Body weight - Maintain stable weight, Body weight - Weight reduction from fluids, as needed    Biochemical Data, Medical Tests and Procedures  Monitoring and Evaluation Plan: Electrolyte/renal panel, Glucose/endocrine profile, Vitamin profile  Criteria: WNL         Goal Status: Some progress toward goal(s)    Time Spent (min): 35 minutes            [1]

## 2025-06-11 NOTE — PROGRESS NOTES
Subjective:  Eager to go home.  Breathing stable    Today in brief:  - L sided thoracentesis today (-1.2L). 2V CXR afterward to re-evaluate. Possible additional drain of L and or R tomorrow morning.  - Repeat PFTs possibly after thoracentesis (?) or as outpatient. Appreciate advanced HF input regarding timing  - To discuss advanced HF therapies candidacy at next meeting 6/17 (do not necessarily have ot remain in pt) until then  - Price check ceftriaxone IV therapy prior to ordering and placing midline.    Objective:  Vitals:    06/11/25 0544   BP: 100/66   Pulse: 91   Resp: 18   Temp: 36.3 °C (97.3 °F)   SpO2: 92%     Weight         6/5/2025  0600 6/7/2025  0440 6/9/2025  0436 6/10/2025  0600 6/11/2025  0544    Weight: 57.5 kg (126 lb 10.5 oz) 59 kg (130 lb 1.1 oz) 60.8 kg (134 lb 0.6 oz) 60.9 kg (134 lb 4.2 oz) 60.1 kg (132 lb 7.9 oz)            Intake/Output Summary (Last 24 hours) at 6/11/2025 0733  Last data filed at 6/11/2025 0630  Gross per 24 hour   Intake 240 ml   Output 3450 ml   Net -3210 ml     Recent Results (from the past 24 hours)   Pulmonary function testing    Collection Time: 06/10/25  9:00 AM   Result Value Ref Range    FVC - Predicted 2.81     FEV1 - Predicted 2.24     FVC - PRE 0.98     FEV1 - Pre 0.67     FVC - Post 1.01     FEV1 - Post 0.75    POCT GLUCOSE    Collection Time: 06/10/25  1:10 PM   Result Value Ref Range    POCT Glucose 256 (H) 74 - 99 mg/dL   POCT GLUCOSE    Collection Time: 06/10/25  4:16 PM   Result Value Ref Range    POCT Glucose 192 (H) 74 - 99 mg/dL   Magnesium    Collection Time: 06/10/25  5:34 PM   Result Value Ref Range    Magnesium 1.93 1.60 - 2.40 mg/dL   CBC and Auto Differential    Collection Time: 06/10/25  5:34 PM   Result Value Ref Range    WBC 6.0 4.4 - 11.3 x10*3/uL    nRBC 0.0 0.0 - 0.0 /100 WBCs    RBC 4.31 4.00 - 5.20 x10*6/uL    Hemoglobin 12.4 12.0 - 16.0 g/dL    Hematocrit 41.3 36.0 - 46.0 %    MCV 96 80 - 100 fL    MCH 28.8 26.0 - 34.0 pg    MCHC 30.0 (L)  32.0 - 36.0 g/dL    RDW 13.6 11.5 - 14.5 %    Platelets 294 150 - 450 x10*3/uL    Neutrophils % 67.2 40.0 - 80.0 %    Immature Granulocytes %, Automated 0.7 0.0 - 0.9 %    Lymphocytes % 21.6 13.0 - 44.0 %    Monocytes % 6.8 2.0 - 10.0 %    Eosinophils % 2.5 0.0 - 6.0 %    Basophils % 1.2 0.0 - 2.0 %    Neutrophils Absolute 4.05 1.20 - 7.70 x10*3/uL    Immature Granulocytes Absolute, Automated 0.04 0.00 - 0.70 x10*3/uL    Lymphocytes Absolute 1.30 1.20 - 4.80 x10*3/uL    Monocytes Absolute 0.41 0.10 - 1.00 x10*3/uL    Eosinophils Absolute 0.15 0.00 - 0.70 x10*3/uL    Basophils Absolute 0.07 0.00 - 0.10 x10*3/uL   Comprehensive metabolic panel    Collection Time: 06/10/25  5:34 PM   Result Value Ref Range    Glucose 219 (H) 74 - 99 mg/dL    Sodium 137 136 - 145 mmol/L    Potassium 4.1 3.5 - 5.3 mmol/L    Chloride 102 98 - 107 mmol/L    Bicarbonate 28 21 - 32 mmol/L    Anion Gap 11 10 - 20 mmol/L    Urea Nitrogen 12 6 - 23 mg/dL    Creatinine 0.65 0.50 - 1.05 mg/dL    eGFR >90 >60 mL/min/1.73m*2    Calcium 8.1 (L) 8.6 - 10.6 mg/dL    Albumin 3.1 (L) 3.4 - 5.0 g/dL    Alkaline Phosphatase 74 33 - 136 U/L    Total Protein 6.3 (L) 6.4 - 8.2 g/dL    AST 10 9 - 39 U/L    Bilirubin, Total 0.2 0.0 - 1.2 mg/dL    ALT 8 7 - 45 U/L   POCT GLUCOSE    Collection Time: 06/10/25  9:04 PM   Result Value Ref Range    POCT Glucose 154 (H) 74 - 99 mg/dL   POCT GLUCOSE    Collection Time: 06/11/25  6:10 AM   Result Value Ref Range    POCT Glucose 162 (H) 74 - 99 mg/dL     Inpatient Medications:  Scheduled Medications[1]  PRN Medications[2]  Continuous Medications[3]    Telemetry 6/11/2025 (personally reviewed): SR 80-90s, occasional PVCs    Physical exam:  General: NAD  Head/ neck: + JVD  Cardiac: RRR, regular S1 S2 , no murmur, no rub, no gallop  Pulm: On room air. No WOB. Diminished bilateral lower lungs  Vascular: Radial 2+ bilaterally  GI: Non distended  Extremities: no LE edema   Neuro: no focal neuro deficits   Psych: appropriate  mood and behavior   Skin: warm and dry     Assessment/Plan   Thao Evans is a 60F with a PMHx sig for CAD s/p PCI (LAD; 2015, Lcx; 3/2025), stage C systolic HF/ICM/HFrEF s/p ICD, h/o VT with presumed associated syncope, poorly controlled DM, pAF (off of DOAC given the absence of recurrence), dyslipidemia, essential HTN, COPD, and hypothyroidism with progressive HF symptoms and intolerance of GDMT - on midodrine. Presented to HFICU as a direct admission for PAC guided evaluation. Advanced therapies evaluation was initiated 6/3 for OHT/LVAD.      Acute on chronic systolic and diastolic heart failure  Ischemic HFrEF 30-35%, Stage C  - TTE 3/2025 at OSH: LVEF 30-35%, normal RV size with low normal function, mild MR, small pericardial effusion  - TTE 6/2/25 on arrival: LVEF 20-25%/LVIDd 4.1cm,  mild MR/TR  - Admit wt: 54.4kg   - Admit BNP 1292H  - Opening SGC #s 6/2: BP 97/55 (68), CVP 3, PAP 39/18 (27), CO/CI 2.86/1.87, SVR 1817, SVO2 62% on midodrine 5 mg, empa 10, sandi 25.  - weaned nipride gtt 6/5  - Closing SGC#s 6/6/25: BP 88/51 (63), CVP 11, PAP 62/30 (43), CO 3.98/CI 2.54. SVO2 62%.   - GDMT:   - Continue entresto 49-51mg BID   - Hold empa 10 while getting abx for UTI.   - Continue sandi 25mg daily   - Holding BB for now   - Diuresis:   - poor response to 40 mg IV lasix X1 yesterday (overall 450 net positive)  - 6/10 resumed bumex 1 mg BID   - Advanced therapies evaluation initiated  - unable to be presented at advanced therapy committee 6/10 due to time constraints> will be re-discussed at next Tuesday meeting 6/17. Does not necessarily have to remain inpt until then. Appreciate HF recs  - Home medications: metop XL 12.5mg daily, spironolactone 25mg daily, jardiance 10mg daily, bumex 2mg bid, midodrine 5mg TID.  - Daily standing weights, 2gm sodium diet, 2L fluid restriction, strict I&Os     Acute hypoxic respiratory failure, improving  COPD  Pulmonary edema/pleural effusions  - C/w home inhalers  - IV  diuretics as above  - s/p L thoracentesis on 6/9:  -1L yellow fluid. Labs c/w transudative effusion.  -  2vCXR  6/9: Marked decrease in L pleural effusion. Stable mild pulm edema and R pleural effusion  - additional cytology labs pending  - PFTs on 6/6 showing severe restrictive defect  - PFTs repeated 6/10 (s/p thoracentesis) without much improvement  - Pulm consulted 6/10. Recommended IR consult for R sided thora  - IR 6/11 today tapped L sided residual thoracentesis instead with  -1.2L. (R didn't seem that bad afterall). Will check 2V CXR. Can always tap L again or R side tomorrow, if need be (IR did not want to do both at once due to PTX risk post procedure).  - Repeat PFTs possibly after thoracentesis (?) or as outpatient. Appreciate advanced HF input regarding timing  - Monitor and maintain SpO2 > 92%  - currently on room air     Symptomatic UTI, improved  Syphilis   - UA with +LE, WBC  - Urine cx from 6/3 (advanced therapies evaluation with e.coli)   - ID following: rec 2 weeks (stop date 6/20) of ceftriaxone 2g q24 hours (given UTI and syphilis)  - if plan is to discharge prior to Abx completion date (6/20), patient will need midline placed.   - Price check ceftriaxone IV therapy prior to ordering and placing midline.  - Hold jardiance while being treated (can resume after 6/20)  - Reports resolved urinary symptoms as of 6/8     Hx of Hypotension  - Home midodrine discontinued  - Last 24hrs SBPs 90-100s (denies any lightheadedness or dizziness)     CAD s/p PCI (LAD 2015, LCx 3/2025)  Hyperlipidemia  - No reported angina  - C/w home Plavix & statin  - per patient, had been discharged after SABINA placement on Eliquis and plavix (no aspirin to avoid triple therapy), but then eliquis was discontinued due to lack of recurrence of atrial fib. Restarted aspirin 81 mg for DAPT given stent placement <1yr ago      H/o VT  Paroxysmal A Fib  s/p CRT-D 3/2025, [employer]   - AC: off DOAC given absence of  recurrence  - K goal >4, Mg>2  - Remains in sinus rhythm on telemetry    T2DM  - HgbA1c 8.6, previously was 12.3 in 3/2025  - Home medications: insulin glargine, jardiance, alogliptin (nesina).  - Endocrine following  - inpatient: glargine 10 units nightly, lispro 6 units premeals, SSI coverage.      Hypothyroidism  - TSH 0.2L, Free T4 2.05H  - Repeat TSH 0.19L, free T4 1.23WNL/T3 67.  - c/w levothyroxine 88mcg daily per endocrinology.   - will need repeat TFTs in 6-8 weeks in outpatient with PCP   - will need follow up with endocrinology at discharge     GERD  - C/w home protonix 20 mg daily   - Bowel regimen: prn miralax, juliocesar-colace BID      Iron deficiency  - Fe, TIBC, and ferritin WNL., %sat 17 Low  - IV venofer ordered 6/3- 6/6   - No additional iron supplemented required     Prior tobacco use  - previous smoker of 40 years  - quit 1.5 months prior to admission date     Anxiety  Depression  - C/w home celexa 40 mg daily  - C/w home gabapentin 400 mg q8 hours     Restless legs  - C/w home requip      DVT ppx: lovenox subcutaneous     DISPO: pending pulmonary repeat thoracentesis with pulmonary improvement, IV abx price check and midline placement, and HF team recommendations    Code status: Full Code    Patient seen and discussed with Dr. Bridger Julien.  Melissa Triplett PAAminataC         [1]   Scheduled medications   Medication Dose Route Frequency    aspirin  81 mg oral Daily    atorvastatin  80 mg oral Daily    bumetanide  1 mg oral BID    cefTRIAXone  2 g intravenous q24h    citalopram  40 mg oral Daily    clopidogrel  75 mg oral Daily    [Held by provider] empagliflozin  10 mg oral Daily    enoxaparin  40 mg subcutaneous q24h    fluticasone furoate-vilanteroL  1 puff inhalation Daily    gabapentin  400 mg oral q8h MELYSSA    insulin glargine  10 Units subcutaneous Nightly    insulin lispro  0-5 Units subcutaneous TID AC    insulin lispro  6 Units subcutaneous TID AC    levothyroxine  88 mcg oral Daily    [Held  by provider] metoprolol succinate XL  12.5 mg oral Daily    [Held by provider] midodrine  5 mg oral TID    pantoprazole  20 mg oral Daily    rOPINIRole  1 mg oral TID    rOPINIRole  3 mg oral Nightly    sacubitriL-valsartan  1 tablet oral BID    sennosides-docusate sodium  2 tablet oral BID    spironolactone  25 mg oral Daily   [2]   PRN medications   Medication    acetaminophen    albuterol    dextrose    dextrose    glucagon    glucagon    guaiFENesin    oxygen    polyethylene glycol   [3]   Continuous Medications   Medication Dose Last Rate

## 2025-06-11 NOTE — NURSING NOTE
Patient has been stable today, reports no pain or discomfort (0/10). States that she no longer feels short of breath (spO2 stable at 92% on room air) and that her breathing has significantly improved since her thoracentesis procedure on 6/9. Patient ate full breakfast today and reports she did have some lunch as well. She expresses that she is very much ready to go home.    Patient is currently awaiting care plan from advanced heart failure committee, with possible transplant or LVAD being considered.

## 2025-06-12 ENCOUNTER — APPOINTMENT (OUTPATIENT)
Dept: RADIOLOGY | Facility: HOSPITAL | Age: 60
DRG: 291 | End: 2025-06-12
Payer: COMMERCIAL

## 2025-06-12 ENCOUNTER — DOCUMENTATION (OUTPATIENT)
Dept: HOME HEALTH SERVICES | Facility: HOME HEALTH | Age: 60
End: 2025-06-12
Payer: COMMERCIAL

## 2025-06-12 ENCOUNTER — APPOINTMENT (OUTPATIENT)
Dept: CARDIOLOGY | Facility: HOSPITAL | Age: 60
DRG: 291 | End: 2025-06-12
Payer: COMMERCIAL

## 2025-06-12 LAB
ALBUMIN SERPL BCP-MCNC: 3.1 G/DL (ref 3.4–5)
ALP SERPL-CCNC: 73 U/L (ref 33–136)
ALT SERPL W P-5'-P-CCNC: 13 U/L (ref 7–45)
ANION GAP SERPL CALC-SCNC: 9 MMOL/L (ref 10–20)
AST SERPL W P-5'-P-CCNC: 13 U/L (ref 9–39)
ATRIAL RATE: 85 BPM
BACTERIA FLD CULT: NORMAL
BASOPHILS # BLD AUTO: 0.05 X10*3/UL (ref 0–0.1)
BASOPHILS NFR BLD AUTO: 0.5 %
BILIRUB SERPL-MCNC: 0.2 MG/DL (ref 0–1.2)
BUN SERPL-MCNC: 16 MG/DL (ref 6–23)
CALCIUM SERPL-MCNC: 8.4 MG/DL (ref 8.6–10.6)
CARDIAC TROPONIN I PNL SERPL HS: 15 NG/L (ref 0–34)
CHLORIDE SERPL-SCNC: 100 MMOL/L (ref 98–107)
CO2 SERPL-SCNC: 33 MMOL/L (ref 21–32)
CREAT SERPL-MCNC: 0.73 MG/DL (ref 0.5–1.05)
EGFRCR SERPLBLD CKD-EPI 2021: >90 ML/MIN/1.73M*2
EOSINOPHIL # BLD AUTO: 0.11 X10*3/UL (ref 0–0.7)
EOSINOPHIL NFR BLD AUTO: 1 %
ERYTHROCYTE [DISTWIDTH] IN BLOOD BY AUTOMATED COUNT: 13.7 % (ref 11.5–14.5)
GLUCOSE BLD MANUAL STRIP-MCNC: 100 MG/DL (ref 74–99)
GLUCOSE BLD MANUAL STRIP-MCNC: 124 MG/DL (ref 74–99)
GLUCOSE BLD MANUAL STRIP-MCNC: 137 MG/DL (ref 74–99)
GLUCOSE BLD MANUAL STRIP-MCNC: 182 MG/DL (ref 74–99)
GLUCOSE SERPL-MCNC: 180 MG/DL (ref 74–99)
GRAM STN SPEC: NORMAL
GRAM STN SPEC: NORMAL
HCT VFR BLD AUTO: 41.4 % (ref 36–46)
HGB BLD-MCNC: 13.4 G/DL (ref 12–16)
IMM GRANULOCYTES # BLD AUTO: 0.07 X10*3/UL (ref 0–0.7)
IMM GRANULOCYTES NFR BLD AUTO: 0.7 % (ref 0–0.9)
LAB AP ASR DISCLAIMER: NORMAL
LABORATORY COMMENT REPORT: NORMAL
LABORATORY COMMENT REPORT: NORMAL
LYMPHOCYTES # BLD AUTO: 1.31 X10*3/UL (ref 1.2–4.8)
LYMPHOCYTES NFR BLD AUTO: 12.4 %
MAGNESIUM SERPL-MCNC: 1.89 MG/DL (ref 1.6–2.4)
MCH RBC QN AUTO: 29.5 PG (ref 26–34)
MCHC RBC AUTO-ENTMCNC: 32.4 G/DL (ref 32–36)
MCV RBC AUTO: 91 FL (ref 80–100)
MONOCYTES # BLD AUTO: 0.6 X10*3/UL (ref 0.1–1)
MONOCYTES NFR BLD AUTO: 5.7 %
NEUTROPHILS # BLD AUTO: 8.41 X10*3/UL (ref 1.2–7.7)
NEUTROPHILS NFR BLD AUTO: 79.7 %
NRBC BLD-RTO: 0 /100 WBCS (ref 0–0)
P AXIS: 65 DEGREES
P OFFSET: 193 MS
P ONSET: 138 MS
PATH REPORT.FINAL DX SPEC: NORMAL
PATH REPORT.GROSS SPEC: NORMAL
PATH REPORT.RELEVANT HX SPEC: NORMAL
PATH REPORT.TOTAL CANCER: NORMAL
PLATELET # BLD AUTO: 333 X10*3/UL (ref 150–450)
POTASSIUM SERPL-SCNC: 3.9 MMOL/L (ref 3.5–5.3)
PR INTERVAL: 160 MS
PROT SERPL-MCNC: 6.3 G/DL (ref 6.4–8.2)
Q ONSET: 218 MS
QRS COUNT: 14 BEATS
QRS DURATION: 90 MS
QT INTERVAL: 376 MS
QTC CALCULATION(BAZETT): 447 MS
QTC FREDERICIA: 422 MS
R AXIS: 23 DEGREES
RBC # BLD AUTO: 4.54 X10*6/UL (ref 4–5.2)
SODIUM SERPL-SCNC: 138 MMOL/L (ref 136–145)
T AXIS: 49 DEGREES
T OFFSET: 406 MS
VENTRICULAR RATE: 85 BPM
WBC # BLD AUTO: 10.6 X10*3/UL (ref 4.4–11.3)

## 2025-06-12 PROCEDURE — 2500000002 HC RX 250 W HCPCS SELF ADMINISTERED DRUGS (ALT 637 FOR MEDICARE OP, ALT 636 FOR OP/ED): Performed by: PHYSICIAN ASSISTANT

## 2025-06-12 PROCEDURE — 82947 ASSAY GLUCOSE BLOOD QUANT: CPT

## 2025-06-12 PROCEDURE — 93010 ELECTROCARDIOGRAM REPORT: CPT | Performed by: INTERNAL MEDICINE

## 2025-06-12 PROCEDURE — 71045 X-RAY EXAM CHEST 1 VIEW: CPT

## 2025-06-12 PROCEDURE — 0W993ZZ DRAINAGE OF RIGHT PLEURAL CAVITY, PERCUTANEOUS APPROACH: ICD-10-PCS | Performed by: NURSE PRACTITIONER

## 2025-06-12 PROCEDURE — 1100000001 HC PRIVATE ROOM DAILY

## 2025-06-12 PROCEDURE — 32555 ASPIRATE PLEURA W/ IMAGING: CPT

## 2025-06-12 PROCEDURE — 2500000001 HC RX 250 WO HCPCS SELF ADMINISTERED DRUGS (ALT 637 FOR MEDICARE OP): Performed by: PHYSICIAN ASSISTANT

## 2025-06-12 PROCEDURE — C1751 CATH, INF, PER/CENT/MIDLINE: HCPCS

## 2025-06-12 PROCEDURE — 2500000001 HC RX 250 WO HCPCS SELF ADMINISTERED DRUGS (ALT 637 FOR MEDICARE OP)

## 2025-06-12 PROCEDURE — 84484 ASSAY OF TROPONIN QUANT: CPT | Performed by: PHYSICIAN ASSISTANT

## 2025-06-12 PROCEDURE — 94640 AIRWAY INHALATION TREATMENT: CPT

## 2025-06-12 PROCEDURE — 85025 COMPLETE CBC W/AUTO DIFF WBC: CPT | Performed by: PHYSICIAN ASSISTANT

## 2025-06-12 PROCEDURE — 2500000004 HC RX 250 GENERAL PHARMACY W/ HCPCS (ALT 636 FOR OP/ED): Performed by: NURSE PRACTITIONER

## 2025-06-12 PROCEDURE — 2500000004 HC RX 250 GENERAL PHARMACY W/ HCPCS (ALT 636 FOR OP/ED): Performed by: PHYSICIAN ASSISTANT

## 2025-06-12 PROCEDURE — 2780000003 HC OR 278 NO HCPCS

## 2025-06-12 PROCEDURE — 99232 SBSQ HOSP IP/OBS MODERATE 35: CPT | Performed by: INTERNAL MEDICINE

## 2025-06-12 PROCEDURE — 36410 VNPNXR 3YR/> PHY/QHP DX/THER: CPT

## 2025-06-12 PROCEDURE — 93005 ELECTROCARDIOGRAM TRACING: CPT

## 2025-06-12 PROCEDURE — 2500000002 HC RX 250 W HCPCS SELF ADMINISTERED DRUGS (ALT 637 FOR MEDICARE OP, ALT 636 FOR OP/ED): Performed by: STUDENT IN AN ORGANIZED HEALTH CARE EDUCATION/TRAINING PROGRAM

## 2025-06-12 PROCEDURE — 80053 COMPREHEN METABOLIC PANEL: CPT | Performed by: PHYSICIAN ASSISTANT

## 2025-06-12 PROCEDURE — 83735 ASSAY OF MAGNESIUM: CPT | Performed by: PHYSICIAN ASSISTANT

## 2025-06-12 RX ORDER — POTASSIUM CHLORIDE 20 MEQ/1
20 TABLET, EXTENDED RELEASE ORAL ONCE
Status: COMPLETED | OUTPATIENT
Start: 2025-06-12 | End: 2025-06-12

## 2025-06-12 RX ORDER — MAGNESIUM SULFATE HEPTAHYDRATE 40 MG/ML
2 INJECTION, SOLUTION INTRAVENOUS ONCE
Status: COMPLETED | OUTPATIENT
Start: 2025-06-12 | End: 2025-06-12

## 2025-06-12 RX ORDER — LIDOCAINE HYDROCHLORIDE 10 MG/ML
5 INJECTION, SOLUTION INFILTRATION; PERINEURAL ONCE
Status: DISCONTINUED | OUTPATIENT
Start: 2025-06-12 | End: 2025-06-17 | Stop reason: HOSPADM

## 2025-06-12 RX ADMIN — ASPIRIN 81 MG: 81 TABLET, CHEWABLE ORAL at 09:17

## 2025-06-12 RX ADMIN — GABAPENTIN 400 MG: 300 CAPSULE ORAL at 21:43

## 2025-06-12 RX ADMIN — BUMETANIDE 1 MG: 1 TABLET ORAL at 09:16

## 2025-06-12 RX ADMIN — PANTOPRAZOLE SODIUM 20 MG: 20 TABLET, DELAYED RELEASE ORAL at 09:17

## 2025-06-12 RX ADMIN — SPIRONOLACTONE 25 MG: 25 TABLET, FILM COATED ORAL at 09:17

## 2025-06-12 RX ADMIN — ATORVASTATIN CALCIUM 80 MG: 80 TABLET, FILM COATED ORAL at 09:16

## 2025-06-12 RX ADMIN — POTASSIUM CHLORIDE 20 MEQ: 1500 TABLET, EXTENDED RELEASE ORAL at 09:17

## 2025-06-12 RX ADMIN — SACUBITRIL AND VALSARTAN 1 TABLET: 49; 51 TABLET, FILM COATED ORAL at 09:17

## 2025-06-12 RX ADMIN — GABAPENTIN 400 MG: 300 CAPSULE ORAL at 13:59

## 2025-06-12 RX ADMIN — ACETAMINOPHEN 650 MG: 325 TABLET ORAL at 14:48

## 2025-06-12 RX ADMIN — LEVOTHYROXINE SODIUM 88 MCG: 0.09 TABLET ORAL at 06:01

## 2025-06-12 RX ADMIN — CLOPIDOGREL BISULFATE 75 MG: 75 TABLET, FILM COATED ORAL at 09:17

## 2025-06-12 RX ADMIN — CEFTRIAXONE SODIUM 2 G: 2 INJECTION, SOLUTION INTRAVENOUS at 16:00

## 2025-06-12 RX ADMIN — ROPINIROLE HYDROCHLORIDE 3 MG: 3 TABLET, FILM COATED ORAL at 21:43

## 2025-06-12 RX ADMIN — MAGNESIUM SULFATE HEPTAHYDRATE 2 G: 40 INJECTION, SOLUTION INTRAVENOUS at 09:17

## 2025-06-12 RX ADMIN — ROPINIROLE 1 MG: 1 TABLET, FILM COATED ORAL at 06:01

## 2025-06-12 RX ADMIN — INSULIN LISPRO 6 UNITS: 100 INJECTION, SOLUTION INTRAVENOUS; SUBCUTANEOUS at 09:21

## 2025-06-12 RX ADMIN — GABAPENTIN 400 MG: 300 CAPSULE ORAL at 06:01

## 2025-06-12 RX ADMIN — INSULIN GLARGINE 10 UNITS: 100 INJECTION, SOLUTION SUBCUTANEOUS at 20:11

## 2025-06-12 RX ADMIN — INSULIN LISPRO 1 UNITS: 100 INJECTION, SOLUTION INTRAVENOUS; SUBCUTANEOUS at 09:20

## 2025-06-12 RX ADMIN — ROPINIROLE 1 MG: 1 TABLET, FILM COATED ORAL at 12:17

## 2025-06-12 RX ADMIN — INSULIN LISPRO 6 UNITS: 100 INJECTION, SOLUTION INTRAVENOUS; SUBCUTANEOUS at 12:17

## 2025-06-12 RX ADMIN — CITALOPRAM HYDROBROMIDE 40 MG: 40 TABLET ORAL at 06:01

## 2025-06-12 RX ADMIN — ROPINIROLE 1 MG: 1 TABLET, FILM COATED ORAL at 20:06

## 2025-06-12 RX ADMIN — SACUBITRIL AND VALSARTAN 1 TABLET: 49; 51 TABLET, FILM COATED ORAL at 21:43

## 2025-06-12 RX ADMIN — INSULIN LISPRO 6 UNITS: 100 INJECTION, SOLUTION INTRAVENOUS; SUBCUTANEOUS at 17:51

## 2025-06-12 RX ADMIN — FLUTICASONE FUROATE AND VILANTEROL TRIFENATATE 1 PUFF: 100; 25 POWDER RESPIRATORY (INHALATION) at 09:46

## 2025-06-12 ASSESSMENT — COGNITIVE AND FUNCTIONAL STATUS - GENERAL
MOBILITY SCORE: 24
MOBILITY SCORE: 24
DAILY ACTIVITIY SCORE: 24
DAILY ACTIVITIY SCORE: 24

## 2025-06-12 ASSESSMENT — PAIN - FUNCTIONAL ASSESSMENT
PAIN_FUNCTIONAL_ASSESSMENT: 0-10

## 2025-06-12 ASSESSMENT — PAIN SCALES - GENERAL
PAINLEVEL_OUTOF10: 0 - NO PAIN
PAINLEVEL_OUTOF10: 10 - WORST POSSIBLE PAIN

## 2025-06-12 ASSESSMENT — PAIN DESCRIPTION - DESCRIPTORS: DESCRIPTORS: SHARP

## 2025-06-12 NOTE — CARE PLAN
Problem: Pain - Adult  Goal: Verbalizes/displays adequate comfort level or baseline comfort level  Outcome: Progressing  Flowsheets (Taken 6/12/2025 1149)  Verbalizes/displays adequate comfort level or baseline comfort level: Encourage patient to monitor pain and request assistance     Problem: Safety - Adult  Goal: Free from fall injury  Outcome: Progressing  Flowsheets (Taken 6/12/2025 1149)  Free from fall injury: Instruct family/caregiver on patient safety     Problem: Discharge Planning  Goal: Discharge to home or other facility with appropriate resources  Outcome: Progressing  Flowsheets (Taken 6/12/2025 1149)  Discharge to home or other facility with appropriate resources: Identify barriers to discharge with patient and caregiver     Problem: Chronic Conditions and Co-morbidities  Goal: Patient's chronic conditions and co-morbidity symptoms are monitored and maintained or improved  Outcome: Progressing  Flowsheets (Taken 6/12/2025 1149)  Care Plan - Patient's Chronic Conditions and Co-Morbidity Symptoms are Monitored and Maintained or Improved: Monitor and assess patient's chronic conditions and comorbid symptoms for stability, deterioration, or improvement     Problem: Nutrition  Goal: Nutrient intake appropriate for maintaining nutritional needs  Outcome: Progressing     Problem: Heart Failure  Goal: Improved gas exchange this shift  Outcome: Progressing  Flowsheets (Taken 6/12/2025 1149)  Improved gas exchange this shift: Assist with pulmonary hygiene and secretion clearance  Goal: Improved urinary output this shift  Outcome: Progressing  Flowsheets (Taken 6/12/2025 1149)  Improved urinary output this shift: Med administration/monitor effect  Goal: Reduction in peripheral edema within 24 hours  Outcome: Progressing  Flowsheets (Taken 6/12/2025 1149)  Reduction in peripheral edema within 24 hours: Monitor edema/skin/mucous membrane integrity  Goal: Report improvement of dyspnea/breathlessness this  shift  Outcome: Progressing  Flowsheets (Taken 6/12/2025 1149)  Report improvement of dyspnea/breathlessness this shift: Encourage activity/mobility/ROM  Goal: Weight from fluid excess reduced over 2-3 days, then stabilize  Outcome: Progressing  Flowsheets (Taken 6/12/2025 1149)  Weight from fluid excess reduced over 2-3 days, then stabilize: Med administration/monitor effect  Goal: Increase self care and/or family involvement in 24 hours  Outcome: Progressing  Flowsheets (Taken 6/12/2025 1149)  Increase self care and/or family involvement in 24 hours: Assess for signs/symptoms of depression       The patient's goals for the shift include discuss plan of care.     The clinical goals for the shift include Patient will remain safe and free from injury/falls throughout this shift.

## 2025-06-12 NOTE — PROGRESS NOTES
06/12/25 1137   Rapid Rounds   Attendance Provider;Care Transitions   Expected Discharge Disposition Home H   Today we still await: Clinical stability;Diagnostic workup   Review at Escalation Rounds Clinically complex

## 2025-06-12 NOTE — HH CARE COORDINATION
Home Care received a referral for Infusion and Nursing. Unfortunately, we are unable to accept and process the referral at this time.    Reason:  Patient's Home is Outside of [facility] Service Area    Patients, please reach out to the referring provider or your PCP to assist in obtaining an alternative home care agency and/or guidance to meet your needs.    Providers, please reach out to [facility] at [phone] with any questions regarding the declined referral.

## 2025-06-12 NOTE — SIGNIFICANT EVENT
Patient c/o new onset severe jaw pain on right and left side. VS obtained. Notified RAJIV Caraballo. Received new orders. EKG obtained and labs drawn. Patient resting in bed, call light and belongings in reach. Cardiac monitor on.

## 2025-06-12 NOTE — PROGRESS NOTES
Thao Evans is a 60 y.o. female on day 10 of admission presenting with ACC/AHA stage D systolic heart failure.    1226: [facility] was informed by [facility] that they do not service her area. C order was made external. Services need is for ABX infusion. List of agencies were printed for patient to make a selection for AOC. After patient received list she stated she had no preference in who the AOC but who has the soonest SOC and highest star rating. [facility] will provide updates to patient about accepting agencies.     Lina Crouch RN   Transitional Care Coordinator ([facility])

## 2025-06-12 NOTE — POST-PROCEDURE NOTE
Pre-Procedure Checklist:  Emergent Line Insertion: No  Type of Line to be Placed: Midline  Consent Obtained: Yes  Emergency Medication Necessary: No  Patient Identified with 2 Independent Identifiers: Yes  Review of Allergies, Anticoagulation, Relevant Labs, ECG/Telemetry: Yes  Risks/Benefits/Alternatives Discussed with Patient/POA/Legal Representative: Yes  Stop Sign on Door: Yes  Time Out Performed: Yes  Catheter Exchange: No    Positioning Checklist:  All People, Including Patient, in the Room with Cap and Mask: Yes  Fluoroscopy Used to Identify Vessel and Guide Insertion: No   Sterile Cover Used: Yes  Full Barrier Precautions Followed (Mask, Cap, Gown, Gloves): Yes  Hands Washed: Yes  Monitors Attached with Sound Alarms On: No  Full Body Sterile Drape (Head-to-Toe) Used to Cover Patient: Yes  Trendelenburg Position (For IJ and Subclavian): No  CHG Skin Prep Used and Allowed to Air Dry to Skin Procedure: Yes    Procedure Checklist:  Blood Aspirated From All Lumens, All Ports Subsequently Flushed: Yes  Catheter Caps Placed on All Lumens; Lumens Clamped: Yes  Maintain Guidewire Control Throughout, Ensuring Guidewire Removal: Yes  Maintain Sterile Field Throughout Insertion: Yes  Catheter Secured: Yes  Confirmatory Test of Venous Placement: Non-Pulsatile Blood    Post Procedure Checklist:  Date and Time Written on Dressing: Yes  Sharp and Wire Count and Safe Disposal of all Sharps/Wires: Yes  Sterile Dressing Applied Per Protocol: Yes  X-ray Ordered or ECG Image: N/A    PICC Insertion Details:  Size (Fr): 3  Lumen Type: single  Catheter to Vein Ratio Less Than 50%: Yes  Total Length (cm): 9  External Length (cm): 0   Orientation: left upper arm  Location: brachial  Site Prep: Chlorohexidine; Usual sterile procedure followed  Local Anesthetic: Injectable/Subcutaneous  Indication: 8 days of IV antibiotic post discharge  Insertion Team Members in the Room: Nurse, LPN  Initial Extremity Circumference (cm): 28  Insertion  Attempts: 1  Patient Tolerance: Tolerated Well, Age Appropriate  Comfort Measures: Subcutaneous anesthetic; Verbal  Procedure Location: Bedside  Safety Measures: Patient specific safety measures addressed with RN  Estimated Blood Loss (mL): 0.5  Vessel Fully Compressible Proximally and Distally to Insertion Site: Yes  Brisk Blood Return Obtained and Line Draws Easily: Yes  Tip Location: axillary vein  Line Confirmation: non-pulsatile blood return  Lot #: [id number]  : BD  PICC Line Exp Date: 09/30/2025  Securement: Stat Lock  Post Procedure Checklist: Handoff with RN; Obtain all new IV tubing prior to use; Bed at lowest level and wheels locked; Line discharge information at bedside.  Additional Details: Line was inserted using Modified Seldinger's Technique.   Placed by: Evonne Salvador RN

## 2025-06-12 NOTE — CARE PLAN
Problem: Heart Failure  Goal: Improved gas exchange this shift  Outcome: Progressing  Goal: Improved urinary output this shift  Outcome: Progressing  Goal: Report improvement of dyspnea/breathlessness this shift  Outcome: Progressing    Problem: Safety - Adult  Goal: Free from fall injury  Flowsheets (Taken 6/12/2025 0604)  Free from fall injury:   Instruct family/caregiver on patient safety   Based on caregiver fall risk screen, instruct family/caregiver to ask for assistance with transferring infant if caregiver noted to have fall risk factors     Problem: Chronic Conditions and Co-morbidities  Goal: Patient's chronic conditions and co-morbidity symptoms are monitored and maintained or improved  Flowsheets (Taken 6/12/2025 0604)  Care Plan - Patient's Chronic Conditions and Co-Morbidity Symptoms are Monitored and Maintained or Improved:   Monitor and assess patient's chronic conditions and comorbid symptoms for stability, deterioration, or improvement   Collaborate with multidisciplinary team to address chronic and comorbid conditions and prevent exacerbation or deterioration   Update acute care plan with appropriate goals if chronic or comorbid symptoms are exacerbated and prevent overall improvement and discharge   End of shift report:   No acute issues this shift. Patient continues to be room air & denies SOB. No signs of respiratory distress. Patient remained free of falls/injury.

## 2025-06-12 NOTE — POST-PROCEDURE NOTE
INTERVENTIONAL RADIOLOGY ADVANCED PRACTICE PROCEDURE  [facility]    A time out was performed and Right Hemithorax was examined with US and appropriate entry point was confirmed and marked.   The patient was prepped and draped in a sterile manner, 1% lidocaine was used to anesthesize the skin and subcutaneous tissue.   A 5F Centesis needle was then introduced through the skin into the pleural space, the centesis catheter was then threaded without difficulty.   1000 ml of yellow fluid was removed without difficulty. The catheter was then removed.   No immediate complications were noted during and immediately following the procedure.

## 2025-06-12 NOTE — PROGRESS NOTES
Subjective:  Eager to go home.  Breathing stable    Today in brief:  - Repeat thoracentesis today if IR thinks reasonable  - Eventual repeat PFTs once thoracentesis plans completed.  - Placed midline for IV abx. OK to use.  - External home health order placed (TCC aware)  - Replete K and Mg    Objective:  Vitals:    06/12/25 0710   BP: 99/62   Pulse: 83   Resp: 16   Temp: 35.9 °C (96.6 °F)   SpO2: 93%     Weight         6/7/2025  0440 6/9/2025  0436 6/10/2025  0600 6/11/2025  0544 6/12/2025  0429    Weight: 59 kg (130 lb 1.1 oz) 60.8 kg (134 lb 0.6 oz) 60.9 kg (134 lb 4.2 oz) 60.1 kg (132 lb 7.9 oz) 58.2 kg (128 lb 4.9 oz)            Intake/Output Summary (Last 24 hours) at 6/12/2025 0804  Last data filed at 6/12/2025 0429  Gross per 24 hour   Intake 880 ml   Output 2450 ml   Net -1570 ml     Recent Results (from the past 24 hours)   POCT GLUCOSE    Collection Time: 06/11/25 11:41 AM   Result Value Ref Range    POCT Glucose 145 (H) 74 - 99 mg/dL   POCT GLUCOSE    Collection Time: 06/11/25  4:33 PM   Result Value Ref Range    POCT Glucose 154 (H) 74 - 99 mg/dL   Magnesium    Collection Time: 06/11/25  6:10 PM   Result Value Ref Range    Magnesium 1.48 (L) 1.60 - 2.40 mg/dL   CBC and Auto Differential    Collection Time: 06/11/25  6:10 PM   Result Value Ref Range    WBC 8.3 4.4 - 11.3 x10*3/uL    nRBC 0.0 0.0 - 0.0 /100 WBCs    RBC 4.32 4.00 - 5.20 x10*6/uL    Hemoglobin 13.0 12.0 - 16.0 g/dL    Hematocrit 41.5 36.0 - 46.0 %    MCV 96 80 - 100 fL    MCH 30.1 26.0 - 34.0 pg    MCHC 31.3 (L) 32.0 - 36.0 g/dL    RDW 13.6 11.5 - 14.5 %    Platelets 312 150 - 450 x10*3/uL    Neutrophils % 68.4 40.0 - 80.0 %    Immature Granulocytes %, Automated 0.6 0.0 - 0.9 %    Lymphocytes % 21.4 13.0 - 44.0 %    Monocytes % 7.2 2.0 - 10.0 %    Eosinophils % 1.6 0.0 - 6.0 %    Basophils % 0.8 0.0 - 2.0 %    Neutrophils Absolute 5.68 1.20 - 7.70 x10*3/uL    Immature Granulocytes Absolute, Automated 0.05 0.00 - 0.70 x10*3/uL    Lymphocytes  Absolute 1.78 1.20 - 4.80 x10*3/uL    Monocytes Absolute 0.60 0.10 - 1.00 x10*3/uL    Eosinophils Absolute 0.13 0.00 - 0.70 x10*3/uL    Basophils Absolute 0.07 0.00 - 0.10 x10*3/uL   Comprehensive metabolic panel    Collection Time: 06/11/25  6:10 PM   Result Value Ref Range    Glucose 105 (H) 74 - 99 mg/dL    Sodium 138 136 - 145 mmol/L    Potassium 3.6 3.5 - 5.3 mmol/L    Chloride 101 98 - 107 mmol/L    Bicarbonate 31 21 - 32 mmol/L    Anion Gap 10 10 - 20 mmol/L    Urea Nitrogen 13 6 - 23 mg/dL    Creatinine 0.92 0.50 - 1.05 mg/dL    eGFR 71 >60 mL/min/1.73m*2    Calcium 8.7 8.6 - 10.6 mg/dL    Albumin 3.1 (L) 3.4 - 5.0 g/dL    Alkaline Phosphatase 70 33 - 136 U/L    Total Protein 6.0 (L) 6.4 - 8.2 g/dL    AST 11 9 - 39 U/L    Bilirubin, Total 0.2 0.0 - 1.2 mg/dL    ALT 10 7 - 45 U/L   POCT GLUCOSE    Collection Time: 06/11/25  8:11 PM   Result Value Ref Range    POCT Glucose 178 (H) 74 - 99 mg/dL   POCT GLUCOSE    Collection Time: 06/12/25  6:41 AM   Result Value Ref Range    POCT Glucose 182 (H) 74 - 99 mg/dL     Inpatient Medications:  Scheduled Medications[1]  PRN Medications[2]  Continuous Medications[3]    Telemetry 6/12/2025 (personally reviewed): SR 70-80s    Physical exam:  General: NAD  Head/ neck: No JVD  Cardiac: RRR, regular S1 S2 , no murmur, no rub, no gallop  Pulm: On room air. No WOB. Diminished bilateral lower lungs (R>L)  Vascular: Radial 2+ bilaterally  GI: Non distended  Extremities: no LE edema   Neuro: no focal neuro deficits   Psych: appropriate mood and behavior   Skin: warm and dry     Assessment/Plan   Thao Evans is a 60F with a PMHx sig for CAD s/p PCI (LAD; 2015, Lcx; 3/2025), stage C systolic HF/ICM/HFrEF s/p ICD, h/o VT with presumed associated syncope, poorly controlled DM, pAF (off of DOAC given the lack of recurrence), dyslipidemia, essential HTN, COPD, and hypothyroidism with progressive HF symptoms and intolerance of GDMT(on midodrine). Presented to HFICU as a direct admission  for PAC guided evaluation. Advanced therapies evaluation was initiated 6/3 for OHT/LVAD.      Acute on chronic systolic and diastolic heart failure  Ischemic HFrEF 30-35%, Stage C  - TTE 3/2025 at OSH: LVEF 30-35%, normal RV size with low normal function, mild MR, small pericardial effusion  - TTE 6/2/25 on arrival: LVEF 20-25%/LVIDd 4.1cm,  mild MR/TR  - Admit wt: 54.4kg   - Admit BNP 1292H  - Opening SGC #s 6/2: BP 97/55 (68), CVP 3, PAP 39/18 (27), CO/CI 2.86/1.87, SVR 1817, SVO2 62% on midodrine 5 mg, empa 10, sandi 25.  - weaned nipride gtt 6/5  - Closing SGC#s 6/6/25: BP 88/51 (63), CVP 11, PAP 62/30 (43), CO 3.98/CI 2.54. SVO2 62%.   - GDMT:   - Continue entresto 49-51mg BID   - Hold empa 10 while getting abx for UTI.   - Continue sandi 25mg daily   - Holding BB for now   - Diuresis:   - poor response to 40 mg IV lasix X1 yesterday (overall 450 net positive)  - 6/10 resumed bumex 1 mg BID   - Advanced therapies evaluation initiated  - unable to be presented at advanced therapy committee 6/10 due to time constraints> will be re-discussed at next Tuesday meeting 6/17. Does not necessarily have to remain inpt until then. Appreciate HF recs  - Home medications: metop XL 12.5mg daily, spironolactone 25mg daily, jardiance 10mg daily, bumex 2mg bid, midodrine 5mg TID.  - Daily standing weights, 2gm sodium diet, 2L fluid restriction, strict I&Os     Acute hypoxic respiratory failure, improving  COPD  Pulmonary edema/pleural effusions  - C/w home inhalers  - IV diuretics as above  - PFTs  6/6 showing severe restrictive defect  - s/p L thoracentesis on 6/9:  -1L yellow fluid. Labs c/w transudative effusion. No significant findings on additional cytology labs  - 2vCXR  6/9: Marked decrease in L pleural effusion. Stable mild pulm edema and R pleural effusion  - PFTs repeat 6/10 (s/p thoracentesis) without much improvement  - Pulm consulted 6/10. Recommended IR consult for R sided thora  - IR 6/11  L sided residual  thoracentesis -1.2L.   - 2V CXR6/11  post L sided thora looks improved. No pneumothorax  - Additional thoracentesis today pending IR input  - Repeat PFTs possibly after repeat thoracentesis (?) or as outpatient. Appreciate advanced HF input regarding timing  - currently on room air  - continue incentive spirometry    Symptomatic UTI, improved  Syphilis   - UA with +LE, WBC  - Urine cx from 6/3 (advanced therapies evaluation with e.coli)   - ID following: rec 2 weeks (stop date 6/20) of ceftriaxone 2g q24 hours (given UTI and syphilis)  - midline placed. Abx completion date (6/20)  - Hold jardiance while being treated (can resume after 6/20)  - Reports resolved urinary symptoms as of 6/8     Hx of Hypotension  - Home midodrine discontinued  - Last 24hrs SBPs: 90/50s (denies any lightheadedness or dizziness)     CAD s/p PCI (LAD 2015, LCx 3/2025)  Hyperlipidemia  - No reported angina  - C/w home Plavix & statin  - per patient, had been discharged after SABINA placement on Eliquis and plavix (no aspirin to avoid triple therapy), but then eliquis was discontinued due to lack of recurrence of atrial fib. Restarted aspirin 81 mg for DAPT given stent placement <1yr ago      H/o VT  Paroxysmal A Fib  s/p CRT-D 3/2025, [employer]   - AC: off DOAC given absence of recurrence  - K goal >4, Mg>2  - Remains in sinus rhythm on telemetry    T2DM  - HgbA1c 8.6, previously was 12.3 in 3/2025  - Home medications: insulin glargine, jardiance, alogliptin (nesina).  - Endocrine saw pt  - inpatient: glargine 10 units nightly, lispro 6 units premeals, SSI coverage.      Hypothyroidism  - TSH 0.2L, Free T4 2.05H  - Repeat TSH 0.19L, free T4 1.23WNL/T3 67.  - c/w levothyroxine 88mcg daily per endocrinology.   - will need repeat TFTs in 6-8 weeks in outpatient with PCP   - will need follow up with endocrinology at discharge     GERD  - C/w home protonix 20 mg daily   - Bowel regimen: prn miralax, juliocesar-colace BID      Iron deficiency  -  Fe, TIBC, and ferritin WNL., %sat 17 Low  - IV venofer ordered 6/3- 6/6   - No additional iron supplemented required     Prior tobacco use  - previous smoker of 40 years  - quit 1.5 months prior to admission date     Anxiety  Depression  - C/w home celexa 40 mg daily  - C/w home gabapentin 400 mg q8 hours     Restless legs  - C/w home requip      DVT ppx: lovenox subcutaneous     DISPO: pending pulmonary repeat thoracentesis with pulmonary improvement,repeat PFTs, and  arrangements for ceftriaxon IV administration    Code status: Full Code    Patient seen and discussed with Dr. Bridger Julien.  Melissa Triplett PA-C         [1]   Scheduled medications   Medication Dose Route Frequency    aspirin  81 mg oral Daily    atorvastatin  80 mg oral Daily    bumetanide  1 mg oral BID    cefTRIAXone  2 g intravenous q24h    citalopram  40 mg oral Daily    clopidogrel  75 mg oral Daily    [Held by provider] empagliflozin  10 mg oral Daily    enoxaparin  40 mg subcutaneous q24h    fluticasone furoate-vilanteroL  1 puff inhalation Daily    gabapentin  400 mg oral q8h [facility]    insulin glargine  10 Units subcutaneous Nightly    insulin lispro  0-5 Units subcutaneous TID AC    insulin lispro  6 Units subcutaneous TID AC    levothyroxine  88 mcg oral Daily    [Held by provider] metoprolol succinate XL  12.5 mg oral Daily    [Held by provider] midodrine  5 mg oral TID    pantoprazole  20 mg oral Daily    rOPINIRole  1 mg oral TID    rOPINIRole  3 mg oral Nightly    sacubitriL-valsartan  1 tablet oral BID    sennosides-docusate sodium  2 tablet oral BID    spironolactone  25 mg oral Daily   [2]   PRN medications   Medication    acetaminophen    albuterol    dextrose    dextrose    glucagon    glucagon    guaiFENesin    oxygen    polyethylene glycol   [3]   Continuous Medications   Medication Dose Last Rate

## 2025-06-13 ENCOUNTER — APPOINTMENT (OUTPATIENT)
Dept: RESPIRATORY THERAPY | Facility: HOSPITAL | Age: 60
DRG: 291 | End: 2025-06-13
Payer: COMMERCIAL

## 2025-06-13 LAB
ALBUMIN SERPL BCP-MCNC: 3.3 G/DL (ref 3.4–5)
ALP SERPL-CCNC: 65 U/L (ref 33–136)
ALT SERPL W P-5'-P-CCNC: 14 U/L (ref 7–45)
ANION GAP SERPL CALC-SCNC: 12 MMOL/L (ref 10–20)
AST SERPL W P-5'-P-CCNC: 14 U/L (ref 9–39)
BILIRUB SERPL-MCNC: 0.3 MG/DL (ref 0–1.2)
BUN SERPL-MCNC: 23 MG/DL (ref 6–23)
CALCIUM SERPL-MCNC: 8.8 MG/DL (ref 8.6–10.6)
CHLORIDE SERPL-SCNC: 100 MMOL/L (ref 98–107)
CO2 SERPL-SCNC: 30 MMOL/L (ref 21–32)
CREAT SERPL-MCNC: 0.77 MG/DL (ref 0.5–1.05)
EGFRCR SERPLBLD CKD-EPI 2021: 87 ML/MIN/1.73M*2
GLUCOSE BLD MANUAL STRIP-MCNC: 153 MG/DL (ref 74–99)
GLUCOSE BLD MANUAL STRIP-MCNC: 160 MG/DL (ref 74–99)
GLUCOSE BLD MANUAL STRIP-MCNC: 191 MG/DL (ref 74–99)
GLUCOSE BLD MANUAL STRIP-MCNC: 208 MG/DL (ref 74–99)
GLUCOSE SERPL-MCNC: 103 MG/DL (ref 74–99)
HLA CLS I TYP PNL BLD/T DONR HIGH RES: NORMAL
HLA RESULTS: NORMAL
HLA-DP2 QL: NORMAL
HLA-DQB1 HIGH RES: NORMAL
HLA-DRB1 HIGH RES: NORMAL
MAGNESIUM SERPL-MCNC: 1.82 MG/DL (ref 1.6–2.4)
MGC ASCENT PFT - FEV1 - PRE: 1.25
MGC ASCENT PFT - FEV1 - PREDICTED: 2.24
MGC ASCENT PFT - FVC - PRE: 1.64
MGC ASCENT PFT - FVC - PREDICTED: 2.81
POTASSIUM SERPL-SCNC: 3.8 MMOL/L (ref 3.5–5.3)
PROT SERPL-MCNC: 6.2 G/DL (ref 6.4–8.2)
SODIUM SERPL-SCNC: 138 MMOL/L (ref 136–145)

## 2025-06-13 PROCEDURE — 83735 ASSAY OF MAGNESIUM: CPT | Performed by: PHYSICIAN ASSISTANT

## 2025-06-13 PROCEDURE — 85025 COMPLETE CBC W/AUTO DIFF WBC: CPT | Performed by: PHYSICIAN ASSISTANT

## 2025-06-13 PROCEDURE — 2500000004 HC RX 250 GENERAL PHARMACY W/ HCPCS (ALT 636 FOR OP/ED): Performed by: PHYSICIAN ASSISTANT

## 2025-06-13 PROCEDURE — 80053 COMPREHEN METABOLIC PANEL: CPT | Performed by: PHYSICIAN ASSISTANT

## 2025-06-13 PROCEDURE — 94010 BREATHING CAPACITY TEST: CPT | Performed by: STUDENT IN AN ORGANIZED HEALTH CARE EDUCATION/TRAINING PROGRAM

## 2025-06-13 PROCEDURE — 82947 ASSAY GLUCOSE BLOOD QUANT: CPT

## 2025-06-13 PROCEDURE — 1100000001 HC PRIVATE ROOM DAILY

## 2025-06-13 PROCEDURE — 2500000002 HC RX 250 W HCPCS SELF ADMINISTERED DRUGS (ALT 637 FOR MEDICARE OP, ALT 636 FOR OP/ED): Performed by: PHYSICIAN ASSISTANT

## 2025-06-13 PROCEDURE — 99232 SBSQ HOSP IP/OBS MODERATE 35: CPT | Performed by: INTERNAL MEDICINE

## 2025-06-13 PROCEDURE — 94729 DIFFUSING CAPACITY: CPT | Performed by: STUDENT IN AN ORGANIZED HEALTH CARE EDUCATION/TRAINING PROGRAM

## 2025-06-13 PROCEDURE — 2500000004 HC RX 250 GENERAL PHARMACY W/ HCPCS (ALT 636 FOR OP/ED): Performed by: NURSE PRACTITIONER

## 2025-06-13 PROCEDURE — 94726 PLETHYSMOGRAPHY LUNG VOLUMES: CPT | Performed by: STUDENT IN AN ORGANIZED HEALTH CARE EDUCATION/TRAINING PROGRAM

## 2025-06-13 PROCEDURE — 36415 COLL VENOUS BLD VENIPUNCTURE: CPT | Performed by: PHYSICIAN ASSISTANT

## 2025-06-13 PROCEDURE — 2500000001 HC RX 250 WO HCPCS SELF ADMINISTERED DRUGS (ALT 637 FOR MEDICARE OP): Performed by: PHYSICIAN ASSISTANT

## 2025-06-13 PROCEDURE — 2500000001 HC RX 250 WO HCPCS SELF ADMINISTERED DRUGS (ALT 637 FOR MEDICARE OP)

## 2025-06-13 PROCEDURE — 2500000002 HC RX 250 W HCPCS SELF ADMINISTERED DRUGS (ALT 637 FOR MEDICARE OP, ALT 636 FOR OP/ED): Performed by: STUDENT IN AN ORGANIZED HEALTH CARE EDUCATION/TRAINING PROGRAM

## 2025-06-13 PROCEDURE — 94726 PLETHYSMOGRAPHY LUNG VOLUMES: CPT

## 2025-06-13 RX ORDER — CEFTRIAXONE 2 G/50ML
2 INJECTION, SOLUTION INTRAVENOUS EVERY 24 HOURS
Qty: 350 ML | Refills: 0 | Status: SHIPPED | OUTPATIENT
Start: 2025-06-13 | End: 2025-06-20

## 2025-06-13 RX ADMIN — INSULIN LISPRO 2 UNITS: 100 INJECTION, SOLUTION INTRAVENOUS; SUBCUTANEOUS at 16:48

## 2025-06-13 RX ADMIN — INSULIN LISPRO 1 UNITS: 100 INJECTION, SOLUTION INTRAVENOUS; SUBCUTANEOUS at 11:55

## 2025-06-13 RX ADMIN — SACUBITRIL AND VALSARTAN 1 TABLET: 49; 51 TABLET, FILM COATED ORAL at 09:03

## 2025-06-13 RX ADMIN — CLOPIDOGREL BISULFATE 75 MG: 75 TABLET, FILM COATED ORAL at 09:03

## 2025-06-13 RX ADMIN — ACETAMINOPHEN 650 MG: 325 TABLET ORAL at 09:02

## 2025-06-13 RX ADMIN — INSULIN LISPRO 6 UNITS: 100 INJECTION, SOLUTION INTRAVENOUS; SUBCUTANEOUS at 16:48

## 2025-06-13 RX ADMIN — GABAPENTIN 400 MG: 300 CAPSULE ORAL at 16:48

## 2025-06-13 RX ADMIN — PANTOPRAZOLE SODIUM 20 MG: 20 TABLET, DELAYED RELEASE ORAL at 09:03

## 2025-06-13 RX ADMIN — FLUTICASONE FUROATE AND VILANTEROL TRIFENATATE 1 PUFF: 100; 25 POWDER RESPIRATORY (INHALATION) at 08:54

## 2025-06-13 RX ADMIN — ROPINIROLE 1 MG: 1 TABLET, FILM COATED ORAL at 12:01

## 2025-06-13 RX ADMIN — INSULIN GLARGINE 10 UNITS: 100 INJECTION, SOLUTION SUBCUTANEOUS at 21:03

## 2025-06-13 RX ADMIN — ATORVASTATIN CALCIUM 80 MG: 80 TABLET, FILM COATED ORAL at 09:03

## 2025-06-13 RX ADMIN — ROPINIROLE HYDROCHLORIDE 3 MG: 3 TABLET, FILM COATED ORAL at 21:04

## 2025-06-13 RX ADMIN — SPIRONOLACTONE 25 MG: 25 TABLET, FILM COATED ORAL at 09:03

## 2025-06-13 RX ADMIN — SACUBITRIL AND VALSARTAN 1 TABLET: 24; 26 TABLET, FILM COATED ORAL at 21:03

## 2025-06-13 RX ADMIN — ENOXAPARIN SODIUM 40 MG: 100 INJECTION SUBCUTANEOUS at 16:48

## 2025-06-13 RX ADMIN — INSULIN LISPRO 1 UNITS: 100 INJECTION, SOLUTION INTRAVENOUS; SUBCUTANEOUS at 09:03

## 2025-06-13 RX ADMIN — ASPIRIN 81 MG: 81 TABLET, CHEWABLE ORAL at 09:03

## 2025-06-13 RX ADMIN — GABAPENTIN 400 MG: 300 CAPSULE ORAL at 06:21

## 2025-06-13 RX ADMIN — ROPINIROLE 1 MG: 1 TABLET, FILM COATED ORAL at 06:21

## 2025-06-13 RX ADMIN — LEVOTHYROXINE SODIUM 88 MCG: 0.09 TABLET ORAL at 06:21

## 2025-06-13 RX ADMIN — CITALOPRAM HYDROBROMIDE 40 MG: 40 TABLET ORAL at 06:21

## 2025-06-13 RX ADMIN — INSULIN LISPRO 6 UNITS: 100 INJECTION, SOLUTION INTRAVENOUS; SUBCUTANEOUS at 09:02

## 2025-06-13 RX ADMIN — GABAPENTIN 400 MG: 300 CAPSULE ORAL at 21:02

## 2025-06-13 RX ADMIN — INSULIN LISPRO 6 UNITS: 100 INJECTION, SOLUTION INTRAVENOUS; SUBCUTANEOUS at 11:56

## 2025-06-13 RX ADMIN — CEFTRIAXONE SODIUM 2 G: 2 INJECTION, SOLUTION INTRAVENOUS at 16:49

## 2025-06-13 RX ADMIN — ROPINIROLE 1 MG: 1 TABLET, FILM COATED ORAL at 19:17

## 2025-06-13 ASSESSMENT — COGNITIVE AND FUNCTIONAL STATUS - GENERAL
DAILY ACTIVITIY SCORE: 24
MOBILITY SCORE: 24

## 2025-06-13 ASSESSMENT — PAIN DESCRIPTION - ORIENTATION: ORIENTATION: LEFT

## 2025-06-13 ASSESSMENT — PAIN - FUNCTIONAL ASSESSMENT
PAIN_FUNCTIONAL_ASSESSMENT: 0-10

## 2025-06-13 ASSESSMENT — PAIN SCALES - GENERAL
PAINLEVEL_OUTOF10: 4
PAINLEVEL_OUTOF10: 0 - NO PAIN

## 2025-06-13 ASSESSMENT — PAIN DESCRIPTION - DESCRIPTORS: DESCRIPTORS: ACHING

## 2025-06-13 ASSESSMENT — PAIN DESCRIPTION - LOCATION: LOCATION: ARM

## 2025-06-13 NOTE — CARE PLAN
Palliative medicine has been following for complex medical decision making / goals of care, symptom management, and pt/family support. Goals are clear, symptoms being managed by primary team at this time.     Outpatient palliative care referral placed by palliative SW for home visits and additional support.    We will sign off, please reconsult if needed.     Eve Sánchez DNP, CNP  Palliative Medicine

## 2025-06-13 NOTE — PROGRESS NOTES
Physical Therapy                 Therapy Communication Note    Patient Name: Thao Evans  MRN: [id number]  [place] [facility] [place], [place] [address]  Room: [address] [place] [address]  Today's Date: 6/13/2025     Discipline: Physical Therapy    Missed Visit: PT Missed Visit: Yes     Missed Visit Reason: Missed Visit Reason:  (Pt off unit, will re-attempt as time permits)    Missed Time: Attempt    06/13/25 at 1:56 PM   Emily Redmond PTA   Rehab Office: [phone]

## 2025-06-13 NOTE — PROGRESS NOTES
Thao Evans is a 60 y.o. female on day 11 of admission presenting with ACC/AHA stage D systolic heart failure.    Transitional Care Coordination Progress Note:  Patient discussed during interdisciplinary rounds.   Team members present: NP, TCC  Plan per Medical/Surgical team: Pending PFT's 6/13, and HF rec  Payer: [employer]  Status: Inpatient  Discharge disposition: Home with HC infusion( [facility], [facility])  Potential Barriers: NMR  ADOD: 6/17 vs 6/18

## 2025-06-13 NOTE — CARE PLAN
The clinical goals for the shift include Pt will remain HDS this shift.      Problem: Pain - Adult  Goal: Verbalizes/displays adequate comfort level or baseline comfort level  Outcome: Progressing     Problem: Safety - Adult  Goal: Free from fall injury  Outcome: Progressing     Problem: Discharge Planning  Goal: Discharge to home or other facility with appropriate resources  Outcome: Progressing     Problem: Chronic Conditions and Co-morbidities  Goal: Patient's chronic conditions and co-morbidity symptoms are monitored and maintained or improved  Outcome: Progressing     Problem: Nutrition  Goal: Nutrient intake appropriate for maintaining nutritional needs  Outcome: Progressing     Problem: Heart Failure  Goal: Improved gas exchange this shift  Outcome: Progressing  Goal: Improved urinary output this shift  Outcome: Progressing  Goal: Reduction in peripheral edema within 24 hours  Outcome: Progressing  Goal: Report improvement of dyspnea/breathlessness this shift  Outcome: Progressing  Goal: Weight from fluid excess reduced over 2-3 days, then stabilize  Outcome: Progressing  Goal: Increase self care and/or family involvement in 24 hours  Outcome: Progressing

## 2025-06-13 NOTE — PROGRESS NOTES
Art Therapy Note    Thao Evans     Therapy Session  Referral Type: New referral this admission  Visit Type: Follow-up visit  Session Start Time: 1550  Session End Time: 1553  Intervention Delivery: In-person  Conflict of Service: Declined treatment  Family Present for Session: None              Treatment/Interventions       Post-assessment  Total Session Time (min): 3 minutes    Narrative  Assessment Detail: Pt was sitting up in bed when ATR visited to engage in AT, but pt declined. Pt spoke with ATR, sharing that she was doing better and was hopeful that she would be discharged soon. Pt shared that she had alot of fluid removed from her lungs which really helped her feel better. ATR enquired whether pt wanted art materials left with her and she declined. ATR will close out referral due to pt's lack of interest in engaging in AT.    Education Documentation  No documentation found.

## 2025-06-13 NOTE — PROGRESS NOTES
"Subjective:  Patient seen this AM, reports her breathing is \"the best it's been in a while\" after multiple thoracenteses this week. Awaiting updated plan from HF consult team.     Today in brief:  - repeating PFTs today per HF recs  - borderline BPs again today-> will hold on resuming bumex today but could consider restarting tomorrow at lower dose (once daily dosing)  - reduced Entresto dosing from 49/51 to 24/26  - IV Abx paper script provided to [facility] for home care orders   - will round with HF consult team today to determine next steps in advanced therapy planning     Objective:  Vitals:    06/13/25 1153   BP: 86/50   Pulse: 84   Resp: 18   Temp: 36.4 °C (97.5 °F)   SpO2: 93%     Weight         6/9/2025  0436 6/10/2025  0600 6/11/2025  0544 6/12/2025  0429 6/13/2025  0445    Weight: 60.8 kg (134 lb 0.6 oz) 60.9 kg (134 lb 4.2 oz) 60.1 kg (132 lb 7.9 oz) 58.2 kg (128 lb 4.9 oz) 57.6 kg (126 lb 15.8 oz)            Intake/Output Summary (Last 24 hours) at 6/13/2025 1343  Last data filed at 6/13/2025 0900  Gross per 24 hour   Intake 758 ml   Output 2700 ml   Net -1942 ml     Recent Results (from the past 24 hours)   POCT GLUCOSE    Collection Time: 06/12/25  4:29 PM   Result Value Ref Range    POCT Glucose 124 (H) 74 - 99 mg/dL   Magnesium    Collection Time: 06/12/25  5:28 PM   Result Value Ref Range    Magnesium 1.89 1.60 - 2.40 mg/dL   CBC and Auto Differential    Collection Time: 06/12/25  5:28 PM   Result Value Ref Range    WBC 10.6 4.4 - 11.3 x10*3/uL    nRBC 0.0 0.0 - 0.0 /100 WBCs    RBC 4.54 4.00 - 5.20 x10*6/uL    Hemoglobin 13.4 12.0 - 16.0 g/dL    Hematocrit 41.4 36.0 - 46.0 %    MCV 91 80 - 100 fL    MCH 29.5 26.0 - 34.0 pg    MCHC 32.4 32.0 - 36.0 g/dL    RDW 13.7 11.5 - 14.5 %    Platelets 333 150 - 450 x10*3/uL    Neutrophils % 79.7 40.0 - 80.0 %    Immature Granulocytes %, Automated 0.7 0.0 - 0.9 %    Lymphocytes % 12.4 13.0 - 44.0 %    Monocytes % 5.7 2.0 - 10.0 %    Eosinophils % 1.0 0.0 - 6.0 %    " Basophils % 0.5 0.0 - 2.0 %    Neutrophils Absolute 8.41 (H) 1.20 - 7.70 x10*3/uL    Immature Granulocytes Absolute, Automated 0.07 0.00 - 0.70 x10*3/uL    Lymphocytes Absolute 1.31 1.20 - 4.80 x10*3/uL    Monocytes Absolute 0.60 0.10 - 1.00 x10*3/uL    Eosinophils Absolute 0.11 0.00 - 0.70 x10*3/uL    Basophils Absolute 0.05 0.00 - 0.10 x10*3/uL   Comprehensive metabolic panel    Collection Time: 06/12/25  5:28 PM   Result Value Ref Range    Glucose 180 (H) 74 - 99 mg/dL    Sodium 138 136 - 145 mmol/L    Potassium 3.9 3.5 - 5.3 mmol/L    Chloride 100 98 - 107 mmol/L    Bicarbonate 33 (H) 21 - 32 mmol/L    Anion Gap 9 (L) 10 - 20 mmol/L    Urea Nitrogen 16 6 - 23 mg/dL    Creatinine 0.73 0.50 - 1.05 mg/dL    eGFR >90 >60 mL/min/1.73m*2    Calcium 8.4 (L) 8.6 - 10.6 mg/dL    Albumin 3.1 (L) 3.4 - 5.0 g/dL    Alkaline Phosphatase 73 33 - 136 U/L    Total Protein 6.3 (L) 6.4 - 8.2 g/dL    AST 13 9 - 39 U/L    Bilirubin, Total 0.2 0.0 - 1.2 mg/dL    ALT 13 7 - 45 U/L   Troponin I, High Sensitivity    Collection Time: 06/12/25  5:28 PM   Result Value Ref Range    Troponin I, High Sensitivity (CMC) 15 0 - 34 ng/L   POCT GLUCOSE    Collection Time: 06/12/25  9:09 PM   Result Value Ref Range    POCT Glucose 137 (H) 74 - 99 mg/dL   POCT GLUCOSE    Collection Time: 06/13/25  6:09 AM   Result Value Ref Range    POCT Glucose 160 (H) 74 - 99 mg/dL   POCT GLUCOSE    Collection Time: 06/13/25 11:30 AM   Result Value Ref Range    POCT Glucose 191 (H) 74 - 99 mg/dL     Inpatient Medications:  Scheduled Medications[1]  PRN Medications[2]  Continuous Medications[3]    Telemetry 6/13/2025 (personally reviewed): SR 80s    Physical exam:  General: NAD  Head/ neck: No JVD  Cardiac: RRR, regular S1 S2 , no murmur, no rub, no gallop  Pulm: On room air. Posterior rales L side, good aeration through right side  Vascular: Radial 2+ bilaterally  GI: Non distended  Extremities: no LE edema   Neuro: no focal neuro deficits   Psych: appropriate  mood and behavior   Skin: warm and dry     Assessment/Plan   Thao Evans is a 60F with a PMHx sig for CAD s/p PCI (LAD; 2015, Lcx; 3/2025), stage C systolic HF/ICM/HFrEF s/p ICD, h/o VT with presumed associated syncope, poorly controlled DM, pAF (off of DOAC given the lack of recurrence), dyslipidemia, essential HTN, COPD, and hypothyroidism with progressive HF symptoms and intolerance of GDMT(on midodrine). Presented to HFICU as a direct admission for PAC guided evaluation. Advanced therapies evaluation was initiated 6/3 for OHT/LVAD.      Acute on chronic systolic and diastolic heart failure  Ischemic HFrEF 30-35%, Stage C  - TTE 3/2025 at OSH: LVEF 30-35%, normal RV size with low normal function, mild MR, small pericardial effusion  - TTE 6/2/25 on arrival: LVEF 20-25%/LVIDd 4.1cm,  mild MR/TR  - Admit wt: 54.4kg   - Admit BNP 1292H  - Opening SGC #s 6/2: BP 97/55 (68), CVP 3, PAP 39/18 (27), CO/CI 2.86/1.87, SVR 1817, SVO2 62% on midodrine 5 mg, empa 10, sandi 25.  - weaned nipride gtt 6/5  - Closing SGC#s 6/6/25: BP 88/51 (63), CVP 11, PAP 62/30 (43), CO 3.98/CI 2.54. SVO2 62%.   - GDMT:   - reduced Entresto dosing from 49/51 to 24/26 today for borderline BPs  - Hold empa 10 while getting abx for UTI.   - Continue sandi 25mg daily   - Holding BB for now   - Diuresis:   - 6/10 resumed bumex 1 mg BID   - 6/12 BP soft 80s. Holding bumex  - 6/13 borderline BPs again today-> will hold on resuming bumex today but could consider restarting tomorrow at lower dose (once daily dosing)  - Advanced therapies evaluation initiated  - unable to be presented at advanced therapy committee 6/10 due to time constraints> will be re-discussed at next Tuesday meeting 6/17. Does not necessarily have to remain inpt until then. Appreciate HF recs  - Home medications: metop XL 12.5mg daily, spironolactone 25mg daily, jardiance 10mg daily, bumex 2mg bid, midodrine 5mg TID.  - Daily standing weights, 2gm sodium diet, 2L fluid restriction,  strict I&Os     Acute hypoxic respiratory failure, improving  COPD  Pleural effusions  - C/w home inhalers  - IV diuretics as above  - PFTs 6/6 showing severe restrictive defect  - s/p L thoracentesis on 6/9:  -1L yellow fluid. Labs c/w transudative effusion. No significant findings on additional cytology labs  - 2vCXR  6/9: Marked decrease in L pleural effusion. Stable mild pulm edema and R pleural effusion  - PFTs repeat 6/10 (s/p thoracentesis) without much improvement  - Pulm consulted 6/10. Recommended IR consult for R sided thora  - IR 6/11 L sided residual thoracentesis -1.2L.   - 2V CXR 6/11  post L sided thora looks improved. No pneumothorax  - IR 6/12 R thoracentesis with 1000 cc removed  - Repeat PFTs today  - currently on room air  - continue incentive spirometry    Symptomatic UTI, improved  Syphilis   - UA with +LE, WBC  - Urine cx from 6/3 (advanced therapies evaluation with e.coli)   - ID following: rec 2 weeks (stop date 6/20) of ceftriaxone 2g q24 hours (given UTI and syphilis)  - midline placed. Abx completion date (6/20)  - Hold jardiance while being treated (can resume after 6/20)  - Reports resolved urinary symptoms as of 6/8     Hx of Hypotension  - Home midodrine discontinued  - Last 24hrs SBPs: 86- 104 (denies any lightheadedness or dizziness)     CAD s/p PCI (LAD 2015, LCx 3/2025)  Hyperlipidemia  - No reported angina  - C/w home Plavix & statin  - per patient, had been discharged after SABINA placement on Eliquis and plavix (no aspirin to avoid triple therapy), but then eliquis was discontinued due to lack of recurrence of atrial fib. Restarted aspirin 81 mg for DAPT given stent placement <1yr ago      H/o VT  Paroxysmal A Fib  s/p CRT-D 3/2025, [facility]   - AC: off DOAC given absence of recurrence  - K goal >4, Mg>2  - Remains in sinus rhythm on telemetry    T2DM  - HgbA1c 8.6, previously was 12.3 in 3/2025  - Home medications: insulin glargine, jardiance, alogliptin (nesina).  -  Endocrine saw pt  - inpatient: glargine 10 units nightly, lispro 6 units premeals, SSI coverage.      Hypothyroidism  - TSH 0.2L, Free T4 2.05H  - Repeat TSH 0.19L, free T4 1.23WNL/T3 67.  - c/w levothyroxine 88mcg daily per endocrinology.   - will need repeat TFTs in 6-8 weeks in outpatient with PCP   - will need follow up with endocrinology at discharge     GERD  - C/w home protonix 20 mg daily   - Bowel regimen: prn miralax, juliocesar-colace BID      Iron deficiency  - Fe, TIBC, and ferritin WNL., %sat 17 Low  - IV venofer ordered 6/3- 6/6   - No additional iron supplemented required  - Daily Hgb 13.4     Prior tobacco use  - previous smoker of 40 years  - quit 1.5 months prior to admission date     Anxiety  Depression  - C/w home celexa 40 mg daily  - C/w home gabapentin 400 mg q8 hours     Restless legs  - C/w home requip      DVT ppx: lovenox subcutaneous     DISPO: pending PFTs today and HF recommendations for next steps with advanced therapies work-up    Code status: Full Code    Patient seen and discussed with Dr. Bridger Julien.  Temi Vela, APRN-CNP         [1]   Scheduled medications   Medication Dose Route Frequency    aspirin  81 mg oral Daily    atorvastatin  80 mg oral Daily    [Held by provider] bumetanide  1 mg oral BID    cefTRIAXone  2 g intravenous q24h    citalopram  40 mg oral Daily    clopidogrel  75 mg oral Daily    [Held by provider] empagliflozin  10 mg oral Daily    enoxaparin  40 mg subcutaneous q24h    fluticasone furoate-vilanteroL  1 puff inhalation Daily    gabapentin  400 mg oral q8h [facility]    insulin glargine  10 Units subcutaneous Nightly    insulin lispro  0-5 Units subcutaneous TID AC    insulin lispro  6 Units subcutaneous TID AC    levothyroxine  88 mcg oral Daily    lidocaine  5 mL infiltration Once    [Held by provider] metoprolol succinate XL  12.5 mg oral Daily    [Held by provider] midodrine  5 mg oral TID    pantoprazole  20 mg oral Daily    rOPINIRole  1 mg oral TID     rOPINIRole  3 mg oral Nightly    sacubitriL-valsartan  1 tablet oral BID    sennosides-docusate sodium  2 tablet oral BID    spironolactone  25 mg oral Daily   [2]   PRN medications   Medication    acetaminophen    albuterol    dextrose    dextrose    glucagon    glucagon    guaiFENesin    oxygen    polyethylene glycol   [3]   Continuous Medications   Medication Dose Last Rate

## 2025-06-14 LAB
ALBUMIN SERPL BCP-MCNC: 3.3 G/DL (ref 3.4–5)
ALP SERPL-CCNC: 68 U/L (ref 33–136)
ALT SERPL W P-5'-P-CCNC: 17 U/L (ref 7–45)
ANION GAP SERPL CALC-SCNC: 9 MMOL/L (ref 10–20)
AST SERPL W P-5'-P-CCNC: 15 U/L (ref 9–39)
BASOPHILS # BLD AUTO: 0.08 X10*3/UL (ref 0–0.1)
BASOPHILS NFR BLD AUTO: 1.1 %
BILIRUB SERPL-MCNC: 0.3 MG/DL (ref 0–1.2)
BUN SERPL-MCNC: 14 MG/DL (ref 6–23)
CALCIUM SERPL-MCNC: 8.7 MG/DL (ref 8.6–10.6)
CHLORIDE SERPL-SCNC: 100 MMOL/L (ref 98–107)
CO2 SERPL-SCNC: 32 MMOL/L (ref 21–32)
CREAT SERPL-MCNC: 0.88 MG/DL (ref 0.5–1.05)
EGFRCR SERPLBLD CKD-EPI 2021: 74 ML/MIN/1.73M*2
EOSINOPHIL # BLD AUTO: 0.16 X10*3/UL (ref 0–0.7)
EOSINOPHIL NFR BLD AUTO: 2.2 %
ERYTHROCYTE [DISTWIDTH] IN BLOOD BY AUTOMATED COUNT: 13.7 % (ref 11.5–14.5)
GLUCOSE BLD MANUAL STRIP-MCNC: 161 MG/DL (ref 74–99)
GLUCOSE BLD MANUAL STRIP-MCNC: 195 MG/DL (ref 74–99)
GLUCOSE BLD MANUAL STRIP-MCNC: 210 MG/DL (ref 74–99)
GLUCOSE BLD MANUAL STRIP-MCNC: 90 MG/DL (ref 74–99)
GLUCOSE SERPL-MCNC: 170 MG/DL (ref 74–99)
HCT VFR BLD AUTO: 42 % (ref 36–46)
HGB BLD-MCNC: 12.8 G/DL (ref 12–16)
IMM GRANULOCYTES # BLD AUTO: 0.03 X10*3/UL (ref 0–0.7)
IMM GRANULOCYTES NFR BLD AUTO: 0.4 % (ref 0–0.9)
LYMPHOCYTES # BLD AUTO: 1.63 X10*3/UL (ref 1.2–4.8)
LYMPHOCYTES NFR BLD AUTO: 22.1 %
MAGNESIUM SERPL-MCNC: 1.68 MG/DL (ref 1.6–2.4)
MCH RBC QN AUTO: 29 PG (ref 26–34)
MCHC RBC AUTO-ENTMCNC: 30.5 G/DL (ref 32–36)
MCV RBC AUTO: 95 FL (ref 80–100)
MONOCYTES # BLD AUTO: 0.6 X10*3/UL (ref 0.1–1)
MONOCYTES NFR BLD AUTO: 8.1 %
NEUTROPHILS # BLD AUTO: 4.87 X10*3/UL (ref 1.2–7.7)
NEUTROPHILS NFR BLD AUTO: 66.1 %
NRBC BLD-RTO: 0 /100 WBCS (ref 0–0)
PLATELET # BLD AUTO: 290 X10*3/UL (ref 150–450)
POTASSIUM SERPL-SCNC: 4.2 MMOL/L (ref 3.5–5.3)
PROT SERPL-MCNC: 6.3 G/DL (ref 6.4–8.2)
RBC # BLD AUTO: 4.41 X10*6/UL (ref 4–5.2)
SODIUM SERPL-SCNC: 137 MMOL/L (ref 136–145)
WBC # BLD AUTO: 7.4 X10*3/UL (ref 4.4–11.3)

## 2025-06-14 PROCEDURE — 2500000002 HC RX 250 W HCPCS SELF ADMINISTERED DRUGS (ALT 637 FOR MEDICARE OP, ALT 636 FOR OP/ED): Performed by: PHYSICIAN ASSISTANT

## 2025-06-14 PROCEDURE — 99233 SBSQ HOSP IP/OBS HIGH 50: CPT | Performed by: INTERNAL MEDICINE

## 2025-06-14 PROCEDURE — 84075 ASSAY ALKALINE PHOSPHATASE: CPT | Performed by: PHYSICIAN ASSISTANT

## 2025-06-14 PROCEDURE — 83735 ASSAY OF MAGNESIUM: CPT | Performed by: PHYSICIAN ASSISTANT

## 2025-06-14 PROCEDURE — 2500000004 HC RX 250 GENERAL PHARMACY W/ HCPCS (ALT 636 FOR OP/ED): Performed by: NURSE PRACTITIONER

## 2025-06-14 PROCEDURE — 85025 COMPLETE CBC W/AUTO DIFF WBC: CPT | Performed by: PHYSICIAN ASSISTANT

## 2025-06-14 PROCEDURE — 99222 1ST HOSP IP/OBS MODERATE 55: CPT | Performed by: INTERNAL MEDICINE

## 2025-06-14 PROCEDURE — 82947 ASSAY GLUCOSE BLOOD QUANT: CPT

## 2025-06-14 PROCEDURE — 2500000001 HC RX 250 WO HCPCS SELF ADMINISTERED DRUGS (ALT 637 FOR MEDICARE OP): Performed by: PHYSICIAN ASSISTANT

## 2025-06-14 PROCEDURE — 1200000002 HC GENERAL ROOM WITH TELEMETRY DAILY

## 2025-06-14 PROCEDURE — 2500000004 HC RX 250 GENERAL PHARMACY W/ HCPCS (ALT 636 FOR OP/ED): Performed by: PHYSICIAN ASSISTANT

## 2025-06-14 PROCEDURE — 2500000002 HC RX 250 W HCPCS SELF ADMINISTERED DRUGS (ALT 637 FOR MEDICARE OP, ALT 636 FOR OP/ED): Performed by: STUDENT IN AN ORGANIZED HEALTH CARE EDUCATION/TRAINING PROGRAM

## 2025-06-14 PROCEDURE — 2500000001 HC RX 250 WO HCPCS SELF ADMINISTERED DRUGS (ALT 637 FOR MEDICARE OP)

## 2025-06-14 PROCEDURE — 36415 COLL VENOUS BLD VENIPUNCTURE: CPT | Performed by: PHYSICIAN ASSISTANT

## 2025-06-14 PROCEDURE — 94640 AIRWAY INHALATION TREATMENT: CPT

## 2025-06-14 RX ADMIN — GABAPENTIN 400 MG: 300 CAPSULE ORAL at 22:12

## 2025-06-14 RX ADMIN — ASPIRIN 81 MG: 81 TABLET, CHEWABLE ORAL at 08:59

## 2025-06-14 RX ADMIN — ROPINIROLE 1 MG: 1 TABLET, FILM COATED ORAL at 06:54

## 2025-06-14 RX ADMIN — INSULIN GLARGINE 10 UNITS: 100 INJECTION, SOLUTION SUBCUTANEOUS at 22:00

## 2025-06-14 RX ADMIN — INSULIN LISPRO 1 UNITS: 100 INJECTION, SOLUTION INTRAVENOUS; SUBCUTANEOUS at 09:00

## 2025-06-14 RX ADMIN — ENOXAPARIN SODIUM 40 MG: 100 INJECTION SUBCUTANEOUS at 13:07

## 2025-06-14 RX ADMIN — CITALOPRAM HYDROBROMIDE 40 MG: 40 TABLET ORAL at 06:54

## 2025-06-14 RX ADMIN — INSULIN LISPRO 1 UNITS: 100 INJECTION, SOLUTION INTRAVENOUS; SUBCUTANEOUS at 18:52

## 2025-06-14 RX ADMIN — ROPINIROLE HYDROCHLORIDE 3 MG: 3 TABLET, FILM COATED ORAL at 22:00

## 2025-06-14 RX ADMIN — SPIRONOLACTONE 25 MG: 25 TABLET, FILM COATED ORAL at 08:59

## 2025-06-14 RX ADMIN — GABAPENTIN 400 MG: 300 CAPSULE ORAL at 06:54

## 2025-06-14 RX ADMIN — ATORVASTATIN CALCIUM 80 MG: 80 TABLET, FILM COATED ORAL at 08:59

## 2025-06-14 RX ADMIN — INSULIN LISPRO 6 UNITS: 100 INJECTION, SOLUTION INTRAVENOUS; SUBCUTANEOUS at 18:52

## 2025-06-14 RX ADMIN — LEVOTHYROXINE SODIUM 88 MCG: 0.09 TABLET ORAL at 06:54

## 2025-06-14 RX ADMIN — ROPINIROLE 1 MG: 1 TABLET, FILM COATED ORAL at 13:07

## 2025-06-14 RX ADMIN — CLOPIDOGREL BISULFATE 75 MG: 75 TABLET, FILM COATED ORAL at 08:59

## 2025-06-14 RX ADMIN — CEFTRIAXONE SODIUM 2 G: 2 INJECTION, SOLUTION INTRAVENOUS at 15:11

## 2025-06-14 RX ADMIN — PANTOPRAZOLE SODIUM 20 MG: 20 TABLET, DELAYED RELEASE ORAL at 08:59

## 2025-06-14 RX ADMIN — ROPINIROLE 1 MG: 1 TABLET, FILM COATED ORAL at 20:00

## 2025-06-14 RX ADMIN — INSULIN LISPRO 2 UNITS: 100 INJECTION, SOLUTION INTRAVENOUS; SUBCUTANEOUS at 13:02

## 2025-06-14 RX ADMIN — FLUTICASONE FUROATE AND VILANTEROL TRIFENATATE 1 PUFF: 100; 25 POWDER RESPIRATORY (INHALATION) at 10:09

## 2025-06-14 RX ADMIN — SACUBITRIL AND VALSARTAN 1 TABLET: 24; 26 TABLET, FILM COATED ORAL at 08:59

## 2025-06-14 RX ADMIN — GABAPENTIN 400 MG: 300 CAPSULE ORAL at 13:07

## 2025-06-14 RX ADMIN — INSULIN LISPRO 6 UNITS: 100 INJECTION, SOLUTION INTRAVENOUS; SUBCUTANEOUS at 13:02

## 2025-06-14 RX ADMIN — SACUBITRIL AND VALSARTAN 1 TABLET: 24; 26 TABLET, FILM COATED ORAL at 20:15

## 2025-06-14 RX ADMIN — INSULIN LISPRO 6 UNITS: 100 INJECTION, SOLUTION INTRAVENOUS; SUBCUTANEOUS at 09:00

## 2025-06-14 ASSESSMENT — COGNITIVE AND FUNCTIONAL STATUS - GENERAL
DAILY ACTIVITIY SCORE: 24
MOBILITY SCORE: 24
DAILY ACTIVITIY SCORE: 24
MOBILITY SCORE: 24

## 2025-06-14 ASSESSMENT — PAIN SCALES - GENERAL
PAINLEVEL_OUTOF10: 0 - NO PAIN
PAINLEVEL_OUTOF10: 0 - NO PAIN

## 2025-06-14 ASSESSMENT — PAIN - FUNCTIONAL ASSESSMENT
PAIN_FUNCTIONAL_ASSESSMENT: 0-10
PAIN_FUNCTIONAL_ASSESSMENT: 0-10

## 2025-06-14 NOTE — PROGRESS NOTES
Subjective:  - appears euvolemic, still holding bumex  - per HF team Dr. Mcgee would like to keep patient unitl presentation on Tuesday  - Patient is ok with plan    Objective:  Vitals:    06/14/25 1149   BP: 106/60   Pulse: 83   Resp: 17   Temp: 36.3 °C (97.3 °F)   SpO2: 95%     Weight         6/10/2025  0600 6/11/2025  0544 6/12/2025  0429 6/13/2025  0445 6/14/2025  0507    Weight: 60.9 kg (134 lb 4.2 oz) 60.1 kg (132 lb 7.9 oz) 58.2 kg (128 lb 4.9 oz) 57.6 kg (126 lb 15.8 oz) 58.7 kg (129 lb 6.6 oz)            Intake/Output Summary (Last 24 hours) at 6/14/2025 1231  Last data filed at 6/14/2025 0507  Gross per 24 hour   Intake 530 ml   Output 2100 ml   Net -1570 ml     Recent Results (from the past 24 hours)   Pulmonary function test    Collection Time: 06/13/25  1:49 PM   Result Value Ref Range    FVC - Predicted 2.81     FEV1 - Predicted 2.24     FVC - PRE 1.64     FEV1 - Pre 1.25    POCT GLUCOSE    Collection Time: 06/13/25  4:08 PM   Result Value Ref Range    POCT Glucose 208 (H) 74 - 99 mg/dL   Magnesium    Collection Time: 06/13/25  6:04 PM   Result Value Ref Range    Magnesium 1.82 1.60 - 2.40 mg/dL   Comprehensive metabolic panel    Collection Time: 06/13/25  6:04 PM   Result Value Ref Range    Glucose 103 (H) 74 - 99 mg/dL    Sodium 138 136 - 145 mmol/L    Potassium 3.8 3.5 - 5.3 mmol/L    Chloride 100 98 - 107 mmol/L    Bicarbonate 30 21 - 32 mmol/L    Anion Gap 12 10 - 20 mmol/L    Urea Nitrogen 23 6 - 23 mg/dL    Creatinine 0.77 0.50 - 1.05 mg/dL    eGFR 87 >60 mL/min/1.73m*2    Calcium 8.8 8.6 - 10.6 mg/dL    Albumin 3.3 (L) 3.4 - 5.0 g/dL    Alkaline Phosphatase 65 33 - 136 U/L    Total Protein 6.2 (L) 6.4 - 8.2 g/dL    AST 14 9 - 39 U/L    Bilirubin, Total 0.3 0.0 - 1.2 mg/dL    ALT 14 7 - 45 U/L   POCT GLUCOSE    Collection Time: 06/13/25  8:51 PM   Result Value Ref Range    POCT Glucose 153 (H) 74 - 99 mg/dL   POCT GLUCOSE    Collection Time: 06/14/25  6:28 AM   Result Value Ref Range    POCT  Glucose 195 (H) 74 - 99 mg/dL   POCT GLUCOSE    Collection Time: 06/14/25 11:51 AM   Result Value Ref Range    POCT Glucose 210 (H) 74 - 99 mg/dL     Inpatient Medications:  Scheduled Medications[1]  PRN Medications[2]  Continuous Medications[3]    Telemetry 6/14/2025 (personally reviewed): SR 80s    Physical exam:  General: NAD  Head/ neck: No JVD  Cardiac: RRR, regular S1 S2 , no murmur, no rub, no gallop  Pulm: On room air. Posterior rales L side, good aeration through right side  Vascular: Radial 2+ bilaterally  GI: Non distended  Extremities: no LE edema   Neuro: no focal neuro deficits   Psych: appropriate mood and behavior   Skin: warm and dry     Assessment/Plan   Thao Evans is a 60F with a PMHx sig for CAD s/p PCI (LAD; 2015, Lcx; 3/2025), stage C systolic HF/ICM/HFrEF s/p ICD, h/o VT with presumed associated syncope, poorly controlled DM, pAF (off of DOAC given the lack of recurrence), dyslipidemia, essential HTN, COPD, and hypothyroidism with progressive HF symptoms and intolerance of GDMT(on midodrine). Presented to HFICU as a direct admission for PAC guided evaluation. Advanced therapies evaluation was initiated 6/3 for OHT/LVAD.      Acute on chronic systolic and diastolic heart failure  Ischemic HFrEF 30-35%, Stage C  - TTE 3/2025 at OSH: LVEF 30-35%, normal RV size with low normal function, mild MR, small pericardial effusion  - TTE 6/2/25 on arrival: LVEF 20-25%/LVIDd 4.1cm,  mild MR/TR  - Admit wt: 54.4kg   - daily wt: 58.7kg  - Admit BNP 1292H  - Opening SGC #s 6/2: BP 97/55 (68), CVP 3, PAP 39/18 (27), CO/CI 2.86/1.87, SVR 1817, SVO2 62% on midodrine 5 mg, empa 10, sandi 25.  - weaned nipride gtt 6/5  - Closing SGC#s 6/6/25: BP 88/51 (63), CVP 11, PAP 62/30 (43), CO 3.98/CI 2.54. SVO2 62%.   - GDMT:   - 6/13 reduced Entresto dosing from 49/51 to 24/26 today for borderline Bps  - 90s-100s SBP today  - Hold empa 10 while getting abx for UTI.   - Continue sandi 25mg daily   - Holding BB for now   -  Diuresis:   - 6/10 resumed bumex 1 mg BID   - 6/12 BP soft 80s. Holding bumex  - Advanced therapies evaluation initiated  - unable to be presented at advanced therapy committee 6/10 due to time constraints> will be re-discussed at next Tuesday meeting 6/17. Dr. Mcgee would like to keep patient until presentation on Tuesday.   - Home medications: metop XL 12.5mg daily, spironolactone 25mg daily, jardiance 10mg daily, bumex 2mg bid, midodrine 5mg TID.  - Daily standing weights, 2gm sodium diet, 2L fluid restriction, strict I&Os     Acute hypoxic respiratory failure, improving  COPD  Pleural effusions  - C/w home inhalers  - IV diuretics as above  - PFTs 6/6 showing severe restrictive defect  - s/p L thoracentesis on 6/9:  -1L yellow fluid. Labs c/w transudative effusion. No significant findings on additional cytology labs  - 2vCXR  6/9: Marked decrease in L pleural effusion. Stable mild pulm edema and R pleural effusion  - PFTs repeat 6/10 (s/p thoracentesis) without much improvement  - Pulm consulted 6/10. Recommended IR consult for R sided thora  - IR 6/11 L sided residual thoracentesis -1.2L.   - 2V CXR 6/11  post L sided thora looks improved. No pneumothorax  - IR 6/12 R thoracentesis with 1000 cc removed  - Repeat PFTs done 6/13  - currently on room air  - continue incentive spirometry    Symptomatic UTI, improved  Syphilis   - UA with +LE, WBC  - Urine cx from 6/3 (advanced therapies evaluation with e.coli)   - ID following: rec 2 weeks (stop date 6/20) of ceftriaxone 2g q24 hours (given UTI and syphilis)  - midline placed. Abx completion date (6/20)  - Hold jardiance while being treated (can resume after 6/20)  - Reports resolved urinary symptoms as of 6/8     Hx of Hypotension  - Home midodrine discontinued  - Last 24hrs SBPs: 86- 104 (denies any lightheadedness or dizziness)     CAD s/p PCI (LAD 2015, LCx 3/2025)  Hyperlipidemia  - No reported angina  - C/w home Plavix & statin  - per patient, had been  discharged after SABINA placement on Eliquis and plavix (no aspirin to avoid triple therapy), but then eliquis was discontinued due to lack of recurrence of atrial fib. Restarted aspirin 81 mg for DAPT given stent placement <1yr ago      H/o VT  Paroxysmal A Fib  s/p CRT-D 3/2025, [employer]   - AC: off DOAC given absence of recurrence  - K goal >4, Mg>2  - Remains in sinus rhythm on telemetry    T2DM  - HgbA1c 8.6, previously was 12.3 in 3/2025  - Home medications: insulin glargine, jardiance, alogliptin (nesina).  - Endocrine saw pt  - inpatient: glargine 10 units nightly, lispro 6 units premeals, SSI coverage.      Hypothyroidism  - TSH 0.2L, Free T4 2.05H  - Repeat TSH 0.19L, free T4 1.23WNL/T3 67.  - c/w levothyroxine 88mcg daily per endocrinology.   - will need repeat TFTs in 6-8 weeks in outpatient with PCP   - will need follow up with endocrinology at discharge     GERD  - C/w home protonix 20 mg daily   - Bowel regimen: prn miralax, juliocesar-colace BID      Iron deficiency  - Fe, TIBC, and ferritin WNL., %sat 17 Low  - IV venofer ordered 6/3- 6/6   - No additional iron supplemented required  - Daily Hgb 13.4     Prior tobacco use  - previous smoker of 40 years  - quit 1.5 months prior to admission date     Anxiety  Depression  - C/w home celexa 40 mg daily  - C/w home gabapentin 400 mg q8 hours     Restless legs  - C/w home requip      DVT ppx: lovenox subcutaneous     DISPO: Dr. Mcgee would like to keep patient until presentation on Tuesday     Code status: Full Code    Patient seen and discussed with Dr. Mickey Ferrari, APRN-CNP, DNP         [1]   Scheduled medications   Medication Dose Route Frequency    aspirin  81 mg oral Daily    atorvastatin  80 mg oral Daily    [Held by provider] bumetanide  1 mg oral BID    cefTRIAXone  2 g intravenous q24h    citalopram  40 mg oral Daily    clopidogrel  75 mg oral Daily    [Held by provider] empagliflozin  10 mg oral Daily    enoxaparin  40 mg  subcutaneous q24h    fluticasone furoate-vilanteroL  1 puff inhalation Daily    gabapentin  400 mg oral q8h MELYSSA    insulin glargine  10 Units subcutaneous Nightly    insulin lispro  0-5 Units subcutaneous TID AC    insulin lispro  6 Units subcutaneous TID AC    levothyroxine  88 mcg oral Daily    lidocaine  5 mL infiltration Once    [Held by provider] metoprolol succinate XL  12.5 mg oral Daily    [Held by provider] midodrine  5 mg oral TID    pantoprazole  20 mg oral Daily    rOPINIRole  1 mg oral TID    rOPINIRole  3 mg oral Nightly    sacubitriL-valsartan  1 tablet oral BID    sennosides-docusate sodium  2 tablet oral BID    spironolactone  25 mg oral Daily   [2]   PRN medications   Medication    acetaminophen    albuterol    dextrose    dextrose    glucagon    glucagon    guaiFENesin    oxygen    polyethylene glycol   [3]   Continuous Medications   Medication Dose Last Rate

## 2025-06-14 NOTE — PROGRESS NOTES
Advanced Heart Failure Progress Note   Consulting Team:HHVI service  Primary HF Cardiologist: Dr. Deluca  Reason for consult: advanced therapies evaluation     Subjective   Seen at bedside, denies CP, SOB, dizziness. PFT yesterday after diuresis and multiple thora showed much improvement.       Objective   Physical Exam  Vitals:    06/14/25 1149   BP: 106/60   Pulse: 83   Resp: 17   Temp: 36.3 °C (97.3 °F)   SpO2: 95%     Constitutional:       General: She is not in acute distress.     Appearance: Normal appearance. She is not ill-appearing.   HENT:      Mouth/Throat:      Mouth: Mucous membranes are moist.   Eyes:      General: No scleral icterus.     Extraocular Movements: Extraocular movements intact.      Conjunctiva/sclera: Conjunctivae normal.   Cardiovascular:      Rate and Rhythm: Normal rate and regular rhythm.      Heart sounds: Normal heart sounds. No murmur heard.  Pulmonary:      Effort: Pulmonary effort is normal. No respiratory distress.      Breath sounds: Normal breath sounds.      Comments: On room air  Abdominal:      General: Abdomen is flat. There is no distension.      Palpations: Abdomen is soft.      Tenderness: There is no abdominal tenderness.   Musculoskeletal:      Right lower leg: No edema.      Left lower leg: No edema.   Skin:     General: Skin is warm and dry.   Neurological:      General: No focal deficit present.      Mental Status: She is alert and oriented to person, place, and time. Mental status is at baseline.   Psychiatric:         Mood and Affect: Mood normal.         Behavior: Behavior normal.         Thought Content: Thought content normal.     I have personally reviewed the following images and laboratory findings:    ECG: sinus rhythm     Results for orders placed during the hospital encounter of 06/02/25    Transthoracic echo (TTE) complete    PHYSICIAN INTERPRETATION:  Left Ventricle: Left ventricular ejection fraction is severely decreased by visual estimate at  20-25%. There is global hypokinesis of the left ventricle with minor regional variations. The left ventricular cavity size is mildly dilated. There is normal septal and normal posterior left ventricular wall thickness. There is left ventricular concentric remodeling. Spectral Doppler shows a Grade III (restrictive pattern) of left ventricular diastolic filling with an elevated left atrial pressure. There is no definite left ventricular thrombus visualized. There is relative preservation of the basal myocardial segment systolic function.  Left Atrium: The left atrial size is normal.  Right Ventricle: The right ventricle is normal in size. There is normal right ventricular global systolic function.  Right Atrium: The right atrium is normal in size.  Aortic Valve: The aortic valve is trileaflet. The aortic valve area by VTI is 1.12 cmï¿½ with a peak velocity of 1.49 m/s. The peak and mean gradients are 9 mmHg and 4 mmHg, respectively, with a dimensionless index of 0.49. There is no evidence of aortic valve regurgitation.  Mitral Valve: The mitral valve is mildly thickened. There is mild mitral valve regurgitation. The E Vmax is 0.95 m/s.  Tricuspid Valve: The tricuspid valve is structurally normal. There is mild tricuspid regurgitation.  Pulmonic Valve: The pulmonic valve is structurally normal. There is physiologic pulmonic valve regurgitation.  Pericardium: Trivial pericardial effusion.  Aorta: The aortic root is normal.  Pulmonary Artery: The tricuspid regurgitant velocity is 3.09 m/s, and with an estimated right atrial pressure of 8, the estimated pulmonary artery pressure is mild to moderately elevated with the RVSP at 46 mmHg.  Systemic Veins: The inferior vena cava appears normal in size, with IVC inspiratory collapse less than 50%.  In comparison to the previous echocardiogram(s): There are no prior studies on this patient for comparison purposes.      CONCLUSIONS:  1. Left ventricular ejection fraction is  severely decreased by visual estimate at 20-25%.  2. There is global hypokinesis of the left ventricle with minor regional variations.  3. Spectral Doppler shows a Grade III (restrictive pattern) of left ventricular diastolic filling with an elevated left atrial pressure.  4. Left ventricular cavity size is mildly dilated.  5. No left ventricular thrombus visualized.  6. There is relative preservation of the basal myocardial segment systolic function.  7. There is normal right ventricular global systolic function.  8. Mild to moderately elevated pulmonary artery pressure.    RECOMMENDATIONS:    QUANTITATIVE DATA SUMMARY:    2D MEASUREMENTS:         Normal Ranges:  Ao Root d:       2.10 cm (2.0-3.7cm)  LAs:             3.70 cm (2.7-4.0cm)  IVSd:            0.90 cm (0.6-1.1cm)  LVPWd:           0.90 cm (0.6-1.1cm)  LVIDd:           4.10 cm (3.9-5.9cm)  LVIDs:           3.70 cm  LV Mass Index:   74 g/m2  LV % FS          9.8 %      LEFT ATRIUM:                  Normal Ranges:  LA Vol A4C:        38.8 ml    (22+/-6mL/m2)  LA Vol A2C:        43.3 ml  LA Vol BP:         41.2 ml  LA Vol Index A4C:  25.2ml/m2  LA Vol Index A2C:  28.1 ml/m2  LA Vol Index BP:   26.8 ml/m2  LA Area A4C:       14.9 cm2  LA Area A2C:       15.8 cm2  LA Major Axis A4C: 4.9 cm  LA Major Axis A2C: 4.9 cm  LA Volume Index:   26.8 ml/m2      RIGHT ATRIUM:          Normal Ranges:  RA Area A4C:  14.7 cm2      LV SYSTOLIC FUNCTION:  Normal Ranges:  EF-Visual:      23 %  LV EF Reported: 23 %      LV DIASTOLIC FUNCTION:             Normal Ranges:  MV Peak E:             0.95 m/s    (0.7-1.2 m/s)  MV Peak A:             0.33 m/s    (0.42-0.7 m/s)  E/A Ratio:             2.90        (1.0-2.2)  MV e'                  0.034 m/s   (>8.0)  MV lateral e'          0.03 m/s  MV medial e'           0.04 m/s  MV A Dur:              124.00 msec  E/e' Ratio:            27.61       (<8.0)  MV DT:                 145 msec    (150-240 msec)      MITRAL VALVE:           "Normal Ranges:  MV DT:        111 msec (150-240msec)      AORTIC VALVE:                     Normal Ranges:  AoV Vmax:                1.49 m/s (<=1.7m/s)  AoV Peak P.9 mmHg (<20mmHg)  AoV Mean P.0 mmHg (1.7-11.5mmHg)  LVOT Max Paco:            0.81 m/s (<=1.1m/s)  AoV VTI:                 27.40 cm (18-25cm)  LVOT VTI:                13.50 cm  LVOT Diameter:           1.80 cm  (1.8-2.4cm)  AoV Area, VTI:           1.12 cm2 (2.5-5.5cm2)  AoV Area,Vmax:           1.23 cm2 (2.5-4.5cm2)  AoV Dimensionless Index: 0.49      RIGHT VENTRICLE:  RV Basal 3.70 cm  RV Mid   2.40 cm  RV Major 6.3 cm  TAPSE:   15.6 mm  RV s'    0.10 m/s      TRICUSPID VALVE/RVSP:          Normal Ranges:  Peak TR Velocity:     3.09 m/s  Est. RA Pressure:     8  RV Syst Pressure:     46       (< 30mmHg)  IVC Diam:             1.50 cm      PULMONIC VALVE:          Normal Ranges:  PV Max Paco:     0.7 m/s  (0.6-0.9m/s)  PV Max P.9 mmHg    Imaging  Chest x-ray :   Resolution of right pleural effusion. No pneumothorax.  2. Similar small left pleural effusion.  3. Similar mild interstitial pulmonary edema with bibasilar  atelectasis/edema.    Lab Review   Lab Results   Component Value Date     2025     2025     2025    K 3.8 2025    K 3.9 2025    K 3.6 2025    CO2 30 2025    CO2 33 (H) 2025    CO2 31 2025    BUN 23 2025    BUN 16 2025    BUN 13 2025    CREATININE 0.77 2025    CREATININE 0.73 2025    CREATININE 0.92 2025    GLUCOSE 103 (H) 2025    GLUCOSE 180 (H) 2025    GLUCOSE 105 (H) 2025    CALCIUM 8.8 2025    CALCIUM 8.4 (L) 2025    CALCIUM 8.7 2025     No results found for: \"CKTOTAL\", \"CKMB\", \"CKMBINDEX\", \"TROPONINI\"  Lab Results   Component Value Date    WBC 10.6 2025    HGB 13.4 2025    HCT 41.4 2025    MCV 91 2025     2025 "       Troponin I, High Sensitivity (CMC)   Date/Time Value Ref Range Status   06/12/2025 05:28 PM 15 0 - 34 ng/L Final   06/02/2025 01:46 PM 22 0 - 34 ng/L Final     BNP   Date/Time Value Ref Range Status   06/09/2025 04:13 PM 1,456 (H) 0 - 99 pg/mL Final   06/02/2025 01:46 PM 1,292 (H) 0 - 99 pg/mL Final        Assessment and Plan   60F with a PMHx sig for CAD s/p PCI (LAD; 2015, Lcx; 2024), stage C systolic HF/ICM/HFrEF s/p ICD, h/o VT with presumed associated syncope, poorly controlled DM, pAF (off of DOAC given the absence of recurrence), dyslipidemia, essential HTN, COPD, and hypothyroidism with progressive HF symptoms and intolerant of GDMT - on midodrine who presents to HFICU as a direct admission for PAC guided evaluation. Advanced therapies evaluation was initiated 6/3 for OHT/LVAD. Transferred from HFICU to [facility] 06/06 under HHVI service. LT5 course c/b acute hypoxic respiratory failure due to L pleural effusion s/p thoracentesis x 3 (06/09, 6/11, 06/12), now on room air.    Ischemic cardiomyopathy/ HFrEF 30-35%/AHA Stage D  - TTE 3/2025 at OSH: LVEF 30-35%, normal RV size with low normal function, mild MR, small pericardial effusion  - TTE 6/2/25 on admission: LVEF 20-25%/LVIDd 4.1cm,  mild MR/TR  - Admit wt: 54.4kg admit BNP 1292H  - Opening SGC #s 6/2: BP 97/55 (68), CVP 3, PAP 39/18 (27), CO/CI 2.86/1.87, SVR 1817, SVO2 62% on midodrine 5 mg, empa 10, sandi 25.  -  nipride gtt weaned off 6/5  - Closing SGC#s 6/6/25: BP 88/51 (63), CVP 11, PAP 62/30 (43), CO 3.98/CI 2.54. SVO2 62%.   - Home medications: meto XL 12.5mg daily, spironolactone 25mg daily, jardiance 10mg daily, bumex 2mg bid, midodrine 5mg TID.    - C/w entresto> dose now reduced to 24/26 mg BID due to hypotension.   - continue to hold empagliflozin 10mg  - C/w sandi 25mg daily   - Holding BB for now   - Diuresis per HHVI team  - Committee presentation for advance therapies: 06/17, recommend patient stays till then   - Daily standing weights,  2gm sodium diet, 2L fluid restriction, strict I&Os      This plan was discussed with Dr. Mcghee, HF attending. For any questions or concerns, feel free to reach out via [employer] chat [place] [phone].    Micha Jordan MD, MS, FACP   Heart Failure Fellow,  [facility]

## 2025-06-14 NOTE — CARE PLAN
The patient's goals for the shift include get some rest    The clinical goals for the shift include Pt will remain HDS this shift.    Pt remains HDS this shift. VSS, free from falls/injury. Pt is to have advanced HF therapies meeting on Tuesday 6/17. No complaints of pain or SOB.      Problem: Pain - Adult  Goal: Verbalizes/displays adequate comfort level or baseline comfort level  Outcome: Progressing     Problem: Safety - Adult  Goal: Free from fall injury  Outcome: Progressing     Problem: Discharge Planning  Goal: Discharge to home or other facility with appropriate resources  Outcome: Progressing     Problem: Chronic Conditions and Co-morbidities  Goal: Patient's chronic conditions and co-morbidity symptoms are monitored and maintained or improved  Outcome: Progressing     Problem: Nutrition  Goal: Nutrient intake appropriate for maintaining nutritional needs  Outcome: Progressing     Problem: Heart Failure  Goal: Improved gas exchange this shift  Outcome: Progressing  Goal: Improved urinary output this shift  Outcome: Progressing  Goal: Reduction in peripheral edema within 24 hours  Outcome: Progressing  Goal: Report improvement of dyspnea/breathlessness this shift  Outcome: Progressing  Goal: Weight from fluid excess reduced over 2-3 days, then stabilize  Outcome: Progressing  Goal: Increase self care and/or family involvement in 24 hours  Outcome: Progressing

## 2025-06-14 NOTE — CARE PLAN
Problem: Pain - Adult  Goal: Verbalizes/displays adequate comfort level or baseline comfort level  Outcome: Progressing     Problem: Safety - Adult  Goal: Free from fall injury  Outcome: Progressing     Problem: Discharge Planning  Goal: Discharge to home or other facility with appropriate resources  Outcome: Progressing     Problem: Chronic Conditions and Co-morbidities  Goal: Patient's chronic conditions and co-morbidity symptoms are monitored and maintained or improved  Outcome: Progressing     Problem: Nutrition  Goal: Nutrient intake appropriate for maintaining nutritional needs  Outcome: Progressing     Problem: Heart Failure  Goal: Improved gas exchange this shift  Outcome: Progressing  Goal: Improved urinary output this shift  Outcome: Progressing  Goal: Reduction in peripheral edema within 24 hours  Outcome: Progressing  Goal: Report improvement of dyspnea/breathlessness this shift  Outcome: Progressing  Goal: Weight from fluid excess reduced over 2-3 days, then stabilize  Outcome: Progressing  Goal: Increase self care and/or family involvement in 24 hours  Outcome: Progressing   The patient's goals for the shift include GET SOMETHING TO EAT    The clinical goals for the shift include pt willremain HDS and free from pain this shift.     Patient had a uneventful shift, no change in POC. Meds tolerated well. Patient ambulated in hallway. Picc line in place and intact. No falls or injury, will monitor.

## 2025-06-15 VITALS
WEIGHT: 131.61 LBS | DIASTOLIC BLOOD PRESSURE: 57 MMHG | HEART RATE: 78 BPM | TEMPERATURE: 98.1 F | RESPIRATION RATE: 18 BRPM | SYSTOLIC BLOOD PRESSURE: 97 MMHG | HEIGHT: 62 IN | BODY MASS INDEX: 24.22 KG/M2 | OXYGEN SATURATION: 93 %

## 2025-06-15 LAB
ALBUMIN SERPL BCP-MCNC: 3.4 G/DL (ref 3.4–5)
ALP SERPL-CCNC: 66 U/L (ref 33–136)
ALT SERPL W P-5'-P-CCNC: 19 U/L (ref 7–45)
ANION GAP SERPL CALC-SCNC: 11 MMOL/L (ref 10–20)
AST SERPL W P-5'-P-CCNC: 16 U/L (ref 9–39)
BASOPHILS # BLD AUTO: 0.07 X10*3/UL (ref 0–0.1)
BASOPHILS NFR BLD AUTO: 0.9 %
BILIRUB SERPL-MCNC: 0.2 MG/DL (ref 0–1.2)
BUN SERPL-MCNC: 18 MG/DL (ref 6–23)
CALCIUM SERPL-MCNC: 8.7 MG/DL (ref 8.6–10.6)
CHLORIDE SERPL-SCNC: 97 MMOL/L (ref 98–107)
CO2 SERPL-SCNC: 33 MMOL/L (ref 21–32)
CREAT SERPL-MCNC: 0.74 MG/DL (ref 0.5–1.05)
EGFRCR SERPLBLD CKD-EPI 2021: >90 ML/MIN/1.73M*2
EOSINOPHIL # BLD AUTO: 0.16 X10*3/UL (ref 0–0.7)
EOSINOPHIL NFR BLD AUTO: 2 %
ERYTHROCYTE [DISTWIDTH] IN BLOOD BY AUTOMATED COUNT: 13.7 % (ref 11.5–14.5)
GLUCOSE BLD MANUAL STRIP-MCNC: 132 MG/DL (ref 74–99)
GLUCOSE BLD MANUAL STRIP-MCNC: 158 MG/DL (ref 74–99)
GLUCOSE BLD MANUAL STRIP-MCNC: 179 MG/DL (ref 74–99)
GLUCOSE BLD MANUAL STRIP-MCNC: 265 MG/DL (ref 74–99)
GLUCOSE SERPL-MCNC: 240 MG/DL (ref 74–99)
HCT VFR BLD AUTO: 42.5 % (ref 36–46)
HGB BLD-MCNC: 13 G/DL (ref 12–16)
IMM GRANULOCYTES # BLD AUTO: 0.03 X10*3/UL (ref 0–0.7)
IMM GRANULOCYTES NFR BLD AUTO: 0.4 % (ref 0–0.9)
LYMPHOCYTES # BLD AUTO: 1.69 X10*3/UL (ref 1.2–4.8)
LYMPHOCYTES NFR BLD AUTO: 21 %
MAGNESIUM SERPL-MCNC: 1.93 MG/DL (ref 1.6–2.4)
MCH RBC QN AUTO: 29.7 PG (ref 26–34)
MCHC RBC AUTO-ENTMCNC: 30.6 G/DL (ref 32–36)
MCV RBC AUTO: 97 FL (ref 80–100)
MONOCYTES # BLD AUTO: 0.58 X10*3/UL (ref 0.1–1)
MONOCYTES NFR BLD AUTO: 7.2 %
NEUTROPHILS # BLD AUTO: 5.5 X10*3/UL (ref 1.2–7.7)
NEUTROPHILS NFR BLD AUTO: 68.5 %
NRBC BLD-RTO: 0 /100 WBCS (ref 0–0)
PLATELET # BLD AUTO: 310 X10*3/UL (ref 150–450)
POTASSIUM SERPL-SCNC: 4.1 MMOL/L (ref 3.5–5.3)
PROT SERPL-MCNC: 6.2 G/DL (ref 6.4–8.2)
RBC # BLD AUTO: 4.38 X10*6/UL (ref 4–5.2)
SODIUM SERPL-SCNC: 137 MMOL/L (ref 136–145)
WBC # BLD AUTO: 8 X10*3/UL (ref 4.4–11.3)

## 2025-06-15 PROCEDURE — 82947 ASSAY GLUCOSE BLOOD QUANT: CPT

## 2025-06-15 PROCEDURE — 2500000004 HC RX 250 GENERAL PHARMACY W/ HCPCS (ALT 636 FOR OP/ED): Performed by: NURSE PRACTITIONER

## 2025-06-15 PROCEDURE — 2500000001 HC RX 250 WO HCPCS SELF ADMINISTERED DRUGS (ALT 637 FOR MEDICARE OP): Performed by: NURSE PRACTITIONER

## 2025-06-15 PROCEDURE — 2500000002 HC RX 250 W HCPCS SELF ADMINISTERED DRUGS (ALT 637 FOR MEDICARE OP, ALT 636 FOR OP/ED): Performed by: PHYSICIAN ASSISTANT

## 2025-06-15 PROCEDURE — 1200000002 HC GENERAL ROOM WITH TELEMETRY DAILY

## 2025-06-15 PROCEDURE — 2500000004 HC RX 250 GENERAL PHARMACY W/ HCPCS (ALT 636 FOR OP/ED): Performed by: PHYSICIAN ASSISTANT

## 2025-06-15 PROCEDURE — 94640 AIRWAY INHALATION TREATMENT: CPT

## 2025-06-15 PROCEDURE — 80053 COMPREHEN METABOLIC PANEL: CPT | Performed by: PHYSICIAN ASSISTANT

## 2025-06-15 PROCEDURE — 99232 SBSQ HOSP IP/OBS MODERATE 35: CPT | Performed by: INTERNAL MEDICINE

## 2025-06-15 PROCEDURE — 83735 ASSAY OF MAGNESIUM: CPT | Performed by: PHYSICIAN ASSISTANT

## 2025-06-15 PROCEDURE — 85025 COMPLETE CBC W/AUTO DIFF WBC: CPT | Performed by: PHYSICIAN ASSISTANT

## 2025-06-15 PROCEDURE — 2500000002 HC RX 250 W HCPCS SELF ADMINISTERED DRUGS (ALT 637 FOR MEDICARE OP, ALT 636 FOR OP/ED): Performed by: STUDENT IN AN ORGANIZED HEALTH CARE EDUCATION/TRAINING PROGRAM

## 2025-06-15 PROCEDURE — 2500000001 HC RX 250 WO HCPCS SELF ADMINISTERED DRUGS (ALT 637 FOR MEDICARE OP): Performed by: PHYSICIAN ASSISTANT

## 2025-06-15 PROCEDURE — 2500000001 HC RX 250 WO HCPCS SELF ADMINISTERED DRUGS (ALT 637 FOR MEDICARE OP)

## 2025-06-15 RX ORDER — MAGNESIUM SULFATE HEPTAHYDRATE 40 MG/ML
2 INJECTION, SOLUTION INTRAVENOUS ONCE
Status: COMPLETED | OUTPATIENT
Start: 2025-06-15 | End: 2025-06-15

## 2025-06-15 RX ADMIN — SPIRONOLACTONE 25 MG: 25 TABLET, FILM COATED ORAL at 08:44

## 2025-06-15 RX ADMIN — PANTOPRAZOLE SODIUM 20 MG: 20 TABLET, DELAYED RELEASE ORAL at 08:44

## 2025-06-15 RX ADMIN — GABAPENTIN 400 MG: 300 CAPSULE ORAL at 15:59

## 2025-06-15 RX ADMIN — BUMETANIDE 1 MG: 1 TABLET ORAL at 08:43

## 2025-06-15 RX ADMIN — INSULIN LISPRO 6 UNITS: 100 INJECTION, SOLUTION INTRAVENOUS; SUBCUTANEOUS at 08:43

## 2025-06-15 RX ADMIN — INSULIN LISPRO 3 UNITS: 100 INJECTION, SOLUTION INTRAVENOUS; SUBCUTANEOUS at 18:51

## 2025-06-15 RX ADMIN — ROPINIROLE 1 MG: 1 TABLET, FILM COATED ORAL at 19:37

## 2025-06-15 RX ADMIN — INSULIN LISPRO 6 UNITS: 100 INJECTION, SOLUTION INTRAVENOUS; SUBCUTANEOUS at 18:51

## 2025-06-15 RX ADMIN — GABAPENTIN 400 MG: 300 CAPSULE ORAL at 21:01

## 2025-06-15 RX ADMIN — GABAPENTIN 400 MG: 300 CAPSULE ORAL at 06:38

## 2025-06-15 RX ADMIN — ROPINIROLE 1 MG: 1 TABLET, FILM COATED ORAL at 06:38

## 2025-06-15 RX ADMIN — ROPINIROLE HYDROCHLORIDE 3 MG: 3 TABLET, FILM COATED ORAL at 21:01

## 2025-06-15 RX ADMIN — FLUTICASONE FUROATE AND VILANTEROL TRIFENATATE 1 PUFF: 100; 25 POWDER RESPIRATORY (INHALATION) at 10:20

## 2025-06-15 RX ADMIN — LEVOTHYROXINE SODIUM 88 MCG: 0.09 TABLET ORAL at 06:38

## 2025-06-15 RX ADMIN — ENOXAPARIN SODIUM 40 MG: 100 INJECTION SUBCUTANEOUS at 12:58

## 2025-06-15 RX ADMIN — INSULIN GLARGINE 10 UNITS: 100 INJECTION, SOLUTION SUBCUTANEOUS at 21:01

## 2025-06-15 RX ADMIN — INSULIN LISPRO 1 UNITS: 100 INJECTION, SOLUTION INTRAVENOUS; SUBCUTANEOUS at 12:58

## 2025-06-15 RX ADMIN — CEFTRIAXONE SODIUM 2 G: 2 INJECTION, SOLUTION INTRAVENOUS at 15:59

## 2025-06-15 RX ADMIN — ATORVASTATIN CALCIUM 80 MG: 80 TABLET, FILM COATED ORAL at 08:43

## 2025-06-15 RX ADMIN — SACUBITRIL AND VALSARTAN 1 TABLET: 24; 26 TABLET, FILM COATED ORAL at 21:01

## 2025-06-15 RX ADMIN — BUMETANIDE 1 MG: 1 TABLET ORAL at 16:00

## 2025-06-15 RX ADMIN — CLOPIDOGREL BISULFATE 75 MG: 75 TABLET, FILM COATED ORAL at 08:43

## 2025-06-15 RX ADMIN — MAGNESIUM SULFATE HEPTAHYDRATE 2 G: 40 INJECTION, SOLUTION INTRAVENOUS at 07:58

## 2025-06-15 RX ADMIN — ASPIRIN 81 MG: 81 TABLET, CHEWABLE ORAL at 08:44

## 2025-06-15 RX ADMIN — ROPINIROLE 1 MG: 1 TABLET, FILM COATED ORAL at 12:57

## 2025-06-15 RX ADMIN — INSULIN LISPRO 6 UNITS: 100 INJECTION, SOLUTION INTRAVENOUS; SUBCUTANEOUS at 12:57

## 2025-06-15 RX ADMIN — SACUBITRIL AND VALSARTAN 1 TABLET: 24; 26 TABLET, FILM COATED ORAL at 08:43

## 2025-06-15 RX ADMIN — CITALOPRAM HYDROBROMIDE 40 MG: 40 TABLET ORAL at 06:38

## 2025-06-15 ASSESSMENT — COGNITIVE AND FUNCTIONAL STATUS - GENERAL
MOBILITY SCORE: 24
DAILY ACTIVITIY SCORE: 24
MOBILITY SCORE: 24
DAILY ACTIVITIY SCORE: 24

## 2025-06-15 ASSESSMENT — PAIN SCALES - GENERAL
PAINLEVEL_OUTOF10: 0 - NO PAIN
PAINLEVEL_OUTOF10: 0 - NO PAIN

## 2025-06-15 ASSESSMENT — PAIN - FUNCTIONAL ASSESSMENT
PAIN_FUNCTIONAL_ASSESSMENT: 0-10
PAIN_FUNCTIONAL_ASSESSMENT: 0-10

## 2025-06-15 NOTE — CARE PLAN
Problem: Pain - Adult  Goal: Verbalizes/displays adequate comfort level or baseline comfort level  Outcome: Progressing     Problem: Safety - Adult  Goal: Free from fall injury  Outcome: Progressing     Problem: Discharge Planning  Goal: Discharge to home or other facility with appropriate resources  Outcome: Progressing     Problem: Chronic Conditions and Co-morbidities  Goal: Patient's chronic conditions and co-morbidity symptoms are monitored and maintained or improved  Outcome: Progressing     Problem: Nutrition  Goal: Nutrient intake appropriate for maintaining nutritional needs  Outcome: Progressing     Problem: Heart Failure  Goal: Improved gas exchange this shift  Outcome: Progressing  Goal: Improved urinary output this shift  Outcome: Progressing  Goal: Reduction in peripheral edema within 24 hours  Outcome: Progressing  Goal: Report improvement of dyspnea/breathlessness this shift  Outcome: Progressing  Goal: Weight from fluid excess reduced over 2-3 days, then stabilize  Outcome: Progressing  Goal: Increase self care and/or family involvement in 24 hours  Outcome: Progressing     Problem: Diabetes  Goal: Achieve decreasing blood glucose levels by end of shift  Outcome: Progressing  Goal: Increase stability of blood glucose readings by end of shift  Outcome: Progressing  Goal: Decrease in ketones present in urine by end of shift  Outcome: Progressing  Goal: Maintain electrolyte levels within acceptable range throughout shift  Outcome: Progressing  Goal: Maintain glucose levels >70mg/dl to <250mg/dl throughout shift  Outcome: Progressing  Goal: No changes in neurological exam by end of shift  Outcome: Progressing  Goal: Learn about and adhere to nutrition recommendations by end of shift  Outcome: Progressing  Goal: Vital signs within normal range for age by end of shift  Outcome: Progressing  Goal: Increase self care and/or family involovement by end of shift  Outcome: Progressing  Goal: Receive DSME  education by end of shift  Outcome: Progressing       The patient's goals for the shift include GET SOMETHING TO EAT    The clinical goals for the shift include pt will remain HDS throughout shift.      06/15/25 at 1:07 AM - KRISTINA HUFFMAN RN

## 2025-06-15 NOTE — PROGRESS NOTES
Subjective:  - Patient gained 3 kg over past 3 days.   - Restart PO 1 mg bumex BID.   - Appears euvolemic. Denies chest pain or shortness of breath. No UTI symptoms.  - per HF team Dr. Mcgee would like to keep patient unitl presentation on Tuesday  - Patient is ok with plan    Objective:  Vitals:    06/15/25 0959   BP: 88/54   Pulse: 81   Resp: 19   Temp: 35.9 °C (96.6 °F)   SpO2: 92%     Weight         6/11/2025  0544 6/12/2025  0429 6/13/2025  0445 6/14/2025  0507 6/15/2025  0345    Weight: 60.1 kg (132 lb 7.9 oz) 58.2 kg (128 lb 4.9 oz) 57.6 kg (126 lb 15.8 oz) 58.7 kg (129 lb 6.6 oz) 59.7 kg (131 lb 9.8 oz)            Intake/Output Summary (Last 24 hours) at 6/15/2025 1113  Last data filed at 6/15/2025 0959  Gross per 24 hour   Intake 720 ml   Output 2250 ml   Net -1530 ml     Recent Results (from the past 24 hours)   POCT GLUCOSE    Collection Time: 06/14/25 11:51 AM   Result Value Ref Range    POCT Glucose 210 (H) 74 - 99 mg/dL   POCT GLUCOSE    Collection Time: 06/14/25  3:29 PM   Result Value Ref Range    POCT Glucose 161 (H) 74 - 99 mg/dL   Magnesium    Collection Time: 06/14/25  6:03 PM   Result Value Ref Range    Magnesium 1.68 1.60 - 2.40 mg/dL   CBC and Auto Differential    Collection Time: 06/14/25  6:03 PM   Result Value Ref Range    WBC 7.4 4.4 - 11.3 x10*3/uL    nRBC 0.0 0.0 - 0.0 /100 WBCs    RBC 4.41 4.00 - 5.20 x10*6/uL    Hemoglobin 12.8 12.0 - 16.0 g/dL    Hematocrit 42.0 36.0 - 46.0 %    MCV 95 80 - 100 fL    MCH 29.0 26.0 - 34.0 pg    MCHC 30.5 (L) 32.0 - 36.0 g/dL    RDW 13.7 11.5 - 14.5 %    Platelets 290 150 - 450 x10*3/uL    Neutrophils % 66.1 40.0 - 80.0 %    Immature Granulocytes %, Automated 0.4 0.0 - 0.9 %    Lymphocytes % 22.1 13.0 - 44.0 %    Monocytes % 8.1 2.0 - 10.0 %    Eosinophils % 2.2 0.0 - 6.0 %    Basophils % 1.1 0.0 - 2.0 %    Neutrophils Absolute 4.87 1.20 - 7.70 x10*3/uL    Immature Granulocytes Absolute, Automated 0.03 0.00 - 0.70 x10*3/uL    Lymphocytes Absolute  1.63 1.20 - 4.80 x10*3/uL    Monocytes Absolute 0.60 0.10 - 1.00 x10*3/uL    Eosinophils Absolute 0.16 0.00 - 0.70 x10*3/uL    Basophils Absolute 0.08 0.00 - 0.10 x10*3/uL   Comprehensive metabolic panel    Collection Time: 06/14/25  6:03 PM   Result Value Ref Range    Glucose 170 (H) 74 - 99 mg/dL    Sodium 137 136 - 145 mmol/L    Potassium 4.2 3.5 - 5.3 mmol/L    Chloride 100 98 - 107 mmol/L    Bicarbonate 32 21 - 32 mmol/L    Anion Gap 9 (L) 10 - 20 mmol/L    Urea Nitrogen 14 6 - 23 mg/dL    Creatinine 0.88 0.50 - 1.05 mg/dL    eGFR 74 >60 mL/min/1.73m*2    Calcium 8.7 8.6 - 10.6 mg/dL    Albumin 3.3 (L) 3.4 - 5.0 g/dL    Alkaline Phosphatase 68 33 - 136 U/L    Total Protein 6.3 (L) 6.4 - 8.2 g/dL    AST 15 9 - 39 U/L    Bilirubin, Total 0.3 0.0 - 1.2 mg/dL    ALT 17 7 - 45 U/L   POCT GLUCOSE    Collection Time: 06/14/25  9:12 PM   Result Value Ref Range    POCT Glucose 90 74 - 99 mg/dL   POCT GLUCOSE    Collection Time: 06/15/25  6:34 AM   Result Value Ref Range    POCT Glucose 132 (H) 74 - 99 mg/dL     Inpatient Medications:  Scheduled Medications[1]  PRN Medications[2]  Continuous Medications[3]    Telemetry 6/15/2025 (personally reviewed): SR 80s    Physical exam:  General: NAD  Head/ neck: No JVD  Cardiac: RRR, regular S1 S2 , no murmur, no rub, no gallop  Pulm: On room air. Posterior rales L side, good aeration through right side  Vascular: Radial 2+ bilaterally  GI: Non distended  Extremities: no LE edema   Neuro: no focal neuro deficits   Psych: appropriate mood and behavior   Skin: warm and dry     Assessment/Plan   Thao Evans is a 60F with a PMHx sig for CAD s/p PCI (LAD; 2015, Lcx; 3/2025), stage C systolic HF/ICM/HFrEF s/p ICD, h/o VT with presumed associated syncope, poorly controlled DM, pAF (off of DOAC given the lack of recurrence), dyslipidemia, essential HTN, COPD, and hypothyroidism with progressive HF symptoms and intolerance of GDMT(on midodrine). Presented to HFICU as a direct admission for  PAC guided evaluation. Advanced therapies evaluation was initiated 6/3 for OHT/LVAD.      Acute on chronic systolic and diastolic heart failure  Ischemic HFrEF 30-35%, Stage C  - TTE 3/2025 at OSH: LVEF 30-35%, normal RV size with low normal function, mild MR, small pericardial effusion  - TTE 6/2/25 on arrival: LVEF 20-25%/LVIDd 4.1cm,  mild MR/TR  - Admit wt: 54.4kg   - daily wt: 58.7kg  - Admit BNP 1292H  - Opening SGC #s 6/2: BP 97/55 (68), CVP 3, PAP 39/18 (27), CO/CI 2.86/1.87, SVR 1817, SVO2 62% on midodrine 5 mg, empa 10, sandi 25.  - weaned nipride gtt 6/5  - Closing SGC#s 6/6/25: BP 88/51 (63), CVP 11, PAP 62/30 (43), CO 3.98/CI 2.54. SVO2 62%.   - GDMT:   - 6/13 reduced Entresto dosing from 49/51 to 24/26 today for borderline Bps  - 88s-100s SBP today  - Hold empa 10 while getting abx for UTI. Consider after resuming after DC   - Continue sandi 25mg daily   - Holding BB for now   - Diuresis:   - 6/10 resumed bumex 1 mg BID   - 6/12 BP soft 80s. Holding bumex  - 6/15 Continue bumex 1 mg BID  - Advanced therapies evaluation initiated  - unable to be presented at advanced therapy committee 6/10 due to time constraints> will be re-discussed at next Tuesday meeting 6/17. Dr. Mcgee would like to keep patient until presentation on Tuesday.   - Home medications: metop XL 12.5mg daily, spironolactone 25mg daily, jardiance 10mg daily, bumex 2mg bid, midodrine 5mg TID.  - Daily standing weights, 2gm sodium diet, 2L fluid restriction, strict I&Os     Acute hypoxic respiratory failure, improving  COPD  Pleural effusions  - C/w home inhalers  - IV diuretics as above  - PFTs 6/6 showing severe restrictive defect  - s/p L thoracentesis on 6/9:  -1L yellow fluid. Labs c/w transudative effusion. No significant findings on additional cytology labs  - 2vCXR  6/9: Marked decrease in L pleural effusion. Stable mild pulm edema and R pleural effusion  - PFTs repeat 6/10 (s/p thoracentesis) without much improvement  - Pulm  consulted 6/10. Recommended IR consult for R sided thora  - IR 6/11 L sided residual thoracentesis -1.2L.   - 2V CXR 6/11  post L sided thora looks improved. No pneumothorax  - IR 6/12 R thoracentesis with 1000 cc removed  - Repeat PFTs done 6/13  - currently on room air  - continue incentive spirometry    Symptomatic UTI, improved  Syphilis   - UA with +LE, WBC  - Urine cx from 6/3 (advanced therapies evaluation with e.coli)   - ID following: rec 2 weeks (stop date 6/20) of ceftriaxone 2g q24 hours (given UTI and syphilis)  - midline placed. Abx completion date (6/20)  - Hold jardiance while being treated (can resume after 6/20)  - Reports resolved urinary symptoms as of 6/8     Hx of Hypotension  - Home midodrine discontinued  - Last 24hrs SBPs: 86- 104 (denies any lightheadedness or dizziness)     CAD s/p PCI (LAD 2015, LCx 3/2025)  Hyperlipidemia  - No reported angina  - C/w home Plavix & statin  - per patient, had been discharged after SABINA placement on Eliquis and plavix (no aspirin to avoid triple therapy), but then eliquis was discontinued due to lack of recurrence of atrial fib. Restarted aspirin 81 mg for DAPT given stent placement <1yr ago      H/o VT  Paroxysmal A Fib  s/p CRT-D 3/2025, [facility]   - AC: off DOAC given absence of recurrence  - K goal >4, Mg>2  - Remains in sinus rhythm on telemetry    T2DM  - HgbA1c 8.6, previously was 12.3 in 3/2025  - Home medications: insulin glargine, jardiance, alogliptin (nesina).  - Endocrine saw pt  - inpatient: glargine 10 units nightly, lispro 6 units premeals, SSI coverage.      Hypothyroidism  - TSH 0.2L, Free T4 2.05H  - Repeat TSH 0.19L, free T4 1.23WNL/T3 67.  - c/w levothyroxine 88mcg daily per endocrinology.   - will need repeat TFTs in 6-8 weeks in outpatient with PCP   - will need follow up with endocrinology at discharge     GERD  - C/w home protonix 20 mg daily   - Bowel regimen: prn miralax, juliocesar-colace BID      Iron deficiency  - Fe, TIBC,  and ferritin WNL., %sat 17 Low  - IV venofer ordered 6/3- 6/6   - No additional iron supplemented required  - Daily Hgb 13.4     Prior tobacco use  - previous smoker of 40 years  - quit 1.5 months prior to admission date     Anxiety  Depression  - C/w home celexa 40 mg daily  - C/w home gabapentin 400 mg q8 hours     Restless legs  - C/w home requip      DVT ppx: lovenox subcutaneous     DISPO: Dr. Mcgee would like to keep patient until presentation on Tuesday     Code status: Full Code    Patient seen and discussed with Dr. Mickey Ferrari DNP, APRN-CNP  Cardiology Consults     Gamaliel English         [1]   Scheduled medications   Medication Dose Route Frequency    aspirin  81 mg oral Daily    atorvastatin  80 mg oral Daily    bumetanide  1 mg oral BID    cefTRIAXone  2 g intravenous q24h    citalopram  40 mg oral Daily    clopidogrel  75 mg oral Daily    [Held by provider] empagliflozin  10 mg oral Daily    enoxaparin  40 mg subcutaneous q24h    fluticasone furoate-vilanteroL  1 puff inhalation Daily    gabapentin  400 mg oral q8h MELYSSA    insulin glargine  10 Units subcutaneous Nightly    insulin lispro  0-5 Units subcutaneous TID AC    insulin lispro  6 Units subcutaneous TID AC    levothyroxine  88 mcg oral Daily    lidocaine  5 mL infiltration Once    [Held by provider] metoprolol succinate XL  12.5 mg oral Daily    [Held by provider] midodrine  5 mg oral TID    pantoprazole  20 mg oral Daily    rOPINIRole  1 mg oral TID    rOPINIRole  3 mg oral Nightly    sacubitriL-valsartan  1 tablet oral BID    sennosides-docusate sodium  2 tablet oral BID    spironolactone  25 mg oral Daily   [2]   PRN medications   Medication    acetaminophen    albuterol    dextrose    dextrose    glucagon    glucagon    guaiFENesin    oxygen    polyethylene glycol   [3]   Continuous Medications   Medication Dose Last Rate

## 2025-06-15 NOTE — CARE PLAN
Problem: Pain - Adult  Goal: Verbalizes/displays adequate comfort level or baseline comfort level  Outcome: Progressing     Problem: Safety - Adult  Goal: Free from fall injury  Outcome: Progressing     Problem: Discharge Planning  Goal: Discharge to home or other facility with appropriate resources  Outcome: Progressing     Problem: Chronic Conditions and Co-morbidities  Goal: Patient's chronic conditions and co-morbidity symptoms are monitored and maintained or improved  Outcome: Progressing     Problem: Nutrition  Goal: Nutrient intake appropriate for maintaining nutritional needs  Outcome: Progressing     Problem: Heart Failure  Goal: Improved gas exchange this shift  Outcome: Progressing  Goal: Improved urinary output this shift  Outcome: Progressing  Goal: Reduction in peripheral edema within 24 hours  Outcome: Progressing  Goal: Report improvement of dyspnea/breathlessness this shift  Outcome: Progressing  Goal: Weight from fluid excess reduced over 2-3 days, then stabilize  Outcome: Progressing  Goal: Increase self care and/or family involvement in 24 hours  Outcome: Progressing     Problem: Diabetes  Goal: Achieve decreasing blood glucose levels by end of shift  Outcome: Progressing  Goal: Increase stability of blood glucose readings by end of shift  Outcome: Progressing  Goal: Decrease in ketones present in urine by end of shift  Outcome: Progressing  Goal: Maintain electrolyte levels within acceptable range throughout shift  Outcome: Progressing  Goal: Maintain glucose levels >70mg/dl to <250mg/dl throughout shift  Outcome: Progressing  Goal: No changes in neurological exam by end of shift  Outcome: Progressing  Goal: Learn about and adhere to nutrition recommendations by end of shift  Outcome: Progressing  Goal: Vital signs within normal range for age by end of shift  Outcome: Progressing  Goal: Increase self care and/or family involovement by end of shift  Outcome: Progressing  Goal: Receive DSME  education by end of shift  Outcome: Progressing   The patient's goals for the shift include GET SOMETHING TO EAT    The clinical goals for the shift include patient will be free from falls and injury this shift    Patient had a uneventful shift, no acute change. Meds tolerated well,no c/o pain. Patient HDS, Goal of acre to be discuss on Wednesday.

## 2025-06-16 LAB
ALBUMIN SERPL BCP-MCNC: 3.3 G/DL (ref 3.4–5)
ALP SERPL-CCNC: 68 U/L (ref 33–136)
ALT SERPL W P-5'-P-CCNC: 19 U/L (ref 7–45)
ANION GAP SERPL CALC-SCNC: 13 MMOL/L (ref 10–20)
AST SERPL W P-5'-P-CCNC: 14 U/L (ref 9–39)
BASOPHILS # BLD AUTO: 0.05 X10*3/UL (ref 0–0.1)
BASOPHILS # BLD AUTO: 0.06 X10*3/UL (ref 0–0.1)
BASOPHILS NFR BLD AUTO: 0.7 %
BASOPHILS NFR BLD AUTO: 0.8 %
BILIRUB SERPL-MCNC: 0.3 MG/DL (ref 0–1.2)
BUN SERPL-MCNC: 16 MG/DL (ref 6–23)
CALCIUM SERPL-MCNC: 8.9 MG/DL (ref 8.6–10.6)
CHLORIDE SERPL-SCNC: 95 MMOL/L (ref 98–107)
CO2 SERPL-SCNC: 33 MMOL/L (ref 21–32)
CREAT SERPL-MCNC: 0.96 MG/DL (ref 0.5–1.05)
EGFRCR SERPLBLD CKD-EPI 2021: 67 ML/MIN/1.73M*2
EOSINOPHIL # BLD AUTO: 0.16 X10*3/UL (ref 0–0.7)
EOSINOPHIL # BLD AUTO: 0.16 X10*3/UL (ref 0–0.7)
EOSINOPHIL NFR BLD AUTO: 2.2 %
EOSINOPHIL NFR BLD AUTO: 2.3 %
ERYTHROCYTE [DISTWIDTH] IN BLOOD BY AUTOMATED COUNT: 13.7 % (ref 11.5–14.5)
ERYTHROCYTE [DISTWIDTH] IN BLOOD BY AUTOMATED COUNT: 13.7 % (ref 11.5–14.5)
GLUCOSE BLD MANUAL STRIP-MCNC: 175 MG/DL (ref 74–99)
GLUCOSE BLD MANUAL STRIP-MCNC: 195 MG/DL (ref 74–99)
GLUCOSE BLD MANUAL STRIP-MCNC: 209 MG/DL (ref 74–99)
GLUCOSE BLD MANUAL STRIP-MCNC: 211 MG/DL (ref 74–99)
GLUCOSE SERPL-MCNC: 295 MG/DL (ref 74–99)
HCT VFR BLD AUTO: 40.7 % (ref 36–46)
HCT VFR BLD AUTO: 42.3 % (ref 36–46)
HGB BLD-MCNC: 12.9 G/DL (ref 12–16)
HGB BLD-MCNC: 13 G/DL (ref 12–16)
IMM GRANULOCYTES # BLD AUTO: 0.02 X10*3/UL (ref 0–0.7)
IMM GRANULOCYTES # BLD AUTO: 0.03 X10*3/UL (ref 0–0.7)
IMM GRANULOCYTES NFR BLD AUTO: 0.3 % (ref 0–0.9)
IMM GRANULOCYTES NFR BLD AUTO: 0.4 % (ref 0–0.9)
LYMPHOCYTES # BLD AUTO: 1.48 X10*3/UL (ref 1.2–4.8)
LYMPHOCYTES # BLD AUTO: 1.57 X10*3/UL (ref 1.2–4.8)
LYMPHOCYTES NFR BLD AUTO: 21.4 %
LYMPHOCYTES NFR BLD AUTO: 21.5 %
MAGNESIUM SERPL-MCNC: 1.45 MG/DL (ref 1.6–2.4)
MCH RBC QN AUTO: 29.8 PG (ref 26–34)
MCHC RBC AUTO-ENTMCNC: 30.7 G/DL (ref 32–36)
MCHC RBC AUTO-ENTMCNC: 31.7 G/DL (ref 32–36)
MCV RBC AUTO: 94 FL (ref 80–100)
MCV RBC AUTO: 94 FL (ref 80–100)
MGC ASCENT PFT - FEV1 - PRE: 1.25
MGC ASCENT PFT - FEV1 - PREDICTED: 2.24
MGC ASCENT PFT - FVC - PRE: 1.64
MGC ASCENT PFT - FVC - PREDICTED: 2.81
MONOCYTES # BLD AUTO: 0.47 X10*3/UL (ref 0.1–1)
MONOCYTES # BLD AUTO: 0.49 X10*3/UL (ref 0.1–1)
MONOCYTES NFR BLD AUTO: 6.4 %
MONOCYTES NFR BLD AUTO: 7.1 %
NEUTROPHILS # BLD AUTO: 4.72 X10*3/UL (ref 1.2–7.7)
NEUTROPHILS # BLD AUTO: 5.01 X10*3/UL (ref 1.2–7.7)
NEUTROPHILS NFR BLD AUTO: 68.1 %
NEUTROPHILS NFR BLD AUTO: 68.8 %
NRBC BLD-RTO: 0 /100 WBCS (ref 0–0)
PLATELET # BLD AUTO: 261 X10*3/UL (ref 150–450)
PLATELET # BLD AUTO: 320 X10*3/UL (ref 150–450)
POTASSIUM SERPL-SCNC: 3.9 MMOL/L (ref 3.5–5.3)
PROT SERPL-MCNC: 6.1 G/DL (ref 6.4–8.2)
RBC # BLD AUTO: 4.33 X10*6/UL (ref 4–5.2)
RBC # BLD AUTO: 4.48 X10*6/UL (ref 4–5.2)
SODIUM SERPL-SCNC: 137 MMOL/L (ref 136–145)
WBC # BLD AUTO: 6.9 X10*3/UL (ref 4.4–11.3)
WBC # BLD AUTO: 7.3 X10*3/UL (ref 4.4–11.3)

## 2025-06-16 PROCEDURE — 82947 ASSAY GLUCOSE BLOOD QUANT: CPT

## 2025-06-16 PROCEDURE — 85025 COMPLETE CBC W/AUTO DIFF WBC: CPT | Performed by: PHYSICIAN ASSISTANT

## 2025-06-16 PROCEDURE — 2500000001 HC RX 250 WO HCPCS SELF ADMINISTERED DRUGS (ALT 637 FOR MEDICARE OP): Performed by: PHYSICIAN ASSISTANT

## 2025-06-16 PROCEDURE — 80053 COMPREHEN METABOLIC PANEL: CPT | Performed by: PHYSICIAN ASSISTANT

## 2025-06-16 PROCEDURE — 2500000001 HC RX 250 WO HCPCS SELF ADMINISTERED DRUGS (ALT 637 FOR MEDICARE OP): Performed by: NURSE PRACTITIONER

## 2025-06-16 PROCEDURE — 2500000004 HC RX 250 GENERAL PHARMACY W/ HCPCS (ALT 636 FOR OP/ED): Performed by: PHYSICIAN ASSISTANT

## 2025-06-16 PROCEDURE — 1200000002 HC GENERAL ROOM WITH TELEMETRY DAILY

## 2025-06-16 PROCEDURE — 83735 ASSAY OF MAGNESIUM: CPT | Performed by: PHYSICIAN ASSISTANT

## 2025-06-16 PROCEDURE — 2500000004 HC RX 250 GENERAL PHARMACY W/ HCPCS (ALT 636 FOR OP/ED)

## 2025-06-16 PROCEDURE — 2500000001 HC RX 250 WO HCPCS SELF ADMINISTERED DRUGS (ALT 637 FOR MEDICARE OP)

## 2025-06-16 PROCEDURE — 2500000002 HC RX 250 W HCPCS SELF ADMINISTERED DRUGS (ALT 637 FOR MEDICARE OP, ALT 636 FOR OP/ED): Performed by: STUDENT IN AN ORGANIZED HEALTH CARE EDUCATION/TRAINING PROGRAM

## 2025-06-16 PROCEDURE — 2500000002 HC RX 250 W HCPCS SELF ADMINISTERED DRUGS (ALT 637 FOR MEDICARE OP, ALT 636 FOR OP/ED): Performed by: PHYSICIAN ASSISTANT

## 2025-06-16 PROCEDURE — 2500000004 HC RX 250 GENERAL PHARMACY W/ HCPCS (ALT 636 FOR OP/ED): Performed by: NURSE PRACTITIONER

## 2025-06-16 PROCEDURE — 99232 SBSQ HOSP IP/OBS MODERATE 35: CPT | Performed by: INTERNAL MEDICINE

## 2025-06-16 RX ORDER — LANOLIN ALCOHOL/MO/W.PET/CERES
400 CREAM (GRAM) TOPICAL DAILY
Status: DISCONTINUED | OUTPATIENT
Start: 2025-06-16 | End: 2025-06-17 | Stop reason: HOSPADM

## 2025-06-16 RX ADMIN — BUMETANIDE 1 MG: 1 TABLET ORAL at 17:17

## 2025-06-16 RX ADMIN — FLUTICASONE FUROATE AND VILANTEROL TRIFENATATE 1 PUFF: 100; 25 POWDER RESPIRATORY (INHALATION) at 10:15

## 2025-06-16 RX ADMIN — GABAPENTIN 400 MG: 300 CAPSULE ORAL at 05:05

## 2025-06-16 RX ADMIN — INSULIN GLARGINE 10 UNITS: 100 INJECTION, SOLUTION SUBCUTANEOUS at 21:09

## 2025-06-16 RX ADMIN — ROPINIROLE 1 MG: 1 TABLET, FILM COATED ORAL at 19:00

## 2025-06-16 RX ADMIN — INSULIN LISPRO 1 UNITS: 100 INJECTION, SOLUTION INTRAVENOUS; SUBCUTANEOUS at 09:02

## 2025-06-16 RX ADMIN — CLOPIDOGREL BISULFATE 75 MG: 75 TABLET, FILM COATED ORAL at 09:02

## 2025-06-16 RX ADMIN — INSULIN LISPRO 2 UNITS: 100 INJECTION, SOLUTION INTRAVENOUS; SUBCUTANEOUS at 17:17

## 2025-06-16 RX ADMIN — ACETAMINOPHEN 650 MG: 325 TABLET ORAL at 15:41

## 2025-06-16 RX ADMIN — INSULIN LISPRO 6 UNITS: 100 INJECTION, SOLUTION INTRAVENOUS; SUBCUTANEOUS at 17:18

## 2025-06-16 RX ADMIN — ROPINIROLE HYDROCHLORIDE 3 MG: 3 TABLET, FILM COATED ORAL at 21:10

## 2025-06-16 RX ADMIN — CEFTRIAXONE SODIUM 2 G: 2 INJECTION, SOLUTION INTRAVENOUS at 16:37

## 2025-06-16 RX ADMIN — MAGNESIUM OXIDE TAB 400 MG (241.3 MG ELEMENTAL MG) 1 TABLET: 400 (241.3 MG) TAB at 21:10

## 2025-06-16 RX ADMIN — LEVOTHYROXINE SODIUM 88 MCG: 0.09 TABLET ORAL at 05:05

## 2025-06-16 RX ADMIN — CITALOPRAM HYDROBROMIDE 40 MG: 40 TABLET ORAL at 05:05

## 2025-06-16 RX ADMIN — ROPINIROLE 1 MG: 1 TABLET, FILM COATED ORAL at 05:07

## 2025-06-16 RX ADMIN — PANTOPRAZOLE SODIUM 20 MG: 20 TABLET, DELAYED RELEASE ORAL at 09:01

## 2025-06-16 RX ADMIN — ATORVASTATIN CALCIUM 80 MG: 80 TABLET, FILM COATED ORAL at 09:02

## 2025-06-16 RX ADMIN — ASPIRIN 81 MG: 81 TABLET, CHEWABLE ORAL at 09:02

## 2025-06-16 RX ADMIN — SPIRONOLACTONE 25 MG: 25 TABLET, FILM COATED ORAL at 09:02

## 2025-06-16 RX ADMIN — INSULIN LISPRO 6 UNITS: 100 INJECTION, SOLUTION INTRAVENOUS; SUBCUTANEOUS at 09:02

## 2025-06-16 RX ADMIN — SACUBITRIL AND VALSARTAN 1 TABLET: 24; 26 TABLET, FILM COATED ORAL at 21:10

## 2025-06-16 RX ADMIN — SACUBITRIL AND VALSARTAN 1 TABLET: 24; 26 TABLET, FILM COATED ORAL at 09:02

## 2025-06-16 RX ADMIN — ROPINIROLE 1 MG: 1 TABLET, FILM COATED ORAL at 13:07

## 2025-06-16 RX ADMIN — BUMETANIDE 1 MG: 1 TABLET ORAL at 09:01

## 2025-06-16 RX ADMIN — GABAPENTIN 400 MG: 300 CAPSULE ORAL at 21:10

## 2025-06-16 RX ADMIN — GABAPENTIN 400 MG: 300 CAPSULE ORAL at 13:08

## 2025-06-16 RX ADMIN — ENOXAPARIN SODIUM 40 MG: 100 INJECTION SUBCUTANEOUS at 13:08

## 2025-06-16 ASSESSMENT — COGNITIVE AND FUNCTIONAL STATUS - GENERAL
MOBILITY SCORE: 24
DAILY ACTIVITIY SCORE: 24

## 2025-06-16 ASSESSMENT — PAIN SCALES - GENERAL
PAINLEVEL_OUTOF10: 0 - NO PAIN

## 2025-06-16 ASSESSMENT — PAIN - FUNCTIONAL ASSESSMENT
PAIN_FUNCTIONAL_ASSESSMENT: 0-10
PAIN_FUNCTIONAL_ASSESSMENT: 0-10

## 2025-06-16 NOTE — PROGRESS NOTES
Subjective:  - Weight stable since restarting bumex yesterday, on room air, ambulating in lund without difficulty  - Appears euvolemic. Denies chest pain or shortness of breath. No UTI symptoms.  - per HF team Dr. Mcgee would like to keep patient unitl presentation on Tuesday.       Objective:  Vitals:    06/16/25 0823   BP: 96/57   Pulse: 82   Resp: 19   Temp: 36.3 °C (97.3 °F)   SpO2: 93%     Weight         6/12/2025  0429 6/13/2025  0445 6/14/2025  0507 6/15/2025  0345 6/16/2025  0523    Weight: 58.2 kg (128 lb 4.9 oz) 57.6 kg (126 lb 15.8 oz) 58.7 kg (129 lb 6.6 oz) 59.7 kg (131 lb 9.8 oz) 59 kg (130 lb 1.1 oz)            Intake/Output Summary (Last 24 hours) at 6/16/2025 1040  Last data filed at 6/16/2025 0523  Gross per 24 hour   Intake 840 ml   Output 4750 ml   Net -3910 ml     Recent Results (from the past 24 hours)   POCT GLUCOSE    Collection Time: 06/15/25 11:51 AM   Result Value Ref Range    POCT Glucose 158 (H) 74 - 99 mg/dL   POCT GLUCOSE    Collection Time: 06/15/25  4:08 PM   Result Value Ref Range    POCT Glucose 265 (H) 74 - 99 mg/dL   Magnesium    Collection Time: 06/15/25  5:35 PM   Result Value Ref Range    Magnesium 1.93 1.60 - 2.40 mg/dL   CBC and Auto Differential    Collection Time: 06/15/25  5:35 PM   Result Value Ref Range    WBC 8.0 4.4 - 11.3 x10*3/uL    nRBC 0.0 0.0 - 0.0 /100 WBCs    RBC 4.38 4.00 - 5.20 x10*6/uL    Hemoglobin 13.0 12.0 - 16.0 g/dL    Hematocrit 42.5 36.0 - 46.0 %    MCV 97 80 - 100 fL    MCH 29.7 26.0 - 34.0 pg    MCHC 30.6 (L) 32.0 - 36.0 g/dL    RDW 13.7 11.5 - 14.5 %    Platelets 310 150 - 450 x10*3/uL    Neutrophils % 68.5 40.0 - 80.0 %    Immature Granulocytes %, Automated 0.4 0.0 - 0.9 %    Lymphocytes % 21.0 13.0 - 44.0 %    Monocytes % 7.2 2.0 - 10.0 %    Eosinophils % 2.0 0.0 - 6.0 %    Basophils % 0.9 0.0 - 2.0 %    Neutrophils Absolute 5.50 1.20 - 7.70 x10*3/uL    Immature Granulocytes Absolute, Automated 0.03 0.00 - 0.70 x10*3/uL    Lymphocytes Absolute  1.69 1.20 - 4.80 x10*3/uL    Monocytes Absolute 0.58 0.10 - 1.00 x10*3/uL    Eosinophils Absolute 0.16 0.00 - 0.70 x10*3/uL    Basophils Absolute 0.07 0.00 - 0.10 x10*3/uL   Comprehensive metabolic panel    Collection Time: 06/15/25  5:35 PM   Result Value Ref Range    Glucose 240 (H) 74 - 99 mg/dL    Sodium 137 136 - 145 mmol/L    Potassium 4.1 3.5 - 5.3 mmol/L    Chloride 97 (L) 98 - 107 mmol/L    Bicarbonate 33 (H) 21 - 32 mmol/L    Anion Gap 11 10 - 20 mmol/L    Urea Nitrogen 18 6 - 23 mg/dL    Creatinine 0.74 0.50 - 1.05 mg/dL    eGFR >90 >60 mL/min/1.73m*2    Calcium 8.7 8.6 - 10.6 mg/dL    Albumin 3.4 3.4 - 5.0 g/dL    Alkaline Phosphatase 66 33 - 136 U/L    Total Protein 6.2 (L) 6.4 - 8.2 g/dL    AST 16 9 - 39 U/L    Bilirubin, Total 0.2 0.0 - 1.2 mg/dL    ALT 19 7 - 45 U/L   POCT GLUCOSE    Collection Time: 06/15/25  9:24 PM   Result Value Ref Range    POCT Glucose 179 (H) 74 - 99 mg/dL   POCT GLUCOSE    Collection Time: 06/16/25  6:36 AM   Result Value Ref Range    POCT Glucose 195 (H) 74 - 99 mg/dL     Inpatient Medications:  Scheduled Medications[1]  PRN Medications[2]  Continuous Medications[3]    Telemetry 6/16/2025 (personally reviewed): SR 80s    Physical exam:  General: NAD  Head/ neck: No JVD  Cardiac: RRR, regular S1 S2 , no murmur, no rub, no gallop  Pulm: On room air. Expiratory wheezing left lower posterior lobe and right anterior lobe  Vascular: Radial 2+ bilaterally  GI: Non distended  Extremities: no LE edema   Neuro: no focal neuro deficits   Psych: appropriate mood and behavior   Skin: warm and dry     Assessment/Plan   Thao Evans is a 60F with a PMHx sig for CAD s/p PCI (LAD; 2015, Lcx; 3/2025), stage C systolic HF/ICM/HFrEF s/p ICD, h/o VT with presumed associated syncope, poorly controlled DM, pAF (off of DOAC given the lack of recurrence), dyslipidemia, essential HTN, COPD, and hypothyroidism with progressive HF symptoms and intolerance of GDMT(on midodrine). Presented to HFICU as a  direct admission for PAC guided evaluation. Advanced therapies evaluation was initiated 6/3 for OHT/LVAD.      Acute on chronic systolic and diastolic heart failure  Ischemic HFrEF 30-35%, Stage C  - TTE 3/2025 at OSH: LVEF 30-35%, normal RV size with low normal function, mild MR, small pericardial effusion  - TTE 6/2/25 on arrival: LVEF 20-25%/LVIDd 4.1cm,  mild MR/TR  - Admit wt: 54.4kg   - daily wt: 58.7kg  - Admit BNP 1292H  - Opening SGC #s 6/2: BP 97/55 (68), CVP 3, PAP 39/18 (27), CO/CI 2.86/1.87, SVR 1817, SVO2 62% on midodrine 5 mg, empa 10, sandi 25.  - weaned nipride gtt 6/5  - Closing SGC#s 6/6/25: BP 88/51 (63), CVP 11, PAP 62/30 (43), CO 3.98/CI 2.54. SVO2 62%.   - GDMT:   - 6/13 reduced Entresto dosing from 49/51 to 24/26 for borderline Bps  - Hold empa 10 while getting abx for UTI. Consider after resuming after DC   - Continue sandi 25mg daily   - Holding BB for now   - Diuresis:   - 6/10 resumed bumex 1 mg BID   - 6/12 BP soft 80s. Holding bumex  - 6/15 restart bumex 1 mg BID due to weight gain  - Advanced therapies evaluation initiated  - unable to be presented at advanced therapy committee 6/10 due to time constraints> will be re-discussed at next Tuesday meeting 6/17. Dr. Mcgee would like to keep patient until presentation on Tuesday.   - Home medications: metop XL 12.5mg daily, spironolactone 25mg daily, jardiance 10mg daily, bumex 2mg bid, midodrine 5mg TID.  - Daily standing weights, 2gm sodium diet, 2L fluid restriction, strict I&Os     Acute hypoxic respiratory failure, resolved  COPD  Pleural effusions  - C/w home inhalers  - IV diuretics as above  - PFTs 6/6 showing severe restrictive defect  - s/p L thoracentesis on 6/9:  -1L yellow fluid. Labs c/w transudative effusion. No significant findings on additional cytology labs  - 2vCXR  6/9: Marked decrease in L pleural effusion. Stable mild pulm edema and R pleural effusion  - PFTs repeat 6/10 (s/p thoracentesis) without much  improvement  - Pulm consulted 6/10. Recommended IR consult for R sided thora  - IR 6/11 L sided residual thoracentesis -1.2L.   - 2V CXR 6/11  post L sided thora looks improved. No pneumothorax  - IR 6/12 R thoracentesis with 1000 cc removed  - Repeat PFTs done 6/13  - currently on room air  - continue incentive spirometry    Symptomatic UTI, improved  Syphilis   - UA with +LE, WBC  - Urine cx from 6/3 (advanced therapies evaluation with e.coli)   - ID following: rec 2 weeks (stop date 6/20) of ceftriaxone 2g q24 hours (given UTI and syphilis)  - midline placed. Abx completion date (6/20)  - Hold jardiance while being treated (can resume after 6/20)  - Reports resolved urinary symptoms as of 6/8     Hx of Hypotension  - Home midodrine discontinued  - Last 24hrs SBPs: 86- 104 (denies any lightheadedness or dizziness)     CAD s/p PCI (LAD 2015, LCx 3/2025)  Hyperlipidemia  - No reported angina  - C/w home Plavix & statin  - per patient, had been discharged after SABINA placement on Eliquis and plavix (no aspirin to avoid triple therapy), but then eliquis was discontinued due to lack of recurrence of atrial fib. Restarted aspirin 81 mg for DAPT given stent placement <1yr ago      H/o VT  Paroxysmal A Fib  s/p CRT-D 3/2025, [employer]   - AC: off DOAC given absence of recurrence  - K goal >4, Mg>2  - Remains in sinus rhythm on telemetry    T2DM  - HgbA1c 8.6, previously was 12.3 in 3/2025  - Home medications: insulin glargine, jardiance, alogliptin (nesina).  - Endocrine saw pt  - inpatient: glargine 10 units nightly, lispro 6 units premeals, SSI coverage.      Hypothyroidism  - TSH 0.2L, Free T4 2.05H  - Repeat TSH 0.19L, free T4 1.23WNL/T3 67.  - c/w levothyroxine 88mcg daily per endocrinology.   - will need repeat TFTs in 6-8 weeks in outpatient with PCP   - will need follow up with endocrinology at discharge     GERD  - C/w home protonix 20 mg daily   - Bowel regimen: prn miralax, juliocesar-colace BID      Iron  deficiency  - Fe, TIBC, and ferritin WNL., %sat 17 Low  - IV venofer ordered 6/3- 6/6   - No additional iron supplemented required  - Daily Hgb 13 (13.4)     Prior tobacco use  - previous smoker of 40 years  - quit 1.5 months prior to admission date     Anxiety  Depression  - C/w home celexa 40 mg daily  - C/w home gabapentin 400 mg q8 hours     Restless legs  - C/w home requip      DVT ppx: lovenox subcutaneous     DISPO: Dr. Mcgee would like to keep patient until presentation on Tuesday     Code status: Full Code    Patient seen and discussed with Dr. Josue Marrero, APRN-CNP         [1]   Scheduled medications   Medication Dose Route Frequency    aspirin  81 mg oral Daily    atorvastatin  80 mg oral Daily    bumetanide  1 mg oral BID    cefTRIAXone  2 g intravenous q24h    citalopram  40 mg oral Daily    clopidogrel  75 mg oral Daily    [Held by provider] empagliflozin  10 mg oral Daily    enoxaparin  40 mg subcutaneous q24h    fluticasone furoate-vilanteroL  1 puff inhalation Daily    gabapentin  400 mg oral q8h MELYSSA    insulin glargine  10 Units subcutaneous Nightly    insulin lispro  0-5 Units subcutaneous TID AC    insulin lispro  6 Units subcutaneous TID AC    levothyroxine  88 mcg oral Daily    lidocaine  5 mL infiltration Once    [Held by provider] metoprolol succinate XL  12.5 mg oral Daily    [Held by provider] midodrine  5 mg oral TID    pantoprazole  20 mg oral Daily    rOPINIRole  1 mg oral TID    rOPINIRole  3 mg oral Nightly    sacubitriL-valsartan  1 tablet oral BID    sennosides-docusate sodium  2 tablet oral BID    spironolactone  25 mg oral Daily   [2]   PRN medications   Medication    acetaminophen    albuterol    dextrose    dextrose    glucagon    glucagon    guaiFENesin    oxygen    polyethylene glycol   [3]   Continuous Medications   Medication Dose Last Rate

## 2025-06-17 ENCOUNTER — COMMITTEE REVIEW (OUTPATIENT)
Dept: TRANSPLANT | Facility: HOSPITAL | Age: 60
End: 2025-06-17
Payer: COMMERCIAL

## 2025-06-17 ENCOUNTER — PHARMACY VISIT (OUTPATIENT)
Dept: PHARMACY | Facility: CLINIC | Age: 60
End: 2025-06-17
Payer: COMMERCIAL

## 2025-06-17 ENCOUNTER — DOCUMENTATION (OUTPATIENT)
Facility: HOSPITAL | Age: 60
End: 2025-06-17
Payer: COMMERCIAL

## 2025-06-17 VITALS
HEIGHT: 62 IN | RESPIRATION RATE: 18 BRPM | OXYGEN SATURATION: 92 % | TEMPERATURE: 97.7 F | BODY MASS INDEX: 23.61 KG/M2 | DIASTOLIC BLOOD PRESSURE: 56 MMHG | WEIGHT: 128.31 LBS | SYSTOLIC BLOOD PRESSURE: 95 MMHG | HEART RATE: 82 BPM

## 2025-06-17 PROBLEM — I47.20 VT (VENTRICULAR TACHYCARDIA) (MULTI): Status: ACTIVE | Noted: 2025-06-17

## 2025-06-17 PROBLEM — A53.0 LATENT SYPHILIS: Status: ACTIVE | Noted: 2025-06-17

## 2025-06-17 PROBLEM — J44.9 COPD (CHRONIC OBSTRUCTIVE PULMONARY DISEASE) (MULTI): Status: ACTIVE | Noted: 2025-06-17

## 2025-06-17 PROBLEM — Z98.61 CAD S/P PERCUTANEOUS CORONARY ANGIOPLASTY: Status: ACTIVE | Noted: 2025-06-17

## 2025-06-17 PROBLEM — N39.0 UTI (URINARY TRACT INFECTION): Status: ACTIVE | Noted: 2025-06-17

## 2025-06-17 PROBLEM — I25.10 CAD S/P PERCUTANEOUS CORONARY ANGIOPLASTY: Status: ACTIVE | Noted: 2025-06-17

## 2025-06-17 PROBLEM — E03.9 HYPOTHYROIDISM: Status: ACTIVE | Noted: 2025-06-17

## 2025-06-17 PROBLEM — G25.81 RESTLESS LEG SYNDROME: Status: ACTIVE | Noted: 2025-06-17

## 2025-06-17 LAB
GLUCOSE BLD MANUAL STRIP-MCNC: 126 MG/DL (ref 74–99)
GLUCOSE BLD MANUAL STRIP-MCNC: 170 MG/DL (ref 74–99)
GLUCOSE BLD MANUAL STRIP-MCNC: 214 MG/DL (ref 74–99)

## 2025-06-17 PROCEDURE — 2500000002 HC RX 250 W HCPCS SELF ADMINISTERED DRUGS (ALT 637 FOR MEDICARE OP, ALT 636 FOR OP/ED): Performed by: STUDENT IN AN ORGANIZED HEALTH CARE EDUCATION/TRAINING PROGRAM

## 2025-06-17 PROCEDURE — 99231 SBSQ HOSP IP/OBS SF/LOW 25: CPT | Performed by: INTERNAL MEDICINE

## 2025-06-17 PROCEDURE — 99233 SBSQ HOSP IP/OBS HIGH 50: CPT | Performed by: INTERNAL MEDICINE

## 2025-06-17 PROCEDURE — 2500000002 HC RX 250 W HCPCS SELF ADMINISTERED DRUGS (ALT 637 FOR MEDICARE OP, ALT 636 FOR OP/ED): Performed by: PHYSICIAN ASSISTANT

## 2025-06-17 PROCEDURE — 2500000001 HC RX 250 WO HCPCS SELF ADMINISTERED DRUGS (ALT 637 FOR MEDICARE OP)

## 2025-06-17 PROCEDURE — 94640 AIRWAY INHALATION TREATMENT: CPT

## 2025-06-17 PROCEDURE — 2500000004 HC RX 250 GENERAL PHARMACY W/ HCPCS (ALT 636 FOR OP/ED): Performed by: PHYSICIAN ASSISTANT

## 2025-06-17 PROCEDURE — 99239 HOSP IP/OBS DSCHRG MGMT >30: CPT | Performed by: INTERNAL MEDICINE

## 2025-06-17 PROCEDURE — 82947 ASSAY GLUCOSE BLOOD QUANT: CPT

## 2025-06-17 PROCEDURE — 2500000001 HC RX 250 WO HCPCS SELF ADMINISTERED DRUGS (ALT 637 FOR MEDICARE OP): Performed by: PHYSICIAN ASSISTANT

## 2025-06-17 PROCEDURE — 2500000001 HC RX 250 WO HCPCS SELF ADMINISTERED DRUGS (ALT 637 FOR MEDICARE OP): Performed by: NURSE PRACTITIONER

## 2025-06-17 PROCEDURE — 2500000004 HC RX 250 GENERAL PHARMACY W/ HCPCS (ALT 636 FOR OP/ED)

## 2025-06-17 PROCEDURE — 2500000004 HC RX 250 GENERAL PHARMACY W/ HCPCS (ALT 636 FOR OP/ED): Performed by: NURSE PRACTITIONER

## 2025-06-17 PROCEDURE — RXMED WILLOW AMBULATORY MEDICATION CHARGE

## 2025-06-17 RX ORDER — BUMETANIDE 1 MG/1
1 TABLET ORAL
Qty: 60 TABLET | Refills: 2 | Status: SHIPPED | OUTPATIENT
Start: 2025-06-17 | End: 2025-09-15

## 2025-06-17 RX ORDER — NAPROXEN SODIUM 220 MG/1
81 TABLET, FILM COATED ORAL DAILY
Qty: 30 TABLET | Refills: 0 | Status: SHIPPED | OUTPATIENT
Start: 2025-06-18 | End: 2025-07-18

## 2025-06-17 RX ORDER — LEVOTHYROXINE SODIUM 88 UG/1
88 TABLET ORAL DAILY
Qty: 30 TABLET | Refills: 11 | Status: SHIPPED | OUTPATIENT
Start: 2025-06-18 | End: 2026-06-18

## 2025-06-17 RX ADMIN — PANTOPRAZOLE SODIUM 20 MG: 20 TABLET, DELAYED RELEASE ORAL at 08:37

## 2025-06-17 RX ADMIN — MAGNESIUM OXIDE TAB 400 MG (241.3 MG ELEMENTAL MG) 1 TABLET: 400 (241.3 MG) TAB at 08:37

## 2025-06-17 RX ADMIN — GABAPENTIN 400 MG: 300 CAPSULE ORAL at 13:38

## 2025-06-17 RX ADMIN — ATORVASTATIN CALCIUM 80 MG: 80 TABLET, FILM COATED ORAL at 08:37

## 2025-06-17 RX ADMIN — GABAPENTIN 400 MG: 300 CAPSULE ORAL at 06:18

## 2025-06-17 RX ADMIN — SACUBITRIL AND VALSARTAN 1 TABLET: 24; 26 TABLET, FILM COATED ORAL at 08:37

## 2025-06-17 RX ADMIN — INSULIN LISPRO 2 UNITS: 100 INJECTION, SOLUTION INTRAVENOUS; SUBCUTANEOUS at 08:36

## 2025-06-17 RX ADMIN — CLOPIDOGREL BISULFATE 75 MG: 75 TABLET, FILM COATED ORAL at 08:37

## 2025-06-17 RX ADMIN — ROPINIROLE 1 MG: 1 TABLET, FILM COATED ORAL at 13:38

## 2025-06-17 RX ADMIN — BUMETANIDE 1 MG: 1 TABLET ORAL at 08:37

## 2025-06-17 RX ADMIN — INSULIN LISPRO 6 UNITS: 100 INJECTION, SOLUTION INTRAVENOUS; SUBCUTANEOUS at 08:37

## 2025-06-17 RX ADMIN — SPIRONOLACTONE 25 MG: 25 TABLET, FILM COATED ORAL at 08:37

## 2025-06-17 RX ADMIN — CEFTRIAXONE SODIUM 2 G: 2 INJECTION, SOLUTION INTRAVENOUS at 15:29

## 2025-06-17 RX ADMIN — INSULIN LISPRO 6 UNITS: 100 INJECTION, SOLUTION INTRAVENOUS; SUBCUTANEOUS at 12:17

## 2025-06-17 RX ADMIN — CITALOPRAM HYDROBROMIDE 40 MG: 40 TABLET ORAL at 06:18

## 2025-06-17 RX ADMIN — ROPINIROLE 1 MG: 1 TABLET, FILM COATED ORAL at 06:18

## 2025-06-17 RX ADMIN — BUMETANIDE 1 MG: 1 TABLET ORAL at 16:44

## 2025-06-17 RX ADMIN — ASPIRIN 81 MG: 81 TABLET, CHEWABLE ORAL at 08:37

## 2025-06-17 RX ADMIN — FLUTICASONE FUROATE AND VILANTEROL TRIFENATATE 1 PUFF: 100; 25 POWDER RESPIRATORY (INHALATION) at 11:04

## 2025-06-17 RX ADMIN — LEVOTHYROXINE SODIUM 88 MCG: 0.09 TABLET ORAL at 06:18

## 2025-06-17 RX ADMIN — ENOXAPARIN SODIUM 40 MG: 100 INJECTION SUBCUTANEOUS at 13:38

## 2025-06-17 ASSESSMENT — COGNITIVE AND FUNCTIONAL STATUS - GENERAL
MOBILITY SCORE: 24
DAILY ACTIVITIY SCORE: 24

## 2025-06-17 NOTE — PROGRESS NOTES
Pharmacist Pre-Transplant Candidate Screening Note     Current Facility-Administered Medications on File Prior to Visit   Medication Dose Route Frequency Provider Last Rate Last Admin    acetaminophen (Tylenol) tablet 650 mg  650 mg oral q6h PRN Melissasuellen Triplett PA-C   650 mg at 06/16/25 1541    albuterol 2.5 mg /3 mL (0.083 %) nebulizer solution 3 mL  3 mL nebulization q6h PRN Melissasuellen Triplett PA-C   3 mL at 06/10/25 0804    aspirin chewable tablet 81 mg  81 mg oral Daily ODALYS Tompkins   81 mg at 06/16/25 0902    atorvastatin (Lipitor) tablet 80 mg  80 mg oral Daily [facility] KLAUDIA Triplett   80 mg at 06/16/25 0902    bumetanide (Bumex) tablet 1 mg  1 mg oral BID ODALYS Hickey, DNP   1 mg at 06/16/25 1717    cefTRIAXone (Rocephin) 2 g in dextrose (iso) IV 50 mL  2 g intravenous q24h ODALYS Pena   Stopped at 06/16/25 1717    citalopram (CeleXA) tablet 40 mg  40 mg oral Daily [facility] KLAUDIA Triplett   40 mg at 06/17/25 0618    clopidogrel (Plavix) tablet 75 mg  75 mg oral Daily [facility] KLAUDIA Triplett   75 mg at 06/16/25 0902    dextrose 50 % injection 12.5 g  12.5 g intravenous q15 min PRN Melissa Triplett PA-C        dextrose 50 % injection 25 g  25 g intravenous q15 min PRN Melissa COREY Triplett PA-C        [Held by provider] empagliflozin (Jardiance) tablet 10 mg  10 mg oral Daily ODALYS Tompkins   10 mg at 06/06/25 0819    enoxaparin (Lovenox) syringe 40 mg  40 mg subcutaneous q24h Melissa Triplett PA-C   40 mg at 06/16/25 1308    fluticasone furoate-vilanteroL (Breo Ellipta) 100-25 mcg/dose inhaler 1 puff  1 puff inhalation Daily Melissa Triplett PA-C   1 puff at 06/16/25 1015    gabapentin (Neurontin) capsule 400 mg  400 mg oral q8h MELYSSA Melissa Triplett PA-C   400 mg at 06/17/25 0618    glucagon (Glucagen) injection 1 mg  1 mg intramuscular q15 min PRN Melissa Triplett PA-C        glucagon (Glucagen) injection 1 mg  1 mg intramuscular q15  min PRN Melissasuellen Triplett PA-C        guaiFENesin (Mucinex) 12 hr tablet 600 mg  600 mg oral BID PRN Melissasuellen Triplett PA-C   600 mg at 06/07/25 1012    insulin glargine (Lantus) injection 10 Units  10 Units subcutaneous Nightly Melissa M KLAUDIA Triplett   10 Units at 06/16/25 2109    insulin lispro injection 0-5 Units  0-5 Units subcutaneous TID AC Melissa Triplett PA-C   2 Units at 06/16/25 1717    insulin lispro injection 6 Units  6 Units subcutaneous TID AC Shonna Sharp DO   6 Units at 06/16/25 1718    levothyroxine (Synthroid, Levoxyl) tablet 88 mcg  88 mcg oral Daily Melissa COREY Triplett PA-C   88 mcg at 06/17/25 0618    lidocaine (Xylocaine) 10 mg/mL (1 %) injection 5 mL  5 mL infiltration Once Melissa Triplett PA-C        magnesium oxide (Mag-Ox) 400 mg (241.3 mg elemental) tablet 1 tablet  400 mg of magnesium oxide oral Daily Gage Haskins MD   1 tablet at 06/16/25 2110    [Held by provider] metoprolol succinate XL (Toprol-XL) 24 hr tablet 12.5 mg  12.5 mg oral Daily ODALYS High        [Held by provider] midodrine (Proamatine) tablet 5 mg  5 mg oral TID ODALYS High   5 mg at 06/02/25 1435    oxygen (O2) therapy   inhalation Continuous PRN - O2/gases Melissa M KLAUDIA Triplett   1 L/min at 06/08/25 1204    pantoprazole (ProtoNix) EC tablet 20 mg  20 mg oral Daily Melissa Triplett PA-C   20 mg at 06/16/25 0901    polyethylene glycol (Glycolax, Miralax) packet 17 g  17 g oral Daily PRN Melissa Triplett PA-C        rOPINIRole (Requip) tablet 1 mg  1 mg oral TID Melissa Triplett PA-C   1 mg at 06/17/25 0618    rOPINIRole (Requip) tablet 3 mg  3 mg oral Nightly Melissa Triplett PA-C   3 mg at 06/16/25 2110    sacubitriL-valsartan (Entresto) 24-26 mg per tablet 1 tablet  1 tablet oral BID MARLI Tompkins-CNP   1 tablet at 06/16/25 2110    sennosides-docusate sodium (Niki-Colace) 8.6-50 mg per tablet 2 tablet  2 tablet oral BID Melissa NICOLE  KLAUDIA Triplett   2 tablet at 06/07/25 1011    spironolactone (Aldactone) tablet 25 mg  25 mg oral Daily Melissa NICOLE KLAUDIA Triplett   25 mg at 06/16/25 0902     Current Outpatient Medications on File Prior to Visit   Medication Sig Dispense Refill    albuterol 2.5 mg /3 mL (0.083 %) nebulizer solution Take 3 mL by nebulization every 6 hours if needed for wheezing or shortness of breath.      albuterol 90 mcg/actuation inhaler Inhale 2 puffs every 6 hours if needed for wheezing or shortness of breath.      alogliptin (Nesina) 25 mg tablet Take 1 tablet (25 mg) by mouth once daily.      atorvastatin (Lipitor) 80 mg tablet Take 1 tablet (80 mg) by mouth once daily.      budesonide-formoterol (Symbicort) 80-4.5 mcg/actuation inhaler INHALE 2 PUFF BY INHALATION ROUTE 2 TIMES EVERY DAY IN THE MORNING AND EVENING      bumetanide (Bumex) 2 mg tablet Take 1 tablet (2 mg) by mouth 2 times a day.      cefTRIAXone (Rocephin) 2 gram/50 mL IV Infuse 50 mL (2 g) at 100 mL/hr over 30 minutes into a venous catheter once every 24 hours for 7 days. 350 mL 0    citalopram (CeleXA) 40 mg tablet Take 1 tablet (40 mg) by mouth early in the morning..      clopidogrel (Plavix) 75 mg tablet Take 1 tablet (75 mg) by mouth once daily.      empagliflozin (Jardiance) 10 mg tablet Take 1 tablet (10 mg) by mouth once daily.      gabapentin (Neurontin) 400 mg capsule TAKE 1 CAPSULE BY MOUTH 3 TIMES EVERY DAY FOR DIABETIC NEUROPATHY      insulin glargine (Lantus Solostar U-100 Insulin) 100 unit/mL (3 mL) pen Inject 15 Units under the skin once daily at bedtime.      levothyroxine (Synthroid, Levoxyl) 200 mcg tablet Take 1 tablet (200 mcg) by mouth early in the morning.. Take on an empty stomach at the same time each day, either 30 to 60 minutes prior to breakfast      metoprolol succinate XL (Toprol-XL) 25 mg 24 hr tablet Take 0.5 tablets (12.5 mg) by mouth once daily at bedtime.      midodrine (Proamatine) 5 mg tablet Take 1 tablet (5 mg) by mouth 3  times a day.      nitroglycerin (Nitrostat) 0.4 mg SL tablet Place 1 tablet (0.4 mg) under the tongue every 5 minutes if needed for chest pain.      pantoprazole (ProtoNix) 20 mg EC tablet Take 1 tablet (20 mg) by mouth once daily.      rOPINIRole (Requip) 1 mg tablet Take 1 tablet (1 mg) by mouth 3 times a day.      rOPINIRole (Requip) 3 mg tablet take 1 tablet by oral route every day at bedtime      spironolactone (Aldactone) 25 mg tablet Take 1 tablet (25 mg) by mouth once daily.         The patient's reported medications have been reviewed. Based on the above medication list, there are no pharmacologic contraindications to heart transplantation.    Lola Ashford, PharmD, BCTXP  Clinical Pharmacy Specialist - Solid Organ Transplant

## 2025-06-17 NOTE — LETTER
Thao Evans  [address] [place] [phone]    06/17/25    Dear Thao,    In compliance with the regulations of the [employer] (UNOS), this letter is to inform you of the [facility] Transplant Team’s decision regarding your transplant candidacy. The Transplant Patient Selection Committee met on 6/17/2025 to discuss the results of your transplant evaluation testing and consultations. Based on the results, we sincerely regret that at this time, we cannot offer transplantation as a treatment option for your organ failure and you will not be placed on the patient waiting list.  This determination is based on active smoking and approval for LVAD.    We appreciate the time and energy you gave during the evaluation process. If you have any questions about the committee’s determination, please do not hesitate to contact me at [phone].    Sincerely,    Zoya Harrison  Heart Transplant Coordinator    Encl: UNOS Patient Information Letter

## 2025-06-17 NOTE — COMMITTEE REVIEW
Evaluation Date: 6/3/2025   Committee Review Date: 6/17/2025   Organ being evaluated for: Heart     Transplant Phase:  Evaluation   Transplant Status: Active     Referring Physician: Dr. Deluca   Transplant Physician: Dr. Deluca      Primary Diagnosis: ICM    Committee Members:   Cardiology Joanne Kelly MD MPH; Aly Hicks MD; Alma Delia Capone, ANIA; Sukhdeep Kincaid MD; Anjali Meredith RN; Sera Fallon, APRN-CNP; Jesus Deluca,    Cardiothoracic Surgery Rashad Inman MD PhD   Financial Counseling and Assistance Services Sukhdeep Rodney Shira, RDN, LD   Pathology Robert Farrar MD PhD   Pharmacy Lola Ashford, PharmD   Psychiatry Lexis Singleton, PhD   Quality Lola Cruz; Shonna Maher RN; Anita Tarango RN; Karrie Mena RN    Rojelio Guy LISW; Xiao Guzman LISW; Tosin Tinajero LISW-S   Transplant Sukhdeep Ramirez RN; Zoya Harrison RN; Lorrie Winter; Pamela Hazel MD MPH; Mirlande Santos, ANIA   Transplant Surgery Leonor Jean MD; Sonu Vickers MD PhD; Mamie Jiang MD       Committee Review Decision:    The candidate's evaluation was presented and discussed at the Advanced Heart Failure Therapeutics Committee. After review of the candidate's diagnosis and the evaluations of the multidisciplinary team members, the committee made the following recommendations:     Patient is denied for heart transplant d/t active smoking. Patient is approved for LVAD implantation. Patient and her support team are to be updated at the bedside by Dr. Kelly and the inpatient team.     Committee Decision: Approved for LVAD      Barriers to transplant: Active smoking     INTERMACS profile: 5     Shared-care with another institution?: No    The patient meets the following indications for LVAD:        New York Heart Association (NYHA) Class IV heart failure: Yes     Have a left ventricular ejection  fraction (LVEF) <= 25%: Yes     Are inotrope dependent: No     Have a Cardiac Index (CI) < 2.2 L/min/m2, while not on inotropes: No and also meet one of the following:    and    Are on optimal medical management (OMM), based on current heart failure practice guidelines for at least 45 out of the last 60 days and are failing to respond Yes  Denied for heart transplant       Patient attests to being fully vaccinated against Hepatitis B: No   Reason(s) for deferral:     Other, specify Patient is denied for heart transplantation.     Lab Results   Component Value Date    HEPBSAB 13.2 (H) 06/03/2025       There is no immunization history for the selected administration types on file for this patient.

## 2025-06-17 NOTE — PROGRESS NOTES
Advanced Heart Failure Progress Note   Consulting Team:HHVI service  Primary HF Cardiologist: Dr. Deluca  Reason for consult: advanced therapies evaluation     Subjective   Pt was discussed in advance therapies committee meeting today and approved for LVAD (barriers to transplant, elevated PAP, uncontrolled DM Hg A1c > 8, active smoking). LVAD will be placed in next few weeks.     Seen at bedside, denies CP, SOB, dizziness. Informed of the advance therapies committee meetingdecision and in agreement.     Objective   Physical Exam  Vitals:    06/17/25 1129   BP: 95/56   Pulse: 82   Resp: 18   Temp: 36.6 °C (97.9 °F)   SpO2: 93%     Constitutional:       General: She is not in acute distress.     Appearance: Normal appearance. She is not ill-appearing.   HENT:      Mouth/Throat:      Mouth: Mucous membranes are moist.   Eyes:      General: No scleral icterus.     Extraocular Movements: Extraocular movements intact.      Conjunctiva/sclera: Conjunctivae normal.   Cardiovascular:      Rate and Rhythm: Normal rate and regular rhythm.      Heart sounds: Normal heart sounds. No murmur heard.  Pulmonary:      Effort: Pulmonary effort is normal. No respiratory distress.      Breath sounds: Normal breath sounds.      Comments: On room air  Abdominal:      General: Abdomen is flat. There is no distension.      Palpations: Abdomen is soft.      Tenderness: There is no abdominal tenderness.   Musculoskeletal:      Right lower leg: No edema.      Left lower leg: No edema.   Skin:     General: Skin is warm and dry.   Neurological:      General: No focal deficit present.      Mental Status: She is alert and oriented to person, place, and time. Mental status is at baseline.   Psychiatric:         Mood and Affect: Mood normal.         Behavior: Behavior normal.         Thought Content: Thought content normal.     I have personally reviewed the following images and laboratory findings:    ECG: sinus rhythm     Results for orders  placed during the hospital encounter of 06/02/25    Transthoracic echo (TTE) complete    PHYSICIAN INTERPRETATION:  Left Ventricle: Left ventricular ejection fraction is severely decreased by visual estimate at 20-25%. There is global hypokinesis of the left ventricle with minor regional variations. The left ventricular cavity size is mildly dilated. There is normal septal and normal posterior left ventricular wall thickness. There is left ventricular concentric remodeling. Spectral Doppler shows a Grade III (restrictive pattern) of left ventricular diastolic filling with an elevated left atrial pressure. There is no definite left ventricular thrombus visualized. There is relative preservation of the basal myocardial segment systolic function.  Left Atrium: The left atrial size is normal.  Right Ventricle: The right ventricle is normal in size. There is normal right ventricular global systolic function.  Right Atrium: The right atrium is normal in size.  Aortic Valve: The aortic valve is trileaflet. The aortic valve area by VTI is 1.12 cmï¿½ with a peak velocity of 1.49 m/s. The peak and mean gradients are 9 mmHg and 4 mmHg, respectively, with a dimensionless index of 0.49. There is no evidence of aortic valve regurgitation.  Mitral Valve: The mitral valve is mildly thickened. There is mild mitral valve regurgitation. The E Vmax is 0.95 m/s.  Tricuspid Valve: The tricuspid valve is structurally normal. There is mild tricuspid regurgitation.  Pulmonic Valve: The pulmonic valve is structurally normal. There is physiologic pulmonic valve regurgitation.  Pericardium: Trivial pericardial effusion.  Aorta: The aortic root is normal.  Pulmonary Artery: The tricuspid regurgitant velocity is 3.09 m/s, and with an estimated right atrial pressure of 8, the estimated pulmonary artery pressure is mild to moderately elevated with the RVSP at 46 mmHg.  Systemic Veins: The inferior vena cava appears normal in size, with IVC  inspiratory collapse less than 50%.  In comparison to the previous echocardiogram(s): There are no prior studies on this patient for comparison purposes.      CONCLUSIONS:  1. Left ventricular ejection fraction is severely decreased by visual estimate at 20-25%.  2. There is global hypokinesis of the left ventricle with minor regional variations.  3. Spectral Doppler shows a Grade III (restrictive pattern) of left ventricular diastolic filling with an elevated left atrial pressure.  4. Left ventricular cavity size is mildly dilated.  5. No left ventricular thrombus visualized.  6. There is relative preservation of the basal myocardial segment systolic function.  7. There is normal right ventricular global systolic function.  8. Mild to moderately elevated pulmonary artery pressure.    RECOMMENDATIONS:    QUANTITATIVE DATA SUMMARY:    2D MEASUREMENTS:         Normal Ranges:  Ao Root d:       2.10 cm (2.0-3.7cm)  LAs:             3.70 cm (2.7-4.0cm)  IVSd:            0.90 cm (0.6-1.1cm)  LVPWd:           0.90 cm (0.6-1.1cm)  LVIDd:           4.10 cm (3.9-5.9cm)  LVIDs:           3.70 cm  LV Mass Index:   74 g/m2  LV % FS          9.8 %      LEFT ATRIUM:                  Normal Ranges:  LA Vol A4C:        38.8 ml    (22+/-6mL/m2)  LA Vol A2C:        43.3 ml  LA Vol BP:         41.2 ml  LA Vol Index A4C:  25.2ml/m2  LA Vol Index A2C:  28.1 ml/m2  LA Vol Index BP:   26.8 ml/m2  LA Area A4C:       14.9 cm2  LA Area A2C:       15.8 cm2  LA Major Axis A4C: 4.9 cm  LA Major Axis A2C: 4.9 cm  LA Volume Index:   26.8 ml/m2      RIGHT ATRIUM:          Normal Ranges:  RA Area A4C:  14.7 cm2      LV SYSTOLIC FUNCTION:  Normal Ranges:  EF-Visual:      23 %  LV EF Reported: 23 %      LV DIASTOLIC FUNCTION:             Normal Ranges:  MV Peak E:             0.95 m/s    (0.7-1.2 m/s)  MV Peak A:             0.33 m/s    (0.42-0.7 m/s)  E/A Ratio:             2.90        (1.0-2.2)  MV e'                  0.034 m/s   (>8.0)  MV lateral  e'          0.03 m/s  MV medial e'           0.04 m/s  MV A Dur:              124.00 msec  E/e' Ratio:            27.61       (<8.0)  MV DT:                 145 msec    (150-240 msec)      MITRAL VALVE:          Normal Ranges:  MV DT:        111 msec (150-240msec)      AORTIC VALVE:                     Normal Ranges:  AoV Vmax:                1.49 m/s (<=1.7m/s)  AoV Peak P.9 mmHg (<20mmHg)  AoV Mean P.0 mmHg (1.7-11.5mmHg)  LVOT Max Paco:            0.81 m/s (<=1.1m/s)  AoV VTI:                 27.40 cm (18-25cm)  LVOT VTI:                13.50 cm  LVOT Diameter:           1.80 cm  (1.8-2.4cm)  AoV Area, VTI:           1.12 cm2 (2.5-5.5cm2)  AoV Area,Vmax:           1.23 cm2 (2.5-4.5cm2)  AoV Dimensionless Index: 0.49      RIGHT VENTRICLE:  RV Basal 3.70 cm  RV Mid   2.40 cm  RV Major 6.3 cm  TAPSE:   15.6 mm  RV s'    0.10 m/s      TRICUSPID VALVE/RVSP:          Normal Ranges:  Peak TR Velocity:     3.09 m/s  Est. RA Pressure:     8  RV Syst Pressure:     46       (< 30mmHg)  IVC Diam:             1.50 cm      PULMONIC VALVE:          Normal Ranges:  PV Max Paco:     0.7 m/s  (0.6-0.9m/s)  PV Max P.9 mmHg    Imaging  Chest x-ray :   Resolution of right pleural effusion. No pneumothorax.  2. Similar small left pleural effusion.  3. Similar mild interstitial pulmonary edema with bibasilar  atelectasis/edema.    Lab Review   Lab Results   Component Value Date     2025     06/15/2025     2025    K 3.9 2025    K 4.1 06/15/2025    K 4.2 2025    CO2 33 (H) 2025    CO2 33 (H) 06/15/2025    CO2 32 2025    BUN 16 2025    BUN 18 06/15/2025    BUN 14 2025    CREATININE 0.96 2025    CREATININE 0.74 06/15/2025    CREATININE 0.88 2025    GLUCOSE 295 (H) 2025    GLUCOSE 240 (H) 06/15/2025    GLUCOSE 170 (H) 2025    CALCIUM 8.9 2025    CALCIUM 8.7 06/15/2025    CALCIUM 8.7 2025     No  "results found for: \"CKTOTAL\", \"CKMB\", \"CKMBINDEX\", \"TROPONINI\"  Lab Results   Component Value Date    WBC 7.3 06/16/2025    HGB 12.9 06/16/2025    HCT 40.7 06/16/2025    MCV 94 06/16/2025     06/16/2025       Troponin I, High Sensitivity (CMC)   Date/Time Value Ref Range Status   06/12/2025 05:28 PM 15 0 - 34 ng/L Final   06/02/2025 01:46 PM 22 0 - 34 ng/L Final     BNP   Date/Time Value Ref Range Status   06/09/2025 04:13 PM 1,456 (H) 0 - 99 pg/mL Final   06/02/2025 01:46 PM 1,292 (H) 0 - 99 pg/mL Final        Assessment and Plan   60F with a PMHx sig for CAD s/p PCI (LAD; 2015, Lcx; 2024), stage D systolic HF/ICM/HFrEF s/p ICD, h/o VT with presumed associated syncope, poorly controlled DM, pAF (off of DOAC given the absence of recurrence), dyslipidemia, essential HTN, COPD, and hypothyroidism with progressive HF symptoms and intolerant of GDMT - on midodrine who presents to HFICU as a direct admission for PAC guided evaluation. Advanced therapies evaluation was initiated 6/3 for OHT/LVAD. Transferred from HFICU to LT  06/06 under HHVI service. LT5 course c/b acute hypoxic respiratory failure due to L pleural effusion s/p thoracentesis x 3 (06/09, 6/11, 06/12), now on room air.    Ischemic cardiomyopathy/ HFrEF 30-35%/AHA Stage D  - TTE 3/2025 at OSH: LVEF 30-35%, normal RV size with low normal function, mild MR, small pericardial effusion  - TTE 6/2/25 on admission: LVEF 20-25%/LVIDd 4.1cm,  mild MR/TR  - Admit wt: 54.4kg admit BNP 1292H  - Opening SGC #s 6/2: BP 97/55 (68), CVP 3, PAP 39/18 (27), CO/CI 2.86/1.87, SVR 1817, SVO2 62% on midodrine 5 mg, empa 10, sandi 25.  -  nipride gtt weaned off 6/5  - Closing SGC#s 6/6/25: BP 88/51 (63), CVP 11, PAP 62/30 (43), CO 3.98/CI 2.54. SVO2 62%.   - Home medications: meto XL 12.5mg daily, spironolactone 25mg daily, jardiance 10mg daily, bumex 2mg bid, midodrine 5mg TID.    - C/w entresto> dose now reduced to 24/26 mg BID due to hypotension.   - Resume " empagliflozin 10mg  - C/w sandi 25mg daily   - Holding BB for now   - Diuresis per HHVI team  - Committee presentation for advance therapies: 06/17: approved for LVAD (barriers to transplant, elevated PAP, uncontrolled DM Hg A1c > 8, active smoking). LVAD will be placed in next few weeks.       This plan was discussed with Dr. Kelly HF attending. For any questions or concerns, feel free to reach out via [employer] chat or page at [phone].    Pt can be discharged from HF standpoint. She will be contacted by LVAD coordinators after discharge to complete any pending tasks (Colonoscopy need to be completed, PAP smear cytology results pending, repeat HgA1c etc) as well as inform potential date of surgery.,    Micha Jordan MD, MS, FACP   Heart Failure Fellow,  [facility]

## 2025-06-17 NOTE — CARE PLAN
Problem: Pain - Adult  Goal: Verbalizes/displays adequate comfort level or baseline comfort level  Outcome: Met     Problem: Safety - Adult  Goal: Free from fall injury  Outcome: Met     Problem: Discharge Planning  Goal: Discharge to home or other facility with appropriate resources  Outcome: Met     Problem: Chronic Conditions and Co-morbidities  Goal: Patient's chronic conditions and co-morbidity symptoms are monitored and maintained or improved  Outcome: Met     Problem: Nutrition  Goal: Nutrient intake appropriate for maintaining nutritional needs  Outcome: Met     Problem: Heart Failure  Goal: Improved gas exchange this shift  Outcome: Met  Goal: Improved urinary output this shift  Outcome: Met  Goal: Reduction in peripheral edema within 24 hours  Outcome: Met  Goal: Report improvement of dyspnea/breathlessness this shift  Outcome: Met  Goal: Weight from fluid excess reduced over 2-3 days, then stabilize  Outcome: Met  Goal: Increase self care and/or family involvement in 24 hours  Outcome: Met     Problem: Diabetes  Goal: Achieve decreasing blood glucose levels by end of shift  Outcome: Met  Goal: Increase stability of blood glucose readings by end of shift  Outcome: Met  Goal: Decrease in ketones present in urine by end of shift  Outcome: Met  Goal: Maintain electrolyte levels within acceptable range throughout shift  Outcome: Met  Goal: Maintain glucose levels >70mg/dl to <250mg/dl throughout shift  Outcome: Met  Goal: No changes in neurological exam by end of shift  Outcome: Met  Goal: Learn about and adhere to nutrition recommendations by end of shift  Outcome: Met  Goal: Vital signs within normal range for age by end of shift  Outcome: Met  Goal: Increase self care and/or family involovement by end of shift  Outcome: Met  Goal: Receive DSME education by end of shift  Outcome: Met

## 2025-06-17 NOTE — CARE PLAN
The patient's goals for the shift include GET SOMETHING TO EAT    The clinical goals for the shift include Pt will remain HDS this shift.    Pt HDS this shift. Vitals WNL for pts baseline. Pt got PO mag last night. Pt is to have advanced HF therapies meeting today.      Problem: Pain - Adult  Goal: Verbalizes/displays adequate comfort level or baseline comfort level  Outcome: Progressing     Problem: Safety - Adult  Goal: Free from fall injury  Outcome: Progressing     Problem: Discharge Planning  Goal: Discharge to home or other facility with appropriate resources  Outcome: Progressing     Problem: Chronic Conditions and Co-morbidities  Goal: Patient's chronic conditions and co-morbidity symptoms are monitored and maintained or improved  Outcome: Progressing     Problem: Nutrition  Goal: Nutrient intake appropriate for maintaining nutritional needs  Outcome: Progressing     Problem: Heart Failure  Goal: Improved gas exchange this shift  Outcome: Progressing  Goal: Improved urinary output this shift  Outcome: Progressing  Goal: Reduction in peripheral edema within 24 hours  Outcome: Progressing  Goal: Report improvement of dyspnea/breathlessness this shift  Outcome: Progressing  Goal: Weight from fluid excess reduced over 2-3 days, then stabilize  Outcome: Progressing  Goal: Increase self care and/or family involvement in 24 hours  Outcome: Progressing     Problem: Diabetes  Goal: Achieve decreasing blood glucose levels by end of shift  Outcome: Progressing  Goal: Increase stability of blood glucose readings by end of shift  Outcome: Progressing  Goal: Decrease in ketones present in urine by end of shift  Outcome: Progressing  Goal: Maintain electrolyte levels within acceptable range throughout shift  Outcome: Progressing  Goal: Maintain glucose levels >70mg/dl to <250mg/dl throughout shift  Outcome: Progressing  Goal: No changes in neurological exam by end of shift  Outcome: Progressing  Goal: Learn about and  adhere to nutrition recommendations by end of shift  Outcome: Progressing  Goal: Vital signs within normal range for age by end of shift  Outcome: Progressing  Goal: Increase self care and/or family involovement by end of shift  Outcome: Progressing  Goal: Receive DSME education by end of shift  Outcome: Progressing

## 2025-06-17 NOTE — PROGRESS NOTES
06/17/25 1750   Discharge Planning   Home or Post Acute Services In home services   Type of Home Care Services Home nursing visits  (IV infusion for ABX)   Expected Discharge Disposition Home H  ([facility] by Karine Sidhu)   Does the patient need discharge transport arranged? No     Patient will discharge home from [facility] with [facility] for ABX IV infusions. DME supplies casey be provided by [employer].     Lina Crouch RN  Transitional Care Coordinator (TCC)

## 2025-06-17 NOTE — SIGNIFICANT EVENT
Received a call from [facility] dept re: syphilis testing, discussed the rationale for why pt had not received penicillin treatment, and that she was a high risk candidate for penicillin challenge after discussion with Allergy/Immunology, and additionally due to her cardiovascular status would need desensitization in the ICU for each of her 3 doses of IM penicillin as treatment for late latent syphilis.  They confirmed that she had no prior testing between [facility] or [facility] records.  We discussed she also cannot get doxycycline due to report of hives with this as well.  Current plan remains to treat with ceftriaxone 2 g daily for total 14 days, and she will need follow-up RPR by her primary care physician or as part of her ongoing transplant eval within 6 months.    Alla Wilkins, DO

## 2025-06-17 NOTE — DISCHARGE SUMMARY
Discharge Diagnosis  ACC/AHA stage D systolic heart failure    Issues Requiring Follow-Up  LVAD work up.    Test Results Pending At Discharge  Pending Labs       Order Current Status    HPV DNA High Risk With Genotype In process    THINPREP PAP In process            Hospital Course  Ms. Evans is a 60F with a PMHx sig for CAD s/p PCI (LAD; 2015, Lcx; 2024), stage C systolic HF/ICM/HFrEF s/p ICD, h/o VT with presumed associated syncope, poorly controlled DM, pAF (off of DOAC given the absence of recurrence), dyslipidemia, essential HTN, COPD, and hypothyroidism with progressive HF symptoms and intolerant of GDMT - on midodrine who presented to HFICU as a direct admission for PAC guided evaluation.    Opening SGC #s 6/2: BP 97/55 (68), CVP 3, PAP 39/18 (27), CO/CI 2.86/1.87, SVR 1817, SVO2 62% on midodrine 5 mg, empa 10, sandi 25.  - Closing SGC#s 6/6/25: BP 88/51 (63), CVP 11, PAP 62/30 (43)  -TE 6/2/25 on arrival: LVEF 20-25%/LVIDd 4.1cm, mild MR/TR      Advanced therapies evaluation was initiated 6/3 for OHT/LVAD.       Floor course:  She optimized to GDMT as tolerated including reducing to Entresto 24/26.  Jardiance to be restarted after completion of antibiotic course for UTI.  Her home midodrine and toprol was discontinued. Her bumex dose was reduced to 1mg BID.     Endocrinology was consulted for her hypothyroidism and DM2.  Levothyroxine was continued with repeat TFT's with her PCP in 6-8 weeks.  For her DM2 it was recommended to continue Jardiance, alogliptin, and Lantus with follow up as outpatient.      Patient presented to advanced HF committee on Tuesday 6/17/25 with decision to proceed to LVAD as outpatient.    Started on ceftriaxone for UTI. Dose escalated given UTI and positive syphilis screening. No PCN given allergy at this time. Plan for IV Abx through 6/20. Midline placed for short term antibiotic therapy 6/12.   Patient has home infusion service and home RN arranged.      Pulmonary consulted for  severe restrictive defect noted on PFTs.   Left thoracentesis completed on 6/9 with 1L removed,.  Repeat L thoracentesis 6/11 with 1.2L removed.  Right thoracentesis 6/12 with 1L removed.   Was then able to be weaned to room air.   Repeat PFTs completed 6/13     Tobacco abstinence encouraged.      Discharge weight: 58.2kg    After all labs and VS were reviewed the decision was made that the patient was medically stable for discharge.  The patient was discharged in satisfactory condition.    More than 60 minutes were spent in coordinating patient discharge.     Visit Vitals  BP 95/56 (BP Location: Right arm, Patient Position: Sitting)   Pulse 82   Temp 36.5 °C (97.7 °F) (Temporal)   Resp 18     Vitals:    06/17/25 0400   Weight: 58.2 kg (128 lb 4.9 oz)       Immunization History   Administered Date(s) Administered    Moderna SARS-CoV-2 Vaccination 04/15/2021, 05/13/2021       Results        Pertinent Physical Exam At Time of Discharge    General: NAD  Head/ neck: No JVD  Cardiac: RRR, regular S1 S2 , no murmur, no rub, no gallop  Pulm: On room air. Expiratory wheezing left lower posterior lobe and right anterior lobe  Vascular: Radial 2+ bilaterally  GI: Non distended  Extremities: no LE edema   Neuro: no focal neuro deficits   Psych: appropriate mood and behavior   Skin: warm and dry   Home Medications     Medication List      START taking these medications     aspirin 81 mg chewable tablet; Chew 1 tablet (81 mg) once daily.; Start   taking on: June 18, 2025   cefTRIAXone 2 gram/50 mL IV; Commonly known as: Rocephin; Infuse 50 mL   (2 g) at 100 mL/hr over 30 minutes into a venous catheter once every 24   hours for 7 days.   Entresto 24-26 mg tablet; Generic drug: sacubitriL-valsartan; Take 1   tablet by mouth 2 times a day.     CHANGE how you take these medications     albuterol 90 mcg/actuation inhaler; What changed: Another medication   with the same name was removed. Continue taking this medication, and   follow  the directions you see here.   bumetanide 1 mg tablet; Commonly known as: Bumex; Take 1 tablet (1 mg)   by mouth 2 times daily (morning and late afternoon).; What changed:   medication strength, how much to take, when to take this   Jardiance 10 mg tablet; Generic drug: empagliflozin; Take 1 tablet (10   mg) by mouth once daily. Do not start before June 21, 2025.; Start taking   on: June 21, 2025; What changed: These instructions start on June 21, 2025. If you are unsure what to do until then, ask your doctor or other   care provider.     CONTINUE taking these medications     alogliptin 25 mg tablet; Commonly known as: Nesina   atorvastatin 80 mg tablet; Commonly known as: Lipitor   budesonide-formoterol 80-4.5 mcg/actuation inhaler; Commonly known as:   Symbicort   citalopram 40 mg tablet; Commonly known as: CeleXA   clopidogrel 75 mg tablet; Commonly known as: Plavix   gabapentin 400 mg capsule; Commonly known as: Neurontin   Lantus Solostar U-100 Insulin 100 unit/mL (3 mL) pen; Generic drug:   insulin glargine   levothyroxine 88 mcg tablet; Commonly known as: Synthroid, Levoxyl; Take   1 tablet (88 mcg) by mouth early in the morning.. Take on an empty stomach   at the same time each day, either 30 to 60 minutes prior to breakfast;   Start taking on: June 18, 2025   nitroglycerin 0.4 mg SL tablet; Commonly known as: Nitrostat   pantoprazole 20 mg EC tablet; Commonly known as: ProtoNix   * rOPINIRole 1 mg tablet; Commonly known as: Requip   * rOPINIRole 3 mg tablet; Commonly known as: Requip   spironolactone 25 mg tablet; Commonly known as: Aldactone  * This list has 2 medication(s) that are the same as other medications   prescribed for you. Read the directions carefully, and ask your doctor or   other care provider to review them with you.       Outpatient Follow-Up  Future Appointments   Date Time Provider Department Center   6/20/2025 11:00 AM ODALYS Diaz [id number] [facility]   8/21/2025   4:20 PM Jesus Deluca DO [id number] [facility]       Yung Shelley, APRN-CNP

## 2025-06-17 NOTE — NURSING NOTE
This RN reviewed patient discharge and after visit summary instructions including any changes to her medication regimen as well as next dose due time and discussed all relevant follow-up appointments and information related to her upcoming LVAD procedure. RN instructed patient on midline care and importance to adhering to IV antibiotic administration schedule. Patient verbalized her understanding of all discharge information with no further questions at this time. PIV removed, patient belongings collected and patient ambulated to front entrance to be driven home by family.

## 2025-06-17 NOTE — DISCHARGE INSTRUCTIONS
Thao,       You are being discharge after being treated for exacerbation of your heart failure.   Please take your medications as prescribed and keep a daily log of your weight (first thing in the morning after you go to the bathroom) and bring with you to your appointments.       You will contacted at home regarding the next steps of having a LVAD placed to support your heart.       You are scheduled to receive antibiotics at home until this Friday, June 20th 2025.  You will be instructed how to infuse this prior to discharge.   You will also have a home nurse see you to care for and to remove your IV line when appropriate.  You will resume your Jardiance on Saturday the 22nd.

## 2025-06-17 NOTE — DISCHARGE SUMMARY
Discharge Diagnosis  ACC/AHA stage D systolic heart failure    Issues Requiring Follow-Up  HF follow up/LVAD work up.    Test Results Pending At Discharge  Pending Labs       Order Current Status    HPV DNA High Risk With Genotype In process    THINPREP PAP In process            Hospital Course  Ms. Evans is a 60F with a PMHx sig for CAD s/p PCI (LAD; 2015, Lcx; 2024), stage C systolic HF/ICM/HFrEF s/p ICD, h/o VT with presumed associated syncope, poorly controlled DM, pAF (off of DOAC given the absence of recurrence), dyslipidemia, essential HTN, COPD, and hypothyroidism with progressive HF symptoms and intolerant of GDMT - on midodrine who presented to HFICU as a direct admission for PAC guided evaluation. Advanced therapies evaluation was initiated 6/3 for OHT/LVAD.       Floor course:  Ongoing diuresis with IV lasix given ongoing NC O2 requirements.     Started on ceftriaxone for UTI. Dose escalated given UTI and positive syphilis screening. No PCN given allergy at this time. Plan for IV Abx through 6/20. Midline placed for short term antibiotic therapy 6/12.   Patient has home infusion service and home RN arranged.      Patient presented to advanced HF committee on Tuesday with decision to proceed to LVAD as outpatient.    Pulmonary consulted for severe restrictive defect noted on PFTs.     Left thoracentesis completed on 6/9 with 1L removed,.  Repeat L thoracentesis 6/11 with 1.2L removed.  Right thoracentesis 6/12 with 1L removed.     Repeat PFTs on 6/13     Discharge weight: 58.2kg    After all labs and VS were reviewed the decision was made that the patient was medically stable for discharge.  The patient was discharged in satisfactory condition.    More than 60 minutes were spent in coordinating patient discharge.     Visit Vitals  BP 95/56 (BP Location: Right arm, Patient Position: Sitting)   Pulse 82   Temp 36.5 °C (97.7 °F) (Temporal)   Resp 18     Vitals:    06/17/25 0400   Weight: 58.2 kg (128 lb 4.9  oz)       Immunization History   Administered Date(s) Administered    Moderna SARS-CoV-2 Vaccination 04/15/2021, 05/13/2021       Results        Pertinent Physical Exam At Time of Discharge  Physical Exam    Home Medications     Medication List      START taking these medications     aspirin 81 mg chewable tablet; Chew 1 tablet (81 mg) once daily.; Start   taking on: June 18, 2025   cefTRIAXone 2 gram/50 mL IV; Commonly known as: Rocephin; Infuse 50 mL   (2 g) at 100 mL/hr over 30 minutes into a venous catheter once every 24   hours for 7 days.   Entresto 24-26 mg tablet; Generic drug: sacubitriL-valsartan; Take 1   tablet by mouth 2 times a day.     CHANGE how you take these medications     albuterol 90 mcg/actuation inhaler; What changed: Another medication   with the same name was removed. Continue taking this medication, and   follow the directions you see here.   bumetanide 1 mg tablet; Commonly known as: Bumex; Take 1 tablet (1 mg)   by mouth 2 times daily (morning and late afternoon).; What changed:   medication strength, how much to take, when to take this   Jardiance 10 mg tablet; Generic drug: empagliflozin; Take 1 tablet (10   mg) by mouth once daily. Do not start before June 21, 2025.; Start taking   on: June 21, 2025; What changed: These instructions start on June 21, 2025. If you are unsure what to do until then, ask your doctor or other   care provider.     CONTINUE taking these medications     alogliptin 25 mg tablet; Commonly known as: Nesina   atorvastatin 80 mg tablet; Commonly known as: Lipitor   budesonide-formoterol 80-4.5 mcg/actuation inhaler; Commonly known as:   Symbicort   citalopram 40 mg tablet; Commonly known as: CeleXA   clopidogrel 75 mg tablet; Commonly known as: Plavix   gabapentin 400 mg capsule; Commonly known as: Neurontin   Lantus Solostar U-100 Insulin 100 unit/mL (3 mL) pen; Generic drug:   insulin glargine   levothyroxine 88 mcg tablet; Commonly known as: Synthroid,  Levoxyl; Take   1 tablet (88 mcg) by mouth early in the morning.. Take on an empty stomach   at the same time each day, either 30 to 60 minutes prior to breakfast;   Start taking on: June 18, 2025   nitroglycerin 0.4 mg SL tablet; Commonly known as: Nitrostat   pantoprazole 20 mg EC tablet; Commonly known as: ProtoNix   * rOPINIRole 1 mg tablet; Commonly known as: Requip   * rOPINIRole 3 mg tablet; Commonly known as: Requip   spironolactone 25 mg tablet; Commonly known as: Aldactone  * This list has 2 medication(s) that are the same as other medications   prescribed for you. Read the directions carefully, and ask your doctor or   other care provider to review them with you.       Outpatient Follow-Up  Future Appointments   Date Time Provider Department Center   6/20/2025 11:00 AM Josy Jules, APRN-CNP [id number] [facility]   8/21/2025  4:20 PM Jesus Deluca DO [id number] [facility]       Yung Shelley, APRN-CNP

## 2025-06-18 PROBLEM — E78.00 PURE HYPERCHOLESTEROLEMIA: Status: ACTIVE | Noted: 2022-10-28

## 2025-06-18 PROBLEM — J96.00 ACUTE RESPIRATORY FAILURE: Status: ACTIVE | Noted: 2025-03-12

## 2025-06-18 PROBLEM — I50.20 HFREF (HEART FAILURE WITH REDUCED EJECTION FRACTION): Status: ACTIVE | Noted: 2025-03-17

## 2025-06-18 PROBLEM — R55 SYNCOPE AND COLLAPSE: Status: ACTIVE | Noted: 2024-12-16

## 2025-06-18 PROBLEM — I10 PRIMARY HYPERTENSION: Status: ACTIVE | Noted: 2022-10-28

## 2025-06-18 PROBLEM — I45.9 STOKES-ADAMS SYNCOPE: Status: ACTIVE | Noted: 2025-03-19

## 2025-06-18 PROBLEM — T38.0X5A STEROID-INDUCED HYPERGLYCEMIA: Status: ACTIVE | Noted: 2025-03-24

## 2025-06-18 PROBLEM — E78.5 HYPERLIPIDEMIA: Status: ACTIVE | Noted: 2024-10-09

## 2025-06-18 PROBLEM — E11.65 POORLY CONTROLLED DIABETES MELLITUS (MULTI): Status: ACTIVE | Noted: 2025-03-24
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
## 2025-06-18 PROBLEM — R73.9 STEROID-INDUCED HYPERGLYCEMIA: Status: ACTIVE | Noted: 2025-03-24

## 2025-06-18 PROBLEM — Z95.810 PRESENCE OF DOUBLE CHAMBER IMPLANTABLE CARDIOVERTER-DEFIBRILLATOR (ICD): Status: ACTIVE | Noted: 2025-03-20

## 2025-06-18 PROBLEM — I50.9 ACUTE DECOMPENSATED HEART FAILURE: Status: ACTIVE | Noted: 2025-03-12

## 2025-06-18 PROBLEM — S20.212A CONTUSION OF LEFT CHEST WALL: Status: ACTIVE | Noted: 2025-03-12

## 2025-06-18 PROBLEM — I48.0 PAROXYSMAL ATRIAL FIBRILLATION (MULTI): Status: ACTIVE | Noted: 2024-12-16

## 2025-06-18 PROBLEM — E11.9 TYPE 2 DIABETES MELLITUS WITHOUT COMPLICATION, WITHOUT LONG-TERM CURRENT USE OF INSULIN: Status: ACTIVE | Noted: 2022-10-28

## 2025-06-18 PROBLEM — R06.02 SHORTNESS OF BREATH: Status: ACTIVE | Noted: 2025-03-17

## 2025-06-18 PROBLEM — I21.3 STEMI (ST ELEVATION MYOCARDIAL INFARCTION) (MULTI): Status: ACTIVE | Noted: 2024-10-08

## 2025-06-18 PROBLEM — Z91.119 DIETARY NONCOMPLIANCE: Status: ACTIVE | Noted: 2024-10-09

## 2025-06-18 PROBLEM — E66.9 MODERATE OBESITY: Status: ACTIVE | Noted: 2022-10-28

## 2025-06-18 NOTE — PROGRESS NOTES
"Thao Evans is a 60 y.o. female on day 15 of admission presenting with ACC/AHA stage D systolic heart failure.    Subjective   Patient seen and examined at bedside in no acute distress.  Patient denies any nausea, vomiting, difficulty breathing, abdominal pain, constipation or diarrhea. Blood glucose trends reviewed.   I have reviewed histories, allergies and medications have been reviewed and there are no changes       Objective   Last Recorded Vitals  Blood pressure 95/56, pulse 82, temperature 36.5 °C (97.7 °F), temperature source Temporal, resp. rate 18, height 1.575 m (5' 2\"), weight 58.2 kg (128 lb 4.9 oz), SpO2 92%.  Intake/Output last 3 Shifts:  I/O last 3 completed shifts:  In: 240 (4.1 mL/kg) [P.O.:240]  Out: 4950 (85.1 mL/kg) [Urine:4950 (2.4 mL/kg/hr)]  Weight: 58.2 kg     Relevant Results  Results from last 7 days   Lab Units 06/17/25  1551 06/17/25  1114 06/17/25  0605 06/16/25  2058 06/16/25  1901 06/16/25  1652 06/15/25  2124 06/15/25  1735 06/14/25  2112 06/14/25  1803 06/13/25  2051 06/13/25  1804 06/12/25  2109 06/12/25  1728   POCT GLUCOSE mg/dL 170* 126* 214* 209*  --  211*   < >  --    < >  --    < >  --    < >  --    GLUCOSE mg/dL  --   --   --   --  295*  --   --  240*  --  170*  --  103*  --  180*    < > = values in this interval not displayed.     Results from last 7 days   Lab Units 06/16/25  1901   SODIUM mmol/L 137   POTASSIUM mmol/L 3.9   CHLORIDE mmol/L 95*   CO2 mmol/L 33*   BUN mg/dL 16   CREATININE mg/dL 0.96   CALCIUM mg/dL 8.9   ALBUMIN g/dL 3.3*   PROTEIN TOTAL g/dL 6.1*   BILIRUBIN TOTAL mg/dL 0.3   ALK PHOS U/L 68   ALT U/L 19   AST U/L 14   GLUCOSE mg/dL 295*      Assessment & Plan  ACC/AHA stage D systolic heart failure    Acute on chronic systolic heart failure    Type 2 diabetes mellitus with hyperglycemia, with long-term current use of insulin    COPD (chronic obstructive pulmonary disease) (Multi)    Hypothyroidism    Restless leg syndrome    CAD S/P percutaneous coronary " angioplasty    UTI (urinary tract infection)    Latent syphilis    VT (ventricular tachycardia) (Multi)    Ms. Evans is a 60F with a PMHx sig for CAD s/p PCI (LAD; 2015, Lcx; 2025), stage C systolic HF/ICM/HFrEF s/p ICD, h/o VT with presumed associated syncope, poorly controlled DM, pAF (off of DOAC given the absence of recurrence), dyslipidemia, essential HTN, COPD, and hypothyroidism, with progressive HF symptoms and intolerant of GDMT - on midodrine who presents to HFICU as a direct admission for PAC guided evaluation.  Advanced therapies evaluation was initiated 6/3 for OHT/LVAD.  Hospital course was complicated by UTI which was treated with IV ceftriaxone.  Patient underwent left thoracentesis on 6/9 with 1 L fluid removal.  Repeat left thoracentesis 6/11 with 1.2 L fluid removal, patient underwent right thoracentesis 6/12 and 1 L fluid was removed.  Endocrine was consulted for hyperglycemia and DM management and hypothyroidism management.         Diabetes History  Type of diabetes: DM2   Year diagnosed or age: 10-15 years ago  Hospitalizations for DKA or HHS: no  Complications: CAD  Seen by PCP or Endocrinology: PCP  Frequency of glucose checks: twice daily  Glucose review: per patient 150s-200s  Frequency of Hypoglycemia: rare, has not happened in months  Hypoglycemia unawareness: no  Severe hypoglycemia requiring assistance from others:  no  Recent A1c is 8.6 in 6/2/25     Home Medications  Basal: lantus 15u (of note pt thought this was lispro but reviewed home med rec- has only ever taken basal insulin outpt)  Orals: jardiance 10mg, alogliptin 25mg  Previous meds:  metformin and GLP1 stopped due to GI side effects   ng-standing hypothyroidism.        #Hypothyroidism:  -Hx of hypothyroidism x 15 years, managed by her PCP  -Home dose levothyroxine 200 mcg daily. Takes it adequately .  -She stated that her levothyroxine dose has been progressively increased over time, but does not recall when her TFTs were last  checked (prior to this admission).   -Reports significant weight loss over the past few months (~60 lbs over the last 6 months)  Labs on 6/2 resulted in low TSH at 0.20 and elevated FT4 at 2.05  Due to recent weight loss her current home dose  200 mcg levothyroxine is too high at this time resulting in hyperthyroidism. She is clinically & biochemically hyperthyroid  She Last took LT4 on 5/30  - FT4 on 6/5 resulted in normal range at 1.23  c/w levothyroxine 88 mcg daily, to be given on empty stomach   repeat TFTs in 6-8 weeks in outpatient with PCP      #Hyperglycemia 2/2 poorly controlled diabetes, and stress state  Discharge recommendations as follows:   Resume tablet alogliptin 25 mg daily  Stop Jardiance in setting of recent UTI, currently on treatment with ceftriaxone, last dose to be given as outpatient  Insulin glargine 12 units nightly  Insulin lispro 7 units TID with meals  Patient to go home with CGM  Follow-up as an outpatient with endocrinology PA, [facility] within 2 to 3 weeks of discharge  Patient will benefit from GLP-1, to be considered at outpatient follow-up    However, patient was discharged with following recommendation per primary team:  Tablet alogliptin 25 mg daily  Start tablet Jardiance 10 mg (6/21/2025)  Insulin glargine 15 units at bedtime    Endocrinology service will schedule follow-up as outpatient with endocrinology PA[facility]     Recommendations communicated to primary team. Please reach out incase you have any questions or concerns.    The patient was discussed with attending Dr. Chuck Denis MD  Endocrinology fellow

## 2025-06-19 NOTE — PROGRESS NOTES
Primary Care Physician: No primary care provider on file.  Patient's Location: [facility] [id number]    Date of Visit: 06/20/2025 11:00 AM EDT  Location of visit: [facility]   Type of Visit: Established - First Seen: Visit date not found    Last visit: Visit date not found    HPI / Summary:   Thao Evans is a very pleasant 60 y.o. female from [place], [place].   Thao Evans lives with her son.  She is accompanied by: her close friend and step daughter.    Thao Evans presenting post heart failure hospitalization for follow up and optimization of GDMT medications. PMHx sig for CAD s/p PCI (LAD; 2015, Lcx; 2025), stage C systolic HF/ICM/HFrEF s/p ICD, h/o VT with presumed associated syncope, poorly controlled DM, pAF (off of DOAC given the absence of recurrence), dyslipidemia, essential HTN, COPD, and h/o Graves. She was recently referred to the [facility] and seen by Dr. Deluca. Her most recent hospitalization was in March 2025 for syncope/VT where she underwent an ICD placement and PCI to her LCx. She reported progressive symptoms over the last several months with ongoing fatigue, dyspnea, and early satiety causing significant progressive weight loss (~60 lbs over the last 6 months). She has also been currently intolerant of GDMT and is on midodrine for BP support since her hospital discharge. Because of worsening symptoms and intolerance of GDMT, she was offered direct admission to HFICU with plan for PAC guided evaluation. Transferred from HFICU to LT 5 06/06 under [facility]VI service. LT5 course c/b acute hypoxic respiratory failure due to L pleural effusion s/p thoracentesis x 3 (06/09, 6/11, 06/12), now on room air.     Pt was discussed in Advanced Therapies Committee meeting on 6/17/2025 and approved for LVAD (barriers to transplant, elevated PAP, uncontrolled DM Hg A1c > 8, active smoking). LVAD will be placed in next few weeks.     Today:     She denies CP, palpitations, increase  heart rate or skipped beat. Endorses JACQUES, difficulty ambulating long distances or flight of stairs. Denies SOB while changing, showering or bendopnea. Denies orthopnea/PND, cough, and sleeps with 4 to 5 pillows for comfort. Denies lower leg edema, abdominal tightness, bloated or loss of appetite. Denies lightheadedness, dizziness, syncope, or recent falls. Medication adherent. Eating/drinking well. Denies NVD. Denies any nose bleed or hematochezia, on ASA, Plavix. No Home B/P monitoring. She has a scale but she does not weight herself. Provided instructions on weighing herself daily and what to watch out for.     Hospitalizations:   6/2/2025-6/17/2025:  3/14/2025-3/25/2025: for syncope/VT where she underwent an ICD placement and PCI to her Lcx    Objective   Medical History (non cardiac):   # COPD  # Graves disease  # Hypertension: Last BP: 98/62 (Left arm - Midline - (line for Antibiotic)).  # Hyperlipidemia: Last Tchol  / LDL  / HDL  / TRIG   # Type II Diabetes Mellitus: Last A1c 6/2/2025: 8.6.   # CKD: Last BUN/Cr (GFR): 6/16/2025: 16/0.96 (67)    Cardiovascular History   #CAD s/p PCI (LAD; 2015, Lcx; 2025   # ischemic Cardiomyopathy  # HFrEF / Chronic systolic Heart Failure, last EF  6/2/2025: 23%, ACC/AHA Stage C, NYHA III,  intolerant to GDMT   # s/p ICD, h/o VT with presumed associated syncope  # pAF (off of DOAC given the absence of recurrence)     Surgical Hx:     Social Hx:  # Former smoker (mid-May 2025)   # Rare ETOH  # Denies illicits drug use    Family Hx:   # Father with CAD      Allergies:   RX Allergies[1]    Exam:       6/20/2025    11:10 AM 6/17/2025     4:19 PM 6/17/2025    11:29 AM 6/17/2025     8:06 AM 6/17/2025     4:00 AM 6/17/2025    12:08 AM   Vitals   Systolic 98       Left arm - Midline - (line for Antibiotic) 95 95 98 97 90   Diastolic 62       Left arm - Midline - (line for Antibiotic) 56 56 57 56 46   BP Location Right arm Right arm Right arm Right arm     Heart Rate 84 82 82 81 80 85  "  Temp  36.5 °C (97.7 °F) 36.6 °C (97.9 °F) 36.1 °C (97 °F) 36.1 °C (97 °F) 36.3 °C (97.3 °F)   Resp  18 18 18 18 18   Height 1.6 m (5' 3\")        Weight (lb) 126.6    128.31    BMI 22.43 kg/m2    23.47 kg/m2    BSA (m2) 1.6 m2    1.6 m2    Visit Report Report          Wt Readings from Last 5 Encounters:   06/20/25 57.4 kg (126 lb 9.6 oz)   06/17/25 58.2 kg (128 lb 4.9 oz)   05/30/25 56.2 kg (124 lb)     GEN: Pleasant, frail, older than her age appearing, no acute distress,   HEENT: JVP not elevated, no icterus. Moist mucus membranes, No HJR, no thyromegaly  CHEST: No wheeze, decrease air movement lower left lobe.  CV: Normal rate, regular rhythm. Normal S1, S2, NO S3/4, no m/r/g  ABD: Soft, ND, NT and normal active bowel sounds  EXT: Warm, well perfused, Trace LE pitting edema RT>LT LE, distal pulses are 2+ in upper extremities  SKIN: Dry with no rash present  NEURO: Oriented and alert x3, mood and affect appropriate and is oriented to plan    Labs:   CMP:  Recent Labs     06/16/25  1901 06/15/25  1735 06/14/25  1803 06/13/25  1804 06/12/25  1728    137 137 138 138   K 3.9 4.1 4.2 3.8 3.9   CL 95* 97* 100 100 100   CO2 33* 33* 32 30 33*   ANIONGAP 13 11 9* 12 9*   BUN 16 18 14 23 16   CREATININE 0.96 0.74 0.88 0.77 0.73   EGFR 67 >90 74 87 >90   MG 1.45* 1.93 1.68 1.82 1.89     Recent Labs     06/16/25  1901 06/15/25  1735 06/14/25  1803   ALBUMIN 3.3* 3.4 3.3*   ALKPHOS 68 66 68   ALT 19 19 17   AST 14 16 15   BILITOT 0.3 0.2 0.3   CBC:  Recent Labs     06/16/25 1901 06/15/25  1735 06/14/25  1803 06/13/25  1804 06/12/25  1728   WBC 7.3 8.0 7.4 6.9 10.6   HGB 12.9 13.0 12.8 13.0 13.4   HCT 40.7 42.5 42.0 42.3 41.4    310 290 320 333   MCV 94 97 95 94 91   HEME/ENDO:  Recent Labs     06/05/25  0607 06/02/25  1346 03/13/25  0531 03/12/25  1430 10/08/24  1606   FERRITIN  --  232*  --   --   --    IRONSAT  --  17*  --   --   --    TSH 0.19* 0.20*  --   --   --    FREET4 1.23 2.05*  2.14*  --   --   --  " "  HGBA1C  --  8.6* 12.3* 12.7* 12.3*    CARDIAC:   Recent Labs     06/12/25  1728 06/09/25  1613 06/08/25  1846 06/03/25  1743 06/02/25  1346   LDH  --   --  166 213  --    TROPHS 15  --   --   --  22   BNP  --  1,456*  --   --  1,292*   No results for input(s): \"CHOL\", \"LDLF\", \"LDLCALC\", \"HDL\", \"TRIG\" in the last 34406 hours.MICRO: No results for input(s): \"ESR\", \"CRP\", \"PROCAL\" in the last 43778 hours.Susceptibility data from last 90 days.  Collected Specimen Info Organism Amoxicillin/Clavulanate Ampicillin Ampicillin/Sulbactam Cefazolin Cefazolin (uncomplicated UTIs only) Ciprofloxacin Gentamicin Levofloxacin Nitrofurantoin Piperacillin/Tazobactam   06/07/25 Urine from Clean Catch/Voided Escherichia coli  S  R  I  S  S  S  S  S  S  S   06/03/25 Urine from Clean Catch/Voided Escherichia coli  S  R  I  S  S  S  S  S  S  S     Collected Specimen Info Organism Trimethoprim/Sulfamethoxazole   06/07/25 Urine from Clean Catch/Voided Escherichia coli  S   06/03/25 Urine from Clean Catch/Voided Escherichia coli  S       Notable Studies, reviewed:   EKG:   Recent Labs     06/12/25  1715 06/03/25  1507   VENTRATE 78 85   ATRRATE 78 85   QTCFRED 436 422   QRSDUR 78 90   QTCCALCB 456 447     Encounter Date: 06/02/25   Electrocardiogram, 12-lead PRN ACS symtpoms   Result Value    Ventricular Rate 78    Atrial Rate 78    MA Interval 160    QRS Duration 78    QT Interval 400    QTC Calculation(Bazett) 456    P Axis 57    R Axis -40    T Axis 101    QRS Count 13    Q Onset 216    P Onset 136    P Offset 185    T Offset 416    QTC Fredericia 436    Narrative    Normal sinus rhythm  Left axis deviation  Cannot rule out Anterior infarct , age undetermined  Abnormal ECG  When compared with ECG of 02-JUN-2025 13:34,  Significant changes have occurred     Echocardiogram:   Recent Labs     06/02/25  1729   EF 23   LVIDD 4.10   RV 41   RVFRWALLPKSP 10.10   TAPSE 1.6   Transthoracic Echo (TTE) Complete With Contrast " 06/02/2025  CONCLUSIONS:  1. Left ventricular ejection fraction is severely decreased by visual estimate at 20-25%.  2. There is global hypokinesis of the left ventricle with minor regional variations.  3. Spectral Doppler shows a Grade III (restrictive pattern) of left ventricular diastolic filling with an elevated left atrial pressure.  4. Left ventricular cavity size is mildly dilated.  5. No left ventricular thrombus visualized.  6. There is relative preservation of the basal myocardial segment systolic function.  7. There is normal right ventricular global systolic function.  8. Mild to moderately elevated pulmonary artery pressure.    Echo (3/17/25):  LVEF 30-35%  Normal RV size with low normal function  Mild MR  Small pericardial effusion    Echo 2/26/2025     Left Ventricle: Left ventricle size is normal. Reduced left ventricular   systolic function with a visually estimated EF of 35 - 40%. There are   regional wall motion abnormalities. Moderate to severe inferior   hypokinesis, mild global hypokinesis. Mild LV hypertrophy.     Pericardium: Trivial posterior pericardial effusion present.     Limited study for LV EF.     Echo 10/8/2025    Left Ventricle: Mildly reduced left ventricular systolic function with   a visually estimated EF of 40 - 45%. Left ventricle size is normal. Normal   wall thickness. Severe hypokinesis of the following segments: basal   anterolateral, basal inferolateral, mid anterolateral, mid inferolateral   and apical lateral. Abnormal diastolic function.     Right Ventricle: Normal systolic function. TAPSE is 2.0 cm.     Aortic Valve: Mild stenosis of the aortic valve. AV mean gradient is 9   mmHg. AV peak velocity is 2.0 m/s. LVOT:AV VTI Index is 0.50. AV area by   continuity VTI is 1.6 cm2. AV Stroke Volume index is 37.2 mL/m2.     Mitral Valve: Mild to moderate regurgitation with a posterior directed   jet.    Tricuspid Valve: Mild regurgitation. The estimated RVSP is 52 mmHg.      Left Atrium: Left atrium is moderately dilated.     Extracardiac: Left pleural effusion.     Cardiac cath (10/12/24):  Patent LAD stent  Occluded pLCx s/p PCI      Coronary Angiography: No results found for this or any previous visit from the past 1800 days.    Right Heart Cath: No results found for this or any previous visit from the past 1800 days.    Cardiac Scoring: No results found for this or any previous visit from the past 1800 days.    Cardiac MRI: No results found for this or any previous visit from the past 1800 days.    Nuclear:No results found for this or any previous visit from the past 1800 days.    Metabolic Stress: No results found for this or any previous visit from the past 1800 days.      Current Outpatient Medications   Medication Instructions    albuterol 90 mcg/actuation inhaler 2 puffs, Every 6 hours PRN    alogliptin (NESINA) 25 mg, Daily    aspirin 81 mg, oral, Daily    atorvastatin (LIPITOR) 80 mg, Daily    budesonide-formoterol (Symbicort) 80-4.5 mcg/actuation inhaler INHALE 2 PUFF BY INHALATION ROUTE 2 TIMES EVERY DAY IN THE MORNING AND EVENING    bumetanide (BUMEX) 1 mg, oral, 2 times daily (morning and late afternoon)    cefTRIAXone (Rocephin) 2 gram/50 mL IV 2 g, intravenous, Every 24 hours    citalopram (CeleXA) 40 mg tablet 1 tablet, Daily (0630)    clopidogrel (PLAVIX) 75 mg, Daily    [START ON 6/21/2025] empagliflozin (Jardiance) 10 mg tablet Take 1 tablet (10 mg) by mouth once daily. Do not start before June 21, 2025.    gabapentin (Neurontin) 400 mg capsule TAKE 1 CAPSULE BY MOUTH 3 TIMES EVERY DAY FOR DIABETIC NEUROPATHY    Lantus Solostar U-100 Insulin 15 Units, Nightly    levothyroxine (SYNTHROID, LEVOXYL) 88 mcg, oral, Daily, Take on an empty stomach at the same time each day, either 30 to 60 minutes prior to breakfast    nitroglycerin (NITROSTAT) 0.4 mg, Every 5 min PRN    pantoprazole (PROTONIX) 20 mg, Daily    rOPINIRole (Requip) 3 mg tablet take 1 tablet by oral route  every day at bedtime    rOPINIRole (REQUIP) 1 mg, 3 times daily    sacubitriL-valsartan (Entresto) 24-26 mg tablet 1 tablet, oral, 2 times daily    spironolactone (ALDACTONE) 25 mg, Daily      Notable Therapies: *GDMT*   Class  Agent SAFETY    *ARNI* / ACE / ARB  Entresto 24-26 mg BID Last BP: 98/62 (Left arm - Midline - (line for Antibiotic)), Last BUN/Cr (GFR): 6/16/2025: 16/0.96 (67)    *Beta-Blocker*   Last HR: 84    *MRA*  Spironolactone 25 mg daily Last K: 6/16/2025: 3.9     *SGLT2*  As per hospital instructions: Start Jardiance 10 mg daily on JUNE 21---pt started the next day after hosp discharge (18th) Last A1c 6/2/2025: 8.6     Diuretic  Bumex 1 mg BID Last BNP: 6/9/2025: 1,456    Hydralazine/ISDN       Digoxin  Last Digoxin level: No results found for requested labs within last 3650 days.    Anticoagulation   Last Hgb: 6/16/2025: 12.9    Anti-arrhythmic   Last QTc: 6/12/2025: 456    Antiplatelet  ASA 81 mg daily  Plavix 75 mg daily Last Platelet: 6/16/2025: 261    Lipid-Lowering  Lipitor 80 mg daily Last Tchol No results found for requested labs within last 3650 days. / LDL No results found for requested labs within last 3650 days. or No results found for requested labs within last 3650 days. / HDL No results found for requested labs within last 3650 days. / TRIG No results found for requested labs within last 3650 days.    Other   Nitroglycerin 0.4mg sublingual     Device(s)   EF: 6/2/2025: 23%, QRS: 6/12/2025: 78ms    Cardiac Rehab,   if EF <35/MI/OHS        HFpEF score table(mmdhfpefscore)    IMPRESSION/PLAN:    Thao Evans is a 60 y.o. female presenting post heart failure hospitalization for follow up and optimization of GDMT medications. Pt was discussed in advance therapies committee meeting on 6/17/2025 and approved for LVAD (barriers to transplant, elevated PAP, uncontrolled DM Hg A1c > 8, active smoking). LVAD will be placed in next few weeks. At the current time she has functional class III  symptoms and has trace pitting edema in LE RT>LT and slightly cool on exam.    1) HFrEF /acute on chronic systolic Heart Failure, ICM, last LVEF 23% (6/2/2025), Stage D, NYHA III, s/p ICD.  - TTE 3/2025 at OSH: LVEF 30-35%, normal RV size with low normal function, mild MR, small pericardial effusion  - TTE 6/2/25 on admission: LVEF 20-25%/LVIDd 4.1cm,  mild MR/TR  -c/w entresto 24/26 mg BID  -c/w jardiance 10 mg daily as per hospital discharge instructions, pt is supposed to start Jardiance on June 21 after completion of antibiotic course for UTI. --she started the day after discharge on June 18th  -c/w spironolactone 25 mg daily  -c/w Bumex 1 mg BID    Please finish your ceftriaxone infusion today at 330 pm at home. PLEASE then proceed to  ER ([facility] closest) to have your Left upper arm mid- line removed as you have completed your ceftriaxone course.     2)CAD s/p PCI (LAD in 2015, LCx in 2025)  -c/w asa 81mg daily,   -c/w Plavix 75 mg daily   -c/w lipitor 80 mg nightly      3) H/o VT, Paroxysmal A Fib  - Device: s/p CRT-D 3/2025, [employer]   - AC: off DOAC given absence of recurrence      Discussed lifestyle modifications for managing heart failure: smoking cessation, maintaining a healthy weight, tracking daily fluid and salt intake, avoid/limit alcohol, eating a heart-healthy diet, regular physical activity, and stress management. Encouraged medication compliance.           MDM:4  2 or more stable chronic illnesses  Review of prior external notes from each unique source  Review of the results of each unique test      Orders:  No orders of the defined types were placed in this encounter.       Followup Appts:  Future Appointments   Date Time Provider Department Center   7/22/2025 10:15 AM Luisa Dupree PA-C [id number] [facility]   8/21/2025  4:20 PM Jesus Deluca DO [id number] [facility]           ____________________________________________________________    Josy Jules,  "APRN-CNP  Outpatient Heart Failure clinic  Division of Cardiovascular Medicine  [facility]         [1]   Allergies  Allergen Reactions    Bee Venom Protein (Honey Bee) Anaphylaxis    Codeine Other     \"Passed Out\"     \"i just black out\"    Doxycycline Hives    Prednisone Hives     Muscle cramps    Tetracyclines Hives and Other    Amoxicillin Hives and Rash     \"Felt warm and overheated all over\"    Metformin Diarrhea and Nausea And Vomiting     "

## 2025-06-20 ENCOUNTER — DOCUMENTATION (OUTPATIENT)
Dept: TRANSPLANT | Facility: HOSPITAL | Age: 60
End: 2025-06-20

## 2025-06-20 ENCOUNTER — OFFICE VISIT (OUTPATIENT)
Dept: CARDIOLOGY | Facility: HOSPITAL | Age: 60
End: 2025-06-20
Payer: COMMERCIAL

## 2025-06-20 ENCOUNTER — HOSPITAL ENCOUNTER (EMERGENCY)
Facility: HOSPITAL | Age: 60
Discharge: HOME | End: 2025-06-20
Payer: COMMERCIAL

## 2025-06-20 ENCOUNTER — HOSPITAL ENCOUNTER (INPATIENT)
Facility: HOSPITAL | Age: 60
End: 2025-06-20
Attending: INTERNAL MEDICINE | Admitting: INTERNAL MEDICINE
Payer: COMMERCIAL

## 2025-06-20 VITALS
TEMPERATURE: 97.7 F | HEIGHT: 62 IN | OXYGEN SATURATION: 94 % | SYSTOLIC BLOOD PRESSURE: 91 MMHG | BODY MASS INDEX: 23.19 KG/M2 | WEIGHT: 126 LBS | DIASTOLIC BLOOD PRESSURE: 57 MMHG | HEART RATE: 97 BPM | RESPIRATION RATE: 12 BRPM

## 2025-06-20 VITALS
WEIGHT: 126.6 LBS | SYSTOLIC BLOOD PRESSURE: 98 MMHG | HEART RATE: 84 BPM | BODY MASS INDEX: 22.43 KG/M2 | HEIGHT: 63 IN | OXYGEN SATURATION: 99 % | DIASTOLIC BLOOD PRESSURE: 62 MMHG

## 2025-06-20 DIAGNOSIS — Z45.2 ENCOUNTER FOR REMOVAL OF PERIPHERALLY INSERTED CENTRAL CATHETER (PICC): Primary | ICD-10-CM

## 2025-06-20 DIAGNOSIS — I50.20 ACC/AHA STAGE D SYSTOLIC HEART FAILURE: ICD-10-CM

## 2025-06-20 DIAGNOSIS — I25.5 ISCHEMIC CARDIOMYOPATHY: ICD-10-CM

## 2025-06-20 DIAGNOSIS — I50.20 HFREF (HEART FAILURE WITH REDUCED EJECTION FRACTION): Primary | ICD-10-CM

## 2025-06-20 DIAGNOSIS — A53.0 LATENT SYPHILIS: Primary | ICD-10-CM

## 2025-06-20 DIAGNOSIS — I47.20 VENTRICULAR TACHYCARDIA (MULTI): ICD-10-CM

## 2025-06-20 DIAGNOSIS — I50.20 HFREF (HEART FAILURE WITH REDUCED EJECTION FRACTION): ICD-10-CM

## 2025-06-20 DIAGNOSIS — Z72.0 TOBACCO USE: ICD-10-CM

## 2025-06-20 DIAGNOSIS — I50.20 ACC/AHA STAGE C SYSTOLIC HEART FAILURE: ICD-10-CM

## 2025-06-20 PROBLEM — I50.9 HEART FAILURE: Status: ACTIVE | Noted: 2025-06-20

## 2025-06-20 LAB
ATRIAL RATE: 78 BPM
P AXIS: 57 DEGREES
P OFFSET: 185 MS
P ONSET: 136 MS
PR INTERVAL: 160 MS
Q ONSET: 216 MS
QRS COUNT: 13 BEATS
QRS DURATION: 78 MS
QT INTERVAL: 400 MS
QTC CALCULATION(BAZETT): 456 MS
QTC FREDERICIA: 436 MS
R AXIS: -40 DEGREES
T AXIS: 101 DEGREES
T OFFSET: 416 MS
VENTRICULAR RATE: 78 BPM

## 2025-06-20 PROCEDURE — 99281 EMR DPT VST MAYX REQ PHY/QHP: CPT

## 2025-06-20 PROCEDURE — 99214 OFFICE O/P EST MOD 30 MIN: CPT

## 2025-06-20 PROCEDURE — 3074F SYST BP LT 130 MM HG: CPT

## 2025-06-20 PROCEDURE — 3052F HG A1C>EQUAL 8.0%<EQUAL 9.0%: CPT

## 2025-06-20 PROCEDURE — 3008F BODY MASS INDEX DOCD: CPT

## 2025-06-20 PROCEDURE — 3078F DIAST BP <80 MM HG: CPT

## 2025-06-20 PROCEDURE — 99212 OFFICE O/P EST SF 10 MIN: CPT

## 2025-06-20 ASSESSMENT — PAIN SCALES - GENERAL
PAINLEVEL_OUTOF10: 0 - NO PAIN
PAINLEVEL_OUTOF10: 0-NO PAIN

## 2025-06-20 ASSESSMENT — LIFESTYLE VARIABLES
TOTAL SCORE: 0
HAVE PEOPLE ANNOYED YOU BY CRITICIZING YOUR DRINKING: NO
EVER FELT BAD OR GUILTY ABOUT YOUR DRINKING: NO
EVER HAD A DRINK FIRST THING IN THE MORNING TO STEADY YOUR NERVES TO GET RID OF A HANGOVER: NO
HAVE YOU EVER FELT YOU SHOULD CUT DOWN ON YOUR DRINKING: NO

## 2025-06-20 ASSESSMENT — PAIN - FUNCTIONAL ASSESSMENT: PAIN_FUNCTIONAL_ASSESSMENT: 0-10

## 2025-06-20 NOTE — ED PROVIDER NOTES
Chief Complaint   Patient presents with    PICC line removal- received abx for UTI       HPI       60 year old female presents to the Emergency Department today complaining of needing midline removed after receiving her last dose of Rocephin for underlying UTI this afternoon. Denies any associated fever, chills, headache, neck pain, chest pain, shortness of breath, abdominal pain, nausea, vomiting, diarrhea, constipation, or urinary symptoms.       History provided by:  Patient             Patient History   Medical History[1]  Surgical History[2]  Family History[3]  Social History[4]        Physical Exam  Constitutional:       General: She is awake.      Appearance: Normal appearance.   Cardiovascular:      Rate and Rhythm: Normal rate and regular rhythm.      Pulses:           Radial pulses are 3+ on the right side and 3+ on the left side.      Heart sounds: Normal heart sounds. No murmur heard.     No friction rub. No gallop.   Pulmonary:      Effort: Pulmonary effort is normal.      Breath sounds: Normal breath sounds and air entry.   Skin:     Comments: Midline in place without signs of infection.    Neurological:      Mental Status: She is alert.   Psychiatric:         Behavior: Behavior is cooperative.         Labs Reviewed - No data to display    No orders to display            ED Course & MDM   Diagnoses as of 06/20/25 1826   Encounter for removal of peripherally inserted central catheter (PICC)           Medical Decision Making  Patient was seen and evaluated by myself. Midline was removed by staff without complications. Tolerated well. Follow up with their doctor in 3 days. Return if worse in any way. Discharged in stable condition with computer instructions.    Diagnostic Impression:     1. Midline removal           Your medication list        ASK your doctor about these medications        Instructions Last Dose Given Next Dose Due   albuterol 90 mcg/actuation inhaler           alogliptin 25 mg  tablet  Commonly known as: Nesina           aspirin 81 mg chewable tablet      Chew 1 tablet (81 mg) once daily.       atorvastatin 80 mg tablet  Commonly known as: Lipitor           budesonide-formoterol 80-4.5 mcg/actuation inhaler  Commonly known as: Symbicort           bumetanide 1 mg tablet  Commonly known as: Bumex      Take 1 tablet (1 mg) by mouth 2 times daily (morning and late afternoon).       cefTRIAXone 2 gram/50 mL IV  Commonly known as: Rocephin      Infuse 50 mL (2 g) at 100 mL/hr over 30 minutes into a venous catheter once every 24 hours for 7 days.       citalopram 40 mg tablet  Commonly known as: CeleXA           clopidogrel 75 mg tablet  Commonly known as: Plavix           Entresto 24-26 mg tablet  Generic drug: sacubitriL-valsartan      Take 1 tablet by mouth 2 times a day.       gabapentin 400 mg capsule  Commonly known as: Neurontin           Jardiance 10 mg tablet  Generic drug: empagliflozin  Start taking on: June 21, 2025      Take 1 tablet (10 mg) by mouth once daily. Do not start before June 21, 2025.       Lantus Solostar U-100 Insulin 100 unit/mL (3 mL) pen  Generic drug: insulin glargine           levothyroxine 88 mcg tablet  Commonly known as: Synthroid, Levoxyl      Take 1 tablet (88 mcg) by mouth early in the morning.. Take on an empty stomach at the same time each day, either 30 to 60 minutes prior to breakfast       nitroglycerin 0.4 mg SL tablet  Commonly known as: Nitrostat           pantoprazole 20 mg EC tablet  Commonly known as: ProtoNix           rOPINIRole 1 mg tablet  Commonly known as: Requip           rOPINIRole 3 mg tablet  Commonly known as: Requip           spironolactone 25 mg tablet  Commonly known as: Aldactone                      Procedure  Procedures       [1]   Past Medical History:  Diagnosis Date    CAD (coronary artery disease)     CHF (congestive heart failure)     COPD (chronic obstructive pulmonary disease) (Multi)     Graves disease     Hypotension      PAF (paroxysmal atrial fibrillation) (Multi)    [2] History reviewed. No pertinent surgical history.  [3] No family history on file.  [4]   Social History  Tobacco Use    Smoking status: Former     Types: Cigarettes     Passive exposure: Never    Smokeless tobacco: Never   Substance Use Topics    Alcohol use: Not on file    Drug use: Not on file        Joe Gibson, MARLI-CNP  06/20/25 9020

## 2025-06-20 NOTE — PATIENT INSTRUCTIONS
It was a pleasure seeing you today. Please contact myself or my team with any questions.     To reach Josy Jules' office please call [phone]  Fax: [phone]  To schedule an office appointment [phone].        [unfilled]  1) Please bring a list of your medications to every visit.  2) Continue current medications   3) Please finish your ceftriaxone infusion today at 330 pm. PLEASE then proceed to  ER ([facility] the closest) to have your Left upper arm mid- line removed as you have completed your ceftriaxone.     We will call you this weekend or Monday for direct admission to  main campus.       Please take your blood pressure daily in the morning, 1.5 to 2 hours after taking your morning medications, and document the results.    Please weigh yourself daily when you wake up. If you gain 2 to 3 pounds overnight, 5 pounds over one week or experience increased swelling in your lower extremities, abdominal tightness, bloating, or shortness of breath; take an extra bumex 1 mg but also please call the office.

## 2025-06-23 ENCOUNTER — TELEPHONE (OUTPATIENT)
Dept: TRANSPLANT | Facility: HOSPITAL | Age: 60
End: 2025-06-23
Payer: COMMERCIAL

## 2025-06-23 LAB
CYTOLOGY CMNT CVX/VAG CYTO-IMP: NORMAL
HPV HR 12 DNA GENITAL QL NAA+PROBE: POSITIVE
HPV HR GENOTYPES PNL CVX NAA+PROBE: POSITIVE
HPV16 DNA SPEC QL NAA+PROBE: POSITIVE
HPV18 DNA SPEC QL NAA+PROBE: POSITIVE
LAB AP HPV GENOTYPE QUESTION: YES
LAB AP HPV HR: NORMAL
LABORATORY COMMENT REPORT: NORMAL
PATH REPORT.TOTAL CANCER: NORMAL
RESIDENT REVIEW: NORMAL

## 2025-06-23 NOTE — DOCUMENTATION CLARIFICATION NOTE
"    PATIENT:               ANETA BECKER  ACCT #:                  [id number]  MRN:                       [id number]  :                       1965  ADMIT DATE:       2025 12:58 PM  DISCH DATE:        2025 5:23 PM  RESPONDING PROVIDER #:        [id number]          PROVIDER RESPONSE TEXT:    I agree with dietician diagnosis of Severe Malnutrition on 6/3/25    CDI QUERY TEXT:    Clarification        Instruction:    Based on your assessment of the patient and the clinical information, please provide the requested documentation by clicking on the appropriate radio button and enter any additional information if prompted.    Question: Please further clarify this patient nutritional status as    When answering this query, please exercise your independent professional judgment. The fact that a question is being asked, does not imply that any particular answer is desired or expected.    The patient's clinical indicators include:  Clinical Information: From discharge summary- \" 60F with a PMHx sig for CAD s/p PCI (LAD; , Lcx; ), stage C systolic HF/ICM/HFrEF s/p ICD, h/o VT with presumed associated syncope, poorly controlled DM, pAF (off of DOAC given the absence of recurrence), dyslipidemia, essential HTN, COPD, and hypothyroidism with progressive HF symptoms and intolerant of GDMT - on midodrine who presented to HFICU as a direct admission for PAC guided evaluation. Advanced therapies evaluation was initiated 6/3 for OHT/LVAD. \"        Clinical Indicators:    From nutrition consult on 6/3/25- \"Nutrition Focused Physical Exam Findings:    \"Subcutaneous Fat Loss:  Orbital Fat Pads: Mild-Moderate (slight dark circles and slight hollowing)  Buccal Fat Pads: Mild-Moderate (flat cheeks, minimal bounce)  Triceps: Mild-Moderate (less than ample fat tissue)  Muscle Wasting:  Temporalis: Mild-Moderate (slight depression)  Pectoralis (Clavicular Region): Mild-Moderate (some protrusion of clavicle)  Deltoid/Trapezius: " "Mild-Moderate (slight protrusion of acromion process)  Quadriceps: Severe (depressions on inner and outer thigh)  Gastrocnemius: Severe (minimal muscle definition)\"    \"Malnutrition Diagnosis  Patient has Malnutrition Diagnosis: Yes  Diagnosis Status: New  Malnutrition Diagnosis: Severe malnutrition related to chronic disease or condition  As Evidenced by: pt reports a poor appetite and intake for the last few months with a noted 20% weight loss from her usual body weight; she has areas of moderate to severe fat and muscle loss on physical exam\"    Treatment: Continue  PO diet as tolerated, Check Vitamin D levels, Ordered Ensure Plus (350 kcal, 13 g pro) once daily    Risk Factors: systolic HF/ICM/HFrEF, poorly controlled DM, pAF  Options provided:  -- I agree with dietician diagnosis of Severe Malnutrition on 6/3/25  -- Other - I will add my own diagnosis  -- Refer to Clinical Documentation Reviewer    Query created by: Talia Watkins on 6/19/2025 12:12 PM      Electronically signed by:  MARLENY CALLES-ALEXANDRE 6/23/2025 5:04 PM          "

## 2025-06-25 ENCOUNTER — DOCUMENTATION (OUTPATIENT)
Dept: TRANSPLANT | Facility: HOSPITAL | Age: 60
End: 2025-06-25
Payer: COMMERCIAL

## 2025-06-25 ENCOUNTER — TELEPHONE (OUTPATIENT)
Dept: TRANSPLANT | Facility: HOSPITAL | Age: 60
End: 2025-06-25
Payer: COMMERCIAL

## 2025-06-25 DIAGNOSIS — I50.9 HEART FAILURE, UNSPECIFIED HF CHRONICITY, UNSPECIFIED HEART FAILURE TYPE: ICD-10-CM

## 2025-06-25 DIAGNOSIS — I50.20 HFREF (HEART FAILURE WITH REDUCED EJECTION FRACTION): ICD-10-CM

## 2025-06-25 DIAGNOSIS — I50.20 ACC/AHA STAGE D SYSTOLIC HEART FAILURE: ICD-10-CM

## 2025-06-25 DIAGNOSIS — I25.5 ISCHEMIC CARDIOMYOPATHY: ICD-10-CM

## 2025-06-25 NOTE — PROGRESS NOTES
Spoke with patient regarding LVAD implant. Patient agreeable for admission on 7/14 with an OR date of 7/22. Patient will need colonoscopy prior to OR date.

## 2025-06-26 DIAGNOSIS — I50.20 HFREF (HEART FAILURE WITH REDUCED EJECTION FRACTION): Primary | ICD-10-CM

## 2025-06-26 DIAGNOSIS — I50.84 CHF (NYHA CLASS IV, ACC/AHA STAGE D) (MULTI): ICD-10-CM

## 2025-06-27 ENCOUNTER — HOSPITAL ENCOUNTER (OUTPATIENT)
Facility: HOSPITAL | Age: 60
Setting detail: OUTPATIENT SURGERY
End: 2025-06-27
Attending: THORACIC SURGERY (CARDIOTHORACIC VASCULAR SURGERY) | Admitting: THORACIC SURGERY (CARDIOTHORACIC VASCULAR SURGERY)
Payer: COMMERCIAL

## 2025-06-27 PROBLEM — I50.84: Status: ACTIVE | Noted: 2025-06-26

## 2025-07-03 ENCOUNTER — APPOINTMENT (OUTPATIENT)
Dept: CARDIOLOGY | Facility: HOSPITAL | Age: 60
End: 2025-07-03
Payer: COMMERCIAL

## 2025-07-08 ENCOUNTER — TELEPHONE (OUTPATIENT)
Dept: TRANSPLANT | Facility: HOSPITAL | Age: 60
End: 2025-07-08
Payer: COMMERCIAL

## 2025-07-08 NOTE — TELEPHONE ENCOUNTER
Spoke with Thao regarding upcoming preadmission on 7/14. Explain to patient she will receive a call once a bed is available for her. Per Dr. Deluca patient was advised to stop talking Plavix and Jardiance. Patient reports already taking her medications this morning so will hold these medications starting tomorrow.     Allowed time for patient to ask questions. All questions answered. Patient agreeable to admission plan.

## 2025-07-14 ENCOUNTER — HOSPITAL ENCOUNTER (INPATIENT)
Facility: HOSPITAL | Age: 60
End: 2025-07-14
Attending: INTERNAL MEDICINE | Admitting: NURSE PRACTITIONER
Payer: COMMERCIAL

## 2025-07-14 ENCOUNTER — TELEPHONE (OUTPATIENT)
Dept: TRANSPLANT | Facility: HOSPITAL | Age: 60
End: 2025-07-14

## 2025-07-14 ENCOUNTER — DOCUMENTATION (OUTPATIENT)
Dept: INTENSIVE CARE | Facility: HOSPITAL | Age: 60
End: 2025-07-14

## 2025-07-14 ENCOUNTER — APPOINTMENT (OUTPATIENT)
Dept: RADIOLOGY | Facility: HOSPITAL | Age: 60
DRG: 001 | End: 2025-07-14
Payer: COMMERCIAL

## 2025-07-14 DIAGNOSIS — R63.4 ABNORMAL WEIGHT LOSS: ICD-10-CM

## 2025-07-14 DIAGNOSIS — I25.5 ISCHEMIC CARDIOMYOPATHY: ICD-10-CM

## 2025-07-14 DIAGNOSIS — I50.23 ACUTE ON CHRONIC HEART FAILURE WITH REDUCED EJECTION FRACTION (HFREF, <= 40%): ICD-10-CM

## 2025-07-14 DIAGNOSIS — I51.9 MILD PULMONIC REGURGITATION AND RV DYSFUNCTION BY PRIOR ECHOCARDIOGRAM: ICD-10-CM

## 2025-07-14 DIAGNOSIS — I50.20 ACC/AHA STAGE D SYSTOLIC HEART FAILURE: Primary | ICD-10-CM

## 2025-07-14 DIAGNOSIS — I50.1 LEFT VENTRICULAR FAILURE, UNSPECIFIED (MULTI): ICD-10-CM

## 2025-07-14 DIAGNOSIS — R57.0 CARDIOGENIC SHOCK (MULTI): ICD-10-CM

## 2025-07-14 DIAGNOSIS — I50.20 ACC/AHA STAGE C SYSTOLIC HEART FAILURE: ICD-10-CM

## 2025-07-14 DIAGNOSIS — I50.9 ACUTE DECOMPENSATED HEART FAILURE: ICD-10-CM

## 2025-07-14 DIAGNOSIS — I50.82 BIVENTRICULAR HEART FAILURE: ICD-10-CM

## 2025-07-14 DIAGNOSIS — Z12.11 SCREENING FOR COLON CANCER: ICD-10-CM

## 2025-07-14 DIAGNOSIS — Z95.811 LVAD (LEFT VENTRICULAR ASSIST DEVICE) PRESENT (MULTI): ICD-10-CM

## 2025-07-14 DIAGNOSIS — I50.20 HFREF (HEART FAILURE WITH REDUCED EJECTION FRACTION): ICD-10-CM

## 2025-07-14 DIAGNOSIS — I50.43 ACUTE ON CHRONIC COMBINED SYSTOLIC (CONGESTIVE) AND DIASTOLIC (CONGESTIVE) HEART FAILURE: ICD-10-CM

## 2025-07-14 DIAGNOSIS — I50.33 HEART FAILURE, DIASTOLIC, WITH ACUTE DECOMPENSATION: ICD-10-CM

## 2025-07-14 DIAGNOSIS — I50.9 HEART FAILURE, UNSPECIFIED: ICD-10-CM

## 2025-07-14 DIAGNOSIS — I50.84 CHF (NYHA CLASS IV, ACC/AHA STAGE D) (MULTI): ICD-10-CM

## 2025-07-14 DIAGNOSIS — J96.01 ACUTE RESPIRATORY FAILURE WITH HYPOXIA: ICD-10-CM

## 2025-07-14 DIAGNOSIS — I37.1 MILD PULMONIC REGURGITATION AND RV DYSFUNCTION BY PRIOR ECHOCARDIOGRAM: ICD-10-CM

## 2025-07-14 LAB
ALBUMIN SERPL BCP-MCNC: 3.9 G/DL (ref 3.4–5)
ALP SERPL-CCNC: 74 U/L (ref 33–136)
ALT SERPL W P-5'-P-CCNC: 11 U/L (ref 7–45)
ANION GAP SERPL CALC-SCNC: 12 MMOL/L (ref 10–20)
APTT PPP: 30 SECONDS (ref 26–36)
AST SERPL W P-5'-P-CCNC: 12 U/L (ref 9–39)
BASOPHILS # BLD AUTO: 0.06 X10*3/UL (ref 0–0.1)
BASOPHILS NFR BLD AUTO: 0.9 %
BILIRUB SERPL-MCNC: 0.8 MG/DL (ref 0–1.2)
BNP SERPL-MCNC: 735 PG/ML (ref 0–99)
BUN SERPL-MCNC: 11 MG/DL (ref 6–23)
CALCIUM SERPL-MCNC: 9.6 MG/DL (ref 8.6–10.6)
CARDIAC TROPONIN I PNL SERPL HS: 18 NG/L (ref 0–34)
CHLORIDE SERPL-SCNC: 96 MMOL/L (ref 98–107)
CHOLEST SERPL-MCNC: 141 MG/DL (ref 0–199)
CHOLESTEROL/HDL RATIO: 3.3
CO2 SERPL-SCNC: 33 MMOL/L (ref 21–32)
CREAT SERPL-MCNC: 0.62 MG/DL (ref 0.5–1.05)
EGFRCR SERPLBLD CKD-EPI 2021: >90 ML/MIN/1.73M*2
EOSINOPHIL # BLD AUTO: 0.11 X10*3/UL (ref 0–0.7)
EOSINOPHIL NFR BLD AUTO: 1.7 %
ERYTHROCYTE [DISTWIDTH] IN BLOOD BY AUTOMATED COUNT: 15.7 % (ref 11.5–14.5)
FERRITIN SERPL-MCNC: 224 NG/ML (ref 8–150)
GLUCOSE BLD MANUAL STRIP-MCNC: 235 MG/DL (ref 74–99)
GLUCOSE SERPL-MCNC: 137 MG/DL (ref 74–99)
HCT VFR BLD AUTO: 41.6 % (ref 36–46)
HDLC SERPL-MCNC: 43.3 MG/DL
HGB BLD-MCNC: 13.2 G/DL (ref 12–16)
IMM GRANULOCYTES # BLD AUTO: 0.02 X10*3/UL (ref 0–0.7)
IMM GRANULOCYTES NFR BLD AUTO: 0.3 % (ref 0–0.9)
INR PPP: 1.1 (ref 0.9–1.1)
IRON SATN MFR SERPL: 24 % (ref 25–45)
IRON SERPL-MCNC: 62 UG/DL (ref 35–150)
LACTATE SERPL-SCNC: 1 MMOL/L (ref 0.4–2)
LDLC SERPL CALC-MCNC: 78 MG/DL
LYMPHOCYTES # BLD AUTO: 1.51 X10*3/UL (ref 1.2–4.8)
LYMPHOCYTES NFR BLD AUTO: 23.2 %
MAGNESIUM SERPL-MCNC: 2.06 MG/DL (ref 1.6–2.4)
MCH RBC QN AUTO: 29.7 PG (ref 26–34)
MCHC RBC AUTO-ENTMCNC: 31.7 G/DL (ref 32–36)
MCV RBC AUTO: 94 FL (ref 80–100)
MONOCYTES # BLD AUTO: 0.5 X10*3/UL (ref 0.1–1)
MONOCYTES NFR BLD AUTO: 7.7 %
NEUTROPHILS # BLD AUTO: 4.32 X10*3/UL (ref 1.2–7.7)
NEUTROPHILS NFR BLD AUTO: 66.2 %
NON HDL CHOLESTEROL: 98 MG/DL (ref 0–149)
NRBC BLD-RTO: 0 /100 WBCS (ref 0–0)
PLATELET # BLD AUTO: 210 X10*3/UL (ref 150–450)
POTASSIUM SERPL-SCNC: 3.6 MMOL/L (ref 3.5–5.3)
PROT SERPL-MCNC: 7 G/DL (ref 6.4–8.2)
PROTHROMBIN TIME: 12.2 SECONDS (ref 9.8–12.4)
RBC # BLD AUTO: 4.45 X10*6/UL (ref 4–5.2)
SODIUM SERPL-SCNC: 137 MMOL/L (ref 136–145)
TIBC SERPL-MCNC: 256 UG/DL (ref 240–445)
TRIGL SERPL-MCNC: 99 MG/DL (ref 0–149)
UIBC SERPL-MCNC: 194 UG/DL (ref 110–370)
VLDL: 20 MG/DL (ref 0–40)
WBC # BLD AUTO: 6.5 X10*3/UL (ref 4.4–11.3)

## 2025-07-14 PROCEDURE — 5A1935Z RESPIRATORY VENTILATION, LESS THAN 24 CONSECUTIVE HOURS: ICD-10-PCS | Performed by: INTERNAL MEDICINE

## 2025-07-14 PROCEDURE — 71045 X-RAY EXAM CHEST 1 VIEW: CPT | Performed by: RADIOLOGY

## 2025-07-14 PROCEDURE — 83880 ASSAY OF NATRIURETIC PEPTIDE: CPT | Performed by: NURSE PRACTITIONER

## 2025-07-14 PROCEDURE — 82728 ASSAY OF FERRITIN: CPT | Performed by: NURSE PRACTITIONER

## 2025-07-14 PROCEDURE — 2500000002 HC RX 250 W HCPCS SELF ADMINISTERED DRUGS (ALT 637 FOR MEDICARE OP, ALT 636 FOR OP/ED): Performed by: NURSE PRACTITIONER

## 2025-07-14 PROCEDURE — 83605 ASSAY OF LACTIC ACID: CPT | Performed by: NURSE PRACTITIONER

## 2025-07-14 PROCEDURE — 82947 ASSAY GLUCOSE BLOOD QUANT: CPT

## 2025-07-14 PROCEDURE — 83735 ASSAY OF MAGNESIUM: CPT | Performed by: NURSE PRACTITIONER

## 2025-07-14 PROCEDURE — 0BH17EZ INSERTION OF ENDOTRACHEAL AIRWAY INTO TRACHEA, VIA NATURAL OR ARTIFICIAL OPENING: ICD-10-PCS | Performed by: INTERNAL MEDICINE

## 2025-07-14 PROCEDURE — 85025 COMPLETE CBC W/AUTO DIFF WBC: CPT | Performed by: NURSE PRACTITIONER

## 2025-07-14 PROCEDURE — 71045 X-RAY EXAM CHEST 1 VIEW: CPT

## 2025-07-14 PROCEDURE — 2500000001 HC RX 250 WO HCPCS SELF ADMINISTERED DRUGS (ALT 637 FOR MEDICARE OP): Performed by: NURSE PRACTITIONER

## 2025-07-14 PROCEDURE — 99291 CRITICAL CARE FIRST HOUR: CPT | Performed by: NURSE PRACTITIONER

## 2025-07-14 PROCEDURE — 85610 PROTHROMBIN TIME: CPT | Performed by: NURSE PRACTITIONER

## 2025-07-14 PROCEDURE — 36415 COLL VENOUS BLD VENIPUNCTURE: CPT | Performed by: NURSE PRACTITIONER

## 2025-07-14 PROCEDURE — 80053 COMPREHEN METABOLIC PANEL: CPT | Performed by: NURSE PRACTITIONER

## 2025-07-14 PROCEDURE — 80061 LIPID PANEL: CPT | Performed by: NURSE PRACTITIONER

## 2025-07-14 PROCEDURE — 2500000005 HC RX 250 GENERAL PHARMACY W/O HCPCS

## 2025-07-14 PROCEDURE — 2020000001 HC ICU ROOM DAILY

## 2025-07-14 PROCEDURE — 83540 ASSAY OF IRON: CPT | Performed by: NURSE PRACTITIONER

## 2025-07-14 PROCEDURE — 84484 ASSAY OF TROPONIN QUANT: CPT | Performed by: NURSE PRACTITIONER

## 2025-07-14 PROCEDURE — 2500000004 HC RX 250 GENERAL PHARMACY W/ HCPCS (ALT 636 FOR OP/ED)

## 2025-07-14 RX ORDER — SACUBITRIL AND VALSARTAN 24; 26 MG/1; MG/1
1 TABLET ORAL 2 TIMES DAILY
Status: DISCONTINUED | OUTPATIENT
Start: 2025-07-14 | End: 2025-07-18

## 2025-07-14 RX ORDER — ALBUTEROL SULFATE 90 UG/1
2 INHALANT RESPIRATORY (INHALATION) EVERY 6 HOURS PRN
Status: DISCONTINUED | OUTPATIENT
Start: 2025-07-14 | End: 2025-08-18 | Stop reason: HOSPADM

## 2025-07-14 RX ORDER — ROPINIROLE 1 MG/1
1 TABLET, FILM COATED ORAL 3 TIMES DAILY
Status: DISCONTINUED | OUTPATIENT
Start: 2025-07-14 | End: 2025-08-18 | Stop reason: HOSPADM

## 2025-07-14 RX ORDER — ROPINIROLE 3 MG/1
3 TABLET, FILM COATED ORAL NIGHTLY
Status: DISCONTINUED | OUTPATIENT
Start: 2025-07-15 | End: 2025-07-14

## 2025-07-14 RX ORDER — LEVOTHYROXINE SODIUM 88 UG/1
88 TABLET ORAL DAILY
Status: DISCONTINUED | OUTPATIENT
Start: 2025-07-15 | End: 2025-08-18 | Stop reason: HOSPADM

## 2025-07-14 RX ORDER — ATORVASTATIN CALCIUM 80 MG/1
80 TABLET, FILM COATED ORAL DAILY
Status: DISCONTINUED | OUTPATIENT
Start: 2025-07-15 | End: 2025-08-18 | Stop reason: HOSPADM

## 2025-07-14 RX ORDER — INSULIN LISPRO 100 [IU]/ML
0-5 INJECTION, SOLUTION INTRAVENOUS; SUBCUTANEOUS
Status: DISCONTINUED | OUTPATIENT
Start: 2025-07-15 | End: 2025-07-25

## 2025-07-14 RX ORDER — ROPINIROLE 3 MG/1
3 TABLET, FILM COATED ORAL NIGHTLY
Status: DISCONTINUED | OUTPATIENT
Start: 2025-07-14 | End: 2025-08-18 | Stop reason: HOSPADM

## 2025-07-14 RX ORDER — INSULIN GLARGINE 100 [IU]/ML
50 INJECTION, SOLUTION SUBCUTANEOUS NIGHTLY
Status: DISCONTINUED | OUTPATIENT
Start: 2025-07-14 | End: 2025-07-16

## 2025-07-14 RX ORDER — FLUTICASONE FUROATE AND VILANTEROL 100; 25 UG/1; UG/1
1 POWDER RESPIRATORY (INHALATION)
Status: DISCONTINUED | OUTPATIENT
Start: 2025-07-15 | End: 2025-08-18 | Stop reason: HOSPADM

## 2025-07-14 RX ORDER — DEXTROSE 50 % IN WATER (D50W) INTRAVENOUS SYRINGE
12.5
Status: DISCONTINUED | OUTPATIENT
Start: 2025-07-14 | End: 2025-08-01

## 2025-07-14 RX ORDER — BUMETANIDE 1 MG/1
1 TABLET ORAL
Status: DISCONTINUED | OUTPATIENT
Start: 2025-07-15 | End: 2025-07-19

## 2025-07-14 RX ORDER — SPIRONOLACTONE 25 MG/1
25 TABLET ORAL DAILY
Status: DISCONTINUED | OUTPATIENT
Start: 2025-07-15 | End: 2025-08-01

## 2025-07-14 RX ORDER — CITALOPRAM 40 MG/1
40 TABLET ORAL
Status: DISCONTINUED | OUTPATIENT
Start: 2025-07-15 | End: 2025-08-18 | Stop reason: HOSPADM

## 2025-07-14 RX ORDER — DEXTROSE 50 % IN WATER (D50W) INTRAVENOUS SYRINGE
25
Status: DISCONTINUED | OUTPATIENT
Start: 2025-07-14 | End: 2025-08-01

## 2025-07-14 RX ORDER — PANTOPRAZOLE SODIUM 20 MG/1
20 TABLET, DELAYED RELEASE ORAL DAILY
Status: DISCONTINUED | OUTPATIENT
Start: 2025-07-15 | End: 2025-08-01

## 2025-07-14 RX ORDER — CLOPIDOGREL BISULFATE 75 MG/1
75 TABLET ORAL DAILY
Status: DISCONTINUED | OUTPATIENT
Start: 2025-07-15 | End: 2025-08-01

## 2025-07-14 RX ORDER — NITROGLYCERIN 0.4 MG/1
0.4 TABLET SUBLINGUAL EVERY 5 MIN PRN
Status: DISCONTINUED | OUTPATIENT
Start: 2025-07-14 | End: 2025-08-01

## 2025-07-14 RX ORDER — NAPROXEN SODIUM 220 MG/1
81 TABLET, FILM COATED ORAL DAILY
Status: DISCONTINUED | OUTPATIENT
Start: 2025-07-15 | End: 2025-08-01

## 2025-07-14 RX ADMIN — SACUBITRIL AND VALSARTAN 1 TABLET: 24; 26 TABLET, FILM COATED ORAL at 22:29

## 2025-07-14 RX ADMIN — GABAPENTIN 400 MG: 300 CAPSULE ORAL at 22:29

## 2025-07-14 RX ADMIN — INSULIN GLARGINE 50 UNITS: 100 INJECTION, SOLUTION SUBCUTANEOUS at 22:30

## 2025-07-14 SDOH — SOCIAL STABILITY: SOCIAL INSECURITY: ARE YOU OR HAVE YOU BEEN THREATENED OR ABUSED PHYSICALLY, EMOTIONALLY, OR SEXUALLY BY ANYONE?: NO

## 2025-07-14 SDOH — ECONOMIC STABILITY: FOOD INSECURITY: HOW HARD IS IT FOR YOU TO PAY FOR THE VERY BASICS LIKE FOOD, HOUSING, MEDICAL CARE, AND HEATING?: NOT VERY HARD

## 2025-07-14 SDOH — SOCIAL STABILITY: SOCIAL INSECURITY
WITHIN THE LAST YEAR, HAVE YOU BEEN RAPED OR FORCED TO HAVE ANY KIND OF SEXUAL ACTIVITY BY YOUR PARTNER OR EX-PARTNER?: NO

## 2025-07-14 SDOH — ECONOMIC STABILITY: INCOME INSECURITY: IN THE PAST 12 MONTHS HAS THE ELECTRIC, GAS, OIL, OR WATER COMPANY THREATENED TO SHUT OFF SERVICES IN YOUR HOME?: NO

## 2025-07-14 SDOH — ECONOMIC STABILITY: HOUSING INSECURITY: AT ANY TIME IN THE PAST 12 MONTHS, WERE YOU HOMELESS OR LIVING IN A SHELTER (INCLUDING NOW)?: NO

## 2025-07-14 SDOH — SOCIAL STABILITY: SOCIAL INSECURITY: WITHIN THE LAST YEAR, HAVE YOU BEEN HUMILIATED OR EMOTIONALLY ABUSED IN OTHER WAYS BY YOUR PARTNER OR EX-PARTNER?: NO

## 2025-07-14 SDOH — SOCIAL STABILITY: SOCIAL INSECURITY: HAVE YOU HAD ANY THOUGHTS OF HARMING ANYONE ELSE?: NO

## 2025-07-14 SDOH — ECONOMIC STABILITY: FOOD INSECURITY: WITHIN THE PAST 12 MONTHS, THE FOOD YOU BOUGHT JUST DIDN'T LAST AND YOU DIDN'T HAVE MONEY TO GET MORE.: SOMETIMES TRUE

## 2025-07-14 SDOH — SOCIAL STABILITY: SOCIAL INSECURITY
WITHIN THE LAST YEAR, HAVE YOU BEEN KICKED, HIT, SLAPPED, OR OTHERWISE PHYSICALLY HURT BY YOUR PARTNER OR EX-PARTNER?: NO

## 2025-07-14 SDOH — ECONOMIC STABILITY: HOUSING INSECURITY
IN THE LAST 12 MONTHS, WAS THERE A TIME WHEN YOU WERE NOT ABLE TO PAY THE MORTGAGE OR RENT ON TIME?: PATIENT UNABLE TO ANSWER

## 2025-07-14 SDOH — ECONOMIC STABILITY: HOUSING INSECURITY: IN THE PAST 12 MONTHS, HOW MANY TIMES HAVE YOU MOVED WHERE YOU WERE LIVING?: 0

## 2025-07-14 SDOH — SOCIAL STABILITY: SOCIAL INSECURITY: WERE YOU ABLE TO COMPLETE ALL THE BEHAVIORAL HEALTH SCREENINGS?: YES

## 2025-07-14 SDOH — ECONOMIC STABILITY: FOOD INSECURITY
WITHIN THE PAST 12 MONTHS, YOU WORRIED THAT YOUR FOOD WOULD RUN OUT BEFORE YOU GOT THE MONEY TO BUY MORE.: SOMETIMES TRUE

## 2025-07-14 SDOH — SOCIAL STABILITY: SOCIAL INSECURITY: WITHIN THE LAST YEAR, HAVE YOU BEEN AFRAID OF YOUR PARTNER OR EX-PARTNER?: NO

## 2025-07-14 SDOH — SOCIAL STABILITY: SOCIAL INSECURITY: ABUSE: ADULT

## 2025-07-14 SDOH — SOCIAL STABILITY: SOCIAL INSECURITY: HAS ANYONE EVER THREATENED TO HURT YOUR FAMILY OR YOUR PETS?: NO

## 2025-07-14 SDOH — SOCIAL STABILITY: SOCIAL INSECURITY: DOES ANYONE TRY TO KEEP YOU FROM HAVING/CONTACTING OTHER FRIENDS OR DOING THINGS OUTSIDE YOUR HOME?: NO

## 2025-07-14 SDOH — ECONOMIC STABILITY: TRANSPORTATION INSECURITY: IN THE PAST 12 MONTHS, HAS LACK OF TRANSPORTATION KEPT YOU FROM MEDICAL APPOINTMENTS OR FROM GETTING MEDICATIONS?: NO

## 2025-07-14 SDOH — SOCIAL STABILITY: SOCIAL INSECURITY: DO YOU FEEL ANYONE HAS EXPLOITED OR TAKEN ADVANTAGE OF YOU FINANCIALLY OR OF YOUR PERSONAL PROPERTY?: NO

## 2025-07-14 SDOH — SOCIAL STABILITY: SOCIAL INSECURITY: DO YOU FEEL UNSAFE GOING BACK TO THE PLACE WHERE YOU ARE LIVING?: NO

## 2025-07-14 SDOH — SOCIAL STABILITY: SOCIAL INSECURITY: HAVE YOU HAD THOUGHTS OF HARMING ANYONE ELSE?: YES

## 2025-07-14 SDOH — SOCIAL STABILITY: SOCIAL INSECURITY: ARE THERE ANY APPARENT SIGNS OF INJURIES/BEHAVIORS THAT COULD BE RELATED TO ABUSE/NEGLECT?: NO

## 2025-07-14 ASSESSMENT — COGNITIVE AND FUNCTIONAL STATUS - GENERAL
DAILY ACTIVITIY SCORE: 24
MOBILITY SCORE: 24
MOBILITY SCORE: 24
PATIENT BASELINE BEDBOUND: NO
DAILY ACTIVITIY SCORE: 24

## 2025-07-14 ASSESSMENT — PAIN SCALES - GENERAL
PAINLEVEL_OUTOF10: 0 - NO PAIN

## 2025-07-14 ASSESSMENT — LIFESTYLE VARIABLES
HOW OFTEN DO YOU HAVE 6 OR MORE DRINKS ON ONE OCCASION: NEVER
AUDIT-C TOTAL SCORE: 2
SKIP TO QUESTIONS 9-10: 1
AUDIT-C TOTAL SCORE: 2
HOW OFTEN DO YOU HAVE A DRINK CONTAINING ALCOHOL: 2-4 TIMES A MONTH
HOW MANY STANDARD DRINKS CONTAINING ALCOHOL DO YOU HAVE ON A TYPICAL DAY: 1 OR 2

## 2025-07-14 ASSESSMENT — ENCOUNTER SYMPTOMS
COUGH: 1
PALPITATIONS: 0
ALLERGIC/IMMUNOLOGIC NEGATIVE: 1
HEMATOLOGIC/LYMPHATIC NEGATIVE: 1
ENDOCRINE NEGATIVE: 1
MUSCULOSKELETAL NEGATIVE: 1
GASTROINTESTINAL NEGATIVE: 1
PSYCHIATRIC NEGATIVE: 1
NEUROLOGICAL NEGATIVE: 1
CONSTITUTIONAL NEGATIVE: 1
EYES NEGATIVE: 1

## 2025-07-14 ASSESSMENT — ACTIVITIES OF DAILY LIVING (ADL)
ADEQUATE_TO_COMPLETE_ADL: YES
GROOMING: INDEPENDENT
PATIENT'S MEMORY ADEQUATE TO SAFELY COMPLETE DAILY ACTIVITIES?: YES
LACK_OF_TRANSPORTATION: NO
FEEDING YOURSELF: INDEPENDENT
WALKS IN HOME: INDEPENDENT
DRESSING YOURSELF: INDEPENDENT
JUDGMENT_ADEQUATE_SAFELY_COMPLETE_DAILY_ACTIVITIES: YES
HEARING - RIGHT EAR: FUNCTIONAL
HEARING - LEFT EAR: FUNCTIONAL
BATHING: INDEPENDENT
LACK_OF_TRANSPORTATION: NO
TOILETING: INDEPENDENT

## 2025-07-14 ASSESSMENT — PAIN - FUNCTIONAL ASSESSMENT
PAIN_FUNCTIONAL_ASSESSMENT: 0-10

## 2025-07-14 ASSESSMENT — PATIENT HEALTH QUESTIONNAIRE - PHQ9
SUM OF ALL RESPONSES TO PHQ9 QUESTIONS 1 & 2: 0
1. LITTLE INTEREST OR PLEASURE IN DOING THINGS: NOT AT ALL
2. FEELING DOWN, DEPRESSED OR HOPELESS: NOT AT ALL

## 2025-07-15 ENCOUNTER — APPOINTMENT (OUTPATIENT)
Dept: CARDIOLOGY | Facility: HOSPITAL | Age: 60
End: 2025-07-15
Payer: COMMERCIAL

## 2025-07-15 ENCOUNTER — APPOINTMENT (OUTPATIENT)
Dept: CARDIOLOGY | Facility: HOSPITAL | Age: 60
DRG: 001 | End: 2025-07-15
Payer: COMMERCIAL

## 2025-07-15 LAB
ALBUMIN SERPL BCP-MCNC: 3.5 G/DL (ref 3.4–5)
ANION GAP SERPL CALC-SCNC: 10 MMOL/L (ref 10–20)
BODY SURFACE AREA: 1.64 M2
BUN SERPL-MCNC: 11 MG/DL (ref 6–23)
CALCIUM SERPL-MCNC: 9.1 MG/DL (ref 8.6–10.6)
CHLORIDE SERPL-SCNC: 99 MMOL/L (ref 98–107)
CO2 SERPL-SCNC: 32 MMOL/L (ref 21–32)
CREAT SERPL-MCNC: 0.64 MG/DL (ref 0.5–1.05)
EGFRCR SERPLBLD CKD-EPI 2021: >90 ML/MIN/1.73M*2
EJECTION FRACTION APICAL 4 CHAMBER: 36.4
EJECTION FRACTION: 33 %
ERYTHROCYTE [DISTWIDTH] IN BLOOD BY AUTOMATED COUNT: 15.9 % (ref 11.5–14.5)
EST. AVERAGE GLUCOSE BLD GHB EST-MCNC: 183 MG/DL
GLUCOSE BLD MANUAL STRIP-MCNC: 125 MG/DL (ref 74–99)
GLUCOSE BLD MANUAL STRIP-MCNC: 226 MG/DL (ref 74–99)
GLUCOSE BLD MANUAL STRIP-MCNC: 279 MG/DL (ref 74–99)
GLUCOSE BLD MANUAL STRIP-MCNC: 84 MG/DL (ref 74–99)
GLUCOSE SERPL-MCNC: 129 MG/DL (ref 74–99)
HBA1C MFR BLD: 8 % (ref ?–5.7)
HCT VFR BLD AUTO: 39.9 % (ref 36–46)
HGB BLD-MCNC: 12.8 G/DL (ref 12–16)
LEFT ATRIUM VOLUME AREA LENGTH INDEX BSA: 33 ML/M2
LEFT VENTRICLE INTERNAL DIMENSION DIASTOLE: 4.12 CM (ref 3.5–6)
LEFT VENTRICULAR OUTFLOW TRACT DIAMETER: 2.14 CM
MAGNESIUM SERPL-MCNC: 2.28 MG/DL (ref 1.6–2.4)
MCH RBC QN AUTO: 30 PG (ref 26–34)
MCHC RBC AUTO-ENTMCNC: 32.1 G/DL (ref 32–36)
MCV RBC AUTO: 94 FL (ref 80–100)
MITRAL VALVE E/A RATIO: 2.74
NRBC BLD-RTO: 0 /100 WBCS (ref 0–0)
PHOSPHATE SERPL-MCNC: 4.1 MG/DL (ref 2.5–4.9)
PLATELET # BLD AUTO: 194 X10*3/UL (ref 150–450)
POTASSIUM SERPL-SCNC: 4.1 MMOL/L (ref 3.5–5.3)
RBC # BLD AUTO: 4.26 X10*6/UL (ref 4–5.2)
RIGHT VENTRICLE PEAK SYSTOLIC PRESSURE: 47 MMHG
SODIUM SERPL-SCNC: 137 MMOL/L (ref 136–145)
WBC # BLD AUTO: 6 X10*3/UL (ref 4.4–11.3)

## 2025-07-15 PROCEDURE — 80069 RENAL FUNCTION PANEL: CPT | Performed by: NURSE PRACTITIONER

## 2025-07-15 PROCEDURE — 99291 CRITICAL CARE FIRST HOUR: CPT | Performed by: INTERNAL MEDICINE

## 2025-07-15 PROCEDURE — 93325 DOPPLER ECHO COLOR FLOW MAPG: CPT

## 2025-07-15 PROCEDURE — 2500000001 HC RX 250 WO HCPCS SELF ADMINISTERED DRUGS (ALT 637 FOR MEDICARE OP): Performed by: NURSE PRACTITIONER

## 2025-07-15 PROCEDURE — 36415 COLL VENOUS BLD VENIPUNCTURE: CPT | Performed by: NURSE PRACTITIONER

## 2025-07-15 PROCEDURE — 93308 TTE F-UP OR LMTD: CPT | Performed by: INTERNAL MEDICINE

## 2025-07-15 PROCEDURE — 93325 DOPPLER ECHO COLOR FLOW MAPG: CPT | Performed by: INTERNAL MEDICINE

## 2025-07-15 PROCEDURE — 2500000004 HC RX 250 GENERAL PHARMACY W/ HCPCS (ALT 636 FOR OP/ED)

## 2025-07-15 PROCEDURE — 99223 1ST HOSP IP/OBS HIGH 75: CPT | Performed by: INTERNAL MEDICINE

## 2025-07-15 PROCEDURE — 85027 COMPLETE CBC AUTOMATED: CPT | Performed by: NURSE PRACTITIONER

## 2025-07-15 PROCEDURE — 82947 ASSAY GLUCOSE BLOOD QUANT: CPT

## 2025-07-15 PROCEDURE — 83036 HEMOGLOBIN GLYCOSYLATED A1C: CPT

## 2025-07-15 PROCEDURE — 2020000001 HC ICU ROOM DAILY

## 2025-07-15 PROCEDURE — 83735 ASSAY OF MAGNESIUM: CPT | Performed by: NURSE PRACTITIONER

## 2025-07-15 PROCEDURE — 93005 ELECTROCARDIOGRAM TRACING: CPT

## 2025-07-15 PROCEDURE — 99291 CRITICAL CARE FIRST HOUR: CPT

## 2025-07-15 PROCEDURE — 93321 DOPPLER ECHO F-UP/LMTD STD: CPT

## 2025-07-15 PROCEDURE — 97530 THERAPEUTIC ACTIVITIES: CPT | Mod: GP

## 2025-07-15 PROCEDURE — 2500000004 HC RX 250 GENERAL PHARMACY W/ HCPCS (ALT 636 FOR OP/ED): Performed by: NURSE PRACTITIONER

## 2025-07-15 PROCEDURE — 93321 DOPPLER ECHO F-UP/LMTD STD: CPT | Performed by: INTERNAL MEDICINE

## 2025-07-15 PROCEDURE — 2500000002 HC RX 250 W HCPCS SELF ADMINISTERED DRUGS (ALT 637 FOR MEDICARE OP, ALT 636 FOR OP/ED): Performed by: NURSE PRACTITIONER

## 2025-07-15 PROCEDURE — 97161 PT EVAL LOW COMPLEX 20 MIN: CPT | Mod: GP

## 2025-07-15 RX ORDER — POTASSIUM CHLORIDE 20 MEQ/1
40 TABLET, EXTENDED RELEASE ORAL ONCE
Status: COMPLETED | OUTPATIENT
Start: 2025-07-15 | End: 2025-07-15

## 2025-07-15 RX ORDER — BUMETANIDE 0.25 MG/ML
1 INJECTION, SOLUTION INTRAMUSCULAR; INTRAVENOUS ONCE
Status: COMPLETED | OUTPATIENT
Start: 2025-07-15 | End: 2025-07-15

## 2025-07-15 RX ADMIN — ATORVASTATIN CALCIUM 80 MG: 80 TABLET, FILM COATED ORAL at 09:21

## 2025-07-15 RX ADMIN — GABAPENTIN 400 MG: 300 CAPSULE ORAL at 20:30

## 2025-07-15 RX ADMIN — GABAPENTIN 400 MG: 300 CAPSULE ORAL at 05:48

## 2025-07-15 RX ADMIN — SPIRONOLACTONE 25 MG: 25 TABLET, FILM COATED ORAL at 09:21

## 2025-07-15 RX ADMIN — GABAPENTIN 400 MG: 300 CAPSULE ORAL at 13:08

## 2025-07-15 RX ADMIN — ROPINIROLE HYDROCHLORIDE 3 MG: 3 TABLET, FILM COATED ORAL at 20:30

## 2025-07-15 RX ADMIN — LEVOTHYROXINE SODIUM 88 MCG: 0.09 TABLET ORAL at 06:17

## 2025-07-15 RX ADMIN — ROPINIROLE 1 MG: 1 TABLET, FILM COATED ORAL at 09:21

## 2025-07-15 RX ADMIN — PANTOPRAZOLE SODIUM 20 MG: 20 TABLET, DELAYED RELEASE ORAL at 09:21

## 2025-07-15 RX ADMIN — ROPINIROLE 1 MG: 1 TABLET, FILM COATED ORAL at 20:29

## 2025-07-15 RX ADMIN — CITALOPRAM HYDROBROMIDE 40 MG: 40 TABLET ORAL at 05:48

## 2025-07-15 RX ADMIN — IRON SUCROSE 200 MG: 20 INJECTION, SOLUTION INTRAVENOUS at 05:49

## 2025-07-15 RX ADMIN — SACUBITRIL AND VALSARTAN 1 TABLET: 24; 26 TABLET, FILM COATED ORAL at 20:29

## 2025-07-15 RX ADMIN — ROPINIROLE HYDROCHLORIDE 3 MG: 3 TABLET, FILM COATED ORAL at 02:20

## 2025-07-15 RX ADMIN — INSULIN LISPRO 3 UNITS: 100 INJECTION, SOLUTION INTRAVENOUS; SUBCUTANEOUS at 12:36

## 2025-07-15 RX ADMIN — ASPIRIN 81 MG CHEWABLE TABLET 81 MG: 81 TABLET CHEWABLE at 09:21

## 2025-07-15 RX ADMIN — BUMETANIDE 1 MG: 0.25 INJECTION INTRAMUSCULAR; INTRAVENOUS at 05:48

## 2025-07-15 RX ADMIN — BUMETANIDE 1 MG: 1 TABLET ORAL at 09:21

## 2025-07-15 RX ADMIN — ROPINIROLE 1 MG: 1 TABLET, FILM COATED ORAL at 00:50

## 2025-07-15 RX ADMIN — POTASSIUM CHLORIDE 40 MEQ: 1500 TABLET, EXTENDED RELEASE ORAL at 00:51

## 2025-07-15 RX ADMIN — BUMETANIDE 1 MG: 1 TABLET ORAL at 17:11

## 2025-07-15 RX ADMIN — PERFLUTREN 10 ML OF DILUTION: 6.52 INJECTION, SUSPENSION INTRAVENOUS at 14:34

## 2025-07-15 RX ADMIN — SACUBITRIL AND VALSARTAN 1 TABLET: 24; 26 TABLET, FILM COATED ORAL at 09:21

## 2025-07-15 RX ADMIN — ROPINIROLE 1 MG: 1 TABLET, FILM COATED ORAL at 14:34

## 2025-07-15 RX ADMIN — INSULIN GLARGINE 50 UNITS: 100 INJECTION, SOLUTION SUBCUTANEOUS at 20:29

## 2025-07-15 SDOH — ECONOMIC STABILITY: FOOD INSECURITY: WITHIN THE PAST 12 MONTHS, THE FOOD YOU BOUGHT JUST DIDN'T LAST AND YOU DIDN'T HAVE MONEY TO GET MORE.: NEVER TRUE

## 2025-07-15 SDOH — ECONOMIC STABILITY: HOUSING INSECURITY: IN THE LAST 12 MONTHS, WAS THERE A TIME WHEN YOU WERE NOT ABLE TO PAY THE MORTGAGE OR RENT ON TIME?: NO

## 2025-07-15 SDOH — ECONOMIC STABILITY: FOOD INSECURITY: WITHIN THE PAST 12 MONTHS, YOU WORRIED THAT YOUR FOOD WOULD RUN OUT BEFORE YOU GOT THE MONEY TO BUY MORE.: NEVER TRUE

## 2025-07-15 ASSESSMENT — COGNITIVE AND FUNCTIONAL STATUS - GENERAL
DAILY ACTIVITIY SCORE: 24
STANDING UP FROM CHAIR USING ARMS: A LITTLE
MOBILITY SCORE: 24
WALKING IN HOSPITAL ROOM: A LITTLE
MOVING FROM LYING ON BACK TO SITTING ON SIDE OF FLAT BED WITH BEDRAILS: A LITTLE
MOBILITY SCORE: 24
MOBILITY SCORE: 24
DAILY ACTIVITIY SCORE: 24
MOVING TO AND FROM BED TO CHAIR: A LITTLE
MOBILITY SCORE: 16
DAILY ACTIVITIY SCORE: 24
MOBILITY SCORE: 24
CLIMB 3 TO 5 STEPS WITH RAILING: TOTAL
TURNING FROM BACK TO SIDE WHILE IN FLAT BAD: A LITTLE
DAILY ACTIVITIY SCORE: 24

## 2025-07-15 ASSESSMENT — ACTIVITIES OF DAILY LIVING (ADL): ADL_ASSISTANCE: INDEPENDENT

## 2025-07-15 ASSESSMENT — PAIN SCALES - GENERAL
PAINLEVEL_OUTOF10: 0 - NO PAIN

## 2025-07-15 ASSESSMENT — PAIN - FUNCTIONAL ASSESSMENT
PAIN_FUNCTIONAL_ASSESSMENT: 0-10

## 2025-07-15 NOTE — CONSULTS
[facility]   [facility]  INITIAL CONSULT NOTE     Source of Information: The source of the history was patient    Consult requested by: Service: HFICU    Reason for Consult: colorectal cancer screening prior to placement of permanent mechanical left ventricular assistance device    Admission Chief Complaint: fatigue, dyspnea, early satiety with weight loss      SUBJECTIVE     HPI: Thao Evans is a 60 y.o. female w/PMH of CAD s/p PCI 2015 (LAD) and March 2025 (Lcx), ischemic stage D cardiomyopathy, h/o VT, ICD placement, paroxsymal atrial fibrillation (no anticoagulation), HTN, HLD, DM II, COPD, Grave's disease who presented for several months of symptoms of worsening hypervolemia as well as weight loss for expedited evaluation for LVAD placement. Gastroenterology has been consulted for colorectal cancer screening prior to LVAD placement.    She does state she began to have decreased appetite and describes early satiety beginning 3 months ago. This has been associated with approximately 60 lb weight loss. Denies nausea, emesis, dysphagia, odynophagia, abdominal pain, rectal pain, constipation, diarrhea, melena, hematochezia. She has not had a prior endoscopic evaluation.     Denies family history of GI cancers.    She is a former smoker with 30+ pack year history. Quit 3 months ago. No significant alcohol use.    ROS: Complete review of systems obtained, negative unless otherwise indicated above.     Allergies[1]  Medical History[2]  Surgical History[3]  Family History[4]  Social History     Social History Narrative    Not on file     [unfilled]    Medications:  Scheduled medications  Scheduled Medications[5]  Continuous medications  Continuous Medications[6]  PRN medications  PRN Medications[7]       EXAM     Vital signs:  Visit Vitals  BP 95/51   Pulse 84   Temp 35.9 °C (96.6 °F)   Resp 17     Physical Exam  Vitals reviewed.   Constitutional:       General: She  is not in acute distress.     Appearance: Normal appearance. She is not ill-appearing or toxic-appearing.   HENT:      Head: Normocephalic and atraumatic.      Nose: Nose normal.      Mouth/Throat:      Mouth: Mucous membranes are moist.      Pharynx: Oropharynx is clear.   Eyes:      General: No scleral icterus.     Conjunctiva/sclera: Conjunctivae normal.      Pupils: Pupils are equal, round, and reactive to light.   Cardiovascular:      Rate and Rhythm: Normal rate and regular rhythm.      Pulses: Normal pulses.   Pulmonary:      Effort: Pulmonary effort is normal. No respiratory distress.   Abdominal:      General: Abdomen is flat. There is no distension.      Palpations: Abdomen is soft. There is no mass.      Tenderness: There is no abdominal tenderness. There is no guarding or rebound.   Skin:     General: Skin is warm and dry.      Capillary Refill: Capillary refill takes less than 2 seconds.      Coloration: Skin is not jaundiced.   Neurological:      Mental Status: She is alert and oriented to person, place, and time.             DATA                                                                            Labs     Lab Results   Component Value Date    WBC 6.0 07/15/2025    WBC 6.5 07/14/2025    WBC 7.3 06/16/2025    HGB 12.8 07/15/2025    HGB 13.2 07/14/2025    HGB 12.9 06/16/2025    MCV 94 07/15/2025    MCV 94 07/14/2025    MCV 94 06/16/2025     07/15/2025     07/14/2025     06/16/2025       Lab Results   Component Value Date    GLUCOSE 129 (H) 07/15/2025    CALCIUM 9.1 07/15/2025     07/15/2025    K 4.1 07/15/2025    CO2 32 07/15/2025    CL 99 07/15/2025    BUN 11 07/15/2025    CREATININE 0.64 07/15/2025       Lab Results   Component Value Date    ALT 11 07/14/2025    ALT 19 06/16/2025    ALT 19 06/15/2025    AST 12 07/14/2025    AST 14 06/16/2025    AST 16 06/15/2025    ALKPHOS 74 07/14/2025    ALKPHOS 68 06/16/2025    ALKPHOS 66 06/15/2025    BILITOT 0.8 07/14/2025     BILITOT 0.3 06/16/2025    BILITOT 0.2 06/15/2025                                                                                  Imaging           === 06/02/25 ===    US THORACENTESIS    - Impression -  Uneventful thoracentesis, as detailed above. Right Pleural space,  1000 mL    === 06/02/25 ===    CT CHEST ABDOMEN PELVIS WO CONTRAST    - Impression -  1. Mild thoracic and moderate abdominopelvic atherosclerosis disease.  2. Mild cardiomegaly and severe coronary artery calcifications.  3. Findings compatible with fluid overload with moderate bilateral  pleural effusions with associated atelectasis, pulmonary interstitial  edema as well as mesenteric and body wall edema.  4. Patchy right lung opacities are likely secondary to pulmonary  alveolar edema, however multifocal infection/inflammation could have  a similar appearance, correlate with patient's symptomatology is  recommended.  5. Indeterminate is enlarged subcarinal and right lower paratracheal  lymph nodes, likely reactive. Recommend attention on follow-up  imaging to document stability/resolution.  6. Colonic diverticulosis without evidence of acute diverticulitis.  7. Intraluminal gas within the bladder, correlate with recent  instrumentation/catheterization.  8. Adrenal form thickening of the left adrenal gland, likely relating  to adrenal gland hyperplasia.  9. Additional chronic and incidental findings as described including  multiple subacute bilateral nondisplaced healing rib fractures and  hepatosplenomegaly.                                                                           GI Procedures   None.    ASSESSMENT / PLAN                  Assessment and Recommendations:   Thao Evans is a 60 y.o. female w/PMH of CAD s/p PCI 2015 (LAD) and March 2025 (Lcx), ischemic stage D cardiomyopathy, h/o VT, ICD placement, paroxsymal atrial fibrillation (no anticoagulation), HTN, HLD, DM II, COPD, Grave's disease who presented for several months of symptoms  of worsening hypervolemia as well as weight loss for expedited evaluation for LVAD placement. Gastroenterology has been consulted for colorectal cancer screening prior to LVAD placement.    The patient is currently hemodynamically stable. She is at higher risk of upper and lower GI malignancy given prolonged tobacco use, unexplained weight loss and lack of prior age-appropriate screening. No obvious malignancy nor metastasis noted on prior cross sectional imaging, however, each study has been conducted in the setting of bilateral pleural effusions due to hypervolemia from decompensated heart failure. An underlying pulmonary malignancy cannot be excluded and the patient is at high risk given tobacco exposure. Nevertheless, bidirectional endoscopic evaluation is indicated at this time.    #Unintentional Weight Loss  #Early Satiety  #Colorectal Cancer Screening, High Risk    Recommendations:  -Please make patient NPO [id number] 7/16/25.  -May have clear liquid diet 7/16/25.  -Will tentatively plan for EGD and colonoscopy 7/17/25 (patient ate solid food 7/15/25).    For Bowel Prep:  -Clear liquid diet for the day prior to procedure.  -NPO at MN except for bowel prep  - Please administer 2L Golytely starting at 5pm and finish by 9pm. Start another 2L at 4am and finish by 6am.  - Patient must drink all 4 L prep. Prep tastes better cold, mixed with other clear/flavorful beverage such as crystal light or juice. Pt to drink 8 oz glass of Golytely mixture every 15 minutes until ALL liquid is gone.   - Can mix Golytley with ice, juice (non-red), and drink with straw.  - Rectal output should be CLEAR (appearance of water or urine).  - If the patient's BMs are not clear by 6 AM, nursing should contact on-call primary team cross-cover to order 2 additional liters of Golytely.   - Pt must drink these 2 L by 7 AM, otherwise colonoscopy may need to be postponed    MEKA per primary team.  "  ------------------------------------------------------------------------  Derek Cohen MD   Gastroenterology Fellow    After 5PM and on Weekends, please page on-call fellow.    To be discussed with service attending Dr. Tejada.  Final recommendations pending attending attestation.          [1]   Allergies  Allergen Reactions    Bee Venom Protein (Honey Bee) Anaphylaxis    Codeine Other     \"Passed Out\"     \"i just black out\"    Doxycycline Hives    Prednisone Hives     Muscle cramps    Tetracyclines Hives and Other    Amoxicillin Hives and Rash     \"Felt warm and overheated all over\"    Metformin Diarrhea and Nausea And Vomiting   [2]   Past Medical History:  Diagnosis Date    CAD (coronary artery disease)     CHF (congestive heart failure)     COPD (chronic obstructive pulmonary disease) (Multi)     Graves disease     Hypotension     PAF (paroxysmal atrial fibrillation) (Multi)    [3] History reviewed. No pertinent surgical history.  [4] No family history on file.  [5] aspirin, 81 mg, oral, Daily  atorvastatin, 80 mg, oral, Daily  bumetanide, 1 mg, oral, BID  citalopram, 40 mg, oral, Daily  [Held by provider] clopidogrel, 75 mg, oral, Daily  [Held by provider] empagliflozin, 10 mg, oral, Daily  fluticasone furoate-vilanteroL, 1 puff, inhalation, Daily  gabapentin, 400 mg, oral, q8h  insulin glargine, 50 Units, subcutaneous, Nightly  insulin lispro, 0-5 Units, subcutaneous, TID AC  iron sucrose, 200 mg, intravenous, Daily  levothyroxine, 88 mcg, oral, Daily  pantoprazole, 20 mg, oral, Daily  rOPINIRole, 1 mg, oral, TID  rOPINIRole, 3 mg, oral, Nightly  sacubitriL-valsartan, 1 tablet, oral, BID  spironolactone, 25 mg, oral, Daily  [6]    [7] PRN medications: albuterol, dextrose, dextrose, glucagon, glucagon, nitroglycerin, oxygen    "

## 2025-07-15 NOTE — H&P
[facility]  HFICU HISTORY AND PHYSICAL     Thao Evans/[id number]    Admit Date: 7/14/2025  Hospital Length of Stay: 0   ICU Length of Stay: 1h   Primary Service: HFICU  Primary HF Cardiologist:  Dr. Deluca   Referring: [place] ([facility])    HPI:   Thao Evans is a 60 year old with a PMHx sig for CAD s/p PCI (LAD; 2015, Lcx; 2025), stage D systolic HF/ICM/HFrEF s/p ICD, h/o VT with presumed associated syncope, poorly controlled DM, pAF (off of DOAC given the absence of recurrence), dyslipidemia, essential HTN, COPD, and h/o Graves. She was recently referred to the [facility] and seen by Dr. Deluca. Her most recent hospitalization was in March 2025 for syncope/VT where she underwent an ICD placement and PCI to her LCx. She reported progressive symptoms over the last several months with ongoing fatigue, dyspnea, and early satiety causing significant progressive weight loss (~60 lbs over the last 6 months). She has also been currently intolerant of GDMT and is on midodrine for BP support since her hospital discharge. Because of worsening symptoms and intolerance of GDMT, she was offered direct admission to HFICU with plan for PAC guided evaluation. [facility] HFICU [facility] LT[facility] course c/b acute hypoxic respiratory failure due to L pleural effusion s/p thoracentesis x 3 (06/09, 6/11, 06/12), now on room air.     Pt was discussed in Advanced Therapies Committee meeting on 6/17/2025 and approved for LVAD (barriers to transplant, elevated PAP, uncontrolled DM Hg A1c > 8, active smoking). LVAD will be placed next week.     Plan is for patient to complete colonoscopy prior to LVAD placement next week tentative date 7/23      PMHx:  CAD s/p PCI (LAD; 2015, Lcx; 2025), stage C systolic HF/ICM/HFrEF s/p ICD, h/o VT with presumed associated syncope, poorly controlled DM, pAF (off of DOAC given the absence of recurrence), dyslipidemia,  essential HTN, COPD, h/o Graves     SocHx:  Former smoker (mid-May 2025), rare etOH, denies illicits  Lives with son in [place]     FamHx:  Father with CAD     Cardiac Tests:  EKG:     ICD Check:     CT:  CTA Chest PE 3/11/2025 from OSH -  FINDINGS:  Pulmonary Arteries: Pulmonary arteries are adequately opacified for  evaluation.  No evidence of intraluminal filling defect to suggest pulmonary  embolism.  Main pulmonary artery is normal in caliber.    Mediastinum: No evidence of mediastinal lymphadenopathy.  The heart and  pericardium demonstrate no acute abnormality.  There is no acute abnormality  of the thoracic aorta.    Lungs/pleura: There are moderate bilateral pleural effusions with associated  atelectasis.  There is diffuse interlobular septal thickening.  Patchy  ground-glass opacities are again seen within the dependent middle lobe and  lingula.  No pneumothorax.     Echocardiogram:  6/2/2025  CONCLUSIONS:   1. Left ventricular ejection fraction is severely decreased by visual estimate at 20-25%.   2. There is global hypokinesis of the left ventricle with minor regional variations.   3. Spectral Doppler shows a Grade III (restrictive pattern) of left ventricular diastolic filling with an elevated left atrial pressure.   4. Left ventricular cavity size is mildly dilated.   5. No left ventricular thrombus visualized.   6. There is relative preservation of the basal myocardial segment systolic function.   7. There is normal right ventricular global systolic function.   8. Mild to moderately elevated pulmonary artery pressure.     Cardiac Catheterization:  10/9/2024 - [facility] from OSH  Coronary Findings Diagnostic Dominance: Right  Left Main: The vessel is moderate in size. The vessel exhibits minimal luminal irregularities.  Left Anterior Descending: The vessel is moderate in size. Mid LAD lesion, 40% stenosed. First Diagonal Branch: The vessel is moderate in size. Second Diagonal Branch: The vessel is  small. Third Diagonal Branch: The vessel is small.  Left Circumflex: The vessel is moderate in size. Prox Cx lesion, 100% stenosed. Diagnostic coronary angiography shows negative for . Lesion is the culprit lesion. The lesion is type C and concentric. The lesion was not previously treated. The stenosis was measured by a visual reading. Pressure wire was not required.. There is moderate plaque burden detected.  Right Coronary Artery: The vessel is moderate in size. Mid RCA lesion, 20% stenosed. Right Posterior Descending Artery: The vessel is small. Right Posterior Atrioventricular Artery: The vessel is small. First Right Posterolateral Branch: The vessel is small. Second Right Posterolateral Branch: The vessel is small. Third Right Posterolateral Branch: The vessel is small.    Intervention  Prox Cx lesion: Stent: A single stent was placed. Drug-eluting stent was successfully placed. The strut is well apposed. Post-Intervention Lesion Assessment: The intervention was successful. Intentional subintimal strategy was not used. Embolic protection device was not deployed. The guidewire was not threaded through a graft to reach the lesion. The guidewire crossed the lesion. Device was deployed. There is no pre-intervention MARION flow. Post-intervention MARION flow is 3. There were no complications. Pressure wire/FFR was not measured. Ultrasound (IVUS) was not performed. CFVR was not performed. No optical coherence tomography (OCT) was performed. There is a 0% residual stenosis post intervention.         Past Medical History:  Medical History[1]    Past Surgical History:  Surgical History[2]    Family History:  Family History[3]    Social History:  Social History     Socioeconomic History    Marital status: Single     Spouse name: Not on file    Number of children: Not on file    Years of education: Not on file    Highest education level: Not on file   Occupational History    Not on file   Tobacco Use    Smoking status:  Former     Types: Cigarettes     Passive exposure: Never    Smokeless tobacco: Never   Substance and Sexual Activity    Alcohol use: Not on file    Drug use: Not on file    Sexual activity: Not on file   Other Topics Concern    Not on file   Social History Narrative    Not on file     Social Drivers of Health     Financial Resource Strain: Low Risk  (7/14/2025)    Overall Financial Resource Strain (CARDIA)     Difficulty of Paying Living Expenses: Not very hard   Food Insecurity: Food Insecurity Present (7/14/2025)    Hunger Vital Sign     Worried About Running Out of Food in the Last Year: Sometimes true     Ran Out of Food in the Last Year: Sometimes true   Transportation Needs: No Transportation Needs (7/14/2025)    PRAPARE - Transportation     Lack of Transportation (Medical): No     Lack of Transportation (Non-Medical): No   Physical Activity: Inactive (6/2/2025)    Exercise Vital Sign     Days of Exercise per Week: 0 days     Minutes of Exercise per Session: 0 min   Stress: No Stress Concern Present (6/2/2025)    [employer] [employer] - Occupational Stress Questionnaire     Feeling of Stress : Not at all   Social Connections: Socially Isolated (6/2/2025)    Social Connection and Isolation Panel [NHANES]     Frequency of Communication with Friends and Family: Three times a week     Frequency of Social Gatherings with Friends and Family: Once a week     Attends Congregational Services: Never     Active Member of Clubs or Organizations: No     Attends Club or Organization Meetings: Never     Marital Status:    Intimate Partner Violence: Not At Risk (7/14/2025)    Humiliation, Afraid, Rape, and Kick questionnaire     Fear of Current or Ex-Partner: No     Emotionally Abused: No     Physically Abused: No     Sexually Abused: No   Housing Stability: Unknown (7/14/2025)    Housing Stability Vital Sign     Unable to Pay for Housing in the Last Year: Patient unable to answer     Number of Times  Moved in the Last Year: 0     Homeless in the Last Year: No       Allergies:  RX Allergies[4]    Prior to Admission Meds:  Prescriptions Prior to Admission[5]    Current Medications:  Infusions:     Scheduled:  [START ON 7/15/2025] aspirin, 81 mg, Daily  [START ON 7/15/2025] atorvastatin, 80 mg, Daily  [START ON 7/15/2025] bumetanide, 1 mg, BID  [START ON 7/15/2025] citalopram, 40 mg, Daily  [START ON 7/15/2025] clopidogrel, 75 mg, Daily  [START ON 7/15/2025] empagliflozin, 10 mg, Daily  [START ON 7/15/2025] fluticasone furoate-vilanteroL, 1 puff, Daily  gabapentin, 400 mg, q8h  insulin glargine, 50 Units, Nightly  [START ON 7/15/2025] insulin lispro, 0-5 Units, TID AC  [START ON 7/15/2025] levothyroxine, 88 mcg, Daily  [START ON 7/15/2025] pantoprazole, 20 mg, Daily  rOPINIRole, 1 mg, TID  rOPINIRole, 3 mg, Nightly  sacubitriL-valsartan, 1 tablet, BID  [START ON 7/15/2025] spironolactone, 25 mg, Daily      PRN:  albuterol, 2 puff, q6h PRN  dextrose, 12.5 g, q15 min PRN  dextrose, 25 g, q15 min PRN  glucagon, 1 mg, q15 min PRN  glucagon, 1 mg, q15 min PRN  nitroglycerin, 0.4 mg, q5 min PRN  oxygen, , Continuous PRN - O2/gases              INTAKE/OUTPUT:  No intake/output data recorded.     Physical Exam  Constitutional:       General: She is awake.      Appearance: Normal appearance. She is well-developed and well-groomed.   HENT:      Head: Normocephalic and atraumatic.      Mouth/Throat:      Mouth: Mucous membranes are moist.   Eyes:      Extraocular Movements: Extraocular movements intact.      Conjunctiva/sclera: Conjunctivae normal.      Pupils: Pupils are equal, round, and reactive to light.   Neck:      Trachea: Trachea and phonation normal.   Cardiovascular:      Rate and Rhythm: Normal rate and regular rhythm.      Pulses: Normal pulses.           Radial pulses are 2+ on the right side and 2+ on the left side.        Dorsalis pedis pulses are 2+ on the right side and 2+ on the left side.        Posterior  tibial pulses are 2+ on the right side and 2+ on the left side.      Heart sounds: Normal heart sounds, S1 normal and S2 normal.   Pulmonary:      Effort: Pulmonary effort is normal.      Breath sounds: Normal breath sounds.   Abdominal:      General: Abdomen is flat. Bowel sounds are normal.      Palpations: Abdomen is soft.   Musculoskeletal:         General: Normal range of motion.      Cervical back: Full passive range of motion without pain and normal range of motion.      Right lower le+ Edema present.      Left lower le+ Edema present.   Skin:     General: Skin is warm and dry.      Capillary Refill: Capillary refill takes less than 2 seconds.   Neurological:      General: No focal deficit present.      Mental Status: She is alert and oriented to person, place, and time. Mental status is at baseline.      Sensory: Sensation is intact.      Motor: Motor function is intact.   Psychiatric:         Mood and Affect: Mood and affect normal.         Speech: Speech normal.         Behavior: Behavior normal. Behavior is cooperative.         Review of Systems   Constitutional: Negative.    HENT: Negative.     Eyes: Negative.    Respiratory:  Positive for cough.    Cardiovascular:  Positive for leg swelling. Negative for chest pain and palpitations.   Gastrointestinal: Negative.    Endocrine: Negative.    Genitourinary: Negative.    Musculoskeletal: Negative.    Skin: Negative.    Allergic/Immunologic: Negative.    Neurological: Negative.    Hematological: Negative.    Psychiatric/Behavioral: Negative.         DATA:  CMP:  Recent Labs     25  1901 06/15/25  1735 25  1803 25  1804 25  1728 25  1810 06/10/25  1734 25  1613 25  1846    137 137 137 138 138 138 137 136 136   K 3.6 3.9 4.1 4.2 3.8 3.9 3.6 4.1 4.2 4.4   CL 96* 95* 97* 100 100 100 101 102 102 101   CO2 33* 33* 33* 32 30 33* 31 28 27 28   ANIONGAP 12 13 11 9* 12 9* 10 11 11 11   BUN 11 16 18  14 23 16 13 12 15 16   CREATININE 0.62 0.96 0.74 0.88 0.77 0.73 0.92 0.65 0.62 0.71   EGFR >90 67 >90 74 87 >90 71 >90 >90 >90   MG 2.06 1.45* 1.93 1.68 1.82 1.89 1.48* 1.93 2.25 2.52*     Recent Labs     07/14/25  2003 06/16/25  1901 06/15/25  1735 06/14/25  1803 06/13/25  1804 06/12/25  1728 06/11/25  1810 06/10/25  1734   ALBUMIN 3.9 3.3* 3.4 3.3* 3.3* 3.1* 3.1* 3.1*   ALT 11 19 19 17 14 13 10 8   AST 12 14 16 15 14 13 11 10   BILITOT 0.8 0.3 0.2 0.3 0.3 0.2 0.2 0.2     CBC:  Recent Labs     07/14/25  2003 06/16/25  1901 06/15/25  1735 06/14/25  1803 06/13/25  1804 06/12/25  1728 06/11/25  1810 06/10/25  1734   WBC 6.5 7.3 8.0 7.4 6.9 10.6 8.3 6.0   HGB 13.2 12.9 13.0 12.8 13.0 13.4 13.0 12.4   HCT 41.6 40.7 42.5 42.0 42.3 41.4 41.5 41.3    261 310 290 320 333 312 294   MCV 94 94 97 95 94 91 96 96     COAG:   Recent Labs     07/14/25 2003 06/02/25  1346   INR 1.1 1.0     ABO:   Recent Labs     06/05/25  0909   ABO O     HEME/ENDO:  Recent Labs     07/14/25 2013 06/05/25  0607 06/02/25  1346 03/13/25  0531   FERRITIN 224*  --  232*  --    IRONSAT 24*  --  17*  --    TSH  --  0.19* 0.20*  --    HGBA1C  --   --  8.6* 12.3*      CARDIAC:   Recent Labs     07/14/25 2003 06/12/25  1728 06/09/25  1613 06/08/25  1846 06/03/25  1743 06/02/25  1346   LDH  --   --   --  166 213  --    TROPHS 18 15  --   --   --  22   BNP  --   --  1,456*  --   --  1,292*     Recent Labs     06/06/25  1312 06/06/25  0519 06/05/25  2319 06/05/25  1808 06/05/25  1412   LACMX 1.1 0.4 0.6 0.8 0.9   SO2MV 62 68 68 70 67   O2CMX 60.3 66.6 66.8 67.9 65.0           ASSESSMENT AND PLAN:   Thao Evans is a 59 y/o with PMHx sig for CAD s/p PCI (LAD; 2015, Lcx; 2025), stage D systolic HF/ICM/HFrEF s/p ICD, h/o VT with presumed associated syncope, poorly controlled DM, pAF (off of DOAC given the absence of recurrence), dyslipidemia, essential HTN, COPD, and h/o Graves who presents to HFICU for colonoscopy prior to LVAD implantation next week.      Neuro:  #Restless legs  #Anxiety  #Depression  - c/w home celexa  - c/w home gabapentin  - C/w home requip  - Serial neuro and pain assessments  - PO Tylenol PRN for pain  - PT/OT Consult, OOB to chair  - CAM ICU score every shift  - Sleep/wake cycle normalization     # Physical Status  - Not obese, BMI 22.68 kg/m2  - Reduced Mobility     #Substance abuse  -Alcohol dependence: rare use  -Tobacco dependence: previous smoker     Cardiovascular:  #HFrEF /acute on chronic systolic Heart Failure, ICM, last LVEF 23% (6/2/2025), Stage D, NYHA III, s/p ICD.  - TTE 6/2 LVSF 20-25%, LV size is moderately dilated, mild/mod elevated pulmonary artery pressure  - Repeat TTE ordered  - BNP: 735  - Lactate: 1  -Troponin: 18  - Plan for PAC after colonoscopy  - Diuretics Bumex 1 mg BID  - GDMT: Entresto 24/26, jardiance 10 mg, sandi 25  - Daily standing weights, 2gm sodium diet, 2L fluid restriction, strict I&Os     #CAD s/p PCI (LAD in 2015, LCx in 2025)  -c/w home asa 81mg daily, Plavix  -c/w home statin     #h/o VT  #paroxysmal A Fib  -Device: s/p CRT-D 3/2025, [employer]  -AC: off DOAC given absence of recurrence     #HTN  #HLD  -BP medications as above.  -c/w home statin     #Electrolyte Disturbances  -K goal >4, Mg>2     Pulmonary:  #COPD  -c/w home inhalers  -Monitor and maintain SpO2 > 92%     GI:  #GERD  -c/w home ppi  - Bowel regimen: prn miralax       :  No renal dysfunction at baseline   -Baseline Cr 0.5-0.9  -Admit BUN/Cr: 11/0.62  -I/Os  -avoid hypotension and nephrotoxic agents     Heme:  No hematology pathology at baseline   - H&H 13.2/41.6  - Iron studies 62, 256, 24%  - venofer 200mg x3 doses      Endo:  #DM, T2  - Last hgbA1c (6/2/25): 8.6  - home medications; insulin - glargine, jardiance, nesina.   -Diet: carb controlled  -ISS while inpatient, adjust as needed  - Euglycemic     #h/o Grave's disease  -last TSH (6/5/25): 0.19, FT4 1.23      ID:  -afebrile, nontoxic  -no s/s infx  -trend temps q4h    "  PHYSICAL AND OCCUPATIONAL THERAPY: ordered     LINES:  PIVs      DVT: N/A  VAP BUNDLE: N/A  CENTRAL LINE BUNDLE: ordered  ULCER PPX: home PPI  GLYCEMIC CONTROL: ISS  BOWEL CARE: miralax  INDWELLING CATHETER: N/A  NUTRITION: Adult diet Regular      EMERGENCY CONTACT: Extended Emergency Contact Information  Primary Emergency Contact: Babatunde Mclean  Mobile Phone: [phone]  Relation: Friend   needed? No  Secondary Emergency Contact: Daisy Velasco  Mobile Phone: [phone]  Relation: Sister  FAMILY UPDATE:  CODE STATUS: Full Code  DISPO:     Patient seen and assessed with Dr. MONTE    I personally spent 60 minutes of critical care time directly and personally managing the patient exclusive of separately billable procedures   _________________________________________________  Soledad Malagon, APRN-CNP         [1]   Past Medical History:  Diagnosis Date    CAD (coronary artery disease)     CHF (congestive heart failure)     COPD (chronic obstructive pulmonary disease) (Multi)     Graves disease     Hypotension     PAF (paroxysmal atrial fibrillation) (Multi)    [2] History reviewed. No pertinent surgical history.  [3] No family history on file.  [4]   Allergies  Allergen Reactions    Bee Venom Protein (Honey Bee) Anaphylaxis    Codeine Other     \"Passed Out\"     \"i just black out\"    Doxycycline Hives    Prednisone Hives     Muscle cramps    Tetracyclines Hives and Other    Amoxicillin Hives and Rash     \"Felt warm and overheated all over\"    Metformin Diarrhea and Nausea And Vomiting   [5]   Medications Prior to Admission   Medication Sig Dispense Refill Last Dose/Taking    albuterol 90 mcg/actuation inhaler Inhale 2 puffs every 6 hours if needed for wheezing or shortness of breath.   7/14/2025    alogliptin (Nesina) 25 mg tablet Take 1 tablet (25 mg) by mouth once daily.   7/14/2025    aspirin 81 mg chewable tablet Chew 1 tablet (81 mg) once daily. 30 tablet 0 7/14/2025    atorvastatin (Lipitor) 80 mg " tablet Take 1 tablet (80 mg) by mouth once daily.   7/14/2025    budesonide-formoterol (Symbicort) 80-4.5 mcg/actuation inhaler INHALE 2 PUFF BY INHALATION ROUTE 2 TIMES EVERY DAY IN THE MORNING AND EVENING   7/14/2025    bumetanide (Bumex) 1 mg tablet Take 1 tablet (1 mg) by mouth 2 times daily (morning and late afternoon). 60 tablet 2 7/14/2025    citalopram (CeleXA) 40 mg tablet Take 1 tablet (40 mg) by mouth early in the morning..   7/14/2025    clopidogrel (Plavix) 75 mg tablet Take 1 tablet (75 mg) by mouth once daily.   7/14/2025    empagliflozin (Jardiance) 10 mg tablet Take 1 tablet (10 mg) by mouth once daily. Do not start before June 21, 2025. 30 tablet 2 7/14/2025    gabapentin (Neurontin) 400 mg capsule TAKE 1 CAPSULE BY MOUTH 3 TIMES EVERY DAY FOR DIABETIC NEUROPATHY   7/14/2025    insulin glargine (Lantus Solostar U-100 Insulin) 100 unit/mL (3 mL) pen Inject 15 Units under the skin once daily at bedtime.   7/14/2025    levothyroxine (Synthroid, Levoxyl) 88 mcg tablet Take 1 tablet (88 mcg) by mouth early in the morning.. Take on an empty stomach at the same time each day, either 30 to 60 minutes prior to breakfast 30 tablet 11 7/14/2025    nitroglycerin (Nitrostat) 0.4 mg SL tablet Place 1 tablet (0.4 mg) under the tongue every 5 minutes if needed for chest pain.   7/14/2025    pantoprazole (ProtoNix) 20 mg EC tablet Take 1 tablet (20 mg) by mouth once daily.   7/14/2025    rOPINIRole (Requip) 1 mg tablet Take 1 tablet (1 mg) by mouth 3 times a day.   7/14/2025    rOPINIRole (Requip) 3 mg tablet take 1 tablet by oral route every day at bedtime   7/14/2025    sacubitriL-valsartan (Entresto) 24-26 mg tablet Take 1 tablet by mouth 2 times a day. 60 tablet 1 7/14/2025    spironolactone (Aldactone) 25 mg tablet Take 1 tablet (25 mg) by mouth once daily.   7/14/2025

## 2025-07-15 NOTE — CARE PLAN
Problem: Pain - Adult  Goal: Verbalizes/displays adequate comfort level or baseline comfort level  Flowsheets (Taken 7/15/2025 1136)  Verbalizes/displays adequate comfort level or baseline comfort level:   Encourage patient to monitor pain and request assistance   Assess pain using appropriate pain scale   Administer analgesics based on type and severity of pain and evaluate response   Implement non-pharmacological measures as appropriate and evaluate response   Consider cultural and social influences on pain and pain management   Notify Licensed Independent Practitioner if interventions unsuccessful or patient reports new pain     Problem: Safety - Adult  Goal: Free from fall injury  Flowsheets (Taken 7/15/2025 0112 by Valeria Hua, RN)  Free from fall injury:   Instruct family/caregiver on patient safety   Based on caregiver fall risk screen, instruct family/caregiver to ask for assistance with transferring infant if caregiver noted to have fall risk factors     Problem: Discharge Planning  Goal: Discharge to home or other facility with appropriate resources  Flowsheets (Taken 7/15/2025 1136)  Discharge to home or other facility with appropriate resources:   Identify barriers to discharge with patient and caregiver   Identify discharge learning needs (meds, wound care, etc)   Refer to discharge planning if patient needs post-hospital services based on physician order or complex needs related to functional status, cognitive ability or social support system   Arrange for needed discharge resources and transportation as appropriate   Arrange for interpreters to assist at discharge as needed     Problem: Chronic Conditions and Co-morbidities  Goal: Patient's chronic conditions and co-morbidity symptoms are monitored and maintained or improved  Flowsheets (Taken 7/15/2025 0112 by Valeria Hua RN)  Care Plan - Patient's Chronic Conditions and Co-Morbidity Symptoms are Monitored and Maintained or Improved:   Monitor  and assess patient's chronic conditions and comorbid symptoms for stability, deterioration, or improvement   Collaborate with multidisciplinary team to address chronic and comorbid conditions and prevent exacerbation or deterioration   Update acute care plan with appropriate goals if chronic or comorbid symptoms are exacerbated and prevent overall improvement and discharge     Problem: Heart Failure  Goal: Improved gas exchange this shift  Flowsheets (Taken 7/15/2025 0112 by Valeria Hua RN)  Improved gas exchange this shift:   Assist with pulmonary hygiene and secretion clearance   Prepare for use of devices/procedures to correct dysrhythmia   Position to promote circulation/maximize ventilation  Goal: Improved urinary output this shift  Flowsheets (Taken 7/15/2025 1136)  Improved urinary output this shift:   Med administration/monitor effect   Monitor intake/output and daily weight   Monitor serum electrolytes/replace per order  Goal: Reduction in peripheral edema within 24 hours  Flowsheets (Taken 7/15/2025 1136)  Reduction in peripheral edema within 24 hours:   Consult dietician   SCDs/elevate extremities   Frequent small meals/supplements per order   Monitor edema/skin/mucous membrane integrity  Goal: Report improvement of dyspnea/breathlessness this shift  Flowsheets (Taken 7/15/2025 1136)  Report improvement of dyspnea/breathlessness this shift:   Ambulate/OOB 3 times daily   Encourage activity/mobility/ROM   Incentive spirometry/deep breathing /HOB elevated elevated   Consider PT/OT consult   Facilitate activity/mobility per patient tolerance/advance  Goal: Weight from fluid excess reduced over 2-3 days, then stabilize  Flowsheets (Taken 7/15/2025 1136)  Weight from fluid excess reduced over 2-3 days, then stabilize:   Med administration/monitor effect   Monitor serum electrolytes/replace per order   Monitor bowel elimination   Monitor intake/output and daily weight  Goal: Increase self care and/or family  involvement in 24 hours  Flowsheets (Taken 7/15/2025 1136)  Increase self care and/or family involvement in 24 hours:   Assess for signs/symptoms of depression   Organize tasks/allow time for rest   Therapy evaluation and treatment   Facilitate advanced care planning/discussion of treatment options   Recognize current coping skills and assist to develop new coping skills     The clinical goals for the shift include HDS

## 2025-07-15 NOTE — PROGRESS NOTES
HFICU Attending Note    Assessment & Plan  Heart failure, diastolic, with acute decompensation    Briefly patient with chronic systolic heart failure previously evaluated for advanced therapies not felt to be a good candidate for transplant given her pulmonary hypertension and lung disease, now admitted for colonoscopy followed by LVAD implant next week we will consult GI and then place a [place] catheter over the weekend I did review her echocardiogram and am concerned about her LV size that being said by her indexed volume she is dilated so she may be able to be well supported with only a relatively low speed additionally her apex is essentially akinetic she does appear to be a bit volume overloaded we will continue her oral diuretics possibly intensify, on the positive side her A1c indicates better control of diabetes lately      This critically ill patient continues to be at-risk for clinically significant deterioration / failure due to the above mentioned dysfunctional, unstable organ systems.  I have personally identified and managed all complex critical care issues to prevent aforementioned clinical deterioration.  Critical care time is spent at bedside and/or the immediate area and has included, but is not limited to, the review of diagnostic tests, labs, radiographs, serial assessments of hemodynamics, respiratory status, ventilatory management, and family updates.  Time spent in procedures and teaching are reported separately.    Critical care time: __44__ minutes

## 2025-07-15 NOTE — PROGRESS NOTES
"[facility]  HFICU PROGRESS NOTE    Thao Evans/[id number]    Admit Date: 7/14/2025  Hospital Length of Stay: 1   ICU Length of Stay: 18h   Primary Service:   Primary HF Cardiologist:   Referring:    INTERVAL EVENTS / PERTINENT ROS:   No overnight events. LVAD placement planned for next week. Will begin bowl prep prior to undergoing EDG and colonoscopy tomorrow 7/16. Bowl prep per GI recommendations. Will be on liquid diet today and NPO at midnight.     Plan:  - Limited echo to view LV   - Check A1c  - Bowl prep per GI recommendation  - EDG and colonoscopy tomorrow  - Liquid diet today  - NPO at midnight    MEDICATIONS  Infusions:     Scheduled:  aspirin, 81 mg, Daily  atorvastatin, 80 mg, Daily  bumetanide, 1 mg, BID  citalopram, 40 mg, Daily  [Held by provider] clopidogrel, 75 mg, Daily  [Held by provider] empagliflozin, 10 mg, Daily  fluticasone furoate-vilanteroL, 1 puff, Daily  gabapentin, 400 mg, q8h  insulin glargine, 50 Units, Nightly  insulin lispro, 0-5 Units, TID AC  iron sucrose, 200 mg, Daily  levothyroxine, 88 mcg, Daily  pantoprazole, 20 mg, Daily  rOPINIRole, 1 mg, TID  rOPINIRole, 3 mg, Nightly  sacubitriL-valsartan, 1 tablet, BID  spironolactone, 25 mg, Daily      PRN:  albuterol, 2 puff, q6h PRN  dextrose, 12.5 g, q15 min PRN  dextrose, 25 g, q15 min PRN  glucagon, 1 mg, q15 min PRN  glucagon, 1 mg, q15 min PRN  nitroglycerin, 0.4 mg, q5 min PRN  oxygen, , Continuous PRN - O2/gases        PHYSICAL EXAM:   Visit Vitals  BP (!) 82/48   Pulse 71   Temp 35.9 °C (96.6 °F)   Resp 14   Ht 1.575 m (5' 2\")   Wt 61.7 kg (136 lb 0.4 oz)   SpO2 95%   BMI 24.88 kg/m²   OB Status Postmenopausal   Smoking Status Former   BSA 1.64 m²       Wt Readings from Last 5 Encounters:   07/15/25 61.7 kg (136 lb 0.4 oz)   06/20/25 57.2 kg (126 lb)   06/20/25 57.4 kg (126 lb 9.6 oz)   06/17/25 58.2 kg (128 lb 4.9 oz)   05/30/25 56.2 kg (124 lb)       INTAKE/OUTPUT:  No intake/output data recorded. " "    Physical Exam  Constitutional:       Appearance: Normal appearance.   HENT:      Head: Normocephalic.   Cardiovascular:      Rate and Rhythm: Normal rate and regular rhythm.      Pulses: Normal pulses.   Pulmonary:      Effort: Pulmonary effort is normal.      Breath sounds: Wheezing present.      Comments: Currently on 2L NC  Abdominal:      General: Abdomen is flat.      Palpations: Abdomen is soft.   Musculoskeletal:      Right lower leg: No edema.      Left lower leg: No edema.   Skin:     General: Skin is warm.   Neurological:      General: No focal deficit present.      Mental Status: She is alert.         DATA:  CMP:  Results from last 7 days   Lab Units 07/15/25  0354 07/14/25 2003   SODIUM mmol/L 137 137   POTASSIUM mmol/L 4.1 3.6   CHLORIDE mmol/L 99 96*   CO2 mmol/L 32 33*   ANION GAP mmol/L 10 12   BUN mg/dL 11 11   CREATININE mg/dL 0.64 0.62   EGFR mL/min/1.73m*2 >90 >90   MAGNESIUM mg/dL 2.28 2.06   ALBUMIN g/dL 3.5 3.9   ALT U/L  --  11   AST U/L  --  12   BILIRUBIN TOTAL mg/dL  --  0.8     CBC:  Results from last 7 days   Lab Units 07/15/25  0354 07/14/25 2003   WBC AUTO x10*3/uL 6.0 6.5   HEMOGLOBIN g/dL 12.8 13.2   HEMATOCRIT % 39.9 41.6   PLATELETS AUTO x10*3/uL 194 210   MCV fL 94 94     COAG:   Results from last 7 days   Lab Units 07/14/25 2003   INR  1.1     ABO: No results found for: \"ABO\"  HEME/ENDO:  Results from last 7 days   Lab Units 07/15/25  0354 07/14/25  2013   FERRITIN ng/mL  --  224*   IRON SATURATION %  --  24*   HEMOGLOBIN A1C % 8.0*  --       CARDIAC:   Results from last 7 days   Lab Units 07/14/25 2003   TROPHSCMC ng/L 18   BNP pg/mL 735*       ASSESSMENT AND PLAN:   Thao Evans is a 60 year old with a PMHx sig for CAD s/p PCI (LAD; 2015, Lcx; 2025), stage D systolic HF/ICM/HFrEF s/p ICD, h/o VT with presumed associated syncope, poorly controlled DM, pAF (off of DOAC given the absence of recurrence), dyslipidemia, essential HTN, COPD, and h/o Graves. She was recently " referred to the [facility] and seen by Dr. Deluca. Her most recent hospitalization was in March 2025 for syncope/VT where she underwent an ICD placement and PCI to her LCx. She reported progressive symptoms over the last several months with ongoing fatigue, dyspnea, and early satiety causing significant progressive weight loss (~60 lbs over the last 6 months). She has also been currently intolerant of GDMT and is on midodrine for BP support since her hospital discharge. Because of worsening symptoms and intolerance of GDMT, she was offered direct admission to HFICU with plan for PAC guided evaluation. Transferred from HFICU to LT 5 06/06 under HHVI service. LT5 course c/b acute hypoxic respiratory failure due to L pleural effusion s/p thoracentesis x 3 (06/09, 6/11, 06/12), now on room air.     Pt was discussed in Advanced Therapies Committee meeting on 6/17/2025 and approved for LVAD (barriers to transplant, elevated PAP, uncontrolled DM Hg A1c > 8, active smoking). Plan is for patient to complete colonoscopy prior to LVAD placement next week tentative date 7/23         Neuro:  #Restless legs  #Anxiety  #Depression  - c/w home celexa  - c/w home gabapentin  - C/w home requip  - Serial neuro and pain assessments  - PO Tylenol PRN for pain  - PT/OT Consult, OOB to chair  - CAM ICU score every shift  - Sleep/wake cycle normalization     # Physical Status  - Not obese, BMI 22.68 kg/m2  - Reduced Mobility     #Substance abuse  -Alcohol dependence: rare use  -Tobacco dependence: previous smoker     Cardiovascular:  #HFrEF /acute on chronic systolic Heart Failure, ICM, last LVEF 23% (6/2/2025), Stage D, NYHA III, s/p ICD.  - TTE 6/2 LVSF 20-25%, LV size is moderately dilated, mild/mod elevated pulmonary artery pressure  - Repeat TTE ordered  - BNP: 735  - Lactate: 1  -Troponin: 18  - Plan for PAC after colonoscopy  - Diuretics Bumex 1 mg BID  - GDMT: Entresto 24/26, jardiance 10 mg, sandi 25  - Daily  standing weights, 2gm sodium diet, 2L fluid restriction, strict I&Os     #CAD s/p PCI (LAD in 2015, LCx in 2025)  -c/w home asa 81mg daily, Plavix  -c/w home statin     #h/o VT  #paroxysmal A Fib  -Device: s/p CRT-D 3/2025, [employer]  -AC: off DOAC given absence of recurrence     #HTN  #HLD  -BP medications as above.  -c/w home statin     #Electrolyte Disturbances  -K goal >4, Mg>2     Pulmonary:  #COPD  -c/w home inhalers  -Monitor and maintain SpO2 > 92%     GI:  #GERD  -c/w home ppi  - Bowel regimen: prn miralax  - Bowel prep 7/15         :  No renal dysfunction at baseline   -Baseline Cr 0.5-0.9  -Admit BUN/Cr: 11/0.62  -I/Os  -avoid hypotension and nephrotoxic agents     Heme:  No hematology pathology at baseline   - H&H 13.2/41.6  - Iron studies 62, 256, 24%  - venofer 200mg x3 doses      Endo:  #DM, T2  - Last hgbA1c (7/15/25): 8.0  - home medications; insulin - glargine, jardiance, nesina.   -Diet: carb controlled  -ISS while inpatient, adjust as needed  - Euglycemic     #h/o Grave's disease  -last TSH (6/5/25): 0.19, FT4 1.23      ID:  -afebrile, nontoxic  -no s/s infx  -trend temps q4h     PHYSICAL AND OCCUPATIONAL THERAPY: ordered     LINES:  PIVs        DVT: N/A  VAP BUNDLE: N/A  CENTRAL LINE BUNDLE: ordered  ULCER PPX: home PPI  GLYCEMIC CONTROL: ISS  BOWEL CARE: miralax  INDWELLING CATHETER: N/A  NUTRITION: Adult diet Clear Liquid  NPO Diet; Effective midnight      EMERGENCY CONTACT: Extended Emergency Contact Information  Primary Emergency Contact: [place],Babatunde  Mobile Phone: [phone]  Relation: Friend   needed? No  Secondary Emergency Contact: Daisy Velasco  Mobile Phone: [phone]  Relation: Sister  FAMILY UPDATE: Patient at bedside  CODE STATUS: Full Code  DISPO: Maintain in HFICU     Patient seen and assessed with Dr. Kincaid    I personally spent 60 minutes of critical care time directly and personally managing the patient exclusive of separately billable procedures    _________________________________________________  ODALYS Martinez

## 2025-07-15 NOTE — PROGRESS NOTES
- HFICU Treatment Plan: Patient scheduled for LVAD on 7/23. She was  approved for LVAD on 6/17, barriers to LVAD were elevated PAP, uncontrolled DM, actively smoking.   - Additional information: patient is going for colonoscopy on 7/16  - Payor: Faraz  - Support:  Patient has HCPOA on file, Friend Babatunde Mclean is listed as primary agent   -Planned Disposition: Rehab recommendation - Low intensity- SW will continue to monitor for any changes after LVAD  -Discharge assessment: Sw reviewed the SDOH and Social Work Discharge Assessment. I met with the patient and updated the assessments as needed. Per the assessment. I identified no barriers to discharge at this time. Sw will continue to follow for dc planning needs.    - Barriers to discharge: none at this time.   QUYEN Lincoln, NATHAN     Discharge Assessment  Living arrangements -  Lives with her son who is wheelchair dependent and her friend Babatunde   Primary support - Babatunde he is her HCPOA it is on file   Supplies - none   Medical care: [address] [place] [address] Dr Peace,   Safety issues: none reported   Housing/food/utilities/ transportation: No issues with getting food, paying for housing or utilities. Patient drives to her appointments.   Patient/Family Concerns: no concerns at this time   Request resource: none requested  - Social Barriers to discharge: none    QUYEN Lnicoln, LSALISSA

## 2025-07-15 NOTE — CARE PLAN
The patient's goals for the shift include      Problem: Safety - Adult  Goal: Free from fall injury  Outcome: Progressing  Flowsheets (Taken 7/15/2025 0112)  Free from fall injury:   Instruct family/caregiver on patient safety   Based on caregiver fall risk screen, instruct family/caregiver to ask for assistance with transferring infant if caregiver noted to have fall risk factors     Problem: Chronic Conditions and Co-morbidities  Goal: Patient's chronic conditions and co-morbidity symptoms are monitored and maintained or improved  Outcome: Progressing  Flowsheets (Taken 7/15/2025 0112)  Care Plan - Patient's Chronic Conditions and Co-Morbidity Symptoms are Monitored and Maintained or Improved:   Monitor and assess patient's chronic conditions and comorbid symptoms for stability, deterioration, or improvement   Collaborate with multidisciplinary team to address chronic and comorbid conditions and prevent exacerbation or deterioration   Update acute care plan with appropriate goals if chronic or comorbid symptoms are exacerbated and prevent overall improvement and discharge     Problem: Heart Failure  Goal: Improved gas exchange this shift  Outcome: Progressing  Flowsheets (Taken 7/15/2025 0112)  Improved gas exchange this shift:   Assist with pulmonary hygiene and secretion clearance   Prepare for use of devices/procedures to correct dysrhythmia   Position to promote circulation/maximize ventilation     The clinical goals for the shift include HDS

## 2025-07-15 NOTE — PROGRESS NOTES
Physical Therapy    Physical Therapy Evaluation & Treatment    Patient Name: Thao Evans  MRN: [id number]  Department: [facility] HFICU  Room: 13/13-A  Today's Date: 7/15/2025   Time Calculation  Start Time: 1450  Stop Time: 1525  Time Calculation (min): 35 min    Assessment/Plan   PT Assessment  PT Assessment Results: Decreased strength, Decreased endurance, Impaired balance, Decreased mobility  Rehab Prognosis: Excellent  Barriers to Discharge Home: No anticipated barriers  Evaluation/Treatment Tolerance: Patient tolerated treatment well  End of Session Communication: Bedside nurse  Assessment Comment: Patient presenting with plans for LVAD implantation. She demonstrates decreased cardiovascular endurance, decreased strength, and overall activity tolerance to activity impairments. She would benefit from low intensity therapy to improve current deficits.  End of Session Patient Position: Up in chair, Alarm off, not on at start of session   IP OR SWING BED PT PLAN  Inpatient or Swing Bed: Inpatient  PT Plan  Treatment/Interventions: Bed mobility, Transfer training, Gait training, Balance training, Neuromuscular re-education, Stair training, Strengthening, Endurance training, Range of motion, Therapeutic exercise, Therapeutic activity, Positioning, Postural re-education  PT Plan: Ongoing PT  PT Frequency: 4 times per week  PT Discharge Recommendations: Low intensity level of continued care  PT Recommended Transfer Status: Stand by assist  PT - OK to Discharge: Yes      Subjective     PT Visit Info:  PT Received On: 07/15/25  General Visit Information:  General  Reason for Referral: patient presents for colonoscopy prior to LVAD implantation next week  Past Medical History Relevant to Rehab: CAD s/p PCI (LAD; 2015, Lcx; 2025), stage D systolic HF/ICM/HFrEF s/p ICD, VT with presumed associated syncope, poorly controlled DM, pAF,, dyslipidemia, essential HTN, COPD, and Graves  Family/Caregiver Present: No  Prior to Session  Communication: Bedside nurse  Patient Position Received: Bed, 3 rail up, Alarm off, not on at start of session  General Comment: Patient initially refusing d/t poor sleep last night, requesting we return later. After a couple of hours, patient was very pleasant and put forth good effort in session.  Home Living:  Home Living  Type of Home: House  Lives With: Other (Comment) (Friend (43 y/o) who lives with her and is available 24/7. Also 41 y/o dependent son who patient takes cares of.)  Home Adaptive Equipment: Walker rolling or standard (available but does not use)  Home Layout: One level  Home Access: Level entry  Bathroom Shower/Tub: Tub/shower unit  Bathroom Toilet: Handicapped height  Bathroom Equipment: Grab bars in shower, Raised toilet seat without rails  Prior Level of Function:  Prior Function Per Pt/Caregiver Report  Level of [place]: Independent with ADLs and functional transfers  Receives Help From: Friends (43 y/o friend who lives with patient and is able to provide 24 hour assistance)  ADL Assistance: Independent  Homemaking Assistance: Independent  Ambulatory Assistance: Independent (community amb with no AD)  Vocational:  (not working)  Prior Function Comments: (-) drives, (-) falls in the last 3 months  Precautions:  Precautions  Medical Precautions: Cardiac precautions, Fall precautions, Oxygen therapy device and L/min    Lines/Tubes:   Telemetry   3L O2     Vital Signs    Vitals Session Pre PT Post PT   Heart Rate 90 76   Resp 17 96   SpO2 92 20   /53 88/54   MAP (mmHg) 68      Objective   Pain:  Pain Assessment  Pain Assessment: 0-10  0-10 (Numeric) Pain Score: 0 - No pain  Cognition:  Cognition  Overall Cognitive Status: Within Functional Limits  Arousal/Alertness: Appropriate responses to stimuli  Orientation Level: Oriented X4  Following Commands: Follows all commands and directions without difficulty    General Assessments:     Activity Tolerance  Endurance: Tolerates 30 min  exercise with multiple rests  Early Mobility/Exercise Safety Screen: Proceed with mobilization - No exclusion criteria met    Sensation  Light Touch: No apparent deficits  Sensation Comment: denies numbness / tingling    Strength  Strength Comments: B Hip ABD/ADD: 4+/5; B Hip Flexion: 3+/5; B Knee Ext/Flex: 4-/5; B Ankle DF/PF: 4/5  Strength  Strength Comments: B Hip ABD/ADD: 4+/5; B Hip Flexion: 3+/5; B Knee Ext/Flex: 4-/5; B Ankle DF/PF: 4/5    Static Sitting Balance  Static Sitting-Balance Support: No upper extremity supported, Feet supported  Static Sitting-Level of Assistance: Close supervision  Dynamic Sitting Balance  Dynamic Sitting-Balance Support: No upper extremity supported, Feet supported  Dynamic Sitting-Level of Assistance: Close supervision    Static Standing Balance  Static Standing-Balance Support: No upper extremity supported  Static Standing-Level of Assistance: Contact guard  Dynamic Standing Balance  Dynamic Standing-Balance Support: No upper extremity supported  Dynamic Standing-Level of Assistance: Contact guard  Functional Assessments:  Transfers  Transfer: Yes  Transfer 1  Technique 1: Sit to stand, Stand to sit  Transfer Level of Assistance 1: Contact guard  Trials/Comments 1: x1 trial B UE support from lap; bed at lowest height (appearing elevated)    Ambulation/Gait Training  Ambulation/Gait Training Performed: Yes  Ambulation/Gait Training 1  Surface 1: Level tile  Device 1: No device  Assistance 1: Contact guard  Quality of Gait 1: Narrow base of support, Decreased step length, Forward flexed posture (slow gait speed, minimal arm swing)  Comments/Distance (ft) 1: ~450ft; patient demonstrating no LOB; RPD:2/10, RPE: 11/20  Extremity/Trunk Assessments:  RLE   RLE : Within Functional Limits  LLE   LLE : Within Functional Limits  Treatments:  Therapeutic Activity  Therapeutic Activity Performed: Yes  Therapeutic Activity 1: x3 sit<>stand from bed lowest height (appearing elevated); no UE  support  Therapeutic Activity 2: ~3 mins of static/dynamic stand with no AD and close supervision  Therapeutic Activity 3: ~10 mins of unsupported static/dynamic sitting with close supervision  Outcome Measures:  [facility] Basic Mobility  Turning from your back to your side while in a flat bed without using bedrails: A little  Moving from lying on your back to sitting on the side of a flat bed without using bedrails: A little  Moving to and from bed to chair (including a wheelchair): A little  Standing up from a chair using your arms (e.g. wheelchair or bedside chair): A little  To walk in hospital room: A little  Climbing 3-5 steps with railing: Total  Basic Mobility - Total Score: 16    Confusion Assessment Method-ICU (CAM-ICU)  Feature 1: Acute Onset or Fluctuating Course: Negative  Overall CAM-ICU: Negative    FSS-ICU  Ambulation: Walks >/ or equal to 150 feet with minimal assistance x1  Rolling: Supervision or set-up only  Sitting: Supervision or set-up only  Transfer Sit-to-Stand: Supervision or set-up only  Transfer Supine-to-Sit: Supervision or set-up only  Total Score: 24      Early Mobility/Exercise Safety Screen: Proceed with mobilization - No exclusion criteria met  ICU Mobility Scale: Walking with assistance of 1 person [8]  E = Exercise and Early Mobility  Early Mobility/Exercise Safety Screen: Proceed with mobilization - No exclusion criteria met  ICU Mobility Scale: Walking with assistance of 1 person  Tinetti  Sitting Balance: Steady, safe  Arises: Able without using arms  Attempts to Arise: Able to arise, one attempt  Immediate Standing Balance (First 5 Seconds): Steady without walker or other support  Standing Balance: Narrow stance without support  Nudged: Begins to fall  Eyes Closed: Unsteady  Turned 360 Degrees: Steadiness: Steady  Turned 360 Degrees: Continuity of Steps: Discontinuous steps  Sitting Down: Safe, smooth motion  Balance Score: 12  Initiation of Gait: No hesitancy  Step Height: R  Swing Foot: Right foot complete clears floor  Step Length: R Swing Foot: Passes left stance foot  Step Height: L Swing Foot: Left foot complete clears floor  Step Length: L Swing Foot: Passes right stance foot  Step Symmetry: Right and left step appear equal  Step Continuity: Steps appear continuous  Path: Straight without walking aid  Trunk: No sway but flexion of knees or back or spreads arms out while walking  Walking Time: Heels almost touching while walking  Gait Score: 11  Total Score: 23  Tinetti score of 23/28 indicates a moderate risk of falls.    Encounter Problems       Encounter Problems (Active)       Balance       patient will score >24/28 on the Tinetti scale to present as a low risk of falls (Progressing)       Start:  07/15/25    Expected End:  07/29/25               Mobility       Patient will ambulate >1000ft indep (Progressing)       Start:  07/15/25    Expected End:  07/29/25               Mobility       Patient will complete the 5xSTS  in <15 sec with no assistive device, no UE support, and close supervision (RPE: 11/20 RPD: 3/10) (Progressing)       Start:  07/15/25    Expected End:  07/29/25            Patient will ambulate >1000ft on the 6MWT with no assistive device and close superivision (RPE: 11/20 RPD: 3/10) (Progressing)       Start:  07/15/25    Expected End:  07/29/25               PT Transfers       Patient to transfer to and from sit to supine indep (Progressing)       Start:  07/15/25    Expected End:  07/29/25            Patient will transfer sit to and from stand indep (Progressing)       Start:  07/15/25    Expected End:  07/29/25                   Education Documentation  Handouts, taught by YOAN Vo at 7/15/2025  6:16 PM.  Learner: Patient  Readiness: Acceptance  Method: Explanation, Demonstration  Response: Needs Reinforcement  Comment: RPD & RPE    Mobility Training, taught by YOAN Vo at 7/15/2025  5:40 PM.  Learner: Patient  Readiness:  Acceptance  Method: Explanation, Demonstration  Response: Demonstrated Understanding, Needs Reinforcement  Comment: Educated patient on mobility precautions and re-inforced throughout    Education Comments  No comments found.    Jacinto Garcia, SPT  Completion of this session, clinical decision making, and documentation performed under the supervision/direction of Kalie Barnes DPT.

## 2025-07-15 NOTE — CARE PLAN
The patient's goals for the shift include      Problem: Pain - Adult  Goal: Verbalizes/displays adequate comfort level or baseline comfort level  Outcome: Progressing  Flowsheets (Taken 7/15/2025 1136 by Park Escobedo V RN)  Verbalizes/displays adequate comfort level or baseline comfort level:   Encourage patient to monitor pain and request assistance   Assess pain using appropriate pain scale   Administer analgesics based on type and severity of pain and evaluate response   Implement non-pharmacological measures as appropriate and evaluate response   Consider cultural and social influences on pain and pain management   Notify Licensed Independent Practitioner if interventions unsuccessful or patient reports new pain     Problem: Safety - Adult  Goal: Free from fall injury  Outcome: Progressing  Flowsheets (Taken 7/15/2025 0112)  Free from fall injury:   Instruct family/caregiver on patient safety   Based on caregiver fall risk screen, instruct family/caregiver to ask for assistance with transferring infant if caregiver noted to have fall risk factors     Problem: Chronic Conditions and Co-morbidities  Goal: Patient's chronic conditions and co-morbidity symptoms are monitored and maintained or improved  Outcome: Progressing  Flowsheets (Taken 7/15/2025 0112)  Care Plan - Patient's Chronic Conditions and Co-Morbidity Symptoms are Monitored and Maintained or Improved:   Monitor and assess patient's chronic conditions and comorbid symptoms for stability, deterioration, or improvement   Collaborate with multidisciplinary team to address chronic and comorbid conditions and prevent exacerbation or deterioration   Update acute care plan with appropriate goals if chronic or comorbid symptoms are exacerbated and prevent overall improvement and discharge     Problem: Nutrition  Goal: Nutrient intake appropriate for maintaining nutritional needs  Outcome: Progressing     Problem: Heart Failure  Goal: Report improvement of  dyspnea/breathlessness this shift  Outcome: Progressing  Flowsheets (Taken 7/15/2025 1136 by Park Escobedo V RN)  Report improvement of dyspnea/breathlessness this shift:   Ambulate/OOB 3 times daily   Encourage activity/mobility/ROM   Incentive spirometry/deep breathing /HOB elevated elevated   Consider PT/OT consult   Facilitate activity/mobility per patient tolerance/advance     The clinical goals for the shift include HDS

## 2025-07-16 ENCOUNTER — APPOINTMENT (OUTPATIENT)
Dept: RADIOLOGY | Facility: HOSPITAL | Age: 60
End: 2025-07-16
Payer: COMMERCIAL

## 2025-07-16 LAB
ALBUMIN SERPL BCP-MCNC: 3.4 G/DL (ref 3.4–5)
ANION GAP SERPL CALC-SCNC: 10 MMOL/L (ref 10–20)
BUN SERPL-MCNC: 12 MG/DL (ref 6–23)
CALCIUM SERPL-MCNC: 8.6 MG/DL (ref 8.6–10.6)
CHLORIDE SERPL-SCNC: 101 MMOL/L (ref 98–107)
CO2 SERPL-SCNC: 33 MMOL/L (ref 21–32)
CREAT SERPL-MCNC: 0.78 MG/DL (ref 0.5–1.05)
EGFRCR SERPLBLD CKD-EPI 2021: 86 ML/MIN/1.73M*2
ERYTHROCYTE [DISTWIDTH] IN BLOOD BY AUTOMATED COUNT: 15.4 % (ref 11.5–14.5)
GLUCOSE BLD MANUAL STRIP-MCNC: 115 MG/DL (ref 74–99)
GLUCOSE BLD MANUAL STRIP-MCNC: 116 MG/DL (ref 74–99)
GLUCOSE BLD MANUAL STRIP-MCNC: 47 MG/DL (ref 74–99)
GLUCOSE BLD MANUAL STRIP-MCNC: 53 MG/DL (ref 74–99)
GLUCOSE BLD MANUAL STRIP-MCNC: 60 MG/DL (ref 74–99)
GLUCOSE BLD MANUAL STRIP-MCNC: 74 MG/DL (ref 74–99)
GLUCOSE BLD MANUAL STRIP-MCNC: 82 MG/DL (ref 74–99)
GLUCOSE BLD MANUAL STRIP-MCNC: 99 MG/DL (ref 74–99)
GLUCOSE SERPL-MCNC: 42 MG/DL (ref 74–99)
HCT VFR BLD AUTO: 42.8 % (ref 36–46)
HGB BLD-MCNC: 13.1 G/DL (ref 12–16)
MAGNESIUM SERPL-MCNC: 1.86 MG/DL (ref 1.6–2.4)
MCH RBC QN AUTO: 29.8 PG (ref 26–34)
MCHC RBC AUTO-ENTMCNC: 30.6 G/DL (ref 32–36)
MCV RBC AUTO: 97 FL (ref 80–100)
NRBC BLD-RTO: 0 /100 WBCS (ref 0–0)
PHOSPHATE SERPL-MCNC: 3.6 MG/DL (ref 2.5–4.9)
PLATELET # BLD AUTO: 266 X10*3/UL (ref 150–450)
POTASSIUM SERPL-SCNC: 4.1 MMOL/L (ref 3.5–5.3)
RBC # BLD AUTO: 4.4 X10*6/UL (ref 4–5.2)
SODIUM SERPL-SCNC: 140 MMOL/L (ref 136–145)
WBC # BLD AUTO: 8.4 X10*3/UL (ref 4.4–11.3)

## 2025-07-16 PROCEDURE — 71250 CT THORAX DX C-: CPT

## 2025-07-16 PROCEDURE — 83735 ASSAY OF MAGNESIUM: CPT | Performed by: NURSE PRACTITIONER

## 2025-07-16 PROCEDURE — 2500000002 HC RX 250 W HCPCS SELF ADMINISTERED DRUGS (ALT 637 FOR MEDICARE OP, ALT 636 FOR OP/ED): Performed by: NURSE PRACTITIONER

## 2025-07-16 PROCEDURE — 2020000001 HC ICU ROOM DAILY

## 2025-07-16 PROCEDURE — 2500000001 HC RX 250 WO HCPCS SELF ADMINISTERED DRUGS (ALT 637 FOR MEDICARE OP): Performed by: NURSE PRACTITIONER

## 2025-07-16 PROCEDURE — 2500000004 HC RX 250 GENERAL PHARMACY W/ HCPCS (ALT 636 FOR OP/ED)

## 2025-07-16 PROCEDURE — 82947 ASSAY GLUCOSE BLOOD QUANT: CPT

## 2025-07-16 PROCEDURE — 2500000004 HC RX 250 GENERAL PHARMACY W/ HCPCS (ALT 636 FOR OP/ED): Performed by: NURSE PRACTITIONER

## 2025-07-16 PROCEDURE — 85027 COMPLETE CBC AUTOMATED: CPT | Performed by: NURSE PRACTITIONER

## 2025-07-16 PROCEDURE — 80069 RENAL FUNCTION PANEL: CPT | Performed by: NURSE PRACTITIONER

## 2025-07-16 PROCEDURE — 99291 CRITICAL CARE FIRST HOUR: CPT | Performed by: NURSE PRACTITIONER

## 2025-07-16 PROCEDURE — 2500000005 HC RX 250 GENERAL PHARMACY W/O HCPCS: Performed by: NURSE PRACTITIONER

## 2025-07-16 PROCEDURE — 80323 ALKALOIDS NOS: CPT | Performed by: STUDENT IN AN ORGANIZED HEALTH CARE EDUCATION/TRAINING PROGRAM

## 2025-07-16 PROCEDURE — 36415 COLL VENOUS BLD VENIPUNCTURE: CPT | Performed by: NURSE PRACTITIONER

## 2025-07-16 PROCEDURE — 99291 CRITICAL CARE FIRST HOUR: CPT | Performed by: INTERNAL MEDICINE

## 2025-07-16 RX ORDER — INSULIN GLARGINE 100 [IU]/ML
7 INJECTION, SOLUTION SUBCUTANEOUS NIGHTLY
Status: DISCONTINUED | OUTPATIENT
Start: 2025-07-16 | End: 2025-07-18

## 2025-07-16 RX ORDER — POLYETHYLENE GLYCOL 3350, SODIUM SULFATE ANHYDROUS, SODIUM BICARBONATE, SODIUM CHLORIDE, POTASSIUM CHLORIDE 236; 22.74; 6.74; 5.86; 2.97 G/4L; G/4L; G/4L; G/4L; G/4L
2000 POWDER, FOR SOLUTION ORAL ONCE AS NEEDED
Status: DISCONTINUED | OUTPATIENT
Start: 2025-07-17 | End: 2025-07-25

## 2025-07-16 RX ORDER — POLYETHYLENE GLYCOL 3350, SODIUM SULFATE ANHYDROUS, SODIUM BICARBONATE, SODIUM CHLORIDE, POTASSIUM CHLORIDE 236; 22.74; 6.74; 5.86; 2.97 G/4L; G/4L; G/4L; G/4L; G/4L
4000 POWDER, FOR SOLUTION ORAL ONCE
Status: COMPLETED | OUTPATIENT
Start: 2025-07-16 | End: 2025-07-16

## 2025-07-16 RX ORDER — ONDANSETRON HYDROCHLORIDE 2 MG/ML
4 INJECTION, SOLUTION INTRAVENOUS EVERY 4 HOURS PRN
Status: DISCONTINUED | OUTPATIENT
Start: 2025-07-16 | End: 2025-07-25

## 2025-07-16 RX ORDER — MAGNESIUM SULFATE HEPTAHYDRATE 40 MG/ML
4 INJECTION, SOLUTION INTRAVENOUS ONCE
Status: COMPLETED | OUTPATIENT
Start: 2025-07-16 | End: 2025-07-16

## 2025-07-16 RX ADMIN — SPIRONOLACTONE 25 MG: 25 TABLET, FILM COATED ORAL at 08:41

## 2025-07-16 RX ADMIN — GABAPENTIN 400 MG: 300 CAPSULE ORAL at 05:52

## 2025-07-16 RX ADMIN — GABAPENTIN 400 MG: 300 CAPSULE ORAL at 20:23

## 2025-07-16 RX ADMIN — DEXTROSE MONOHYDRATE 12.5 G: 25 INJECTION, SOLUTION INTRAVENOUS at 20:02

## 2025-07-16 RX ADMIN — GABAPENTIN 400 MG: 300 CAPSULE ORAL at 13:16

## 2025-07-16 RX ADMIN — MAGNESIUM SULFATE HEPTAHYDRATE 4 G: 40 INJECTION, SOLUTION INTRAVENOUS at 06:20

## 2025-07-16 RX ADMIN — POLYETHYLENE GLYCOL 3350, SODIUM SULFATE ANHYDROUS, SODIUM BICARBONATE, SODIUM CHLORIDE, POTASSIUM CHLORIDE 4000 ML: 236; 22.74; 6.74; 5.86; 2.97 POWDER, FOR SOLUTION ORAL at 16:04

## 2025-07-16 RX ADMIN — BUMETANIDE 1 MG: 1 TABLET ORAL at 16:14

## 2025-07-16 RX ADMIN — ROPINIROLE 1 MG: 1 TABLET, FILM COATED ORAL at 20:19

## 2025-07-16 RX ADMIN — CITALOPRAM HYDROBROMIDE 40 MG: 40 TABLET ORAL at 05:52

## 2025-07-16 RX ADMIN — DEXTROSE MONOHYDRATE 12.5 G: 25 INJECTION, SOLUTION INTRAVENOUS at 22:41

## 2025-07-16 RX ADMIN — LEVOTHYROXINE SODIUM 88 MCG: 0.09 TABLET ORAL at 05:56

## 2025-07-16 RX ADMIN — IRON SUCROSE 200 MG: 20 INJECTION, SOLUTION INTRAVENOUS at 05:52

## 2025-07-16 RX ADMIN — ATORVASTATIN CALCIUM 80 MG: 80 TABLET, FILM COATED ORAL at 08:41

## 2025-07-16 RX ADMIN — ROPINIROLE HYDROCHLORIDE 3 MG: 3 TABLET, FILM COATED ORAL at 20:19

## 2025-07-16 RX ADMIN — ASPIRIN 81 MG CHEWABLE TABLET 81 MG: 81 TABLET CHEWABLE at 08:41

## 2025-07-16 RX ADMIN — PANTOPRAZOLE SODIUM 20 MG: 20 TABLET, DELAYED RELEASE ORAL at 08:41

## 2025-07-16 RX ADMIN — ROPINIROLE 1 MG: 1 TABLET, FILM COATED ORAL at 08:43

## 2025-07-16 RX ADMIN — ROPINIROLE 1 MG: 1 TABLET, FILM COATED ORAL at 16:14

## 2025-07-16 RX ADMIN — BUMETANIDE 1 MG: 1 TABLET ORAL at 08:41

## 2025-07-16 ASSESSMENT — COGNITIVE AND FUNCTIONAL STATUS - GENERAL
DAILY ACTIVITIY SCORE: 24
DAILY ACTIVITIY SCORE: 24
MOBILITY SCORE: 24
MOBILITY SCORE: 24

## 2025-07-16 ASSESSMENT — PAIN - FUNCTIONAL ASSESSMENT
PAIN_FUNCTIONAL_ASSESSMENT: 0-10

## 2025-07-16 ASSESSMENT — PAIN SCALES - GENERAL
PAINLEVEL_OUTOF10: 0 - NO PAIN

## 2025-07-16 NOTE — PROGRESS NOTES
HFICU Attending Note    Assessment & Plan  Heart failure, diastolic, with acute decompensation      She is still feeling a bit congested but BNP is improved from prior will continue oral diuretic regimen, will hold entresto given borderline blood pressure and impending procedure, she reports poor appetite for some time leading to weight loss, reivewed echocardiogram although LV is dilated for her BMI I have concerns about LVAD, it seems A1c and PVR are better she is adamant about smoking cessation, will repeat nicotine may discuss next Tuesday in case transplant is a viable option, will also repeat chest CT to see if improvement from 6 weeks ago    This critically ill patient continues to be at-risk for clinically significant deterioration / failure due to the above mentioned dysfunctional, unstable organ systems.  I have personally identified and managed all complex critical care issues to prevent aforementioned clinical deterioration.  Critical care time is spent at bedside and/or the immediate area and has included, but is not limited to, the review of diagnostic tests, labs, radiographs, serial assessments of hemodynamics, respiratory status, ventilatory management, and family updates.  Time spent in procedures and teaching are reported separately.    Critical care time: 41____ minutes

## 2025-07-16 NOTE — PROGRESS NOTES
"[facility]  HFICU PROGRESS NOTE    Thao Evans/[id number]    Admit Date: 7/14/2025  Hospital Length of Stay: 2   ICU Length of Stay: 1d 17h   Primary Service:   Primary HF Cardiologist:   Referring:    INTERVAL EVENTS / PERTINENT ROS:     Overnight hypoglycemia noted. BS was 40 with Am lab.     LVAD placement planned for next week.     No new complaints today.    Plan:  -Overnight hypoglycemia --> Reduced lantus insulin from 50 units to 7 units AM (home dose 15u , plus NPO Post 12MN)  -Prep for Colonoscopy  -Hold Entresto due to BP board line  -CT chest noncon    MEDICATIONS  Infusions:     Scheduled:  aspirin, 81 mg, Daily  atorvastatin, 80 mg, Daily  bumetanide, 1 mg, BID  citalopram, 40 mg, Daily  [Held by provider] clopidogrel, 75 mg, Daily  [Held by provider] empagliflozin, 10 mg, Daily  fluticasone furoate-vilanteroL, 1 puff, Daily  gabapentin, 400 mg, q8h  insulin glargine, 7 Units, Nightly  insulin lispro, 0-5 Units, TID AC  iron sucrose, 200 mg, Daily  levothyroxine, 88 mcg, Daily  pantoprazole, 20 mg, Daily  perflutren protein A microsphere, 0.5 mL, Once in imaging  polyethylene glycol, 4,000 mL, Once  rOPINIRole, 1 mg, TID  rOPINIRole, 3 mg, Nightly  [Held by provider] sacubitriL-valsartan, 1 tablet, BID  spironolactone, 25 mg, Daily  sulfur hexafluoride microsphr, 2 mL, Once in imaging      PRN:  albuterol, 2 puff, q6h PRN  dextrose, 12.5 g, q15 min PRN  dextrose, 25 g, q15 min PRN  glucagon, 1 mg, q15 min PRN  glucagon, 1 mg, q15 min PRN  nitroglycerin, 0.4 mg, q5 min PRN  ondansetron, 4 mg, q4h PRN  oxygen, , Continuous PRN - O2/gases  [START ON 7/17/2025] polyethylene glycol, 2,000 mL, Once PRN        PHYSICAL EXAM:   Visit Vitals  BP (!) 119/92   Pulse 74   Temp 35.6 °C (96.1 °F)   Resp 17   Ht 1.575 m (5' 2\")   Wt 62.7 kg (138 lb 3.7 oz)   SpO2 94%   BMI 25.28 kg/m²   OB Status Postmenopausal   Smoking Status Former   BSA 1.66 m²       Wt Readings from Last 5 Encounters: " "  07/16/25 62.7 kg (138 lb 3.7 oz)   06/20/25 57.2 kg (126 lb)   06/20/25 57.4 kg (126 lb 9.6 oz)   06/17/25 58.2 kg (128 lb 4.9 oz)   05/30/25 56.2 kg (124 lb)       INTAKE/OUTPUT:  I/O last 3 completed shifts:  In: 1510 (24.1 mL/kg) [P.O.:1510]  Out: 3250 (51.8 mL/kg) [Urine:3250 (1.4 mL/kg/hr)]  Weight: 62.7 kg      Physical Exam  Constitutional:       Appearance: Normal appearance.   HENT:      Head: Normocephalic.     Cardiovascular:      Rate and Rhythm: Normal rate and regular rhythm.      Pulses: Normal pulses.   Pulmonary:      Effort: Pulmonary effort is normal.      Comments: Currently on 2L NC  Abdominal:      General: Abdomen is flat.      Palpations: Abdomen is soft.     Musculoskeletal:      Right lower leg: No edema.      Left lower leg: No edema.     Skin:     General: Skin is warm.     Neurological:      General: No focal deficit present.      Mental Status: She is alert.         DATA:  CMP:  Results from last 7 days   Lab Units 07/16/25  0416 07/15/25  0354 07/14/25  2003   SODIUM mmol/L 140 137 137   POTASSIUM mmol/L 4.1 4.1 3.6   CHLORIDE mmol/L 101 99 96*   CO2 mmol/L 33* 32 33*   ANION GAP mmol/L 10 10 12   BUN mg/dL 12 11 11   CREATININE mg/dL 0.78 0.64 0.62   EGFR mL/min/1.73m*2 86 >90 >90   MAGNESIUM mg/dL 1.86 2.28 2.06   ALBUMIN g/dL 3.4 3.5 3.9   ALT U/L  --   --  11   AST U/L  --   --  12   BILIRUBIN TOTAL mg/dL  --   --  0.8     CBC:  Results from last 7 days   Lab Units 07/16/25  0416 07/15/25  0354 07/14/25  2003   WBC AUTO x10*3/uL 8.4 6.0 6.5   HEMOGLOBIN g/dL 13.1 12.8 13.2   HEMATOCRIT % 42.8 39.9 41.6   PLATELETS AUTO x10*3/uL 266 194 210   MCV fL 97 94 94     COAG:   Results from last 7 days   Lab Units 07/14/25 2003   INR  1.1     ABO: No results found for: \"ABO\"  HEME/ENDO:  Results from last 7 days   Lab Units 07/15/25  0354 07/14/25 2013   FERRITIN ng/mL  --  224*   IRON SATURATION %  --  24*   HEMOGLOBIN A1C % 8.0*  --       CARDIAC:   Results from last 7 days   Lab " Units 07/14/25 2003   [facility] ng/L 18   BNP pg/mL 735*       ASSESSMENT AND PLAN:   Thao Evans is a 60 year old with a PMHx sig for CAD s/p PCI (LAD; 2015, Lcx; 2025), stage D systolic HF/ICM/HFrEF s/p ICD, h/o VT with presumed associated syncope, poorly controlled DM, pAF (off of DOAC given the absence of recurrence), dyslipidemia, essential HTN, COPD, and h/o Graves. She was recently referred to the [facility] and seen by Dr. Deluca. Her most recent hospitalization was in March 2025 for syncope/VT where she underwent an ICD placement and PCI to her LCx. She reported progressive symptoms over the last several months with ongoing fatigue, dyspnea, and early satiety causing significant progressive weight loss (~60 lbs over the last 6 months). She has also been currently intolerant of GDMT and is on midodrine for BP support since her hospital discharge. Because of worsening symptoms and intolerance of GDMT, she was offered direct admission to HFICU with plan for PAC guided evaluation. Transferred from HFICU to LT 5 06/06 under HHVI service. LT5 course c/b acute hypoxic respiratory failure due to L pleural effusion s/p thoracentesis x 3 (06/09, 6/11, 06/12), now on room air.     Pt was discussed in Advanced Therapies Committee meeting on 6/17/2025 and approved for LVAD (barriers to transplant, elevated PAP, uncontrolled DM Hg A1c > 8, active smoking). Plan is for patient to complete colonoscopy prior to LVAD placement next week tentative date 7/23. On clear liquid diet, NPO post 12 MN, for colonoscopy on 7/17. Repeat CT chest today,         Neuro:  #Restless legs  #Anxiety  #Depression  - c/w home celexa  - c/w home gabapentin  - C/w home requip  - Serial neuro and pain assessments  - PO Tylenol PRN for pain  - PT/OT Consult, OOB to chair  - CAM ICU score every shift  - Sleep/wake cycle normalization     # Physical Status  - Not obese, BMI 22.68 kg/m2  - Reduced Mobility     #Substance  abuse  -Alcohol dependence: rare use  -Tobacco dependence: previous smoker     Cardiovascular:  #HFrEF /acute on chronic systolic Heart Failure, ICM, last LVEF 23% (6/2/2025), Stage D, NYHA III, s/p ICD.  - TTE 6/2 LVSF 20-25%, LV size is moderately dilated, mild/mod elevated pulmonary artery pressure  - Repeat TTE ordered  - BNP: 735  - Lactate: 1  - Troponin: 18  - Plan for PAC after colonoscopy  - Diuretics Bumex 1 mg BID  - GDMT:  jardiance 10 mg, sandi 25  - Hold Entresto 24/26 BID for colonoscopy AM due to board line BP   - Daily standing weights, 2gm sodium diet, 2L fluid restriction, strict I&Os     #CAD s/p PCI (LAD in 2015, LCx in 2025)  -c/w home asa 81mg daily, Plavix  -c/w home statin     #h/o VT  #paroxysmal A Fib  -Device: s/p CRT-D 3/2025, [employer]  -AC: off DOAC given absence of recurrence     #HTN  #HLD  -BP medications as above.  -c/w home statin     #Electrolyte Disturbances  -K goal >4, Mg>2     Pulmonary:  #COPD  -c/w home inhalers  -Monitor and maintain SpO2 > 92%     GI:  #GERD  -c/w home ppi  - Bowel regimen: prn miralax  - Bowel prep 7/15         :  No renal dysfunction at baseline   -Baseline Cr 0.5-0.9  -Admit BUN/Cr: 11/0.62  -I/Os  -avoid hypotension and nephrotoxic agents     Heme:  No hematology pathology at baseline   - H&H 13.2/41.6  - Iron studies 62, 256, 24%  - venofer 200mg x3 doses      Endo:  #DM, T2  - Last hgbA1c (7/15/25): 8.0  - home medications; insulin - glargine, jardiance, nesina.   -Diet: carb controlled  -ISS while inpatient, adjust as needed  - Euglycemic    # Hypoglycemia  - Noted Hypoglycemia overnight 7/15  - Reduced lantus insulin from 50 units to 7 units AM (home dose 15u , plus NPO Post 12MN)     #h/o Grave's disease  -last TSH (6/5/25): 0.19, FT4 1.23      ID:  -afebrile, nontoxic  -no s/s infx  -trend temps q4h     PHYSICAL AND OCCUPATIONAL THERAPY: ordered    LINES:  PIVs        DVT: N/A  VAP BUNDLE: N/A  CENTRAL LINE BUNDLE: ordered  ULCER PPX:  home PPI  GLYCEMIC CONTROL: ISS  BOWEL CARE: miralax  INDWELLING CATHETER: N/A  NUTRITION: Adult diet Clear Liquid  NPO Diet; Effective midnight      EMERGENCY CONTACT: Extended Emergency Contact Information  Primary Emergency Contact: aBbatunde Mclean  Mobile Phone: [phone]  Relation: Friend   needed? No  Secondary Emergency Contact: Daisy Velasco  Mobile Phone: [phone]  Relation: Sister  FAMILY UPDATE: Patient at bedside  CODE STATUS: Full Code  DISPO: Maintain in HFICU     Patient seen and assessed with Dr. Kincaid    I personally spent 60 minutes of critical care time directly and personally managing the patient exclusive of separately billable procedures   _________________________________________________  ODALYS Ngo

## 2025-07-16 NOTE — PROGRESS NOTES
Occupational Therapy    Communication Note    Patient Name: Thao Evans  MRN: [id number]  Today's Date: 7/16/2025   Room: 13/13-A    Discipline: Occupational Therapy      Missed Visit Reason:  (patient going off unit; will attempt OT next visit as appropriate)      07/16/25 at 1:03 PM   Yessenia Hunter OT   Rehab Office: [phone]

## 2025-07-17 ENCOUNTER — DOCUMENTATION (OUTPATIENT)
Dept: TRANSPLANT | Facility: HOSPITAL | Age: 60
End: 2025-07-17

## 2025-07-17 ENCOUNTER — APPOINTMENT (OUTPATIENT)
Dept: GASTROENTEROLOGY | Facility: HOSPITAL | Age: 60
End: 2025-07-17
Payer: COMMERCIAL

## 2025-07-17 ENCOUNTER — APPOINTMENT (OUTPATIENT)
Dept: GASTROENTEROLOGY | Facility: HOSPITAL | Age: 60
DRG: 001 | End: 2025-07-17
Payer: COMMERCIAL

## 2025-07-17 LAB
ALBUMIN SERPL BCP-MCNC: 4.1 G/DL (ref 3.4–5)
ANION GAP SERPL CALC-SCNC: 9 MMOL/L (ref 10–20)
BUN SERPL-MCNC: 9 MG/DL (ref 6–23)
CALCIUM SERPL-MCNC: 9.3 MG/DL (ref 8.6–10.6)
CHLORIDE SERPL-SCNC: 97 MMOL/L (ref 98–107)
CO2 SERPL-SCNC: 37 MMOL/L (ref 21–32)
CREAT SERPL-MCNC: 0.69 MG/DL (ref 0.5–1.05)
EGFRCR SERPLBLD CKD-EPI 2021: >90 ML/MIN/1.73M*2
ERYTHROCYTE [DISTWIDTH] IN BLOOD BY AUTOMATED COUNT: 15.3 % (ref 11.5–14.5)
FOLATE SERPL-MCNC: 11.2 NG/ML
GLUCOSE BLD MANUAL STRIP-MCNC: 100 MG/DL (ref 74–99)
GLUCOSE BLD MANUAL STRIP-MCNC: 102 MG/DL (ref 74–99)
GLUCOSE BLD MANUAL STRIP-MCNC: 106 MG/DL (ref 74–99)
GLUCOSE BLD MANUAL STRIP-MCNC: 140 MG/DL (ref 74–99)
GLUCOSE BLD MANUAL STRIP-MCNC: 200 MG/DL (ref 74–99)
GLUCOSE BLD MANUAL STRIP-MCNC: 59 MG/DL (ref 74–99)
GLUCOSE BLD MANUAL STRIP-MCNC: 59 MG/DL (ref 74–99)
GLUCOSE BLD MANUAL STRIP-MCNC: 60 MG/DL (ref 74–99)
GLUCOSE BLD MANUAL STRIP-MCNC: 62 MG/DL (ref 74–99)
GLUCOSE BLD MANUAL STRIP-MCNC: 79 MG/DL (ref 74–99)
GLUCOSE BLD MANUAL STRIP-MCNC: 81 MG/DL (ref 74–99)
GLUCOSE SERPL-MCNC: 80 MG/DL (ref 74–99)
HCT VFR BLD AUTO: 43.4 % (ref 36–46)
HGB BLD-MCNC: 14.1 G/DL (ref 12–16)
MAGNESIUM SERPL-MCNC: 2.26 MG/DL (ref 1.6–2.4)
MCH RBC QN AUTO: 29.9 PG (ref 26–34)
MCHC RBC AUTO-ENTMCNC: 32.5 G/DL (ref 32–36)
MCV RBC AUTO: 92 FL (ref 80–100)
NRBC BLD-RTO: 0 /100 WBCS (ref 0–0)
PHOSPHATE SERPL-MCNC: 3.4 MG/DL (ref 2.5–4.9)
PLATELET # BLD AUTO: 198 X10*3/UL (ref 150–450)
POTASSIUM SERPL-SCNC: 3.9 MMOL/L (ref 3.5–5.3)
RBC # BLD AUTO: 4.71 X10*6/UL (ref 4–5.2)
SODIUM SERPL-SCNC: 139 MMOL/L (ref 136–145)
WBC # BLD AUTO: 7.3 X10*3/UL (ref 4.4–11.3)

## 2025-07-17 PROCEDURE — 0DBN8ZZ EXCISION OF SIGMOID COLON, VIA NATURAL OR ARTIFICIAL OPENING ENDOSCOPIC: ICD-10-PCS | Performed by: STUDENT IN AN ORGANIZED HEALTH CARE EDUCATION/TRAINING PROGRAM

## 2025-07-17 PROCEDURE — 2500000005 HC RX 250 GENERAL PHARMACY W/O HCPCS: Performed by: NURSE PRACTITIONER

## 2025-07-17 PROCEDURE — 36415 COLL VENOUS BLD VENIPUNCTURE: CPT | Performed by: NURSE PRACTITIONER

## 2025-07-17 PROCEDURE — 88305 TISSUE EXAM BY PATHOLOGIST: CPT | Performed by: PATHOLOGY

## 2025-07-17 PROCEDURE — 0DJ08ZZ INSPECTION OF UPPER INTESTINAL TRACT, VIA NATURAL OR ARTIFICIAL OPENING ENDOSCOPIC: ICD-10-PCS | Performed by: STUDENT IN AN ORGANIZED HEALTH CARE EDUCATION/TRAINING PROGRAM

## 2025-07-17 PROCEDURE — 85027 COMPLETE CBC AUTOMATED: CPT | Performed by: NURSE PRACTITIONER

## 2025-07-17 PROCEDURE — 2500000004 HC RX 250 GENERAL PHARMACY W/ HCPCS (ALT 636 FOR OP/ED)

## 2025-07-17 PROCEDURE — 2020000001 HC ICU ROOM DAILY

## 2025-07-17 PROCEDURE — 80069 RENAL FUNCTION PANEL: CPT | Performed by: NURSE PRACTITIONER

## 2025-07-17 PROCEDURE — 2500000001 HC RX 250 WO HCPCS SELF ADMINISTERED DRUGS (ALT 637 FOR MEDICARE OP): Performed by: NURSE PRACTITIONER

## 2025-07-17 PROCEDURE — 2720000007 HC OR 272 NO HCPCS

## 2025-07-17 PROCEDURE — 99233 SBSQ HOSP IP/OBS HIGH 50: CPT | Performed by: STUDENT IN AN ORGANIZED HEALTH CARE EDUCATION/TRAINING PROGRAM

## 2025-07-17 PROCEDURE — 43235 EGD DIAGNOSTIC BRUSH WASH: CPT | Performed by: STUDENT IN AN ORGANIZED HEALTH CARE EDUCATION/TRAINING PROGRAM

## 2025-07-17 PROCEDURE — 88305 TISSUE EXAM BY PATHOLOGIST: CPT | Mod: TC,SUR | Performed by: STUDENT IN AN ORGANIZED HEALTH CARE EDUCATION/TRAINING PROGRAM

## 2025-07-17 PROCEDURE — 82947 ASSAY GLUCOSE BLOOD QUANT: CPT

## 2025-07-17 PROCEDURE — 97165 OT EVAL LOW COMPLEX 30 MIN: CPT | Mod: GO

## 2025-07-17 PROCEDURE — 82746 ASSAY OF FOLIC ACID SERUM: CPT | Performed by: NURSE PRACTITIONER

## 2025-07-17 PROCEDURE — 83735 ASSAY OF MAGNESIUM: CPT | Performed by: NURSE PRACTITIONER

## 2025-07-17 PROCEDURE — 45381 COLONOSCOPY SUBMUCOUS NJX: CPT | Performed by: STUDENT IN AN ORGANIZED HEALTH CARE EDUCATION/TRAINING PROGRAM

## 2025-07-17 PROCEDURE — 45385 COLONOSCOPY W/LESION REMOVAL: CPT | Performed by: STUDENT IN AN ORGANIZED HEALTH CARE EDUCATION/TRAINING PROGRAM

## 2025-07-17 PROCEDURE — 99291 CRITICAL CARE FIRST HOUR: CPT | Performed by: INTERNAL MEDICINE

## 2025-07-17 PROCEDURE — 0DBK8ZZ EXCISION OF ASCENDING COLON, VIA NATURAL OR ARTIFICIAL OPENING ENDOSCOPIC: ICD-10-PCS | Performed by: STUDENT IN AN ORGANIZED HEALTH CARE EDUCATION/TRAINING PROGRAM

## 2025-07-17 PROCEDURE — 2500000002 HC RX 250 W HCPCS SELF ADMINISTERED DRUGS (ALT 637 FOR MEDICARE OP, ALT 636 FOR OP/ED)

## 2025-07-17 PROCEDURE — 99291 CRITICAL CARE FIRST HOUR: CPT

## 2025-07-17 PROCEDURE — 2500000004 HC RX 250 GENERAL PHARMACY W/ HCPCS (ALT 636 FOR OP/ED): Performed by: NURSE PRACTITIONER

## 2025-07-17 PROCEDURE — 2500000002 HC RX 250 W HCPCS SELF ADMINISTERED DRUGS (ALT 637 FOR MEDICARE OP, ALT 636 FOR OP/ED): Performed by: NURSE PRACTITIONER

## 2025-07-17 RX ORDER — MIDAZOLAM HYDROCHLORIDE 2 MG/2ML
2 INJECTION, SOLUTION INTRAMUSCULAR; INTRAVENOUS ONCE
Status: DISCONTINUED | OUTPATIENT
Start: 2025-07-17 | End: 2025-07-17

## 2025-07-17 RX ORDER — POTASSIUM CHLORIDE 20 MEQ/1
20 TABLET, EXTENDED RELEASE ORAL ONCE
Status: COMPLETED | OUTPATIENT
Start: 2025-07-17 | End: 2025-07-17

## 2025-07-17 RX ORDER — PHENYLEPHRINE HCL IN 0.9% NACL 0.4MG/10ML
SYRINGE (ML) INTRAVENOUS
Status: DISPENSED
Start: 2025-07-17 | End: 2025-07-18

## 2025-07-17 RX ORDER — FENTANYL CITRATE 50 UG/ML
INJECTION, SOLUTION INTRAMUSCULAR; INTRAVENOUS CODE/TRAUMA/SEDATION MEDICATION
Status: COMPLETED | OUTPATIENT
Start: 2025-07-17 | End: 2025-07-17

## 2025-07-17 RX ORDER — MIDAZOLAM HYDROCHLORIDE 2 MG/2ML
INJECTION, SOLUTION INTRAMUSCULAR; INTRAVENOUS
Status: DISPENSED
Start: 2025-07-17 | End: 2025-07-18

## 2025-07-17 RX ORDER — DEXTROSE MONOHYDRATE 100 MG/ML
50 INJECTION, SOLUTION INTRAVENOUS CONTINUOUS
Status: ACTIVE | OUTPATIENT
Start: 2025-07-17 | End: 2025-07-17

## 2025-07-17 RX ORDER — MIDAZOLAM HYDROCHLORIDE 5 MG/ML
5 INJECTION, SOLUTION INTRAMUSCULAR; INTRAVENOUS ONCE
Status: DISCONTINUED | OUTPATIENT
Start: 2025-07-17 | End: 2025-07-19

## 2025-07-17 RX ORDER — DEXTROSE MONOHYDRATE 100 MG/ML
50 INJECTION, SOLUTION INTRAVENOUS CONTINUOUS
Status: DISPENSED | OUTPATIENT
Start: 2025-07-17 | End: 2025-07-17

## 2025-07-17 RX ORDER — FENTANYL CITRATE 50 UG/ML
100 INJECTION, SOLUTION INTRAMUSCULAR; INTRAVENOUS ONCE
Status: DISCONTINUED | OUTPATIENT
Start: 2025-07-17 | End: 2025-07-18

## 2025-07-17 RX ORDER — FENTANYL CITRATE 50 UG/ML
INJECTION, SOLUTION INTRAMUSCULAR; INTRAVENOUS
Status: DISPENSED
Start: 2025-07-17 | End: 2025-07-18

## 2025-07-17 RX ORDER — MIDAZOLAM HYDROCHLORIDE 2 MG/2ML
INJECTION, SOLUTION INTRAMUSCULAR; INTRAVENOUS CODE/TRAUMA/SEDATION MEDICATION
Status: COMPLETED | OUTPATIENT
Start: 2025-07-17 | End: 2025-07-17

## 2025-07-17 RX ADMIN — BUMETANIDE 1 MG: 1 TABLET ORAL at 08:52

## 2025-07-17 RX ADMIN — ATORVASTATIN CALCIUM 80 MG: 80 TABLET, FILM COATED ORAL at 08:52

## 2025-07-17 RX ADMIN — POTASSIUM CHLORIDE 20 MEQ: 1500 TABLET, EXTENDED RELEASE ORAL at 04:18

## 2025-07-17 RX ADMIN — LEVOTHYROXINE SODIUM 88 MCG: 0.09 TABLET ORAL at 05:25

## 2025-07-17 RX ADMIN — SPIRONOLACTONE 25 MG: 25 TABLET, FILM COATED ORAL at 08:52

## 2025-07-17 RX ADMIN — DEXTROSE MONOHYDRATE 12.5 G: 25 INJECTION, SOLUTION INTRAVENOUS at 09:56

## 2025-07-17 RX ADMIN — IRON SUCROSE 200 MG: 20 INJECTION, SOLUTION INTRAVENOUS at 05:25

## 2025-07-17 RX ADMIN — ROPINIROLE HYDROCHLORIDE 3 MG: 3 TABLET, FILM COATED ORAL at 20:07

## 2025-07-17 RX ADMIN — DEXTROSE MONOHYDRATE 12.5 G: 25 INJECTION, SOLUTION INTRAVENOUS at 00:18

## 2025-07-17 RX ADMIN — ASPIRIN 81 MG CHEWABLE TABLET 81 MG: 81 TABLET CHEWABLE at 08:52

## 2025-07-17 RX ADMIN — DEXTROSE 50 ML/HR: 10 SOLUTION INTRAVENOUS at 08:30

## 2025-07-17 RX ADMIN — PANTOPRAZOLE SODIUM 20 MG: 20 TABLET, DELAYED RELEASE ORAL at 08:52

## 2025-07-17 RX ADMIN — DEXTROSE 50 ML/HR: 10 SOLUTION INTRAVENOUS at 08:22

## 2025-07-17 RX ADMIN — DEXTROSE 50 ML/HR: 10 SOLUTION INTRAVENOUS at 00:56

## 2025-07-17 RX ADMIN — GABAPENTIN 400 MG: 300 CAPSULE ORAL at 05:25

## 2025-07-17 RX ADMIN — GABAPENTIN 400 MG: 300 CAPSULE ORAL at 14:27

## 2025-07-17 RX ADMIN — ROPINIROLE 1 MG: 1 TABLET, FILM COATED ORAL at 08:57

## 2025-07-17 RX ADMIN — BUMETANIDE 1 MG: 1 TABLET ORAL at 20:06

## 2025-07-17 RX ADMIN — ROPINIROLE 1 MG: 1 TABLET, FILM COATED ORAL at 14:27

## 2025-07-17 RX ADMIN — CITALOPRAM HYDROBROMIDE 40 MG: 40 TABLET ORAL at 05:30

## 2025-07-17 RX ADMIN — GABAPENTIN 400 MG: 300 CAPSULE ORAL at 20:06

## 2025-07-17 RX ADMIN — MIDAZOLAM HYDROCHLORIDE 2 MG: 2 INJECTION, SOLUTION INTRAMUSCULAR; INTRAVENOUS at 14:38

## 2025-07-17 RX ADMIN — ROPINIROLE 1 MG: 1 TABLET, FILM COATED ORAL at 20:06

## 2025-07-17 RX ADMIN — DEXTROSE MONOHYDRATE 12.5 G: 25 INJECTION, SOLUTION INTRAVENOUS at 14:06

## 2025-07-17 RX ADMIN — FENTANYL CITRATE 50 MCG: 50 INJECTION, SOLUTION INTRAMUSCULAR; INTRAVENOUS at 14:38

## 2025-07-17 ASSESSMENT — COGNITIVE AND FUNCTIONAL STATUS - GENERAL
PERSONAL GROOMING: A LITTLE
DRESSING REGULAR UPPER BODY CLOTHING: A LITTLE
DRESSING REGULAR LOWER BODY CLOTHING: A LITTLE
HELP NEEDED FOR BATHING: A LITTLE
DAILY ACTIVITIY SCORE: 24
DAILY ACTIVITIY SCORE: 19
TOILETING: A LITTLE
MOBILITY SCORE: 24
DAILY ACTIVITIY SCORE: 24
MOBILITY SCORE: 24

## 2025-07-17 ASSESSMENT — ACTIVITIES OF DAILY LIVING (ADL): ADL_ASSISTANCE: INDEPENDENT

## 2025-07-17 ASSESSMENT — PAIN - FUNCTIONAL ASSESSMENT
PAIN_FUNCTIONAL_ASSESSMENT: 0-10

## 2025-07-17 ASSESSMENT — PAIN SCALES - GENERAL
PAINLEVEL_OUTOF10: 0 - NO PAIN

## 2025-07-17 NOTE — CARE PLAN
Problem: Pain - Adult  Goal: Verbalizes/displays adequate comfort level or baseline comfort level  Outcome: Progressing  Flowsheets (Taken 7/15/2025 1136 by Park VALENZUELA RN)  Verbalizes/displays adequate comfort level or baseline comfort level:   Encourage patient to monitor pain and request assistance   Assess pain using appropriate pain scale   Administer analgesics based on type and severity of pain and evaluate response   Implement non-pharmacological measures as appropriate and evaluate response   Consider cultural and social influences on pain and pain management   Notify Licensed Independent Practitioner if interventions unsuccessful or patient reports new pain     Problem: Safety - Adult  Goal: Free from fall injury  Outcome: Progressing  Flowsheets (Taken 7/15/2025 0112 by Valeria Hua RN)  Free from fall injury:   Instruct family/caregiver on patient safety   Based on caregiver fall risk screen, instruct family/caregiver to ask for assistance with transferring infant if caregiver noted to have fall risk factors     Problem: Heart Failure  Goal: Improved gas exchange this shift  Outcome: Progressing  Flowsheets (Taken 7/15/2025 0112 by Valeria Hua RN)  Improved gas exchange this shift:   Assist with pulmonary hygiene and secretion clearance   Prepare for use of devices/procedures to correct dysrhythmia   Position to promote circulation/maximize ventilation  Goal: Improved urinary output this shift  Outcome: Progressing  Flowsheets (Taken 7/15/2025 1136 by Park VALENZUELA RN)  Improved urinary output this shift:   Med administration/monitor effect   Monitor intake/output and daily weight   Monitor serum electrolytes/replace per order  Goal: Reduction in peripheral edema within 24 hours  Outcome: Progressing  Goal: Report improvement of dyspnea/breathlessness this shift  Outcome: Progressing  Goal: Weight from fluid excess reduced over 2-3 days, then stabilize  Outcome: Progressing  Goal: Increase  self care and/or family involvement in 24 hours  Outcome: Progressing

## 2025-07-17 NOTE — PROGRESS NOTES
"[facility]  HFICU PROGRESS NOTE    Thao Evans/[id number]    Admit Date: 7/14/2025  Hospital Length of Stay: 3   ICU Length of Stay: 2d 16h   Primary Service: HFICU  Primary HF Cardiologist: Dr Deluca      INTERVAL EVENTS / PERTINENT ROS:     Continues to have overnight hypoglycemia, D10 gtt ordered. LVAD placement planned for next week. No new complaints today.    Plan:  - Colonoscopy/EGD today    MEDICATIONS  Infusions:  dextrose 10 % in water (D10W), Last Rate: 50 mL/hr (07/17/25 0822)      Scheduled:  aspirin, 81 mg, Daily  atorvastatin, 80 mg, Daily  bumetanide, 1 mg, BID  citalopram, 40 mg, Daily  [Held by provider] clopidogrel, 75 mg, Daily  [Held by provider] empagliflozin, 10 mg, Daily  fluticasone furoate-vilanteroL, 1 puff, Daily  gabapentin, 400 mg, q8h  [Held by provider] insulin glargine, 7 Units, Nightly  insulin lispro, 0-5 Units, TID AC  levothyroxine, 88 mcg, Daily  pantoprazole, 20 mg, Daily  perflutren protein A microsphere, 0.5 mL, Once in imaging  rOPINIRole, 1 mg, TID  rOPINIRole, 3 mg, Nightly  [Held by provider] sacubitriL-valsartan, 1 tablet, BID  spironolactone, 25 mg, Daily  sulfur hexafluoride microsphr, 2 mL, Once in imaging      PRN:  albuterol, 2 puff, q6h PRN  dextrose, 12.5 g, q15 min PRN  dextrose, 25 g, q15 min PRN  glucagon, 1 mg, q15 min PRN  glucagon, 1 mg, q15 min PRN  nitroglycerin, 0.4 mg, q5 min PRN  ondansetron, 4 mg, q4h PRN  oxygen, , Continuous PRN - O2/gases  polyethylene glycol, 2,000 mL, Once PRN        PHYSICAL EXAM:   Visit Vitals  /71 (BP Location: Left arm, Patient Position: Lying)   Pulse 79   Temp 36.1 °C (97 °F) (Temporal)   Resp 20   Ht 1.575 m (5' 2\")   Wt 63.5 kg (139 lb 14.4 oz)   SpO2 92%   BMI 25.59 kg/m²   OB Status Postmenopausal   Smoking Status Former   BSA 1.67 m²       Wt Readings from Last 5 Encounters:   07/17/25 63.5 kg (139 lb 14.4 oz)   06/20/25 57.2 kg (126 lb)   06/20/25 57.4 kg (126 lb 9.6 oz) " "  06/17/25 58.2 kg (128 lb 4.9 oz)   05/30/25 56.2 kg (124 lb)       INTAKE/OUTPUT:  I/O last 3 completed shifts:  In: 266.3 (4.2 mL/kg) [I.V.:266.3 (4.2 mL/kg)]  Out: 2200 (34.7 mL/kg) [Urine:2200 (1 mL/kg/hr)]  Weight: 63.5 kg      Physical Exam  Constitutional:       Appearance: Normal appearance.   HENT:      Head: Normocephalic.     Cardiovascular:      Rate and Rhythm: Normal rate and regular rhythm.      Pulses: Normal pulses.   Pulmonary:      Effort: Pulmonary effort is normal.      Comments: Currently on 2L NC  Abdominal:      General: Abdomen is flat.      Palpations: Abdomen is soft.     Musculoskeletal:      Right lower leg: No edema.      Left lower leg: No edema.     Skin:     General: Skin is warm.     Neurological:      General: No focal deficit present.      Mental Status: She is alert.         DATA:  CMP:  Results from last 7 days   Lab Units 07/17/25  0052 07/16/25  0416 07/15/25  0354 07/14/25  2003   SODIUM mmol/L 139 140 137 137   POTASSIUM mmol/L 3.9 4.1 4.1 3.6   CHLORIDE mmol/L 97* 101 99 96*   CO2 mmol/L 37* 33* 32 33*   ANION GAP mmol/L 9* 10 10 12   BUN mg/dL 9 12 11 11   CREATININE mg/dL 0.69 0.78 0.64 0.62   EGFR mL/min/1.73m*2 >90 86 >90 >90   MAGNESIUM mg/dL 2.26 1.86 2.28 2.06   ALBUMIN g/dL 4.1 3.4 3.5 3.9   ALT U/L  --   --   --  11   AST U/L  --   --   --  12   BILIRUBIN TOTAL mg/dL  --   --   --  0.8     CBC:  Results from last 7 days   Lab Units 07/17/25  0050 07/16/25  0416 07/15/25  0354 07/14/25  2003   WBC AUTO x10*3/uL 7.3 8.4 6.0 6.5   HEMOGLOBIN g/dL 14.1 13.1 12.8 13.2   HEMATOCRIT % 43.4 42.8 39.9 41.6   PLATELETS AUTO x10*3/uL 198 266 194 210   MCV fL 92 97 94 94     COAG:   Results from last 7 days   Lab Units 07/14/25 2003   INR  1.1     ABO: No results found for: \"ABO\"  HEME/ENDO:  Results from last 7 days   Lab Units 07/15/25  0354 07/14/25 2013   FERRITIN ng/mL  --  224*   IRON SATURATION %  --  24*   HEMOGLOBIN A1C % 8.0*  --       CARDIAC:   Results from last " 7 days   Lab Units 07/14/25 2003   [facility] ng/L 18   BNP pg/mL 735*       ASSESSMENT AND PLAN:   Thao Evans is a 60 year old with a PMHx sig for CAD s/p PCI (LAD; 2015, Lcx; 2025), stage D systolic HF/ICM/HFrEF s/p ICD, h/o VT with presumed associated syncope, poorly controlled DM, pAF (off of DOAC given the absence of recurrence), dyslipidemia, essential HTN, COPD, and h/o Graves. She was recently referred to the [facility] and seen by Dr. Deluca. Her most recent hospitalization was in March 2025 for syncope/VT where she underwent an ICD placement and PCI to her LCx. She reported progressive symptoms over the last several months with ongoing fatigue, dyspnea, and early satiety causing significant progressive weight loss (~60 lbs over the last 6 months). She has also been currently intolerant of GDMT and is on midodrine for BP support since her hospital discharge. Because of worsening symptoms and intolerance of GDMT, she was offered direct admission to HFICU with plan for PAC guided evaluation. Transferred from HFICU to LT 5 06/06 under HHVI service. LT5 course c/b acute hypoxic respiratory failure due to L pleural effusion s/p thoracentesis x 3 (06/09, 6/11, 06/12), now on room air.     Pt was discussed in Advanced Therapies Committee meeting on 6/17/2025 and approved for LVAD (barriers to transplant, elevated PAP, uncontrolled DM Hg A1c > 8, active smoking). Plan is for patient to complete colonoscopy prior to LVAD placement next week tentative date 7/23. Repeat CT chest 7/16 showing improvement. Plan for EGD/colonoscopy today.       Neuro:  #Restless legs  #Anxiety  #Depression  - c/w home celexa  - c/w home gabapentin  - C/w home requip  - Serial neuro and pain assessments  - PO Tylenol PRN for pain  - PT/OT Consult, OOB to chair  - CAM ICU score every shift  - Sleep/wake cycle normalization     # Physical Status  - Not obese, BMI 22.68 kg/m2  - Reduced Mobility     #Substance  abuse  -Alcohol dependence: rare use  -Tobacco dependence: previous smoker     Cardiovascular:  #HFrEF /acute on chronic systolic Heart Failure, ICM, last LVEF 23% (6/2/2025), Stage D, NYHA III, s/p ICD.  - TTE 6/2 LVSF 20-25%, LV size is moderately dilated, mild/mod elevated pulmonary artery pressure  - Repeat TTE 7/15: LVSF 30-35%, low normal RVSF  - Plan for PAC after colonoscopy this weekend   - Diuretics Bumex 1 mg BID  - GDMT:  holding jardiance 10 mg, c/w sandi 25  - Hold Entresto 24/26 BID for colonoscopy AM due to boarder line BP   - Daily standing weights, 2gm sodium diet, 2L fluid restriction, strict I&Os     #CAD s/p PCI (LAD in 2015, LCx in 2025)  -c/w home asa 81mg daily, Plavix  -c/w home statin     #h/o VT  #paroxysmal A Fib  -Device: s/p CRT-D 3/2025, [employer]  -AC: off DOAC given absence of recurrence     #HTN  #HLD  -BP medications as above.  -c/w home statin     #Electrolyte Disturbances  -K goal >4, Mg>2     Pulmonary:  #COPD  -c/w home inhalers  -Monitor and maintain SpO2 > 92%     GI:  #GERD  -c/w home ppi  - Bowel regimen: prn miralax  - Bowel prep 7/15 - EGD/Colonoscopy 7/17         :  No renal dysfunction at baseline  -Baseline Cr 0.5-0.9  -Admit BUN/Cr: 11/0.62  -I/Os  -avoid hypotension and nephrotoxic agents     Heme:  No hematology pathology at baseline   - H&H 13.2/41.6  - Iron studies 62, 256, 24%  - venofer 200mg x3 doses      Endo:  #DM, T2  - Last hgbA1c (7/15/25): 8.0  - home medications; insulin - glargine, jardiance, nesina.  -Diet: carb controlled  -ISS while inpatient, adjust as needed  - Euglycemic    # Hypoglycemia  - Noted Hypoglycemia overnight 7/15 and 7/16  - Reduced lantus insulin from 50 units to 7 units AM (home dose 15u , plus NPO Post 12MN) - HOLDING 7/17     #h/o Grave's disease  -last TSH (6/5/25): 0.19, FT4 1.23      ID:  -afebrile, nontoxic  -no s/s infx  -trend temps q4h     PHYSICAL AND OCCUPATIONAL THERAPY: ordered    LINES:  PIVs        DVT:  N/A  VAP BUNDLE: N/A  CENTRAL LINE BUNDLE: ordered  ULCER PPX: home PPI  GLYCEMIC CONTROL: ISS  BOWEL CARE: miralax  INDWELLING CATHETER: N/A  NUTRITION: NPO Diet; Effective midnight      EMERGENCY CONTACT: Extended Emergency Contact Information  Primary Emergency Contact: Babatunde Mclean  Mobile Phone: [phone]  Relation: Friend   needed? No  Secondary Emergency Contact: Daisy Velasco  Mobile Phone: [phone]  Relation: Sister  FAMILY UPDATE: Patient at bedside  CODE STATUS: Full Code  DISPO: Maintain in HFICU     Patient seen and assessed with Dr. Kincaid    I personally spent 60 minutes of critical care time directly and personally managing the patient exclusive of separately billable procedures   _________________________________________________  Cordelia Cramer, APRN-CNP

## 2025-07-17 NOTE — PROGRESS NOTES
[facility] Attending Note    Assessment & Plan  Heart failure, diastolic, with acute decompensation    Patient continues to progress hemodynamically her CVP is improved BNP is better than before we will continue on oral diuretics since she is making progress on this regimen holding Entresto today given her colonoscopy, reviewed her prior testing her nicotine level was negative during the last admission but cotinine was positive consistent with recent tobacco cessation we have repeated and may reconsider the possibility of transplant should the repeat test be negative reviewed her CT scan of the chest which is improved in terms of the lung parenchyma slightly smaller effusions her PFTs are more consistent with restrictive disease and does not appear prohibitive for either VAT or transplant- plan for RHC over the weekend for futher characterization of her hemodynamics      This critically ill patient continues to be at-risk for clinically significant deterioration / failure due to the above mentioned dysfunctional, unstable organ systems.  I have personally identified and managed all complex critical care issues to prevent aforementioned clinical deterioration.  Critical care time is spent at bedside and/or the immediate area and has included, but is not limited to, the review of diagnostic tests, labs, radiographs, serial assessments of hemodynamics, respiratory status, ventilatory management, and family updates.  Time spent in procedures and teaching are reported separately.    Critical care time: _43___ minutes

## 2025-07-17 NOTE — CARE PLAN
The clinical goals for the shift include hDS      Problem: Pain - Adult  Goal: Verbalizes/displays adequate comfort level or baseline comfort level  Outcome: Progressing     Problem: Safety - Adult  Goal: Free from fall injury  Outcome: Progressing     Problem: Chronic Conditions and Co-morbidities  Goal: Patient's chronic conditions and co-morbidity symptoms are monitored and maintained or improved  Outcome: Progressing     Problem: Nutrition  Goal: Nutrient intake appropriate for maintaining nutritional needs  Outcome: Progressing     Problem: Heart Failure  Goal: Improved gas exchange this shift  Outcome: Progressing  Goal: Improved urinary output this shift  Outcome: Progressing  Goal: Reduction in peripheral edema within 24 hours  Outcome: Progressing  Goal: Report improvement of dyspnea/breathlessness this shift  Outcome: Progressing  Goal: Weight from fluid excess reduced over 2-3 days, then stabilize  Outcome: Progressing  Goal: Increase self care and/or family involvement in 24 hours  Outcome: Progressing

## 2025-07-17 NOTE — PROGRESS NOTES
[employer] policy silver plan active. Member ID [id number].  For Authorizations and admissions [phone]. Pharmacy ph [phone].

## 2025-07-17 NOTE — PROGRESS NOTES
Check labs   Cares and symptomatic treatment discussed   follow up if problem        Spiritual Care Visit  Spiritual Care Request    Reason for Visit:  Routine Visit: Introduction (rounding)     Focus of Care:  Visited With: Patient       Outcome:  Outcome of Spiritual Care Visit: Affirmation, Comfort/healing presence, Identifying patient's strengths/source of hope     Spiritual Care Annotation    Annotation:   checked on patient while rounding on unit. Patient was receptive to spiritual care visit.      explored patient's thoughts, feelings, and concerns. Patient discussed her health journey and presently being at the hospital to get an LVAD. Patient shared about feeling encouraged about the procedure after getting to talk with someone who received an LVAD in February. Patient looks forward to the new opportunities that will come after recovering from surgery. Patient has support from family and her family is both a motivation and means of encouragement to her.      actively listened, provided a calm, supportive presence, offered words of encouragement, and affirmed patient's thoughts and feelings. Patient expressed gratitude for visit.     Signed: Gamaliel Ellington, Clinical Care

## 2025-07-17 NOTE — PROGRESS NOTES
Occupational Therapy    Evaluation    Patient Name: Thao Evans  MRN: [id number]  Department:   Room: 13/13  Today's Date: 7/17/2025  Time Calculation  Start Time: 1042  Stop Time: 1057  Time Calculation (min): 15 min    Assessment  IP OT Assessment  OT Assessment: Patient admitted for colonoscopy prior to planned LVAD 7/23; patient with deficits in ADLs/IADLs, will benefit from OT services to address deficits.  Prognosis: Good  Barriers to Discharge Home: No anticipated barriers  End of Session Communication: Bedside nurse  End of Session Patient Position: Bed, 3 rail up, Alarm off, not on at start of session  Plan:  Treatment Interventions: ADL retraining, Functional transfer training, Endurance training, Patient/family training, Equipment evaluation/education, Compensatory technique education  OT Frequency: 1 time per week  OT Discharge Recommendations: No OT needed after discharge  Equipment Recommended upon Discharge:  (TBD)  OT Recommended Transfer Status: Stand by assist  OT - OK to Discharge: Yes    Subjective   OT Visit Info:  OT Received On: 07/17/25  General Visit Info:  General  Reason for Referral: patient presents for colonoscopy prior to LVAD implantation next week  Past Medical History Relevant to Rehab: CAD s/p PCI (LAD; 2015, Lcx; 2025), stage D systolic HF/ICM/HFrEF s/p ICD, VT with presumed associated syncope, poorly controlled DM, pAF, dyslipidemia, essential HTN, COPD, and Graves  Family/Caregiver Present: No  Prior to Session Communication: Bedside nurse  Patient Position Received: Bed, 3 rail up, Alarm off, not on at start of session  General Comment: patient pleasant, agreeable to OT, reports not sleeping much overnight and waiting to have colonoscopy today  Precautions:  Hearing/Visual Limitations: hearing is WFL  Medical Precautions: Cardiac precautions, Fall precautions    Vital Signs     Vitals Session Pre OT During OT Post OT   /61     HR 79  80   SpO2 90  96   MAP (mmHg) 74          Pain:  Pain Assessment  Pain Assessment: 0-10  0-10 (Numeric) Pain Score: 0 - No pain    Objective   Cognition:  Overall Cognitive Status: Within Functional Limits  Arousal/Alertness: Appropriate responses to stimuli  Orientation Level: Oriented X4  Following Commands: Follows all commands and directions without difficulty        [place] Agitation Sedation Scale  [place]da[place] Agitation Sedation Scale (RASS): Alert and calm  Home Living:  Type of Home: House  Lives With: Other (Comment) (friend + patient's 40 year old disabled son whom patient cares for)  Home Adaptive Equipment: Walker rolling or standard  Home Layout: One level  Home Access: Level entry  Bathroom Shower/Tub: Tub/shower unit  Bathroom Toilet: Handicapped height  Bathroom Equipment: Grab bars in shower, Raised toilet seat without rails   Prior Function:  Level of [place]: Independent with ADLs and functional transfers, Independent with homemaking with ambulation  ADL Assistance: Independent  Homemaking Assistance: Independent  Ambulatory Assistance: Independent  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)  Leisure: crafting  Prior Function Comments: (+) drives     ADL:  Eating Assistance: Independent  Eating Deficit: Setup (anticipated)  Grooming Assistance:  (supervision)  Grooming Deficit:  (standing)  Bathing Assistance:  (supervision)  Bathing Deficit:  (anticipated)  UE Dressing Assistance: Independent  LE Dressing Assistance: Independent  Toileting Assistance with Device: Independent  Activity Tolerance:  Early Mobility/Exercise Safety Screen: Proceed with mobilization - No exclusion criteria met  Bed Mobility/Transfers: Bed Mobility  Bed Mobility: Yes  Bed Mobility 1  Bed Mobility 1: Supine to sitting, Sitting to supine  Level of Assistance 1: Distant supervision  Bed Mobility Comments 1: HOB elevated    Transfers  Transfer: Yes  Transfer 1  Transfer From 1: Sit to, Stand to  Transfer to 1: Sit, Stand  Technique 1: Sit to stand, Stand to  sit  Transfer Level of Assistance 1: Close supervision  Transfers 2  Trials/Comments 2: SBA for toilet transfer in bathroom, patient pushing IV pole to/from bathroom with SBA    Sitting Balance:  Static Sitting Balance  Static Sitting-Level of Assistance: Independent  Dynamic Sitting Balance  Dynamic Sitting-Level of Assistance: Distant supervision  Standing Balance:  Static Standing Balance  Static Standing-Level of Assistance: Distant supervision  Dynamic Standing Balance  Dynamic Standing-Level of Assistance: Distant supervision, Close supervision     Vision: Vision - Basic Assessment  Current Vision: Wears glasses all the time  Sensation:  Light Touch: No apparent deficits  Strength:  Strength Comments: (B)  WFL, (B) shoulders/elbows >/= 3/5     Extremities: RUE   RUE : Within Functional Limits and LUE   LUE: Within Functional Limits    Outcome Measures: [facility] Daily Activity  Putting on and taking off regular lower body clothing: A little  Bathing (including washing, rinsing, drying): A little  Putting on and taking off regular upper body clothing: A little  Toileting, which includes using toilet, bedpan or urinal: A little  Taking care of personal grooming such as brushing teeth: A little  Eating Meals: None  Daily Activity - Total Score: 19    , Confusion Assessment Method-ICU (CAM-ICU)  Feature 1: Acute Onset or Fluctuating Course: Negative  Feature 3: Altered Level of Consciousness: Negative  Overall CAM-ICU: Negative  , and E = Exercise and Early Mobility  Early Mobility/Exercise Safety Screen: Proceed with mobilization - No exclusion criteria met  ICU Mobility Scale: Walking with assistance of 1 person  Education Documentation  Body Mechanics, taught by Yessenia Hunter OT at 7/17/2025  1:23 PM.  Learner: Patient  Readiness: Acceptance  Method: Explanation  Response: Verbalizes Understanding  Comment: OT POC    Precautions, taught by Yessenia Hunter OT at 7/17/2025  1:23 PM.  Learner:  Patient  Readiness: Acceptance  Method: Explanation  Response: Verbalizes Understanding  Comment: OT POC    ADL Training, taught by Yessenia Hunter OT at 7/17/2025  1:23 PM.  Learner: Patient  Readiness: Acceptance  Method: Explanation  Response: Verbalizes Understanding  Comment: OT POC    Education Comments  No comments found.    Goals:   Encounter Problems       Encounter Problems (Active)       ADLs       Patient will perform basic IADLs/simulated household tasks with Independent and LRD.  (Progressing)       Start:  07/17/25    Expected End:  07/31/25               COGNITION/SAFETY       Patient will complete pill box simulation independently with use of medication list. (Progressing)       Start:  07/17/25    Expected End:  07/31/25               EXERCISE/STRENGTHENING       Patient will participate in >15 minutes of activity with <2 rest breaks to increase ADL/functional task endurance.  (Progressing)       Start:  07/17/25    Expected End:  07/31/25               MOBILITY       Patient will perform Functional mobility Household distances/Community Distances with independent level of assistance and least restrictive device in order to improve safety and functional mobility. (Progressing)       Start:  07/17/25    Expected End:  07/31/25               TRANSFERS       Patient will complete functional transfers with least restrictive device with independent level of assistance. (Progressing)       Start:  07/17/25    Expected End:  07/31/25               Yessenia Hunter OTR/L  Inpatient Occupational Therapist   Rehab Office: [phone]

## 2025-07-17 NOTE — PROGRESS NOTES
"[facility]  [facility]  CONSULT FOLLOW-UP  July 17, 2025     Reason For Consult: screening colonoscopy, consideration for LVAD, 60 lb weight loss.    History Of Present Illness  Thao Evans is a 60 y.o. female presenting with hypervolemia and weight loss in the setting of decompensated heart failure.    SUBJECTIVE  Patient tolerated colonoscopy prep. She was hypoglycemia throughout the night and is now on a D10 drip.    EGD and colonoscopy done today with results below.    OBJECTIVE  Last Recorded Vitals  Blood pressure 88/73, pulse 86, temperature 36.5 °C (97.7 °F), temperature source Temporal, resp. rate 16, height 1.575 m (5' 2\"), weight 63.5 kg (139 lb 14.4 oz), SpO2 100%.      Intake/Output Summary (Last 24 hours) at 7/17/2025 1715  Last data filed at 7/17/2025 1412  Gross per 24 hour   Intake 495 ml   Output 1050 ml   Net -555 ml          Physical Exam  General: chronically ill appearing, no acute distress  HEENT: EOM intact, no scleral icterus, moist MM  Respiratory: nonlabored breathing on room air  Cardiovascular: Regular rate and rhythm  Abdomen: thin, soft, nontender, nondistended  Extremities: no edema, no asterixis  Neuro: alert and oriented, moves all 4 extremities with no focal deficits    Medications  Current Medications[1]    Labs  Lab Results   Component Value Date    WBC 7.3 07/17/2025    HGB 14.1 07/17/2025    HCT 43.4 07/17/2025    MCV 92 07/17/2025     07/17/2025     Lab Results   Component Value Date    GLUCOSE 80 07/17/2025    CALCIUM 9.3 07/17/2025     07/17/2025    K 3.9 07/17/2025    CO2 37 (H) 07/17/2025    CL 97 (L) 07/17/2025    BUN 9 07/17/2025    CREATININE 0.69 07/17/2025     Lab Results   Component Value Date    ALT 11 07/14/2025    AST 12 07/14/2025    ALKPHOS 74 07/14/2025    BILITOT 0.8 07/14/2025     Lab Results   Component Value Date    INR 1.1 07/14/2025    INR 1.0 06/02/2025    PROTIME 12.2 07/14/2025    " PROTIME 11.4 06/02/2025       Imaging: No recent abdominal imaging available for review.    GI Procedures:  EGD 7/17/25  Impression  The upper third of the esophagus, middle third of the esophagus and lower third of the esophagus appeared normal.  Irregular Z-line  The fundus of the stomach, body of the stomach, incisura, antrum and pylorus appeared normal.  The duodenal bulb and 2nd part of the duodenum appeared normal.    Colonoscopy 7/17/25  Impression  Semi solid stool that could not be cleared resulting in inadequate preparation on the right side of the colon.  The terminal ileum appeared normal.  One 20 mm Rhonda IIa+c polyp in the appendiceal orifice; lift performed but central portion of the polyp did not lift with abnormal features under NBI concerning for possible invasive disease. Central portion was biopsied.   Subcentimeter polyp in the ascending colon was removed with cold snare  Four polyps measuring from 4 mm up to 10 mm; performed cold snare removal  One 20 mm Rhonda IIa LST-G polyp in the sigmoid colon; lift performed; performed cold snare with piecemeal removal; tattooed 5 cm distal to the finding    ASSESSMENT/PLAN:  Thao Evans is a 60 y.o. female presenting with acute on chronic decompensated heart failure. GI consulted for endoscopic evaluation given weight loss as part of advanced cardiac therapy evaluation.  EGD and colonoscopy done today.  Colonoscopy notable for inadequate prep and multiple large polyps, including one at the appendiceal orifice that could not be reviewed.    Recommendations:  - Defer resumption of diet and medications to the patient's primary team. No restrictions from a post-procedural standpoint.   -If pathology from the AO polyp only shows adenomatous tissue, can consider repeat colonoscopy with a 2 day bowel preparation and a clear cap for a polypectomy attempt. Another consideration could be for FTRD though will be challenging due to the location.  This should be  scheduled as an outpatient and can be ordered after pathology has resulted.    Patient was seen and discussed with Dr. Tejada.    Recommendations communicated with the primary team via secure chat.  Thank you for this consult. Gastroenterology will sign off.  -During weekday hours of 7am-5pm please do not hesitate to contact me on Epic Chat or page [phone] if there are any further questions between the weekday hours of 7 AM - 5 PM.   -After hours, on weekends, and on holidays, please page the on-call GI fellow at [phone]. Thank you.      Cyndi Perez MD  Gastroenterology and Hepatology Fellow  [facility]         [1]   Current Facility-Administered Medications:     albuterol 90 mcg/actuation inhaler 2 puff, 2 puff, inhalation, q6h PRN, ODALYS Sparks    aspirin chewable tablet 81 mg, 81 mg, oral, Daily, ODALYS Sparks, 81 mg at 07/17/25 0852    atorvastatin (Lipitor) tablet 80 mg, 80 mg, oral, Daily, ODALYS Sparks, 80 mg at 07/17/25 0852    bumetanide (Bumex) tablet 1 mg, 1 mg, oral, BID, ODALYS Sparks, 1 mg at 07/17/25 0852    citalopram (CeleXA) tablet 40 mg, 40 mg, oral, Daily, MARLI Sparks-CNP, 40 mg at 07/17/25 0530    [Held by provider] clopidogrel (Plavix) tablet 75 mg, 75 mg, oral, Daily, ODALYS Sparks    dextrose 10 % in water (D10W) infusion, 50 mL/hr, intravenous, Continuous, ODALYS High, Last Rate: 50 mL/hr at 07/17/25 1414, 50 mL/hr at 07/17/25 1414    dextrose 50 % injection 12.5 g, 12.5 g, intravenous, q15 min PRN, ODALYS Sparks, 12.5 g at 07/17/25 1406    dextrose 50 % injection 25 g, 25 g, intravenous, q15 min PRN, ODALYS Sparks    [Held by provider] empagliflozin (Jardiance) tablet 10 mg, 10 mg, oral, Daily, ODALYS Sparks    fentaNYL PF (Sublimaze) injection 100 mcg, 100 mcg, intravenous, Once, ODALYS High    fluticasone furoate-vilanteroL (Breo  Ellipta) 100-25 mcg/dose inhaler 1 puff, 1 puff, inhalation, Daily, ODALYS Sparks    gabapentin (Neurontin) capsule 400 mg, 400 mg, oral, q8h, MARLI Sparks-CNP, 400 mg at 07/17/25 1427    glucagon (Glucagen) injection 1 mg, 1 mg, intramuscular, q15 min PRN, ODALYS Sparks    glucagon (Glucagen) injection 1 mg, 1 mg, intramuscular, q15 min PRN, ODALYS Sparks    [Held by provider] insulin glargine (Lantus) injection 7 Units, 7 Units, subcutaneous, Nightly, ODALYS Ngo    insulin lispro injection 0-5 Units, 0-5 Units, subcutaneous, TID AC, ODALYS Sparks, 3 Units at 07/15/25 1236    levothyroxine (Synthroid, Levoxyl) tablet 88 mcg, 88 mcg, oral, Daily, MARLI Sparks-CNP, 88 mcg at 07/17/25 0525    midazolam PF (Versed) injection 5 mg, 5 mg, intravenous, Once, ODALYS High    nitroglycerin (Nitrostat) SL tablet 0.4 mg, 0.4 mg, sublingual, q5 min PRN, ODALYS Sparks    ondansetron (Zofran) injection 4 mg, 4 mg, intravenous, q4h PRN, ODALYS Ngo    oxygen (O2) therapy, , inhalation, Continuous PRN - O2/gases, ODALYS Sparks    pantoprazole (ProtoNix) EC tablet 20 mg, 20 mg, oral, Daily, MARLI Sparks-ALEXANDRE, 20 mg at 07/17/25 0852    perflutren protein A microsphere (Optison) injection 0.5 mL, 0.5 mL, intravenous, Once in imaging, Joe Gilman, ODALYS    phenylephrine in NS (Maulik-Synephrine) syringe 40 mcg/mL  - Omnicell Override Pull, , , ,     polyethylene glycol (GoLYTELY) solution 2,000 mL, 2,000 mL, oral, Once PRN, ODALYS Ngo    rOPINIRole (Requip) tablet 1 mg, 1 mg, oral, TID, ODALYS Sparks, 1 mg at 07/17/25 1427    rOPINIRole (Requip) tablet 3 mg, 3 mg, oral, Nightly, ODALYS Sparks, 3 mg at 07/16/25 2019    [Held by provider] sacubitriL-valsartan (Entresto) 24-26 mg per tablet 1 tablet, 1 tablet, oral, BID, ODALYS Sparks, 1 tablet at 07/15/25  2029    spironolactone (Aldactone) tablet 25 mg, 25 mg, oral, Daily, Soledad Malagon, APRN-CNP, 25 mg at 07/17/25 0852    sulfur hexafluoride microsphr (Lumason) injection 24.28 mg, 2 mL, intravenous, Once in imaging, Joe Gilman, APRN-CNP

## 2025-07-18 ENCOUNTER — APPOINTMENT (OUTPATIENT)
Dept: CARDIOLOGY | Facility: HOSPITAL | Age: 60
End: 2025-07-18
Payer: COMMERCIAL

## 2025-07-18 LAB
ALBUMIN SERPL BCP-MCNC: 3.7 G/DL (ref 3.4–5)
ANION GAP SERPL CALC-SCNC: 11 MMOL/L (ref 10–20)
BUN SERPL-MCNC: 11 MG/DL (ref 6–23)
CALCIUM SERPL-MCNC: 8.7 MG/DL (ref 8.6–10.6)
CHLORIDE SERPL-SCNC: 97 MMOL/L (ref 98–107)
CO2 SERPL-SCNC: 36 MMOL/L (ref 21–32)
CREAT SERPL-MCNC: 0.83 MG/DL (ref 0.5–1.05)
EGFRCR SERPLBLD CKD-EPI 2021: 80 ML/MIN/1.73M*2
ERYTHROCYTE [DISTWIDTH] IN BLOOD BY AUTOMATED COUNT: 15.5 % (ref 11.5–14.5)
GLUCOSE BLD MANUAL STRIP-MCNC: 169 MG/DL (ref 74–99)
GLUCOSE BLD MANUAL STRIP-MCNC: 197 MG/DL (ref 74–99)
GLUCOSE BLD MANUAL STRIP-MCNC: 263 MG/DL (ref 74–99)
GLUCOSE SERPL-MCNC: 217 MG/DL (ref 74–99)
HCT VFR BLD AUTO: 39.5 % (ref 36–46)
HGB BLD-MCNC: 12.7 G/DL (ref 12–16)
MAGNESIUM SERPL-MCNC: 1.87 MG/DL (ref 1.6–2.4)
MCH RBC QN AUTO: 29.3 PG (ref 26–34)
MCHC RBC AUTO-ENTMCNC: 32.2 G/DL (ref 32–36)
MCV RBC AUTO: 91 FL (ref 80–100)
NRBC BLD-RTO: 0 /100 WBCS (ref 0–0)
PHOSPHATE SERPL-MCNC: 3.3 MG/DL (ref 2.5–4.9)
PLATELET # BLD AUTO: 178 X10*3/UL (ref 150–450)
POTASSIUM SERPL-SCNC: 3.9 MMOL/L (ref 3.5–5.3)
RBC # BLD AUTO: 4.34 X10*6/UL (ref 4–5.2)
SODIUM SERPL-SCNC: 140 MMOL/L (ref 136–145)
WBC # BLD AUTO: 6.6 X10*3/UL (ref 4.4–11.3)

## 2025-07-18 PROCEDURE — 84100 ASSAY OF PHOSPHORUS: CPT | Performed by: NURSE PRACTITIONER

## 2025-07-18 PROCEDURE — 2500000002 HC RX 250 W HCPCS SELF ADMINISTERED DRUGS (ALT 637 FOR MEDICARE OP, ALT 636 FOR OP/ED): Performed by: NURSE PRACTITIONER

## 2025-07-18 PROCEDURE — 85027 COMPLETE CBC AUTOMATED: CPT | Performed by: NURSE PRACTITIONER

## 2025-07-18 PROCEDURE — 36415 COLL VENOUS BLD VENIPUNCTURE: CPT | Performed by: NURSE PRACTITIONER

## 2025-07-18 PROCEDURE — 2500000002 HC RX 250 W HCPCS SELF ADMINISTERED DRUGS (ALT 637 FOR MEDICARE OP, ALT 636 FOR OP/ED)

## 2025-07-18 PROCEDURE — 83735 ASSAY OF MAGNESIUM: CPT | Performed by: NURSE PRACTITIONER

## 2025-07-18 PROCEDURE — 2500000004 HC RX 250 GENERAL PHARMACY W/ HCPCS (ALT 636 FOR OP/ED): Performed by: NURSE PRACTITIONER

## 2025-07-18 PROCEDURE — 99291 CRITICAL CARE FIRST HOUR: CPT

## 2025-07-18 PROCEDURE — 99291 CRITICAL CARE FIRST HOUR: CPT | Performed by: INTERNAL MEDICINE

## 2025-07-18 PROCEDURE — 82947 ASSAY GLUCOSE BLOOD QUANT: CPT

## 2025-07-18 PROCEDURE — 2500000001 HC RX 250 WO HCPCS SELF ADMINISTERED DRUGS (ALT 637 FOR MEDICARE OP): Performed by: NURSE PRACTITIONER

## 2025-07-18 PROCEDURE — 2020000001 HC ICU ROOM DAILY

## 2025-07-18 PROCEDURE — 94640 AIRWAY INHALATION TREATMENT: CPT

## 2025-07-18 PROCEDURE — 2500000004 HC RX 250 GENERAL PHARMACY W/ HCPCS (ALT 636 FOR OP/ED)

## 2025-07-18 RX ORDER — POTASSIUM CHLORIDE 20 MEQ/1
20 TABLET, EXTENDED RELEASE ORAL ONCE
Status: COMPLETED | OUTPATIENT
Start: 2025-07-18 | End: 2025-07-18

## 2025-07-18 RX ORDER — SACUBITRIL AND VALSARTAN 24; 26 MG/1; MG/1
0.5 TABLET ORAL 2 TIMES DAILY
Status: DISCONTINUED | OUTPATIENT
Start: 2025-07-18 | End: 2025-07-19

## 2025-07-18 RX ORDER — INSULIN GLARGINE 100 [IU]/ML
15 INJECTION, SOLUTION SUBCUTANEOUS NIGHTLY
Status: DISCONTINUED | OUTPATIENT
Start: 2025-07-18 | End: 2025-07-31

## 2025-07-18 RX ORDER — MAGNESIUM SULFATE HEPTAHYDRATE 40 MG/ML
4 INJECTION, SOLUTION INTRAVENOUS ONCE
Status: COMPLETED | OUTPATIENT
Start: 2025-07-18 | End: 2025-07-18

## 2025-07-18 RX ADMIN — BUMETANIDE 1 MG: 1 TABLET ORAL at 08:56

## 2025-07-18 RX ADMIN — ROPINIROLE 1 MG: 1 TABLET, FILM COATED ORAL at 20:16

## 2025-07-18 RX ADMIN — ASPIRIN 81 MG CHEWABLE TABLET 81 MG: 81 TABLET CHEWABLE at 08:55

## 2025-07-18 RX ADMIN — LEVOTHYROXINE SODIUM 88 MCG: 0.09 TABLET ORAL at 06:32

## 2025-07-18 RX ADMIN — INSULIN GLARGINE 15 UNITS: 100 INJECTION, SOLUTION SUBCUTANEOUS at 20:16

## 2025-07-18 RX ADMIN — INSULIN LISPRO 1 UNITS: 100 INJECTION, SOLUTION INTRAVENOUS; SUBCUTANEOUS at 13:59

## 2025-07-18 RX ADMIN — SPIRONOLACTONE 25 MG: 25 TABLET, FILM COATED ORAL at 08:55

## 2025-07-18 RX ADMIN — BUMETANIDE 1 MG: 1 TABLET ORAL at 16:13

## 2025-07-18 RX ADMIN — MAGNESIUM SULFATE 4 G: 4 INJECTION INTRAVENOUS at 06:32

## 2025-07-18 RX ADMIN — HYALURONIDASE 1 ML: 150 INJECTION SUBCUTANEOUS at 22:45

## 2025-07-18 RX ADMIN — ROPINIROLE 1 MG: 1 TABLET, FILM COATED ORAL at 16:13

## 2025-07-18 RX ADMIN — PANTOPRAZOLE SODIUM 20 MG: 20 TABLET, DELAYED RELEASE ORAL at 08:56

## 2025-07-18 RX ADMIN — POTASSIUM CHLORIDE 20 MEQ: 1500 TABLET, EXTENDED RELEASE ORAL at 06:33

## 2025-07-18 RX ADMIN — INSULIN LISPRO 1 UNITS: 100 INJECTION, SOLUTION INTRAVENOUS; SUBCUTANEOUS at 07:49

## 2025-07-18 RX ADMIN — GABAPENTIN 400 MG: 300 CAPSULE ORAL at 20:16

## 2025-07-18 RX ADMIN — ROPINIROLE HYDROCHLORIDE 3 MG: 3 TABLET, FILM COATED ORAL at 20:16

## 2025-07-18 RX ADMIN — ROPINIROLE 1 MG: 1 TABLET, FILM COATED ORAL at 08:55

## 2025-07-18 RX ADMIN — CITALOPRAM HYDROBROMIDE 40 MG: 40 TABLET ORAL at 06:34

## 2025-07-18 RX ADMIN — SACUBITRIL AND VALSARTAN 0.5 TABLET: 24; 26 TABLET, FILM COATED ORAL at 20:16

## 2025-07-18 RX ADMIN — FLUTICASONE FUROATE AND VILANTEROL TRIFENATATE 1 PUFF: 100; 25 POWDER RESPIRATORY (INHALATION) at 08:15

## 2025-07-18 RX ADMIN — ATORVASTATIN CALCIUM 80 MG: 80 TABLET, FILM COATED ORAL at 08:55

## 2025-07-18 RX ADMIN — GABAPENTIN 400 MG: 300 CAPSULE ORAL at 13:59

## 2025-07-18 RX ADMIN — GABAPENTIN 400 MG: 300 CAPSULE ORAL at 06:33

## 2025-07-18 RX ADMIN — INSULIN LISPRO 1 UNITS: 100 INJECTION, SOLUTION INTRAVENOUS; SUBCUTANEOUS at 16:45

## 2025-07-18 ASSESSMENT — COGNITIVE AND FUNCTIONAL STATUS - GENERAL
DAILY ACTIVITIY SCORE: 24
MOBILITY SCORE: 24

## 2025-07-18 ASSESSMENT — PAIN SCALES - GENERAL
PAINLEVEL_OUTOF10: 0 - NO PAIN

## 2025-07-18 ASSESSMENT — PAIN - FUNCTIONAL ASSESSMENT
PAIN_FUNCTIONAL_ASSESSMENT: 0-10

## 2025-07-18 NOTE — CARE PLAN
Problem: Pain - Adult  Goal: Verbalizes/displays adequate comfort level or baseline comfort level  Outcome: Progressing     Problem: Safety - Adult  Goal: Free from fall injury  Outcome: Progressing     Problem: Discharge Planning  Goal: Discharge to home or other facility with appropriate resources  Outcome: Progressing     Problem: Chronic Conditions and Co-morbidities  Goal: Patient's chronic conditions and co-morbidity symptoms are monitored and maintained or improved  Outcome: Progressing     Problem: Nutrition  Goal: Nutrient intake appropriate for maintaining nutritional needs  Outcome: Progressing     Problem: Heart Failure  Goal: Improved gas exchange this shift  Outcome: Progressing  Goal: Improved urinary output this shift  Outcome: Progressing  Goal: Reduction in peripheral edema within 24 hours  Outcome: Progressing  Goal: Report improvement of dyspnea/breathlessness this shift  Outcome: Progressing  Goal: Weight from fluid excess reduced over 2-3 days, then stabilize  Outcome: Progressing  Goal: Increase self care and/or family involvement in 24 hours  Outcome: Progressing   The patient's goals for the shift include      The clinical goals for the shift include hDS

## 2025-07-18 NOTE — PROGRESS NOTES
"[facility]  HFICU PROGRESS NOTE    Thao Evans/[id number]    Admit Date: 7/14/2025  Hospital Length of Stay: 4   ICU Length of Stay: 3d 15h   Primary Service: HFICU  Primary HF Cardiologist: Dr Deluca      INTERVAL EVENTS / PERTINENT ROS:     Colonoscopy was completed yesterday - there was a biopsy of a polyp that was concerning, waiting on results. Two bloody BMs after colonoscopy but not a concern per GI due to multiple polyps. No other acute events or concerns from the patient.     Plan:  - Restart lantus 7u at night   - Restart entresto half low dose   - Waiting for biopsy results   - [place] this weekend in prep for VAD surgery     MEDICATIONS  Infusions:       Scheduled:  aspirin, 81 mg, Daily  atorvastatin, 80 mg, Daily  bumetanide, 1 mg, BID  citalopram, 40 mg, Daily  [Held by provider] clopidogrel, 75 mg, Daily  [Held by provider] empagliflozin, 10 mg, Daily  fentaNYL, 100 mcg, Once  fluticasone furoate-vilanteroL, 1 puff, Daily  gabapentin, 400 mg, q8h  insulin glargine, 7 Units, Nightly  insulin lispro, 0-5 Units, TID AC  levothyroxine, 88 mcg, Daily  midazolam, 5 mg, Once  pantoprazole, 20 mg, Daily  perflutren protein A microsphere, 0.5 mL, Once in imaging  rOPINIRole, 1 mg, TID  rOPINIRole, 3 mg, Nightly  sacubitriL-valsartan, 0.5 tablet, BID  spironolactone, 25 mg, Daily  sulfur hexafluoride microsphr, 2 mL, Once in imaging      PRN:  albuterol, 2 puff, q6h PRN  dextrose, 12.5 g, q15 min PRN  dextrose, 25 g, q15 min PRN  glucagon, 1 mg, q15 min PRN  glucagon, 1 mg, q15 min PRN  nitroglycerin, 0.4 mg, q5 min PRN  ondansetron, 4 mg, q4h PRN  oxygen, , Continuous PRN - O2/gases  polyethylene glycol, 2,000 mL, Once PRN        PHYSICAL EXAM:   Visit Vitals  BP (!) 125/110 (BP Location: Left arm, Patient Position: Lying)   Pulse 109   Temp 36 °C (96.8 °F)   Resp (!) 27   Ht 1.575 m (5' 2\")   Wt 61.6 kg (135 lb 11.2 oz)   SpO2 91%   BMI 24.82 kg/m²   OB Status Postmenopausal "   Smoking Status Former   BSA 1.64 m²       Wt Readings from Last 5 Encounters:   07/18/25 61.6 kg (135 lb 11.2 oz)   06/20/25 57.2 kg (126 lb)   06/20/25 57.4 kg (126 lb 9.6 oz)   06/17/25 58.2 kg (128 lb 4.9 oz)   05/30/25 56.2 kg (124 lb)       INTAKE/OUTPUT:  I/O last 3 completed shifts:  In: 810.4 (13.2 mL/kg) [I.V.:810.4 (13.2 mL/kg)]  Out: 3550 (57.7 mL/kg) [Urine:3550 (1.6 mL/kg/hr)]  Weight: 61.6 kg      Physical Exam  Constitutional:       Appearance: Normal appearance.   HENT:      Head: Normocephalic.     Cardiovascular:      Rate and Rhythm: Normal rate and regular rhythm.      Pulses: Normal pulses.   Pulmonary:      Effort: Pulmonary effort is normal.      Comments: Currently on 2L NC  Abdominal:      General: Abdomen is flat.      Palpations: Abdomen is soft.     Musculoskeletal:      Right lower leg: No edema.      Left lower leg: No edema.     Skin:     General: Skin is warm.     Neurological:      General: No focal deficit present.      Mental Status: She is alert.         DATA:  CMP:  Results from last 7 days   Lab Units 07/18/25  0318 07/17/25  0052 07/16/25  0416 07/15/25  0354 07/14/25 2003   SODIUM mmol/L 140 139 140 137 137   POTASSIUM mmol/L 3.9 3.9 4.1 4.1 3.6   CHLORIDE mmol/L 97* 97* 101 99 96*   CO2 mmol/L 36* 37* 33* 32 33*   ANION GAP mmol/L 11 9* 10 10 12   BUN mg/dL 11 9 12 11 11   CREATININE mg/dL 0.83 0.69 0.78 0.64 0.62   EGFR mL/min/1.73m*2 80 >90 86 >90 >90   MAGNESIUM mg/dL 1.87 2.26 1.86 2.28 2.06   ALBUMIN g/dL 3.7 4.1 3.4 3.5 3.9   ALT U/L  --   --   --   --  11   AST U/L  --   --   --   --  12   BILIRUBIN TOTAL mg/dL  --   --   --   --  0.8     CBC:  Results from last 7 days   Lab Units 07/18/25  0318 07/17/25  0050 07/16/25  0416 07/15/25  0354 07/14/25 2003   WBC AUTO x10*3/uL 6.6 7.3 8.4 6.0 6.5   HEMOGLOBIN g/dL 12.7 14.1 13.1 12.8 13.2   HEMATOCRIT % 39.5 43.4 42.8 39.9 41.6   PLATELETS AUTO x10*3/uL 178 198 266 194 210   MCV fL 91 92 97 94 94     COAG:   Results  "from last 7 days   Lab Units 07/14/25 2003   INR  1.1     ABO: No results found for: \"ABO\"  HEME/ENDO:  Results from last 7 days   Lab Units 07/15/25  0354 07/14/25 2013   FERRITIN ng/mL  --  224*   IRON SATURATION %  --  24*   HEMOGLOBIN A1C % 8.0*  --       CARDIAC:   Results from last 7 days   Lab Units 07/14/25 2003   [facility] ng/L 18   BNP pg/mL 735*       ASSESSMENT AND PLAN:   Thao Evans is a 60 year old with a PMHx sig for CAD s/p PCI (LAD; 2015, Lcx; 2025), stage D systolic HF/ICM/HFrEF s/p ICD, h/o VT with presumed associated syncope, poorly controlled DM, pAF (off of DOAC given the absence of recurrence), dyslipidemia, essential HTN, COPD, and h/o Graves. She was recently referred to the [facility] and seen by Dr. Deluca. Her most recent hospitalization was in March 2025 for syncope/VT where she underwent an ICD placement and PCI to her LCx. She reported progressive symptoms over the last several months with ongoing fatigue, dyspnea, and early satiety causing significant progressive weight loss (~60 lbs over the last 6 months). She has also been currently intolerant of GDMT and is on midodrine for BP support since her hospital discharge. Because of worsening symptoms and intolerance of GDMT, she was offered direct admission to HFICU with plan for PAC guided evaluation. Transferred from HFICU to LT 5 06/06 under HHVI service. LT5 course c/b acute hypoxic respiratory failure due to L pleural effusion s/p thoracentesis x 3 (06/09, 6/11, 06/12), now on room air.     Pt was discussed in Advanced Therapies Committee meeting on 6/17/2025 and approved for LVAD (barriers to transplant, elevated PAP, uncontrolled DM Hg A1c > 8, active smoking). Plan is for patient to complete colonoscopy prior to LVAD placement next week tentative date 7/23. Repeat CT chest 7/16 showing improvement. EGD/colonoscopy 7/17 - pending biopsy results. [place] this weekend in prep for VAD surgery next week.    "   Neuro:  #Restless legs  #Anxiety  #Depression  - c/w home celexa  - c/w home gabapentin  - C/w home requip  - Serial neuro and pain assessments  - PO Tylenol PRN for pain  - PT/OT Consult, OOB to chair  - CAM ICU score every shift  - Sleep/wake cycle normalization     # Physical Status  - Not obese, BMI 22.68 kg/m2  - Reduced Mobility     #Substance abuse  -Alcohol dependence: rare use  -Tobacco dependence: previous smoker     Cardiovascular:  #HFrEF /acute on chronic systolic Heart Failure, ICM, last LVEF 23% (6/2/2025), Stage D, NYHA III, s/p ICD.  - TTE 6/2 LVSF 20-25%, LV size is moderately dilated, mild/mod elevated pulmonary artery pressure  - Repeat TTE 7/15: LVSF 30-35%, low normal RVSF  - Plan for PAC after colonoscopy this weekend   - Diuretics Bumex 1 mg BID  - GDMT:  holding jardiance 10 mg, c/w sandi 25  - Restart entresto at half low dose, uptitrate to 24/26 this afternoon if tolerates   - Daily standing weights, 2gm sodium diet, 2L fluid restriction, strict I&Os     #CAD s/p PCI (LAD in 2015, LCx in 2025)  -c/w home asa 81mg daily, Plavix (on hold)  -c/w home statin     #h/o VT  #paroxysmal A Fib  -Device: s/p CRT-D 3/2025, [place]  -AC: off DOAC given absence of recurrence     #HTN  #HLD  -BP medications as above.  -c/w home statin     #Electrolyte Disturbances  -K goal >4, Mg>2     Pulmonary:  #COPD  -c/w home inhalers  -Monitor and maintain SpO2 > 92%     GI:  #GERD  -c/w home ppi  - Bowel regimen: prn miralax  - Bowel prep 7/15 - EGD/Colonoscopy 7/17 pending biopsy results         :  No renal dysfunction at baseline  -Baseline Cr 0.5-0.9  -Admit BUN/Cr: 11/0.62  -I/Os  -avoid hypotension and nephrotoxic agents     Heme:  No hematology pathology at baseline   - H&H 13.2/41.6  - Iron studies 62, 256, 24%  - venofer 200mg x3 doses      Endo:  #DM, T2  - Last hgbA1c (7/15/25): 8.0  - home medications; insulin - glargine, jardiance, nesina.  -Diet: carb controlled  -ISS while inpatient,  adjust as needed  - Euglycemic    # Hypoglycemia  - Noted Hypoglycemia overnight 7/15 and 7/16  - Reduced lantus insulin from 50 units to 7 units AM (home dose 15u , plus NPO Post 12MN)   - Restarted lantus 7 units 7/18     #h/o Grave's disease  -last TSH (6/5/25): 0.19, FT4 1.23      ID:  -afebrile, nontoxic  -no s/s infx  -trend temps q4h     PHYSICAL AND OCCUPATIONAL THERAPY: ordered    LINES:  PIVs        DVT: N/A  VAP BUNDLE: N/A  CENTRAL LINE BUNDLE: ordered  ULCER PPX: home PPI  GLYCEMIC CONTROL: ISS  BOWEL CARE: miralax  INDWELLING CATHETER: N/A  NUTRITION: Adult diet Regular      EMERGENCY CONTACT: Extended Emergency Contact Information  Primary Emergency Contact: Babatunde Mclean  Mobile Phone: [phone]  Relation: Friend   needed? No  Secondary Emergency Contact: Daisy Velasco  Mobile Phone: [phone]  Relation: Sister  FAMILY UPDATE: Patient at bedside  CODE STATUS: Full Code  DISPO: Maintain in HFICU     Patient seen and assessed with Dr. Kincaid    I personally spent 60 minutes of critical care time directly and personally managing the patient exclusive of separately billable procedures   _________________________________________________  MARLI High-CNP

## 2025-07-18 NOTE — PROGRESS NOTES
HFICU Attending Note    Assessment & Plan  Heart failure, diastolic, with acute decompensation    Patient tolerated her colonoscopy yesterday endoscopy did not show any concerning findings but she did have an area concerning for invasive adenocarcinoma in the colon will need to await biopsies before considering any further therapies including LVAD hopefully will have the result prior to Thursday additionally her urine nicotine has been sent we will await that result before discussing whether she may be a candidate for transplant blood pressure is improved today will resume Entresto now that she has no further procedures planned for a [place]-Femi catheter placement over the weekend      This critically ill patient continues to be at-risk for clinically significant deterioration / failure due to the above mentioned dysfunctional, unstable organ systems.  I have personally identified and managed all complex critical care issues to prevent aforementioned clinical deterioration.  Critical care time is spent at bedside and/or the immediate area and has included, but is not limited to, the review of diagnostic tests, labs, radiographs, serial assessments of hemodynamics, respiratory status, ventilatory management, and family updates.  Time spent in procedures and teaching are reported separately.    Critical care time: _34___ minutes

## 2025-07-19 LAB
ALBUMIN SERPL BCP-MCNC: 3.6 G/DL (ref 3.4–5)
ANION GAP SERPL CALC-SCNC: 9 MMOL/L (ref 10–20)
BUN SERPL-MCNC: 11 MG/DL (ref 6–23)
CALCIUM SERPL-MCNC: 8.9 MG/DL (ref 8.6–10.6)
CHLORIDE SERPL-SCNC: 96 MMOL/L (ref 98–107)
CO2 SERPL-SCNC: 38 MMOL/L (ref 21–32)
CREAT SERPL-MCNC: 0.66 MG/DL (ref 0.5–1.05)
EGFRCR SERPLBLD CKD-EPI 2021: >90 ML/MIN/1.73M*2
ERYTHROCYTE [DISTWIDTH] IN BLOOD BY AUTOMATED COUNT: 15.1 % (ref 11.5–14.5)
GLUCOSE BLD MANUAL STRIP-MCNC: 187 MG/DL (ref 74–99)
GLUCOSE BLD MANUAL STRIP-MCNC: 222 MG/DL (ref 74–99)
GLUCOSE BLD MANUAL STRIP-MCNC: 260 MG/DL (ref 74–99)
GLUCOSE BLD MANUAL STRIP-MCNC: 262 MG/DL (ref 74–99)
GLUCOSE SERPL-MCNC: 145 MG/DL (ref 74–99)
HCT VFR BLD AUTO: 38.5 % (ref 36–46)
HGB BLD-MCNC: 12.3 G/DL (ref 12–16)
MAGNESIUM SERPL-MCNC: 2.06 MG/DL (ref 1.6–2.4)
MCH RBC QN AUTO: 30.3 PG (ref 26–34)
MCHC RBC AUTO-ENTMCNC: 31.9 G/DL (ref 32–36)
MCV RBC AUTO: 95 FL (ref 80–100)
NRBC BLD-RTO: 0 /100 WBCS (ref 0–0)
PHOSPHATE SERPL-MCNC: 2.9 MG/DL (ref 2.5–4.9)
PLATELET # BLD AUTO: 160 X10*3/UL (ref 150–450)
POTASSIUM SERPL-SCNC: 4.2 MMOL/L (ref 3.5–5.3)
RBC # BLD AUTO: 4.06 X10*6/UL (ref 4–5.2)
SODIUM SERPL-SCNC: 139 MMOL/L (ref 136–145)
WBC # BLD AUTO: 5.9 X10*3/UL (ref 4.4–11.3)

## 2025-07-19 PROCEDURE — 99291 CRITICAL CARE FIRST HOUR: CPT | Performed by: NURSE PRACTITIONER

## 2025-07-19 PROCEDURE — 36415 COLL VENOUS BLD VENIPUNCTURE: CPT | Performed by: NURSE PRACTITIONER

## 2025-07-19 PROCEDURE — 2500000001 HC RX 250 WO HCPCS SELF ADMINISTERED DRUGS (ALT 637 FOR MEDICARE OP): Performed by: INTERNAL MEDICINE

## 2025-07-19 PROCEDURE — 2020000001 HC ICU ROOM DAILY

## 2025-07-19 PROCEDURE — 80069 RENAL FUNCTION PANEL: CPT | Performed by: NURSE PRACTITIONER

## 2025-07-19 PROCEDURE — 83735 ASSAY OF MAGNESIUM: CPT | Performed by: NURSE PRACTITIONER

## 2025-07-19 PROCEDURE — 85027 COMPLETE CBC AUTOMATED: CPT | Performed by: NURSE PRACTITIONER

## 2025-07-19 PROCEDURE — 2500000001 HC RX 250 WO HCPCS SELF ADMINISTERED DRUGS (ALT 637 FOR MEDICARE OP): Performed by: NURSE PRACTITIONER

## 2025-07-19 PROCEDURE — 2500000002 HC RX 250 W HCPCS SELF ADMINISTERED DRUGS (ALT 637 FOR MEDICARE OP, ALT 636 FOR OP/ED): Performed by: NURSE PRACTITIONER

## 2025-07-19 PROCEDURE — 82947 ASSAY GLUCOSE BLOOD QUANT: CPT

## 2025-07-19 PROCEDURE — 99291 CRITICAL CARE FIRST HOUR: CPT | Performed by: INTERNAL MEDICINE

## 2025-07-19 PROCEDURE — 2500000004 HC RX 250 GENERAL PHARMACY W/ HCPCS (ALT 636 FOR OP/ED): Performed by: NURSE PRACTITIONER

## 2025-07-19 RX ORDER — LANOLIN ALCOHOL/MO/W.PET/CERES
800 CREAM (GRAM) TOPICAL 2 TIMES DAILY
Status: DISCONTINUED | OUTPATIENT
Start: 2025-07-19 | End: 2025-08-01

## 2025-07-19 RX ORDER — SACUBITRIL AND VALSARTAN 24; 26 MG/1; MG/1
1 TABLET ORAL 2 TIMES DAILY
Status: DISCONTINUED | OUTPATIENT
Start: 2025-07-19 | End: 2025-07-21

## 2025-07-19 RX ORDER — BUMETANIDE 2 MG/1
2 TABLET ORAL
Status: DISCONTINUED | OUTPATIENT
Start: 2025-07-19 | End: 2025-07-20

## 2025-07-19 RX ADMIN — SACUBITRIL AND VALSARTAN 1 TABLET: 24; 26 TABLET, FILM COATED ORAL at 08:31

## 2025-07-19 RX ADMIN — ROPINIROLE HYDROCHLORIDE 3 MG: 3 TABLET, FILM COATED ORAL at 21:17

## 2025-07-19 RX ADMIN — INSULIN GLARGINE 15 UNITS: 100 INJECTION, SOLUTION SUBCUTANEOUS at 20:40

## 2025-07-19 RX ADMIN — INSULIN LISPRO 3 UNITS: 100 INJECTION, SOLUTION INTRAVENOUS; SUBCUTANEOUS at 12:54

## 2025-07-19 RX ADMIN — GABAPENTIN 400 MG: 300 CAPSULE ORAL at 20:40

## 2025-07-19 RX ADMIN — INSULIN LISPRO 1 UNITS: 100 INJECTION, SOLUTION INTRAVENOUS; SUBCUTANEOUS at 16:24

## 2025-07-19 RX ADMIN — ASPIRIN 81 MG CHEWABLE TABLET 81 MG: 81 TABLET CHEWABLE at 08:31

## 2025-07-19 RX ADMIN — GABAPENTIN 400 MG: 300 CAPSULE ORAL at 06:16

## 2025-07-19 RX ADMIN — ATORVASTATIN CALCIUM 80 MG: 80 TABLET, FILM COATED ORAL at 08:31

## 2025-07-19 RX ADMIN — PANTOPRAZOLE SODIUM 20 MG: 20 TABLET, DELAYED RELEASE ORAL at 08:32

## 2025-07-19 RX ADMIN — MAGNESIUM OXIDE TAB 400 MG (241.3 MG ELEMENTAL MG) 2 TABLET: 400 (241.3 MG) TAB at 08:31

## 2025-07-19 RX ADMIN — BUMETANIDE 2 MG: 2 TABLET ORAL at 16:27

## 2025-07-19 RX ADMIN — FLUTICASONE FUROATE AND VILANTEROL TRIFENATATE 1 PUFF: 100; 25 POWDER RESPIRATORY (INHALATION) at 16:24

## 2025-07-19 RX ADMIN — INSULIN LISPRO 3 UNITS: 100 INJECTION, SOLUTION INTRAVENOUS; SUBCUTANEOUS at 08:48

## 2025-07-19 RX ADMIN — MAGNESIUM OXIDE TAB 400 MG (241.3 MG ELEMENTAL MG) 2 TABLET: 400 (241.3 MG) TAB at 20:39

## 2025-07-19 RX ADMIN — ROPINIROLE 1 MG: 1 TABLET, FILM COATED ORAL at 08:32

## 2025-07-19 RX ADMIN — SACUBITRIL AND VALSARTAN 1 TABLET: 24; 26 TABLET, FILM COATED ORAL at 20:40

## 2025-07-19 RX ADMIN — GABAPENTIN 400 MG: 300 CAPSULE ORAL at 14:37

## 2025-07-19 RX ADMIN — CITALOPRAM HYDROBROMIDE 40 MG: 40 TABLET ORAL at 08:32

## 2025-07-19 RX ADMIN — SPIRONOLACTONE 25 MG: 25 TABLET, FILM COATED ORAL at 08:31

## 2025-07-19 RX ADMIN — LEVOTHYROXINE SODIUM 88 MCG: 0.09 TABLET ORAL at 06:16

## 2025-07-19 RX ADMIN — ROPINIROLE 1 MG: 1 TABLET, FILM COATED ORAL at 20:39

## 2025-07-19 RX ADMIN — ROPINIROLE 1 MG: 1 TABLET, FILM COATED ORAL at 14:37

## 2025-07-19 RX ADMIN — BUMETANIDE 1 MG: 1 TABLET ORAL at 08:32

## 2025-07-19 ASSESSMENT — PAIN - FUNCTIONAL ASSESSMENT
PAIN_FUNCTIONAL_ASSESSMENT: 0-10

## 2025-07-19 ASSESSMENT — COGNITIVE AND FUNCTIONAL STATUS - GENERAL
MOBILITY SCORE: 24
DAILY ACTIVITIY SCORE: 24

## 2025-07-19 ASSESSMENT — PAIN SCALES - GENERAL
PAINLEVEL_OUTOF10: 0 - NO PAIN

## 2025-07-19 NOTE — PROGRESS NOTES
HFICU Attending Note    Assessment & Plan  Heart failure, diastolic, with acute decompensation    No major complaints today she continues to have reasonable urine output blood pressure is tolerated resumption of the Entresto we will plan for placement of a [place]-Femi catheter to give at least 24 hours of hemodynamics and time for optimization before meeting on Tuesday unfortunately we still do not have her urine nicotine or pathology back    She is hypoxic unclear how much of this is intrinsic pulmonary although her pulmonary function tests were not particularly obstructive we will increase her oral diuretics a bit more      This critically ill patient continues to be at-risk for clinically significant deterioration / failure due to the above mentioned dysfunctional, unstable organ systems.  I have personally identified and managed all complex critical care issues to prevent aforementioned clinical deterioration.  Critical care time is spent at bedside and/or the immediate area and has included, but is not limited to, the review of diagnostic tests, labs, radiographs, serial assessments of hemodynamics, respiratory status, ventilatory management, and family updates.  Time spent in procedures and teaching are reported separately.    Critical care time: ___41_ minutes          2 (mild pain)

## 2025-07-19 NOTE — PROGRESS NOTES
"[facility]  HFICU PROGRESS NOTE    Thao Evans/[id number]    Admit Date: 7/14/2025  Hospital Length of Stay: 5   ICU Length of Stay: 4d 12h   Primary Service: HFICU  Primary HF Cardiologist: Dr Deluca      INTERVAL EVENTS / PERTINENT ROS:     No acute overnight event. Noted IV infiltration from D50 previously, treated with subcutaneous hyaluronidase.  No further bloody bowel movement the 24 hours from colonoscopy now.     No complaints this morning .     Plan:  - Up entresto 24/26 mg BID   - Waiting for biopsy results   - [place] in prep for VAD surgery     MEDICATIONS  Infusions:       Scheduled:  aspirin, 81 mg, Daily  atorvastatin, 80 mg, Daily  bumetanide, 1 mg, BID  citalopram, 40 mg, Daily  [Held by provider] clopidogrel, 75 mg, Daily  [Held by provider] empagliflozin, 10 mg, Daily  fluticasone furoate-vilanteroL, 1 puff, Daily  gabapentin, 400 mg, q8h  insulin glargine, 15 Units, Nightly  insulin lispro, 0-5 Units, TID AC  levothyroxine, 88 mcg, Daily  midazolam, 5 mg, Once  pantoprazole, 20 mg, Daily  perflutren protein A microsphere, 0.5 mL, Once in imaging  rOPINIRole, 1 mg, TID  rOPINIRole, 3 mg, Nightly  sacubitriL-valsartan, 0.5 tablet, BID  spironolactone, 25 mg, Daily  sulfur hexafluoride microsphr, 2 mL, Once in imaging      PRN:  albuterol, 2 puff, q6h PRN  dextrose, 12.5 g, q15 min PRN  dextrose, 25 g, q15 min PRN  glucagon, 1 mg, q15 min PRN  glucagon, 1 mg, q15 min PRN  nitroglycerin, 0.4 mg, q5 min PRN  ondansetron, 4 mg, q4h PRN  oxygen, , Continuous PRN - O2/gases  polyethylene glycol, 2,000 mL, Once PRN        PHYSICAL EXAM:   Visit Vitals  /64   Pulse 76   Temp 36.7 °C (98.1 °F) (Temporal)   Resp 17   Ht 1.575 m (5' 2\")   Wt 61.7 kg (136 lb 1.6 oz)   SpO2 97%   BMI 24.89 kg/m²   OB Status Postmenopausal   Smoking Status Former   BSA 1.64 m²       Wt Readings from Last 5 Encounters:   07/19/25 61.7 kg (136 lb 1.6 oz)   06/20/25 57.2 kg (126 lb) "   06/20/25 57.4 kg (126 lb 9.6 oz)   06/17/25 58.2 kg (128 lb 4.9 oz)   05/30/25 56.2 kg (124 lb)       INTAKE/OUTPUT:  I/O last 3 completed shifts:  In: 315.4 (5.1 mL/kg) [I.V.:315.4 (5.1 mL/kg)]  Out: 3800 (61.6 mL/kg) [Urine:3800 (1.7 mL/kg/hr)]  Weight: 61.7 kg      Physical Exam  Constitutional:       Appearance: Normal appearance.   HENT:      Head: Normocephalic.     Cardiovascular:      Rate and Rhythm: Normal rate and regular rhythm.      Pulses: Normal pulses.   Pulmonary:      Effort: Pulmonary effort is normal.      Comments: Currently on 2L NC  Abdominal:      General: Abdomen is flat.      Palpations: Abdomen is soft.     Musculoskeletal:      Right lower leg: No edema.      Left lower leg: No edema.     Skin:     General: Skin is warm.     Neurological:      General: No focal deficit present.      Mental Status: She is alert.         DATA:  CMP:  Results from last 7 days   Lab Units 07/19/25  0435 07/18/25  0318 07/17/25  0052 07/16/25  0416 07/15/25  0354 07/14/25 2003   SODIUM mmol/L 139 140 139 140 137 137   POTASSIUM mmol/L 4.2 3.9 3.9 4.1 4.1 3.6   CHLORIDE mmol/L 96* 97* 97* 101 99 96*   CO2 mmol/L 38* 36* 37* 33* 32 33*   ANION GAP mmol/L 9* 11 9* 10 10 12   BUN mg/dL 11 11 9 12 11 11   CREATININE mg/dL 0.66 0.83 0.69 0.78 0.64 0.62   EGFR mL/min/1.73m*2 >90 80 >90 86 >90 >90   MAGNESIUM mg/dL 2.06 1.87 2.26 1.86 2.28 2.06   ALBUMIN g/dL 3.6 3.7 4.1 3.4 3.5 3.9   ALT U/L  --   --   --   --   --  11   AST U/L  --   --   --   --   --  12   BILIRUBIN TOTAL mg/dL  --   --   --   --   --  0.8     CBC:  Results from last 7 days   Lab Units 07/19/25  0435 07/18/25  0318 07/17/25  0050 07/16/25  0416 07/15/25  0354 07/14/25 2003   WBC AUTO x10*3/uL 5.9 6.6 7.3 8.4 6.0 6.5   HEMOGLOBIN g/dL 12.3 12.7 14.1 13.1 12.8 13.2   HEMATOCRIT % 38.5 39.5 43.4 42.8 39.9 41.6   PLATELETS AUTO x10*3/uL 160 178 198 266 194 210   MCV fL 95 91 92 97 94 94     COAG:   Results from last 7 days   Lab Units  "07/14/25 2003   INR  1.1     ABO: No results found for: \"ABO\"  HEME/ENDO:  Results from last 7 days   Lab Units 07/15/25  0354 07/14/25 2013   FERRITIN ng/mL  --  224*   IRON SATURATION %  --  24*   HEMOGLOBIN A1C % 8.0*  --       CARDIAC:   Results from last 7 days   Lab Units 07/14/25 2003   [facility] ng/L 18   BNP pg/mL 735*       ASSESSMENT AND PLAN:   Thao Evans is a 60 year old with a PMHx sig for CAD s/p PCI (LAD; 2015, Lcx; 2025), stage D systolic HF/ICM/HFrEF s/p ICD, h/o VT with presumed associated syncope, poorly controlled DM, pAF (off of DOAC given the absence of recurrence), dyslipidemia, essential HTN, COPD, and h/o Graves. She was recently referred to the [facility] and seen by Dr. Deluca. Her most recent hospitalization was in March 2025 for syncope/VT where she underwent an ICD placement and PCI to her LCx. She reported progressive symptoms over the last several months with ongoing fatigue, dyspnea, and early satiety causing significant progressive weight loss (~60 lbs over the last 6 months). She has also been currently intolerant of GDMT and is on midodrine for BP support since her hospital discharge. Because of worsening symptoms and intolerance of GDMT, she was offered direct admission to HFICU with plan for PAC guided evaluation. Transferred from HFICU to LT 5 06/06 under HHVI service. LT5 course c/b acute hypoxic respiratory failure due to L pleural effusion s/p thoracentesis x 3 (06/09, 6/11, 06/12), now on room air.     Pt was discussed in Advanced Therapies Committee meeting on 6/17/2025 and approved for LVAD (barriers to transplant, elevated PAP, uncontrolled DM Hg A1c > 8, active smoking). Plan is for patient to complete colonoscopy prior to LVAD placement next week tentative date 7/23. Repeat CT chest 7/16 showing improvement. EGD/colonoscopy 7/17 - pending biopsy results. [place] this Sunday in prep for VAD surgery next week.      Neuro:  #Restless " legs  #Anxiety  #Depression  - c/w home celexa  - c/w home gabapentin  - C/w home requip  - Serial neuro and pain assessments  - PO Tylenol PRN for pain  - PT/OT Consult, OOB to chair  - CAM ICU score every shift  - Sleep/wake cycle normalization     # Physical Status  - Not obese, BMI 22.68 kg/m2  - Reduced Mobility     #Substance abuse  -Alcohol dependence: rare use  -Tobacco dependence: previous smoker     Cardiovascular:  #HFrEF /acute on chronic systolic Heart Failure, ICM, last LVEF 23% (6/2/2025), Stage D, NYHA III, s/p ICD.  - TTE 6/2 LVSF 20-25%, LV size is moderately dilated, mild/mod elevated pulmonary artery pressure  - Repeat TTE 7/15: LVSF 30-35%, low normal RVSF  - Plan for PAC after colonoscopy this weekend   - Diuretics Bumex 1 mg BID  - GDMT:  holding jardiance 10 mg, c/w sandi 25  - Up entresto 24/26 mg BID 7/19  - Daily standing weights, 2gm sodium diet, 2L fluid restriction, strict I&Os     #CAD s/p PCI (LAD in 2015, LCx in 2025)  -c/w home asa 81mg daily, Plavix (on hold)  -c/w home statin     #h/o VT  #paroxysmal A Fib  -Device: s/p CRT-D 3/2025, [employer]  -AC: off DOAC given absence of recurrence     #HTN  #HLD  -BP medications as above.  -c/w home statin     #Electrolyte Disturbances  -K goal >4, Mg>2     Pulmonary:  #COPD  -c/w home inhalers  -Monitor and maintain SpO2 > 92%     GI:  #GERD  -c/w home ppi  - Bowel regimen: prn miralax  - Bowel prep 7/15 - EGD/Colonoscopy 7/17 pending biopsy results, please call patho next Monday        :  No renal dysfunction at baseline  -Baseline Cr 0.5-0.9  -Admit BUN/Cr: 11/0.62  -I/Os  -avoid hypotension and nephrotoxic agents     Heme:  No hematology pathology at baseline   - H&H 13.2/41.6  - Iron studies 62, 256, 24%  - venofer 200mg x3 doses      Endo:  #DM, T2  - Last hgbA1c (7/15/25): 8.0  - home medications; insulin - glargine, jardiance, nesina.  -Diet: carb controlled  -ISS while inpatient, adjust as needed  - Euglycemic    #  Hypoglycemia  - Noted Hypoglycemia overnight 7/15 and 7/16  - Reduced lantus insulin from 50 units home dose 15u   - C/w lantus insulin 15 units      #h/o Grave's disease  -last TSH (6/5/25): 0.19, FT4 1.23      ID:  -afebrile, nontoxic  -no s/s infx  -trend temps q4h     PHYSICAL AND OCCUPATIONAL THERAPY: ordered    LINES:  PIVs        DVT: N/A  VAP BUNDLE: N/A  CENTRAL LINE BUNDLE: ordered  ULCER PPX: home PPI  GLYCEMIC CONTROL: ISS  BOWEL CARE: miralax  INDWELLING CATHETER: N/A  NUTRITION: Adult diet Regular      EMERGENCY CONTACT: Extended Emergency Contact Information  Primary Emergency Contact: Babatunde Mclean  Mobile Phone: [phone]  Relation: Friend   needed? No  Secondary Emergency Contact: Daisy Velasco  Mobile Phone: [phone]  Relation: Sister  FAMILY UPDATE: Patient at bedside  CODE STATUS: Full Code  DISPO: Maintain in HFICU     Patient seen and assessed with Dr. Kincaid    I personally spent 60 minutes of critical care time directly and personally managing the patient exclusive of separately billable procedures   _________________________________________________  MARLI Ngo-CNP

## 2025-07-20 ENCOUNTER — APPOINTMENT (OUTPATIENT)
Dept: RADIOLOGY | Facility: HOSPITAL | Age: 60
End: 2025-07-20
Payer: COMMERCIAL

## 2025-07-20 ENCOUNTER — APPOINTMENT (OUTPATIENT)
Dept: RADIOLOGY | Facility: HOSPITAL | Age: 60
DRG: 001 | End: 2025-07-20
Payer: COMMERCIAL

## 2025-07-20 VITALS
RESPIRATION RATE: 16 BRPM | SYSTOLIC BLOOD PRESSURE: 81 MMHG | WEIGHT: 136.47 LBS | OXYGEN SATURATION: 96 % | BODY MASS INDEX: 25.11 KG/M2 | HEIGHT: 62 IN | HEART RATE: 73 BPM | TEMPERATURE: 97.2 F | DIASTOLIC BLOOD PRESSURE: 51 MMHG

## 2025-07-20 LAB
ALBUMIN SERPL BCP-MCNC: 3.6 G/DL (ref 3.4–5)
ANION GAP BLDV CALCULATED.4IONS-SCNC: 2 MMOL/L (ref 10–25)
ANION GAP BLDV CALCULATED.4IONS-SCNC: 3 MMOL/L (ref 10–25)
ANION GAP BLDV CALCULATED.4IONS-SCNC: 3 MMOL/L (ref 10–25)
ANION GAP SERPL CALC-SCNC: 11 MMOL/L (ref 10–20)
BASE EXCESS BLDV CALC-SCNC: 10.7 MMOL/L (ref -2–3)
BASE EXCESS BLDV CALC-SCNC: 12.8 MMOL/L (ref -2–3)
BASE EXCESS BLDV CALC-SCNC: 14.1 MMOL/L (ref -2–3)
BODY TEMPERATURE: 37 DEGREES CELSIUS
BUN SERPL-MCNC: 13 MG/DL (ref 6–23)
CA-I BLDV-SCNC: 1.11 MMOL/L (ref 1.1–1.33)
CA-I BLDV-SCNC: 1.12 MMOL/L (ref 1.1–1.33)
CA-I BLDV-SCNC: 1.14 MMOL/L (ref 1.1–1.33)
CALCIUM SERPL-MCNC: 9 MG/DL (ref 8.6–10.6)
CHLORIDE BLDV-SCNC: 96 MMOL/L (ref 98–107)
CHLORIDE BLDV-SCNC: 97 MMOL/L (ref 98–107)
CHLORIDE BLDV-SCNC: 98 MMOL/L (ref 98–107)
CHLORIDE SERPL-SCNC: 97 MMOL/L (ref 98–107)
CO2 SERPL-SCNC: 36 MMOL/L (ref 21–32)
CREAT SERPL-MCNC: 0.64 MG/DL (ref 0.5–1.05)
EGFRCR SERPLBLD CKD-EPI 2021: >90 ML/MIN/1.73M*2
ERYTHROCYTE [DISTWIDTH] IN BLOOD BY AUTOMATED COUNT: 15.3 % (ref 11.5–14.5)
GLUCOSE BLD MANUAL STRIP-MCNC: 156 MG/DL (ref 74–99)
GLUCOSE BLD MANUAL STRIP-MCNC: 227 MG/DL (ref 74–99)
GLUCOSE BLD MANUAL STRIP-MCNC: 260 MG/DL (ref 74–99)
GLUCOSE BLDV-MCNC: 154 MG/DL (ref 74–99)
GLUCOSE BLDV-MCNC: 168 MG/DL (ref 74–99)
GLUCOSE BLDV-MCNC: 292 MG/DL (ref 74–99)
GLUCOSE SERPL-MCNC: 151 MG/DL (ref 74–99)
HCO3 BLDV-SCNC: 38.7 MMOL/L (ref 22–26)
HCO3 BLDV-SCNC: 41 MMOL/L (ref 22–26)
HCO3 BLDV-SCNC: 41.8 MMOL/L (ref 22–26)
HCT VFR BLD AUTO: 40.3 % (ref 36–46)
HCT VFR BLD EST: 36 % (ref 36–46)
HCT VFR BLD EST: 39 % (ref 36–46)
HCT VFR BLD EST: 40 % (ref 36–46)
HGB BLD-MCNC: 12.3 G/DL (ref 12–16)
HGB BLDV-MCNC: 12.1 G/DL (ref 12–16)
HGB BLDV-MCNC: 13 G/DL (ref 12–16)
HGB BLDV-MCNC: 13.2 G/DL (ref 12–16)
INHALED O2 CONCENTRATION: 21 %
INHALED O2 CONCENTRATION: 32 %
INHALED O2 CONCENTRATION: 36 %
LACTATE BLDV-SCNC: 0.7 MMOL/L (ref 0.4–2)
LACTATE BLDV-SCNC: 0.8 MMOL/L (ref 0.4–2)
LACTATE BLDV-SCNC: 1.4 MMOL/L (ref 0.4–2)
MAGNESIUM SERPL-MCNC: 2 MG/DL (ref 1.6–2.4)
MCH RBC QN AUTO: 29.1 PG (ref 26–34)
MCHC RBC AUTO-ENTMCNC: 30.5 G/DL (ref 32–36)
MCV RBC AUTO: 96 FL (ref 80–100)
NRBC BLD-RTO: 0 /100 WBCS (ref 0–0)
OXYHGB MFR BLDV: 60.6 % (ref 45–75)
OXYHGB MFR BLDV: 67.6 % (ref 45–75)
OXYHGB MFR BLDV: 67.7 % (ref 45–75)
PCO2 BLDV: 66 MM HG (ref 41–51)
PCO2 BLDV: 67 MM HG (ref 41–51)
PCO2 BLDV: 71 MM HG (ref 41–51)
PH BLDV: 7.37 PH (ref 7.33–7.43)
PH BLDV: 7.37 PH (ref 7.33–7.43)
PH BLDV: 7.41 PH (ref 7.33–7.43)
PHOSPHATE SERPL-MCNC: 3.1 MG/DL (ref 2.5–4.9)
PLATELET # BLD AUTO: 179 X10*3/UL (ref 150–450)
PO2 BLDV: 35 MM HG (ref 35–45)
PO2 BLDV: 40 MM HG (ref 35–45)
PO2 BLDV: 40 MM HG (ref 35–45)
POTASSIUM BLDV-SCNC: 3.6 MMOL/L (ref 3.5–5.3)
POTASSIUM BLDV-SCNC: 3.7 MMOL/L (ref 3.5–5.3)
POTASSIUM BLDV-SCNC: 3.8 MMOL/L (ref 3.5–5.3)
POTASSIUM SERPL-SCNC: 4.5 MMOL/L (ref 3.5–5.3)
RBC # BLD AUTO: 4.22 X10*6/UL (ref 4–5.2)
SAO2 % BLDV: 62 % (ref 45–75)
SAO2 % BLDV: 69 % (ref 45–75)
SAO2 % BLDV: 69 % (ref 45–75)
SODIUM BLDV-SCNC: 136 MMOL/L (ref 136–145)
SODIUM BLDV-SCNC: 136 MMOL/L (ref 136–145)
SODIUM BLDV-SCNC: 137 MMOL/L (ref 136–145)
SODIUM SERPL-SCNC: 139 MMOL/L (ref 136–145)
WBC # BLD AUTO: 5.5 X10*3/UL (ref 4.4–11.3)

## 2025-07-20 PROCEDURE — 71045 X-RAY EXAM CHEST 1 VIEW: CPT

## 2025-07-20 PROCEDURE — 2020000001 HC ICU ROOM DAILY

## 2025-07-20 PROCEDURE — 84132 ASSAY OF SERUM POTASSIUM: CPT | Performed by: NURSE PRACTITIONER

## 2025-07-20 PROCEDURE — 85027 COMPLETE CBC AUTOMATED: CPT | Performed by: NURSE PRACTITIONER

## 2025-07-20 PROCEDURE — 37799 UNLISTED PX VASCULAR SURGERY: CPT | Performed by: NURSE PRACTITIONER

## 2025-07-20 PROCEDURE — 2500000001 HC RX 250 WO HCPCS SELF ADMINISTERED DRUGS (ALT 637 FOR MEDICARE OP): Performed by: NURSE PRACTITIONER

## 2025-07-20 PROCEDURE — 71045 X-RAY EXAM CHEST 1 VIEW: CPT | Performed by: STUDENT IN AN ORGANIZED HEALTH CARE EDUCATION/TRAINING PROGRAM

## 2025-07-20 PROCEDURE — 2500000004 HC RX 250 GENERAL PHARMACY W/ HCPCS (ALT 636 FOR OP/ED): Mod: JZ | Performed by: NURSE PRACTITIONER

## 2025-07-20 PROCEDURE — 80069 RENAL FUNCTION PANEL: CPT | Performed by: NURSE PRACTITIONER

## 2025-07-20 PROCEDURE — 36415 COLL VENOUS BLD VENIPUNCTURE: CPT | Performed by: NURSE PRACTITIONER

## 2025-07-20 PROCEDURE — 36556 INSERT NON-TUNNEL CV CATH: CPT | Performed by: NURSE PRACTITIONER

## 2025-07-20 PROCEDURE — 83735 ASSAY OF MAGNESIUM: CPT | Performed by: NURSE PRACTITIONER

## 2025-07-20 PROCEDURE — 99291 CRITICAL CARE FIRST HOUR: CPT | Performed by: INTERNAL MEDICINE

## 2025-07-20 PROCEDURE — 2500000002 HC RX 250 W HCPCS SELF ADMINISTERED DRUGS (ALT 637 FOR MEDICARE OP, ALT 636 FOR OP/ED): Performed by: NURSE PRACTITIONER

## 2025-07-20 PROCEDURE — 82947 ASSAY GLUCOSE BLOOD QUANT: CPT

## 2025-07-20 PROCEDURE — 99291 CRITICAL CARE FIRST HOUR: CPT | Performed by: NURSE PRACTITIONER

## 2025-07-20 PROCEDURE — 2500000004 HC RX 250 GENERAL PHARMACY W/ HCPCS (ALT 636 FOR OP/ED): Performed by: NURSE PRACTITIONER

## 2025-07-20 PROCEDURE — 2500000001 HC RX 250 WO HCPCS SELF ADMINISTERED DRUGS (ALT 637 FOR MEDICARE OP): Performed by: INTERNAL MEDICINE

## 2025-07-20 RX ORDER — BUMETANIDE 0.25 MG/ML
2 INJECTION, SOLUTION INTRAMUSCULAR; INTRAVENOUS ONCE
Status: COMPLETED | OUTPATIENT
Start: 2025-07-20 | End: 2025-07-20

## 2025-07-20 RX ORDER — ACETAMINOPHEN 500 MG
5 TABLET ORAL NIGHTLY PRN
Status: DISCONTINUED | OUTPATIENT
Start: 2025-07-20 | End: 2025-08-18 | Stop reason: HOSPADM

## 2025-07-20 RX ORDER — FENTANYL CITRATE 50 UG/ML
12.5 INJECTION, SOLUTION INTRAMUSCULAR; INTRAVENOUS ONCE
Status: COMPLETED | OUTPATIENT
Start: 2025-07-20 | End: 2025-07-20

## 2025-07-20 RX ORDER — MIDAZOLAM HYDROCHLORIDE 2 MG/2ML
0.5 INJECTION, SOLUTION INTRAMUSCULAR; INTRAVENOUS ONCE
Status: COMPLETED | OUTPATIENT
Start: 2025-07-20 | End: 2025-07-20

## 2025-07-20 RX ORDER — ACETAMINOPHEN 325 MG/1
650 TABLET ORAL EVERY 6 HOURS PRN
Status: DISCONTINUED | OUTPATIENT
Start: 2025-07-20 | End: 2025-08-01

## 2025-07-20 RX ADMIN — ROPINIROLE 1 MG: 1 TABLET, FILM COATED ORAL at 20:38

## 2025-07-20 RX ADMIN — ROPINIROLE 1 MG: 1 TABLET, FILM COATED ORAL at 15:30

## 2025-07-20 RX ADMIN — GABAPENTIN 400 MG: 300 CAPSULE ORAL at 15:30

## 2025-07-20 RX ADMIN — CITALOPRAM HYDROBROMIDE 40 MG: 40 TABLET ORAL at 06:32

## 2025-07-20 RX ADMIN — SACUBITRIL AND VALSARTAN 1 TABLET: 24; 26 TABLET, FILM COATED ORAL at 20:37

## 2025-07-20 RX ADMIN — INSULIN LISPRO 3 UNITS: 100 INJECTION, SOLUTION INTRAVENOUS; SUBCUTANEOUS at 18:03

## 2025-07-20 RX ADMIN — INSULIN LISPRO 2 UNITS: 100 INJECTION, SOLUTION INTRAVENOUS; SUBCUTANEOUS at 12:45

## 2025-07-20 RX ADMIN — LEVOTHYROXINE SODIUM 88 MCG: 0.09 TABLET ORAL at 05:44

## 2025-07-20 RX ADMIN — MAGNESIUM OXIDE TAB 400 MG (241.3 MG ELEMENTAL MG) 2 TABLET: 400 (241.3 MG) TAB at 20:37

## 2025-07-20 RX ADMIN — ATORVASTATIN CALCIUM 80 MG: 80 TABLET, FILM COATED ORAL at 08:44

## 2025-07-20 RX ADMIN — SPIRONOLACTONE 25 MG: 25 TABLET, FILM COATED ORAL at 08:44

## 2025-07-20 RX ADMIN — GABAPENTIN 400 MG: 300 CAPSULE ORAL at 20:37

## 2025-07-20 RX ADMIN — FENTANYL CITRATE 12.5 MCG: 50 INJECTION INTRAMUSCULAR; INTRAVENOUS at 11:20

## 2025-07-20 RX ADMIN — ROPINIROLE 1 MG: 1 TABLET, FILM COATED ORAL at 08:46

## 2025-07-20 RX ADMIN — BUMETANIDE 2 MG: 0.25 INJECTION INTRAMUSCULAR; INTRAVENOUS at 15:30

## 2025-07-20 RX ADMIN — ROPINIROLE HYDROCHLORIDE 3 MG: 3 TABLET, FILM COATED ORAL at 20:38

## 2025-07-20 RX ADMIN — INSULIN GLARGINE 15 UNITS: 100 INJECTION, SOLUTION SUBCUTANEOUS at 20:37

## 2025-07-20 RX ADMIN — SACUBITRIL AND VALSARTAN 1 TABLET: 24; 26 TABLET, FILM COATED ORAL at 08:44

## 2025-07-20 RX ADMIN — ACETAMINOPHEN 650 MG: 325 TABLET ORAL at 20:49

## 2025-07-20 RX ADMIN — FLUTICASONE FUROATE AND VILANTEROL TRIFENATATE 1 PUFF: 100; 25 POWDER RESPIRATORY (INHALATION) at 08:46

## 2025-07-20 RX ADMIN — BUMETANIDE 2 MG: 2 TABLET ORAL at 08:46

## 2025-07-20 RX ADMIN — BUMETANIDE 2 MG: 0.25 INJECTION INTRAMUSCULAR; INTRAVENOUS at 20:37

## 2025-07-20 RX ADMIN — Medication 5 MG: at 20:37

## 2025-07-20 RX ADMIN — INSULIN LISPRO 1 UNITS: 100 INJECTION, SOLUTION INTRAVENOUS; SUBCUTANEOUS at 08:44

## 2025-07-20 RX ADMIN — PANTOPRAZOLE SODIUM 20 MG: 20 TABLET, DELAYED RELEASE ORAL at 08:46

## 2025-07-20 RX ADMIN — ASPIRIN 81 MG CHEWABLE TABLET 81 MG: 81 TABLET CHEWABLE at 08:44

## 2025-07-20 RX ADMIN — MAGNESIUM OXIDE TAB 400 MG (241.3 MG ELEMENTAL MG) 2 TABLET: 400 (241.3 MG) TAB at 08:43

## 2025-07-20 RX ADMIN — GABAPENTIN 400 MG: 300 CAPSULE ORAL at 05:44

## 2025-07-20 RX ADMIN — MIDAZOLAM HYDROCHLORIDE 0.5 MG: 1 INJECTION, SOLUTION INTRAMUSCULAR; INTRAVENOUS at 11:19

## 2025-07-20 ASSESSMENT — PAIN SCALES - GENERAL
PAINLEVEL_OUTOF10: 0 - NO PAIN
PAINLEVEL_OUTOF10: 6
PAINLEVEL_OUTOF10: 3

## 2025-07-20 ASSESSMENT — PAIN DESCRIPTION - ORIENTATION: ORIENTATION: RIGHT

## 2025-07-20 ASSESSMENT — PAIN - FUNCTIONAL ASSESSMENT
PAIN_FUNCTIONAL_ASSESSMENT: 0-10

## 2025-07-20 ASSESSMENT — PAIN DESCRIPTION - LOCATION: LOCATION: NECK

## 2025-07-20 NOTE — PROGRESS NOTES
"[facility]  HFICU PROGRESS NOTE    Thao Evans/[id number]    Admit Date: 7/14/2025  Hospital Length of Stay: 6   ICU Length of Stay: 5d 16h   Primary Service: HFICU  Primary HF Cardiologist: Dr Deluca      INTERVAL EVENTS / PERTINENT ROS:     No acute overnight event. Denies any c/o dyspnea or chest discomfort. C/o right forearm discomfort d/t prior D5 infiltration. Denies any n/v/c/d. Telemetry reviewed, no arrhythmias   No further bloody bowel movement the 24 hours from colonoscopy now. Bx completed with colonoscopy, results pending. Nicotine test pending, also.     Plan:  - SCG Placement  - c/w entresto 24/36  - DC po bumex, start IV  - Waiting for biopsy /nicotine results   - LVAD next week???    MEDICATIONS  Infusions:       Scheduled:  aspirin, 81 mg, Daily  atorvastatin, 80 mg, Daily  bumetanide, 2 mg, BID  citalopram, 40 mg, Daily  [Held by provider] clopidogrel, 75 mg, Daily  [Held by provider] empagliflozin, 10 mg, Daily  fluticasone furoate-vilanteroL, 1 puff, Daily  gabapentin, 400 mg, q8h  insulin glargine, 15 Units, Nightly  insulin lispro, 0-5 Units, TID AC  levothyroxine, 88 mcg, Daily  magnesium oxide, 800 mg of magnesium oxide, BID  pantoprazole, 20 mg, Daily  perflutren protein A microsphere, 0.5 mL, Once in imaging  rOPINIRole, 1 mg, TID  rOPINIRole, 3 mg, Nightly  sacubitriL-valsartan, 1 tablet, BID  spironolactone, 25 mg, Daily  sulfur hexafluoride microsphr, 2 mL, Once in imaging      PRN:  albuterol, 2 puff, q6h PRN  alteplase, 2 mg, PRN  dextrose, 12.5 g, q15 min PRN  dextrose, 25 g, q15 min PRN  glucagon, 1 mg, q15 min PRN  glucagon, 1 mg, q15 min PRN  melatonin, 5 mg, Nightly PRN  nitroglycerin, 0.4 mg, q5 min PRN  ondansetron, 4 mg, q4h PRN  oxygen, , Continuous PRN - O2/gases  polyethylene glycol, 2,000 mL, Once PRN        PHYSICAL EXAM:   Visit Vitals  /62   Pulse 89   Temp 35.8 °C (96.4 °F)   Resp 18   Ht 1.575 m (5' 2\")   Wt 61.9 kg (136 lb 7.4 " oz)   SpO2 98%   BMI 24.96 kg/m²   OB Status Postmenopausal   Smoking Status Former   BSA 1.65 m²       Wt Readings from Last 5 Encounters:   07/20/25 61.9 kg (136 lb 7.4 oz)   06/20/25 57.2 kg (126 lb)   06/20/25 57.4 kg (126 lb 9.6 oz)   06/17/25 58.2 kg (128 lb 4.9 oz)   05/30/25 56.2 kg (124 lb)       INTAKE/OUTPUT:  I/O last 3 completed shifts:  In: 660 (10.7 mL/kg) [P.O.:660]  Out: 2300 (37.2 mL/kg) [Urine:2300 (1 mL/kg/hr)]  Weight: 61.9 kg      Physical Exam  Constitutional:       Appearance: Normal appearance.   HENT:      Head: Normocephalic.   Neck:      Vascular: JVD present.      Comments: Right IJ/SGC @ 45cm  Cardiovascular:      Rate and Rhythm: Normal rate and regular rhythm.      Pulses: Normal pulses.      Heart sounds: Murmur (holosystolic) heard.   Pulmonary:      Effort: Pulmonary effort is normal.      Breath sounds: Examination of the right-lower field reveals rales. Examination of the left-lower field reveals rales. Decreased breath sounds and rales present.      Comments: Currently on 2L NC  Abdominal:      General: Abdomen is flat.      Palpations: Abdomen is soft.     Musculoskeletal:      Right lower leg: No edema.      Left lower leg: No edema.     Skin:     General: Skin is warm.      Findings: Erythema (right forearm) present.     Neurological:      General: No focal deficit present.      Mental Status: She is alert.         DATA:  CMP:  Results from last 7 days   Lab Units 07/20/25  0600 07/19/25  0435 07/18/25  0318 07/17/25  0052 07/16/25  0416 07/15/25  0354 07/14/25 2003   SODIUM mmol/L 139 139 140 139 140 137 137   POTASSIUM mmol/L 4.5 4.2 3.9 3.9 4.1 4.1 3.6   CHLORIDE mmol/L 97* 96* 97* 97* 101 99 96*   CO2 mmol/L 36* 38* 36* 37* 33* 32 33*   ANION GAP mmol/L 11 9* 11 9* 10 10 12   BUN mg/dL 13 11 11 9 12 11 11   CREATININE mg/dL 0.64 0.66 0.83 0.69 0.78 0.64 0.62   EGFR mL/min/1.73m*2 >90 >90 80 >90 86 >90 >90   MAGNESIUM mg/dL 2.00 2.06 1.87 2.26 1.86 2.28 2.06   ALBUMIN  "g/dL 3.6 3.6 3.7 4.1 3.4 3.5 3.9   ALT U/L  --   --   --   --   --   --  11   AST U/L  --   --   --   --   --   --  12   BILIRUBIN TOTAL mg/dL  --   --   --   --   --   --  0.8     CBC:  Results from last 7 days   Lab Units 07/19/25  0435 07/18/25  0318 07/17/25  0050 07/16/25  0416 07/15/25  0354 07/14/25 2003   WBC AUTO x10*3/uL 5.9 6.6 7.3 8.4 6.0 6.5   HEMOGLOBIN g/dL 12.3 12.7 14.1 13.1 12.8 13.2   HEMATOCRIT % 38.5 39.5 43.4 42.8 39.9 41.6   PLATELETS AUTO x10*3/uL 160 178 198 266 194 210   MCV fL 95 91 92 97 94 94     COAG:   Results from last 7 days   Lab Units 07/14/25 2003   INR  1.1     ABO: No results found for: \"ABO\"  HEME/ENDO:  Results from last 7 days   Lab Units 07/15/25  0354 07/14/25 2013   FERRITIN ng/mL  --  224*   IRON SATURATION %  --  24*   HEMOGLOBIN A1C % 8.0*  --       CARDIAC:   Results from last 7 days   Lab Units 07/14/25 2003   [facility] ng/L 18   BNP pg/mL 735*       ASSESSMENT AND PLAN:   Thao Evans is a 60 year old with a PMHx sig for CAD s/p PCI (LAD; 2015, Lcx; 2025), stage D systolic HF/ICM/HFrEF s/p ICD, h/o VT with presumed associated syncope, poorly controlled DM, pAF (off of DOAC given the absence of recurrence), dyslipidemia, essential HTN, COPD, and h/o Graves. She was recently [facility] to [facility] and seen by Dr. Deluca. Her most recent hospitalization was in March 2025 for syncope/VT where she underwent an ICD placement and PCI to her LCx. She reported progressive symptoms over the last several months with ongoing fatigue, dyspnea, and early satiety causing significant progressive weight loss (~60 lbs over the last 6 months). She has also been currently intolerant of GDMT and is on midodrine for BP support since her hospital discharge. Because of worsening symptoms and intolerance of GDMT, she was offered direct admission to HFICU with plan for PAC guided evaluation. [facility] HFICU [facility] LT[facility] course c/b " acute hypoxic respiratory failure due to L pleural effusion s/p thoracentesis x 3 (06/09, 6/11, 06/12), now on room air.     Pt was discussed in Advanced Therapies Committee meeting on 6/17/2025 and approved for LVAD (barriers to transplant, elevated PAP, uncontrolled DM Hg A1c > 8, active smoking). Plan is for patient to complete colonoscopy prior to LVAD placement next week tentative date 7/23. Repeat CT chest 7/16 showing improvement. EGD/colonoscopy 7/17 - pending biopsy results. [place] this Sunday in prep for VAD surgery next week.      Neuro:  #Restless legs  #Anxiety  #Depression  - c/w home celexa  - c/w home gabapentin  - C/w home requip  - Serial neuro and pain assessments  - PO Tylenol PRN for pain  - PT/OT Consult, OOB to chair  - CAM ICU score every shift  - Sleep/wake cycle normalization     # Physical Status  - Not obese, BMI 22.68 kg/m2  - Reduced Mobility     #Substance abuse  -Alcohol dependence: rare use  -Tobacco dependence: previous smoker     Cardiovascular:  #HFrEF /acute on chronic systolic Heart Failure, ICM, last LVEF 23% (6/2/2025), Stage D, NYHA III, s/p ICD.  - TTE 6/2 LVSF 20-25%, LV size is moderately dilated, mild/mod elevated pulmonary artery pressure  - Repeat TTE 7/15: LVSF 30-35%, low normal RVSF  - Diuretics: poor response oral bumex, dc 7/20. Bumex 2mg IV x 1 (7/20)  - GDMT:  holding jardiance 10 mg, c/w sandi 25  - C/w Entresto 24/26 mg BID (7/19)  - Opening SGC #'s (7/20) /66 (74), CVP 12, PA 52/30 (41), CO/CI 5.4/3.3, , SVO 2 61%, bumex 2mg po BID, entresto 24/26, Sandi 25  - Daily SCC #'s:   - Daily standing weights, 2gm sodium diet, 2L fluid restriction, strict I&Os     #CAD s/p PCI (LAD in 2015, LCx in 2025)  -c/w asa 81mg daily, Plavix (on hold)  -c/w atorvastatin 80mg     #h/o VT  #paroxysmal A Fib  -Device: s/p CRT-D 3/2025, [employer]  -AC: off DOAC given absence of recurrence     #HTN    #HLD  -BP medications as above.  -c/w home statin      #Electrolyte Disturbances  -K goal >4, Mg>2     Pulmonary:  #COPD  -c/w fluticasone furoate-vilanterol (Breo) 100-25mcg   -c/w albuterol HFA prn  -Monitor and maintain SpO2 > 92%     GI:  #GERD  -c/w protonix 20mg   - Bowel regimen: prn miralax  - Bowel prep 7/15 - EGD/Colonoscopy 7/17 pending biopsy results, please call patho next Monday        :  No renal dysfunction at baseline  -Baseline Cr 0.5-0.9  -Admit BUN/Cr: 11/0.62  -I/Os  -avoid hypotension and nephrotoxic agents     Heme:  No hematology pathology at baseline   - H&H 13.2/41.6  - Iron studies 62, 256, 24%  - venofer 200mg x3 doses      Endo:  #DM, T2  - Last hgbA1c (7/15/25): 8.0  - home medications; insulin - glargine, jardiance, nesina.  -Diet: carb controlled  -SSI while inpatient, adjust as needed    # Hypoglycemia  - Noted Hypoglycemia overnight 7/15 and 7/16  - Reduced lantus insulin from 50 units home dose 15u   - C/w lantus insulin 15 units      #h/o Grave's disease  -last TSH (6/5/25): 0.19, FT4 1.23      ID:  -afebrile, nontoxic  -no s/s infx  -trend temps q4h     PHYSICAL AND OCCUPATIONAL THERAPY: ordered    LINES:  PIVs  Rt IJ/SGC (7/20 - )        DVT: N/A  VAP BUNDLE: N/A  CENTRAL LINE BUNDLE: ordered  ULCER PPX: home PPI  GLYCEMIC CONTROL: ISS  BOWEL CARE: miralax  INDWELLING CATHETER: N/A  NUTRITION: Adult diet Regular      EMERGENCY CONTACT: Extended Emergency Contact Information  Primary Emergency Contact: Babatunde Mclean  Mobile Phone: [phone]  Relation: Friend   needed? No  Secondary Emergency Contact: Daisy Velasco  Mobile Phone: [phone]  Relation: Sister  FAMILY UPDATE: Patient at bedside  CODE STATUS: Full Code  DISPO: Maintain in HFICU     Patient seen and assessed with Dr. Kincaid    I personally spent 60 minutes of critical care time directly and personally managing the patient exclusive of separately billable procedures   _________________________________________________  ODALYS Marin

## 2025-07-20 NOTE — PROCEDURES
Central Line    Date/Time: 7/20/2025 11:00 AM    Performed by: ODALYS Peters  Authorized by: ODALYS Peters    Consent:     Consent obtained:  Written    Consent given by:  Patient    Risks, benefits, and alternatives were discussed: yes      Risks discussed:  Arterial puncture, incorrect placement, pneumothorax, infection and bleeding    Alternatives discussed:  No treatment  Universal protocol:     Procedure explained and questions answered to patient or proxy's satisfaction: yes      Relevant documents present and verified: yes      Test results available: yes      Imaging studies available: yes      Required blood products, implants, devices, and special equipment available: yes      Site/side marked: yes      Immediately prior to procedure, a time out was called: yes      Patient identity confirmed:  Verbally with patient and arm band  Pre-procedure details:     Indication(s): central venous access and hemodynamic monitoring      Hand hygiene: Hand hygiene performed prior to insertion      Sterile barrier technique: All elements of maximal sterile technique followed      Skin preparation:  Chlorhexidine    Skin preparation agent: Skin preparation agent completely dried prior to procedure    Sedation:     Sedation type:  Anxiolysis  Anesthesia:     Anesthesia method:  Local infiltration    Local anesthetic:  Lidocaine 1% w/o epi  Procedure details:     Location:  R internal jugular    Patient position:  Supine    Procedural supplies:  Cordis    Catheter size:  9.5 Fr    Landmarks identified: yes      Ultrasound guidance: yes      Ultrasound guidance timing: prior to insertion and real time      Sterile ultrasound techniques: Sterile gel and sterile probe covers were used      Number of attempts:  1    Successful placement: yes    Post-procedure details:     Post-procedure:  Dressing applied and line sutured    Assessment:  Blood return through all ports, free fluid flow and  placement verified by x-ray    Procedure completion:  Tolerated  Comments:      SGC inserted and secured @ 45cm.

## 2025-07-20 NOTE — PROGRESS NOTES
HFICU Attending Note    Assessment & Plan  Heart failure, diastolic, with acute decompensation    She feels well today unfortunately we were not able to wean her off of oxygen increased her Bumex without much effect we will plan for a right heart catheterization today to determine how aggressive we need to be with her diuretics and to assess her pulmonary pressures prior to our meeting on Tuesday.  Otherwise her renal function remained stable she is eating pathology and urine nicotine are still pending which will also be critical for our decision-making process      This critically ill patient continues to be at-risk for clinically significant deterioration / failure due to the above mentioned dysfunctional, unstable organ systems.  I have personally identified and managed all complex critical care issues to prevent aforementioned clinical deterioration.  Critical care time is spent at bedside and/or the immediate area and has included, but is not limited to, the review of diagnostic tests, labs, radiographs, serial assessments of hemodynamics, respiratory status, ventilatory management, and family updates.  Time spent in procedures and teaching are reported separately.    Critical care time: _35___ minutes

## 2025-07-20 NOTE — SIGNIFICANT EVENT
07/20/25 1123   Pre-Procedure Checklist   Emergent Line Insertion No   Type of Line to be Placed CVC   Consent Obtained Yes   Emergency Medication Necessary No   Patient Identified with 2 Independent Identifiers Yes   Review of Allergies, Anticoagulation, Relevant Labs, ECG/Telemetry Yes   Risks/Benefits/Alternatives Discussed with Patient/POA/Legal Representative Yes   Stop Sign on Door Yes   Time Out Performed Yes   Catheter Exchange No   Positioning and Preparation Checklist   All People, Including Patient, in the Room with Cap and Mask Yes   Fluoroscopy Used to Identify Vessel and Guide Insertion; Sterile Cover Used No   Ultrasound Used to Identify Vessel and Guide Insertion; Sterile Cover Used Yes   Full Barrier Precautions Followed (Mask, Cap, Gown, Gloves) Yes   Hands Washed Yes   Monitors Attached with Sound Alarms On Yes   Full Body Sterile Drape (Head-to-Toe) Used to Cover Patient Yes   [place] Position (For IJ and Subclavian) Yes   CHG Skin Prep Used and Allowed to Air Dry Prior to Skin Procedure Yes   Procedure Checklist   Blood Aspirated From All Lumens, All Ports Subsequently Flushed Yes   Catheter Caps Placed On All Lumens; Lumens Clamped Yes   Maintain Guidewire Control Throughout, Ensuring Guidewire Removal Yes   Maintain Sterile Field Throughout Insertion Yes   Catheter Secured Yes   Confirmatory Test of Venous Placement Non-pulsatile blood   Post-Procedure Checklist   Date and Time Written on Dressing Yes   Sharp and Wire Count and Safe Disposal of all Sharps/Wires Yes   Sterile Dressing Applied Per Protocol Yes   X-ray Ordered or ECG Image Yes

## 2025-07-21 PROBLEM — I50.33 HEART FAILURE, DIASTOLIC, WITH ACUTE DECOMPENSATION: Status: RESOLVED | Noted: 2025-07-14 | Resolved: 2025-07-21

## 2025-07-21 PROBLEM — R57.0 CARDIOGENIC SHOCK (MULTI): Status: ACTIVE | Noted: 2025-07-21

## 2025-07-21 LAB
ALBUMIN SERPL BCP-MCNC: 3.3 G/DL (ref 3.4–5)
ANION GAP BLDMV CALCULATED.4IONS-SCNC: 1 MMO/L (ref 10–25)
ANION GAP BLDMV CALCULATED.4IONS-SCNC: 1 MMO/L (ref 10–25)
ANION GAP BLDMV CALCULATED.4IONS-SCNC: 3 MMO/L (ref 10–25)
ANION GAP SERPL CALC-SCNC: 8 MMOL/L (ref 10–20)
BASE EXCESS BLDMV CALC-SCNC: 11.9 MMOL/L (ref -2–3)
BASE EXCESS BLDMV CALC-SCNC: 15.2 MMOL/L (ref -2–3)
BASE EXCESS BLDMV CALC-SCNC: 16.7 MMOL/L (ref -2–3)
BODY TEMPERATURE: 37 DEGREES CELSIUS
BUN SERPL-MCNC: 13 MG/DL (ref 6–23)
CA-I BLDMV-SCNC: 1.13 MMOL/L (ref 1.1–1.33)
CA-I BLDMV-SCNC: 1.15 MMOL/L (ref 1.1–1.33)
CA-I BLDMV-SCNC: 1.16 MMOL/L (ref 1.1–1.33)
CALCIUM SERPL-MCNC: 8.4 MG/DL (ref 8.6–10.6)
CHLORIDE BLD-SCNC: 93 MMOL/L (ref 98–107)
CHLORIDE BLD-SCNC: 94 MMOL/L (ref 98–107)
CHLORIDE BLD-SCNC: 99 MMOL/L (ref 98–107)
CHLORIDE SERPL-SCNC: 97 MMOL/L (ref 98–107)
CO2 SERPL-SCNC: 41 MMOL/L (ref 21–32)
CREAT SERPL-MCNC: 0.73 MG/DL (ref 0.5–1.05)
EGFRCR SERPLBLD CKD-EPI 2021: >90 ML/MIN/1.73M*2
ERYTHROCYTE [DISTWIDTH] IN BLOOD BY AUTOMATED COUNT: 15.1 % (ref 11.5–14.5)
GLUCOSE BLD MANUAL STRIP-MCNC: 159 MG/DL (ref 74–99)
GLUCOSE BLD MANUAL STRIP-MCNC: 171 MG/DL (ref 74–99)
GLUCOSE BLD MANUAL STRIP-MCNC: 194 MG/DL (ref 74–99)
GLUCOSE BLD MANUAL STRIP-MCNC: 238 MG/DL (ref 74–99)
GLUCOSE BLD MANUAL STRIP-MCNC: 257 MG/DL (ref 74–99)
GLUCOSE BLD MANUAL STRIP-MCNC: 286 MG/DL (ref 74–99)
GLUCOSE BLD-MCNC: 111 MG/DL (ref 74–99)
GLUCOSE BLD-MCNC: 168 MG/DL (ref 74–99)
GLUCOSE BLD-MCNC: 202 MG/DL (ref 74–99)
GLUCOSE SERPL-MCNC: 152 MG/DL (ref 74–99)
HCO3 BLDMV-SCNC: 40.1 MMOL/L (ref 22–26)
HCO3 BLDMV-SCNC: 43.8 MMOL/L (ref 22–26)
HCO3 BLDMV-SCNC: 45.4 MMOL/L (ref 22–26)
HCT VFR BLD AUTO: 37.2 % (ref 36–46)
HCT VFR BLD EST: 37 % (ref 36–46)
HCT VFR BLD EST: 38 % (ref 36–46)
HCT VFR BLD EST: 39 % (ref 36–46)
HGB BLD-MCNC: 11.9 G/DL (ref 12–16)
HGB BLDMV-MCNC: 12.2 G/DL (ref 12–16)
HGB BLDMV-MCNC: 12.8 G/DL (ref 12–16)
HGB BLDMV-MCNC: 12.9 G/DL (ref 12–16)
INHALED O2 CONCENTRATION: 26 %
INHALED O2 CONCENTRATION: 32 %
INHALED O2 CONCENTRATION: 36 %
LACTATE BLDMV-SCNC: 0.9 MMOL/L (ref 0.4–2)
LACTATE BLDMV-SCNC: 1 MMOL/L (ref 0.4–2)
LACTATE BLDMV-SCNC: 1.1 MMOL/L (ref 0.4–2)
MAGNESIUM SERPL-MCNC: 1.7 MG/DL (ref 1.6–2.4)
MCH RBC QN AUTO: 29.3 PG (ref 26–34)
MCHC RBC AUTO-ENTMCNC: 32 G/DL (ref 32–36)
MCV RBC AUTO: 92 FL (ref 80–100)
NRBC BLD-RTO: 0 /100 WBCS (ref 0–0)
OXYHGB MFR BLDMV: 62.6 % (ref 45–75)
OXYHGB MFR BLDMV: 66.1 % (ref 45–75)
OXYHGB MFR BLDMV: 68.1 % (ref 45–75)
PCO2 BLDMV: 71 MM HG (ref 41–51)
PCO2 BLDMV: 74 MM HG (ref 41–51)
PCO2 BLDMV: 75 MM HG (ref 41–51)
PH BLDMV: 7.36 PH (ref 7.33–7.43)
PH BLDMV: 7.38 PH (ref 7.33–7.43)
PH BLDMV: 7.39 PH (ref 7.33–7.43)
PHOSPHATE SERPL-MCNC: 3.3 MG/DL (ref 2.5–4.9)
PLATELET # BLD AUTO: 174 X10*3/UL (ref 150–450)
PO2 BLDMV: 39 MM HG (ref 35–45)
PO2 BLDMV: 40 MM HG (ref 35–45)
PO2 BLDMV: 40 MM HG (ref 35–45)
POTASSIUM BLDMV-SCNC: 3.9 MMOL/L (ref 3.5–5.3)
POTASSIUM BLDMV-SCNC: 4.2 MMOL/L (ref 3.5–5.3)
POTASSIUM BLDMV-SCNC: 4.2 MMOL/L (ref 3.5–5.3)
POTASSIUM SERPL-SCNC: 4 MMOL/L (ref 3.5–5.3)
RBC # BLD AUTO: 4.06 X10*6/UL (ref 4–5.2)
SAO2 % BLDMV: 64 % (ref 45–75)
SAO2 % BLDMV: 68 % (ref 45–75)
SAO2 % BLDMV: 70 % (ref 45–75)
SODIUM BLDMV-SCNC: 135 MMOL/L (ref 136–145)
SODIUM BLDMV-SCNC: 135 MMOL/L (ref 136–145)
SODIUM BLDMV-SCNC: 138 MMOL/L (ref 136–145)
SODIUM SERPL-SCNC: 142 MMOL/L (ref 136–145)
WBC # BLD AUTO: 5.5 X10*3/UL (ref 4.4–11.3)

## 2025-07-21 PROCEDURE — 84132 ASSAY OF SERUM POTASSIUM: CPT

## 2025-07-21 PROCEDURE — 2500000002 HC RX 250 W HCPCS SELF ADMINISTERED DRUGS (ALT 637 FOR MEDICARE OP, ALT 636 FOR OP/ED): Performed by: NURSE PRACTITIONER

## 2025-07-21 PROCEDURE — 2020000001 HC ICU ROOM DAILY

## 2025-07-21 PROCEDURE — 83735 ASSAY OF MAGNESIUM: CPT | Performed by: NURSE PRACTITIONER

## 2025-07-21 PROCEDURE — 97116 GAIT TRAINING THERAPY: CPT | Mod: GP,CQ

## 2025-07-21 PROCEDURE — 82947 ASSAY GLUCOSE BLOOD QUANT: CPT

## 2025-07-21 PROCEDURE — 85027 COMPLETE CBC AUTOMATED: CPT | Performed by: NURSE PRACTITIONER

## 2025-07-21 PROCEDURE — 97530 THERAPEUTIC ACTIVITIES: CPT | Mod: GP,CQ

## 2025-07-21 PROCEDURE — 99291 CRITICAL CARE FIRST HOUR: CPT | Performed by: STUDENT IN AN ORGANIZED HEALTH CARE EDUCATION/TRAINING PROGRAM

## 2025-07-21 PROCEDURE — 2500000001 HC RX 250 WO HCPCS SELF ADMINISTERED DRUGS (ALT 637 FOR MEDICARE OP): Performed by: NURSE PRACTITIONER

## 2025-07-21 PROCEDURE — 37799 UNLISTED PX VASCULAR SURGERY: CPT

## 2025-07-21 PROCEDURE — 80069 RENAL FUNCTION PANEL: CPT | Performed by: NURSE PRACTITIONER

## 2025-07-21 PROCEDURE — 2500000001 HC RX 250 WO HCPCS SELF ADMINISTERED DRUGS (ALT 637 FOR MEDICARE OP)

## 2025-07-21 PROCEDURE — 2500000004 HC RX 250 GENERAL PHARMACY W/ HCPCS (ALT 636 FOR OP/ED): Performed by: NURSE PRACTITIONER

## 2025-07-21 PROCEDURE — 2500000004 HC RX 250 GENERAL PHARMACY W/ HCPCS (ALT 636 FOR OP/ED)

## 2025-07-21 PROCEDURE — 99291 CRITICAL CARE FIRST HOUR: CPT | Performed by: NURSE PRACTITIONER

## 2025-07-21 RX ORDER — SACUBITRIL AND VALSARTAN 24; 26 MG/1; MG/1
0.5 TABLET ORAL 2 TIMES DAILY
Status: DISCONTINUED | OUTPATIENT
Start: 2025-07-21 | End: 2025-07-23

## 2025-07-21 RX ORDER — BUMETANIDE 0.25 MG/ML
1 INJECTION, SOLUTION INTRAMUSCULAR; INTRAVENOUS ONCE
Status: COMPLETED | OUTPATIENT
Start: 2025-07-21 | End: 2025-07-21

## 2025-07-21 RX ORDER — MAGNESIUM SULFATE HEPTAHYDRATE 40 MG/ML
4 INJECTION, SOLUTION INTRAVENOUS ONCE
Status: COMPLETED | OUTPATIENT
Start: 2025-07-21 | End: 2025-07-21

## 2025-07-21 RX ADMIN — ROPINIROLE 1 MG: 1 TABLET, FILM COATED ORAL at 16:23

## 2025-07-21 RX ADMIN — INSULIN LISPRO 1 UNITS: 100 INJECTION, SOLUTION INTRAVENOUS; SUBCUTANEOUS at 16:23

## 2025-07-21 RX ADMIN — MAGNESIUM OXIDE TAB 400 MG (241.3 MG ELEMENTAL MG) 2 TABLET: 400 (241.3 MG) TAB at 08:01

## 2025-07-21 RX ADMIN — ROPINIROLE 1 MG: 1 TABLET, FILM COATED ORAL at 20:10

## 2025-07-21 RX ADMIN — SODIUM CHLORIDE, SODIUM LACTATE, POTASSIUM CHLORIDE, AND CALCIUM CHLORIDE 250 ML: 600; 310; 30; 20 INJECTION, SOLUTION INTRAVENOUS at 02:06

## 2025-07-21 RX ADMIN — GABAPENTIN 400 MG: 300 CAPSULE ORAL at 06:20

## 2025-07-21 RX ADMIN — CITALOPRAM HYDROBROMIDE 40 MG: 40 TABLET ORAL at 08:01

## 2025-07-21 RX ADMIN — BUMETANIDE 1 MG: 0.25 INJECTION INTRAMUSCULAR; INTRAVENOUS at 06:21

## 2025-07-21 RX ADMIN — ROPINIROLE 1 MG: 1 TABLET, FILM COATED ORAL at 08:00

## 2025-07-21 RX ADMIN — ATORVASTATIN CALCIUM 80 MG: 80 TABLET, FILM COATED ORAL at 08:01

## 2025-07-21 RX ADMIN — INSULIN LISPRO 1 UNITS: 100 INJECTION, SOLUTION INTRAVENOUS; SUBCUTANEOUS at 08:04

## 2025-07-21 RX ADMIN — ACETAMINOPHEN 650 MG: 325 TABLET ORAL at 20:11

## 2025-07-21 RX ADMIN — PANTOPRAZOLE SODIUM 20 MG: 20 TABLET, DELAYED RELEASE ORAL at 08:00

## 2025-07-21 RX ADMIN — MAGNESIUM OXIDE TAB 400 MG (241.3 MG ELEMENTAL MG) 2 TABLET: 400 (241.3 MG) TAB at 20:11

## 2025-07-21 RX ADMIN — ASPIRIN 81 MG CHEWABLE TABLET 81 MG: 81 TABLET CHEWABLE at 08:01

## 2025-07-21 RX ADMIN — Medication 5 MG: at 20:11

## 2025-07-21 RX ADMIN — MAGNESIUM SULFATE HEPTAHYDRATE 4 G: 40 INJECTION, SOLUTION INTRAVENOUS at 06:19

## 2025-07-21 RX ADMIN — ROPINIROLE HYDROCHLORIDE 3 MG: 3 TABLET, FILM COATED ORAL at 20:10

## 2025-07-21 RX ADMIN — SACUBITRIL AND VALSARTAN 0.5 TABLET: 24; 26 TABLET, FILM COATED ORAL at 20:11

## 2025-07-21 RX ADMIN — GABAPENTIN 400 MG: 300 CAPSULE ORAL at 20:11

## 2025-07-21 RX ADMIN — LEVOTHYROXINE SODIUM 88 MCG: 0.09 TABLET ORAL at 06:20

## 2025-07-21 RX ADMIN — FLUTICASONE FUROATE AND VILANTEROL TRIFENATATE 1 PUFF: 100; 25 POWDER RESPIRATORY (INHALATION) at 08:26

## 2025-07-21 RX ADMIN — SPIRONOLACTONE 25 MG: 25 TABLET, FILM COATED ORAL at 08:01

## 2025-07-21 RX ADMIN — GABAPENTIN 400 MG: 300 CAPSULE ORAL at 14:09

## 2025-07-21 RX ADMIN — SACUBITRIL AND VALSARTAN 0.5 TABLET: 24; 26 TABLET, FILM COATED ORAL at 08:15

## 2025-07-21 RX ADMIN — INSULIN GLARGINE 15 UNITS: 100 INJECTION, SOLUTION SUBCUTANEOUS at 20:10

## 2025-07-21 ASSESSMENT — PAIN - FUNCTIONAL ASSESSMENT
PAIN_FUNCTIONAL_ASSESSMENT: 0-10

## 2025-07-21 ASSESSMENT — COGNITIVE AND FUNCTIONAL STATUS - GENERAL
TURNING FROM BACK TO SIDE WHILE IN FLAT BAD: A LITTLE
DAILY ACTIVITIY SCORE: 24
WALKING IN HOSPITAL ROOM: A LITTLE
MOVING TO AND FROM BED TO CHAIR: A LITTLE
STANDING UP FROM CHAIR USING ARMS: A LITTLE
MOVING FROM LYING ON BACK TO SITTING ON SIDE OF FLAT BED WITH BEDRAILS: A LITTLE
MOBILITY SCORE: 18
CLIMB 3 TO 5 STEPS WITH RAILING: A LITTLE
MOBILITY SCORE: 24

## 2025-07-21 ASSESSMENT — PAIN SCALES - GENERAL
PAINLEVEL_OUTOF10: 0 - NO PAIN
PAINLEVEL_OUTOF10: 2
PAINLEVEL_OUTOF10: 0 - NO PAIN

## 2025-07-21 NOTE — PROGRESS NOTES
HFICU Attending Note    60F with stage D HFrEF admitted for PAC-optimization prior to LVAD. Pre-LVAD colonoscopy revealed concerning appendiceal polyp. awaiting pathology. On Entresto, spironolactone. Holding Plavix for procedure. Plan for LVAD 7/23 pending bx/nicotine--reconsideration for OHT. Of note LVIDD 4.4cm. Tolerability of LVAD a concern given LV dimensions    This critically ill patient continues to be at-risk for clinically significant deterioration / failure due to the above mentioned dysfunctional, unstable organ systems.  I have personally identified and managed all complex critical care issues to prevent aforementioned clinical deterioration.  Critical care time is spent at bedside and/or the immediate area and has included, but is not limited to, the review of diagnostic tests, labs, radiographs, serial assessments of hemodynamics, respiratory status, ventilatory management, and family updates.  Time spent in procedures and teaching are reported separately.    Critical care time: ___35_ minutes     Objective    Admit Date: 7/14/2025  Hospital Length of Stay: 7   ICU Length of Stay: 6d 12h   Home: [facility] [id number]    MEDICATIONS  Infusions:     Scheduled:  aspirin, 81 mg, Daily  atorvastatin, 80 mg, Daily  citalopram, 40 mg, Daily  [Held by provider] clopidogrel, 75 mg, Daily  [Held by provider] empagliflozin, 10 mg, Daily  fluticasone furoate-vilanteroL, 1 puff, Daily  gabapentin, 400 mg, q8h  insulin glargine, 15 Units, Nightly  insulin lispro, 0-5 Units, TID AC  levothyroxine, 88 mcg, Daily  magnesium oxide, 800 mg of magnesium oxide, BID  magnesium sulfate, 4 g, Once  pantoprazole, 20 mg, Daily  perflutren protein A microsphere, 0.5 mL, Once in imaging  rOPINIRole, 1 mg, TID  rOPINIRole, 3 mg, Nightly  sacubitriL-valsartan, 0.5 tablet, BID  spironolactone, 25 mg, Daily  sulfur hexafluoride microsphr, 2 mL, Once in imaging      PRN:  acetaminophen, 650 mg, q6h PRN  albuterol, 2 puff, q6h  PRN  alteplase, 2 mg, PRN  dextrose, 12.5 g, q15 min PRN  dextrose, 25 g, q15 min PRN  glucagon, 1 mg, q15 min PRN  glucagon, 1 mg, q15 min PRN  melatonin, 5 mg, Nightly PRN  nitroglycerin, 0.4 mg, q5 min PRN  ondansetron, 4 mg, q4h PRN  oxygen, , Continuous PRN - O2/gases  polyethylene glycol, 2,000 mL, Once PRN        Prior to Admission Meds:  Prescriptions Prior to Admission[1]    Invasive Hemodynamics:    Most Recent Range Past 24hrs   BP (Art)   No data recorded   MAP(Art)   No data recorded   RA/CVP   No data recorded   PA 50/30 PAP  Min: 4/1  Max: 55/39   PA(mean) 40 mmHg PAP (Mean)  Min: 2 mmHg  Max: 47 mmHg   PCWP 12 mmHg PCWP (mmHg)  Min: 12 mmHg  Max: 14 mmHg   CO 3.7 L/min CO (L/min)  Min: 3.7 L/min  Max: 4.3 L/min   CI 2.3 L/min/m2 CI (L/min/m2)  Min: 2.3 L/min/m2  Max: 2.7 L/min/m2   Mixed Venous 64 % SVO2 (%)  Min: 64 %  Max: 69 %   SVR  1442 (dyne*sec)/cm5 SVR (dyne*sec)/cm5  Min: 844 (dyne*sec)/cm5  Max: 1560 (dyne*sec)/cm5    (dyne*sec)/cm5 PVR (dyne*sec)/cm5  Min: 278 (dyne*sec)/cm5  Max: 524 (dyne*sec)/cm5     MCS:   Heart Mate III:     Most Recent Range Past 24hrs   Flow   No data recorded   Speed   No data recorded   Power   No data recorded   PI   No data recorded     ECMO:     Most Recent Range Past 24hrs   Flow   No data recorded   Speed   No data recorded   Sweep   No data recorded     Impella:      Most Recent Range Past 24hrs   Performance Level   No data recorded   Flow (L/min)   No data recorded   Motor Current   No data recorded   Placement Signal    Placement OK could not be evaluated. This SmartLink does not work with rows of the type: Custom List   Purge (mmHg)   No data recorded   Purge rate (mL/hr)   No data recorded     VENT:    Most Recent Range Past 24hrs   Mode      FiO2   No data recorded   Rate   No data recorded   Vt    No data recorded   PEEP   No data recorded         7/21/2025     8:04 AM 7/21/2025     6:00 AM 7/21/2025     5:50 AM 7/21/2025     5:30 AM 7/21/2025  "    5:00 AM 7/21/2025     4:00 AM 7/21/2025     3:30 AM   Vitals   Systolic  103 100  87 96 96   Diastolic  59 64  48 64 60   BP Location      Right arm    Heart Rate  71 70  70 72 75   Temp 35.8 °C (96.4 °F)         Resp  22 21  16 13 22   Weight (lb)    134.92      BMI    24.68 kg/m2      BSA (m2)    1.64 m2        Visit Vitals  /59   Pulse 71   Temp 35.8 °C (96.4 °F)   Resp 22   Ht 1.575 m (5' 2\")   Wt 61.2 kg (134 lb 14.7 oz)   SpO2 99%   BMI 24.68 kg/m²   OB Status Postmenopausal   Smoking Status Former   BSA 1.64 m²     Wt Readings from Last 5 Encounters:   07/21/25 61.2 kg (134 lb 14.7 oz)   06/20/25 57.2 kg (126 lb)   06/20/25 57.4 kg (126 lb 9.6 oz)   06/17/25 58.2 kg (128 lb 4.9 oz)   05/30/25 56.2 kg (124 lb)     Weight         7/18/2025  0600 7/18/2025 2025 7/19/2025  0600 7/20/2025  0600 7/21/2025  0530    Weight: 61.6 kg (135 lb 11.2 oz) 61.5 kg (135 lb 9.3 oz) 61.7 kg (136 lb 1.6 oz) 61.9 kg (136 lb 7.4 oz) 61.2 kg (134 lb 14.7 oz)              Intake/Output Summary (Last 24 hours) at 7/21/2025 0810  Last data filed at 7/21/2025 0600  Gross per 24 hour   Intake 1009.58 ml   Output 3975 ml   Net -2965.42 ml     CHEST: Unlabored, Clear, Diminished  CV:  Normal sinus rhythm  ABD:      Bowel sounds:  , Flatus:    EXT:   RLE: Appropriate for ethnicity,Warm, Dry  DP:    PT: Weak  LLE: Appropriate for ethnicity,Warm, Dry  DP:    PT: Weak  NEURO:   RASS: Alert and calm  CAM: Negative  LOC: Alert  Cognition: Appropriate judgement, Appropriate attention/concentration, Appropriate safety awareness, Appropriate for developmental age, Follows commands  GCS: 15    DATA:  CMP:  Recent Labs     07/21/25  0402 07/20/25  0600 07/19/25  0435 07/18/25  0318 07/17/25  0052 07/16/25  0416 07/15/25  0354 07/14/25 2003 06/16/25  1901 06/15/25  1735    139 139 140 139 140 137 137 137 137   K 4.0 4.5 4.2 3.9 3.9 4.1 4.1 3.6 3.9 4.1   CL 97* 97* 96* 97* 97* 101 99 96* 95* 97*   CO2 41* 36* 38* 36* 37* 33* 32 33* " "33* 33*   ANIONGAP 8* 11 9* 11 9* 10 10 12 13 11   BUN 13 13 11 11 9 12 11 11 16 18   CREATININE 0.73 0.64 0.66 0.83 0.69 0.78 0.64 0.62 0.96 0.74   EGFR >90 >90 >90 80 >90 86 >90 >90 67 >90   MG 1.70 2.00 2.06 1.87 2.26 1.86 2.28 2.06 1.45* 1.93     Recent Labs     07/21/25  0402 07/20/25  0600 07/19/25  0435 07/18/25  0318 07/17/25  0052 07/16/25  0416 07/15/25  0354 07/14/25 2003 06/16/25  1901 06/15/25  1735 06/14/25  1803 06/13/25  1804 06/12/25  1728 06/11/25  1810 06/10/25  1734   ALBUMIN 3.3* 3.6 3.6 3.7 4.1 3.4 3.5 3.9 3.3* 3.4 3.3* 3.3* 3.1* 3.1* 3.1*   ALT  --   --   --   --   --   --   --  11 19 19 17 14 13 10 8   AST  --   --   --   --   --   --   --  12 14 16 15 14 13 11 10   BILITOT  --   --   --   --   --   --   --  0.8 0.3 0.2 0.3 0.3 0.2 0.2 0.2     CBC:  Recent Labs     07/21/25  0402 07/20/25  1315 07/19/25  0435 07/18/25  0318 07/17/25  0050 07/16/25  0416 07/15/25  0354 07/14/25 2003   WBC 5.5 5.5 5.9 6.6 7.3 8.4 6.0 6.5   HGB 11.9* 12.3 12.3 12.7 14.1 13.1 12.8 13.2   HCT 37.2 40.3 38.5 39.5 43.4 42.8 39.9 41.6    179 160 178 198 266 194 210   MCV 92 96 95 91 92 97 94 94     COAG:   Recent Labs     07/14/25 2003 06/02/25  1346   INR 1.1 1.0     ABO:   Recent Labs     06/05/25  0909   ABO O     HEME/ENDO:   Recent Labs     07/15/25  0354 07/14/25 2013 06/05/25  0607 06/02/25  1346 03/13/25  0531 03/12/25  1430   FERRITIN  --  224*  --  232*  --   --    IRONSAT  --  24*  --  17*  --   --    TSH  --   --  0.19* 0.20*  --   --    HGBA1C 8.0*  --   --  8.6* 12.3* 12.7*     CARDIAC:   Recent Labs     07/14/25 2003 06/12/25  1728 06/09/25  1613 06/08/25  1846 06/03/25  1743 06/02/25  1346   LDH  --   --   --  166 213  --    TROPHS 18 15  --   --   --  22   *  --  1,456*  --   --  1,292*     Recent Labs     07/21/25  0529 06/06/25  1312 06/06/25  0519 06/05/25  2319 06/05/25  1808   LACMX 1.1 1.1 0.4 0.6 0.8   SO2MV 64 62 68 68 70     No results for input(s): \"TACROLIMUS\", " "\"SIROLIMUS\", \"CYCLOSPORINE\" in the last 74008 hours.  Recent Labs     07/14/25 2003   CHOL 141   LDLCALC 78   HDL 43.3   TRIG 99     MICRO: No results for input(s): \"ESR\", \"CRP\", \"PROCAL\" in the last 40590 hours.  Susceptibility data from last 90 days.  Collected Specimen Info Organism Amoxicillin/Clavulanate Ampicillin Ampicillin/Sulbactam Cefazolin Cefazolin (uncomplicated UTIs only) Ciprofloxacin Gentamicin Levofloxacin Nitrofurantoin Piperacillin/Tazobactam   06/07/25 Urine from Clean Catch/Voided Escherichia coli  S  R  I  S  S  S  S  S  S  S   06/03/25 Urine from Clean Catch/Voided Escherichia coli  S  R  I  S  S  S  S  S  S  S     Collected Specimen Info Organism Trimethoprim/Sulfamethoxazole   06/07/25 Urine from Clean Catch/Voided Escherichia coli  S   06/03/25 Urine from Clean Catch/Voided Escherichia coli  S     Assessment & Plan  Acute on chronic heart failure with reduced ejection fraction (HFrEF, <= 40%)    Cardiogenic shock (Multi)        EKG:   Recent Labs     06/12/25  1715 06/03/25  1507   ATRRATE 78 85   VENTRATE 78 85   PRINT 160 160   QRSDUR 78 90   QTCFRED 436 422   QTCCALCB 456 447     Encounter Date: 06/02/25   Electrocardiogram, 12-lead PRN ACS symtpoms   Result Value    Ventricular Rate 78    Atrial Rate 78    IL Interval 160    QRS Duration 78    QT Interval 400    QTC Calculation(Bazett) 456    P Axis 57    R Axis -40    T Axis 101    QRS Count 13    Q Onset 216    P Onset 136    P Offset 185    T Offset 416    QTC Fredericia 436    Narrative    Normal sinus rhythm  Left axis deviation  Cannot rule out Anterior infarct , age undetermined  Abnormal ECG  When compared with ECG of 02-JUN-2025 13:34,  Significant changes have occurred  Confirmed by Jose Gonzalez ([id number]) on 6/20/2025 6:14:20 PM     Echocardiogram:   Recent Labs     07/15/25  1440 06/02/25  1729   EF 33 23   LVIDD 4.12 4.10   RV 47 41   RVFRWALLPKSP  --  10.10   TAPSE  --  1.6   Transthoracic Echo (TTE) Complete With Contrast " 06/02/2025    [facility], [address] [place] [address]  Tel [phone] and Fax [phone]    TRANSTHORACIC ECHOCARDIOGRAM REPORT      Patient Name:       ANETA BECKER         Erin Physician:    [id number] Jose Gonzalez MD  Study Date:         6/2/2025            Ordering Provider:    [id number] HAYDEN BARCLAY  MRN/PID:            [id number]            Fellow:  Accession#:         [id number]        Nurse:  Date of Birth/Age:  1965 / 60      Sonographer:          Diane donaldson RDCS, KRYSTAL  Gender assigned at  F                   Additional Staff:  Birth:  Height:             157.48 cm           Admit Date:           5/30/2025  Weight:             54.43 kg            Admission Status:     Inpatient -  Routine  BSA / BMI:          1.54 m2 / 21.95     Encounter#:           [id number]  kg/m2  Blood Pressure:     97/55 mmHg          Department Location:  [address]    Study Type:    TRANSTHORACIC ECHO (TTE) COMPLETE  Diagnosis/ICD: Unspecified systolic (congestive) heart failure (CHF)-I50.20  Indication:    Congestive Heart Failure  CPT Code:      Echo Complete w Full Doppler-27405    Patient History:  Diabetes:          Yes  Pertinent History: Cardiomyopathy and CHF.    Study Detail: The following Echo studies were performed: 2D, M-Mode, Doppler and  color flow. Definity used as a contrast agent for endocardial  border definition. Total contrast used for this procedure was 2 mL  via IV push.      PHYSICIAN INTERPRETATION:  Left Ventricle: Left ventricular ejection fraction is severely decreased by visual estimate at 20-25%. There is global hypokinesis of the left ventricle with minor regional variations. The left ventricular cavity size is mildly dilated. There is normal septal and normal posterior left ventricular wall thickness. There is left ventricular concentric remodeling. Spectral Doppler shows a Grade III (restrictive  pattern) of left ventricular diastolic filling with an elevated left atrial pressure. There is no definite left ventricular thrombus visualized. There is relative preservation of the basal myocardial segment systolic function.  Left Atrium: The left atrial size is normal.  Right Ventricle: The right ventricle is normal in size. There is normal right ventricular global systolic function.  Right Atrium: The right atrium is normal in size.  Aortic Valve: The aortic valve is trileaflet. The aortic valve area by VTI is 1.12 cmï¿½ with a peak velocity of 1.49 m/s. The peak and mean gradients are 9 mmHg and 4 mmHg, respectively, with a dimensionless index of 0.49. There is no evidence of aortic valve regurgitation.  Mitral Valve: The mitral valve is mildly thickened. There is mild mitral valve regurgitation. The E Vmax is 0.95 m/s.  Tricuspid Valve: The tricuspid valve is structurally normal. There is mild tricuspid regurgitation.  Pulmonic Valve: The pulmonic valve is structurally normal. There is physiologic pulmonic valve regurgitation.  Pericardium: Trivial pericardial effusion.  Aorta: The aortic root is normal.  Pulmonary Artery: The tricuspid regurgitant velocity is 3.09 m/s, and with an estimated right atrial pressure of 8, the estimated pulmonary artery pressure is mild to moderately elevated with the RVSP at 46 mmHg.  Systemic Veins: The inferior vena cava appears normal in size, with IVC inspiratory collapse less than 50%.  In comparison to the previous echocardiogram(s): There are no prior studies on this patient for comparison purposes.      CONCLUSIONS:  1. Left ventricular ejection fraction is severely decreased by visual estimate at 20-25%.  2. There is global hypokinesis of the left ventricle with minor regional variations.  3. Spectral Doppler shows a Grade III (restrictive pattern) of left ventricular diastolic filling with an elevated left atrial pressure.  4. Left ventricular cavity size is mildly  dilated.  5. No left ventricular thrombus visualized.  6. There is relative preservation of the basal myocardial segment systolic function.  7. There is normal right ventricular global systolic function.  8. Mild to moderately elevated pulmonary artery pressure.    RECOMMENDATIONS:    QUANTITATIVE DATA SUMMARY:    2D MEASUREMENTS:         Normal Ranges:  Ao Root d:       2.10 cm (2.0-3.7cm)  LAs:             3.70 cm (2.7-4.0cm)  IVSd:            0.90 cm (0.6-1.1cm)  LVPWd:           0.90 cm (0.6-1.1cm)  LVIDd:           4.10 cm (3.9-5.9cm)  LVIDs:           3.70 cm  LV Mass Index:   74 g/m2  LV % FS          9.8 %      LEFT ATRIUM:                  Normal Ranges:  LA Vol A4C:        38.8 ml    (22+/-6mL/m2)  LA Vol A2C:        43.3 ml  LA Vol BP:         41.2 ml  LA Vol Index A4C:  25.2ml/m2  LA Vol Index A2C:  28.1 ml/m2  LA Vol Index BP:   26.8 ml/m2  LA Area A4C:       14.9 cm2  LA Area A2C:       15.8 cm2  LA Major Axis A4C: 4.9 cm  LA Major Axis A2C: 4.9 cm  LA Volume Index:   26.8 ml/m2      RIGHT ATRIUM:          Normal Ranges:  RA Area A4C:  14.7 cm2      LV SYSTOLIC FUNCTION:  Normal Ranges:  EF-Visual:      23 %  LV EF Reported: 23 %      LV DIASTOLIC FUNCTION:             Normal Ranges:  MV Peak E:             0.95 m/s    (0.7-1.2 m/s)  MV Peak A:             0.33 m/s    (0.42-0.7 m/s)  E/A Ratio:             2.90        (1.0-2.2)  MV e'                  0.034 m/s   (>8.0)  MV lateral e'          0.03 m/s  MV medial e'           0.04 m/s  MV A Dur:              124.00 msec  E/e' Ratio:            27.61       (<8.0)  MV DT:                 145 msec    (150-240 msec)      MITRAL VALVE:          Normal Ranges:  MV DT:        111 msec (150-240msec)      AORTIC VALVE:                     Normal Ranges:  AoV Vmax:                1.49 m/s (<=1.7m/s)  AoV Peak P.9 mmHg (<20mmHg)  AoV Mean P.0 mmHg (1.7-11.5mmHg)  LVOT Max Paco:            0.81 m/s (<=1.1m/s)  AoV VTI:                  27.40 cm (18-25cm)  LVOT VTI:                13.50 cm  LVOT Diameter:           1.80 cm  (1.8-2.4cm)  AoV Area, VTI:           1.12 cm2 (2.5-5.5cm2)  AoV Area,Vmax:           1.23 cm2 (2.5-4.5cm2)  AoV Dimensionless Index: 0.49      RIGHT VENTRICLE:  RV Basal 3.70 cm  RV Mid   2.40 cm  RV Major 6.3 cm  TAPSE:   15.6 mm  RV s'    0.10 m/s      TRICUSPID VALVE/RVSP:          Normal Ranges:  Peak TR Velocity:     3.09 m/s  Est. RA Pressure:     8  RV Syst Pressure:     46       (< 30mmHg)  IVC Diam:             1.50 cm      PULMONIC VALVE:          Normal Ranges:  PV Max Paco:     0.7 m/s  (0.6-0.9m/s)  PV Max P.9 mmHg      [id number] Jose Gonzalez MD  Electronically signed on 2025 at 6:00:20 PM        ** Final **    Coronary Angiography: No results found for this or any previous visit from the past 1800 days.    Right Heart Cath: No results found for this or any previous visit from the past 1800 days.    Cardiac Scoring: No results found for this or any previous visit from the past 1800 days.    Cardiac MRI: No results found for this or any previous visit from the past 1800 days.    Nuclear:No results found for this or any previous visit from the past 1800 days.    Metabolic Stress: No results found for this or any previous visit from the past 1800 days.      Imaging  XR chest 1 view  Result Date: 2025  1. Right IJ [place]-Femi catheter has been slightly retracted with tip now seen projecting over mid right main pulmonary artery.   2. Otherwise, no significant change in mild diffuse pulmonary edema and bilateral pleural effusions with associated atelectasis, left more than right.   I personally reviewed the images/study and I agree with the findings as stated by resident physician Joseph Moore MD. This study was interpreted at [facility], [place], [place].   MACRO: None   Signed by: Reggie Denis 2025 8:49 PM Dictation workstation:   [id number]    XR chest 1 view  Result  Date: 7/20/2025  1. Medical devices as above. New right IJ approach [place]-Femi catheter with tip projecting over right interlobar pulmonary artery. 2. No significant change in small-to-moderate bilateral pleural effusions and associated atelectasis, left more than right. 3. Similar mild background pulmonary edema.   Signed by: Reggie Denis 7/20/2025 12:42 PM Dictation workstation:   [id number]    CT chest wo IV contrast  Result Date: 7/16/2025  1.  Bilateral moderate pleural effusions, left-greater-than-right, with adjacent atelectasis. These appear slightly improved from prior. 2. Pulmonary interstitial and alveolar edema. However superimposed infectious or inflammatory process not excluded. 3. Interval decrease in size of mediastinal lymphadenopathy. Recommend continued attention on follow-up imaging. 4. Mild cardiomegaly and severe coronary artery calcifications. 5. Mild thoracic atherosclerotic disease. 6. Additional chronic or incidental findings as detailed above.   I personally reviewed the images/study and I agree with the findings as stated by resident physician Marcus Meier MD. This study was interpreted at [place], [place].   MACRO: None   Signed by: Aj Alaniz 7/16/2025 4:11 PM Dictation workstation:   [id number]    XR chest 1 view  Result Date: 7/15/2025  1. Findings suggestive of pulmonary edema. 2. Moderate left and small right pleural effusion.   I personally reviewed the images/study and I agree with the findings as stated by Juan Allen DO, PGY-4. This study was interpreted at [place], [place].   MACRO: None   Signed by: Lina Parra 7/15/2025 8:32 AM Dictation workstation:   [id number]      Cardiology, Vascular, and Other Imaging  Colonoscopy Diagnostic  Result Date: 7/17/2025  Table formatting from the original result was not included. Impression Semi solid stool that could not be  cleared resulting in inadequate preparation on the right side of the colon. The terminal ileum appeared normal. One 20 mm Rhonda IIa+c polyp in the appendiceal orifice; lift performed but central portion of the polyp did not lift with abnormal features under NBI concerning for possible invasive disease. Central portion was biopsied. Subcentimeter polyp in the ascending colon was removed with cold snare Four polyps measuring from 4 mm up to 10 mm; performed cold snare removal One 20 mm Rhonda IIa LST-G polyp in the sigmoid colon; lift performed; performed cold snare with piecemeal removal; tattooed 5 cm distal to the finding Findings The terminal ileum appeared normal. One 20 mm sessile and adenomatous-appearing Rhonda IIa+c polyp in the appendiceal orifice; lift performed with 5 mL of [place] injected into the submucosa. The central portion of the polyp did not lift appropriately. This part of the polyp was biopsied. Polypectomy not attempted due to multiple factors including ICU setting, inadequate preparation on the right side of the colon and patient's intermittent desaturation events. Biopsy placed in Jar 1. One 4 mm polyp in the ascending colon; performed cold snare with complete en bloc removal and retrieved specimen. Placed in Jar 2 Four sessile and benign-appearing polyps measuring from 4 mm up to 10 mm; performed cold snare with complete en bloc removal and retrieved specimen. Placed in Jar 3. One 20 mm sessile Rhonda IIa LST-G polyp in the sigmoid colon; lift performed with 4 mL of [place] injected into the submucosa; performed cold snare with complete piecemeal removal and retrieved specimen; 1 tattoo was placed 5 cm distal to the finding with Black eye. Placed in Jar 3 Recommendation - The patient will be observed post-procedure, until all discharge criteria are met. - Return patient to ICU for ongoing care. - Defer resumption of diet and medications to the patient's primary team. No restrictions from a  post-procedural standpoint. - Observe patient's clinical course following today's procedure - Await pathology results. - If pathology from the AO polyp only shows adenomatous tissue, can consider repeat colonoscopy with a 2 day bowel preparation and a clear cap for a polypectomy attempt. Another consideration could be for FTRD though will be challenging due to the location. - The findings and recommendations were discussed with the referring physicians. - The signs and symptoms of potential delayed complications were discussed with the patient. - Follow up with Norah Perez and [facility] for ongoing inpatient gastroenterology consultation. Indication Abnormal weight loss Screening for colon cancer Post-Op Diagnosis None Staff Staff Role Leonila Slater MD Proceduralist Medications Totals unavailable because the procedure time range is not set Preprocedure A history and physical has been performed, and patient medication allergies have been reviewed. The patient's tolerance of previous anesthesia has been reviewed. The risks and benefits of the procedure and the sedation options and risks were discussed with the patient. All questions were answered and informed consent obtained. Details of the Procedure The patient underwent moderate sedation, which was administered by the procedural nurse. The patient's blood pressure, ECG, ETCO2, heart rate, level of consciousness, oxygen and respirations were monitored throughout the procedure. A digital rectal exam was performed. The scope was introduced through the anus and advanced to the terminal ileum. Retroflexion was performed in the rectum. The quality of bowel preparation was evaluated using the [place] Bowel Preparation Scale with scores of: right colon = 1, transverse colon = 2, left colon = 2. The total BBPS score was 5. Bowel prep was not adequate. The patient experienced no blood loss. The procedure was not difficult. The patient tolerated the procedure well. There were no  apparent adverse events. Events Procedure Events Event Event Time Specimens No specimens collected Procedure Location [facility] [id number] [address] [facility] [address] [phone] Referring Provider Mellisa Villafana MD MPH Procedure Provider Leonila Slater MD     Esophagogastroduodenoscopy (EGD)  Result Date: 7/17/2025  Table formatting from the original result was not included. Impression The upper third of the esophagus, middle third of the esophagus and lower third of the esophagus appeared normal. Irregular Z-line The fundus of the stomach, body of the stomach, incisura, antrum and pylorus appeared normal. The duodenal bulb and 2nd part of the duodenum appeared normal. Findings The upper third of the esophagus, middle third of the esophagus and lower third of the esophagus appeared normal. Irregular Z-line 38 cm from the incisors The fundus of the stomach, body of the stomach, incisura, antrum and pylorus appeared normal. The duodenal bulb and 2nd part of the duodenum appeared normal. Recommendation Proceed with colonoscopy today as previously scheduled.  Indication Abnormal weight loss Post-Op Diagnosis None Staff Staff Role Cyndi Perez MD Fellow Leonila Slater MD Proceduralist Medications gabapentin (Neurontin) capsule 400 mg Not documented*  *Total volume has not been documented. View each administration to see the amount administered. rOPINIRole (Requip) tablet 1 mg 1 mg dextrose 50 % injection 12.5 g 12.5 g dextrose 10 % in water (D10W) infusion 291.67 mL*  *From user-documented volume fentaNYL PF (Sublimaze) injection 100 mcg 0 mcg*  *Some administrations are not included in total fentaNYL PF (Sublimaze) injection 50 mcg/mL  - Omnicell Override Pull Cannot be calculated*  *Administration dose not documented fentaNYL PF (Sublimaze) injection 50 mcg midazolam (Versed) injection 1 mg/mL  - Omnicell Override Pull Cannot be calculated*  *Administration dose not documented  midazolam (Versed) injection 1 mg/mL  - Omnicell Override Pull Cannot be calculated*  *Administration dose not documented midazolam (Versed) injection 2 mg phenylephrine in NS (Maulik-Synephrine) syringe 40 mcg/mL  - Omnicell Override Pull Cannot be calculated*  *Administration dose not documented midazolam PF (Versed) injection 5 mg 0 mg*  *Some administrations are not included in total (Totals for administrations occurring from 1231 to 1640 on 07/17/25) Preprocedure A history and physical has been performed, and patient medication allergies have been reviewed. The patient's tolerance of previous anesthesia has been reviewed. The risks and benefits of the procedure and the sedation options and risks were discussed with the patient. All questions were answered and informed consent obtained. Details of the Procedure The patient underwent moderate sedation, which was administered by the procedural nurse. The patient's blood pressure, ECG, ETCO2, heart rate, level of consciousness, oxygen and respirations were monitored throughout the procedure. The scope was introduced through the mouth and advanced to the second part of the duodenum. Retroflexion was performed in the cardia. Prior to the procedure, the patient's H. Pylori status was unknown. The patient experienced no blood loss. The procedure was not difficult. The patient tolerated the procedure well. There were no apparent adverse events. Events Procedure Events Event Event Time Specimens No specimens collected Procedure Location [address] [id number] [phone] Referring Provider Mellisa Villafana MD MPH Procedure Provider Leonila Slater MD     Transthoracic Echo (TTE) Limited  Result Date: 7/15/2025   [facility], [address] [place] [address]                Tel [phone] and Fax [phone] TRANSTHORACIC ECHOCARDIOGRAM REPORT  Patient Name:       ANETA BECKER        Reading Physician:    [id number] Tony Kelly MD Study Date:         7/15/2025           Ordering Provider:    [id number] JOY LEWIS                                                               HENDRICKS MRN/PID:            [id number]            Fellow: Accession#:         [id number]        Nurse: Date of Birth/Age:  1965 / 60      Sonographer:          Lorrie donaldson                                     BELLE Gender assigned at  F                   Additional Staff: Birth: Height:             157.48 cm           Admit Date:           7/14/2025 Weight:             61.69 kg            Admission Status:     Inpatient -                                                               Routine BSA / BMI:          1.62 m2 / 24.87     Encounter#:           [id number]                     kg/m2 Blood Pressure:     82/48 mmHg          Department Location:  [address] Study Type:    TRANSTHORACIC ECHO (TTE) LIMITED Diagnosis/ICD: Acute on chronic combined systolic (congestive) and diastolic                (congestive) heart failure (CHF)-I50.43; Left ventricular                failure-I50.1; Heart failure, unspecified-I50.9 Indication:    LVB cavity size CPT Code:      Echo Limited-99342; Color Doppler-29180; Doppler Limited-73143 Patient History: Pertinent History: CHF, CAD, VT, A-fib, STEMI, HTN, HFrEF, HLD,                    hypercholesteremia. Study Detail: The following Echo studies were performed: 2D, M-Mode, Doppler and               color flow. Technically challenging study due to poor acoustic               windows and prominent lung artifact. Definity used as a contrast               agent for endocardial border definition. Total contrast used for               this procedure was 2 mL via IV push.  PHYSICIAN INTERPRETATION: Left Ventricle: Left ventricular ejection fraction is moderately decreased by visual estimate at 30-35%. There  are multiple left ventricular wall motion abnormalities. The left ventricular cavity size is severely dilated. The left ventricular cavity size is severely dilated by end diastolic indexed volume. There is normal septal and normal posterior left ventricular wall thickness. Spectral Doppler shows a Grade III (restrictive pattern) of left ventricular diastolic filling with an elevated left atrial pressure. There is no definite left ventricular thrombus visualized. The inferolateral wall is hypo to akinetic. The apex is hypokinetic. Left Atrium: The left atrial size is upper limits of normal. Right Ventricle: The right ventricle is normal in size. There is low normal right ventricular systolic function. Right Atrium: The right atrium is normal in size. The right atrium has a prominent Chiari network. Aortic Valve: The aortic valve is probably trileaflet. There is no evidence of aortic valve regurgitation. Mitral Valve: The mitral valve is mildly thickened. There is trace mitral valve regurgitation. The E Vmax is 0.97 m/s. Tricuspid Valve: The tricuspid valve is structurally normal. There is mild tricuspid regurgitation. The Doppler estimated right ventricular systolic pressure (RVSP) is mildly elevated at 47 mmHg. Pulmonic Valve: The pulmonic valve is not well visualized. The pulmonic valve regurgitation was not well visualized. Pericardium: Trivial pericardial effusion. There is a pericardial fat pad present. Pleural: There is a large left pleural effusion. Aorta: The aortic root is normal. The ascending aorta was not well visualized. The aortic root appears normal in size and measures 2.53 cm. Systemic Veins: The inferior vena cava appears normal in size, with IVC inspiratory collapse greater than 50%. In comparison to the previous echocardiogram(s): Compared with study dated 6/2/2025, the LV function is now mildly improved owing mostly to a small increase in apcal contractility.  CONCLUSIONS:  1. Left ventricular  ejection fraction is moderately decreased by visual estimate at 30-35%.  2. There are multiple left ventricular wall motion abnormalities.  3. The inferolateral wall is hypo to akinetic. The apex is hypokinetic.  4. Spectral Doppler shows a Grade III (restrictive pattern) of left ventricular diastolic filling with an elevated left atrial pressure.  5. Left ventricular cavity size is severely dilated.  6. No left ventricular thrombus visualized.  7. There is low normal right ventricular systolic function.  8. There is a large pleural effusion.  9. The Doppler estimated RVSP is mildly elevated at 47 mmHg. 10. Normal aortic root. 11. Compared with study dated 6/2/2025, the LV function is now mildly improved owing mostly to a small increase in apcal contractility. QUANTITATIVE DATA SUMMARY:  2D MEASUREMENTS:          Normal Ranges: Ao Root d:       2.53 cm  (2.0-3.7cm) IVSd:            0.78 cm  (0.6-1.1cm) LVPWd:           0.82 cm  (0.6-1.1cm) LVIDd:           4.12 cm  (3.9-5.9cm) LVIDs:           2.53 cm LV Mass Index:   60 g/m2 LVEDV Index:     88 ml/m2 LV % FS          38.7 %  LEFT ATRIUM:                  Normal Ranges: LA Vol A4C:        57.4 ml    (22+/-6mL/m2) LA Vol A2C:        47.0 ml LA Vol BP:         53.5 ml LA Vol Index A4C:  35.4ml/m2 LA Vol Index A2C:  29.0 ml/m2 LA Vol Index BP:   33.0 ml/m2 LA Area A4C:       19.1 cm2 LA Area A2C:       16.8 cm2 LA Major Axis A4C: 5.4 cm LA Major Axis A2C: 5.1 cm LA Volume Index:   33.0 ml/m2  RIGHT ATRIUM:                 Normal Ranges: RA Vol A4C:        46.5 ml    (8.3-19.5ml) RA Vol Index A4C:  28.7 ml/m2 RA Area A4C:       14.8 cm2 RA Major Axis A4C: 4.0 cm  LV SYSTOLIC FUNCTION:                      Normal Ranges: EF-A4C View:    36 % (>=55%) EF-A2C View:    34 % EF-Biplane:     33 % EF-Visual:      33 % LV EF Reported: 33 %  LV DIASTOLIC FUNCTION:             Normal Ranges: MV Peak E:             0.97 m/s    (0.7-1.2 m/s) MV Peak A:             0.35 m/s     (0.42-0.7 m/s) E/A Ratio:             2.74        (1.0-2.2) MV e'                  0.059 m/s   (>8.0) MV lateral e'          0.06 m/s MV medial e'           0.06 m/s MV A Dur:              114.18 msec E/e' Ratio:            16.34       (<8.0) PulmV Sys Paco:         21.33 cm/s PulmV Sánchez Paco:        69.80 cm/s PulmV S/D Paco:         0.31 PulmV A Revs Paco:      16.69 cm/s PulmV A Revs Dur:      99.90 msec  MITRAL VALVE:          Normal Ranges: MV DT:        119 msec (150-240msec)  AORTIC VALVE:          Normal Ranges: LVOT Diameter: 2.14 cm (1.8-2.4cm)  RIGHT VENTRICLE: RV Basal 3.50 cm RV Mid   1.90 cm  TRICUSPID VALVE/RVSP:          Normal Ranges: Peak TR Velocity:     3.32 m/s Est. RA Pressure:     3 RV Syst Pressure:     47       (< 30mmHg) IVC Diam:             1.80 cm  PULMONARY VEINS: PulmV A Revs Dur: 99.90 msec PulmV A Revs Paco: 16.69 cm/s PulmV Sánchez Paco:   69.80 cm/s PulmV S/D Paco:    0.31 PulmV Sys Paco:    21.33 cm/s  [id number] Tony Kelly MD Electronically signed on 7/15/2025 at 3:56:04 PM  ** Final **              LDA:  Introducer 07/20/25 Internal jugular Right (Active)   Placement Date/Time: 07/20/25 1300   Hand Hygiene Completed: Yes  Location: Internal jugular  Orientation: Right  Placement Verified: X-ray   Number of days: 0       Pulmonary Artery Catheter Internal jugular (Active)   Placement Date/Time: 07/20/25 1300   Hand Hygiene Performed Prior to CVC Insertion: Yes  Site Prep: Chlorhexidine   Site Prep Agent has Completely Dried Before Insertion: Yes  All 5 Sterile Barriers Used (Gloves, Gown, Cap, Mask, Large Sterile Drape):...   Number of days: 0     NUTRITION: Adult diet Regular  EMERGENCY CONTACT: Extended Emergency Contact Information  Primary Emergency Contact: Babatunde Mclean  Mobile Phone: [phone]  Relation: Friend   needed? No  Secondary Emergency Contact: Daisy Velasco  Mobile Phone: [phone]  Relation: Sister  CODE STATUS: Full Code  DISPO: Discharge Planning  Living  Arrangements: Family members  Support Systems: Family members  Assistance Needed: no  Home or Post Acute Services: None  Expected Discharge Disposition: Home  FOLLOWUP: No future appointments.             [1]   Medications Prior to Admission   Medication Sig Dispense Refill Last Dose/Taking    albuterol 90 mcg/actuation inhaler Inhale 2 puffs every 6 hours if needed for wheezing or shortness of breath.   2025    alogliptin (Nesina) 25 mg tablet Take 1 tablet (25 mg) by mouth once daily.   2025    [] aspirin 81 mg chewable tablet Chew 1 tablet (81 mg) once daily. 30 tablet 0 2025    atorvastatin (Lipitor) 80 mg tablet Take 1 tablet (80 mg) by mouth once daily.   2025    budesonide-formoterol (Symbicort) 80-4.5 mcg/actuation inhaler INHALE 2 PUFF BY INHALATION ROUTE 2 TIMES EVERY DAY IN THE MORNING AND EVENING   2025    bumetanide (Bumex) 1 mg tablet Take 1 tablet (1 mg) by mouth 2 times daily (morning and late afternoon). 60 tablet 2 2025    citalopram (CeleXA) 40 mg tablet Take 1 tablet (40 mg) by mouth early in the morning..   2025    clopidogrel (Plavix) 75 mg tablet Take 1 tablet (75 mg) by mouth once daily.   2025    empagliflozin (Jardiance) 10 mg tablet Take 1 tablet (10 mg) by mouth once daily. Do not start before 2025. 30 tablet 2 2025    gabapentin (Neurontin) 400 mg capsule TAKE 1 CAPSULE BY MOUTH 3 TIMES EVERY DAY FOR DIABETIC NEUROPATHY   2025    insulin glargine (Lantus Solostar U-100 Insulin) 100 unit/mL (3 mL) pen Inject 15 Units under the skin once daily at bedtime.   2025    levothyroxine (Synthroid, Levoxyl) 88 mcg tablet Take 1 tablet (88 mcg) by mouth early in the morning.. Take on an empty stomach at the same time each day, either 30 to 60 minutes prior to breakfast 30 tablet 11 2025    nitroglycerin (Nitrostat) 0.4 mg SL tablet Place 1 tablet (0.4 mg) under the tongue every 5 minutes if needed for chest pain.    7/14/2025    pantoprazole (ProtoNix) 20 mg EC tablet Take 1 tablet (20 mg) by mouth once daily.   7/14/2025    rOPINIRole (Requip) 1 mg tablet Take 1 tablet (1 mg) by mouth 3 times a day.   7/14/2025    rOPINIRole (Requip) 3 mg tablet take 1 tablet by oral route every day at bedtime   7/14/2025    sacubitriL-valsartan (Entresto) 24-26 mg tablet Take 1 tablet by mouth 2 times a day. 60 tablet 1 7/14/2025    spironolactone (Aldactone) 25 mg tablet Take 1 tablet (25 mg) by mouth once daily.   7/14/2025      Nabor Maki  Primary Care Provider  [address] [place] [address]  Phone: [phone]  Fax: (   )    -  Established Patient  Follow Up Time: 2 weeks   Jeronimo Maki  Primary Care Provider  [address] [place] [address]  Phone: [phone]  Fax: (   )    -  Established Patient  Follow Up Time: 2 weeks

## 2025-07-21 NOTE — PROGRESS NOTES
[facility]  HFICU PROGRESS NOTE    Thao Evans/[id number]    Admit Date: 7/14/2025  Hospital Length of Stay: 7   ICU Length of Stay: 6d 11h   Primary Service: HFICU  Primary HF Cardiologist: Dr Deluca      INTERVAL EVENTS / PERTINENT ROS:     No acute overnight event. Pt remains hemodynamically stable without inotropics.  Pt resting on bed comfortably this morning and states that he slept well last night. NS rhythm with occasional PVCs per tele review    Patient is alert, oriented x3, moves all extremities, tolerates PT/OT.  Patient denies chest pain or chest discomfort, but C/O on and off tremor . Patient denies palpation or dizziness. Denies SOB. Denies abdominal pain or abdominal discomfort. Denies N/V/D.    Plan:  - C/W Entresto 1/2 pills of 24/36  - C/W IV bumex IV and closely monitoring UOP and hold if indicated   - Pending biopsy /nicotine results   - LVAD next week???    MEDICATIONS  Infusions:       Scheduled:  aspirin, 81 mg, Daily  atorvastatin, 80 mg, Daily  citalopram, 40 mg, Daily  [Held by provider] clopidogrel, 75 mg, Daily  [Held by provider] empagliflozin, 10 mg, Daily  fluticasone furoate-vilanteroL, 1 puff, Daily  gabapentin, 400 mg, q8h  insulin glargine, 15 Units, Nightly  insulin lispro, 0-5 Units, TID AC  levothyroxine, 88 mcg, Daily  magnesium oxide, 800 mg of magnesium oxide, BID  pantoprazole, 20 mg, Daily  perflutren protein A microsphere, 0.5 mL, Once in imaging  rOPINIRole, 1 mg, TID  rOPINIRole, 3 mg, Nightly  sacubitriL-valsartan, 0.5 tablet, BID  spironolactone, 25 mg, Daily  sulfur hexafluoride microsphr, 2 mL, Once in imaging      PRN:  acetaminophen, 650 mg, q6h PRN  albuterol, 2 puff, q6h PRN  alteplase, 2 mg, PRN  dextrose, 12.5 g, q15 min PRN  dextrose, 25 g, q15 min PRN  glucagon, 1 mg, q15 min PRN  glucagon, 1 mg, q15 min PRN  melatonin, 5 mg, Nightly PRN  nitroglycerin, 0.4 mg, q5 min PRN  ondansetron, 4 mg, q4h PRN  oxygen, , Continuous PRN -  "O2/gases  polyethylene glycol, 2,000 mL, Once PRN        PHYSICAL EXAM:   Visit Vitals  /59   Pulse 71   Temp 36.7 °C (98.1 °F) (Temporal)   Resp 22   Ht 1.575 m (5' 2\")   Wt 61.2 kg (134 lb 14.7 oz)   SpO2 99%   BMI 24.68 kg/m²   OB Status Postmenopausal   Smoking Status Former   BSA 1.64 m²       Wt Readings from Last 5 Encounters:   07/21/25 61.2 kg (134 lb 14.7 oz)   06/20/25 57.2 kg (126 lb)   06/20/25 57.4 kg (126 lb 9.6 oz)   06/17/25 58.2 kg (128 lb 4.9 oz)   05/30/25 56.2 kg (124 lb)       INTAKE/OUTPUT:  I/O last 3 completed shifts:  In: 1309.6 (21.4 mL/kg) [P.O.:980; I.V.:79.6 (1.3 mL/kg); IV Piggyback:250]  Out: 3975 (65 mL/kg) [Urine:3975 (1.8 mL/kg/hr)]  Weight: 61.2 kg      Physical Exam  Constitutional:       Appearance: Normal appearance.   HENT:      Head: Normocephalic.   Neck:      Vascular: JVD present.      Comments: Right IJ/SGC @ 45cm  Cardiovascular:      Rate and Rhythm: Normal rate and regular rhythm.      Pulses: Normal pulses.      Heart sounds: Murmur (holosystolic) heard.   Pulmonary:      Effort: Pulmonary effort is normal.      Breath sounds: Examination of the right-lower field reveals rales. Examination of the left-lower field reveals rales. Decreased breath sounds and rales present.      Comments: Currently on 2L NC  Abdominal:      General: Abdomen is flat.      Palpations: Abdomen is soft.     Musculoskeletal:      Right lower leg: No edema.      Left lower leg: No edema.     Skin:     General: Skin is warm.      Findings: Erythema (right forearm) present.     Neurological:      General: No focal deficit present.      Mental Status: She is alert.         DATA:  CMP:  Results from last 7 days   Lab Units 07/21/25  0402 07/20/25  0600 07/19/25  0435 07/18/25  0318 07/17/25  0052 07/16/25  0416 07/15/25  0354 07/14/25 2003   SODIUM mmol/L 142 139 139 140 139 140 137 137   POTASSIUM mmol/L 4.0 4.5 4.2 3.9 3.9 4.1 4.1 3.6   CHLORIDE mmol/L 97* 97* 96* 97* 97* 101 99 96*   CO2 " "mmol/L 41* 36* 38* 36* 37* 33* 32 33*   ANION GAP mmol/L 8* 11 9* 11 9* 10 10 12   BUN mg/dL 13 13 11 11 9 12 11 11   CREATININE mg/dL 0.73 0.64 0.66 0.83 0.69 0.78 0.64 0.62   EGFR mL/min/1.73m*2 >90 >90 >90 80 >90 86 >90 >90   MAGNESIUM mg/dL 1.70 2.00 2.06 1.87 2.26 1.86 2.28 2.06   ALBUMIN g/dL 3.3* 3.6 3.6 3.7 4.1 3.4 3.5 3.9   ALT U/L  --   --   --   --   --   --   --  11   AST U/L  --   --   --   --   --   --   --  12   BILIRUBIN TOTAL mg/dL  --   --   --   --   --   --   --  0.8     CBC:  Results from last 7 days   Lab Units 07/21/25  0402 07/20/25  1315 07/19/25  0435 07/18/25  0318 07/17/25  0050 07/16/25  0416 07/15/25  0354 07/14/25 2003   WBC AUTO x10*3/uL 5.5 5.5 5.9 6.6 7.3 8.4 6.0 6.5   HEMOGLOBIN g/dL 11.9* 12.3 12.3 12.7 14.1 13.1 12.8 13.2   HEMATOCRIT % 37.2 40.3 38.5 39.5 43.4 42.8 39.9 41.6   PLATELETS AUTO x10*3/uL 174 179 160 178 198 266 194 210   MCV fL 92 96 95 91 92 97 94 94     COAG:   Results from last 7 days   Lab Units 07/14/25 2003   INR  1.1     ABO: No results found for: \"ABO\"  HEME/ENDO:  Results from last 7 days   Lab Units 07/15/25  0354 07/14/25  2013   FERRITIN ng/mL  --  224*   IRON SATURATION %  --  24*   HEMOGLOBIN A1C % 8.0*  --       CARDIAC:   Results from last 7 days   Lab Units 07/14/25 2003   TROPHSCMC ng/L 18   BNP pg/mL 735*       ASSESSMENT AND PLAN:   Thao Evans is a 60 year old with a PMHx sig for CAD s/p PCI (LAD; 2015, Lcx; 2025), stage D systolic HF/ICM/HFrEF s/p ICD, h/o VT with presumed associated syncope, poorly controlled DM, pAF (off of DOAC given the absence of recurrence), dyslipidemia, essential HTN, COPD, and h/o Graves. She was [facility] and seen by Dr. Deluca. Her most recent hospitalization was in March 2025 for syncope/VT where she underwent an ICD placement and PCI to her LCx. She reported progressive symptoms over the last several months with ongoing fatigue, dyspnea, and early satiety causing " significant progressive weight loss (~60 lbs over the last 6 months). She has also been currently intolerant of GDMT and is on midodrine for BP support since her hospital discharge. Because of worsening symptoms and intolerance of GDMT, she was offered direct admission to HFICU with plan for PAC guided evaluation. Transferred from HFICU to LT 5 06/06 under HHVI service. LT5 course c/b acute hypoxic respiratory failure due to L pleural effusion s/p thoracentesis x 3 (06/09, 6/11, 06/12), now on room air.     Pt was discussed in Advanced Therapies Committee meeting on 6/17/2025 and approved for LVAD (barriers to transplant, elevated PAP, uncontrolled DM Hg A1c > 8, active smoking). Plan is for patient to complete colonoscopy prior to LVAD placement next week tentative date 7/23. Repeat CT chest 7/16 showing improvement. EGD/colonoscopy 7/17 - pending biopsy results. [employer] this Sunday in prep for VAD surgery next week.      Neuro:  #Restless legs  #Anxiety  #Depression  - c/w home celexa  - c/w home gabapentin  - C/w home requip  - Serial neuro and pain assessments  - PO Tylenol PRN for pain  - PT/OT Consult, OOB to chair  - CAM ICU score every shift  - Sleep/wake cycle normalization     # Physical Status  - Not obese, BMI 22.68 kg/m2  - Reduced Mobility     #Substance abuse  -Alcohol dependence: rare use  -Tobacco dependence: previous smoker     Cardiovascular:  #HFrEF /acute on chronic systolic Heart Failure, ICM, last LVEF 23% (6/2/2025), Stage D, NYHA III, s/p ICD.  - TTE 6/2 LVSF 20-25%, LV size is moderately dilated, mild/mod elevated pulmonary artery pressure  - Repeat TTE 7/15: LVSF 30-35%, low normal RVSF  - Diuretics: poor response oral bumex, dc 7/20. Bumex 2mg IV x 1 (7/20)  - GDMT:  holding jardiance 10 mg, c/w sandi 25  - C/w Entresto 24/26 mg BID (7/19)  - Opening SGC #'s (7/20): /66 (74), CVP 12, PA 52/30 (41), CO/CI 5.4/3.3, , SVO 2 61%, bumex 2mg po BID, entresto 24/26, Sandi 25  - Daily  SCC #'s (7/21): 100/64(76), CVP 10, PA 48/23(36), CO/CI 3.66/2.27, SVR 1442, SVO2 64%, bumex 2mg IV BID, entresto 1/2 pills of 24/26, Edroy 25  - Daily standing weights, 2gm sodium diet, 2L fluid restriction, strict I&Os     #CAD s/p PCI (LAD in 2015, LCx in 2025)  -c/w asa 81mg daily, Plavix (on hold)  -c/w atorvastatin 80mg     # H/O VT  # Paroxysmal A Fib  -Device: s/p CRT-D 3/2025, [employer]  -AC: off DOAC given absence of recurrence     #HTN  #HLD  -BP medications as above.  -c/w home statin     #Electrolyte Disturbances  -K goal >4, Mg>2     Pulmonary:  #COPD  -c/w fluticasone furoate-vilanterol (Breo) 100-25mcg   -c/w albuterol HFA prn  -Monitor and maintain SpO2 > 92%     GI:  #GERD  -c/w protonix 20mg   - Bowel regimen: prn miralax  - Bowel prep 7/15 - EGD/Colonoscopy 7/17 pending biopsy results, contact pathology as indicated         :  No renal dysfunction at baseline  -Baseline Cr 0.5-0.9  -Admit BUN/Cr: 11/0.62  -I/Os  -avoid hypotension and nephrotoxic agents     Heme:  No hematology pathology at baseline   - H&H 13.2/41.6  - Iron studies 62, 256, 24%  - venofer 200mg x3 doses      Endo:  #DM, T2  - Last hgbA1c (7/15/25): 8.0  - home medications; insulin - glargine, jardiance, nesina.  -Diet: carb controlled  -SSI while inpatient, adjust as needed    # Hypoglycemia  - Noted Hypoglycemia overnight 7/15 and 7/16  - Reduced lantus insulin from 50 units home dose 15u   - C/w lantus insulin 15 units      #h/o Grave's disease  -last TSH (6/5/25): 0.19, FT4 1.23      ID:  -afebrile, nontoxic  -no s/s infx  -trend temps q4h     PHYSICAL AND OCCUPATIONAL THERAPY: ordered    LINES:  PIVs  Rt IJ/SGC (7/20 - )        DVT: N/A  VAP BUNDLE: N/A  CENTRAL LINE BUNDLE: ordered  ULCER PPX: home PPI  GLYCEMIC CONTROL: ISS  BOWEL CARE: miralax  INDWELLING CATHETER: N/A  NUTRITION: Adult diet Regular      EMERGENCY CONTACT: Extended Emergency Contact Information  Primary Emergency Contact: Babatunde Mclean  Phone: [phone]  Relation: Friend   needed? No  Secondary Emergency Contact: Daisy Velasco  Mobile Phone: [phone]  Relation: Sister  FAMILY UPDATE: Patient at bedside  CODE STATUS: Full Code  DISPO: Maintain in HFICU     Patient seen and assessed with Dr. Kurt TRAMMELL personally spent 60 minutes of critical care time directly and personally managing the patient exclusive of separately billable procedures   _________________________________________________  MARLI Hooker-CNP

## 2025-07-21 NOTE — CARE PLAN
The clinical goals for the shift include patient will remain hemodynamically stable    Problem: Pain - Adult  Goal: Verbalizes/displays adequate comfort level or baseline comfort level  Flowsheets (Taken 7/21/2025 0902)  Verbalizes/displays adequate comfort level or baseline comfort level:   Encourage patient to monitor pain and request assistance   Assess pain using appropriate pain scale   Administer analgesics based on type and severity of pain and evaluate response   Implement non-pharmacological measures as appropriate and evaluate response   Consider cultural and social influences on pain and pain management   Notify Licensed Independent Practitioner if interventions unsuccessful or patient reports new pain     Problem: Safety - Adult  Goal: Free from fall injury  Flowsheets (Taken 7/21/2025 0902)  Free from fall injury:   Instruct family/caregiver on patient safety   Based on caregiver fall risk screen, instruct family/caregiver to ask for assistance with transferring infant if caregiver noted to have fall risk factors     Problem: Discharge Planning  Goal: Discharge to home or other facility with appropriate resources  Flowsheets (Taken 7/21/2025 0902)  Discharge to home or other facility with appropriate resources:   Identify barriers to discharge with patient and caregiver   Identify discharge learning needs (meds, wound care, etc)   Refer to discharge planning if patient needs post-hospital services based on physician order or complex needs related to functional status, cognitive ability or social support system   Arrange for needed discharge resources and transportation as appropriate   Arrange for interpreters to assist at discharge as needed     Problem: Chronic Conditions and Co-morbidities  Goal: Patient's chronic conditions and co-morbidity symptoms are monitored and maintained or improved  Flowsheets (Taken 7/21/2025 0902)  Care Plan - Patient's Chronic Conditions and Co-Morbidity Symptoms are  Monitored and Maintained or Improved:   Monitor and assess patient's chronic conditions and comorbid symptoms for stability, deterioration, or improvement   Collaborate with multidisciplinary team to address chronic and comorbid conditions and prevent exacerbation or deterioration   Update acute care plan with appropriate goals if chronic or comorbid symptoms are exacerbated and prevent overall improvement and discharge     Problem: Heart Failure  Goal: Improved gas exchange this shift  Flowsheets (Taken 7/21/2025 0902)  Improved gas exchange this shift:   Assist with pulmonary hygiene and secretion clearance   Prepare for use of devices/procedures to correct dysrhythmia   Position to promote circulation/maximize ventilation  Goal: Improved urinary output this shift  Flowsheets (Taken 7/15/2025 1136)  Improved urinary output this shift:   Med administration/monitor effect   Monitor intake/output and daily weight   Monitor serum electrolytes/replace per order  Goal: Reduction in peripheral edema within 24 hours  Flowsheets (Taken 7/21/2025 0902)  Reduction in peripheral edema within 24 hours:   Consult dietician   SCDs/elevate extremities   Frequent small meals/supplements per order   Monitor edema/skin/mucous membrane integrity  Goal: Report improvement of dyspnea/breathlessness this shift  Flowsheets (Taken 7/21/2025 0902)  Report improvement of dyspnea/breathlessness this shift:   Ambulate/OOB 3 times daily   Encourage activity/mobility/ROM   Incentive spirometry/deep breathing /HOB elevated elevated   Consider PT/OT consult   Facilitate activity/mobility per patient tolerance/advance  Goal: Weight from fluid excess reduced over 2-3 days, then stabilize  Flowsheets (Taken 7/21/2025 0902)  Weight from fluid excess reduced over 2-3 days, then stabilize:   Med administration/monitor effect   Monitor bowel elimination   Monitor intake/output and daily weight   Monitor serum electrolytes/replace per order  Goal:  Increase self care and/or family involvement in 24 hours  Flowsheets (Taken 7/21/2025 0902)  Increase self care and/or family involvement in 24 hours:   Assess for signs/symptoms of depression   Organize tasks/allow time for rest   Therapy evaluation and treatment   Facilitate advanced care planning/discussion of treatment options   Recognize current coping skills and assist to develop new coping skills

## 2025-07-21 NOTE — PROGRESS NOTES
Physical Therapy    Physical Therapy Treatment    Patient Name: Thao Evans  MRN: [id number]  Department: [facility] HFU  Room: 13/13-A  Today's Date: 7/21/2025  Time Calculation  Start Time: 1438  Stop Time: 1502  Time Calculation (min): 24 min         Assessment/Plan   PT Assessment  Rehab Prognosis: Excellent  Barriers to Discharge Home: No anticipated barriers  Evaluation/Treatment Tolerance: Patient tolerated treatment well  Medical Staff Made Aware: Yes  Strengths: Ability to acquire knowledge, Attitude of self  Barriers to Participation: Comorbidities  End of Session Communication: Bedside nurse  Assessment Comment: Patient making steady gains towards stted therapy goals.  End of Session Patient Position: Bed, 3 rail up, Alarm off, not on at start of session     PT Plan  Treatment/Interventions: Bed mobility, Transfer training, Gait training, Balance training, Neuromuscular re-education, Stair training, Strengthening, Endurance training, Range of motion, Therapeutic exercise, Therapeutic activity, Positioning, Postural re-education  PT Plan: Ongoing PT  PT Frequency: 4 times per week  PT Discharge Recommendations: Low intensity level of continued care  PT Recommended Transfer Status: Stand by assist  PT - OK to Discharge: Yes    PT Visit Info:  PT Received On: 07/21/25  Response to Previous Treatment: Patient with no complaints from previous session.     General Visit Information:   General  Prior to Session Communication: Bedside nurse  Patient Position Received: Bed, 3 rail up, Alarm off, not on at start of session  Preferred Learning Style: visual, verbal  General Comment: patient pleasant, agreeable to PT session.    Subjective   Precautions:  Precautions  Hearing/Visual Limitations: hearing is WFL  Medical Precautions: Cardiac precautions, Fall precautions  Precautions Comment: tele, deb, 4L SpO2     Date/Time Vitals Session Patient Position Pulse Resp SpO2 BP MAP (mmHg)    07/21/25 1438 Pre PT  Lying   86  22  98 %  100/55  69     07/21/25 1439 Post PT  Sitting  --  --  --  96/56  69      Vital Signs Comment: vitals stable during amb SpO2 97%-98%     Objective   Pain:  Pain Assessment  Pain Assessment: 0-10  0-10 (Numeric) Pain Score: 0 - No pain  Cognition:  Cognition  Overall Cognitive Status: Within Functional Limits  Arousal/Alertness: Appropriate responses to stimuli  Orientation Level: Oriented X4  Coordination:  Movements are Fluid and Coordinated: Yes  Postural Control:  Postural Control  Postural Control: Within Functional Limits  Static Standing Balance  Static Standing-Balance Support: No upper extremity supported  Static Standing-Level of Assistance: Close supervision  Static Standing-Comment/Number of Minutes: narrow CAS eyes open/closed for :10 sec ea trial  Dynamic Standing Balance  Dynamic Standing-Balance Support: No upper extremity supported  Dynamic Standing-Level of Assistance: Close supervision  Dynamic Standing-Balance: Turning  Dynamic Standing-Comments: no AD    Activity Tolerance:  Activity Tolerance  Endurance: Endurance does not limit participation in activity  Early Mobility/Exercise Safety Screen: Proceed with mobilization - No exclusion criteria met    Treatments:  Bed Mobility  Bed Mobility: Yes  Bed Mobility 1  Bed Mobility 1: Supine to sitting, Sitting to supine  Level of Assistance 1: Modified independent  Bed Mobility Comments 1: HOB elevated    Ambulation/Gait Training  Ambulation/Gait Training Performed: Yes  Ambulation/Gait Training 1  Surface 1: Level tile  Device 1: No device  Assistance 1: Close supervision  Quality of Gait 1: Decreased step length  Comments/Distance (ft) 1: 450 ft  Transfers  Transfer: Yes  Transfer 1  Transfer From 1: Sit to, Stand to  Transfer to 1: Sit, Stand  Technique 1: Sit to stand, Stand to sit  Transfer Level of Assistance 1: Close supervision  Trials/Comments 1: x7 trials    Outcome Measures:  [facility] Basic Mobility  Turning from your back to your  side while in a flat bed without using bedrails: A little  Moving from lying on your back to sitting on the side of a flat bed without using bedrails: A little  Moving to and from bed to chair (including a wheelchair): A little  Standing up from a chair using your arms (e.g. wheelchair or bedside chair): A little  To walk in hospital room: A little  Climbing 3-5 steps with railing: A little  Basic Mobility - Total Score: 18    FSS-ICU  Ambulation: Walks >/ or equal to 150 feet with supervision  Rolling: Supervision or set-up only  Sitting: Supervision or set-up only  Transfer Sit-to-Stand: Supervision or set-up only  Transfer Supine-to-Sit: Supervision or set-up only  Total Score: 25      Early Mobility/Exercise Safety Screen: Proceed with mobilization - No exclusion criteria met  ICU Mobility Scale: Walking with assistance of 1 person [8]    Tinetti  Sitting Balance: Steady, safe  Arises: Able without using arms  Attempts to Arise: Able to arise, one attempt  Immediate Standing Balance (First 5 Seconds): Steady without walker or other support  Standing Balance: Narrow stance without support  Nudged: Steady without walker or other support  Eyes Closed: Steady  Turned 360 Degrees: Steadiness: Steady  Turned 360 Degrees: Continuity of Steps: Continuous  Sitting Down: Safe, smooth motion  Balance Score: 16  Initiation of Gait: No hesitancy  Step Height: R Swing Foot: Right foot complete clears floor  Step Length: R Swing Foot: Passes left stance foot  Step Height: L Swing Foot: Left foot complete clears floor  Step Length: L Swing Foot: Passes right stance foot  Step Symmetry: Right and left step appear equal  Step Continuity: Steps appear continuous  Path: Mild/moderate deviation or uses walking aid  Trunk: No sway, no flexion, no use of arms, no walking aid  Walking Time: Heels almost touching while walking  Gait Score: 11  Total Score: 27    Other Measures  5x Sit to Stand: patient completed from bed at lowest  height with no AD and no UE support, as well as close supervision. completed in 11.51 sec    Education Documentation  Handouts, taught by Cheryl Hemphill PTA at 7/21/2025  3:43 PM.  Learner: Patient  Readiness: Acceptance  Method: Explanation  Response: Verbalizes Understanding  Comment: reviewed rehab poc    Mobility Training, taught by Cheryl Hemphill PTA at 7/21/2025  3:43 PM.  Learner: Patient  Readiness: Acceptance  Method: Explanation  Response: Verbalizes Understanding  Comment: reviewed rehab poc    Education Comments  No comments found.        OP EDUCATION:       Encounter Problems       Encounter Problems (Active)       Balance       patient will score >24/28 on the Tinetti scale to present as a low risk of falls (Progressing)       Start:  07/15/25    Expected End:  07/29/25               Mobility       Patient will ambulate >1000ft indep (Progressing)       Start:  07/15/25    Expected End:  07/29/25               Mobility       Patient will complete the 5xSTS  in <15 sec with no assistive device, no UE support, and close supervision (RPE: 11/20 RPD: 3/10) (Progressing)       Start:  07/15/25    Expected End:  07/29/25            Patient will ambulate >1000ft on the 6MWT with no assistive device and close superivision (RPE: 11/20 RPD: 3/10) (Progressing)       Start:  07/15/25    Expected End:  07/29/25               PT Transfers       Patient to transfer to and from sit to supine indep (Progressing)       Start:  07/15/25    Expected End:  07/29/25            Patient will transfer sit to and from stand indep (Progressing)       Start:  07/15/25    Expected End:  07/29/25

## 2025-07-21 NOTE — CARE PLAN
Problem: Safety - Adult  Goal: Free from fall injury  Outcome: Progressing     Problem: Heart Failure  Goal: Improved gas exchange this shift  Outcome: Progressing  Goal: Improved urinary output this shift  Outcome: Progressing  Goal: Reduction in peripheral edema within 24 hours  Outcome: Progressing  Goal: Report improvement of dyspnea/breathlessness this shift  Outcome: Progressing  Goal: Weight from fluid excess reduced over 2-3 days, then stabilize  Outcome: Progressing  Goal: Increase self care and/or family involvement in 24 hours  Outcome: Progressing      General Sunscreen Counseling: I recommended a broad spectrum sunscreen with at least SPF of 30, but higher is better.  I explained that SPF 30 sunscreens block approximately 97 percent of the sun's harmful rays in vitro, but in practice a higher SPF offers more protection from sun exposure as most people don't use the density of application to get the number on the body.  Sunscreens should be applied at least 15 minutes prior to expected sun exposure and then every 2 hours after that as long as sun exposure continues. If swimming or exercising sunscreen should be reapplied more frequently.  One ounce, or 30 fluid ounces (the equivalent of a shot glass full of sunscreen), is adequate to protect the skin not covered by a bathing suit. I also recommended a lip balm with a sunscreen as well. Sun protective clothing (UPF50+) can be used in lieu of sunscreen but must be worn the entire time you are exposed to the sun's rays. Detail Level: Simple Products Recommended: Recommend SPF 50 or greater for face, 30 or greater for body\\nRecommend mineral sunscreen in all cases (active ingredients zinc oxide and/or titanium dioxide)\\nBrands: Neutrogena, Tizo, La Roche Posay, Elta MD

## 2025-07-22 ENCOUNTER — APPOINTMENT (OUTPATIENT)
Dept: RADIOLOGY | Facility: HOSPITAL | Age: 60
DRG: 001 | End: 2025-07-22
Payer: COMMERCIAL

## 2025-07-22 ENCOUNTER — DOCUMENTATION (OUTPATIENT)
Dept: TRANSPLANT | Facility: HOSPITAL | Age: 60
End: 2025-07-22

## 2025-07-22 ENCOUNTER — APPOINTMENT (OUTPATIENT)
Dept: ENDOCRINOLOGY | Facility: CLINIC | Age: 60
End: 2025-07-22
Payer: COMMERCIAL

## 2025-07-22 LAB
ALBUMIN SERPL BCP-MCNC: 3.5 G/DL (ref 3.4–5)
ANION GAP SERPL CALC-SCNC: 9 MMOL/L (ref 10–20)
BUN SERPL-MCNC: 17 MG/DL (ref 6–23)
CALCIUM SERPL-MCNC: 8.8 MG/DL (ref 8.6–10.6)
CHLORIDE SERPL-SCNC: 94 MMOL/L (ref 98–107)
CO2 SERPL-SCNC: 38 MMOL/L (ref 21–32)
CREAT SERPL-MCNC: 0.65 MG/DL (ref 0.5–1.05)
EGFRCR SERPLBLD CKD-EPI 2021: >90 ML/MIN/1.73M*2
ERYTHROCYTE [DISTWIDTH] IN BLOOD BY AUTOMATED COUNT: 15 % (ref 11.5–14.5)
GLUCOSE BLD MANUAL STRIP-MCNC: 111 MG/DL (ref 74–99)
GLUCOSE BLD MANUAL STRIP-MCNC: 180 MG/DL (ref 74–99)
GLUCOSE BLD MANUAL STRIP-MCNC: 225 MG/DL (ref 74–99)
GLUCOSE BLD MANUAL STRIP-MCNC: 242 MG/DL (ref 74–99)
GLUCOSE SERPL-MCNC: 156 MG/DL (ref 74–99)
HCT VFR BLD AUTO: 38.2 % (ref 36–46)
HGB BLD-MCNC: 12.6 G/DL (ref 12–16)
MAGNESIUM SERPL-MCNC: 2.38 MG/DL (ref 1.6–2.4)
MCH RBC QN AUTO: 30.3 PG (ref 26–34)
MCHC RBC AUTO-ENTMCNC: 33 G/DL (ref 32–36)
MCV RBC AUTO: 92 FL (ref 80–100)
NRBC BLD-RTO: 0 /100 WBCS (ref 0–0)
PHOSPHATE SERPL-MCNC: 3.3 MG/DL (ref 2.5–4.9)
PLATELET # BLD AUTO: 186 X10*3/UL (ref 150–450)
POTASSIUM SERPL-SCNC: 4.4 MMOL/L (ref 3.5–5.3)
RBC # BLD AUTO: 4.16 X10*6/UL (ref 4–5.2)
SODIUM SERPL-SCNC: 137 MMOL/L (ref 136–145)
WBC # BLD AUTO: 6 X10*3/UL (ref 4.4–11.3)

## 2025-07-22 PROCEDURE — 2500000002 HC RX 250 W HCPCS SELF ADMINISTERED DRUGS (ALT 637 FOR MEDICARE OP, ALT 636 FOR OP/ED): Performed by: NURSE PRACTITIONER

## 2025-07-22 PROCEDURE — 82947 ASSAY GLUCOSE BLOOD QUANT: CPT

## 2025-07-22 PROCEDURE — 0W9B3ZZ DRAINAGE OF LEFT PLEURAL CAVITY, PERCUTANEOUS APPROACH: ICD-10-PCS | Performed by: NURSE PRACTITIONER

## 2025-07-22 PROCEDURE — 99291 CRITICAL CARE FIRST HOUR: CPT | Performed by: STUDENT IN AN ORGANIZED HEALTH CARE EDUCATION/TRAINING PROGRAM

## 2025-07-22 PROCEDURE — 97116 GAIT TRAINING THERAPY: CPT | Mod: GP,CQ

## 2025-07-22 PROCEDURE — 2500000001 HC RX 250 WO HCPCS SELF ADMINISTERED DRUGS (ALT 637 FOR MEDICARE OP): Performed by: NURSE PRACTITIONER

## 2025-07-22 PROCEDURE — 94640 AIRWAY INHALATION TREATMENT: CPT

## 2025-07-22 PROCEDURE — 84132 ASSAY OF SERUM POTASSIUM: CPT | Performed by: NURSE PRACTITIONER

## 2025-07-22 PROCEDURE — 83735 ASSAY OF MAGNESIUM: CPT | Performed by: NURSE PRACTITIONER

## 2025-07-22 PROCEDURE — 99291 CRITICAL CARE FIRST HOUR: CPT

## 2025-07-22 PROCEDURE — 1100000001 HC PRIVATE ROOM DAILY

## 2025-07-22 PROCEDURE — 71045 X-RAY EXAM CHEST 1 VIEW: CPT

## 2025-07-22 PROCEDURE — 71045 X-RAY EXAM CHEST 1 VIEW: CPT | Performed by: RADIOLOGY

## 2025-07-22 PROCEDURE — 85027 COMPLETE CBC AUTOMATED: CPT | Performed by: NURSE PRACTITIONER

## 2025-07-22 PROCEDURE — 2500000004 HC RX 250 GENERAL PHARMACY W/ HCPCS (ALT 636 FOR OP/ED): Performed by: NURSE PRACTITIONER

## 2025-07-22 RX ADMIN — ROPINIROLE 1 MG: 1 TABLET, FILM COATED ORAL at 20:24

## 2025-07-22 RX ADMIN — ATORVASTATIN CALCIUM 80 MG: 80 TABLET, FILM COATED ORAL at 08:00

## 2025-07-22 RX ADMIN — PANTOPRAZOLE SODIUM 20 MG: 20 TABLET, DELAYED RELEASE ORAL at 08:02

## 2025-07-22 RX ADMIN — SPIRONOLACTONE 25 MG: 25 TABLET, FILM COATED ORAL at 08:00

## 2025-07-22 RX ADMIN — GABAPENTIN 400 MG: 300 CAPSULE ORAL at 12:31

## 2025-07-22 RX ADMIN — ROPINIROLE HYDROCHLORIDE 3 MG: 3 TABLET, FILM COATED ORAL at 20:24

## 2025-07-22 RX ADMIN — GABAPENTIN 400 MG: 300 CAPSULE ORAL at 20:30

## 2025-07-22 RX ADMIN — SACUBITRIL AND VALSARTAN 0.5 TABLET: 24; 26 TABLET, FILM COATED ORAL at 08:00

## 2025-07-22 RX ADMIN — MAGNESIUM OXIDE TAB 400 MG (241.3 MG ELEMENTAL MG) 2 TABLET: 400 (241.3 MG) TAB at 08:00

## 2025-07-22 RX ADMIN — ROPINIROLE 1 MG: 1 TABLET, FILM COATED ORAL at 08:00

## 2025-07-22 RX ADMIN — SACUBITRIL AND VALSARTAN 0.5 TABLET: 24; 26 TABLET, FILM COATED ORAL at 20:24

## 2025-07-22 RX ADMIN — MAGNESIUM OXIDE TAB 400 MG (241.3 MG ELEMENTAL MG) 2 TABLET: 400 (241.3 MG) TAB at 20:24

## 2025-07-22 RX ADMIN — CITALOPRAM HYDROBROMIDE 40 MG: 40 TABLET ORAL at 06:15

## 2025-07-22 RX ADMIN — ROPINIROLE 1 MG: 1 TABLET, FILM COATED ORAL at 17:27

## 2025-07-22 RX ADMIN — ASPIRIN 81 MG CHEWABLE TABLET 81 MG: 81 TABLET CHEWABLE at 08:00

## 2025-07-22 RX ADMIN — FLUTICASONE FUROATE AND VILANTEROL TRIFENATATE 1 PUFF: 100; 25 POWDER RESPIRATORY (INHALATION) at 07:44

## 2025-07-22 RX ADMIN — INSULIN LISPRO 2 UNITS: 100 INJECTION, SOLUTION INTRAVENOUS; SUBCUTANEOUS at 17:26

## 2025-07-22 RX ADMIN — INSULIN LISPRO 2 UNITS: 100 INJECTION, SOLUTION INTRAVENOUS; SUBCUTANEOUS at 12:20

## 2025-07-22 RX ADMIN — GABAPENTIN 400 MG: 300 CAPSULE ORAL at 06:15

## 2025-07-22 RX ADMIN — LEVOTHYROXINE SODIUM 88 MCG: 0.09 TABLET ORAL at 06:15

## 2025-07-22 RX ADMIN — INSULIN GLARGINE 15 UNITS: 100 INJECTION, SOLUTION SUBCUTANEOUS at 20:25

## 2025-07-22 ASSESSMENT — PAIN SCALES - GENERAL
PAINLEVEL_OUTOF10: 0 - NO PAIN

## 2025-07-22 ASSESSMENT — COGNITIVE AND FUNCTIONAL STATUS - GENERAL
MOVING TO AND FROM BED TO CHAIR: A LITTLE
TURNING FROM BACK TO SIDE WHILE IN FLAT BAD: A LITTLE
MOBILITY SCORE: 18
STANDING UP FROM CHAIR USING ARMS: A LITTLE
WALKING IN HOSPITAL ROOM: A LITTLE
MOVING FROM LYING ON BACK TO SITTING ON SIDE OF FLAT BED WITH BEDRAILS: A LITTLE
CLIMB 3 TO 5 STEPS WITH RAILING: A LITTLE

## 2025-07-22 ASSESSMENT — PAIN - FUNCTIONAL ASSESSMENT
PAIN_FUNCTIONAL_ASSESSMENT: 0-10

## 2025-07-22 NOTE — PROGRESS NOTES
Patient was briefly discussed in selection regarding LVAD candidacy. Patient pathology results pending from colonoscopy. Will await results prior to OR. LVAD implant planned for tomorrow canceled.

## 2025-07-22 NOTE — PROGRESS NOTES
"[facility]  HFICU PROGRESS NOTE    Thao Evans/[id number]    Admit Date: 7/14/2025  Hospital Length of Stay: 8   ICU Length of Stay: 7d 18h   Primary Service: HFICU  Primary HF Cardiologist: Dr Deluca      INTERVAL EVENTS / PERTINENT ROS:     Overnight swan was removed due to malfunctioning. No other acute events.     Plan:  - Pending colonoscopy biopsy /nicotine results   - IR consult for thoracentesis     MEDICATIONS  Infusions:       Scheduled:  aspirin, 81 mg, Daily  atorvastatin, 80 mg, Daily  citalopram, 40 mg, Daily  [Held by provider] clopidogrel, 75 mg, Daily  [Held by provider] empagliflozin, 10 mg, Daily  fluticasone furoate-vilanteroL, 1 puff, Daily  gabapentin, 400 mg, q8h  insulin glargine, 15 Units, Nightly  insulin lispro, 0-5 Units, TID AC  levothyroxine, 88 mcg, Daily  magnesium oxide, 800 mg of magnesium oxide, BID  pantoprazole, 20 mg, Daily  perflutren protein A microsphere, 0.5 mL, Once in imaging  rOPINIRole, 1 mg, TID  rOPINIRole, 3 mg, Nightly  sacubitriL-valsartan, 0.5 tablet, BID  spironolactone, 25 mg, Daily  sulfur hexafluoride microsphr, 2 mL, Once in imaging      PRN:  acetaminophen, 650 mg, q6h PRN  albuterol, 2 puff, q6h PRN  alteplase, 2 mg, PRN  dextrose, 12.5 g, q15 min PRN  dextrose, 25 g, q15 min PRN  glucagon, 1 mg, q15 min PRN  glucagon, 1 mg, q15 min PRN  melatonin, 5 mg, Nightly PRN  nitroglycerin, 0.4 mg, q5 min PRN  ondansetron, 4 mg, q4h PRN  oxygen, , Continuous PRN - O2/gases  polyethylene glycol, 2,000 mL, Once PRN        PHYSICAL EXAM:   Visit Vitals  /70   Pulse 80   Temp 35.8 °C (96.4 °F) (Temporal)   Resp 18   Ht 1.575 m (5' 2\")   Wt 62 kg (136 lb 11 oz)   SpO2 94%   BMI 25.00 kg/m²   OB Status Postmenopausal   Smoking Status Former   BSA 1.65 m²       Wt Readings from Last 5 Encounters:   07/22/25 62 kg (136 lb 11 oz)   06/20/25 57.2 kg (126 lb)   06/20/25 57.4 kg (126 lb 9.6 oz)   06/17/25 58.2 kg (128 lb 4.9 oz)   05/30/25 " 56.2 kg (124 lb)       INTAKE/OUTPUT:  I/O last 3 completed shifts:  In: 1394.6 (22.5 mL/kg) [P.O.:960; I.V.:184.6 (3 mL/kg); IV Piggyback:250]  Out: 2950 (47.6 mL/kg) [Urine:2950 (1.3 mL/kg/hr)]  Weight: 62 kg      Physical Exam  Constitutional:       Appearance: Normal appearance.   HENT:      Head: Normocephalic.   Neck:      Vascular: JVD present.     Cardiovascular:      Rate and Rhythm: Normal rate and regular rhythm.      Pulses: Normal pulses.      Heart sounds: Murmur (holosystolic) heard.   Pulmonary:      Effort: Pulmonary effort is normal.      Breath sounds: Examination of the right-lower field reveals rales. Examination of the left-lower field reveals rales. Decreased breath sounds and rales present.      Comments: Currently on 2L NC  Abdominal:      General: Abdomen is flat.      Palpations: Abdomen is soft.     Musculoskeletal:      Right lower leg: No edema.      Left lower leg: No edema.     Skin:     General: Skin is warm.      Findings: Erythema (right forearm) present.     Neurological:      General: No focal deficit present.      Mental Status: She is alert.         DATA:  CMP:  Results from last 7 days   Lab Units 07/22/25  0407 07/21/25  0402 07/20/25  0600 07/19/25  0435 07/18/25  0318 07/17/25  0052 07/16/25  0416   SODIUM mmol/L 137 142 139 139 140 139 140   POTASSIUM mmol/L 4.4 4.0 4.5 4.2 3.9 3.9 4.1   CHLORIDE mmol/L 94* 97* 97* 96* 97* 97* 101   CO2 mmol/L 38* 41* 36* 38* 36* 37* 33*   ANION GAP mmol/L 9* 8* 11 9* 11 9* 10   BUN mg/dL 17 13 13 11 11 9 12   CREATININE mg/dL 0.65 0.73 0.64 0.66 0.83 0.69 0.78   EGFR mL/min/1.73m*2 >90 >90 >90 >90 80 >90 86   MAGNESIUM mg/dL 2.38 1.70 2.00 2.06 1.87 2.26 1.86   ALBUMIN g/dL 3.5 3.3* 3.6 3.6 3.7 4.1 3.4     CBC:  Results from last 7 days   Lab Units 07/22/25  0407 07/21/25  0402 07/20/25  1315 07/19/25  0435 07/18/25  0318 07/17/25  0050 07/16/25  0416   WBC AUTO x10*3/uL 6.0 5.5 5.5 5.9 6.6 7.3 8.4   HEMOGLOBIN g/dL 12.6 11.9* 12.3 12.3  "12.7 14.1 13.1   HEMATOCRIT % 38.2 37.2 40.3 38.5 39.5 43.4 42.8   PLATELETS AUTO x10*3/uL 186 174 179 160 178 198 266   MCV fL 92 92 96 95 91 92 97     COAG:         ABO: No results found for: \"ABO\"  HEME/ENDO:         CARDIAC:         No lab exists for component: \"CK\", \"CKMBP\"      ASSESSMENT AND PLAN:   Thao Evans is a 60 year old with a PMHx sig for CAD s/p PCI (LAD; 2015, Lcx; 2025), stage D systolic HF/ICM/HFrEF s/p ICD, h/o VT with presumed associated syncope, poorly controlled DM, pAF (off of DOAC given the absence of recurrence), dyslipidemia, essential HTN, COPD, and h/o Graves. She was recently referred to [facility] and seen by Dr. Deluca. Her most recent hospitalization was in March 2025 for syncope/VT where she underwent an ICD placement and PCI to her LCx. She reported progressive symptoms over the last several months with ongoing fatigue, dyspnea, and early satiety causing significant progressive weight loss (~60 lbs over the last 6 months). She has also been currently intolerant of GDMT and is on midodrine for BP support since her hospital discharge. Because of worsening symptoms and intolerance of GDMT, she was offered direct admission to HFICU with plan for PAC guided evaluation. Transferred from HFICU to LT 5 06/06 under HHVI service. LT5 course c/b acute hypoxic respiratory failure due to L pleural effusion s/p thoracentesis x 3 (06/09, 6/11, 06/12), now on room air.     Pt was discussed in Advanced Therapies Committee meeting on 6/17/2025 and approved for LVAD (barriers to transplant, elevated PAP, uncontrolled DM Hg A1c > 8, active smoking). Plan is for patient to complete colonoscopy prior to LVAD placement next week tentative date 7/23. Repeat CT chest 7/16 showing improvement. EGD/colonoscopy 7/17 - pending biopsy results. [place] placed 7/20, removed 7/22 due to malfunctioning.      Neuro:  #Restless legs  #Anxiety  #Depression  - c/w home celexa  - c/w home " gabapentin  - C/w home requip  - Serial neuro and pain assessments  - PO Tylenol PRN for pain  - PT/OT Consult, OOB to chair  - CAM ICU score every shift  - Sleep/wake cycle normalization     # Physical Status  - Not obese, BMI 22.68 kg/m2  - Reduced Mobility     #Substance abuse  -Alcohol dependence: rare use  -Tobacco dependence: previous smoker     Cardiovascular:  #HFrEF /acute on chronic systolic Heart Failure, ICM, last LVEF 23% (6/2/2025), Stage D, NYHA III, s/p ICD.  - TTE 6/2 LVSF 20-25%, LV size is moderately dilated, mild/mod elevated pulmonary artery pressure  - Repeat TTE 7/15: LVSF 30-35%, low normal RVSF  - Diuretics: poor response oral bumex, dc 7/20. Bumex 2mg IV x 1 (7/20)  - GDMT:  holding jardiance 10 mg, c/w sandi 25  - C/w Entresto 24/26 mg BID (7/19)  - Opening SGC #'s (7/20): /66 (74), CVP 12, PA 52/30 (41), CO/CI 5.4/3.3, , SVO 2 61%, bumex 2mg po BID, entresto 24/26, Sandi 25  - Closing SCC #'s (7/22): MAP 80, CVP 7, PA 40/30 (36), CO/CI 5.4/3.4, SVR 1096, SVO2 68%, entresto 12/13 mg, [place] 25  - Daily standing weights, 2gm sodium diet, 2L fluid restriction, strict I&Os     #CAD s/p PCI (LAD in 2015, LCx in 2025)  -c/w asa 81mg daily, Plavix (on hold)  -c/w atorvastatin 80mg     # H/O VT  # Paroxysmal A Fib  -Device: s/p CRT-D 3/2025, at[employer]  -AC: off DOAC given absence of recurrence     #HTN  #HLD  -BP medications as above.  -c/w home statin     #Electrolyte Disturbances  -K goal >4, Mg>2     Pulmonary:  #COPD  -c/w fluticasone furoate-vilanterol (Breo) 100-25mcg   -c/w albuterol HFA prn  -Monitor and maintain SpO2 > 92%     GI:  #GERD  -c/w protonix 20mg   - Bowel regimen: prn miralax  - Bowel prep 7/15 - EGD/Colonoscopy 7/17 pending biopsy results, contact pathology as indicated      :  No renal dysfunction at baseline  -Baseline Cr 0.5-0.9  -Admit BUN/Cr: 11/0.62  -I/Os  -avoid hypotension and nephrotoxic agents     Heme:  No hematology pathology at baseline    - H&H 13.2/41.6  - Iron studies 62, 256, 24%  - venofer 200mg x3 doses      Endo:  #DM, T2  - Last hgbA1c (7/15/25): 8.0  - home medications; insulin - glargine, jardiance, nesina.  -Diet: carb controlled  -SSI while inpatient, adjust as needed    # Hypoglycemia  - Noted Hypoglycemia overnight 7/15 and 7/16  - Reduced lantus insulin from 50 units home dose 15u   - C/w lantus insulin 15 units      #h/o Grave's disease  -last TSH (6/5/25): 0.19, FT4 1.23      ID:  -afebrile, nontoxic  -no s/s infx  -trend temps q4h     PHYSICAL AND OCCUPATIONAL THERAPY: ordered    LINES:  PIVs     DVT: N/A  VAP BUNDLE: N/A  CENTRAL LINE BUNDLE: ordered  ULCER PPX: home PPI  GLYCEMIC CONTROL: ISS  BOWEL CARE: miralax  INDWELLING CATHETER: N/A  NUTRITION: Adult diet Regular      EMERGENCY CONTACT: Extended Emergency Contact Information  Primary Emergency Contact: Babatunde Mclean  Mobile Phone: [phone]  Relation: Friend   needed? No  Secondary Emergency Contact: Daisy Velasco  Mobile Phone: [phone]  Relation: Sister  FAMILY UPDATE: Patient at bedside  CODE STATUS: Full Code  DISPO: Maintain in HFICU     Patient seen and assessed with Dr. Hicks    I personally spent 60 minutes of critical care time directly and personally managing the patient exclusive of separately billable procedures   _________________________________________________  MARLI High-CNP

## 2025-07-22 NOTE — PROGRESS NOTES
HFICU Attending Note    60F with stage D HFrEF admitted for PAC-optimization prior to LVAD. Pre-LVAD colonoscopy revealed concerning polyp, now awaiting pathology. Deferring LVAD pending pathology.  If bx benign she has had 2 months of nicotine cessation such that we may revisit OHT. Of note LVIDD 4.4cm and anatomical suitability for LVAD may be a concern.     - Thoracentesis. Possible transfer to floor pending pathology results.     This critically ill patient continues to be at-risk for clinically significant deterioration / failure due to the above mentioned dysfunctional, unstable organ systems.  I have personally identified and managed all complex critical care issues to prevent aforementioned clinical deterioration.  Critical care time is spent at bedside and/or the immediate area and has included, but is not limited to, the review of diagnostic tests, labs, radiographs, serial assessments of hemodynamics, respiratory status, ventilatory management, and family updates.  Time spent in procedures and teaching are reported separately.    Critical care time: ___35_ minutes     Objective    Admit Date: 7/14/2025  Hospital Length of Stay: 8   ICU Length of Stay: 7d 13h   Home: [facility] [id number]    MEDICATIONS  Infusions:     Scheduled:  aspirin, 81 mg, Daily  atorvastatin, 80 mg, Daily  citalopram, 40 mg, Daily  [Held by provider] clopidogrel, 75 mg, Daily  [Held by provider] empagliflozin, 10 mg, Daily  fluticasone furoate-vilanteroL, 1 puff, Daily  gabapentin, 400 mg, q8h  insulin glargine, 15 Units, Nightly  insulin lispro, 0-5 Units, TID AC  levothyroxine, 88 mcg, Daily  magnesium oxide, 800 mg of magnesium oxide, BID  pantoprazole, 20 mg, Daily  perflutren protein A microsphere, 0.5 mL, Once in imaging  rOPINIRole, 1 mg, TID  rOPINIRole, 3 mg, Nightly  sacubitriL-valsartan, 0.5 tablet, BID  spironolactone, 25 mg, Daily  sulfur hexafluoride microsphr, 2 mL, Once in imaging      PRN:  acetaminophen, 650  mg, q6h PRN  albuterol, 2 puff, q6h PRN  alteplase, 2 mg, PRN  dextrose, 12.5 g, q15 min PRN  dextrose, 25 g, q15 min PRN  glucagon, 1 mg, q15 min PRN  glucagon, 1 mg, q15 min PRN  melatonin, 5 mg, Nightly PRN  nitroglycerin, 0.4 mg, q5 min PRN  ondansetron, 4 mg, q4h PRN  oxygen, , Continuous PRN - O2/gases  polyethylene glycol, 2,000 mL, Once PRN        Prior to Admission Meds:  Prescriptions Prior to Admission[1]    Invasive Hemodynamics:    Most Recent Range Past 24hrs   BP (Art)   No data recorded   MAP(Art)   No data recorded   RA/CVP   No data recorded   PA 37/27 PAP  Min: 33/23  Max: 53/27   PA(mean) 33 mmHg PAP (Mean)  Min: 28 mmHg  Max: 40 mmHg   PCWP 12 mmHg No data recorded   CO 5.4 L/min CO (L/min)  Min: 5.2 L/min  Max: 5.4 L/min   CI 3.4 L/min/m2 CI (L/min/m2)  Min: 3.2 L/min/m2  Max: 3.4 L/min/m2   Mixed Venous 68 % SVO2 (%)  Min: 68 %  Max: 70 %   SVR  1096 (dyne*sec)/cm5 SVR (dyne*sec)/cm5  Min: 938 (dyne*sec)/cm5  Max: 1096 (dyne*sec)/cm5    (dyne*sec)/cm5 No data recorded     MCS:   Heart Mate III:     Most Recent Range Past 24hrs   Flow   No data recorded   Speed   No data recorded   Power   No data recorded   PI   No data recorded     ECMO:     Most Recent Range Past 24hrs   Flow   No data recorded   Speed   No data recorded   Sweep   No data recorded     Impella:      Most Recent Range Past 24hrs   Performance Level   No data recorded   Flow (L/min)   No data recorded   Motor Current   No data recorded   Placement Signal    Placement OK could not be evaluated. This SmartLink does not work with rows of the type: Custom List   Purge (mmHg)   No data recorded   Purge rate (mL/hr)   No data recorded     VENT:    Most Recent Range Past 24hrs   Mode      FiO2   No data recorded   Rate   No data recorded   Vt    No data recorded   PEEP   No data recorded         7/22/2025     8:00 AM 7/22/2025     7:29 AM 7/22/2025     7:00 AM 7/22/2025     6:00 AM 7/22/2025     5:00 AM 7/22/2025     4:00 AM  "7/22/2025     3:00 AM   Vitals   Systolic 98  88 99 97 101 100   Diastolic 62  76 51 56 64 58   BP Location      Left arm    Heart Rate 76  69 70 72 74 75   Temp  35.5 °C (95.9 °F)    36.1 °C (97 °F)    Resp 15  19 14 14 21 12   Weight (lb)    136.69      BMI    25 kg/m2      BSA (m2)    1.65 m2        Visit Vitals  BP 98/62   Pulse 76   Temp 35.5 °C (95.9 °F)   Resp 15   Ht 1.575 m (5' 2\")   Wt 62 kg (136 lb 11 oz)   SpO2 (!) 89%   BMI 25.00 kg/m²   OB Status Postmenopausal   Smoking Status Former   BSA 1.65 m²     Wt Readings from Last 5 Encounters:   07/22/25 62 kg (136 lb 11 oz)   06/20/25 57.2 kg (126 lb)   06/20/25 57.4 kg (126 lb 9.6 oz)   06/17/25 58.2 kg (128 lb 4.9 oz)   05/30/25 56.2 kg (124 lb)     Weight         7/18/2025 2025 7/19/2025  0600 7/20/2025  0600 7/21/2025  0530 7/22/2025  0600    Weight: 61.5 kg (135 lb 9.3 oz) 61.7 kg (136 lb 1.6 oz) 61.9 kg (136 lb 7.4 oz) 61.2 kg (134 lb 14.7 oz) 62 kg (136 lb 11 oz)              Intake/Output Summary (Last 24 hours) at 7/22/2025 0908  Last data filed at 7/22/2025 0800  Gross per 24 hour   Intake 825 ml   Output 1150 ml   Net -325 ml     CHEST: Unlabored, Clear, Diminished  CV:  Normal sinus rhythm  ABD:      Bowel sounds:  , Flatus:    EXT:   RLE: Appropriate for ethnicity,Warm, Dry  DP:    PT: Weak  LLE: Appropriate for ethnicity,Warm, Dry  DP:    PT: Weak  NEURO:   RASS: Alert and calm  CAM: Negative  LOC: Alert  Cognition: Appropriate judgement  GCS: 15    DATA:  CMP:  Recent Labs     07/22/25  0407 07/21/25  0402 07/20/25  0600 07/19/25  0435 07/18/25  0318 07/17/25  0052 07/16/25  0416 07/15/25  0354 07/14/25 2003 06/16/25  1901    142 139 139 140 139 140 137 137 137   K 4.4 4.0 4.5 4.2 3.9 3.9 4.1 4.1 3.6 3.9   CL 94* 97* 97* 96* 97* 97* 101 99 96* 95*   CO2 38* 41* 36* 38* 36* 37* 33* 32 33* 33*   ANIONGAP 9* 8* 11 9* 11 9* 10 10 12 13   BUN 17 13 13 11 11 9 12 11 11 16   CREATININE 0.65 0.73 0.64 0.66 0.83 0.69 0.78 0.64 0.62 0.96   EGFR " ">90 >90 >90 >90 80 >90 86 >90 >90 67   MG 2.38 1.70 2.00 2.06 1.87 2.26 1.86 2.28 2.06 1.45*     Recent Labs     07/22/25  0407 07/21/25  0402 07/20/25  0600 07/19/25  0435 07/18/25  0318 07/17/25  0052 07/16/25  0416 07/15/25  0354 07/14/25 2003 06/16/25  1901 06/15/25  1735 06/14/25  1803 06/13/25  1804 06/12/25  1728 06/11/25  1810 06/10/25  1734   ALBUMIN 3.5 3.3* 3.6 3.6 3.7 4.1 3.4 3.5 3.9 3.3* 3.4 3.3* 3.3* 3.1* 3.1* 3.1*   ALT  --   --   --   --   --   --   --   --  11 19 19 17 14 13 10 8   AST  --   --   --   --   --   --   --   --  12 14 16 15 14 13 11 10   BILITOT  --   --   --   --   --   --   --   --  0.8 0.3 0.2 0.3 0.3 0.2 0.2 0.2     CBC:  Recent Labs     07/22/25  0407 07/21/25  0402 07/20/25  1315 07/19/25  0435 07/18/25  0318 07/17/25  0050 07/16/25  0416 07/15/25  0354   WBC 6.0 5.5 5.5 5.9 6.6 7.3 8.4 6.0   HGB 12.6 11.9* 12.3 12.3 12.7 14.1 13.1 12.8   HCT 38.2 37.2 40.3 38.5 39.5 43.4 42.8 39.9    174 179 160 178 198 266 194   MCV 92 92 96 95 91 92 97 94     COAG:   Recent Labs     07/14/25 2003 06/02/25  1346   INR 1.1 1.0     ABO:   Recent Labs     06/05/25  0909   ABO O     HEME/ENDO:   Recent Labs     07/15/25  0354 07/14/25  2013 06/05/25  0607 06/02/25  1346 03/13/25  0531 03/12/25  1430   FERRITIN  --  224*  --  232*  --   --    IRONSAT  --  24*  --  17*  --   --    TSH  --   --  0.19* 0.20*  --   --    HGBA1C 8.0*  --   --  8.6* 12.3* 12.7*     CARDIAC:   Recent Labs     07/14/25 2003 06/12/25  1728 06/09/25  1613 06/08/25  1846 06/03/25  1743 06/02/25  1346   LDH  --   --   --  166 213  --    TROPHS 18 15  --   --   --  22   *  --  1,456*  --   --  1,292*     Recent Labs     07/21/25  1630 07/21/25  1128 07/21/25  0529 06/06/25  1312 06/06/25  0519   LACMX 1.0 0.9 1.1 1.1 0.4   SO2MV 68 70 64 62 68     No results for input(s): \"TACROLIMUS\", \"SIROLIMUS\", \"CYCLOSPORINE\" in the last 89150 hours.  Recent Labs     07/14/25 2003   CHOL 141   LDLCALC 78   HDL 43.3   TRIG 99 " "    MICRO: No results for input(s): \"ESR\", \"CRP\", \"PROCAL\" in the last 46458 hours.  Susceptibility data from last 90 days.  Collected Specimen Info Organism Amoxicillin/Clavulanate Ampicillin Ampicillin/Sulbactam Cefazolin Cefazolin (uncomplicated UTIs only) Ciprofloxacin Gentamicin Levofloxacin Nitrofurantoin Piperacillin/Tazobactam   06/07/25 Urine from Clean Catch/Voided Escherichia coli  S  R  I  S  S  S  S  S  S  S   06/03/25 Urine from Clean Catch/Voided Escherichia coli  S  R  I  S  S  S  S  S  S  S     Collected Specimen Info Organism Trimethoprim/Sulfamethoxazole   06/07/25 Urine from Clean Catch/Voided Escherichia coli  S   06/03/25 Urine from Clean Catch/Voided Escherichia coli  S     Assessment & Plan  Acute on chronic heart failure with reduced ejection fraction (HFrEF, <= 40%)    Cardiogenic shock (Multi)        EKG:   Recent Labs     06/12/25  1715 06/03/25  1507   ATRRATE 78 85   VENTRATE 78 85   PRINT 160 160   QRSDUR 78 90   QTCFRED 436 422   QTCCALCB 456 447     Encounter Date: 06/02/25   Electrocardiogram, 12-lead PRN ACS symtpoms   Result Value    Ventricular Rate 78    Atrial Rate 78    MD Interval 160    QRS Duration 78    QT Interval 400    QTC Calculation(Bazett) 456    P Axis 57    R Axis -40    T Axis 101    QRS Count 13    Q Onset 216    P Onset 136    P Offset 185    T Offset 416    QTC Fredericia 436    Narrative    Normal sinus rhythm  Left axis deviation  Cannot rule out Anterior infarct , age undetermined  Abnormal ECG  When compared with ECG of 02-JUN-2025 13:34,  Significant changes have occurred  Confirmed by Jose Gonzalez ([id number]) on 6/20/2025 6:14:20 PM     Echocardiogram:   Recent Labs     07/15/25  1440 06/02/25  1729   EF 33 23   LVIDD 4.12 4.10   RV 47 41   RVFRWALLPKSP  --  10.10   TAPSE  --  1.6   Transthoracic Echo (TTE) Complete With Contrast 06/02/2025    [facility], [address] [place] [address]  Tel [phone] and Fax " [phone]    TRANSTHORACIC ECHOCARDIOGRAM REPORT      Patient Name:       [facility]A [facility] Physician:    [id number] Jose Gonzalez MD  Study Date:         6/2/2025            Ordering Provider:    [id number] HAYDEN BARCLAY  MRN/PID:            [id number]            Fellow:  Accession#:         [id number]        Nurse:  Date of Birth/Age:  1965 / 60      Sonographer:          Diane donaldson RDCS, KRYSTAL  Gender assigned at  F                   Additional Staff:  Birth:  Height:             157.48 cm           Admit Date:           5/30/2025  Weight:             54.43 kg            Admission Status:     Inpatient -  Routine  BSA / BMI:          1.54 m2 / 21.95     Encounter#:           [id number]  kg/m2  Blood Pressure:     97/55 mmHg          Department Location:  [address]    Study Type:    TRANSTHORACIC ECHO (TTE) COMPLETE  Diagnosis/ICD: Unspecified systolic (congestive) heart failure (CHF)-I50.20  Indication:    Congestive Heart Failure  CPT Code:      Echo Complete w Full Doppler-61942    Patient History:  Diabetes:          Yes  Pertinent History: Cardiomyopathy and CHF.    Study Detail: The following Echo studies were performed: 2D, M-Mode, Doppler and  color flow. Definity used as a contrast agent for endocardial  border definition. Total contrast used for this procedure was 2 mL  via IV push.      PHYSICIAN INTERPRETATION:  Left Ventricle: Left ventricular ejection fraction is severely decreased by visual estimate at 20-25%. There is global hypokinesis of the left ventricle with minor regional variations. The left ventricular cavity size is mildly dilated. There is normal septal and normal posterior left ventricular wall thickness. There is left ventricular concentric remodeling. Spectral Doppler shows a Grade III (restrictive pattern) of left ventricular diastolic filling with an elevated left atrial pressure. There is no definite left ventricular  thrombus visualized. There is relative preservation of the basal myocardial segment systolic function.  Left Atrium: The left atrial size is normal.  Right Ventricle: The right ventricle is normal in size. There is normal right ventricular global systolic function.  Right Atrium: The right atrium is normal in size.  Aortic Valve: The aortic valve is trileaflet. The aortic valve area by VTI is 1.12 cmï¿½ with a peak velocity of 1.49 m/s. The peak and mean gradients are 9 mmHg and 4 mmHg, respectively, with a dimensionless index of 0.49. There is no evidence of aortic valve regurgitation.  Mitral Valve: The mitral valve is mildly thickened. There is mild mitral valve regurgitation. The E Vmax is 0.95 m/s.  Tricuspid Valve: The tricuspid valve is structurally normal. There is mild tricuspid regurgitation.  Pulmonic Valve: The pulmonic valve is structurally normal. There is physiologic pulmonic valve regurgitation.  Pericardium: Trivial pericardial effusion.  Aorta: The aortic root is normal.  Pulmonary Artery: The tricuspid regurgitant velocity is 3.09 m/s, and with an estimated right atrial pressure of 8, the estimated pulmonary artery pressure is mild to moderately elevated with the RVSP at 46 mmHg.  Systemic Veins: The inferior vena cava appears normal in size, with IVC inspiratory collapse less than 50%.  In comparison to the previous echocardiogram(s): There are no prior studies on this patient for comparison purposes.      CONCLUSIONS:  1. Left ventricular ejection fraction is severely decreased by visual estimate at 20-25%.  2. There is global hypokinesis of the left ventricle with minor regional variations.  3. Spectral Doppler shows a Grade III (restrictive pattern) of left ventricular diastolic filling with an elevated left atrial pressure.  4. Left ventricular cavity size is mildly dilated.  5. No left ventricular thrombus visualized.  6. There is relative preservation of the basal myocardial segment  systolic function.  7. There is normal right ventricular global systolic function.  8. Mild to moderately elevated pulmonary artery pressure.    RECOMMENDATIONS:    QUANTITATIVE DATA SUMMARY:    2D MEASUREMENTS:         Normal Ranges:  Ao Root d:       2.10 cm (2.0-3.7cm)  LAs:             3.70 cm (2.7-4.0cm)  IVSd:            0.90 cm (0.6-1.1cm)  LVPWd:           0.90 cm (0.6-1.1cm)  LVIDd:           4.10 cm (3.9-5.9cm)  LVIDs:           3.70 cm  LV Mass Index:   74 g/m2  LV % FS          9.8 %      LEFT ATRIUM:                  Normal Ranges:  LA Vol A4C:        38.8 ml    (22+/-6mL/m2)  LA Vol A2C:        43.3 ml  LA Vol BP:         41.2 ml  LA Vol Index A4C:  25.2ml/m2  LA Vol Index A2C:  28.1 ml/m2  LA Vol Index BP:   26.8 ml/m2  LA Area A4C:       14.9 cm2  LA Area A2C:       15.8 cm2  LA Major Axis A4C: 4.9 cm  LA Major Axis A2C: 4.9 cm  LA Volume Index:   26.8 ml/m2      RIGHT ATRIUM:          Normal Ranges:  RA Area A4C:  14.7 cm2      LV SYSTOLIC FUNCTION:  Normal Ranges:  EF-Visual:      23 %  LV EF Reported: 23 %      LV DIASTOLIC FUNCTION:             Normal Ranges:  MV Peak E:             0.95 m/s    (0.7-1.2 m/s)  MV Peak A:             0.33 m/s    (0.42-0.7 m/s)  E/A Ratio:             2.90        (1.0-2.2)  MV e'                  0.034 m/s   (>8.0)  MV lateral e'          0.03 m/s  MV medial e'           0.04 m/s  MV A Dur:              124.00 msec  E/e' Ratio:            27.61       (<8.0)  MV DT:                 145 msec    (150-240 msec)      MITRAL VALVE:          Normal Ranges:  MV DT:        111 msec (150-240msec)      AORTIC VALVE:                     Normal Ranges:  AoV Vmax:                1.49 m/s (<=1.7m/s)  AoV Peak P.9 mmHg (<20mmHg)  AoV Mean P.0 mmHg (1.7-11.5mmHg)  LVOT Max Paco:            0.81 m/s (<=1.1m/s)  AoV VTI:                 27.40 cm (18-25cm)  LVOT VTI:                13.50 cm  LVOT Diameter:           1.80 cm  (1.8-2.4cm)  AoV Area, VTI:            1.12 cm2 (2.5-5.5cm2)  AoV Area,Vmax:           1.23 cm2 (2.5-4.5cm2)  AoV Dimensionless Index: 0.49      RIGHT VENTRICLE:  RV Basal 3.70 cm  RV Mid   2.40 cm  RV Major 6.3 cm  TAPSE:   15.6 mm  RV s'    0.10 m/s      TRICUSPID VALVE/RVSP:          Normal Ranges:  Peak TR Velocity:     3.09 m/s  Est. RA Pressure:     8  RV Syst Pressure:     46       (< 30mmHg)  IVC Diam:             1.50 cm      PULMONIC VALVE:          Normal Ranges:  PV Max Paco:     0.7 m/s  (0.6-0.9m/s)  PV Max P.9 mmHg      [id number] Jose Gonzalez MD  Electronically signed on 2025 at 6:00:20 PM        ** Final **    Coronary Angiography: No results found for this or any previous visit from the past 1800 days.    Right Heart Cath: No results found for this or any previous visit from the past 1800 days.    Cardiac Scoring: No results found for this or any previous visit from the past 1800 days.    Cardiac MRI: No results found for this or any previous visit from the past 1800 days.    Nuclear:No results found for this or any previous visit from the past 1800 days.    Metabolic Stress: No results found for this or any previous visit from the past 1800 days.      Imaging  XR chest 1 view  Result Date: 2025  1. Right IJ [place]-Femi catheter has been slightly retracted with tip now seen projecting over mid right main pulmonary artery.   2. Otherwise, no significant change in mild diffuse pulmonary edema and bilateral pleural effusions with associated atelectasis, left more than right.   I personally reviewed the images/study and I agree with the findings as stated by resident physician Joseph Moore MD. This study was interpreted at [facility], [place], [place].   MACRO: None   Signed by: Reggie Denis 2025 8:49 PM Dictation workstation:   [id number]    XR chest 1 view  Result Date: 2025  1. Medical devices as above. New right IJ approach [place]-Femi catheter with tip projecting over right  interlobar pulmonary artery. 2. No significant change in small-to-moderate bilateral pleural effusions and associated atelectasis, left more than right. 3. Similar mild background pulmonary edema.   Signed by: Reggie Denis 7/20/2025 12:42 PM Dictation workstation:   [id number]    CT chest wo IV contrast  Result Date: 7/16/2025  1.  Bilateral moderate pleural effusions, left-greater-than-right, with adjacent atelectasis. These appear slightly improved from prior. 2. Pulmonary interstitial and alveolar edema. However superimposed infectious or inflammatory process not excluded. 3. Interval decrease in size of mediastinal lymphadenopathy. Recommend continued attention on follow-up imaging. 4. Mild cardiomegaly and severe coronary artery calcifications. 5. Mild thoracic atherosclerotic disease. 6. Additional chronic or incidental findings as detailed above.   I personally reviewed the images/study and I agree with the findings as stated by resident physician Marcus Meier MD. This study was interpreted at [place], [place].   MACRO: None   Signed by: Aj Alaniz 7/16/2025 4:11 PM Dictation workstation:   [id number]      Cardiology, Vascular, and Other Imaging  Colonoscopy Diagnostic  Result Date: 7/17/2025  Table formatting from the original result was not included. Impression Semi solid stool that could not be cleared resulting in inadequate preparation on the right side of the colon. The terminal ileum appeared normal. One 20 mm Rhonda IIa+c polyp in the appendiceal orifice; lift performed but central portion of the polyp did not lift with abnormal features under NBI concerning for possible invasive disease. Central portion was biopsied. Subcentimeter polyp in the ascending colon was removed with cold snare Four polyps measuring from 4 mm up to 10 mm; performed cold snare removal One 20 mm Rhonda IIa LST-G polyp in the sigmoid colon; lift performed; performed cold  snare with piecemeal removal; tattooed 5 cm distal to the finding Findings The terminal ileum appeared normal. One 20 mm sessile and adenomatous-appearing Rhonda IIa+c polyp in the appendiceal orifice; lift performed with 5 mL of Blue Eye injected into the submucosa. The central portion of the polyp did not lift appropriately. This part of the polyp was biopsied. Polypectomy not attempted due to multiple factors including ICU setting, inadequate preparation on the right side of the colon and patient's intermittent desaturation events. Biopsy placed in Jar 1. One 4 mm polyp in the ascending colon; performed cold snare with complete en bloc removal and retrieved specimen. Placed in Jar 2 Four sessile and benign-appearing polyps measuring from 4 mm up to 10 mm; performed cold snare with complete en bloc removal and retrieved specimen. Placed in Jar 3. One 20 mm sessile Rhonda IIa LST-G polyp in the sigmoid colon; lift performed with 4 mL of Blue Eye injected into the submucosa; performed cold snare with complete piecemeal removal and retrieved specimen; 1 tattoo was placed 5 cm distal to the finding with Black eye. Placed in Jar 3 Recommendation - The patient will be observed post-procedure, until all discharge criteria are met. - Return patient to ICU for ongoing care. - Defer resumption of diet and medications to the patient's primary team. No restrictions from a post-procedural standpoint. - Observe patient's clinical course following today's procedure - Await pathology results. - If pathology from the AO polyp only shows adenomatous tissue, can consider repeat colonoscopy with a 2 day bowel preparation and a clear cap for a polypectomy attempt. Another consideration could be for FTRD though will be challenging due to the location. - The findings and recommendations were discussed with the referring physicians. - The signs and symptoms of potential delayed complications were discussed with the patient. - Follow up  with Norah Perez and [facility] for ongoing inpatient gastroenterology consultation. Indication Abnormal weight loss Screening for colon cancer Post-Op Diagnosis None Staff Staff Role Leonila Slater MD Proceduralist Medications Totals unavailable because the procedure time range is not set Preprocedure A history and physical has been performed, and patient medication allergies have been reviewed. The patient's tolerance of previous anesthesia has been reviewed. The risks and benefits of the procedure and the sedation options and risks were discussed with the patient. All questions were answered and informed consent obtained. Details of the Procedure The patient underwent moderate sedation, which was administered by the procedural nurse. The patient's blood pressure, ECG, ETCO2, heart rate, level of consciousness, oxygen and respirations were monitored throughout the procedure. A digital rectal exam was performed. The scope was introduced through the anus and advanced to the terminal ileum. Retroflexion was performed in the rectum. The quality of bowel preparation was evaluated using the [place] Bowel Preparation Scale with scores of: right colon = 1, transverse colon = 2, left colon = 2. The total BBPS score was 5. Bowel prep was not adequate. The patient experienced no blood loss. The procedure was not difficult. The patient tolerated the procedure well. There were no apparent adverse events. Events Procedure Events Event Event Time Specimens No specimens collected Procedure Location [facility] [address] [facility] [address] [phone] Referring Provider Mellisa Villafana MD MPH Procedure Provider Leonila Slater MD     Esophagogastroduodenoscopy (EGD)  Result Date: 7/17/2025  Table formatting from the original result was not included. Impression The upper third of the esophagus, middle third of the esophagus and lower third of the esophagus appeared normal. Irregular Z-line  The fundus of the stomach, body of the stomach, incisura, antrum and pylorus appeared normal. The duodenal bulb and 2nd part of the duodenum appeared normal. Findings The upper third of the esophagus, middle third of the esophagus and lower third of the esophagus appeared normal. Irregular Z-line 38 cm from the incisors The fundus of the stomach, body of the stomach, incisura, antrum and pylorus appeared normal. The duodenal bulb and 2nd part of the duodenum appeared normal. Recommendation Proceed with colonoscopy today as previously scheduled.  Indication Abnormal weight loss Post-Op Diagnosis None Staff Staff Role Cyndi Perez MD Fellow Leonila Slater MD Proceduralist Medications gabapentin (Neurontin) capsule 400 mg Not documented*  *Total volume has not been documented. View each administration to see the amount administered. rOPINIRole (Requip) tablet 1 mg 1 mg dextrose 50 % injection 12.5 g 12.5 g dextrose 10 % in water (D10W) infusion 291.67 mL*  *From user-documented volume fentaNYL PF (Sublimaze) injection 100 mcg 0 mcg*  *Some administrations are not included in total fentaNYL PF (Sublimaze) injection 50 mcg/mL  - Omnicell Override Pull Cannot be calculated*  *Administration dose not documented fentaNYL PF (Sublimaze) injection 50 mcg midazolam (Versed) injection 1 mg/mL  - Omnicell Override Pull Cannot be calculated*  *Administration dose not documented midazolam (Versed) injection 1 mg/mL  - Omnicell Override Pull Cannot be calculated*  *Administration dose not documented midazolam (Versed) injection 2 mg phenylephrine in NS (Maulik-Synephrine) syringe 40 mcg/mL  - Omnicell Override Pull Cannot be calculated*  *Administration dose not documented midazolam PF (Versed) injection 5 mg 0 mg*  *Some administrations are not included in total (Totals for administrations occurring from 1231 to 1640 on 07/17/25) Preprocedure A history and physical has been performed, and patient medication allergies have been  reviewed. The patient's tolerance of previous anesthesia has been reviewed. The risks and benefits of the procedure and the sedation options and risks were discussed with the patient. All questions were answered and informed consent obtained. Details of the Procedure The patient underwent moderate sedation, which was administered by the procedural nurse. The patient's blood pressure, ECG, ETCO2, heart rate, level of consciousness, oxygen and respirations were monitored throughout the procedure. The scope was introduced through the mouth and advanced to the second part of the duodenum. Retroflexion was performed in the cardia. Prior to the procedure, the patient's H. Pylori status was unknown. The patient experienced no blood loss. The procedure was not difficult. The patient tolerated the procedure well. There were no apparent adverse events. Events Procedure Events Event Event Time Specimens No specimens collected Procedure Location [place] [id number] [phone] Referring Provider Mellisa Villafana MD MPH Procedure Provider Leonila Slater MD     Transthoracic Echo (TTE) Limited  Result Date: 7/15/2025   [facility], [address] [place] [address]                Tel [phone] and Fax [phone] TRANSTHORACIC ECHOCARDIOGRAM REPORT  Patient Name:       ANETA Khan Physician:    [id number] Kelly MD Study Date:         7/15/2025           Ordering Provider:    [id number] JOY HENDRICKS MRN/PID:            [id number]            Fellow: Accession#:         [id number]        Nurse: Date of Birth/Age:  1965 / 60      Sonographer:          Lorrie donaldson                                     [facility] Gender assigned at  F                   Additional Staff:  Birth: Height:             157.48 cm           Admit Date:           7/14/2025 Weight:             61.69 kg            Admission Status:     Inpatient -                                                               Routine BSA / BMI:          1.62 m2 / 24.87     Encounter#:           [id number]                     kg/m2 Blood Pressure:     82/48 mmHg          [address] Study Type:    TRANSTHORACIC ECHO (TTE) LIMITED Diagnosis/ICD: 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 Study Detail: The following Echo studies were performed: 2D, M-Mode, Doppler and               color flow. Technically challenging study due to poor acoustic               windows and prominent lung artifact. Definity used as a contrast               agent for endocardial border definition. Total contrast used for               this procedure was 2 mL via IV push.  PHYSICIAN INTERPRETATION: Left Ventricle: Left ventricular ejection fraction is moderately decreased by visual estimate at 30-35%. There are multiple left ventricular wall motion abnormalities. The left ventricular cavity size is severely dilated. The left ventricular cavity size is severely dilated by end diastolic indexed volume. There is normal septal and normal posterior left ventricular wall thickness. Spectral Doppler shows a Grade III (restrictive pattern) of left ventricular diastolic filling with an elevated left atrial pressure. There is no definite left ventricular thrombus visualized. The inferolateral wall is hypo to akinetic. The apex is hypokinetic. Left Atrium: The  left atrial size is upper limits of normal. Right Ventricle: The right ventricle is normal in size. There is low normal right ventricular systolic function. Right Atrium: The right atrium is normal in size. The right atrium has a prominent Chiari network. Aortic Valve: The aortic valve is probably trileaflet. There is no evidence of aortic valve regurgitation. Mitral Valve: The mitral valve is mildly thickened. There is trace mitral valve regurgitation. The E Vmax is 0.97 m/s. Tricuspid Valve: The tricuspid valve is structurally normal. There is mild tricuspid regurgitation. The Doppler estimated right ventricular systolic pressure (RVSP) is mildly elevated at 47 mmHg. Pulmonic Valve: The pulmonic valve is not well visualized. The pulmonic valve regurgitation was not well visualized. Pericardium: Trivial pericardial effusion. There is a pericardial fat pad present. Pleural: There is a large left pleural effusion. Aorta: The aortic root is normal. The ascending aorta was not well visualized. The aortic root appears normal in size and measures 2.53 cm. Systemic Veins: The inferior vena cava appears normal in size, with IVC inspiratory collapse greater than 50%. In comparison to the previous echocardiogram(s): Compared with study dated 6/2/2025, the LV function is now mildly improved owing mostly to a small increase in apcal contractility.  CONCLUSIONS:  1. Left ventricular ejection fraction is moderately decreased by visual estimate at 30-35%.  2. There are multiple left ventricular wall motion abnormalities.  3. The inferolateral wall is hypo to akinetic. The apex is hypokinetic.  4. Spectral Doppler shows a Grade III (restrictive pattern) of left ventricular diastolic filling with an elevated left atrial pressure.  5. Left ventricular cavity size is severely dilated.  6. No left ventricular thrombus visualized.  7. There is low normal right ventricular systolic function.  8. There is a large pleural effusion.  9.  The Doppler estimated RVSP is mildly elevated at 47 mmHg. 10. Normal aortic root. 11. Compared with study dated 6/2/2025, the LV function is now mildly improved owing mostly to a small increase in apcal contractility. QUANTITATIVE DATA SUMMARY:  2D MEASUREMENTS:          Normal Ranges: Ao Root d:       2.53 cm  (2.0-3.7cm) IVSd:            0.78 cm  (0.6-1.1cm) LVPWd:           0.82 cm  (0.6-1.1cm) LVIDd:           4.12 cm  (3.9-5.9cm) LVIDs:           2.53 cm LV Mass Index:   60 g/m2 LVEDV Index:     88 ml/m2 LV % FS          38.7 %  LEFT ATRIUM:                  Normal Ranges: LA Vol A4C:        57.4 ml    (22+/-6mL/m2) LA Vol A2C:        47.0 ml LA Vol BP:         53.5 ml LA Vol Index A4C:  35.4ml/m2 LA Vol Index A2C:  29.0 ml/m2 LA Vol Index BP:   33.0 ml/m2 LA Area A4C:       19.1 cm2 LA Area A2C:       16.8 cm2 LA Major Axis A4C: 5.4 cm LA Major Axis A2C: 5.1 cm LA Volume Index:   33.0 ml/m2  RIGHT ATRIUM:                 Normal Ranges: RA Vol A4C:        46.5 ml    (8.3-19.5ml) RA Vol Index A4C:  28.7 ml/m2 RA Area A4C:       14.8 cm2 RA Major Axis A4C: 4.0 cm  LV SYSTOLIC FUNCTION:                      Normal Ranges: EF-A4C View:    36 % (>=55%) EF-A2C View:    34 % EF-Biplane:     33 % EF-Visual:      33 % LV EF Reported: 33 %  LV DIASTOLIC FUNCTION:             Normal Ranges: MV Peak E:             0.97 m/s    (0.7-1.2 m/s) MV Peak A:             0.35 m/s    (0.42-0.7 m/s) E/A Ratio:             2.74        (1.0-2.2) MV e'                  0.059 m/s   (>8.0) MV lateral e'          0.06 m/s MV medial e'           0.06 m/s MV A Dur:              114.18 msec E/e' Ratio:            16.34       (<8.0) PulmV Sys Paco:         21.33 cm/s PulmV Sánchez Paco:        69.80 cm/s PulmV S/D Paco:         0.31 PulmV A Revs Paco:      16.69 cm/s PulmV A Revs Dur:      99.90 msec  MITRAL VALVE:          Normal Ranges: MV DT:        119 msec (150-240msec)  AORTIC VALVE:          Normal Ranges: LVOT Diameter: 2.14 cm (1.8-2.4cm)   RIGHT VENTRICLE: RV Basal 3.50 cm RV Mid   1.90 cm  TRICUSPID VALVE/RVSP:          Normal Ranges: Peak TR Velocity:     3.32 m/s Est. RA Pressure:     3 RV Syst Pressure:     47       (< 30mmHg) IVC Diam:             1.80 cm  PULMONARY VEINS: PulmV A Revs Dur: 99.90 msec PulmV A Revs Paco: 16.69 cm/s PulmV Sánchez Paco:   69.80 cm/s PulmV S/D Paco:    0.31 PulmV Sys Paco:    21.33 cm/s  [id number] Tony Kelly MD Electronically signed on 7/15/2025 at 3:56:04 PM  ** Final **              LDA:       NUTRITION: Adult diet Regular  EMERGENCY CONTACT: Extended Emergency Contact Information  Primary Emergency Contact: Babatunde Mclean  Mobile Phone: [phone]  Relation: Friend   needed? No  Secondary Emergency Contact: KristaBeckDaisy  Mobile Phone: [phone]  Relation: Sister  CODE STATUS: Full Code  DISPO: Discharge Planning  Living Arrangements: Family members  Support Systems: Family members  Assistance Needed: no  Home or Post Acute Services: None  Expected Discharge Disposition: Home  FOLLOWUP: No future appointments.               [1]   Medications Prior to Admission   Medication Sig Dispense Refill Last Dose/Taking    albuterol 90 mcg/actuation inhaler Inhale 2 puffs every 6 hours if needed for wheezing or shortness of breath.   2025    alogliptin (Nesina) 25 mg tablet Take 1 tablet (25 mg) by mouth once daily.   2025    [] aspirin 81 mg chewable tablet Chew 1 tablet (81 mg) once daily. 30 tablet 0 2025    atorvastatin (Lipitor) 80 mg tablet Take 1 tablet (80 mg) by mouth once daily.   2025    budesonide-formoterol (Symbicort) 80-4.5 mcg/actuation inhaler INHALE 2 PUFF BY INHALATION ROUTE 2 TIMES EVERY DAY IN THE MORNING AND EVENING   2025    bumetanide (Bumex) 1 mg tablet Take 1 tablet (1 mg) by mouth 2 times daily (morning and late afternoon). 60 tablet 2 2025    citalopram (CeleXA) 40 mg tablet Take 1 tablet (40 mg) by mouth early in the morning..   2025     clopidogrel (Plavix) 75 mg tablet Take 1 tablet (75 mg) by mouth once daily.   7/14/2025    empagliflozin (Jardiance) 10 mg tablet Take 1 tablet (10 mg) by mouth once daily. Do not start before June 21, 2025. 30 tablet 2 7/14/2025    gabapentin (Neurontin) 400 mg capsule TAKE 1 CAPSULE BY MOUTH 3 TIMES EVERY DAY FOR DIABETIC NEUROPATHY   7/14/2025    insulin glargine (Lantus Solostar U-100 Insulin) 100 unit/mL (3 mL) pen Inject 15 Units under the skin once daily at bedtime.   7/14/2025    levothyroxine (Synthroid, Levoxyl) 88 mcg tablet Take 1 tablet (88 mcg) by mouth early in the morning.. Take on an empty stomach at the same time each day, either 30 to 60 minutes prior to breakfast 30 tablet 11 7/14/2025    nitroglycerin (Nitrostat) 0.4 mg SL tablet Place 1 tablet (0.4 mg) under the tongue every 5 minutes if needed for chest pain.   7/14/2025    pantoprazole (ProtoNix) 20 mg EC tablet Take 1 tablet (20 mg) by mouth once daily.   7/14/2025    rOPINIRole (Requip) 1 mg tablet Take 1 tablet (1 mg) by mouth 3 times a day.   7/14/2025    rOPINIRole (Requip) 3 mg tablet take 1 tablet by oral route every day at bedtime   7/14/2025    sacubitriL-valsartan (Entresto) 24-26 mg tablet Take 1 tablet by mouth 2 times a day. 60 tablet 1 7/14/2025    spironolactone (Aldactone) 25 mg tablet Take 1 tablet (25 mg) by mouth once daily.   7/14/2025

## 2025-07-22 NOTE — PROGRESS NOTES
Physical Therapy    Physical Therapy Treatment    Patient Name: Thao Evans  MRN: [id number]  Department: [facility] HFICU  Room: 13/13-A  Today's Date: 7/22/2025  Time Calculation  Start Time: 1452  Stop Time: 1515  Time Calculation (min): 23 min         Assessment/Plan   PT Assessment  Rehab Prognosis: Excellent  Barriers to Discharge Home: No anticipated barriers  Evaluation/Treatment Tolerance: Patient tolerated treatment well  Medical Staff Made Aware: Yes  Strengths: Ability to acquire knowledge, Attitude of self  Barriers to Participation: Comorbidities  End of Session Communication: Bedside nurse  Assessment Comment: Focus of tx session on 6 min walk test. Making great progress and remains on track for safe d/c home when medically cleared.  End of Session Patient Position: Bed, 3 rail up, Alarm off, not on at start of session     PT Plan  Treatment/Interventions: Bed mobility, Transfer training, Gait training, Balance training, Neuromuscular re-education, Stair training, Strengthening, Endurance training, Range of motion, Therapeutic exercise, Therapeutic activity, Positioning, Postural re-education  PT Plan: Ongoing PT  PT Frequency: 4 times per week  PT Discharge Recommendations: Low intensity level of continued care  PT Recommended Transfer Status: Stand by assist  PT - OK to Discharge: Yes    PT Visit Info:  PT Received On: 07/22/25  Response to Previous Treatment: Patient with no complaints from previous session.     General Visit Information:   General  Family/Caregiver Present: No  Prior to Session Communication: Bedside nurse  Patient Position Received: Bed, 3 rail up, Alarm off, not on at start of session  Preferred Learning Style: verbal  General Comment: patient pleasant, agreeable to PT session.    Subjective   Precautions:  Precautions  Hearing/Visual Limitations: hearing is WFL  Medical Precautions: Cardiac precautions  Precautions Comment: tele, 4L SpO2      Vital Signs Comment: vitals stable  during amb     Objective   Pain:  Pain Assessment  Pain Assessment: 0-10  0-10 (Numeric) Pain Score: 0 - No pain  Cognition:  Cognition  Orientation Level: Oriented X4  Coordination:  Movements are Fluid and Coordinated: Yes  Postural Control:  Postural Control  Postural Control: Within Functional Limits  Static Standing Balance  Static Standing-Balance Support: No upper extremity supported  Static Standing-Level of Assistance: Close supervision  Static Standing-Comment/Number of Minutes: standing at bedside prior to amb  Dynamic Standing Balance  Dynamic Standing-Balance Support: Right upper extremity supported  Dynamic Standing-Level of Assistance: Close supervision  Dynamic Standing-Balance: Turning  Dynamic Standing-Comments: holding on to IV    Activity Tolerance:  Activity Tolerance  Endurance: Endurance does not limit participation in activity  Early Mobility/Exercise Safety Screen: Proceed with mobilization - No exclusion criteria met  Rate of Perceived Dyspnea (RPD): 0/10  Rate of Perceived Exertion (RPE): 0/10    Treatments:  Bed Mobility  Bed Mobility: Yes  Bed Mobility 1  Bed Mobility 1: Supine to sitting, Sitting to supine  Level of Assistance 1: Modified independent  Bed Mobility Comments 1: HOB elevated    Ambulation/Gait Training  Ambulation/Gait Training Performed: Yes  Ambulation/Gait Training 1  Surface 1: Level tile  Device 1: IV Pole  Assistance 1: Close supervision  Quality of Gait 1: Decreased step length  Comments/Distance (ft) 1: 675 ft (during 6min walk test), 225 ft  Transfers  Transfer: Yes  Transfer 1  Transfer From 1: Sit to, Stand to  Transfer to 1: Sit, Stand  Technique 1: Sit to stand, Stand to sit  Transfer Level of Assistance 1: Close supervision  Trials/Comments 1: x1 trial    Outcome Measures:  [facility] Basic Mobility  Turning from your back to your side while in a flat bed without using bedrails: A little  Moving from lying on your back to sitting on the side of a flat bed without  using bedrails: A little  Moving to and from bed to chair (including a wheelchair): A little  Standing up from a chair using your arms (e.g. wheelchair or bedside chair): A little  To walk in hospital room: A little  Climbing 3-5 steps with railing: A little  Basic Mobility - Total Score: 18    FSS-ICU  Ambulation: Walks >/ or equal to 150 feet with supervision  Rolling: Supervision or set-up only  Sitting: Supervision or set-up only  Transfer Sit-to-Stand: Supervision or set-up only  Transfer Supine-to-Sit: Supervision or set-up only  Total Score: 25      Early Mobility/Exercise Safety Screen: Proceed with mobilization - No exclusion criteria met  ICU Mobility Scale: Walking with assistance of 1 person [8]  Other Measures  6 min Walk Test: Patient completed 6 min walk test around unit with minimal distractions. Able to complete 675 ft without stopping and stable vitals.    Education Documentation  Handouts, taught by Cheryl Hemphill PTA at 7/22/2025  3:58 PM.  Learner: Patient  Readiness: Acceptance  Method: Explanation  Response: Verbalizes Understanding  Comment: reviewed rehab POC    Mobility Training, taught by Cheryl Hemphill PTA at 7/22/2025  3:58 PM.  Learner: Patient  Readiness: Acceptance  Method: Explanation  Response: Verbalizes Understanding  Comment: reviewed rehab POC    Education Comments  No comments found.        OP EDUCATION:       Encounter Problems       Encounter Problems (Active)       Balance       patient will score >24/28 on the Tinetti scale to present as a low risk of falls (Progressing)       Start:  07/15/25    Expected End:  07/29/25               Mobility       Patient will ambulate >1000ft indep (Progressing)       Start:  07/15/25    Expected End:  07/29/25               Mobility       Patient will complete the 5xSTS  in <15 sec with no assistive device, no UE support, and close supervision (RPE: 11/20 RPD: 3/10) (Progressing)       Start:  07/15/25    Expected End:  07/29/25             Patient will ambulate >1000ft on the 6MWT with no assistive device and close superivision (RPE: 11/20 RPD: 3/10) (Progressing)       Start:  07/15/25    Expected End:  07/29/25               PT Transfers       Patient to transfer to and from sit to supine indep (Progressing)       Start:  07/15/25    Expected End:  07/29/25            Patient will transfer sit to and from stand indep (Progressing)       Start:  07/15/25    Expected End:  07/29/25

## 2025-07-23 ENCOUNTER — APPOINTMENT (OUTPATIENT)
Dept: RADIOLOGY | Facility: HOSPITAL | Age: 60
End: 2025-07-23
Payer: COMMERCIAL

## 2025-07-23 ENCOUNTER — APPOINTMENT (OUTPATIENT)
Dept: TRANSPLANT | Facility: HOSPITAL | Age: 60
End: 2025-07-23
Payer: COMMERCIAL

## 2025-07-23 ENCOUNTER — APPOINTMENT (OUTPATIENT)
Dept: RADIOLOGY | Facility: HOSPITAL | Age: 60
DRG: 001 | End: 2025-07-23
Payer: COMMERCIAL

## 2025-07-23 LAB
ALBUMIN SERPL BCP-MCNC: 3.6 G/DL (ref 3.4–5)
ALBUMIN SERPL BCP-MCNC: 3.7 G/DL (ref 3.4–5)
AMYLASE FLD-CCNC: 43 U/L
ANABASINE UR-MCNC: <5 NG/ML
ANION GAP SERPL CALC-SCNC: 11 MMOL/L (ref 10–20)
ANION GAP SERPL CALC-SCNC: 9 MMOL/L (ref 10–20)
BASOPHILS NFR FLD MANUAL: 1 %
BUN SERPL-MCNC: 15 MG/DL (ref 6–23)
BUN SERPL-MCNC: 16 MG/DL (ref 6–23)
CALCIUM SERPL-MCNC: 9 MG/DL (ref 8.6–10.6)
CALCIUM SERPL-MCNC: 9.1 MG/DL (ref 8.6–10.6)
CHLORIDE SERPL-SCNC: 97 MMOL/L (ref 98–107)
CHLORIDE SERPL-SCNC: 98 MMOL/L (ref 98–107)
CLARITY FLD: ABNORMAL
CO2 SERPL-SCNC: 33 MMOL/L (ref 21–32)
CO2 SERPL-SCNC: 39 MMOL/L (ref 21–32)
COLOR FLD: ABNORMAL
COTININE UR-MCNC: 64 NG/ML
CREAT SERPL-MCNC: 0.62 MG/DL (ref 0.5–1.05)
CREAT SERPL-MCNC: 0.85 MG/DL (ref 0.5–1.05)
EGFRCR SERPLBLD CKD-EPI 2021: 78 ML/MIN/1.73M*2
EGFRCR SERPLBLD CKD-EPI 2021: >90 ML/MIN/1.73M*2
EOSINOPHIL NFR FLD MANUAL: NORMAL %
ERYTHROCYTE [DISTWIDTH] IN BLOOD BY AUTOMATED COUNT: 14.9 % (ref 11.5–14.5)
ERYTHROCYTE [DISTWIDTH] IN BLOOD BY AUTOMATED COUNT: 15.1 % (ref 11.5–14.5)
GLUCOSE BLD MANUAL STRIP-MCNC: 117 MG/DL (ref 74–99)
GLUCOSE BLD MANUAL STRIP-MCNC: 176 MG/DL (ref 74–99)
GLUCOSE BLD MANUAL STRIP-MCNC: 260 MG/DL (ref 74–99)
GLUCOSE BLD MANUAL STRIP-MCNC: 301 MG/DL (ref 74–99)
GLUCOSE FLD-MCNC: 237 MG/DL
GLUCOSE SERPL-MCNC: 184 MG/DL (ref 74–99)
GLUCOSE SERPL-MCNC: 218 MG/DL (ref 74–99)
HCT VFR BLD AUTO: 39.3 % (ref 36–46)
HCT VFR BLD AUTO: 41.7 % (ref 36–46)
HGB BLD-MCNC: 12.6 G/DL (ref 12–16)
HGB BLD-MCNC: 12.7 G/DL (ref 12–16)
HOLD SPECIMEN: NORMAL
LDH FLD L TO P-CCNC: 62 U/L
LYMPHOCYTES NFR FLD MANUAL: 52 %
MAGNESIUM SERPL-MCNC: 2.17 MG/DL (ref 1.6–2.4)
MAGNESIUM SERPL-MCNC: 2.28 MG/DL (ref 1.6–2.4)
MCH RBC QN AUTO: 29.9 PG (ref 26–34)
MCH RBC QN AUTO: 30 PG (ref 26–34)
MCHC RBC AUTO-ENTMCNC: 30.5 G/DL (ref 32–36)
MCHC RBC AUTO-ENTMCNC: 32.1 G/DL (ref 32–36)
MCV RBC AUTO: 93 FL (ref 80–100)
MCV RBC AUTO: 98 FL (ref 80–100)
MONOS+MACROS NFR FLD MANUAL: 26 %
NEUTROPHILS NFR FLD MANUAL: 21 %
NICOTINE UR-MCNC: <15 NG/ML
NRBC BLD-RTO: 0 /100 WBCS (ref 0–0)
NRBC BLD-RTO: 0 /100 WBCS (ref 0–0)
PH FLD: 7.09 [PH]
PHOSPHATE SERPL-MCNC: 2.9 MG/DL (ref 2.5–4.9)
PHOSPHATE SERPL-MCNC: 3.6 MG/DL (ref 2.5–4.9)
PLATELET # BLD AUTO: 176 X10*3/UL (ref 150–450)
PLATELET # BLD AUTO: 177 X10*3/UL (ref 150–450)
POTASSIUM SERPL-SCNC: 4.8 MMOL/L (ref 3.5–5.3)
POTASSIUM SERPL-SCNC: 4.8 MMOL/L (ref 3.5–5.3)
PROT FLD-MCNC: 2 G/DL
RBC # BLD AUTO: 4.22 X10*6/UL (ref 4–5.2)
RBC # BLD AUTO: 4.24 X10*6/UL (ref 4–5.2)
RBC # FLD AUTO: 6000 /UL
SODIUM SERPL-SCNC: 136 MMOL/L (ref 136–145)
SODIUM SERPL-SCNC: 141 MMOL/L (ref 136–145)
TOTAL CELLS COUNTED FLD: 100
TRANS-3-OH-COTININE UR-MCNC: >2000 NG/ML
TRIGL FLD-MCNC: <10 MG/DL
WBC # BLD AUTO: 5.4 X10*3/UL (ref 4.4–11.3)
WBC # BLD AUTO: 7.5 X10*3/UL (ref 4.4–11.3)
WBC # FLD AUTO: 403 /UL

## 2025-07-23 PROCEDURE — 2500000005 HC RX 250 GENERAL PHARMACY W/O HCPCS: Performed by: NURSE PRACTITIONER

## 2025-07-23 PROCEDURE — 71045 X-RAY EXAM CHEST 1 VIEW: CPT

## 2025-07-23 PROCEDURE — 32555 ASPIRATE PLEURA W/ IMAGING: CPT

## 2025-07-23 PROCEDURE — 84157 ASSAY OF PROTEIN OTHER: CPT

## 2025-07-23 PROCEDURE — 2500000002 HC RX 250 W HCPCS SELF ADMINISTERED DRUGS (ALT 637 FOR MEDICARE OP, ALT 636 FOR OP/ED): Performed by: STUDENT IN AN ORGANIZED HEALTH CARE EDUCATION/TRAINING PROGRAM

## 2025-07-23 PROCEDURE — 83615 LACTATE (LD) (LDH) ENZYME: CPT | Performed by: NURSE PRACTITIONER

## 2025-07-23 PROCEDURE — 99291 CRITICAL CARE FIRST HOUR: CPT

## 2025-07-23 PROCEDURE — 83615 LACTATE (LD) (LDH) ENZYME: CPT

## 2025-07-23 PROCEDURE — 88305 TISSUE EXAM BY PATHOLOGIST: CPT | Performed by: PATHOLOGY

## 2025-07-23 PROCEDURE — 94640 AIRWAY INHALATION TREATMENT: CPT

## 2025-07-23 PROCEDURE — 87205 SMEAR GRAM STAIN: CPT

## 2025-07-23 PROCEDURE — 36415 COLL VENOUS BLD VENIPUNCTURE: CPT | Performed by: NURSE PRACTITIONER

## 2025-07-23 PROCEDURE — 2500000001 HC RX 250 WO HCPCS SELF ADMINISTERED DRUGS (ALT 637 FOR MEDICARE OP): Performed by: NURSE PRACTITIONER

## 2025-07-23 PROCEDURE — 36415 COLL VENOUS BLD VENIPUNCTURE: CPT | Performed by: PHYSICIAN ASSISTANT

## 2025-07-23 PROCEDURE — 85027 COMPLETE CBC AUTOMATED: CPT | Performed by: PHYSICIAN ASSISTANT

## 2025-07-23 PROCEDURE — 1100000001 HC PRIVATE ROOM DAILY

## 2025-07-23 PROCEDURE — 83986 ASSAY PH BODY FLUID NOS: CPT

## 2025-07-23 PROCEDURE — 85027 COMPLETE CBC AUTOMATED: CPT | Performed by: NURSE PRACTITIONER

## 2025-07-23 PROCEDURE — 88112 CYTOPATH CELL ENHANCE TECH: CPT | Performed by: PATHOLOGY

## 2025-07-23 PROCEDURE — 89050 BODY FLUID CELL COUNT: CPT

## 2025-07-23 PROCEDURE — 80069 RENAL FUNCTION PANEL: CPT | Performed by: PHYSICIAN ASSISTANT

## 2025-07-23 PROCEDURE — 2500000001 HC RX 250 WO HCPCS SELF ADMINISTERED DRUGS (ALT 637 FOR MEDICARE OP): Performed by: PHYSICIAN ASSISTANT

## 2025-07-23 PROCEDURE — 82945 GLUCOSE OTHER FLUID: CPT

## 2025-07-23 PROCEDURE — 82150 ASSAY OF AMYLASE: CPT

## 2025-07-23 PROCEDURE — 83735 ASSAY OF MAGNESIUM: CPT | Performed by: NURSE PRACTITIONER

## 2025-07-23 PROCEDURE — 87116 MYCOBACTERIA CULTURE: CPT

## 2025-07-23 PROCEDURE — 2500000004 HC RX 250 GENERAL PHARMACY W/ HCPCS (ALT 636 FOR OP/ED): Performed by: NURSE PRACTITIONER

## 2025-07-23 PROCEDURE — 2500000002 HC RX 250 W HCPCS SELF ADMINISTERED DRUGS (ALT 637 FOR MEDICARE OP, ALT 636 FOR OP/ED): Performed by: PHYSICIAN ASSISTANT

## 2025-07-23 PROCEDURE — 88342 IMHCHEM/IMCYTCHM 1ST ANTB: CPT | Performed by: PATHOLOGY

## 2025-07-23 PROCEDURE — 82947 ASSAY GLUCOSE BLOOD QUANT: CPT

## 2025-07-23 PROCEDURE — 88341 IMHCHEM/IMCYTCHM EA ADD ANTB: CPT | Performed by: PATHOLOGY

## 2025-07-23 PROCEDURE — 84478 ASSAY OF TRIGLYCERIDES: CPT

## 2025-07-23 PROCEDURE — 2500000002 HC RX 250 W HCPCS SELF ADMINISTERED DRUGS (ALT 637 FOR MEDICARE OP, ALT 636 FOR OP/ED): Performed by: NURSE PRACTITIONER

## 2025-07-23 PROCEDURE — 71045 X-RAY EXAM CHEST 1 VIEW: CPT | Performed by: RADIOLOGY

## 2025-07-23 PROCEDURE — 83735 ASSAY OF MAGNESIUM: CPT | Performed by: PHYSICIAN ASSISTANT

## 2025-07-23 PROCEDURE — 2500000004 HC RX 250 GENERAL PHARMACY W/ HCPCS (ALT 636 FOR OP/ED): Performed by: PHYSICIAN ASSISTANT

## 2025-07-23 PROCEDURE — 88112 CYTOPATH CELL ENHANCE TECH: CPT | Mod: TC,MCY

## 2025-07-23 PROCEDURE — 80069 RENAL FUNCTION PANEL: CPT | Performed by: NURSE PRACTITIONER

## 2025-07-23 PROCEDURE — 87206 SMEAR FLUORESCENT/ACID STAI: CPT

## 2025-07-23 PROCEDURE — 2500000004 HC RX 250 GENERAL PHARMACY W/ HCPCS (ALT 636 FOR OP/ED): Mod: JZ | Performed by: STUDENT IN AN ORGANIZED HEALTH CARE EDUCATION/TRAINING PROGRAM

## 2025-07-23 PROCEDURE — 99291 CRITICAL CARE FIRST HOUR: CPT | Performed by: STUDENT IN AN ORGANIZED HEALTH CARE EDUCATION/TRAINING PROGRAM

## 2025-07-23 RX ORDER — BUMETANIDE 0.25 MG/ML
1 INJECTION, SOLUTION INTRAMUSCULAR; INTRAVENOUS ONCE
Status: COMPLETED | OUTPATIENT
Start: 2025-07-23 | End: 2025-07-23

## 2025-07-23 RX ORDER — IPRATROPIUM BROMIDE AND ALBUTEROL SULFATE 2.5; .5 MG/3ML; MG/3ML
3 SOLUTION RESPIRATORY (INHALATION)
Status: DISCONTINUED | OUTPATIENT
Start: 2025-07-23 | End: 2025-07-24

## 2025-07-23 RX ORDER — SACUBITRIL AND VALSARTAN 24; 26 MG/1; MG/1
1 TABLET ORAL 2 TIMES DAILY
Status: DISCONTINUED | OUTPATIENT
Start: 2025-07-23 | End: 2025-08-10

## 2025-07-23 RX ADMIN — MAGNESIUM OXIDE TAB 400 MG (241.3 MG ELEMENTAL MG) 2 TABLET: 400 (241.3 MG) TAB at 08:04

## 2025-07-23 RX ADMIN — GABAPENTIN 400 MG: 300 CAPSULE ORAL at 20:39

## 2025-07-23 RX ADMIN — ROPINIROLE HYDROCHLORIDE 3 MG: 3 TABLET, FILM COATED ORAL at 20:26

## 2025-07-23 RX ADMIN — Medication: at 07:22

## 2025-07-23 RX ADMIN — LEVOTHYROXINE SODIUM 88 MCG: 0.09 TABLET ORAL at 06:38

## 2025-07-23 RX ADMIN — SACUBITRIL AND VALSARTAN 1 TABLET: 24; 26 TABLET, FILM COATED ORAL at 20:24

## 2025-07-23 RX ADMIN — MAGNESIUM OXIDE TAB 400 MG (241.3 MG ELEMENTAL MG) 2 TABLET: 400 (241.3 MG) TAB at 20:24

## 2025-07-23 RX ADMIN — GABAPENTIN 400 MG: 300 CAPSULE ORAL at 12:42

## 2025-07-23 RX ADMIN — ROPINIROLE 1 MG: 1 TABLET, FILM COATED ORAL at 20:25

## 2025-07-23 RX ADMIN — GABAPENTIN 400 MG: 300 CAPSULE ORAL at 06:38

## 2025-07-23 RX ADMIN — IPRATROPIUM BROMIDE AND ALBUTEROL SULFATE 3 ML: .5; 3 SOLUTION RESPIRATORY (INHALATION) at 21:33

## 2025-07-23 RX ADMIN — BUMETANIDE 1 MG: 0.25 INJECTION INTRAMUSCULAR; INTRAVENOUS at 20:12

## 2025-07-23 RX ADMIN — FLUTICASONE FUROATE AND VILANTEROL TRIFENATATE 1 PUFF: 100; 25 POWDER RESPIRATORY (INHALATION) at 07:22

## 2025-07-23 RX ADMIN — ASPIRIN 81 MG CHEWABLE TABLET 81 MG: 81 TABLET CHEWABLE at 08:04

## 2025-07-23 RX ADMIN — SPIRONOLACTONE 25 MG: 25 TABLET, FILM COATED ORAL at 08:04

## 2025-07-23 RX ADMIN — INSULIN GLARGINE 15 UNITS: 100 INJECTION, SOLUTION SUBCUTANEOUS at 22:20

## 2025-07-23 RX ADMIN — PANTOPRAZOLE SODIUM 20 MG: 20 TABLET, DELAYED RELEASE ORAL at 08:05

## 2025-07-23 RX ADMIN — SACUBITRIL AND VALSARTAN 0.5 TABLET: 24; 26 TABLET, FILM COATED ORAL at 08:04

## 2025-07-23 RX ADMIN — CITALOPRAM HYDROBROMIDE 40 MG: 40 TABLET ORAL at 07:26

## 2025-07-23 RX ADMIN — ROPINIROLE 1 MG: 1 TABLET, FILM COATED ORAL at 08:05

## 2025-07-23 RX ADMIN — INSULIN LISPRO 1 UNITS: 100 INJECTION, SOLUTION INTRAVENOUS; SUBCUTANEOUS at 08:08

## 2025-07-23 RX ADMIN — INSULIN LISPRO 3 UNITS: 100 INJECTION, SOLUTION INTRAVENOUS; SUBCUTANEOUS at 12:42

## 2025-07-23 RX ADMIN — ATORVASTATIN CALCIUM 80 MG: 80 TABLET, FILM COATED ORAL at 08:04

## 2025-07-23 RX ADMIN — ROPINIROLE 1 MG: 1 TABLET, FILM COATED ORAL at 14:40

## 2025-07-23 ASSESSMENT — PAIN SCALES - GENERAL
PAINLEVEL_OUTOF10: 0 - NO PAIN

## 2025-07-23 ASSESSMENT — COGNITIVE AND FUNCTIONAL STATUS - GENERAL
MOBILITY SCORE: 24
DAILY ACTIVITIY SCORE: 24

## 2025-07-23 ASSESSMENT — PAIN - FUNCTIONAL ASSESSMENT
PAIN_FUNCTIONAL_ASSESSMENT: 0-10

## 2025-07-23 NOTE — PROGRESS NOTES
Patient transferred to the floor in stable condition to the [facility] Cardiology service. Upon evaluation, patient is HDS and in no acute distress with no c/o pain or discomfort. Based on chart review and physical examination, patient is appropriate for the HVI/HF floor service at this time. Transfer orders reviewed and updated. Plan of care as stated in transfer note.  To be seen and discussed with staff attending physician in the AM

## 2025-07-23 NOTE — PROGRESS NOTES
HFICU Attending Note    60F with stage D HFrEF admitted for PAC-optimization prior to LVAD. Pre-LVAD colonoscopy revealed concerning polyp, now awaiting pathology. Deferring LVAD pending pathology.  If bx benign she has had 2 months of nicotine cessation such that we may revisit OHT. Of note LVIDD 4.4cm and anatomical suitability for LVAD may be a concern.     - Thoracentesis.   - Transfer to floor  - await colonoscopy pathology    This critically ill patient continues to be at-risk for clinically significant deterioration / failure due to the above mentioned dysfunctional, unstable organ systems.  I have personally identified and managed all complex critical care issues to prevent aforementioned clinical deterioration.  Critical care time is spent at bedside and/or the immediate area and has included, but is not limited to, the review of diagnostic tests, labs, radiographs, serial assessments of hemodynamics, respiratory status, ventilatory management, and family updates.  Time spent in procedures and teaching are reported separately.    Critical care time: ___35_ minutes     Objective    Admit Date: 7/14/2025  Hospital Length of Stay: 9   ICU Length of Stay: 8d 12h   Home: [facility] [address]    MEDICATIONS  Infusions:     Scheduled:  aspirin, 81 mg, Daily  atorvastatin, 80 mg, Daily  citalopram, 40 mg, Daily  [Held by provider] clopidogrel, 75 mg, Daily  [Held by provider] empagliflozin, 10 mg, Daily  fluticasone furoate-vilanteroL, 1 puff, Daily  gabapentin, 400 mg, q8h  insulin glargine, 15 Units, Nightly  insulin lispro, 0-5 Units, TID AC  levothyroxine, 88 mcg, Daily  magnesium oxide, 800 mg of magnesium oxide, BID  pantoprazole, 20 mg, Daily  perflutren protein A microsphere, 0.5 mL, Once in imaging  rOPINIRole, 1 mg, TID  rOPINIRole, 3 mg, Nightly  sacubitriL-valsartan, 0.5 tablet, BID  spironolactone, 25 mg, Daily  sulfur hexafluoride microsphr, 2 mL, Once in imaging      PRN:  acetaminophen, 650  mg, q6h PRN  albuterol, 2 puff, q6h PRN  alteplase, 2 mg, PRN  dextrose, 12.5 g, q15 min PRN  dextrose, 25 g, q15 min PRN  glucagon, 1 mg, q15 min PRN  glucagon, 1 mg, q15 min PRN  melatonin, 5 mg, Nightly PRN  nitroglycerin, 0.4 mg, q5 min PRN  ondansetron, 4 mg, q4h PRN  oxygen, , Continuous - O2/gases  polyethylene glycol, 2,000 mL, Once PRN        Prior to Admission Meds:  Prescriptions Prior to Admission[1]    Invasive Hemodynamics:    Most Recent Range Past 24hrs   BP (Art)   No data recorded   MAP(Art)   No data recorded   RA/CVP   No data recorded   PA 37/27 No data recorded   PA(mean) 33 mmHg No data recorded   PCWP 12 mmHg No data recorded   CO 5.4 L/min No data recorded   CI 3.4 L/min/m2 No data recorded   Mixed Venous 68 % No data recorded   SVR  1096 (dyne*sec)/cm5 No data recorded    (dyne*sec)/cm5 No data recorded     MCS:   Heart Mate III:     Most Recent Range Past 24hrs   Flow   No data recorded   Speed   No data recorded   Power   No data recorded   PI   No data recorded     ECMO:     Most Recent Range Past 24hrs   Flow   No data recorded   Speed   No data recorded   Sweep   No data recorded     Impella:      Most Recent Range Past 24hrs   Performance Level   No data recorded   Flow (L/min)   No data recorded   Motor Current   No data recorded   Placement Signal    Placement OK could not be evaluated. This SmartLink does not work with rows of the type: Custom List   Purge (mmHg)   No data recorded   Purge rate (mL/hr)   No data recorded     VENT:    Most Recent Range Past 24hrs   Mode      FiO2   No data recorded   Rate   No data recorded   Vt    No data recorded   PEEP   No data recorded         7/23/2025     7:57 AM 7/23/2025     6:00 AM 7/23/2025     5:00 AM 7/23/2025     4:00 AM 7/23/2025     3:00 AM 7/23/2025     2:00 AM 7/23/2025     1:00 AM   Vitals   Systolic  103 111 107 105 101 110   Diastolic  70 91 63 59 56 62   BP Location    Left arm      Heart Rate  85 95 85 86 86 85   Temp  "36 °C (96.8 °F)    36.1 °C (97 °F)     Resp  13 26 18 14 13 13   Weight (lb)  137.9        BMI  25.22 kg/m2        BSA (m2)  1.65 m2          Visit Vitals  /70   Pulse 85   Temp 36 °C (96.8 °F) (Temporal)   Resp 13   Ht 1.575 m (5' 2\")   Wt 62.6 kg (137 lb 14.4 oz)   SpO2 95%   BMI 25.22 kg/m²   OB Status Postmenopausal   Smoking Status Former   BSA 1.65 m²     Wt Readings from Last 5 Encounters:   07/23/25 62.6 kg (137 lb 14.4 oz)   06/20/25 57.2 kg (126 lb)   06/20/25 57.4 kg (126 lb 9.6 oz)   06/17/25 58.2 kg (128 lb 4.9 oz)   05/30/25 56.2 kg (124 lb)     Weight         7/19/2025  0600 7/20/2025  0600 7/21/2025  0530 7/22/2025  0600 7/23/2025  0600    Weight: 61.7 kg (136 lb 1.6 oz) 61.9 kg (136 lb 7.4 oz) 61.2 kg (134 lb 14.7 oz) 62 kg (136 lb 11 oz) 62.6 kg (137 lb 14.4 oz)              Intake/Output Summary (Last 24 hours) at 7/23/2025 0819  Last data filed at 7/22/2025 1930  Gross per 24 hour   Intake 240 ml   Output 600 ml   Net -360 ml     CHEST: Unlabored, Diminished  CV:  Normal sinus rhythm  ABD:      Bowel sounds:  , Flatus:    EXT:   RLE: Appropriate for ethnicity,Warm, Dry  DP: Weak  PT: Moderate  LLE: Appropriate for ethnicity,Warm, Dry  DP: Weak  PT: Moderate  NEURO:   RASS: Alert and calm  CAM: Negative  LOC: Alert  Cognition: Appropriate judgement, Appropriate safety awareness, Appropriate attention/concentration, Appropriate for developmental age, Follows commands  GCS: 15    DATA:  CMP:  Recent Labs     07/23/25  0509 07/22/25  0407 07/21/25  0402 07/20/25  0600 07/19/25  0435 07/18/25  0318 07/17/25  0052 07/16/25  0416 07/15/25  0354 07/14/25 2003    137 142 139 139 140 139 140 137 137   K 4.8 4.4 4.0 4.5 4.2 3.9 3.9 4.1 4.1 3.6   CL 98 94* 97* 97* 96* 97* 97* 101 99 96*   CO2 39* 38* 41* 36* 38* 36* 37* 33* 32 33*   ANIONGAP 9* 9* 8* 11 9* 11 9* 10 10 12   BUN 16 17 13 13 11 11 9 12 11 11   CREATININE 0.85 0.65 0.73 0.64 0.66 0.83 0.69 0.78 0.64 0.62   EGFR 78 >90 >90 >90 >90 80 " ">90 86 >90 >90   MG 2.28 2.38 1.70 2.00 2.06 1.87 2.26 1.86 2.28 2.06     Recent Labs     07/23/25  0509 07/22/25  0407 07/21/25  0402 07/20/25  0600 07/19/25  0435 07/18/25  0318 07/17/25  0052 07/16/25  0416 07/15/25  0354 07/14/25 2003 06/16/25  1901 06/15/25  1735 06/14/25  1803 06/13/25  1804 06/12/25  1728 06/11/25  1810 06/10/25  1734   ALBUMIN 3.7 3.5 3.3* 3.6 3.6 3.7 4.1 3.4   < > 3.9 3.3* 3.4 3.3* 3.3* 3.1* 3.1* 3.1*   ALT  --   --   --   --   --   --   --   --   --  11 19 19 17 14 13 10 8   AST  --   --   --   --   --   --   --   --   --  12 14 16 15 14 13 11 10   BILITOT  --   --   --   --   --   --   --   --   --  0.8 0.3 0.2 0.3 0.3 0.2 0.2 0.2    < > = values in this interval not displayed.     CBC:  Recent Labs     07/23/25  0509 07/22/25  0407 07/21/25  0402 07/20/25  1315 07/19/25  0435 07/18/25  0318 07/17/25  0050 07/16/25  0416   WBC 5.4 6.0 5.5 5.5 5.9 6.6 7.3 8.4   HGB 12.6 12.6 11.9* 12.3 12.3 12.7 14.1 13.1   HCT 39.3 38.2 37.2 40.3 38.5 39.5 43.4 42.8    186 174 179 160 178 198 266   MCV 93 92 92 96 95 91 92 97     COAG:   Recent Labs     07/14/25 2003 06/02/25  1346   INR 1.1 1.0     ABO:   Recent Labs     06/05/25  0909   ABO O     HEME/ENDO:   Recent Labs     07/15/25  0354 07/14/25 2013 06/05/25  0607 06/02/25  1346 03/13/25  0531 03/12/25  1430   FERRITIN  --  224*  --  232*  --   --    IRONSAT  --  24*  --  17*  --   --    TSH  --   --  0.19* 0.20*  --   --    HGBA1C 8.0*  --   --  8.6* 12.3* 12.7*     CARDIAC:   Recent Labs     07/14/25 2003 06/12/25  1728 06/09/25  1613 06/08/25  1846 06/03/25  1743 06/02/25  1346   LDH  --   --   --  166 213  --    TROPHS 18 15  --   --   --  22   *  --  1,456*  --   --  1,292*     Recent Labs     07/21/25  1630 07/21/25  1128 07/21/25  0529 06/06/25  1312 06/06/25  0519   LACMX 1.0 0.9 1.1 1.1 0.4   SO2MV 68 70 64 62 68     No results for input(s): \"TACROLIMUS\", \"SIROLIMUS\", \"CYCLOSPORINE\" in the last 51681 hours.  Recent Labs    " " 07/14/25 2003   CHOL 141   LDLCALC 78   HDL 43.3   TRIG 99     MICRO: No results for input(s): \"ESR\", \"CRP\", \"PROCAL\" in the last 77025 hours.  Susceptibility data from last 90 days.  Collected Specimen Info Organism Amoxicillin/Clavulanate Ampicillin Ampicillin/Sulbactam Cefazolin Cefazolin (uncomplicated UTIs only) Ciprofloxacin Gentamicin Levofloxacin Nitrofurantoin Piperacillin/Tazobactam   06/07/25 Urine from Clean Catch/Voided Escherichia coli  S  R  I  S  S  S  S  S  S  S   06/03/25 Urine from Clean Catch/Voided Escherichia coli  S  R  I  S  S  S  S  S  S  S     Collected Specimen Info Organism Trimethoprim/Sulfamethoxazole   06/07/25 Urine from Clean Catch/Voided Escherichia coli  S   06/03/25 Urine from Clean Catch/Voided Escherichia coli  S     Assessment & Plan  Acute on chronic heart failure with reduced ejection fraction (HFrEF, <= 40%)    Cardiogenic shock (Multi)        EKG:   Recent Labs     06/12/25  1715 06/03/25  1507   ATRRATE 78 85   VENTRATE 78 85   PRINT 160 160   QRSDUR 78 90   QTCFRED 436 422   QTCCALCB 456 447     Encounter Date: 06/02/25   Electrocardiogram, 12-lead PRN ACS symtpoms   Result Value    Ventricular Rate 78    Atrial Rate 78    MN Interval 160    QRS Duration 78    QT Interval 400    QTC Calculation(Bazett) 456    P Axis 57    R Axis -40    T Axis 101    QRS Count 13    Q Onset 216    P Onset 136    P Offset 185    T Offset 416    QTC Fredericia 436    Narrative    Normal sinus rhythm  Left axis deviation  Cannot rule out Anterior infarct , age undetermined  Abnormal ECG  When compared with ECG of 02-JUN-2025 13:34,  Significant changes have occurred  Confirmed by Jose Gonzalez ([id number]) on 6/20/2025 6:14:20 PM     Echocardiogram:   Recent Labs     07/15/25  1440 06/02/25  1729   EF 33 23   LVIDD 4.12 4.10   RV 47 41   RVFRWALLPKSP  --  10.10   TAPSE  --  1.6   Transthoracic Echo (TTE) Complete With Contrast 06/02/2025    [facility], [id number] " [place] [address]  Tel [phone] and Fax [phone]    TRANSTHORACIC ECHOCARDIOGRAM REPORT      Patient Name:       ANETA Lillington         Erin Physician:    [id number] Jose Gonzalez MD  Study Date:         6/2/2025            Ordering Provider:    [id number] HAYDEN BARCLAY  MRN/PID:            [id number]            Fellow:  Accession#:         [id number]        Nurse:  Date of Birth/Age:  1965 / 60      Sonographer:          Diane donaldson RDCS, KRYSTAL  Gender assigned at  F                   Additional Staff:  Birth:  Height:             157.48 cm           Admit Date:           5/30/2025  Weight:             54.43 kg            Admission Status:     Inpatient -  Routine  BSA / BMI:          1.54 m2 / 21.95     Encounter#:           [id number]  kg/m2  Blood Pressure:     97/55 mmHg          Department Location:  [address]    Study Type:    TRANSTHORACIC ECHO (TTE) COMPLETE  Diagnosis/ICD: Unspecified systolic (congestive) heart failure (CHF)-I50.20  Indication:    Congestive Heart Failure  CPT Code:      Echo Complete w Full Doppler-[id number]    Patient History:  Diabetes:          Yes  Pertinent History: Cardiomyopathy and CHF.    Study Detail: The following Echo studies were performed: 2D, M-Mode, Doppler and  color flow. Definity used as a contrast agent for endocardial  border definition. Total contrast used for this procedure was 2 mL  via IV push.      PHYSICIAN INTERPRETATION:  Left Ventricle: Left ventricular ejection fraction is severely decreased by visual estimate at 20-25%. There is global hypokinesis of the left ventricle with minor regional variations. The left ventricular cavity size is mildly dilated. There is normal septal and normal posterior left ventricular wall thickness. There is left ventricular concentric remodeling. Spectral Doppler shows a Grade III (restrictive pattern) of left ventricular diastolic filling with an elevated  left atrial pressure. There is no definite left ventricular thrombus visualized. There is relative preservation of the basal myocardial segment systolic function.  Left Atrium: The left atrial size is normal.  Right Ventricle: The right ventricle is normal in size. There is normal right ventricular global systolic function.  Right Atrium: The right atrium is normal in size.  Aortic Valve: The aortic valve is trileaflet. The aortic valve area by VTI is 1.12 cmï¿½ with a peak velocity of 1.49 m/s. The peak and mean gradients are 9 mmHg and 4 mmHg, respectively, with a dimensionless index of 0.49. There is no evidence of aortic valve regurgitation.  Mitral Valve: The mitral valve is mildly thickened. There is mild mitral valve regurgitation. The E Vmax is 0.95 m/s.  Tricuspid Valve: The tricuspid valve is structurally normal. There is mild tricuspid regurgitation.  Pulmonic Valve: The pulmonic valve is structurally normal. There is physiologic pulmonic valve regurgitation.  Pericardium: Trivial pericardial effusion.  Aorta: The aortic root is normal.  Pulmonary Artery: The tricuspid regurgitant velocity is 3.09 m/s, and with an estimated right atrial pressure of 8, the estimated pulmonary artery pressure is mild to moderately elevated with the RVSP at 46 mmHg.  Systemic Veins: The inferior vena cava appears normal in size, with IVC inspiratory collapse less than 50%.  In comparison to the previous echocardiogram(s): There are no prior studies on this patient for comparison purposes.      CONCLUSIONS:  1. Left ventricular ejection fraction is severely decreased by visual estimate at 20-25%.  2. There is global hypokinesis of the left ventricle with minor regional variations.  3. Spectral Doppler shows a Grade III (restrictive pattern) of left ventricular diastolic filling with an elevated left atrial pressure.  4. Left ventricular cavity size is mildly dilated.  5. No left ventricular thrombus visualized.  6. There is  relative preservation of the basal myocardial segment systolic function.  7. There is normal right ventricular global systolic function.  8. Mild to moderately elevated pulmonary artery pressure.    RECOMMENDATIONS:    QUANTITATIVE DATA SUMMARY:    2D MEASUREMENTS:         Normal Ranges:  Ao Root d:       2.10 cm (2.0-3.7cm)  LAs:             3.70 cm (2.7-4.0cm)  IVSd:            0.90 cm (0.6-1.1cm)  LVPWd:           0.90 cm (0.6-1.1cm)  LVIDd:           4.10 cm (3.9-5.9cm)  LVIDs:           3.70 cm  LV Mass Index:   74 g/m2  LV % FS          9.8 %      LEFT ATRIUM:                  Normal Ranges:  LA Vol A4C:        38.8 ml    (22+/-6mL/m2)  LA Vol A2C:        43.3 ml  LA Vol BP:         41.2 ml  LA Vol Index A4C:  25.2ml/m2  LA Vol Index A2C:  28.1 ml/m2  LA Vol Index BP:   26.8 ml/m2  LA Area A4C:       14.9 cm2  LA Area A2C:       15.8 cm2  LA Major Axis A4C: 4.9 cm  LA Major Axis A2C: 4.9 cm  LA Volume Index:   26.8 ml/m2      RIGHT ATRIUM:          Normal Ranges:  RA Area A4C:  14.7 cm2      LV SYSTOLIC FUNCTION:  Normal Ranges:  EF-Visual:      23 %  LV EF Reported: 23 %      LV DIASTOLIC FUNCTION:             Normal Ranges:  MV Peak E:             0.95 m/s    (0.7-1.2 m/s)  MV Peak A:             0.33 m/s    (0.42-0.7 m/s)  E/A Ratio:             2.90        (1.0-2.2)  MV e'                  0.034 m/s   (>8.0)  MV lateral e'          0.03 m/s  MV medial e'           0.04 m/s  MV A Dur:              124.00 msec  E/e' Ratio:            27.61       (<8.0)  MV DT:                 145 msec    (150-240 msec)      MITRAL VALVE:          Normal Ranges:  MV DT:        111 msec (150-240msec)      AORTIC VALVE:                     Normal Ranges:  AoV Vmax:                1.49 m/s (<=1.7m/s)  AoV Peak P.9 mmHg (<20mmHg)  AoV Mean P.0 mmHg (1.7-11.5mmHg)  LVOT Max Paco:            0.81 m/s (<=1.1m/s)  AoV VTI:                 27.40 cm (18-25cm)  LVOT VTI:                13.50 cm  LVOT  Diameter:           1.80 cm  (1.8-2.4cm)  AoV Area, VTI:           1.12 cm2 (2.5-5.5cm2)  AoV Area,Vmax:           1.23 cm2 (2.5-4.5cm2)  AoV Dimensionless Index: 0.49      RIGHT VENTRICLE:  RV Basal 3.70 cm  RV Mid   2.40 cm  RV Major 6.3 cm  TAPSE:   15.6 mm  RV s'    0.10 m/s      TRICUSPID VALVE/RVSP:          Normal Ranges:  Peak TR Velocity:     3.09 m/s  Est. RA Pressure:     8  RV Syst Pressure:     46       (< 30mmHg)  IVC Diam:             1.50 cm      PULMONIC VALVE:          Normal Ranges:  PV Max Paco:     0.7 m/s  (0.6-0.9m/s)  PV Max P.9 mmHg      [id number] Jose Gonzalez MD  Electronically signed on 2025 at 6:00:20 PM        ** Final **    Coronary Angiography: No results found for this or any previous visit from the past 1800 days.    Right Heart Cath: No results found for this or any previous visit from the past 1800 days.    Cardiac Scoring: No results found for this or any previous visit from the past 1800 days.    Cardiac MRI: No results found for this or any previous visit from the past 1800 days.    Nuclear:No results found for this or any previous visit from the past 1800 days.    Metabolic Stress: No results found for this or any previous visit from the past 1800 days.      Imaging  XR chest 1 view  Result Date: 2025  1.  Similar bilateral pleural effusions with associated atelectasis, left-greater-than-right. 2. Mild interstitial edema. 3. Medical devices as above.   I personally reviewed the images/study and I agree with the findings as stated by resident physician Marcus Meier MD. This study was interpreted at [facility], [place], [place].   MACRO: None   Signed by: Jeremias Alaniz 2025 1:57 PM Dictation workstation:   [id number]    XR chest 1 view  Result Date: 2025  1. Right IJ [place]-Femi catheter has been slightly retracted with tip now seen projecting over mid right main pulmonary artery.   2. Otherwise, no significant  change in mild diffuse pulmonary edema and bilateral pleural effusions with associated atelectasis, left more than right.   I personally reviewed the images/study and I agree with the findings as stated by resident physician Joseph Moore MD. This study was interpreted at [place], [place].   MACRO: None   Signed by: Reggie Denis 7/20/2025 8:49 PM Dictation workstation:   [id number]    XR chest 1 view  Result Date: 7/20/2025  1. Medical devices as above. New right IJ approach [place]-Femi catheter with tip projecting over right interlobar pulmonary artery. 2. No significant change in small-to-moderate bilateral pleural effusions and associated atelectasis, left more than right. 3. Similar mild background pulmonary edema.   Signed by: Reggie Denis 7/20/2025 12:42 PM Dictation workstation:   [id number]    CT chest wo IV contrast  Result Date: 7/16/2025  1.  Bilateral moderate pleural effusions, left-greater-than-right, with adjacent atelectasis. These appear slightly improved from prior. 2. Pulmonary interstitial and alveolar edema. However superimposed infectious or inflammatory process not excluded. 3. Interval decrease in size of mediastinal lymphadenopathy. Recommend continued attention on follow-up imaging. 4. Mild cardiomegaly and severe coronary artery calcifications. 5. Mild thoracic atherosclerotic disease. 6. Additional chronic or incidental findings as detailed above.   I personally reviewed the images/study and I agree with the findings as stated by resident physician Marcus Meier MD. This study was interpreted at [place], [place].   MACRO: None   Signed by: Aj Alaniz 7/16/2025 4:11 PM Dictation workstation:   [id number]      Cardiology, Vascular, and Other Imaging  Colonoscopy Diagnostic  Result Date: 7/17/2025  Table formatting from the original result was not included. Impression Semi solid stool that could  not be cleared resulting in inadequate preparation on the right side of the colon. The terminal ileum appeared normal. One 20 mm Rhonda IIa+c polyp in the appendiceal orifice; lift performed but central portion of the polyp did not lift with abnormal features under NBI concerning for possible invasive disease. Central portion was biopsied. Subcentimeter polyp in the ascending colon was removed with cold snare Four polyps measuring from 4 mm up to 10 mm; performed cold snare removal One 20 mm Rhonda IIa LST-G polyp in the sigmoid colon; lift performed; performed cold snare with piecemeal removal; tattooed 5 cm distal to the finding Findings The terminal ileum appeared normal. One 20 mm sessile and adenomatous-appearing Rhonda IIa+c polyp in the appendiceal orifice; lift performed with 5 mL of [place] injected into the submucosa. The central portion of the polyp did not lift appropriately. This part of the polyp was biopsied. Polypectomy not attempted due to multiple factors including ICU setting, inadequate preparation on the right side of the colon and patient's intermittent desaturation events. Biopsy placed in Jar 1. One 4 mm polyp in the ascending colon; performed cold snare with complete en bloc removal and retrieved specimen. Placed in Jar 2 Four sessile and benign-appearing polyps measuring from 4 mm up to 10 mm; performed cold snare with complete en bloc removal and retrieved specimen. Placed in Jar 3. One 20 mm sessile Rhonda IIa LST-G polyp in the sigmoid colon; lift performed with 4 mL of Blue [place] injected into the submucosa; performed cold snare with complete piecemeal removal and retrieved specimen; 1 tattoo was placed 5 cm distal to the finding with Black eye. Placed in Jar 3 Recommendation - The patient will be observed post-procedure, until all discharge criteria are met. - Return patient to ICU for ongoing care. - Defer resumption of diet and medications to the patient's primary team. No restrictions  from a post-procedural standpoint. - Observe patient's clinical course following today's procedure - Await pathology results. - If pathology from the AO polyp only shows adenomatous tissue, can consider repeat colonoscopy with a 2 day bowel preparation and a clear cap for a polypectomy attempt. Another consideration could be for FTRD though will be challenging due to the location. - The findings and recommendations were discussed with the referring physicians. - The signs and symptoms of potential delayed complications were discussed with the patient. - Follow up with Norah Perez and [facility]ory for ongoing inpatient gastroenterology consultation. Indication Abnormal weight loss Screening for colon cancer Post-Op Diagnosis None Staff Staff Role Leonila Slater MD Proceduralist Medications Totals unavailable because the procedure time range is not set Preprocedure A history and physical has been performed, and patient medication allergies have been reviewed. The patient's tolerance of previous anesthesia has been reviewed. The risks and benefits of the procedure and the sedation options and risks were discussed with the patient. All questions were answered and informed consent obtained. Details of the Procedure The patient underwent moderate sedation, which was administered by the procedural nurse. The patient's blood pressure, ECG, ETCO2, heart rate, level of consciousness, oxygen and respirations were monitored throughout the procedure. A digital rectal exam was performed. The scope was introduced through the anus and advanced to the terminal ileum. Retroflexion was performed in the rectum. The quality of bowel preparation was evaluated using the [place] Bowel Preparation Scale with scores of: right colon = 1, transverse colon = 2, left colon = 2. The total BBPS score was 5. Bowel prep was not adequate. The patient experienced no blood loss. The procedure was not difficult. The patient tolerated the procedure well. There  were no apparent adverse events. Events Procedure Events Event Event Time Specimens No specimens collected Procedure Location [facility] [address] [facility] [address] [phone] Referring Provider Mellisa Villafana MD MPH Procedure Provider Leonila Slater MD     Esophagogastroduodenoscopy (EGD)  Result Date: 7/17/2025  Table formatting from the original result was not included. Impression The upper third of the esophagus, middle third of the esophagus and lower third of the esophagus appeared normal. Irregular Z-line The fundus of the stomach, body of the stomach, incisura, antrum and pylorus appeared normal. The duodenal bulb and 2nd part of the duodenum appeared normal. Findings The upper third of the esophagus, middle third of the esophagus and lower third of the esophagus appeared normal. Irregular Z-line 38 cm from the incisors The fundus of the stomach, body of the stomach, incisura, antrum and pylorus appeared normal. The duodenal bulb and 2nd part of the duodenum appeared normal. Recommendation Proceed with colonoscopy today as previously scheduled.  Indication Abnormal weight loss Post-Op Diagnosis None Staff Staff Role Cyndi Perez MD Fellow Leonila Slater MD Proceduralist Medications gabapentin (Neurontin) capsule 400 mg Not documented*  *Total volume has not been documented. View each administration to see the amount administered. rOPINIRole (Requip) tablet 1 mg 1 mg dextrose 50 % injection 12.5 g 12.5 g dextrose 10 % in water (D10W) infusion 291.67 mL*  *From user-documented volume fentaNYL PF (Sublimaze) injection 100 mcg 0 mcg*  *Some administrations are not included in total fentaNYL PF (Sublimaze) injection 50 mcg/mL  - Omnicell Override Pull Cannot be calculated*  *Administration dose not documented fentaNYL PF (Sublimaze) injection 50 mcg midazolam (Versed) injection 1 mg/mL  - Omnicell Override Pull Cannot be calculated*  *Administration dose not  documented midazolam (Versed) injection 1 mg/mL  - Omnicell Override Pull Cannot be calculated*  *Administration dose not documented midazolam (Versed) injection 2 mg phenylephrine in NS (Maulik-Synephrine) syringe 40 mcg/mL  - Omnicell Override Pull Cannot be calculated*  *Administration dose not documented midazolam PF (Versed) injection 5 mg 0 mg*  *Some administrations are not included in total (Totals for administrations occurring from 1231 to 1640 on 07/17/25) Preprocedure A history and physical has been performed, and patient medication allergies have been reviewed. The patient's tolerance of previous anesthesia has been reviewed. The risks and benefits of the procedure and the sedation options and risks were discussed with the patient. All questions were answered and informed consent obtained. Details of the Procedure The patient underwent moderate sedation, which was administered by the procedural nurse. The patient's blood pressure, ECG, ETCO2, heart rate, level of consciousness, oxygen and respirations were monitored throughout the procedure. The scope was introduced through the mouth and advanced to the second part of the duodenum. Retroflexion was performed in the cardia. Prior to the procedure, the patient's H. Pylori status was unknown. The patient experienced no blood loss. The procedure was not difficult. The patient tolerated the procedure well. There were no apparent adverse events. Events Procedure Events Event Event Time Specimens No specimens collected Procedure Location [facility] [address] [facility] [id number] [phone] Referring Provider Mellisa Villafana MD MPH Procedure Provider Leonila Slater MD              LDA:       NUTRITION: Adult diet Regular  EMERGENCY CONTACT: Extended Emergency Contact Information  Primary Emergency Contact: Babatunde Mclean  Mobile Phone: [phone]  Relation: Friend   needed? No  Secondary Emergency Contact:  Daisy Velasco  Mobile Phone: [phone]  Relation: Sister  CODE STATUS: Full Code  DISPO: Discharge Planning  Living Arrangements: Family members  Support Systems: Family members  Assistance Needed: no  Home or Post Acute Services: None  Expected Discharge Disposition: Home  FOLLOWUP: No future appointments.                 [1]   Medications Prior to Admission   Medication Sig Dispense Refill Last Dose/Taking    albuterol 90 mcg/actuation inhaler Inhale 2 puffs every 6 hours if needed for wheezing or shortness of breath.   2025    alogliptin (Nesina) 25 mg tablet Take 1 tablet (25 mg) by mouth once daily.   2025    [] aspirin 81 mg chewable tablet Chew 1 tablet (81 mg) once daily. 30 tablet 0 2025    atorvastatin (Lipitor) 80 mg tablet Take 1 tablet (80 mg) by mouth once daily.   2025    budesonide-formoterol (Symbicort) 80-4.5 mcg/actuation inhaler INHALE 2 PUFF BY INHALATION ROUTE 2 TIMES EVERY DAY IN THE MORNING AND EVENING   2025    bumetanide (Bumex) 1 mg tablet Take 1 tablet (1 mg) by mouth 2 times daily (morning and late afternoon). 60 tablet 2 2025    citalopram (CeleXA) 40 mg tablet Take 1 tablet (40 mg) by mouth early in the morning..   2025    clopidogrel (Plavix) 75 mg tablet Take 1 tablet (75 mg) by mouth once daily.   2025    empagliflozin (Jardiance) 10 mg tablet Take 1 tablet (10 mg) by mouth once daily. Do not start before 2025. 30 tablet 2 2025    gabapentin (Neurontin) 400 mg capsule TAKE 1 CAPSULE BY MOUTH 3 TIMES EVERY DAY FOR DIABETIC NEUROPATHY   2025    insulin glargine (Lantus Solostar U-100 Insulin) 100 unit/mL (3 mL) pen Inject 15 Units under the skin once daily at bedtime.   2025    levothyroxine (Synthroid, Levoxyl) 88 mcg tablet Take 1 tablet (88 mcg) by mouth early in the morning.. Take on an empty stomach at the same time each day, either 30 to 60 minutes prior to breakfast 30 tablet 11 2025     nitroglycerin (Nitrostat) 0.4 mg SL tablet Place 1 tablet (0.4 mg) under the tongue every 5 minutes if needed for chest pain.   7/14/2025    pantoprazole (ProtoNix) 20 mg EC tablet Take 1 tablet (20 mg) by mouth once daily.   7/14/2025    rOPINIRole (Requip) 1 mg tablet Take 1 tablet (1 mg) by mouth 3 times a day.   7/14/2025    rOPINIRole (Requip) 3 mg tablet take 1 tablet by oral route every day at bedtime   7/14/2025    sacubitriL-valsartan (Entresto) 24-26 mg tablet Take 1 tablet by mouth 2 times a day. 60 tablet 1 7/14/2025    spironolactone (Aldactone) 25 mg tablet Take 1 tablet (25 mg) by mouth once daily.   7/14/2025

## 2025-07-23 NOTE — CARE PLAN
Problem: Safety - Adult  Goal: Free from fall injury  Outcome: Progressing     Problem: Nutrition  Goal: Nutrient intake appropriate for maintaining nutritional needs  Outcome: Progressing     Pt arrived safe and stable to LT5

## 2025-07-23 NOTE — POST-PROCEDURE NOTE
INTERVENTIONAL RADIOLOGY ADVANCED PRACTICE PROCEDURE  [facility]    A time out was performed and Left Hemithorax was examined with US and appropriate entry point was confirmed and marked.   The patient was prepped and draped in a sterile manner, 1% lidocaine was used to anesthesize the skin and subcutaneous tissue.   A 5F Centesis needle was then introduced through the skin into the pleural space, the centesis catheter was then threaded without difficulty.   1200 ml of serosanguineous fluid was removed without difficulty. The catheter was then removed.   No immediate complications were noted during and immediately following the procedure.

## 2025-07-23 NOTE — PROGRESS NOTES
"[facility]  HFICU PROGRESS NOTE    Thao Evans/[id number]    Admit Date: 7/14/2025  Hospital Length of Stay: 9   ICU Length of Stay: 8d 15h   Primary Service: HFICU  Primary HF Cardiologist: Dr Deluca      INTERVAL EVENTS / PERTINENT ROS:     No overnight events. Patient is lying in bed this AM with no complaints.     Plan:  - Pending colonoscopy biopsy  - IR consult for thoracentesis     MEDICATIONS  Infusions:       Scheduled:  aspirin, 81 mg, Daily  atorvastatin, 80 mg, Daily  citalopram, 40 mg, Daily  [Held by provider] clopidogrel, 75 mg, Daily  empagliflozin, 10 mg, Daily  fluticasone furoate-vilanteroL, 1 puff, Daily  gabapentin, 400 mg, q8h  insulin glargine, 15 Units, Nightly  insulin lispro, 0-5 Units, TID AC  levothyroxine, 88 mcg, Daily  magnesium oxide, 800 mg of magnesium oxide, BID  pantoprazole, 20 mg, Daily  perflutren protein A microsphere, 0.5 mL, Once in imaging  rOPINIRole, 1 mg, TID  rOPINIRole, 3 mg, Nightly  sacubitriL-valsartan, 1 tablet, BID  spironolactone, 25 mg, Daily  sulfur hexafluoride microsphr, 2 mL, Once in imaging      PRN:  acetaminophen, 650 mg, q6h PRN  albuterol, 2 puff, q6h PRN  alteplase, 2 mg, PRN  dextrose, 12.5 g, q15 min PRN  dextrose, 25 g, q15 min PRN  glucagon, 1 mg, q15 min PRN  glucagon, 1 mg, q15 min PRN  melatonin, 5 mg, Nightly PRN  nitroglycerin, 0.4 mg, q5 min PRN  ondansetron, 4 mg, q4h PRN  oxygen, , Continuous - O2/gases  polyethylene glycol, 2,000 mL, Once PRN        PHYSICAL EXAM:   Visit Vitals  /66   Pulse 81   Temp 36 °C (96.8 °F) (Temporal)   Resp 25   Ht 1.575 m (5' 2\")   Wt 62.6 kg (137 lb 14.4 oz)   SpO2 96%   BMI 25.22 kg/m²   OB Status Postmenopausal   Smoking Status Former   BSA 1.65 m²       Wt Readings from Last 5 Encounters:   07/23/25 62.6 kg (137 lb 14.4 oz)   06/20/25 57.2 kg (126 lb)   06/20/25 57.4 kg (126 lb 9.6 oz)   06/17/25 58.2 kg (128 lb 4.9 oz)   05/30/25 56.2 kg (124 lb) "       INTAKE/OUTPUT:  I/O last 3 completed shifts:  In: 345 (5.5 mL/kg) [P.O.:240; I.V.:105 (1.7 mL/kg)]  Out: 1350 (21.6 mL/kg) [Urine:1350 (0.6 mL/kg/hr)]  Weight: 62.6 kg      Physical Exam  Constitutional:       Appearance: Normal appearance.   HENT:      Head: Normocephalic.   Neck:      Vascular: JVD present.     Cardiovascular:      Rate and Rhythm: Normal rate and regular rhythm.      Pulses: Normal pulses.      Heart sounds: Murmur (holosystolic) heard.   Pulmonary:      Effort: Pulmonary effort is normal.      Breath sounds: Examination of the right-lower field reveals rales. Examination of the left-lower field reveals rales. Decreased breath sounds and rales present.      Comments: Currently on 2L NC  Abdominal:      General: Abdomen is flat.      Palpations: Abdomen is soft.     Musculoskeletal:      Right lower leg: No edema.      Left lower leg: No edema.     Skin:     General: Skin is warm.      Findings: Erythema (right forearm) present.     Neurological:      General: No focal deficit present.      Mental Status: She is alert.         DATA:  CMP:  Results from last 7 days   Lab Units 07/23/25  0509 07/22/25  0407 07/21/25  0402 07/20/25  0600 07/19/25  0435 07/18/25  0318 07/17/25  0052   SODIUM mmol/L 141 137 142 139 139 140 139   POTASSIUM mmol/L 4.8 4.4 4.0 4.5 4.2 3.9 3.9   CHLORIDE mmol/L 98 94* 97* 97* 96* 97* 97*   CO2 mmol/L 39* 38* 41* 36* 38* 36* 37*   ANION GAP mmol/L 9* 9* 8* 11 9* 11 9*   BUN mg/dL 16 17 13 13 11 11 9   CREATININE mg/dL 0.85 0.65 0.73 0.64 0.66 0.83 0.69   EGFR mL/min/1.73m*2 78 >90 >90 >90 >90 80 >90   MAGNESIUM mg/dL 2.28 2.38 1.70 2.00 2.06 1.87 2.26   ALBUMIN g/dL 3.7 3.5 3.3* 3.6 3.6 3.7 4.1     CBC:  Results from last 7 days   Lab Units 07/23/25  0509 07/22/25  0407 07/21/25  0402 07/20/25  1315 07/19/25  0435 07/18/25  0318 07/17/25  0050   WBC AUTO x10*3/uL 5.4 6.0 5.5 5.5 5.9 6.6 7.3   HEMOGLOBIN g/dL 12.6 12.6 11.9* 12.3 12.3 12.7 14.1   HEMATOCRIT % 39.3 38.2  "37.2 40.3 38.5 39.5 43.4   PLATELETS AUTO x10*3/uL 176 186 174 179 160 178 198   MCV fL 93 92 92 96 95 91 92     COAG:         ABO: No results found for: \"ABO\"  HEME/ENDO:         CARDIAC:         No lab exists for component: \"CK\", \"CKMBP\"      ASSESSMENT AND PLAN:   Thao Evans is a 60 year old with a PMHx sig for CAD s/p PCI (LAD; 2015, Lcx; 2025), stage D systolic HF/ICM/HFrEF s/p ICD, h/o VT with presumed associated syncope, poorly controlled DM, pAF (off of DOAC given the absence of recurrence), dyslipidemia, essential HTN, COPD, and h/o Graves. She was recently referred to [facility] and seen by Dr. Deluca. Her most recent hospitalization was in March 2025 for syncope/VT where she underwent an ICD placement and PCI to her LCx. She reported progressive symptoms over the last several months with ongoing fatigue, dyspnea, and early satiety causing significant progressive weight loss (~60 lbs over the last 6 months). She has also been currently intolerant of GDMT and is on midodrine for BP support since her hospital discharge. Because of worsening symptoms and intolerance of GDMT, she was offered direct admission to HFICU with plan for PAC guided evaluation. Transferred from HFICU to LT 5 06/06 under HHVI service. LT5 course c/b acute hypoxic respiratory failure due to L pleural effusion s/p thoracentesis x 3 (06/09, 6/11, 06/12), now on room air.     Pt was discussed in Advanced Therapies Committee meeting on 6/17/2025 and approved for LVAD (barriers to transplant, elevated PAP, uncontrolled DM Hg A1c > 8, active smoking). Plan is for patient to complete colonoscopy prior to LVAD placement next week tentative date 7/23. Repeat CT chest 7/16 showing improvement. EGD/colonoscopy 7/17 - pending biopsy results. [place] placed 7/20, removed 7/22 due to malfunctioning.      Neuro:  #Restless legs  #Anxiety  #Depression  - c/w home celexa  - c/w home gabapentin  - C/w home requip  - Serial neuro " and pain assessments  - PO Tylenol PRN for pain  - PT/OT Consult, OOB to chair  - CAM ICU score every shift  - Sleep/wake cycle normalization     # Physical Status  - Not obese, BMI 22.68 kg/m2  - Reduced Mobility     #Substance abuse  -Alcohol dependence: rare use  -Tobacco dependence: previous smoker - nicotine results negative      Cardiovascular:  #HFrEF /acute on chronic systolic Heart Failure, ICM, last LVEF 23% (6/2/2025), Stage D, NYHA III, s/p ICD.  - TTE 6/2 LVSF 20-25%, LV size is moderately dilated, mild/mod elevated pulmonary artery pressure  - Repeat TTE 7/15: LVSF 30-35%, low normal RVSF  - Diuretics: poor response oral bumex, dc 7/20. Bumex 2mg IV x 1 (7/20)  - GDMT:  holding jardiance 10 mg, c/w sandi 25  - C/w Entresto 24/26 mg BID (7/19)  - Opening SGC #'s (7/20): /66 (74), CVP 12, PA 52/30 (41), CO/CI 5.4/3.3, , SVO 2 61%, bumex 2mg po BID, entresto 24/26, [place] 25  - Closing SCC #'s (7/22): MAP 80, CVP 7, PA 40/30 (36), CO/CI 5.4/3.4, SVR 1096, SVO2 68%, entresto 12/13 mg, [place] 25  - Daily standing weights, 2gm sodium diet, 2L fluid restriction, strict I&Os     #CAD s/p PCI (LAD in 2015, LCx in 2025)  -c/w asa 81mg daily, Plavix (on hold)  -c/w atorvastatin 80mg     # H/O VT  # Paroxysmal A Fib  -Device: s/p CRT-D 3/2025, [employer]  -AC: off DOAC given absence of recurrence     #HTN  #HLD  -BP medications as above.  -c/w home statin     #Electrolyte Disturbances  -K goal >4, Mg>2     Pulmonary:  #COPD  -c/w fluticasone furoate-vilanterol (Breo) 100-25mcg   -c/w albuterol HFA prn  -Monitor and maintain SpO2 > 92%    #Pleural effusions (chronic)  - IR consult for thoracentesis      GI:  #GERD  -c/w protonix 20mg   - Bowel regimen: prn miralax  - Bowel prep 7/15 - EGD/Colonoscopy 7/17 pending biopsy results, Dr Hicks reached out 7/23, still pending      :  No renal dysfunction at baseline  -Baseline Cr 0.5-0.9  -Admit BUN/Cr: 11/0.62  -I/Os  -avoid hypotension and  nephrotoxic agents     Heme:  No hematology pathology at baseline   - H&H 13.2/41.6  - Iron studies 62, 256, 24%  - venofer 200mg x3 doses      Endo:  #DM, T2  - Last hgbA1c (7/15/25): 8.0  - home medications; insulin - glargine, jardiance, nesina.  -Diet: carb controlled  -SSI while inpatient, adjust as needed    # Hypoglycemia  - Noted Hypoglycemia overnight 7/15 and 7/16  - Reduced lantus insulin from 50 units home dose 15u   - C/w lantus insulin 15 units      #h/o Grave's disease  -last TSH (6/5/25): 0.19, FT4 1.23      ID:  -afebrile, nontoxic  -no s/s infx  -trend temps q4h     PHYSICAL AND OCCUPATIONAL THERAPY: ordered    LINES:  PIVs     DVT: N/A  VAP BUNDLE: N/A  CENTRAL LINE BUNDLE: ordered  ULCER PPX: home PPI  GLYCEMIC CONTROL: ISS  BOWEL CARE: miralax  INDWELLING CATHETER: N/A  NUTRITION: Adult diet Regular      EMERGENCY CONTACT: Extended Emergency Contact Information  Primary Emergency Contact: Babatunde Mclean  Mobile Phone: [phone]  Relation: Friend   needed? No  Secondary Emergency Contact: Daisy Velasco  Mobile Phone: [phone]  Relation: Sister  FAMILY UPDATE: Patient at bedside  CODE STATUS: Full Code  DISPO: Maintain in HFICU     Patient seen and assessed with Dr. Hicks    I personally spent 60 minutes of critical care time directly and personally managing the patient exclusive of separately billable procedures   _________________________________________________  MARLI High-CNP

## 2025-07-23 NOTE — PROGRESS NOTES
Social Work Note  - HFICU Treatment Plan: LVAD scheduled for 7/23 has been postponed. Procedure pending pathology report from colonoscopy and nicotine results. Thoracentesis. Patient potentially may return to the floor   - Payer: [employer]  - Support:  Patient has HCPOA on file, Friend Babatunde Mclean is listed as primary agent   -Planned Disposition: Rehab recommendation - PT/low intensity and OT/no needs at this time. SW will continue to follow for   -Discharge assessment: 7/15 I reviewed the SDOH and Social Work Discharge Assessment. I met with the patient and updated the assessments as needed. Per the assessment. I identified no barriers to discharge at this time. SW will continue to follow for dc planning needs   - Barriers to discharge: none at this time.   QUYEN Lincoln, LSW

## 2025-07-24 LAB
ACID FAST STN SPEC: NORMAL
ALBUMIN SERPL BCP-MCNC: 3.9 G/DL (ref 3.4–5)
ANION GAP SERPL CALC-SCNC: 11 MMOL/L (ref 10–20)
BUN SERPL-MCNC: 17 MG/DL (ref 6–23)
CALCIUM SERPL-MCNC: 9.5 MG/DL (ref 8.6–10.6)
CHLORIDE SERPL-SCNC: 94 MMOL/L (ref 98–107)
CO2 SERPL-SCNC: 39 MMOL/L (ref 21–32)
CREAT SERPL-MCNC: 0.71 MG/DL (ref 0.5–1.05)
EGFRCR SERPLBLD CKD-EPI 2021: >90 ML/MIN/1.73M*2
ERYTHROCYTE [DISTWIDTH] IN BLOOD BY AUTOMATED COUNT: 15 % (ref 11.5–14.5)
GLUCOSE BLD MANUAL STRIP-MCNC: 147 MG/DL (ref 74–99)
GLUCOSE BLD MANUAL STRIP-MCNC: 201 MG/DL (ref 74–99)
GLUCOSE BLD MANUAL STRIP-MCNC: 255 MG/DL (ref 74–99)
GLUCOSE BLD MANUAL STRIP-MCNC: 372 MG/DL (ref 74–99)
GLUCOSE SERPL-MCNC: 158 MG/DL (ref 74–99)
HCT VFR BLD AUTO: 41.9 % (ref 36–46)
HGB BLD-MCNC: 13.2 G/DL (ref 12–16)
LDH SERPL L TO P-CCNC: 238 U/L (ref 84–246)
MAGNESIUM SERPL-MCNC: 2.26 MG/DL (ref 1.6–2.4)
MCH RBC QN AUTO: 30.2 PG (ref 26–34)
MCHC RBC AUTO-ENTMCNC: 31.5 G/DL (ref 32–36)
MCV RBC AUTO: 96 FL (ref 80–100)
MYCOBACTERIUM SPEC CULT: NORMAL
NRBC BLD-RTO: 0 /100 WBCS (ref 0–0)
PHOSPHATE SERPL-MCNC: 3.7 MG/DL (ref 2.5–4.9)
PLATELET # BLD AUTO: 207 X10*3/UL (ref 150–450)
POTASSIUM SERPL-SCNC: 3.8 MMOL/L (ref 3.5–5.3)
RBC # BLD AUTO: 4.37 X10*6/UL (ref 4–5.2)
SODIUM SERPL-SCNC: 140 MMOL/L (ref 136–145)
WBC # BLD AUTO: 6.4 X10*3/UL (ref 4.4–11.3)

## 2025-07-24 PROCEDURE — 1100000001 HC PRIVATE ROOM DAILY

## 2025-07-24 PROCEDURE — 2500000001 HC RX 250 WO HCPCS SELF ADMINISTERED DRUGS (ALT 637 FOR MEDICARE OP): Performed by: PHYSICIAN ASSISTANT

## 2025-07-24 PROCEDURE — 2500000002 HC RX 250 W HCPCS SELF ADMINISTERED DRUGS (ALT 637 FOR MEDICARE OP, ALT 636 FOR OP/ED): Performed by: STUDENT IN AN ORGANIZED HEALTH CARE EDUCATION/TRAINING PROGRAM

## 2025-07-24 PROCEDURE — 2500000005 HC RX 250 GENERAL PHARMACY W/O HCPCS: Performed by: PHYSICIAN ASSISTANT

## 2025-07-24 PROCEDURE — 94640 AIRWAY INHALATION TREATMENT: CPT

## 2025-07-24 PROCEDURE — 2500000002 HC RX 250 W HCPCS SELF ADMINISTERED DRUGS (ALT 637 FOR MEDICARE OP, ALT 636 FOR OP/ED): Performed by: PHYSICIAN ASSISTANT

## 2025-07-24 PROCEDURE — 2500000004 HC RX 250 GENERAL PHARMACY W/ HCPCS (ALT 636 FOR OP/ED): Mod: JZ | Performed by: NURSE PRACTITIONER

## 2025-07-24 PROCEDURE — 84100 ASSAY OF PHOSPHORUS: CPT | Performed by: PHYSICIAN ASSISTANT

## 2025-07-24 PROCEDURE — 85027 COMPLETE CBC AUTOMATED: CPT | Performed by: PHYSICIAN ASSISTANT

## 2025-07-24 PROCEDURE — 99232 SBSQ HOSP IP/OBS MODERATE 35: CPT | Performed by: STUDENT IN AN ORGANIZED HEALTH CARE EDUCATION/TRAINING PROGRAM

## 2025-07-24 PROCEDURE — 82947 ASSAY GLUCOSE BLOOD QUANT: CPT

## 2025-07-24 PROCEDURE — 83735 ASSAY OF MAGNESIUM: CPT | Performed by: PHYSICIAN ASSISTANT

## 2025-07-24 PROCEDURE — 2500000004 HC RX 250 GENERAL PHARMACY W/ HCPCS (ALT 636 FOR OP/ED): Performed by: PHYSICIAN ASSISTANT

## 2025-07-24 PROCEDURE — 2500000001 HC RX 250 WO HCPCS SELF ADMINISTERED DRUGS (ALT 637 FOR MEDICARE OP)

## 2025-07-24 PROCEDURE — 36415 COLL VENOUS BLD VENIPUNCTURE: CPT | Performed by: PHYSICIAN ASSISTANT

## 2025-07-24 RX ORDER — POTASSIUM CHLORIDE 1.5 G/1.58G
40 POWDER, FOR SOLUTION ORAL ONCE
Status: COMPLETED | OUTPATIENT
Start: 2025-07-24 | End: 2025-07-24

## 2025-07-24 RX ORDER — BUMETANIDE 0.25 MG/ML
1 INJECTION, SOLUTION INTRAMUSCULAR; INTRAVENOUS ONCE
Status: COMPLETED | OUTPATIENT
Start: 2025-07-24 | End: 2025-07-24

## 2025-07-24 RX ORDER — IPRATROPIUM BROMIDE AND ALBUTEROL SULFATE 2.5; .5 MG/3ML; MG/3ML
3 SOLUTION RESPIRATORY (INHALATION)
Status: DISCONTINUED | OUTPATIENT
Start: 2025-07-25 | End: 2025-07-25

## 2025-07-24 RX ORDER — IRON POLYSACCHARIDE COMPLEX 150 MG
150 CAPSULE ORAL DAILY
Status: DISCONTINUED | OUTPATIENT
Start: 2025-07-25 | End: 2025-08-01

## 2025-07-24 RX ADMIN — SPIRONOLACTONE 25 MG: 25 TABLET, FILM COATED ORAL at 08:54

## 2025-07-24 RX ADMIN — INSULIN GLARGINE 15 UNITS: 100 INJECTION, SOLUTION SUBCUTANEOUS at 21:10

## 2025-07-24 RX ADMIN — LEVOTHYROXINE SODIUM 88 MCG: 0.09 TABLET ORAL at 06:02

## 2025-07-24 RX ADMIN — IPRATROPIUM BROMIDE AND ALBUTEROL SULFATE 3 ML: .5; 3 SOLUTION RESPIRATORY (INHALATION) at 15:21

## 2025-07-24 RX ADMIN — BUMETANIDE 1 MG: 0.25 INJECTION INTRAMUSCULAR; INTRAVENOUS at 08:55

## 2025-07-24 RX ADMIN — IPRATROPIUM BROMIDE AND ALBUTEROL SULFATE 3 ML: .5; 3 SOLUTION RESPIRATORY (INHALATION) at 09:48

## 2025-07-24 RX ADMIN — EMPAGLIFLOZIN 10 MG: 10 TABLET, FILM COATED ORAL at 08:54

## 2025-07-24 RX ADMIN — POTASSIUM CHLORIDE 40 MEQ: 1.5 POWDER, FOR SOLUTION ORAL at 22:57

## 2025-07-24 RX ADMIN — MAGNESIUM OXIDE TAB 400 MG (241.3 MG ELEMENTAL MG) 2 TABLET: 400 (241.3 MG) TAB at 08:53

## 2025-07-24 RX ADMIN — FLUTICASONE FUROATE AND VILANTEROL TRIFENATATE 1 PUFF: 100; 25 POWDER RESPIRATORY (INHALATION) at 09:48

## 2025-07-24 RX ADMIN — IPRATROPIUM BROMIDE AND ALBUTEROL SULFATE 3 ML: .5; 3 SOLUTION RESPIRATORY (INHALATION) at 04:05

## 2025-07-24 RX ADMIN — ATORVASTATIN CALCIUM 80 MG: 80 TABLET, FILM COATED ORAL at 08:54

## 2025-07-24 RX ADMIN — INSULIN LISPRO 5 UNITS: 100 INJECTION, SOLUTION INTRAVENOUS; SUBCUTANEOUS at 11:41

## 2025-07-24 RX ADMIN — ROPINIROLE HYDROCHLORIDE 3 MG: 3 TABLET, FILM COATED ORAL at 21:11

## 2025-07-24 RX ADMIN — ROPINIROLE 1 MG: 1 TABLET, FILM COATED ORAL at 15:21

## 2025-07-24 RX ADMIN — CITALOPRAM HYDROBROMIDE 40 MG: 40 TABLET ORAL at 08:55

## 2025-07-24 RX ADMIN — INSULIN LISPRO 2 UNITS: 100 INJECTION, SOLUTION INTRAVENOUS; SUBCUTANEOUS at 17:39

## 2025-07-24 RX ADMIN — ASPIRIN 81 MG CHEWABLE TABLET 81 MG: 81 TABLET CHEWABLE at 08:59

## 2025-07-24 RX ADMIN — Medication: at 23:00

## 2025-07-24 RX ADMIN — BUMETANIDE 1 MG: 0.25 INJECTION INTRAMUSCULAR; INTRAVENOUS at 16:17

## 2025-07-24 RX ADMIN — GABAPENTIN 400 MG: 300 CAPSULE ORAL at 13:34

## 2025-07-24 RX ADMIN — PANTOPRAZOLE SODIUM 20 MG: 20 TABLET, DELAYED RELEASE ORAL at 08:54

## 2025-07-24 RX ADMIN — Medication 3 DOSE: at 08:00

## 2025-07-24 RX ADMIN — GABAPENTIN 400 MG: 300 CAPSULE ORAL at 21:10

## 2025-07-24 RX ADMIN — ROPINIROLE 1 MG: 1 TABLET, FILM COATED ORAL at 08:52

## 2025-07-24 RX ADMIN — ROPINIROLE 1 MG: 1 TABLET, FILM COATED ORAL at 21:20

## 2025-07-24 RX ADMIN — MAGNESIUM OXIDE TAB 400 MG (241.3 MG ELEMENTAL MG) 2 TABLET: 400 (241.3 MG) TAB at 21:13

## 2025-07-24 RX ADMIN — GABAPENTIN 400 MG: 300 CAPSULE ORAL at 06:02

## 2025-07-24 RX ADMIN — SACUBITRIL AND VALSARTAN 1 TABLET: 24; 26 TABLET, FILM COATED ORAL at 08:53

## 2025-07-24 ASSESSMENT — COGNITIVE AND FUNCTIONAL STATUS - GENERAL
DAILY ACTIVITIY SCORE: 24
DAILY ACTIVITIY SCORE: 24
MOBILITY SCORE: 23
MOBILITY SCORE: 24
CLIMB 3 TO 5 STEPS WITH RAILING: A LITTLE

## 2025-07-24 ASSESSMENT — PAIN SCALES - GENERAL
PAINLEVEL_OUTOF10: 0 - NO PAIN
PAINLEVEL_OUTOF10: 0 - NO PAIN

## 2025-07-24 ASSESSMENT — ACTIVITIES OF DAILY LIVING (ADL): LACK_OF_TRANSPORTATION: NO

## 2025-07-24 ASSESSMENT — PAIN - FUNCTIONAL ASSESSMENT: PAIN_FUNCTIONAL_ASSESSMENT: 0-10

## 2025-07-24 NOTE — SIGNIFICANT EVENT
Rapid Response Nurse Note: RADAR alert: 7    Pager time:   Arrival time:   Event end time:   Location: [place] [address]  [] Triage by phone or secure messaging    Rapid response initiated by:  [] Rapid response RN [] Family [] Nursing Supervisor [] Physician   [x] RADAR auto page [] Sepsis auto-page [] RN [] RT   [] NP/PA [] Other:     Primary reason for call:   [] BAT [] New CPAP/BiPAP [] Bleeding [] Change in mental status   [] Chest pain [] Code blue [] FiO2 >/= 50% [] HR </= 40 bpm   [] HR >/= 130 bpm [] Hyperglycemia [] Hypoglycemia [x] RADAR    [] RR </= 8 bpm [] RR >/= 30 bpm [] SBP </= 90 mmHg [] SpO2 < 90%   [] Seizure [] Sepsis [] Shortness of breath  [] Staff concern: see comments     Initial VS and/or RADAR VS: T 36.2 °C; HR 90; RR null; BP 94/57; SPO2 91%.    Providers present at bedside (if applicable):     Name of ICU Provider contacted (if applicable):     Interventions:  [x] None [] ABG/VBG [] Assist w/ICU transfer [] BAT paged    [] Bag mask [] Blood [] Cardioversion [] Code Blue   [] Code blue for intubation [] Code status changed [] Chest x-ray [] EKG   [] IV fluid/bolus [] KUB x-ray [] Labs/cultures [] Medication   [] Nebulizer treatment [] NIPPV (CPAP/BiPAP) [] Oxygen [] Oral airway   [] Peripheral IV [] Palliative care consult [] CT/MRI [] Sepsis protocol    [] Suctioned [] Other:     Outcome:  [] Coded and  [] Code blue for intubation [] Coded and transferred to ICU []  on division   [x] Remained on division (no change) [] Remained on division + additional monitoring [] Remained in ED [] Transferred to ED   [] Transferred to ICU [] Transferred to inpatient status [] Transferred for interventions (procedure) [] Transferred to ICU stepdown    [] Transferred to surgery [] Transferred to telemetry [] Sepsis protocol [] STEMI protocol   [] Stroke protocol [x] Bedside nurse instructed to page rapid response for any concerns or acute change in condition/VS     Additional Comments:      Radar auto-page received for a radar score of 7 with the above listed vital signs.  Vital signs were confirmed and reviewed with the primary RN.  She is at her current baseline and the primary RN has no present concerns.  There are no indications for interventions by Rapid Response at this time.  RN to contact Rapid Response with any future concerns.
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
## 2025-07-24 NOTE — CARE PLAN
Problem: Safety - Adult  Goal: Free from fall injury  Outcome: Progressing     Problem: Nutrition  Goal: Nutrient intake appropriate for maintaining nutritional needs  Outcome: Progressing     Patient remained safe and stable this shift. 1mg IVP bumex x2 with almost 3L of output this shift. 2L fluid restriction started. 3L oxygen, 98%.

## 2025-07-24 NOTE — SIGNIFICANT EVENT
Rapid Response Nurse Note: RADAR alert: 7    Pager time:   Arrival time:   Event end time:   Location: T5-34  [x] Triage by phone or secure messaging    Rapid response initiated by:  [] Rapid response RN [] Family [] Nursing Supervisor [] Physician   [x] RADAR auto page [] Sepsis auto-page [] RN [] RT   [] NP/PA [] Other:     Primary reason for call:   [] BAT [] New CPAP/BiPAP [] Bleeding [] Change in mental status   [] Chest pain [] Code blue [] FiO2 >/= 50% [] HR </= 40 bpm   [] HR >/= 130 bpm [] Hyperglycemia [] Hypoglycemia [x] RADAR    [] RR </= 8 bpm [] RR >/= 30 bpm [] SBP </= 90 mmHg [] SpO2 < 90%   [] Seizure [] Sepsis [] Shortness of breath  [] Staff concern: see comments     Initial VS and/or RADAR VS: T 36 °C; HR 89; RR 18; BP 94/55; SPO2 93%.    Providers present at bedside (if applicable): bedside RN     Name of ICU Provider contacted (if applicable): NA    Interventions:  [x] None [] ABG/VBG [] Assist w/ICU transfer [] BAT paged    [] Bag mask [] Blood [] Cardioversion [] Code Blue   [] Code blue for intubation [] Code status changed [] Chest x-ray [] EKG   [] IV fluid/bolus [] KUB x-ray [] Labs/cultures [] Medication   [] Nebulizer treatment [] NIPPV (CPAP/BiPAP) [] Oxygen [] Oral airway   [] Peripheral IV [] Palliative care consult [] CT/MRI [] Sepsis protocol    [] Suctioned [] Other:     Outcome:  [] Coded and  [] Code blue for intubation [] Coded and transferred to ICU []  on division   [x] Remained on division (no change) [] Remained on division + additional monitoring [] Remained in ED [] Transferred to ED   [] Transferred to ICU [] Transferred to inpatient status [] Transferred for interventions (procedure) [] Transferred to ICU stepdown    [] Transferred to surgery [] Transferred to telemetry [] Sepsis protocol [] STEMI protocol   [] Stroke protocol [x] Bedside nurse instructed to page rapid response for any concerns or acute change in condition/VS     Additional  Comments: Radar=7 paged 7/23@0833; called & spoke with RN over the phone who stated that this pt has COPD with goal POX of 88%-92%; No acute concerns per RN @ this time

## 2025-07-24 NOTE — PROGRESS NOTES
Subjective Data:  Patient lying in bed, denies SOB/CP at rest; reports improvement in her SOB since L thora and IV diuresis overnight. Softer Bps; asymptomatic, added holding parameters to entresto to HOLD if SBP 85 or less.    - IVP bumex 1mg x1, ?consider additional evening dose  - cont. Holding plavix per HF, pending pathology and LVAD vs. OHT  - ?consider BB    Overnight Events:    HFICU Transfer to [place] [id number] last evening; patient originally admitted for swan-guided optimization pre-LVAD (originally scheduled 7/23)  suspect pulmonary flash edema as evidenced by sudden/increased SOB with p.ox reading of 83% on 3-4L O2 N/C; s/p IVP bumex 1mg x1, voided 1.5L with this with improvement in SOB and p.ox improved to 98% on 3L, CXR w/pulm edema and small L pl. Effusion s/p L thora yesterday afternoon (-1200cc serosang fluid). No other acute events overnight.     Objective Data:  Last Recorded Vitals:  Vitals:    07/24/25 0400 07/24/25 0456 07/24/25 0653 07/24/25 0800   BP:  93/54 93/52    BP Location:  Right arm Right arm    Patient Position:  Sitting Lying    Pulse: 79 82  78   Resp:  17 18    Temp:  36.1 °C (97 °F) 36.3 °C (97.3 °F)    TempSrc:  Temporal Temporal    SpO2:  97% 97% 95%   Weight:  61.3 kg (135 lb 2.3 oz)     Height:         Last Labs:  CBC - 7/23/2025:  6:55 PM  7.5 12.7 177    41.7      CMP - 7/23/2025:  6:55 PM  9.0 7.0 12 --- 0.8   2.9 3.6 11 74      PTT - 7/14/2025:  8:03 PM  1.1   12.2 30     TROPHS   Date/Time Value Ref Range Status   07/14/2025 08:03 PM 18 0 - 34 ng/L Final   06/12/2025 05:28 PM 15 0 - 34 ng/L Final   06/02/2025 01:46 PM 22 0 - 34 ng/L Final     BNP   Date/Time Value Ref Range Status   07/14/2025 08:03  0 - 99 pg/mL Final   06/09/2025 04:13 PM 1,456 0 - 99 pg/mL Final     HGBA1C   Date/Time Value Ref Range Status   07/15/2025 03:54 AM 8.0 See comment % Final   06/02/2025 01:46 PM 8.6 See comment % Final     LDLCALC   Date/Time Value Ref Range Status   07/14/2025 08:03 PM 78  <=99 mg/dL Final     Comment:                                 Near   Borderline      AGE      Desirable  Optimal    High     High     Very High     0-19 Y     0 - 109     ---    110-129   >/= 130     ----    20-24 Y     0 - 119     ---    120-159   >/= 160     ----      >24 Y     0 -  99   100-129  130-159   160-189     >/=190    LDL Cholesterol is calculated using the Friedewald equation.     VLDL   Date/Time Value Ref Range Status   07/14/2025 08:03 PM 20 0 - 40 mg/dL Final      Last I/O:  I/O last 3 completed shifts:  In: 840 (13.7 mL/kg) [P.O.:840]  Out: 1550 (25.3 mL/kg) [Urine:1550 (0.7 mL/kg/hr)]  Weight: 61.3 kg     Past Cardiology Tests (Last 3 Years):  EKG:  Electrocardiogram, 12-lead PRN ACS symtpoms 06/12/2025    Echo:  Transthoracic Echo (TTE) Limited 07/15/2025:  1. Left ventricular ejection fraction is moderately decreased by visual estimate at 30-35%.  2. There are multiple left ventricular wall motion abnormalities.  3. The inferolateral wall is hypo to akinetic. The apex is hypokinetic.  4. Spectral Doppler shows a Grade III (restrictive pattern) of left ventricular diastolic filling with an elevated left atrial pressure.  5. Left ventricular cavity size is severely dilated.  6. No left ventricular thrombus visualized.  7. There is low normal right ventricular systolic function.  8. There is a large pleural effusion.  9. The Doppler estimated RVSP is mildly elevated at 47 mmHg.  10. Normal aortic root.  11. Compared with study dated 6/2/2025, the LV function is now mildly improved owing mostly to a small increase in apcal contractility.     Transthoracic echo (TTE) complete 06/02/2025:  1. Left ventricular ejection fraction is severely decreased by visual estimate at 20-25%.  2. There is global hypokinesis of the left ventricle with minor regional variations.  3. Spectral Doppler shows a Grade III (restrictive pattern) of left ventricular diastolic filling with an elevated left atrial pressure.  4.  Left ventricular cavity size is mildly dilated.  5. No left ventricular thrombus visualized.  6. There is relative preservation of the basal myocardial segment systolic function.  7. There is normal right ventricular global systolic function.  8. Mild to moderately elevated pulmonary artery pressure.     Ejection Fractions:  EF   Date/Time Value Ref Range Status   07/15/2025 02:40 PM 33 %    06/02/2025 05:29 PM 23 %      Cath:  No results found for this or any previous visit from the past 1095 days.  Stress Test:  No results found for this or any previous visit from the past 1095 days.  Cardiac Imaging:  No results found for this or any previous visit from the past 1095 days.    Inpatient Medications:  Scheduled Medications[1]  PRN Medications[2]  Continuous Medications[3]    Physical Exam:  General: NAD, lying in bed, thin  Skin: warm and dry throughout, L mid-back dressing C/D/I s/p L thora (7/23)  Head/ neck: +JVD above clavicle   Cardiac: RRR, S1, S2, NSR HR 90s on tele    Pulm: +rales in BLL, on 2L O2 N/C (not baseline)  GI: soft, nontender, non-distended  Extremities: +1 edema in BLE  Neuro: no focal neuro deficits, a+ox4  Psych: appropriate mood and behavior, pleasant       Assessment/Plan   Thao Evans is a 60 year old with a PMHx significant for CAD s/p PCI (LAD; 2015, Lcx; 3/2025), stage D systolic HF/ICM/HFrEF s/p ICD for h/o VT with presumed associated syncope (3/2025 w/EF of 30-35%), recurrent bilat pl. Effusions 2/2 HF, poorly controlled T2DM, pAF (off of DOAC given the absence of recurrence), HTN, HLD, tobacco use/COPD (quit x2mos), Graves Disease, RLS, anxiety, & depression. Directly admitted to HFICU for swan-guided optimization pre-scheduled LVAD for 7/23; now delayed d/t concerning polyps on colonoscopy screening w/pathology pending; LVAD vs. OHT to be re-visited. Transferred out of HFICU on (7/23) evening, Under [facility]VI Service for further management.     Acute on Chronic Systolic and Diastolic  Heart Failure  Ischemic Cardiomyopathy  - Follows Dr. Deluca, etiology: Stage D, NYHA III, s/p ICD for h/o VT (3/2025)  - limited TTE (7/2025): EF 30-35%, multiple WMAs, LV sev. Dilated, DD, hypo to akinetic inferolateral wall, apex hypokinetic, low-normal RV systolic function, RVSP 47mmHg, large L pl. Effusion, IVC >50%  - admitted to HFICU for swan-guided optimization pre-LVAD   - Opening SGC #'s (7/20): /66 (74), CVP 12, PA 52/30 (41), CO/CI 5.4/3.3, , SVO 2 61%, bumex 2mg po BID, entresto 24/26, Sandi 25  - Closing SCC #'s (7/22): MAP 80, CVP 7, PA 40/30 (36), CO/CI 5.4/3.4, SVR 1096, SVO2 68%, entresto 12/13 mg, Sandi 25  - HF following, LVAD originally scheduled for 7/23, but concerning polyp found on screening   -- per HF OF NOTE: tobacco-free x2 mos (OHT may be revisited) and LVIDD 4.4cm; anatomical suitability for LVAD may be a concern   - finalized advanced therapies plans pending colonoscopy pathology results (?LVAD vs. OHT)  - prior to admit, intolerant to GDMT d/t low Bps/was on midodrine  - poor response to home PO bumex 1mg BID (stopped 7/20), diuresing w/IVP Bumex boluses--->give IVP Bumex 1mg x1, ?consider additional evening dose  - cont. empa 10mg daily, sandi 25mg daily, entresto 24-26mg BID  - ?consider BB, pending finalized advanced therapies plans per HF  - Daily standing weights, 2L fluid restriction, strict I's & O's     CAD s/p PCI (LAD in 2015, LCx in 3/2025)  - discussed w/HF; cont. HOLDING home plavix 75mg daily pending finalized advanced therapies plan  - currently denies s/sx of angina, HS trop on admit=18  - cont. Home ASA 81mg daily, statin 80mg nightly, ?consider BB as above    H/o VT s/p ICD (3/2025)  pAfib  - h/o VT w/presumed associated syncope  - Device: s/p CRT-D 3/2025, [employer]  - OFF DOAC given absence of recurrence  - device interrogation on admit: no V-arrhythmias, AP<1% <1%  - currently NSR HR 90s on tele   - monitor tele, maintain K>4.0 and  mag>2.00    Colonic Polyps  NITA, stable  GERD  - no prior h/o anemia  - hgb stable ~12-13 without overt signs of bleeding  - iron studies on admit: 62, 256, sat 24%, ferritin 224(H), Folate & vit.B12 WNL  - s/p IV venofer x3 doses (completed 7/17), cont. PO  - reported weight loss ~60lbs in past 6mos  - screening for LVAD, s/p EGD/colonoscopy (7/17): multiple large polyps, pathology pending (Dr. Hicks reached out 7/23; still pending)  - cont. Home PPI     Bilateral Pleural Effusions 2/2 HF, chronic, treated  Suspected Flash Pulmonary Edema (7/23), resolved  COPD  - former smoker, 2mos tobacco-free   - PFTs (6/2025): severe restrictive defect   - s/p R thora on (6/12) -1L, s/p L thora's on [6/9 -1L, 6/11 -1.2L, & 7/23 (-1.2L serosang fluid; transudative per Light's Criteria]  -- gram stain/cultures NGTD, prior cytology (from 6/9) revealing reactive lymphocytosis   -- post-L thora CXR (7/23 evening): pulm edema, worsened bibasilar atelectasis, small L pl. Effusion without pneumo   - (7/23 PM): increased SOB, p.ox 83% on 3-4L O2, improved w/diuresis (plan as above)  - cont. fluticasone furoate-vilanterol (Breo) 100-25mcg   - cont. albuterol HFA prn  - Monitor and maintain SpO2 > 92%, wean O2 as tolerated (on 2L), diuresis plan as above    T2DM   Hypoglycemia, resolved  - Hgb A1c (7/2025): 8.0%  - home meds: lantus 15U nightly, empa 10mg, alogliptin 25mg daily  - hypoglycemia on 7/15 & 7/16 PM; lantus reduced from 50U to home 15U nightly; cont.  - cont. Home empa 10mg daily  - Bgs labile from 140-300s; cont. Regimen as above  - ACHS accuchecks, SSI#1, hypoglycemia protocol     H/o HTN with current hypotension, asymptomatic   HLD  - BP on on admit:   - SBP past 24hrs: high 80s-low 100s  - cont. BP meds as above; added HOLDING parameters to entresto for SBP <85  - lipid panel on admit: total cholesterol 141 HDL 43.3 LDL 78 Tgs 99  - cont. Home statin 80mg nightly    RLS  Anxiety  Depression  - denies active issues  -  OARRS reviewed, cont. Home reagan 400mg TID  - cont. Home citalopram 40mg daily  - cont. Home ropinirole 1mg TID, 3mg nightly  - PO Tylenol PRN for pain    Graves Disease  - (6/2025) TSH 0.19(L), FT4 1.23(WNL)  - cont. Home levothyroxine 88mcg daily     Physical Status  - Not obese, BMI 22.68 kg/m2  - Reduced Mobility, cont. PT/OT     Substance abuse  - rare ETOH use  - nicotine in urine NEGATIVE on admit  - encourage ongoing cessation; reports 2mos tobacco-free    DVT ppx: SCDs, ambulation  DISPO: PT/OT recs low-intensity care  - discharge pending finalized advanced therapies plan    All labs, vital signs, tests & imaging results, and medications were reviewed.    Seen and discussed with Dr. Timmons     Code Status:  Full Code    Elisa Ibrahim, APRN-CNP       [1]   Scheduled medications   Medication Dose Route Frequency    aspirin  81 mg oral Daily    atorvastatin  80 mg oral Daily    bumetanide  1 mg intravenous Once    citalopram  40 mg oral Daily    [Held by provider] clopidogrel  75 mg oral Daily    empagliflozin  10 mg oral Daily    fluticasone furoate-vilanteroL  1 puff inhalation Daily    gabapentin  400 mg oral q8h    insulin glargine  15 Units subcutaneous Nightly    insulin lispro  0-5 Units subcutaneous TID AC    ipratropium-albuteroL  3 mL nebulization q6h    levothyroxine  88 mcg oral Daily    magnesium oxide  800 mg of magnesium oxide oral BID    pantoprazole  20 mg oral Daily    perflutren protein A microsphere  0.5 mL intravenous Once in imaging    rOPINIRole  1 mg oral TID    rOPINIRole  3 mg oral Nightly    sacubitriL-valsartan  1 tablet oral BID    spironolactone  25 mg oral Daily    sulfur hexafluoride microsphr  2 mL intravenous Once in imaging   [2]   PRN medications   Medication    acetaminophen    albuterol    alteplase    dextrose    dextrose    glucagon    glucagon    melatonin    nitroglycerin    ondansetron    oxygen    polyethylene glycol   [3]   Continuous Medications   Medication Dose  Last Rate

## 2025-07-24 NOTE — SIGNIFICANT EVENT
Rapid Response Respiratory Therapy Note: Other - Concern per RN and Team     Start Time: 2000  End Time: 2035  Location:     Initial Vital Signs and O2: HR: 90    RR: 24     Type of O2: 6L NC    SpO2: 94%  Breath Sounds: Upper Coarse/Lower Coarse Crackles     Respiratory Concerns:  []  None [x]  Increased WOB []  Shallow respirations []  Irregular Respirations   []  Tachypnea []  Bradypnea [x]  Oxygen desaturation []  Cyanosis   []  Poor Secretion Clearance []  Impaired/Weak Cough []  Copious Secretions []  Thick Secretions   []  Inability to Protect Airway []  Apnea []  Respiratory Arrest [x]  Shortness of Breath   [] Hemodynamic Instability []  Asymmetric Chest Rise []  Adventitious Breath Sounds []  Abnormal CXR   []  Aspiration []  Other:       Interventions:  []  None []  ABG/VBG  []  Assist w/ICU transfer []  Bag-mask ventilation   []  Bronchial Hygiene []  Trach care/Suction []  ETCO2 []  CPR   []  Assist Intubation []  Venti Mask/NRB []  Chest x-ray []  CT/MRI transport    []  High Flow Therapy []  Igel  []  Nasal Airway []  NT Suctioning   []  Nebulizer treatment []  NIPPV (CPAP/BiPAP) [x]  Oxygen  Type: NC  FiO2:  Liter Flow: 2L -> 4L  SpO2: 95% []  Oral Airway   []  Oral Suctioning [x]  Other: 4mg Bumex     Outcome:  [x]  Maintain on Division []  Transferred to ICU []  Transferred to ED [x]  Bedside nurse instructed to page Rapid Response for any concerns or acute change in condition/VS     Final Vital Signs and O2: HR: 98   RR: 23   Type of O2: 6L NC    SpO2: 95%   Breath Sounds:     Additional Comments:  RN concerned for pts increased need in O2 requirements - 2L to 6L. Pt with Hx of COPD and CHF. Currently fluid overloaded and thoro procedure this AM. Pt indicates typical SOB during daily activities with no home O2 use. Spoke with Team and they are ok with SpO2 goals of 88% and greater. Pt currently waiting on LVAD procedure. Given 4mg Bumex and multiple pieces of O2 extension tubing removed.  Portable O2 tank brought to room for pt to use the bathroom. Pt encouraged to reach out with any change in respiratory status. Floor RN and Rapid RN updated.

## 2025-07-24 NOTE — CARE PLAN
Problem: Chronic Conditions and Co-morbidities  Goal: Patient's chronic conditions and co-morbidity symptoms are monitored and maintained or improved  Outcome: Progressing  Flowsheets (Taken 7/24/2025 1922)  Care Plan - Patient's Chronic Conditions and Co-Morbidity Symptoms are Monitored and Maintained or Improved:   Monitor and assess patient's chronic conditions and comorbid symptoms for stability, deterioration, or improvement   Collaborate with multidisciplinary team to address chronic and comorbid conditions and prevent exacerbation or deterioration   Update acute care plan with appropriate goals if chronic or comorbid symptoms are exacerbated and prevent overall improvement and discharge     Problem: Heart Failure  Goal: Improved gas exchange this shift  Outcome: Progressing  Goal: Improved urinary output this shift  Outcome: Progressing  Goal: Report improvement of dyspnea/breathlessness this shift  Outcome: Progressing   End of shift report:  At beginning of shift patient reported SOB & required 6L of O2 via nasal cannula. CXR was completed. Patient received 1 mg of IV Bumex x1 dose. Patient has shown improvement in urinary output. Patient also states that her breathing is better. Patient now on 3 L of O2 and Spo2 in the 90s. RT recommends keeping Spo2 greater than 88%.

## 2025-07-24 NOTE — PROGRESS NOTES
07/24/25 1422   Discharge Planning   Living Arrangements Spouse/significant other   Support Systems Spouse/significant other   Assistance Needed None   Type of Residence Private residence   Do you have animals or pets at home? No   Who is requesting discharge planning? Provider   Home or Post Acute Services None   Expected Discharge Disposition Home   Does the patient need discharge transport arranged? No   Financial Resource Strain   How hard is it for you to pay for the very basics like food, housing, medical care, and heating? Not very   Housing Stability   In the last 12 months, was there a time when you were not able to pay the mortgage or rent on time? N   At any time in the past 12 months, were you homeless or living in a shelter (including now)? N   Transportation Needs   In the past 12 months, has lack of transportation kept you from medical appointments or from getting medications? no   In the past 12 months, has lack of transportation kept you from meetings, work, or from getting things needed for daily living? No   Patient Choice   Patient / Family choosing to utilize agency / facility established prior to hospitalization No   Stroke Family Assessment   Stroke Family Assessment Needed No   Intensity of Service   Intensity of Service 0-30 min     Transitional Care Coordination Progress Note:  Patient discussed during interdisciplinary rounds.   Team members present: RN YAHIR CRISTOBAL   Plan per Medical/Surgical team: Awaiting colonoscopy pathology, LVAD vs heart transplant  Payor: [employer]   Discharge disposition: Home   Potential Barriers: None   ADOD: 7-      Previous Home Care:   DME: None  Pharmacy: [facility]   Falls: None  Dialysis: None

## 2025-07-25 LAB
ALBUMIN SERPL BCP-MCNC: 3.5 G/DL (ref 3.4–5)
ANION GAP SERPL CALC-SCNC: 10 MMOL/L (ref 10–20)
BUN SERPL-MCNC: 20 MG/DL (ref 6–23)
CALCIUM SERPL-MCNC: 9.3 MG/DL (ref 8.6–10.6)
CHLORIDE SERPL-SCNC: 95 MMOL/L (ref 98–107)
CO2 SERPL-SCNC: 37 MMOL/L (ref 21–32)
CREAT SERPL-MCNC: 0.75 MG/DL (ref 0.5–1.05)
EGFRCR SERPLBLD CKD-EPI 2021: 90 ML/MIN/1.73M*2
ERYTHROCYTE [DISTWIDTH] IN BLOOD BY AUTOMATED COUNT: 15 % (ref 11.5–14.5)
FUNGUS SPEC CULT: NORMAL
FUNGUS SPEC FUNGUS STN: NORMAL
GLUCOSE BLD MANUAL STRIP-MCNC: 147 MG/DL (ref 74–99)
GLUCOSE BLD MANUAL STRIP-MCNC: 153 MG/DL (ref 74–99)
GLUCOSE BLD MANUAL STRIP-MCNC: 214 MG/DL (ref 74–99)
GLUCOSE BLD MANUAL STRIP-MCNC: 236 MG/DL (ref 74–99)
GLUCOSE SERPL-MCNC: 122 MG/DL (ref 74–99)
HCT VFR BLD AUTO: 42.2 % (ref 36–46)
HGB BLD-MCNC: 12.9 G/DL (ref 12–16)
LABORATORY COMMENT REPORT: NORMAL
MAGNESIUM SERPL-MCNC: 2.28 MG/DL (ref 1.6–2.4)
MCH RBC QN AUTO: 29.5 PG (ref 26–34)
MCHC RBC AUTO-ENTMCNC: 30.6 G/DL (ref 32–36)
MCV RBC AUTO: 96 FL (ref 80–100)
NRBC BLD-RTO: 0 /100 WBCS (ref 0–0)
PATH REPORT.FINAL DX SPEC: NORMAL
PATH REPORT.GROSS SPEC: NORMAL
PATH REPORT.TOTAL CANCER: NORMAL
PHOSPHATE SERPL-MCNC: 3.6 MG/DL (ref 2.5–4.9)
PLATELET # BLD AUTO: 228 X10*3/UL (ref 150–450)
POTASSIUM SERPL-SCNC: 4 MMOL/L (ref 3.5–5.3)
RBC # BLD AUTO: 4.38 X10*6/UL (ref 4–5.2)
SODIUM SERPL-SCNC: 138 MMOL/L (ref 136–145)
WBC # BLD AUTO: 5.9 X10*3/UL (ref 4.4–11.3)

## 2025-07-25 PROCEDURE — 2500000002 HC RX 250 W HCPCS SELF ADMINISTERED DRUGS (ALT 637 FOR MEDICARE OP, ALT 636 FOR OP/ED): Performed by: PHYSICIAN ASSISTANT

## 2025-07-25 PROCEDURE — 2500000004 HC RX 250 GENERAL PHARMACY W/ HCPCS (ALT 636 FOR OP/ED): Mod: JZ | Performed by: NURSE PRACTITIONER

## 2025-07-25 PROCEDURE — 2500000001 HC RX 250 WO HCPCS SELF ADMINISTERED DRUGS (ALT 637 FOR MEDICARE OP): Performed by: PHYSICIAN ASSISTANT

## 2025-07-25 PROCEDURE — 1100000001 HC PRIVATE ROOM DAILY

## 2025-07-25 PROCEDURE — 80069 RENAL FUNCTION PANEL: CPT | Performed by: PHYSICIAN ASSISTANT

## 2025-07-25 PROCEDURE — 99232 SBSQ HOSP IP/OBS MODERATE 35: CPT | Performed by: STUDENT IN AN ORGANIZED HEALTH CARE EDUCATION/TRAINING PROGRAM

## 2025-07-25 PROCEDURE — 2500000004 HC RX 250 GENERAL PHARMACY W/ HCPCS (ALT 636 FOR OP/ED): Performed by: PHYSICIAN ASSISTANT

## 2025-07-25 PROCEDURE — 94640 AIRWAY INHALATION TREATMENT: CPT

## 2025-07-25 PROCEDURE — 82947 ASSAY GLUCOSE BLOOD QUANT: CPT

## 2025-07-25 PROCEDURE — 2500000001 HC RX 250 WO HCPCS SELF ADMINISTERED DRUGS (ALT 637 FOR MEDICARE OP): Performed by: NURSE PRACTITIONER

## 2025-07-25 PROCEDURE — 85027 COMPLETE CBC AUTOMATED: CPT | Performed by: PHYSICIAN ASSISTANT

## 2025-07-25 PROCEDURE — 2500000002 HC RX 250 W HCPCS SELF ADMINISTERED DRUGS (ALT 637 FOR MEDICARE OP, ALT 636 FOR OP/ED): Performed by: STUDENT IN AN ORGANIZED HEALTH CARE EDUCATION/TRAINING PROGRAM

## 2025-07-25 PROCEDURE — 36415 COLL VENOUS BLD VENIPUNCTURE: CPT | Performed by: PHYSICIAN ASSISTANT

## 2025-07-25 PROCEDURE — 83735 ASSAY OF MAGNESIUM: CPT | Performed by: PHYSICIAN ASSISTANT

## 2025-07-25 PROCEDURE — 2500000002 HC RX 250 W HCPCS SELF ADMINISTERED DRUGS (ALT 637 FOR MEDICARE OP, ALT 636 FOR OP/ED): Performed by: NURSE PRACTITIONER

## 2025-07-25 RX ORDER — INSULIN LISPRO 100 [IU]/ML
0-10 INJECTION, SOLUTION INTRAVENOUS; SUBCUTANEOUS
Status: DISCONTINUED | OUTPATIENT
Start: 2025-07-25 | End: 2025-08-01

## 2025-07-25 RX ORDER — IPRATROPIUM BROMIDE AND ALBUTEROL SULFATE 2.5; .5 MG/3ML; MG/3ML
3 SOLUTION RESPIRATORY (INHALATION) EVERY 2 HOUR PRN
Status: DISCONTINUED | OUTPATIENT
Start: 2025-07-25 | End: 2025-08-04 | Stop reason: DRUGHIGH

## 2025-07-25 RX ORDER — BUMETANIDE 0.25 MG/ML
1 INJECTION, SOLUTION INTRAMUSCULAR; INTRAVENOUS ONCE
Status: COMPLETED | OUTPATIENT
Start: 2025-07-25 | End: 2025-07-25

## 2025-07-25 RX ADMIN — ASPIRIN 81 MG CHEWABLE TABLET 81 MG: 81 TABLET CHEWABLE at 08:19

## 2025-07-25 RX ADMIN — SPIRONOLACTONE 25 MG: 25 TABLET, FILM COATED ORAL at 08:19

## 2025-07-25 RX ADMIN — FLUTICASONE FUROATE AND VILANTEROL TRIFENATATE 1 PUFF: 100; 25 POWDER RESPIRATORY (INHALATION) at 09:49

## 2025-07-25 RX ADMIN — PANTOPRAZOLE SODIUM 20 MG: 20 TABLET, DELAYED RELEASE ORAL at 08:19

## 2025-07-25 RX ADMIN — SACUBITRIL AND VALSARTAN 1 TABLET: 24; 26 TABLET, FILM COATED ORAL at 08:19

## 2025-07-25 RX ADMIN — MAGNESIUM OXIDE TAB 400 MG (241.3 MG ELEMENTAL MG) 2 TABLET: 400 (241.3 MG) TAB at 08:19

## 2025-07-25 RX ADMIN — IPRATROPIUM BROMIDE AND ALBUTEROL SULFATE 3 ML: .5; 3 SOLUTION RESPIRATORY (INHALATION) at 09:49

## 2025-07-25 RX ADMIN — GABAPENTIN 400 MG: 300 CAPSULE ORAL at 13:01

## 2025-07-25 RX ADMIN — LEVOTHYROXINE SODIUM 88 MCG: 0.09 TABLET ORAL at 05:31

## 2025-07-25 RX ADMIN — INSULIN GLARGINE 15 UNITS: 100 INJECTION, SOLUTION SUBCUTANEOUS at 21:11

## 2025-07-25 RX ADMIN — GABAPENTIN 400 MG: 300 CAPSULE ORAL at 20:56

## 2025-07-25 RX ADMIN — INSULIN LISPRO 4 UNITS: 100 INJECTION, SOLUTION INTRAVENOUS; SUBCUTANEOUS at 08:23

## 2025-07-25 RX ADMIN — INSULIN LISPRO 2 UNITS: 100 INJECTION, SOLUTION INTRAVENOUS; SUBCUTANEOUS at 12:14

## 2025-07-25 RX ADMIN — ROPINIROLE 1 MG: 1 TABLET, FILM COATED ORAL at 15:06

## 2025-07-25 RX ADMIN — INSULIN LISPRO 4 UNITS: 100 INJECTION, SOLUTION INTRAVENOUS; SUBCUTANEOUS at 17:30

## 2025-07-25 RX ADMIN — EMPAGLIFLOZIN 10 MG: 10 TABLET, FILM COATED ORAL at 08:19

## 2025-07-25 RX ADMIN — ROPINIROLE HYDROCHLORIDE 3 MG: 3 TABLET, FILM COATED ORAL at 20:56

## 2025-07-25 RX ADMIN — MAGNESIUM OXIDE TAB 400 MG (241.3 MG ELEMENTAL MG) 2 TABLET: 400 (241.3 MG) TAB at 20:56

## 2025-07-25 RX ADMIN — BUMETANIDE 1 MG: 0.25 INJECTION INTRAMUSCULAR; INTRAVENOUS at 09:25

## 2025-07-25 RX ADMIN — POLYSACCHARIDE-IRON COMPLEX 150 MG: 150 CAPSULE ORAL at 08:19

## 2025-07-25 RX ADMIN — ATORVASTATIN CALCIUM 80 MG: 80 TABLET, FILM COATED ORAL at 08:19

## 2025-07-25 RX ADMIN — ROPINIROLE 1 MG: 1 TABLET, FILM COATED ORAL at 08:19

## 2025-07-25 RX ADMIN — CITALOPRAM HYDROBROMIDE 40 MG: 40 TABLET ORAL at 08:19

## 2025-07-25 RX ADMIN — SACUBITRIL AND VALSARTAN 1 TABLET: 24; 26 TABLET, FILM COATED ORAL at 20:56

## 2025-07-25 RX ADMIN — ROPINIROLE 1 MG: 1 TABLET, FILM COATED ORAL at 20:56

## 2025-07-25 RX ADMIN — GABAPENTIN 400 MG: 300 CAPSULE ORAL at 05:31

## 2025-07-25 SDOH — ECONOMIC STABILITY: FOOD INSECURITY: WITHIN THE PAST 12 MONTHS, THE FOOD YOU BOUGHT JUST DIDN'T LAST AND YOU DIDN'T HAVE MONEY TO GET MORE.: NEVER TRUE

## 2025-07-25 SDOH — ECONOMIC STABILITY: HOUSING INSECURITY

## 2025-07-25 SDOH — ECONOMIC STABILITY: FOOD INSECURITY

## 2025-07-25 SDOH — ECONOMIC STABILITY: TRANSPORTATION INSECURITY

## 2025-07-25 SDOH — ECONOMIC STABILITY: FOOD INSECURITY: WITHIN THE PAST 12 MONTHS, YOU WORRIED THAT YOUR FOOD WOULD RUN OUT BEFORE YOU GOT MONEY TO BUY MORE.: NEVER TRUE

## 2025-07-25 SDOH — ECONOMIC STABILITY: HOUSING INSECURITY: IN THE LAST 12 MONTHS, HOW MANY PLACES HAVE YOU LIVED?: 1

## 2025-07-25 SDOH — ECONOMIC STABILITY: FOOD INSECURITY: WITHIN THE PAST 12 MONTHS, YOU WORRIED THAT YOUR FOOD WOULD RUN OUT BEFORE YOU GOT THE MONEY TO BUY MORE.: NEVER TRUE

## 2025-07-25 SDOH — ECONOMIC STABILITY: HOUSING INSECURITY: IN THE LAST 12 MONTHS, WAS THERE A TIME WHEN YOU WERE NOT ABLE TO PAY THE MORTGAGE OR RENT ON TIME?: PATIENT DECLINED

## 2025-07-25 SDOH — ECONOMIC STABILITY: TRANSPORTATION INSECURITY
IN THE PAST 12 MONTHS, HAS LACK OF TRANSPORTATION KEPT YOU FROM MEETINGS, WORK, OR FROM GETTING THINGS NEEDED FOR DAILY LIVING?: NO

## 2025-07-25 SDOH — ECONOMIC STABILITY: INCOME INSECURITY: IN THE LAST 12 MONTHS, WAS THERE A TIME WHEN YOU WERE NOT ABLE TO PAY THE MORTGAGE OR RENT ON TIME?: PATIENT DECLINED

## 2025-07-25 SDOH — ECONOMIC STABILITY: GENERAL

## 2025-07-25 SDOH — ECONOMIC STABILITY: TRANSPORTATION INSECURITY: IN THE PAST 12 MONTHS, HAS LACK OF TRANSPORTATION KEPT YOU FROM MEDICAL APPOINTMENTS OR FROM GETTING MEDICATIONS?: NO

## 2025-07-25 SDOH — ECONOMIC STABILITY: HOUSING INSECURITY
IN THE LAST 12 MONTHS, WAS THERE A TIME WHEN YOU DID NOT HAVE A STEADY PLACE TO SLEEP OR SLEPT IN A SHELTER (INCLUDING NOW)?: NO

## 2025-07-25 SDOH — ECONOMIC STABILITY: TRANSPORTATION INSECURITY
IN THE PAST 12 MONTHS, HAS THE LACK OF TRANSPORTATION KEPT YOU FROM MEDICAL APPOINTMENTS OR FROM GETTING MEDICATIONS?: NO

## 2025-07-25 SDOH — ECONOMIC STABILITY: HOUSING INSECURITY: IN THE PAST 12 MONTHS HAS THE ELECTRIC, GAS, OIL, OR WATER COMPANY THREATENED TO SHUT OFF SERVICES IN YOUR HOME?: NO

## 2025-07-25 ASSESSMENT — COGNITIVE AND FUNCTIONAL STATUS - GENERAL
DAILY ACTIVITIY SCORE: 24
CLIMB 3 TO 5 STEPS WITH RAILING: A LITTLE
MOBILITY SCORE: 23
CLIMB 3 TO 5 STEPS WITH RAILING: A LITTLE
DAILY ACTIVITIY SCORE: 24
MOBILITY SCORE: 23

## 2025-07-25 ASSESSMENT — SOCIAL DETERMINANTS OF HEALTH (SDOH): IN THE PAST 12 MONTHS, HAS THE ELECTRIC, GAS, OIL, OR WATER COMPANY THREATENED TO SHUT OFF SERVICE IN YOUR HOME?: NO

## 2025-07-25 ASSESSMENT — PAIN SCALES - GENERAL
PAINLEVEL_OUTOF10: 0 - NO PAIN
PAINLEVEL_OUTOF10: 0 - NO PAIN

## 2025-07-25 ASSESSMENT — PAIN - FUNCTIONAL ASSESSMENT
PAIN_FUNCTIONAL_ASSESSMENT: 0-10
PAIN_FUNCTIONAL_ASSESSMENT: 0-10

## 2025-07-25 ASSESSMENT — ACTIVITIES OF DAILY LIVING (ADL): LACK_OF_TRANSPORTATION: NO
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
## 2025-07-25 NOTE — CARE PLAN
The patient's goals for the shift include      The clinical goals for the shift include Patient will understand importance of remaining within 2L fluid restriction.    Over the shift, the patient has been compliant to her fluid restriction and all medications and treatment   \  Problem: Pain - Adult  Goal: Verbalizes/displays adequate comfort level or baseline comfort level  Outcome: Progressing     Problem: Safety - Adult  Goal: Free from fall injury  Outcome: Progressing     Problem: Discharge Planning  Goal: Discharge to home or other facility with appropriate resources  Outcome: Progressing     Problem: Chronic Conditions and Co-morbidities  Goal: Patient's chronic conditions and co-morbidity symptoms are monitored and maintained or improved  Outcome: Progressing     Problem: Nutrition  Goal: Nutrient intake appropriate for maintaining nutritional needs  Outcome: Progressing     Problem: Heart Failure  Goal: Improved gas exchange this shift  Outcome: Progressing  Goal: Improved urinary output this shift  Outcome: Progressing  Goal: Reduction in peripheral edema within 24 hours  Outcome: Progressing  Goal: Report improvement of dyspnea/breathlessness this shift  Outcome: Progressing  Goal: Weight from fluid excess reduced over 2-3 days, then stabilize  Outcome: Progressing  Goal: Increase self care and/or family involvement in 24 hours  Outcome: Progressing

## 2025-07-26 LAB
ALBUMIN SERPL BCP-MCNC: 3.8 G/DL (ref 3.4–5)
ANION GAP SERPL CALC-SCNC: 12 MMOL/L (ref 10–20)
BACTERIA FLD CULT: NORMAL
BUN SERPL-MCNC: 23 MG/DL (ref 6–23)
CALCIUM SERPL-MCNC: 9.3 MG/DL (ref 8.6–10.6)
CHLORIDE SERPL-SCNC: 96 MMOL/L (ref 98–107)
CO2 SERPL-SCNC: 35 MMOL/L (ref 21–32)
CREAT SERPL-MCNC: 0.7 MG/DL (ref 0.5–1.05)
EGFRCR SERPLBLD CKD-EPI 2021: >90 ML/MIN/1.73M*2
ERYTHROCYTE [DISTWIDTH] IN BLOOD BY AUTOMATED COUNT: 14.8 % (ref 11.5–14.5)
GLUCOSE BLD MANUAL STRIP-MCNC: 132 MG/DL (ref 74–99)
GLUCOSE BLD MANUAL STRIP-MCNC: 182 MG/DL (ref 74–99)
GLUCOSE BLD MANUAL STRIP-MCNC: 191 MG/DL (ref 74–99)
GLUCOSE BLD MANUAL STRIP-MCNC: 235 MG/DL (ref 74–99)
GLUCOSE SERPL-MCNC: 166 MG/DL (ref 74–99)
GRAM STN SPEC: NORMAL
GRAM STN SPEC: NORMAL
HCT VFR BLD AUTO: 43.3 % (ref 36–46)
HGB BLD-MCNC: 13.8 G/DL (ref 12–16)
MAGNESIUM SERPL-MCNC: 2.26 MG/DL (ref 1.6–2.4)
MCH RBC QN AUTO: 30.4 PG (ref 26–34)
MCHC RBC AUTO-ENTMCNC: 31.9 G/DL (ref 32–36)
MCV RBC AUTO: 95 FL (ref 80–100)
NRBC BLD-RTO: 0 /100 WBCS (ref 0–0)
PHOSPHATE SERPL-MCNC: 3.4 MG/DL (ref 2.5–4.9)
PLATELET # BLD AUTO: 244 X10*3/UL (ref 150–450)
POTASSIUM SERPL-SCNC: 4.1 MMOL/L (ref 3.5–5.3)
RBC # BLD AUTO: 4.54 X10*6/UL (ref 4–5.2)
SODIUM SERPL-SCNC: 139 MMOL/L (ref 136–145)
WBC # BLD AUTO: 6.2 X10*3/UL (ref 4.4–11.3)

## 2025-07-26 PROCEDURE — 99232 SBSQ HOSP IP/OBS MODERATE 35: CPT | Performed by: INTERNAL MEDICINE

## 2025-07-26 PROCEDURE — 83735 ASSAY OF MAGNESIUM: CPT | Mod: PARLAB | Performed by: PHYSICIAN ASSISTANT

## 2025-07-26 PROCEDURE — 94640 AIRWAY INHALATION TREATMENT: CPT

## 2025-07-26 PROCEDURE — 2500000002 HC RX 250 W HCPCS SELF ADMINISTERED DRUGS (ALT 637 FOR MEDICARE OP, ALT 636 FOR OP/ED): Performed by: PHYSICIAN ASSISTANT

## 2025-07-26 PROCEDURE — 2500000004 HC RX 250 GENERAL PHARMACY W/ HCPCS (ALT 636 FOR OP/ED): Mod: JZ

## 2025-07-26 PROCEDURE — 2500000001 HC RX 250 WO HCPCS SELF ADMINISTERED DRUGS (ALT 637 FOR MEDICARE OP): Performed by: NURSE PRACTITIONER

## 2025-07-26 PROCEDURE — 80069 RENAL FUNCTION PANEL: CPT | Mod: PARLAB | Performed by: PHYSICIAN ASSISTANT

## 2025-07-26 PROCEDURE — 2500000004 HC RX 250 GENERAL PHARMACY W/ HCPCS (ALT 636 FOR OP/ED): Performed by: PHYSICIAN ASSISTANT

## 2025-07-26 PROCEDURE — 2500000001 HC RX 250 WO HCPCS SELF ADMINISTERED DRUGS (ALT 637 FOR MEDICARE OP): Performed by: PHYSICIAN ASSISTANT

## 2025-07-26 PROCEDURE — 36415 COLL VENOUS BLD VENIPUNCTURE: CPT | Mod: PARLAB | Performed by: PHYSICIAN ASSISTANT

## 2025-07-26 PROCEDURE — 1100000001 HC PRIVATE ROOM DAILY

## 2025-07-26 PROCEDURE — 85027 COMPLETE CBC AUTOMATED: CPT | Mod: PARLAB | Performed by: PHYSICIAN ASSISTANT

## 2025-07-26 PROCEDURE — 2500000002 HC RX 250 W HCPCS SELF ADMINISTERED DRUGS (ALT 637 FOR MEDICARE OP, ALT 636 FOR OP/ED): Performed by: NURSE PRACTITIONER

## 2025-07-26 PROCEDURE — 82947 ASSAY GLUCOSE BLOOD QUANT: CPT

## 2025-07-26 RX ORDER — BUMETANIDE 0.25 MG/ML
1 INJECTION, SOLUTION INTRAMUSCULAR; INTRAVENOUS ONCE
Status: COMPLETED | OUTPATIENT
Start: 2025-07-26 | End: 2025-07-26

## 2025-07-26 RX ADMIN — ROPINIROLE 1 MG: 1 TABLET, FILM COATED ORAL at 15:27

## 2025-07-26 RX ADMIN — SPIRONOLACTONE 25 MG: 25 TABLET, FILM COATED ORAL at 08:18

## 2025-07-26 RX ADMIN — ROPINIROLE 1 MG: 1 TABLET, FILM COATED ORAL at 08:19

## 2025-07-26 RX ADMIN — ROPINIROLE HYDROCHLORIDE 3 MG: 3 TABLET, FILM COATED ORAL at 20:34

## 2025-07-26 RX ADMIN — GABAPENTIN 400 MG: 300 CAPSULE ORAL at 04:36

## 2025-07-26 RX ADMIN — INSULIN GLARGINE 15 UNITS: 100 INJECTION, SOLUTION SUBCUTANEOUS at 20:34

## 2025-07-26 RX ADMIN — CITALOPRAM HYDROBROMIDE 40 MG: 40 TABLET ORAL at 08:18

## 2025-07-26 RX ADMIN — POLYSACCHARIDE-IRON COMPLEX 150 MG: 150 CAPSULE ORAL at 08:18

## 2025-07-26 RX ADMIN — ATORVASTATIN CALCIUM 80 MG: 80 TABLET, FILM COATED ORAL at 08:18

## 2025-07-26 RX ADMIN — ASPIRIN 81 MG CHEWABLE TABLET 81 MG: 81 TABLET CHEWABLE at 08:18

## 2025-07-26 RX ADMIN — ROPINIROLE 1 MG: 1 TABLET, FILM COATED ORAL at 20:34

## 2025-07-26 RX ADMIN — BUMETANIDE 1 MG: 0.25 INJECTION INTRAMUSCULAR; INTRAVENOUS at 11:27

## 2025-07-26 RX ADMIN — GABAPENTIN 400 MG: 300 CAPSULE ORAL at 20:34

## 2025-07-26 RX ADMIN — PANTOPRAZOLE SODIUM 20 MG: 20 TABLET, DELAYED RELEASE ORAL at 08:18

## 2025-07-26 RX ADMIN — MAGNESIUM OXIDE TAB 400 MG (241.3 MG ELEMENTAL MG) 2 TABLET: 400 (241.3 MG) TAB at 08:18

## 2025-07-26 RX ADMIN — INSULIN LISPRO 4 UNITS: 100 INJECTION, SOLUTION INTRAVENOUS; SUBCUTANEOUS at 17:56

## 2025-07-26 RX ADMIN — GABAPENTIN 400 MG: 300 CAPSULE ORAL at 12:15

## 2025-07-26 RX ADMIN — FLUTICASONE FUROATE AND VILANTEROL TRIFENATATE 1 PUFF: 100; 25 POWDER RESPIRATORY (INHALATION) at 08:20

## 2025-07-26 RX ADMIN — MAGNESIUM OXIDE TAB 400 MG (241.3 MG ELEMENTAL MG) 2 TABLET: 400 (241.3 MG) TAB at 20:37

## 2025-07-26 RX ADMIN — LEVOTHYROXINE SODIUM 88 MCG: 0.09 TABLET ORAL at 06:14

## 2025-07-26 RX ADMIN — INSULIN LISPRO 2 UNITS: 100 INJECTION, SOLUTION INTRAVENOUS; SUBCUTANEOUS at 11:50

## 2025-07-26 ASSESSMENT — COGNITIVE AND FUNCTIONAL STATUS - GENERAL
DAILY ACTIVITIY SCORE: 24
DAILY ACTIVITIY SCORE: 24
MOBILITY SCORE: 24
MOBILITY SCORE: 24

## 2025-07-26 ASSESSMENT — PAIN - FUNCTIONAL ASSESSMENT
PAIN_FUNCTIONAL_ASSESSMENT: 0-10
PAIN_FUNCTIONAL_ASSESSMENT: 0-10

## 2025-07-26 ASSESSMENT — PAIN SCALES - GENERAL
PAINLEVEL_OUTOF10: 0 - NO PAIN
PAINLEVEL_OUTOF10: 0 - NO PAIN

## 2025-07-26 NOTE — CARE PLAN
Problem: Respiratory  Goal: Minimize anxiety/maximize coping throughout shift  Outcome: Met  Goal: Minimal/no exertional discomfort or dyspnea this shift  Outcome: Progressing  Goal: No signs of respiratory distress (eg. Use of accessory muscles. Peds grunting)  Outcome: Progressing  Goal: Patent airway maintained this shift  Outcome: Progressing  Goal: Verbalize decreased shortness of breath this shift  Outcome: Progressing  Goal: Wean oxygen to maintain O2 saturation per order/standard this shift  Outcome: Progressing  Goal: Increase self care and/or family involvement in next 24 hours  Outcome: Progressing     Problem: Heart Failure  Goal: Improved gas exchange this shift  Outcome: Progressing  Goal: Improved urinary output this shift  Outcome: Progressing  Goal: Reduction in peripheral edema within 24 hours  Outcome: Progressing  Goal: Report improvement of dyspnea/breathlessness this shift  Outcome: Progressing  Goal: Weight from fluid excess reduced over 2-3 days, then stabilize  Outcome: Progressing  Goal: Increase self care and/or family involvement in 24 hours  Outcome: Progressing      The clinical goals for the shift include pt will maintained free from respiratory distress this shift.
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
## 2025-07-27 LAB
ALBUMIN SERPL BCP-MCNC: 3.6 G/DL (ref 3.4–5)
ANION GAP SERPL CALC-SCNC: 10 MMOL/L (ref 10–20)
BUN SERPL-MCNC: 22 MG/DL (ref 6–23)
CALCIUM SERPL-MCNC: 9.1 MG/DL (ref 8.6–10.6)
CHLORIDE SERPL-SCNC: 98 MMOL/L (ref 98–107)
CO2 SERPL-SCNC: 34 MMOL/L (ref 21–32)
CREAT SERPL-MCNC: 0.64 MG/DL (ref 0.5–1.05)
EGFRCR SERPLBLD CKD-EPI 2021: >90 ML/MIN/1.73M*2
ERYTHROCYTE [DISTWIDTH] IN BLOOD BY AUTOMATED COUNT: 14.7 % (ref 11.5–14.5)
GLUCOSE BLD MANUAL STRIP-MCNC: 189 MG/DL (ref 74–99)
GLUCOSE BLD MANUAL STRIP-MCNC: 205 MG/DL (ref 74–99)
GLUCOSE BLD MANUAL STRIP-MCNC: 224 MG/DL (ref 74–99)
GLUCOSE BLD MANUAL STRIP-MCNC: 259 MG/DL (ref 74–99)
GLUCOSE SERPL-MCNC: 228 MG/DL (ref 74–99)
HCT VFR BLD AUTO: 40.8 % (ref 36–46)
HGB BLD-MCNC: 12.9 G/DL (ref 12–16)
MAGNESIUM SERPL-MCNC: 2.04 MG/DL (ref 1.6–2.4)
MCH RBC QN AUTO: 30.1 PG (ref 26–34)
MCHC RBC AUTO-ENTMCNC: 31.6 G/DL (ref 32–36)
MCV RBC AUTO: 95 FL (ref 80–100)
NRBC BLD-RTO: 0 /100 WBCS (ref 0–0)
PHOSPHATE SERPL-MCNC: 2.9 MG/DL (ref 2.5–4.9)
PLATELET # BLD AUTO: 230 X10*3/UL (ref 150–450)
POTASSIUM SERPL-SCNC: 4.2 MMOL/L (ref 3.5–5.3)
RBC # BLD AUTO: 4.29 X10*6/UL (ref 4–5.2)
SODIUM SERPL-SCNC: 138 MMOL/L (ref 136–145)
WBC # BLD AUTO: 6.6 X10*3/UL (ref 4.4–11.3)

## 2025-07-27 PROCEDURE — 36415 COLL VENOUS BLD VENIPUNCTURE: CPT | Performed by: PHYSICIAN ASSISTANT

## 2025-07-27 PROCEDURE — 82947 ASSAY GLUCOSE BLOOD QUANT: CPT

## 2025-07-27 PROCEDURE — 2500000004 HC RX 250 GENERAL PHARMACY W/ HCPCS (ALT 636 FOR OP/ED): Performed by: PHYSICIAN ASSISTANT

## 2025-07-27 PROCEDURE — 1200000002 HC GENERAL ROOM WITH TELEMETRY DAILY

## 2025-07-27 PROCEDURE — 2500000004 HC RX 250 GENERAL PHARMACY W/ HCPCS (ALT 636 FOR OP/ED): Mod: JZ

## 2025-07-27 PROCEDURE — 2500000002 HC RX 250 W HCPCS SELF ADMINISTERED DRUGS (ALT 637 FOR MEDICARE OP, ALT 636 FOR OP/ED): Performed by: PHYSICIAN ASSISTANT

## 2025-07-27 PROCEDURE — 99232 SBSQ HOSP IP/OBS MODERATE 35: CPT | Performed by: INTERNAL MEDICINE

## 2025-07-27 PROCEDURE — 94640 AIRWAY INHALATION TREATMENT: CPT

## 2025-07-27 PROCEDURE — 83735 ASSAY OF MAGNESIUM: CPT | Performed by: PHYSICIAN ASSISTANT

## 2025-07-27 PROCEDURE — 2500000002 HC RX 250 W HCPCS SELF ADMINISTERED DRUGS (ALT 637 FOR MEDICARE OP, ALT 636 FOR OP/ED): Performed by: NURSE PRACTITIONER

## 2025-07-27 PROCEDURE — 2500000001 HC RX 250 WO HCPCS SELF ADMINISTERED DRUGS (ALT 637 FOR MEDICARE OP): Performed by: PHYSICIAN ASSISTANT

## 2025-07-27 PROCEDURE — 80069 RENAL FUNCTION PANEL: CPT | Performed by: PHYSICIAN ASSISTANT

## 2025-07-27 PROCEDURE — 85027 COMPLETE CBC AUTOMATED: CPT | Performed by: PHYSICIAN ASSISTANT

## 2025-07-27 PROCEDURE — 2500000001 HC RX 250 WO HCPCS SELF ADMINISTERED DRUGS (ALT 637 FOR MEDICARE OP): Performed by: NURSE PRACTITIONER

## 2025-07-27 RX ORDER — BUMETANIDE 0.25 MG/ML
1 INJECTION, SOLUTION INTRAMUSCULAR; INTRAVENOUS ONCE
Status: COMPLETED | OUTPATIENT
Start: 2025-07-27 | End: 2025-07-27

## 2025-07-27 RX ADMIN — CITALOPRAM HYDROBROMIDE 40 MG: 40 TABLET ORAL at 09:47

## 2025-07-27 RX ADMIN — INSULIN LISPRO 2 UNITS: 100 INJECTION, SOLUTION INTRAVENOUS; SUBCUTANEOUS at 08:24

## 2025-07-27 RX ADMIN — ATORVASTATIN CALCIUM 80 MG: 80 TABLET, FILM COATED ORAL at 08:25

## 2025-07-27 RX ADMIN — PANTOPRAZOLE SODIUM 20 MG: 20 TABLET, DELAYED RELEASE ORAL at 08:25

## 2025-07-27 RX ADMIN — GABAPENTIN 400 MG: 300 CAPSULE ORAL at 14:37

## 2025-07-27 RX ADMIN — INSULIN LISPRO 4 UNITS: 100 INJECTION, SOLUTION INTRAVENOUS; SUBCUTANEOUS at 11:51

## 2025-07-27 RX ADMIN — MAGNESIUM OXIDE TAB 400 MG (241.3 MG ELEMENTAL MG) 2 TABLET: 400 (241.3 MG) TAB at 20:10

## 2025-07-27 RX ADMIN — POLYSACCHARIDE-IRON COMPLEX 150 MG: 150 CAPSULE ORAL at 08:25

## 2025-07-27 RX ADMIN — INSULIN LISPRO 4 UNITS: 100 INJECTION, SOLUTION INTRAVENOUS; SUBCUTANEOUS at 18:16

## 2025-07-27 RX ADMIN — MAGNESIUM OXIDE TAB 400 MG (241.3 MG ELEMENTAL MG) 2 TABLET: 400 (241.3 MG) TAB at 08:25

## 2025-07-27 RX ADMIN — ROPINIROLE HYDROCHLORIDE 3 MG: 3 TABLET, FILM COATED ORAL at 20:09

## 2025-07-27 RX ADMIN — GABAPENTIN 400 MG: 300 CAPSULE ORAL at 06:03

## 2025-07-27 RX ADMIN — BUMETANIDE 1 MG: 0.25 INJECTION INTRAMUSCULAR; INTRAVENOUS at 13:15

## 2025-07-27 RX ADMIN — ROPINIROLE 1 MG: 1 TABLET, FILM COATED ORAL at 20:10

## 2025-07-27 RX ADMIN — FLUTICASONE FUROATE AND VILANTEROL TRIFENATATE 1 PUFF: 100; 25 POWDER RESPIRATORY (INHALATION) at 11:06

## 2025-07-27 RX ADMIN — LEVOTHYROXINE SODIUM 88 MCG: 0.09 TABLET ORAL at 06:03

## 2025-07-27 RX ADMIN — ROPINIROLE 1 MG: 1 TABLET, FILM COATED ORAL at 14:37

## 2025-07-27 RX ADMIN — ASPIRIN 81 MG CHEWABLE TABLET 81 MG: 81 TABLET CHEWABLE at 08:25

## 2025-07-27 RX ADMIN — INSULIN GLARGINE 15 UNITS: 100 INJECTION, SOLUTION SUBCUTANEOUS at 20:09

## 2025-07-27 RX ADMIN — GABAPENTIN 400 MG: 300 CAPSULE ORAL at 20:30

## 2025-07-27 RX ADMIN — ROPINIROLE 1 MG: 1 TABLET, FILM COATED ORAL at 08:25

## 2025-07-27 RX ADMIN — SPIRONOLACTONE 25 MG: 25 TABLET, FILM COATED ORAL at 08:24

## 2025-07-27 ASSESSMENT — COGNITIVE AND FUNCTIONAL STATUS - GENERAL
MOBILITY SCORE: 24
MOBILITY SCORE: 24
DAILY ACTIVITIY SCORE: 24
DAILY ACTIVITIY SCORE: 24

## 2025-07-27 ASSESSMENT — PAIN - FUNCTIONAL ASSESSMENT
PAIN_FUNCTIONAL_ASSESSMENT: 0-10
PAIN_FUNCTIONAL_ASSESSMENT: 0-10

## 2025-07-27 ASSESSMENT — PAIN SCALES - GENERAL
PAINLEVEL_OUTOF10: 0 - NO PAIN
PAINLEVEL_OUTOF10: 0 - NO PAIN

## 2025-07-27 NOTE — SIGNIFICANT EVENT
Rapid Response Nurse Note:  [x] RADAR alert/Score 7    Pager time: [phone]  Arrival time: 449  Event end time: 451  Location: LT 5034  [x] Phone triage     Rapid response initiated by:  [] Rapid Response RN [] Family [] Nursing Supervisor [] Physician   [x] RADAR auto-page [] Sepsis auto-page [] RN [] RT   [] NP/PA [] Other:     Primary reason for call:   [] BAT [] New CPAP/BiPAP [] Bleeding [] Change in mental status   [] Chest pain [] Code blue [] FiO2 >/= 50% [] HR </= 40 bpm   [] HR >/= 130 bpm [] Hyperglycemia [] Hypoglycemia [x] RADAR    [] RR </= 8 bpm [] RR >/= 30 bpm [] SBP </= 90 mmHg [] SpO2 < 90%   [] Seizure [] Sepsis [] Staff concern:     Initial VS and/or RADAR VS:  radar vitals below in bold, the 431 vitals included the 88 % pox in red.      Vitals:    25 0355 25 0400 25 0405 25 043   BP:    104/64   BP Location:    Right arm   Patient Position:    Lying   Pulse:  87  75   Resp:    19   Temp:    36 °C (96.8 °F)   TempSrc:    Temporal   SpO2: 93% (!) 88% 94% 97%   Weight:    59.3 kg (130 lb 11.7 oz)   Height:            Interventions:  [x] None [] ABG [] Assist w/ICU transfer [] BAT paged    [] Bag mask [] Blood [] Cardioversion [] Code Blue   [] Code blue for intubation [] Code status changed [] Chest x-ray [] EKG   [] IV fluid/bolus [] KUB x-ray [] Labs/cultures [] Medication   [] Nebulizer treatment [] NIPPV (CPAP/BiPAP) [] Oxygen [] Oral airway   [] Peripheral IV [] Palliative care consult [] CT/MRI [] Sepsis protocol    [] Suctioned [] Other:       Outcome:  [] Coded and  [] Code blue for intubation [] Coded and transferred to ICU []  on division   [x] Remained on division (no change) [] Remained on division + additional monitoring [] Remained in ED [] Transferred to ED   [] Transferred to ICU [] Transferred to inpatient status [] Transferred for interventions (procedure) [] Transferred to ICU stepdown    [] Transferred to surgery [] Transferred to telemetry  [] Sepsis protocol [] STEMI protocol   [] Stroke protocol [x] Bedside nurse instructed to page rapid response for any concerns or acute change in condition/VS     Additional Comments: Spoke to RN regarding vital signs.  Per RN., pt would intermittently pull oxygen off.  At the time, her pox was 88 %, she took her oxygen off and was placed back on.  Pox improved to 97 % -99 %.  No concerns from RN at this time.
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
## 2025-07-27 NOTE — CARE PLAN
Problem: Pain - Adult  Goal: Verbalizes/displays adequate comfort level or baseline comfort level  Outcome: Progressing     Problem: Safety - Adult  Goal: Free from fall injury  Outcome: Progressing     Problem: Discharge Planning  Goal: Discharge to home or other facility with appropriate resources  Outcome: Progressing     Problem: Chronic Conditions and Co-morbidities  Goal: Patient's chronic conditions and co-morbidity symptoms are monitored and maintained or improved  Outcome: Progressing     Problem: Nutrition  Goal: Nutrient intake appropriate for maintaining nutritional needs  Outcome: Progressing     Problem: Heart Failure  Goal: Improved gas exchange this shift  Outcome: Progressing  Goal: Improved urinary output this shift  Outcome: Progressing  Goal: Reduction in peripheral edema within 24 hours  Outcome: Progressing  Goal: Report improvement of dyspnea/breathlessness this shift  Outcome: Progressing  Goal: Weight from fluid excess reduced over 2-3 days, then stabilize  Outcome: Progressing  Goal: Increase self care and/or family involvement in 24 hours  Outcome: Progressing     Problem: Respiratory  Goal: Minimal/no exertional discomfort or dyspnea this shift  Outcome: Progressing  Goal: No signs of respiratory distress (eg. Use of accessory muscles. Peds grunting)  Outcome: Progressing  Goal: Patent airway maintained this shift  Outcome: Progressing  Goal: Verbalize decreased shortness of breath this shift  Outcome: Progressing  Goal: Wean oxygen to maintain O2 saturation per order/standard this shift  Outcome: Progressing  Goal: Increase self care and/or family involvement in next 24 hours  Outcome: Progressing     Problem: Diabetes  Goal: Achieve decreasing blood glucose levels by end of shift  Outcome: Progressing  Goal: Increase stability of blood glucose readings by end of shift  Outcome: Progressing  Goal: Decrease in ketones present in urine by end of shift  Outcome: Progressing  Goal:  Maintain electrolyte levels within acceptable range throughout shift  Outcome: Progressing  Goal: Maintain glucose levels >70mg/dl to <250mg/dl throughout shift  Outcome: Progressing  Goal: No changes in neurological exam by end of shift  Outcome: Progressing  Goal: Learn about and adhere to nutrition recommendations by end of shift  Outcome: Progressing  Goal: Vital signs within normal range for age by end of shift  Outcome: Progressing  Goal: Increase self care and/or family involovement by end of shift  Outcome: Progressing  Goal: Receive DSME education by end of shift  Outcome: Progressing     The clinical goals for the shift include Pt will be free from s/s of SOB throughout shift.      07/27/25 at 1:40 AM - KRISTINA HUFFMAN RN

## 2025-07-28 VITALS
OXYGEN SATURATION: 91 % | TEMPERATURE: 98.2 F | BODY MASS INDEX: 24.06 KG/M2 | RESPIRATION RATE: 16 BRPM | HEART RATE: 84 BPM | DIASTOLIC BLOOD PRESSURE: 65 MMHG | HEIGHT: 62 IN | WEIGHT: 130.73 LBS | SYSTOLIC BLOOD PRESSURE: 106 MMHG

## 2025-07-28 LAB
ALBUMIN SERPL BCP-MCNC: 3.8 G/DL (ref 3.4–5)
ANION GAP SERPL CALC-SCNC: 11 MMOL/L (ref 10–20)
BUN SERPL-MCNC: 16 MG/DL (ref 6–23)
CALCIUM SERPL-MCNC: 9.2 MG/DL (ref 8.6–10.6)
CHLORIDE SERPL-SCNC: 96 MMOL/L (ref 98–107)
CO2 SERPL-SCNC: 35 MMOL/L (ref 21–32)
CREAT SERPL-MCNC: 0.69 MG/DL (ref 0.5–1.05)
EGFRCR SERPLBLD CKD-EPI 2021: >90 ML/MIN/1.73M*2
ERYTHROCYTE [DISTWIDTH] IN BLOOD BY AUTOMATED COUNT: 14.6 % (ref 11.5–14.5)
GLUCOSE BLD MANUAL STRIP-MCNC: 149 MG/DL (ref 74–99)
GLUCOSE BLD MANUAL STRIP-MCNC: 168 MG/DL (ref 74–99)
GLUCOSE BLD MANUAL STRIP-MCNC: 249 MG/DL (ref 74–99)
GLUCOSE BLD MANUAL STRIP-MCNC: 298 MG/DL (ref 74–99)
GLUCOSE SERPL-MCNC: 90 MG/DL (ref 74–99)
HCT VFR BLD AUTO: 43.3 % (ref 36–46)
HGB BLD-MCNC: 13.2 G/DL (ref 12–16)
LAB AP ASR DISCLAIMER: NORMAL
LABORATORY COMMENT REPORT: NORMAL
LABORATORY COMMENT REPORT: NORMAL
MAGNESIUM SERPL-MCNC: 2 MG/DL (ref 1.6–2.4)
MCH RBC QN AUTO: 29.2 PG (ref 26–34)
MCHC RBC AUTO-ENTMCNC: 30.5 G/DL (ref 32–36)
MCV RBC AUTO: 96 FL (ref 80–100)
NRBC BLD-RTO: 0 /100 WBCS (ref 0–0)
PATH REPORT.FINAL DX SPEC: NORMAL
PATH REPORT.GROSS SPEC: NORMAL
PATH REPORT.RELEVANT HX SPEC: NORMAL
PATH REPORT.TOTAL CANCER: NORMAL
PHOSPHATE SERPL-MCNC: 3.6 MG/DL (ref 2.5–4.9)
PLATELET # BLD AUTO: 246 X10*3/UL (ref 150–450)
POTASSIUM SERPL-SCNC: 3.6 MMOL/L (ref 3.5–5.3)
RBC # BLD AUTO: 4.52 X10*6/UL (ref 4–5.2)
SODIUM SERPL-SCNC: 138 MMOL/L (ref 136–145)
WBC # BLD AUTO: 6.7 X10*3/UL (ref 4.4–11.3)

## 2025-07-28 PROCEDURE — 2500000004 HC RX 250 GENERAL PHARMACY W/ HCPCS (ALT 636 FOR OP/ED): Performed by: PHYSICIAN ASSISTANT

## 2025-07-28 PROCEDURE — 99232 SBSQ HOSP IP/OBS MODERATE 35: CPT | Performed by: INTERNAL MEDICINE

## 2025-07-28 PROCEDURE — 2500000002 HC RX 250 W HCPCS SELF ADMINISTERED DRUGS (ALT 637 FOR MEDICARE OP, ALT 636 FOR OP/ED): Performed by: PHYSICIAN ASSISTANT

## 2025-07-28 PROCEDURE — 2500000002 HC RX 250 W HCPCS SELF ADMINISTERED DRUGS (ALT 637 FOR MEDICARE OP, ALT 636 FOR OP/ED): Performed by: NURSE PRACTITIONER

## 2025-07-28 PROCEDURE — 36415 COLL VENOUS BLD VENIPUNCTURE: CPT | Performed by: PHYSICIAN ASSISTANT

## 2025-07-28 PROCEDURE — 94640 AIRWAY INHALATION TREATMENT: CPT

## 2025-07-28 PROCEDURE — 82947 ASSAY GLUCOSE BLOOD QUANT: CPT

## 2025-07-28 PROCEDURE — 83735 ASSAY OF MAGNESIUM: CPT | Performed by: PHYSICIAN ASSISTANT

## 2025-07-28 PROCEDURE — 80069 RENAL FUNCTION PANEL: CPT | Performed by: PHYSICIAN ASSISTANT

## 2025-07-28 PROCEDURE — 2500000004 HC RX 250 GENERAL PHARMACY W/ HCPCS (ALT 636 FOR OP/ED): Mod: JZ | Performed by: PHYSICIAN ASSISTANT

## 2025-07-28 PROCEDURE — 2500000001 HC RX 250 WO HCPCS SELF ADMINISTERED DRUGS (ALT 637 FOR MEDICARE OP): Performed by: NURSE PRACTITIONER

## 2025-07-28 PROCEDURE — 2500000001 HC RX 250 WO HCPCS SELF ADMINISTERED DRUGS (ALT 637 FOR MEDICARE OP): Performed by: PHYSICIAN ASSISTANT

## 2025-07-28 PROCEDURE — 85027 COMPLETE CBC AUTOMATED: CPT | Performed by: PHYSICIAN ASSISTANT

## 2025-07-28 PROCEDURE — 1200000002 HC GENERAL ROOM WITH TELEMETRY DAILY

## 2025-07-28 RX ORDER — BUMETANIDE 0.25 MG/ML
2 INJECTION, SOLUTION INTRAMUSCULAR; INTRAVENOUS
Status: DISCONTINUED | OUTPATIENT
Start: 2025-07-28 | End: 2025-07-28

## 2025-07-28 RX ADMIN — POLYSACCHARIDE-IRON COMPLEX 150 MG: 150 CAPSULE ORAL at 08:52

## 2025-07-28 RX ADMIN — GABAPENTIN 400 MG: 300 CAPSULE ORAL at 20:30

## 2025-07-28 RX ADMIN — PANTOPRAZOLE SODIUM 20 MG: 20 TABLET, DELAYED RELEASE ORAL at 08:52

## 2025-07-28 RX ADMIN — ATORVASTATIN CALCIUM 80 MG: 80 TABLET, FILM COATED ORAL at 08:52

## 2025-07-28 RX ADMIN — CITALOPRAM HYDROBROMIDE 40 MG: 40 TABLET ORAL at 08:52

## 2025-07-28 RX ADMIN — ROPINIROLE 1 MG: 1 TABLET, FILM COATED ORAL at 15:14

## 2025-07-28 RX ADMIN — INSULIN LISPRO 2 UNITS: 100 INJECTION, SOLUTION INTRAVENOUS; SUBCUTANEOUS at 08:52

## 2025-07-28 RX ADMIN — ASPIRIN 81 MG CHEWABLE TABLET 81 MG: 81 TABLET CHEWABLE at 08:54

## 2025-07-28 RX ADMIN — GABAPENTIN 400 MG: 300 CAPSULE ORAL at 15:14

## 2025-07-28 RX ADMIN — MAGNESIUM OXIDE TAB 400 MG (241.3 MG ELEMENTAL MG) 2 TABLET: 400 (241.3 MG) TAB at 20:26

## 2025-07-28 RX ADMIN — BUMETANIDE 2 MG: 0.25 INJECTION INTRAMUSCULAR; INTRAVENOUS at 11:44

## 2025-07-28 RX ADMIN — ROPINIROLE 1 MG: 1 TABLET, FILM COATED ORAL at 20:25

## 2025-07-28 RX ADMIN — GABAPENTIN 400 MG: 300 CAPSULE ORAL at 06:23

## 2025-07-28 RX ADMIN — LEVOTHYROXINE SODIUM 88 MCG: 0.09 TABLET ORAL at 06:23

## 2025-07-28 RX ADMIN — BUMETANIDE 2 MG: 0.25 INJECTION INTRAMUSCULAR; INTRAVENOUS at 18:52

## 2025-07-28 RX ADMIN — INSULIN LISPRO 6 UNITS: 100 INJECTION, SOLUTION INTRAVENOUS; SUBCUTANEOUS at 11:45

## 2025-07-28 RX ADMIN — INSULIN GLARGINE 15 UNITS: 100 INJECTION, SOLUTION SUBCUTANEOUS at 20:26

## 2025-07-28 RX ADMIN — ROPINIROLE 1 MG: 1 TABLET, FILM COATED ORAL at 08:52

## 2025-07-28 RX ADMIN — ROPINIROLE HYDROCHLORIDE 3 MG: 3 TABLET, FILM COATED ORAL at 20:26

## 2025-07-28 RX ADMIN — FLUTICASONE FUROATE AND VILANTEROL TRIFENATATE 1 PUFF: 100; 25 POWDER RESPIRATORY (INHALATION) at 09:27

## 2025-07-28 RX ADMIN — MAGNESIUM OXIDE TAB 400 MG (241.3 MG ELEMENTAL MG) 2 TABLET: 400 (241.3 MG) TAB at 08:53

## 2025-07-28 RX ADMIN — SPIRONOLACTONE 25 MG: 25 TABLET, FILM COATED ORAL at 08:52

## 2025-07-28 ASSESSMENT — COGNITIVE AND FUNCTIONAL STATUS - GENERAL
MOBILITY SCORE: 24
DAILY ACTIVITIY SCORE: 24
DAILY ACTIVITIY SCORE: 24
MOBILITY SCORE: 24

## 2025-07-28 ASSESSMENT — PAIN - FUNCTIONAL ASSESSMENT
PAIN_FUNCTIONAL_ASSESSMENT: 0-10

## 2025-07-28 ASSESSMENT — PAIN SCALES - GENERAL
PAINLEVEL_OUTOF10: 0 - NO PAIN

## 2025-07-28 NOTE — PROGRESS NOTES
07/28/25        Transitional Care Coordination Progress Note:   Patient discussed during interdisciplinary rounds.   Team members present: ANIA TCC MD   Plan per Medical/Surgical team: Lvad implantation 7-29 then education after procedure  ]  Discharge disposition: Home   Status-Inpatient   Payer- [employer]   Potential Barriers: None   ADOD: Unknown   Manny Burris RN [facility] [phone]

## 2025-07-28 NOTE — CARE PLAN
Problem: Pain - Adult  Goal: Verbalizes/displays adequate comfort level or baseline comfort level  Outcome: Progressing     Problem: Safety - Adult  Goal: Free from fall injury  Outcome: Progressing     Problem: Discharge Planning  Goal: Discharge to home or other facility with appropriate resources  Outcome: Progressing     Problem: Chronic Conditions and Co-morbidities  Goal: Patient's chronic conditions and co-morbidity symptoms are monitored and maintained or improved  Outcome: Progressing     Problem: Nutrition  Goal: Nutrient intake appropriate for maintaining nutritional needs  Outcome: Progressing     Problem: Heart Failure  Goal: Improved gas exchange this shift  Outcome: Progressing  Goal: Improved urinary output this shift  Outcome: Progressing  Goal: Reduction in peripheral edema within 24 hours  Outcome: Progressing  Goal: Report improvement of dyspnea/breathlessness this shift  Outcome: Progressing  Goal: Weight from fluid excess reduced over 2-3 days, then stabilize  Outcome: Progressing  Goal: Increase self care and/or family involvement in 24 hours  Outcome: Progressing     Problem: Respiratory  Goal: Minimal/no exertional discomfort or dyspnea this shift  Outcome: Progressing  Goal: No signs of respiratory distress (eg. Use of accessory muscles. Peds grunting)  Outcome: Progressing  Goal: Patent airway maintained this shift  Outcome: Progressing  Goal: Verbalize decreased shortness of breath this shift  Outcome: Progressing  Goal: Wean oxygen to maintain O2 saturation per order/standard this shift  Outcome: Progressing  Goal: Increase self care and/or family involvement in next 24 hours  Outcome: Progressing     Problem: Diabetes  Goal: Achieve decreasing blood glucose levels by end of shift  Outcome: Progressing  Goal: Increase stability of blood glucose readings by end of shift  Outcome: Progressing  Goal: Decrease in ketones present in urine by end of shift  Outcome: Progressing  Goal:  Maintain electrolyte levels within acceptable range throughout shift  Outcome: Progressing  Goal: Maintain glucose levels >70mg/dl to <250mg/dl throughout shift  Outcome: Progressing  Goal: No changes in neurological exam by end of shift  Outcome: Progressing  Goal: Learn about and adhere to nutrition recommendations by end of shift  Outcome: Progressing  Goal: Vital signs within normal range for age by end of shift  Outcome: Progressing  Goal: Increase self care and/or family involovement by end of shift  Outcome: Progressing  Goal: Receive DSME education by end of shift  Outcome: Progressing       The clinical goals for the shift include Pt will be free from s/s of SOB throughout shift.      07/27/25 at 9:55 PM - KRISTINA HUFFMAN RN
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
## 2025-07-28 NOTE — TELEPHONE ENCOUNTER
Patient friend called stating that she is confused about an OR date. Friend states she was told the implant would happen today and then would happen tomorrow and now is unsure.     Informed the friend that the patient does not currently have an OR date set. Once there is one set by the surgeons, the patient will be updated.

## 2025-07-29 ENCOUNTER — DOCUMENTATION (OUTPATIENT)
Dept: TRANSPLANT | Facility: HOSPITAL | Age: 60
End: 2025-07-29
Payer: COMMERCIAL

## 2025-07-29 DIAGNOSIS — I50.84 CHF (NYHA CLASS IV, ACC/AHA STAGE D) (MULTI): ICD-10-CM

## 2025-07-29 LAB
ALBUMIN SERPL BCP-MCNC: 4 G/DL (ref 3.4–5)
ANION GAP SERPL CALC-SCNC: 14 MMOL/L (ref 10–20)
BUN SERPL-MCNC: 18 MG/DL (ref 6–23)
CALCIUM SERPL-MCNC: 9.3 MG/DL (ref 8.6–10.6)
CHLORIDE SERPL-SCNC: 95 MMOL/L (ref 98–107)
CO2 SERPL-SCNC: 29 MMOL/L (ref 21–32)
CREAT SERPL-MCNC: 0.71 MG/DL (ref 0.5–1.05)
EGFRCR SERPLBLD CKD-EPI 2021: >90 ML/MIN/1.73M*2
ERYTHROCYTE [DISTWIDTH] IN BLOOD BY AUTOMATED COUNT: 14.6 % (ref 11.5–14.5)
FUNGUS SPEC CULT: NORMAL
FUNGUS SPEC FUNGUS STN: NORMAL
GLUCOSE BLD MANUAL STRIP-MCNC: 223 MG/DL (ref 74–99)
GLUCOSE BLD MANUAL STRIP-MCNC: 270 MG/DL (ref 74–99)
GLUCOSE BLD MANUAL STRIP-MCNC: 308 MG/DL (ref 74–99)
GLUCOSE BLD MANUAL STRIP-MCNC: 373 MG/DL (ref 74–99)
GLUCOSE SERPL-MCNC: 298 MG/DL (ref 74–99)
HCT VFR BLD AUTO: 44 % (ref 36–46)
HGB BLD-MCNC: 13.8 G/DL (ref 12–16)
MAGNESIUM SERPL-MCNC: 1.85 MG/DL (ref 1.6–2.4)
MCH RBC QN AUTO: 29.6 PG (ref 26–34)
MCHC RBC AUTO-ENTMCNC: 31.4 G/DL (ref 32–36)
MCV RBC AUTO: 94 FL (ref 80–100)
NRBC BLD-RTO: 0 /100 WBCS (ref 0–0)
PHOSPHATE SERPL-MCNC: 2.7 MG/DL (ref 2.5–4.9)
PLATELET # BLD AUTO: 262 X10*3/UL (ref 150–450)
POTASSIUM SERPL-SCNC: 4.2 MMOL/L (ref 3.5–5.3)
RBC # BLD AUTO: 4.66 X10*6/UL (ref 4–5.2)
SODIUM SERPL-SCNC: 134 MMOL/L (ref 136–145)
WBC # BLD AUTO: 8.6 X10*3/UL (ref 4.4–11.3)

## 2025-07-29 PROCEDURE — 2500000001 HC RX 250 WO HCPCS SELF ADMINISTERED DRUGS (ALT 637 FOR MEDICARE OP): Performed by: NURSE PRACTITIONER

## 2025-07-29 PROCEDURE — 99232 SBSQ HOSP IP/OBS MODERATE 35: CPT | Performed by: INTERNAL MEDICINE

## 2025-07-29 PROCEDURE — 1200000002 HC GENERAL ROOM WITH TELEMETRY DAILY

## 2025-07-29 PROCEDURE — 99233 SBSQ HOSP IP/OBS HIGH 50: CPT | Performed by: SPECIALIST

## 2025-07-29 PROCEDURE — 2500000002 HC RX 250 W HCPCS SELF ADMINISTERED DRUGS (ALT 637 FOR MEDICARE OP, ALT 636 FOR OP/ED): Performed by: NURSE PRACTITIONER

## 2025-07-29 PROCEDURE — 80069 RENAL FUNCTION PANEL: CPT | Performed by: PHYSICIAN ASSISTANT

## 2025-07-29 PROCEDURE — 2500000002 HC RX 250 W HCPCS SELF ADMINISTERED DRUGS (ALT 637 FOR MEDICARE OP, ALT 636 FOR OP/ED): Performed by: PHYSICIAN ASSISTANT

## 2025-07-29 PROCEDURE — 2500000004 HC RX 250 GENERAL PHARMACY W/ HCPCS (ALT 636 FOR OP/ED): Performed by: PHYSICIAN ASSISTANT

## 2025-07-29 PROCEDURE — 2500000001 HC RX 250 WO HCPCS SELF ADMINISTERED DRUGS (ALT 637 FOR MEDICARE OP): Performed by: PHYSICIAN ASSISTANT

## 2025-07-29 PROCEDURE — 94640 AIRWAY INHALATION TREATMENT: CPT

## 2025-07-29 PROCEDURE — 85027 COMPLETE CBC AUTOMATED: CPT | Performed by: PHYSICIAN ASSISTANT

## 2025-07-29 PROCEDURE — 36415 COLL VENOUS BLD VENIPUNCTURE: CPT | Performed by: PHYSICIAN ASSISTANT

## 2025-07-29 PROCEDURE — 82947 ASSAY GLUCOSE BLOOD QUANT: CPT

## 2025-07-29 PROCEDURE — 83735 ASSAY OF MAGNESIUM: CPT | Performed by: PHYSICIAN ASSISTANT

## 2025-07-29 RX ORDER — BUMETANIDE 1 MG/1
1 TABLET ORAL
Status: DISCONTINUED | OUTPATIENT
Start: 2025-07-29 | End: 2025-07-30

## 2025-07-29 RX ORDER — POTASSIUM CHLORIDE 20 MEQ/1
20 TABLET, EXTENDED RELEASE ORAL ONCE
Status: COMPLETED | OUTPATIENT
Start: 2025-07-29 | End: 2025-07-29

## 2025-07-29 RX ADMIN — INSULIN LISPRO 4 UNITS: 100 INJECTION, SOLUTION INTRAVENOUS; SUBCUTANEOUS at 08:33

## 2025-07-29 RX ADMIN — CITALOPRAM HYDROBROMIDE 40 MG: 40 TABLET ORAL at 08:33

## 2025-07-29 RX ADMIN — ROPINIROLE 1 MG: 1 TABLET, FILM COATED ORAL at 20:24

## 2025-07-29 RX ADMIN — POTASSIUM CHLORIDE 20 MEQ: 1500 TABLET, EXTENDED RELEASE ORAL at 08:38

## 2025-07-29 RX ADMIN — INSULIN LISPRO 6 UNITS: 100 INJECTION, SOLUTION INTRAVENOUS; SUBCUTANEOUS at 11:30

## 2025-07-29 RX ADMIN — LEVOTHYROXINE SODIUM 88 MCG: 0.09 TABLET ORAL at 05:12

## 2025-07-29 RX ADMIN — ATORVASTATIN CALCIUM 80 MG: 80 TABLET, FILM COATED ORAL at 08:33

## 2025-07-29 RX ADMIN — ROPINIROLE 1 MG: 1 TABLET, FILM COATED ORAL at 17:10

## 2025-07-29 RX ADMIN — SPIRONOLACTONE 25 MG: 25 TABLET, FILM COATED ORAL at 08:33

## 2025-07-29 RX ADMIN — GABAPENTIN 400 MG: 300 CAPSULE ORAL at 05:12

## 2025-07-29 RX ADMIN — BUMETANIDE 1 MG: 1 TABLET ORAL at 17:10

## 2025-07-29 RX ADMIN — POLYSACCHARIDE-IRON COMPLEX 150 MG: 150 CAPSULE ORAL at 08:33

## 2025-07-29 RX ADMIN — ROPINIROLE 1 MG: 1 TABLET, FILM COATED ORAL at 08:33

## 2025-07-29 RX ADMIN — MAGNESIUM OXIDE TAB 400 MG (241.3 MG ELEMENTAL MG) 2 TABLET: 400 (241.3 MG) TAB at 20:23

## 2025-07-29 RX ADMIN — ASPIRIN 81 MG CHEWABLE TABLET 81 MG: 81 TABLET CHEWABLE at 08:38

## 2025-07-29 RX ADMIN — GABAPENTIN 400 MG: 300 CAPSULE ORAL at 13:22

## 2025-07-29 RX ADMIN — PANTOPRAZOLE SODIUM 20 MG: 20 TABLET, DELAYED RELEASE ORAL at 08:33

## 2025-07-29 RX ADMIN — ROPINIROLE HYDROCHLORIDE 3 MG: 3 TABLET, FILM COATED ORAL at 20:23

## 2025-07-29 RX ADMIN — INSULIN GLARGINE 15 UNITS: 100 INJECTION, SOLUTION SUBCUTANEOUS at 20:27

## 2025-07-29 RX ADMIN — MAGNESIUM OXIDE TAB 400 MG (241.3 MG ELEMENTAL MG) 2 TABLET: 400 (241.3 MG) TAB at 08:38

## 2025-07-29 RX ADMIN — GABAPENTIN 400 MG: 300 CAPSULE ORAL at 20:31

## 2025-07-29 RX ADMIN — INSULIN LISPRO 10 UNITS: 100 INJECTION, SOLUTION INTRAVENOUS; SUBCUTANEOUS at 19:34

## 2025-07-29 RX ADMIN — FLUTICASONE FUROATE AND VILANTEROL TRIFENATATE 1 PUFF: 100; 25 POWDER RESPIRATORY (INHALATION) at 09:14

## 2025-07-29 ASSESSMENT — COGNITIVE AND FUNCTIONAL STATUS - GENERAL
DAILY ACTIVITIY SCORE: 24
DAILY ACTIVITIY SCORE: 24
MOBILITY SCORE: 24
MOBILITY SCORE: 24

## 2025-07-29 ASSESSMENT — PAIN - FUNCTIONAL ASSESSMENT
PAIN_FUNCTIONAL_ASSESSMENT: 0-10
PAIN_FUNCTIONAL_ASSESSMENT: 0-10

## 2025-07-29 ASSESSMENT — PAIN SCALES - GENERAL
PAINLEVEL_OUTOF10: 0 - NO PAIN
PAINLEVEL_OUTOF10: 0 - NO PAIN

## 2025-07-29 NOTE — CARE PLAN
Problem: Pain - Adult  Goal: Verbalizes/displays adequate comfort level or baseline comfort level  Outcome: Progressing     Problem: Safety - Adult  Goal: Free from fall injury  Outcome: Progressing     Problem: Discharge Planning  Goal: Discharge to home or other facility with appropriate resources  Outcome: Progressing     Problem: Chronic Conditions and Co-morbidities  Goal: Patient's chronic conditions and co-morbidity symptoms are monitored and maintained or improved  Outcome: Progressing     Problem: Nutrition  Goal: Nutrient intake appropriate for maintaining nutritional needs  Outcome: Progressing     Problem: Heart Failure  Goal: Improved gas exchange this shift  Outcome: Progressing  Goal: Improved urinary output this shift  Outcome: Progressing  Goal: Reduction in peripheral edema within 24 hours  Outcome: Progressing  Goal: Report improvement of dyspnea/breathlessness this shift  Outcome: Progressing  Goal: Weight from fluid excess reduced over 2-3 days, then stabilize  Outcome: Progressing  Goal: Increase self care and/or family involvement in 24 hours  Outcome: Progressing     Problem: Respiratory  Goal: Minimal/no exertional discomfort or dyspnea this shift  Outcome: Progressing  Goal: No signs of respiratory distress (eg. Use of accessory muscles. Peds grunting)  Outcome: Progressing  Goal: Patent airway maintained this shift  Outcome: Progressing  Goal: Verbalize decreased shortness of breath this shift  Outcome: Progressing  Goal: Wean oxygen to maintain O2 saturation per order/standard this shift  Outcome: Progressing  Goal: Increase self care and/or family involvement in next 24 hours  Outcome: Progressing     Problem: Diabetes  Goal: Achieve decreasing blood glucose levels by end of shift  Outcome: Progressing  Goal: Increase stability of blood glucose readings by end of shift  Outcome: Progressing  Goal: Decrease in ketones present in urine by end of shift  Outcome: Progressing  Goal:  Maintain electrolyte levels within acceptable range throughout shift  Outcome: Progressing  Goal: Maintain glucose levels >70mg/dl to <250mg/dl throughout shift  Outcome: Progressing  Goal: No changes in neurological exam by end of shift  Outcome: Progressing  Goal: Learn about and adhere to nutrition recommendations by end of shift  Outcome: Progressing  Goal: Vital signs within normal range for age by end of shift  Outcome: Progressing  Goal: Increase self care and/or family involovement by end of shift  Outcome: Progressing  Goal: Receive DSME education by end of shift  Outcome: Progressing       The clinical goals for the shift include Pt will be free from s/s of SOB throughout shift.      07/28/25 at 11:02 PM - KRISTINA HUFFMAN RN

## 2025-07-29 NOTE — PROGRESS NOTES
07/29/25 1415   Rapid Rounds   Attendance Provider;Nurse;Care Transitions;Pharmacist   Expected Discharge Disposition Home   Today we still await: Clinical stability
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
## 2025-07-30 LAB
ACID FAST STN SPEC: NORMAL
ALBUMIN SERPL BCP-MCNC: 3.8 G/DL (ref 3.4–5)
ANION GAP SERPL CALC-SCNC: 12 MMOL/L (ref 10–20)
BUN SERPL-MCNC: 16 MG/DL (ref 6–23)
CALCIUM SERPL-MCNC: 9.5 MG/DL (ref 8.6–10.6)
CHLORIDE SERPL-SCNC: 93 MMOL/L (ref 98–107)
CO2 SERPL-SCNC: 35 MMOL/L (ref 21–32)
CREAT SERPL-MCNC: 0.78 MG/DL (ref 0.5–1.05)
EGFRCR SERPLBLD CKD-EPI 2021: 86 ML/MIN/1.73M*2
ERYTHROCYTE [DISTWIDTH] IN BLOOD BY AUTOMATED COUNT: 14.7 % (ref 11.5–14.5)
GLUCOSE BLD MANUAL STRIP-MCNC: 176 MG/DL (ref 74–99)
GLUCOSE BLD MANUAL STRIP-MCNC: 188 MG/DL (ref 74–99)
GLUCOSE BLD MANUAL STRIP-MCNC: 246 MG/DL (ref 74–99)
GLUCOSE BLD MANUAL STRIP-MCNC: 274 MG/DL (ref 74–99)
GLUCOSE SERPL-MCNC: 166 MG/DL (ref 74–99)
HCT VFR BLD AUTO: 41.6 % (ref 36–46)
HGB BLD-MCNC: 13.5 G/DL (ref 12–16)
MAGNESIUM SERPL-MCNC: 2.07 MG/DL (ref 1.6–2.4)
MCH RBC QN AUTO: 30.3 PG (ref 26–34)
MCHC RBC AUTO-ENTMCNC: 32.5 G/DL (ref 32–36)
MCV RBC AUTO: 94 FL (ref 80–100)
MYCOBACTERIUM SPEC CULT: NORMAL
NRBC BLD-RTO: 0 /100 WBCS (ref 0–0)
PHOSPHATE SERPL-MCNC: 4.1 MG/DL (ref 2.5–4.9)
PLATELET # BLD AUTO: 243 X10*3/UL (ref 150–450)
POTASSIUM SERPL-SCNC: 4 MMOL/L (ref 3.5–5.3)
RBC # BLD AUTO: 4.45 X10*6/UL (ref 4–5.2)
SODIUM SERPL-SCNC: 136 MMOL/L (ref 136–145)
WBC # BLD AUTO: 7.4 X10*3/UL (ref 4.4–11.3)

## 2025-07-30 PROCEDURE — 85027 COMPLETE CBC AUTOMATED: CPT | Performed by: PHYSICIAN ASSISTANT

## 2025-07-30 PROCEDURE — 99232 SBSQ HOSP IP/OBS MODERATE 35: CPT | Performed by: INTERNAL MEDICINE

## 2025-07-30 PROCEDURE — 1200000002 HC GENERAL ROOM WITH TELEMETRY DAILY

## 2025-07-30 PROCEDURE — 2500000002 HC RX 250 W HCPCS SELF ADMINISTERED DRUGS (ALT 637 FOR MEDICARE OP, ALT 636 FOR OP/ED): Performed by: PHYSICIAN ASSISTANT

## 2025-07-30 PROCEDURE — 80069 RENAL FUNCTION PANEL: CPT | Performed by: PHYSICIAN ASSISTANT

## 2025-07-30 PROCEDURE — 2500000004 HC RX 250 GENERAL PHARMACY W/ HCPCS (ALT 636 FOR OP/ED): Performed by: PHYSICIAN ASSISTANT

## 2025-07-30 PROCEDURE — 82947 ASSAY GLUCOSE BLOOD QUANT: CPT

## 2025-07-30 PROCEDURE — 2500000001 HC RX 250 WO HCPCS SELF ADMINISTERED DRUGS (ALT 637 FOR MEDICARE OP): Performed by: PHYSICIAN ASSISTANT

## 2025-07-30 PROCEDURE — 36415 COLL VENOUS BLD VENIPUNCTURE: CPT | Performed by: PHYSICIAN ASSISTANT

## 2025-07-30 PROCEDURE — 94640 AIRWAY INHALATION TREATMENT: CPT

## 2025-07-30 PROCEDURE — 83735 ASSAY OF MAGNESIUM: CPT | Performed by: PHYSICIAN ASSISTANT

## 2025-07-30 PROCEDURE — 2500000002 HC RX 250 W HCPCS SELF ADMINISTERED DRUGS (ALT 637 FOR MEDICARE OP, ALT 636 FOR OP/ED): Performed by: NURSE PRACTITIONER

## 2025-07-30 PROCEDURE — 2500000001 HC RX 250 WO HCPCS SELF ADMINISTERED DRUGS (ALT 637 FOR MEDICARE OP): Performed by: NURSE PRACTITIONER

## 2025-07-30 RX ORDER — BUMETANIDE 2 MG/1
2 TABLET ORAL
Status: DISCONTINUED | OUTPATIENT
Start: 2025-07-30 | End: 2025-08-01

## 2025-07-30 RX ADMIN — CITALOPRAM HYDROBROMIDE 40 MG: 40 TABLET ORAL at 09:01

## 2025-07-30 RX ADMIN — ATORVASTATIN CALCIUM 80 MG: 80 TABLET, FILM COATED ORAL at 09:01

## 2025-07-30 RX ADMIN — MAGNESIUM OXIDE TAB 400 MG (241.3 MG ELEMENTAL MG) 2 TABLET: 400 (241.3 MG) TAB at 20:07

## 2025-07-30 RX ADMIN — INSULIN GLARGINE 15 UNITS: 100 INJECTION, SOLUTION SUBCUTANEOUS at 20:09

## 2025-07-30 RX ADMIN — BUMETANIDE 2 MG: 1 TABLET ORAL at 09:01

## 2025-07-30 RX ADMIN — GABAPENTIN 400 MG: 300 CAPSULE ORAL at 14:18

## 2025-07-30 RX ADMIN — LEVOTHYROXINE SODIUM 88 MCG: 0.09 TABLET ORAL at 06:34

## 2025-07-30 RX ADMIN — PANTOPRAZOLE SODIUM 20 MG: 20 TABLET, DELAYED RELEASE ORAL at 09:01

## 2025-07-30 RX ADMIN — MAGNESIUM OXIDE TAB 400 MG (241.3 MG ELEMENTAL MG) 2 TABLET: 400 (241.3 MG) TAB at 09:01

## 2025-07-30 RX ADMIN — SPIRONOLACTONE 25 MG: 25 TABLET, FILM COATED ORAL at 09:01

## 2025-07-30 RX ADMIN — POLYSACCHARIDE-IRON COMPLEX 150 MG: 150 CAPSULE ORAL at 09:01

## 2025-07-30 RX ADMIN — ASPIRIN 81 MG CHEWABLE TABLET 81 MG: 81 TABLET CHEWABLE at 09:01

## 2025-07-30 RX ADMIN — INSULIN LISPRO 6 UNITS: 100 INJECTION, SOLUTION INTRAVENOUS; SUBCUTANEOUS at 11:36

## 2025-07-30 RX ADMIN — ROPINIROLE 1 MG: 1 TABLET, FILM COATED ORAL at 20:08

## 2025-07-30 RX ADMIN — ROPINIROLE 1 MG: 1 TABLET, FILM COATED ORAL at 14:18

## 2025-07-30 RX ADMIN — INSULIN LISPRO 2 UNITS: 100 INJECTION, SOLUTION INTRAVENOUS; SUBCUTANEOUS at 09:01

## 2025-07-30 RX ADMIN — GABAPENTIN 400 MG: 300 CAPSULE ORAL at 06:34

## 2025-07-30 RX ADMIN — FLUTICASONE FUROATE AND VILANTEROL TRIFENATATE 1 PUFF: 100; 25 POWDER RESPIRATORY (INHALATION) at 10:15

## 2025-07-30 RX ADMIN — ROPINIROLE HYDROCHLORIDE 3 MG: 3 TABLET, FILM COATED ORAL at 20:08

## 2025-07-30 RX ADMIN — INSULIN LISPRO 2 UNITS: 100 INJECTION, SOLUTION INTRAVENOUS; SUBCUTANEOUS at 17:35

## 2025-07-30 RX ADMIN — GABAPENTIN 400 MG: 300 CAPSULE ORAL at 21:30

## 2025-07-30 RX ADMIN — BUMETANIDE 2 MG: 1 TABLET ORAL at 17:35

## 2025-07-30 RX ADMIN — ROPINIROLE 1 MG: 1 TABLET, FILM COATED ORAL at 09:01

## 2025-07-30 ASSESSMENT — COGNITIVE AND FUNCTIONAL STATUS - GENERAL
DAILY ACTIVITIY SCORE: 24
MOBILITY SCORE: 24
MOBILITY SCORE: 24
DAILY ACTIVITIY SCORE: 24

## 2025-07-30 ASSESSMENT — PAIN SCALES - GENERAL
PAINLEVEL_OUTOF10: 0 - NO PAIN
PAINLEVEL_OUTOF10: 0 - NO PAIN

## 2025-07-30 ASSESSMENT — PAIN - FUNCTIONAL ASSESSMENT
PAIN_FUNCTIONAL_ASSESSMENT: 0-10
PAIN_FUNCTIONAL_ASSESSMENT: 0-10

## 2025-07-30 NOTE — NURSING NOTE
Thao Evans is a 60 year old female with a past medical hx of CAD, statge D systolic HF, poorly controlled T2DM, past tobacco use (quit 2 months ago), Graves's Disease, RLS, COPD, anxiety and depression. She is currently awaiting LVAD placement, but it was delayed due to concerning polyps on colonoscopy. Today she has been cooperative with all medications and care, she is currently resting comfortably in bed. Goals for today's shift include remaining free from SOB.

## 2025-07-30 NOTE — NURSING NOTE
Patient is up and alert. Took medication after breakfast. Is currently resting comfortably. Goal is to remain pain free and looking forward to going home.

## 2025-07-30 NOTE — CARE PLAN
The patient's goals for the shift include      The clinical goals for the shift include Pt will be free from s/s of SOB throughout shift.   The Patient has been compliant with all medication and intervention throughout this shift.

## 2025-07-30 NOTE — CARE PLAN
The patient's goals for the shift include      The clinical goals for the shift include Pt will be free from s/s of SOB throughout shift.    The Patient has been compliant to all medications and intervention throughout this shift.  Problem: Pain - Adult  Goal: Verbalizes/displays adequate comfort level or baseline comfort level  7/30/2025 0719 by Wandy Heck RN  Outcome: Progressing  7/30/2025 0719 by Wandy Heck RN  Outcome: Progressing  7/30/2025 0715 by Wandy Heck RN  Outcome: Progressing     Problem: Safety - Adult  Goal: Free from fall injury  7/30/2025 0719 by Wandy Heck RN  Outcome: Progressing  7/30/2025 0719 by Wandy eHck RN  Outcome: Progressing  7/30/2025 0715 by Wandy Heck RN  Outcome: Progressing     Problem: Discharge Planning  Goal: Discharge to home or other facility with appropriate resources  7/30/2025 0719 by Wandy Heck RN  Outcome: Progressing  7/30/2025 0719 by Wandy Heck RN  Outcome: Progressing  7/30/2025 0715 by Wandy Heck RN  Outcome: Progressing     Problem: Chronic Conditions and Co-morbidities  Goal: Patient's chronic conditions and co-morbidity symptoms are monitored and maintained or improved  7/30/2025 0719 by Wandy Heck RN  Outcome: Progressing  7/30/2025 0719 by Wandy Heck RN  Outcome: Progressing  7/30/2025 0715 by Wandy Heck RN  Outcome: Progressing     Problem: Nutrition  Goal: Nutrient intake appropriate for maintaining nutritional needs  7/30/2025 0719 by Wandy Heck RN  Outcome: Progressing  7/30/2025 0719 by Wandy Heck RN  Outcome: Progressing  7/30/2025 0715 by Wandy Heck RN  Outcome: Progressing     Problem: Heart Failure  Goal: Improved gas exchange this shift  7/30/2025 0719 by Wandy Heck RN  Outcome: Progressing  7/30/2025 0719 by Wandy Heck RN  Outcome: Progressing  7/30/2025 0715 by Wandy Heck RN  Outcome: Progressing  Goal: Improved urinary output  this shift  7/30/2025 0719 by Wandy Heck RN  Outcome: Progressing  7/30/2025 0719 by Wandy Heck RN  Outcome: Progressing  7/30/2025 0715 by Wandy Heck RN  Outcome: Progressing  Goal: Reduction in peripheral edema within 24 hours  7/30/2025 0719 by Wandy Heck, RN  Outcome: Progressing  7/30/2025 0719 by Wandy Heck, RN  Outcome: Progressing  7/30/2025 0715 by Wandy Heck RN  Outcome: Progressing  Goal: Report improvement of dyspnea/breathlessness this shift  7/30/2025 0719 by Wandy Heck, ANIA  Outcome: Progressing  7/30/2025 0719 by Wandy Heck, ANIA  Outcome: Progressing  7/30/2025 0715 by Wandy Heck RN  Outcome: Progressing  Goal: Weight from fluid excess reduced over 2-3 days, then stabilize  7/30/2025 0719 by Wandy Heck, ANIA  Outcome: Progressing  7/30/2025 0719 by Wandy Heck, RN  Outcome: Progressing  7/30/2025 0715 by Wandy Heck RN  Outcome: Progressing  Goal: Increase self care and/or family involvement in 24 hours  7/30/2025 0719 by Wandy Heck, ANIA  Outcome: Progressing  7/30/2025 0719 by Wandy Heck, RN  Outcome: Progressing  7/30/2025 0715 by Wnady Heck RN  Outcome: Progressing     Problem: Respiratory  Goal: Minimal/no exertional discomfort or dyspnea this shift  7/30/2025 0719 by Wandy Heck, ANIA  Outcome: Progressing  7/30/2025 0719 by Wandy Heck, RN  Outcome: Progressing  7/30/2025 0715 by Wandy Heck RN  Outcome: Progressing  Goal: No signs of respiratory distress (eg. Use of accessory muscles. Peds grunting)  7/30/2025 0719 by Wandy Heck RN  Outcome: Progressing  7/30/2025 0719 by Wandy Heck, RN  Outcome: Progressing  7/30/2025 0715 by Juanikee Umang, RN  Outcome: Progressing  Goal: Patent airway maintained this shift  7/30/2025 0719 by Wandy Heck, ANIA  Outcome: Progressing  7/30/2025 0719 by Wandy Heck RN  Outcome: Progressing  7/30/2025 0715 by Wandy Heck,  RN  Outcome: Progressing  Goal: Verbalize decreased shortness of breath this shift  7/30/2025 0719 by Wandy Heck RN  Outcome: Progressing  7/30/2025 0719 by Wandy Heck RN  Outcome: Progressing  7/30/2025 0715 by Wandy Heck RN  Outcome: Progressing  Goal: Wean oxygen to maintain O2 saturation per order/standard this shift  7/30/2025 0719 by Wandy Heck, ANIA  Outcome: Progressing  7/30/2025 0719 by Wandy Heck, ANIA  Outcome: Progressing  7/30/2025 0715 by Wandy Heck RN  Outcome: Progressing  Goal: Increase self care and/or family involvement in next 24 hours  7/30/2025 0719 by Wandy Heck RN  Outcome: Progressing  7/30/2025 0719 by Wandy Heck, ANIA  Outcome: Progressing  7/30/2025 0715 by Wandy Heck, ANIA  Outcome: Progressing     Problem: Diabetes  Goal: Achieve decreasing blood glucose levels by end of shift  7/30/2025 0719 by Wandy Heck RN  Outcome: Progressing  7/30/2025 0719 by Wandy Heck, ANIA  Outcome: Progressing  7/30/2025 0715 by Wandy Heck RN  Outcome: Progressing  Goal: Increase stability of blood glucose readings by end of shift  7/30/2025 0719 by Wandy Heck RN  Outcome: Progressing  7/30/2025 0719 by Wandy Heck, ANIA  Outcome: Progressing  7/30/2025 0715 by Wandy Heck, ANIA  Outcome: Progressing  Goal: Decrease in ketones present in urine by end of shift  7/30/2025 0719 by Wandy Heck RN  Outcome: Progressing  7/30/2025 0719 by Wandy Heck RN  Outcome: Progressing  7/30/2025 0715 by Wandy Heck RN  Outcome: Progressing  Goal: Maintain electrolyte levels within acceptable range throughout shift  7/30/2025 0719 by Wandy Heck, ANIA  Outcome: Progressing  7/30/2025 0719 by Wandy Hcek, ANIA  Outcome: Progressing  7/30/2025 0715 by Wandy Heck RN  Outcome: Progressing  Goal: Maintain glucose levels >70mg/dl to <250mg/dl throughout shift  7/30/2025 0719 by Wandy Heck, RN  Outcome:  Progressing  7/30/2025 0719 by Wandy Heck RN  Outcome: Progressing  7/30/2025 0715 by Wandy Heck RN  Outcome: Progressing  Goal: No changes in neurological exam by end of shift  7/30/2025 0719 by Wandy Heck, ANIA  Outcome: Progressing  7/30/2025 0719 by Wandy Heck, ANIA  Outcome: Progressing  7/30/2025 0715 by Wandy Heck RN  Outcome: Progressing  Goal: Learn about and adhere to nutrition recommendations by end of shift  7/30/2025 0719 by Wandy Heck RN  Outcome: Progressing  7/30/2025 0719 by Wandy Heck RN  Outcome: Progressing  7/30/2025 0715 by Wandy Heck RN  Outcome: Progressing  Goal: Vital signs within normal range for age by end of shift  7/30/2025 0719 by Wandy Heck, ANIA  Outcome: Progressing  7/30/2025 0719 by Wandy Heck, ANIA  Outcome: Progressing  7/30/2025 0715 by Wandy Heck RN  Outcome: Progressing  Goal: Increase self care and/or family involovement by end of shift  7/30/2025 0719 by Wandy Heck RN  Outcome: Progressing  7/30/2025 0719 by Wandy Heck RN  Outcome: Progressing  7/30/2025 0715 by Wandy Heck RN  Outcome: Progressing  Goal: Receive DSME education by end of shift  7/30/2025 0719 by Wandy Heck, ANIA  Outcome: Progressing  7/30/2025 0719 by Wandy Heck RN  Outcome: Progressing  7/30/2025 0715 by Wandy Heck RN  Outcome: Progressing

## 2025-07-30 NOTE — CARE PLAN
Problem: Safety - Adult  Goal: Free from fall injury  Outcome: Progressing     The patient's goals for the shift include  rest.    The clinical goals for the shift include safety.    Over the shift, the patient did not make progress toward the following goals n/a. Barriers to progression include none. Recommendations to address these barriers include none.
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
## 2025-07-31 ENCOUNTER — ANESTHESIA EVENT (OUTPATIENT)
Dept: OPERATING ROOM | Facility: HOSPITAL | Age: 60
End: 2025-07-31
Payer: COMMERCIAL

## 2025-07-31 ENCOUNTER — APPOINTMENT (OUTPATIENT)
Dept: CARDIOLOGY | Facility: HOSPITAL | Age: 60
End: 2025-07-31
Payer: COMMERCIAL

## 2025-07-31 LAB
ABO GROUP (TYPE) IN BLOOD: NORMAL
ALBUMIN SERPL BCP-MCNC: 3.8 G/DL (ref 3.4–5)
ANION GAP SERPL CALC-SCNC: 12 MMOL/L (ref 10–20)
ANTIBODY SCREEN: NORMAL
BUN SERPL-MCNC: 19 MG/DL (ref 6–23)
CALCIUM SERPL-MCNC: 9.4 MG/DL (ref 8.6–10.6)
CHLORIDE SERPL-SCNC: 93 MMOL/L (ref 98–107)
CO2 SERPL-SCNC: 37 MMOL/L (ref 21–32)
CREAT SERPL-MCNC: 0.8 MG/DL (ref 0.5–1.05)
EGFRCR SERPLBLD CKD-EPI 2021: 83 ML/MIN/1.73M*2
ERYTHROCYTE [DISTWIDTH] IN BLOOD BY AUTOMATED COUNT: 14.6 % (ref 11.5–14.5)
EST. AVERAGE GLUCOSE BLD GHB EST-MCNC: 169 MG/DL
GLUCOSE BLD MANUAL STRIP-MCNC: 168 MG/DL (ref 74–99)
GLUCOSE BLD MANUAL STRIP-MCNC: 187 MG/DL (ref 74–99)
GLUCOSE BLD MANUAL STRIP-MCNC: 282 MG/DL (ref 74–99)
GLUCOSE BLD MANUAL STRIP-MCNC: 317 MG/DL (ref 74–99)
GLUCOSE SERPL-MCNC: 213 MG/DL (ref 74–99)
HBA1C MFR BLD: 7.5 % (ref ?–5.7)
HCT VFR BLD AUTO: 40.9 % (ref 36–46)
HGB BLD-MCNC: 13.1 G/DL (ref 12–16)
MAGNESIUM SERPL-MCNC: 1.94 MG/DL (ref 1.6–2.4)
MCH RBC QN AUTO: 30 PG (ref 26–34)
MCHC RBC AUTO-ENTMCNC: 32 G/DL (ref 32–36)
MCV RBC AUTO: 94 FL (ref 80–100)
NRBC BLD-RTO: 0 /100 WBCS (ref 0–0)
PHOSPHATE SERPL-MCNC: 3.8 MG/DL (ref 2.5–4.9)
PLATELET # BLD AUTO: 253 X10*3/UL (ref 150–450)
POTASSIUM SERPL-SCNC: 3.8 MMOL/L (ref 3.5–5.3)
RBC # BLD AUTO: 4.36 X10*6/UL (ref 4–5.2)
RH FACTOR (ANTIGEN D): NORMAL
SODIUM SERPL-SCNC: 138 MMOL/L (ref 136–145)
WBC # BLD AUTO: 8.4 X10*3/UL (ref 4.4–11.3)

## 2025-07-31 PROCEDURE — 82947 ASSAY GLUCOSE BLOOD QUANT: CPT

## 2025-07-31 PROCEDURE — 83735 ASSAY OF MAGNESIUM: CPT | Performed by: PHYSICIAN ASSISTANT

## 2025-07-31 PROCEDURE — 2500000001 HC RX 250 WO HCPCS SELF ADMINISTERED DRUGS (ALT 637 FOR MEDICARE OP): Performed by: PHYSICIAN ASSISTANT

## 2025-07-31 PROCEDURE — 2500000002 HC RX 250 W HCPCS SELF ADMINISTERED DRUGS (ALT 637 FOR MEDICARE OP, ALT 636 FOR OP/ED): Performed by: NURSE PRACTITIONER

## 2025-07-31 PROCEDURE — 80069 RENAL FUNCTION PANEL: CPT | Performed by: PHYSICIAN ASSISTANT

## 2025-07-31 PROCEDURE — 86901 BLOOD TYPING SEROLOGIC RH(D): CPT | Performed by: REGISTERED NURSE

## 2025-07-31 PROCEDURE — 2500000001 HC RX 250 WO HCPCS SELF ADMINISTERED DRUGS (ALT 637 FOR MEDICARE OP): Performed by: NURSE PRACTITIONER

## 2025-07-31 PROCEDURE — 86923 COMPATIBILITY TEST ELECTRIC: CPT

## 2025-07-31 PROCEDURE — 2500000002 HC RX 250 W HCPCS SELF ADMINISTERED DRUGS (ALT 637 FOR MEDICARE OP, ALT 636 FOR OP/ED): Performed by: PHYSICIAN ASSISTANT

## 2025-07-31 PROCEDURE — 99233 SBSQ HOSP IP/OBS HIGH 50: CPT | Performed by: INTERNAL MEDICINE

## 2025-07-31 PROCEDURE — 85027 COMPLETE CBC AUTOMATED: CPT | Performed by: PHYSICIAN ASSISTANT

## 2025-07-31 PROCEDURE — 1200000002 HC GENERAL ROOM WITH TELEMETRY DAILY

## 2025-07-31 PROCEDURE — 87081 CULTURE SCREEN ONLY: CPT | Performed by: REGISTERED NURSE

## 2025-07-31 PROCEDURE — 2500000004 HC RX 250 GENERAL PHARMACY W/ HCPCS (ALT 636 FOR OP/ED): Performed by: PHYSICIAN ASSISTANT

## 2025-07-31 PROCEDURE — 93010 ELECTROCARDIOGRAM REPORT: CPT | Performed by: INTERNAL MEDICINE

## 2025-07-31 PROCEDURE — 83036 HEMOGLOBIN GLYCOSYLATED A1C: CPT | Performed by: REGISTERED NURSE

## 2025-07-31 PROCEDURE — 93005 ELECTROCARDIOGRAM TRACING: CPT

## 2025-07-31 PROCEDURE — 36415 COLL VENOUS BLD VENIPUNCTURE: CPT | Performed by: PHYSICIAN ASSISTANT

## 2025-07-31 PROCEDURE — 36415 COLL VENOUS BLD VENIPUNCTURE: CPT | Performed by: NURSE PRACTITIONER

## 2025-07-31 RX ORDER — CEFAZOLIN SODIUM 2 G/100ML
2 INJECTION, SOLUTION INTRAVENOUS EVERY 8 HOURS
Status: CANCELLED | OUTPATIENT
Start: 2025-07-31

## 2025-07-31 RX ORDER — INSULIN GLARGINE 100 [IU]/ML
7 INJECTION, SOLUTION SUBCUTANEOUS NIGHTLY
Status: DISCONTINUED | OUTPATIENT
Start: 2025-07-31 | End: 2025-08-01

## 2025-07-31 RX ADMIN — PANTOPRAZOLE SODIUM 20 MG: 20 TABLET, DELAYED RELEASE ORAL at 09:02

## 2025-07-31 RX ADMIN — INSULIN LISPRO 2 UNITS: 100 INJECTION, SOLUTION INTRAVENOUS; SUBCUTANEOUS at 18:03

## 2025-07-31 RX ADMIN — CITALOPRAM HYDROBROMIDE 40 MG: 40 TABLET ORAL at 09:03

## 2025-07-31 RX ADMIN — INSULIN GLARGINE 7 UNITS: 100 INJECTION, SOLUTION SUBCUTANEOUS at 21:45

## 2025-07-31 RX ADMIN — BUMETANIDE 2 MG: 1 TABLET ORAL at 09:03

## 2025-07-31 RX ADMIN — ROPINIROLE 1 MG: 1 TABLET, FILM COATED ORAL at 21:45

## 2025-07-31 RX ADMIN — LEVOTHYROXINE SODIUM 88 MCG: 0.09 TABLET ORAL at 05:12

## 2025-07-31 RX ADMIN — ROPINIROLE 1 MG: 1 TABLET, FILM COATED ORAL at 09:02

## 2025-07-31 RX ADMIN — ATORVASTATIN CALCIUM 80 MG: 80 TABLET, FILM COATED ORAL at 09:03

## 2025-07-31 RX ADMIN — MAGNESIUM OXIDE TAB 400 MG (241.3 MG ELEMENTAL MG) 2 TABLET: 400 (241.3 MG) TAB at 09:02

## 2025-07-31 RX ADMIN — BUMETANIDE 2 MG: 1 TABLET ORAL at 18:03

## 2025-07-31 RX ADMIN — ASPIRIN 81 MG CHEWABLE TABLET 81 MG: 81 TABLET CHEWABLE at 09:03

## 2025-07-31 RX ADMIN — GABAPENTIN 400 MG: 300 CAPSULE ORAL at 13:28

## 2025-07-31 RX ADMIN — ROPINIROLE HYDROCHLORIDE 3 MG: 3 TABLET, FILM COATED ORAL at 21:45

## 2025-07-31 RX ADMIN — GABAPENTIN 400 MG: 300 CAPSULE ORAL at 04:43

## 2025-07-31 RX ADMIN — FLUTICASONE FUROATE AND VILANTEROL TRIFENATATE 1 PUFF: 100; 25 POWDER RESPIRATORY (INHALATION) at 12:21

## 2025-07-31 RX ADMIN — GABAPENTIN 400 MG: 300 CAPSULE ORAL at 21:45

## 2025-07-31 RX ADMIN — SPIRONOLACTONE 25 MG: 25 TABLET, FILM COATED ORAL at 09:03

## 2025-07-31 RX ADMIN — INSULIN LISPRO 6 UNITS: 100 INJECTION, SOLUTION INTRAVENOUS; SUBCUTANEOUS at 12:22

## 2025-07-31 RX ADMIN — POLYSACCHARIDE-IRON COMPLEX 150 MG: 150 CAPSULE ORAL at 09:02

## 2025-07-31 RX ADMIN — INSULIN LISPRO 2 UNITS: 100 INJECTION, SOLUTION INTRAVENOUS; SUBCUTANEOUS at 09:03

## 2025-07-31 RX ADMIN — MAGNESIUM OXIDE TAB 400 MG (241.3 MG ELEMENTAL MG) 2 TABLET: 400 (241.3 MG) TAB at 21:45

## 2025-07-31 RX ADMIN — ROPINIROLE 1 MG: 1 TABLET, FILM COATED ORAL at 18:03

## 2025-07-31 ASSESSMENT — COGNITIVE AND FUNCTIONAL STATUS - GENERAL
MOBILITY SCORE: 24
DAILY ACTIVITIY SCORE: 24

## 2025-07-31 ASSESSMENT — PAIN - FUNCTIONAL ASSESSMENT: PAIN_FUNCTIONAL_ASSESSMENT: 0-10

## 2025-07-31 ASSESSMENT — PAIN SCALES - GENERAL: PAINLEVEL_OUTOF10: 0 - NO PAIN

## 2025-07-31 NOTE — CARE PLAN
The patient's goals for the shift include patient wants hot water to be fixed this shift    The clinical goals for the shift include patient will remain safe and free from falls this shift    Over the shift, the patient did make progress toward the following goals.      Problem: Pain - Adult  Goal: Verbalizes/displays adequate comfort level or baseline comfort level  Outcome: Progressing     Problem: Safety - Adult  Goal: Free from fall injury  Outcome: Progressing     Problem: Discharge Planning  Goal: Discharge to home or other facility with appropriate resources  Outcome: Progressing     Problem: Chronic Conditions and Co-morbidities  Goal: Patient's chronic conditions and co-morbidity symptoms are monitored and maintained or improved  Outcome: Progressing     Problem: Nutrition  Goal: Nutrient intake appropriate for maintaining nutritional needs  Outcome: Progressing     Problem: Heart Failure  Goal: Improved gas exchange this shift  Outcome: Progressing  Goal: Improved urinary output this shift  Outcome: Progressing  Goal: Reduction in peripheral edema within 24 hours  Outcome: Progressing  Goal: Report improvement of dyspnea/breathlessness this shift  Outcome: Progressing  Goal: Weight from fluid excess reduced over 2-3 days, then stabilize  Outcome: Progressing  Goal: Increase self care and/or family involvement in 24 hours  Outcome: Progressing     Problem: Respiratory  Goal: Minimal/no exertional discomfort or dyspnea this shift  Outcome: Progressing  Goal: No signs of respiratory distress (eg. Use of accessory muscles. Peds grunting)  Outcome: Progressing  Goal: Patent airway maintained this shift  Outcome: Progressing  Goal: Verbalize decreased shortness of breath this shift  Outcome: Progressing  Goal: Wean oxygen to maintain O2 saturation per order/standard this shift  Outcome: Progressing  Goal: Increase self care and/or family involvement in next 24 hours  Outcome: Progressing     Problem:  Diabetes  Goal: Achieve decreasing blood glucose levels by end of shift  Outcome: Progressing  Goal: Increase stability of blood glucose readings by end of shift  Outcome: Progressing  Goal: Decrease in ketones present in urine by end of shift  Outcome: Progressing  Goal: Maintain electrolyte levels within acceptable range throughout shift  Outcome: Progressing  Goal: Maintain glucose levels >70mg/dl to <250mg/dl throughout shift  Outcome: Progressing  Goal: No changes in neurological exam by end of shift  Outcome: Progressing  Goal: Learn about and adhere to nutrition recommendations by end of shift  Outcome: Progressing  Goal: Vital signs within normal range for age by end of shift  Outcome: Progressing  Goal: Increase self care and/or family involovement by end of shift  Outcome: Progressing  Goal: Receive DSME education by end of shift  Outcome: Progressing

## 2025-07-31 NOTE — CARE PLAN
The patient's goals for the shift include      The clinical goals for the shift include Remain HDS    Over the shift, the patient has been compliant to all interventions and medications unit she get her   Problem: Pain - Adult  Goal: Verbalizes/displays adequate comfort level or baseline comfort level  Outcome: Progressing     Problem: Safety - Adult  Goal: Free from fall injury  Outcome: Progressing     Problem: Discharge Planning  Goal: Discharge to home or other facility with appropriate resources  Outcome: Progressing     Problem: Chronic Conditions and Co-morbidities  Goal: Patient's chronic conditions and co-morbidity symptoms are monitored and maintained or improved  Outcome: Progressing     Problem: Nutrition  Goal: Nutrient intake appropriate for maintaining nutritional needs  Outcome: Progressing     Problem: Heart Failure  Goal: Improved gas exchange this shift  Outcome: Progressing  Goal: Improved urinary output this shift  Outcome: Progressing  Goal: Reduction in peripheral edema within 24 hours  Outcome: Progressing  Goal: Report improvement of dyspnea/breathlessness this shift  Outcome: Progressing  Goal: Weight from fluid excess reduced over 2-3 days, then stabilize  Outcome: Progressing  Goal: Increase self care and/or family involvement in 24 hours  Outcome: Progressing     Problem: Respiratory  Goal: Minimal/no exertional discomfort or dyspnea this shift  Outcome: Progressing  Goal: No signs of respiratory distress (eg. Use of accessory muscles. Peds grunting)  Outcome: Progressing  Goal: Patent airway maintained this shift  Outcome: Progressing  Goal: Verbalize decreased shortness of breath this shift  Outcome: Progressing  Goal: Wean oxygen to maintain O2 saturation per order/standard this shift  Outcome: Progressing  Goal: Increase self care and/or family involvement in next 24 hours  Outcome: Progressing     Problem: Diabetes  Goal: Achieve decreasing blood glucose levels by end of  shift  Outcome: Progressing  Goal: Increase stability of blood glucose readings by end of shift  Outcome: Progressing  Goal: Decrease in ketones present in urine by end of shift  Outcome: Progressing  Goal: Maintain electrolyte levels within acceptable range throughout shift  Outcome: Progressing  Goal: Maintain glucose levels >70mg/dl to <250mg/dl throughout shift  Outcome: Progressing  Goal: No changes in neurological exam by end of shift  Outcome: Progressing  Goal: Learn about and adhere to nutrition recommendations by end of shift  Outcome: Progressing  Goal: Vital signs within normal range for age by end of shift  Outcome: Progressing  Goal: Increase self care and/or family involovement by end of shift  Outcome: Progressing  Goal: Receive DSME education by end of shift  Outcome: Progressing   LVAD placed
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
## 2025-07-31 NOTE — PROGRESS NOTES
Report received from nightshift RN. Patient awake and alert, sitting up in bed, oriented x4, with no complaints of pain or discomfort at this time. Vital signs reviewed. Labs and orders reviewed. Will assume care of patient.
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
## 2025-08-01 ENCOUNTER — HOSPITAL ENCOUNTER (OUTPATIENT)
Dept: OPERATING ROOM | Facility: HOSPITAL | Age: 60
Discharge: HOME | End: 2025-08-01

## 2025-08-01 ENCOUNTER — APPOINTMENT (OUTPATIENT)
Dept: CARDIOLOGY | Facility: HOSPITAL | Age: 60
End: 2025-08-01
Payer: COMMERCIAL

## 2025-08-01 ENCOUNTER — APPOINTMENT (OUTPATIENT)
Dept: CARDIOLOGY | Facility: HOSPITAL | Age: 60
DRG: 001 | End: 2025-08-01
Payer: COMMERCIAL

## 2025-08-01 ENCOUNTER — APPOINTMENT (OUTPATIENT)
Dept: RADIOLOGY | Facility: HOSPITAL | Age: 60
DRG: 001 | End: 2025-08-01
Payer: COMMERCIAL

## 2025-08-01 ENCOUNTER — ANESTHESIA (OUTPATIENT)
Dept: OPERATING ROOM | Facility: HOSPITAL | Age: 60
End: 2025-08-01
Payer: COMMERCIAL

## 2025-08-01 ENCOUNTER — DOCUMENTATION (OUTPATIENT)
Dept: TRANSPLANT | Facility: HOSPITAL | Age: 60
End: 2025-08-01

## 2025-08-01 PROBLEM — Z95.5 STENTED CORONARY ARTERY: Status: ACTIVE | Noted: 2025-08-01

## 2025-08-01 PROBLEM — G70.9 NEUROMUSCULAR DISEASE (MULTI): Status: ACTIVE | Noted: 2025-08-01

## 2025-08-01 PROBLEM — I27.20 PULMONARY HYPERTENSION (MULTI): Status: ACTIVE | Noted: 2025-08-01

## 2025-08-01 PROBLEM — R93.1 DECREASED CARDIAC EJECTION FRACTION: Status: ACTIVE | Noted: 2025-08-01

## 2025-08-01 PROBLEM — E66.9 MODERATE OBESITY: Status: RESOLVED | Noted: 2022-10-28 | Resolved: 2025-08-01

## 2025-08-01 PROBLEM — M19.90 OSTEOARTHRITIS: Status: ACTIVE | Noted: 2025-08-01

## 2025-08-01 LAB
ACT BLD: 118 SEC (ref 82–174)
ACT BLD: 123 SEC (ref 82–174)
ACT BLD: 131 SEC (ref 82–174)
ACT BLD: 473 SEC (ref 82–174)
ACT BLD: 530 SEC (ref 82–174)
ACT BLD: 538 SEC (ref 82–174)
ALBUMIN SERPL BCP-MCNC: 4.3 G/DL (ref 3.4–5)
ANION GAP BLDA CALCULATED.4IONS-SCNC: 0 MMO/L (ref 10–25)
ANION GAP BLDA CALCULATED.4IONS-SCNC: 10 MMO/L (ref 10–25)
ANION GAP BLDA CALCULATED.4IONS-SCNC: 12 MMO/L (ref 10–25)
ANION GAP BLDA CALCULATED.4IONS-SCNC: 2 MMO/L (ref 10–25)
ANION GAP BLDA CALCULATED.4IONS-SCNC: 4 MMO/L (ref 10–25)
ANION GAP BLDA CALCULATED.4IONS-SCNC: 5 MMO/L (ref 10–25)
ANION GAP BLDA CALCULATED.4IONS-SCNC: 5 MMO/L (ref 10–25)
ANION GAP BLDA CALCULATED.4IONS-SCNC: 7 MMO/L (ref 10–25)
ANION GAP BLDMV CALCULATED.4IONS-SCNC: 12 MMO/L (ref 10–25)
ANION GAP BLDV CALCULATED.4IONS-SCNC: 6 MMOL/L (ref 10–25)
ANION GAP SERPL CALC-SCNC: 16 MMOL/L (ref 10–20)
APTT PPP: 25 SECONDS (ref 26–36)
BASE EXCESS BLDA CALC-SCNC: 0.6 MMOL/L (ref -2–3)
BASE EXCESS BLDA CALC-SCNC: 12.8 MMOL/L (ref -2–3)
BASE EXCESS BLDA CALC-SCNC: 16.2 MMOL/L (ref -2–3)
BASE EXCESS BLDA CALC-SCNC: 2.8 MMOL/L (ref -2–3)
BASE EXCESS BLDA CALC-SCNC: 3.3 MMOL/L (ref -2–3)
BASE EXCESS BLDA CALC-SCNC: 5.9 MMOL/L (ref -2–3)
BASE EXCESS BLDA CALC-SCNC: 6.4 MMOL/L (ref -2–3)
BASE EXCESS BLDA CALC-SCNC: 7.9 MMOL/L (ref -2–3)
BASE EXCESS BLDA CALC-SCNC: 8.1 MMOL/L (ref -2–3)
BASE EXCESS BLDA CALC-SCNC: 8.2 MMOL/L (ref -2–3)
BASE EXCESS BLDMV CALC-SCNC: 2.4 MMOL/L (ref -2–3)
BASE EXCESS BLDV CALC-SCNC: 9.1 MMOL/L (ref -2–3)
BLOOD EXPIRATION DATE: NORMAL
BODY TEMPERATURE: 37 DEGREES CELSIUS
BUN SERPL-MCNC: 17 MG/DL (ref 6–23)
CA-I BLD-SCNC: 1.11 MMOL/L (ref 1.1–1.33)
CA-I BLDA-SCNC: 1.01 MMOL/L (ref 1.1–1.33)
CA-I BLDA-SCNC: 1.02 MMOL/L (ref 1.1–1.33)
CA-I BLDA-SCNC: 1.02 MMOL/L (ref 1.1–1.33)
CA-I BLDA-SCNC: 1.06 MMOL/L (ref 1.1–1.33)
CA-I BLDA-SCNC: 1.12 MMOL/L (ref 1.1–1.33)
CA-I BLDA-SCNC: 1.18 MMOL/L (ref 1.1–1.33)
CA-I BLDA-SCNC: 1.19 MMOL/L (ref 1.1–1.33)
CA-I BLDA-SCNC: 1.21 MMOL/L (ref 1.1–1.33)
CA-I BLDA-SCNC: 1.22 MMOL/L (ref 1.1–1.33)
CA-I BLDA-SCNC: 1.32 MMOL/L (ref 1.1–1.33)
CA-I BLDMV-SCNC: 1.09 MMOL/L (ref 1.1–1.33)
CA-I BLDV-SCNC: 1.06 MMOL/L (ref 1.1–1.33)
CALCIUM SERPL-MCNC: 9.3 MG/DL (ref 8.6–10.6)
CFT FORM KAOLIN IND BLD RES TEG: 1 MIN (ref 0.8–2.1)
CFT FORM KAOLIN IND BLD RES TEG: 1 MIN (ref 0.8–2.1)
CFT FORM KAOLIN IND BLD RES TEG: 1.1 MIN (ref 0.8–2.1)
CHLORIDE BLD-SCNC: 101 MMOL/L (ref 98–107)
CHLORIDE BLDA-SCNC: 100 MMOL/L (ref 98–107)
CHLORIDE BLDA-SCNC: 101 MMOL/L (ref 98–107)
CHLORIDE BLDA-SCNC: 101 MMOL/L (ref 98–107)
CHLORIDE BLDA-SCNC: 105 MMOL/L (ref 98–107)
CHLORIDE BLDA-SCNC: 96 MMOL/L (ref 98–107)
CHLORIDE BLDA-SCNC: 97 MMOL/L (ref 98–107)
CHLORIDE BLDA-SCNC: 98 MMOL/L (ref 98–107)
CHLORIDE BLDA-SCNC: 98 MMOL/L (ref 98–107)
CHLORIDE BLDA-SCNC: 99 MMOL/L (ref 98–107)
CHLORIDE BLDA-SCNC: 99 MMOL/L (ref 98–107)
CHLORIDE BLDV-SCNC: 96 MMOL/L (ref 98–107)
CHLORIDE SERPL-SCNC: 98 MMOL/L (ref 98–107)
CLOT ANGLE.KAOLIN INDUCED BLD RES TEG: 74.5 DEG (ref 63–78)
CLOT ANGLE.KAOLIN INDUCED BLD RES TEG: 75 DEG (ref 63–78)
CLOT ANGLE.KAOLIN INDUCED BLD RES TEG: 76 DEG (ref 63–78)
CLOT INIT KAO IND P HEP NEUT BLD RES TEG: 5.2 MIN (ref 4.3–8.3)
CLOT INIT KAO IND P HEP NEUT BLD RES TEG: 5.4 MIN (ref 4.3–8.3)
CLOT INIT KAO IND P HEP NEUT BLD RES TEG: 5.6 MIN (ref 4.6–9.1)
CLOT INIT KAO IND P HEP NEUT BLD RES TEG: 5.7 MIN (ref 4.6–9.1)
CLOT INIT KAO IND P HEP NEUT BLD RES TEG: 6.5 MIN (ref 4.3–8.3)
CLOT INIT KAO IND P HEP NEUT BLD RES TEG: 7.6 MIN (ref 4.6–9.1)
CO2 SERPL-SCNC: 28 MMOL/L (ref 21–32)
COHGB MFR BLDA: 1 %
COHGB MFR BLDA: 1.4 %
COHGB MFR BLDA: 1.6 %
COHGB MFR BLDA: 1.6 %
COHGB MFR BLDA: 1.7 %
COHGB MFR BLDA: 1.8 %
COHGB MFR BLDA: 2.5 %
COHGB MFR BLDV: 1.5 %
CREAT SERPL-MCNC: 0.72 MG/DL (ref 0.5–1.05)
DISPENSE STATUS: NORMAL
DO-HGB MFR BLDA: 0 % (ref 0–5)
DO-HGB MFR BLDA: 0.3 % (ref 0–5)
DO-HGB MFR BLDA: 1 % (ref 0–5)
DO-HGB MFR BLDA: 1.3 % (ref 0–5)
DO-HGB MFR BLDA: 1.4 % (ref 0–5)
DO-HGB MFR BLDA: 2.9 % (ref 0–5)
DO-HGB MFR BLDA: 9.2 % (ref 0–5)
EGFRCR SERPLBLD CKD-EPI 2021: >90 ML/MIN/1.73M*2
ERYTHROCYTE [DISTWIDTH] IN BLOOD BY AUTOMATED COUNT: 14.6 % (ref 11.5–14.5)
ERYTHROCYTE [DISTWIDTH] IN BLOOD BY AUTOMATED COUNT: 14.6 % (ref 11.5–14.5)
FIBRINOGEN BLD CALC-MCNC: 360 MG/DL (ref 278–581)
FIBRINOGEN BLD CALC-MCNC: 402 MG/DL (ref 278–581)
FIBRINOGEN BLD CALC-MCNC: 411 MG/DL (ref 278–581)
FIBRINOGEN PPP-MCNC: 229 MG/DL (ref 200–400)
GLUCOSE BLD MANUAL STRIP-MCNC: 197 MG/DL (ref 74–99)
GLUCOSE BLD MANUAL STRIP-MCNC: 200 MG/DL (ref 74–99)
GLUCOSE BLD MANUAL STRIP-MCNC: 219 MG/DL (ref 74–99)
GLUCOSE BLD MANUAL STRIP-MCNC: 231 MG/DL (ref 74–99)
GLUCOSE BLD MANUAL STRIP-MCNC: 249 MG/DL (ref 74–99)
GLUCOSE BLD MANUAL STRIP-MCNC: 255 MG/DL (ref 74–99)
GLUCOSE BLD MANUAL STRIP-MCNC: 269 MG/DL (ref 74–99)
GLUCOSE BLD-MCNC: 140 MG/DL (ref 74–99)
GLUCOSE BLDA-MCNC: 134 MG/DL (ref 74–99)
GLUCOSE BLDA-MCNC: 140 MG/DL (ref 74–99)
GLUCOSE BLDA-MCNC: 170 MG/DL (ref 74–99)
GLUCOSE BLDA-MCNC: 179 MG/DL (ref 74–99)
GLUCOSE BLDA-MCNC: 183 MG/DL (ref 74–99)
GLUCOSE BLDA-MCNC: 190 MG/DL (ref 74–99)
GLUCOSE BLDA-MCNC: 221 MG/DL (ref 74–99)
GLUCOSE BLDA-MCNC: 248 MG/DL (ref 74–99)
GLUCOSE BLDA-MCNC: 256 MG/DL (ref 74–99)
GLUCOSE BLDA-MCNC: 264 MG/DL (ref 74–99)
GLUCOSE BLDV-MCNC: 274 MG/DL (ref 74–99)
GLUCOSE SERPL-MCNC: 149 MG/DL (ref 74–99)
HCO3 BLDA-SCNC: 24 MMOL/L (ref 22–26)
HCO3 BLDA-SCNC: 27.8 MMOL/L (ref 22–26)
HCO3 BLDA-SCNC: 27.9 MMOL/L (ref 22–26)
HCO3 BLDA-SCNC: 28.4 MMOL/L (ref 22–26)
HCO3 BLDA-SCNC: 29.7 MMOL/L (ref 22–26)
HCO3 BLDA-SCNC: 32 MMOL/L (ref 22–26)
HCO3 BLDA-SCNC: 32.7 MMOL/L (ref 22–26)
HCO3 BLDA-SCNC: 32.8 MMOL/L (ref 22–26)
HCO3 BLDA-SCNC: 38.1 MMOL/L (ref 22–26)
HCO3 BLDA-SCNC: 42.1 MMOL/L (ref 22–26)
HCO3 BLDMV-SCNC: 27.2 MMOL/L (ref 22–26)
HCO3 BLDV-SCNC: 35 MMOL/L (ref 22–26)
HCT VFR BLD AUTO: 31 % (ref 36–46)
HCT VFR BLD AUTO: 31.3 % (ref 36–46)
HCT VFR BLD EST: 30 % (ref 36–46)
HCT VFR BLD EST: 31 % (ref 36–46)
HCT VFR BLD EST: 32 % (ref 36–46)
HCT VFR BLD EST: 33 % (ref 36–46)
HCT VFR BLD EST: 33 % (ref 36–46)
HCT VFR BLD EST: 36 % (ref 36–46)
HCT VFR BLD EST: 40 % (ref 36–46)
HCT VFR BLD EST: 41 % (ref 36–46)
HGB BLD-MCNC: 10.3 G/DL (ref 12–16)
HGB BLD-MCNC: 10.7 G/DL (ref 12–16)
HGB BLDA-MCNC: 10.5 G/DL (ref 12–16)
HGB BLDA-MCNC: 10.6 G/DL (ref 12–16)
HGB BLDA-MCNC: 10.6 G/DL (ref 12–16)
HGB BLDA-MCNC: 10.8 G/DL (ref 12–16)
HGB BLDA-MCNC: 10.8 G/DL (ref 12–16)
HGB BLDA-MCNC: 10.9 G/DL (ref 12–16)
HGB BLDA-MCNC: 12 G/DL (ref 12–16)
HGB BLDA-MCNC: 12 G/DL (ref 12–16)
HGB BLDA-MCNC: 13.2 G/DL (ref 12–16)
HGB BLDA-MCNC: 13.2 G/DL (ref 12–16)
HGB BLDA-MCNC: 13.6 G/DL (ref 12–16)
HGB BLDA-MCNC: 13.6 G/DL (ref 12–16)
HGB BLDA-MCNC: 9.9 G/DL (ref 12–16)
HGB BLDMV-MCNC: 11 G/DL (ref 12–16)
HGB BLDV-MCNC: 10.4 G/DL (ref 12–16)
INHALED O2 CONCENTRATION: 21 %
INHALED O2 CONCENTRATION: 30 %
INHALED O2 CONCENTRATION: 30 %
INHALED O2 CONCENTRATION: 50 %
INHALED O2 CONCENTRATION: 60 %
INHALED O2 CONCENTRATION: 70 %
INHALED O2 CONCENTRATION: 80 %
INHALED O2 CONCENTRATION: 80 %
INHALED O2 CONCENTRATION: 90 %
INR PPP: 1.3 (ref 0.9–1.1)
LACTATE BLDA-SCNC: 1.1 MMOL/L (ref 0.4–2)
LACTATE BLDA-SCNC: 1.6 MMOL/L (ref 0.4–2)
LACTATE BLDA-SCNC: 1.8 MMOL/L (ref 0.4–2)
LACTATE BLDA-SCNC: 2.2 MMOL/L (ref 0.4–2)
LACTATE BLDA-SCNC: 2.4 MMOL/L (ref 0.4–2)
LACTATE BLDA-SCNC: 2.5 MMOL/L (ref 0.4–2)
LACTATE BLDA-SCNC: 3.2 MMOL/L (ref 0.4–2)
LACTATE BLDA-SCNC: 3.4 MMOL/L (ref 0.4–2)
LACTATE BLDA-SCNC: 3.4 MMOL/L (ref 0.4–2)
LACTATE BLDA-SCNC: 4 MMOL/L (ref 0.4–2)
LACTATE BLDMV-SCNC: 3.2 MMOL/L (ref 0.4–2)
LACTATE BLDV-SCNC: 1.8 MMOL/L (ref 0.4–2)
MA KAOLIN BLD RES TEG: 63 MM (ref 52–69)
MA KAOLIN BLD RES TEG: 63.9 MM (ref 52–69)
MA KAOLIN BLD RES TEG: 65 MM (ref 52–69)
MA KAOLIN+TF BLD RES TEG: 64.5 MM (ref 52–70)
MA KAOLIN+TF BLD RES TEG: 66 MM (ref 52–70)
MA KAOLIN+TF BLD RES TEG: 66 MM (ref 52–70)
MA TF IND+IIB-IIIA INH BLD RES TEG: 19.7 MM (ref 15–32)
MA TF IND+IIB-IIIA INH BLD RES TEG: 22 MM (ref 15–32)
MA TF IND+IIB-IIIA INH BLD RES TEG: 22 MM (ref 15–32)
MAGNESIUM SERPL-MCNC: 1.83 MG/DL (ref 1.6–2.4)
MCH RBC QN AUTO: 29.3 PG (ref 26–34)
MCH RBC QN AUTO: 30.1 PG (ref 26–34)
MCHC RBC AUTO-ENTMCNC: 33.2 G/DL (ref 32–36)
MCHC RBC AUTO-ENTMCNC: 34.2 G/DL (ref 32–36)
MCV RBC AUTO: 88 FL (ref 80–100)
MCV RBC AUTO: 88 FL (ref 80–100)
METHGB MFR BLDA: 0.5 % (ref 0–1.5)
METHGB MFR BLDA: 0.5 % (ref 0–1.5)
METHGB MFR BLDA: 0.6 % (ref 0–1.5)
METHGB MFR BLDA: 0.7 % (ref 0–1.5)
METHGB MFR BLDA: 0.9 % (ref 0–1.5)
METHGB MFR BLDV: 0.7 % (ref 0–1.5)
NRBC BLD-RTO: 0 /100 WBCS (ref 0–0)
NRBC BLD-RTO: 0 /100 WBCS (ref 0–0)
OXYHGB MFR BLDA: 87.8 % (ref 94–98)
OXYHGB MFR BLDA: 87.8 % (ref 94–98)
OXYHGB MFR BLDA: 94.5 % (ref 94–98)
OXYHGB MFR BLDA: 94.5 % (ref 94–98)
OXYHGB MFR BLDA: 95.7 % (ref 94–98)
OXYHGB MFR BLDA: 96.2 % (ref 94–98)
OXYHGB MFR BLDA: 96.2 % (ref 94–98)
OXYHGB MFR BLDA: 96.4 % (ref 94–98)
OXYHGB MFR BLDA: 96.7 % (ref 94–98)
OXYHGB MFR BLDA: 96.7 % (ref 94–98)
OXYHGB MFR BLDA: 97.1 % (ref 94–98)
OXYHGB MFR BLDA: 97.1 % (ref 94–98)
OXYHGB MFR BLDA: 97.3 % (ref 94–98)
OXYHGB MFR BLDA: 97.3 % (ref 94–98)
OXYHGB MFR BLDA: 97.4 % (ref 94–98)
OXYHGB MFR BLDA: 97.7 % (ref 94–98)
OXYHGB MFR BLDA: 97.7 % (ref 94–98)
OXYHGB MFR BLDMV: 78.7 % (ref 45–75)
OXYHGB MFR BLDV: 74.7 % (ref 45–75)
PCO2 BLDA: 31 MM HG (ref 38–42)
PCO2 BLDA: 33 MM HG (ref 38–42)
PCO2 BLDA: 39 MM HG (ref 38–42)
PCO2 BLDA: 41 MM HG (ref 38–42)
PCO2 BLDA: 41 MM HG (ref 38–42)
PCO2 BLDA: 44 MM HG (ref 38–42)
PCO2 BLDA: 45 MM HG (ref 38–42)
PCO2 BLDA: 46 MM HG (ref 38–42)
PCO2 BLDA: 50 MM HG (ref 38–42)
PCO2 BLDA: 54 MM HG (ref 38–42)
PCO2 BLDMV: 42 MM HG (ref 41–51)
PCO2 BLDV: 54 MM HG (ref 41–51)
PH BLDA: 7.41 PH (ref 7.38–7.42)
PH BLDA: 7.44 PH (ref 7.38–7.42)
PH BLDA: 7.46 PH (ref 7.38–7.42)
PH BLDA: 7.47 PH (ref 7.38–7.42)
PH BLDA: 7.47 PH (ref 7.38–7.42)
PH BLDA: 7.49 PH (ref 7.38–7.42)
PH BLDA: 7.49 PH (ref 7.38–7.42)
PH BLDA: 7.5 PH (ref 7.38–7.42)
PH BLDA: 7.5 PH (ref 7.38–7.42)
PH BLDA: 7.57 PH (ref 7.38–7.42)
PH BLDMV: 7.42 PH (ref 7.33–7.43)
PH BLDV: 7.42 PH (ref 7.33–7.43)
PHOSPHATE SERPL-MCNC: 1.3 MG/DL (ref 2.5–4.9)
PLATELET # BLD AUTO: 285 X10*3/UL (ref 150–450)
PLATELET # BLD AUTO: 287 X10*3/UL (ref 150–450)
PO2 BLDA: 115 MM HG (ref 85–95)
PO2 BLDA: 134 MM HG (ref 85–95)
PO2 BLDA: 163 MM HG (ref 85–95)
PO2 BLDA: 164 MM HG (ref 85–95)
PO2 BLDA: 336 MM HG (ref 85–95)
PO2 BLDA: 376 MM HG (ref 85–95)
PO2 BLDA: 59 MM HG (ref 85–95)
PO2 BLDA: 81 MM HG (ref 85–95)
PO2 BLDA: 85 MM HG (ref 85–95)
PO2 BLDA: 92 MM HG (ref 85–95)
PO2 BLDMV: 48 MM HG (ref 35–45)
PO2 BLDV: 47 MM HG (ref 35–45)
POTASSIUM BLDA-SCNC: 2.6 MMOL/L (ref 3.5–5.3)
POTASSIUM BLDA-SCNC: 3 MMOL/L (ref 3.5–5.3)
POTASSIUM BLDA-SCNC: 3.3 MMOL/L (ref 3.5–5.3)
POTASSIUM BLDA-SCNC: 3.4 MMOL/L (ref 3.5–5.3)
POTASSIUM BLDA-SCNC: 3.9 MMOL/L (ref 3.5–5.3)
POTASSIUM BLDA-SCNC: 4 MMOL/L (ref 3.5–5.3)
POTASSIUM BLDA-SCNC: 4.2 MMOL/L (ref 3.5–5.3)
POTASSIUM BLDA-SCNC: 4.3 MMOL/L (ref 3.5–5.3)
POTASSIUM BLDA-SCNC: 4.5 MMOL/L (ref 3.5–5.3)
POTASSIUM BLDA-SCNC: 4.7 MMOL/L (ref 3.5–5.3)
POTASSIUM BLDMV-SCNC: 2.7 MMOL/L (ref 3.5–5.3)
POTASSIUM BLDV-SCNC: 4 MMOL/L (ref 3.5–5.3)
POTASSIUM SERPL-SCNC: 3.1 MMOL/L (ref 3.5–5.3)
PRODUCT BLOOD TYPE: 5100
PRODUCT CODE: NORMAL
PROTHROMBIN TIME: 14.2 SECONDS (ref 9.8–12.4)
RBC # BLD AUTO: 3.51 X10*6/UL (ref 4–5.2)
RBC # BLD AUTO: 3.56 X10*6/UL (ref 4–5.2)
SAO2 % BLDA: 100 % (ref 94–100)
SAO2 % BLDA: 100 % (ref 94–100)
SAO2 % BLDA: 91 % (ref 94–100)
SAO2 % BLDA: 97 % (ref 94–100)
SAO2 % BLDA: 98 % (ref 94–100)
SAO2 % BLDA: 99 % (ref 94–100)
SAO2 % BLDMV: 80 % (ref 45–75)
SAO2 % BLDV: 76 % (ref 45–75)
SODIUM BLDA-SCNC: 131 MMOL/L (ref 136–145)
SODIUM BLDA-SCNC: 132 MMOL/L (ref 136–145)
SODIUM BLDA-SCNC: 133 MMOL/L (ref 136–145)
SODIUM BLDA-SCNC: 134 MMOL/L (ref 136–145)
SODIUM BLDA-SCNC: 136 MMOL/L (ref 136–145)
SODIUM BLDA-SCNC: 138 MMOL/L (ref 136–145)
SODIUM BLDMV-SCNC: 137 MMOL/L (ref 136–145)
SODIUM BLDV-SCNC: 133 MMOL/L (ref 136–145)
SODIUM SERPL-SCNC: 139 MMOL/L (ref 136–145)
TEST COMMENT: NORMAL
TEST COMMENT: NORMAL
UNIT ABO: NORMAL
UNIT NUMBER: NORMAL
UNIT RH: NORMAL
UNIT VOLUME: 177
WBC # BLD AUTO: 19.5 X10*3/UL (ref 4.4–11.3)
WBC # BLD AUTO: 24.7 X10*3/UL (ref 4.4–11.3)

## 2025-08-01 PROCEDURE — 2500000004 HC RX 250 GENERAL PHARMACY W/ HCPCS (ALT 636 FOR OP/ED): Performed by: REGISTERED NURSE

## 2025-08-01 PROCEDURE — 2500000002 HC RX 250 W HCPCS SELF ADMINISTERED DRUGS (ALT 637 FOR MEDICARE OP, ALT 636 FOR OP/ED): Performed by: PHYSICIAN ASSISTANT

## 2025-08-01 PROCEDURE — 36430 TRANSFUSION BLD/BLD COMPNT: CPT

## 2025-08-01 PROCEDURE — P9017 PLASMA 1 DONOR FRZ W/IN 8 HR: HCPCS

## 2025-08-01 PROCEDURE — 84132 ASSAY OF SERUM POTASSIUM: CPT

## 2025-08-01 PROCEDURE — 82947 ASSAY GLUCOSE BLOOD QUANT: CPT

## 2025-08-01 PROCEDURE — 2500000005 HC RX 250 GENERAL PHARMACY W/O HCPCS: Performed by: NURSE ANESTHETIST, CERTIFIED REGISTERED

## 2025-08-01 PROCEDURE — 02HA0QZ INSERTION OF IMPLANTABLE HEART ASSIST SYSTEM INTO HEART, OPEN APPROACH: ICD-10-PCS | Performed by: THORACIC SURGERY (CARDIOTHORACIC VASCULAR SURGERY)

## 2025-08-01 PROCEDURE — 2500000004 HC RX 250 GENERAL PHARMACY W/ HCPCS (ALT 636 FOR OP/ED): Performed by: ANESTHESIOLOGY

## 2025-08-01 PROCEDURE — 83735 ASSAY OF MAGNESIUM: CPT

## 2025-08-01 PROCEDURE — 85384 FIBRINOGEN ACTIVITY: CPT

## 2025-08-01 PROCEDURE — 82330 ASSAY OF CALCIUM: CPT

## 2025-08-01 PROCEDURE — 2500000004 HC RX 250 GENERAL PHARMACY W/ HCPCS (ALT 636 FOR OP/ED): Mod: JZ

## 2025-08-01 PROCEDURE — 2500000004 HC RX 250 GENERAL PHARMACY W/ HCPCS (ALT 636 FOR OP/ED): Performed by: THORACIC SURGERY (CARDIOTHORACIC VASCULAR SURGERY)

## 2025-08-01 PROCEDURE — 93750 INTERROGATION VAD IN PERSON: CPT | Performed by: SPECIALIST

## 2025-08-01 PROCEDURE — 2500000002 HC RX 250 W HCPCS SELF ADMINISTERED DRUGS (ALT 637 FOR MEDICARE OP, ALT 636 FOR OP/ED): Performed by: ANESTHESIOLOGY

## 2025-08-01 PROCEDURE — P9037 PLATE PHERES LEUKOREDU IRRAD: HCPCS

## 2025-08-01 PROCEDURE — 87081 CULTURE SCREEN ONLY: CPT

## 2025-08-01 PROCEDURE — 2500000005 HC RX 250 GENERAL PHARMACY W/O HCPCS

## 2025-08-01 PROCEDURE — 93005 ELECTROCARDIOGRAM TRACING: CPT

## 2025-08-01 PROCEDURE — 71045 X-RAY EXAM CHEST 1 VIEW: CPT

## 2025-08-01 PROCEDURE — 3600000011 HC PERFUSION TIME - INITIAL BASE CHARGE: Performed by: THORACIC SURGERY (CARDIOTHORACIC VASCULAR SURGERY)

## 2025-08-01 PROCEDURE — 94645 CONT INHLJ TX EACH ADDL HOUR: CPT

## 2025-08-01 PROCEDURE — 2780000003 HC OR 278 NO HCPCS: Performed by: THORACIC SURGERY (CARDIOTHORACIC VASCULAR SURGERY)

## 2025-08-01 PROCEDURE — 37799 UNLISTED PX VASCULAR SURGERY: CPT

## 2025-08-01 PROCEDURE — 33979 INSERT INTRACORPOREAL DEVICE: CPT | Performed by: STUDENT IN AN ORGANIZED HEALTH CARE EDUCATION/TRAINING PROGRAM

## 2025-08-01 PROCEDURE — 2500000002 HC RX 250 W HCPCS SELF ADMINISTERED DRUGS (ALT 637 FOR MEDICARE OP, ALT 636 FOR OP/ED): Performed by: NURSE PRACTITIONER

## 2025-08-01 PROCEDURE — P9045 ALBUMIN (HUMAN), 5%, 250 ML: HCPCS | Mod: JZ | Performed by: NURSE ANESTHETIST, CERTIFIED REGISTERED

## 2025-08-01 PROCEDURE — 93287 PERI-PX DEVICE EVAL & PRGR: CPT | Performed by: INTERNAL MEDICINE

## 2025-08-01 PROCEDURE — 94002 VENT MGMT INPAT INIT DAY: CPT

## 2025-08-01 PROCEDURE — 2500000001 HC RX 250 WO HCPCS SELF ADMINISTERED DRUGS (ALT 637 FOR MEDICARE OP): Performed by: REGISTERED NURSE

## 2025-08-01 PROCEDURE — 88307 TISSUE EXAM BY PATHOLOGIST: CPT | Performed by: STUDENT IN AN ORGANIZED HEALTH CARE EDUCATION/TRAINING PROGRAM

## 2025-08-01 PROCEDURE — 82375 ASSAY CARBOXYHB QUANT: CPT

## 2025-08-01 PROCEDURE — 94640 AIRWAY INHALATION TREATMENT: CPT

## 2025-08-01 PROCEDURE — 33979 INSERT INTRACORPOREAL DEVICE: CPT | Performed by: THORACIC SURGERY (CARDIOTHORACIC VASCULAR SURGERY)

## 2025-08-01 PROCEDURE — 93010 ELECTROCARDIOGRAM REPORT: CPT | Performed by: STUDENT IN AN ORGANIZED HEALTH CARE EDUCATION/TRAINING PROGRAM

## 2025-08-01 PROCEDURE — 85027 COMPLETE CBC AUTOMATED: CPT

## 2025-08-01 PROCEDURE — P9045 ALBUMIN (HUMAN), 5%, 250 ML: HCPCS | Mod: JZ

## 2025-08-01 PROCEDURE — 2500000001 HC RX 250 WO HCPCS SELF ADMINISTERED DRUGS (ALT 637 FOR MEDICARE OP): Performed by: PHYSICIAN ASSISTANT

## 2025-08-01 PROCEDURE — A4649 SURGICAL SUPPLIES: HCPCS | Performed by: THORACIC SURGERY (CARDIOTHORACIC VASCULAR SURGERY)

## 2025-08-01 PROCEDURE — 3700000002 HC GENERAL ANESTHESIA TIME - EACH INCREMENTAL 1 MINUTE: Performed by: THORACIC SURGERY (CARDIOTHORACIC VASCULAR SURGERY)

## 2025-08-01 PROCEDURE — 2500000004 HC RX 250 GENERAL PHARMACY W/ HCPCS (ALT 636 FOR OP/ED)

## 2025-08-01 PROCEDURE — 2500000005 HC RX 250 GENERAL PHARMACY W/O HCPCS: Performed by: THORACIC SURGERY (CARDIOTHORACIC VASCULAR SURGERY)

## 2025-08-01 PROCEDURE — 3700000001 HC GENERAL ANESTHESIA TIME - INITIAL BASE CHARGE: Performed by: THORACIC SURGERY (CARDIOTHORACIC VASCULAR SURGERY)

## 2025-08-01 PROCEDURE — 93287 PERI-PX DEVICE EVAL & PRGR: CPT

## 2025-08-01 PROCEDURE — 2020000001 HC ICU ROOM DAILY

## 2025-08-01 PROCEDURE — 2500000004 HC RX 250 GENERAL PHARMACY W/ HCPCS (ALT 636 FOR OP/ED): Mod: JZ | Performed by: NURSE ANESTHETIST, CERTIFIED REGISTERED

## 2025-08-01 PROCEDURE — 2500000001 HC RX 250 WO HCPCS SELF ADMINISTERED DRUGS (ALT 637 FOR MEDICARE OP)

## 2025-08-01 PROCEDURE — 85347 COAGULATION TIME ACTIVATED: CPT

## 2025-08-01 PROCEDURE — 3600000005 HC OR TIME - INITIAL BASE CHARGE - PROCEDURE LEVEL FIVE: Performed by: THORACIC SURGERY (CARDIOTHORACIC VASCULAR SURGERY)

## 2025-08-01 PROCEDURE — A4312 CATH W/O BAG 2-WAY SILICONE: HCPCS | Performed by: THORACIC SURGERY (CARDIOTHORACIC VASCULAR SURGERY)

## 2025-08-01 PROCEDURE — 3600000010 HC OR TIME - EACH INCREMENTAL 1 MINUTE - PROCEDURE LEVEL FIVE: Performed by: THORACIC SURGERY (CARDIOTHORACIC VASCULAR SURGERY)

## 2025-08-01 PROCEDURE — 99291 CRITICAL CARE FIRST HOUR: CPT | Performed by: SPECIALIST

## 2025-08-01 PROCEDURE — P9047 ALBUMIN (HUMAN), 25%, 50ML: HCPCS | Performed by: THORACIC SURGERY (CARDIOTHORACIC VASCULAR SURGERY)

## 2025-08-01 PROCEDURE — 85610 PROTHROMBIN TIME: CPT

## 2025-08-01 PROCEDURE — 94799 UNLISTED PULMONARY SVC/PX: CPT

## 2025-08-01 PROCEDURE — 2500000004 HC RX 250 GENERAL PHARMACY W/ HCPCS (ALT 636 FOR OP/ED): Performed by: NURSE PRACTITIONER

## 2025-08-01 PROCEDURE — 71045 X-RAY EXAM CHEST 1 VIEW: CPT | Performed by: RADIOLOGY

## 2025-08-01 PROCEDURE — C1889 IMPLANT/INSERT DEVICE, NOC: HCPCS | Performed by: THORACIC SURGERY (CARDIOTHORACIC VASCULAR SURGERY)

## 2025-08-01 PROCEDURE — 94762 N-INVAS EAR/PLS OXIMTRY CONT: CPT

## 2025-08-01 PROCEDURE — 88313 SPECIAL STAINS GROUP 2: CPT | Performed by: STUDENT IN AN ORGANIZED HEALTH CARE EDUCATION/TRAINING PROGRAM

## 2025-08-01 PROCEDURE — 84100 ASSAY OF PHOSPHORUS: CPT

## 2025-08-01 PROCEDURE — 5A02216 ASSISTANCE WITH CARDIAC OUTPUT USING OTHER PUMP, CONTINUOUS: ICD-10-PCS | Performed by: THORACIC SURGERY (CARDIOTHORACIC VASCULAR SURGERY)

## 2025-08-01 PROCEDURE — 88307 TISSUE EXAM BY PATHOLOGIST: CPT | Mod: TC,SUR | Performed by: REGISTERED NURSE

## 2025-08-01 PROCEDURE — C1768 GRAFT, VASCULAR: HCPCS | Performed by: THORACIC SURGERY (CARDIOTHORACIC VASCULAR SURGERY)

## 2025-08-01 PROCEDURE — 99291 CRITICAL CARE FIRST HOUR: CPT | Performed by: ANESTHESIOLOGY

## 2025-08-01 PROCEDURE — 94644 CONT INHLJ TX 1ST HOUR: CPT

## 2025-08-01 PROCEDURE — 3600000012 HC PERFUSION TIME - EACH INCREMENTAL 1 MINUTE: Performed by: THORACIC SURGERY (CARDIOTHORACIC VASCULAR SURGERY)

## 2025-08-01 PROCEDURE — 2500000005 HC RX 250 GENERAL PHARMACY W/O HCPCS: Performed by: REGISTERED NURSE

## 2025-08-01 PROCEDURE — 2500000005 HC RX 250 GENERAL PHARMACY W/O HCPCS: Performed by: NURSE PRACTITIONER

## 2025-08-01 PROCEDURE — 2720000007 HC OR 272 NO HCPCS: Performed by: THORACIC SURGERY (CARDIOTHORACIC VASCULAR SURGERY)

## 2025-08-01 PROCEDURE — 02L70CK OCCLUSION OF LEFT ATRIAL APPENDAGE WITH EXTRALUMINAL DEVICE, OPEN APPROACH: ICD-10-PCS | Performed by: THORACIC SURGERY (CARDIOTHORACIC VASCULAR SURGERY)

## 2025-08-01 DEVICE — HEARTMATE III LVAS KIT,  W/POCKET CONTROLLER: Type: IMPLANTABLE DEVICE | Site: HEART | Status: FUNCTIONAL

## 2025-08-01 DEVICE — PATCH, FELT, 1 X 6 IN, EPTFE: Type: IMPLANTABLE DEVICE | Site: HEART | Status: FUNCTIONAL

## 2025-08-01 DEVICE — IMPLANTABLE DEVICE: Type: IMPLANTABLE DEVICE | Site: HEART | Status: FUNCTIONAL

## 2025-08-01 DEVICE — IMPLANTABLE DEVICE: Type: IMPLANTABLE DEVICE | Site: HEART | Status: NON-FUNCTIONAL

## 2025-08-01 RX ORDER — SODIUM CHLORIDE, SODIUM GLUCONATE, SODIUM ACETATE, POTASSIUM CHLORIDE AND MAGNESIUM CHLORIDE 30; 37; 368; 526; 502 MG/100ML; MG/100ML; MG/100ML; MG/100ML; MG/100ML
INJECTION, SOLUTION INTRAVENOUS CONTINUOUS PRN
Status: DISCONTINUED | OUTPATIENT
Start: 2025-08-01 | End: 2025-08-01

## 2025-08-01 RX ORDER — NALOXONE HYDROCHLORIDE 0.4 MG/ML
0.2 INJECTION, SOLUTION INTRAMUSCULAR; INTRAVENOUS; SUBCUTANEOUS EVERY 5 MIN PRN
Status: DISCONTINUED | OUTPATIENT
Start: 2025-08-01 | End: 2025-08-18 | Stop reason: HOSPADM

## 2025-08-01 RX ORDER — PANTOPRAZOLE SODIUM 40 MG/10ML
40 INJECTION, POWDER, LYOPHILIZED, FOR SOLUTION INTRAVENOUS
Status: DISCONTINUED | OUTPATIENT
Start: 2025-08-02 | End: 2025-08-01

## 2025-08-01 RX ORDER — VANCOMYCIN HYDROCHLORIDE 1 G/200ML
15 INJECTION, SOLUTION INTRAVENOUS ONCE
Status: COMPLETED | OUTPATIENT
Start: 2025-08-01 | End: 2025-08-01

## 2025-08-01 RX ORDER — SODIUM CHLORIDE, SODIUM LACTATE, POTASSIUM CHLORIDE, CALCIUM CHLORIDE 600; 310; 30; 20 MG/100ML; MG/100ML; MG/100ML; MG/100ML
30 INJECTION, SOLUTION INTRAVENOUS CONTINUOUS
Status: DISCONTINUED | OUTPATIENT
Start: 2025-08-01 | End: 2025-08-02

## 2025-08-01 RX ORDER — PROPOFOL 10 MG/ML
INJECTION, EMULSION INTRAVENOUS CONTINUOUS PRN
Status: DISCONTINUED | OUTPATIENT
Start: 2025-08-01 | End: 2025-08-01

## 2025-08-01 RX ORDER — VANCOMYCIN HYDROCHLORIDE 1 G/20ML
INJECTION, POWDER, LYOPHILIZED, FOR SOLUTION INTRAVENOUS AS NEEDED
Status: DISCONTINUED | OUTPATIENT
Start: 2025-08-01 | End: 2025-08-01 | Stop reason: HOSPADM

## 2025-08-01 RX ORDER — MUPIROCIN 20 MG/G
0.5 OINTMENT TOPICAL 2 TIMES DAILY
Status: DISCONTINUED | OUTPATIENT
Start: 2025-08-01 | End: 2025-08-02

## 2025-08-01 RX ORDER — PROPOFOL 10 MG/ML
0-50 INJECTION, EMULSION INTRAVENOUS CONTINUOUS
Status: DISCONTINUED | OUTPATIENT
Start: 2025-08-01 | End: 2025-08-02

## 2025-08-01 RX ORDER — EPINEPHRINE IN 0.9 % SOD CHLOR 4MG/250ML
PLASTIC BAG, INJECTION (ML) INTRAVENOUS CONTINUOUS PRN
Status: DISCONTINUED | OUTPATIENT
Start: 2025-08-01 | End: 2025-08-01

## 2025-08-01 RX ORDER — FENTANYL CITRATE 50 UG/ML
INJECTION, SOLUTION INTRAMUSCULAR; INTRAVENOUS AS NEEDED
Status: DISCONTINUED | OUTPATIENT
Start: 2025-08-01 | End: 2025-08-01

## 2025-08-01 RX ORDER — NAPROXEN SODIUM 220 MG/1
81 TABLET, FILM COATED ORAL DAILY
Status: DISCONTINUED | OUTPATIENT
Start: 2025-08-01 | End: 2025-08-01

## 2025-08-01 RX ORDER — ALBUMIN HUMAN 50 G/1000ML
SOLUTION INTRAVENOUS AS NEEDED
Status: DISCONTINUED | OUTPATIENT
Start: 2025-08-01 | End: 2025-08-01

## 2025-08-01 RX ORDER — POTASSIUM CHLORIDE 14.9 MG/ML
20 INJECTION INTRAVENOUS EVERY 6 HOURS PRN
Status: DISCONTINUED | OUTPATIENT
Start: 2025-08-01 | End: 2025-08-05

## 2025-08-01 RX ORDER — SODIUM CHLORIDE, SODIUM LACTATE, POTASSIUM CHLORIDE, CALCIUM CHLORIDE 600; 310; 30; 20 MG/100ML; MG/100ML; MG/100ML; MG/100ML
INJECTION, SOLUTION INTRAVENOUS CONTINUOUS PRN
Status: DISCONTINUED | OUTPATIENT
Start: 2025-08-01 | End: 2025-08-01

## 2025-08-01 RX ORDER — LIDOCAINE HCL/PF 100 MG/5ML
SYRINGE (ML) INTRAVENOUS AS NEEDED
Status: DISCONTINUED | OUTPATIENT
Start: 2025-08-01 | End: 2025-08-01

## 2025-08-01 RX ORDER — ALBUMIN HUMAN 50 G/1000ML
12.5 SOLUTION INTRAVENOUS ONCE
Status: COMPLETED | OUTPATIENT
Start: 2025-08-01 | End: 2025-08-01

## 2025-08-01 RX ORDER — CALCIUM GLUCONATE 20 MG/ML
2 INJECTION, SOLUTION INTRAVENOUS EVERY 6 HOURS PRN
Status: DISCONTINUED | OUTPATIENT
Start: 2025-08-01 | End: 2025-08-05

## 2025-08-01 RX ORDER — MILRINONE LACTATE 0.2 MG/ML
0.38 INJECTION, SOLUTION INTRAVENOUS CONTINUOUS
Status: DISCONTINUED | OUTPATIENT
Start: 2025-08-01 | End: 2025-08-02

## 2025-08-01 RX ORDER — CALCIUM GLUCONATE 20 MG/ML
1 INJECTION, SOLUTION INTRAVENOUS EVERY 6 HOURS PRN
Status: DISCONTINUED | OUTPATIENT
Start: 2025-08-01 | End: 2025-08-05

## 2025-08-01 RX ORDER — FLUCONAZOLE 2 MG/ML
400 INJECTION, SOLUTION INTRAVENOUS EVERY 24 HOURS
Status: COMPLETED | OUTPATIENT
Start: 2025-08-02 | End: 2025-08-03

## 2025-08-01 RX ORDER — MAGNESIUM SULFATE HEPTAHYDRATE 40 MG/ML
2 INJECTION, SOLUTION INTRAVENOUS EVERY 6 HOURS PRN
Status: DISCONTINUED | OUTPATIENT
Start: 2025-08-01 | End: 2025-08-05

## 2025-08-01 RX ORDER — ONDANSETRON 4 MG/1
4 TABLET, FILM COATED ORAL EVERY 8 HOURS PRN
Status: DISCONTINUED | OUTPATIENT
Start: 2025-08-01 | End: 2025-08-18 | Stop reason: HOSPADM

## 2025-08-01 RX ORDER — OXYCODONE HYDROCHLORIDE 5 MG/1
5 TABLET ORAL EVERY 4 HOURS PRN
Status: DISCONTINUED | OUTPATIENT
Start: 2025-08-01 | End: 2025-08-01

## 2025-08-01 RX ORDER — SODIUM CHLORIDE 0.9 G/100ML
INJECTION, SOLUTION IRRIGATION AS NEEDED
Status: DISCONTINUED | OUTPATIENT
Start: 2025-08-01 | End: 2025-08-01 | Stop reason: HOSPADM

## 2025-08-01 RX ORDER — PANTOPRAZOLE SODIUM 40 MG/10ML
40 INJECTION, POWDER, LYOPHILIZED, FOR SOLUTION INTRAVENOUS
Status: DISCONTINUED | OUTPATIENT
Start: 2025-08-02 | End: 2025-08-02

## 2025-08-01 RX ORDER — ALBUMIN HUMAN 50 G/1000ML
25 SOLUTION INTRAVENOUS ONCE
Status: DISCONTINUED | OUTPATIENT
Start: 2025-08-01 | End: 2025-08-01

## 2025-08-01 RX ORDER — OXYCODONE HYDROCHLORIDE 10 MG/1
10 TABLET ORAL EVERY 4 HOURS PRN
Refills: 0 | Status: DISCONTINUED | OUTPATIENT
Start: 2025-08-01 | End: 2025-08-02

## 2025-08-01 RX ORDER — NOREPINEPHRINE BITARTRATE/D5W 8 MG/250ML
PLASTIC BAG, INJECTION (ML) INTRAVENOUS CONTINUOUS PRN
Status: DISCONTINUED | OUTPATIENT
Start: 2025-08-01 | End: 2025-08-01

## 2025-08-01 RX ORDER — POTASSIUM CHLORIDE 29.8 MG/ML
40 INJECTION INTRAVENOUS EVERY 6 HOURS PRN
Status: DISCONTINUED | OUTPATIENT
Start: 2025-08-01 | End: 2025-08-05

## 2025-08-01 RX ORDER — DEXTROSE 50 % IN WATER (D50W) INTRAVENOUS SYRINGE
25
Status: DISCONTINUED | OUTPATIENT
Start: 2025-08-01 | End: 2025-08-18 | Stop reason: HOSPADM

## 2025-08-01 RX ORDER — LIDOCAINE HYDROCHLORIDE 20 MG/ML
INJECTION, SOLUTION INFILTRATION; PERINEURAL AS NEEDED
Status: DISCONTINUED | OUTPATIENT
Start: 2025-08-01 | End: 2025-08-01

## 2025-08-01 RX ORDER — HEPARIN SODIUM 1000 [USP'U]/ML
INJECTION, SOLUTION INTRAVENOUS; SUBCUTANEOUS AS NEEDED
Status: DISCONTINUED | OUTPATIENT
Start: 2025-08-01 | End: 2025-08-01

## 2025-08-01 RX ORDER — POLYETHYLENE GLYCOL 3350 17 G/17G
17 POWDER, FOR SOLUTION ORAL DAILY
Status: DISCONTINUED | OUTPATIENT
Start: 2025-08-01 | End: 2025-08-01

## 2025-08-01 RX ORDER — CEFUROXIME SODIUM 1.5 G/16ML
INJECTION, POWDER, FOR SOLUTION INTRAVENOUS AS NEEDED
Status: DISCONTINUED | OUTPATIENT
Start: 2025-08-01 | End: 2025-08-01

## 2025-08-01 RX ORDER — BUDESONIDE 0.5 MG/2ML
0.5 INHALANT ORAL
Status: DISCONTINUED | OUTPATIENT
Start: 2025-08-01 | End: 2025-08-02

## 2025-08-01 RX ORDER — PANTOPRAZOLE SODIUM 40 MG/1
40 TABLET, DELAYED RELEASE ORAL
Status: DISCONTINUED | OUTPATIENT
Start: 2025-08-02 | End: 2025-08-01

## 2025-08-01 RX ORDER — POTASSIUM CHLORIDE 14.9 MG/ML
INJECTION INTRAVENOUS AS NEEDED
Status: DISCONTINUED | OUTPATIENT
Start: 2025-08-01 | End: 2025-08-01

## 2025-08-01 RX ORDER — OXYCODONE HYDROCHLORIDE 10 MG/1
10 TABLET ORAL EVERY 4 HOURS PRN
Status: DISCONTINUED | OUTPATIENT
Start: 2025-08-01 | End: 2025-08-01

## 2025-08-01 RX ORDER — EPINEPHRINE IN 0.9 % SOD CHLOR 4MG/250ML
0-2 PLASTIC BAG, INJECTION (ML) INTRAVENOUS CONTINUOUS
Status: DISCONTINUED | OUTPATIENT
Start: 2025-08-01 | End: 2025-08-02

## 2025-08-01 RX ORDER — PHENYLEPHRINE HYDROCHLORIDE 10 MG/ML
INJECTION INTRAVENOUS AS NEEDED
Status: DISCONTINUED | OUTPATIENT
Start: 2025-08-01 | End: 2025-08-01

## 2025-08-01 RX ORDER — ONDANSETRON HYDROCHLORIDE 2 MG/ML
4 INJECTION, SOLUTION INTRAVENOUS EVERY 8 HOURS PRN
Status: DISCONTINUED | OUTPATIENT
Start: 2025-08-01 | End: 2025-08-18 | Stop reason: HOSPADM

## 2025-08-01 RX ORDER — OXYCODONE HYDROCHLORIDE 5 MG/1
5 TABLET ORAL EVERY 4 HOURS PRN
Refills: 0 | Status: DISCONTINUED | OUTPATIENT
Start: 2025-08-01 | End: 2025-08-02

## 2025-08-01 RX ORDER — SODIUM CHLORIDE, SODIUM LACTATE, POTASSIUM CHLORIDE, CALCIUM CHLORIDE 600; 310; 30; 20 MG/100ML; MG/100ML; MG/100ML; MG/100ML
5 INJECTION, SOLUTION INTRAVENOUS CONTINUOUS
Status: DISCONTINUED | OUTPATIENT
Start: 2025-08-01 | End: 2025-08-08

## 2025-08-01 RX ORDER — AMOXICILLIN 250 MG
2 CAPSULE ORAL 2 TIMES DAILY
Status: DISCONTINUED | OUTPATIENT
Start: 2025-08-01 | End: 2025-08-18 | Stop reason: HOSPADM

## 2025-08-01 RX ORDER — DEXTROSE 50 % IN WATER (D50W) INTRAVENOUS SYRINGE
12.5
Status: DISCONTINUED | OUTPATIENT
Start: 2025-08-01 | End: 2025-08-18 | Stop reason: HOSPADM

## 2025-08-01 RX ORDER — MAGNESIUM SULFATE HEPTAHYDRATE 40 MG/ML
4 INJECTION, SOLUTION INTRAVENOUS EVERY 6 HOURS PRN
Status: DISCONTINUED | OUTPATIENT
Start: 2025-08-01 | End: 2025-08-05

## 2025-08-01 RX ORDER — HYDROMORPHONE HYDROCHLORIDE 0.2 MG/ML
0.2 INJECTION INTRAMUSCULAR; INTRAVENOUS; SUBCUTANEOUS
Status: DISCONTINUED | OUTPATIENT
Start: 2025-08-01 | End: 2025-08-01

## 2025-08-01 RX ORDER — ROCURONIUM BROMIDE 10 MG/ML
INJECTION, SOLUTION INTRAVENOUS AS NEEDED
Status: DISCONTINUED | OUTPATIENT
Start: 2025-08-01 | End: 2025-08-01

## 2025-08-01 RX ORDER — MIDAZOLAM HYDROCHLORIDE 2 MG/2ML
INJECTION, SOLUTION INTRAMUSCULAR; INTRAVENOUS AS NEEDED
Status: DISCONTINUED | OUTPATIENT
Start: 2025-08-01 | End: 2025-08-01

## 2025-08-01 RX ORDER — CHLORHEXIDINE GLUCONATE ORAL RINSE 1.2 MG/ML
15 SOLUTION DENTAL 2 TIMES DAILY
Status: DISCONTINUED | OUTPATIENT
Start: 2025-08-01 | End: 2025-08-02

## 2025-08-01 RX ORDER — CALCIUM CHLORIDE INJECTION 100 MG/ML
INJECTION, SOLUTION INTRAVENOUS AS NEEDED
Status: DISCONTINUED | OUTPATIENT
Start: 2025-08-01 | End: 2025-08-01

## 2025-08-01 RX ORDER — NOREPINEPHRINE BITARTRATE/D5W 8 MG/250ML
0-1 PLASTIC BAG, INJECTION (ML) INTRAVENOUS CONTINUOUS
Status: DISCONTINUED | OUTPATIENT
Start: 2025-08-01 | End: 2025-08-02

## 2025-08-01 RX ORDER — IPRATROPIUM BROMIDE AND ALBUTEROL SULFATE 2.5; .5 MG/3ML; MG/3ML
3 SOLUTION RESPIRATORY (INHALATION)
Status: DISCONTINUED | OUTPATIENT
Start: 2025-08-01 | End: 2025-08-02

## 2025-08-01 RX ORDER — CEFAZOLIN SODIUM 2 G/100ML
2 INJECTION, SOLUTION INTRAVENOUS EVERY 8 HOURS
Status: COMPLETED | OUTPATIENT
Start: 2025-08-01 | End: 2025-08-03

## 2025-08-01 RX ORDER — FLUCONAZOLE 2 MG/ML
400 INJECTION, SOLUTION INTRAVENOUS ONCE
Status: DISCONTINUED | OUTPATIENT
Start: 2025-08-01 | End: 2025-08-01

## 2025-08-01 RX ORDER — ACETAMINOPHEN 325 MG/1
650 TABLET ORAL EVERY 6 HOURS
Status: DISCONTINUED | OUTPATIENT
Start: 2025-08-01 | End: 2025-08-18 | Stop reason: HOSPADM

## 2025-08-01 RX ORDER — PANTOPRAZOLE SODIUM 40 MG/1
40 TABLET, DELAYED RELEASE ORAL
Status: DISCONTINUED | OUTPATIENT
Start: 2025-08-02 | End: 2025-08-06

## 2025-08-01 RX ORDER — FLUCONAZOLE 2 MG/ML
INJECTION, SOLUTION INTRAVENOUS AS NEEDED
Status: DISCONTINUED | OUTPATIENT
Start: 2025-08-01 | End: 2025-08-01

## 2025-08-01 RX ORDER — AZTREONAM 2 G/1
2 INJECTION, POWDER, LYOPHILIZED, FOR SOLUTION INTRAMUSCULAR; INTRAVENOUS ONCE
Status: DISCONTINUED | OUTPATIENT
Start: 2025-08-01 | End: 2025-08-01

## 2025-08-01 RX ORDER — PROTAMINE SULFATE 10 MG/ML
INJECTION, SOLUTION INTRAVENOUS AS NEEDED
Status: DISCONTINUED | OUTPATIENT
Start: 2025-08-01 | End: 2025-08-01

## 2025-08-01 RX ORDER — POLYETHYLENE GLYCOL 3350 17 G/17G
17 POWDER, FOR SOLUTION ORAL 2 TIMES DAILY
Status: DISCONTINUED | OUTPATIENT
Start: 2025-08-01 | End: 2025-08-10

## 2025-08-01 RX ORDER — MILRINONE LACTATE 0.2 MG/ML
INJECTION, SOLUTION INTRAVENOUS CONTINUOUS PRN
Status: DISCONTINUED | OUTPATIENT
Start: 2025-08-01 | End: 2025-08-01

## 2025-08-01 RX ADMIN — BUDESONIDE 0.5 MG: 0.5 INHALANT RESPIRATORY (INHALATION) at 20:12

## 2025-08-01 RX ADMIN — NOREPINEPHRINE BITARTRATE 8 MCG: 1 INJECTION, SOLUTION, CONCENTRATE INTRAVENOUS at 11:42

## 2025-08-01 RX ADMIN — NOREPINEPHRINE BITARTRATE 8 MCG: 1 INJECTION, SOLUTION, CONCENTRATE INTRAVENOUS at 11:51

## 2025-08-01 RX ADMIN — LEVOTHYROXINE SODIUM 88 MCG: 0.09 TABLET ORAL at 05:19

## 2025-08-01 RX ADMIN — MILRINONE LACTATE IN DEXTROSE 0.25 MCG/KG/MIN: 0.2 INJECTION, SOLUTION INTRAVENOUS at 09:09

## 2025-08-01 RX ADMIN — HYDROMORPHONE HYDROCHLORIDE 0.2 MG: 0.2 INJECTION, SOLUTION INTRAMUSCULAR; INTRAVENOUS; SUBCUTANEOUS at 16:49

## 2025-08-01 RX ADMIN — CEFUROXIME SODIUM 1.5 G: 1.5 INJECTION, POWDER, FOR SOLUTION INTRAVENOUS at 09:02

## 2025-08-01 RX ADMIN — VANCOMYCIN HYDROCHLORIDE 1000 MG: 1 INJECTION, SOLUTION INTRAVENOUS at 15:00

## 2025-08-01 RX ADMIN — TRANEXAMIC ACID 885 MG: 100 INJECTION INTRAVENOUS at 09:01

## 2025-08-01 RX ADMIN — PROPOFOL 15 MCG/KG/MIN: 10 INJECTION, EMULSION INTRAVENOUS at 17:52

## 2025-08-01 RX ADMIN — POTASSIUM CHLORIDE 40 MEQ: 29.8 INJECTION, SOLUTION INTRAVENOUS at 14:37

## 2025-08-01 RX ADMIN — CALCIUM CHLORIDE 1 G: 100 INJECTION, SOLUTION INTRAVENOUS at 10:19

## 2025-08-01 RX ADMIN — MIDAZOLAM HYDROCHLORIDE 1 MG: 2 INJECTION, SOLUTION INTRAMUSCULAR; INTRAVENOUS at 07:17

## 2025-08-01 RX ADMIN — OXYCODONE HYDROCHLORIDE 10 MG: 10 TABLET ORAL at 17:54

## 2025-08-01 RX ADMIN — PHENYLEPHRINE HYDROCHLORIDE 160 MCG: 10 INJECTION INTRAVENOUS at 08:08

## 2025-08-01 RX ADMIN — NOREPINEPHRINE BITARTRATE 8 MCG: 1 INJECTION, SOLUTION, CONCENTRATE INTRAVENOUS at 09:05

## 2025-08-01 RX ADMIN — DEXAMETHASONE SODIUM PHOSPHATE 8 MG: 4 INJECTION, SOLUTION INTRA-ARTICULAR; INTRALESIONAL; INTRAMUSCULAR; INTRAVENOUS; SOFT TISSUE at 08:06

## 2025-08-01 RX ADMIN — NOREPINEPHRINE BITARTRATE 8 MCG: 1 INJECTION, SOLUTION, CONCENTRATE INTRAVENOUS at 08:21

## 2025-08-01 RX ADMIN — POTASSIUM PHOSPHATE 21 MMOL: 236; 224 INJECTION, SOLUTION INTRAVENOUS at 17:17

## 2025-08-01 RX ADMIN — MAGNESIUM SULFATE HEPTAHYDRATE 2 G: 40 INJECTION, SOLUTION INTRAVENOUS at 17:17

## 2025-08-01 RX ADMIN — IPRATROPIUM BROMIDE AND ALBUTEROL SULFATE 3 ML: .5; 3 SOLUTION RESPIRATORY (INHALATION) at 20:18

## 2025-08-01 RX ADMIN — SODIUM CHLORIDE 2 UNITS/HR: 9 INJECTION, SOLUTION INTRAVENOUS at 09:38

## 2025-08-01 RX ADMIN — SODIUM CHLORIDE, SODIUM GLUCONATE, SODIUM ACETATE, POTASSIUM CHLORIDE AND MAGNESIUM CHLORIDE: 526; 502; 368; 37; 30 INJECTION, SOLUTION INTRAVENOUS at 07:17

## 2025-08-01 RX ADMIN — MUPIROCIN 0.5 APPLICATION: 20 OINTMENT TOPICAL at 21:00

## 2025-08-01 RX ADMIN — IPRATROPIUM BROMIDE AND ALBUTEROL SULFATE 3 ML: .5; 3 SOLUTION RESPIRATORY (INHALATION) at 15:40

## 2025-08-01 RX ADMIN — Medication 0.02 MCG/KG/MIN: at 11:06

## 2025-08-01 RX ADMIN — HEPARIN SODIUM 23000 UNITS: 1000 INJECTION, SOLUTION INTRAVENOUS; SUBCUTANEOUS at 09:22

## 2025-08-01 RX ADMIN — PROTAMINE SULFATE 220 MG: 10 INJECTION, SOLUTION INTRAVENOUS at 10:32

## 2025-08-01 RX ADMIN — FENTANYL CITRATE 250 MCG: 50 INJECTION, SOLUTION INTRAMUSCULAR; INTRAVENOUS at 08:06

## 2025-08-01 RX ADMIN — PROPOFOL 100 MCG/KG/MIN: 10 INJECTION, EMULSION INTRAVENOUS at 08:48

## 2025-08-01 RX ADMIN — GABAPENTIN 400 MG: 300 CAPSULE ORAL at 05:19

## 2025-08-01 RX ADMIN — HEPARIN SODIUM 2000 UNITS: 1000 INJECTION, SOLUTION INTRAVENOUS; SUBCUTANEOUS at 10:15

## 2025-08-01 RX ADMIN — ALBUMIN HUMAN 12.5 G: 0.05 INJECTION, SOLUTION INTRAVENOUS at 14:26

## 2025-08-01 RX ADMIN — NOREPINEPHRINE BITARTRATE 8 MCG: 1 INJECTION, SOLUTION, CONCENTRATE INTRAVENOUS at 09:32

## 2025-08-01 RX ADMIN — HYDROMORPHONE HYDROCHLORIDE 0.2 MG: 0.2 INJECTION, SOLUTION INTRAMUSCULAR; INTRAVENOUS; SUBCUTANEOUS at 16:12

## 2025-08-01 RX ADMIN — EPOPROSTENOL 0.05 MCG/KG/MIN: 1.5 INJECTION, POWDER, LYOPHILIZED, FOR SOLUTION INTRAVENOUS at 08:48

## 2025-08-01 RX ADMIN — POLYETHYLENE GLYCOL 3350 17 G: 17 POWDER, FOR SOLUTION ORAL at 20:09

## 2025-08-01 RX ADMIN — ROCURONIUM BROMIDE 100 MG: 10 INJECTION INTRAVENOUS at 08:07

## 2025-08-01 RX ADMIN — ACETAMINOPHEN 650 MG: 325 TABLET, FILM COATED ORAL at 20:09

## 2025-08-01 RX ADMIN — NOREPINEPHRINE BITARTRATE 8 MCG: 1 INJECTION, SOLUTION, CONCENTRATE INTRAVENOUS at 08:31

## 2025-08-01 RX ADMIN — SODIUM CHLORIDE, POTASSIUM CHLORIDE, SODIUM LACTATE AND CALCIUM CHLORIDE: 600; 310; 30; 20 INJECTION, SOLUTION INTRAVENOUS at 09:14

## 2025-08-01 RX ADMIN — PROTAMINE SULFATE 10 MG: 10 INJECTION, SOLUTION INTRAVENOUS at 10:31

## 2025-08-01 RX ADMIN — ALBUMIN HUMAN 250 ML: 0.05 INJECTION, SOLUTION INTRAVENOUS at 11:02

## 2025-08-01 RX ADMIN — FLUCONAZOLE 400 MG: 400 INJECTION, SOLUTION INTRAVENOUS at 08:52

## 2025-08-01 RX ADMIN — VANCOMYCIN HYDROCHLORIDE 1000 MG: 1025 INJECTION, POWDER, FOR SOLUTION INTRAVENOUS; ORAL at 08:52

## 2025-08-01 RX ADMIN — Medication: at 20:20

## 2025-08-01 RX ADMIN — ROCURONIUM BROMIDE 50 MG: 10 INJECTION INTRAVENOUS at 09:10

## 2025-08-01 RX ADMIN — PROPOFOL 100 MG: 10 INJECTION, EMULSION INTRAVENOUS at 08:06

## 2025-08-01 RX ADMIN — ROCURONIUM BROMIDE 30 MG: 10 INJECTION INTRAVENOUS at 12:27

## 2025-08-01 RX ADMIN — Medication: at 12:50

## 2025-08-01 RX ADMIN — ALBUMIN HUMAN 12.5 G: 0.05 INJECTION, SOLUTION INTRAVENOUS at 22:08

## 2025-08-01 RX ADMIN — POTASSIUM CHLORIDE 20 MEQ: 14.9 INJECTION, SOLUTION INTRAVENOUS at 10:56

## 2025-08-01 RX ADMIN — ALBUMIN HUMAN 250 ML: 0.05 INJECTION, SOLUTION INTRAVENOUS at 11:46

## 2025-08-01 RX ADMIN — EPINEPHRINE IN SODIUM CHLORIDE 0.02 MCG/KG/MIN: 16 INJECTION INTRAVENOUS at 09:09

## 2025-08-01 RX ADMIN — LIDOCAINE HYDROCHLORIDE 100 MG: 20 INJECTION INTRAVENOUS at 08:06

## 2025-08-01 RX ADMIN — ALBUMIN HUMAN 250 ML: 0.05 INJECTION, SOLUTION INTRAVENOUS at 11:41

## 2025-08-01 RX ADMIN — PROTAMINE SULFATE 30 MG: 10 INJECTION, SOLUTION INTRAVENOUS at 11:19

## 2025-08-01 RX ADMIN — MUPIROCIN 0.5 APPLICATION: 20 OINTMENT TOPICAL at 15:31

## 2025-08-01 RX ADMIN — CHLORHEXIDINE GLUCONATE, 0.12% ORAL RINSE 15 ML: 1.2 SOLUTION DENTAL at 20:09

## 2025-08-01 RX ADMIN — MANNITOL 550 ML: 250 INJECTION, SOLUTION INTRAVENOUS at 09:30

## 2025-08-01 RX ADMIN — EPOPROSTENOL SODIUM 0.03 MCG/KG/MIN: 1.5 INJECTION, POWDER, LYOPHILIZED, FOR SOLUTION INTRAVENOUS at 15:40

## 2025-08-01 RX ADMIN — ALBUMIN HUMAN 250 ML: 0.05 INJECTION, SOLUTION INTRAVENOUS at 11:03

## 2025-08-01 RX ADMIN — NOREPINEPHRINE BITARTRATE 8 MCG: 1 INJECTION, SOLUTION, CONCENTRATE INTRAVENOUS at 09:24

## 2025-08-01 RX ADMIN — SENNOSIDES, DOCUSATE SODIUM 2 TABLET: 50; 8.6 TABLET, FILM COATED ORAL at 20:09

## 2025-08-01 RX ADMIN — CEFAZOLIN SODIUM 2 G: 2 INJECTION, SOLUTION INTRAVENOUS at 18:00

## 2025-08-01 RX ADMIN — NOREPINEPHRINE BITARTRATE 8 MCG: 1 INJECTION, SOLUTION, CONCENTRATE INTRAVENOUS at 08:07

## 2025-08-01 SDOH — HEALTH STABILITY: MENTAL HEALTH: CURRENT SMOKER: 0

## 2025-08-01 ASSESSMENT — PAIN - FUNCTIONAL ASSESSMENT
PAIN_FUNCTIONAL_ASSESSMENT: CPOT (CRITICAL CARE PAIN OBSERVATION TOOL)
PAIN_FUNCTIONAL_ASSESSMENT: CPOT (CRITICAL CARE PAIN OBSERVATION TOOL)
PAIN_FUNCTIONAL_ASSESSMENT: 0-10
PAIN_FUNCTIONAL_ASSESSMENT: CPOT (CRITICAL CARE PAIN OBSERVATION TOOL)

## 2025-08-01 ASSESSMENT — KANSAS CITY CARDIOMYOPATHY QUESTIONNAIRE (KCCQ12)
4. OVER THE PAST 2 WEEKS, ON AVERAGE, HOW MANY TIMES HAS SHORTNESS OF BREATH LIMITED YOUR ABILITY TO DO WHAT YOU WANTED: 3 OR MORE TIMES PER WEEK BUT NOT EVERY DAY
8A. OVER THE PAST 2 WEEKS, ON AVERAGE, HOW HAS HEART FAILURE LIMITED YOU ABILITY TO DO HOBBIES OR RECREATIONAL ACTIVITIES: LIMITED QUITE A BIT
1B. OVER THE PAST 2 WEEKS, HOW MUCH WERE YOU LIMITED BY HEART FAILURE SYMPTOMS (SHORTNESS OF BREATH OR FATIGUE) WHEN WALKING 1 BLOCK ON LEVEL GROUND: SLIGHTLY LIMITED
3. OVER THE PAST 2 WEEKS, ON AVERAGE, HOW MANY TIMES HAS FATIGUE LIMITED YOUR ABILITY TO DO WHAT YOU WANTED: AT LEAST ONCE A DAY
1A. OVER THE PAST 2 WEEKS, HOW MUCH WERE YOU LIMITED BY HEART FAILURE SYMPTOMS (SHORTNESS OF BREATH OR FATIGUE) WHEN SHOWERING OR BATHING: NOT AT ALL LIMITED
5. OVER THE PAST 2 WEEKS, ON AVERAGE, HOW MANY TIMES HAVE YOU BEEN FORCED TO SLEEP SITTING UP IN A CHAIR OR WITH AT LEAST 3 PILLOWS TO PROP YOU UP BECAUSE OF SHORTNESS OF BREATH?: LESS THAN ONCE A WEEK
6. OVER THE PAST 2 WEEKS, HOW MUCH HAS YOUR HEART FAILURE LIMITED YOUR ENJOYMENT OF LIFE: IT HAS EXTREMELY LIMITED MY ENJOYMENT OF LIFE
7. IF YOU HAD TO SPEND THE REST OF YOUR LIFE WITH YOUR HEART FAILURE THE WAY IT IS RIGHT NOW, HOW WOULD YOU FEEL ABOUT THIS?: NOT AT ALL SATISFIED
DID THE PATIENT COMPLETE A KCCQ FORM: YES
1C. OVER THE PAST 2 WEEKS, HOW MUCH WERE YOU LIMITED BY HEART FAILURE SYMPTOMS (SHORTNESS OF BREATH OR FATIGUE) WHEN HURRYING OR JOGGING (AS IF TO CATCH A BUS): EXTREMELY LIMITED
2. OVER THE PAST 2 WEEKS, HOW MANY TIMES DID YOU HAVE SWELLING IN YOUR FEET, ANKLES OR LEGS WHEN YOU WOKE UP IN THE MORNING: NEVER OVER THE PAST 2 WEEKS
8C. OVER THE PAST 2 WEEKS, ON AVERAGE, HOW HAS HEART FAILURE LIMITED YOU ABILITY TO VISIT FAMILY AND FRIENDS OUR OF YOUR HOME: LIMITED QUITE A BIT
8B. OVER THE PAST 2 WEEKS, ON AVERAGE, HOW HAS HEART FAILURE LIMITED YOU ABILITY TO WORK OR DO HOUSEHOLD CHORES: LIMITED QUITE A BIT

## 2025-08-01 ASSESSMENT — COLUMBIA-SUICIDE SEVERITY RATING SCALE - C-SSRS
1. IN THE PAST MONTH, HAVE YOU WISHED YOU WERE DEAD OR WISHED YOU COULD GO TO SLEEP AND NOT WAKE UP?: NO
6. HAVE YOU EVER DONE ANYTHING, STARTED TO DO ANYTHING, OR PREPARED TO DO ANYTHING TO END YOUR LIFE?: NO
2. HAVE YOU ACTUALLY HAD ANY THOUGHTS OF KILLING YOURSELF?: NO

## 2025-08-01 ASSESSMENT — PAIN SCALES - GENERAL
PAIN_LEVEL: 0
PAINLEVEL_OUTOF10: 0 - NO PAIN

## 2025-08-01 ASSESSMENT — LIFESTYLE VARIABLES
CURRENT_SMOKER: YES
CURRENT_SMOKER_ECIG: NO
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
## 2025-08-01 NOTE — ANESTHESIA PROCEDURE NOTES
Airway  Date/Time: 8/1/2025 8:09 AM  Reason: elective    Airway not difficult    Staffing  Performed: [facility]   Authorized by: Fabricio Duran MD    Performed by: Fabricio Duran MD  Patient location during procedure: OR    Patient Condition  Indications for airway management: airway protection and anesthesia  Patient position: sniffing  MILS maintained throughout  No planned trial extubation  Sedation level: deep     Final Airway Details   Preoxygenated: yes  Final airway type: endotracheal airway  Successful airway: ETT  Cuffed: yes   Successful intubation technique: direct laryngoscopy  Adjuncts used in placement: intubating stylet  Endotracheal tube insertion site: oral  Blade: Isatu  Blade size: #3  ETT size (mm): 7.0  Cormack-Lehane Classification: grade I - full view of glottis  Placement verified by: chest auscultation and capnometry   Cuff volume (mL): 13  Measured from: lips  ETT to lips (cm): 22  Ventilation between attempts: none  Number of attempts at approach: 1  Number of other approaches attempted: 0

## 2025-08-01 NOTE — SIGNIFICANT EVENT
Cardiology Brief Significant Event Note  CICU fellow alerted to patient being taken to OR for LVAD. [employer] CRT-D tachytherapies disabled and placed into electrocautery safe mode.     Recommendations:  -Please obtain formal device interrogation postoperatively.     Tonia Yadav MD  PGY-6 Cardiovascular Medicine Fellow    If further questions arise, please page the general cardiology consult pager at [phone] on weekdays 7AM - 6PM and weekends 7AM - 2PM, or at [phone] at all other times.

## 2025-08-01 NOTE — ANESTHESIA PROCEDURE NOTES
Arterial Line:    Date/Time: 8/1/2025 7:42 AM    Staffing  Performed: SRNA and CRNA   Authorized by: Fabricio Duran MD    Performed by: Martina Martin    An arterial line was placed.   Procedure performed using ultrasound guidance.in the OR for the following indication(s): continuous blood pressure monitoring, blood sampling needed and unable to use non-invasive cuff.    A 20 gauge (size), 12 cm (length), Arrow (type) catheter was placed into the Left brachial artery, secured by Tegaderm,   Seldinger technique used.  Events:  patient tolerated procedure well with no complications.

## 2025-08-01 NOTE — NURSING NOTE
VAD Note   Name:  Thao Evans  Admission Date:  2025  7:21 PM  MRN: [id number]  Attending Provider: Sonu Vickers MD PhD  Room/Bed:               Sex: female  : 1965       Age: 60 y.o.    VAD Readmission       HeartMate Serial Numbers  HeartMate Equipment Tracking/Serial Numbers  Battery Charger: [id number]  Battery Clip 1: [id number]  Battery Clip 2: [id number]  Battery Clip 3: [id number]  Battery Clip 4: [id number]  Rechargeable Battery 1: [id number]  Rechargeable Battery 2: [id number]  Rechargeable Battery 3: [id number]  Rechargeable Battery 4: [id number]  Rechargeable Battery 5: [id number]  Rechargeable Battery 6: [id number]  Rechargeable Battery 7: [id number]  Rechargeable Battery 8: [id number]  Programmed Backup : [id number]  Primary Controller: [id number]  Mobile Power Unit: [id number]     HeartMate Tracking  HeartMate Equipment Transfer  Transfer From: OR  Transfer To: [facility]  Battery Charger: Yes  Battery Clip 1: Yes  Battery Clip 2: Yes  Battery Clip 3: Yes  Battery Clip 4: Yes  Rechargeable Battery 1: Yes  Rechargeable Battery 2: Yes  Rechargeable Battery 3: Yes  Rechargeable Battery 4: Yes  Rechargeable Battery 5: Yes  Rechargeable Battery 6: Yes  Rechargeable Battery 7: Yes  Rechargeable Battery 8: Yes  Backup Battery 2: No  Go Gear Consolidated Bag: No  Go Gear Accessory Kit: No  Go Gear Vest: No  Programmed Backup : Yes  Primary Controller: Yes  Mobile Power Unit: Yes  Black Emergency Red Tag Bag: No  Shower Bag: No  Apil Cuff: Yes  Outflow graft: Yes  Modular cable: Yes     HeartWare Serial Numbers        HeartWare Tracking        VAD Discharge       Educations  Education Documentation  No documentation found.        Nelida Hoffman RN  Date: 2025  Time: 3:13 PM

## 2025-08-01 NOTE — CARE PLAN
The clinical goals for the shift include maintaining hemodynamic stability.     Problem: Safety - Medical Restraint  Goal: Remains free of injury from restraints (Restraint for Interference with Medical Device)  Outcome: Progressing  Flowsheets (Taken 8/1/2025 1629)  Remains free of injury from restraints (restraint for interference with medical device):   Determine that other, less restrictive measures have been tried or would not be effective before applying the restraint   Evaluate the patient's condition at the time of restraint application   Inform patient/family regarding the reason for restraint   Every 2 hours: Monitor safety, psychosocial status, comfort, nutrition and hydration  Goal: Free from restraint(s) (Restraint for Interference with Medical Device)  Outcome: Progressing  Flowsheets (Taken 8/1/2025 1629)  Free from restraint(s) (restraint for interference with medical device):   ONCE/SHIFT or MINIMUM Every 12 hours: Assess and document the continuing need for restraints   Every 24 hours: Continued use of restraint requires Licensed Independent Practitioner to perform face to face examination and written order   Identify and implement measures to help patient regain control

## 2025-08-01 NOTE — BRIEF OP NOTE
Date: 2025 - 2025  OR Location: [facility] OR    Name: Thao Evans, : 1965, Age: 60 y.o., MRN: [id number], Sex: female    Diagnosis  Pre-op Diagnosis      * CHF (NYHA class IV, ACC/AHA stage D) (Multi) [I50.84] Post-op Diagnosis     * CHF (NYHA class IV, ACC/AHA stage D) (Multi) [I50.84]     Procedures  INSERTION, IMPLANTABLE VENTRICULAR ASSIST DEVICE/ HEART MATE 3, LEFT ATRIAL APPENDAGE  41051 - CO INSJ VENTR ASSIST DEV IMPLTABLE ICORP 1 VNTRC  Median Sternotomy  NSERTION, IMPLANTABLE VENTRICULAR ASSIST DEVICE HEART MATE 3,   LEFT ATRIAL APPENDAGE CLIP PENDITURE 45 MM      Chest Tubes/Drains: 1 RIGHT PLEURAL 1 LEFT PLEURAL 1 MEDIASTINAL         Temporary Pacing Wires: na     -Settings:   -Underlying Rhythm:    Permanent pacer/ICD: Yes   -Preoperative settings:     -Intra-op/ Postoperative settings: VVI 40     Sternotomy performed by: Rashad Light MD     Conduit Harvested by: bridgette    Sternal Wires placed by: Varinder LYON     Arm/Leg/Groin Closure/Cutdown performed by: Varinder Del Real PA-C     Cardio Pulmonary Bypass Time: 50 min  Cross-clamp Time: na  Circulatory Arrest: No Time:     Is patient candidate for Emergency Re-sternotomy? Yes   -If yes, POD #10 is -  25    Surgeons      * Rashad Inman - Primary    Resident/Fellow/Other Assistant:  Surgeons and Role:     * Sonu Vickers MD PhD - Assisting  Dr Leonor LYON   Staff:   Circulator: Rey Henning Person: Kristel  Surgical Assistant: Alejandro  Surgical Assistant: Vita    Anesthesia Staff: Anesthesiologist: Fabricio Duran MD  CRNA: MARLI Barrios-CRNA  SRNA: Martina Martin  Perfusionist: Seema Feliciano; Lisa Duffy    Procedure Summary  Anesthesia: General  ASA: IV  Estimated Blood Loss: 250mL  Intra-op Medications:   Administrations occurring from 0650 to 1240 on 25:   Medication Name Total Dose   sodium chloride 0.9 % irrigation solution 2,000 mL    vancomycin (Vancocin) vial for injection 5 g   albumin human bottle 5% 500 mL   aspirin chewable tablet 81 mg Cannot be calculated   atorvastatin (Lipitor) tablet 80 mg Cannot be calculated   bumetanide (Bumex) tablet 2 mg Cannot be calculated   calcium chloride 100 mg/mL syringe 1 g   cefuroxime (Zinacef) 1.5 g 1.5 g   citalopram (CeleXA) tablet 40 mg Cannot be calculated   clopidogrel (Plavix) tablet 75 mg Cannot be calculated   dexAMETHasone (Decadron) 4 mg/mL IV Syringe 2 mL 8 mg   electrolyte-A (Plasmalyte-A) solution Cannot be calculated   empagliflozin (Jardiance) tablet 10 mg Cannot be calculated   EPINEPHrine (Adrenalin) 4 mg/ 250 mL NS (16 mcg/mL) infusion 0.58 mg   epoprostenol (Veletri) 1.5 mg/50 mL inhalation 0.58 mg   fentaNYL (Sublimaze) injection 50 mcg/mL 250 mcg   fluconazole (Diflucan) IVPB 400 mg in 200 mL NS (premix) 400 mg   fluticasone furoate-vilanteroL (Breo Ellipta) 100-25 mcg/dose inhaler 1 puff Cannot be calculated   heparin injection 1,000 units/mL 25,000 Units   insulin lispro injection 0-10 Units Cannot be calculated   insulin regular (HumuLIN R,NovoLIN R) 100 Units in sodium chloride 0.9% 100 mL (1 Units/mL) infusion 11.03 Units   insulin regular (HumuLIN R, NovoLIN R) bolus from bag 6 Units   iron polysaccharides (Nu-Iron,Niferex) capsule 150 mg Cannot be calculated   lactated Ringer's infusion 171.67 mL   lidocaine (cardiac) injection 2% prefilled syringe 100 mg   magnesium oxide (Mag-Ox) 400 mg (241.3 mg elemental) tablet 2 tablet Cannot be calculated   midazolam PF (Versed) injection 1 mg/mL 1 mg   milrinone (Primacor) 20 mg/100 mL in D5W premix 3.1 mg   norepinephrine (Levophed) 8 mg/250 mL D5W (premix) 0.11 mg   norepinephrine (Levophed) 16 mcg/mL syringe for Anesthesia 48 mcg   pantoprazole (ProtoNix) EC tablet 20 mg Cannot be calculated   perfusion prime builder 550 mL   phenylephrine (Maulik-Synephrine) injection 160 mcg   potassium chloride 20 mEq in 100 mL IV premix 20 mEq    propofol (Diprivan) injection 10 mg/mL 1,464.16 mg   protamine injection 260 mg   rocuronium (ZeMuron) 50 mg/5 mL injection 150 mg   rOPINIRole (Requip) tablet 1 mg Cannot be calculated   sacubitriL-valsartan (Entresto) 24-26 mg per tablet 1 tablet Cannot be calculated   spironolactone (Aldactone) tablet 25 mg Cannot be calculated   tranexamic acid (Cyklokapron) 5,000 mg in sodium chloride 0.9% 250 mL (20 mg/mL) infusion 1,470.06 mg              Anesthesia Record               Intraprocedure I/O Totals          Intake    Norepinephrine Drip 0.00 mL    The total shown is the total volume documented since Anesthesia Start was filed.    Tranexamic Acid 0.00 mL    The total shown is the total volume documented since Anesthesia Start was filed.    Insulin Drip 0.00 mL    The total shown is the total volume documented since Anesthesia Start was filed.    Epinephrine Drip 0.00 mL    The total shown is the total volume documented since Anesthesia Start was filed.    Milrinone Drip 0.00 mL    The total shown is the total volume documented since Anesthesia Start was filed.    albumin human bottle 5% 250.00 mL    fluconazole (Diflucan) IVPB 400 mg in 200 mL NS (premix) 200.00 mL    perfusion prime builder 550.00 mL    potassium chloride 20 mEq in 100 mL IV premix 100.00 mL    Cell Saver 624 mL    Total Intake 1724 mL       Output    Urine 410 mL    Total Output 410 mL       Net    Net Volume 1314 mL          Specimen: No specimens collected               Findings: chf    Complications:  None; patient tolerated the procedure well.     Disposition: ICU - intubated and hemodynamically stable.  Condition: stable  Specimens Collected: No specimens collected  Attending Attestation: I was present and scrubbed for the entire procedure.    Rashad Inman  Phone Number: [phone]

## 2025-08-01 NOTE — ANESTHESIA PROCEDURE NOTES
PA Catheter Placement:    Date/Time: 8/1/2025 8:35 AM    A PA catheter was placed in the OR for the following indication(s): central venous access and CVP monitoring.    Staffing  Performed: CRNA and SRNA   Authorized by: Fabricio Duran MD    Performed by: MARLI Barrios-CRNA    Completed: patient identified, IV checked, site marked, risks and benefits discussed, surgical consent, monitors and equipment checked, pre-op evaluation and timeout performed    Procedure Information        Introducer size: 9 Fr    Sterility preparation included the following: provider hand hygiene performed prior to central venous catheter insertion, all 5 sterile barriers used (gloves, gown, cap, mask, large sterile drape) during central venous catheter insertion and skin prep agent completely dried prior to procedure.  The patient was placed in Trendelenburg position.       The site was prepped with ChloraPrep.  Skin prep agent completely dried prior to procedure.    PA catheter laterality: Right  Site: internal jugular vein  Catheter size: 7.5 with 1 attempt(s)    Successful placement  Catheter type: non-oximetric  Seldinger technique used.    PA catheter placement: distal introducer port  Medical reason for not performing maximal sterile barrier technique: no  Procedure performed using ultrasound guidance.       images stored in chart    Intravenous verification was obtained by ultrasound, venous blood return and transducer.  The PAC placement was confirmed by pressure tracing changes and transduced waveform.    Post insertion care included: dressing applied, line secured and line sutured.     Procedure was uneventful.

## 2025-08-01 NOTE — ANESTHESIA POSTPROCEDURE EVALUATION
Patient: Thao Evans    Procedure Summary       Date: 08/01/25 Room / Location: [facility] OR 18 / [facility] OR    Anesthesia Start: 0717 Anesthesia Stop: 1255    Procedure: INSERTION, IMPLANTABLE VENTRICULAR ASSIST DEVICE/ HEART MATE 3, LEFT ATRIAL APPENDAGE LIGATION USING LAAC45 MEDTRONIC PENDITURE Diagnosis:       CHF (NYHA class IV, ACC/AHA stage D) (Multi)      (CHF (NYHA class IV, ACC/AHA stage D) (Multi) [I50.84])    Surgeons: Rashad Inman MD PhD Responsible Provider: Fabricio Duran MD    Anesthesia Type: general ASA Status: 4            Anesthesia Type: general    Vitals Value Taken Time   BP 99/65 08/01/25 12:54   Temp 37.7 °C (99.9 °F) 08/01/25 16:00   Pulse 115 08/01/25 16:27   Resp 9 08/01/25 16:27   SpO2 100 % 08/01/25 16:27   Vitals shown include unfiled device data.    Anesthesia Post Evaluation    Patient location during evaluation: ICU  Patient participation: complete - patient cannot participate  Level of consciousness: sedated  Pain score: 0  Pain management: satisfactory to patient  Airway patency: patent  Cardiovascular status: acceptable and hemodynamically stable  Respiratory status: acceptable, ETT and intubated  Hydration status: acceptable  Postoperative Nausea and Vomiting: none        There were no known notable events for this encounter.

## 2025-08-01 NOTE — OP NOTE
Operation Note    Date of the Operation: 08/01/25   Name: Thao Evans   MRN: [id number]    Preoperative Diagnosis:   1. Heart failure, diastolic, with acute decompensation  Surgical Pathology Exam    Surgical Pathology Exam    Anesthesia Intraoperative Transesophageal Echocardiogram    Anesthesia Intraoperative Transesophageal Echocardiogram      2. Biventricular heart failure  Transthoracic Echo (TTE) Limited    Transthoracic Echo (TTE) Limited    Surgical Pathology Exam    Surgical Pathology Exam      3. Acute on chronic combined systolic (congestive) and diastolic (congestive) heart failure  Transthoracic Echo (TTE) Limited    Surgical Pathology Exam    Surgical Pathology Exam      4. Left ventricular failure, unspecified (Multi)  Transthoracic Echo (TTE) Limited    Surgical Pathology Exam    Surgical Pathology Exam      5. Heart failure, unspecified  Transthoracic Echo (TTE) Limited    Surgical Pathology Exam    Surgical Pathology Exam      6. Abnormal weight loss  Esophagogastroduodenoscopy (EGD)    Colonoscopy Diagnostic    Esophagogastroduodenoscopy (EGD)    Colonoscopy Diagnostic    Surgical Pathology Exam    Surgical Pathology Exam      7. Screening for colon cancer  Colonoscopy Diagnostic    Colonoscopy Diagnostic    Surgical Pathology Exam    Surgical Pathology Exam      8. CHF (NYHA class IV, ACC/AHA stage D) (Multi)  Case Request Cath Lab: INSERTION, IMPLANTABLE VENTRICULAR ASSIST DEVICE    Case Request Cath Lab: INSERTION, IMPLANTABLE VENTRICULAR ASSIST DEVICE    Surgical Pathology Exam    Surgical Pathology Exam      9. Acute decompensated heart failure  Cardiac device check - Surgery    Cardiac device check - Surgery          Operative Procedures:  Implant of HeartMate 3 Left Ventricular Assist Device    Surgeon:   Rashad Inman MD PhD     Assistants:   Sonu SANTACRUZ     No appropriately qualified resident was available to assist with  the procedure.    Clement Vickers and Ted provided full assistance throughout the procedure including LVAD prep and implantation.    The RNFA / SA was scrubbed for the entire case and assisted by manipulating and retracting tissue as well as wound closure.    Anesthesia Type:  General General endotracheal with LALY    Anesthesiologist:  Anesthesiologist: Fabricio Duran MD  CRNA: MARLI Barrios-TREE  SRNA: Martina Martin  Perfusionist: Seema Feliciano; Lisa Toledo; Chelo Duffy     Operative Findings:  The heart was enlarged in sinus rhythm.   The LALY demonstrated mild TR or AI. RV function was preserved. No evidence of LV thrombus.     Indication for Surgery:  Thao Evans is a 60 y.o. year old female patient with HEFrEF referred for LVAD.      Patient was admitted to hospital for a preoperative optimization for implanting a left ventricular assist device (LVAD). I discussed that LVAD therapy is a major cardiac surgery which has significant risks and benefits. The risks include, but are not limited to, right ventricular failure which may require mechanical circulatory support, bleeding, clotting, neurologic events, prolonged ICU stay, need for re-operation, respiratory or renal failure, pump failure, or death.  We discussed that there are several surgical approaches to implantation. I have reviewed all pertinent surgical history, imaging, and other diagnostic testing. The patient had the opportunity to ask questions, which were answered. This assessment was provided to the Advanced Heart Failure committee and the consensus was to recommend LVAD. I spoke to the patient in great details about the operation, its risks and complications and the patient agrees to proceed.     Operative detail:   With the patient supine on the operating table the skin was prepped and draped.  Median sternotomy was performed.    The pericardium was opened in the midline.  There is no evidence of adhesions.  Heparin was  administered and cannulation for cardiopulmonary bypass was undertaken using an aortic cannula and two-stage venous cannula.  Cardiopulmonary bypass was commenced at normothermia.  Ventilation was continued throughout the procedure with the use of epoprostenol.    The left ventricular apex was marked with LALY guidance.  The LVAD inflow ring was sutured using four pledgeted 2-0 Ethibond sutures, and then a running 3-0 Prolene suture.  CoSeal glue was applied.  A small apical incision was made in the center of the inflow ring and a size 16 Scott catheter was inserted.  The left ventricular apex was cored, and the LVAD inflow was secured. The cored muscle was send for histopathological examination. The LVAD was de-aired and the outflow graft was cut to length.  The outflow graft was then anastomosed end to side to the ascending aorta after the application of a partial occlusion clamp, using continuous 4-0 Prolene sutures.  CoSeal glue was applied.    A 45mm Penditure was used for left atrial appendage occlusion.    A vent was inserted in the outflow graft through a 5/0 pledgeted Prolene pursestring.    The LVAD driveline was double tunneled exiting in the right upper quadrant.  The driveline was connected.    Following de-airing with the aid of CO2 the LVAD was started and cardiopulmonary bypass was gradually weaned as the LVAD flow was increased.  Satisfactory flows were achieved off cardiopulmonary bypass and the patient remained hemodynamically stable.  The LALY was satisfactory.    Protamine was then administered to reverse systemic anticoagulation, the cannulae were removed and hemostasis was secured.    Three drains were inserted 1 in each pleural space and 1 in the mediastinum.  The sternum was closed using interrupted stainless steel wires the subcutaneous layers were closed using Vicryl and the skin was closed with subcuticular suture.    The patient remained stable, was transferred to the Cardiothoracic  Intensive Care Unit in stable cardiorespiratory condition.  I was available for all aspects of the procedure preoperatively and postoperatively.    Rashad Inman MD PhD

## 2025-08-01 NOTE — PROGRESS NOTES
SW input pre intermacs questions (EuroQol and KCCQ-12) completed with pt yesterday by other sw. SW to follow up with pt routinely.     Xiao CERNA

## 2025-08-01 NOTE — NURSING NOTE
0928 KEE -  KEVEN PATIÑO Surgical Assistant primed the left ventricular assist device or Heart Mate 3 using 0.9% sodium chloride 1 liter with lot number : [id number], expiration date : 2027-03-31.
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
## 2025-08-02 ENCOUNTER — APPOINTMENT (OUTPATIENT)
Dept: RADIOLOGY | Facility: HOSPITAL | Age: 60
End: 2025-08-02
Payer: COMMERCIAL

## 2025-08-02 LAB
ALBUMIN SERPL BCP-MCNC: 4.2 G/DL (ref 3.4–5)
ALBUMIN SERPL BCP-MCNC: 4.4 G/DL (ref 3.4–5)
ALBUMIN SERPL BCP-MCNC: 4.4 G/DL (ref 3.4–5)
ALP SERPL-CCNC: 46 U/L (ref 33–136)
ALT SERPL W P-5'-P-CCNC: 21 U/L (ref 7–45)
ANION GAP BLDA CALCULATED.4IONS-SCNC: 10 MMO/L (ref 10–25)
ANION GAP BLDA CALCULATED.4IONS-SCNC: 11 MMO/L (ref 10–25)
ANION GAP BLDA CALCULATED.4IONS-SCNC: 12 MMO/L (ref 10–25)
ANION GAP BLDMV CALCULATED.4IONS-SCNC: 10 MMO/L (ref 10–25)
ANION GAP BLDMV CALCULATED.4IONS-SCNC: 9 MMO/L (ref 10–25)
ANION GAP SERPL CALC-SCNC: 13 MMOL/L (ref 10–20)
ANION GAP SERPL CALC-SCNC: 14 MMOL/L (ref 10–20)
APTT PPP: 25 SECONDS (ref 26–36)
APTT PPP: 25 SECONDS (ref 26–36)
AST SERPL W P-5'-P-CCNC: 58 U/L (ref 9–39)
BASE EXCESS BLDA CALC-SCNC: 1.5 MMOL/L (ref -2–3)
BASE EXCESS BLDA CALC-SCNC: 3 MMOL/L (ref -2–3)
BASE EXCESS BLDA CALC-SCNC: 3.3 MMOL/L (ref -2–3)
BASE EXCESS BLDMV CALC-SCNC: 1.5 MMOL/L (ref -2–3)
BASE EXCESS BLDMV CALC-SCNC: 2.7 MMOL/L (ref -2–3)
BILIRUB DIRECT SERPL-MCNC: 0.2 MG/DL (ref 0–0.3)
BILIRUB SERPL-MCNC: 0.9 MG/DL (ref 0–1.2)
BLOOD EXPIRATION DATE: NORMAL
BLOOD EXPIRATION DATE: NORMAL
BODY TEMPERATURE: 37 DEGREES CELSIUS
BUN SERPL-MCNC: 22 MG/DL (ref 6–23)
BUN SERPL-MCNC: 24 MG/DL (ref 6–23)
CA-I BLD-SCNC: 1.15 MMOL/L (ref 1.1–1.33)
CA-I BLD-SCNC: 1.18 MMOL/L (ref 1.1–1.33)
CA-I BLDA-SCNC: 1.14 MMOL/L (ref 1.1–1.33)
CA-I BLDA-SCNC: 1.17 MMOL/L (ref 1.1–1.33)
CA-I BLDA-SCNC: 1.19 MMOL/L (ref 1.1–1.33)
CA-I BLDMV-SCNC: 1.12 MMOL/L (ref 1.1–1.33)
CA-I BLDMV-SCNC: 1.16 MMOL/L (ref 1.1–1.33)
CALCIUM SERPL-MCNC: 9 MG/DL (ref 8.6–10.6)
CALCIUM SERPL-MCNC: 9.5 MG/DL (ref 8.6–10.6)
CHLORIDE BLD-SCNC: 101 MMOL/L (ref 98–107)
CHLORIDE BLD-SCNC: 98 MMOL/L (ref 98–107)
CHLORIDE BLDA-SCNC: 100 MMOL/L (ref 98–107)
CHLORIDE BLDA-SCNC: 98 MMOL/L (ref 98–107)
CHLORIDE BLDA-SCNC: 99 MMOL/L (ref 98–107)
CHLORIDE SERPL-SCNC: 95 MMOL/L (ref 98–107)
CHLORIDE SERPL-SCNC: 98 MMOL/L (ref 98–107)
CO2 SERPL-SCNC: 30 MMOL/L (ref 21–32)
CO2 SERPL-SCNC: 30 MMOL/L (ref 21–32)
CREAT SERPL-MCNC: 0.87 MG/DL (ref 0.5–1.05)
CREAT SERPL-MCNC: 0.88 MG/DL (ref 0.5–1.05)
DISPENSE STATUS: NORMAL
DISPENSE STATUS: NORMAL
EGFRCR SERPLBLD CKD-EPI 2021: 74 ML/MIN/1.73M*2
EGFRCR SERPLBLD CKD-EPI 2021: 75 ML/MIN/1.73M*2
ERYTHROCYTE [DISTWIDTH] IN BLOOD BY AUTOMATED COUNT: 14.8 % (ref 11.5–14.5)
ERYTHROCYTE [DISTWIDTH] IN BLOOD BY AUTOMATED COUNT: 15.3 % (ref 11.5–14.5)
FIBRINOGEN PPP-MCNC: 318 MG/DL (ref 200–400)
GLUCOSE BLD MANUAL STRIP-MCNC: 159 MG/DL (ref 74–99)
GLUCOSE BLD MANUAL STRIP-MCNC: 234 MG/DL (ref 74–99)
GLUCOSE BLD MANUAL STRIP-MCNC: 254 MG/DL (ref 74–99)
GLUCOSE BLD-MCNC: 134 MG/DL (ref 74–99)
GLUCOSE BLD-MCNC: 175 MG/DL (ref 74–99)
GLUCOSE BLDA-MCNC: 132 MG/DL (ref 74–99)
GLUCOSE BLDA-MCNC: 135 MG/DL (ref 74–99)
GLUCOSE BLDA-MCNC: 145 MG/DL (ref 74–99)
GLUCOSE SERPL-MCNC: 134 MG/DL (ref 74–99)
GLUCOSE SERPL-MCNC: 235 MG/DL (ref 74–99)
HCO3 BLDA-SCNC: 27.2 MMOL/L (ref 22–26)
HCO3 BLDA-SCNC: 28.5 MMOL/L (ref 22–26)
HCO3 BLDA-SCNC: 28.5 MMOL/L (ref 22–26)
HCO3 BLDMV-SCNC: 27.6 MMOL/L (ref 22–26)
HCO3 BLDMV-SCNC: 29.5 MMOL/L (ref 22–26)
HCT VFR BLD AUTO: 29.9 % (ref 36–46)
HCT VFR BLD AUTO: 30.8 % (ref 36–46)
HCT VFR BLD EST: 29 % (ref 36–46)
HCT VFR BLD EST: 31 % (ref 36–46)
HCT VFR BLD EST: 32 % (ref 36–46)
HGB BLD-MCNC: 10.2 G/DL (ref 12–16)
HGB BLD-MCNC: 9.8 G/DL (ref 12–16)
HGB BLDA-MCNC: 10.3 G/DL (ref 12–16)
HGB BLDA-MCNC: 10.4 G/DL (ref 12–16)
HGB BLDA-MCNC: 10.5 G/DL (ref 12–16)
HGB BLDMV-MCNC: 10.2 G/DL (ref 12–16)
HGB BLDMV-MCNC: 9.7 G/DL (ref 12–16)
INHALED O2 CONCENTRATION: 30 %
INHALED O2 CONCENTRATION: 30 %
INHALED O2 CONCENTRATION: 37 %
INHALED O2 CONCENTRATION: 40 %
INHALED O2 CONCENTRATION: 40 %
INR PPP: 1.1 (ref 0.9–1.1)
INR PPP: 1.2 (ref 0.9–1.1)
LACTATE BLDA-SCNC: 1.6 MMOL/L (ref 0.4–2)
LACTATE BLDA-SCNC: 1.9 MMOL/L (ref 0.4–2)
LACTATE BLDA-SCNC: 2 MMOL/L (ref 0.4–2)
LACTATE BLDMV-SCNC: 1.4 MMOL/L (ref 0.4–2)
LACTATE BLDMV-SCNC: 1.8 MMOL/L (ref 0.4–2)
LDH SERPL L TO P-CCNC: 325 U/L (ref 84–246)
LDH SERPL L TO P-CCNC: 330 U/L (ref 84–246)
MAGNESIUM SERPL-MCNC: 2.16 MG/DL (ref 1.6–2.4)
MAGNESIUM SERPL-MCNC: 2.37 MG/DL (ref 1.6–2.4)
MCH RBC QN AUTO: 29.6 PG (ref 26–34)
MCH RBC QN AUTO: 30.5 PG (ref 26–34)
MCHC RBC AUTO-ENTMCNC: 31.8 G/DL (ref 32–36)
MCHC RBC AUTO-ENTMCNC: 34.1 G/DL (ref 32–36)
MCV RBC AUTO: 90 FL (ref 80–100)
MCV RBC AUTO: 93 FL (ref 80–100)
NRBC BLD-RTO: 0 /100 WBCS (ref 0–0)
NRBC BLD-RTO: 0 /100 WBCS (ref 0–0)
OXYHGB MFR BLDA: 96.3 % (ref 94–98)
OXYHGB MFR BLDA: 97.2 % (ref 94–98)
OXYHGB MFR BLDA: 97.4 % (ref 94–98)
OXYHGB MFR BLDMV: 51.2 % (ref 45–75)
OXYHGB MFR BLDMV: 66.5 % (ref 45–75)
PCO2 BLDA: 45 MM HG (ref 38–42)
PCO2 BLDA: 47 MM HG (ref 38–42)
PCO2 BLDA: 47 MM HG (ref 38–42)
PCO2 BLDMV: 50 MM HG (ref 41–51)
PCO2 BLDMV: 56 MM HG (ref 41–51)
PH BLDA: 7.37 PH (ref 7.38–7.42)
PH BLDA: 7.39 PH (ref 7.38–7.42)
PH BLDA: 7.41 PH (ref 7.38–7.42)
PH BLDMV: 7.33 PH (ref 7.33–7.43)
PH BLDMV: 7.35 PH (ref 7.33–7.43)
PHOSPHATE SERPL-MCNC: 5.5 MG/DL (ref 2.5–4.9)
PHOSPHATE SERPL-MCNC: 6.3 MG/DL (ref 2.5–4.9)
PLATELET # BLD AUTO: 229 X10*3/UL (ref 150–450)
PLATELET # BLD AUTO: 260 X10*3/UL (ref 150–450)
PO2 BLDA: 104 MM HG (ref 85–95)
PO2 BLDA: 148 MM HG (ref 85–95)
PO2 BLDA: 93 MM HG (ref 85–95)
PO2 BLDMV: 35 MM HG (ref 35–45)
PO2 BLDMV: 43 MM HG (ref 35–45)
POTASSIUM BLDA-SCNC: 4.6 MMOL/L (ref 3.5–5.3)
POTASSIUM BLDA-SCNC: 5.1 MMOL/L (ref 3.5–5.3)
POTASSIUM BLDA-SCNC: 5.1 MMOL/L (ref 3.5–5.3)
POTASSIUM BLDMV-SCNC: 4.6 MMOL/L (ref 3.5–5.3)
POTASSIUM BLDMV-SCNC: 5 MMOL/L (ref 3.5–5.3)
POTASSIUM SERPL-SCNC: 4.9 MMOL/L (ref 3.5–5.3)
POTASSIUM SERPL-SCNC: 5 MMOL/L (ref 3.5–5.3)
PRODUCT BLOOD TYPE: 6200
PRODUCT BLOOD TYPE: 6200
PRODUCT CODE: NORMAL
PRODUCT CODE: NORMAL
PROT SERPL-MCNC: 6.3 G/DL (ref 6.4–8.2)
PROTHROMBIN TIME: 12.3 SECONDS (ref 9.8–12.4)
PROTHROMBIN TIME: 12.8 SECONDS (ref 9.8–12.4)
RBC # BLD AUTO: 3.31 X10*6/UL (ref 4–5.2)
RBC # BLD AUTO: 3.34 X10*6/UL (ref 4–5.2)
SAO2 % BLDA: 100 % (ref 94–100)
SAO2 % BLDA: 99 % (ref 94–100)
SAO2 % BLDA: 99 % (ref 94–100)
SAO2 % BLDMV: 53 % (ref 45–75)
SAO2 % BLDMV: 69 % (ref 45–75)
SODIUM BLDA-SCNC: 132 MMOL/L (ref 136–145)
SODIUM BLDA-SCNC: 133 MMOL/L (ref 136–145)
SODIUM BLDA-SCNC: 134 MMOL/L (ref 136–145)
SODIUM BLDMV-SCNC: 132 MMOL/L (ref 136–145)
SODIUM BLDMV-SCNC: 134 MMOL/L (ref 136–145)
SODIUM SERPL-SCNC: 133 MMOL/L (ref 136–145)
SODIUM SERPL-SCNC: 137 MMOL/L (ref 136–145)
STAPHYLOCOCCUS SPEC CULT: NORMAL
UNIT ABO: NORMAL
UNIT ABO: NORMAL
UNIT NUMBER: NORMAL
UNIT NUMBER: NORMAL
UNIT RH: NORMAL
UNIT RH: NORMAL
UNIT VOLUME: 219
UNIT VOLUME: 334
WBC # BLD AUTO: 18.7 X10*3/UL (ref 4.4–11.3)
WBC # BLD AUTO: 21.3 X10*3/UL (ref 4.4–11.3)

## 2025-08-02 PROCEDURE — 2500000002 HC RX 250 W HCPCS SELF ADMINISTERED DRUGS (ALT 637 FOR MEDICARE OP, ALT 636 FOR OP/ED): Performed by: NURSE PRACTITIONER

## 2025-08-02 PROCEDURE — 97164 PT RE-EVAL EST PLAN CARE: CPT | Mod: GP

## 2025-08-02 PROCEDURE — 82330 ASSAY OF CALCIUM: CPT

## 2025-08-02 PROCEDURE — 94640 AIRWAY INHALATION TREATMENT: CPT

## 2025-08-02 PROCEDURE — 37799 UNLISTED PX VASCULAR SURGERY: CPT

## 2025-08-02 PROCEDURE — 71045 X-RAY EXAM CHEST 1 VIEW: CPT | Performed by: STUDENT IN AN ORGANIZED HEALTH CARE EDUCATION/TRAINING PROGRAM

## 2025-08-02 PROCEDURE — 2500000001 HC RX 250 WO HCPCS SELF ADMINISTERED DRUGS (ALT 637 FOR MEDICARE OP)

## 2025-08-02 PROCEDURE — 99291 CRITICAL CARE FIRST HOUR: CPT | Performed by: SPECIALIST

## 2025-08-02 PROCEDURE — 93750 INTERROGATION VAD IN PERSON: CPT | Performed by: SPECIALIST

## 2025-08-02 PROCEDURE — 85730 THROMBOPLASTIN TIME PARTIAL: CPT

## 2025-08-02 PROCEDURE — 2500000004 HC RX 250 GENERAL PHARMACY W/ HCPCS (ALT 636 FOR OP/ED): Performed by: NURSE PRACTITIONER

## 2025-08-02 PROCEDURE — 2500000002 HC RX 250 W HCPCS SELF ADMINISTERED DRUGS (ALT 637 FOR MEDICARE OP, ALT 636 FOR OP/ED)

## 2025-08-02 PROCEDURE — 83615 LACTATE (LD) (LDH) ENZYME: CPT | Performed by: NURSE PRACTITIONER

## 2025-08-02 PROCEDURE — 85384 FIBRINOGEN ACTIVITY: CPT

## 2025-08-02 PROCEDURE — 84132 ASSAY OF SERUM POTASSIUM: CPT

## 2025-08-02 PROCEDURE — 2020000001 HC ICU ROOM DAILY

## 2025-08-02 PROCEDURE — 97535 SELF CARE MNGMENT TRAINING: CPT | Mod: GO

## 2025-08-02 PROCEDURE — 83735 ASSAY OF MAGNESIUM: CPT

## 2025-08-02 PROCEDURE — 2500000001 HC RX 250 WO HCPCS SELF ADMINISTERED DRUGS (ALT 637 FOR MEDICARE OP): Performed by: NURSE PRACTITIONER

## 2025-08-02 PROCEDURE — 2500000004 HC RX 250 GENERAL PHARMACY W/ HCPCS (ALT 636 FOR OP/ED)

## 2025-08-02 PROCEDURE — 2500000005 HC RX 250 GENERAL PHARMACY W/O HCPCS

## 2025-08-02 PROCEDURE — 80076 HEPATIC FUNCTION PANEL: CPT | Performed by: NURSE PRACTITIONER

## 2025-08-02 PROCEDURE — 97116 GAIT TRAINING THERAPY: CPT | Mod: GP

## 2025-08-02 PROCEDURE — 97168 OT RE-EVAL EST PLAN CARE: CPT | Mod: GO

## 2025-08-02 PROCEDURE — 85027 COMPLETE CBC AUTOMATED: CPT

## 2025-08-02 PROCEDURE — 71045 X-RAY EXAM CHEST 1 VIEW: CPT

## 2025-08-02 PROCEDURE — 82947 ASSAY GLUCOSE BLOOD QUANT: CPT

## 2025-08-02 PROCEDURE — 99291 CRITICAL CARE FIRST HOUR: CPT | Performed by: ANESTHESIOLOGY

## 2025-08-02 RX ORDER — EPINEPHRINE IN 0.9 % SOD CHLOR 4MG/250ML
0.04 PLASTIC BAG, INJECTION (ML) INTRAVENOUS CONTINUOUS
Status: DISCONTINUED | OUTPATIENT
Start: 2025-08-02 | End: 2025-08-03

## 2025-08-02 RX ORDER — HEPARIN SODIUM 5000 [USP'U]/ML
5000 INJECTION, SOLUTION INTRAVENOUS; SUBCUTANEOUS EVERY 8 HOURS
Status: DISCONTINUED | OUTPATIENT
Start: 2025-08-02 | End: 2025-08-10

## 2025-08-02 RX ORDER — MILRINONE LACTATE 0.2 MG/ML
0.25 INJECTION, SOLUTION INTRAVENOUS CONTINUOUS
Status: DISCONTINUED | OUTPATIENT
Start: 2025-08-02 | End: 2025-08-04

## 2025-08-02 RX ORDER — FUROSEMIDE 10 MG/ML
40 INJECTION INTRAMUSCULAR; INTRAVENOUS ONCE
Status: COMPLETED | OUTPATIENT
Start: 2025-08-02 | End: 2025-08-02

## 2025-08-02 RX ORDER — METHOCARBAMOL 500 MG/1
500 TABLET, FILM COATED ORAL EVERY 6 HOURS
Status: DISPENSED | OUTPATIENT
Start: 2025-08-02 | End: 2025-08-05

## 2025-08-02 RX ORDER — INSULIN GLARGINE 100 [IU]/ML
15 INJECTION, SOLUTION SUBCUTANEOUS EVERY 24 HOURS
Status: DISCONTINUED | OUTPATIENT
Start: 2025-08-02 | End: 2025-08-03

## 2025-08-02 RX ORDER — LIDOCAINE 560 MG/1
1 PATCH PERCUTANEOUS; TOPICAL; TRANSDERMAL DAILY
Status: DISCONTINUED | OUTPATIENT
Start: 2025-08-02 | End: 2025-08-18 | Stop reason: HOSPADM

## 2025-08-02 RX ORDER — INSULIN LISPRO 100 [IU]/ML
0-15 INJECTION, SOLUTION INTRAVENOUS; SUBCUTANEOUS EVERY 4 HOURS
Status: DISCONTINUED | OUTPATIENT
Start: 2025-08-02 | End: 2025-08-02

## 2025-08-02 RX ORDER — INSULIN LISPRO 100 [IU]/ML
0-15 INJECTION, SOLUTION INTRAVENOUS; SUBCUTANEOUS
Status: DISCONTINUED | OUTPATIENT
Start: 2025-08-02 | End: 2025-08-04

## 2025-08-02 RX ORDER — OXYCODONE HYDROCHLORIDE 10 MG/1
10 TABLET ORAL EVERY 4 HOURS PRN
Refills: 0 | Status: DISCONTINUED | OUTPATIENT
Start: 2025-08-02 | End: 2025-08-04

## 2025-08-02 RX ORDER — NAPROXEN SODIUM 220 MG/1
81 TABLET, FILM COATED ORAL DAILY
Status: DISCONTINUED | OUTPATIENT
Start: 2025-08-02 | End: 2025-08-18 | Stop reason: HOSPADM

## 2025-08-02 RX ORDER — OXYCODONE HYDROCHLORIDE 5 MG/1
5 TABLET ORAL EVERY 4 HOURS PRN
Refills: 0 | Status: DISCONTINUED | OUTPATIENT
Start: 2025-08-02 | End: 2025-08-04

## 2025-08-02 RX ORDER — HYDROMORPHONE HYDROCHLORIDE 0.2 MG/ML
0.2 INJECTION INTRAMUSCULAR; INTRAVENOUS; SUBCUTANEOUS EVERY 2 HOUR PRN
Status: DISCONTINUED | OUTPATIENT
Start: 2025-08-02 | End: 2025-08-04

## 2025-08-02 RX ORDER — WARFARIN 2 MG/1
2 TABLET ORAL ONCE
Status: COMPLETED | OUTPATIENT
Start: 2025-08-02 | End: 2025-08-02

## 2025-08-02 RX ORDER — IPRATROPIUM BROMIDE AND ALBUTEROL SULFATE 2.5; .5 MG/3ML; MG/3ML
3 SOLUTION RESPIRATORY (INHALATION) 3 TIMES DAILY
Status: DISCONTINUED | OUTPATIENT
Start: 2025-08-02 | End: 2025-08-03

## 2025-08-02 RX ADMIN — INSULIN LISPRO 5 UNITS: 100 INJECTION, SOLUTION INTRAVENOUS; SUBCUTANEOUS at 06:07

## 2025-08-02 RX ADMIN — ASPIRIN 81 MG: 81 TABLET, CHEWABLE ORAL at 10:39

## 2025-08-02 RX ADMIN — HEPARIN SODIUM 5000 UNITS: 5000 INJECTION INTRAVENOUS; SUBCUTANEOUS at 17:23

## 2025-08-02 RX ADMIN — OXYCODONE HYDROCHLORIDE 10 MG: 10 TABLET ORAL at 12:34

## 2025-08-02 RX ADMIN — HYDROMORPHONE HYDROCHLORIDE 0.2 MG: 0.2 INJECTION, SOLUTION INTRAMUSCULAR; INTRAVENOUS; SUBCUTANEOUS at 15:14

## 2025-08-02 RX ADMIN — ROPINIROLE HYDROCHLORIDE 3 MG: 3 TABLET, FILM COATED ORAL at 20:14

## 2025-08-02 RX ADMIN — ACETAMINOPHEN 650 MG: 325 TABLET, FILM COATED ORAL at 06:38

## 2025-08-02 RX ADMIN — EPINEPHRINE IN SODIUM CHLORIDE 0.04 MCG/KG/MIN: 16 INJECTION INTRAVENOUS at 03:27

## 2025-08-02 RX ADMIN — OXYCODONE HYDROCHLORIDE 10 MG: 10 TABLET ORAL at 21:46

## 2025-08-02 RX ADMIN — IPRATROPIUM BROMIDE AND ALBUTEROL SULFATE 3 ML: .5; 3 SOLUTION RESPIRATORY (INHALATION) at 15:34

## 2025-08-02 RX ADMIN — LEVOTHYROXINE SODIUM 88 MCG: 0.09 TABLET ORAL at 06:38

## 2025-08-02 RX ADMIN — CITALOPRAM HYDROBROMIDE 40 MG: 40 TABLET ORAL at 10:38

## 2025-08-02 RX ADMIN — ACETAMINOPHEN 650 MG: 325 TABLET, FILM COATED ORAL at 20:12

## 2025-08-02 RX ADMIN — CEFAZOLIN SODIUM 2 G: 2 INJECTION, SOLUTION INTRAVENOUS at 02:19

## 2025-08-02 RX ADMIN — POLYETHYLENE GLYCOL 3350 17 G: 17 POWDER, FOR SOLUTION ORAL at 08:13

## 2025-08-02 RX ADMIN — CEFAZOLIN SODIUM 2 G: 2 INJECTION, SOLUTION INTRAVENOUS at 09:07

## 2025-08-02 RX ADMIN — METHOCARBAMOL 500 MG: 500 TABLET ORAL at 17:23

## 2025-08-02 RX ADMIN — OXYCODONE HYDROCHLORIDE 10 MG: 10 TABLET ORAL at 07:53

## 2025-08-02 RX ADMIN — Medication: at 21:15

## 2025-08-02 RX ADMIN — MUPIROCIN 0.5 APPLICATION: 20 OINTMENT TOPICAL at 08:14

## 2025-08-02 RX ADMIN — ROPINIROLE 1 MG: 1 TABLET, FILM COATED ORAL at 10:37

## 2025-08-02 RX ADMIN — ATORVASTATIN CALCIUM 80 MG: 80 TABLET, FILM COATED ORAL at 08:13

## 2025-08-02 RX ADMIN — IPRATROPIUM BROMIDE AND ALBUTEROL SULFATE 3 ML: .5; 3 SOLUTION RESPIRATORY (INHALATION) at 21:15

## 2025-08-02 RX ADMIN — HEPARIN SODIUM 5000 UNITS: 5000 INJECTION INTRAVENOUS; SUBCUTANEOUS at 10:39

## 2025-08-02 RX ADMIN — ROPINIROLE 1 MG: 1 TABLET, FILM COATED ORAL at 15:22

## 2025-08-02 RX ADMIN — SENNOSIDES, DOCUSATE SODIUM 2 TABLET: 50; 8.6 TABLET, FILM COATED ORAL at 08:13

## 2025-08-02 RX ADMIN — HYDROMORPHONE HYDROCHLORIDE 0.2 MG: 0.2 INJECTION, SOLUTION INTRAMUSCULAR; INTRAVENOUS; SUBCUTANEOUS at 02:38

## 2025-08-02 RX ADMIN — INSULIN LISPRO 15 UNITS: 100 INJECTION, SOLUTION INTRAVENOUS; SUBCUTANEOUS at 20:12

## 2025-08-02 RX ADMIN — IPRATROPIUM BROMIDE AND ALBUTEROL SULFATE 3 ML: .5; 3 SOLUTION RESPIRATORY (INHALATION) at 09:01

## 2025-08-02 RX ADMIN — HYDROMORPHONE HYDROCHLORIDE 0.5 MG: 1 INJECTION, SOLUTION INTRAMUSCULAR; INTRAVENOUS; SUBCUTANEOUS at 00:29

## 2025-08-02 RX ADMIN — WARFARIN SODIUM 2 MG: 2 TABLET ORAL at 20:11

## 2025-08-02 RX ADMIN — INSULIN GLARGINE 15 UNITS: 100 INJECTION, SOLUTION SUBCUTANEOUS at 11:00

## 2025-08-02 RX ADMIN — INSULIN LISPRO 15 UNITS: 100 INJECTION, SOLUTION INTRAVENOUS; SUBCUTANEOUS at 11:26

## 2025-08-02 RX ADMIN — METHOCARBAMOL 500 MG: 500 TABLET ORAL at 11:26

## 2025-08-02 RX ADMIN — MILRINONE LACTATE IN DEXTROSE 0.25 MCG/KG/MIN: 200 INJECTION, SOLUTION INTRAVENOUS at 18:49

## 2025-08-02 RX ADMIN — CEFAZOLIN SODIUM 2 G: 2 INJECTION, SOLUTION INTRAVENOUS at 17:23

## 2025-08-02 RX ADMIN — HYDROMORPHONE HYDROCHLORIDE 0.2 MG: 0.2 INJECTION, SOLUTION INTRAMUSCULAR; INTRAVENOUS; SUBCUTANEOUS at 05:33

## 2025-08-02 RX ADMIN — SENNOSIDES, DOCUSATE SODIUM 2 TABLET: 50; 8.6 TABLET, FILM COATED ORAL at 20:12

## 2025-08-02 RX ADMIN — BUDESONIDE 0.5 MG: 0.5 INHALANT RESPIRATORY (INHALATION) at 09:01

## 2025-08-02 RX ADMIN — ACETAMINOPHEN 650 MG: 325 TABLET, FILM COATED ORAL at 12:34

## 2025-08-02 RX ADMIN — OXYCODONE HYDROCHLORIDE 10 MG: 10 TABLET ORAL at 17:23

## 2025-08-02 RX ADMIN — POLYETHYLENE GLYCOL 3350 17 G: 17 POWDER, FOR SOLUTION ORAL at 20:12

## 2025-08-02 RX ADMIN — ROPINIROLE 1 MG: 1 TABLET, FILM COATED ORAL at 21:21

## 2025-08-02 RX ADMIN — HYDROMORPHONE HYDROCHLORIDE 0.2 MG: 0.2 INJECTION, SOLUTION INTRAMUSCULAR; INTRAVENOUS; SUBCUTANEOUS at 08:57

## 2025-08-02 RX ADMIN — LIDOCAINE 4% 1 PATCH: 40 PATCH TOPICAL at 10:58

## 2025-08-02 RX ADMIN — FUROSEMIDE 40 MG: 10 INJECTION, SOLUTION INTRAMUSCULAR; INTRAVENOUS at 15:15

## 2025-08-02 RX ADMIN — FLUCONAZOLE 400 MG: 2 INJECTION, SOLUTION INTRAVENOUS at 08:13

## 2025-08-02 RX ADMIN — PANTOPRAZOLE SODIUM 40 MG: 40 INJECTION, POWDER, FOR SOLUTION INTRAVENOUS at 06:38

## 2025-08-02 RX ADMIN — FUROSEMIDE 40 MG: 10 INJECTION, SOLUTION INTRAMUSCULAR; INTRAVENOUS at 10:39

## 2025-08-02 ASSESSMENT — PAIN SCALES - GENERAL
PAINLEVEL_OUTOF10: 8
PAINLEVEL_OUTOF10: 2
PAINLEVEL_OUTOF10: 7
PAINLEVEL_OUTOF10: 8
PAINLEVEL_OUTOF10: 8
PAINLEVEL_OUTOF10: 2
PAINLEVEL_OUTOF10: 4
PAINLEVEL_OUTOF10: 10 - WORST POSSIBLE PAIN
PAINLEVEL_OUTOF10: 8
PAINLEVEL_OUTOF10: 3
PAINLEVEL_OUTOF10: 2
PAINLEVEL_OUTOF10: 8
PAINLEVEL_OUTOF10: 9

## 2025-08-02 ASSESSMENT — PAIN - FUNCTIONAL ASSESSMENT
PAIN_FUNCTIONAL_ASSESSMENT: 0-10
PAIN_FUNCTIONAL_ASSESSMENT: CPOT (CRITICAL CARE PAIN OBSERVATION TOOL)
PAIN_FUNCTIONAL_ASSESSMENT: 0-10
PAIN_FUNCTIONAL_ASSESSMENT: 0-10
PAIN_FUNCTIONAL_ASSESSMENT: CPOT (CRITICAL CARE PAIN OBSERVATION TOOL)
PAIN_FUNCTIONAL_ASSESSMENT: 0-10

## 2025-08-02 ASSESSMENT — ACTIVITIES OF DAILY LIVING (ADL)
ADL_ASSISTANCE: INDEPENDENT
HOME_MANAGEMENT_TIME_ENTRY: 20
BATHING_ASSISTANCE: MODERATE

## 2025-08-02 ASSESSMENT — PAIN DESCRIPTION - LOCATION
LOCATION: CHEST

## 2025-08-02 ASSESSMENT — COGNITIVE AND FUNCTIONAL STATUS - GENERAL
HELP NEEDED FOR BATHING: A LOT
DRESSING REGULAR UPPER BODY CLOTHING: A LOT
PERSONAL GROOMING: A LITTLE
STANDING UP FROM CHAIR USING ARMS: A LITTLE
DAILY ACTIVITIY SCORE: 15
TOILETING: A LOT
DRESSING REGULAR LOWER BODY CLOTHING: A LOT
TURNING FROM BACK TO SIDE WHILE IN FLAT BAD: A LITTLE
MOVING FROM LYING ON BACK TO SITTING ON SIDE OF FLAT BED WITH BEDRAILS: A LITTLE
MOVING TO AND FROM BED TO CHAIR: A LITTLE
MOBILITY SCORE: 17
WALKING IN HOSPITAL ROOM: A LITTLE
CLIMB 3 TO 5 STEPS WITH RAILING: A LOT

## 2025-08-02 ASSESSMENT — PAIN DESCRIPTION - ORIENTATION: ORIENTATION: MID

## 2025-08-02 ASSESSMENT — PAIN DESCRIPTION - DESCRIPTORS: DESCRIPTORS: ACHING
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
## 2025-08-02 NOTE — PROGRESS NOTES
Physical Therapy    Physical Therapy re-Evaluation & Treatment    Patient Name: Thao Evans  MRN: [id number]  Department: [facility] [id number]  Room: 02/02-A  Today's Date: 8/2/2025   Time Calculation  Start Time: 1417  Stop Time: 1455  Time Calculation (min): 38 min    Assessment/Plan   PT Assessment  PT Assessment Results: Decreased strength, Decreased endurance, Impaired balance, Decreased mobility, Pain  Rehab Prognosis: Excellent  Barriers to Discharge Home: No anticipated barriers  Evaluation/Treatment Tolerance: Patient tolerated treatment well  Medical Staff Made Aware: Yes  End of Session Communication: Bedside nurse  Assessment Comment: Pt performed bed mobility, functional transfers, and ambulated 50' with CGA-Min A with FWW this date. Increased time for line management and LVAD management including education of LVAD. Pt will continue to benefit from skilled PT to improve functional mobility.  End of Session Patient Position: Bed, 3 rail up, Alarm off, not on at start of session   IP OR SWING BED PT PLAN  Inpatient or Swing Bed: Inpatient  PT Plan  Treatment/Interventions: Bed mobility, Transfer training, Gait training, Balance training, Stair training, Strengthening, Endurance training, Range of motion, Therapeutic exercise, Therapeutic activity, Home exercise program, Postural re-education, Positioning  PT Plan: Ongoing PT  PT Frequency: 6 times per week  PT Discharge Recommendations: Low intensity level of continued care  Equipment Recommended upon Discharge: Wheeled walker  PT Recommended Transfer Status: Assist x1  PT - OK to Discharge: Yes      Subjective     PT Visit Info:  PT Received On: 08/02/25  General Visit Information:  General  Reason for Referral: 59 y/o F presents for colonoscopy prior to LVAD implantation next week and now  LVAD (HM3) placement 08/01.  Past Medical History Relevant to Rehab: CAD s/p PCI (LAD; 2015, Lcx; 2025), stage D systolic HF/ICM/HFrEF s/p ICD, VT with presumed  associated syncope, poorly controlled DM, pAF, dyslipidemia, essential HTN, COPD, and Graves  Prior to Session Communication: Bedside nurse  Patient Position Received: Up in chair, Alarm off, not on at start of session  Preferred Learning Style: auditory, verbal, visual  General Comment: Pt awake and willing to participate in PT re-evaluation s/p LVAD (Lines: HM3- drive line stable during session- 4.2/4850/3.1/3.1; A line, swan, tele, epi 0.04, mil 0.375, x2 CT)  Home Living:  Home Living  Type of Home: House  Lives With:  (friend and dependent son)  Home Adaptive Equipment: Walker rolling or standard  Home Layout: One level  Home Access: Level entry  Bathroom Shower/Tub: Tub/shower unit  Bathroom Toilet: Handicapped height  Bathroom Equipment: Grab bars in shower, Raised toilet seat without rails  Prior Level of Function:  Prior Function Per Pt/Caregiver Report  Level of [place]: Independent with ADLs and functional transfers, Independent with homemaking with ambulation  Receives Help From: Friends  ADL Assistance: Independent  Homemaking Assistance: Independent  Ambulatory Assistance: Independent  Leisure: crafting  Hand Dominance: Right  Prior Function Comments: (+) drives. Patient shares household IADLs with friend.  Precautions:  Precautions  Hearing/Visual Limitations: hearing WFL; glasses on.  Medical Precautions: Cardiac precautions, Fall precautions, Chest tube  Post-Surgical Precautions: Move in the Tube  Precautions Comment: 2 chest tubes in place; MAP>65     Date/Time Vitals Session Patient Position Pulse Resp SpO2 BP MAP (mmHg)    08/02/25 1345 --  --  103  15  98 %  --  --     08/02/25 1400 --  --  102  11  100 %  --  --     08/02/25 1415 --  --  103  19  --  --  --     08/02/25 1417 Pre PT  Sitting  103  --  96 %  84/70  77     08/02/25 1430 --  --  105  14  88 %  --  --     08/02/25 1445 --  --  107  29  81 %  --  --     08/02/25 1455 Post PT  --  105  --  --  88/69  78     08/02/25 1500 --  --   104  15  84 %  --  --     08/02/25 1515 --  --  105  13  100 %  --  --      Vital Signs Comment: slightly low BP while ambulating however MAP maintained >65    Objective   Pain:  Pain Assessment  Pain Assessment: 0-10  0-10 (Numeric) Pain Score: 8  Pain Type: Surgical pain  Pain Location: Chest  Pain Interventions: Repositioned  Response to Interventions: Resting quietly  Cognition:  Cognition  Overall Cognitive Status: Within Functional Limits  Arousal/Alertness: Appropriate responses to stimuli  Orientation Level: Oriented X4    General Assessments:     Activity Tolerance  Endurance: Tolerates 10 - 20 min exercise with multiple rests  Early Mobility/Exercise Safety Screen: Proceed with mobilization - No exclusion criteria met  Activity Tolerance Comments: Slightly hypotensive while walking however pt asymptomatic and MAP >65    Sensation  Light Touch: No apparent deficits    Strength  Strength Comments: Grossly at least 3/5 based on functional mobility  Strength  Strength Comments: Grossly at least 3/5 based on functional mobility    Perception  Inattention/Neglect: Appears intact  Initiation: Appears intact  Motor Planning: Appears intact  Perseveration: Not present      Coordination  Movements are Fluid and Coordinated: Yes    Postural Control  Postural Control: Within Functional Limits    Functional Assessments:  Bed Mobility  Bed Mobility: Yes  Bed Mobility 1  Bed Mobility 1: Sitting to supine  Level of Assistance 1: Minimum assistance  Bed Mobility Comments 1: Min A for LE management to supine    Transfers  Transfer: Yes  Transfer 1  Transfer From 1: Sit to, Stand to  Transfer to 1: Stand, Sit  Technique 1: Sit to stand, Stand to sit  Transfer Device 1: Walker  Transfer Level of Assistance 1: Minimum assistance  Trials/Comments 1: Min A for hip elevation to standing; progressed to CGA once standing    Ambulation/Gait Training  Ambulation/Gait Training Performed:refer to treatment section  below    Extremity/Trunk Assessments:  Cervical Spine   Cervical Spine: Within Functional Limits       RLE   RLE : Within Functional Limits  LLE   LLE : Within Functional Limits  Treatments:  Ambulation/Gait Training  Ambulation/Gait Training Performed: Yes  Ambulation/Gait Training 1  Surface 1: Level tile  Device 1: Rolling walker  Assistance 1: Contact guard  Quality of Gait 1: Decreased step length, Forward flexed posture  Comments/Distance (ft) 1: x50' with CGA with FWW; slightly hypotensive while walking however MAP > 65  Outcome Measures:  [facility] Basic Mobility  Turning from your back to your side while in a flat bed without using bedrails: A little  Moving from lying on your back to sitting on the side of a flat bed without using bedrails: A little  Moving to and from bed to chair (including a wheelchair): A little  Standing up from a chair using your arms (e.g. wheelchair or bedside chair): A little  To walk in hospital room: A little  Climbing 3-5 steps with railing: A lot  Basic Mobility - Total Score: 17    FSS-ICU  Ambulation: Walks >/ or equal to 150 feet with minimal assistance x1  Rolling: Minimal assistance (performs 75% or more of task)  Sitting: Minimal assistance (performs 75% or more of task)  Transfer Sit-to-Stand: Minimal assistance (performs 75% or more of task)  Transfer Supine-to-Sit: Minimal assistance (performs 75% or more of task)  Total Score: 20      Early Mobility/Exercise Safety Screen: Proceed with mobilization - No exclusion criteria met  ICU Mobility Scale: Walking with assistance of 1 person [8]    Encounter Problems       Encounter Problems (Active)       Balance       patient will score >24/28 on the Tinetti scale to present as a low risk of falls (Progressing)       Start:  07/15/25    Expected End:  08/16/25               Mobility       Patient will complete the 5xSTS  in <15 sec with no assistive device, no UE support, and close supervision (RPE: 11/20 RPD: 3/10)  (Progressing)       Start:  07/15/25    Expected End:  08/16/25            Patient will ambulate >1000ft on the 6MWT with no assistive device and close superivision (RPE: 11/20 RPD: 3/10) (Progressing)       Start:  07/15/25    Expected End:  08/16/25               Mobility       Pt will ambulate >300ft with appropriate form, SBA or less, LRAD, and no LOB for safe DC.  (Progressing)       Start:  08/02/25    Expected End:  08/16/25            Pt will ambulate up/down >3 stairs with/without hand rail with CGA or less to safely access their home upon DC.   (Progressing)       Start:  08/02/25    Expected End:  08/16/25               PT Transfers       Pt will perform bed mobility and sit<>stand transfers with SBA and use of LRAD to safely DC.  (Progressing)       Start:  08/02/25    Expected End:  08/16/25                   Education Documentation  Precautions, taught by Eddie Schaffer PT at 8/2/2025  3:37 PM.  Learner: Patient  Readiness: Acceptance  Method: Explanation  Response: Verbalizes Understanding  Comment: agreeable to PT POC; education provided on LVAD  management    Body Mechanics, taught by Eddie Schaffer PT at 8/2/2025  3:37 PM.  Learner: Patient  Readiness: Acceptance  Method: Explanation  Response: Verbalizes Understanding  Comment: agreeable to PT POC; education provided on LVAD  management    Handouts, taught by Eddie Schaffer PT at 8/2/2025  3:37 PM.  Learner: Patient  Readiness: Acceptance  Method: Explanation  Response: Verbalizes Understanding  Comment: agreeable to PT POC; education provided on LVAD  management    Mobility Training, taught by Eddie Schaffer PT at 8/2/2025  3:37 PM.  Learner: Patient  Readiness: Acceptance  Method: Explanation  Response: Verbalizes Understanding  Comment: agreeable to PT POC; education provided on LVAD  management    Education Comments  No comments found.    EDDIE SCHAFEFR PT

## 2025-08-02 NOTE — PROGRESS NOTES
Occupational Therapy    Re-Evaluation and Treatment    Patient Name: Thao Evans  MRN: [id number]  Today's Date: 8/2/2025  Room: 02/02-A  Time Calculation  Start Time: 1228  Stop Time: 1318  Time Calculation (min): 50 min    Assessment  IP OT Assessment  OT Assessment: Re-evaluation completed due to significant change in functional status now s/p LVAD on 8/1. Patient receptive to LVAD management education provided and demo observed functional movement from bed to chair with MIN A to CGA overall. Patient with L shoulder pain/limitations worse than baseline after surgery. Patient motivated to return to PLOF. Recommend LOW intensity OT services when medically appropriate for discharge and continue acute care OT.  Prognosis: Excellent  Barriers to Discharge Home: Physical needs  Physical Needs: Intermittent mobility assistance needed, Intermittent ADL assistance needed  Evaluation/Treatment Tolerance: Patient tolerated treatment well  Medical Staff Made Aware: Yes  End of Session Communication: Bedside nurse  End of Session Patient Position: Up in chair, Alarm off, not on at start of session  Plan:  Inpatient Plan  Treatment Interventions: ADL retraining, Visual perceptual retraining, Functional transfer training, UE strengthening/ROM, Endurance training, Cognitive reorientation, Patient/family training, Equipment evaluation/education, Neuromuscular reeducation, Fine motor coordination activities, Compensatory technique education, UE splinting, Continued evaluation  OT Frequency: 3 times per week  OT Discharge Recommendations: Low intensity level of continued care  Equipment Recommended upon Discharge: Wheeled walker  OT Recommended Transfer Status: Assist of 1 (may need 2nd person for line management)  OT - OK to Discharge: Yes (when medically appropriate)  OT Assessment  OT Assessment Results: Decreased ADL status, Decreased endurance, Decreased functional mobility  Prognosis: Excellent  Evaluation/Treatment  Tolerance: Patient tolerated treatment well  Medical Staff Made Aware: Yes  Strengths: Ability to acquire knowledge, Attitude of self  Barriers to Participation: Comorbidities    Subjective   Current Problem:  1. Heart failure, diastolic, with acute decompensation  Surgical Pathology Exam    Surgical Pathology Exam    Anesthesia Intraoperative Transesophageal Echocardiogram    Anesthesia Intraoperative Transesophageal Echocardiogram      2. Biventricular heart failure  Transthoracic Echo (TTE) Limited    Transthoracic Echo (TTE) Limited    Surgical Pathology Exam    Surgical Pathology Exam      3. Acute on chronic combined systolic (congestive) and diastolic (congestive) heart failure  Transthoracic Echo (TTE) Limited    Surgical Pathology Exam    Surgical Pathology Exam      4. Left ventricular failure, unspecified (Multi)  Transthoracic Echo (TTE) Limited    Surgical Pathology Exam    Surgical Pathology Exam      5. Heart failure, unspecified  Transthoracic Echo (TTE) Limited    Surgical Pathology Exam    Surgical Pathology Exam      6. Abnormal weight loss  Esophagogastroduodenoscopy (EGD)    Colonoscopy Diagnostic    Esophagogastroduodenoscopy (EGD)    Colonoscopy Diagnostic    Surgical Pathology Exam    Surgical Pathology Exam      7. Screening for colon cancer  Colonoscopy Diagnostic    Colonoscopy Diagnostic    Surgical Pathology Exam    Surgical Pathology Exam      8. CHF (NYHA class IV, ACC/AHA stage D) (Multi)  Case Request Cath Lab: INSERTION, IMPLANTABLE VENTRICULAR ASSIST DEVICE    Case Request Cath Lab: INSERTION, IMPLANTABLE VENTRICULAR ASSIST DEVICE    Surgical Pathology Exam    Surgical Pathology Exam      9. Acute decompensated heart failure  Cardiac device check - Surgery    Cardiac device check - Surgery      10. Acute on chronic heart failure with reduced ejection fraction (HFrEF, <= 40%)  Cardiac device check - Inpatient    Cardiac device check - Inpatient        General:  Reason for Referral:  59 y/o F presents for colonoscopy prior to LVAD implantation next week and now  LVAD (HM3) placement 08/01.  Past Medical History Relevant to Rehab: CAD s/p PCI (LAD; 2015, Lcx; 2025), stage D systolic HF/ICM/HFrEF s/p ICD, VT with presumed associated syncope, poorly controlled DM, pAF, dyslipidemia, essential HTN, COPD, and Graves  Prior to Session Communication: Bedside nurse  Patient Position Received: Bed, 3 rail up, Alarm off, not on at start of session  Family/Caregiver Present: No  General Comment: Pleasant and agreeable to OT. Patient initially unsure about OOB activity 2/2 pain. (Driveline secure at start and end of session; LVAD numbers stable throughout session.)   Precautions:  Hearing/Visual Limitations: hearing WFL; glasses on.  Medical Precautions: Cardiac precautions, Fall precautions, Chest tube (LVAD)  Post-Surgical Precautions: Move in the Tube  Precautions Comment: 2 chest tubes in place; MAP>65  Vital Signs:     08/02/25 1228 08/02/25 1318   Vital Signs   Vitals Session Pre OT Post OT   Heart Rate 104 105   Resp  --   --    SpO2 97 % 90 %   BP 83/65 95/77   MAP (mmHg) 75 83     Pain:  Pain Assessment  Pain Assessment: 0-10  0-10 (Numeric) Pain Score: 7  Pain Type: Surgical pain  Pain Location: Chest  Pain Descriptors: Aching  Pain Frequency: Constant/continuous  Pain Onset: Ongoing  Pain Interventions: Repositioned, Distraction, Emotional support  Response to Interventions: No change in pain  Lines/Tubes/Drains:  CVC 08/01/25 Right Internal jugular (Active)   Number of days: 1       Introducer 08/01/25 Internal jugular Right (Active)   Number of days: 1       Arterial Line 08/01/25 Left Brachial (Active)   Number of days: 1       Ventricular Assist Device (Active)   Number of days: 1       Pulmonary Artery Catheter Internal jugular (Active)   Number of days: 1       Urethral Catheter Non-latex 14 Fr. (Active)   Number of days: 1       Chest Tube 1 Mediastinal 28 Fr (Active)   Number of days: 1        Y Chest Tube 1 and 2 Left Pleural Right Pleural (Active)   Number of days: 1         Objective   Cognition:  Overall Cognitive Status: Within Functional Limits  Arousal/Alertness: Appropriate responses to stimuli  Orientation Level: Oriented X4  Following Commands: Follows all commands and directions without difficulty  Insight: Within function limits  Impulsive: Within functional limits     Home Living:  Type of Home: House  Lives With: Other (Comment) (Friend (43 y/o) who lives with her and is available 24/7. 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 Patient shares household IADLs with friend.    ADL:  Eating Assistance: Independent  Grooming Assistance: Stand by  Grooming Deficit: Supervision/safety (standing or sitting)  Bathing Assistance: Moderate  UE Dressing Assistance: Moderate  LE Dressing Assistance: Moderate  Toileting Assistance with Device: Moderate    Balance:  Dynamic Standing Balance  Dynamic Standing-Level of Assistance: Contact guard  Static Sitting Balance  Static Sitting-Level of Assistance: Close supervision  Static Standing Balance  Static Standing-Level of Assistance: Contact guard  Bed Mobility/Transfers: Bed Mobility/Transfers: Bed Mobility  Bed Mobility: Yes  Bed Mobility 1  Bed Mobility 1: Supine to sitting  Level of Assistance 1: Minimum  assistance  Functional Mobility  Functional Mobility Performed: No   and Transfers  Transfer: Yes  Transfer 1  Technique 1: Sit to stand, Stand to sit, Stand pivot  Transfer Device 1:  (hand held assist)  Transfer Level of Assistance 1: Hand held assistance, Contact guard  Trials/Comments 1: Demo provided and cues with lines; RN assisting with line management and OT providing CGA.    Vision: Vision - Basic Assessment  Current Vision: Wears glasses all the time    Strength:  Strength Comments: within post op precautions; L shoulder limited 2/2 pain.    Coordination:  Movements are Fluid and Coordinated: Yes   Hand Function:  Hand Function  Gross Grasp: Functional  Coordination: Functional  Extremities:   RUE   RUE : Within Functional Limits, LUE   LUE:  (Hx of L shoulder rotator cuff surgery and did not make full recovery; now with further limitations and pain post op; distally WFL),      Outcome Measures: [facility] Daily Activity  Putting on and taking off regular lower body clothing: A lot  Bathing (including washing, rinsing, drying): A lot  Putting on and taking off regular upper body clothing: A lot  Toileting, which includes using toilet, bedpan or urinal: A lot  Taking care of personal grooming such as brushing teeth: A little  Eating Meals: None  Daily Activity - Total Score: 15    Confusion Assessment Method-ICU (CAM-ICU)  Feature 1: Acute Onset or Fluctuating Course: Negative  Overall CAM-ICU: Negative      Education Documentation  Body Mechanics, taught by Dari Yang OT at 8/2/2025  2:28 PM.  Learner: Patient  Readiness: Acceptance  Method: Explanation  Response: Verbalizes Understanding, Demonstrated Understanding, Needs Reinforcement  Comment: safety parameters; LVAD management.    Precautions, taught by Dari Yang OT at 8/2/2025  2:28 PM.  Learner: Patient  Readiness: Acceptance  Method: Explanation  Response: Verbalizes Understanding, Demonstrated Understanding, Needs  Reinforcement  Comment: safety parameters; LVAD management.    ADL Training, taught by Dari Yang OT at 8/2/2025  2:28 PM.  Learner: Patient  Readiness: Acceptance  Method: Explanation  Response: Verbalizes Understanding, Demonstrated Understanding, Needs Reinforcement  Comment: safety parameters; LVAD management.    Education Comments  No comments found.        Goals:   Encounter Problems       Encounter Problems (Active)       ADLs       Patient will perform basic IADLs/simulated household tasks with Independent and LRD.  (Not met)       Start:  07/17/25    Expected End:  08/16/25    Resolved:  08/02/25         Patient will complete LB dressing with MOD I.   (Progressing)       Start:  08/02/25    Expected End:  08/16/25                Patient will complete UB dressing with MOD I.   (Progressing)       Start:  08/02/25    Expected End:  08/16/25                Patient will complete toileting with MOD I.   (Progressing)       Start:  08/02/25    Expected End:  08/16/25                Patient will complete grooming with MOD I.   (Progressing)       Start:  08/02/25    Expected End:  08/16/25                   COGNITION/SAFETY       Patient will complete pill box simulation independently with use of medication list. (Progressing)       Start:  07/17/25    Expected End:  08/16/25            Patient will demo LVAD management of batteries<>wall unit with MOD I. (Progressing)       Start:  08/02/25    Expected End:  08/16/25                   EXERCISE/STRENGTHENING       Patient will participate in >15 minutes of activity with <2 rest breaks to increase ADL/functional task endurance.  (Not met)       Start:  07/17/25    Expected End:  08/16/25    Resolved:  08/02/25         Patient will demo L shoulder HEP with MOD I to improve functional use of LUE. (Progressing)       Start:  08/02/25    Expected End:  08/16/25                   MOBILITY       Patient will perform Functional mobility Household  distances/Community Distances with independent level of assistance and least restrictive device in order to improve safety and functional mobility. (Progressing)       Start:  07/17/25    Expected End:  08/16/25               TRANSFERS       Patient will complete functional transfers with least restrictive device with independent level of assistance. (Progressing)       Start:  07/17/25    Expected End:  08/16/25            Patient will complete bed mobility with MOD I.    (Progressing)       Start:  08/02/25    Expected End:  08/16/25                       Treatment Completed on Evaluation  ADL/Cognitive Skill Development:  Cognitive Skill Development Activity 1: Reviewed LVAD components, safety with OOB activity and transfer from wall unit <>battery unit. Patient engaged and receptive. Would benefit from further review for increased carryover.      08/02/25 at 2:41 PM   Dari Yang OT   Rehab Office: [phone]

## 2025-08-02 NOTE — SIGNIFICANT EVENT
Weaning parameters completed.        08/02/25 0058   Daily Screen   Total RSBI (S)  63   Weaning Parameters   Weaning Vital Capacity (S)  742 mL   Negative Inspiratory Force (NIF) (S)  -27   Spontaneous Minute Volume (MV) (S)  7.9   Weaning Tidal Volume (S)  334 mL  (rr 24)   Respiratory Depth/Rhythm (S)  Regular   Respiratory Effort (S)  Unlabored   Weaning Tolerance (S)  Good

## 2025-08-02 NOTE — CARE PLAN
The clinical goals for the shift include pt to remain hds throughout shift      Problem: Safety - Medical Restraint  Goal: Remains free of injury from restraints (Restraint for Interference with Medical Device)  Outcome: Progressing  Flowsheets (Taken 8/1/2025 1629 by Nelida Hoffman RN)  Remains free of injury from restraints (restraint for interference with medical device):   Determine that other, less restrictive measures have been tried or would not be effective before applying the restraint   Evaluate the patient's condition at the time of restraint application   Inform patient/family regarding the reason for restraint   Every 2 hours: Monitor safety, psychosocial status, comfort, nutrition and hydration  Goal: Free from restraint(s) (Restraint for Interference with Medical Device)  Outcome: Progressing  Flowsheets (Taken 8/1/2025 1629 by Nelida Hoffman RN)  Free from restraint(s) (restraint for interference with medical device):   ONCE/SHIFT or MINIMUM Every 12 hours: Assess and document the continuing need for restraints   Every 24 hours: Continued use of restraint requires Licensed Independent Practitioner to perform face to face examination and written order   Identify and implement measures to help patient regain control     Problem: Ventricular Assisted Device (VAD)  Goal: Hemodynamic stability, correction of coagulopathy, lab value stability  Outcome: Progressing  Goal: Wean vasopressors/nitric  Outcome: Progressing  Goal: Wean ventilator  Outcome: Progressing     Problem: Skin  Goal: Decreased wound size/increased tissue granulation at next dressing change  Outcome: Progressing  Flowsheets (Taken 8/1/2025 2351)  Decreased wound size/increased tissue granulation at next dressing change:   Promote sleep for wound healing   Protective dressings over bony prominences  Goal: Participates in plan/prevention/treatment measures  Outcome: Progressing  Flowsheets (Taken 8/1/2025 2351)  Participates  in plan/prevention/treatment measures: Elevate heels  Goal: Prevent/manage excess moisture  Outcome: Progressing  Flowsheets (Taken 8/1/2025 2351)  Prevent/manage excess moisture:   Cleanse incontinence/protect with barrier cream   Moisturize dry skin   Monitor for/manage infection if present   Use wicking fabric (obtain order)   Follow provider orders for dressing changes  Goal: Prevent/minimize sheer/friction injuries  Outcome: Progressing  Flowsheets (Taken 8/1/2025 2351)  Prevent/minimize sheer/friction injuries:   Complete micro-shifts as needed if patient unable. Adjust patient position to relieve pressure points, not a full turn   Turn/reposition every 2 hours/use positioning/transfer devices   Use pull sheet   Increase activity/out of bed for meals   HOB 30 degrees or less   Utilize specialty bed per algorithm  Goal: Promote/optimize nutrition  Outcome: Progressing  Flowsheets (Taken 8/1/2025 2351)  Promote/optimize nutrition:   Reassess MST if dietician not consulted   Offer water/supplements/favorite foods  Goal: Promote skin healing  Outcome: Progressing  Flowsheets (Taken 8/1/2025 2351)  Promote skin healing:   Assess skin/pad under line(s)/device(s)   Protective dressings over bony prominences   Rotate device position/do not position patient on device   Turn/reposition every 2 hours/use positioning/transfer devices   Ensure correct size (line/device) and apply per  instructions
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
## 2025-08-03 ENCOUNTER — APPOINTMENT (OUTPATIENT)
Dept: RADIOLOGY | Facility: HOSPITAL | Age: 60
DRG: 001 | End: 2025-08-03
Payer: COMMERCIAL

## 2025-08-03 ENCOUNTER — APPOINTMENT (OUTPATIENT)
Dept: RADIOLOGY | Facility: HOSPITAL | Age: 60
End: 2025-08-03
Payer: COMMERCIAL

## 2025-08-03 LAB
ABO GROUP (TYPE) IN BLOOD: NORMAL
ALBUMIN SERPL BCP-MCNC: 3.7 G/DL (ref 3.4–5)
ALBUMIN SERPL BCP-MCNC: 3.7 G/DL (ref 3.4–5)
ALP SERPL-CCNC: 56 U/L (ref 33–136)
ALT SERPL W P-5'-P-CCNC: 11 U/L (ref 7–45)
ANION GAP BLDA CALCULATED.4IONS-SCNC: 10 MMO/L (ref 10–25)
ANION GAP BLDA CALCULATED.4IONS-SCNC: 7 MMO/L (ref 10–25)
ANION GAP BLDA CALCULATED.4IONS-SCNC: 9 MMO/L (ref 10–25)
ANION GAP BLDA CALCULATED.4IONS-SCNC: 9 MMO/L (ref 10–25)
ANION GAP BLDMV CALCULATED.4IONS-SCNC: 7 MMO/L (ref 10–25)
ANION GAP SERPL CALC-SCNC: 10 MMOL/L (ref 10–20)
ANION GAP SERPL CALC-SCNC: 12 MMOL/L (ref 10–20)
ANTIBODY SCREEN: NORMAL
APTT PPP: 27 SECONDS (ref 26–36)
AST SERPL W P-5'-P-CCNC: 43 U/L (ref 9–39)
BASE EXCESS BLDA CALC-SCNC: 0.2 MMOL/L (ref -2–3)
BASE EXCESS BLDA CALC-SCNC: 1.6 MMOL/L (ref -2–3)
BASE EXCESS BLDA CALC-SCNC: 2.8 MMOL/L (ref -2–3)
BASE EXCESS BLDA CALC-SCNC: 3.1 MMOL/L (ref -2–3)
BASE EXCESS BLDMV CALC-SCNC: 4.8 MMOL/L (ref -2–3)
BILIRUB DIRECT SERPL-MCNC: 0.1 MG/DL (ref 0–0.3)
BILIRUB SERPL-MCNC: 0.5 MG/DL (ref 0–1.2)
BLOOD EXPIRATION DATE: NORMAL
BODY TEMPERATURE: 37 DEGREES CELSIUS
BUN SERPL-MCNC: 28 MG/DL (ref 6–23)
BUN SERPL-MCNC: 31 MG/DL (ref 6–23)
CA-I BLD-SCNC: 1.14 MMOL/L (ref 1.1–1.33)
CA-I BLD-SCNC: 1.16 MMOL/L (ref 1.1–1.33)
CA-I BLDA-SCNC: 1.16 MMOL/L (ref 1.1–1.33)
CA-I BLDA-SCNC: 1.18 MMOL/L (ref 1.1–1.33)
CA-I BLDA-SCNC: 1.19 MMOL/L (ref 1.1–1.33)
CA-I BLDA-SCNC: 1.19 MMOL/L (ref 1.1–1.33)
CA-I BLDMV-SCNC: 1.18 MMOL/L (ref 1.1–1.33)
CALCIUM SERPL-MCNC: 8.8 MG/DL (ref 8.6–10.6)
CALCIUM SERPL-MCNC: 9 MG/DL (ref 8.6–10.6)
CHLORIDE BLD-SCNC: 97 MMOL/L (ref 98–107)
CHLORIDE BLDA-SCNC: 97 MMOL/L (ref 98–107)
CHLORIDE BLDA-SCNC: 98 MMOL/L (ref 98–107)
CHLORIDE BLDA-SCNC: 99 MMOL/L (ref 98–107)
CHLORIDE BLDA-SCNC: 99 MMOL/L (ref 98–107)
CHLORIDE SERPL-SCNC: 95 MMOL/L (ref 98–107)
CHLORIDE SERPL-SCNC: 95 MMOL/L (ref 98–107)
CO2 SERPL-SCNC: 31 MMOL/L (ref 21–32)
CO2 SERPL-SCNC: 33 MMOL/L (ref 21–32)
CREAT SERPL-MCNC: 0.92 MG/DL (ref 0.5–1.05)
CREAT SERPL-MCNC: 0.92 MG/DL (ref 0.5–1.05)
DISPENSE STATUS: NORMAL
EGFRCR SERPLBLD CKD-EPI 2021: 71 ML/MIN/1.73M*2
EGFRCR SERPLBLD CKD-EPI 2021: 71 ML/MIN/1.73M*2
ERYTHROCYTE [DISTWIDTH] IN BLOOD BY AUTOMATED COUNT: 15.4 % (ref 11.5–14.5)
ERYTHROCYTE [DISTWIDTH] IN BLOOD BY AUTOMATED COUNT: 15.5 % (ref 11.5–14.5)
GLUCOSE BLD MANUAL STRIP-MCNC: 144 MG/DL (ref 74–99)
GLUCOSE BLD MANUAL STRIP-MCNC: 145 MG/DL (ref 74–99)
GLUCOSE BLD MANUAL STRIP-MCNC: 206 MG/DL (ref 74–99)
GLUCOSE BLD MANUAL STRIP-MCNC: 218 MG/DL (ref 74–99)
GLUCOSE BLD-MCNC: 139 MG/DL (ref 74–99)
GLUCOSE BLDA-MCNC: 137 MG/DL (ref 74–99)
GLUCOSE BLDA-MCNC: 146 MG/DL (ref 74–99)
GLUCOSE BLDA-MCNC: 172 MG/DL (ref 74–99)
GLUCOSE BLDA-MCNC: 178 MG/DL (ref 74–99)
GLUCOSE SERPL-MCNC: 139 MG/DL (ref 74–99)
GLUCOSE SERPL-MCNC: 189 MG/DL (ref 74–99)
HCO3 BLDA-SCNC: 28.5 MMOL/L (ref 22–26)
HCO3 BLDA-SCNC: 29.8 MMOL/L (ref 22–26)
HCO3 BLDA-SCNC: 30.5 MMOL/L (ref 22–26)
HCO3 BLDA-SCNC: 31 MMOL/L (ref 22–26)
HCO3 BLDMV-SCNC: 32.9 MMOL/L (ref 22–26)
HCT VFR BLD AUTO: 28.4 % (ref 36–46)
HCT VFR BLD AUTO: 29 % (ref 36–46)
HCT VFR BLD EST: 26 % (ref 36–46)
HCT VFR BLD EST: 28 % (ref 36–46)
HCT VFR BLD EST: 28 % (ref 36–46)
HCT VFR BLD EST: 29 % (ref 36–46)
HCT VFR BLD EST: 29 % (ref 36–46)
HGB BLD-MCNC: 8.9 G/DL (ref 12–16)
HGB BLD-MCNC: 9.3 G/DL (ref 12–16)
HGB BLDA-MCNC: 8.8 G/DL (ref 12–16)
HGB BLDA-MCNC: 9.2 G/DL (ref 12–16)
HGB BLDA-MCNC: 9.5 G/DL (ref 12–16)
HGB BLDA-MCNC: 9.6 G/DL (ref 12–16)
HGB BLDMV-MCNC: 9.4 G/DL (ref 12–16)
INHALED O2 CONCENTRATION: 100 %
INHALED O2 CONCENTRATION: 40 %
INHALED O2 CONCENTRATION: 50 %
INR PPP: 1.2 (ref 0.9–1.1)
LACTATE BLDA-SCNC: 0.5 MMOL/L (ref 0.4–2)
LACTATE BLDA-SCNC: 0.7 MMOL/L (ref 0.4–2)
LACTATE BLDA-SCNC: 1.2 MMOL/L (ref 0.4–2)
LACTATE BLDA-SCNC: 1.4 MMOL/L (ref 0.4–2)
LACTATE BLDMV-SCNC: 0.8 MMOL/L (ref 0.4–2)
LDH SERPL L TO P-CCNC: 349 U/L (ref 84–246)
MAGNESIUM SERPL-MCNC: 2.09 MG/DL (ref 1.6–2.4)
MAGNESIUM SERPL-MCNC: 2.14 MG/DL (ref 1.6–2.4)
MCH RBC QN AUTO: 30.1 PG (ref 26–34)
MCH RBC QN AUTO: 30.7 PG (ref 26–34)
MCHC RBC AUTO-ENTMCNC: 31.3 G/DL (ref 32–36)
MCHC RBC AUTO-ENTMCNC: 32.1 G/DL (ref 32–36)
MCV RBC AUTO: 96 FL (ref 80–100)
MCV RBC AUTO: 96 FL (ref 80–100)
NRBC BLD-RTO: 0 /100 WBCS (ref 0–0)
NRBC BLD-RTO: 0 /100 WBCS (ref 0–0)
OXYHGB MFR BLDA: 57.6 % (ref 94–98)
OXYHGB MFR BLDA: 94.7 % (ref 94–98)
OXYHGB MFR BLDA: 96.2 % (ref 94–98)
OXYHGB MFR BLDA: 98.2 % (ref 94–98)
OXYHGB MFR BLDMV: 56.7 % (ref 45–75)
PCO2 BLDA: 65 MM HG (ref 38–42)
PCO2 BLDA: 66 MM HG (ref 38–42)
PCO2 BLDA: 68 MM HG (ref 38–42)
PCO2 BLDA: 68 MM HG (ref 38–42)
PCO2 BLDMV: 70 MM HG (ref 41–51)
PH BLDA: 7.23 PH (ref 7.38–7.42)
PH BLDA: 7.25 PH (ref 7.38–7.42)
PH BLDA: 7.28 PH (ref 7.38–7.42)
PH BLDA: 7.28 PH (ref 7.38–7.42)
PH BLDMV: 7.28 PH (ref 7.33–7.43)
PHOSPHATE SERPL-MCNC: 5.2 MG/DL (ref 2.5–4.9)
PHOSPHATE SERPL-MCNC: 5.4 MG/DL (ref 2.5–4.9)
PLATELET # BLD AUTO: 173 X10*3/UL (ref 150–450)
PLATELET # BLD AUTO: 196 X10*3/UL (ref 150–450)
PO2 BLDA: 248 MM HG (ref 85–95)
PO2 BLDA: 38 MM HG (ref 85–95)
PO2 BLDA: 81 MM HG (ref 85–95)
PO2 BLDA: 91 MM HG (ref 85–95)
PO2 BLDMV: 37 MM HG (ref 35–45)
POTASSIUM BLDA-SCNC: 4.2 MMOL/L (ref 3.5–5.3)
POTASSIUM BLDA-SCNC: 4.4 MMOL/L (ref 3.5–5.3)
POTASSIUM BLDA-SCNC: 4.6 MMOL/L (ref 3.5–5.3)
POTASSIUM BLDA-SCNC: 4.6 MMOL/L (ref 3.5–5.3)
POTASSIUM BLDMV-SCNC: 4.6 MMOL/L (ref 3.5–5.3)
POTASSIUM SERPL-SCNC: 4.3 MMOL/L (ref 3.5–5.3)
POTASSIUM SERPL-SCNC: 4.5 MMOL/L (ref 3.5–5.3)
PRODUCT BLOOD TYPE: 5100
PRODUCT CODE: NORMAL
PROT SERPL-MCNC: 5.9 G/DL (ref 6.4–8.2)
PROTHROMBIN TIME: 13.8 SECONDS (ref 9.8–12.4)
RBC # BLD AUTO: 2.96 X10*6/UL (ref 4–5.2)
RBC # BLD AUTO: 3.03 X10*6/UL (ref 4–5.2)
RH FACTOR (ANTIGEN D): NORMAL
SAO2 % BLDA: 100 % (ref 94–100)
SAO2 % BLDA: 60 % (ref 94–100)
SAO2 % BLDA: 97 % (ref 94–100)
SAO2 % BLDA: 99 % (ref 94–100)
SAO2 % BLDMV: 58 % (ref 45–75)
SODIUM BLDA-SCNC: 132 MMOL/L (ref 136–145)
SODIUM BLDA-SCNC: 133 MMOL/L (ref 136–145)
SODIUM BLDMV-SCNC: 132 MMOL/L (ref 136–145)
SODIUM SERPL-SCNC: 133 MMOL/L (ref 136–145)
SODIUM SERPL-SCNC: 134 MMOL/L (ref 136–145)
STAPHYLOCOCCUS SPEC CULT: NORMAL
UNIT ABO: NORMAL
UNIT NUMBER: NORMAL
UNIT RH: NORMAL
UNIT VOLUME: 350
WBC # BLD AUTO: 17.6 X10*3/UL (ref 4.4–11.3)
WBC # BLD AUTO: 19.9 X10*3/UL (ref 4.4–11.3)
XM INTEP: NORMAL

## 2025-08-03 PROCEDURE — 71045 X-RAY EXAM CHEST 1 VIEW: CPT

## 2025-08-03 PROCEDURE — 2500000001 HC RX 250 WO HCPCS SELF ADMINISTERED DRUGS (ALT 637 FOR MEDICARE OP)

## 2025-08-03 PROCEDURE — 2500000001 HC RX 250 WO HCPCS SELF ADMINISTERED DRUGS (ALT 637 FOR MEDICARE OP): Performed by: NURSE PRACTITIONER

## 2025-08-03 PROCEDURE — 2500000005 HC RX 250 GENERAL PHARMACY W/O HCPCS

## 2025-08-03 PROCEDURE — 71045 X-RAY EXAM CHEST 1 VIEW: CPT | Performed by: RADIOLOGY

## 2025-08-03 PROCEDURE — 71045 X-RAY EXAM CHEST 1 VIEW: CPT | Performed by: STUDENT IN AN ORGANIZED HEALTH CARE EDUCATION/TRAINING PROGRAM

## 2025-08-03 PROCEDURE — 2500000004 HC RX 250 GENERAL PHARMACY W/ HCPCS (ALT 636 FOR OP/ED)

## 2025-08-03 PROCEDURE — 82248 BILIRUBIN DIRECT: CPT | Performed by: NURSE PRACTITIONER

## 2025-08-03 PROCEDURE — 80069 RENAL FUNCTION PANEL: CPT | Mod: CCI

## 2025-08-03 PROCEDURE — 99291 CRITICAL CARE FIRST HOUR: CPT

## 2025-08-03 PROCEDURE — 84132 ASSAY OF SERUM POTASSIUM: CPT

## 2025-08-03 PROCEDURE — 82947 ASSAY GLUCOSE BLOOD QUANT: CPT

## 2025-08-03 PROCEDURE — 2500000002 HC RX 250 W HCPCS SELF ADMINISTERED DRUGS (ALT 637 FOR MEDICARE OP, ALT 636 FOR OP/ED)

## 2025-08-03 PROCEDURE — 99291 CRITICAL CARE FIRST HOUR: CPT | Performed by: SPECIALIST

## 2025-08-03 PROCEDURE — 83615 LACTATE (LD) (LDH) ENZYME: CPT | Performed by: NURSE PRACTITIONER

## 2025-08-03 PROCEDURE — 94669 MECHANICAL CHEST WALL OSCILL: CPT

## 2025-08-03 PROCEDURE — 31500 INSERT EMERGENCY AIRWAY: CPT

## 2025-08-03 PROCEDURE — 85610 PROTHROMBIN TIME: CPT

## 2025-08-03 PROCEDURE — 87070 CULTURE OTHR SPECIMN AEROBIC: CPT

## 2025-08-03 PROCEDURE — 2500000004 HC RX 250 GENERAL PHARMACY W/ HCPCS (ALT 636 FOR OP/ED): Performed by: NURSE PRACTITIONER

## 2025-08-03 PROCEDURE — 2020000001 HC ICU ROOM DAILY

## 2025-08-03 PROCEDURE — 86901 BLOOD TYPING SEROLOGIC RH(D): CPT

## 2025-08-03 PROCEDURE — 2500000002 HC RX 250 W HCPCS SELF ADMINISTERED DRUGS (ALT 637 FOR MEDICARE OP, ALT 636 FOR OP/ED): Performed by: NURSE PRACTITIONER

## 2025-08-03 PROCEDURE — 82330 ASSAY OF CALCIUM: CPT

## 2025-08-03 PROCEDURE — 94640 AIRWAY INHALATION TREATMENT: CPT

## 2025-08-03 PROCEDURE — 94660 CPAP INITIATION&MGMT: CPT

## 2025-08-03 PROCEDURE — 84100 ASSAY OF PHOSPHORUS: CPT

## 2025-08-03 PROCEDURE — 31500 INSERT EMERGENCY AIRWAY: CPT | Mod: GC

## 2025-08-03 PROCEDURE — 83735 ASSAY OF MAGNESIUM: CPT

## 2025-08-03 PROCEDURE — 93750 INTERROGATION VAD IN PERSON: CPT | Performed by: SPECIALIST

## 2025-08-03 PROCEDURE — 80048 BASIC METABOLIC PNL TOTAL CA: CPT

## 2025-08-03 PROCEDURE — 85027 COMPLETE CBC AUTOMATED: CPT

## 2025-08-03 PROCEDURE — 31624 DX BRONCHOSCOPE/LAVAGE: CPT

## 2025-08-03 PROCEDURE — 37799 UNLISTED PX VASCULAR SURGERY: CPT

## 2025-08-03 RX ORDER — INSULIN GLARGINE 100 [IU]/ML
25 INJECTION, SOLUTION SUBCUTANEOUS EVERY 24 HOURS
Status: DISCONTINUED | OUTPATIENT
Start: 2025-08-03 | End: 2025-08-18 | Stop reason: HOSPADM

## 2025-08-03 RX ORDER — NOREPINEPHRINE BITARTRATE/D5W 8 MG/250ML
PLASTIC BAG, INJECTION (ML) INTRAVENOUS
Status: DISPENSED
Start: 2025-08-03 | End: 2025-08-04

## 2025-08-03 RX ORDER — FUROSEMIDE 10 MG/ML
60 INJECTION INTRAMUSCULAR; INTRAVENOUS ONCE
Status: COMPLETED | OUTPATIENT
Start: 2025-08-03 | End: 2025-08-03

## 2025-08-03 RX ORDER — IPRATROPIUM BROMIDE AND ALBUTEROL SULFATE 2.5; .5 MG/3ML; MG/3ML
3 SOLUTION RESPIRATORY (INHALATION)
Status: DISCONTINUED | OUTPATIENT
Start: 2025-08-03 | End: 2025-08-04

## 2025-08-03 RX ORDER — PROPOFOL 10 MG/ML
5-50 INJECTION, EMULSION INTRAVENOUS CONTINUOUS
Status: DISCONTINUED | OUTPATIENT
Start: 2025-08-03 | End: 2025-08-04

## 2025-08-03 RX ORDER — PROPOFOL 10 MG/ML
INJECTION, EMULSION INTRAVENOUS
Status: COMPLETED
Start: 2025-08-03 | End: 2025-08-03

## 2025-08-03 RX ORDER — ROCURONIUM BROMIDE 10 MG/ML
INJECTION, SOLUTION INTRAVENOUS
Status: COMPLETED
Start: 2025-08-03 | End: 2025-08-03

## 2025-08-03 RX ORDER — FUROSEMIDE 10 MG/ML
40 INJECTION INTRAMUSCULAR; INTRAVENOUS ONCE
Status: COMPLETED | OUTPATIENT
Start: 2025-08-03 | End: 2025-08-03

## 2025-08-03 RX ORDER — WARFARIN 2 MG/1
2 TABLET ORAL DAILY
Status: DISCONTINUED | OUTPATIENT
Start: 2025-08-03 | End: 2025-08-10

## 2025-08-03 RX ORDER — ROCURONIUM BROMIDE 10 MG/ML
100 INJECTION, SOLUTION INTRAVENOUS ONCE
Status: COMPLETED | OUTPATIENT
Start: 2025-08-03 | End: 2025-08-03

## 2025-08-03 RX ORDER — ETOMIDATE 2 MG/ML
INJECTION INTRAVENOUS
Status: COMPLETED
Start: 2025-08-03 | End: 2025-08-03

## 2025-08-03 RX ORDER — FUROSEMIDE 10 MG/ML
80 INJECTION INTRAMUSCULAR; INTRAVENOUS ONCE
Status: COMPLETED | OUTPATIENT
Start: 2025-08-03 | End: 2025-08-03

## 2025-08-03 RX ORDER — ETOMIDATE 2 MG/ML
0.3 INJECTION INTRAVENOUS ONCE
Status: COMPLETED | OUTPATIENT
Start: 2025-08-03 | End: 2025-08-03

## 2025-08-03 RX ORDER — SODIUM CHLORIDE FOR INHALATION 3 %
3 VIAL, NEBULIZER (ML) INHALATION EVERY 6 HOURS SCHEDULED
Status: DISCONTINUED | OUTPATIENT
Start: 2025-08-03 | End: 2025-08-04

## 2025-08-03 RX ORDER — EPINEPHRINE IN 0.9 % SOD CHLOR 4MG/250ML
0.04 PLASTIC BAG, INJECTION (ML) INTRAVENOUS CONTINUOUS
Status: DISCONTINUED | OUTPATIENT
Start: 2025-08-03 | End: 2025-08-04

## 2025-08-03 RX ORDER — PHENYLEPHRINE HCL IN 0.9% NACL 0.4MG/10ML
SYRINGE (ML) INTRAVENOUS
Status: COMPLETED
Start: 2025-08-03 | End: 2025-08-03

## 2025-08-03 RX ADMIN — METHOCARBAMOL 500 MG: 500 TABLET ORAL at 18:16

## 2025-08-03 RX ADMIN — SENNOSIDES, DOCUSATE SODIUM 2 TABLET: 50; 8.6 TABLET, FILM COATED ORAL at 08:15

## 2025-08-03 RX ADMIN — METHOCARBAMOL 500 MG: 500 TABLET ORAL at 12:08

## 2025-08-03 RX ADMIN — ETOMIDATE INJECTION 19.6 MG: 2 SOLUTION INTRAVENOUS at 23:33

## 2025-08-03 RX ADMIN — ROPINIROLE 1 MG: 1 TABLET, FILM COATED ORAL at 08:15

## 2025-08-03 RX ADMIN — INSULIN GLARGINE 25 UNITS: 100 INJECTION, SOLUTION SUBCUTANEOUS at 10:47

## 2025-08-03 RX ADMIN — ROCURONIUM BROMIDE 100 MG: 10 INJECTION INTRAVENOUS at 23:34

## 2025-08-03 RX ADMIN — ACETAMINOPHEN 650 MG: 325 TABLET, FILM COATED ORAL at 00:01

## 2025-08-03 RX ADMIN — POLYETHYLENE GLYCOL 3350 17 G: 17 POWDER, FOR SOLUTION ORAL at 08:15

## 2025-08-03 RX ADMIN — Medication: at 18:38

## 2025-08-03 RX ADMIN — WARFARIN SODIUM 2 MG: 2 TABLET ORAL at 18:17

## 2025-08-03 RX ADMIN — HYDROMORPHONE HYDROCHLORIDE 0.2 MG: 0.2 INJECTION, SOLUTION INTRAMUSCULAR; INTRAVENOUS; SUBCUTANEOUS at 20:32

## 2025-08-03 RX ADMIN — IPRATROPIUM BROMIDE AND ALBUTEROL SULFATE 3 ML: .5; 3 SOLUTION RESPIRATORY (INHALATION) at 16:30

## 2025-08-03 RX ADMIN — ETOMIDATE 19.6 MG: 2 INJECTION INTRAVENOUS at 23:33

## 2025-08-03 RX ADMIN — PROPOFOL 50 MCG/KG/MIN: 10 INJECTION, EMULSION INTRAVENOUS at 23:33

## 2025-08-03 RX ADMIN — IPRATROPIUM BROMIDE AND ALBUTEROL SULFATE 3 ML: .5; 3 SOLUTION RESPIRATORY (INHALATION) at 21:06

## 2025-08-03 RX ADMIN — OXYCODONE HYDROCHLORIDE 10 MG: 10 TABLET ORAL at 11:26

## 2025-08-03 RX ADMIN — INSULIN LISPRO 10 UNITS: 100 INJECTION, SOLUTION INTRAVENOUS; SUBCUTANEOUS at 06:31

## 2025-08-03 RX ADMIN — INSULIN LISPRO 5 UNITS: 100 INJECTION, SOLUTION INTRAVENOUS; SUBCUTANEOUS at 11:29

## 2025-08-03 RX ADMIN — FUROSEMIDE 40 MG: 10 INJECTION, SOLUTION INTRAMUSCULAR; INTRAVENOUS at 09:05

## 2025-08-03 RX ADMIN — CEFAZOLIN SODIUM 2 G: 2 INJECTION, SOLUTION INTRAVENOUS at 00:01

## 2025-08-03 RX ADMIN — FUROSEMIDE 40 MG: 10 INJECTION, SOLUTION INTRAMUSCULAR; INTRAVENOUS at 03:13

## 2025-08-03 RX ADMIN — ONDANSETRON 4 MG: 2 INJECTION INTRAMUSCULAR; INTRAVENOUS at 13:28

## 2025-08-03 RX ADMIN — FUROSEMIDE 60 MG: 10 INJECTION, SOLUTION INTRAMUSCULAR; INTRAVENOUS at 13:06

## 2025-08-03 RX ADMIN — LIDOCAINE 4% 1 PATCH: 40 PATCH TOPICAL at 08:23

## 2025-08-03 RX ADMIN — PANTOPRAZOLE SODIUM 40 MG: 40 TABLET, DELAYED RELEASE ORAL at 06:31

## 2025-08-03 RX ADMIN — METHOCARBAMOL 500 MG: 500 TABLET ORAL at 05:12

## 2025-08-03 RX ADMIN — HEPARIN SODIUM 5000 UNITS: 5000 INJECTION INTRAVENOUS; SUBCUTANEOUS at 18:16

## 2025-08-03 RX ADMIN — ACETAMINOPHEN 650 MG: 325 TABLET, FILM COATED ORAL at 13:06

## 2025-08-03 RX ADMIN — HEPARIN SODIUM 5000 UNITS: 5000 INJECTION INTRAVENOUS; SUBCUTANEOUS at 10:47

## 2025-08-03 RX ADMIN — METHOCARBAMOL 500 MG: 500 TABLET ORAL at 00:01

## 2025-08-03 RX ADMIN — IPRATROPIUM BROMIDE AND ALBUTEROL SULFATE 3 ML: .5; 3 SOLUTION RESPIRATORY (INHALATION) at 09:26

## 2025-08-03 RX ADMIN — ATORVASTATIN CALCIUM 80 MG: 80 TABLET, FILM COATED ORAL at 08:15

## 2025-08-03 RX ADMIN — FLUCONAZOLE 400 MG: 2 INJECTION, SOLUTION INTRAVENOUS at 08:15

## 2025-08-03 RX ADMIN — CEFAZOLIN SODIUM 2 G: 2 INJECTION, SOLUTION INTRAVENOUS at 08:24

## 2025-08-03 RX ADMIN — CITALOPRAM HYDROBROMIDE 40 MG: 40 TABLET ORAL at 08:15

## 2025-08-03 RX ADMIN — ASPIRIN 81 MG: 81 TABLET, CHEWABLE ORAL at 08:15

## 2025-08-03 RX ADMIN — OXYCODONE HYDROCHLORIDE 10 MG: 10 TABLET ORAL at 05:12

## 2025-08-03 RX ADMIN — FUROSEMIDE 80 MG: 10 INJECTION, SOLUTION INTRAMUSCULAR; INTRAVENOUS at 18:16

## 2025-08-03 RX ADMIN — MILRINONE LACTATE IN DEXTROSE 0.25 MCG/KG/MIN: 200 INJECTION, SOLUTION INTRAVENOUS at 18:39

## 2025-08-03 RX ADMIN — LEVOTHYROXINE SODIUM 88 MCG: 0.09 TABLET ORAL at 05:12

## 2025-08-03 RX ADMIN — ROCURONIUM BROMIDE 100 MG: 10 INJECTION, SOLUTION INTRAVENOUS at 23:34

## 2025-08-03 RX ADMIN — Medication 0.4 MG: at 23:37

## 2025-08-03 RX ADMIN — ACETAMINOPHEN 650 MG: 325 TABLET, FILM COATED ORAL at 06:31

## 2025-08-03 RX ADMIN — HYDROMORPHONE HYDROCHLORIDE 0.2 MG: 0.2 INJECTION, SOLUTION INTRAMUSCULAR; INTRAVENOUS; SUBCUTANEOUS at 08:36

## 2025-08-03 RX ADMIN — EPINEPHRINE IN SODIUM CHLORIDE 0.04 MCG/KG/MIN: 16 INJECTION INTRAVENOUS at 10:47

## 2025-08-03 RX ADMIN — ONDANSETRON 4 MG: 2 INJECTION INTRAMUSCULAR; INTRAVENOUS at 23:37

## 2025-08-03 RX ADMIN — HEPARIN SODIUM 5000 UNITS: 5000 INJECTION INTRAVENOUS; SUBCUTANEOUS at 01:09

## 2025-08-03 RX ADMIN — ROPINIROLE 1 MG: 1 TABLET, FILM COATED ORAL at 16:00

## 2025-08-03 RX ADMIN — HYDROMORPHONE HYDROCHLORIDE 0.2 MG: 0.2 INJECTION, SOLUTION INTRAMUSCULAR; INTRAVENOUS; SUBCUTANEOUS at 05:44

## 2025-08-03 ASSESSMENT — PAIN SCALES - GENERAL
PAINLEVEL_OUTOF10: 1
PAINLEVEL_OUTOF10: 8
PAINLEVEL_OUTOF10: 2
PAINLEVEL_OUTOF10: 2
PAINLEVEL_OUTOF10: 8
PAINLEVEL_OUTOF10: 0 - NO PAIN
PAINLEVEL_OUTOF10: 2
PAINLEVEL_OUTOF10: 8
PAINLEVEL_OUTOF10: 8
PAINLEVEL_OUTOF10: 7
PAINLEVEL_OUTOF10: 0 - NO PAIN
PAINLEVEL_OUTOF10: 2

## 2025-08-03 ASSESSMENT — PAIN DESCRIPTION - LOCATION
LOCATION: CHEST
LOCATION: ABDOMEN
LOCATION: CHEST

## 2025-08-03 ASSESSMENT — PAIN DESCRIPTION - ORIENTATION
ORIENTATION: LEFT
ORIENTATION: LEFT
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
## 2025-08-03 NOTE — CARE PLAN
The clinical goals for the shift include pt to remain hds throughout shift      Problem: Ventricular Assisted Device (VAD)  Goal: Hemodynamic stability, correction of coagulopathy, lab value stability  Outcome: Progressing  Goal: Mobility/OT/PT  Outcome: Progressing

## 2025-08-03 NOTE — CARE PLAN
The patient's goals for the shift include get more than 4 hours of rest    The clinical goals for the shift include pt to remain hds throughout shift      Problem: Pain - Adult  Goal: Verbalizes/displays adequate comfort level or baseline comfort level  Outcome: Progressing     Problem: Safety - Adult  Goal: Free from fall injury  Outcome: Progressing     Problem: Discharge Planning  Goal: Discharge to home or other facility with appropriate resources  Outcome: Progressing     Problem: Chronic Conditions and Co-morbidities  Goal: Patient's chronic conditions and co-morbidity symptoms are monitored and maintained or improved  Outcome: Progressing     Problem: Nutrition  Goal: Nutrient intake appropriate for maintaining nutritional needs  Outcome: Progressing     Problem: Heart Failure  Goal: Improved gas exchange this shift  Outcome: Progressing  Goal: Improved urinary output this shift  Outcome: Progressing  Goal: Reduction in peripheral edema within 24 hours  Outcome: Progressing  Goal: Report improvement of dyspnea/breathlessness this shift  Outcome: Progressing  Goal: Weight from fluid excess reduced over 2-3 days, then stabilize  Outcome: Progressing  Goal: Increase self care and/or family involvement in 24 hours  Outcome: Progressing     Problem: Respiratory  Goal: Minimal/no exertional discomfort or dyspnea this shift  Outcome: Progressing  Goal: No signs of respiratory distress (eg. Use of accessory muscles. Peds grunting)  Outcome: Progressing  Goal: Patent airway maintained this shift  Outcome: Progressing  Goal: Verbalize decreased shortness of breath this shift  Outcome: Progressing  Goal: Wean oxygen to maintain O2 saturation per order/standard this shift  Outcome: Progressing  Goal: Increase self care and/or family involvement in next 24 hours  Outcome: Progressing     Problem: Diabetes  Goal: Achieve decreasing blood glucose levels by end of shift  Outcome: Progressing  Goal: Increase stability of  blood glucose readings by end of shift  Outcome: Progressing  Goal: Decrease in ketones present in urine by end of shift  Outcome: Progressing  Goal: Maintain electrolyte levels within acceptable range throughout shift  Outcome: Progressing  Goal: Maintain glucose levels >70mg/dl to <250mg/dl throughout shift  Outcome: Progressing  Goal: No changes in neurological exam by end of shift  Outcome: Progressing  Goal: Learn about and adhere to nutrition recommendations by end of shift  Outcome: Progressing  Goal: Vital signs within normal range for age by end of shift  Outcome: Progressing  Goal: Increase self care and/or family involovement by end of shift  Outcome: Progressing  Goal: Receive DSME education by end of shift  Outcome: Progressing     Problem: Safety - Medical Restraint  Goal: Remains free of injury from restraints (Restraint for Interference with Medical Device)  Outcome: Progressing  Goal: Free from restraint(s) (Restraint for Interference with Medical Device)  Outcome: Progressing     Problem: Ventricular Assisted Device (VAD)  Goal: Hemodynamic stability, correction of coagulopathy, lab value stability  Outcome: Progressing  Goal: Wean vasopressors/nitric  Outcome: Progressing  Goal: Wean ventilator  Outcome: Progressing  Goal: Extubation  Outcome: Progressing  Goal: Stable metal status  Outcome: Progressing  Goal: Pulmonary toileting, incentive spirometry  Outcome: Progressing  Goal: Nutrition  Outcome: Progressing  Goal: Mobility/OT/PT  Outcome: Progressing  Goal: Rubber ball  Outcome: Progressing  Goal: ROM  Outcome: Progressing  Goal: Sitting  Outcome: Progressing  Goal: Walk  Outcome: Progressing  Goal: Involve in VAD awareness, dressing change  Outcome: Progressing  Goal: AICD On/Off  Outcome: Progressing

## 2025-08-04 ENCOUNTER — APPOINTMENT (OUTPATIENT)
Dept: RADIOLOGY | Facility: HOSPITAL | Age: 60
DRG: 001 | End: 2025-08-04
Payer: COMMERCIAL

## 2025-08-04 ENCOUNTER — APPOINTMENT (OUTPATIENT)
Dept: CARDIOLOGY | Facility: HOSPITAL | Age: 60
DRG: 001 | End: 2025-08-04
Payer: COMMERCIAL

## 2025-08-04 LAB
ALBUMIN SERPL BCP-MCNC: 3.3 G/DL (ref 3.4–5)
ALBUMIN SERPL BCP-MCNC: 3.4 G/DL (ref 3.4–5)
ALBUMIN SERPL BCP-MCNC: 3.4 G/DL (ref 3.4–5)
ALP SERPL-CCNC: 61 U/L (ref 33–136)
ALT SERPL W P-5'-P-CCNC: 4 U/L (ref 7–45)
ANION GAP BLDA CALCULATED.4IONS-SCNC: 10 MMO/L (ref 10–25)
ANION GAP BLDA CALCULATED.4IONS-SCNC: 7 MMO/L (ref 10–25)
ANION GAP BLDA CALCULATED.4IONS-SCNC: 8 MMO/L (ref 10–25)
ANION GAP BLDMV CALCULATED.4IONS-SCNC: 5 MMO/L (ref 10–25)
ANION GAP BLDMV CALCULATED.4IONS-SCNC: 6 MMO/L (ref 10–25)
ANION GAP SERPL CALC-SCNC: 10 MMOL/L (ref 10–20)
ANION GAP SERPL CALC-SCNC: 15 MMOL/L (ref 10–20)
APPARATUS: ABNORMAL
APTT PPP: 28 SECONDS (ref 26–36)
AST SERPL W P-5'-P-CCNC: 24 U/L (ref 9–39)
BASE EXCESS BLDA CALC-SCNC: 3.1 MMOL/L (ref -2–3)
BASE EXCESS BLDA CALC-SCNC: 3.3 MMOL/L (ref -2–3)
BASE EXCESS BLDA CALC-SCNC: 4.1 MMOL/L (ref -2–3)
BASE EXCESS BLDA CALC-SCNC: 4.3 MMOL/L (ref -2–3)
BASE EXCESS BLDA CALC-SCNC: 5.4 MMOL/L (ref -2–3)
BASE EXCESS BLDMV CALC-SCNC: 5 MMOL/L (ref -2–3)
BASE EXCESS BLDMV CALC-SCNC: 6.7 MMOL/L (ref -2–3)
BILIRUB DIRECT SERPL-MCNC: 0.2 MG/DL (ref 0–0.3)
BILIRUB SERPL-MCNC: 0.6 MG/DL (ref 0–1.2)
BODY TEMPERATURE: 37 DEGREES CELSIUS
BUN SERPL-MCNC: 35 MG/DL (ref 6–23)
BUN SERPL-MCNC: 36 MG/DL (ref 6–23)
CA-I BLD-SCNC: 1.16 MMOL/L (ref 1.1–1.33)
CA-I BLDA-SCNC: 1.15 MMOL/L (ref 1.1–1.33)
CA-I BLDA-SCNC: 1.15 MMOL/L (ref 1.1–1.33)
CA-I BLDA-SCNC: 1.16 MMOL/L (ref 1.1–1.33)
CA-I BLDA-SCNC: 1.17 MMOL/L (ref 1.1–1.33)
CA-I BLDA-SCNC: 1.17 MMOL/L (ref 1.1–1.33)
CA-I BLDMV-SCNC: 1.18 MMOL/L (ref 1.1–1.33)
CA-I BLDMV-SCNC: 1.23 MMOL/L (ref 1.1–1.33)
CALCIUM SERPL-MCNC: 8.7 MG/DL (ref 8.6–10.6)
CALCIUM SERPL-MCNC: 9.2 MG/DL (ref 8.6–10.6)
CHLORIDE BLD-SCNC: 95 MMOL/L (ref 98–107)
CHLORIDE BLD-SCNC: 98 MMOL/L (ref 98–107)
CHLORIDE BLDA-SCNC: 100 MMOL/L (ref 98–107)
CHLORIDE BLDA-SCNC: 98 MMOL/L (ref 98–107)
CHLORIDE BLDA-SCNC: 99 MMOL/L (ref 98–107)
CHLORIDE SERPL-SCNC: 95 MMOL/L (ref 98–107)
CHLORIDE SERPL-SCNC: 96 MMOL/L (ref 98–107)
CO2 SERPL-SCNC: 29 MMOL/L (ref 21–32)
CO2 SERPL-SCNC: 34 MMOL/L (ref 21–32)
CREAT SERPL-MCNC: 0.93 MG/DL (ref 0.5–1.05)
CREAT SERPL-MCNC: 0.99 MG/DL (ref 0.5–1.05)
EGFRCR SERPLBLD CKD-EPI 2021: 65 ML/MIN/1.73M*2
EGFRCR SERPLBLD CKD-EPI 2021: 70 ML/MIN/1.73M*2
EJECTION FRACTION: 20 %
ERYTHROCYTE [DISTWIDTH] IN BLOOD BY AUTOMATED COUNT: 15.1 % (ref 11.5–14.5)
ERYTHROCYTE [DISTWIDTH] IN BLOOD BY AUTOMATED COUNT: 15.2 % (ref 11.5–14.5)
FUNGUS SPEC CULT: NORMAL
FUNGUS SPEC FUNGUS STN: NORMAL
GLUCOSE BLD MANUAL STRIP-MCNC: 109 MG/DL (ref 74–99)
GLUCOSE BLD MANUAL STRIP-MCNC: 119 MG/DL (ref 74–99)
GLUCOSE BLD MANUAL STRIP-MCNC: 151 MG/DL (ref 74–99)
GLUCOSE BLD MANUAL STRIP-MCNC: 161 MG/DL (ref 74–99)
GLUCOSE BLD MANUAL STRIP-MCNC: 53 MG/DL (ref 74–99)
GLUCOSE BLD MANUAL STRIP-MCNC: 55 MG/DL (ref 74–99)
GLUCOSE BLD MANUAL STRIP-MCNC: 58 MG/DL (ref 74–99)
GLUCOSE BLD MANUAL STRIP-MCNC: 72 MG/DL (ref 74–99)
GLUCOSE BLD MANUAL STRIP-MCNC: 98 MG/DL (ref 74–99)
GLUCOSE BLD-MCNC: 106 MG/DL (ref 74–99)
GLUCOSE BLD-MCNC: 80 MG/DL (ref 74–99)
GLUCOSE BLDA-MCNC: 102 MG/DL (ref 74–99)
GLUCOSE BLDA-MCNC: 119 MG/DL (ref 74–99)
GLUCOSE BLDA-MCNC: 151 MG/DL (ref 74–99)
GLUCOSE BLDA-MCNC: 180 MG/DL (ref 74–99)
GLUCOSE BLDA-MCNC: 189 MG/DL (ref 74–99)
GLUCOSE SERPL-MCNC: 103 MG/DL (ref 74–99)
GLUCOSE SERPL-MCNC: 189 MG/DL (ref 74–99)
HCO3 BLDA-SCNC: 26.9 MMOL/L (ref 22–26)
HCO3 BLDA-SCNC: 28.7 MMOL/L (ref 22–26)
HCO3 BLDA-SCNC: 29 MMOL/L (ref 22–26)
HCO3 BLDA-SCNC: 30.5 MMOL/L (ref 22–26)
HCO3 BLDA-SCNC: 30.5 MMOL/L (ref 22–26)
HCO3 BLDMV-SCNC: 34 MMOL/L (ref 22–26)
HCO3 BLDMV-SCNC: 34.4 MMOL/L (ref 22–26)
HCT VFR BLD AUTO: 25.5 % (ref 36–46)
HCT VFR BLD AUTO: 26.3 % (ref 36–46)
HCT VFR BLD EST: 26 % (ref 36–46)
HCT VFR BLD EST: 27 % (ref 36–46)
HCT VFR BLD EST: 31 % (ref 36–46)
HCT VFR BLD EST: 50 % (ref 36–46)
HGB BLD-MCNC: 8.1 G/DL (ref 12–16)
HGB BLD-MCNC: 8.1 G/DL (ref 12–16)
HGB BLDA-MCNC: 10.2 G/DL (ref 12–16)
HGB BLDA-MCNC: 8.7 G/DL (ref 12–16)
HGB BLDA-MCNC: 8.8 G/DL (ref 12–16)
HGB BLDA-MCNC: 8.8 G/DL (ref 12–16)
HGB BLDA-MCNC: 8.9 G/DL (ref 12–16)
HGB BLDMV-MCNC: 16.6 G/DL (ref 12–16)
HGB BLDMV-MCNC: 8.5 G/DL (ref 12–16)
INHALED O2 CONCENTRATION: 36 %
INHALED O2 CONCENTRATION: 50 %
INHALED O2 CONCENTRATION: 50 %
INHALED O2 CONCENTRATION: 60 %
INR PPP: 1.4 (ref 0.9–1.1)
LACTATE BLDA-SCNC: 0.6 MMOL/L (ref 0.4–2)
LACTATE BLDA-SCNC: 0.7 MMOL/L (ref 0.4–2)
LACTATE BLDA-SCNC: 0.7 MMOL/L (ref 0.4–2)
LACTATE BLDA-SCNC: 0.9 MMOL/L (ref 0.4–2)
LACTATE BLDA-SCNC: 1.2 MMOL/L (ref 0.4–2)
LACTATE BLDMV-SCNC: 0.7 MMOL/L (ref 0.4–2)
LACTATE BLDMV-SCNC: 0.8 MMOL/L (ref 0.4–2)
LDH SERPL L TO P-CCNC: 311 U/L (ref 84–246)
LEFT VENTRICLE INTERNAL DIMENSION DIASTOLE: 5.3 CM (ref 3.5–6)
LEFT VENTRICULAR OUTFLOW TRACT DIAMETER: 1.5 CM
MAGNESIUM SERPL-MCNC: 2.08 MG/DL (ref 1.6–2.4)
MCH RBC QN AUTO: 29.7 PG (ref 26–34)
MCH RBC QN AUTO: 30.1 PG (ref 26–34)
MCHC RBC AUTO-ENTMCNC: 30.8 G/DL (ref 32–36)
MCHC RBC AUTO-ENTMCNC: 31.8 G/DL (ref 32–36)
MCV RBC AUTO: 95 FL (ref 80–100)
MCV RBC AUTO: 96 FL (ref 80–100)
MITRAL VALVE E/A RATIO: 3.58
NRBC BLD-RTO: 0 /100 WBCS (ref 0–0)
NRBC BLD-RTO: 0 /100 WBCS (ref 0–0)
OXYHGB MFR BLDA: 94.1 % (ref 94–98)
OXYHGB MFR BLDA: 97.2 % (ref 94–98)
OXYHGB MFR BLDA: 97.2 % (ref 94–98)
OXYHGB MFR BLDA: 97.6 % (ref 94–98)
OXYHGB MFR BLDA: 97.6 % (ref 94–98)
OXYHGB MFR BLDMV: 60.5 % (ref 45–75)
OXYHGB MFR BLDMV: 61.2 % (ref 45–75)
PCO2 BLDA: 36 MM HG (ref 38–42)
PCO2 BLDA: 37 MM HG (ref 38–42)
PCO2 BLDA: 49 MM HG (ref 38–42)
PCO2 BLDA: 54 MM HG (ref 38–42)
PCO2 BLDA: 54 MM HG (ref 38–42)
PCO2 BLDMV: 66 MM HG (ref 41–51)
PCO2 BLDMV: 70 MM HG (ref 41–51)
PH BLDA: 7.36 PH (ref 7.38–7.42)
PH BLDA: 7.36 PH (ref 7.38–7.42)
PH BLDA: 7.38 PH (ref 7.38–7.42)
PH BLDA: 7.47 PH (ref 7.38–7.42)
PH BLDA: 7.51 PH (ref 7.38–7.42)
PH BLDMV: 7.3 PH (ref 7.33–7.43)
PH BLDMV: 7.32 PH (ref 7.33–7.43)
PHOSPHATE SERPL-MCNC: 3.6 MG/DL (ref 2.5–4.9)
PHOSPHATE SERPL-MCNC: 4.1 MG/DL (ref 2.5–4.9)
PLATELET # BLD AUTO: 145 X10*3/UL (ref 150–450)
PLATELET # BLD AUTO: 159 X10*3/UL (ref 150–450)
PO2 BLDA: 107 MM HG (ref 85–95)
PO2 BLDA: 115 MM HG (ref 85–95)
PO2 BLDA: 124 MM HG (ref 85–95)
PO2 BLDA: 132 MM HG (ref 85–95)
PO2 BLDA: 65 MM HG (ref 85–95)
PO2 BLDMV: 40 MM HG (ref 35–45)
PO2 BLDMV: 41 MM HG (ref 35–45)
POTASSIUM BLDA-SCNC: 3.8 MMOL/L (ref 3.5–5.3)
POTASSIUM BLDA-SCNC: 4 MMOL/L (ref 3.5–5.3)
POTASSIUM BLDA-SCNC: 4 MMOL/L (ref 3.5–5.3)
POTASSIUM BLDA-SCNC: 4.1 MMOL/L (ref 3.5–5.3)
POTASSIUM BLDA-SCNC: 4.2 MMOL/L (ref 3.5–5.3)
POTASSIUM BLDMV-SCNC: 3.6 MMOL/L (ref 3.5–5.3)
POTASSIUM BLDMV-SCNC: 4.1 MMOL/L (ref 3.5–5.3)
POTASSIUM SERPL-SCNC: 4 MMOL/L (ref 3.5–5.3)
POTASSIUM SERPL-SCNC: 4.2 MMOL/L (ref 3.5–5.3)
PROT SERPL-MCNC: 5.4 G/DL (ref 6.4–8.2)
PROTHROMBIN TIME: 15.4 SECONDS (ref 9.8–12.4)
RBC # BLD AUTO: 2.69 X10*6/UL (ref 4–5.2)
RBC # BLD AUTO: 2.73 X10*6/UL (ref 4–5.2)
RIGHT VENTRICLE PEAK SYSTOLIC PRESSURE: 50 MMHG
SAO2 % BLDA: 100 % (ref 94–100)
SAO2 % BLDA: 100 % (ref 94–100)
SAO2 % BLDA: 96 % (ref 94–100)
SAO2 % BLDA: 99 % (ref 94–100)
SAO2 % BLDA: 99 % (ref 94–100)
SAO2 % BLDMV: 63 % (ref 45–75)
SAO2 % BLDMV: 63 % (ref 45–75)
SODIUM BLDA-SCNC: 131 MMOL/L (ref 136–145)
SODIUM BLDA-SCNC: 131 MMOL/L (ref 136–145)
SODIUM BLDA-SCNC: 132 MMOL/L (ref 136–145)
SODIUM BLDA-SCNC: 133 MMOL/L (ref 136–145)
SODIUM BLDA-SCNC: 133 MMOL/L (ref 136–145)
SODIUM BLDMV-SCNC: 131 MMOL/L (ref 136–145)
SODIUM BLDMV-SCNC: 134 MMOL/L (ref 136–145)
SODIUM SERPL-SCNC: 135 MMOL/L (ref 136–145)
SODIUM SERPL-SCNC: 136 MMOL/L (ref 136–145)
TRIGL SERPL-MCNC: 115 MG/DL (ref 0–149)
WBC # BLD AUTO: 13.4 X10*3/UL (ref 4.4–11.3)
WBC # BLD AUTO: 13.6 X10*3/UL (ref 4.4–11.3)

## 2025-08-04 PROCEDURE — 99291 CRITICAL CARE FIRST HOUR: CPT

## 2025-08-04 PROCEDURE — 94640 AIRWAY INHALATION TREATMENT: CPT

## 2025-08-04 PROCEDURE — 2500000004 HC RX 250 GENERAL PHARMACY W/ HCPCS (ALT 636 FOR OP/ED): Performed by: NURSE PRACTITIONER

## 2025-08-04 PROCEDURE — 2500000001 HC RX 250 WO HCPCS SELF ADMINISTERED DRUGS (ALT 637 FOR MEDICARE OP): Performed by: NURSE PRACTITIONER

## 2025-08-04 PROCEDURE — 2500000005 HC RX 250 GENERAL PHARMACY W/O HCPCS

## 2025-08-04 PROCEDURE — 2500000001 HC RX 250 WO HCPCS SELF ADMINISTERED DRUGS (ALT 637 FOR MEDICARE OP)

## 2025-08-04 PROCEDURE — 2500000002 HC RX 250 W HCPCS SELF ADMINISTERED DRUGS (ALT 637 FOR MEDICARE OP, ALT 636 FOR OP/ED)

## 2025-08-04 PROCEDURE — 84132 ASSAY OF SERUM POTASSIUM: CPT

## 2025-08-04 PROCEDURE — 83735 ASSAY OF MAGNESIUM: CPT

## 2025-08-04 PROCEDURE — 2500000004 HC RX 250 GENERAL PHARMACY W/ HCPCS (ALT 636 FOR OP/ED)

## 2025-08-04 PROCEDURE — 2500000004 HC RX 250 GENERAL PHARMACY W/ HCPCS (ALT 636 FOR OP/ED): Mod: JZ

## 2025-08-04 PROCEDURE — 85027 COMPLETE CBC AUTOMATED: CPT

## 2025-08-04 PROCEDURE — 94669 MECHANICAL CHEST WALL OSCILL: CPT

## 2025-08-04 PROCEDURE — 37799 UNLISTED PX VASCULAR SURGERY: CPT | Performed by: NURSE PRACTITIONER

## 2025-08-04 PROCEDURE — 2500000002 HC RX 250 W HCPCS SELF ADMINISTERED DRUGS (ALT 637 FOR MEDICARE OP, ALT 636 FOR OP/ED): Performed by: NURSE PRACTITIONER

## 2025-08-04 PROCEDURE — 99291 CRITICAL CARE FIRST HOUR: CPT | Performed by: INTERNAL MEDICINE

## 2025-08-04 PROCEDURE — 93750 INTERROGATION VAD IN PERSON: CPT

## 2025-08-04 PROCEDURE — 94003 VENT MGMT INPAT SUBQ DAY: CPT

## 2025-08-04 PROCEDURE — 82947 ASSAY GLUCOSE BLOOD QUANT: CPT

## 2025-08-04 PROCEDURE — 31622 DX BRONCHOSCOPE/WASH: CPT

## 2025-08-04 PROCEDURE — 2020000001 HC ICU ROOM DAILY

## 2025-08-04 PROCEDURE — 82040 ASSAY OF SERUM ALBUMIN: CPT | Performed by: NURSE PRACTITIONER

## 2025-08-04 PROCEDURE — 85610 PROTHROMBIN TIME: CPT | Performed by: NURSE PRACTITIONER

## 2025-08-04 PROCEDURE — 97530 THERAPEUTIC ACTIVITIES: CPT | Mod: GP

## 2025-08-04 PROCEDURE — 37799 UNLISTED PX VASCULAR SURGERY: CPT

## 2025-08-04 PROCEDURE — 71045 X-RAY EXAM CHEST 1 VIEW: CPT | Performed by: RADIOLOGY

## 2025-08-04 PROCEDURE — 82330 ASSAY OF CALCIUM: CPT

## 2025-08-04 PROCEDURE — 83615 LACTATE (LD) (LDH) ENZYME: CPT | Performed by: NURSE PRACTITIONER

## 2025-08-04 PROCEDURE — 84478 ASSAY OF TRIGLYCERIDES: CPT

## 2025-08-04 PROCEDURE — 82565 ASSAY OF CREATININE: CPT

## 2025-08-04 PROCEDURE — 97116 GAIT TRAINING THERAPY: CPT | Mod: GP

## 2025-08-04 PROCEDURE — 71045 X-RAY EXAM CHEST 1 VIEW: CPT

## 2025-08-04 RX ORDER — EPINEPHRINE IN 0.9 % SOD CHLOR 4MG/250ML
0.04 PLASTIC BAG, INJECTION (ML) INTRAVENOUS CONTINUOUS
Status: DISCONTINUED | OUTPATIENT
Start: 2025-08-04 | End: 2025-08-04

## 2025-08-04 RX ORDER — PROPOFOL 10 MG/ML
5-50 INJECTION, EMULSION INTRAVENOUS CONTINUOUS
Status: DISCONTINUED | OUTPATIENT
Start: 2025-08-04 | End: 2025-08-04

## 2025-08-04 RX ORDER — INSULIN LISPRO 100 [IU]/ML
0-15 INJECTION, SOLUTION INTRAVENOUS; SUBCUTANEOUS EVERY 4 HOURS
Status: DISCONTINUED | OUTPATIENT
Start: 2025-08-04 | End: 2025-08-07

## 2025-08-04 RX ORDER — HYDROMORPHONE HYDROCHLORIDE 0.2 MG/ML
0.2 INJECTION INTRAMUSCULAR; INTRAVENOUS; SUBCUTANEOUS EVERY 2 HOUR PRN
Status: DISCONTINUED | OUTPATIENT
Start: 2025-08-04 | End: 2025-08-05

## 2025-08-04 RX ORDER — OXYCODONE HYDROCHLORIDE 5 MG/1
5 TABLET ORAL EVERY 4 HOURS PRN
Refills: 0 | Status: DISCONTINUED | OUTPATIENT
Start: 2025-08-04 | End: 2025-08-05

## 2025-08-04 RX ORDER — IPRATROPIUM BROMIDE AND ALBUTEROL SULFATE 2.5; .5 MG/3ML; MG/3ML
3 SOLUTION RESPIRATORY (INHALATION) EVERY 6 HOURS PRN
Status: DISCONTINUED | OUTPATIENT
Start: 2025-08-04 | End: 2025-08-05

## 2025-08-04 RX ORDER — NOREPINEPHRINE BITARTRATE/D5W 8 MG/250ML
0-.5 PLASTIC BAG, INJECTION (ML) INTRAVENOUS CONTINUOUS
Status: DISCONTINUED | OUTPATIENT
Start: 2025-08-04 | End: 2025-08-04

## 2025-08-04 RX ORDER — FUROSEMIDE 10 MG/ML
40 INJECTION INTRAMUSCULAR; INTRAVENOUS ONCE
Status: COMPLETED | OUTPATIENT
Start: 2025-08-04 | End: 2025-08-04

## 2025-08-04 RX ORDER — OXYCODONE HYDROCHLORIDE 5 MG/1
2.5 TABLET ORAL EVERY 4 HOURS PRN
Refills: 0 | Status: DISCONTINUED | OUTPATIENT
Start: 2025-08-04 | End: 2025-08-06

## 2025-08-04 RX ORDER — MILRINONE LACTATE 0.2 MG/ML
0.12 INJECTION, SOLUTION INTRAVENOUS CONTINUOUS
Status: DISCONTINUED | OUTPATIENT
Start: 2025-08-04 | End: 2025-08-10

## 2025-08-04 RX ORDER — EPINEPHRINE IN 0.9 % SOD CHLOR 4MG/250ML
0.03 PLASTIC BAG, INJECTION (ML) INTRAVENOUS CONTINUOUS
Status: DISCONTINUED | OUTPATIENT
Start: 2025-08-04 | End: 2025-08-05

## 2025-08-04 RX ORDER — HYDROMORPHONE HYDROCHLORIDE 0.2 MG/ML
0.2 INJECTION INTRAMUSCULAR; INTRAVENOUS; SUBCUTANEOUS EVERY 2 HOUR PRN
Status: DISCONTINUED | OUTPATIENT
Start: 2025-08-04 | End: 2025-08-04

## 2025-08-04 RX ADMIN — IPRATROPIUM BROMIDE AND ALBUTEROL SULFATE 3 ML: .5; 3 SOLUTION RESPIRATORY (INHALATION) at 03:53

## 2025-08-04 RX ADMIN — SODIUM CHLORIDE SOLN NEBU 3% 3 ML: 3 NEBU SOLN at 08:53

## 2025-08-04 RX ADMIN — POLYETHYLENE GLYCOL 3350 17 G: 17 POWDER, FOR SOLUTION ORAL at 08:12

## 2025-08-04 RX ADMIN — ROPINIROLE 1 MG: 1 TABLET, FILM COATED ORAL at 20:00

## 2025-08-04 RX ADMIN — ATORVASTATIN CALCIUM 80 MG: 80 TABLET, FILM COATED ORAL at 08:13

## 2025-08-04 RX ADMIN — INSULIN GLARGINE 25 UNITS: 100 INJECTION, SOLUTION SUBCUTANEOUS at 09:15

## 2025-08-04 RX ADMIN — ROPINIROLE 1 MG: 1 TABLET, FILM COATED ORAL at 16:06

## 2025-08-04 RX ADMIN — MILRINONE LACTATE IN DEXTROSE 0.25 MCG/KG/MIN: 200 INJECTION, SOLUTION INTRAVENOUS at 18:33

## 2025-08-04 RX ADMIN — IPRATROPIUM BROMIDE AND ALBUTEROL SULFATE 3 ML: .5; 3 SOLUTION RESPIRATORY (INHALATION) at 08:54

## 2025-08-04 RX ADMIN — INSULIN LISPRO 5 UNITS: 100 INJECTION, SOLUTION INTRAVENOUS; SUBCUTANEOUS at 08:14

## 2025-08-04 RX ADMIN — METHOCARBAMOL 500 MG: 500 TABLET ORAL at 17:30

## 2025-08-04 RX ADMIN — METHOCARBAMOL 500 MG: 500 TABLET ORAL at 23:30

## 2025-08-04 RX ADMIN — ROPINIROLE 1 MG: 1 TABLET, FILM COATED ORAL at 08:13

## 2025-08-04 RX ADMIN — ACETAMINOPHEN 650 MG: 325 TABLET, FILM COATED ORAL at 08:12

## 2025-08-04 RX ADMIN — SUGAMMADEX 130 MG: 100 INJECTION, SOLUTION INTRAVENOUS at 02:21

## 2025-08-04 RX ADMIN — ROPINIROLE HYDROCHLORIDE 3 MG: 3 TABLET, FILM COATED ORAL at 21:57

## 2025-08-04 RX ADMIN — OXYCODONE HYDROCHLORIDE 5 MG: 5 TABLET ORAL at 20:00

## 2025-08-04 RX ADMIN — DEXTROSE MONOHYDRATE 12.5 G: 25 INJECTION, SOLUTION INTRAVENOUS at 19:41

## 2025-08-04 RX ADMIN — ACETAMINOPHEN 650 MG: 325 TABLET, FILM COATED ORAL at 13:19

## 2025-08-04 RX ADMIN — FUROSEMIDE 40 MG: 10 INJECTION, SOLUTION INTRAMUSCULAR; INTRAVENOUS at 16:05

## 2025-08-04 RX ADMIN — SENNOSIDES, DOCUSATE SODIUM 2 TABLET: 50; 8.6 TABLET, FILM COATED ORAL at 20:00

## 2025-08-04 RX ADMIN — HYDROMORPHONE HYDROCHLORIDE 0.2 MG: 0.2 INJECTION, SOLUTION INTRAMUSCULAR; INTRAVENOUS; SUBCUTANEOUS at 12:47

## 2025-08-04 RX ADMIN — HEPARIN SODIUM 5000 UNITS: 5000 INJECTION INTRAVENOUS; SUBCUTANEOUS at 02:21

## 2025-08-04 RX ADMIN — HYDROMORPHONE HYDROCHLORIDE 0.2 MG: 0.2 INJECTION, SOLUTION INTRAMUSCULAR; INTRAVENOUS; SUBCUTANEOUS at 06:00

## 2025-08-04 RX ADMIN — HEPARIN SODIUM 5000 UNITS: 5000 INJECTION INTRAVENOUS; SUBCUTANEOUS at 09:16

## 2025-08-04 RX ADMIN — ASPIRIN 81 MG: 81 TABLET, CHEWABLE ORAL at 08:13

## 2025-08-04 RX ADMIN — PROPOFOL 40 MCG/KG/MIN: 10 INJECTION, EMULSION INTRAVENOUS at 02:21

## 2025-08-04 RX ADMIN — Medication: at 08:53

## 2025-08-04 RX ADMIN — SENNOSIDES, DOCUSATE SODIUM 2 TABLET: 50; 8.6 TABLET, FILM COATED ORAL at 08:12

## 2025-08-04 RX ADMIN — Medication: at 20:00

## 2025-08-04 RX ADMIN — CITALOPRAM HYDROBROMIDE 40 MG: 40 TABLET ORAL at 08:13

## 2025-08-04 RX ADMIN — DEXTROSE MONOHYDRATE 12.5 G: 25 INJECTION, SOLUTION INTRAVENOUS at 14:47

## 2025-08-04 RX ADMIN — ACETAMINOPHEN 650 MG: 325 TABLET, FILM COATED ORAL at 19:55

## 2025-08-04 RX ADMIN — OXYCODONE HYDROCHLORIDE 5 MG: 5 TABLET ORAL at 14:40

## 2025-08-04 RX ADMIN — SODIUM CHLORIDE SOLN NEBU 3% 3 ML: 3 NEBU SOLN at 03:53

## 2025-08-04 RX ADMIN — HEPARIN SODIUM 5000 UNITS: 5000 INJECTION INTRAVENOUS; SUBCUTANEOUS at 17:30

## 2025-08-04 RX ADMIN — LIDOCAINE 4% 1 PATCH: 40 PATCH TOPICAL at 08:13

## 2025-08-04 RX ADMIN — WARFARIN SODIUM 2 MG: 2 TABLET ORAL at 17:30

## 2025-08-04 ASSESSMENT — PAIN DESCRIPTION - LOCATION
LOCATION: CHEST
LOCATION: ABDOMEN

## 2025-08-04 ASSESSMENT — COGNITIVE AND FUNCTIONAL STATUS - GENERAL
MOVING FROM LYING ON BACK TO SITTING ON SIDE OF FLAT BED WITH BEDRAILS: A LITTLE
MOBILITY SCORE: 20
MOVING TO AND FROM BED TO CHAIR: A LITTLE
DRESSING REGULAR LOWER BODY CLOTHING: A LITTLE
DAILY ACTIVITIY SCORE: 19
PERSONAL GROOMING: A LITTLE
WALKING IN HOSPITAL ROOM: A LITTLE
CLIMB 3 TO 5 STEPS WITH RAILING: A LITTLE
MOVING TO AND FROM BED TO CHAIR: A LITTLE
DRESSING REGULAR UPPER BODY CLOTHING: A LITTLE
TOILETING: A LITTLE
HELP NEEDED FOR BATHING: A LITTLE
CLIMB 3 TO 5 STEPS WITH RAILING: A LOT
TURNING FROM BACK TO SIDE WHILE IN FLAT BAD: A LITTLE
MOBILITY SCORE: 17
WALKING IN HOSPITAL ROOM: A LITTLE
STANDING UP FROM CHAIR USING ARMS: A LITTLE
STANDING UP FROM CHAIR USING ARMS: A LITTLE

## 2025-08-04 ASSESSMENT — PAIN SCALES - GENERAL
PAINLEVEL_OUTOF10: 9
PAINLEVEL_OUTOF10: 0 - NO PAIN
PAINLEVEL_OUTOF10: 8
PAINLEVEL_OUTOF10: 9
PAINLEVEL_OUTOF10: 8
PAINLEVEL_OUTOF10: 2
PAINLEVEL_OUTOF10: 6

## 2025-08-04 ASSESSMENT — PAIN - FUNCTIONAL ASSESSMENT
PAIN_FUNCTIONAL_ASSESSMENT: 0-10
PAIN_FUNCTIONAL_ASSESSMENT: CPOT (CRITICAL CARE PAIN OBSERVATION TOOL)
PAIN_FUNCTIONAL_ASSESSMENT: CPOT (CRITICAL CARE PAIN OBSERVATION TOOL)
PAIN_FUNCTIONAL_ASSESSMENT: 0-10
PAIN_FUNCTIONAL_ASSESSMENT: CPOT (CRITICAL CARE PAIN OBSERVATION TOOL)
PAIN_FUNCTIONAL_ASSESSMENT: 0-10
PAIN_FUNCTIONAL_ASSESSMENT: CPOT (CRITICAL CARE PAIN OBSERVATION TOOL)

## 2025-08-04 ASSESSMENT — PAIN DESCRIPTION - DESCRIPTORS: DESCRIPTORS: ACHING

## 2025-08-04 ASSESSMENT — PAIN DESCRIPTION - ORIENTATION: ORIENTATION: MID

## 2025-08-04 NOTE — PROCEDURES
Procedures    Appropriate consent was obtained from the patient, all risks and benefits were explained to her.    Propofol sedation was initiated.  At this point, a bronchoscope was inserted via the existing endotracheal tube.      The bronchoscope was then advanced to the right upper lobe and the bronchoscope was wedged into the apical segment.  Bronchoscope was then pulled back at the level of the cornell and advanced into the right middle lobe.  Bronchoscope was then pulled back and then advanced into the right lower lobe.  There were no significant abnormalities in any segment.  There was not significant mucus to suction.  All areas were inspected for any acute hemorrhage, but none were seen.  The bronchoscope was withdrawn.      The scope was then directed into the left side, into the left main bronchus. Left lower lobe bronchus with their sub segments were noted to have copious amounts of thick secretions.  The left upper lobe bronchus was noted to have copious amounts of thick secretions.  All of this volume was suctioned and sent for respiratory culture.  All areas were inspected for any acute hemorrhage, but none were seen.  The bronchoscope was withdrawn.  The patient tolerated the procedure well.  There were no complications.    Chest x-ray ordered.

## 2025-08-04 NOTE — SIGNIFICANT EVENT
VAD Readmission     HeartMate Serial Numbers  HeartMate Equipment Tracking/Serial Numbers  Battery Charger: [id number]  Battery Clip 1: [id number]  Battery Clip 2: [id number]  Battery Clip 3: [id number]  Battery Clip 4: [id number]  Rechargeable Battery 1: [id number]  Rechargeable Battery 2: [id number]  Rechargeable Battery 3: [id number]  Rechargeable Battery 4: [id number]  Rechargeable Battery 5: [id number]  Rechargeable Battery 6: [id number]  Rechargeable Battery 7: [id number]  Rechargeable Battery 8: [id number]  Programmed Backup : [id number]  Primary Controller: [id number]  Mobile Power Unit: [id number]      HeartMate Tracking  HeartMate Equipment Transfer  Transfer From: OR  Transfer To: Psychiatric  Battery Charger: Yes  Battery Clip 1: Yes  Battery Clip 2: Yes  Battery Clip 3: Yes  Battery Clip 4: Yes  Rechargeable Battery 1: Yes  Rechargeable Battery 2: Yes  Rechargeable Battery 3: Yes  Rechargeable Battery 4: Yes  Rechargeable Battery 5: Yes  Rechargeable Battery 6: Yes  Rechargeable Battery 7: Yes  Rechargeable Battery 8: Yes  Backup Battery 2: No  Go Gear Consolidated Bag: No  Go Gear Accessory Kit: No  Go Gear Vest: No  Programmed Backup : Yes  Primary Controller: Yes  Mobile Power Unit: Yes  Black Emergency Red Tag Bag: No  Shower Bag: No  Apil Cuff: Yes  Outflow graft: Yes  Modular cable: Yes      HeartWare Serial Numbers         HeartWare Tracking         VAD Discharge     Educations    Education Documented

## 2025-08-04 NOTE — PROGRESS NOTES
LVAD  08/01 Implant of HeartMate 3 Left Ventricular Assist Device  (Rashad Abangel Ilir)     ICU TREATMENT PLAN  08/03 re-intubated. Bronched for large mucous plug in LLL.     DC PLAN  TBD - Care Transitions is following to develop a safe & supportive discharge plan in collaboration with multidisciplinary team (&) patient/family/significant others.      JUDITHOR   Debra    PT/OT   08/02 = low     COMPLETED  (X) Daily ongoing review of patient via chart and/or (M-F) IDT rounds    Ruby Lees (LSW, MSW)

## 2025-08-04 NOTE — PROGRESS NOTES
Physical Therapy    Physical Therapy Treatment    Patient Name: Thao Evans  MRN: [id number]  Department: [facility] CTICU  Room: 02/02-A  Today's Date: 8/4/2025  Time Calculation  Start Time: 1123  Stop Time: 1209  Time Calculation (min): 46 min    Assessment/Plan   PT Assessment  PT Assessment Results: Decreased strength, Decreased endurance, Impaired balance, Decreased mobility, Pain  Rehab Prognosis: Excellent  Barriers to Discharge Home: No anticipated barriers  Evaluation/Treatment Tolerance: Patient tolerated treatment well  Medical Staff Made Aware: Yes  End of Session Communication: Bedside nurse  End of Session Patient Position: Up in chair, Alarm off, not on at start of session  PT Plan  Inpatient/Swing Bed or Outpatient: Inpatient  PT Plan  Treatment/Interventions: Bed mobility, Transfer training, Gait training, Balance training, Stair training, Strengthening, Endurance training, Range of motion, Therapeutic exercise, Therapeutic activity, Home exercise program, Postural re-education, Positioning  PT Plan: Ongoing PT  PT Frequency: 6 times per week  PT Discharge Recommendations: Low intensity level of continued care  Equipment Recommended upon Discharge: Wheeled walker  PT Recommended Transfer Status: Assist x1  PT - OK to Discharge: Yes    PT Visit Info:  PT Received On: 08/04/25  Response to Previous Treatment: Patient with no complaints from previous session. (Re-intubated 8/3; extubated 8/4 a.m.)     General Visit Information:   General  Family/Caregiver Present: No  Prior to Session Communication: Bedside nurse  Patient Position Received: Bed, 3 rail up, Alarm off, not on at start of session  Preferred Learning Style: auditory, verbal  General Comment: Pt awake, alert and willing to participate in PT session.  Pt completed bed mobility, sit<>stand transfer and ambulation ~115ft with thoracic walker.  CGA-minAx1 provided with all functional mobility.  Vitals stable. (Tele, nette, swan, CVP, nguyen, CVC epi  .03, mil .25, LVAD driveline secure and stable throughout session)    Subjective   Precautions:  Precautions  Medical Precautions: Cardiac precautions, Fall precautions, Oxygen therapy device and L/min (4L)  Post-Surgical Precautions: Move in the Tube  Precautions Comment: MAP >65     Date/Time Vitals Session Patient Position Pulse Resp SpO2 BP MAP (mmHg)    08/04/25 1123 Pre PT  Lying  106  30  99 %  77/64  70     08/04/25 1209 Post PT  Sitting  110  20  97 %  76/62  68      Vital Signs Comment: Vitals stable throughout session     Objective   Pain:  Pain Assessment  Pain Assessment: 0-10  0-10 (Numeric) Pain Score:  (6/10 start of session; 9/10 end of session)  Pain Type: Surgical pain  Pain Location: Chest  Cognition:  Cognition  Overall Cognitive Status: Within Functional Limits  Arousal/Alertness: Appropriate responses to stimuli  Orientation Level: Oriented X4  Following Commands: Follows all commands and directions without difficulty  Activity Tolerance:  Activity Tolerance  Endurance: Tolerates 10 - 20 min exercise with multiple rests  Early Mobility/Exercise Safety Screen: Proceed with mobilization - No exclusion criteria met  Treatments:  Therapeutic Activity  Therapeutic Activity Performed: Yes  Therapeutic Activity 1: Increased time for skilled ICU line/tube management for safe functional mobility  Therapeutic Activity 2: Pt sat EOB ~10 min prior to ambulation completing LVAD monitor>battery transfer.  Pt able to complete all steps except requiring Ophelia for connection to the batteries.  Pt with good seated posture and no instability at EOB.  Pt assisted with transfer back to monitor once in chair however PT completed majority of transfer d/t pts pain.  Therapeutic Activity 3: Static standing ~3 min prior to stand>sit with thoracic walker for upright support.  Pt demonstrating good posture and no instability.    Bed Mobility  Bed Mobility: Yes  Bed Mobility 1  Bed Mobility 1: Supine to sitting  Level of  Assistance 1: Minimum assistance  Bed Mobility Comments 1: HOB elevated, assistance with advancing trunk upright.  Cues for hand placement (log roll0    Ambulation/Gait Training  Ambulation/Gait Training Performed: Yes  Ambulation/Gait Training 1  Surface 1: Level tile  Device 1: Thoracic walker  Assistance 1: Contact guard  Quality of Gait 1: Decreased step length, Forward flexed posture  Comments/Distance (ft) 1: ~115ft  Transfers  Transfer: Yes  Transfer 1  Transfer From 1: Sit to  Transfer to 1: Stand  Technique 1: Sit to stand  Transfer Device 1: Thoracic walker  Transfer Level of Assistance 1: Minimum assistance  Trials/Comments 1: Cues for hand placement and proper use of AD  Transfers 2  Transfer From 2: Stand to  Transfer to 2: Sit  Technique 2: Stand to sit  Transfer Device 2: Thoracic walker  Transfer Level of Assistance 2: Contact guard    Stairs  Stairs: No    Outcome Measures:  [facility] Basic Mobility  Turning from your back to your side while in a flat bed without using bedrails: A little  Moving from lying on your back to sitting on the side of a flat bed without using bedrails: A little  Moving to and from bed to chair (including a wheelchair): A little  Standing up from a chair using your arms (e.g. wheelchair or bedside chair): A little  To walk in hospital room: A little  Climbing 3-5 steps with railing: A lot  Basic Mobility - Total Score: 17    FSS-ICU  Ambulation: Walks >/ or equal to 50 feet with any assistance x1  Rolling: Supervision or set-up only  Sitting: Supervision or set-up only  Transfer Sit-to-Stand: Minimal assistance (performs 75% or more of task)  Transfer Supine-to-Sit: Minimal assistance (performs 75% or more of task)  Total Score: 20    Early Mobility/Exercise Safety Screen: Proceed with mobilization - No exclusion criteria met  ICU Mobility Scale: Walking with assistance of 1 person [8]    Education Documentation  Body Mechanics, taught by Cari Bonilla PT at 8/4/2025 12:25  PM.  Learner: Patient  Readiness: Acceptance  Method: Demonstration  Response: Demonstrated Understanding    Handouts, taught by Cari Bonilla PT at 8/4/2025 12:25 PM.  Learner: Patient  Readiness: Acceptance  Method: Demonstration  Response: Demonstrated Understanding    Mobility Training, taught by Cari Bonilla PT at 8/4/2025 12:25 PM.  Learner: Patient  Readiness: Acceptance  Method: Demonstration  Response: Demonstrated Understanding    Education Comments  No comments found.     EDUCATION:       Encounter Problems       Encounter Problems (Active)       Balance       patient will score >24/28 on the Tinetti scale to present as a low risk of falls (Progressing)       Start:  07/15/25    Expected End:  08/16/25               Mobility       Patient will complete the 5xSTS  in <15 sec with no assistive device, no UE support, and close supervision (RPE: 11/20 RPD: 3/10) (Progressing)       Start:  07/15/25    Expected End:  08/16/25            Patient will ambulate >1000ft on the 6MWT with no assistive device and close superivision (RPE: 11/20 RPD: 3/10) (Progressing)       Start:  07/15/25    Expected End:  08/16/25               Mobility       Pt will ambulate >300ft with appropriate form, SBA or less, LRAD, and no LOB for safe DC.  (Progressing)       Start:  08/02/25    Expected End:  08/16/25            Pt will ambulate up/down >3 stairs with/without hand rail with CGA or less to safely access their home upon DC.   (Progressing)       Start:  08/02/25    Expected End:  08/16/25               PT Transfers       Pt will perform bed mobility and sit<>stand transfers with SBA and use of LRAD to safely DC.  (Progressing)       Start:  08/02/25    Expected End:  08/16/25
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
## 2025-08-04 NOTE — PROCEDURES
Intubation    Date/Time: 8/3/2025 9:56 PM    Performed by: Kunal Casey MD  Authorized by: Kunal Casey MD    Consent:     Consent obtained:  Emergent situation  Universal protocol:     Patient identity confirmed:  Arm band and hospital-assigned identification number  Pre-procedure details:     Indications: respiratory failure      Indications comment:  Mixed hypoxic and hypercarbic    Patient status:  Awake    Neck mobility: normal      Pharmacologic strategy: RSI      Induction agents:  Etomidate    Paralytics:  Rocuronium  Procedure details:     Preoxygenation:  Nonrebreather mask    Number of attempts:  1  Successful intubation attempt details:     Intubation method:  Oral    Intubation technique: video assisted      Laryngoscope blade:  Hypercurved (S3 glidescope)    Grade view: I      Tube size (mm):  7.5    Tube type:  Cuffed    Tube visualized through cords: yes    Placement assessment:     ETT at teeth/gumline (cm):  22    Tube secured with:  ETT guerra    Breath sounds:  Equal    Placement verification: chest rise, colorimetric ETCO2 and CXR verification      CXR findings:  Appropriate position  Post-procedure details:     Procedure completion:  Tolerated
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
## 2025-08-04 NOTE — SIGNIFICANT EVENT
Patient complained of nausea. Obtained ABG with SaO2 noted to be 60. Patient is s/p LVAD and was progressing well. Today on CXR noted to have near complete opacification of left lung. ABG noted to have hypercarbia and acidosis. Patient was in no acute distress. She was placed on Bipap for ~2 hours without change in her work of breathing or ABG. She was trialed off Bipap. IPV was attempted over concern for mucous plug. Shortly thereafter had nausea and an ABG was obtained which demonstrated a PaO2 in the 30s and SaO2 at 60.    We immediately placed patient on 100% FiO2 and prepared for intubation. She was subsequently intubated and bronched with large mucous plug noted in LLL. Please see procedure notes for details.    Dr. Vickers and family/POA made aware.

## 2025-08-05 ENCOUNTER — APPOINTMENT (OUTPATIENT)
Dept: RADIOLOGY | Facility: HOSPITAL | Age: 60
DRG: 001 | End: 2025-08-05
Payer: COMMERCIAL

## 2025-08-05 ENCOUNTER — APPOINTMENT (OUTPATIENT)
Dept: CARDIOLOGY | Facility: HOSPITAL | Age: 60
DRG: 001 | End: 2025-08-05
Payer: COMMERCIAL

## 2025-08-05 LAB
ALBUMIN SERPL BCP-MCNC: 3.4 G/DL (ref 3.4–5)
ALP SERPL-CCNC: 67 U/L (ref 33–136)
ALT SERPL W P-5'-P-CCNC: 3 U/L (ref 7–45)
ANION GAP BLDA CALCULATED.4IONS-SCNC: 7 MMO/L (ref 10–25)
ANION GAP BLDMV CALCULATED.4IONS-SCNC: 4 MMO/L (ref 10–25)
ANION GAP BLDMV CALCULATED.4IONS-SCNC: 5 MMO/L (ref 10–25)
ANION GAP BLDMV CALCULATED.4IONS-SCNC: 6 MMO/L (ref 10–25)
ANION GAP BLDMV CALCULATED.4IONS-SCNC: 9 MMO/L (ref 10–25)
ANION GAP SERPL CALC-SCNC: 9 MMOL/L (ref 10–20)
APPARATUS: ABNORMAL
APTT PPP: 33 SECONDS (ref 26–36)
APTT PPP: 35 SECONDS (ref 26–36)
AST SERPL W P-5'-P-CCNC: 20 U/L (ref 9–39)
ATRIAL RATE: 91 BPM
BASE EXCESS BLDA CALC-SCNC: 3.1 MMOL/L (ref -2–3)
BASE EXCESS BLDMV CALC-SCNC: 3.2 MMOL/L (ref -2–3)
BASE EXCESS BLDMV CALC-SCNC: 3.9 MMOL/L (ref -2–3)
BASE EXCESS BLDMV CALC-SCNC: 4 MMOL/L (ref -2–3)
BASE EXCESS BLDMV CALC-SCNC: 4 MMOL/L (ref -2–3)
BILIRUB DIRECT SERPL-MCNC: 0.1 MG/DL (ref 0–0.3)
BILIRUB SERPL-MCNC: 0.5 MG/DL (ref 0–1.2)
BODY TEMPERATURE: 37 DEGREES CELSIUS
BUN SERPL-MCNC: 31 MG/DL (ref 6–23)
CA-I BLDA-SCNC: 1.17 MMOL/L (ref 1.1–1.33)
CA-I BLDMV-SCNC: 1.15 MMOL/L (ref 1.1–1.33)
CA-I BLDMV-SCNC: 1.16 MMOL/L (ref 1.1–1.33)
CA-I BLDMV-SCNC: 1.18 MMOL/L (ref 1.1–1.33)
CA-I BLDMV-SCNC: 1.18 MMOL/L (ref 1.1–1.33)
CALCIUM SERPL-MCNC: 9 MG/DL (ref 8.6–10.6)
CHLORIDE BLD-SCNC: 102 MMOL/L (ref 98–107)
CHLORIDE BLD-SCNC: 102 MMOL/L (ref 98–107)
CHLORIDE BLD-SCNC: 96 MMOL/L (ref 98–107)
CHLORIDE BLD-SCNC: 98 MMOL/L (ref 98–107)
CHLORIDE BLDA-SCNC: 100 MMOL/L (ref 98–107)
CHLORIDE SERPL-SCNC: 96 MMOL/L (ref 98–107)
CO2 SERPL-SCNC: 34 MMOL/L (ref 21–32)
CREAT SERPL-MCNC: 0.87 MG/DL (ref 0.5–1.05)
EGFRCR SERPLBLD CKD-EPI 2021: 75 ML/MIN/1.73M*2
ERYTHROCYTE [DISTWIDTH] IN BLOOD BY AUTOMATED COUNT: 15.4 % (ref 11.5–14.5)
ERYTHROCYTE [DISTWIDTH] IN BLOOD BY AUTOMATED COUNT: 15.6 % (ref 11.5–14.5)
GLUCOSE BLD MANUAL STRIP-MCNC: 104 MG/DL (ref 74–99)
GLUCOSE BLD MANUAL STRIP-MCNC: 105 MG/DL (ref 74–99)
GLUCOSE BLD MANUAL STRIP-MCNC: 120 MG/DL (ref 74–99)
GLUCOSE BLD MANUAL STRIP-MCNC: 122 MG/DL (ref 74–99)
GLUCOSE BLD MANUAL STRIP-MCNC: 79 MG/DL (ref 74–99)
GLUCOSE BLD MANUAL STRIP-MCNC: 81 MG/DL (ref 74–99)
GLUCOSE BLD MANUAL STRIP-MCNC: 91 MG/DL (ref 74–99)
GLUCOSE BLD-MCNC: 106 MG/DL (ref 74–99)
GLUCOSE BLD-MCNC: 78 MG/DL (ref 74–99)
GLUCOSE BLD-MCNC: 79 MG/DL (ref 74–99)
GLUCOSE BLD-MCNC: 82 MG/DL (ref 74–99)
GLUCOSE BLDA-MCNC: 108 MG/DL (ref 74–99)
GLUCOSE SERPL-MCNC: 77 MG/DL (ref 74–99)
HCO3 BLDA-SCNC: 29.7 MMOL/L (ref 22–26)
HCO3 BLDMV-SCNC: 30.9 MMOL/L (ref 22–26)
HCO3 BLDMV-SCNC: 31.1 MMOL/L (ref 22–26)
HCO3 BLDMV-SCNC: 31.2 MMOL/L (ref 22–26)
HCO3 BLDMV-SCNC: 33.4 MMOL/L (ref 22–26)
HCT VFR BLD AUTO: 21.7 % (ref 36–46)
HCT VFR BLD AUTO: 25.2 % (ref 36–46)
HCT VFR BLD EST: 26 % (ref 36–46)
HCT VFR BLD EST: 31 % (ref 36–46)
HCT VFR BLD EST: 31 % (ref 36–46)
HCT VFR BLD EST: 47 % (ref 36–46)
HCT VFR BLD EST: ABNORMAL %
HGB BLD-MCNC: 6.7 G/DL (ref 12–16)
HGB BLD-MCNC: 7.9 G/DL (ref 12–16)
HGB BLDA-MCNC: 10.2 G/DL (ref 12–16)
HGB BLDMV-MCNC: 10.4 G/DL (ref 12–16)
HGB BLDMV-MCNC: 15.7 G/DL (ref 12–16)
HGB BLDMV-MCNC: 8.7 G/DL (ref 12–16)
HGB BLDMV-MCNC: ABNORMAL G/DL
INHALED O2 CONCENTRATION: 36 %
INHALED O2 CONCENTRATION: 42 %
INR PPP: 2.9 (ref 0.9–1.1)
INR PPP: 3.4 (ref 0.9–1.1)
LACTATE BLDA-SCNC: 0.5 MMOL/L (ref 0.4–2)
LACTATE BLDMV-SCNC: 0.5 MMOL/L (ref 0.4–2)
LACTATE BLDMV-SCNC: 0.5 MMOL/L (ref 0.4–2)
LACTATE BLDMV-SCNC: 0.6 MMOL/L (ref 0.4–2)
LACTATE BLDMV-SCNC: 0.8 MMOL/L (ref 0.4–2)
LDH SERPL L TO P-CCNC: 296 U/L (ref 84–246)
MAGNESIUM SERPL-MCNC: 2.07 MG/DL (ref 1.6–2.4)
MCH RBC QN AUTO: 30.5 PG (ref 26–34)
MCH RBC QN AUTO: 30.5 PG (ref 26–34)
MCHC RBC AUTO-ENTMCNC: 30.9 G/DL (ref 32–36)
MCHC RBC AUTO-ENTMCNC: 31.3 G/DL (ref 32–36)
MCV RBC AUTO: 97 FL (ref 80–100)
MCV RBC AUTO: 99 FL (ref 80–100)
NRBC BLD-RTO: 0 /100 WBCS (ref 0–0)
NRBC BLD-RTO: 0 /100 WBCS (ref 0–0)
OXYHGB MFR BLDA: 97.1 % (ref 94–98)
OXYHGB MFR BLDMV: 58.5 % (ref 45–75)
OXYHGB MFR BLDMV: 59.2 % (ref 45–75)
OXYHGB MFR BLDMV: 61.7 % (ref 45–75)
OXYHGB MFR BLDMV: ABNORMAL %
P AXIS: 68 DEGREES
P OFFSET: 190 MS
P ONSET: 137 MS
PCO2 BLDA: 55 MM HG (ref 38–42)
PCO2 BLDMV: 59 MM HG (ref 41–51)
PCO2 BLDMV: 60 MM HG (ref 41–51)
PCO2 BLDMV: 62 MM HG (ref 41–51)
PCO2 BLDMV: 71 MM HG (ref 41–51)
PH BLDA: 7.34 PH (ref 7.38–7.42)
PH BLDMV: 7.28 PH (ref 7.33–7.43)
PH BLDMV: 7.31 PH (ref 7.33–7.43)
PH BLDMV: 7.32 PH (ref 7.33–7.43)
PH BLDMV: 7.33 PH (ref 7.33–7.43)
PHOSPHATE SERPL-MCNC: 3.1 MG/DL (ref 2.5–4.9)
PLATELET # BLD AUTO: 155 X10*3/UL (ref 150–450)
PLATELET # BLD AUTO: 163 X10*3/UL (ref 150–450)
PO2 BLDA: 114 MM HG (ref 85–95)
PO2 BLDMV: 38 MM HG (ref 35–45)
PO2 BLDMV: 40 MM HG (ref 35–45)
PO2 BLDMV: 40 MM HG (ref 35–45)
PO2 BLDMV: 44 MM HG (ref 35–45)
POTASSIUM BLDA-SCNC: 4 MMOL/L (ref 3.5–5.3)
POTASSIUM BLDMV-SCNC: 3.7 MMOL/L (ref 3.5–5.3)
POTASSIUM BLDMV-SCNC: 3.8 MMOL/L (ref 3.5–5.3)
POTASSIUM BLDMV-SCNC: 3.8 MMOL/L (ref 3.5–5.3)
POTASSIUM BLDMV-SCNC: 4 MMOL/L (ref 3.5–5.3)
POTASSIUM SERPL-SCNC: 3.7 MMOL/L (ref 3.5–5.3)
PR INTERVAL: 160 MS
PROT SERPL-MCNC: 6.2 G/DL (ref 6.4–8.2)
PROTHROMBIN TIME: 32.2 SECONDS (ref 9.8–12.4)
PROTHROMBIN TIME: 37.7 SECONDS (ref 9.8–12.4)
Q ONSET: 217 MS
QRS COUNT: 14 BEATS
QRS DURATION: 78 MS
QT INTERVAL: 374 MS
QTC CALCULATION(BAZETT): 460 MS
QTC FREDERICIA: 430 MS
R AXIS: -46 DEGREES
RBC # BLD AUTO: 2.2 X10*6/UL (ref 4–5.2)
RBC # BLD AUTO: 2.59 X10*6/UL (ref 4–5.2)
SAO2 % BLDA: 100 % (ref 94–100)
SAO2 % BLDMV: 60 % (ref 45–75)
SAO2 % BLDMV: 61 % (ref 45–75)
SAO2 % BLDMV: 63 % (ref 45–75)
SAO2 % BLDMV: ABNORMAL %
SODIUM BLDA-SCNC: 133 MMOL/L (ref 136–145)
SODIUM BLDMV-SCNC: 130 MMOL/L (ref 136–145)
SODIUM BLDMV-SCNC: 134 MMOL/L (ref 136–145)
SODIUM BLDMV-SCNC: 134 MMOL/L (ref 136–145)
SODIUM BLDMV-SCNC: 135 MMOL/L (ref 136–145)
SODIUM SERPL-SCNC: 135 MMOL/L (ref 136–145)
T AXIS: 82 DEGREES
T OFFSET: 404 MS
VENTRICULAR RATE: 91 BPM
WBC # BLD AUTO: 10 X10*3/UL (ref 4.4–11.3)
WBC # BLD AUTO: 9.4 X10*3/UL (ref 4.4–11.3)

## 2025-08-05 PROCEDURE — 2020000001 HC ICU ROOM DAILY

## 2025-08-05 PROCEDURE — 82248 BILIRUBIN DIRECT: CPT | Performed by: NURSE PRACTITIONER

## 2025-08-05 PROCEDURE — 2500000004 HC RX 250 GENERAL PHARMACY W/ HCPCS (ALT 636 FOR OP/ED): Performed by: NURSE PRACTITIONER

## 2025-08-05 PROCEDURE — 97530 THERAPEUTIC ACTIVITIES: CPT | Mod: GP

## 2025-08-05 PROCEDURE — 84132 ASSAY OF SERUM POTASSIUM: CPT

## 2025-08-05 PROCEDURE — 2500000002 HC RX 250 W HCPCS SELF ADMINISTERED DRUGS (ALT 637 FOR MEDICARE OP, ALT 636 FOR OP/ED)

## 2025-08-05 PROCEDURE — 37799 UNLISTED PX VASCULAR SURGERY: CPT

## 2025-08-05 PROCEDURE — 85027 COMPLETE CBC AUTOMATED: CPT

## 2025-08-05 PROCEDURE — 99222 1ST HOSP IP/OBS MODERATE 55: CPT | Performed by: STUDENT IN AN ORGANIZED HEALTH CARE EDUCATION/TRAINING PROGRAM

## 2025-08-05 PROCEDURE — 83615 LACTATE (LD) (LDH) ENZYME: CPT | Performed by: NURSE PRACTITIONER

## 2025-08-05 PROCEDURE — 2500000004 HC RX 250 GENERAL PHARMACY W/ HCPCS (ALT 636 FOR OP/ED): Mod: JZ

## 2025-08-05 PROCEDURE — 2500000001 HC RX 250 WO HCPCS SELF ADMINISTERED DRUGS (ALT 637 FOR MEDICARE OP)

## 2025-08-05 PROCEDURE — 2500000004 HC RX 250 GENERAL PHARMACY W/ HCPCS (ALT 636 FOR OP/ED)

## 2025-08-05 PROCEDURE — 2500000001 HC RX 250 WO HCPCS SELF ADMINISTERED DRUGS (ALT 637 FOR MEDICARE OP): Performed by: NURSE PRACTITIONER

## 2025-08-05 PROCEDURE — 94669 MECHANICAL CHEST WALL OSCILL: CPT

## 2025-08-05 PROCEDURE — 82947 ASSAY GLUCOSE BLOOD QUANT: CPT

## 2025-08-05 PROCEDURE — 84100 ASSAY OF PHOSPHORUS: CPT

## 2025-08-05 PROCEDURE — 87205 SMEAR GRAM STAIN: CPT | Performed by: INTERNAL MEDICINE

## 2025-08-05 PROCEDURE — 2500000004 HC RX 250 GENERAL PHARMACY W/ HCPCS (ALT 636 FOR OP/ED): Performed by: INTERNAL MEDICINE

## 2025-08-05 PROCEDURE — 71045 X-RAY EXAM CHEST 1 VIEW: CPT | Performed by: RADIOLOGY

## 2025-08-05 PROCEDURE — 85610 PROTHROMBIN TIME: CPT | Performed by: NURSE PRACTITIONER

## 2025-08-05 PROCEDURE — 2500000002 HC RX 250 W HCPCS SELF ADMINISTERED DRUGS (ALT 637 FOR MEDICARE OP, ALT 636 FOR OP/ED): Performed by: INTERNAL MEDICINE

## 2025-08-05 PROCEDURE — 2500000005 HC RX 250 GENERAL PHARMACY W/O HCPCS

## 2025-08-05 PROCEDURE — 94640 AIRWAY INHALATION TREATMENT: CPT

## 2025-08-05 PROCEDURE — 99291 CRITICAL CARE FIRST HOUR: CPT

## 2025-08-05 PROCEDURE — 2500000005 HC RX 250 GENERAL PHARMACY W/O HCPCS: Performed by: INTERNAL MEDICINE

## 2025-08-05 PROCEDURE — 83735 ASSAY OF MAGNESIUM: CPT

## 2025-08-05 PROCEDURE — 71045 X-RAY EXAM CHEST 1 VIEW: CPT

## 2025-08-05 PROCEDURE — 94660 CPAP INITIATION&MGMT: CPT

## 2025-08-05 PROCEDURE — 85610 PROTHROMBIN TIME: CPT

## 2025-08-05 RX ORDER — POTASSIUM CHLORIDE 20 MEQ/1
40 TABLET, EXTENDED RELEASE ORAL ONCE
Status: DISCONTINUED | OUTPATIENT
Start: 2025-08-05 | End: 2025-08-05

## 2025-08-05 RX ORDER — POTASSIUM CHLORIDE 29.8 MG/ML
40 INJECTION INTRAVENOUS ONCE
Status: COMPLETED | OUTPATIENT
Start: 2025-08-05 | End: 2025-08-05

## 2025-08-05 RX ORDER — FUROSEMIDE 10 MG/ML
80 INJECTION INTRAMUSCULAR; INTRAVENOUS ONCE
Status: COMPLETED | OUTPATIENT
Start: 2025-08-05 | End: 2025-08-05

## 2025-08-05 RX ORDER — ACETAZOLAMIDE 500 MG/5ML
250 INJECTION, POWDER, LYOPHILIZED, FOR SOLUTION INTRAVENOUS ONCE
Status: COMPLETED | OUTPATIENT
Start: 2025-08-05 | End: 2025-08-05

## 2025-08-05 RX ORDER — ACETYLCYSTEINE 200 MG/ML
3 SOLUTION ORAL; RESPIRATORY (INHALATION)
Status: DISCONTINUED | OUTPATIENT
Start: 2025-08-05 | End: 2025-08-05

## 2025-08-05 RX ORDER — ACETAZOLAMIDE 250 MG/1
250 TABLET ORAL DAILY
Status: DISCONTINUED | OUTPATIENT
Start: 2025-08-05 | End: 2025-08-05

## 2025-08-05 RX ORDER — ACETAZOLAMIDE 250 MG/1
500 TABLET ORAL DAILY
Status: COMPLETED | OUTPATIENT
Start: 2025-08-06 | End: 2025-08-06

## 2025-08-05 RX ORDER — IPRATROPIUM BROMIDE AND ALBUTEROL SULFATE 2.5; .5 MG/3ML; MG/3ML
3 SOLUTION RESPIRATORY (INHALATION)
Status: DISCONTINUED | OUTPATIENT
Start: 2025-08-05 | End: 2025-08-06

## 2025-08-05 RX ORDER — ACETYLCYSTEINE 200 MG/ML
3 SOLUTION ORAL; RESPIRATORY (INHALATION)
Status: DISCONTINUED | OUTPATIENT
Start: 2025-08-05 | End: 2025-08-08

## 2025-08-05 RX ORDER — EPINEPHRINE IN 0.9 % SOD CHLOR 4MG/250ML
0-.03 PLASTIC BAG, INJECTION (ML) INTRAVENOUS CONTINUOUS
Status: DISCONTINUED | OUTPATIENT
Start: 2025-08-05 | End: 2025-08-05

## 2025-08-05 RX ADMIN — ACETAMINOPHEN 650 MG: 325 TABLET, FILM COATED ORAL at 06:30

## 2025-08-05 RX ADMIN — IPRATROPIUM BROMIDE AND ALBUTEROL SULFATE 3 ML: .5; 3 SOLUTION RESPIRATORY (INHALATION) at 13:52

## 2025-08-05 RX ADMIN — HYDROMORPHONE HYDROCHLORIDE 0.2 MG: 0.2 INJECTION, SOLUTION INTRAMUSCULAR; INTRAVENOUS; SUBCUTANEOUS at 04:13

## 2025-08-05 RX ADMIN — ROPINIROLE 1 MG: 1 TABLET, FILM COATED ORAL at 17:29

## 2025-08-05 RX ADMIN — ACETAZOLAMIDE SODIUM 250 MG: 500 INJECTION, POWDER, LYOPHILIZED, FOR SOLUTION INTRAVENOUS at 20:56

## 2025-08-05 RX ADMIN — POTASSIUM CHLORIDE 40 MEQ: 29.8 INJECTION, SOLUTION INTRAVENOUS at 18:08

## 2025-08-05 RX ADMIN — ROPINIROLE 1 MG: 1 TABLET, FILM COATED ORAL at 21:00

## 2025-08-05 RX ADMIN — FUROSEMIDE 80 MG: 10 INJECTION, SOLUTION INTRAMUSCULAR; INTRAVENOUS at 08:04

## 2025-08-05 RX ADMIN — ACETAMINOPHEN 650 MG: 325 TABLET, FILM COATED ORAL at 13:29

## 2025-08-05 RX ADMIN — FUROSEMIDE 80 MG: 10 INJECTION, SOLUTION INTRAMUSCULAR; INTRAVENOUS at 18:08

## 2025-08-05 RX ADMIN — ROPINIROLE HYDROCHLORIDE 3 MG: 3 TABLET, FILM COATED ORAL at 21:00

## 2025-08-05 RX ADMIN — MILRINONE LACTATE IN DEXTROSE 0.25 MCG/KG/MIN: 200 INJECTION, SOLUTION INTRAVENOUS at 02:33

## 2025-08-05 RX ADMIN — ATORVASTATIN CALCIUM 80 MG: 80 TABLET, FILM COATED ORAL at 09:27

## 2025-08-05 RX ADMIN — ACETAMINOPHEN 650 MG: 325 TABLET, FILM COATED ORAL at 00:44

## 2025-08-05 RX ADMIN — MILRINONE LACTATE IN DEXTROSE 0.25 MCG/KG/MIN: 200 INJECTION, SOLUTION INTRAVENOUS at 21:01

## 2025-08-05 RX ADMIN — EPINEPHRINE IN SODIUM CHLORIDE 0.03 MCG/KG/MIN: 16 INJECTION INTRAVENOUS at 02:33

## 2025-08-05 RX ADMIN — SENNOSIDES, DOCUSATE SODIUM 2 TABLET: 50; 8.6 TABLET, FILM COATED ORAL at 21:01

## 2025-08-05 RX ADMIN — PANTOPRAZOLE SODIUM 40 MG: 40 TABLET, DELAYED RELEASE ORAL at 06:18

## 2025-08-05 RX ADMIN — ASPIRIN 81 MG: 81 TABLET, CHEWABLE ORAL at 09:27

## 2025-08-05 RX ADMIN — IPRATROPIUM BROMIDE AND ALBUTEROL SULFATE 3 ML: .5; 3 SOLUTION RESPIRATORY (INHALATION) at 19:58

## 2025-08-05 RX ADMIN — Medication 1 DOSE: at 07:57

## 2025-08-05 RX ADMIN — ROPINIROLE 1 MG: 1 TABLET, FILM COATED ORAL at 09:28

## 2025-08-05 RX ADMIN — METHOCARBAMOL 500 MG: 500 TABLET ORAL at 06:18

## 2025-08-05 RX ADMIN — OXYCODONE HYDROCHLORIDE 2.5 MG: 5 TABLET ORAL at 09:33

## 2025-08-05 RX ADMIN — GUAIFENESIN AND DEXTROMETHORPHAN HYDROBROMIDE 1 TABLET: 600; 30 TABLET, EXTENDED RELEASE ORAL at 20:59

## 2025-08-05 RX ADMIN — ACETYLCYSTEINE 600 MG: 200 SOLUTION ORAL; RESPIRATORY (INHALATION) at 20:00

## 2025-08-05 RX ADMIN — ACETAZOLAMIDE 250 MG: 250 TABLET ORAL at 09:28

## 2025-08-05 RX ADMIN — HYDROMORPHONE HYDROCHLORIDE 0.2 MG: 0.2 INJECTION, SOLUTION INTRAMUSCULAR; INTRAVENOUS; SUBCUTANEOUS at 00:00

## 2025-08-05 RX ADMIN — LEVOTHYROXINE SODIUM 88 MCG: 0.09 TABLET ORAL at 06:18

## 2025-08-05 RX ADMIN — LIDOCAINE 4% 1 PATCH: 40 PATCH TOPICAL at 09:29

## 2025-08-05 RX ADMIN — SENNOSIDES, DOCUSATE SODIUM 2 TABLET: 50; 8.6 TABLET, FILM COATED ORAL at 09:27

## 2025-08-05 RX ADMIN — ACETAMINOPHEN 650 MG: 325 TABLET, FILM COATED ORAL at 21:00

## 2025-08-05 RX ADMIN — CITALOPRAM HYDROBROMIDE 40 MG: 40 TABLET ORAL at 09:28

## 2025-08-05 RX ADMIN — HEPARIN SODIUM 5000 UNITS: 5000 INJECTION INTRAVENOUS; SUBCUTANEOUS at 01:00

## 2025-08-05 RX ADMIN — OXYCODONE HYDROCHLORIDE 2.5 MG: 5 TABLET ORAL at 20:57

## 2025-08-05 RX ADMIN — Medication 5 MG: at 21:01

## 2025-08-05 RX ADMIN — POTASSIUM CHLORIDE 40 MEQ: 29.8 INJECTION, SOLUTION INTRAVENOUS at 08:04

## 2025-08-05 RX ADMIN — GUAIFENESIN AND DEXTROMETHORPHAN HYDROBROMIDE 1 TABLET: 600; 30 TABLET, EXTENDED RELEASE ORAL at 11:54

## 2025-08-05 ASSESSMENT — COGNITIVE AND FUNCTIONAL STATUS - GENERAL
TURNING FROM BACK TO SIDE WHILE IN FLAT BAD: A LITTLE
MOBILITY SCORE: 17
STANDING UP FROM CHAIR USING ARMS: A LITTLE
MOVING FROM LYING ON BACK TO SITTING ON SIDE OF FLAT BED WITH BEDRAILS: A LITTLE
CLIMB 3 TO 5 STEPS WITH RAILING: A LOT
WALKING IN HOSPITAL ROOM: A LITTLE
MOVING TO AND FROM BED TO CHAIR: A LITTLE

## 2025-08-05 ASSESSMENT — PAIN DESCRIPTION - DESCRIPTORS
DESCRIPTORS: ACHING

## 2025-08-05 ASSESSMENT — PAIN SCALES - GENERAL
PAINLEVEL_OUTOF10: 6
PAINLEVEL_OUTOF10: 9
PAINLEVEL_OUTOF10: 8
PAINLEVEL_OUTOF10: 0 - NO PAIN
PAINLEVEL_OUTOF10: 6
PAINLEVEL_OUTOF10: 0 - NO PAIN
PAINLEVEL_OUTOF10: 1
PAINLEVEL_OUTOF10: 8

## 2025-08-05 ASSESSMENT — PAIN - FUNCTIONAL ASSESSMENT
PAIN_FUNCTIONAL_ASSESSMENT: CPOT (CRITICAL CARE PAIN OBSERVATION TOOL)
PAIN_FUNCTIONAL_ASSESSMENT: 0-10

## 2025-08-05 ASSESSMENT — PAIN DESCRIPTION - ORIENTATION: ORIENTATION: MID

## 2025-08-05 ASSESSMENT — PAIN DESCRIPTION - LOCATION: LOCATION: CHEST

## 2025-08-05 NOTE — PROGRESS NOTES
Social Work Note  - HFICU Treatment Plan: transferred  back to HFICU, S/P LVAD 8/1, currently on Epi and Milrinone, on oxygen HFNC PRN   - Payer: [employer]  - Support: Patient has HCPOA on file, Friend Babatunde Mclean is listed as primary agent   -Planned Disposition: seen on 8/4  Rehab recommendation remains unchanged after procedure  - PT/low intensity and OT/no needs at this time. Patient will likely be dc home with home care and and a wheeled walker. ADOD uncertain.    -Discharge assessment: 7/15 I reviewed the SDOH and Social Work Discharge Assessment. I met with the patient and updated the assessments as needed. Per the assessment. I identified no barriers to discharge at this time. SW will continue to follow   QUYEN Lincoln, LSW

## 2025-08-05 NOTE — CONSULTS
Reason for Consult:  Hypoxia, new L lobe atelectasis concern for mucous plugging, eval for possible bronch    History Of Present Illness  Thao Evasn is a 60 y.o. female PMHx significant for CAD s/p PCI (LAD; 2015, Lcx; 3/2025), stage C systolic HF/ICM/HFrEF s/p ICD, h/o VT with presumed associated syncope, poorly controlled DM, pAF (off of DOAC given the absence of recurrence), dyslipidemia, essential HTN, hypothyroidism, recurrent pleural effusion likely 2/2 HF, restriction by PFTs who presented to [facility] HF ICU for [place]-guided hemodynamic optimization and LVAD placement, which was completed 8/2. Pulmonary team was consulted for consideration of bronchoscopy.    Notably, her hospital course has been complicated by mucous plugging to her L lung that required intubation and bedside bronchoscopy to evacuate mucous plug on 8/3, extubated 8/4 to NC. This am, patient developed SOB and hypoxia requiring escalation to Airvo (stable on 50/40). CXR demonstrated complete opacity of L hemithorax, with otherwise stable hemodynamics concern for L lung atelectasis likely 2/2 mucous plugging.    On interview, patient confirms that she was breathing well up until this morning when she suddenly developed shortness of breath. States that she has felt the need to cough up phlegm and has coughed up just a little bit, but has been limited by the post-operative pain in her chest. Confirms she has been using her incentive spirometer but has felt weak and unable to do it well. Denies any fever or chills. Denies hematemesis.        Social History  She reports that she has quit smoking. Her smoking use included cigarettes. She has never been exposed to tobacco smoke. She has never used smokeless tobacco. No history on file for alcohol use and drug use.    Family History  Family History[1]     Allergies  Bee venom protein (honey bee), Codeine, Doxycycline, Prednisone, Tetracyclines, Amoxicillin, and Metformin    Review of Systems  As per  HPI     Past Medical History  She has a past medical history of CAD (coronary artery disease), CHF (congestive heart failure), COPD (chronic obstructive pulmonary disease) (Multi), Graves disease, Hypotension, and PAF (paroxysmal atrial fibrillation) (Multi).    Surgical History  She has no past surgical history on file.     Social History  She reports that she has quit smoking. Her smoking use included cigarettes. She has never been exposed to tobacco smoke. She has never used smokeless tobacco. No history on file for alcohol use and drug use.  Cigarette smoking history:   Marijuana use:  Vaping:  Pets:   Other specific exposure:      Family History  Family History[2]     Allergies  Bee venom protein (honey bee), Codeine, Doxycycline, Prednisone, Tetracyclines, Amoxicillin, and Metformin     Physical Exam  Temp:  [36.7 °C (98.1 °F)-37 °C (98.6 °F)] 36.7 °C (98.1 °F)  Heart Rate:  [] 92  Resp:  [10-27] 12  BP: (70-86)/(53-71) 86/71  Arterial Line BP 1: ()/(42-82) 89/74  FiO2 (%):  [40 %] 40 %    Ill-appearing female sitting upright in bedside chair. Airvo in place running 50/40. Appears to have slightly increased work of breathing but without accessory muscle use. Absent breath sounds on L, decreased breath sounds on right from anterior and lateral auscultation. Poor inspiratory effort. Mechanical hum heard across precordium consistent with LVAD.      Most recent pulmonary function test:  6/16/25:  FEV1/FVC 76, TLC 3.05 (Z -3.42) with normal RV 45, DLCO reduced at 10.95 (z -3.29). No obstruction but c/w moderate restrictive defect.    Most recent TTE results:  7/15/25  1. Left ventricular ejection fraction is moderately decreased by visual estimate at 30-35%.   2. There are multiple left ventricular wall motion abnormalities.   3. The inferolateral wall is hypo to akinetic. The apex is hypokinetic.   4. Spectral Doppler shows a Grade III (restrictive pattern) of left ventricular diastolic filling with an  elevated left atrial pressure.   5. Left ventricular cavity size is severely dilated.   6. No left ventricular thrombus visualized.   7. There is low normal right ventricular systolic function.   8. There is a large pleural effusion.   9. The Doppler estimated RVSP is mildly elevated at 47 mmHg.  10. Normal aortic root.  11. Compared with study dated 6/2/2025, the LV function is now mildly improved owing mostly to a small increase in apcal contractility.    Lung imaging: Reviewed.     8/5/25 CXR complete L hemithorax opacification c/w atelectasis.  7/16/25 CT chest BL pleural effusions with associated atelectasis, interstitial edema, interlobular septal thickening,       Assessment/Plan     Complete L lung atelectasis likely 2/2 mucous plugging  Restriction on PFT    -Would be reasonable to bronch to assess for and treat mucous plug, patient agreeable but cardiology team defers at this time  -Continue with pulmonary hygiene measures: IPV, humidified BiPAP 12/5 with O2 at bedtime and with naps, mucomyst, orders placed  -Agree with daily CXR  -Repeat resp cx; monitor off abx for now, if patient develops infectious s/sx, low threshold to begin empiric abx to cover for HAP       Patient was seen and examined with Dr. Summers.  Pulmonary team will continue to follow, please call or page for any questions or concerns.    Ravi Shah MD, PGY4, Pulmonary and Critical Care Fellow        [1] No family history on file.  [2] No family history on file.

## 2025-08-05 NOTE — PROGRESS NOTES
Physical Therapy    Physical Therapy Treatment    Patient Name: Thao Evans  MRN: [id number]  Department: [facility] HFICU  Room: 07/07-A  Today's Date: 8/5/2025  Time Calculation  Start Time: 1140  Stop Time: 1208  Time Calculation (min): 28 min         Assessment/Plan   PT Assessment  PT Assessment Results: Decreased strength, Decreased endurance, Impaired balance, Decreased mobility, Decreased coordination, Pain  Rehab Prognosis: Excellent  Barriers to Discharge Home: No anticipated barriers  Evaluation/Treatment Tolerance: Patient tolerated treatment well  Medical Staff Made Aware: Yes  End of Session Communication: Bedside nurse  Assessment Comment: Pt performed bed mobility and functional transfer to chair with Min A; pt participated in LVAD management. Pt will continue to benefit from skilled PT to improve functional mobility.  End of Session Patient Position: Up in chair, Alarm off, not on at start of session  PT Plan  Inpatient/Swing Bed or Outpatient: Inpatient  PT Plan  Treatment/Interventions: Bed mobility, Transfer training, Gait training, Balance training, Stair training, Strengthening, Endurance training, Range of motion, Therapeutic exercise, Therapeutic activity, Home exercise program, Positioning, Postural re-education  PT Plan: Ongoing PT  PT Frequency: 6 times per week  PT Discharge Recommendations: Low intensity level of continued care  Equipment Recommended upon Discharge: Wheeled walker  PT Recommended Transfer Status: Assist x1  PT - OK to Discharge: Yes    PT Visit Info:  PT Received On: 08/05/25  Response to Previous Treatment: Patient with no complaints from previous session.     General Visit Information:   General  Reason for Referral: 59 y/o F presents for colonoscopy prior to LVAD implantation next week and now  LVAD (HM3) placement 08/01.  Past Medical History Relevant to Rehab: CAD s/p PCI (LAD; 2015, Lcx; 2025), stage D systolic HF/ICM/HFrEF s/p ICD, VT with presumed associated  syncope, poorly controlled DM, pAF, dyslipidemia, essential HTN, COPD, and Graves  Prior to Session Communication: Bedside nurse  Patient Position Received: Bed, 3 rail up, Alarm off, not on at start of session  Preferred Learning Style: auditory, verbal, visual  General Comment: Pt awake and willing to participate in PT treatment session. (Lines: HM3- 4.1/4900/3.4/3.1; A line, tele, on airvo- 50L/42% O2)    Subjective   Precautions:  Precautions  Hearing/Visual Limitations: hearing WFL; glasses on.  Medical Precautions: Cardiac precautions, Fall precautions, Oxygen therapy device and L/min  Post-Surgical Precautions: Move in the Tube  Precautions Comment: MAP >65     Date/Time Vitals Session Patient Position Pulse Resp SpO2 BP MAP (mmHg)    08/05/25 1057 --  --  99  17  99 %  --  --     08/05/25 1058 --  --  100  13  --  --  --     08/05/25 1100 --  --  99  18  98 %  --  --     08/05/25 1102 --  --  98  14  --  --  --     08/05/25 1103 --  --  98  18  99 %  --  --     08/05/25 1104 --  --  99  15  99 %  --  --     08/05/25 1105 --  --  98  17  99 %  --  --     08/05/25 1106 --  --  98  15  99 %  --  --     08/05/25 1107 --  --  99  13  99 %  --  --     08/05/25 1108 --  --  98  14  99 %  --  --     08/05/25 1109 --  --  99  15  99 %  --  --     08/05/25 1110 --  --  96  24  95 %  --  --     08/05/25 1111 --  --  99  20  99 %  --  --     08/05/25 1112 --  --  99  16  99 %  --  --     08/05/25 1113 --  --  98  14  99 %  --  --     08/05/25 1114 --  --  98  14  99 %  --  --     08/05/25 1115 --  --  98  14  99 %  --  --     08/05/25 1116 --  --  98  14  99 %  --  --     08/05/25 1117 --  --  97  14  99 %  --  --     08/05/25 1118 --  --  97  14  99 %  --  --     08/05/25 1119 --  --  97  17  99 %  --  --     08/05/25 1120 --  --  98  16  99 %  --  --     08/05/25 1121 --  --  97  17  99 %  --  --     08/05/25 1122 --  --  97  13  99 %  --  --     08/05/25 1123 --  --  97  13  99 %  --  --     08/05/25 1124 --  --  96   14  99 %  --  --     08/05/25 1125 --  --  96  14  99 %  --  --     08/05/25 1126 --  --  96  15  99 %  --  --     08/05/25 1127 --  --  96  15  99 %  --  --     08/05/25 1128 --  --  97  13  98 %  --  --     08/05/25 1129 --  --  96  13  99 %  --  --     08/05/25 1130 --  --  96  14  98 %  --  --     08/05/25 1131 --  --  96  14  99 %  --  --     08/05/25 1132 --  --  96  15  98 %  --  --     08/05/25 1133 --  --  96  15  99 %  --  --     08/05/25 1134 --  --  96  18  99 %  --  --     08/05/25 1135 --  --  97  15  --  --  --     08/05/25 1136 --  --  96  13  --  --  --     08/05/25 1137 --  --  96  13  --  --  --     08/05/25 1138 --  --  95  15  --  --  --     08/05/25 1139 --  --  95  13  --  --  --     08/05/25 1140 Pre PT  Lying  96  --  99 %  86/71  78     08/05/25 1141 --  --  96  14  99 %  --  --     08/05/25 1142 --  --  94  15  98 %  --  --     08/05/25 1143 --  --  95  14  --  --  --     08/05/25 1144 --  --  95  16  99 %  --  --     08/05/25 1145 --  --  95  15  99 %  --  --     08/05/25 1146 --  --  95  20  98 %  --  --     08/05/25 1147 --  --  95  18  --  --  --     08/05/25 1148 --  --  96  20  --  --  --     08/05/25 1149 --  --  96  18  --  --  --     08/05/25 1150 --  --  97  18  --  --  --     08/05/25 1151 --  --  98  19  --  --  --     08/05/25 1152 --  --  96  17  --  --  --     08/05/25 1153 --  --  97  15  99 %  --  --     08/05/25 1154 --  --  95  17  98 %  --  --     08/05/25 1155 --  --  97  23  99 %  --  --     08/05/25 1156 --  --  98  20  97 %  --  --     08/05/25 1157 --  --  98  17  96 %  --  --     08/05/25 1158 --  --  98  24  --  --  --     08/05/25 1159 --  --  96  23  --  --  --     08/05/25 1200 --  --  95  18  --  --  --     08/05/25 1201 --  --  97  18  --  --  --     08/05/25 1202 --  --  97  20  --  --  --     08/05/25 1203 --  --  96  24  --  --  --     08/05/25 1204 --  --  96  19  --  --  --     08/05/25 1205 --  --  96  26  --  --  --     08/05/25 1206 --  --  96  20   --  --  --     08/05/25 1207 --  --  96  22  --  --  --     08/05/25 1208 --  --  96  18  --  --  --     08/05/25 1209 --  --  92  26  --  --  --     08/05/25 1210 --  --  95  21  --  --  --     08/05/25 1211 --  --  95  17  --  --  --     08/05/25 1212 --  --  94  18  97 %  --  --     08/05/25 1213 --  --  95  16  --  --  --     08/05/25 1214 --  --  95  17  96 %  --  --     08/05/25 1215 --  --  95  15  97 %  --  --     08/05/25 1216 --  --  95  17  97 %  --  --     08/05/25 1217 --  --  95  14  97 %  --  --     08/05/25 1218 --  --  95  14  --  --  --     08/05/25 1219 --  --  81  17  95 %  --  --      Vital Signs Comment: Vitals stable throughout session     Objective   Pain:  Pain Assessment  Pain Assessment: 0-10  0-10 (Numeric) Pain Score: 6  Pain Type: Surgical pain  Pain Location: Chest  Pain Interventions: Repositioned  Response to Interventions: Resting quietly  Cognition:  Cognition  Overall Cognitive Status: Within Functional Limits  Arousal/Alertness: Appropriate responses to stimuli  Orientation Level: Oriented X4  Coordination:  Movements are Fluid and Coordinated: Yes    Activity Tolerance:  Activity Tolerance  Endurance: Tolerates 10 - 20 min exercise with multiple rests  Early Mobility/Exercise Safety Screen: Proceed with mobilization - No exclusion criteria met  Treatments:  Therapeutic Activity  Therapeutic Activity Performed: Yes  Therapeutic Activity 1: Increased time for line/VAD/Airvo management required for functional mobility  Therapeutic Activity 2: Pt sat EOB for 2 minutes with SBA prior to transfer to chair  Therapeutic Activity 3: Pt participated in LVAD management/education via performing connection from batteries to power source with Minimal cueing    Bed Mobility  Bed Mobility: Yes  Bed Mobility 1  Bed Mobility 1: Supine to sitting  Level of Assistance 1: Minimum assistance  Bed Mobility Comments 1: Min A for trunk elevation to seated    Ambulation/Gait Training  Ambulation/Gait  Training Performed:  (limited due to airvo)  Transfers  Transfer: Yes  Transfer 1  Transfer From 1: Bed to  Transfer to 1: Chair with arms  Technique 1: Sit to stand, Stand to sit  Transfer Level of Assistance 1: Arm in arm assistance, Minimum assistance  Trials/Comments 1: Min A for hip elevation to stand and balance during side steps to chair    Outcome Measures:  [facility] Basic Mobility  Turning from your back to your side while in a flat bed without using bedrails: A little  Moving from lying on your back to sitting on the side of a flat bed without using bedrails: A little  Moving to and from bed to chair (including a wheelchair): A little  Standing up from a chair using your arms (e.g. wheelchair or bedside chair): A little  To walk in hospital room: A little  Climbing 3-5 steps with railing: A lot  Basic Mobility - Total Score: 17    FSS-ICU  Ambulation: Walks <50 feet with any assistance x1 or walks any distance with assistance x2 people  Rolling: Minimal assistance (performs 75% or more of task)  Sitting: Supervision or set-up only  Transfer Sit-to-Stand: Minimal assistance (performs 75% or more of task)  Transfer Supine-to-Sit: Minimal assistance (performs 75% or more of task)  Total Score: 18      Early Mobility/Exercise Safety Screen: Proceed with mobilization - No exclusion criteria met  ICU Mobility Scale: Transferring bed to chair [5]    Education Documentation  Precautions, taught by Felicia Melvin PT at 8/5/2025 12:55 PM.  Learner: Patient  Readiness: Acceptance  Method: Explanation  Response: Verbalizes Understanding  Comment: agreeable to PT POC    Body Mechanics, taught by Felicia Melvin PT at 8/5/2025 12:55 PM.  Learner: Patient  Readiness: Acceptance  Method: Explanation  Response: Verbalizes Understanding  Comment: agreeable to PT POC    Handouts, taught by Felicia Melvin PT at 8/5/2025 12:55 PM.  Learner: Patient  Readiness: Acceptance  Method: Explanation  Response: Verbalizes  Understanding  Comment: agreeable to PT POC    Mobility Training, taught by Eddie Schaffer PT at 8/5/2025 12:55 PM.  Learner: Patient  Readiness: Acceptance  Method: Explanation  Response: Verbalizes Understanding  Comment: agreeable to PT POC    Education Comments  No comments found.             Encounter Problems       Encounter Problems (Active)       Balance       patient will score >24/28 on the Tinetti scale to present as a low risk of falls (Progressing)       Start:  07/15/25    Expected End:  08/16/25               Mobility       Patient will complete the 5xSTS  in <15 sec with no assistive device, no UE support, and close supervision (RPE: 11/20 RPD: 3/10) (Progressing)       Start:  07/15/25    Expected End:  08/16/25            Patient will ambulate >1000ft on the 6MWT with no assistive device and close superivision (RPE: 11/20 RPD: 3/10) (Progressing)       Start:  07/15/25    Expected End:  08/16/25               Mobility       Pt will ambulate >300ft with appropriate form, SBA or less, LRAD, and no LOB for safe DC.  (Progressing)       Start:  08/02/25    Expected End:  08/16/25            Pt will ambulate up/down >3 stairs with/without hand rail with CGA or less to safely access their home upon DC.   (Progressing)       Start:  08/02/25    Expected End:  08/16/25               PT Transfers       Pt will perform bed mobility and sit<>stand transfers with SBA and use of LRAD to safely DC.  (Progressing)       Start:  08/02/25    Expected End:  08/16/25                 EDDIE SCHAFFER PT
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
## 2025-08-05 NOTE — PROGRESS NOTES
HFICU Attending Note    Assessment & Plan  Acute on chronic heart failure with reduced ejection fraction (HFrEF, <= 40%)    CAD (coronary artery disease)    CHF (NYHA class IV, ACC/AHA stage D) (Multi)    Cardiogenic shock (Multi)    Neuromuscular disease (Multi)    Decreased cardiac ejection fraction    Stented coronary artery    Pulmonary hypertension (Multi)    Osteoarthritis      Underwent LVAD placement, tolerated this well so far.  Came out on low to moderate dose pressors and inotropic support. Being weaned off IV vasoactive agents, extubated.   Agree with continued wean of Epi and milrinone.   LVAD speed and flows somewhat on the low side immediately postop along with pulsatility observed on A-line, consistent with normal postoperative state.  Will watch for now  Again has mucous plug pulm to see  H&H lower monitor  INR to 2.9 from 1.4, hold AC    This critically ill patient continues to be at-risk for clinically significant deterioration / failure due to the above mentioned dysfunctional, unstable organ systems.  I have personally identified and managed all complex critical care issues to prevent aforementioned clinical deterioration.  Critical care time is spent at bedside and/or the immediate area and has included, but is not limited to, the review of diagnostic tests, labs, radiographs, serial assessments of hemodynamics, respiratory status, ventilatory management, and family updates.  Time spent in procedures and teaching are reported separately.    Critical care time: _35___ minutes

## 2025-08-05 NOTE — CONSULTS
Inpatient Diabetes Education Consult    Reason for Visit:  Thao Evans is a 60 y.o. female who presents for HF, placement LVAD; hx T2DM.    Consulting Service/Provider: attending team    Visit Type: Initial visit    Visit Modality: In-person    Discharge Equipment/Supply Needs:       Patient has supplies at home: Blood glucose meter:  , Testing strips:  , Lancets, and Pen needles    Patient History and Assessment:  New diagnosis: n/a  Previous diagnosis: Type 2  Patient known to Diabetes Education department: No  Treatment prior to hospital admission: Oral medications Jardiance  Insulin: Long acting, via pen  Diet: Carb restricted  Blood glucose testing: Before Meals  Complications: cardiovascular disease and HLD  PTA Medications:    Current Outpatient Medications   Medication Instructions    albuterol 90 mcg/actuation inhaler 2 puffs, Every 6 hours PRN    alogliptin (NESINA) 25 mg, Daily    atorvastatin (LIPITOR) 80 mg, Daily    budesonide-formoterol (Symbicort) 80-4.5 mcg/actuation inhaler INHALE 2 PUFF BY INHALATION ROUTE 2 TIMES EVERY DAY IN THE MORNING AND EVENING    bumetanide (BUMEX) 1 mg, oral, 2 times daily (morning and late afternoon)    citalopram (CeleXA) 40 mg tablet 1 tablet, Daily (0630)    clopidogrel (PLAVIX) 75 mg, Daily    empagliflozin (Jardiance) 10 mg tablet Take 1 tablet (10 mg) by mouth once daily. Do not start before June 21, 2025.    gabapentin (Neurontin) 400 mg capsule TAKE 1 CAPSULE BY MOUTH 3 TIMES EVERY DAY FOR DIABETIC NEUROPATHY    Lantus Solostar U-100 Insulin 15 Units, Nightly    levothyroxine (SYNTHROID, LEVOXYL) 88 mcg, oral, Daily, Take on an empty stomach at the same time each day, either 30 to 60 minutes prior to breakfast    nitroglycerin (NITROSTAT) 0.4 mg, Every 5 min PRN    pantoprazole (PROTONIX) 20 mg, Daily    rOPINIRole (Requip) 3 mg tablet take 1 tablet by oral route every day at bedtime    rOPINIRole (REQUIP) 1 mg, 3 times daily    sacubitriL-valsartan (Entresto)  "24-26 mg tablet 1 tablet, oral, 2 times daily    spironolactone (ALDACTONE) 25 mg, Daily       Glucose   Date/Time Value Ref Range Status   08/05/2025 05:14 AM 77 74 - 99 mg/dL Final   08/04/2025 03:37  (H) 74 - 99 mg/dL Final   08/04/2025 12:23  (H) 74 - 99 mg/dL Final   08/03/2025 01:01  (H) 74 - 99 mg/dL Final   08/03/2025 01:02  (H) 74 - 99 mg/dL Final   08/02/2025 12:25  (H) 74 - 99 mg/dL Final   08/02/2025 01:45  (H) 74 - 99 mg/dL Final   08/01/2025 01:22  (H) 74 - 99 mg/dL Final   07/31/2025 04:58  (H) 74 - 99 mg/dL Final   07/30/2025 05:39  (H) 74 - 99 mg/dL Final     No results found for: \"CPEPTIDE\"  Hemoglobin A1C   Date Value Ref Range Status   07/31/2025 7.5 (H) See comment % Final   07/15/2025 8.0 (H) See comment % Final   06/02/2025 8.6 (H) See comment % Final   03/13/2025 12.3 (H) 4.0 - 5.6 % Final   03/12/2025 12.7 (H) 4.0 - 5.6 % Final       Patient Learning/Readiness Assessment:  Ms. Evans is agreeable to diabetes ed visit today    Interventions/Topics Covered:  See After Visit Summary for handouts/information sheets provided to patient.  Education Documentation  Self-Care Behaviors, taught by Marley Alvarado RN at 8/5/2025 12:31 PM.  Learner: Patient  Readiness: Acceptance  Method: Explanation, Teach-back  Response: Verbalizes Understanding          Additional topics covered: Ms. Evans states she has had DM x 20 years.  She is comfortable with home regimen of Lantus insulin daily and Jardiance daily.  States her A1c cont to improve (now 7.5%).  She monitors BG 3x day with meter.  She has PCP she follows with outpatient.    Additional materials provided: n/a    CGM:  n/a    Education Outcome/Recommendations:        Recommendations for bedside nursing: n/a    Recommendations for Providers: Follow-up w/ PCP and/or Endocrinology    Additional Comments: Ms. Evans is fatigued today.  She will have help from friend at home.  Anticipate that her diabetes med " regimen will be similar to plan prior to admission.  Will sign off at this time as she is clearly able to manage her diabetes well.  Time spent: 15 min

## 2025-08-05 NOTE — CARE PLAN
The patient's goals for the shift include eating a small meal    The clinical goals for the shift include Paitent will remain hemodynamically stable.    Problem: Pain - Adult  Goal: Verbalizes/displays adequate comfort level or baseline comfort level  Flowsheets (Taken 7/21/2025 0902)  Verbalizes/displays adequate comfort level or baseline comfort level:   Encourage patient to monitor pain and request assistance   Assess pain using appropriate pain scale   Administer analgesics based on type and severity of pain and evaluate response   Implement non-pharmacological measures as appropriate and evaluate response   Consider cultural and social influences on pain and pain management   Notify Licensed Independent Practitioner if interventions unsuccessful or patient reports new pain     Problem: Safety - Adult  Goal: Free from fall injury  Flowsheets (Taken 7/21/2025 0902)  Free from fall injury:   Instruct family/caregiver on patient safety   Based on caregiver fall risk screen, instruct family/caregiver to ask for assistance with transferring infant if caregiver noted to have fall risk factors     Problem: Discharge Planning  Goal: Discharge to home or other facility with appropriate resources  Flowsheets (Taken 7/21/2025 0902)  Discharge to home or other facility with appropriate resources:   Identify barriers to discharge with patient and caregiver   Identify discharge learning needs (meds, wound care, etc)   Refer to discharge planning if patient needs post-hospital services based on physician order or complex needs related to functional status, cognitive ability or social support system   Arrange for needed discharge resources and transportation as appropriate   Arrange for interpreters to assist at discharge as needed     Problem: Chronic Conditions and Co-morbidities  Goal: Patient's chronic conditions and co-morbidity symptoms are monitored and maintained or improved  Flowsheets (Taken 7/24/2025 0622 by Booker  Singleton, RN)  Care Plan - Patient's Chronic Conditions and Co-Morbidity Symptoms are Monitored and Maintained or Improved:   Monitor and assess patient's chronic conditions and comorbid symptoms for stability, deterioration, or improvement   Collaborate with multidisciplinary team to address chronic and comorbid conditions and prevent exacerbation or deterioration   Update acute care plan with appropriate goals if chronic or comorbid symptoms are exacerbated and prevent overall improvement and discharge     Problem: Heart Failure  Goal: Improved gas exchange this shift  Flowsheets (Taken 7/21/2025 0902)  Improved gas exchange this shift:   Assist with pulmonary hygiene and secretion clearance   Prepare for use of devices/procedures to correct dysrhythmia   Position to promote circulation/maximize ventilation  Goal: Improved urinary output this shift  Flowsheets (Taken 7/15/2025 1136)  Improved urinary output this shift:   Med administration/monitor effect   Monitor intake/output and daily weight   Monitor serum electrolytes/replace per order  Goal: Reduction in peripheral edema within 24 hours  Flowsheets (Taken 7/21/2025 0902)  Reduction in peripheral edema within 24 hours:   Consult dietician   SCDs/elevate extremities   Frequent small meals/supplements per order   Monitor edema/skin/mucous membrane integrity  Goal: Report improvement of dyspnea/breathlessness this shift  Flowsheets (Taken 7/21/2025 0902)  Report improvement of dyspnea/breathlessness this shift:   Ambulate/OOB 3 times daily   Encourage activity/mobility/ROM   Incentive spirometry/deep breathing /HOB elevated elevated   Consider PT/OT consult   Facilitate activity/mobility per patient tolerance/advance  Goal: Weight from fluid excess reduced over 2-3 days, then stabilize  Flowsheets (Taken 7/21/2025 0902)  Weight from fluid excess reduced over 2-3 days, then stabilize:   Med administration/monitor effect   Monitor bowel elimination   Monitor  intake/output and daily weight   Monitor serum electrolytes/replace per order  Goal: Increase self care and/or family involvement in 24 hours  Flowsheets (Taken 7/21/2025 0902)  Increase self care and/or family involvement in 24 hours:   Assess for signs/symptoms of depression   Organize tasks/allow time for rest   Therapy evaluation and treatment   Facilitate advanced care planning/discussion of treatment options   Recognize current coping skills and assist to develop new coping skills     Problem: Safety - Medical Restraint  Goal: Remains free of injury from restraints (Restraint for Interference with Medical Device)  Outcome: Met  Goal: Free from restraint(s) (Restraint for Interference with Medical Device)  Outcome: Met     Problem: Skin  Goal: Decreased wound size/increased tissue granulation at next dressing change  Flowsheets (Taken 8/1/2025 2351 by Edmond Sloan RN)  Decreased wound size/increased tissue granulation at next dressing change:   Promote sleep for wound healing   Protective dressings over bony prominences  Goal: Prevent/manage excess moisture  Flowsheets (Taken 8/1/2025 2351 by Edmond Sloan RN)  Prevent/manage excess moisture:   Cleanse incontinence/protect with barrier cream   Moisturize dry skin   Monitor for/manage infection if present   Use wicking fabric (obtain order)   Follow provider orders for dressing changes  Goal: Prevent/minimize sheer/friction injuries  Flowsheets (Taken 8/1/2025 2351 by Edmond Sloan RN)  Prevent/minimize sheer/friction injuries:   Complete micro-shifts as needed if patient unable. Adjust patient position to relieve pressure points, not a full turn   Turn/reposition every 2 hours/use positioning/transfer devices   Use pull sheet   Increase activity/out of bed for meals   HOB 30 degrees or less   Utilize specialty bed per algorithm  Goal: Promote/optimize nutrition  Flowsheets (Taken 8/1/2025 2351 by Edmond Sloan RN)  Promote/optimize nutrition:    Reassess MST if dietician not consulted   Offer water/supplements/favorite foods  Goal: Promote skin healing  Flowsheets (Taken 8/1/2025 8102 by Edmond Sloan RN)  Promote skin healing:   Assess skin/pad under line(s)/device(s)   Protective dressings over bony prominences   Rotate device position/do not position patient on device   Turn/reposition every 2 hours/use positioning/transfer devices   Ensure correct size (line/device) and apply per  instructions

## 2025-08-06 ENCOUNTER — APPOINTMENT (OUTPATIENT)
Dept: RADIOLOGY | Facility: HOSPITAL | Age: 60
DRG: 001 | End: 2025-08-06
Payer: COMMERCIAL

## 2025-08-06 ENCOUNTER — APPOINTMENT (OUTPATIENT)
Dept: CARDIOLOGY | Facility: HOSPITAL | Age: 60
End: 2025-08-06
Payer: COMMERCIAL

## 2025-08-06 LAB
ACID FAST STN SPEC: NORMAL
ALBUMIN SERPL BCP-MCNC: 3.3 G/DL (ref 3.4–5)
ALP SERPL-CCNC: 80 U/L (ref 33–136)
ALT SERPL W P-5'-P-CCNC: 3 U/L (ref 7–45)
ANION GAP BLDMV CALCULATED.4IONS-SCNC: 4 MMO/L (ref 10–25)
ANION GAP BLDMV CALCULATED.4IONS-SCNC: 4 MMO/L (ref 10–25)
ANION GAP BLDMV CALCULATED.4IONS-SCNC: 5 MMO/L (ref 10–25)
ANION GAP BLDMV CALCULATED.4IONS-SCNC: 7 MMO/L (ref 10–25)
ANION GAP SERPL CALC-SCNC: 10 MMOL/L (ref 10–20)
APPARATUS: ABNORMAL
APTT PPP: 35 SECONDS (ref 26–36)
APTT PPP: 36 SECONDS (ref 26–36)
AST SERPL W P-5'-P-CCNC: 17 U/L (ref 9–39)
BACTERIA SPEC RESP CULT: NORMAL
BASE EXCESS BLDMV CALC-SCNC: 6.2 MMOL/L (ref -2–3)
BASE EXCESS BLDMV CALC-SCNC: 6.3 MMOL/L (ref -2–3)
BASE EXCESS BLDMV CALC-SCNC: 6.6 MMOL/L (ref -2–3)
BASE EXCESS BLDMV CALC-SCNC: 6.7 MMOL/L (ref -2–3)
BILIRUB DIRECT SERPL-MCNC: 0.1 MG/DL (ref 0–0.3)
BILIRUB SERPL-MCNC: 0.4 MG/DL (ref 0–1.2)
BLOOD EXPIRATION DATE: NORMAL
BLOOD EXPIRATION DATE: NORMAL
BODY TEMPERATURE: 37 DEGREES CELSIUS
BUN SERPL-MCNC: 29 MG/DL (ref 6–23)
CA-I BLDMV-SCNC: 1.18 MMOL/L (ref 1.1–1.33)
CA-I BLDMV-SCNC: 1.21 MMOL/L (ref 1.1–1.33)
CA-I BLDMV-SCNC: 1.23 MMOL/L (ref 1.1–1.33)
CA-I BLDMV-SCNC: 1.25 MMOL/L (ref 1.1–1.33)
CALCIUM SERPL-MCNC: 8.6 MG/DL (ref 8.6–10.6)
CHLORIDE BLD-SCNC: 101 MMOL/L (ref 98–107)
CHLORIDE BLD-SCNC: 101 MMOL/L (ref 98–107)
CHLORIDE BLD-SCNC: 99 MMOL/L (ref 98–107)
CHLORIDE BLD-SCNC: 99 MMOL/L (ref 98–107)
CHLORIDE SERPL-SCNC: 96 MMOL/L (ref 98–107)
CLARITY FLD: ABNORMAL
CO2 SERPL-SCNC: 34 MMOL/L (ref 21–32)
COLOR FLD: ABNORMAL
CREAT SERPL-MCNC: 0.83 MG/DL (ref 0.5–1.05)
DISPENSE STATUS: NORMAL
DISPENSE STATUS: NORMAL
EGFRCR SERPLBLD CKD-EPI 2021: 80 ML/MIN/1.73M*2
EJECTION FRACTION APICAL 4 CHAMBER: 33.7
EJECTION FRACTION: 25 %
EOSINOPHIL NFR FLD MANUAL: NORMAL %
ERYTHROCYTE [DISTWIDTH] IN BLOOD BY AUTOMATED COUNT: 14.9 % (ref 11.5–14.5)
GLUCOSE BLD MANUAL STRIP-MCNC: 120 MG/DL (ref 74–99)
GLUCOSE BLD MANUAL STRIP-MCNC: 136 MG/DL (ref 74–99)
GLUCOSE BLD MANUAL STRIP-MCNC: 144 MG/DL (ref 74–99)
GLUCOSE BLD MANUAL STRIP-MCNC: 69 MG/DL (ref 74–99)
GLUCOSE BLD MANUAL STRIP-MCNC: 76 MG/DL (ref 74–99)
GLUCOSE BLD MANUAL STRIP-MCNC: 83 MG/DL (ref 74–99)
GLUCOSE BLD-MCNC: 117 MG/DL (ref 74–99)
GLUCOSE BLD-MCNC: 130 MG/DL (ref 74–99)
GLUCOSE BLD-MCNC: 133 MG/DL (ref 74–99)
GLUCOSE BLD-MCNC: 81 MG/DL (ref 74–99)
GLUCOSE SERPL-MCNC: 132 MG/DL (ref 74–99)
GRAM STN SPEC: NORMAL
GRAM STN SPEC: NORMAL
HCO3 BLDMV-SCNC: 31.2 MMOL/L (ref 22–26)
HCO3 BLDMV-SCNC: 32.9 MMOL/L (ref 22–26)
HCO3 BLDMV-SCNC: 33.4 MMOL/L (ref 22–26)
HCO3 BLDMV-SCNC: 33.7 MMOL/L (ref 22–26)
HCT VFR BLD AUTO: 23.3 % (ref 36–46)
HCT VFR BLD EST: 23 % (ref 36–46)
HCT VFR BLD EST: 24 % (ref 36–46)
HCT VFR BLD EST: 25 % (ref 36–46)
HCT VFR BLD EST: 27 % (ref 36–46)
HGB BLD-MCNC: 7.6 G/DL (ref 12–16)
HGB BLDMV-MCNC: 7.8 G/DL (ref 12–16)
HGB BLDMV-MCNC: 8.1 G/DL (ref 12–16)
HGB BLDMV-MCNC: 8.2 G/DL (ref 12–16)
HGB BLDMV-MCNC: 9.1 G/DL (ref 12–16)
HOLD SPECIMEN: NORMAL
INHALED O2 CONCENTRATION: 38 %
INHALED O2 CONCENTRATION: 40 %
INR PPP: 3.1 (ref 0.9–1.1)
INR PPP: 5.9 (ref 0.9–1.1)
LACTATE BLDMV-SCNC: 0.5 MMOL/L (ref 0.4–2)
LACTATE BLDMV-SCNC: 0.5 MMOL/L (ref 0.4–2)
LACTATE BLDMV-SCNC: 0.6 MMOL/L (ref 0.4–2)
LACTATE BLDMV-SCNC: 0.7 MMOL/L (ref 0.4–2)
LDH SERPL L TO P-CCNC: 291 U/L (ref 84–246)
LEFT VENTRICLE INTERNAL DIMENSION DIASTOLE: 5.5 CM (ref 3.5–6)
LYMPHOCYTES # BLD MANUAL: NORMAL 10*3/UL
MACROPHAGES NFR FLD MANUAL: NORMAL %
MAGNESIUM SERPL-MCNC: 2.09 MG/DL (ref 1.6–2.4)
MCH RBC QN AUTO: 30 PG (ref 26–34)
MCHC RBC AUTO-ENTMCNC: 32.6 G/DL (ref 32–36)
MCV RBC AUTO: 92 FL (ref 80–100)
MYCOBACTERIUM SPEC CULT: NORMAL
NEUTROPHILS NFR FLD MANUAL: NORMAL %
NRBC BLD-RTO: 0 /100 WBCS (ref 0–0)
OXYHGB MFR BLDMV: 54.1 % (ref 45–75)
OXYHGB MFR BLDMV: 56.7 % (ref 45–75)
OXYHGB MFR BLDMV: 59 % (ref 45–75)
OXYHGB MFR BLDMV: 61.3 % (ref 45–75)
PCO2 BLDMV: 46 MM HG (ref 41–51)
PCO2 BLDMV: 61 MM HG (ref 41–51)
PCO2 BLDMV: 62 MM HG (ref 41–51)
PCO2 BLDMV: 64 MM HG (ref 41–51)
PH BLDMV: 7.33 PH (ref 7.33–7.43)
PH BLDMV: 7.34 PH (ref 7.33–7.43)
PH BLDMV: 7.34 PH (ref 7.33–7.43)
PH BLDMV: 7.44 PH (ref 7.33–7.43)
PHOSPHATE SERPL-MCNC: 3.2 MG/DL (ref 2.5–4.9)
PLATELET # BLD AUTO: 164 X10*3/UL (ref 150–450)
PO2 BLDMV: 32 MM HG (ref 35–45)
PO2 BLDMV: 37 MM HG (ref 35–45)
PO2 BLDMV: 38 MM HG (ref 35–45)
PO2 BLDMV: 39 MM HG (ref 35–45)
POTASSIUM BLDMV-SCNC: 2.8 MMOL/L (ref 3.5–5.3)
POTASSIUM BLDMV-SCNC: 3.7 MMOL/L (ref 3.5–5.3)
POTASSIUM BLDMV-SCNC: 3.8 MMOL/L (ref 3.5–5.3)
POTASSIUM BLDMV-SCNC: 3.8 MMOL/L (ref 3.5–5.3)
POTASSIUM SERPL-SCNC: 3.7 MMOL/L (ref 3.5–5.3)
PRODUCT BLOOD TYPE: 6200
PRODUCT BLOOD TYPE: 6200
PRODUCT CODE: NORMAL
PRODUCT CODE: NORMAL
PROT SERPL-MCNC: 5.6 G/DL (ref 6.4–8.2)
PROTHROMBIN TIME: 34.6 SECONDS (ref 9.8–12.4)
PROTHROMBIN TIME: 65.1 SECONDS (ref 9.8–12.4)
RBC # BLD AUTO: 2.53 X10*6/UL (ref 4–5.2)
RBC # FLD MANUAL: ABNORMAL /UL
RIGHT VENTRICLE FREE WALL PEAK S': 6.96 CM/S
RIGHT VENTRICLE PEAK SYSTOLIC PRESSURE: 34 MMHG
SAO2 % BLDMV: 55 % (ref 45–75)
SAO2 % BLDMV: 58 % (ref 45–75)
SAO2 % BLDMV: 60 % (ref 45–75)
SAO2 % BLDMV: 63 % (ref 45–75)
SODIUM BLDMV-SCNC: 133 MMOL/L (ref 136–145)
SODIUM BLDMV-SCNC: 134 MMOL/L (ref 136–145)
SODIUM BLDMV-SCNC: 134 MMOL/L (ref 136–145)
SODIUM BLDMV-SCNC: 136 MMOL/L (ref 136–145)
SODIUM SERPL-SCNC: 136 MMOL/L (ref 136–145)
TOTAL CELLS COUNTED FLD: 0
TRICUSPID ANNULAR PLANE SYSTOLIC EXCURSION: 1.2 CM
UNIT ABO: NORMAL
UNIT ABO: NORMAL
UNIT NUMBER: NORMAL
UNIT NUMBER: NORMAL
UNIT RH: NORMAL
UNIT RH: NORMAL
UNIT VOLUME: 274
UNIT VOLUME: 304
WBC # BLD AUTO: 6.1 X10*3/UL (ref 4.4–11.3)
WBC # FLD MANUAL: 8791 /UL

## 2025-08-06 PROCEDURE — 82248 BILIRUBIN DIRECT: CPT | Performed by: NURSE PRACTITIONER

## 2025-08-06 PROCEDURE — 2500000002 HC RX 250 W HCPCS SELF ADMINISTERED DRUGS (ALT 637 FOR MEDICARE OP, ALT 636 FOR OP/ED)

## 2025-08-06 PROCEDURE — 2500000001 HC RX 250 WO HCPCS SELF ADMINISTERED DRUGS (ALT 637 FOR MEDICARE OP): Performed by: NURSE PRACTITIONER

## 2025-08-06 PROCEDURE — 2500000002 HC RX 250 W HCPCS SELF ADMINISTERED DRUGS (ALT 637 FOR MEDICARE OP, ALT 636 FOR OP/ED): Performed by: NURSE PRACTITIONER

## 2025-08-06 PROCEDURE — 2500000001 HC RX 250 WO HCPCS SELF ADMINISTERED DRUGS (ALT 637 FOR MEDICARE OP)

## 2025-08-06 PROCEDURE — 87077 CULTURE AEROBIC IDENTIFY: CPT | Performed by: STUDENT IN AN ORGANIZED HEALTH CARE EDUCATION/TRAINING PROGRAM

## 2025-08-06 PROCEDURE — 94002 VENT MGMT INPAT INIT DAY: CPT

## 2025-08-06 PROCEDURE — 71045 X-RAY EXAM CHEST 1 VIEW: CPT | Performed by: RADIOLOGY

## 2025-08-06 PROCEDURE — 93308 TTE F-UP OR LMTD: CPT | Performed by: INTERNAL MEDICINE

## 2025-08-06 PROCEDURE — 99233 SBSQ HOSP IP/OBS HIGH 50: CPT | Performed by: STUDENT IN AN ORGANIZED HEALTH CARE EDUCATION/TRAINING PROGRAM

## 2025-08-06 PROCEDURE — 84100 ASSAY OF PHOSPHORUS: CPT

## 2025-08-06 PROCEDURE — 84132 ASSAY OF SERUM POTASSIUM: CPT

## 2025-08-06 PROCEDURE — 94640 AIRWAY INHALATION TREATMENT: CPT

## 2025-08-06 PROCEDURE — 85610 PROTHROMBIN TIME: CPT

## 2025-08-06 PROCEDURE — 93325 DOPPLER ECHO COLOR FLOW MAPG: CPT | Performed by: INTERNAL MEDICINE

## 2025-08-06 PROCEDURE — 71045 X-RAY EXAM CHEST 1 VIEW: CPT

## 2025-08-06 PROCEDURE — 2500000004 HC RX 250 GENERAL PHARMACY W/ HCPCS (ALT 636 FOR OP/ED): Performed by: NURSE PRACTITIONER

## 2025-08-06 PROCEDURE — 2500000004 HC RX 250 GENERAL PHARMACY W/ HCPCS (ALT 636 FOR OP/ED)

## 2025-08-06 PROCEDURE — 31624 DX BRONCHOSCOPE/LAVAGE: CPT | Performed by: STUDENT IN AN ORGANIZED HEALTH CARE EDUCATION/TRAINING PROGRAM

## 2025-08-06 PROCEDURE — 2500000005 HC RX 250 GENERAL PHARMACY W/O HCPCS

## 2025-08-06 PROCEDURE — 89050 BODY FLUID CELL COUNT: CPT | Performed by: STUDENT IN AN ORGANIZED HEALTH CARE EDUCATION/TRAINING PROGRAM

## 2025-08-06 PROCEDURE — 83615 LACTATE (LD) (LDH) ENZYME: CPT | Performed by: NURSE PRACTITIONER

## 2025-08-06 PROCEDURE — 93325 DOPPLER ECHO COLOR FLOW MAPG: CPT

## 2025-08-06 PROCEDURE — 2500000004 HC RX 250 GENERAL PHARMACY W/ HCPCS (ALT 636 FOR OP/ED): Performed by: INTERNAL MEDICINE

## 2025-08-06 PROCEDURE — 82947 ASSAY GLUCOSE BLOOD QUANT: CPT

## 2025-08-06 PROCEDURE — 85027 COMPLETE CBC AUTOMATED: CPT

## 2025-08-06 PROCEDURE — 36430 TRANSFUSION BLD/BLD COMPNT: CPT

## 2025-08-06 PROCEDURE — 87205 SMEAR GRAM STAIN: CPT | Performed by: STUDENT IN AN ORGANIZED HEALTH CARE EDUCATION/TRAINING PROGRAM

## 2025-08-06 PROCEDURE — 85610 PROTHROMBIN TIME: CPT | Performed by: NURSE PRACTITIONER

## 2025-08-06 PROCEDURE — 83735 ASSAY OF MAGNESIUM: CPT

## 2025-08-06 PROCEDURE — 99291 CRITICAL CARE FIRST HOUR: CPT

## 2025-08-06 PROCEDURE — 87116 MYCOBACTERIA CULTURE: CPT | Performed by: STUDENT IN AN ORGANIZED HEALTH CARE EDUCATION/TRAINING PROGRAM

## 2025-08-06 PROCEDURE — 2020000001 HC ICU ROOM DAILY

## 2025-08-06 PROCEDURE — 94669 MECHANICAL CHEST WALL OSCILL: CPT

## 2025-08-06 PROCEDURE — 2500000002 HC RX 250 W HCPCS SELF ADMINISTERED DRUGS (ALT 637 FOR MEDICARE OP, ALT 636 FOR OP/ED): Performed by: INTERNAL MEDICINE

## 2025-08-06 PROCEDURE — 93321 DOPPLER ECHO F-UP/LMTD STD: CPT | Performed by: INTERNAL MEDICINE

## 2025-08-06 PROCEDURE — P9017 PLASMA 1 DONOR FRZ W/IN 8 HR: HCPCS

## 2025-08-06 PROCEDURE — 93321 DOPPLER ECHO F-UP/LMTD STD: CPT

## 2025-08-06 PROCEDURE — 94660 CPAP INITIATION&MGMT: CPT

## 2025-08-06 PROCEDURE — 37799 UNLISTED PX VASCULAR SURGERY: CPT

## 2025-08-06 PROCEDURE — 31500 INSERT EMERGENCY AIRWAY: CPT | Performed by: STUDENT IN AN ORGANIZED HEALTH CARE EDUCATION/TRAINING PROGRAM

## 2025-08-06 RX ORDER — FUROSEMIDE 10 MG/ML
40 INJECTION INTRAMUSCULAR; INTRAVENOUS ONCE
Status: COMPLETED | OUTPATIENT
Start: 2025-08-06 | End: 2025-08-06

## 2025-08-06 RX ORDER — PHENYLEPHRINE HCL IN 0.9% NACL 0.4MG/10ML
SYRINGE (ML) INTRAVENOUS
Status: COMPLETED
Start: 2025-08-06 | End: 2025-08-06

## 2025-08-06 RX ORDER — PROPOFOL 10 MG/ML
INJECTION, EMULSION INTRAVENOUS
Status: COMPLETED
Start: 2025-08-06 | End: 2025-08-06

## 2025-08-06 RX ORDER — PANTOPRAZOLE SODIUM 40 MG/10ML
40 INJECTION, POWDER, LYOPHILIZED, FOR SOLUTION INTRAVENOUS 2 TIMES DAILY
Status: DISCONTINUED | OUTPATIENT
Start: 2025-08-06 | End: 2025-08-09

## 2025-08-06 RX ORDER — NOREPINEPHRINE BITARTRATE/D5W 8 MG/250ML
0-.5 PLASTIC BAG, INJECTION (ML) INTRAVENOUS CONTINUOUS
Status: DISCONTINUED | OUTPATIENT
Start: 2025-08-06 | End: 2025-08-07

## 2025-08-06 RX ORDER — ETOMIDATE 2 MG/ML
0.3 INJECTION INTRAVENOUS ONCE
Status: COMPLETED | OUTPATIENT
Start: 2025-08-06 | End: 2025-08-06

## 2025-08-06 RX ORDER — POTASSIUM CHLORIDE 29.8 MG/ML
40 INJECTION INTRAVENOUS ONCE
Status: COMPLETED | OUTPATIENT
Start: 2025-08-06 | End: 2025-08-06

## 2025-08-06 RX ORDER — IPRATROPIUM BROMIDE AND ALBUTEROL SULFATE 2.5; .5 MG/3ML; MG/3ML
3 SOLUTION RESPIRATORY (INHALATION)
Status: DISCONTINUED | OUTPATIENT
Start: 2025-08-06 | End: 2025-08-08

## 2025-08-06 RX ORDER — PHENYLEPHRINE 10 MG/250 ML(40 MCG/ML)IN 0.9 % SOD.CHLORIDE INTRAVENOUS
0-2 CONTINUOUS
Status: DISCONTINUED | OUTPATIENT
Start: 2025-08-06 | End: 2025-08-07

## 2025-08-06 RX ORDER — PHYTONADIONE 5 MG/1
2.5 TABLET ORAL ONCE
Status: DISCONTINUED | OUTPATIENT
Start: 2025-08-06 | End: 2025-08-06

## 2025-08-06 RX ORDER — PROPOFOL 10 MG/ML
0-20 INJECTION, EMULSION INTRAVENOUS CONTINUOUS
Status: DISCONTINUED | OUTPATIENT
Start: 2025-08-06 | End: 2025-08-07

## 2025-08-06 RX ORDER — POTASSIUM CHLORIDE 20 MEQ/1
40 TABLET, EXTENDED RELEASE ORAL ONCE
Status: DISCONTINUED | OUTPATIENT
Start: 2025-08-06 | End: 2025-08-06

## 2025-08-06 RX ORDER — SPIRONOLACTONE 25 MG/1
25 TABLET ORAL DAILY
Status: DISCONTINUED | OUTPATIENT
Start: 2025-08-06 | End: 2025-08-18 | Stop reason: HOSPADM

## 2025-08-06 RX ORDER — FENTANYL CITRATE 50 UG/ML
100 INJECTION, SOLUTION INTRAMUSCULAR; INTRAVENOUS ONCE
Status: COMPLETED | OUTPATIENT
Start: 2025-08-06 | End: 2025-08-06

## 2025-08-06 RX ORDER — OXYCODONE HYDROCHLORIDE 5 MG/1
5 TABLET ORAL EVERY 4 HOURS PRN
Refills: 0 | Status: DISCONTINUED | OUTPATIENT
Start: 2025-08-06 | End: 2025-08-18 | Stop reason: HOSPADM

## 2025-08-06 RX ORDER — LEVOTHYROXINE SODIUM ANHYDROUS 100 UG/5ML
70 INJECTION, POWDER, LYOPHILIZED, FOR SOLUTION INTRAVENOUS
Status: DISCONTINUED | OUTPATIENT
Start: 2025-08-07 | End: 2025-08-07

## 2025-08-06 RX ORDER — FENTANYL CITRATE 50 UG/ML
INJECTION, SOLUTION INTRAMUSCULAR; INTRAVENOUS
Status: COMPLETED
Start: 2025-08-06 | End: 2025-08-06

## 2025-08-06 RX ORDER — ROCURONIUM BROMIDE 10 MG/ML
1 INJECTION, SOLUTION INTRAVENOUS ONCE
Status: COMPLETED | OUTPATIENT
Start: 2025-08-06 | End: 2025-08-06

## 2025-08-06 RX ORDER — FENTANYL CITRATE 50 UG/ML
25 INJECTION, SOLUTION INTRAMUSCULAR; INTRAVENOUS EVERY 4 HOURS PRN
Status: DISCONTINUED | OUTPATIENT
Start: 2025-08-06 | End: 2025-08-08

## 2025-08-06 RX ORDER — DOCUSATE SODIUM 100 MG/1
100 CAPSULE, LIQUID FILLED ORAL 2 TIMES DAILY
Status: DISCONTINUED | OUTPATIENT
Start: 2025-08-06 | End: 2025-08-10

## 2025-08-06 RX ORDER — FUROSEMIDE 10 MG/ML
60 INJECTION INTRAMUSCULAR; INTRAVENOUS ONCE
Status: COMPLETED | OUTPATIENT
Start: 2025-08-06 | End: 2025-08-06

## 2025-08-06 RX ORDER — PHENYLEPHRINE HCL IN 0.9% NACL 0.4MG/10ML
SYRINGE (ML) INTRAVENOUS
Status: DISPENSED
Start: 2025-08-06 | End: 2025-08-07

## 2025-08-06 RX ADMIN — FENTANYL CITRATE 25 MCG: 50 INJECTION INTRAMUSCULAR; INTRAVENOUS at 21:47

## 2025-08-06 RX ADMIN — PANTOPRAZOLE SODIUM 40 MG: 40 INJECTION, POWDER, LYOPHILIZED, FOR SOLUTION INTRAVENOUS at 21:48

## 2025-08-06 RX ADMIN — ETOMIDATE 20.8 MG: 2 INJECTION, SOLUTION INTRAVENOUS at 15:10

## 2025-08-06 RX ADMIN — ACETAMINOPHEN 650 MG: 325 TABLET, FILM COATED ORAL at 06:36

## 2025-08-06 RX ADMIN — SENNOSIDES, DOCUSATE SODIUM 2 TABLET: 50; 8.6 TABLET, FILM COATED ORAL at 08:27

## 2025-08-06 RX ADMIN — DOCUSATE SODIUM 100 MG: 100 CAPSULE, LIQUID FILLED ORAL at 08:27

## 2025-08-06 RX ADMIN — POLYETHYLENE GLYCOL 3350 17 G: 17 POWDER, FOR SOLUTION ORAL at 08:28

## 2025-08-06 RX ADMIN — Medication: at 07:39

## 2025-08-06 RX ADMIN — Medication: at 10:19

## 2025-08-06 RX ADMIN — SPIRONOLACTONE 25 MG: 25 TABLET ORAL at 10:03

## 2025-08-06 RX ADMIN — FENTANYL CITRATE 100 MCG: 50 INJECTION INTRAMUSCULAR; INTRAVENOUS at 17:45

## 2025-08-06 RX ADMIN — IPRATROPIUM BROMIDE AND ALBUTEROL SULFATE 3 ML: .5; 3 SOLUTION RESPIRATORY (INHALATION) at 19:41

## 2025-08-06 RX ADMIN — ROCURONIUM BROMIDE 69 MG: 10 INJECTION INTRAVENOUS at 15:11

## 2025-08-06 RX ADMIN — LEVOTHYROXINE SODIUM 88 MCG: 0.09 TABLET ORAL at 06:37

## 2025-08-06 RX ADMIN — IPRATROPIUM BROMIDE AND ALBUTEROL SULFATE 3 ML: .5; 3 SOLUTION RESPIRATORY (INHALATION) at 01:34

## 2025-08-06 RX ADMIN — ACETYLCYSTEINE 600 MG: 200 SOLUTION ORAL; RESPIRATORY (INHALATION) at 07:40

## 2025-08-06 RX ADMIN — PROPOFOL 20 MCG/KG/MIN: 10 INJECTION, EMULSION INTRAVENOUS at 15:30

## 2025-08-06 RX ADMIN — INSULIN GLARGINE 25 UNITS: 100 INJECTION, SOLUTION SUBCUTANEOUS at 09:15

## 2025-08-06 RX ADMIN — FUROSEMIDE 60 MG: 10 INJECTION, SOLUTION INTRAMUSCULAR; INTRAVENOUS at 17:03

## 2025-08-06 RX ADMIN — MILRINONE LACTATE IN DEXTROSE 0.25 MCG/KG/MIN: 200 INJECTION, SOLUTION INTRAVENOUS at 19:34

## 2025-08-06 RX ADMIN — Medication: at 15:00

## 2025-08-06 RX ADMIN — PANTOPRAZOLE SODIUM 40 MG: 40 TABLET, DELAYED RELEASE ORAL at 06:37

## 2025-08-06 RX ADMIN — GUAIFENESIN AND DEXTROMETHORPHAN HYDROBROMIDE 1 TABLET: 600; 30 TABLET, EXTENDED RELEASE ORAL at 08:28

## 2025-08-06 RX ADMIN — ATORVASTATIN CALCIUM 80 MG: 80 TABLET, FILM COATED ORAL at 08:27

## 2025-08-06 RX ADMIN — Medication: at 15:15

## 2025-08-06 RX ADMIN — FUROSEMIDE 40 MG: 10 INJECTION, SOLUTION INTRAMUSCULAR; INTRAVENOUS at 08:28

## 2025-08-06 RX ADMIN — POTASSIUM CHLORIDE 40 MEQ: 29.8 INJECTION, SOLUTION INTRAVENOUS at 08:29

## 2025-08-06 RX ADMIN — ACETYLCYSTEINE 600 MG: 200 SOLUTION ORAL; RESPIRATORY (INHALATION) at 14:19

## 2025-08-06 RX ADMIN — DEXTROSE MONOHYDRATE 12.5 G: 25 INJECTION, SOLUTION INTRAVENOUS at 19:45

## 2025-08-06 RX ADMIN — CITALOPRAM HYDROBROMIDE 40 MG: 40 TABLET ORAL at 08:27

## 2025-08-06 RX ADMIN — FENTANYL CITRATE 100 MCG: 50 INJECTION, SOLUTION INTRAMUSCULAR; INTRAVENOUS at 17:45

## 2025-08-06 RX ADMIN — HUMAN ALBUMIN MICROSPHERES AND PERFLUTREN 0.5 ML: 10; .22 INJECTION, SOLUTION INTRAVENOUS at 09:14

## 2025-08-06 RX ADMIN — ASPIRIN 81 MG: 81 TABLET, CHEWABLE ORAL at 08:27

## 2025-08-06 RX ADMIN — IPRATROPIUM BROMIDE AND ALBUTEROL SULFATE 3 ML: .5; 3 SOLUTION RESPIRATORY (INHALATION) at 14:18

## 2025-08-06 RX ADMIN — DEXTROSE MONOHYDRATE 12.5 G: 25 INJECTION, SOLUTION INTRAVENOUS at 21:48

## 2025-08-06 RX ADMIN — ACETYLCYSTEINE 600 MG: 200 SOLUTION ORAL; RESPIRATORY (INHALATION) at 19:42

## 2025-08-06 RX ADMIN — ROPINIROLE 1 MG: 1 TABLET, FILM COATED ORAL at 08:27

## 2025-08-06 RX ADMIN — ACETAZOLAMIDE 500 MG: 250 TABLET ORAL at 08:28

## 2025-08-06 RX ADMIN — ACETYLCYSTEINE 600 MG: 200 SOLUTION ORAL; RESPIRATORY (INHALATION) at 01:34

## 2025-08-06 RX ADMIN — Medication 0.4 MG: at 15:13

## 2025-08-06 RX ADMIN — Medication: at 14:18

## 2025-08-06 RX ADMIN — IPRATROPIUM BROMIDE AND ALBUTEROL SULFATE 3 ML: .5; 3 SOLUTION RESPIRATORY (INHALATION) at 07:39

## 2025-08-06 RX ADMIN — ACETAMINOPHEN 650 MG: 325 TABLET, FILM COATED ORAL at 13:03

## 2025-08-06 ASSESSMENT — PAIN - FUNCTIONAL ASSESSMENT
PAIN_FUNCTIONAL_ASSESSMENT: 0-10
PAIN_FUNCTIONAL_ASSESSMENT: 0-10
PAIN_FUNCTIONAL_ASSESSMENT: CPOT (CRITICAL CARE PAIN OBSERVATION TOOL)
PAIN_FUNCTIONAL_ASSESSMENT: CPOT (CRITICAL CARE PAIN OBSERVATION TOOL)
PAIN_FUNCTIONAL_ASSESSMENT: 0-10
PAIN_FUNCTIONAL_ASSESSMENT: 0-10
PAIN_FUNCTIONAL_ASSESSMENT: CPOT (CRITICAL CARE PAIN OBSERVATION TOOL)
PAIN_FUNCTIONAL_ASSESSMENT: 0-10

## 2025-08-06 ASSESSMENT — COGNITIVE AND FUNCTIONAL STATUS - GENERAL
WALKING IN HOSPITAL ROOM: TOTAL
DRESSING REGULAR UPPER BODY CLOTHING: A LITTLE
CLIMB 3 TO 5 STEPS WITH RAILING: TOTAL
STANDING UP FROM CHAIR USING ARMS: A LOT
TURNING FROM BACK TO SIDE WHILE IN FLAT BAD: A LOT
MOVING FROM LYING ON BACK TO SITTING ON SIDE OF FLAT BED WITH BEDRAILS: A LITTLE
PERSONAL GROOMING: A LOT
TOILETING: A LITTLE
EATING MEALS: A LITTLE
HELP NEEDED FOR BATHING: A LOT
MOVING TO AND FROM BED TO CHAIR: A LOT
DRESSING REGULAR LOWER BODY CLOTHING: A LOT
MOBILITY SCORE: 11
DAILY ACTIVITIY SCORE: 15

## 2025-08-06 ASSESSMENT — PAIN SCALES - GENERAL
PAINLEVEL_OUTOF10: 0 - NO PAIN
PAINLEVEL_OUTOF10: 0 - NO PAIN
PAINLEVEL_OUTOF10: 3
PAINLEVEL_OUTOF10: 0 - NO PAIN

## 2025-08-06 NOTE — PROGRESS NOTES
"Pulmonary/Critical Care Medicine Inpatient Consultation: Progress Note    Reason for Consultation: \"Evaluate for broncoscopy. Collapsed left lung\"      Interval events: CXR this am revealing persistent atelectasis of L lobe, patient with persistent AHRF requiring Airvo, pulmonology service re-engaged with request for bronchoscopy.      Subjective: Patient confirms that she is still having shortness of breath and states that she is still agreeable to proceed with bronchoscopy. She was explained the risks and benefits and informed consent was obtained (see consents tab). She denied chest pain or pain elsewhere at time of my exam this am.      Objective Findings:  Physical Exam:  Temp:  [35.3 °C (95.5 °F)-36.7 °C (98.1 °F)] 36.7 °C (98.1 °F)  Heart Rate:  [78-94] 94  Resp:  [13-27] 18  BP: (90-98)/(73-77) 91/73  Arterial Line BP 1: ()/(56-95) 145/95  FiO2 (%):  [40 %] 40 %     GEN: ill-appearing female lying in bed, no acute distress  HEENT: normal conjunctivae, clear oropharynx, moist MM, no cervical lymphadenopathy.   RESP: On Airvo 40/40. Poor air entry L lung  CV: Mechanical hum heard across precordium  GI: soft, NT, ND.  MSK: no deformity.  SKIN: warm, dry, no clubbing or cyanosis.   NEURO: alert, responds appropriately to questions    Most recent pulmonary function test:  6/16/25:  FEV1/FVC 76, TLC 3.05 (Z -3.42) with normal RV 45, DLCO reduced at 10.95 (z -3.29). No obstruction but c/w moderate restrictive defect.     Most recent TTE results:  7/15/25  1. Left ventricular ejection fraction is moderately decreased by visual estimate at 30-35%.   2. There are multiple left ventricular wall motion abnormalities.   3. The inferolateral wall is hypo to akinetic. The apex is hypokinetic.   4. Spectral Doppler shows a Grade III (restrictive pattern) of left ventricular diastolic filling with an elevated left atrial pressure.   5. Left ventricular cavity size is severely dilated.   6. No left ventricular thrombus " visualized.   7. There is low normal right ventricular systolic function.   8. There is a large pleural effusion.   9. The Doppler estimated RVSP is mildly elevated at 47 mmHg.  10. Normal aortic root.  11. Compared with study dated 6/2/2025, the LV function is now mildly improved owing mostly to a small increase in apcal contractility.     Lung imaging: Reviewed.      8/6/25 CXR persistent L hemithorax opacification c/w atelectasis  8/5/25 CXR complete L hemithorax opacification c/w atelectasis.  7/16/25 CT chest BL pleural effusions with associated atelectasis, interstitial edema, interlobular septal thickening,     Assessment/Plan     #Acute hypoxic respiratory failure  #Mucous plugging  #Complete left lung collapse secondary to above    Recommendations:  -Proceed with bronchoscopy for mucous clearance  -Titrate FiO2 to goal SPO2 greater than 92%  -Continue aggressive bronchopulmonary hygiene with nebulized saline, DuoNebs, IPV QID with RT  -Daily CXR  -Plan for bronchoscopic wash and send outs for resp cx, AFB cx, fungal cx, cell count and diff  -Monitor for signs and sx of infection, consider abx should any signs develop  -Utilize CPAP nightly and with naps  -Defer diuresis to primary team       Patient was seen and examined with Dr. Summers, who agrees with the above assessment and plan.  Pulmonary team will  continue to follow, please call or page for any questions or concerns.    Ravi Shah MD, PGY4, Pulmonary and Critical Care Fellow

## 2025-08-06 NOTE — PROCEDURES
General    Date/Time: 8/6/2025 6:14 PM    Performed by: Ravi Shah MD  Authorized by: Jeanette Osorio MD    Consent:     Consent obtained:  Verbal and written    Consent given by:  Patient    Risks, benefits, and alternatives were discussed: yes      Risks discussed:  Bleeding and infection (pneumothorax requiring chest tube placement, hypoxia, cough, sore throat)    Alternatives discussed:  No treatment, delayed treatment and alternative treatment  Universal protocol:     Procedure explained and questions answered to patient or proxy's satisfaction: yes      Relevant documents present and verified: yes      Test results available: yes      Imaging studies available: yes      Required blood products, implants, devices, and special equipment available: yes      Site/side marked: yes      Immediately prior to procedure, a time out was called: yes      Patient identity confirmed:  Verbally with patient and arm band  Indications:     Indications:  AHRF, new RML atelectasis after last bronchoscopy concern for persistent mucous plugging  Procedure specific details:      Repeat bronchoscopy conducted to re-evaluate RML where atelectasis was observed on post-procedure CXR. Propofol 15mg push utilized for moderate sedation and fentanyl 100mcg in two divided doses administered for pain/comfort. Mucous plug visualized near BI on R lung and suctioned. Advanced to RML where no obvious clot was visible, bronchoscope wedged into RML and bronchial saline wash applied and suctioned with good return. Thick, clear mucous visible throughout RML, RLL, R superior segment and BI that was suctioned with good return. Left side visualized with scant, thick clear secretions that were suctioned. At conclusion of procedure, patient developed transient hypotension requiring phenylephrine push and norepinephrine infusion, MAP soon overcorrected and norepinephrine was turned off, at which point blood pressure stabilized to acceptable  range.

## 2025-08-06 NOTE — CARE PLAN
Problem: Pain - Adult  Goal: Verbalizes/displays adequate comfort level or baseline comfort level  Outcome: Progressing     Problem: Safety - Adult  Goal: Free from fall injury  Outcome: Progressing     Problem: Discharge Planning  Goal: Discharge to home or other facility with appropriate resources  Outcome: Progressing     Problem: Chronic Conditions and Co-morbidities  Goal: Patient's chronic conditions and co-morbidity symptoms are monitored and maintained or improved  Outcome: Progressing     Problem: Nutrition  Goal: Nutrient intake appropriate for maintaining nutritional needs  Outcome: Progressing     Problem: Heart Failure  Goal: Improved gas exchange this shift  Outcome: Progressing  Goal: Improved urinary output this shift  Outcome: Progressing  Goal: Reduction in peripheral edema within 24 hours  Outcome: Progressing  Goal: Report improvement of dyspnea/breathlessness this shift  Outcome: Progressing  Goal: Weight from fluid excess reduced over 2-3 days, then stabilize  Outcome: Progressing  Goal: Increase self care and/or family involvement in 24 hours  Outcome: Progressing     Problem: Respiratory  Goal: Minimal/no exertional discomfort or dyspnea this shift  Outcome: Progressing  Goal: No signs of respiratory distress (eg. Use of accessory muscles. Peds grunting)  Outcome: Progressing  Goal: Patent airway maintained this shift  Outcome: Progressing  Goal: Verbalize decreased shortness of breath this shift  Outcome: Progressing  Goal: Wean oxygen to maintain O2 saturation per order/standard this shift  Outcome: Progressing  Goal: Increase self care and/or family involvement in next 24 hours  Outcome: Progressing     Problem: Diabetes  Goal: Achieve decreasing blood glucose levels by end of shift  Outcome: Progressing  Goal: Increase stability of blood glucose readings by end of shift  Outcome: Progressing  Goal: Decrease in ketones present in urine by end of shift  Outcome: Progressing  Goal:  Maintain electrolyte levels within acceptable range throughout shift  Outcome: Progressing  Goal: Maintain glucose levels >70mg/dl to <250mg/dl throughout shift  Outcome: Progressing  Goal: No changes in neurological exam by end of shift  Outcome: Progressing  Goal: Learn about and adhere to nutrition recommendations by end of shift  Outcome: Progressing  Goal: Vital signs within normal range for age by end of shift  Outcome: Progressing  Goal: Increase self care and/or family involovement by end of shift  Outcome: Progressing  Goal: Receive DSME education by end of shift  Outcome: Progressing     Problem: Ventricular Assisted Device (VAD)  Goal: Hemodynamic stability, correction of coagulopathy, lab value stability  Outcome: Progressing  Goal: Wean vasopressors/nitric  Outcome: Progressing  Goal: Wean ventilator  Outcome: Progressing  Goal: Extubation  Outcome: Progressing  Goal: Stable metal status  Outcome: Progressing  Goal: Pulmonary toileting, incentive spirometry  Outcome: Progressing  Goal: Nutrition  Outcome: Progressing  Goal: Mobility/OT/PT  Outcome: Progressing  Goal: Rubber ball  Outcome: Progressing  Goal: ROM  Outcome: Progressing  Goal: Sitting  Outcome: Progressing  Goal: Walk  Outcome: Progressing  Goal: Involve in VAD awareness, dressing change  Outcome: Progressing  Goal: AICD On/Off  Outcome: Progressing   The patient's goals for the shift include patient wants hot water to be fixed this shift    The clinical goals for the shift include pt will demonstrated adequate gas exchange through shift as evidenced by SPO2>

## 2025-08-06 NOTE — PROGRESS NOTES
Occupational Therapy    Communication Note    Patient Name: Thao Evans  MRN: [id number]  Today's Date: 8/6/2025   Room: 07/07-A    Discipline: Occupational Therapy      Missed Visit Reason: Patient placed on medical hold (Per chart review, patient with INR of 5.9 and per RN, patient to be re-intubated for bronch. Hold OT at this time and reattempt when patient is medically appropriate.)      08/06/25 at 2:43 PM   Yessenia Hunter OT   Rehab Office: [phone]
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
## 2025-08-06 NOTE — PROCEDURES
Intubation    Date/Time: 8/6/2025 4:09 PM    Performed by: Ravi Shah MD  Authorized by: Marcus Nowak MD    Consent:     Consent obtained:  Verbal and written    Consent given by:  Patient    Risks, benefits, and alternatives were discussed: yes      Risks discussed:  Aspiration, brain injury, dental trauma, laryngeal injury, bleeding, death, hypoxia and pneumothorax    Alternatives discussed:  No treatment and delayed treatment  Universal protocol:     Procedure explained and questions answered to patient or proxy's satisfaction: yes      Relevant documents present and verified: yes      Test results available: yes      Imaging studies available: yes      Required blood products, implants, devices, and special equipment available: yes      Immediately prior to procedure, a time out was called: yes      Patient identity confirmed:  Verbally with patient and arm band  Pre-procedure details:     Indications: airway obstruction and respiratory failure      Patient status:  Awake    Look externally: no concerns      Obstruction: none      Neck mobility: normal      Pharmacologic strategy: RSI      Induction agents:  Etomidate    Paralytics:  Rocuronium  Procedure details:     Preoxygenation:  Nasal cannula    CPR in progress: no      Number of attempts:  1  Successful intubation attempt details:     Intubation method:  Oral    Intubation technique: video assisted      Laryngoscope blade:  Hypercurved (hypercurved Mac 3 with video laryngoscopy)    Bougie used: no      Grade view: II      Tube size (mm):  8.0    Tube type:  Cuffed    Tube visualized through cords: yes    Placement assessment:     ETT at teeth/gumline (cm):  21    Tube secured with:  ETT guerra    Breath sounds:  Equal    Placement verification: chest rise, colorimetric ETCO2, direct visualization, endoscopic and equal breath sounds    Post-procedure details:     Procedure completion:  Tolerated    Complications: hypotension and hypoxia     Comments:      Pre-oxygenated with Airvo to 100% O2 saturation. ETT placed on first attempt visualized to pass through vocal cords with video laryngoscopy. Balloon inflated below vocal cords under direct visualization initially 24cm at the teeth. Patient connected to colorimetric ETCO2 with good color change and was immediately bagged with ambu-bag with free-standing oxygen source connected to wall, initially with transient hypoxia, oxygen flow switched directly to wall oxygen source and hyperventilated with good response, connected to ventilator with resolution of hypoxia. During this time became transiently hypotensive requiring phenylephrine push and initiation of levophed gtt with resolution of hypotension. Connected to ventilator with good O2 sats and achieved hemodynamic stability. Bronchoscopy completed (see separate procedure note) showing ETT approximately 0.5cm above cornell, retracted approximately 3cm to 21cm at the teeth. CXR ordered and pending.

## 2025-08-06 NOTE — PROCEDURES
General    Date/Time: 8/6/2025 4:50 PM    Performed by: Ravi Shah MD  Authorized by: Abe Summers MD    Consent:     Consent obtained:  Verbal and written    Consent given by:  Patient    Risks, benefits, and alternatives were discussed: yes      Risks discussed:  Bleeding and infection (also pneumothorax requiring chest tube, trauma, cough, hoarseness, sore throat)    Alternatives discussed:  No treatment and alternative treatment  Universal protocol:     Procedure explained and questions answered to patient or proxy's satisfaction: yes      Relevant documents present and verified: yes      Test results available: yes      Imaging studies available: yes      Required blood products, implants, devices, and special equipment available: yes      Immediately prior to procedure, a time out was called: yes      Patient identity confirmed:  Verbally with patient and arm band  Indications:     Indications:  AHRF, left lung atelectasis likely 2/2 mucous plugging, shortness of breath  Sedation:     Sedation type: RSI with etomodate and rocuronium (see separate intubation note)  Procedure specific details:      Once adequate oxygenation achieved, bronchoscope inserted with immediate finding in ETT of thick mucous plugs. These were suctioned in turn with intermittent saline wash to facilitate removal. Upon visualization of main cornell, large obstructing mucous plug visualized in L mainstem bronchus just distal to takeoff from main cornell, this required wash and multiple suction attempts to remove and caused transient hypoxia to high 80s% due to mucous nearly obstructing the ETT, which was cleared by saline wash into the ETT with repeat suction and intermittent removal of scope and flushing onto bed. Multiple mucous plugs also evacuated from L upper and L lower lobes. All R lung airways visualized with minimal mucous plugging that was removed. Mucous plugs collected and sent for respiratory cx, fungal cx, afb cx,  cell count and diff.           Beginning of procedure, L mainstem mucous plug           Main cornell, evacuating mucous plug          After procedure, L mainstem with upper and lower lobe takeoffs          After procedure, L lower lobe           Post-procedure, main cornell

## 2025-08-06 NOTE — PROGRESS NOTES
Physical Therapy                 Therapy Communication Note    Patient Name: Thao Evans  MRN: [id number]  Department: [facility]  Room: 07/07-A  Today's Date: 8/6/2025     Discipline: Physical Therapy    Missed Visit: PT Missed Visit: Yes     Missed Visit Reason: Missed Visit Reason:  (Per chart review, pt with INR of 5.9 and per RN, pt to be re-intubated for bronch. Hold PT at this time and reattempt when pt is medically appropriate.)    Missed Time: Attempt    EDDIE SCHAFFER, PT

## 2025-08-07 ENCOUNTER — APPOINTMENT (OUTPATIENT)
Dept: RADIOLOGY | Facility: HOSPITAL | Age: 60
DRG: 001 | End: 2025-08-07
Payer: COMMERCIAL

## 2025-08-07 LAB
ACID FAST STN SPEC: NORMAL
ALBUMIN SERPL BCP-MCNC: 3.3 G/DL (ref 3.4–5)
ALBUMIN SERPL BCP-MCNC: 3.3 G/DL (ref 3.4–5)
ALBUMIN SERPL BCP-MCNC: 3.4 G/DL (ref 3.4–5)
ALP SERPL-CCNC: 83 U/L (ref 33–136)
ALT SERPL W P-5'-P-CCNC: 4 U/L (ref 7–45)
ANION GAP BLDA CALCULATED.4IONS-SCNC: 7 MMO/L (ref 10–25)
ANION GAP BLDMV CALCULATED.4IONS-SCNC: 2 MMO/L (ref 10–25)
ANION GAP BLDMV CALCULATED.4IONS-SCNC: 5 MMO/L (ref 10–25)
ANION GAP BLDMV CALCULATED.4IONS-SCNC: 6 MMO/L (ref 10–25)
ANION GAP BLDMV CALCULATED.4IONS-SCNC: 7 MMO/L (ref 10–25)
ANION GAP SERPL CALC-SCNC: 10 MMOL/L (ref 10–20)
ANION GAP SERPL CALC-SCNC: 11 MMOL/L (ref 10–20)
ANION GAP SERPL CALC-SCNC: 13 MMOL/L (ref 10–20)
APPARATUS: ABNORMAL
APTT PPP: 38 SECONDS (ref 26–36)
AST SERPL W P-5'-P-CCNC: 18 U/L (ref 9–39)
BACTERIA SPEC RESP CULT: NORMAL
BASE EXCESS BLDA CALC-SCNC: 4.2 MMOL/L (ref -2–3)
BASE EXCESS BLDMV CALC-SCNC: 2.6 MMOL/L (ref -2–3)
BASE EXCESS BLDMV CALC-SCNC: 4.7 MMOL/L (ref -2–3)
BASE EXCESS BLDMV CALC-SCNC: 4.9 MMOL/L (ref -2–3)
BASE EXCESS BLDMV CALC-SCNC: 6 MMOL/L (ref -2–3)
BILIRUB DIRECT SERPL-MCNC: 0.1 MG/DL (ref 0–0.3)
BILIRUB SERPL-MCNC: 0.5 MG/DL (ref 0–1.2)
BODY TEMPERATURE: 37 DEGREES CELSIUS
BUN SERPL-MCNC: 19 MG/DL (ref 6–23)
BUN SERPL-MCNC: 20 MG/DL (ref 6–23)
BUN SERPL-MCNC: 22 MG/DL (ref 6–23)
CA-I BLDA-SCNC: 1.2 MMOL/L (ref 1.1–1.33)
CA-I BLDMV-SCNC: 1.17 MMOL/L (ref 1.1–1.33)
CA-I BLDMV-SCNC: 1.18 MMOL/L (ref 1.1–1.33)
CA-I BLDMV-SCNC: 1.18 MMOL/L (ref 1.1–1.33)
CA-I BLDMV-SCNC: 1.22 MMOL/L (ref 1.1–1.33)
CALCIUM SERPL-MCNC: 8.5 MG/DL (ref 8.6–10.6)
CALCIUM SERPL-MCNC: 8.6 MG/DL (ref 8.6–10.6)
CALCIUM SERPL-MCNC: 8.9 MG/DL (ref 8.6–10.6)
CHLORIDE BLD-SCNC: 102 MMOL/L (ref 98–107)
CHLORIDE BLD-SCNC: 103 MMOL/L (ref 98–107)
CHLORIDE BLD-SCNC: 104 MMOL/L (ref 98–107)
CHLORIDE BLD-SCNC: 105 MMOL/L (ref 98–107)
CHLORIDE BLDA-SCNC: 104 MMOL/L (ref 98–107)
CHLORIDE SERPL-SCNC: 100 MMOL/L (ref 98–107)
CHLORIDE SERPL-SCNC: 102 MMOL/L (ref 98–107)
CHLORIDE SERPL-SCNC: 98 MMOL/L (ref 98–107)
CO2 SERPL-SCNC: 30 MMOL/L (ref 21–32)
CO2 SERPL-SCNC: 31 MMOL/L (ref 21–32)
CO2 SERPL-SCNC: 32 MMOL/L (ref 21–32)
CREAT SERPL-MCNC: 0.64 MG/DL (ref 0.5–1.05)
CREAT SERPL-MCNC: 0.75 MG/DL (ref 0.5–1.05)
CREAT SERPL-MCNC: 0.75 MG/DL (ref 0.5–1.05)
EGFRCR SERPLBLD CKD-EPI 2021: 90 ML/MIN/1.73M*2
EGFRCR SERPLBLD CKD-EPI 2021: 90 ML/MIN/1.73M*2
EGFRCR SERPLBLD CKD-EPI 2021: >90 ML/MIN/1.73M*2
ERYTHROCYTE [DISTWIDTH] IN BLOOD BY AUTOMATED COUNT: 14.6 % (ref 11.5–14.5)
FUNGUS SPEC CULT: NORMAL
FUNGUS SPEC FUNGUS STN: NORMAL
GLUCOSE BLD MANUAL STRIP-MCNC: 135 MG/DL (ref 74–99)
GLUCOSE BLD MANUAL STRIP-MCNC: 138 MG/DL (ref 74–99)
GLUCOSE BLD MANUAL STRIP-MCNC: 173 MG/DL (ref 74–99)
GLUCOSE BLD MANUAL STRIP-MCNC: 222 MG/DL (ref 74–99)
GLUCOSE BLD MANUAL STRIP-MCNC: 230 MG/DL (ref 74–99)
GLUCOSE BLD MANUAL STRIP-MCNC: 74 MG/DL (ref 74–99)
GLUCOSE BLD MANUAL STRIP-MCNC: 75 MG/DL (ref 74–99)
GLUCOSE BLD MANUAL STRIP-MCNC: 84 MG/DL (ref 74–99)
GLUCOSE BLD MANUAL STRIP-MCNC: 88 MG/DL (ref 74–99)
GLUCOSE BLD-MCNC: 124 MG/DL (ref 74–99)
GLUCOSE BLD-MCNC: 180 MG/DL (ref 74–99)
GLUCOSE BLD-MCNC: 48 MG/DL (ref 74–99)
GLUCOSE BLD-MCNC: 65 MG/DL (ref 74–99)
GLUCOSE BLDA-MCNC: 45 MG/DL (ref 74–99)
GLUCOSE SERPL-MCNC: 117 MG/DL (ref 74–99)
GLUCOSE SERPL-MCNC: 45 MG/DL (ref 74–99)
GLUCOSE SERPL-MCNC: 75 MG/DL (ref 74–99)
GRAM STN SPEC: NORMAL
GRAM STN SPEC: NORMAL
HCO3 BLDA-SCNC: 29.2 MMOL/L (ref 22–26)
HCO3 BLDMV-SCNC: 28.3 MMOL/L (ref 22–26)
HCO3 BLDMV-SCNC: 30.3 MMOL/L (ref 22–26)
HCO3 BLDMV-SCNC: 30.6 MMOL/L (ref 22–26)
HCO3 BLDMV-SCNC: 30.8 MMOL/L (ref 22–26)
HCT VFR BLD AUTO: 22.3 % (ref 36–46)
HCT VFR BLD EST: 23 % (ref 36–46)
HCT VFR BLD EST: 25 % (ref 36–46)
HCT VFR BLD EST: 25 % (ref 36–46)
HCT VFR BLD EST: 27 % (ref 36–46)
HCT VFR BLD EST: 27 % (ref 36–46)
HGB BLD-MCNC: 7.4 G/DL (ref 12–16)
HGB BLDA-MCNC: 8.9 G/DL (ref 12–16)
HGB BLDMV-MCNC: 7.8 G/DL (ref 12–16)
HGB BLDMV-MCNC: 8.2 G/DL (ref 12–16)
HGB BLDMV-MCNC: 8.3 G/DL (ref 12–16)
HGB BLDMV-MCNC: 8.9 G/DL (ref 12–16)
INHALED O2 CONCENTRATION: 30 %
INHALED O2 CONCENTRATION: 40 %
INHALED O2 CONCENTRATION: 40 %
INR PPP: 3.5 (ref 0.9–1.1)
LACTATE BLDA-SCNC: 0.5 MMOL/L (ref 0.4–2)
LACTATE BLDMV-SCNC: 0.5 MMOL/L (ref 0.4–2)
LACTATE BLDMV-SCNC: 0.5 MMOL/L (ref 0.4–2)
LACTATE BLDMV-SCNC: 0.6 MMOL/L (ref 0.4–2)
LACTATE BLDMV-SCNC: 1 MMOL/L (ref 0.4–2)
LDH SERPL L TO P-CCNC: 295 U/L (ref 84–246)
MCH RBC QN AUTO: 29.1 PG (ref 26–34)
MCHC RBC AUTO-ENTMCNC: 33.2 G/DL (ref 32–36)
MCV RBC AUTO: 88 FL (ref 80–100)
MYCOBACTERIUM SPEC CULT: NORMAL
NRBC BLD-RTO: 0 /100 WBCS (ref 0–0)
OXYHGB MFR BLDA: 97 % (ref 94–98)
OXYHGB MFR BLDMV: 61.3 % (ref 45–75)
OXYHGB MFR BLDMV: 64.2 % (ref 45–75)
OXYHGB MFR BLDMV: 64.7 % (ref 45–75)
OXYHGB MFR BLDMV: 70.9 % (ref 45–75)
PATH REVIEW-CELL CT,FLUID: NORMAL
PCO2 BLDA: 45 MM HG (ref 38–42)
PCO2 BLDMV: 44 MM HG (ref 41–51)
PCO2 BLDMV: 49 MM HG (ref 41–51)
PCO2 BLDMV: 50 MM HG (ref 41–51)
PCO2 BLDMV: 52 MM HG (ref 41–51)
PH BLDA: 7.42 PH (ref 7.38–7.42)
PH BLDMV: 7.37 PH (ref 7.33–7.43)
PH BLDMV: 7.38 PH (ref 7.33–7.43)
PH BLDMV: 7.39 PH (ref 7.33–7.43)
PH BLDMV: 7.45 PH (ref 7.33–7.43)
PHOSPHATE SERPL-MCNC: 2.9 MG/DL (ref 2.5–4.9)
PHOSPHATE SERPL-MCNC: 3 MG/DL (ref 2.5–4.9)
PLATELET # BLD AUTO: 195 X10*3/UL (ref 150–450)
PO2 BLDA: 113 MM HG (ref 85–95)
PO2 BLDMV: 35 MM HG (ref 35–45)
PO2 BLDMV: 38 MM HG (ref 35–45)
PO2 BLDMV: 39 MM HG (ref 35–45)
PO2 BLDMV: 43 MM HG (ref 35–45)
POTASSIUM BLDA-SCNC: 3.8 MMOL/L (ref 3.5–5.3)
POTASSIUM BLDMV-SCNC: 2.7 MMOL/L (ref 3.5–5.3)
POTASSIUM BLDMV-SCNC: 3.7 MMOL/L (ref 3.5–5.3)
POTASSIUM BLDMV-SCNC: 3.8 MMOL/L (ref 3.5–5.3)
POTASSIUM BLDMV-SCNC: 3.9 MMOL/L (ref 3.5–5.3)
POTASSIUM SERPL-SCNC: 2.9 MMOL/L (ref 3.5–5.3)
POTASSIUM SERPL-SCNC: 3.9 MMOL/L (ref 3.5–5.3)
POTASSIUM SERPL-SCNC: 4 MMOL/L (ref 3.5–5.3)
PREALB SERPL-MCNC: 13.8 MG/DL (ref 18–40)
PROT SERPL-MCNC: 6.4 G/DL (ref 6.4–8.2)
PROTHROMBIN TIME: 38.9 SECONDS (ref 9.8–12.4)
RBC # BLD AUTO: 2.54 X10*6/UL (ref 4–5.2)
SAO2 % BLDA: 99 % (ref 94–100)
SAO2 % BLDMV: 63 % (ref 45–75)
SAO2 % BLDMV: 65 % (ref 45–75)
SAO2 % BLDMV: 66 % (ref 45–75)
SAO2 % BLDMV: 72 % (ref 45–75)
SODIUM BLDA-SCNC: 136 MMOL/L (ref 136–145)
SODIUM BLDMV-SCNC: 133 MMOL/L (ref 136–145)
SODIUM BLDMV-SCNC: 133 MMOL/L (ref 136–145)
SODIUM BLDMV-SCNC: 137 MMOL/L (ref 136–145)
SODIUM BLDMV-SCNC: 137 MMOL/L (ref 136–145)
SODIUM SERPL-SCNC: 137 MMOL/L (ref 136–145)
SODIUM SERPL-SCNC: 138 MMOL/L (ref 136–145)
SODIUM SERPL-SCNC: 141 MMOL/L (ref 136–145)
VENTILATOR MODE: ABNORMAL
VENTILATOR MODE: ABNORMAL
WBC # BLD AUTO: 5.8 X10*3/UL (ref 4.4–11.3)

## 2025-08-07 PROCEDURE — 84132 ASSAY OF SERUM POTASSIUM: CPT

## 2025-08-07 PROCEDURE — 2500000002 HC RX 250 W HCPCS SELF ADMINISTERED DRUGS (ALT 637 FOR MEDICARE OP, ALT 636 FOR OP/ED): Performed by: NURSE PRACTITIONER

## 2025-08-07 PROCEDURE — 71045 X-RAY EXAM CHEST 1 VIEW: CPT | Performed by: RADIOLOGY

## 2025-08-07 PROCEDURE — 85610 PROTHROMBIN TIME: CPT | Performed by: NURSE PRACTITIONER

## 2025-08-07 PROCEDURE — 2500000004 HC RX 250 GENERAL PHARMACY W/ HCPCS (ALT 636 FOR OP/ED): Performed by: NURSE PRACTITIONER

## 2025-08-07 PROCEDURE — 2500000001 HC RX 250 WO HCPCS SELF ADMINISTERED DRUGS (ALT 637 FOR MEDICARE OP)

## 2025-08-07 PROCEDURE — 84134 ASSAY OF PREALBUMIN: CPT

## 2025-08-07 PROCEDURE — 84132 ASSAY OF SERUM POTASSIUM: CPT | Performed by: NURSE PRACTITIONER

## 2025-08-07 PROCEDURE — 97530 THERAPEUTIC ACTIVITIES: CPT | Mod: GP

## 2025-08-07 PROCEDURE — 94003 VENT MGMT INPAT SUBQ DAY: CPT

## 2025-08-07 PROCEDURE — 82947 ASSAY GLUCOSE BLOOD QUANT: CPT

## 2025-08-07 PROCEDURE — 85027 COMPLETE CBC AUTOMATED: CPT | Performed by: NURSE PRACTITIONER

## 2025-08-07 PROCEDURE — 99291 CRITICAL CARE FIRST HOUR: CPT | Performed by: INTERNAL MEDICINE

## 2025-08-07 PROCEDURE — 82374 ASSAY BLOOD CARBON DIOXIDE: CPT | Performed by: NURSE PRACTITIONER

## 2025-08-07 PROCEDURE — 37799 UNLISTED PX VASCULAR SURGERY: CPT | Performed by: NURSE PRACTITIONER

## 2025-08-07 PROCEDURE — 97110 THERAPEUTIC EXERCISES: CPT | Mod: GP

## 2025-08-07 PROCEDURE — 94669 MECHANICAL CHEST WALL OSCILL: CPT

## 2025-08-07 PROCEDURE — 2500000001 HC RX 250 WO HCPCS SELF ADMINISTERED DRUGS (ALT 637 FOR MEDICARE OP): Performed by: NURSE PRACTITIONER

## 2025-08-07 PROCEDURE — 94660 CPAP INITIATION&MGMT: CPT

## 2025-08-07 PROCEDURE — 71045 X-RAY EXAM CHEST 1 VIEW: CPT

## 2025-08-07 PROCEDURE — 2500000004 HC RX 250 GENERAL PHARMACY W/ HCPCS (ALT 636 FOR OP/ED): Performed by: INTERNAL MEDICINE

## 2025-08-07 PROCEDURE — 2020000001 HC ICU ROOM DAILY

## 2025-08-07 PROCEDURE — 2500000002 HC RX 250 W HCPCS SELF ADMINISTERED DRUGS (ALT 637 FOR MEDICARE OP, ALT 636 FOR OP/ED)

## 2025-08-07 PROCEDURE — 99233 SBSQ HOSP IP/OBS HIGH 50: CPT | Performed by: STUDENT IN AN ORGANIZED HEALTH CARE EDUCATION/TRAINING PROGRAM

## 2025-08-07 PROCEDURE — 94640 AIRWAY INHALATION TREATMENT: CPT

## 2025-08-07 PROCEDURE — 83615 LACTATE (LD) (LDH) ENZYME: CPT | Performed by: NURSE PRACTITIONER

## 2025-08-07 PROCEDURE — 2500000005 HC RX 250 GENERAL PHARMACY W/O HCPCS

## 2025-08-07 PROCEDURE — 80076 HEPATIC FUNCTION PANEL: CPT | Performed by: NURSE PRACTITIONER

## 2025-08-07 RX ORDER — INSULIN LISPRO 100 [IU]/ML
0-10 INJECTION, SOLUTION INTRAVENOUS; SUBCUTANEOUS
Status: DISCONTINUED | OUTPATIENT
Start: 2025-08-08 | End: 2025-08-07

## 2025-08-07 RX ORDER — INSULIN LISPRO 100 [IU]/ML
0-10 INJECTION, SOLUTION INTRAVENOUS; SUBCUTANEOUS
Status: DISCONTINUED | OUTPATIENT
Start: 2025-08-07 | End: 2025-08-18 | Stop reason: HOSPADM

## 2025-08-07 RX ORDER — POTASSIUM CHLORIDE 29.8 MG/ML
40 INJECTION INTRAVENOUS EVERY 4 HOURS
Status: COMPLETED | OUTPATIENT
Start: 2025-08-07 | End: 2025-08-07

## 2025-08-07 RX ORDER — BISACODYL 10 MG/1
10 SUPPOSITORY RECTAL DAILY
Status: DISCONTINUED | OUTPATIENT
Start: 2025-08-07 | End: 2025-08-10

## 2025-08-07 RX ORDER — LIDOCAINE 560 MG/1
1 PATCH PERCUTANEOUS; TOPICAL; TRANSDERMAL EVERY 24 HOURS
Status: DISCONTINUED | OUTPATIENT
Start: 2025-08-07 | End: 2025-08-18 | Stop reason: HOSPADM

## 2025-08-07 RX ORDER — FUROSEMIDE 10 MG/ML
80 INJECTION INTRAMUSCULAR; INTRAVENOUS ONCE
Status: COMPLETED | OUTPATIENT
Start: 2025-08-07 | End: 2025-08-07

## 2025-08-07 RX ORDER — POTASSIUM CHLORIDE 29.8 MG/ML
40 INJECTION INTRAVENOUS ONCE
Status: COMPLETED | OUTPATIENT
Start: 2025-08-07 | End: 2025-08-08

## 2025-08-07 RX ADMIN — OXYCODONE HYDROCHLORIDE 5 MG: 5 TABLET ORAL at 20:40

## 2025-08-07 RX ADMIN — GUAIFENESIN AND DEXTROMETHORPHAN HYDROBROMIDE 1 TABLET: 600; 30 TABLET, EXTENDED RELEASE ORAL at 20:40

## 2025-08-07 RX ADMIN — GUAIFENESIN AND DEXTROMETHORPHAN HYDROBROMIDE 1 TABLET: 600; 30 TABLET, EXTENDED RELEASE ORAL at 12:11

## 2025-08-07 RX ADMIN — LIDOCAINE 4% 1 PATCH: 40 PATCH TOPICAL at 09:56

## 2025-08-07 RX ADMIN — LEVOTHYROXINE SODIUM 70 MCG: 100 INJECTION, POWDER, LYOPHILIZED, FOR SOLUTION INTRAVENOUS at 08:45

## 2025-08-07 RX ADMIN — INSULIN LISPRO 4 UNITS: 100 INJECTION, SOLUTION INTRAVENOUS; SUBCUTANEOUS at 20:40

## 2025-08-07 RX ADMIN — DEXTROSE MONOHYDRATE 12.5 G: 25 INJECTION, SOLUTION INTRAVENOUS at 11:40

## 2025-08-07 RX ADMIN — DOCUSATE SODIUM 100 MG: 100 CAPSULE, LIQUID FILLED ORAL at 12:11

## 2025-08-07 RX ADMIN — IPRATROPIUM BROMIDE AND ALBUTEROL SULFATE 3 ML: .5; 3 SOLUTION RESPIRATORY (INHALATION) at 20:58

## 2025-08-07 RX ADMIN — IPRATROPIUM BROMIDE AND ALBUTEROL SULFATE 3 ML: .5; 3 SOLUTION RESPIRATORY (INHALATION) at 13:45

## 2025-08-07 RX ADMIN — Medication: at 09:31

## 2025-08-07 RX ADMIN — BISACODYL 10 MG: 10 SUPPOSITORY RECTAL at 08:45

## 2025-08-07 RX ADMIN — IPRATROPIUM BROMIDE AND ALBUTEROL SULFATE 3 ML: .5; 3 SOLUTION RESPIRATORY (INHALATION) at 00:18

## 2025-08-07 RX ADMIN — Medication: at 20:58

## 2025-08-07 RX ADMIN — ASPIRIN 81 MG: 81 TABLET, CHEWABLE ORAL at 12:10

## 2025-08-07 RX ADMIN — SENNOSIDES, DOCUSATE SODIUM 2 TABLET: 50; 8.6 TABLET, FILM COATED ORAL at 12:11

## 2025-08-07 RX ADMIN — PANTOPRAZOLE SODIUM 40 MG: 40 INJECTION, POWDER, LYOPHILIZED, FOR SOLUTION INTRAVENOUS at 08:45

## 2025-08-07 RX ADMIN — PANTOPRAZOLE SODIUM 40 MG: 40 INJECTION, POWDER, LYOPHILIZED, FOR SOLUTION INTRAVENOUS at 20:30

## 2025-08-07 RX ADMIN — POTASSIUM CHLORIDE 40 MEQ: 29.8 INJECTION, SOLUTION INTRAVENOUS at 04:29

## 2025-08-07 RX ADMIN — ROPINIROLE 1 MG: 1 TABLET, FILM COATED ORAL at 12:10

## 2025-08-07 RX ADMIN — FUROSEMIDE 80 MG: 10 INJECTION, SOLUTION INTRAMUSCULAR; INTRAVENOUS at 23:40

## 2025-08-07 RX ADMIN — ACETYLCYSTEINE 600 MG: 200 SOLUTION ORAL; RESPIRATORY (INHALATION) at 07:46

## 2025-08-07 RX ADMIN — ATORVASTATIN CALCIUM 80 MG: 80 TABLET, FILM COATED ORAL at 12:12

## 2025-08-07 RX ADMIN — IPRATROPIUM BROMIDE AND ALBUTEROL SULFATE 3 ML: .5; 3 SOLUTION RESPIRATORY (INHALATION) at 07:46

## 2025-08-07 RX ADMIN — SPIRONOLACTONE 25 MG: 25 TABLET ORAL at 12:19

## 2025-08-07 RX ADMIN — ACETYLCYSTEINE 600 MG: 200 SOLUTION ORAL; RESPIRATORY (INHALATION) at 13:45

## 2025-08-07 RX ADMIN — SENNOSIDES, DOCUSATE SODIUM 2 TABLET: 50; 8.6 TABLET, FILM COATED ORAL at 20:29

## 2025-08-07 RX ADMIN — DOCUSATE SODIUM 100 MG: 100 CAPSULE, LIQUID FILLED ORAL at 20:29

## 2025-08-07 RX ADMIN — ACETYLCYSTEINE 600 MG: 200 SOLUTION ORAL; RESPIRATORY (INHALATION) at 20:57

## 2025-08-07 RX ADMIN — ROPINIROLE HYDROCHLORIDE 3 MG: 3 TABLET, FILM COATED ORAL at 20:30

## 2025-08-07 RX ADMIN — ACETYLCYSTEINE 600 MG: 200 SOLUTION ORAL; RESPIRATORY (INHALATION) at 01:22

## 2025-08-07 RX ADMIN — ROPINIROLE 1 MG: 1 TABLET, FILM COATED ORAL at 20:29

## 2025-08-07 RX ADMIN — POTASSIUM CHLORIDE 40 MEQ: 29.8 INJECTION, SOLUTION INTRAVENOUS at 08:46

## 2025-08-07 RX ADMIN — POTASSIUM CHLORIDE 40 MEQ: 29.8 INJECTION, SOLUTION INTRAVENOUS at 23:41

## 2025-08-07 RX ADMIN — ACETAMINOPHEN 650 MG: 325 TABLET, FILM COATED ORAL at 12:13

## 2025-08-07 RX ADMIN — OXYCODONE HYDROCHLORIDE 5 MG: 5 TABLET ORAL at 12:37

## 2025-08-07 RX ADMIN — FENTANYL CITRATE 25 MCG: 50 INJECTION INTRAMUSCULAR; INTRAVENOUS at 07:33

## 2025-08-07 RX ADMIN — IPRATROPIUM BROMIDE AND ALBUTEROL SULFATE 3 ML: .5; 3 SOLUTION RESPIRATORY (INHALATION) at 10:28

## 2025-08-07 RX ADMIN — FENTANYL CITRATE 25 MCG: 50 INJECTION INTRAMUSCULAR; INTRAVENOUS at 02:24

## 2025-08-07 RX ADMIN — ACETAMINOPHEN 650 MG: 325 TABLET, FILM COATED ORAL at 18:21

## 2025-08-07 RX ADMIN — IPRATROPIUM BROMIDE AND ALBUTEROL SULFATE 3 ML: .5; 3 SOLUTION RESPIRATORY (INHALATION) at 03:43

## 2025-08-07 RX ADMIN — MILRINONE LACTATE IN DEXTROSE 0.25 MCG/KG/MIN: 200 INJECTION, SOLUTION INTRAVENOUS at 18:20

## 2025-08-07 ASSESSMENT — PAIN SCALES - GENERAL
PAINLEVEL_OUTOF10: 5 - MODERATE PAIN
PAINLEVEL_OUTOF10: 0 - NO PAIN
PAINLEVEL_OUTOF10: 2
PAINLEVEL_OUTOF10: 6
PAINLEVEL_OUTOF10: 0 - NO PAIN
PAINLEVEL_OUTOF10: 4
PAINLEVEL_OUTOF10: 0 - NO PAIN
PAINLEVEL_OUTOF10: 2

## 2025-08-07 ASSESSMENT — COGNITIVE AND FUNCTIONAL STATUS - GENERAL
CLIMB 3 TO 5 STEPS WITH RAILING: TOTAL
MOVING FROM LYING ON BACK TO SITTING ON SIDE OF FLAT BED WITH BEDRAILS: A LITTLE
WALKING IN HOSPITAL ROOM: A LOT
STANDING UP FROM CHAIR USING ARMS: A LITTLE
MOVING TO AND FROM BED TO CHAIR: A LITTLE
TURNING FROM BACK TO SIDE WHILE IN FLAT BAD: A LITTLE
MOBILITY SCORE: 15

## 2025-08-07 ASSESSMENT — PAIN - FUNCTIONAL ASSESSMENT
PAIN_FUNCTIONAL_ASSESSMENT: 0-10
PAIN_FUNCTIONAL_ASSESSMENT: CPOT (CRITICAL CARE PAIN OBSERVATION TOOL)
PAIN_FUNCTIONAL_ASSESSMENT: 0-10
PAIN_FUNCTIONAL_ASSESSMENT: CPOT (CRITICAL CARE PAIN OBSERVATION TOOL)
PAIN_FUNCTIONAL_ASSESSMENT: 0-10
PAIN_FUNCTIONAL_ASSESSMENT: CPOT (CRITICAL CARE PAIN OBSERVATION TOOL)

## 2025-08-07 ASSESSMENT — PAIN DESCRIPTION - ORIENTATION: ORIENTATION: MID

## 2025-08-07 ASSESSMENT — PAIN DESCRIPTION - DESCRIPTORS
DESCRIPTORS: ACHING
DESCRIPTORS: ACHING

## 2025-08-07 ASSESSMENT — PAIN DESCRIPTION - LOCATION: LOCATION: CHEST

## 2025-08-07 NOTE — PROGRESS NOTES
Social Work Note  - HFICU Treatment Plan: s/p post extubtion - Collaspsed left lung - Pt being evaluated by pulmonolgy for broncoscopy. Pt c/o short of breath  - Payer: [employer]  - Support: Patient has HCPOA on file, Friend Babatunde Mclean is listed as primary agent   -Planned Disposition: seen on 8/4  Rehab recommendation remains unchanged after procedure  - PT/low intensity and OT/no needs at this time. Patient will likely be dc home with home care and and a wheeled walker   -Discharge assessment: 7/15 I reviewed the SDOH and Social Work Discharge Assessment. I met with the patient and updated the assessments as needed. Per the assessment. I identified no barriers to discharge at this time. SW will continue to follow for dc planning needs   QUYEN Lincoln, LSW

## 2025-08-07 NOTE — NURSING NOTE
Pt extubated to CPAP by RT. Mouth care performed, pt tolerated well, no adverse outcomes noted. Restraints no longer indicated & d/c'd.

## 2025-08-07 NOTE — CARE PLAN
Problem: Pain - Adult  Goal: Verbalizes/displays adequate comfort level or baseline comfort level  Outcome: Progressing     Problem: Safety - Adult  Goal: Free from fall injury  Outcome: Progressing     Problem: Discharge Planning  Goal: Discharge to home or other facility with appropriate resources  Outcome: Progressing     Problem: Chronic Conditions and Co-morbidities  Goal: Patient's chronic conditions and co-morbidity symptoms are monitored and maintained or improved  Outcome: Progressing     Problem: Nutrition  Goal: Nutrient intake appropriate for maintaining nutritional needs  Outcome: Progressing     Problem: Heart Failure  Goal: Improved gas exchange this shift  Outcome: Progressing  Goal: Improved urinary output this shift  Outcome: Progressing  Goal: Reduction in peripheral edema within 24 hours  Outcome: Progressing  Goal: Report improvement of dyspnea/breathlessness this shift  Outcome: Progressing  Goal: Weight from fluid excess reduced over 2-3 days, then stabilize  Outcome: Progressing  Goal: Increase self care and/or family involvement in 24 hours  Outcome: Progressing     Problem: Respiratory  Goal: Minimal/no exertional discomfort or dyspnea this shift  Outcome: Progressing  Goal: No signs of respiratory distress (eg. Use of accessory muscles. Peds grunting)  Outcome: Progressing  Goal: Patent airway maintained this shift  Outcome: Progressing  Goal: Verbalize decreased shortness of breath this shift  Outcome: Progressing  Goal: Wean oxygen to maintain O2 saturation per order/standard this shift  Outcome: Progressing  Goal: Increase self care and/or family involvement in next 24 hours  Outcome: Progressing     Problem: Diabetes  Goal: Achieve decreasing blood glucose levels by end of shift  Outcome: Progressing  Goal: Increase stability of blood glucose readings by end of shift  Outcome: Progressing  Goal: Decrease in ketones present in urine by end of shift  Outcome: Progressing  Goal:  Maintain electrolyte levels within acceptable range throughout shift  Outcome: Progressing  Goal: Maintain glucose levels >70mg/dl to <250mg/dl throughout shift  Outcome: Progressing  Goal: No changes in neurological exam by end of shift  Outcome: Progressing  Goal: Learn about and adhere to nutrition recommendations by end of shift  Outcome: Progressing  Goal: Vital signs within normal range for age by end of shift  Outcome: Progressing  Goal: Increase self care and/or family involovement by end of shift  Outcome: Progressing  Goal: Receive DSME education by end of shift  Outcome: Progressing     Problem: Ventricular Assisted Device (VAD)  Goal: Hemodynamic stability, correction of coagulopathy, lab value stability  Outcome: Progressing  Goal: Wean vasopressors/nitric  Outcome: Progressing  Goal: Wean ventilator  Outcome: Progressing  Goal: Extubation  Outcome: Progressing  Goal: Stable metal status  Outcome: Progressing  Goal: Pulmonary toileting, incentive spirometry  Outcome: Progressing  Goal: Nutrition  Outcome: Progressing  Goal: Mobility/OT/PT  Outcome: Progressing  Goal: Rubber ball  Outcome: Progressing  Goal: ROM  Outcome: Progressing  Goal: Sitting  Outcome: Progressing  Goal: Walk  Outcome: Progressing  Goal: Involve in VAD awareness, dressing change  Outcome: Progressing  Goal: AICD On/Off  Outcome: Progressing     Problem: Skin  Goal: Decreased wound size/increased tissue granulation at next dressing change  Outcome: Progressing  Flowsheets (Taken 8/7/2025 0728)  Decreased wound size/increased tissue granulation at next dressing change:   Promote sleep for wound healing   Utilize specialty bed per algorithm   Protective dressings over bony prominences  Goal: Participates in plan/prevention/treatment measures  Outcome: Progressing  Flowsheets (Taken 8/7/2025 0728)  Participates in plan/prevention/treatment measures:   Increase activity/out of bed for meals   Discuss with provider PT/OT consult    Elevate heels  Goal: Prevent/manage excess moisture  Outcome: Progressing  Flowsheets (Taken 8/7/2025 0728)  Prevent/manage excess moisture:   Moisturize dry skin   Cleanse incontinence/protect with barrier cream   Follow provider orders for dressing changes   Monitor for/manage infection if present  Goal: Prevent/minimize sheer/friction injuries  Outcome: Progressing  Flowsheets (Taken 8/7/2025 0728)  Prevent/minimize sheer/friction injuries:   Complete micro-shifts as needed if patient unable. Adjust patient position to relieve pressure points, not a full turn   HOB 30 degrees or less   Use pull sheet   Utilize specialty bed per algorithm   Turn/reposition every 2 hours/use positioning/transfer devices   Increase activity/out of bed for meals  Goal: Promote/optimize nutrition  Outcome: Progressing  Flowsheets (Taken 8/7/2025 0728)  Promote/optimize nutrition:   Assist with feeding   Discuss with provider if NPO > 2 days   Offer water/supplements/favorite foods   Reassess MST if dietician not consulted   Monitor/record intake including meals   Consume > 50% meals/supplements  Goal: Promote skin healing  Outcome: Progressing  Flowsheets (Taken 8/7/2025 0728)  Promote skin healing:   Assess skin/pad under line(s)/device(s)   Ensure correct size (line/device) and apply per  instructions   Protective dressings over bony prominences   Turn/reposition every 2 hours/use positioning/transfer devices   Rotate device position/do not position patient on device     Problem: Safety - Medical Restraint  Goal: Remains free of injury from restraints (Restraint for Interference with Medical Device)  Outcome: Progressing  Flowsheets (Taken 8/7/2025 0754)  Remains free of injury from restraints (restraint for interference with medical device):   Determine that other, less restrictive measures have been tried or would not be effective before applying the restraint   Inform patient/family regarding the reason for  restraint   Evaluate the patient's condition at the time of restraint application   Every 2 hours: Monitor safety, psychosocial status, comfort, nutrition and hydration  Goal: Free from restraint(s) (Restraint for Interference with Medical Device)  Outcome: Progressing  Flowsheets (Taken 8/7/2025 2567)  Free from restraint(s) (restraint for interference with medical device):   ONCE/SHIFT or MINIMUM Every 12 hours: Assess and document the continuing need for restraints   Every 24 hours: Continued use of restraint requires Licensed Independent Practitioner to perform face to face examination and written order   Identify and implement measures to help patient regain control   The patient's goals for the shift include patient wants hot water to be fixed this shift    The clinical goals for the shift include pt will tolerate vent weaning      Problem: Pain - Adult  Goal: Verbalizes/displays adequate comfort level or baseline comfort level  Outcome: Progressing     Problem: Safety - Adult  Goal: Free from fall injury  Outcome: Progressing     Problem: Discharge Planning  Goal: Discharge to home or other facility with appropriate resources  Outcome: Progressing     Problem: Chronic Conditions and Co-morbidities  Goal: Patient's chronic conditions and co-morbidity symptoms are monitored and maintained or improved  Outcome: Progressing     Problem: Nutrition  Goal: Nutrient intake appropriate for maintaining nutritional needs  Outcome: Progressing     Problem: Heart Failure  Goal: Improved gas exchange this shift  Outcome: Progressing  Goal: Improved urinary output this shift  Outcome: Progressing  Goal: Reduction in peripheral edema within 24 hours  Outcome: Progressing  Goal: Report improvement of dyspnea/breathlessness this shift  Outcome: Progressing  Goal: Weight from fluid excess reduced over 2-3 days, then stabilize  Outcome: Progressing  Goal: Increase self care and/or family involvement in 24 hours  Outcome:  Progressing     Problem: Respiratory  Goal: Minimal/no exertional discomfort or dyspnea this shift  Outcome: Progressing  Goal: No signs of respiratory distress (eg. Use of accessory muscles. Peds grunting)  Outcome: Progressing  Goal: Patent airway maintained this shift  Outcome: Progressing  Goal: Verbalize decreased shortness of breath this shift  Outcome: Progressing  Goal: Wean oxygen to maintain O2 saturation per order/standard this shift  Outcome: Progressing  Goal: Increase self care and/or family involvement in next 24 hours  Outcome: Progressing     Problem: Diabetes  Goal: Achieve decreasing blood glucose levels by end of shift  Outcome: Progressing  Goal: Increase stability of blood glucose readings by end of shift  Outcome: Progressing  Goal: Decrease in ketones present in urine by end of shift  Outcome: Progressing  Goal: Maintain electrolyte levels within acceptable range throughout shift  Outcome: Progressing  Goal: Maintain glucose levels >70mg/dl to <250mg/dl throughout shift  Outcome: Progressing  Goal: No changes in neurological exam by end of shift  Outcome: Progressing  Goal: Learn about and adhere to nutrition recommendations by end of shift  Outcome: Progressing  Goal: Vital signs within normal range for age by end of shift  Outcome: Progressing  Goal: Increase self care and/or family involovement by end of shift  Outcome: Progressing  Goal: Receive DSME education by end of shift  Outcome: Progressing     Problem: Ventricular Assisted Device (VAD)  Goal: Wean vasopressors/nitric  Outcome: Progressing  Goal: Wean ventilator  Outcome: Progressing  Goal: Extubation  Outcome: Progressing  Goal: Stable metal status  Outcome: Progressing  Goal: Pulmonary toileting, incentive spirometry  Outcome: Progressing  Goal: Nutrition  Outcome: Progressing  Goal: Mobility/OT/PT  Outcome: Progressing  Goal: Rubber ball  Outcome: Progressing  Goal: ROM  Outcome: Progressing  Goal: Sitting  Outcome:  Progressing  Goal: Walk  Outcome: Progressing  Goal: Involve in VAD awareness, dressing change  Outcome: Progressing  Goal: AICD On/Off  Outcome: Progressing     Problem: Skin  Goal: Decreased wound size/increased tissue granulation at next dressing change  Outcome: Progressing  Flowsheets (Taken 8/7/2025 0728)  Decreased wound size/increased tissue granulation at next dressing change:   Promote sleep for wound healing   Utilize specialty bed per algorithm   Protective dressings over bony prominences  Goal: Participates in plan/prevention/treatment measures  Outcome: Progressing  Flowsheets (Taken 8/7/2025 0728)  Participates in plan/prevention/treatment measures:   Increase activity/out of bed for meals   Discuss with provider PT/OT consult   Elevate heels  Goal: Prevent/manage excess moisture  Outcome: Progressing  Flowsheets (Taken 8/7/2025 0728)  Prevent/manage excess moisture:   Moisturize dry skin   Cleanse incontinence/protect with barrier cream   Follow provider orders for dressing changes   Monitor for/manage infection if present  Goal: Prevent/minimize sheer/friction injuries  Outcome: Progressing  Flowsheets (Taken 8/7/2025 0728)  Prevent/minimize sheer/friction injuries:   Complete micro-shifts as needed if patient unable. Adjust patient position to relieve pressure points, not a full turn   HOB 30 degrees or less   Use pull sheet   Utilize specialty bed per algorithm   Turn/reposition every 2 hours/use positioning/transfer devices   Increase activity/out of bed for meals  Goal: Promote/optimize nutrition  Outcome: Progressing  Flowsheets (Taken 8/7/2025 0728)  Promote/optimize nutrition:   Assist with feeding   Discuss with provider if NPO > 2 days   Offer water/supplements/favorite foods   Reassess MST if dietician not consulted   Monitor/record intake including meals   Consume > 50% meals/supplements  Goal: Promote skin healing  Outcome: Progressing  Flowsheets (Taken 8/7/2025 0728)  Promote skin  healing:   Assess skin/pad under line(s)/device(s)   Ensure correct size (line/device) and apply per  instructions   Protective dressings over bony prominences   Turn/reposition every 2 hours/use positioning/transfer devices   Rotate device position/do not position patient on device     Problem: Safety - Medical Restraint  Goal: Remains free of injury from restraints (Restraint for Interference with Medical Device)  Outcome: Progressing  Flowsheets (Taken 8/7/2025 0754)  Remains free of injury from restraints (restraint for interference with medical device):   Determine that other, less restrictive measures have been tried or would not be effective before applying the restraint   Inform patient/family regarding the reason for restraint   Evaluate the patient's condition at the time of restraint application   Every 2 hours: Monitor safety, psychosocial status, comfort, nutrition and hydration     Problem: Ventricular Assisted Device (VAD)  Goal: Hemodynamic stability, correction of coagulopathy, lab value stability  8/7/2025 1742 by Gali Ferris RN  Outcome: Met  8/7/2025 0727 by Gali Ferris RN  Outcome: Progressing     Problem: Safety - Medical Restraint  Goal: Free from restraint(s) (Restraint for Interference with Medical Device)  8/7/2025 1742 by Gali Ferris RN  Outcome: Met  8/7/2025 0754 by Gali Ferris RN  Outcome: Progressing  Flowsheets (Taken 8/7/2025 0754)  Free from restraint(s) (restraint for interference with medical device):   ONCE/SHIFT or MINIMUM Every 12 hours: Assess and document the continuing need for restraints   Every 24 hours: Continued use of restraint requires Licensed Independent Practitioner to perform face to face examination and written order   Identify and implement measures to help patient regain control

## 2025-08-07 NOTE — SIGNIFICANT EVENT
Weaning parameters    08/07/25 0900   Weaning Parameters   Weaning Start Time 0746   Weaning Vital Capacity 700 mL   Negative Inspiratory Force (NIF) -32   Spontaneous Minute Volume (MV) 7.1   Weaning Tidal Volume 315 mL   Respiratory Depth/Rhythm Regular   Respiratory Effort Unlabored   Dyspnea Occurrence With exertion   Weaning Tolerance Fair   Weaning Stop Time 0910     Pt was successfully extubated to a heated BIPAP per MD. Pt had a positive cuff leak pre-extubation, was suctioned with minimal thick, white secretions and with the following parameters above. Post extubation voice was intact, no stridor present and she had bilateral dim bs. Now on heated BIPAP and will continue to monitor.

## 2025-08-07 NOTE — NURSING NOTE
Second attempt made to begin education with patient. Patient in bed on BiPAP and shook her head no when asked if she was ready to begin VAD education. Will try again tomorrow.

## 2025-08-07 NOTE — PROGRESS NOTES
Occupational Therapy    Communication Note    Patient Name: Thao Evans  MRN: [id number]  Today's Date: 8/7/2025   Room: 07/07A    Discipline: Occupational Therapy      Missed Visit Reason: Patient placed on medical hold (Patient extubated this AM, currently on BiPAP, hold per RN; will attempt OT next visit as appropriate.)      08/07/25 at 11:38 AM   Yessenia Hunter OT   Rehab Office: [phone]

## 2025-08-07 NOTE — PROGRESS NOTES
Physical Therapy    Physical Therapy Treatment    Patient Name: Thao Evans  MRN: [id number]  Department: [facility]  Room: 07/07-A  Today's Date: 8/7/2025  Time Calculation  Start Time: 1248  Stop Time: 1318  Time Calculation (min): 30 min         Assessment/Plan   PT Assessment  End of Session Communication: Bedside nurse  End of Session Patient Position: Up in chair, Alarm off, not on at start of session  PT Plan  Inpatient/Swing Bed or Outpatient: Inpatient  PT Plan  Treatment/Interventions: Bed mobility, Transfer training, Gait training, Balance training, Stair training, Strengthening, Endurance training, Range of motion, Therapeutic exercise, Therapeutic activity, Home exercise program, Positioning, Postural re-education  PT Plan: Ongoing PT  PT Frequency: 6 times per week  PT Discharge Recommendations: Low intensity level of continued care  Equipment Recommended upon Discharge: Wheeled walker  PT Recommended Transfer Status: Assist x1  PT - OK to Discharge: Yes    PT Visit Info:  PT Received On: 08/07/25  Response to Previous Treatment: Patient with no complaints from previous session.     General Visit Information:   General  Family/Caregiver Present: No  Prior to Session Communication: Bedside nurse  Patient Position Received: Bed, 3 rail up, Alarm off, not on at start of session  General Comment: Pt awake and willing to participate in PT treatment session. Extubated at 9AM today. Now on AirVo.    Subjective   Precautions:  Precautions  Medical Precautions: Cardiac precautions, Fall precautions, Oxygen therapy device and L/min  Post-Surgical Precautions: Move in the Tube  Precautions Comment: MAP >65. spO2 >92%    Lines/Tubes:   AirVo: 40L, 35%   IV milrinone .25   A Line   Telemetry    [place]     LVAD- Heartmate 3   Flow (lpm) 4.0  3.7   Speed (rpm) 4800  4800   PI  4.4  4.5   Power (Roche) 3.1  3.1   Driveline secured prior to mobility        Vital Signs     Vitals Session Pre PT During PT Post PT    Heart Rate 91 101 90   Resp 25  26   SpO2 96% 94% 100%   BP      MAP (mmHg) 70  75            Objective   Pain:  Pain Assessment  Pain Assessment: 0-10  0-10 (Numeric) Pain Score: 0 - No pain    Cognition:  Cognition  Overall Cognitive Status: Within Functional Limits  Arousal/Alertness: Appropriate responses to stimuli  Orientation Level: Oriented X4  Following Commands: Follows all commands and directions without difficulty       Postural Control:  Static Sitting Balance  Static Sitting-Balance Support: No upper extremity supported, Feet supported  Static Sitting-Level of Assistance: Close supervision  Dynamic Sitting Balance  Dynamic Sitting-Balance Support: No upper extremity supported, Feet supported  Dynamic Sitting-Level of Assistance: Close supervision  Static Standing Balance  Static Standing-Balance Support: Right upper extremity supported  Static Standing-Level of Assistance: Minimum assistance  Dynamic Standing Balance  Dynamic Standing-Balance Support: Right upper extremity supported  Dynamic Standing-Level of Assistance: Minimum assistance    Activity Tolerance:  Activity Tolerance  Endurance: Tolerates 10 - 20 min exercise with multiple rests  Early Mobility/Exercise Safety Screen: Proceed with mobilization - No exclusion criteria met  Treatments:  Therapeutic Exercise  Therapeutic Exercise Performed: Yes  Therapeutic Exercise Activity 1: incentive spirometer x 10, 1 set  Therapeutic Exercise Activity 2: ankle pumps in supine x 15  Therapeutic Exercise Activity 3: seated knee extension  x 10 ea, 2 sets    Bed Mobility  Bed Mobility: Yes  Bed Mobility 1  Bed Mobility 1: Supine to sitting  Level of Assistance 1: Minimum assistance    Ambulation/Gait Training  Ambulation/Gait Training Performed: Yes  Ambulation/Gait Training 1  Surface 1: Level tile  Assistance 1: Hand held assistance, Minimum assistance  Quality of Gait 1: Decreased step length, Forward flexed posture  Comments/Distance (ft) 1:  distance limited by AirVo tubing length. Able to walk x 4 ft forward/backward, x 4 trials, plus side steps to chair, with Min A and verbal cues for safety.  Transfers  Transfer: Yes  Transfer 1  Transfer From 1: Sit to  Transfer to 1: Stand  Technique 1: Sit to stand  Transfer Level of Assistance 1: Minimum assistance, Hand held assistance  Transfers 2  Transfer From 2: Stand to  Transfer to 2: Sit  Technique 2: Stand to sit  Transfer Level of Assistance 2: Minimum assistance, Hand held assistance    Outcome Measures:  [facility] Basic Mobility  Turning from your back to your side while in a flat bed without using bedrails: A little  Moving from lying on your back to sitting on the side of a flat bed without using bedrails: A little  Moving to and from bed to chair (including a wheelchair): A little  Standing up from a chair using your arms (e.g. wheelchair or bedside chair): A little  To walk in hospital room: A lot  Climbing 3-5 steps with railing: Total  Basic Mobility - Total Score: 15    FSS-ICU  Ambulation: Walks <50 feet with any assistance x1 or walks any distance with assistance x2 people  Rolling: Supervision or set-up only  Sitting: Supervision or set-up only  Transfer Sit-to-Stand: Minimal assistance (performs 75% or more of task)  Transfer Supine-to-Sit: Minimal assistance (performs 75% or more of task)  Total Score: 19      Early Mobility/Exercise Safety Screen: Proceed with mobilization - No exclusion criteria met  ICU Mobility Scale: Marching on spot (at bedside) [6]  E = Exercise and Early Mobility  Early Mobility/Exercise Safety Screen: Proceed with mobilization - No exclusion criteria met  ICU Mobility Scale: Marching on spot (at bedside)    Education Documentation  Mobility Training, taught by Teresita Ferris PT at 8/7/2025  5:28 PM.  Learner: Patient  Readiness: Acceptance  Method: Explanation  Response: Verbalizes Understanding  Comment: reviewed MITT and safety during mobility    Education  Comments  No comments found.        OP EDUCATION:       Encounter Problems       Encounter Problems (Active)       Balance       patient will score >24/28 on the Tinetti scale to present as a low risk of falls (Progressing)       Start:  07/15/25    Expected End:  08/16/25               Mobility       Patient will complete the 5xSTS  in <15 sec with no assistive device, no UE support, and close supervision (RPE: 11/20 RPD: 3/10) (Progressing)       Start:  07/15/25    Expected End:  08/16/25            Patient will ambulate >1000ft on the 6MWT with no assistive device and close superivision (RPE: 11/20 RPD: 3/10) (Progressing)       Start:  07/15/25    Expected End:  08/16/25               Mobility       Pt will ambulate >300ft with appropriate form, SBA or less, LRAD, and no LOB for safe DC.  (Progressing)       Start:  08/02/25    Expected End:  08/16/25            Pt will ambulate up/down >3 stairs with/without hand rail with CGA or less to safely access their home upon DC.   (Progressing)       Start:  08/02/25    Expected End:  08/16/25               PT Transfers       Pt will perform bed mobility and sit<>stand transfers with SBA and use of LRAD to safely DC.  (Progressing)       Start:  08/02/25    Expected End:  08/16/25
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
## 2025-08-07 NOTE — PROGRESS NOTES
"Pulmonary/Critical Care Medicine Inpatient Progress Note    Reason for Consultation: \"Evaluate for broncoscopy. Collapsed left lung\"     Interval events: CXR this am reveals nearly resolved L lobe atelectasis and also still improving RML atelectasis    Subjective  Patient states that she is doing well today, feels that her shortness of breath has greatly improved. Denies any other chest pain (apart from healing sternal wound). Denies belly pain. States that her breathing treatments have been going well. No acute complaints.      Objective Findings:  Physical Exam:  Temp:  [35.7 °C (96.3 °F)-36.9 °C (98.4 °F)] 36.5 °C (97.7 °F)  Heart Rate:  [75-98] 87  Resp:  [14-27] 17  Arterial Line BP 1: (72-99)/(59-82) 75/61  FiO2 (%):  [30 %-40 %] 40 %     GEN: slightly ill-appearing female lying in bed, no acute distress  RESP: On Airvo 40/40. Right lung relatively clear and left lung auscultation with improved airflow compared to yesterday's exam; no significant adventitious lung sounds appreciated  CV: Mechanical hum heard across precordium, unchanged in interval  GI: soft, NT, ND.  MSK: no deformity.  SKIN: warm, dry, no clubbing or cyanosis.   NEURO: alert, responds appropriately to questions    Most recent pulmonary function test:  6/16/25:  FEV1/FVC 76, TLC 3.05 (Z -3.42) with normal RV 45, DLCO reduced at 10.95 (z -3.29). No obstruction but c/w moderate restrictive defect.     Most recent TTE results:  7/15/25  1. Left ventricular ejection fraction is moderately decreased by visual estimate at 30-35%.   2. There are multiple left ventricular wall motion abnormalities.   3. The inferolateral wall is hypo to akinetic. The apex is hypokinetic.   4. Spectral Doppler shows a Grade III (restrictive pattern) of left ventricular diastolic filling with an elevated left atrial pressure.   5. Left ventricular cavity size is severely dilated.   6. No left ventricular thrombus visualized.   7. There is low normal right ventricular " systolic function.   8. There is a large pleural effusion.   9. The Doppler estimated RVSP is mildly elevated at 47 mmHg.  10. Normal aortic root.  11. Compared with study dated 6/2/2025, the LV function is now mildly improved owing mostly to a small increase in apcal contractility.     Lung imaging: Reviewed.      8/7/25 CXR continued improvement of L lung aeration, improving RML atelectasis  8/6/25 CXR #3 (s/p bronch 2), still improved aeration of L lung, improving atelectasis R lung  8/6/25 CXR #2 (s/p bronch 1), improved aeration of L lung but new atelectasis of RML  8/6/25 CXR persistent L hemithorax opacification c/w atelectasis  8/5/25 CXR complete L hemithorax opacification c/w atelectasis.  7/16/25 CT chest BL pleural effusions with associated atelectasis, interstitial edema, interlobular septal thickening,       Assessment/Plan   #Acute hypoxic respiratory failure, stable  #Mucous plugging s/p multiple bronchoscopies, resolved  #Complete left lung collapse secondary to above, resolved  #RML atelectasis secondary to above, improving    -Improved aeration of bilateral lungs s/p two bronchoscopies 8/6/25  -Continue aggressive bronchopulmonary hygiene with nebulized saline, DuoNebs, IPV QID with RT  -Titrate FiO2 to goal SPO2 greater than 92%  -Bronchial washings sent for resp cx, afb cx, fungal cx, cell count and diff pending  -Monitor for signs and sx of infection, consider abx should any signs develop/positivity on cx from bronchial washings  -Utilize CPAP nightly and with naps  -Cont daily CXR  -Defer diuresis to primary team        Patient was seen and examined with Dr. Summers, who agrees with the above assessment and plan.  Pulmonary team will continue to follow, please call or page for any questions or concerns.     Ravi Shah MD, PGY4, Pulmonary and Critical Care Fellow

## 2025-08-07 NOTE — PROGRESS NOTES
Spiritual Care Visit  Spiritual Care Request    Reason for Visit:  Routine Visit: Follow-up     Focus of Care:  Visited With: Patient       Spiritual Care Annotation    Annotation:  Spiritual Care visit declined.  will remain available for support.     Signed: [facility]

## 2025-08-08 ENCOUNTER — APPOINTMENT (OUTPATIENT)
Dept: RADIOLOGY | Facility: HOSPITAL | Age: 60
DRG: 001 | End: 2025-08-08
Payer: COMMERCIAL

## 2025-08-08 LAB
ALBUMIN SERPL BCP-MCNC: 3.3 G/DL (ref 3.4–5)
ALP SERPL-CCNC: 80 U/L (ref 33–136)
ALT SERPL W P-5'-P-CCNC: 4 U/L (ref 7–45)
ANION GAP BLDMV CALCULATED.4IONS-SCNC: 6 MMO/L (ref 10–25)
ANION GAP BLDMV CALCULATED.4IONS-SCNC: 7 MMO/L (ref 10–25)
ANION GAP BLDMV CALCULATED.4IONS-SCNC: 7 MMO/L (ref 10–25)
ANION GAP SERPL CALC-SCNC: 11 MMOL/L (ref 10–20)
APTT PPP: 39 SECONDS (ref 26–36)
AST SERPL W P-5'-P-CCNC: 13 U/L (ref 9–39)
BACTERIA SPEC RESP CULT: NORMAL
BASE EXCESS BLDMV CALC-SCNC: 3.1 MMOL/L (ref -2–3)
BASE EXCESS BLDMV CALC-SCNC: 3.9 MMOL/L (ref -2–3)
BASE EXCESS BLDMV CALC-SCNC: 5.5 MMOL/L (ref -2–3)
BILIRUB DIRECT SERPL-MCNC: 0.1 MG/DL (ref 0–0.3)
BILIRUB SERPL-MCNC: 0.4 MG/DL (ref 0–1.2)
BODY TEMPERATURE: 37 DEGREES CELSIUS
BUN SERPL-MCNC: 16 MG/DL (ref 6–23)
CA-I BLDMV-SCNC: 1.19 MMOL/L (ref 1.1–1.33)
CA-I BLDMV-SCNC: 1.19 MMOL/L (ref 1.1–1.33)
CA-I BLDMV-SCNC: 1.23 MMOL/L (ref 1.1–1.33)
CALCIUM SERPL-MCNC: 8.4 MG/DL (ref 8.6–10.6)
CHLORIDE BLD-SCNC: 100 MMOL/L (ref 98–107)
CHLORIDE BLD-SCNC: 102 MMOL/L (ref 98–107)
CHLORIDE BLD-SCNC: 99 MMOL/L (ref 98–107)
CHLORIDE SERPL-SCNC: 98 MMOL/L (ref 98–107)
CO2 SERPL-SCNC: 31 MMOL/L (ref 21–32)
CREAT SERPL-MCNC: 0.81 MG/DL (ref 0.5–1.05)
EGFRCR SERPLBLD CKD-EPI 2021: 82 ML/MIN/1.73M*2
ERYTHROCYTE [DISTWIDTH] IN BLOOD BY AUTOMATED COUNT: 15.2 % (ref 11.5–14.5)
GLUCOSE BLD MANUAL STRIP-MCNC: 103 MG/DL (ref 74–99)
GLUCOSE BLD MANUAL STRIP-MCNC: 148 MG/DL (ref 74–99)
GLUCOSE BLD MANUAL STRIP-MCNC: 154 MG/DL (ref 74–99)
GLUCOSE BLD MANUAL STRIP-MCNC: 185 MG/DL (ref 74–99)
GLUCOSE BLD MANUAL STRIP-MCNC: 191 MG/DL (ref 74–99)
GLUCOSE BLD-MCNC: 143 MG/DL (ref 74–99)
GLUCOSE BLD-MCNC: 168 MG/DL (ref 74–99)
GLUCOSE BLD-MCNC: 198 MG/DL (ref 74–99)
GLUCOSE SERPL-MCNC: 190 MG/DL (ref 74–99)
GRAM STN SPEC: NORMAL
GRAM STN SPEC: NORMAL
HCO3 BLDMV-SCNC: 29.3 MMOL/L (ref 22–26)
HCO3 BLDMV-SCNC: 29.6 MMOL/L (ref 22–26)
HCO3 BLDMV-SCNC: 31 MMOL/L (ref 22–26)
HCT VFR BLD AUTO: 22.9 % (ref 36–46)
HCT VFR BLD EST: 24 % (ref 36–46)
HCT VFR BLD EST: 25 % (ref 36–46)
HCT VFR BLD EST: 26 % (ref 36–46)
HGB BLD-MCNC: 7.6 G/DL (ref 12–16)
HGB BLDMV-MCNC: 8 G/DL (ref 12–16)
HGB BLDMV-MCNC: 8.4 G/DL (ref 12–16)
HGB BLDMV-MCNC: 8.6 G/DL (ref 12–16)
INHALED O2 CONCENTRATION: 30 %
INHALED O2 CONCENTRATION: 35 %
INHALED O2 CONCENTRATION: 40 %
INR PPP: 3.9 (ref 0.9–1.1)
LACTATE BLDMV-SCNC: 0.5 MMOL/L (ref 0.4–2)
LACTATE BLDMV-SCNC: 0.6 MMOL/L (ref 0.4–2)
LACTATE BLDMV-SCNC: 0.6 MMOL/L (ref 0.4–2)
LDH SERPL L TO P-CCNC: 269 U/L (ref 84–246)
MAGNESIUM SERPL-MCNC: 1.76 MG/DL (ref 1.6–2.4)
MCH RBC QN AUTO: 29.8 PG (ref 26–34)
MCHC RBC AUTO-ENTMCNC: 33.2 G/DL (ref 32–36)
MCV RBC AUTO: 90 FL (ref 80–100)
NRBC BLD-RTO: 0 /100 WBCS (ref 0–0)
OXYHGB MFR BLDMV: 53.3 % (ref 45–75)
OXYHGB MFR BLDMV: 54.7 % (ref 45–75)
OXYHGB MFR BLDMV: 59.6 % (ref 45–75)
PCO2 BLDMV: 50 MM HG (ref 41–51)
PCO2 BLDMV: 50 MM HG (ref 41–51)
PCO2 BLDMV: 53 MM HG (ref 41–51)
PH BLDMV: 7.35 PH (ref 7.33–7.43)
PH BLDMV: 7.38 PH (ref 7.33–7.43)
PH BLDMV: 7.4 PH (ref 7.33–7.43)
PHOSPHATE SERPL-MCNC: 2.5 MG/DL (ref 2.5–4.9)
PLATELET # BLD AUTO: 208 X10*3/UL (ref 150–450)
PO2 BLDMV: 33 MM HG (ref 35–45)
PO2 BLDMV: 34 MM HG (ref 35–45)
PO2 BLDMV: 35 MM HG (ref 35–45)
POTASSIUM BLDMV-SCNC: 4 MMOL/L (ref 3.5–5.3)
POTASSIUM BLDMV-SCNC: 4.4 MMOL/L (ref 3.5–5.3)
POTASSIUM BLDMV-SCNC: 4.4 MMOL/L (ref 3.5–5.3)
POTASSIUM SERPL-SCNC: 4.2 MMOL/L (ref 3.5–5.3)
PROT SERPL-MCNC: 5.8 G/DL (ref 6.4–8.2)
PROTHROMBIN TIME: 43.1 SECONDS (ref 9.8–12.4)
RBC # BLD AUTO: 2.55 X10*6/UL (ref 4–5.2)
SAO2 % BLDMV: 55 % (ref 45–75)
SAO2 % BLDMV: 56 % (ref 45–75)
SAO2 % BLDMV: 61 % (ref 45–75)
SODIUM BLDMV-SCNC: 132 MMOL/L (ref 136–145)
SODIUM BLDMV-SCNC: 133 MMOL/L (ref 136–145)
SODIUM BLDMV-SCNC: 133 MMOL/L (ref 136–145)
SODIUM SERPL-SCNC: 136 MMOL/L (ref 136–145)
WBC # BLD AUTO: 6.7 X10*3/UL (ref 4.4–11.3)

## 2025-08-08 PROCEDURE — 2500000004 HC RX 250 GENERAL PHARMACY W/ HCPCS (ALT 636 FOR OP/ED)

## 2025-08-08 PROCEDURE — 2020000001 HC ICU ROOM DAILY

## 2025-08-08 PROCEDURE — 2500000002 HC RX 250 W HCPCS SELF ADMINISTERED DRUGS (ALT 637 FOR MEDICARE OP, ALT 636 FOR OP/ED): Performed by: NURSE PRACTITIONER

## 2025-08-08 PROCEDURE — 2500000001 HC RX 250 WO HCPCS SELF ADMINISTERED DRUGS (ALT 637 FOR MEDICARE OP)

## 2025-08-08 PROCEDURE — 84132 ASSAY OF SERUM POTASSIUM: CPT

## 2025-08-08 PROCEDURE — 82248 BILIRUBIN DIRECT: CPT | Performed by: NURSE PRACTITIONER

## 2025-08-08 PROCEDURE — 37799 UNLISTED PX VASCULAR SURGERY: CPT

## 2025-08-08 PROCEDURE — 97530 THERAPEUTIC ACTIVITIES: CPT | Mod: GO

## 2025-08-08 PROCEDURE — 85027 COMPLETE CBC AUTOMATED: CPT

## 2025-08-08 PROCEDURE — 71045 X-RAY EXAM CHEST 1 VIEW: CPT | Performed by: RADIOLOGY

## 2025-08-08 PROCEDURE — 85730 THROMBOPLASTIN TIME PARTIAL: CPT | Performed by: NURSE PRACTITIONER

## 2025-08-08 PROCEDURE — 2500000002 HC RX 250 W HCPCS SELF ADMINISTERED DRUGS (ALT 637 FOR MEDICARE OP, ALT 636 FOR OP/ED)

## 2025-08-08 PROCEDURE — 71045 X-RAY EXAM CHEST 1 VIEW: CPT

## 2025-08-08 PROCEDURE — 84100 ASSAY OF PHOSPHORUS: CPT

## 2025-08-08 PROCEDURE — 2500000001 HC RX 250 WO HCPCS SELF ADMINISTERED DRUGS (ALT 637 FOR MEDICARE OP): Performed by: NURSE PRACTITIONER

## 2025-08-08 PROCEDURE — 83615 LACTATE (LD) (LDH) ENZYME: CPT | Performed by: NURSE PRACTITIONER

## 2025-08-08 PROCEDURE — 99232 SBSQ HOSP IP/OBS MODERATE 35: CPT | Performed by: STUDENT IN AN ORGANIZED HEALTH CARE EDUCATION/TRAINING PROGRAM

## 2025-08-08 PROCEDURE — 2500000004 HC RX 250 GENERAL PHARMACY W/ HCPCS (ALT 636 FOR OP/ED): Performed by: INTERNAL MEDICINE

## 2025-08-08 PROCEDURE — 94669 MECHANICAL CHEST WALL OSCILL: CPT

## 2025-08-08 PROCEDURE — 97530 THERAPEUTIC ACTIVITIES: CPT | Mod: GP

## 2025-08-08 PROCEDURE — 2500000004 HC RX 250 GENERAL PHARMACY W/ HCPCS (ALT 636 FOR OP/ED): Performed by: NURSE PRACTITIONER

## 2025-08-08 PROCEDURE — 99291 CRITICAL CARE FIRST HOUR: CPT | Performed by: INTERNAL MEDICINE

## 2025-08-08 PROCEDURE — 94660 CPAP INITIATION&MGMT: CPT

## 2025-08-08 PROCEDURE — 2500000005 HC RX 250 GENERAL PHARMACY W/O HCPCS

## 2025-08-08 PROCEDURE — 85610 PROTHROMBIN TIME: CPT | Performed by: NURSE PRACTITIONER

## 2025-08-08 PROCEDURE — 94640 AIRWAY INHALATION TREATMENT: CPT

## 2025-08-08 PROCEDURE — 97535 SELF CARE MNGMENT TRAINING: CPT | Mod: GO

## 2025-08-08 PROCEDURE — 83735 ASSAY OF MAGNESIUM: CPT

## 2025-08-08 PROCEDURE — 82947 ASSAY GLUCOSE BLOOD QUANT: CPT

## 2025-08-08 RX ORDER — SODIUM CHLORIDE, SODIUM LACTATE, POTASSIUM CHLORIDE, CALCIUM CHLORIDE 600; 310; 30; 20 MG/100ML; MG/100ML; MG/100ML; MG/100ML
5 INJECTION, SOLUTION INTRAVENOUS CONTINUOUS
Status: DISCONTINUED | OUTPATIENT
Start: 2025-08-08 | End: 2025-08-12

## 2025-08-08 RX ORDER — ACETYLCYSTEINE 200 MG/ML
3 SOLUTION ORAL; RESPIRATORY (INHALATION)
Status: DISCONTINUED | OUTPATIENT
Start: 2025-08-08 | End: 2025-08-13

## 2025-08-08 RX ORDER — IPRATROPIUM BROMIDE AND ALBUTEROL SULFATE 2.5; .5 MG/3ML; MG/3ML
3 SOLUTION RESPIRATORY (INHALATION)
Status: DISCONTINUED | OUTPATIENT
Start: 2025-08-08 | End: 2025-08-13

## 2025-08-08 RX ORDER — MAGNESIUM SULFATE HEPTAHYDRATE 40 MG/ML
2 INJECTION, SOLUTION INTRAVENOUS ONCE
Status: COMPLETED | OUTPATIENT
Start: 2025-08-08 | End: 2025-08-08

## 2025-08-08 RX ORDER — FUROSEMIDE 10 MG/ML
80 INJECTION INTRAMUSCULAR; INTRAVENOUS ONCE
Status: COMPLETED | OUTPATIENT
Start: 2025-08-08 | End: 2025-08-08

## 2025-08-08 RX ORDER — POTASSIUM CHLORIDE 1.5 G/1.58G
40 POWDER, FOR SOLUTION ORAL ONCE
Status: COMPLETED | OUTPATIENT
Start: 2025-08-08 | End: 2025-08-08

## 2025-08-08 RX ORDER — ICOSAPENT ETHYL 1 G/1
2 CAPSULE ORAL
Status: DISCONTINUED | OUTPATIENT
Start: 2025-08-08 | End: 2025-08-18 | Stop reason: HOSPADM

## 2025-08-08 RX ORDER — POTASSIUM CHLORIDE 20 MEQ/1
40 TABLET, EXTENDED RELEASE ORAL ONCE
Status: COMPLETED | OUTPATIENT
Start: 2025-08-08 | End: 2025-08-08

## 2025-08-08 RX ADMIN — SENNOSIDES, DOCUSATE SODIUM 2 TABLET: 50; 8.6 TABLET, FILM COATED ORAL at 08:05

## 2025-08-08 RX ADMIN — IPRATROPIUM BROMIDE AND ALBUTEROL SULFATE 3 ML: .5; 3 SOLUTION RESPIRATORY (INHALATION) at 00:19

## 2025-08-08 RX ADMIN — PANTOPRAZOLE SODIUM 40 MG: 40 INJECTION, POWDER, LYOPHILIZED, FOR SOLUTION INTRAVENOUS at 20:37

## 2025-08-08 RX ADMIN — ACETYLCYSTEINE 600 MG: 200 SOLUTION ORAL; RESPIRATORY (INHALATION) at 07:40

## 2025-08-08 RX ADMIN — SODIUM CHLORIDE, POTASSIUM CHLORIDE, SODIUM LACTATE AND CALCIUM CHLORIDE 5 ML/HR: 600; 310; 30; 20 INJECTION, SOLUTION INTRAVENOUS at 11:29

## 2025-08-08 RX ADMIN — ACETYLCYSTEINE 600 MG: 200 SOLUTION ORAL; RESPIRATORY (INHALATION) at 15:16

## 2025-08-08 RX ADMIN — IPRATROPIUM BROMIDE AND ALBUTEROL SULFATE 3 ML: .5; 3 SOLUTION RESPIRATORY (INHALATION) at 15:16

## 2025-08-08 RX ADMIN — INSULIN LISPRO 2 UNITS: 100 INJECTION, SOLUTION INTRAVENOUS; SUBCUTANEOUS at 11:36

## 2025-08-08 RX ADMIN — OXYCODONE HYDROCHLORIDE 5 MG: 5 TABLET ORAL at 00:34

## 2025-08-08 RX ADMIN — ACETAMINOPHEN 650 MG: 325 TABLET, FILM COATED ORAL at 08:03

## 2025-08-08 RX ADMIN — INSULIN GLARGINE 25 UNITS: 100 INJECTION, SOLUTION SUBCUTANEOUS at 11:36

## 2025-08-08 RX ADMIN — ROPINIROLE 1 MG: 1 TABLET, FILM COATED ORAL at 08:41

## 2025-08-08 RX ADMIN — IPRATROPIUM BROMIDE AND ALBUTEROL SULFATE 3 ML: .5; 3 SOLUTION RESPIRATORY (INHALATION) at 20:01

## 2025-08-08 RX ADMIN — IPRATROPIUM BROMIDE AND ALBUTEROL SULFATE 3 ML: .5; 3 SOLUTION RESPIRATORY (INHALATION) at 07:40

## 2025-08-08 RX ADMIN — ROPINIROLE 1 MG: 1 TABLET, FILM COATED ORAL at 15:17

## 2025-08-08 RX ADMIN — LIDOCAINE 4% 1 PATCH: 40 PATCH TOPICAL at 08:39

## 2025-08-08 RX ADMIN — GUAIFENESIN AND DEXTROMETHORPHAN HYDROBROMIDE 1 TABLET: 600; 30 TABLET, EXTENDED RELEASE ORAL at 20:36

## 2025-08-08 RX ADMIN — POTASSIUM CHLORIDE 40 MEQ: 1500 TABLET, EXTENDED RELEASE ORAL at 08:03

## 2025-08-08 RX ADMIN — CITALOPRAM HYDROBROMIDE 40 MG: 40 TABLET ORAL at 08:02

## 2025-08-08 RX ADMIN — IPRATROPIUM BROMIDE AND ALBUTEROL SULFATE 3 ML: .5; 3 SOLUTION RESPIRATORY (INHALATION) at 04:07

## 2025-08-08 RX ADMIN — ACETAMINOPHEN 650 MG: 325 TABLET, FILM COATED ORAL at 00:34

## 2025-08-08 RX ADMIN — ACETAMINOPHEN 650 MG: 325 TABLET, FILM COATED ORAL at 20:36

## 2025-08-08 RX ADMIN — ASPIRIN 81 MG: 81 TABLET, CHEWABLE ORAL at 08:04

## 2025-08-08 RX ADMIN — INSULIN LISPRO 2 UNITS: 100 INJECTION, SOLUTION INTRAVENOUS; SUBCUTANEOUS at 08:04

## 2025-08-08 RX ADMIN — PANTOPRAZOLE SODIUM 40 MG: 40 INJECTION, POWDER, LYOPHILIZED, FOR SOLUTION INTRAVENOUS at 08:05

## 2025-08-08 RX ADMIN — EMPAGLIFLOZIN 10 MG: 10 TABLET, FILM COATED ORAL at 08:06

## 2025-08-08 RX ADMIN — SENNOSIDES, DOCUSATE SODIUM 2 TABLET: 50; 8.6 TABLET, FILM COATED ORAL at 20:36

## 2025-08-08 RX ADMIN — FUROSEMIDE 80 MG: 10 INJECTION, SOLUTION INTRAMUSCULAR; INTRAVENOUS at 08:05

## 2025-08-08 RX ADMIN — ROPINIROLE 1 MG: 1 TABLET, FILM COATED ORAL at 20:36

## 2025-08-08 RX ADMIN — ACETAMINOPHEN 650 MG: 325 TABLET, FILM COATED ORAL at 13:36

## 2025-08-08 RX ADMIN — POTASSIUM CHLORIDE 40 MEQ: 1.5 POWDER, FOR SOLUTION ORAL at 19:40

## 2025-08-08 RX ADMIN — FUROSEMIDE 80 MG: 10 INJECTION, SOLUTION INTRAMUSCULAR; INTRAVENOUS at 19:40

## 2025-08-08 RX ADMIN — DOCUSATE SODIUM 100 MG: 100 CAPSULE, LIQUID FILLED ORAL at 08:05

## 2025-08-08 RX ADMIN — ACETYLCYSTEINE 600 MG: 200 SOLUTION ORAL; RESPIRATORY (INHALATION) at 20:00

## 2025-08-08 RX ADMIN — MILRINONE LACTATE IN DEXTROSE 0.12 MCG/KG/MIN: 200 INJECTION, SOLUTION INTRAVENOUS at 11:37

## 2025-08-08 RX ADMIN — LEVOTHYROXINE SODIUM 88 MCG: 0.09 TABLET ORAL at 05:10

## 2025-08-08 RX ADMIN — OXYCODONE HYDROCHLORIDE 5 MG: 5 TABLET ORAL at 21:02

## 2025-08-08 RX ADMIN — MAGNESIUM SULFATE HEPTAHYDRATE 2 G: 40 INJECTION, SOLUTION INTRAVENOUS at 08:31

## 2025-08-08 RX ADMIN — GUAIFENESIN AND DEXTROMETHORPHAN HYDROBROMIDE 1 TABLET: 600; 30 TABLET, EXTENDED RELEASE ORAL at 08:38

## 2025-08-08 RX ADMIN — OXYCODONE HYDROCHLORIDE 5 MG: 5 TABLET ORAL at 11:50

## 2025-08-08 RX ADMIN — OXYCODONE HYDROCHLORIDE 5 MG: 5 TABLET ORAL at 05:10

## 2025-08-08 RX ADMIN — ATORVASTATIN CALCIUM 80 MG: 80 TABLET, FILM COATED ORAL at 08:03

## 2025-08-08 RX ADMIN — ROPINIROLE HYDROCHLORIDE 3 MG: 3 TABLET, FILM COATED ORAL at 20:36

## 2025-08-08 RX ADMIN — ICOSAPENT ETHYL 2 G: 1 CAPSULE ORAL at 17:59

## 2025-08-08 RX ADMIN — ACETYLCYSTEINE 600 MG: 200 SOLUTION ORAL; RESPIRATORY (INHALATION) at 04:07

## 2025-08-08 RX ADMIN — SPIRONOLACTONE 25 MG: 25 TABLET ORAL at 08:03

## 2025-08-08 RX ADMIN — DOCUSATE SODIUM 100 MG: 100 CAPSULE, LIQUID FILLED ORAL at 20:36

## 2025-08-08 ASSESSMENT — PAIN - FUNCTIONAL ASSESSMENT
PAIN_FUNCTIONAL_ASSESSMENT: 0-10

## 2025-08-08 ASSESSMENT — COGNITIVE AND FUNCTIONAL STATUS - GENERAL
DRESSING REGULAR LOWER BODY CLOTHING: A LOT
TURNING FROM BACK TO SIDE WHILE IN FLAT BAD: A LITTLE
MOVING TO AND FROM BED TO CHAIR: A LITTLE
CLIMB 3 TO 5 STEPS WITH RAILING: A LOT
TOILETING: A LITTLE
MOBILITY SCORE: 17
HELP NEEDED FOR BATHING: A LOT
WALKING IN HOSPITAL ROOM: A LITTLE
PERSONAL GROOMING: A LITTLE
STANDING UP FROM CHAIR USING ARMS: A LITTLE
DRESSING REGULAR UPPER BODY CLOTHING: A LITTLE
MOVING FROM LYING ON BACK TO SITTING ON SIDE OF FLAT BED WITH BEDRAILS: A LITTLE
DAILY ACTIVITIY SCORE: 17

## 2025-08-08 ASSESSMENT — PAIN DESCRIPTION - LOCATION
LOCATION: BACK
LOCATION: CHEST
LOCATION: BACK

## 2025-08-08 ASSESSMENT — PAIN SCALES - GENERAL
PAINLEVEL_OUTOF10: 8
PAINLEVEL_OUTOF10: 8
PAINLEVEL_OUTOF10: 0 - NO PAIN

## 2025-08-08 ASSESSMENT — PAIN DESCRIPTION - DESCRIPTORS
DESCRIPTORS: ACHING

## 2025-08-08 ASSESSMENT — ACTIVITIES OF DAILY LIVING (ADL): HOME_MANAGEMENT_TIME_ENTRY: 30

## 2025-08-08 ASSESSMENT — PAIN DESCRIPTION - ORIENTATION: ORIENTATION: MID

## 2025-08-08 NOTE — PROGRESS NOTES
"Pulmonary/Critical Care Medicine Inpatient Progress Note    Reason for Consultation: \"Evaluate for broncoscopy. Collapsed left lung\"     Interval events: CXR this am appears stable aerated L lung and resolution of RML atelectasis, likely small pleural effusions BL, noted radiology read of possible retrocardiac consolidation though difficult to adequately assess for on single AP view and this appears stable in interval    Subjective  Patient states she is doing well today, feels like her breathing is improved even from yesterday and has no acute complaints. Denies SOB or new chest pain aside from her sternal wound pain that is stable. Denies abdominal pain. Discussed importance of working with RT to complete breathing exercises and treatments to keep her lungs moving, she states it's been going well. Updated her as to culture data that remains negative as of today.      Objective Findings:  Physical Exam:  Temp:  [35.5 °C (95.9 °F)-36.5 °C (97.7 °F)] 36.3 °C (97.3 °F)  Heart Rate:  [80-98] 80  Resp:  [14-27] 19  Arterial Line BP 1: (68-99)/(55-83) 81/69  FiO2 (%):  [30 %-40 %] 30 %     GEN: ill-appearing female sitting up in bedside chair, no acute distress, awake and alert  RESP: Airvo on 35/30 (yesterday was 40/40). From anterior and lateral auscultation, slightly decreased breath sounds bilaterally with decent air movement but no adventitious sounds.   CV: Mechanical hum from LVAD  MSK: no deformity.  SKIN: warm, dry, no clubbing or cyanosis.   NEURO: alert, responds appropriately to questions, no gross motor deficit.        Assessment/Plan     #Acute hypoxic respiratory failure, improving  #Mucous plugging s/p multiple bronchoscopies, resolved  #Complete left lung collapse secondary to above, resolved  #RML atelectasis secondary to above, improving     -Improved aeration of bilateral lungs s/p two bronchoscopies 8/6/25  -Continue aggressive bronchopulmonary hygiene with nebulized saline, DuoNebs, IPV QID with " RT  -Titrate FiO2 to goal SPO2 greater than 92%  -Bronchial washings sent for resp cx, afb cx, fungal cx, cell count; NGTD  -Monitor for signs and sx of infection, consider abx should any signs develop/positivity on cx from bronchial washings  -Utilize CPAP intermittently PRN, nightly and with naps  -Cont daily CXR  -Defer diuresis to primary team    Patient was seen and examined with Dr. Summers, who agrees with the above assessment and plan.  Pulmonary team will continue to follow, please call or page for any questions or concerns.    Ravi Shah MD, PGY4, Pulmonary and Critical Care Fellow

## 2025-08-08 NOTE — CONSULTS
"Nutrition Initial Assessment:   Nutrition Assessment    Reason for Assessment: Provider consult order    Patient is a 60 y.o. female presenting on 7/14 for swan-guided optimization pre-LVAD.  Pt is s/p VAD on 8/1 and is now in HFICU for care.      Past medical history includes CAD s/p PCI (LAD; 2015, Lcx; 3/2025), stage C systolic HF/ICM/HFrEF s/p ICD, h/o VT with presumed associated syncope, poorly controlled DM, pAF (off of DOAC given the absence of recurrence), dyslipidemia, essential HTN, hypothyroidism, recurrent pleural effusion likely 2/2 HF, restriction by PFTs       Nutrition History:  Food and Nutrient History: Met with patient sitting up in a chair at visit.  Familiar to this service from prior admission in June-- thought to be malnourished at that time.  She reports that her appetite and intake prior to VAD were fair and since VAD one week ago, it has been poor.  She is uncertain about where she is with weight or if she has lost any weight.  SEe weight hsitory below.  Denies any GI issues like N/V/D or constipation.  No trouble chewing or swallowing and she is not having any taste changes.  She does not like anything like Ensure or Boost but is agreeable to trying protein bars and Magic Cup.       Anthropometrics:  Height: 157.5 cm (5' 2\")   Weight: 64.8 kg (142 lb 13.7 oz)   BMI (Calculated): 26.12  IBW/kg (Dietitian Calculated): 50 kg  Percent of IBW: 130 %                    Weight History:     Since admission:   8/8/25: 64.8kg  8/2/25: 65.6kg  7/28/25: 58.9kg  7/21/25: 62kg  7/14/25: 62.1kg (Admit)    Wt Readings from Last 5 Encounters:   08/08/25 64.8 kg (142 lb 13.7 oz)   06/20/25 57.2 kg (126 lb)   06/20/25 57.4 kg (126 lb 9.6 oz)   06/17/25 58.2 kg (128 lb 4.9 oz)   05/30/25 56.2 kg (124 lb)     Pt had reported to this RDN two months ago a usual body weight of 72.7kg.  Based on admit weight, she is down 15% from her usual body weight.  She is up overall from her weights the last 2-3 months. " "    Weight Change %:       Nutrition Focused Physical Exam Findings:    Subcutaneous Fat Loss:   Orbital Fat Pads: Mild-Moderate (slight dark circles and slight hollowing)  Buccal Fat Pads: Mild-Moderate (flat cheeks, minimal bounce)  Triceps: Severe (negligible fat tissue)  Muscle Wasting:  Temporalis: Mild-Moderate (slight depression)  Pectoralis (Clavicular Region): Mild-Moderate (some protrusion of clavicle)  Deltoid/Trapezius: Mild-Moderate (slight protrusion of acromion process)  Quadriceps: Defer  Gastrocnemius: Severe (minimal muscle definition)  Edema:  Edema Location: non-pitting edema  Physical Findings:  Skin: Positive (B/L breast wonds, surgical site to sternum, surgical site to abdomen)    Nutrition Significant Labs:  A1C:  Lab Results   Component Value Date    HGBA1C 7.5 (H) 07/31/2025   , BG POCT trend:   Results from last 7 days   Lab Units 08/08/25  1127 08/08/25  0754 08/07/25  2310 08/07/25  1934 08/07/25  1618   POCT GLUCOSE mg/dL 154* 191* 230* 222* 135*    , Renal Lab Trend:   Results from last 7 days   Lab Units 08/08/25  0506 08/07/25  1420 08/07/25  1110 08/07/25  0223   POTASSIUM mmol/L 4.2 4.0 3.9 2.9*   PHOSPHORUS mg/dL 2.5 2.9 3.0  --    SODIUM mmol/L 136 137 141 138   MAGNESIUM mg/dL 1.76  --   --   --    EGFR mL/min/1.73m*2 82 90 >90 90   BUN mg/dL 16 19 20 22   CREATININE mg/dL 0.81 0.75 0.64 0.75    , Vit D: No results found for: \"VITD25\"     Nutrition Specific Medications:  Scheduled medications  acetaminophen, 650 mg, oral, q6h  acetylcysteine, 3 mL, nebulization, q6h  aspirin, 81 mg, oral, Daily  atorvastatin, 80 mg, oral, Daily  bisacodyl, 10 mg, rectal, Daily  citalopram, 40 mg, oral, Daily  dextromethorphan-guaifenesin, 1 tablet, oral, BID  docusate sodium, 100 mg, oral, BID  empagliflozin, 10 mg, oral, Daily  [Held by provider] fluticasone furoate-vilanteroL, 1 puff, inhalation, Daily  [Held by provider] heparin, 5,000 Units, subcutaneous, q8h  icosapent ethyL, 2 g, oral, " BID  insulin glargine, 25 Units, subcutaneous, q24h  insulin lispro, 0-10 Units, subcutaneous, TID AC  ipratropium-albuteroL, 3 mL, nebulization, q6h  levothyroxine, 88 mcg, oral, Daily  lidocaine, 1 patch, transdermal, Daily  lidocaine, 1 patch, transdermal, q24h  pantoprazole, 40 mg, intravenous, BID  perflutren lipid microspheres, 0.5-10 mL of dilution, intravenous, Once in imaging  polyethylene glycol, 17 g, oral, BID  rOPINIRole, 1 mg, oral, TID  rOPINIRole, 3 mg, oral, Nightly  [Held by provider] sacubitriL-valsartan, 1 tablet, oral, BID  sennosides-docusate sodium, 2 tablet, oral, BID  spironolactone, 25 mg, oral, Daily  [Held by provider] warfarin, 2 mg, oral, Daily      Continuous medications  lactated Ringer's, 5 mL/hr, Last Rate: 5 mL/hr (08/08/25 1200)  lactated Ringer's, 5 mL/hr, Last Rate: 5 mL/hr (08/08/25 1200)  milrinone, 0.125 mcg/kg/min (Order-Specific), Last Rate: 0.125 mcg/kg/min (08/08/25 1200)      PRN medications  PRN Medications[1]      I/O:   Last BM Date: 08/07/25; Stool Appearance: Soft, Loose (08/07/25 1000)    Dietary Orders (From admission, onward)       Start     Ordered    08/05/25 1500  Adult diet Cardiac; 70 gm fat; 2 - 3 grams Sodium  Diet effective now        Question Answer Comment   Diet type Cardiac    Fat restriction: 70 gm fat    Sodium restriction: 2 - 3 grams Sodium        08/05/25 1459    08/01/25 1306  May Participate in Room Service  ( ROOM SERVICE MAY PARTICIPATE)  Once        Question:  .  Answer:  Yes    08/01/25 1305                     Estimated Needs:   Total Energy Estimated Needs in 24 hours (kCal):  (0506-9214)  Method for Estimating Needs: MSJ= 1137  Total Protein Estimated Needs in 24 Hours (g):  (65-75)  Method for Estimating 24 Hour Protein Needs: 1.3-1.5 x 50kg            Nutrition Diagnosis   Malnutrition Diagnosis  Patient has Malnutrition Diagnosis: Yes  Diagnosis Status: New  Malnutrition Diagnosis: Severe malnutrition related to chronic disease or  condition  As Evidenced by: pt with only a fair appetite and intake prior to VAD and now with a poor appetite and intake post-VAD; she is down from her usual body weight by 15% and has areas of moderate to severe fat and muscle wasting all over her body          Nutrition Interventions/Recommendations        Nutrition Recommendations:   1) RDN will liberalize her diet to Regular    2) Would encourage team to check Vitamin D level post-sternotomy and in the setting of severe malnutrition.      3) To have malnutrition diagnosis pull into provider note, will need to add @checkmalnutritiondx@ into note    4) As PO intake has been inadequate for some time, may need to add Remeron as an appetite stimulant to med regimen.     Nutrition Interventions/Goals:    RDN to order Ian bars for patient as well as Magic Cup= 290kcals, 9 grams protein each      Education Documentation  No documentation found.            Nutrition Monitoring and Evaluation   Estimated Energy Intake: Energy intake 50 -75% of estimated energy needs           Goal Status: New goal(s) identified    Time Spent (min): 60 minutes            [1] PRN medications: albuterol, alteplase, dextrose, dextrose, glucagon, glucagon, melatonin, naloxone, ondansetron **OR** ondansetron, oxyCODONE, oxygen, oxygen

## 2025-08-08 NOTE — PROGRESS NOTES
Occupational Therapy    Occupational Therapy Treatment    Name: Thao Evans  MRN: [id number]  Department: [facility] HFICU  Room: 07/07-A  Date: 08/08/25  Time Calculation  Start Time: 1021  Stop Time: 1115  Time Calculation (min): 54 min    Assessment:  OT Assessment: Patient completed functional transfers with CGA, completed LVAD management with cues; remains appropriate for low intensity post-acute OT.  Barriers to Discharge Home: No anticipated barriers  End of Session Communication: Bedside nurse  End of Session Patient Position: Up in chair, Alarm off, not on at start of session  Plan:  Treatment Interventions: ADL retraining, Visual perceptual retraining, Functional transfer training, UE strengthening/ROM, Endurance training, Cognitive reorientation, Patient/family training, Equipment evaluation/education, Neuromuscular reeducation, Fine motor coordination activities, Compensatory technique education, UE splinting, Continued evaluation  OT Frequency: 3 times per week  OT Discharge Recommendations: Low intensity level of continued care  Equipment Recommended upon Discharge: Wheeled walker  OT Recommended Transfer Status: Assist of 1 (may need 2nd person for line management)  OT - OK to Discharge: Yes (when medically appropriate)    Subjective   OT Visit Info:  OT Received On: 08/08/25  General:  General  Family/Caregiver Present: No  Prior to Session Communication: Bedside nurse  Patient Position Received: Bed, 3 rail up, Alarm off, not on at start of session  General Comment: patient agreeable to OT; LVAD HM 3 driveline secured, Maykel, Suzette  Precautions:  Medical Precautions: Cardiac precautions, Fall precautions, Oxygen therapy device and L/min  Post-Surgical Precautions: Move in the Tube  Precautions Comment: MAP >65. SpO2 >92%    LVAD Numbers     Pre OT During OT Post OT   LVAD- Heartmate 3   Flow (lpm) 3.9  3.7   Speed (rpm) 4800  4850   PI  4.5  5.5   Power (Roche) 3.1  3.2   Driveline secured prior to  mobility     Vital Signs     Vitals Session Pre OT During OT Post OT   BP      HR 81  83   SpO2 95  97   MAP (mmHg) via a-line 74  79     Pain Assessment:  Pain Assessment  Pain Assessment: 0-10  0-10 (Numeric) Pain Score:  (report minimal surgical pain, did not rate)    Objective   Cognition:  Overall Cognitive Status: Within Functional Limits  Arousal/Alertness: Appropriate responses to stimuli  Orientation Level: Oriented X4  Following Commands: Follows one step commands without difficulty  Activities of Daily Living: LE Dressing  LE Dressing: Yes  LE Dressing Comments: patient donned hospital pants with set-up and mod A, increased assist over feet and to safely get over LVAD dressing; when donning pants noted that patient's LVAD securement starting to lift off skin, RN notified     Bed Mobility/Transfers: Bed Mobility  Bed Mobility: Yes  Bed Mobility 1  Bed Mobility 1: Supine to sitting  Level of Assistance 1: Close supervision, Minimal verbal cues  Bed Mobility Comments 1: HOB elevated    Transfers  Transfer: Yes  Transfer 1  Transfer From 1: Sit to, Stand to  Transfer to 1: Sit, Stand  Technique 1: Sit to stand, Stand to sit  Transfer Level of Assistance 1: Contact guard, Minimal verbal cues  Trials/Comments 1: x1 STS from bed, x2 STS from chair; patient able to take steps forward/backward 2x with CGA  IADL's: Health Management  Health Management: Patient completed LVAD management; able to correctly identify components with min cues, able to switch from wall power to batteries and back to wall power with min cues for connections and how to lock/unlock cables; patient able to attach clips to batteries without cues; able to check battery charge without cues; increased time to complete    Therapy/Activity: Therapeutic Activity  Therapeutic Activity Performed: Yes  Therapeutic Activity 1: Increased time for advanced ICU line management for successful mobilization  Therapeutic Activity 2: Reviewed MITT precautions  with patient, patient unable to state precuations initially, able to verbalize understanding after education provided.  Therapeutic Activity 3: Patient sat EOB x10-15 minutes with (S) for balance.    Outcome Measures:  [facility] Daily Activity  Putting on and taking off regular lower body clothing: A lot  Bathing (including washing, rinsing, drying): A lot  Putting on and taking off regular upper body clothing: A little  Toileting, which includes using toilet, bedpan or urinal: A little  Taking care of personal grooming such as brushing teeth: A little  Eating Meals: None  Daily Activity - Total Score: 17    , Confusion Assessment Method-ICU (CAM-ICU)  Feature 1: Acute Onset or Fluctuating Course: Negative  Feature 3: Altered Level of Consciousness: Negative  Overall CAM-ICU: Negative  , and E = Exercise and Early Mobility  ICU Mobility Scale: Marching on spot (at bedside)    Education Documentation  Body Mechanics, taught by Yessenia Hunter OT at 8/8/2025 12:53 PM.  Learner: Patient  Readiness: Acceptance  Method: Explanation, Demonstration  Response: Verbalizes Understanding, Needs Reinforcement  Comment: OT POC    Precautions, taught by Yessenia Hunter OT at 8/8/2025 12:53 PM.  Learner: Patient  Readiness: Acceptance  Method: Explanation, Demonstration  Response: Verbalizes Understanding, Needs Reinforcement  Comment: OT POC    ADL Training, taught by Yessenia Hunter OT at 8/8/2025 12:53 PM.  Learner: Patient  Readiness: Acceptance  Method: Explanation, Demonstration  Response: Verbalizes Understanding, Needs Reinforcement  Comment: OT POC    Education Comments  No comments found.    Goals:  Encounter Problems       Encounter Problems (Active)       ADLs       Patient will perform basic IADLs/simulated household tasks with Independent and LRD.  (Not met)       Start:  07/17/25    Expected End:  08/16/25    Resolved:  08/02/25         Patient will complete LB dressing with MOD I.   (Progressing)       Start:   08/02/25    Expected End:  08/16/25                Patient will complete UB dressing with MOD I.   (Not Progressing)       Start:  08/02/25    Expected End:  08/16/25                Patient will complete toileting with MOD I.   (Not Progressing)       Start:  08/02/25    Expected End:  08/16/25                Patient will complete grooming with MOD I.   (Not Progressing)       Start:  08/02/25    Expected End:  08/16/25                   COGNITION/SAFETY       Patient will complete pill box simulation independently with use of medication list. (Not Progressing)       Start:  07/17/25    Expected End:  08/16/25            Patient will demo LVAD management of batteries<>wall unit with MOD I. (Progressing)       Start:  08/02/25    Expected End:  08/16/25                   EXERCISE/STRENGTHENING       Patient will participate in >15 minutes of activity with <2 rest breaks to increase ADL/functional task endurance.  (Not met)       Start:  07/17/25    Expected End:  08/16/25    Resolved:  08/02/25         Patient will demo L shoulder HEP with MOD I to improve functional use of LUE. (Not Progressing)       Start:  08/02/25    Expected End:  08/16/25                   MOBILITY       Patient will perform Functional mobility Household distances/Community Distances with independent level of assistance and least restrictive device in order to improve safety and functional mobility. (Progressing)       Start:  07/17/25    Expected End:  08/16/25               TRANSFERS       Patient will complete functional transfers with least restrictive device with independent level of assistance. (Progressing)       Start:  07/17/25    Expected End:  08/16/25            Patient will complete bed mobility with MOD I.    (Progressing)       Start:  08/02/25    Expected End:  08/16/25                   Yessenia Hunter OTR/L  Inpatient Occupational Therapist   Rehab Office: [phone]

## 2025-08-08 NOTE — CARE PLAN
The clinical goals for the shift include HDS      Problem: Pain - Adult  Goal: Verbalizes/displays adequate comfort level or baseline comfort level  Outcome: Progressing     Problem: Safety - Adult  Goal: Free from fall injury  Outcome: Progressing     Problem: Chronic Conditions and Co-morbidities  Goal: Patient's chronic conditions and co-morbidity symptoms are monitored and maintained or improved  Outcome: Progressing     Problem: Nutrition  Goal: Nutrient intake appropriate for maintaining nutritional needs  Outcome: Progressing     Problem: Heart Failure  Goal: Improved gas exchange this shift  Outcome: Progressing  Goal: Improved urinary output this shift  Outcome: Progressing  Goal: Reduction in peripheral edema within 24 hours  Outcome: Progressing  Goal: Report improvement of dyspnea/breathlessness this shift  Outcome: Progressing  Goal: Weight from fluid excess reduced over 2-3 days, then stabilize  Outcome: Progressing  Goal: Increase self care and/or family involvement in 24 hours  Outcome: Progressing     Problem: Respiratory  Goal: Minimal/no exertional discomfort or dyspnea this shift  Outcome: Progressing  Goal: No signs of respiratory distress (eg. Use of accessory muscles. Peds grunting)  Outcome: Progressing  Goal: Patent airway maintained this shift  Outcome: Progressing  Goal: Verbalize decreased shortness of breath this shift  Outcome: Progressing  Goal: Wean oxygen to maintain O2 saturation per order/standard this shift  Outcome: Progressing  Goal: Increase self care and/or family involvement in next 24 hours  Outcome: Progressing     Problem: Diabetes  Goal: Achieve decreasing blood glucose levels by end of shift  Outcome: Progressing  Goal: Increase stability of blood glucose readings by end of shift  Outcome: Progressing  Goal: Decrease in ketones present in urine by end of shift  Outcome: Progressing  Goal: Maintain electrolyte levels within acceptable range throughout shift  Outcome:  Progressing  Goal: Maintain glucose levels >70mg/dl to <250mg/dl throughout shift  Outcome: Progressing  Goal: No changes in neurological exam by end of shift  Outcome: Progressing  Goal: Learn about and adhere to nutrition recommendations by end of shift  Outcome: Progressing  Goal: Vital signs within normal range for age by end of shift  Outcome: Progressing  Goal: Increase self care and/or family involovement by end of shift  Outcome: Progressing  Goal: Receive DSME education by end of shift  Outcome: Progressing     Problem: Ventricular Assisted Device (VAD)  Goal: Wean vasopressors/nitric  Outcome: Progressing  Goal: Wean ventilator  Outcome: Progressing  Goal: Extubation  Outcome: Progressing  Goal: Stable metal status  Outcome: Progressing  Goal: Pulmonary toileting, incentive spirometry  Outcome: Progressing  Goal: Nutrition  Outcome: Progressing  Goal: Mobility/OT/PT  Outcome: Progressing  Goal: Rubber ball  Outcome: Progressing  Goal: ROM  Outcome: Progressing  Goal: Sitting  Outcome: Progressing  Goal: Walk  Outcome: Progressing  Goal: Involve in VAD awareness, dressing change  Outcome: Progressing  Goal: AICD On/Off  Outcome: Progressing     Problem: Skin  Goal: Decreased wound size/increased tissue granulation at next dressing change  Outcome: Progressing  Goal: Participates in plan/prevention/treatment measures  Outcome: Progressing  Goal: Prevent/manage excess moisture  Outcome: Progressing  Goal: Prevent/minimize sheer/friction injuries  Outcome: Progressing  Goal: Promote/optimize nutrition  Outcome: Progressing  Goal: Promote skin healing  Outcome: Progressing     Problem: Pain  Goal: Takes deep breaths with improved pain control throughout the shift  Outcome: Progressing  Goal: Turns in bed with improved pain control throughout the shift  Outcome: Progressing  Goal: Walks with improved pain control throughout the shift  Outcome: Progressing  Goal: Performs ADL's with improved pain control  throughout shift  Outcome: Progressing  Goal: Participates in PT with improved pain control throughout the shift  Outcome: Progressing  Goal: Free from opioid side effects throughout the shift  Outcome: Progressing  Goal: Free from acute confusion related to pain meds throughout the shift  Outcome: Progressing     Problem: Fall/Injury  Goal: Not fall by end of shift  Outcome: Progressing  Goal: Be free from injury by end of the shift  Outcome: Progressing  Goal: Verbalize understanding of personal risk factors for fall in the hospital  Outcome: Progressing  Goal: Verbalize understanding of risk factor reduction measures to prevent injury from fall in the home  Outcome: Progressing  Goal: Use assistive devices by end of the shift  Outcome: Progressing  Goal: Pace activities to prevent fatigue by end of the shift  Outcome: Progressing     Problem: Safety - Medical Restraint  Goal: Remains free of injury from restraints (Restraint for Interference with Medical Device)  Outcome: Progressing

## 2025-08-08 NOTE — PROGRESS NOTES
Physical Therapy    Physical Therapy Treatment    Patient Name: Thao Evans  MRN: [id number]  Department: [facility] HFICU  Room: 07/07-A  Today's Date: 8/8/2025  Time Calculation  Start Time: 1421  Stop Time: 1454  Time Calculation (min): 33 min         Assessment/Plan   PT Assessment  PT Assessment Results: Decreased strength, Decreased endurance, Impaired balance, Decreased mobility, Decreased safety awareness, Pain  Rehab Prognosis: Excellent  Barriers to Discharge Home: No anticipated barriers  Evaluation/Treatment Tolerance: Patient tolerated treatment well  Medical Staff Made Aware: Yes  End of Session Communication: Bedside nurse  Assessment Comment: Pt performed bed mobility, functional transfers, and took fwd/bwd steps with CGA this date. Pt will continue to benefit from skilled PT to improve functional mobility.  End of Session Patient Position: Bed, 3 rail up, Alarm off, not on at start of session  PT Plan  Inpatient/Swing Bed or Outpatient: Inpatient  PT Plan  Treatment/Interventions: Bed mobility, Transfer training, Gait training, Stair training, Balance training, Strengthening, Endurance training, Range of motion, Therapeutic exercise, Therapeutic activity, Home exercise program, Positioning, Postural re-education  PT Plan: Ongoing PT  PT Frequency: 6 times per week  PT Discharge Recommendations: Low intensity level of continued care  Equipment Recommended upon Discharge: Wheeled walker  PT Recommended Transfer Status: Assist x1  PT - OK to Discharge: Yes    PT Visit Info:  PT Received On: 08/08/25  Response to Previous Treatment: Patient with no complaints from previous session.     General Visit Information:   General  Reason for Referral: 59 y/o F presents for colonoscopy prior to LVAD implantation next week and now  LVAD (HM3) placement 08/01.  Past Medical History Relevant to Rehab: CAD s/p PCI (LAD; 2015, Lcx; 2025), stage D systolic HF/ICM/HFrEF s/p ICD, VT with presumed associated syncope, poorly  controlled DM, pAF, dyslipidemia, essential HTN, COPD, and Graves  Prior to Session Communication: Bedside nurse  Patient Position Received: Up in chair, Alarm off, not on at start of session  Preferred Learning Style: auditory, verbal, visual  General Comment: Pt awake and willing to participate in PT treatment session. (Lines: HM3- driveline stable throughout session; PRE: 3.6/4850/2.5/3.1- POST: 3.6/4800/5.7/3.1; nette Brar tele, IV- Mil 0.125; on Airvo RN notified of PI changes from start to end of session)    Subjective   Precautions:  Precautions  Hearing/Visual Limitations: hearing WFL; glasses on.  Medical Precautions: Cardiac precautions, Fall precautions, Oxygen therapy device and L/min  Post-Surgical Precautions: Move in the Tube  Precautions Comment: MAP >65. SpO2 >92%     Date/Time Vitals Session Patient Position Pulse Resp SpO2 BP MAP (mmHg)    08/08/25 1400 --  --  81  17  97 %  --  --     08/08/25 1421 Pre PT  Sitting  84  --  96 %  86/59  79     08/08/25 1454 Post PT  Lying  80  --  --  81/56  71     08/08/25 1500 --  --  80  15  97 %  --  --      Vital Signs Comment: Drop in BP to MAP of 50s after taking fwd/bwd steps  and returning to seated then supine.     Objective   Pain:  Pain Assessment  Pain Assessment: 0-10  0-10 (Numeric) Pain Score: 0 - No pain  Cognition:  Cognition  Overall Cognitive Status: Within Functional Limits  Arousal/Alertness: Appropriate responses to stimuli  Orientation Level: Oriented X4  Cognition Comments: flat affect  Insight: Within function limits  Coordination:  Movements are Fluid and Coordinated: Yes  Postural Control:  Postural Control  Postural Control: Within Functional Limits  Extremity/Trunk Assessments:    RLE   RLE : Within Functional Limits  LLE   LLE : Within Functional Limits  Activity Tolerance:  Activity Tolerance  Endurance: Tolerates 10 - 20 min exercise with multiple rests  Early Mobility/Exercise Safety Screen: Proceed with mobilization - No  exclusion criteria met  Activity Tolerance Comments: Drop in BP to MAP of 50s after laying supine; recovered with rest and performance of ankle pumps. Pt asymptomatic throughout  Treatments:  Therapeutic Exercise  Therapeutic Exercise Performed: Yes  Therapeutic Exercise Activity 1: LAQ x 10 bilat LE    Therapeutic Activity  Therapeutic Activity Performed: Yes  Therapeutic Activity 1: Pt sat EOB for total of ~15 minutes with SBA for safety in between standing reps  Therapeutic Activity 2: Pt performed static stand with CGA while PT performed pericare; CGA for safety and line management    Bed Mobility  Bed Mobility: Yes  Bed Mobility 1  Bed Mobility 1: Sitting to supine  Level of Assistance 1: Close supervision  Bed Mobility Comments 1: SBA for safety and line management    Ambulation/Gait Training  Ambulation/Gait Training Performed: Yes  Ambulation/Gait Training 1  Surface 1: Level tile  Device 1: No device  Assistance 1: Hand held assistance, Contact guard  Quality of Gait 1: Decreased step length, Forward flexed posture  Comments/Distance (ft) 1: CGA for safety and line management; HHA; pt took 3 fwd/bwd steps x 3 bouts (~6' total) within limits of Airvo  Transfers  Transfer: Yes  Transfer 1  Transfer From 1: Bed to  Transfer to 1: Chair with arms  Technique 1: Sit to stand, Stand to sit  Transfer Level of Assistance 1: Hand held assistance, Contact guard  Trials/Comments 1: CGA for safety and line management; pt took 4 side steps to bed  Transfers 2  Transfer From 2: Sit to, Stand to  Transfer to 2: Stand, Sit  Technique 2: Sit to stand, Stand to sit  Transfer Level of Assistance 2: Contact guard  Trials/Comments 2: CGA for safety and line management    Outcome Measures:  [facility] Basic Mobility  Turning from your back to your side while in a flat bed without using bedrails: A little  Moving from lying on your back to sitting on the side of a flat bed without using bedrails: A little  Moving to and from bed to  chair (including a wheelchair): A little  Standing up from a chair using your arms (e.g. wheelchair or bedside chair): A little  To walk in hospital room: A little  Climbing 3-5 steps with railing: A lot  Basic Mobility - Total Score: 17    FSS-ICU  Ambulation: Walks <50 feet with any assistance x1 or walks any distance with assistance x2 people  Rolling: Supervision or set-up only  Sitting: Supervision or set-up only  Transfer Sit-to-Stand: Minimal assistance (performs 75% or more of task)  Transfer Supine-to-Sit: Minimal assistance (performs 75% or more of task)  Total Score: 19      Early Mobility/Exercise Safety Screen: Proceed with mobilization - No exclusion criteria met  ICU Mobility Scale: Marching on spot (at bedside) [6]    Education Documentation  Precautions, taught by Felicia Melvin PT at 8/8/2025  3:09 PM.  Learner: Patient  Readiness: Acceptance  Method: Explanation  Response: Verbalizes Understanding  Comment: agreeable to PT POC    Body Mechanics, taught by Felicia Melvin PT at 8/8/2025  3:09 PM.  Learner: Patient  Readiness: Acceptance  Method: Explanation  Response: Verbalizes Understanding  Comment: agreeable to PT POC    Handouts, taught by Felicia Melvin PT at 8/8/2025  3:09 PM.  Learner: Patient  Readiness: Acceptance  Method: Explanation  Response: Verbalizes Understanding  Comment: agreeable to PT POC    Mobility Training, taught by Felicia Melvin PT at 8/8/2025  3:09 PM.  Learner: Patient  Readiness: Acceptance  Method: Explanation  Response: Verbalizes Understanding  Comment: agreeable to PT POC    Education Comments  No comments found.             Encounter Problems       Encounter Problems (Active)       Balance       patient will score >24/28 on the Tinetti scale to present as a low risk of falls (Progressing)       Start:  07/15/25    Expected End:  08/16/25               Mobility       Patient will complete the 5xSTS  in <15 sec with no assistive device, no UE support, and close  supervision (RPE: 11/20 RPD: 3/10) (Progressing)       Start:  07/15/25    Expected End:  08/16/25            Patient will ambulate >1000ft on the 6MWT with no assistive device and close superivision (RPE: 11/20 RPD: 3/10) (Progressing)       Start:  07/15/25    Expected End:  08/16/25               Mobility       Pt will ambulate >300ft with appropriate form, SBA or less, LRAD, and no LOB for safe DC.  (Progressing)       Start:  08/02/25    Expected End:  08/16/25            Pt will ambulate up/down >3 stairs with/without hand rail with CGA or less to safely access their home upon DC.   (Progressing)       Start:  08/02/25    Expected End:  08/16/25               PT Transfers       Pt will perform bed mobility and sit<>stand transfers with SBA and use of LRAD to safely DC.  (Progressing)       Start:  08/02/25    Expected End:  08/16/25                 EDDIE SCHAFFER, PT
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
## 2025-08-09 ENCOUNTER — APPOINTMENT (OUTPATIENT)
Dept: RADIOLOGY | Facility: HOSPITAL | Age: 60
DRG: 001 | End: 2025-08-09
Payer: COMMERCIAL

## 2025-08-09 LAB
ALBUMIN SERPL BCP-MCNC: 3.2 G/DL (ref 3.4–5)
ALP SERPL-CCNC: 77 U/L (ref 33–136)
ALT SERPL W P-5'-P-CCNC: 5 U/L (ref 7–45)
ANION GAP BLDMV CALCULATED.4IONS-SCNC: 5 MMO/L (ref 10–25)
ANION GAP BLDMV CALCULATED.4IONS-SCNC: 7 MMO/L (ref 10–25)
ANION GAP SERPL CALC-SCNC: 12 MMOL/L (ref 10–20)
APTT PPP: 39 SECONDS (ref 26–36)
AST SERPL W P-5'-P-CCNC: 12 U/L (ref 9–39)
BASE EXCESS BLDMV CALC-SCNC: 4.4 MMOL/L (ref -2–3)
BASE EXCESS BLDMV CALC-SCNC: 7.5 MMOL/L (ref -2–3)
BILIRUB DIRECT SERPL-MCNC: 0 MG/DL (ref 0–0.3)
BILIRUB SERPL-MCNC: 0.4 MG/DL (ref 0–1.2)
BODY TEMPERATURE: 37 DEGREES CELSIUS
BODY TEMPERATURE: 37 DEGREES CELSIUS
BUN SERPL-MCNC: 17 MG/DL (ref 6–23)
CA-I BLDMV-SCNC: 1.16 MMOL/L (ref 1.1–1.33)
CA-I BLDMV-SCNC: 1.17 MMOL/L (ref 1.1–1.33)
CALCIUM SERPL-MCNC: 8.7 MG/DL (ref 8.6–10.6)
CHLORIDE BLD-SCNC: 100 MMOL/L (ref 98–107)
CHLORIDE BLD-SCNC: 97 MMOL/L (ref 98–107)
CHLORIDE SERPL-SCNC: 96 MMOL/L (ref 98–107)
CO2 SERPL-SCNC: 30 MMOL/L (ref 21–32)
CREAT SERPL-MCNC: 0.85 MG/DL (ref 0.5–1.05)
EGFRCR SERPLBLD CKD-EPI 2021: 78 ML/MIN/1.73M*2
ERYTHROCYTE [DISTWIDTH] IN BLOOD BY AUTOMATED COUNT: 15 % (ref 11.5–14.5)
GLUCOSE BLD MANUAL STRIP-MCNC: 114 MG/DL (ref 74–99)
GLUCOSE BLD MANUAL STRIP-MCNC: 125 MG/DL (ref 74–99)
GLUCOSE BLD MANUAL STRIP-MCNC: 172 MG/DL (ref 74–99)
GLUCOSE BLD MANUAL STRIP-MCNC: 204 MG/DL (ref 74–99)
GLUCOSE BLD MANUAL STRIP-MCNC: 298 MG/DL (ref 74–99)
GLUCOSE BLD-MCNC: 125 MG/DL (ref 74–99)
GLUCOSE BLD-MCNC: 212 MG/DL (ref 74–99)
GLUCOSE SERPL-MCNC: 138 MG/DL (ref 74–99)
HCO3 BLDMV-SCNC: 30.2 MMOL/L (ref 22–26)
HCO3 BLDMV-SCNC: 33.4 MMOL/L (ref 22–26)
HCT VFR BLD AUTO: 24.1 % (ref 36–46)
HCT VFR BLD EST: 25 % (ref 36–46)
HCT VFR BLD EST: 26 % (ref 36–46)
HGB BLD-MCNC: 8 G/DL (ref 12–16)
HGB BLDMV-MCNC: 8.3 G/DL (ref 12–16)
HGB BLDMV-MCNC: 8.8 G/DL (ref 12–16)
INHALED O2 CONCENTRATION: 29 %
INHALED O2 CONCENTRATION: 30 %
INR PPP: 3.7 (ref 0.9–1.1)
LACTATE BLDMV-SCNC: 0.5 MMOL/L (ref 0.4–2)
LACTATE BLDMV-SCNC: 0.5 MMOL/L (ref 0.4–2)
LDH SERPL L TO P-CCNC: 257 U/L (ref 84–246)
MAGNESIUM SERPL-MCNC: 2.05 MG/DL (ref 1.6–2.4)
MCH RBC QN AUTO: 30.1 PG (ref 26–34)
MCHC RBC AUTO-ENTMCNC: 33.2 G/DL (ref 32–36)
MCV RBC AUTO: 91 FL (ref 80–100)
NRBC BLD-RTO: 0 /100 WBCS (ref 0–0)
OXYHGB MFR BLDMV: 51.7 % (ref 45–75)
OXYHGB MFR BLDMV: 58.6 % (ref 45–75)
PCO2 BLDMV: 51 MM HG (ref 41–51)
PCO2 BLDMV: 54 MM HG (ref 41–51)
PH BLDMV: 7.38 PH (ref 7.33–7.43)
PH BLDMV: 7.4 PH (ref 7.33–7.43)
PHOSPHATE SERPL-MCNC: 3.5 MG/DL (ref 2.5–4.9)
PLATELET # BLD AUTO: 253 X10*3/UL (ref 150–450)
PO2 BLDMV: 33 MM HG (ref 35–45)
PO2 BLDMV: 37 MM HG (ref 35–45)
POTASSIUM BLDMV-SCNC: 4.2 MMOL/L (ref 3.5–5.3)
POTASSIUM BLDMV-SCNC: 4.2 MMOL/L (ref 3.5–5.3)
POTASSIUM SERPL-SCNC: 4.1 MMOL/L (ref 3.5–5.3)
PROT SERPL-MCNC: 6.4 G/DL (ref 6.4–8.2)
PROTHROMBIN TIME: 40.6 SECONDS (ref 9.8–12.4)
RBC # BLD AUTO: 2.66 X10*6/UL (ref 4–5.2)
SAO2 % BLDMV: 53 % (ref 45–75)
SAO2 % BLDMV: 60 % (ref 45–75)
SODIUM BLDMV-SCNC: 131 MMOL/L (ref 136–145)
SODIUM BLDMV-SCNC: 133 MMOL/L (ref 136–145)
SODIUM SERPL-SCNC: 134 MMOL/L (ref 136–145)
WBC # BLD AUTO: 6.6 X10*3/UL (ref 4.4–11.3)

## 2025-08-09 PROCEDURE — 71045 X-RAY EXAM CHEST 1 VIEW: CPT | Performed by: RADIOLOGY

## 2025-08-09 PROCEDURE — 84132 ASSAY OF SERUM POTASSIUM: CPT

## 2025-08-09 PROCEDURE — 94640 AIRWAY INHALATION TREATMENT: CPT

## 2025-08-09 PROCEDURE — 2500000002 HC RX 250 W HCPCS SELF ADMINISTERED DRUGS (ALT 637 FOR MEDICARE OP, ALT 636 FOR OP/ED): Performed by: NURSE PRACTITIONER

## 2025-08-09 PROCEDURE — 2500000004 HC RX 250 GENERAL PHARMACY W/ HCPCS (ALT 636 FOR OP/ED)

## 2025-08-09 PROCEDURE — 83615 LACTATE (LD) (LDH) ENZYME: CPT | Performed by: NURSE PRACTITIONER

## 2025-08-09 PROCEDURE — 82374 ASSAY BLOOD CARBON DIOXIDE: CPT

## 2025-08-09 PROCEDURE — 2500000001 HC RX 250 WO HCPCS SELF ADMINISTERED DRUGS (ALT 637 FOR MEDICARE OP): Performed by: NURSE PRACTITIONER

## 2025-08-09 PROCEDURE — 2500000001 HC RX 250 WO HCPCS SELF ADMINISTERED DRUGS (ALT 637 FOR MEDICARE OP)

## 2025-08-09 PROCEDURE — 83735 ASSAY OF MAGNESIUM: CPT

## 2025-08-09 PROCEDURE — 2500000002 HC RX 250 W HCPCS SELF ADMINISTERED DRUGS (ALT 637 FOR MEDICARE OP, ALT 636 FOR OP/ED)

## 2025-08-09 PROCEDURE — 85610 PROTHROMBIN TIME: CPT | Performed by: NURSE PRACTITIONER

## 2025-08-09 PROCEDURE — 2020000001 HC ICU ROOM DAILY

## 2025-08-09 PROCEDURE — 94668 MNPJ CHEST WALL SBSQ: CPT

## 2025-08-09 PROCEDURE — 2500000004 HC RX 250 GENERAL PHARMACY W/ HCPCS (ALT 636 FOR OP/ED): Performed by: NURSE PRACTITIONER

## 2025-08-09 PROCEDURE — 99291 CRITICAL CARE FIRST HOUR: CPT | Performed by: NURSE PRACTITIONER

## 2025-08-09 PROCEDURE — 99291 CRITICAL CARE FIRST HOUR: CPT | Performed by: INTERNAL MEDICINE

## 2025-08-09 PROCEDURE — 37799 UNLISTED PX VASCULAR SURGERY: CPT

## 2025-08-09 PROCEDURE — 71045 X-RAY EXAM CHEST 1 VIEW: CPT

## 2025-08-09 PROCEDURE — 2500000005 HC RX 250 GENERAL PHARMACY W/O HCPCS

## 2025-08-09 PROCEDURE — 82947 ASSAY GLUCOSE BLOOD QUANT: CPT

## 2025-08-09 PROCEDURE — 37799 UNLISTED PX VASCULAR SURGERY: CPT | Performed by: NURSE PRACTITIONER

## 2025-08-09 PROCEDURE — 84100 ASSAY OF PHOSPHORUS: CPT

## 2025-08-09 PROCEDURE — 85027 COMPLETE CBC AUTOMATED: CPT

## 2025-08-09 RX ORDER — PANTOPRAZOLE SODIUM 40 MG/1
40 TABLET, DELAYED RELEASE ORAL
Status: DISCONTINUED | OUTPATIENT
Start: 2025-08-10 | End: 2025-08-18 | Stop reason: HOSPADM

## 2025-08-09 RX ORDER — POTASSIUM CHLORIDE 20 MEQ/1
40 TABLET, EXTENDED RELEASE ORAL ONCE
Status: COMPLETED | OUTPATIENT
Start: 2025-08-09 | End: 2025-08-09

## 2025-08-09 RX ORDER — MAGNESIUM SULFATE HEPTAHYDRATE 40 MG/ML
2 INJECTION, SOLUTION INTRAVENOUS ONCE
Status: COMPLETED | OUTPATIENT
Start: 2025-08-09 | End: 2025-08-09

## 2025-08-09 RX ORDER — FUROSEMIDE 10 MG/ML
80 INJECTION INTRAMUSCULAR; INTRAVENOUS ONCE
Status: COMPLETED | OUTPATIENT
Start: 2025-08-09 | End: 2025-08-09

## 2025-08-09 RX ADMIN — IPRATROPIUM BROMIDE AND ALBUTEROL SULFATE 3 ML: .5; 3 SOLUTION RESPIRATORY (INHALATION) at 14:36

## 2025-08-09 RX ADMIN — IPRATROPIUM BROMIDE AND ALBUTEROL SULFATE 3 ML: .5; 3 SOLUTION RESPIRATORY (INHALATION) at 19:47

## 2025-08-09 RX ADMIN — ACETYLCYSTEINE 600 MG: 200 SOLUTION ORAL; RESPIRATORY (INHALATION) at 19:47

## 2025-08-09 RX ADMIN — ACETAMINOPHEN 650 MG: 325 TABLET, FILM COATED ORAL at 06:32

## 2025-08-09 RX ADMIN — PANTOPRAZOLE SODIUM 40 MG: 40 INJECTION, POWDER, LYOPHILIZED, FOR SOLUTION INTRAVENOUS at 09:57

## 2025-08-09 RX ADMIN — FUROSEMIDE 80 MG: 10 INJECTION, SOLUTION INTRAMUSCULAR; INTRAVENOUS at 07:56

## 2025-08-09 RX ADMIN — POTASSIUM CHLORIDE 40 MEQ: 1500 TABLET, EXTENDED RELEASE ORAL at 12:50

## 2025-08-09 RX ADMIN — POTASSIUM CHLORIDE 40 MEQ: 1500 TABLET, EXTENDED RELEASE ORAL at 07:56

## 2025-08-09 RX ADMIN — EMPAGLIFLOZIN 10 MG: 10 TABLET, FILM COATED ORAL at 09:57

## 2025-08-09 RX ADMIN — OXYCODONE HYDROCHLORIDE 5 MG: 5 TABLET ORAL at 06:32

## 2025-08-09 RX ADMIN — POLYETHYLENE GLYCOL 3350 17 G: 17 POWDER, FOR SOLUTION ORAL at 09:57

## 2025-08-09 RX ADMIN — DOCUSATE SODIUM 100 MG: 100 CAPSULE, LIQUID FILLED ORAL at 09:57

## 2025-08-09 RX ADMIN — SENNOSIDES, DOCUSATE SODIUM 2 TABLET: 50; 8.6 TABLET, FILM COATED ORAL at 20:53

## 2025-08-09 RX ADMIN — LEVOTHYROXINE SODIUM 88 MCG: 0.09 TABLET ORAL at 06:17

## 2025-08-09 RX ADMIN — MAGNESIUM SULFATE HEPTAHYDRATE 2 G: 40 INJECTION, SOLUTION INTRAVENOUS at 05:43

## 2025-08-09 RX ADMIN — ACETYLCYSTEINE 600 MG: 200 SOLUTION ORAL; RESPIRATORY (INHALATION) at 08:11

## 2025-08-09 RX ADMIN — IPRATROPIUM BROMIDE AND ALBUTEROL SULFATE 3 ML: .5; 3 SOLUTION RESPIRATORY (INHALATION) at 01:50

## 2025-08-09 RX ADMIN — ROPINIROLE 1 MG: 1 TABLET, FILM COATED ORAL at 15:25

## 2025-08-09 RX ADMIN — ICOSAPENT ETHYL 2 G: 1 CAPSULE ORAL at 17:56

## 2025-08-09 RX ADMIN — LIDOCAINE 4% 1 PATCH: 40 PATCH TOPICAL at 10:08

## 2025-08-09 RX ADMIN — GUAIFENESIN AND DEXTROMETHORPHAN HYDROBROMIDE 1 TABLET: 600; 30 TABLET, EXTENDED RELEASE ORAL at 20:55

## 2025-08-09 RX ADMIN — IPRATROPIUM BROMIDE AND ALBUTEROL SULFATE 3 ML: .5; 3 SOLUTION RESPIRATORY (INHALATION) at 08:11

## 2025-08-09 RX ADMIN — ROPINIROLE 1 MG: 1 TABLET, FILM COATED ORAL at 20:55

## 2025-08-09 RX ADMIN — ACETAMINOPHEN 650 MG: 325 TABLET, FILM COATED ORAL at 20:53

## 2025-08-09 RX ADMIN — CITALOPRAM HYDROBROMIDE 40 MG: 40 TABLET ORAL at 07:56

## 2025-08-09 RX ADMIN — INSULIN GLARGINE 25 UNITS: 100 INJECTION, SOLUTION SUBCUTANEOUS at 10:08

## 2025-08-09 RX ADMIN — LIDOCAINE 4% 1 PATCH: 40 PATCH TOPICAL at 09:57

## 2025-08-09 RX ADMIN — ACETYLCYSTEINE 600 MG: 200 SOLUTION ORAL; RESPIRATORY (INHALATION) at 14:36

## 2025-08-09 RX ADMIN — Medication 5 MG: at 20:53

## 2025-08-09 RX ADMIN — Medication 1 DOSE: at 08:11

## 2025-08-09 RX ADMIN — OXYCODONE HYDROCHLORIDE 5 MG: 5 TABLET ORAL at 20:54

## 2025-08-09 RX ADMIN — ROPINIROLE HYDROCHLORIDE 3 MG: 3 TABLET, FILM COATED ORAL at 20:56

## 2025-08-09 RX ADMIN — ASPIRIN 81 MG: 81 TABLET, CHEWABLE ORAL at 09:57

## 2025-08-09 RX ADMIN — ACETYLCYSTEINE 600 MG: 200 SOLUTION ORAL; RESPIRATORY (INHALATION) at 01:50

## 2025-08-09 RX ADMIN — Medication: at 19:47

## 2025-08-09 RX ADMIN — ICOSAPENT ETHYL 2 G: 1 CAPSULE ORAL at 07:56

## 2025-08-09 RX ADMIN — OXYCODONE HYDROCHLORIDE 5 MG: 5 TABLET ORAL at 12:53

## 2025-08-09 RX ADMIN — ROPINIROLE 1 MG: 1 TABLET, FILM COATED ORAL at 09:56

## 2025-08-09 RX ADMIN — ACETAMINOPHEN 650 MG: 325 TABLET, FILM COATED ORAL at 12:50

## 2025-08-09 RX ADMIN — ATORVASTATIN CALCIUM 80 MG: 80 TABLET, FILM COATED ORAL at 09:57

## 2025-08-09 RX ADMIN — SENNOSIDES, DOCUSATE SODIUM 2 TABLET: 50; 8.6 TABLET, FILM COATED ORAL at 09:57

## 2025-08-09 RX ADMIN — DOCUSATE SODIUM 100 MG: 100 CAPSULE, LIQUID FILLED ORAL at 20:53

## 2025-08-09 RX ADMIN — SPIRONOLACTONE 25 MG: 25 TABLET ORAL at 09:57

## 2025-08-09 RX ADMIN — GUAIFENESIN AND DEXTROMETHORPHAN HYDROBROMIDE 1 TABLET: 600; 30 TABLET, EXTENDED RELEASE ORAL at 09:57

## 2025-08-09 RX ADMIN — MILRINONE LACTATE IN DEXTROSE 0.12 MCG/KG/MIN: 200 INJECTION, SOLUTION INTRAVENOUS at 04:38

## 2025-08-09 RX ADMIN — INSULIN LISPRO 4 UNITS: 100 INJECTION, SOLUTION INTRAVENOUS; SUBCUTANEOUS at 12:50

## 2025-08-09 ASSESSMENT — PAIN SCALES - GENERAL
PAINLEVEL_OUTOF10: 0 - NO PAIN
PAINLEVEL_OUTOF10: 3
PAINLEVEL_OUTOF10: 0 - NO PAIN
PAINLEVEL_OUTOF10: 5 - MODERATE PAIN
PAINLEVEL_OUTOF10: 8
PAINLEVEL_OUTOF10: 0 - NO PAIN
PAINLEVEL_OUTOF10: 8
PAINLEVEL_OUTOF10: 8

## 2025-08-09 ASSESSMENT — PAIN DESCRIPTION - DESCRIPTORS
DESCRIPTORS: ACHING

## 2025-08-09 ASSESSMENT — PAIN DESCRIPTION - ORIENTATION
ORIENTATION: UPPER
ORIENTATION: MID
ORIENTATION: RIGHT;LOWER

## 2025-08-09 ASSESSMENT — PAIN - FUNCTIONAL ASSESSMENT
PAIN_FUNCTIONAL_ASSESSMENT: 0-10

## 2025-08-09 ASSESSMENT — PAIN DESCRIPTION - LOCATION
LOCATION: BACK
LOCATION: INCISION
LOCATION: BACK
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
## 2025-08-09 NOTE — SIGNIFICANT EVENT
Closing [place] Numbers:  94/74(83), RA 16, 25/17(21), W 12, CO/CI 4.4, 2.68, SVR 1218, SVO2 53% on Jardiance 10mg daily and Aldactone 25mg daily

## 2025-08-09 NOTE — CARE PLAN
The clinical goals for the shift include HDS      Problem: Pain - Adult  Goal: Verbalizes/displays adequate comfort level or baseline comfort level  8/8/2025 2128 by Alicia Kenney RN  Outcome: Progressing  8/8/2025 2128 by Alicia Kenney RN  Outcome: Progressing     Problem: Safety - Adult  Goal: Free from fall injury  8/8/2025 2128 by Alicia Kenney RN  Outcome: Progressing  8/8/2025 2128 by Alicia Kenney RN  Outcome: Progressing     Problem: Discharge Planning  Goal: Discharge to home or other facility with appropriate resources  8/8/2025 2128 by Alicia Kenney RN  Outcome: Progressing  8/8/2025 2128 by Alicia Kenney RN  Outcome: Progressing     Problem: Chronic Conditions and Co-morbidities  Goal: Patient's chronic conditions and co-morbidity symptoms are monitored and maintained or improved  8/8/2025 2128 by Alicia Kenney RN  Outcome: Progressing  8/8/2025 2128 by Alicia Kenney RN  Outcome: Progressing     Problem: Nutrition  Goal: Nutrient intake appropriate for maintaining nutritional needs  8/8/2025 2128 by Alicia Kenney RN  Outcome: Progressing  8/8/2025 2128 by Alicia Kenney RN  Outcome: Progressing     Problem: Heart Failure  Goal: Improved gas exchange this shift  8/8/2025 2128 by Alicia Kenney RN  Outcome: Progressing  8/8/2025 2128 by Alicia Kenney RN  Outcome: Progressing  Goal: Improved urinary output this shift  8/8/2025 2128 by Alicia Kenney RN  Outcome: Progressing  8/8/2025 2128 by Alicia Kenney RN  Outcome: Progressing  Goal: Reduction in peripheral edema within 24 hours  8/8/2025 2128 by Alicia Kenney RN  Outcome: Progressing  8/8/2025 2128 by Alicia Kenney RN  Outcome: Progressing  Goal: Report improvement of dyspnea/breathlessness this shift  8/8/2025 2128 by Alicia Kenney RN  Outcome: Progressing  8/8/2025 2128 by Alicia Kenney RN  Outcome: Progressing  Goal: Weight from fluid excess reduced over 2-3 days, then stabilize  8/8/2025 2128 by Alicia Kenney RN  Outcome:  Progressing  8/8/2025 2128 by Alicia Kenney RN  Outcome: Progressing  Goal: Increase self care and/or family involvement in 24 hours  8/8/2025 2128 by Alicia Kenney RN  Outcome: Progressing  8/8/2025 2128 by Alicia Kenney RN  Outcome: Progressing     Problem: Respiratory  Goal: Minimal/no exertional discomfort or dyspnea this shift  8/8/2025 2128 by Alicia Kenney RN  Outcome: Progressing  8/8/2025 2128 by Alicia Kenney RN  Outcome: Progressing  Goal: No signs of respiratory distress (eg. Use of accessory muscles. Peds grunting)  8/8/2025 2128 by Alicia Kenney RN  Outcome: Progressing  8/8/2025 2128 by Alicia Kenney RN  Outcome: Progressing  Goal: Patent airway maintained this shift  8/8/2025 2128 by Alicia Kenney RN  Outcome: Progressing  8/8/2025 2128 by Alicia Kenney RN  Outcome: Progressing  Goal: Verbalize decreased shortness of breath this shift  8/8/2025 2128 by Alicia Kenney RN  Outcome: Progressing  8/8/2025 2128 by Alicia Kenney RN  Outcome: Progressing  Goal: Wean oxygen to maintain O2 saturation per order/standard this shift  8/8/2025 2128 by Alicia Kenney RN  Outcome: Progressing  8/8/2025 2128 by Alicia Kenney RN  Outcome: Progressing  Goal: Increase self care and/or family involvement in next 24 hours  8/8/2025 2128 by Alicia Kenney RN  Outcome: Progressing  8/8/2025 2128 by Alicia Kenney RN  Outcome: Progressing     Problem: Diabetes  Goal: Achieve decreasing blood glucose levels by end of shift  8/8/2025 2128 by Alicia Kenney RN  Outcome: Progressing  8/8/2025 2128 by Alicia Kenney RN  Outcome: Progressing  Goal: Increase stability of blood glucose readings by end of shift  8/8/2025 2128 by Alicia Kenney RN  Outcome: Progressing  8/8/2025 2128 by Alicia Kenney RN  Outcome: Progressing  Goal: Decrease in ketones present in urine by end of shift  8/8/2025 2128 by Alicia Kenney RN  Outcome: Progressing  8/8/2025 2128 by Alicia Kenney RN  Outcome: Progressing  Goal: Maintain  electrolyte levels within acceptable range throughout shift  8/8/2025 2128 by Alicia Kenney RN  Outcome: Progressing  8/8/2025 2128 by Alicia Kenney RN  Outcome: Progressing  Goal: Maintain glucose levels >70mg/dl to <250mg/dl throughout shift  8/8/2025 2128 by Alicia Kenney RN  Outcome: Progressing  8/8/2025 2128 by Alicia Kenney RN  Outcome: Progressing  Goal: No changes in neurological exam by end of shift  8/8/2025 2128 by Alicia Kenney RN  Outcome: Progressing  8/8/2025 2128 by Alicia Kenney RN  Outcome: Progressing  Goal: Learn about and adhere to nutrition recommendations by end of shift  8/8/2025 2128 by Alicia Kenney RN  Outcome: Progressing  8/8/2025 2128 by Alicia Kenney RN  Outcome: Progressing  Goal: Vital signs within normal range for age by end of shift  8/8/2025 2128 by Alicia Kenney RN  Outcome: Progressing  8/8/2025 2128 by Alicia Kenney RN  Outcome: Progressing  Goal: Increase self care and/or family involovement by end of shift  8/8/2025 2128 by Alicia Kenney RN  Outcome: Progressing  8/8/2025 2128 by Alicia Kenney RN  Outcome: Progressing  Goal: Receive DSME education by end of shift  8/8/2025 2128 by Alicia Kenney RN  Outcome: Progressing  8/8/2025 2128 by Alicia Kenney RN  Outcome: Progressing     Problem: Ventricular Assisted Device (VAD)  Goal: Wean vasopressors/nitric  8/8/2025 2128 by Alicia Kenney RN  Outcome: Progressing  8/8/2025 2128 by Alicia Kenney RN  Outcome: Progressing  Goal: Wean ventilator  8/8/2025 2128 by Alicia Kenney RN  Outcome: Progressing  8/8/2025 2128 by Alicia Kenney RN  Outcome: Progressing  Goal: Extubation  8/8/2025 2128 by Alicia Kenney RN  Outcome: Progressing  8/8/2025 2128 by Alicia Kenney RN  Outcome: Progressing  Goal: Stable metal status  8/8/2025 2128 by Alicia Kenney RN  Outcome: Progressing  8/8/2025 2128 by Alicia Kenney RN  Outcome: Progressing  Goal: Pulmonary toileting, incentive spirometry  8/8/2025 2128 by Alicia Kenney,  RN  Outcome: Progressing  8/8/2025 2128 by Alicia Kenney RN  Outcome: Progressing  Goal: Nutrition  8/8/2025 2128 by Alicia Kenney RN  Outcome: Progressing  8/8/2025 2128 by Alicia Kenney RN  Outcome: Progressing  Goal: Mobility/OT/PT  8/8/2025 2128 by Alicia Kenney RN  Outcome: Progressing  8/8/2025 2128 by Alicia Kenney RN  Outcome: Progressing  Goal: Rubber ball  8/8/2025 2128 by Alicia Kenney RN  Outcome: Progressing  8/8/2025 2128 by Alicia Kenney RN  Outcome: Progressing  Goal: ROM  8/8/2025 2128 by Alicia Kenney RN  Outcome: Progressing  8/8/2025 2128 by Alicia Kenney RN  Outcome: Progressing  Goal: Sitting  8/8/2025 2128 by Alicia Kenney RN  Outcome: Progressing  8/8/2025 2128 by Alicia Kenney RN  Outcome: Progressing  Goal: Walk  8/8/2025 2128 by Alicia Kenney RN  Outcome: Progressing  8/8/2025 2128 by Alicia Kenney RN  Outcome: Progressing  Goal: Involve in VAD awareness, dressing change  8/8/2025 2128 by Alicia Kenney RN  Outcome: Progressing  8/8/2025 2128 by Alicia Kenney RN  Outcome: Progressing  Goal: AICD On/Off  8/8/2025 2128 by Alicia Kenney RN  Outcome: Progressing  8/8/2025 2128 by Alicia Kenney RN  Outcome: Progressing     Problem: Skin  Goal: Decreased wound size/increased tissue granulation at next dressing change  8/8/2025 2128 by Alicia Kenney RN  Outcome: Progressing  8/8/2025 2128 by Alicia Kenney RN  Outcome: Progressing  Goal: Participates in plan/prevention/treatment measures  8/8/2025 2128 by Alicia Kenney RN  Outcome: Progressing  8/8/2025 2128 by Alicia Kenney RN  Outcome: Progressing  Goal: Prevent/manage excess moisture  8/8/2025 2128 by Alicia Kenney RN  Outcome: Progressing  8/8/2025 2128 by Alicia Kenney RN  Outcome: Progressing  Goal: Prevent/minimize sheer/friction injuries  8/8/2025 2128 by Alicia Kenney RN  Outcome: Progressing  8/8/2025 2128 by Alicia Pablito, RN  Outcome: Progressing  Goal: Promote/optimize nutrition  8/8/2025 2128 by Alicia Kenney,  RN  Outcome: Progressing  8/8/2025 2128 by Alicia Kenney RN  Outcome: Progressing  Goal: Promote skin healing  8/8/2025 2128 by Alicia Kenney RN  Outcome: Progressing  8/8/2025 2128 by Alicia Kenney RN  Outcome: Progressing     Problem: Pain  Goal: Takes deep breaths with improved pain control throughout the shift  8/8/2025 2128 by Alicia Kenney RN  Outcome: Progressing  8/8/2025 2128 by Alicia Kenney RN  Outcome: Progressing  Goal: Turns in bed with improved pain control throughout the shift  8/8/2025 2128 by Alicia Kenney RN  Outcome: Progressing  8/8/2025 2128 by Alicia Kenney RN  Outcome: Progressing  Goal: Walks with improved pain control throughout the shift  8/8/2025 2128 by Alicia Kenney RN  Outcome: Progressing  8/8/2025 2128 by Alicia Kenney RN  Outcome: Progressing  Goal: Performs ADL's with improved pain control throughout shift  8/8/2025 2128 by Alicia Kenney RN  Outcome: Progressing  8/8/2025 2128 by Alicia Kenney RN  Outcome: Progressing  Goal: Participates in PT with improved pain control throughout the shift  8/8/2025 2128 by Alicia Kenney RN  Outcome: Progressing  8/8/2025 2128 by Alicia Kenney RN  Outcome: Progressing  Goal: Free from opioid side effects throughout the shift  8/8/2025 2128 by Alicia Kenney RN  Outcome: Progressing  8/8/2025 2128 by Alicia Kenney RN  Outcome: Progressing  Goal: Free from acute confusion related to pain meds throughout the shift  8/8/2025 2128 by Alicia Kenney RN  Outcome: Progressing  8/8/2025 2128 by Alicia Keneny RN  Outcome: Progressing     Problem: Fall/Injury  Goal: Not fall by end of shift  8/8/2025 2128 by Alicia Kenney RN  Outcome: Progressing  8/8/2025 2128 by Alicia Kenney RN  Outcome: Progressing  Goal: Be free from injury by end of the shift  8/8/2025 2128 by Alicia Kenney RN  Outcome: Progressing  8/8/2025 2128 by Alicia Kenney RN  Outcome: Progressing  Goal: Verbalize understanding of personal risk factors for fall in the  [facility]  8/8/2025 2128 by Alicia Kenney RN  Outcome: Progressing  8/8/2025 2128 by Alicia Kenney RN  Outcome: Progressing  Goal: Verbalize understanding of risk factor reduction measures to prevent injury from fall in the home  8/8/2025 2128 by Alicia Kenney RN  Outcome: Progressing  8/8/2025 2128 by Alicia Kenney RN  Outcome: Progressing  Goal: Use assistive devices by end of the shift  8/8/2025 2128 by Alicia Kenney RN  Outcome: Progressing  8/8/2025 2128 by Alicia Kenney RN  Outcome: Progressing  Goal: Pace activities to prevent fatigue by end of the shift  8/8/2025 2128 by Alicia Kenney RN  Outcome: Progressing  8/8/2025 2128 by Alicia Kenney RN  Outcome: Progressing     Problem: Safety - Medical Restraint  Goal: Remains free of injury from restraints (Restraint for Interference with Medical Device)  8/8/2025 2128 by Alicia Kenney RN  Outcome: Progressing  8/8/2025 2128 by Alicia Kenney RN  Outcome: Progressing

## 2025-08-10 ENCOUNTER — APPOINTMENT (OUTPATIENT)
Dept: RADIOLOGY | Facility: HOSPITAL | Age: 60
DRG: 001 | End: 2025-08-10
Payer: COMMERCIAL

## 2025-08-10 LAB
ALBUMIN SERPL BCP-MCNC: 3.5 G/DL (ref 3.4–5)
ALP SERPL-CCNC: 78 U/L (ref 33–136)
ALT SERPL W P-5'-P-CCNC: 5 U/L (ref 7–45)
ANION GAP SERPL CALC-SCNC: 12 MMOL/L (ref 10–20)
APTT PPP: 40 SECONDS (ref 26–36)
AST SERPL W P-5'-P-CCNC: 13 U/L (ref 9–39)
ATRIAL RATE: 105 BPM
BILIRUB DIRECT SERPL-MCNC: 0 MG/DL (ref 0–0.3)
BILIRUB SERPL-MCNC: 0.4 MG/DL (ref 0–1.2)
BUN SERPL-MCNC: 16 MG/DL (ref 6–23)
CALCIUM SERPL-MCNC: 9 MG/DL (ref 8.6–10.6)
CHLORIDE SERPL-SCNC: 96 MMOL/L (ref 98–107)
CO2 SERPL-SCNC: 32 MMOL/L (ref 21–32)
CREAT SERPL-MCNC: 0.84 MG/DL (ref 0.5–1.05)
EGFRCR SERPLBLD CKD-EPI 2021: 79 ML/MIN/1.73M*2
ERYTHROCYTE [DISTWIDTH] IN BLOOD BY AUTOMATED COUNT: 15.5 % (ref 11.5–14.5)
GLUCOSE BLD MANUAL STRIP-MCNC: 138 MG/DL (ref 74–99)
GLUCOSE BLD MANUAL STRIP-MCNC: 143 MG/DL (ref 74–99)
GLUCOSE BLD MANUAL STRIP-MCNC: 234 MG/DL (ref 74–99)
GLUCOSE BLD MANUAL STRIP-MCNC: 96 MG/DL (ref 74–99)
GLUCOSE SERPL-MCNC: 104 MG/DL (ref 74–99)
HCT VFR BLD AUTO: 26.9 % (ref 36–46)
HGB BLD-MCNC: 8.6 G/DL (ref 12–16)
INR PPP: 2.7 (ref 0.9–1.1)
LDH SERPL L TO P-CCNC: 254 U/L (ref 84–246)
MAGNESIUM SERPL-MCNC: 2.23 MG/DL (ref 1.6–2.4)
MCH RBC QN AUTO: 31 PG (ref 26–34)
MCHC RBC AUTO-ENTMCNC: 32 G/DL (ref 32–36)
MCV RBC AUTO: 97 FL (ref 80–100)
NRBC BLD-RTO: 0 /100 WBCS (ref 0–0)
P AXIS: 5 DEGREES
P OFFSET: 173 MS
P ONSET: 153 MS
PHOSPHATE SERPL-MCNC: 3.4 MG/DL (ref 2.5–4.9)
PLATELET # BLD AUTO: 299 X10*3/UL (ref 150–450)
POTASSIUM SERPL-SCNC: 4.3 MMOL/L (ref 3.5–5.3)
PR INTERVAL: 124 MS
PROT SERPL-MCNC: 6.7 G/DL (ref 6.4–8.2)
PROTHROMBIN TIME: 30.1 SECONDS (ref 9.8–12.4)
Q ONSET: 205 MS
QRS COUNT: 18 BEATS
QRS DURATION: 156 MS
QT INTERVAL: 454 MS
QTC CALCULATION(BAZETT): 600 MS
QTC FREDERICIA: 547 MS
R AXIS: -42 DEGREES
RBC # BLD AUTO: 2.77 X10*6/UL (ref 4–5.2)
SODIUM SERPL-SCNC: 136 MMOL/L (ref 136–145)
T AXIS: 45 DEGREES
T OFFSET: 432 MS
VENTRICULAR RATE: 105 BPM
WBC # BLD AUTO: 5.5 X10*3/UL (ref 4.4–11.3)

## 2025-08-10 PROCEDURE — 2500000001 HC RX 250 WO HCPCS SELF ADMINISTERED DRUGS (ALT 637 FOR MEDICARE OP): Performed by: NURSE PRACTITIONER

## 2025-08-10 PROCEDURE — 37799 UNLISTED PX VASCULAR SURGERY: CPT

## 2025-08-10 PROCEDURE — 84075 ASSAY ALKALINE PHOSPHATASE: CPT | Performed by: NURSE PRACTITIONER

## 2025-08-10 PROCEDURE — 82947 ASSAY GLUCOSE BLOOD QUANT: CPT

## 2025-08-10 PROCEDURE — 71045 X-RAY EXAM CHEST 1 VIEW: CPT

## 2025-08-10 PROCEDURE — 84100 ASSAY OF PHOSPHORUS: CPT

## 2025-08-10 PROCEDURE — 2500000005 HC RX 250 GENERAL PHARMACY W/O HCPCS: Performed by: NURSE PRACTITIONER

## 2025-08-10 PROCEDURE — 94640 AIRWAY INHALATION TREATMENT: CPT

## 2025-08-10 PROCEDURE — 83615 LACTATE (LD) (LDH) ENZYME: CPT | Performed by: NURSE PRACTITIONER

## 2025-08-10 PROCEDURE — 2500000002 HC RX 250 W HCPCS SELF ADMINISTERED DRUGS (ALT 637 FOR MEDICARE OP, ALT 636 FOR OP/ED): Performed by: NURSE PRACTITIONER

## 2025-08-10 PROCEDURE — 99291 CRITICAL CARE FIRST HOUR: CPT | Performed by: INTERNAL MEDICINE

## 2025-08-10 PROCEDURE — 85610 PROTHROMBIN TIME: CPT | Performed by: NURSE PRACTITIONER

## 2025-08-10 PROCEDURE — 82374 ASSAY BLOOD CARBON DIOXIDE: CPT

## 2025-08-10 PROCEDURE — 2500000001 HC RX 250 WO HCPCS SELF ADMINISTERED DRUGS (ALT 637 FOR MEDICARE OP)

## 2025-08-10 PROCEDURE — 94668 MNPJ CHEST WALL SBSQ: CPT

## 2025-08-10 PROCEDURE — 85027 COMPLETE CBC AUTOMATED: CPT

## 2025-08-10 PROCEDURE — 2500000002 HC RX 250 W HCPCS SELF ADMINISTERED DRUGS (ALT 637 FOR MEDICARE OP, ALT 636 FOR OP/ED)

## 2025-08-10 PROCEDURE — 2500000005 HC RX 250 GENERAL PHARMACY W/O HCPCS: Performed by: STUDENT IN AN ORGANIZED HEALTH CARE EDUCATION/TRAINING PROGRAM

## 2025-08-10 PROCEDURE — 99291 CRITICAL CARE FIRST HOUR: CPT | Performed by: NURSE PRACTITIONER

## 2025-08-10 PROCEDURE — 83735 ASSAY OF MAGNESIUM: CPT

## 2025-08-10 PROCEDURE — 71045 X-RAY EXAM CHEST 1 VIEW: CPT | Performed by: RADIOLOGY

## 2025-08-10 PROCEDURE — 2500000005 HC RX 250 GENERAL PHARMACY W/O HCPCS

## 2025-08-10 PROCEDURE — 2020000001 HC ICU ROOM DAILY

## 2025-08-10 PROCEDURE — 2500000004 HC RX 250 GENERAL PHARMACY W/ HCPCS (ALT 636 FOR OP/ED): Performed by: NURSE PRACTITIONER

## 2025-08-10 PROCEDURE — 2500000004 HC RX 250 GENERAL PHARMACY W/ HCPCS (ALT 636 FOR OP/ED)

## 2025-08-10 RX ORDER — WARFARIN 1 MG/1
0.5 TABLET ORAL ONCE
Status: DISCONTINUED | OUTPATIENT
Start: 2025-08-10 | End: 2025-08-10

## 2025-08-10 RX ORDER — OXYCODONE HYDROCHLORIDE 5 MG/1
5 TABLET ORAL ONCE
Refills: 0 | Status: COMPLETED | OUTPATIENT
Start: 2025-08-11 | End: 2025-08-11

## 2025-08-10 RX ORDER — POLYETHYLENE GLYCOL 3350 17 G/17G
17 POWDER, FOR SOLUTION ORAL DAILY PRN
Status: DISCONTINUED | OUTPATIENT
Start: 2025-08-10 | End: 2025-08-18 | Stop reason: HOSPADM

## 2025-08-10 RX ORDER — FUROSEMIDE 10 MG/ML
40 INJECTION INTRAMUSCULAR; INTRAVENOUS ONCE
Status: COMPLETED | OUTPATIENT
Start: 2025-08-10 | End: 2025-08-10

## 2025-08-10 RX ORDER — SACUBITRIL AND VALSARTAN 24; 26 MG/1; MG/1
1 TABLET ORAL ONCE
Status: COMPLETED | OUTPATIENT
Start: 2025-08-10 | End: 2025-08-10

## 2025-08-10 RX ORDER — WARFARIN 1 MG/1
0.5 TABLET ORAL ONCE
Status: COMPLETED | OUTPATIENT
Start: 2025-08-10 | End: 2025-08-10

## 2025-08-10 RX ORDER — TRAMADOL HYDROCHLORIDE 50 MG/1
50 TABLET, FILM COATED ORAL ONCE
Status: COMPLETED | OUTPATIENT
Start: 2025-08-10 | End: 2025-08-10

## 2025-08-10 RX ORDER — FUROSEMIDE 10 MG/ML
80 INJECTION INTRAMUSCULAR; INTRAVENOUS ONCE
Status: COMPLETED | OUTPATIENT
Start: 2025-08-10 | End: 2025-08-10

## 2025-08-10 RX ADMIN — ICOSAPENT ETHYL 2 G: 1 CAPSULE ORAL at 08:58

## 2025-08-10 RX ADMIN — IPRATROPIUM BROMIDE AND ALBUTEROL SULFATE 3 ML: .5; 3 SOLUTION RESPIRATORY (INHALATION) at 08:01

## 2025-08-10 RX ADMIN — INSULIN GLARGINE 25 UNITS: 100 INJECTION, SOLUTION SUBCUTANEOUS at 09:35

## 2025-08-10 RX ADMIN — ATORVASTATIN CALCIUM 80 MG: 80 TABLET, FILM COATED ORAL at 08:58

## 2025-08-10 RX ADMIN — ACETAMINOPHEN 650 MG: 325 TABLET, FILM COATED ORAL at 02:04

## 2025-08-10 RX ADMIN — OXYCODONE HYDROCHLORIDE 5 MG: 5 TABLET ORAL at 17:58

## 2025-08-10 RX ADMIN — TRAMADOL HYDROCHLORIDE 50 MG: 50 TABLET, COATED ORAL at 20:12

## 2025-08-10 RX ADMIN — ACETYLCYSTEINE 600 MG: 200 SOLUTION ORAL; RESPIRATORY (INHALATION) at 08:01

## 2025-08-10 RX ADMIN — FUROSEMIDE 40 MG: 10 INJECTION, SOLUTION INTRAMUSCULAR; INTRAVENOUS at 18:54

## 2025-08-10 RX ADMIN — LIDOCAINE 4% 1 PATCH: 40 PATCH TOPICAL at 09:35

## 2025-08-10 RX ADMIN — LIDOCAINE 4% 1 PATCH: 40 PATCH TOPICAL at 08:58

## 2025-08-10 RX ADMIN — Medication 1 DOSE: at 20:18

## 2025-08-10 RX ADMIN — ROPINIROLE HYDROCHLORIDE 3 MG: 3 TABLET, FILM COATED ORAL at 20:13

## 2025-08-10 RX ADMIN — Medication 5 MG: at 22:07

## 2025-08-10 RX ADMIN — SPIRONOLACTONE 25 MG: 25 TABLET ORAL at 09:02

## 2025-08-10 RX ADMIN — ROPINIROLE 1 MG: 1 TABLET, FILM COATED ORAL at 15:06

## 2025-08-10 RX ADMIN — SENNOSIDES, DOCUSATE SODIUM 2 TABLET: 50; 8.6 TABLET, FILM COATED ORAL at 08:57

## 2025-08-10 RX ADMIN — PANTOPRAZOLE SODIUM 40 MG: 40 TABLET, DELAYED RELEASE ORAL at 06:01

## 2025-08-10 RX ADMIN — FUROSEMIDE 80 MG: 10 INJECTION, SOLUTION INTRAMUSCULAR; INTRAVENOUS at 13:57

## 2025-08-10 RX ADMIN — ROPINIROLE 1 MG: 1 TABLET, FILM COATED ORAL at 20:12

## 2025-08-10 RX ADMIN — ICOSAPENT ETHYL 2 G: 1 CAPSULE ORAL at 17:59

## 2025-08-10 RX ADMIN — ACETYLCYSTEINE 600 MG: 200 SOLUTION ORAL; RESPIRATORY (INHALATION) at 20:18

## 2025-08-10 RX ADMIN — ROPINIROLE 1 MG: 1 TABLET, FILM COATED ORAL at 08:57

## 2025-08-10 RX ADMIN — OXYCODONE HYDROCHLORIDE 5 MG: 5 TABLET ORAL at 00:47

## 2025-08-10 RX ADMIN — IPRATROPIUM BROMIDE AND ALBUTEROL SULFATE 3 ML: .5; 3 SOLUTION RESPIRATORY (INHALATION) at 14:19

## 2025-08-10 RX ADMIN — SACUBITRIL AND VALSARTAN 1 TABLET: 24; 26 TABLET, FILM COATED ORAL at 13:57

## 2025-08-10 RX ADMIN — Medication 1 DOSE: at 08:01

## 2025-08-10 RX ADMIN — ACETYLCYSTEINE 600 MG: 200 SOLUTION ORAL; RESPIRATORY (INHALATION) at 14:19

## 2025-08-10 RX ADMIN — IPRATROPIUM BROMIDE AND ALBUTEROL SULFATE 3 ML: .5; 3 SOLUTION RESPIRATORY (INHALATION) at 03:18

## 2025-08-10 RX ADMIN — OXYCODONE HYDROCHLORIDE 5 MG: 5 TABLET ORAL at 22:07

## 2025-08-10 RX ADMIN — OXYCODONE HYDROCHLORIDE 5 MG: 5 TABLET ORAL at 06:01

## 2025-08-10 RX ADMIN — CITALOPRAM HYDROBROMIDE 40 MG: 40 TABLET ORAL at 08:57

## 2025-08-10 RX ADMIN — ASPIRIN 81 MG: 81 TABLET, CHEWABLE ORAL at 08:58

## 2025-08-10 RX ADMIN — LEVOTHYROXINE SODIUM 88 MCG: 0.09 TABLET ORAL at 06:01

## 2025-08-10 RX ADMIN — EMPAGLIFLOZIN 10 MG: 10 TABLET, FILM COATED ORAL at 08:57

## 2025-08-10 RX ADMIN — WARFARIN SODIUM 0.5 MG: 1 TABLET ORAL at 17:59

## 2025-08-10 RX ADMIN — IPRATROPIUM BROMIDE AND ALBUTEROL SULFATE 3 ML: .5; 3 SOLUTION RESPIRATORY (INHALATION) at 20:18

## 2025-08-10 RX ADMIN — ACETAMINOPHEN 650 MG: 325 TABLET, FILM COATED ORAL at 18:54

## 2025-08-10 RX ADMIN — ACETYLCYSTEINE 600 MG: 200 SOLUTION ORAL; RESPIRATORY (INHALATION) at 03:19

## 2025-08-10 RX ADMIN — INSULIN LISPRO 4 UNITS: 100 INJECTION, SOLUTION INTRAVENOUS; SUBCUTANEOUS at 12:26

## 2025-08-10 RX ADMIN — ACETAMINOPHEN 650 MG: 325 TABLET, FILM COATED ORAL at 07:19

## 2025-08-10 RX ADMIN — SENNOSIDES, DOCUSATE SODIUM 2 TABLET: 50; 8.6 TABLET, FILM COATED ORAL at 20:12

## 2025-08-10 RX ADMIN — GUAIFENESIN AND DEXTROMETHORPHAN HYDROBROMIDE 1 TABLET: 600; 30 TABLET, EXTENDED RELEASE ORAL at 20:13

## 2025-08-10 RX ADMIN — GUAIFENESIN AND DEXTROMETHORPHAN HYDROBROMIDE 1 TABLET: 600; 30 TABLET, EXTENDED RELEASE ORAL at 08:57

## 2025-08-10 RX ADMIN — ACETAMINOPHEN 650 MG: 325 TABLET, FILM COATED ORAL at 12:30

## 2025-08-10 ASSESSMENT — PAIN SCALES - GENERAL
PAINLEVEL_OUTOF10: 5 - MODERATE PAIN
PAINLEVEL_OUTOF10: 7
PAINLEVEL_OUTOF10: 6
PAINLEVEL_OUTOF10: 8
PAINLEVEL_OUTOF10: 7
PAINLEVEL_OUTOF10: 6
PAINLEVEL_OUTOF10: 0 - NO PAIN
PAINLEVEL_OUTOF10: 4
PAINLEVEL_OUTOF10: 8
PAINLEVEL_OUTOF10: 4
PAINLEVEL_OUTOF10: 6
PAINLEVEL_OUTOF10: 0 - NO PAIN
PAINLEVEL_OUTOF10: 8

## 2025-08-10 ASSESSMENT — PAIN DESCRIPTION - LOCATION
LOCATION: ABDOMEN
LOCATION: CHEST
LOCATION: INCISION

## 2025-08-10 ASSESSMENT — PAIN DESCRIPTION - DESCRIPTORS
DESCRIPTORS: ACHING

## 2025-08-10 ASSESSMENT — PAIN DESCRIPTION - ORIENTATION: ORIENTATION: RIGHT;LOWER
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
## 2025-08-10 NOTE — CARE PLAN
The patient's goals for the shift include to have less pain and get some sleep    The clinical goals for the shift include stable hemodynamics with improved pain level      Problem: Skin  Goal: Decreased wound size/increased tissue granulation at next dressing change  Outcome: Progressing  Flowsheets (Taken 8/10/2025 0013)  Decreased wound size/increased tissue granulation at next dressing change:   Protective dressings over bony prominences   Promote sleep for wound healing  Goal: Participates in plan/prevention/treatment measures  Outcome: Progressing  Flowsheets (Taken 8/10/2025 0013)  Participates in plan/prevention/treatment measures:   Elevate heels   Increase activity/out of bed for meals  Goal: Prevent/manage excess moisture  Outcome: Progressing  Flowsheets (Taken 8/10/2025 0013)  Prevent/manage excess moisture:   Monitor for/manage infection if present   Follow provider orders for dressing changes   Moisturize dry skin   Cleanse incontinence/protect with barrier cream  Goal: Prevent/minimize sheer/friction injuries  Outcome: Progressing  Flowsheets (Taken 8/10/2025 0013)  Prevent/minimize sheer/friction injuries:   Use pull sheet   Increase activity/out of bed for meals   Turn/reposition every 2 hours/use positioning/transfer devices  Goal: Promote/optimize nutrition  Outcome: Progressing  Flowsheets (Taken 8/10/2025 0013)  Promote/optimize nutrition:   Monitor/record intake including meals   Consume > 50% meals/supplements   Offer water/supplements/favorite foods  Goal: Promote skin healing  Outcome: Progressing  Flowsheets (Taken 8/10/2025 0013)  Promote skin healing:   Turn/reposition every 2 hours/use positioning/transfer devices   Protective dressings over bony prominences   Assess skin/pad under line(s)/device(s)   Rotate device position/do not position patient on device     Problem: Pain  Goal: Takes deep breaths with improved pain control throughout the shift  Outcome: Progressing  Goal: Turns in  bed with improved pain control throughout the shift  Outcome: Progressing  Goal: Walks with improved pain control throughout the shift  Outcome: Progressing  Goal: Performs ADL's with improved pain control throughout shift  Outcome: Progressing  Goal: Participates in PT with improved pain control throughout the shift  Outcome: Progressing  Goal: Free from opioid side effects throughout the shift  Outcome: Progressing  Goal: Free from acute confusion related to pain meds throughout the shift  Outcome: Progressing     Problem: Fall/Injury  Goal: Not fall by end of shift  Outcome: Progressing  Goal: Be free from injury by end of the shift  Outcome: Progressing  Goal: Verbalize understanding of personal risk factors for fall in the hospital  Outcome: Progressing  Goal: Verbalize understanding of risk factor reduction measures to prevent injury from fall in the home  Outcome: Progressing  Goal: Use assistive devices by end of the shift  Outcome: Progressing  Goal: Pace activities to prevent fatigue by end of the shift  Outcome: Progressing

## 2025-08-11 ENCOUNTER — APPOINTMENT (OUTPATIENT)
Dept: RADIOLOGY | Facility: HOSPITAL | Age: 60
DRG: 001 | End: 2025-08-11
Payer: COMMERCIAL

## 2025-08-11 VITALS
SYSTOLIC BLOOD PRESSURE: 81 MMHG | DIASTOLIC BLOOD PRESSURE: 56 MMHG | OXYGEN SATURATION: 89 % | RESPIRATION RATE: 21 BRPM | TEMPERATURE: 97.7 F | HEART RATE: 74 BPM | BODY MASS INDEX: 24.42 KG/M2 | HEIGHT: 62 IN | WEIGHT: 132.72 LBS

## 2025-08-11 LAB
ABO GROUP (TYPE) IN BLOOD: NORMAL
ALBUMIN SERPL BCP-MCNC: 3.3 G/DL (ref 3.4–5)
ALBUMIN SERPL BCP-MCNC: 3.4 G/DL (ref 3.4–5)
ALP SERPL-CCNC: 82 U/L (ref 33–136)
ALT SERPL W P-5'-P-CCNC: 5 U/L (ref 7–45)
ANION GAP SERPL CALC-SCNC: 13 MMOL/L (ref 10–20)
ANTIBODY SCREEN: NORMAL
APTT PPP: 36 SECONDS (ref 26–36)
AST SERPL W P-5'-P-CCNC: 10 U/L (ref 9–39)
BILIRUB DIRECT SERPL-MCNC: 0.1 MG/DL (ref 0–0.3)
BILIRUB SERPL-MCNC: 0.4 MG/DL (ref 0–1.2)
BODY TEMPERATURE: ABNORMAL
BUN SERPL-MCNC: 15 MG/DL (ref 6–23)
CALCIUM SERPL-MCNC: 8.5 MG/DL (ref 8.6–10.6)
CHLORIDE SERPL-SCNC: 94 MMOL/L (ref 98–107)
CO2 SERPL-SCNC: 29 MMOL/L (ref 21–32)
CREAT SERPL-MCNC: 0.74 MG/DL (ref 0.5–1.05)
EGFRCR SERPLBLD CKD-EPI 2021: >90 ML/MIN/1.73M*2
ERYTHROCYTE [DISTWIDTH] IN BLOOD BY AUTOMATED COUNT: 15.5 % (ref 11.5–14.5)
FUNGUS SPEC CULT: ABNORMAL
FUNGUS SPEC CULT: NORMAL
FUNGUS SPEC FUNGUS STN: ABNORMAL
FUNGUS SPEC FUNGUS STN: NORMAL
GLUCOSE BLD MANUAL STRIP-MCNC: 103 MG/DL (ref 74–99)
GLUCOSE BLD MANUAL STRIP-MCNC: 151 MG/DL (ref 74–99)
GLUCOSE BLD MANUAL STRIP-MCNC: 76 MG/DL (ref 74–99)
GLUCOSE BLD MANUAL STRIP-MCNC: 88 MG/DL (ref 74–99)
GLUCOSE SERPL-MCNC: 143 MG/DL (ref 74–99)
HCT VFR BLD AUTO: 25.5 % (ref 36–46)
HGB BLD-MCNC: 8.2 G/DL (ref 12–16)
INHALED O2 CONCENTRATION: 35 %
INR PPP: 1.9 (ref 0.9–1.1)
LDH SERPL L TO P-CCNC: 224 U/L (ref 84–246)
MAGNESIUM SERPL-MCNC: 1.92 MG/DL (ref 1.6–2.4)
MCH RBC QN AUTO: 29.6 PG (ref 26–34)
MCHC RBC AUTO-ENTMCNC: 32.2 G/DL (ref 32–36)
MCV RBC AUTO: 92 FL (ref 80–100)
NRBC BLD-RTO: 0 /100 WBCS (ref 0–0)
OXYHGB MFR BLDMV: 53.5 % (ref 45–75)
PCO2 BLDMV: 51 MM HG (ref 41–51)
PH BLDMV: 7.37 PH (ref 7.33–7.43)
PHOSPHATE SERPL-MCNC: 3.4 MG/DL (ref 2.5–4.9)
PLATELET # BLD AUTO: 264 X10*3/UL (ref 150–450)
PO2 BLDMV: 33 MM HG (ref 35–45)
POTASSIUM SERPL-SCNC: 4.2 MMOL/L (ref 3.5–5.3)
PROT SERPL-MCNC: 6.2 G/DL (ref 6.4–8.2)
PROTHROMBIN TIME: 21 SECONDS (ref 9.8–12.4)
RBC # BLD AUTO: 2.77 X10*6/UL (ref 4–5.2)
RH FACTOR (ANTIGEN D): NORMAL
SAO2 % BLDMV: 55 % (ref 45–75)
SODIUM SERPL-SCNC: 132 MMOL/L (ref 136–145)
WBC # BLD AUTO: 6 X10*3/UL (ref 4.4–11.3)

## 2025-08-11 PROCEDURE — 2500000001 HC RX 250 WO HCPCS SELF ADMINISTERED DRUGS (ALT 637 FOR MEDICARE OP)

## 2025-08-11 PROCEDURE — 83735 ASSAY OF MAGNESIUM: CPT | Performed by: NURSE PRACTITIONER

## 2025-08-11 PROCEDURE — 2500000002 HC RX 250 W HCPCS SELF ADMINISTERED DRUGS (ALT 637 FOR MEDICARE OP, ALT 636 FOR OP/ED)

## 2025-08-11 PROCEDURE — 94668 MNPJ CHEST WALL SBSQ: CPT

## 2025-08-11 PROCEDURE — 37799 UNLISTED PX VASCULAR SURGERY: CPT | Performed by: NURSE PRACTITIONER

## 2025-08-11 PROCEDURE — 82947 ASSAY GLUCOSE BLOOD QUANT: CPT

## 2025-08-11 PROCEDURE — 85027 COMPLETE CBC AUTOMATED: CPT | Performed by: NURSE PRACTITIONER

## 2025-08-11 PROCEDURE — 2500000002 HC RX 250 W HCPCS SELF ADMINISTERED DRUGS (ALT 637 FOR MEDICARE OP, ALT 636 FOR OP/ED): Performed by: NURSE PRACTITIONER

## 2025-08-11 PROCEDURE — 99291 CRITICAL CARE FIRST HOUR: CPT | Performed by: NURSE PRACTITIONER

## 2025-08-11 PROCEDURE — 2500000004 HC RX 250 GENERAL PHARMACY W/ HCPCS (ALT 636 FOR OP/ED)

## 2025-08-11 PROCEDURE — 99291 CRITICAL CARE FIRST HOUR: CPT | Performed by: STUDENT IN AN ORGANIZED HEALTH CARE EDUCATION/TRAINING PROGRAM

## 2025-08-11 PROCEDURE — 83615 LACTATE (LD) (LDH) ENZYME: CPT | Performed by: NURSE PRACTITIONER

## 2025-08-11 PROCEDURE — 2500000001 HC RX 250 WO HCPCS SELF ADMINISTERED DRUGS (ALT 637 FOR MEDICARE OP): Performed by: NURSE PRACTITIONER

## 2025-08-11 PROCEDURE — 71045 X-RAY EXAM CHEST 1 VIEW: CPT | Performed by: RADIOLOGY

## 2025-08-11 PROCEDURE — 71045 X-RAY EXAM CHEST 1 VIEW: CPT

## 2025-08-11 PROCEDURE — 2500000004 HC RX 250 GENERAL PHARMACY W/ HCPCS (ALT 636 FOR OP/ED): Performed by: NURSE PRACTITIONER

## 2025-08-11 PROCEDURE — 85610 PROTHROMBIN TIME: CPT | Performed by: NURSE PRACTITIONER

## 2025-08-11 PROCEDURE — 82248 BILIRUBIN DIRECT: CPT | Performed by: NURSE PRACTITIONER

## 2025-08-11 PROCEDURE — 86901 BLOOD TYPING SEROLOGIC RH(D): CPT | Performed by: NURSE PRACTITIONER

## 2025-08-11 PROCEDURE — 2500000005 HC RX 250 GENERAL PHARMACY W/O HCPCS

## 2025-08-11 PROCEDURE — 82374 ASSAY BLOOD CARBON DIOXIDE: CPT | Performed by: NURSE PRACTITIONER

## 2025-08-11 PROCEDURE — 1100000001 HC PRIVATE ROOM DAILY

## 2025-08-11 RX ORDER — WARFARIN 1 MG/1
0.5 TABLET ORAL ONCE
Status: COMPLETED | OUTPATIENT
Start: 2025-08-11 | End: 2025-08-11

## 2025-08-11 RX ORDER — SACUBITRIL AND VALSARTAN 24; 26 MG/1; MG/1
1 TABLET ORAL 2 TIMES DAILY
Status: DISCONTINUED | OUTPATIENT
Start: 2025-08-11 | End: 2025-08-18 | Stop reason: HOSPADM

## 2025-08-11 RX ORDER — POTASSIUM CHLORIDE 20 MEQ/1
20 TABLET, EXTENDED RELEASE ORAL ONCE
Status: COMPLETED | OUTPATIENT
Start: 2025-08-11 | End: 2025-08-11

## 2025-08-11 RX ORDER — FUROSEMIDE 10 MG/ML
80 INJECTION INTRAMUSCULAR; INTRAVENOUS ONCE
Status: COMPLETED | OUTPATIENT
Start: 2025-08-11 | End: 2025-08-11

## 2025-08-11 RX ADMIN — POTASSIUM CHLORIDE 20 MEQ: 1500 TABLET, EXTENDED RELEASE ORAL at 14:16

## 2025-08-11 RX ADMIN — EMPAGLIFLOZIN 10 MG: 10 TABLET, FILM COATED ORAL at 09:06

## 2025-08-11 RX ADMIN — IPRATROPIUM BROMIDE AND ALBUTEROL SULFATE 3 ML: .5; 3 SOLUTION RESPIRATORY (INHALATION) at 08:36

## 2025-08-11 RX ADMIN — ACETAMINOPHEN 650 MG: 325 TABLET, FILM COATED ORAL at 14:16

## 2025-08-11 RX ADMIN — PANTOPRAZOLE SODIUM 40 MG: 40 TABLET, DELAYED RELEASE ORAL at 06:28

## 2025-08-11 RX ADMIN — ROPINIROLE 1 MG: 1 TABLET, FILM COATED ORAL at 09:12

## 2025-08-11 RX ADMIN — ROPINIROLE 1 MG: 1 TABLET, FILM COATED ORAL at 14:17

## 2025-08-11 RX ADMIN — SPIRONOLACTONE 25 MG: 25 TABLET ORAL at 09:06

## 2025-08-11 RX ADMIN — ACETYLCYSTEINE 600 MG: 200 SOLUTION ORAL; RESPIRATORY (INHALATION) at 08:36

## 2025-08-11 RX ADMIN — WARFARIN SODIUM 0.5 MG: 1 TABLET ORAL at 17:40

## 2025-08-11 RX ADMIN — LIDOCAINE 4% 1 PATCH: 40 PATCH TOPICAL at 09:21

## 2025-08-11 RX ADMIN — OXYCODONE HYDROCHLORIDE 5 MG: 5 TABLET ORAL at 09:05

## 2025-08-11 RX ADMIN — OXYCODONE HYDROCHLORIDE 5 MG: 5 TABLET ORAL at 14:26

## 2025-08-11 RX ADMIN — OXYCODONE HYDROCHLORIDE 5 MG: 5 TABLET ORAL at 00:13

## 2025-08-11 RX ADMIN — ACETAMINOPHEN 650 MG: 325 TABLET, FILM COATED ORAL at 06:31

## 2025-08-11 RX ADMIN — LEVOTHYROXINE SODIUM 88 MCG: 0.09 TABLET ORAL at 06:31

## 2025-08-11 RX ADMIN — ATORVASTATIN CALCIUM 80 MG: 80 TABLET, FILM COATED ORAL at 09:05

## 2025-08-11 RX ADMIN — LIDOCAINE 4% 1 PATCH: 40 PATCH TOPICAL at 09:06

## 2025-08-11 RX ADMIN — ACETAMINOPHEN 650 MG: 325 TABLET, FILM COATED ORAL at 19:49

## 2025-08-11 RX ADMIN — INSULIN GLARGINE 25 UNITS: 100 INJECTION, SOLUTION SUBCUTANEOUS at 09:06

## 2025-08-11 RX ADMIN — SENNOSIDES, DOCUSATE SODIUM 2 TABLET: 50; 8.6 TABLET, FILM COATED ORAL at 09:05

## 2025-08-11 RX ADMIN — ICOSAPENT ETHYL 2 G: 1 CAPSULE ORAL at 09:05

## 2025-08-11 RX ADMIN — FUROSEMIDE 80 MG: 10 INJECTION, SOLUTION INTRAMUSCULAR; INTRAVENOUS at 14:16

## 2025-08-11 RX ADMIN — ICOSAPENT ETHYL 2 G: 1 CAPSULE ORAL at 17:40

## 2025-08-11 RX ADMIN — CITALOPRAM HYDROBROMIDE 40 MG: 40 TABLET ORAL at 09:05

## 2025-08-11 RX ADMIN — ACETAMINOPHEN 650 MG: 325 TABLET, FILM COATED ORAL at 00:51

## 2025-08-11 RX ADMIN — ASPIRIN 81 MG: 81 TABLET, CHEWABLE ORAL at 09:05

## 2025-08-11 RX ADMIN — ACETYLCYSTEINE 600 MG: 200 SOLUTION ORAL; RESPIRATORY (INHALATION) at 14:39

## 2025-08-11 RX ADMIN — GUAIFENESIN AND DEXTROMETHORPHAN HYDROBROMIDE 1 TABLET: 600; 30 TABLET, EXTENDED RELEASE ORAL at 09:11

## 2025-08-11 RX ADMIN — IPRATROPIUM BROMIDE AND ALBUTEROL SULFATE 3 ML: .5; 3 SOLUTION RESPIRATORY (INHALATION) at 14:39

## 2025-08-11 ASSESSMENT — PAIN SCALES - GENERAL
PAINLEVEL_OUTOF10: 0 - NO PAIN
PAINLEVEL_OUTOF10: 5 - MODERATE PAIN
PAINLEVEL_OUTOF10: 0 - NO PAIN
PAINLEVEL_OUTOF10: 0 - NO PAIN
PAINLEVEL_OUTOF10: 8
PAINLEVEL_OUTOF10: 0 - NO PAIN
PAINLEVEL_OUTOF10: 0 - NO PAIN
PAINLEVEL_OUTOF10: 5 - MODERATE PAIN
PAINLEVEL_OUTOF10: 0 - NO PAIN

## 2025-08-11 ASSESSMENT — PAIN DESCRIPTION - LOCATION
LOCATION: MEDIASTINUM
LOCATION: MEDIASTINUM

## 2025-08-11 ASSESSMENT — PAIN - FUNCTIONAL ASSESSMENT
PAIN_FUNCTIONAL_ASSESSMENT: 0-10

## 2025-08-11 ASSESSMENT — PAIN DESCRIPTION - DESCRIPTORS
DESCRIPTORS: ACHING
DESCRIPTORS: ACHING

## 2025-08-11 NOTE — CARE PLAN
The patient's goals for the shift include pt will be HDS throughout shift.    The clinical goals for the shift include stable hemodynamics with improved pain level      Problem: Pain - Adult  Goal: Verbalizes/displays adequate comfort level or baseline comfort level  Outcome: Progressing     Problem: Safety - Adult  Goal: Free from fall injury  Outcome: Progressing     Problem: Chronic Conditions and Co-morbidities  Goal: Patient's chronic conditions and co-morbidity symptoms are monitored and maintained or improved  Outcome: Progressing     Problem: Heart Failure  Goal: Improved gas exchange this shift  Outcome: Progressing  Goal: Improved urinary output this shift  Outcome: Progressing  Goal: Reduction in peripheral edema within 24 hours  Outcome: Progressing  Goal: Report improvement of dyspnea/breathlessness this shift  Outcome: Progressing  Goal: Weight from fluid excess reduced over 2-3 days, then stabilize  Outcome: Progressing  Goal: Increase self care and/or family involvement in 24 hours  Outcome: Progressing     Problem: Respiratory  Goal: Patent airway maintained this shift  Outcome: Progressing     Problem: Diabetes  Goal: Maintain glucose levels >70mg/dl to <250mg/dl throughout shift  Outcome: Progressing  Goal: No changes in neurological exam by end of shift  Outcome: Progressing  Goal: Increase self care and/or family involovement by end of shift  Outcome: Progressing     Problem: Ventricular Assisted Device (VAD)  Goal: Stable metal status  Outcome: Progressing  Goal: Pulmonary toileting, incentive spirometry  Outcome: Progressing  Goal: Nutrition  Outcome: Progressing  Goal: Mobility/OT/PT  Outcome: Progressing  Goal: Rubber ball  Outcome: Progressing  Goal: ROM  Outcome: Progressing  Goal: Sitting  Outcome: Progressing  Goal: Walk  Outcome: Progressing  Goal: Involve in VAD awareness, dressing change  Outcome: Progressing  Goal: AICD On/Off  Outcome: Progressing     Problem: Skin  Goal: Decreased  wound size/increased tissue granulation at next dressing change  Outcome: Progressing  Goal: Participates in plan/prevention/treatment measures  Outcome: Progressing  Goal: Prevent/manage excess moisture  Outcome: Progressing  Goal: Prevent/minimize sheer/friction injuries  Outcome: Progressing  Goal: Promote/optimize nutrition  Outcome: Progressing  Goal: Promote skin healing  Outcome: Progressing     Problem: Pain  Goal: Takes deep breaths with improved pain control throughout the shift  Outcome: Progressing  Goal: Turns in bed with improved pain control throughout the shift  Outcome: Progressing  Goal: Walks with improved pain control throughout the shift  Outcome: Progressing  Goal: Performs ADL's with improved pain control throughout shift  Outcome: Progressing  Goal: Participates in PT with improved pain control throughout the shift  Outcome: Progressing  Goal: Free from opioid side effects throughout the shift  Outcome: Progressing  Goal: Free from acute confusion related to pain meds throughout the shift  Outcome: Progressing     Problem: Fall/Injury  Goal: Not fall by end of shift  Outcome: Progressing  Goal: Be free from injury by end of the shift  Outcome: Progressing  Goal: Verbalize understanding of personal risk factors for fall in the hospital  Outcome: Progressing  Goal: Verbalize understanding of risk factor reduction measures to prevent injury from fall in the home  Outcome: Progressing  Goal: Use assistive devices by end of the shift  Outcome: Progressing  Goal: Pace activities to prevent fatigue by end of the shift  Outcome: Progressing

## 2025-08-11 NOTE — CARE PLAN
D/T pain patient could only tolerate 5-min of treatment. NP aware.    Date of visit: 5/6/2021      Chief Complaint   Patient presents with   • Office Visit       MA note appreciated and accepted.      HISTORY OF PRESENT ILLNESS:  Mr Garry Arana who is a pt of Garry Mejia MD presents for FU of chronic conditions:  1- DM - taking metformin 500 mg BID with meals - had some constipation at 1st, but this is resolving.  Has been careful with diet - lost about 5 lb.  Not interested in checking blood sugars  2- HTN - taking losartan daily - no side effects        Past Medical History:   Diagnosis Date   • Ganglion of joint     wrist   • Gastroesophageal reflux disease    • Impaired fasting glucose    • Iron deficiency anemia, unspecified    • Mild major depression (CMS/HCC) 3/1/2019   • Mixed hyperlipidemia    • Obesity, unspecified    • Unspecified sleep apnea    • Vitamin D deficiency          REVIEW OF SYSTEMS:  Constitutional: feels great  Cardiovascular: Denies chest pain, palpitations or pedal edema  Respiratory: Denies cough, SOB, wheeze      EXAM:    Visit Vitals  BP (!) 148/86 Comment: per np   Pulse 82   Temp 97.3 °F (36.3 °C) (Temporal)   Wt 133.6 kg   SpO2 96%   BMI 38.87 kg/m²   down 2 kgs in 6 weeks     General: pleasant 55 year old male who looks comfortable  Cardiovascular: RRR, no pedal edema  Respiratory: CTA bilateral, normal resp effort        ASSESSMENT:   1. Type 2 diabetes mellitus without complication, without long-term current use of insulin (CMS/HCC)    2. Essential hypertension - not at goal - increasing losartan to 100 mg   3. Mixed hyperlipidemia    Will see again in 6-8 weeks - then send for diabetes education at that time.   He is willing to have the COVID shot      PLAN:   Orders Placed This Encounter   • Basic Metabolic Panel  - glucose: 156, otherwise WNL    • losartan (Cozaar) 100 MG tablet   • atorvastatin (LIPITOR) 20 MG tablet   See eye doctor  Continue metformin 500 BID with meals  Increase dose of losartan to 100 mg from 50 mg - best to  take in evening  Start atorvastatin   Pt educated about diabetes self care.  Please see Pt Instructions     ADDENDUM 5-7-2021: e-letter sent with lab results & regular letter as well.       FU: recheck late June / early July - labs then     Written information given  Patient states understanding and agreement with plan  Collaborating physicians: Dr. Kyler Darby & Dr Jo Wilson

## 2025-08-11 NOTE — CARE PLAN
The patient's goals for the shift include improved pain reduction during shift    The clinical goals for the shift include patient will receive adequate rest and will maintain a tolerable pain level by end of shift    Problem: Pain - Adult  Goal: Verbalizes/displays adequate comfort level or baseline comfort level  Outcome: Progressing     Problem: Safety - Adult  Goal: Free from fall injury  Outcome: Progressing     Problem: Discharge Planning  Goal: Discharge to home or other facility with appropriate resources  Outcome: Progressing     Problem: Chronic Conditions and Co-morbidities  Goal: Patient's chronic conditions and co-morbidity symptoms are monitored and maintained or improved  Outcome: Progressing     Problem: Nutrition  Goal: Nutrient intake appropriate for maintaining nutritional needs  Outcome: Progressing     Problem: Heart Failure  Goal: Improved gas exchange this shift  Outcome: Progressing  Goal: Improved urinary output this shift  Outcome: Progressing  Goal: Reduction in peripheral edema within 24 hours  Outcome: Progressing  Goal: Report improvement of dyspnea/breathlessness this shift  Outcome: Progressing  Goal: Weight from fluid excess reduced over 2-3 days, then stabilize  Outcome: Progressing  Goal: Increase self care and/or family involvement in 24 hours  Outcome: Progressing     Problem: Respiratory  Goal: Minimal/no exertional discomfort or dyspnea this shift  Outcome: Progressing  Goal: No signs of respiratory distress (eg. Use of accessory muscles. Peds grunting)  Outcome: Progressing  Goal: Patent airway maintained this shift  Outcome: Progressing  Goal: Verbalize decreased shortness of breath this shift  Outcome: Progressing  Goal: Wean oxygen to maintain O2 saturation per order/standard this shift  Outcome: Progressing  Goal: Increase self care and/or family involvement in next 24 hours  Outcome: Progressing     Problem: Diabetes  Goal: Achieve decreasing blood glucose levels by end  of shift  Outcome: Progressing  Goal: Increase stability of blood glucose readings by end of shift  Outcome: Progressing  Goal: Decrease in ketones present in urine by end of shift  Outcome: Progressing  Goal: Maintain electrolyte levels within acceptable range throughout shift  Outcome: Progressing  Goal: Maintain glucose levels >70mg/dl to <250mg/dl throughout shift  Outcome: Progressing  Goal: No changes in neurological exam by end of shift  Outcome: Progressing  Goal: Learn about and adhere to nutrition recommendations by end of shift  Outcome: Progressing  Goal: Vital signs within normal range for age by end of shift  Outcome: Progressing  Goal: Increase self care and/or family involovement by end of shift  Outcome: Progressing  Goal: Receive DSME education by end of shift  Outcome: Progressing     Problem: Ventricular Assisted Device (VAD)  Goal: Stable metal status  Outcome: Progressing  Goal: Pulmonary toileting, incentive spirometry  Outcome: Progressing  Goal: Nutrition  Outcome: Progressing  Goal: Mobility/OT/PT  Outcome: Progressing  Goal: Rubber ball  Outcome: Progressing  Goal: ROM  Outcome: Progressing  Goal: Sitting  Outcome: Progressing  Goal: Walk  Outcome: Progressing  Goal: Involve in VAD awareness, dressing change  Outcome: Progressing  Goal: AICD On/Off  Outcome: Progressing     Problem: Skin  Goal: Decreased wound size/increased tissue granulation at next dressing change  Outcome: Progressing  Goal: Participates in plan/prevention/treatment measures  Outcome: Progressing  Goal: Prevent/manage excess moisture  Outcome: Progressing  Goal: Prevent/minimize sheer/friction injuries  Outcome: Progressing  Goal: Promote/optimize nutrition  Outcome: Progressing  Goal: Promote skin healing  Outcome: Progressing     Problem: Pain  Goal: Takes deep breaths with improved pain control throughout the shift  Outcome: Progressing  Goal: Turns in bed with improved pain control throughout the shift  Outcome:  Progressing  Goal: Walks with improved pain control throughout the shift  Outcome: Progressing  Goal: Performs ADL's with improved pain control throughout shift  Outcome: Progressing  Goal: Participates in PT with improved pain control throughout the shift  Outcome: Progressing  Goal: Free from opioid side effects throughout the shift  Outcome: Progressing  Goal: Free from acute confusion related to pain meds throughout the shift  Outcome: Progressing     Problem: Fall/Injury  Goal: Not fall by end of shift  Outcome: Progressing  Goal: Be free from injury by end of the shift  Outcome: Progressing  Goal: Verbalize understanding of personal risk factors for fall in the hospital  Outcome: Progressing  Goal: Verbalize understanding of risk factor reduction measures to prevent injury from fall in the home  Outcome: Progressing  Goal: Use assistive devices by end of the shift  Outcome: Progressing  Goal: Pace activities to prevent fatigue by end of the shift  Outcome: Progressing        no concerns

## 2025-08-11 NOTE — PROGRESS NOTES
[facility] Attending Note    Assessment & Plan  Acute on chronic heart failure with reduced ejection fraction (HFrEF, <= 40%)    CAD (coronary artery disease)    CHF (NYHA class IV, ACC/AHA stage D) (Multi)    Stented coronary artery    Pulmonary hypertension (Multi)    Osteoarthritis    60-year-old woman status post long-term LVAD 8/1/2025 (Dr. Inman).  Postoperative course complicated by respiratory embarrassment secondary to mucous plugging.  She required bronchoscopy, IV diuresis.  Fortunately improving respiratory status and oxygen requirement now down to 4 L.    She continues to have a largely nonproductive cough, with reasonable air entry.  Chest x-ray today demonstrates ongoing small bilateral pleural lesions.    Will continue aggressive bronchial toilet, diuresis.    Stable LVAD function.      This critically ill patient continues to be at-risk for clinically significant deterioration / failure due to the above mentioned dysfunctional, unstable organ systems.  I have personally identified and managed all complex critical care issues to prevent aforementioned clinical deterioration.  Critical care time is spent at bedside and/or the immediate area and has included, but is not limited to, the review of diagnostic tests, labs, radiographs, serial assessments of hemodynamics, respiratory status, ventilatory management, and family updates.  Time spent in procedures and teaching are reported separately.    Critical care time: 35 minutes
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
## 2025-08-12 ENCOUNTER — APPOINTMENT (OUTPATIENT)
Dept: RADIOLOGY | Facility: HOSPITAL | Age: 60
DRG: 001 | End: 2025-08-12
Payer: COMMERCIAL

## 2025-08-12 LAB
ALBUMIN SERPL BCP-MCNC: 3.5 G/DL (ref 3.4–5)
ALBUMIN SERPL BCP-MCNC: 3.5 G/DL (ref 3.4–5)
ALP SERPL-CCNC: 81 U/L (ref 33–136)
ALT SERPL W P-5'-P-CCNC: 6 U/L (ref 7–45)
ANION GAP SERPL CALC-SCNC: 14 MMOL/L (ref 10–20)
APTT PPP: 34 SECONDS (ref 26–36)
AST SERPL W P-5'-P-CCNC: 10 U/L (ref 9–39)
BILIRUB DIRECT SERPL-MCNC: 0.1 MG/DL (ref 0–0.3)
BILIRUB SERPL-MCNC: 0.4 MG/DL (ref 0–1.2)
BUN SERPL-MCNC: 13 MG/DL (ref 6–23)
CALCIUM SERPL-MCNC: 9 MG/DL (ref 8.6–10.6)
CHLORIDE SERPL-SCNC: 94 MMOL/L (ref 98–107)
CO2 SERPL-SCNC: 33 MMOL/L (ref 21–32)
CREAT SERPL-MCNC: 0.77 MG/DL (ref 0.5–1.05)
EGFRCR SERPLBLD CKD-EPI 2021: 87 ML/MIN/1.73M*2
GLUCOSE BLD MANUAL STRIP-MCNC: 175 MG/DL (ref 74–99)
GLUCOSE BLD MANUAL STRIP-MCNC: 239 MG/DL (ref 74–99)
GLUCOSE BLD MANUAL STRIP-MCNC: 73 MG/DL (ref 74–99)
GLUCOSE BLD MANUAL STRIP-MCNC: 97 MG/DL (ref 74–99)
GLUCOSE SERPL-MCNC: 64 MG/DL (ref 74–99)
INR PPP: 1.8 (ref 0.9–1.1)
INR PPP: 1.9 (ref 0.9–1.1)
LDH SERPL L TO P-CCNC: 235 U/L (ref 84–246)
PHOSPHATE SERPL-MCNC: 3 MG/DL (ref 2.5–4.9)
POTASSIUM SERPL-SCNC: 4.5 MMOL/L (ref 3.5–5.3)
PROT SERPL-MCNC: 6.9 G/DL (ref 6.4–8.2)
PROTHROMBIN TIME: 19.5 SECONDS (ref 9.8–12.4)
PROTHROMBIN TIME: 20.5 SECONDS (ref 9.8–12.4)
SODIUM SERPL-SCNC: 136 MMOL/L (ref 136–145)

## 2025-08-12 PROCEDURE — 2500000002 HC RX 250 W HCPCS SELF ADMINISTERED DRUGS (ALT 637 FOR MEDICARE OP, ALT 636 FOR OP/ED): Performed by: REGISTERED NURSE

## 2025-08-12 PROCEDURE — 94640 AIRWAY INHALATION TREATMENT: CPT

## 2025-08-12 PROCEDURE — 2500000004 HC RX 250 GENERAL PHARMACY W/ HCPCS (ALT 636 FOR OP/ED): Performed by: REGISTERED NURSE

## 2025-08-12 PROCEDURE — 2500000001 HC RX 250 WO HCPCS SELF ADMINISTERED DRUGS (ALT 637 FOR MEDICARE OP): Performed by: NURSE PRACTITIONER

## 2025-08-12 PROCEDURE — 2500000001 HC RX 250 WO HCPCS SELF ADMINISTERED DRUGS (ALT 637 FOR MEDICARE OP)

## 2025-08-12 PROCEDURE — 2500000002 HC RX 250 W HCPCS SELF ADMINISTERED DRUGS (ALT 637 FOR MEDICARE OP, ALT 636 FOR OP/ED): Performed by: NURSE PRACTITIONER

## 2025-08-12 PROCEDURE — 1200000002 HC GENERAL ROOM WITH TELEMETRY DAILY

## 2025-08-12 PROCEDURE — 2500000002 HC RX 250 W HCPCS SELF ADMINISTERED DRUGS (ALT 637 FOR MEDICARE OP, ALT 636 FOR OP/ED): Performed by: STUDENT IN AN ORGANIZED HEALTH CARE EDUCATION/TRAINING PROGRAM

## 2025-08-12 PROCEDURE — 94668 MNPJ CHEST WALL SBSQ: CPT

## 2025-08-12 PROCEDURE — 84100 ASSAY OF PHOSPHORUS: CPT | Performed by: REGISTERED NURSE

## 2025-08-12 PROCEDURE — 83615 LACTATE (LD) (LDH) ENZYME: CPT | Performed by: STUDENT IN AN ORGANIZED HEALTH CARE EDUCATION/TRAINING PROGRAM

## 2025-08-12 PROCEDURE — 93750 INTERROGATION VAD IN PERSON: CPT | Performed by: REGISTERED NURSE

## 2025-08-12 PROCEDURE — 82947 ASSAY GLUCOSE BLOOD QUANT: CPT

## 2025-08-12 PROCEDURE — 97535 SELF CARE MNGMENT TRAINING: CPT | Mod: GO

## 2025-08-12 PROCEDURE — 85610 PROTHROMBIN TIME: CPT | Performed by: STUDENT IN AN ORGANIZED HEALTH CARE EDUCATION/TRAINING PROGRAM

## 2025-08-12 PROCEDURE — 36415 COLL VENOUS BLD VENIPUNCTURE: CPT | Performed by: REGISTERED NURSE

## 2025-08-12 PROCEDURE — 71045 X-RAY EXAM CHEST 1 VIEW: CPT

## 2025-08-12 PROCEDURE — 36415 COLL VENOUS BLD VENIPUNCTURE: CPT | Performed by: STUDENT IN AN ORGANIZED HEALTH CARE EDUCATION/TRAINING PROGRAM

## 2025-08-12 PROCEDURE — 2500000004 HC RX 250 GENERAL PHARMACY W/ HCPCS (ALT 636 FOR OP/ED): Performed by: STUDENT IN AN ORGANIZED HEALTH CARE EDUCATION/TRAINING PROGRAM

## 2025-08-12 PROCEDURE — 2500000001 HC RX 250 WO HCPCS SELF ADMINISTERED DRUGS (ALT 637 FOR MEDICARE OP): Performed by: STUDENT IN AN ORGANIZED HEALTH CARE EDUCATION/TRAINING PROGRAM

## 2025-08-12 PROCEDURE — 2500000005 HC RX 250 GENERAL PHARMACY W/O HCPCS: Performed by: STUDENT IN AN ORGANIZED HEALTH CARE EDUCATION/TRAINING PROGRAM

## 2025-08-12 PROCEDURE — 99233 SBSQ HOSP IP/OBS HIGH 50: CPT | Performed by: REGISTERED NURSE

## 2025-08-12 PROCEDURE — 97530 THERAPEUTIC ACTIVITIES: CPT | Mod: GP,CQ

## 2025-08-12 PROCEDURE — 80076 HEPATIC FUNCTION PANEL: CPT | Performed by: STUDENT IN AN ORGANIZED HEALTH CARE EDUCATION/TRAINING PROGRAM

## 2025-08-12 PROCEDURE — 85610 PROTHROMBIN TIME: CPT | Performed by: REGISTERED NURSE

## 2025-08-12 PROCEDURE — 71045 X-RAY EXAM CHEST 1 VIEW: CPT | Performed by: RADIOLOGY

## 2025-08-12 PROCEDURE — 97116 GAIT TRAINING THERAPY: CPT | Mod: GP,CQ

## 2025-08-12 RX ORDER — FUROSEMIDE 10 MG/ML
80 INJECTION INTRAMUSCULAR; INTRAVENOUS ONCE
Status: COMPLETED | OUTPATIENT
Start: 2025-08-12 | End: 2025-08-12

## 2025-08-12 RX ORDER — WARFARIN 1 MG/1
1 TABLET ORAL ONCE
Status: COMPLETED | OUTPATIENT
Start: 2025-08-12 | End: 2025-08-12

## 2025-08-12 RX ADMIN — ICOSAPENT ETHYL 2 G: 1 CAPSULE ORAL at 16:54

## 2025-08-12 RX ADMIN — ACETYLCYSTEINE 600 MG: 200 SOLUTION ORAL; RESPIRATORY (INHALATION) at 10:37

## 2025-08-12 RX ADMIN — EMPAGLIFLOZIN 10 MG: 10 TABLET, FILM COATED ORAL at 08:13

## 2025-08-12 RX ADMIN — OXYCODONE HYDROCHLORIDE 5 MG: 5 TABLET ORAL at 16:57

## 2025-08-12 RX ADMIN — ROPINIROLE HYDROCHLORIDE 3 MG: 3 TABLET, FILM COATED ORAL at 00:15

## 2025-08-12 RX ADMIN — LIDOCAINE 4% 1 PATCH: 40 PATCH TOPICAL at 08:16

## 2025-08-12 RX ADMIN — ROPINIROLE 1 MG: 1 TABLET, FILM COATED ORAL at 00:15

## 2025-08-12 RX ADMIN — ICOSAPENT ETHYL 2 G: 1 CAPSULE ORAL at 08:13

## 2025-08-12 RX ADMIN — SENNOSIDES, DOCUSATE SODIUM 2 TABLET: 50; 8.6 TABLET, FILM COATED ORAL at 00:16

## 2025-08-12 RX ADMIN — PANTOPRAZOLE SODIUM 40 MG: 40 TABLET, DELAYED RELEASE ORAL at 06:39

## 2025-08-12 RX ADMIN — SACUBITRIL AND VALSARTAN 1 TABLET: 24; 26 TABLET, FILM COATED ORAL at 21:03

## 2025-08-12 RX ADMIN — WARFARIN SODIUM 1 MG: 1 TABLET ORAL at 17:05

## 2025-08-12 RX ADMIN — SACUBITRIL AND VALSARTAN 1 TABLET: 24; 26 TABLET, FILM COATED ORAL at 08:15

## 2025-08-12 RX ADMIN — SPIRONOLACTONE 25 MG: 25 TABLET ORAL at 08:14

## 2025-08-12 RX ADMIN — IPRATROPIUM BROMIDE AND ALBUTEROL SULFATE 3 ML: .5; 3 SOLUTION RESPIRATORY (INHALATION) at 10:38

## 2025-08-12 RX ADMIN — ATORVASTATIN CALCIUM 80 MG: 80 TABLET, FILM COATED ORAL at 08:15

## 2025-08-12 RX ADMIN — ACETAMINOPHEN 650 MG: 325 TABLET, FILM COATED ORAL at 21:03

## 2025-08-12 RX ADMIN — ASPIRIN 81 MG: 81 TABLET, CHEWABLE ORAL at 08:15

## 2025-08-12 RX ADMIN — ROPINIROLE HYDROCHLORIDE 3 MG: 3 TABLET, FILM COATED ORAL at 21:03

## 2025-08-12 RX ADMIN — OXYCODONE HYDROCHLORIDE 5 MG: 5 TABLET ORAL at 09:14

## 2025-08-12 RX ADMIN — SENNOSIDES, DOCUSATE SODIUM 2 TABLET: 50; 8.6 TABLET, FILM COATED ORAL at 21:04

## 2025-08-12 RX ADMIN — ACETAMINOPHEN 650 MG: 325 TABLET, FILM COATED ORAL at 06:39

## 2025-08-12 RX ADMIN — ACETAMINOPHEN 650 MG: 325 TABLET, FILM COATED ORAL at 01:10

## 2025-08-12 RX ADMIN — GUAIFENESIN AND DEXTROMETHORPHAN HYDROBROMIDE 1 TABLET: 600; 30 TABLET, EXTENDED RELEASE ORAL at 08:13

## 2025-08-12 RX ADMIN — LIDOCAINE 4% 1 PATCH: 40 PATCH TOPICAL at 08:13

## 2025-08-12 RX ADMIN — ACETYLCYSTEINE 600 MG: 200 SOLUTION ORAL; RESPIRATORY (INHALATION) at 21:52

## 2025-08-12 RX ADMIN — OXYCODONE HYDROCHLORIDE 5 MG: 5 TABLET ORAL at 22:45

## 2025-08-12 RX ADMIN — ROPINIROLE 1 MG: 1 TABLET, FILM COATED ORAL at 21:09

## 2025-08-12 RX ADMIN — GUAIFENESIN AND DEXTROMETHORPHAN HYDROBROMIDE 1 TABLET: 600; 30 TABLET, EXTENDED RELEASE ORAL at 22:45

## 2025-08-12 RX ADMIN — LEVOTHYROXINE SODIUM 88 MCG: 0.09 TABLET ORAL at 06:39

## 2025-08-12 RX ADMIN — ROPINIROLE 1 MG: 1 TABLET, FILM COATED ORAL at 14:14

## 2025-08-12 RX ADMIN — FUROSEMIDE 80 MG: 10 INJECTION, SOLUTION INTRAMUSCULAR; INTRAVENOUS at 11:11

## 2025-08-12 RX ADMIN — INSULIN GLARGINE 25 UNITS: 100 INJECTION, SOLUTION SUBCUTANEOUS at 08:17

## 2025-08-12 RX ADMIN — INSULIN LISPRO 2 UNITS: 100 INJECTION, SOLUTION INTRAVENOUS; SUBCUTANEOUS at 11:30

## 2025-08-12 RX ADMIN — Medication 5 MG: at 00:16

## 2025-08-12 RX ADMIN — SENNOSIDES, DOCUSATE SODIUM 2 TABLET: 50; 8.6 TABLET, FILM COATED ORAL at 08:15

## 2025-08-12 RX ADMIN — ACETAMINOPHEN 650 MG: 325 TABLET, FILM COATED ORAL at 13:10

## 2025-08-12 RX ADMIN — IPRATROPIUM BROMIDE AND ALBUTEROL SULFATE 3 ML: .5; 3 SOLUTION RESPIRATORY (INHALATION) at 21:52

## 2025-08-12 RX ADMIN — ROPINIROLE 1 MG: 1 TABLET, FILM COATED ORAL at 08:13

## 2025-08-12 RX ADMIN — CITALOPRAM HYDROBROMIDE 40 MG: 40 TABLET ORAL at 08:13

## 2025-08-12 RX ADMIN — GUAIFENESIN AND DEXTROMETHORPHAN HYDROBROMIDE 1 TABLET: 600; 30 TABLET, EXTENDED RELEASE ORAL at 01:10

## 2025-08-12 ASSESSMENT — COGNITIVE AND FUNCTIONAL STATUS - GENERAL
MOVING FROM LYING ON BACK TO SITTING ON SIDE OF FLAT BED WITH BEDRAILS: A LITTLE
WALKING IN HOSPITAL ROOM: A LITTLE
HELP NEEDED FOR BATHING: A LITTLE
DAILY ACTIVITIY SCORE: 23
STANDING UP FROM CHAIR USING ARMS: A LITTLE
DRESSING REGULAR LOWER BODY CLOTHING: A LITTLE
MOVING TO AND FROM BED TO CHAIR: A LITTLE
TOILETING: A LITTLE
CLIMB 3 TO 5 STEPS WITH RAILING: A LITTLE
MOBILITY SCORE: 17
DAILY ACTIVITIY SCORE: 23
MOBILITY SCORE: 23
MOBILITY SCORE: 23
CLIMB 3 TO 5 STEPS WITH RAILING: A LOT
TURNING FROM BACK TO SIDE WHILE IN FLAT BAD: A LITTLE
DAILY ACTIVITIY SCORE: 22
CLIMB 3 TO 5 STEPS WITH RAILING: A LITTLE
TOILETING: A LITTLE

## 2025-08-12 ASSESSMENT — PAIN DESCRIPTION - LOCATION
LOCATION: SHOULDER
LOCATION: ABDOMEN
LOCATION: SHOULDER

## 2025-08-12 ASSESSMENT — PAIN - FUNCTIONAL ASSESSMENT
PAIN_FUNCTIONAL_ASSESSMENT: 0-10

## 2025-08-12 ASSESSMENT — PAIN SCALES - GENERAL
PAINLEVEL_OUTOF10: 8
PAINLEVEL_OUTOF10: 8
PAINLEVEL_OUTOF10: 7
PAINLEVEL_OUTOF10: 5 - MODERATE PAIN
PAINLEVEL_OUTOF10: 0 - NO PAIN
PAINLEVEL_OUTOF10: 8

## 2025-08-12 ASSESSMENT — PAIN DESCRIPTION - ORIENTATION
ORIENTATION: RIGHT
ORIENTATION: RIGHT

## 2025-08-12 ASSESSMENT — ACTIVITIES OF DAILY LIVING (ADL): HOME_MANAGEMENT_TIME_ENTRY: 10

## 2025-08-12 ASSESSMENT — PAIN DESCRIPTION - DESCRIPTORS
DESCRIPTORS: ACHING
DESCRIPTORS: ACHING

## 2025-08-12 NOTE — NURSING NOTE
VAD Note   Name:  Thao Evans  Admission Date:  2025  7:21 PM  MRN: [id number]  Attending Provider: Raisa Mcege MD P*  Room/Bed:  [facility]7/5027-A             Sex: female  : 1965       Age: 60 y.o.    VAD Readmission  Readmission Checklist  Readmission Checklist: Yes  Code Status Reviewed: Yes  Code Status Ordered: Yes  Anticoagulation Goals Entered: Yes  Event Monitor Checked: Yes  Driveline Secure: Yes  Driveline Site Assessed and Photographed: Yes  Dressing Change Mely: Frequent  ICD: Nurse to Check Device Upon Admission: On    HeartMate Serial Numbers  HeartMate Equipment Tracking/Serial Numbers  Battery Charger: [id number]  Battery Clip 1: [id number]  Battery Clip 2: [id number]  Battery Clip 3: [id number]  Battery Clip 4: [id number]  Rechargeable Battery 1: [id number]  Rechargeable Battery 2: [id number]  Rechargeable Battery 3: [id number]  Rechargeable Battery 4: [id number]  Rechargeable Battery 5: [id number]  Rechargeable Battery 6: [id number]  Rechargeable Battery 7: [id number]  Rechargeable Battery 8: [id number]  Go Gear Consolidated Ba  Go Gear Vest: [id number]  Programmed Backup : [id number]  Primary Controller: [id number]  Mobile Power Unit: [id number]  Black Emergency Red Tag Ba  Apil Cuff: [id number]  Outflow graft: [id number]  Modular Cable: [id number]     HeartMate Tracking  HeartMate Equipment Transfer  Transfer From: Heart Failure Unit  Transfer To: [place] [address]  Battery Charger: Yes  Battery Clip 1: Yes  Battery Clip 2: Yes  Battery Clip 3: Yes  Battery Clip 4: Yes  Rechargeable Battery 1: Yes  Rechargeable Battery 2: Yes  Rechargeable Battery 3: Yes  Rechargeable Battery 4: Yes  Rechargeable Battery 5: Yes  Rechargeable Battery 6: Yes  Rechargeable Battery 7: Yes  Rechargeable Battery 8: Yes  Backup Battery 2: No  Go Gear Consolidated Bag: Yes  Go Gear Accessory Kit: No  Go Gear Vest: Yes  Programmed Backup : Yes  Primary Controller: Yes  Mobile Power Unit:  Yes  Black Emergency Red Tag Bag: Yes  Shower Bag: No  Apil Cuff: Yes  Outflow graft: Yes  Modular cable: Yes     HeartWare Serial Numbers        HeartWare Tracking        VAD Discharge       Educations  Education Documentation  No documentation found.        Khloe Whitehead RN  Date: 8/11/2025  Time: 11:23 PM

## 2025-08-12 NOTE — CARE PLAN
The patient's goals for the shift include patient wants hot water to be fixed this shift    The clinical goals for the shift include HDS    Over the shift, the patient did make progress toward the following goals. Barriers to progression include none. Recommendations to address these barriers include n/a.  Pt progressing with VAD education. Maps 70's to 80's. Speed increased to 5000,today

## 2025-08-12 NOTE — PROGRESS NOTES
Physical Therapy    Physical Therapy Treatment    Patient Name: Thao Evans  MRN: [id number]  Department: [place] [address]  Room: [address] [place] [address]  Today's Date: 8/12/2025  Time Calculation  Start Time: 0846  Stop Time: 0920  Time Calculation (min): 34 min         Assessment/Plan   PT Assessment  PT Assessment Results: Decreased strength, Decreased endurance, Impaired balance, Decreased mobility, Decreased safety awareness, Pain  Rehab Prognosis: Excellent  Barriers to Discharge Home: No anticipated barriers  Evaluation/Treatment Tolerance: Patient tolerated treatment well  Medical Staff Made Aware: Yes  End of Session Communication: Bedside nurse  Assessment Comment: Pt offers good effort with therapy, requires Ophelia, min cues for LVAD management, CGA for ambulation without AD. Pt fatigues easily but remains appropriate for low intensity therapy  End of Session Patient Position: Bed, 2 rail up, Alarm off, not on at start of session  PT Plan  Inpatient/Swing Bed or Outpatient: Inpatient  PT Plan  Treatment/Interventions: Bed mobility, Transfer training, Gait training, Stair training, Balance training, Strengthening, Endurance training, Range of motion, Therapeutic exercise, Therapeutic activity, Home exercise program, Positioning, Postural re-education  PT Plan: Ongoing PT  PT Frequency: 6 times per week  PT Discharge Recommendations: Low intensity level of continued care  Equipment Recommended upon Discharge: Wheeled walker  PT Recommended Transfer Status: Assist x1  PT - OK to Discharge: Yes    PT Visit Info:  PT Received On: 08/12/25  Response to Previous Treatment: Patient with no complaints from previous session.     General Visit Information:   General  Prior to Session Communication: Bedside nurse  Patient Position Received: Up in room  General Comment: Pt up and mobilizing in room. Reports fatigue but agreeable to therapy    Subjective   Precautions:  Precautions  Medical Precautions: Cardiac precautions, Fall  precautions  Post-Surgical Precautions: Move in the Tube  Precautions Comment: MAP >65. SpO2 >92%. LVAD driveline intact before and after session     Date/Time Vitals Session Patient Position Pulse Resp SpO2 BP MAP (mmHg)    08/12/25 0846 During PT  --  82  --  --  --  --      Vital Signs Comment: RN reports MAP=82 this am.  LVAD #'s: pre/post tx,   flow= 3.9/4.1  speed=4800/4800  power=3.8/3.6  PI= 3.2/3.2     Objective   Pain:  Pain Assessment  Pain Assessment: 0-10  0-10 (Numeric) Pain Score: 8  Pain Type: Surgical pain  Pain Location: Abdomen  Pain Interventions: Ambulation/increased activity, Distraction, Emotional support (RN providing pain medication)  Cognition:  Cognition  Overall Cognitive Status: Within Functional Limits  Orientation Level: Oriented X4    Treatments:     Therapeutic Activity  Therapeutic Activity Performed: Yes  Therapeutic Activity 1: Pt switching to/from LVAD battery power and donning/doffing vest with Ophelia, mod vc's    Bed Mobility  Bed Mobility: Yes  Bed Mobility 1  Bed Mobility 1: Sitting to supine, Supine to sitting  Level of Assistance 1: Modified independent  Bed Mobility Comments 1: 3x    Ambulation/Gait Training  Ambulation/Gait Training Performed: Yes  Ambulation/Gait Training 1  Surface 1: Level tile  Device 1: No device  Assistance 1: Contact guard  Quality of Gait 1: Wide base of support, Inconsistent stride length, Decreased step length, Forward flexed posture, Soft knee(s)  Comments/Distance (ft) 1: 60'x2, extended seated rest break between bouts  Transfers  Transfer: Yes  Transfer 1  Transfer From 1: Sit to, Stand to  Transfer to 1: Sit  Technique 1: Sit to stand, Stand to sit  Transfer Device 1:  (no AD)  Transfer Level of Assistance 1: Close supervision  Trials/Comments 1: from varied surfaces    Outcome Measures:     [facility] Basic Mobility  Turning from your back to your side while in a flat bed without using bedrails: A little  Moving from lying on your back to sitting  on the side of a flat bed without using bedrails: A little  Moving to and from bed to chair (including a wheelchair): A little  Standing up from a chair using your arms (e.g. wheelchair or bedside chair): A little  To walk in hospital room: A little  Climbing 3-5 steps with railing: A lot  Basic Mobility - Total Score: 17    Education Documentation  Precautions, taught by Alem Fallon PTA at 8/12/2025 10:20 AM.  Learner: Patient  Readiness: Acceptance  Method: Explanation, Demonstration  Response: Verbalizes Understanding, Demonstrated Understanding  Comment: LVAD management, precautions, safe mobility, PT POC    Body Mechanics, taught by Alem aFllon PTA at 8/12/2025 10:20 AM.  Learner: Patient  Readiness: Acceptance  Method: Explanation, Demonstration  Response: Verbalizes Understanding, Demonstrated Understanding  Comment: LVAD management, precautions, safe mobility, PT POC    Mobility Training, taught by Alem Fallon PTA at 8/12/2025 10:20 AM.  Learner: Patient  Readiness: Acceptance  Method: Explanation, Demonstration  Response: Verbalizes Understanding, Demonstrated Understanding  Comment: LVAD management, precautions, safe mobility, PT POC    Education Comments  No comments found.        OP EDUCATION:       Encounter Problems       Encounter Problems (Active)       Balance       patient will score >24/28 on the Tinetti scale to present as a low risk of falls (Progressing)       Start:  07/15/25    Expected End:  08/16/25               Mobility       Patient will complete the 5xSTS  in <15 sec with no assistive device, no UE support, and close supervision (RPE: 11/20 RPD: 3/10) (Progressing)       Start:  07/15/25    Expected End:  08/16/25            Patient will ambulate >1000ft on the 6MWT with no assistive device and close superivision (RPE: 11/20 RPD: 3/10) (Progressing)       Start:  07/15/25    Expected End:  08/16/25               Mobility       Pt will ambulate >300ft with appropriate  form, SBA or less, LRAD, and no LOB for safe DC.  (Progressing)       Start:  08/02/25    Expected End:  08/16/25            Pt will ambulate up/down >3 stairs with/without hand rail with CGA or less to safely access their home upon DC.   (Progressing)       Start:  08/02/25    Expected End:  08/16/25               PT Transfers       Pt will perform bed mobility and sit<>stand transfers with SBA and use of LRAD to safely DC.  (Progressing)       Start:  08/02/25    Expected End:  08/16/25                 LEA Dave

## 2025-08-12 NOTE — PROGRESS NOTES
08/12/25 1234   Rapid Rounds   Attendance Pharmacist;Care Transitions;Nurse   Expected Discharge Disposition Home H   Today we still await: Clinical stability   Review at Escalation Rounds Socially complex

## 2025-08-12 NOTE — NURSING NOTE
Patient VAD education started today. Reviewed all VAD equipment that will be going home with the patient. Patient successfully demonstrated battery connections and power module connections with verbal enforcement. Reviewed controller buttons and yellow/red alarms with patient today.      At this time, will plan to continue to review equipment basic knowledge and add VAD related education. Spent 35 minutes with the patient today. Plan to review again tomorrow and family will come to the hospital on Monday, 8/18.

## 2025-08-12 NOTE — PROGRESS NOTES
Occupational Therapy    Occupational Therapy Treatment    Name: Thao Evans  MRN: [id number]  : 1965  Date: 25  Time Calculation  Start Time: 1134  Stop Time: 1144  Time Calculation (min): 10 min    Assessment:  Prognosis: Excellent  Barriers to Discharge Home: No anticipated barriers  Evaluation/Treatment Tolerance: Patient tolerated treatment well  Medical Staff Made Aware: Yes  End of Session Communication: Bedside nurse  End of Session Patient Position: Bed, 3 rail up, Alarm off, not on at start of session  Plan:  Treatment Interventions: ADL retraining, Visual perceptual retraining, Functional transfer training, UE strengthening/ROM, Endurance training, Cognitive reorientation, Patient/family training, Equipment evaluation/education, Neuromuscular reeducation, Fine motor coordination activities, Compensatory technique education, UE splinting, Continued evaluation  No Skilled OT: At baseline function  OT Frequency: 3 times per week  OT Discharge Recommendations: Low intensity level of continued care  Equipment Recommended upon Discharge: Wheeled walker  OT Recommended Transfer Status: Assist of 1 (may need 2nd person for line management)  OT - OK to Discharge: Yes (when medically appropriate)    Subjective   General:  OT Last Visit  OT Received On: 25  Reason for Referral: 59 y/o F presents for colonoscopy prior to LVAD implantation next week and now  LVAD (HM3) placement .  Past Medical History Relevant to Rehab: CAD s/p PCI (LAD; , Lcx; ), stage D systolic HF/ICM/HFrEF s/p ICD, VT with presumed associated syncope, poorly controlled DM, pAF, dyslipidemia, essential HTN, COPD, and Graves  Prior to Session Communication: Bedside nurse  Patient Position Received: Bed, 3 rail up, Alarm off, not on at start of session  Family/Caregiver Present: No  General Comment: supine with HOB elevated at the start of the session.       Cognition:  Overall Cognitive Status: Within Functional  Limits    Pain Assessment:  Pain Assessment  Pain Assessment: 0-10  0-10 (Numeric) Pain Score: 5 - Moderate pain  Pain Type: Acute pain  Pain Location: Chest  Pain Interventions: Repositioned (RN is aware)     Objective   Activities of Daily Living:        Grooming  Grooming Level of Assistance: Independent  Grooming Where Assessed: Standing sinkside      Toileting  Toileting Level of Assistance: Independent  Where Assessed: Toilet    Bed Mobility/Transfers:   Bed Mobility  Bed Mobility: Yes  Bed Mobility 1  Bed Mobility 1: Supine to sitting  Level of Assistance 1: Modified independent  Bed Mobility 2  Bed Mobility  2: Sitting to supine  Level of Assistance 2: Modified independent    Transfers  Transfer: Yes  Transfer 1  Transfer From 1: Bed to  Transfer to 1: Stand  Technique 1: Sit to stand, Stand to sit  Transfer Level of Assistance 1: Independent  Transfers 2  Transfer From 2: Stand to  Transfer to 2: Toilet  Technique 2: Sit to stand, Stand to sit  Transfer Level of Assistance 2: Independent    Balance:  Dynamic Standing Balance  Dynamic Standing-Balance Support: No upper extremity supported  Dynamic Standing-Level of Assistance: Independent    Outcome Measures:  [facility] Daily Activity  Putting on and taking off regular lower body clothing: A little  Bathing (including washing, rinsing, drying): A little  Putting on and taking off regular upper body clothing: None  Toileting, which includes using toilet, bedpan or urinal: None  Taking care of personal grooming such as brushing teeth: None  Eating Meals: None  Daily Activity - Total Score: 22     Education Documentation  Body Mechanics, taught by Placido Arnold OT at 8/12/2025 12:51 PM.  Learner: Patient  Readiness: Acceptance  Method: Explanation  Response: Verbalizes Understanding    Precautions, taught by Placido Arnold OT at 8/12/2025 12:51 PM.  Learner: Patient  Readiness: Acceptance  Method: Explanation  Response: Verbalizes Understanding    ADL Training,  taught by Placido Arnold OT at 8/12/2025 12:51 PM.  Learner: Patient  Readiness: Acceptance  Method: Explanation  Response: Verbalizes Understanding    Education Comments  No comments found.        Goals:  Encounter Problems       Encounter Problems (Active)       ADLs       Patient will perform basic IADLs/simulated household tasks with Independent and LRD.  (Not met)       Start:  07/17/25    Expected End:  08/16/25    Resolved:  08/02/25         Patient will complete LB dressing with MOD I.   (Progressing)       Start:  08/02/25    Expected End:  08/16/25                Patient will complete UB dressing with MOD I.   (Progressing)       Start:  08/02/25    Expected End:  08/16/25                Patient will complete toileting with MOD I.   (Met)       Start:  08/02/25    Expected End:  08/16/25    Resolved:  08/12/25             Patient will complete grooming with MOD I.   (Met)       Start:  08/02/25    Expected End:  08/16/25    Resolved:  08/12/25                COGNITION/SAFETY       Patient will complete pill box simulation independently with use of medication list. (Progressing)       Start:  07/17/25    Expected End:  08/16/25            Patient will demo LVAD management of batteries<>wall unit with MOD I. (Progressing)       Start:  08/02/25    Expected End:  08/16/25                   EXERCISE/STRENGTHENING       Patient will participate in >15 minutes of activity with <2 rest breaks to increase ADL/functional task endurance.  (Not met)       Start:  07/17/25    Expected End:  08/16/25    Resolved:  08/02/25         Patient will demo L shoulder HEP with MOD I to improve functional use of LUE. (Progressing)       Start:  08/02/25    Expected End:  08/16/25                      Encounter Problems (Resolved)       MOBILITY       Patient will perform Functional mobility Household distances/Community Distances with independent level of assistance and least restrictive device in order to improve safety and  functional mobility. (Met)       Start:  07/17/25    Expected End:  08/16/25    Resolved:  08/12/25            TRANSFERS       Patient will complete functional transfers with least restrictive device with independent level of assistance. (Met)       Start:  07/17/25    Expected End:  08/16/25    Resolved:  08/12/25         Patient will complete bed mobility with MOD I.    (Met)       Start:  08/02/25    Expected End:  08/16/25    Resolved:  08/12/25 08/12/25 at 12:56 PM   Placido Arnold OTR/L, OTD  [phone]
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
## 2025-08-13 ENCOUNTER — DOCUMENTATION (OUTPATIENT)
Dept: INTENSIVE CARE | Facility: HOSPITAL | Age: 60
End: 2025-08-13

## 2025-08-13 ENCOUNTER — APPOINTMENT (OUTPATIENT)
Dept: RADIOLOGY | Facility: HOSPITAL | Age: 60
DRG: 001 | End: 2025-08-13
Payer: COMMERCIAL

## 2025-08-13 ENCOUNTER — APPOINTMENT (OUTPATIENT)
Dept: CARDIOLOGY | Facility: HOSPITAL | Age: 60
DRG: 001 | End: 2025-08-13
Payer: COMMERCIAL

## 2025-08-13 LAB
ACID FAST STN SPEC: NORMAL
ACID FAST STN SPEC: NORMAL
ALBUMIN SERPL BCP-MCNC: 3.3 G/DL (ref 3.4–5)
ANION GAP SERPL CALC-SCNC: 10 MMOL/L (ref 10–20)
BUN SERPL-MCNC: 12 MG/DL (ref 6–23)
CALCIUM SERPL-MCNC: 9 MG/DL (ref 8.6–10.6)
CHLORIDE SERPL-SCNC: 96 MMOL/L (ref 98–107)
CO2 SERPL-SCNC: 34 MMOL/L (ref 21–32)
CREAT SERPL-MCNC: 0.76 MG/DL (ref 0.5–1.05)
EGFRCR SERPLBLD CKD-EPI 2021: 89 ML/MIN/1.73M*2
EJECTION FRACTION APICAL 4 CHAMBER: 40
EJECTION FRACTION: 25 %
ERYTHROCYTE [DISTWIDTH] IN BLOOD BY AUTOMATED COUNT: 16.1 % (ref 11.5–14.5)
GLUCOSE BLD MANUAL STRIP-MCNC: 151 MG/DL (ref 74–99)
GLUCOSE BLD MANUAL STRIP-MCNC: 159 MG/DL (ref 74–99)
GLUCOSE BLD MANUAL STRIP-MCNC: 182 MG/DL (ref 74–99)
GLUCOSE BLD MANUAL STRIP-MCNC: 250 MG/DL (ref 74–99)
GLUCOSE SERPL-MCNC: 156 MG/DL (ref 74–99)
HCT VFR BLD AUTO: 28 % (ref 36–46)
HGB BLD-MCNC: 8.8 G/DL (ref 12–16)
INR PPP: 1.7 (ref 0.9–1.1)
LDH SERPL L TO P-CCNC: 208 U/L (ref 84–246)
LEFT ATRIUM VOLUME AREA LENGTH INDEX BSA: 30.1 ML/M2
LEFT VENTRICLE INTERNAL DIMENSION DIASTOLE: 4.17 CM (ref 3.5–6)
LEFT VENTRICULAR OUTFLOW TRACT DIAMETER: 1.88 CM
MCH RBC QN AUTO: 30.2 PG (ref 26–34)
MCHC RBC AUTO-ENTMCNC: 31.4 G/DL (ref 32–36)
MCV RBC AUTO: 96 FL (ref 80–100)
MYCOBACTERIUM SPEC CULT: NORMAL
MYCOBACTERIUM SPEC CULT: NORMAL
NRBC BLD-RTO: 0 /100 WBCS (ref 0–0)
PHOSPHATE SERPL-MCNC: 2.7 MG/DL (ref 2.5–4.9)
PLATELET # BLD AUTO: 424 X10*3/UL (ref 150–450)
POTASSIUM SERPL-SCNC: 4.2 MMOL/L (ref 3.5–5.3)
PROTHROMBIN TIME: 19.2 SECONDS (ref 9.8–12.4)
RBC # BLD AUTO: 2.91 X10*6/UL (ref 4–5.2)
RIGHT VENTRICLE FREE WALL PEAK S': 8 CM/S
RIGHT VENTRICLE PEAK SYSTOLIC PRESSURE: 39 MMHG
SODIUM SERPL-SCNC: 136 MMOL/L (ref 136–145)
TRICUSPID ANNULAR PLANE SYSTOLIC EXCURSION: 1.4 CM
WBC # BLD AUTO: 9.4 X10*3/UL (ref 4.4–11.3)

## 2025-08-13 PROCEDURE — 94669 MECHANICAL CHEST WALL OSCILL: CPT

## 2025-08-13 PROCEDURE — 2500000001 HC RX 250 WO HCPCS SELF ADMINISTERED DRUGS (ALT 637 FOR MEDICARE OP): Performed by: STUDENT IN AN ORGANIZED HEALTH CARE EDUCATION/TRAINING PROGRAM

## 2025-08-13 PROCEDURE — 93308 TTE F-UP OR LMTD: CPT | Performed by: INTERNAL MEDICINE

## 2025-08-13 PROCEDURE — 2500000002 HC RX 250 W HCPCS SELF ADMINISTERED DRUGS (ALT 637 FOR MEDICARE OP, ALT 636 FOR OP/ED): Performed by: STUDENT IN AN ORGANIZED HEALTH CARE EDUCATION/TRAINING PROGRAM

## 2025-08-13 PROCEDURE — 99233 SBSQ HOSP IP/OBS HIGH 50: CPT | Performed by: REGISTERED NURSE

## 2025-08-13 PROCEDURE — 93321 DOPPLER ECHO F-UP/LMTD STD: CPT | Performed by: INTERNAL MEDICINE

## 2025-08-13 PROCEDURE — 2500000001 HC RX 250 WO HCPCS SELF ADMINISTERED DRUGS (ALT 637 FOR MEDICARE OP): Performed by: NURSE PRACTITIONER

## 2025-08-13 PROCEDURE — 93325 DOPPLER ECHO COLOR FLOW MAPG: CPT | Performed by: INTERNAL MEDICINE

## 2025-08-13 PROCEDURE — 2500000001 HC RX 250 WO HCPCS SELF ADMINISTERED DRUGS (ALT 637 FOR MEDICARE OP)

## 2025-08-13 PROCEDURE — 2500000004 HC RX 250 GENERAL PHARMACY W/ HCPCS (ALT 636 FOR OP/ED): Performed by: REGISTERED NURSE

## 2025-08-13 PROCEDURE — 94668 MNPJ CHEST WALL SBSQ: CPT

## 2025-08-13 PROCEDURE — 36415 COLL VENOUS BLD VENIPUNCTURE: CPT | Performed by: REGISTERED NURSE

## 2025-08-13 PROCEDURE — 83615 LACTATE (LD) (LDH) ENZYME: CPT | Performed by: STUDENT IN AN ORGANIZED HEALTH CARE EDUCATION/TRAINING PROGRAM

## 2025-08-13 PROCEDURE — 82947 ASSAY GLUCOSE BLOOD QUANT: CPT

## 2025-08-13 PROCEDURE — 94640 AIRWAY INHALATION TREATMENT: CPT

## 2025-08-13 PROCEDURE — 2500000001 HC RX 250 WO HCPCS SELF ADMINISTERED DRUGS (ALT 637 FOR MEDICARE OP): Performed by: REGISTERED NURSE

## 2025-08-13 PROCEDURE — 99222 1ST HOSP IP/OBS MODERATE 55: CPT | Performed by: STUDENT IN AN ORGANIZED HEALTH CARE EDUCATION/TRAINING PROGRAM

## 2025-08-13 PROCEDURE — 93325 DOPPLER ECHO COLOR FLOW MAPG: CPT

## 2025-08-13 PROCEDURE — 2500000002 HC RX 250 W HCPCS SELF ADMINISTERED DRUGS (ALT 637 FOR MEDICARE OP, ALT 636 FOR OP/ED): Performed by: NURSE PRACTITIONER

## 2025-08-13 PROCEDURE — 2500000005 HC RX 250 GENERAL PHARMACY W/O HCPCS: Performed by: STUDENT IN AN ORGANIZED HEALTH CARE EDUCATION/TRAINING PROGRAM

## 2025-08-13 PROCEDURE — 71045 X-RAY EXAM CHEST 1 VIEW: CPT

## 2025-08-13 PROCEDURE — 71045 X-RAY EXAM CHEST 1 VIEW: CPT | Performed by: RADIOLOGY

## 2025-08-13 PROCEDURE — 85610 PROTHROMBIN TIME: CPT | Performed by: REGISTERED NURSE

## 2025-08-13 PROCEDURE — 2500000004 HC RX 250 GENERAL PHARMACY W/ HCPCS (ALT 636 FOR OP/ED): Performed by: STUDENT IN AN ORGANIZED HEALTH CARE EDUCATION/TRAINING PROGRAM

## 2025-08-13 PROCEDURE — 93750 INTERROGATION VAD IN PERSON: CPT | Performed by: REGISTERED NURSE

## 2025-08-13 PROCEDURE — 1200000002 HC GENERAL ROOM WITH TELEMETRY DAILY

## 2025-08-13 PROCEDURE — 85027 COMPLETE CBC AUTOMATED: CPT | Performed by: REGISTERED NURSE

## 2025-08-13 PROCEDURE — 80069 RENAL FUNCTION PANEL: CPT | Performed by: REGISTERED NURSE

## 2025-08-13 RX ORDER — FUROSEMIDE 10 MG/ML
40 INJECTION INTRAMUSCULAR; INTRAVENOUS ONCE
Status: COMPLETED | OUTPATIENT
Start: 2025-08-13 | End: 2025-08-13

## 2025-08-13 RX ORDER — GABAPENTIN 300 MG/1
300 CAPSULE ORAL EVERY 8 HOURS SCHEDULED
Status: DISCONTINUED | OUTPATIENT
Start: 2025-08-13 | End: 2025-08-18 | Stop reason: HOSPADM

## 2025-08-13 RX ORDER — FUROSEMIDE 10 MG/ML
80 INJECTION INTRAMUSCULAR; INTRAVENOUS ONCE
Status: COMPLETED | OUTPATIENT
Start: 2025-08-13 | End: 2025-08-13

## 2025-08-13 RX ORDER — WARFARIN 2 MG/1
2 TABLET ORAL ONCE
Status: COMPLETED | OUTPATIENT
Start: 2025-08-13 | End: 2025-08-13

## 2025-08-13 RX ORDER — ACETYLCYSTEINE 200 MG/ML
3 SOLUTION ORAL; RESPIRATORY (INHALATION)
Status: DISCONTINUED | OUTPATIENT
Start: 2025-08-14 | End: 2025-08-18 | Stop reason: HOSPADM

## 2025-08-13 RX ORDER — IPRATROPIUM BROMIDE AND ALBUTEROL SULFATE 2.5; .5 MG/3ML; MG/3ML
3 SOLUTION RESPIRATORY (INHALATION)
Status: DISCONTINUED | OUTPATIENT
Start: 2025-08-14 | End: 2025-08-18 | Stop reason: HOSPADM

## 2025-08-13 RX ADMIN — GABAPENTIN 300 MG: 300 CAPSULE ORAL at 22:15

## 2025-08-13 RX ADMIN — ASPIRIN 81 MG: 81 TABLET, CHEWABLE ORAL at 09:12

## 2025-08-13 RX ADMIN — ACETYLCYSTEINE 600 MG: 200 SOLUTION ORAL; RESPIRATORY (INHALATION) at 14:49

## 2025-08-13 RX ADMIN — SACUBITRIL AND VALSARTAN 1 TABLET: 24; 26 TABLET, FILM COATED ORAL at 20:19

## 2025-08-13 RX ADMIN — FUROSEMIDE 80 MG: 10 INJECTION, SOLUTION INTRAMUSCULAR; INTRAVENOUS at 13:07

## 2025-08-13 RX ADMIN — OXYCODONE HYDROCHLORIDE 5 MG: 5 TABLET ORAL at 20:29

## 2025-08-13 RX ADMIN — PERFLUTREN 10 ML OF DILUTION: 6.52 INJECTION, SUSPENSION INTRAVENOUS at 13:55

## 2025-08-13 RX ADMIN — OXYCODONE HYDROCHLORIDE 5 MG: 5 TABLET ORAL at 06:30

## 2025-08-13 RX ADMIN — GUAIFENESIN AND DEXTROMETHORPHAN HYDROBROMIDE 1 TABLET: 600; 30 TABLET, EXTENDED RELEASE ORAL at 09:13

## 2025-08-13 RX ADMIN — CITALOPRAM HYDROBROMIDE 40 MG: 40 TABLET ORAL at 09:16

## 2025-08-13 RX ADMIN — ACETAMINOPHEN 650 MG: 325 TABLET, FILM COATED ORAL at 20:18

## 2025-08-13 RX ADMIN — INSULIN LISPRO 1 UNITS: 100 INJECTION, SOLUTION INTRAVENOUS; SUBCUTANEOUS at 15:41

## 2025-08-13 RX ADMIN — LIDOCAINE 4% 1 PATCH: 40 PATCH TOPICAL at 09:18

## 2025-08-13 RX ADMIN — EMPAGLIFLOZIN 10 MG: 10 TABLET, FILM COATED ORAL at 09:13

## 2025-08-13 RX ADMIN — INSULIN LISPRO 1 UNITS: 100 INJECTION, SOLUTION INTRAVENOUS; SUBCUTANEOUS at 09:13

## 2025-08-13 RX ADMIN — ROPINIROLE 1 MG: 1 TABLET, FILM COATED ORAL at 15:41

## 2025-08-13 RX ADMIN — LEVOTHYROXINE SODIUM 88 MCG: 0.09 TABLET ORAL at 06:22

## 2025-08-13 RX ADMIN — ACETAMINOPHEN 650 MG: 325 TABLET, FILM COATED ORAL at 09:16

## 2025-08-13 RX ADMIN — SENNOSIDES, DOCUSATE SODIUM 2 TABLET: 50; 8.6 TABLET, FILM COATED ORAL at 09:14

## 2025-08-13 RX ADMIN — IPRATROPIUM BROMIDE AND ALBUTEROL SULFATE 3 ML: .5; 3 SOLUTION RESPIRATORY (INHALATION) at 03:04

## 2025-08-13 RX ADMIN — ACETAMINOPHEN 650 MG: 325 TABLET, FILM COATED ORAL at 15:41

## 2025-08-13 RX ADMIN — ICOSAPENT ETHYL 2 G: 1 CAPSULE ORAL at 09:13

## 2025-08-13 RX ADMIN — WARFARIN SODIUM 2 MG: 2 TABLET ORAL at 17:22

## 2025-08-13 RX ADMIN — INSULIN GLARGINE 25 UNITS: 100 INJECTION, SOLUTION SUBCUTANEOUS at 09:14

## 2025-08-13 RX ADMIN — ROPINIROLE 1 MG: 1 TABLET, FILM COATED ORAL at 20:19

## 2025-08-13 RX ADMIN — ICOSAPENT ETHYL 2 G: 1 CAPSULE ORAL at 17:22

## 2025-08-13 RX ADMIN — ATORVASTATIN CALCIUM 80 MG: 80 TABLET, FILM COATED ORAL at 09:12

## 2025-08-13 RX ADMIN — IPRATROPIUM BROMIDE AND ALBUTEROL SULFATE 3 ML: .5; 3 SOLUTION RESPIRATORY (INHALATION) at 21:15

## 2025-08-13 RX ADMIN — SPIRONOLACTONE 25 MG: 25 TABLET ORAL at 09:13

## 2025-08-13 RX ADMIN — ACETYLCYSTEINE 600 MG: 200 SOLUTION ORAL; RESPIRATORY (INHALATION) at 08:15

## 2025-08-13 RX ADMIN — ACETYLCYSTEINE 600 MG: 200 SOLUTION ORAL; RESPIRATORY (INHALATION) at 21:14

## 2025-08-13 RX ADMIN — FUROSEMIDE 40 MG: 10 INJECTION, SOLUTION INTRAMUSCULAR; INTRAVENOUS at 17:21

## 2025-08-13 RX ADMIN — LIDOCAINE 4% 1 PATCH: 40 PATCH TOPICAL at 10:06

## 2025-08-13 RX ADMIN — PANTOPRAZOLE SODIUM 40 MG: 40 TABLET, DELAYED RELEASE ORAL at 06:22

## 2025-08-13 RX ADMIN — ROPINIROLE HYDROCHLORIDE 3 MG: 3 TABLET, FILM COATED ORAL at 20:19

## 2025-08-13 RX ADMIN — ACETYLCYSTEINE 600 MG: 200 SOLUTION ORAL; RESPIRATORY (INHALATION) at 03:03

## 2025-08-13 RX ADMIN — ACETAMINOPHEN 650 MG: 325 TABLET, FILM COATED ORAL at 01:09

## 2025-08-13 RX ADMIN — ROPINIROLE 1 MG: 1 TABLET, FILM COATED ORAL at 09:12

## 2025-08-13 RX ADMIN — GUAIFENESIN AND DEXTROMETHORPHAN HYDROBROMIDE 1 TABLET: 600; 30 TABLET, EXTENDED RELEASE ORAL at 20:19

## 2025-08-13 RX ADMIN — INSULIN LISPRO 1 UNITS: 100 INJECTION, SOLUTION INTRAVENOUS; SUBCUTANEOUS at 11:51

## 2025-08-13 RX ADMIN — SACUBITRIL AND VALSARTAN 1 TABLET: 24; 26 TABLET, FILM COATED ORAL at 09:26

## 2025-08-13 RX ADMIN — IPRATROPIUM BROMIDE AND ALBUTEROL SULFATE 3 ML: .5; 3 SOLUTION RESPIRATORY (INHALATION) at 14:49

## 2025-08-13 RX ADMIN — IPRATROPIUM BROMIDE AND ALBUTEROL SULFATE 3 ML: .5; 3 SOLUTION RESPIRATORY (INHALATION) at 08:15

## 2025-08-13 RX ADMIN — GABAPENTIN 300 MG: 300 CAPSULE ORAL at 14:34

## 2025-08-13 ASSESSMENT — COGNITIVE AND FUNCTIONAL STATUS - GENERAL
CLIMB 3 TO 5 STEPS WITH RAILING: A LITTLE
DAILY ACTIVITIY SCORE: 23
DAILY ACTIVITIY SCORE: 23
TOILETING: A LITTLE
MOBILITY SCORE: 23
CLIMB 3 TO 5 STEPS WITH RAILING: A LITTLE
MOBILITY SCORE: 23
TOILETING: A LITTLE

## 2025-08-13 ASSESSMENT — PAIN DESCRIPTION - DESCRIPTORS
DESCRIPTORS: ACHING
DESCRIPTORS: ACHING

## 2025-08-13 ASSESSMENT — PAIN DESCRIPTION - ORIENTATION
ORIENTATION: RIGHT
ORIENTATION: RIGHT

## 2025-08-13 ASSESSMENT — PAIN DESCRIPTION - LOCATION
LOCATION: SHOULDER
LOCATION: SHOULDER

## 2025-08-13 ASSESSMENT — PAIN - FUNCTIONAL ASSESSMENT
PAIN_FUNCTIONAL_ASSESSMENT: 0-10
PAIN_FUNCTIONAL_ASSESSMENT: 0-10

## 2025-08-13 ASSESSMENT — PAIN SCALES - GENERAL
PAINLEVEL_OUTOF10: 7
PAINLEVEL_OUTOF10: 7

## 2025-08-13 NOTE — PROGRESS NOTES
Physical Therapy                 Therapy Communication Note    Patient Name: Thao Evans  MRN: [id number]  Department: [place] [id number]  Room: [address] [place] [address]  Today's Date: 8/13/2025     Discipline: Physical Therapy    Missed Visit: PT Missed Visit: Yes     Missed Visit Reason: Missed Visit Reason:  (Three attempts to tx. At 923, pt in bed, reports significant R UE pain kept her up all night, requesting this PTA return in ther afternoon. At 1345 pt with ECHO. Reattempt tx at 1425, pt had just started eating lunch.)    Missed Time: Attempt 1428    Comment:

## 2025-08-13 NOTE — CARE PLAN
Problem: Pain - Adult  Goal: Verbalizes/displays adequate comfort level or baseline comfort level  Outcome: Progressing     Problem: Safety - Adult  Goal: Free from fall injury  Outcome: Progressing     Problem: Discharge Planning  Goal: Discharge to home or other facility with appropriate resources  Outcome: Progressing     Problem: Respiratory  Goal: Minimal/no exertional discomfort or dyspnea this shift  Outcome: Progressing  Goal: No signs of respiratory distress (eg. Use of accessory muscles. Peds grunting)  Outcome: Progressing  Goal: Patent airway maintained this shift  Outcome: Progressing  Goal: Verbalize decreased shortness of breath this shift  Outcome: Progressing  Goal: Wean oxygen to maintain O2 saturation per order/standard this shift  Outcome: Progressing  Goal: Increase self care and/or family involvement in next 24 hours  Outcome: Progressing     Problem: Ventricular Assisted Device (VAD)  Goal: Stable metal status  Outcome: Progressing  Goal: Pulmonary toileting, incentive spirometry  Outcome: Progressing  Goal: Nutrition  Outcome: Progressing  Goal: Mobility/OT/PT  Outcome: Progressing  Goal: Rubber ball  Outcome: Progressing  Goal: ROM  Outcome: Progressing  Goal: Sitting  Outcome: Progressing  Goal: Walk  Outcome: Progressing  Goal: Involve in VAD awareness, dressing change  Outcome: Progressing  Goal: AICD On/Off  Outcome: Progressing     Problem: Skin  Goal: Decreased wound size/increased tissue granulation at next dressing change  Outcome: Progressing  Goal: Participates in plan/prevention/treatment measures  Outcome: Progressing  Goal: Prevent/manage excess moisture  Outcome: Progressing  Goal: Prevent/minimize sheer/friction injuries  Outcome: Progressing  Goal: Promote/optimize nutrition  Outcome: Progressing  Goal: Promote skin healing  Outcome: Progressing   The patient's goals for the shift include patient wants hot water to be fixed this shift    The clinical goals for the shift  include remain HDS and safe this shift    Over the shift, the patient was medicated with oxy ir 5 mg po x 2 for right shoulder pain with relief. Up to bathroom with steady gait. Calls appropriately for assistance. Will continue to monitor.

## 2025-08-13 NOTE — PROGRESS NOTES
ADVANCED HEART FAILURE LVAD PROGRESS NOTE      VAD Cardiologist: Sandrine    VAD Coordinator: Anjali Meredith, RN and Alma Delia Capone RN     Type of VAD: HM3  Implant Date: 8/1/25   Reason for VAD: ICM/HFrEF   Intent: Long-Term modifiable (nicotine)      Subjective     HPI:  Thao Evans is a 60 y.o. female with a complex cardiovascular history, including coronary artery disease status post PCI to the LAD in 2015 and Lcx in October 2024 (currently on Plavix), and Stage D systolic heart failure secondary to ischemic cardiomyopathy (ICM/HFrEF) s/p ICD placement, poorly controlled type 2 diabetes mellitus, paroxysmal atrial fibrillation status post DCCV in October 2024 (currently off anticoagulation due to absence of recurrence), hypertension, hyperlipidemia, COPD with recent tobacco cessation (2 months ago), Graves disease, restless legs syndrome, and generalized anxiety and depression.    She was directly admitted to the HF ICU for [id number] hemodynamic optimization in anticipation of LVAD placement initially scheduled for July 23, which was postponed due to identification of concerning colonic polyps on routine screening colonoscopy; pathology returned benign on July 25.    Now s/p LVAD HM3 implantation on August 1 by Dr. Inman. Postoperatively, she required initial invasive mechanical ventilation, followed by reintubation on the evening of August 3 due to mucous plugging. She is now extubated s/p bronchoscopy and transferred to [facility] 8/11.     Interval Events:   Patient seen and examined at bedside. NAOE, no events on tele. Still w/ non productive cough and similar B pleural effusions on imaging.     NYHA class pre-VAD implant: IV   NYHA class today (08/13/25): II    Plan:   Restart home gapapentin for pain   Increase Coumadin to 2mg   Echo today   C/w VAD education   Increase diuresis to BID     Objective   Vitals:   Vitals:    08/13/25 0643 08/13/25 0736 08/13/25 1141 08/13/25 1518   BP:       Pulse: 80  85 86 54   Resp: 18 18 18 18   Temp: 36.5 °C (97.7 °F) 35.5 °C (95.9 °F) 36.1 °C (97 °F) 36.1 °C (97 °F)   TempSrc:  Temporal Temporal Temporal   SpO2: 96% 90% 92% 92%   Weight:       Height:         Wt Readings from Last 5 Encounters:   08/13/25 60 kg (132 lb 4.4 oz)   06/20/25 57.2 kg (126 lb)   06/20/25 57.4 kg (126 lb 9.6 oz)   06/17/25 58.2 kg (128 lb 4.9 oz)   05/30/25 56.2 kg (124 lb)     Hemodynamic parameters for last 24 hours:     Intake/Output for last 24 hours:    Intake/Output Summary (Last 24 hours) at 8/13/2025 1612  Last data filed at 8/13/2025 1439  Gross per 24 hour   Intake --   Output 2000 ml   Net -2000 ml      Physical exam:  Physical Exam  Constitutional:       General: She is not in acute distress.     Appearance: Normal appearance. She is not ill-appearing, toxic-appearing or diaphoretic.   HENT:      Head: Normocephalic and atraumatic.      Mouth/Throat:      Mouth: Mucous membranes are moist.      Pharynx: Oropharynx is clear.     Eyes:      Extraocular Movements: Extraocular movements intact.      Pupils: Pupils are equal, round, and reactive to light.       Cardiovascular:      Comments: LVAD hum heard on auscultation  No BLE edema   No JVP   Euvolemic on exam     Pulmonary:      Effort: Pulmonary effort is normal. No respiratory distress.      Breath sounds: No stridor. Rhonchi present. No wheezing.      Comments: Rhonci in B bases, non productive cough   Abdominal:      General: Abdomen is flat. Bowel sounds are normal. There is no distension.      Palpations: Abdomen is soft. There is no mass.      Tenderness: There is no abdominal tenderness. There is no guarding or rebound.      Hernia: No hernia is present.     Musculoskeletal:         General: Normal range of motion.      Right lower leg: No edema.      Left lower leg: No edema.     Skin:     General: Skin is warm and dry.      Coloration: Skin is not jaundiced.      Findings: No bruising, lesion or rash.     Neurological:       General: No focal deficit present.      Mental Status: She is alert and oriented to person, place, and time. Mental status is at baseline.      Cranial Nerves: No cranial nerve deficit.      Sensory: No sensory deficit.      Motor: No weakness.     Psychiatric:         Mood and Affect: Mood normal.         Behavior: Behavior normal.        Driveline:     Labs:   CMP:  Recent Labs     08/13/25  0611 08/12/25  0634 08/11/25  0844 08/10/25  0413 08/09/25  0328 08/08/25  0506 08/07/25  1420 08/07/25  1110 08/07/25  0223 08/06/25  0432 08/05/25  0514 08/04/25  1537 08/04/25  0023 08/03/25  1301    136 132* 136 134* 136 137 141   < > 136 135*   < > 135* 134*   K 4.2 4.5 4.2 4.3 4.1 4.2 4.0 3.9   < > 3.7 3.7   < > 4.2 4.3   CL 96* 94* 94* 96* 96* 98 100 102   < > 96* 96*   < > 95* 95*   CO2 34* 33* 29 32 30 31 31 32   < > 34* 34*   < > 29 33*   ANIONGAP 10 14 13 12 12 11 10 11   < > 10 9*   < > 15 10   BUN 12 13 15 16 17 16 19 20   < > 29* 31*   < > 36* 31*   CREATININE 0.76 0.77 0.74 0.84 0.85 0.81 0.75 0.64   < > 0.83 0.87   < > 0.93 0.92   EGFR 89 87 >90 79 78 82 90 >90   < > 80 75   < > 70 71   MG  --   --  1.92 2.23 2.05 1.76  --   --   --  2.09 2.07  --  2.08 2.14    < > = values in this interval not displayed.     Recent Labs     08/13/25  0611 08/12/25  0634 08/11/25  0844 08/11/25  0415 08/10/25  0413 08/09/25  0328 08/08/25  0506 08/07/25  1420 08/07/25  1110 08/07/25  0223 08/06/25  0432 08/05/25  0514   ALBUMIN 3.3* 3.5  3.5 3.4 3.3* 3.5 3.2* 3.3* 3.4   < > 3.3* 3.3* 3.4   ALT  --  6*  --  5* 5* 5* 4*  --   --  4* 3* 3*   AST  --  10  --  10 13 12 13  --   --  18 17 20   BILITOT  --  0.4  --  0.4 0.4 0.4 0.4  --   --  0.5 0.4 0.5    < > = values in this interval not displayed.     CBC:  Recent Labs     08/13/25  1147 08/11/25  0844 08/10/25  0413 08/09/25  0328 08/08/25  0506 08/07/25  0359 08/06/25  0432 08/05/25  0844   WBC 9.4 6.0 5.5 6.6 6.7 5.8 6.1 9.4   HGB 8.8* 8.2* 8.6* 8.0* 7.6* 7.4* 7.6* 7.9*  "  HCT 28.0* 25.5* 26.9* 24.1* 22.9* 22.3* 23.3* 25.2*    264 299 253 208 195 164 155   MCV 96 92 97 91 90 88 92 97     COAG:   Recent Labs     08/13/25  0611 08/12/25  1302 08/12/25  0634 08/11/25  0415 08/10/25  0413 08/09/25  0328 08/08/25  0506 08/07/25  0223 08/02/25  1225 08/02/25  0145 08/01/25  1322   INR 1.7* 1.9* 1.8* 1.9* 2.7* 3.7* 3.9* 3.5*   < > 1.1 1.3*   FIBRINOGEN  --   --   --   --   --   --   --   --   --  318 229    < > = values in this interval not displayed.     ABO:   Recent Labs     08/11/25 0415   ABO O     HEME/ENDO:   Recent Labs     07/31/25  1658 07/15/25  0354 07/14/25  2013 06/05/25  0607 06/02/25  1346 03/13/25  0531   FERRITIN  --   --  224*  --  232*  --    IRONSAT  --   --  24*  --  17*  --    TSH  --   --   --  0.19* 0.20*  --    HGBA1C 7.5* 8.0*  --   --  8.6* 12.3*     CARDIAC:   Recent Labs     08/13/25  0611 08/12/25  0634 08/11/25  0415 08/10/25  0413 08/09/25  0328 08/08/25  0506 08/07/25  0223 08/06/25  0432 07/23/25  1855 07/14/25 2003 06/12/25  1728 06/09/25  1613 06/03/25  1743 06/02/25  1346    235 224 254* 257* 269* 295* 291*   < >  --   --   --    < >  --    TROPHS  --   --   --   --   --   --   --   --   --  18 15  --   --  22   BNP  --   --   --   --   --   --   --   --   --  735*  --  1,456*  --  1,292*    < > = values in this interval not displayed.     Recent Labs     08/04/25  0426 07/14/25 2003   CHOL  --  141   LDLCALC  --  78   HDL  --  43.3   TRIG 115 99     TOX:  Recent Labs     06/03/25  1743   AMPHETAMINE Negative     MICRO: No results for input(s): \"ESR\", \"CRP\", \"PROCAL\" in the last 43377 hours.  Susceptibility data from last 90 days.  Collected Specimen Info Organism Amoxicillin/Clavulanate Ampicillin Ampicillin/Sulbactam Cefazolin Cefazolin (uncomplicated UTIs only) Ciprofloxacin Gentamicin Levofloxacin Nitrofurantoin Piperacillin/Tazobactam   08/06/25 Fluid from Lung Candida (Nakaseomyces) glabrata             08/06/25 Fluid from " Bronchial Washings Mixed Microorganisms Resembling Upper Respiratory Remi              08/05/25 Fluid from SPUTUM Normal throat remi             08/03/25 Fluid from BAL Mixed Microorganisms Resembling Upper Respiratory Remi              06/07/25 Urine from Clean Catch/Voided Escherichia coli  S  R  I  S  S  S  S  S  S  S   06/03/25 Urine from Clean Catch/Voided Escherichia coli  S  R  I  S  S  S  S  S  S  S     Collected Specimen Info Organism Trimethoprim/Sulfamethoxazole   08/06/25 Fluid from Lung Candida (Nakaseomyces) glabrata    08/06/25 Fluid from Bronchial Washings Mixed Microorganisms Resembling Upper Respiratory Remi     08/05/25 Fluid from SPUTUM Normal throat remi    08/03/25 Fluid from BAL Mixed Microorganisms Resembling Upper Respiratory Remi     06/07/25 Urine from Clean Catch/Voided Escherichia coli  S   06/03/25 Urine from Clean Catch/Voided Escherichia coli  S         Imaging:   Imaging  XR chest 1 view  Result Date: 8/13/2025  1.  Unchanged appearance of the bilateral small pleural effusion and findings of pulmonary edema. 2. Bibasilar opacities likely reflect atelectasis/edema. 3. Medical devices as above.   I personally reviewed the images/study and I agree with the findings as stated by Hugo Holliday MD, PGY-2 this study was interpreted at [place], [place].   MACRO: None   Signed by: Jeremias Alaniz 8/13/2025 7:50 AM Dictation workstation:   [id number]    XR chest 1 view  Result Date: 8/12/2025  1. Unchanged small bilateral pleural effusions with associated bibasilar consolidation/atelectasis, on a background of pulmonary edema. 2. Medical devices as noted above.   I personally reviewed the images/study and I agree with the findings as stated. This study was interpreted by radiology resident Fabrice Blackwood D.O. at [place], [place].   Signed by: Jeremias Alaniz 8/12/2025 8:53 AM Dictation workstation:    [id number]      Cardiology, Vascular, and Other Imaging  Transthoracic Echo (TTE) Limited  Result Date: 8/13/2025   [facility], [address] [place] [address]                Tel [phone] and Fax [phone] TRANSTHORACIC ECHOCARDIOGRAM REPORT  Patient Name:       ANETA VIRGIL Khan Physician:    [id number] Tony Kelly MD Study Date:         8/13/2025           Ordering Provider:    [id number] BRIAN SCHWARZ MRN/PID:            [id number]            Fellow:               [id number] Justina Lao MD Accession#:         [id number]        Nurse: Date of Birth/Age:  1965 / 60      Sonographer:          Lorrie donaldson RDCS Gender assigned at  F                   Additional Staff: Birth: Height:             157.48 cm           Admit Date: Weight:             59.87 kg            Admission Status:     Inpatient -                                                               Routine BSA / BMI:          1.60 m2 / 24.14     Encounter#:           [id number]                     kg/m2 Blood Pressure:     130/83 mmHg         Department Location:  [place] [address] Study Type:    TRANSTHORACIC ECHO (TTE) LIMITED Diagnosis/ICD: Unspecified systolic (congestive) heart failure (CHF)-I50.20 Indication:    LVAD w/ speed adjustment yesterday CPT Code:      Echo Limited-91634; Doppler Limited-08634; Color Doppler-87881 Patient History: Pertinent History: CAD s/p PCI (LAD; 2015, Lcx; 3/2025), stage C systolic                    HF/ICM/HFrEF s/p ICD, h/o VT with presumed associated                    syncope, poorly controlled DM, pAF (off of DOAC given the                    absence of  recurrence), dyslipidemia, essential HTN,                    hypothyroidism, recurrent pleural effusion likely 2/2 HF,                    restriction by PFTs who presented to [facility] HF ICU for                    [place]-guided hemodynamic optimization and LVAD placement. s/p                    LVAD Hm3 8/1/25. Study Detail: The following Echo studies were performed: 2D, M-Mode, Doppler and               color flow. Technically challenging study due to prominent lung               artifact and body habitus. Definity used as a contrast agent for               endocardial border definition. Total contrast used for this               procedure was 2 mL via IV push.  PHYSICIAN INTERPRETATION: Left Ventricle: Left ventricular ejection fraction is severely decreased by visual estimate at 25%. There is global hypokinesis of the left ventricle with minor regional variations. The left ventricular cavity size is normal. There is normal septal and normal posterior left ventricular wall thickness. Abnormal (paradoxical) septal motion consistent with post-operative status. Left ventricular diastolic filling cannot be determined due to left ventricular assist device. There is no definite left ventricular thrombus visualized. The apical inferior wall is akinetic and appears aneurysmal (clip 81, 100). Left Atrium: The left atrial size is normal. Right Ventricle: The right ventricle is normal in size. There is reduced right ventricular systolic function. A device is visualized in the right ventricle. Right Atrium: The right atrium is normal in size. There is a device visualized in the right atrium. Aortic Valve: The aortic valve is trileaflet. There is no evidence of aortic valve regurgitation. Mitral Valve: The mitral valve is mildly thickened. There is mild to moderate mitral annular calcification. There is mild mitral valve regurgitation. Tricuspid Valve: The tricuspid valve is structurally normal. There is moderate tricuspid  regurgitation. The Doppler estimated right ventricular systolic pressure (RVSP) is mildly elevated at 39 mmHg. Pulmonic Valve: The pulmonic valve is structurally normal. There is trace pulmonic valve regurgitation. Pericardium: Small pericardial effusion localized near the right ventricle. The pericardial effusion appears to contain fibrinous material. Aorta: The aortic root is normal. The aortic root appears normal in size and measures 2.90 cm. The Asc Ao is 2.60 cm. There is no dilatation of the ascending aorta. Systemic Veins: The inferior vena cava appears normal in size, with IVC inspiratory collapse greater than 50%. In comparison to the previous echocardiogram(s): Compared with study dated 8/6/2025, findings are overall similar.  LEFT VENTRICULAR ASSIST DEVICE: LVAD: The patient has a(n) HeartMate 3 LVAD device present. Inflow Cannula: The inflow cannula is visualized in the left ventricular apex. Outflow Cannula: Visualization of the outflow cannula is technically difficult. LVAD Comments: The aortic valve partially opens every 1-2 beats.  CONCLUSIONS:  1. Left ventricular ejection fraction is severely decreased by visual estimate at 25%.  2. There is global hypokinesis of the left ventricle with minor regional variations.  3. Abnormal septal motion consistent with post-operative status.  4. No left ventricular thrombus visualized.  5. The apical inferior wall is akinetic and appears aneurysmal (clip 81, 100).  6. There is reduced right ventricular systolic function.  7. Small pericardial effusion.  8. Moderate tricuspid regurgitation visualized.  9. The Doppler estimated RVSP is mildly elevated at 39 mmHg. 10. Normal aortic root. 11. Compared with study dated 8/6/2025, findings are overall similar. QUANTITATIVE DATA SUMMARY:  2D MEASUREMENTS:          Normal Ranges: Ao Root d:       2.90 cm  (2.0-3.7cm) IVSd:            0.91 cm  (0.6-1.1cm) LVPWd:           0.72 cm  (0.6-1.1cm) LVIDd:           4.17 cm   (3.9-5.9cm) LVIDs:           3.41 cm LV Mass Index:   64 g/m2 LVEDV Index:     54 ml/m2 LV % FS          18.2 %  LEFT ATRIUM:                  Normal Ranges: LA Vol A4C:        40.6 ml    (22+/-6mL/m2) LA Vol A2C:        49.8 ml LA Vol BP:         48.2 ml LA Vol Index A4C:  25.4ml/m2 LA Vol Index A2C:  31.1 ml/m2 LA Vol Index BP:   30.1 ml/m2 LA Area A4C:       14.0 cm2 LA Area A2C:       16.6 cm2 LA Major Axis A4C: 4.1 cm LA Major Axis A2C: 4.7 cm LA Volume Index:   30.1 ml/m2  RIGHT ATRIUM:                 Normal Ranges: RA Vol A4C:        28.9 ml    (8.3-19.5ml) RA Vol Index A4C:  18.1 ml/m2 RA Area A4C:       12.1 cm2 RA Major Axis A4C: 4.3 cm  AORTA MEASUREMENTS:         Normal Ranges: Asc Ao, d:          2.60 cm (2.1-3.4cm)  LV SYSTOLIC FUNCTION:                      Normal Ranges: EF-Visual:      25 % LV EF Reported: 25 %  AORTIC VALVE:          Normal Ranges: LVOT Diameter: 1.88 cm (1.8-2.4cm)  RIGHT VENTRICLE: RV Basal 3.50 cm RV Mid   2.90 cm RV Major 6.8 cm TAPSE:   14.0 mm RV s'    0.08 m/s  TRICUSPID VALVE/RVSP:          Normal Ranges: Peak TR Velocity:     2.99 m/s Est. RA Pressure:     3 RV Syst Pressure:     39       (< 30mmHg) IVC Diam:             1.87 cm  PULMONIC VALVE:         Normal Ranges: PV Accel Time:  84 msec (>120ms)  AORTA: Asc Ao Diam 2.60 cm  [id number] Tony Kelly MD Electronically signed on 8/13/2025 at 3:14:58 PM  ** Final **          Notable Studies:   EKG:  Encounter Date: 07/14/25   ECG 12 lead   Result Value    Ventricular Rate 105    Atrial Rate 105    CO Interval 124    QRS Duration 156    QT Interval 454    QTC Calculation(Bazett) 600    P Axis 5    R Axis -42    T Axis 45    QRS Count 18    Q Onset 205    P Onset 153    P Offset 173    T Offset 432    QTC Fredericia 547    Narrative    Sinus tachycardia  Left axis deviation  Nonspecific intraventricular block  Possible Lateral infarct (cited on or before 12-JUN-2025)  Abnormal ECG  When compared with ECG of 01-AUG-2025  14:08,  Questionable change in QRS duration  Questionable change in initial forces of Anterolateral leads  Confirmed by Viktor Nelson ([id number]) on 8/10/2025 11:40:02 PM       Echo:  Transthoracic Echo (TTE) Limited  Result Date: 8/13/2025   [facility] [address] [place] [address]                Tel [phone] and Fax [phone] TRANSTHORACIC ECHOCARDIOGRAM REPORT  Patient Name:       ANETA Khan Physician:    [id number] Tony Kelly MD Study Date:         8/13/2025           Ordering Provider:    [id number] BRIAN SCHWARZ MRN/PID:            [id number]            Fellow:               [id number] Justina Lao MD Accession#:         [id number]        Nurse: Date of Birth/Age:  1965 / 60      Sonographer:          Lorrie donaldson                                     [facility] Gender assigned at  F                   Additional Staff: Birth: Height:             157.48 cm           Admit Date: Weight:             59.87 kg            Admission Status:     Inpatient -                                                               Routine BSA / BMI:          1.60 m2 / 24.14     Encounter#:           [id number]                     kg/m2 Blood Pressure:     130/83 mmHg         Department Location:  [place] [address] Study Type:    TRANSTHORACIC ECHO (TTE) LIMITED Diagnosis/ICD: Unspecified systolic (congestive) heart failure (CHF)-I50.20 Indication:    LVAD w/ speed adjustment yesterday CPT Code:      Echo Limited-84319; Doppler Limited-75889; Color Doppler-[id number] Patient History: Pertinent History: CAD s/p PCI (LAD; 2015, Lcx; 3/2025), stage C systolic                    HF/ICM/HFrEF s/p ICD, h/o VT with  presumed associated                    syncope, poorly controlled DM, pAF (off of DOAC given the                    absence of recurrence), dyslipidemia, essential HTN,                    hypothyroidism, recurrent pleural effusion likely 2/2 HF,                    restriction by PFTs who presented to [facility] HF ICU for                    [place]-guided hemodynamic optimization and LVAD placement. s/p                    LVAD Hm3 8/1/25. Study Detail: The following Echo studies were performed: 2D, M-Mode, Doppler and               color flow. Technically challenging study due to prominent lung               artifact and body habitus. Definity used as a contrast agent for               endocardial border definition. Total contrast used for this               procedure was 2 mL via IV push.  PHYSICIAN INTERPRETATION: Left Ventricle: Left ventricular ejection fraction is severely decreased by visual estimate at 25%. There is global hypokinesis of the left ventricle with minor regional variations. The left ventricular cavity size is normal. There is normal septal and normal posterior left ventricular wall thickness. Abnormal (paradoxical) septal motion consistent with post-operative status. Left ventricular diastolic filling cannot be determined due to left ventricular assist device. There is no definite left ventricular thrombus visualized. The apical inferior wall is akinetic and appears aneurysmal (clip 81, 100). Left Atrium: The left atrial size is normal. Right Ventricle: The right ventricle is normal in size. There is reduced right ventricular systolic function. A device is visualized in the right ventricle. Right Atrium: The right atrium is normal in size. There is a device visualized in the right atrium. Aortic Valve: The aortic valve is trileaflet. There is no evidence of aortic valve regurgitation. Mitral Valve: The mitral valve is mildly thickened. There is mild to moderate mitral annular calcification. There is mild  mitral valve regurgitation. Tricuspid Valve: The tricuspid valve is structurally normal. There is moderate tricuspid regurgitation. The Doppler estimated right ventricular systolic pressure (RVSP) is mildly elevated at 39 mmHg. Pulmonic Valve: The pulmonic valve is structurally normal. There is trace pulmonic valve regurgitation. Pericardium: Small pericardial effusion localized near the right ventricle. The pericardial effusion appears to contain fibrinous material. Aorta: The aortic root is normal. The aortic root appears normal in size and measures 2.90 cm. The Asc Ao is 2.60 cm. There is no dilatation of the ascending aorta. Systemic Veins: The inferior vena cava appears normal in size, with IVC inspiratory collapse greater than 50%. In comparison to the previous echocardiogram(s): Compared with study dated 8/6/2025, findings are overall similar.  LEFT VENTRICULAR ASSIST DEVICE: LVAD: The patient has a(n) HeartMate 3 LVAD device present. Inflow Cannula: The inflow cannula is visualized in the left ventricular apex. Outflow Cannula: Visualization of the outflow cannula is technically difficult. LVAD Comments: The aortic valve partially opens every 1-2 beats.  CONCLUSIONS:  1. Left ventricular ejection fraction is severely decreased by visual estimate at 25%.  2. There is global hypokinesis of the left ventricle with minor regional variations.  3. Abnormal septal motion consistent with post-operative status.  4. No left ventricular thrombus visualized.  5. The apical inferior wall is akinetic and appears aneurysmal (clip 81, 100).  6. There is reduced right ventricular systolic function.  7. Small pericardial effusion.  8. Moderate tricuspid regurgitation visualized.  9. The Doppler estimated RVSP is mildly elevated at 39 mmHg. 10. Normal aortic root. 11. Compared with study dated 8/6/2025, findings are overall similar. QUANTITATIVE DATA SUMMARY:  2D MEASUREMENTS:          Normal Ranges: Ao Root d:       2.90 cm   (2.0-3.7cm) IVSd:            0.91 cm  (0.6-1.1cm) LVPWd:           0.72 cm  (0.6-1.1cm) LVIDd:           4.17 cm  (3.9-5.9cm) LVIDs:           3.41 cm LV Mass Index:   64 g/m2 LVEDV Index:     54 ml/m2 LV % FS          18.2 %  LEFT ATRIUM:                  Normal Ranges: LA Vol A4C:        40.6 ml    (22+/-6mL/m2) LA Vol A2C:        49.8 ml LA Vol BP:         48.2 ml LA Vol Index A4C:  25.4ml/m2 LA Vol Index A2C:  31.1 ml/m2 LA Vol Index BP:   30.1 ml/m2 LA Area A4C:       14.0 cm2 LA Area A2C:       16.6 cm2 LA Major Axis A4C: 4.1 cm LA Major Axis A2C: 4.7 cm LA Volume Index:   30.1 ml/m2  RIGHT ATRIUM:                 Normal Ranges: RA Vol A4C:        28.9 ml    (8.3-19.5ml) RA Vol Index A4C:  18.1 ml/m2 RA Area A4C:       12.1 cm2 RA Major Axis A4C: 4.3 cm  AORTA MEASUREMENTS:         Normal Ranges: Asc Ao, d:          2.60 cm (2.1-3.4cm)  LV SYSTOLIC FUNCTION:                      Normal Ranges: EF-Visual:      25 % LV EF Reported: 25 %  AORTIC VALVE:          Normal Ranges: LVOT Diameter: 1.88 cm (1.8-2.4cm)  RIGHT VENTRICLE: RV Basal 3.50 cm RV Mid   2.90 cm RV Major 6.8 cm TAPSE:   14.0 mm RV s'    0.08 m/s  TRICUSPID VALVE/RVSP:          Normal Ranges: Peak TR Velocity:     2.99 m/s Est. RA Pressure:     3 RV Syst Pressure:     39       (< 30mmHg) IVC Diam:             1.87 cm  PULMONIC VALVE:         Normal Ranges: PV Accel Time:  84 msec (>120ms)  AORTA: Asc Ao Diam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

## 2025-08-13 NOTE — NURSING NOTE
Began in depth VAD education today with Thao. Reviewed the controller green arrows and when to recognize if pump is off. Discussed the functions of the 3 controller buttons. Educated the patient on the VAD parameters, what each value shown on the screen means, and what numbers would result in contacting the VAD team.     Patient practiced battery and clip connections and standard battery connection to data and power cords correctly connecting white to white and black to black on the wall unit.     Thao learned about all alarms that could present on the controller, including battery life alarms, red heart hazard alarms, and yellow wrench alarms. Examples of alarms that would read on the controller screen shown to patient. Reiterated  that the appropriate response to red heart or yellow wrench alarms is to contact the VAD team.     Reviewed the pump function with the patient. Reviewed the modular cable with the patient.     Introduced the battery charger functions and how to calibrate a battery. Reviewed what each light means on the battery charger. The MPU was discussed in detail, reviewing the icons on the top and the 3 AA batteries located on the bottom.  Discussed the need to always connect to the MPU when sleeping.    Will plan to continue VAD education and review above education throughout sessions.

## 2025-08-13 NOTE — CONSULTS
Inpatient consult to Endocrinology  Consult performed by: Avery Sharif MD  Consult ordered by: Sera Fallon, APRN-CNP  Reason for consult: insulin mgmt          Reason For Consult  insulin mgmt     History Of Present Illness  Thao Evans is a 60 y.o. female with a past medical history significant of but not limited to CAD s/p PCI (LAD; 2015, Lcx; 2025), stage C systolic HF/ICM/HFrEF s/p ICD, h/o VT with presumed associated syncope,  IDT2DM, pAF (off of DOAC given the absence of recurrence), dyslipidemia, essential HTN, COPD, and h/o Graves now hypothyroid ( did not say surgery or VEGA ablation) on LT4 is admitted to the hospital for swan valeriy guided hemodynamic optimization, LVAD placement which was done on 08/01 for her HF. We have been asked to see the patient for insulin management.    Diabetes Diagnosis: 10-15 years ago  Home blood glucose checks: twice daily at home  Trends: <200s  Hypoglycemia (y/n, threshold and symptoms): rarely   Hypoglycemia awareness: yes  Home diabetes regimen: Per last endo note, was recently discharged on G 12/L7 TID, endo told to stop Jardiance. Primary team sent her on Alogliptin 25 mg every day, Jardiance 10 mg every day, G 15 units at night  Previous Meds: Metformin and GLP-1a stopped because of GI side effects  Home diet (how many meals a day): she said regular diet, so a mix of carbs, protein etc  Outpatient physician managing diabetes: PCP  Macrovascular complications: CAD +, but no stroke or PAD  Microvascular complications: unsure  Last A1c: 7.5 in 07/2025  Inpatient diabetes regimen: G25/L with ISS, Jardiance 10 mg QD  Inpatient diet: Regular    The patient said she has had 2 UTIs in the last calendar year both of which were treated. She was on SGLT-2i both the times.    Results from Most Recent A1C  Hemoglobin A1C   Date/Time Value Ref Range Status   07/31/2025 04:58 PM 7.5 (H) See comment % Final        Diabetes Problem List Entries with Dates  Problem  "List:  2025-06: Type 2 diabetes mellitus with hyperglycemia, with long-term   current use of insulin  2025-03: Poorly controlled diabetes mellitus (Multi)  2022-10: Type 2 diabetes mellitus without complication, without long-  term current use of insulin    Past Medical History  She has a past medical history of CAD (coronary artery disease), CHF (congestive heart failure), COPD (chronic obstructive pulmonary disease) (Multi), Graves disease, Hypotension, and PAF (paroxysmal atrial fibrillation) (Multi).    Surgical History  She has no past surgical history on file.     Social History  She reports that she has quit smoking. Her smoking use included cigarettes. She has never been exposed to tobacco smoke. She has never used smokeless tobacco. No history on file for alcohol use and drug use.    Family History  Family History[1]     Allergies  Bee venom protein (honey bee), Codeine, Doxycycline, Prednisone, Tetracyclines, Amoxicillin, and Metformin    Review of Systems  Negative unless noted above     Physical Exam     General: female patient in NAD  HEENT: AT/NC  Neck: trachea in midline  Resp: no extra wob noted  CVS: normal s1 and s2  Skin: warm, dry and intact, no visible skin tags  Neuro: Alert  Psych: cooperative    ROS, PMH, FH/SH, surgical history and allergies have been reviewed.    Last Recorded Vitals  Blood pressure 81/56, pulse 85, temperature 35.5 °C (95.9 °F), temperature source Temporal, resp. rate 18, height 1.575 m (5' 2\"), weight 60 kg (132 lb 4.4 oz), SpO2 90%.         Relevant Results  Results from last 7 days   Lab Units 08/13/25  0705 08/13/25  0611 08/12/25  2254 08/12/25  1516 08/12/25  1114 08/12/25  0653 08/12/25  0634 08/11/25  1118 08/11/25  0844 08/10/25  0741 08/10/25  0413 08/09/25  0739 08/09/25  0328   POCT GLUCOSE mg/dL 151*  --  239* 97 175* 73*  --    < >  --    < >  --    < >  --    GLUCOSE mg/dL  --  156*  --   --   --   --  64*  --  143*  --  104*  --  138*    < > = values in " this interval not displayed.        Assessment/Plan   Assessment & Plan  Acute on chronic heart failure with reduced ejection fraction (HFrEF, <= 40%)    CAD (coronary artery disease)    Cardiogenic shock (Multi)    Neuromuscular disease (Multi)    Decreased cardiac ejection fraction    Stented coronary artery    Pulmonary hypertension (Multi)    Osteoarthritis    CHF (NYHA class IV, ACC/AHA stage D) (Multi)    The patient is a 60 year old female who is admitted to the hospital for LVAD placement for HF optimization. It was done on 08/01. We have been asked to see her for insulin management.    DM:   Hyperglycemia 2/2 poorly controlled diabetes, insufficient insulin administration, and stress state    -Goal -180  -We do not recommend SGLT2i due to recurrent UTIs. Last UTI was 6/2025.  -Rx Glargine 25 U HS  -Rx prandial Lispro 2 U AC   -Accuchecks (not BMP) TID and QHS- kindly ensure QHS Accucheck is drawn; it is often missed  -Low dose SSI (2U of Lispro for every 50 above 150) TID w/ meals only; avoid night time correction  -If NPO, please hold the scheduled meal time insulin and cover only with SS 2:50>140 Q4 hours with accuchecks Q4  hours  -Hypoglycemia protocol  -Diabetic Diet- low carb 60 CHO  -Will continue to follow and titrate insulin accordingly  -Please let us know incase you are changing the patient's diet, starting the patient on tube feeds or starting/stopping glucocorticoids as that will alter the regimen above  -Please ensure adequate home supplies/ provide rx for insulin pens, needles, glucometer, alcohol swabs, lancets, CGM, test strips etc.  -Consider rx for glucagon (nasal powder 3mg)  -Follow up with Endocrinology 1-2w after discharge      Discharge recs:  - Stop SGLt-2i due to recurrent recent UTI. We can re-consider this in a few months from now.  - c/w Alopgliptin 25 mg every day   - Lantus 20 units at bedtime    Thank you for the consult   Plan conveyed to primary team  Patient was  seen, examined and plan was discussed with Dr. Ross.  We will sign off for now. Please reach out with questions.    Avery Sharif MD  PGY-5 Endocrinology Fellow         [1] No family history on file.

## 2025-08-14 ENCOUNTER — APPOINTMENT (OUTPATIENT)
Dept: RADIOLOGY | Facility: HOSPITAL | Age: 60
DRG: 001 | End: 2025-08-14
Payer: COMMERCIAL

## 2025-08-14 LAB
ALBUMIN SERPL BCP-MCNC: 3.3 G/DL (ref 3.4–5)
ANION GAP SERPL CALC-SCNC: 14 MMOL/L
BUN SERPL-MCNC: 12 MG/DL (ref 6–23)
CALCIUM SERPL-MCNC: 8.7 MG/DL (ref 8.6–10.6)
CHLORIDE SERPL-SCNC: 96 MMOL/L (ref 98–107)
CO2 SERPL-SCNC: 32 MMOL/L (ref 21–32)
CREAT SERPL-MCNC: 0.78 MG/DL (ref 0.5–1.05)
EGFRCR SERPLBLD CKD-EPI 2021: 86 ML/MIN/1.73M*2
ERYTHROCYTE [DISTWIDTH] IN BLOOD BY AUTOMATED COUNT: 16.4 % (ref 11.5–14.5)
GLUCOSE BLD MANUAL STRIP-MCNC: 111 MG/DL (ref 74–99)
GLUCOSE BLD MANUAL STRIP-MCNC: 175 MG/DL (ref 74–99)
GLUCOSE BLD MANUAL STRIP-MCNC: 208 MG/DL (ref 74–99)
GLUCOSE BLD MANUAL STRIP-MCNC: 264 MG/DL (ref 74–99)
GLUCOSE BLD MANUAL STRIP-MCNC: 66 MG/DL (ref 74–99)
GLUCOSE SERPL-MCNC: 166 MG/DL (ref 74–99)
HCT VFR BLD AUTO: 29 % (ref 36–46)
HGB BLD-MCNC: 9.1 G/DL (ref 12–16)
INR PPP: 1.7 (ref 0.9–1.1)
LDH SERPL L TO P-CCNC: 210 U/L (ref 84–246)
MCH RBC QN AUTO: 30.6 PG (ref 26–34)
MCHC RBC AUTO-ENTMCNC: 31.4 G/DL (ref 32–36)
MCV RBC AUTO: 98 FL (ref 80–100)
NRBC BLD-RTO: 0 /100 WBCS (ref 0–0)
PHOSPHATE SERPL-MCNC: 3.1 MG/DL (ref 2.5–4.9)
PLATELET # BLD AUTO: 446 X10*3/UL (ref 150–450)
POTASSIUM SERPL-SCNC: 3.7 MMOL/L (ref 3.5–5.3)
PROTHROMBIN TIME: 19.2 SECONDS (ref 9.8–12.4)
RBC # BLD AUTO: 2.97 X10*6/UL (ref 4–5.2)
SODIUM SERPL-SCNC: 138 MMOL/L (ref 136–145)
WBC # BLD AUTO: 9 X10*3/UL (ref 4.4–11.3)

## 2025-08-14 PROCEDURE — 2500000005 HC RX 250 GENERAL PHARMACY W/O HCPCS: Performed by: STUDENT IN AN ORGANIZED HEALTH CARE EDUCATION/TRAINING PROGRAM

## 2025-08-14 PROCEDURE — 2500000001 HC RX 250 WO HCPCS SELF ADMINISTERED DRUGS (ALT 637 FOR MEDICARE OP): Performed by: NURSE PRACTITIONER

## 2025-08-14 PROCEDURE — 85610 PROTHROMBIN TIME: CPT | Performed by: REGISTERED NURSE

## 2025-08-14 PROCEDURE — 2500000004 HC RX 250 GENERAL PHARMACY W/ HCPCS (ALT 636 FOR OP/ED): Performed by: STUDENT IN AN ORGANIZED HEALTH CARE EDUCATION/TRAINING PROGRAM

## 2025-08-14 PROCEDURE — 2500000001 HC RX 250 WO HCPCS SELF ADMINISTERED DRUGS (ALT 637 FOR MEDICARE OP): Performed by: STUDENT IN AN ORGANIZED HEALTH CARE EDUCATION/TRAINING PROGRAM

## 2025-08-14 PROCEDURE — 83615 LACTATE (LD) (LDH) ENZYME: CPT | Performed by: REGISTERED NURSE

## 2025-08-14 PROCEDURE — 2500000001 HC RX 250 WO HCPCS SELF ADMINISTERED DRUGS (ALT 637 FOR MEDICARE OP): Performed by: REGISTERED NURSE

## 2025-08-14 PROCEDURE — 99233 SBSQ HOSP IP/OBS HIGH 50: CPT | Performed by: STUDENT IN AN ORGANIZED HEALTH CARE EDUCATION/TRAINING PROGRAM

## 2025-08-14 PROCEDURE — 80069 RENAL FUNCTION PANEL: CPT | Performed by: REGISTERED NURSE

## 2025-08-14 PROCEDURE — 71045 X-RAY EXAM CHEST 1 VIEW: CPT | Performed by: RADIOLOGY

## 2025-08-14 PROCEDURE — 2500000002 HC RX 250 W HCPCS SELF ADMINISTERED DRUGS (ALT 637 FOR MEDICARE OP, ALT 636 FOR OP/ED): Performed by: STUDENT IN AN ORGANIZED HEALTH CARE EDUCATION/TRAINING PROGRAM

## 2025-08-14 PROCEDURE — 82947 ASSAY GLUCOSE BLOOD QUANT: CPT

## 2025-08-14 PROCEDURE — 2500000001 HC RX 250 WO HCPCS SELF ADMINISTERED DRUGS (ALT 637 FOR MEDICARE OP)

## 2025-08-14 PROCEDURE — 71045 X-RAY EXAM CHEST 1 VIEW: CPT

## 2025-08-14 PROCEDURE — 97530 THERAPEUTIC ACTIVITIES: CPT | Mod: GP,CQ

## 2025-08-14 PROCEDURE — 94668 MNPJ CHEST WALL SBSQ: CPT

## 2025-08-14 PROCEDURE — 85027 COMPLETE CBC AUTOMATED: CPT | Performed by: REGISTERED NURSE

## 2025-08-14 PROCEDURE — 36415 COLL VENOUS BLD VENIPUNCTURE: CPT | Performed by: REGISTERED NURSE

## 2025-08-14 PROCEDURE — 1200000002 HC GENERAL ROOM WITH TELEMETRY DAILY

## 2025-08-14 PROCEDURE — 94640 AIRWAY INHALATION TREATMENT: CPT

## 2025-08-14 PROCEDURE — 2500000002 HC RX 250 W HCPCS SELF ADMINISTERED DRUGS (ALT 637 FOR MEDICARE OP, ALT 636 FOR OP/ED): Performed by: NURSE PRACTITIONER

## 2025-08-14 PROCEDURE — 97116 GAIT TRAINING THERAPY: CPT | Mod: GP,CQ

## 2025-08-14 RX ORDER — WARFARIN 2 MG/1
2 TABLET ORAL DAILY
Status: DISCONTINUED | OUTPATIENT
Start: 2025-08-14 | End: 2025-08-15

## 2025-08-14 RX ORDER — FUROSEMIDE 40 MG/1
40 TABLET ORAL DAILY
Status: DISCONTINUED | OUTPATIENT
Start: 2025-08-14 | End: 2025-08-15

## 2025-08-14 RX ORDER — POTASSIUM CHLORIDE 20 MEQ/1
40 TABLET, EXTENDED RELEASE ORAL ONCE
Status: COMPLETED | OUTPATIENT
Start: 2025-08-14 | End: 2025-08-14

## 2025-08-14 RX ADMIN — ACETYLCYSTEINE 600 MG: 200 SOLUTION ORAL; RESPIRATORY (INHALATION) at 07:43

## 2025-08-14 RX ADMIN — OXYCODONE HYDROCHLORIDE 5 MG: 5 TABLET ORAL at 18:35

## 2025-08-14 RX ADMIN — GUAIFENESIN AND DEXTROMETHORPHAN HYDROBROMIDE 1 TABLET: 600; 30 TABLET, EXTENDED RELEASE ORAL at 20:14

## 2025-08-14 RX ADMIN — ACETYLCYSTEINE 600 MG: 200 SOLUTION ORAL; RESPIRATORY (INHALATION) at 14:23

## 2025-08-14 RX ADMIN — ACETAMINOPHEN 650 MG: 325 TABLET, FILM COATED ORAL at 18:35

## 2025-08-14 RX ADMIN — GABAPENTIN 300 MG: 300 CAPSULE ORAL at 06:09

## 2025-08-14 RX ADMIN — ACETAMINOPHEN 650 MG: 325 TABLET, FILM COATED ORAL at 13:13

## 2025-08-14 RX ADMIN — LIDOCAINE 4% 1 PATCH: 40 PATCH TOPICAL at 08:58

## 2025-08-14 RX ADMIN — GABAPENTIN 300 MG: 300 CAPSULE ORAL at 21:31

## 2025-08-14 RX ADMIN — CITALOPRAM HYDROBROMIDE 40 MG: 40 TABLET ORAL at 08:53

## 2025-08-14 RX ADMIN — GABAPENTIN 300 MG: 300 CAPSULE ORAL at 14:10

## 2025-08-14 RX ADMIN — FUROSEMIDE 40 MG: 40 TABLET ORAL at 11:35

## 2025-08-14 RX ADMIN — IPRATROPIUM BROMIDE AND ALBUTEROL SULFATE 3 ML: .5; 3 SOLUTION RESPIRATORY (INHALATION) at 14:22

## 2025-08-14 RX ADMIN — PANTOPRAZOLE SODIUM 40 MG: 40 TABLET, DELAYED RELEASE ORAL at 06:09

## 2025-08-14 RX ADMIN — POTASSIUM CHLORIDE 40 MEQ: 1500 TABLET, EXTENDED RELEASE ORAL at 09:01

## 2025-08-14 RX ADMIN — OXYCODONE HYDROCHLORIDE 5 MG: 5 TABLET ORAL at 02:19

## 2025-08-14 RX ADMIN — ASPIRIN 81 MG: 81 TABLET, CHEWABLE ORAL at 08:55

## 2025-08-14 RX ADMIN — SACUBITRIL AND VALSARTAN 1 TABLET: 24; 26 TABLET, FILM COATED ORAL at 08:53

## 2025-08-14 RX ADMIN — ACETYLCYSTEINE 600 MG: 200 SOLUTION ORAL; RESPIRATORY (INHALATION) at 20:09

## 2025-08-14 RX ADMIN — ACETAMINOPHEN 650 MG: 325 TABLET, FILM COATED ORAL at 02:17

## 2025-08-14 RX ADMIN — ICOSAPENT ETHYL 2 G: 1 CAPSULE ORAL at 17:30

## 2025-08-14 RX ADMIN — IPRATROPIUM BROMIDE AND ALBUTEROL SULFATE 3 ML: .5; 3 SOLUTION RESPIRATORY (INHALATION) at 20:08

## 2025-08-14 RX ADMIN — LIDOCAINE 4% 1 PATCH: 40 PATCH TOPICAL at 08:59

## 2025-08-14 RX ADMIN — INSULIN LISPRO 4 UNITS: 100 INJECTION, SOLUTION INTRAVENOUS; SUBCUTANEOUS at 08:56

## 2025-08-14 RX ADMIN — GUAIFENESIN AND DEXTROMETHORPHAN HYDROBROMIDE 1 TABLET: 600; 30 TABLET, EXTENDED RELEASE ORAL at 08:53

## 2025-08-14 RX ADMIN — ATORVASTATIN CALCIUM 80 MG: 80 TABLET, FILM COATED ORAL at 08:54

## 2025-08-14 RX ADMIN — ROPINIROLE 1 MG: 1 TABLET, FILM COATED ORAL at 08:55

## 2025-08-14 RX ADMIN — INSULIN LISPRO 6 UNITS: 100 INJECTION, SOLUTION INTRAVENOUS; SUBCUTANEOUS at 17:30

## 2025-08-14 RX ADMIN — SPIRONOLACTONE 25 MG: 25 TABLET ORAL at 08:54

## 2025-08-14 RX ADMIN — ROPINIROLE 1 MG: 1 TABLET, FILM COATED ORAL at 20:52

## 2025-08-14 RX ADMIN — ICOSAPENT ETHYL 2 G: 1 CAPSULE ORAL at 08:55

## 2025-08-14 RX ADMIN — OXYCODONE HYDROCHLORIDE 5 MG: 5 TABLET ORAL at 08:53

## 2025-08-14 RX ADMIN — Medication 5 MG: at 21:31

## 2025-08-14 RX ADMIN — SACUBITRIL AND VALSARTAN 1 TABLET: 24; 26 TABLET, FILM COATED ORAL at 20:14

## 2025-08-14 RX ADMIN — IPRATROPIUM BROMIDE AND ALBUTEROL SULFATE 3 ML: .5; 3 SOLUTION RESPIRATORY (INHALATION) at 07:42

## 2025-08-14 RX ADMIN — WARFARIN SODIUM 2 MG: 2 TABLET ORAL at 19:09

## 2025-08-14 RX ADMIN — LEVOTHYROXINE SODIUM 88 MCG: 0.09 TABLET ORAL at 06:09

## 2025-08-14 RX ADMIN — ACETAMINOPHEN 650 MG: 325 TABLET, FILM COATED ORAL at 08:53

## 2025-08-14 RX ADMIN — OXYCODONE HYDROCHLORIDE 5 MG: 5 TABLET ORAL at 13:13

## 2025-08-14 RX ADMIN — ROPINIROLE HYDROCHLORIDE 3 MG: 3 TABLET, FILM COATED ORAL at 20:14

## 2025-08-14 RX ADMIN — ROPINIROLE 1 MG: 1 TABLET, FILM COATED ORAL at 16:12

## 2025-08-14 RX ADMIN — INSULIN GLARGINE 25 UNITS: 100 INJECTION, SOLUTION SUBCUTANEOUS at 08:59

## 2025-08-14 ASSESSMENT — COGNITIVE AND FUNCTIONAL STATUS - GENERAL
CLIMB 3 TO 5 STEPS WITH RAILING: A LITTLE
CLIMB 3 TO 5 STEPS WITH RAILING: A LITTLE
TURNING FROM BACK TO SIDE WHILE IN FLAT BAD: A LITTLE
MOBILITY SCORE: 18
MOBILITY SCORE: 23
DAILY ACTIVITIY SCORE: 24
STANDING UP FROM CHAIR USING ARMS: A LITTLE
MOBILITY SCORE: 23
MOVING TO AND FROM BED TO CHAIR: A LITTLE
WALKING IN HOSPITAL ROOM: A LITTLE
MOVING FROM LYING ON BACK TO SITTING ON SIDE OF FLAT BED WITH BEDRAILS: A LITTLE
CLIMB 3 TO 5 STEPS WITH RAILING: A LITTLE
DAILY ACTIVITIY SCORE: 24

## 2025-08-14 ASSESSMENT — PAIN SCALES - GENERAL
PAINLEVEL_OUTOF10: 5 - MODERATE PAIN
PAINLEVEL_OUTOF10: 6
PAINLEVEL_OUTOF10: 0 - NO PAIN
PAINLEVEL_OUTOF10: 6
PAINLEVEL_OUTOF10: 6
PAINLEVEL_OUTOF10: 5 - MODERATE PAIN
PAINLEVEL_OUTOF10: 5 - MODERATE PAIN

## 2025-08-14 ASSESSMENT — PAIN - FUNCTIONAL ASSESSMENT
PAIN_FUNCTIONAL_ASSESSMENT: 0-10

## 2025-08-14 ASSESSMENT — PAIN DESCRIPTION - LOCATION
LOCATION: CHEST

## 2025-08-14 ASSESSMENT — PAIN DESCRIPTION - ORIENTATION
ORIENTATION: MID
ORIENTATION: MID

## 2025-08-14 NOTE — CARE PLAN
Pt safe and stable. Pt ambulated in the hallway throughout the shift.       Problem: Pain - Adult  Goal: Verbalizes/displays adequate comfort level or baseline comfort level  Outcome: Progressing     Problem: Safety - Adult  Goal: Free from fall injury  Outcome: Progressing     Problem: Discharge Planning  Goal: Discharge to home or other facility with appropriate resources  Outcome: Progressing     Problem: Chronic Conditions and Co-morbidities  Goal: Patient's chronic conditions and co-morbidity symptoms are monitored and maintained or improved  Outcome: Progressing     Problem: Nutrition  Goal: Nutrient intake appropriate for maintaining nutritional needs  Outcome: Progressing     Problem: Heart Failure  Goal: Improved gas exchange this shift  Outcome: Progressing  Goal: Improved urinary output this shift  Outcome: Progressing  Goal: Reduction in peripheral edema within 24 hours  Outcome: Progressing  Goal: Report improvement of dyspnea/breathlessness this shift  Outcome: Progressing  Goal: Weight from fluid excess reduced over 2-3 days, then stabilize  Outcome: Progressing  Goal: Increase self care and/or family involvement in 24 hours  Outcome: Progressing     Problem: Respiratory  Goal: Minimal/no exertional discomfort or dyspnea this shift  Outcome: Progressing  Goal: No signs of respiratory distress (eg. Use of accessory muscles. Peds grunting)  Outcome: Progressing  Goal: Patent airway maintained this shift  Outcome: Progressing  Goal: Verbalize decreased shortness of breath this shift  Outcome: Progressing  Goal: Wean oxygen to maintain O2 saturation per order/standard this shift  Outcome: Progressing  Goal: Increase self care and/or family involvement in next 24 hours  Outcome: Progressing     Problem: Diabetes  Goal: Achieve decreasing blood glucose levels by end of shift  Outcome: Progressing  Goal: Increase stability of blood glucose readings by end of shift  Outcome: Progressing  Goal: Decrease in  ketones present in urine by end of shift  Outcome: Progressing  Goal: Maintain electrolyte levels within acceptable range throughout shift  Outcome: Progressing  Goal: Maintain glucose levels >70mg/dl to <250mg/dl throughout shift  Outcome: Progressing  Goal: No changes in neurological exam by end of shift  Outcome: Progressing  Goal: Learn about and adhere to nutrition recommendations by end of shift  Outcome: Progressing  Goal: Vital signs within normal range for age by end of shift  Outcome: Progressing  Goal: Increase self care and/or family involovement by end of shift  Outcome: Progressing  Goal: Receive DSME education by end of shift  Outcome: Progressing     Problem: Ventricular Assisted Device (VAD)  Goal: Stable metal status  Outcome: Progressing  Goal: Pulmonary toileting, incentive spirometry  Outcome: Progressing  Goal: Nutrition  Outcome: Progressing  Goal: Mobility/OT/PT  Outcome: Progressing  Goal: Rubber ball  Outcome: Progressing  Goal: ROM  Outcome: Progressing  Goal: Sitting  Outcome: Progressing  Goal: Walk  Outcome: Progressing  Goal: Involve in VAD awareness, dressing change  Outcome: Progressing  Goal: AICD On/Off  Outcome: Progressing     Problem: Skin  Goal: Decreased wound size/increased tissue granulation at next dressing change  Outcome: Progressing  Goal: Participates in plan/prevention/treatment measures  Outcome: Progressing  Goal: Prevent/manage excess moisture  Outcome: Progressing  Goal: Prevent/minimize sheer/friction injuries  Outcome: Progressing  Goal: Promote/optimize nutrition  Outcome: Progressing  Goal: Promote skin healing  Outcome: Progressing     Problem: Pain  Goal: Takes deep breaths with improved pain control throughout the shift  Outcome: Progressing  Goal: Turns in bed with improved pain control throughout the shift  Outcome: Progressing  Goal: Walks with improved pain control throughout the shift  Outcome: Progressing  Goal: Performs ADL's with improved pain  control throughout shift  Outcome: Progressing  Goal: Participates in PT with improved pain control throughout the shift  Outcome: Progressing  Goal: Free from opioid side effects throughout the shift  Outcome: Progressing  Goal: Free from acute confusion related to pain meds throughout the shift  Outcome: Progressing     Problem: Fall/Injury  Goal: Not fall by end of shift  Outcome: Progressing  Goal: Be free from injury by end of the shift  Outcome: Progressing  Goal: Verbalize understanding of personal risk factors for fall in the hospital  Outcome: Progressing  Goal: Verbalize understanding of risk factor reduction measures to prevent injury from fall in the home  Outcome: Progressing  Goal: Use assistive devices by end of the shift  Outcome: Progressing  Goal: Pace activities to prevent fatigue by end of the shift  Outcome: Progressing

## 2025-08-14 NOTE — CARE PLAN
Problem: Pain - Adult  Goal: Verbalizes/displays adequate comfort level or baseline comfort level  Outcome: Progressing     Problem: Safety - Adult  Goal: Free from fall injury  Outcome: Progressing     Problem: Discharge Planning  Goal: Discharge to home or other facility with appropriate resources  Outcome: Progressing     Problem: Nutrition  Goal: Nutrient intake appropriate for maintaining nutritional needs  Outcome: Progressing     Problem: Respiratory  Goal: Minimal/no exertional discomfort or dyspnea this shift  Outcome: Progressing  Goal: No signs of respiratory distress (eg. Use of accessory muscles. Peds grunting)  Outcome: Progressing  Goal: Patent airway maintained this shift  Outcome: Progressing  Goal: Verbalize decreased shortness of breath this shift  Outcome: Progressing  Goal: Wean oxygen to maintain O2 saturation per order/standard this shift  Outcome: Progressing  Goal: Increase self care and/or family involvement in next 24 hours  Outcome: Progressing     Problem: Ventricular Assisted Device (VAD)  Goal: Stable metal status  Outcome: Progressing  Goal: Pulmonary toileting, incentive spirometry  Outcome: Progressing  Goal: Nutrition  Outcome: Progressing  Goal: Mobility/OT/PT  Outcome: Progressing  Goal: Rubber ball  Outcome: Progressing  Goal: ROM  Outcome: Progressing  Goal: Sitting  Outcome: Progressing  Goal: Walk  Outcome: Progressing  Goal: Involve in VAD awareness, dressing change  Outcome: Progressing  Goal: AICD On/Off  Outcome: Progressing   The patient's goals for the shift include patient wants hot water to be fixed this shift    The clinical goals for the shift include remain HDS and safe this shift    Over the shift, the patient medicated with oxycodone 5 mg po x 1 for right shoulder pain. Up to bathroom with steady gait. Calls appropriately. Will continue to monitor.

## 2025-08-14 NOTE — PROGRESS NOTES
Physical Therapy    Physical Therapy Treatment    Patient Name: Thao Evans  MRN: [id number]  Department: [place] [address]  Room: [facility]  Today's Date: 8/14/2025  Time Calculation  Start Time: 0916  Stop Time: 0939  Time Calculation (min): 23 min         Assessment/Plan   PT Assessment  PT Assessment Results: Decreased strength, Decreased endurance, Impaired balance, Decreased mobility, Decreased safety awareness, Pain  Rehab Prognosis: Excellent  Barriers to Discharge Home: No anticipated barriers  Evaluation/Treatment Tolerance: Patient tolerated treatment well  Medical Staff Made Aware: Yes  End of Session Communication: Bedside nurse  Assessment Comment: Pt offers good effort with therapy, progressing with LVAD management as well strength and endurance, though would benefit from continued strengthening and balance training. Pt remains appropriate for low intensity therapy  End of Session Patient Position: Bed, 3 rail up, Alarm off, not on at start of session  PT Plan  Inpatient/Swing Bed or Outpatient: Inpatient  PT Plan  Treatment/Interventions: Bed mobility, Transfer training, Gait training, Stair training, Balance training, Strengthening, Endurance training, Range of motion, Therapeutic exercise, Therapeutic activity, Home exercise program, Positioning, Postural re-education  PT Plan: Ongoing PT  PT Frequency for Current Admission: 6 times per week during this acute inpatient hospitalization  PT Discharge Recommendations: Low intensity level of continued care  Equipment Recommended upon Discharge: Wheeled walker  PT Recommended Transfer Status: Assist x1  PT - OK to Discharge: Yes    PT Visit Info:  PT Received On: 08/14/25  Response to Previous Treatment: Patient with no complaints from previous session.     General Visit Information:   General  Prior to Session Communication: Bedside nurse  Patient Position Received: Bed, 3 rail up, Alarm off, not on at start of session  General Comment: Pt supine in bed,  pleasant and agreeable to therapy  Per handoff with RN, pt is appropriate for therapy, vitals are stable and pain is controlled. Other concerns prior to tx are: none    Subjective   Precautions:  Precautions  Medical Precautions: Cardiac precautions, Fall precautions  Post-Surgical Precautions: Move in the Tube  Precautions Comment: MAP >65. SpO2 >92%. LVAD driveline intact before and after session     Date/Time Vitals Session Patient Position Pulse Resp SpO2 BP MAP (mmHg)    08/14/25 0916 --  --  --  --  --  --  84      Vital Signs Comment: LVAD #'s: pre/post tx,   flow=4.3/4.4   speed=5000/5000   power=4.0/3.5   PI= 3.4/3.5     Objective   Pain:  Pain Assessment  Pain Assessment: 0-10  0-10 (Numeric) Pain Score: 0 - No pain  Cognition:  Cognition  Overall Cognitive Status: Within Functional Limits  Orientation Level: Oriented X4    Treatments:  Therapeutic Exercise  Therapeutic Exercise Performed: Yes  Therapeutic Exercise Activity 1: Sit-stand without use of UEs 3x5 from progressively lowered bed height\    Therapeutic Activity  Therapeutic Activity Performed: Yes  Therapeutic Activity 1: Pt switching LVAD to battery power at start of session with SBA and no cues required, donning shoulder bag with Ophelia and min cues. Pt opting to remain on battery power at end of session    Bed Mobility  Bed Mobility: Yes  Bed Mobility 1  Bed Mobility 1: Supine to sitting, Sitting to supine  Level of Assistance 1: Modified independent    Ambulation/Gait Training  Ambulation/Gait Training Performed: Yes  Ambulation/Gait Training 1  Surface 1: Level tile  Device 1: No device  Assistance 1: Close supervision  Quality of Gait 1: Wide base of support, Soft knee(s)  Comments/Distance (ft) 1: 180'x1, 60'x1  Transfers  Transfer: Yes  Transfer 1  Transfer From 1: Sit to, Stand to  Transfer to 1: Sit  Technique 1: Sit to stand, Stand to sit  Transfer Level of Assistance 1: Distant supervision    Outcome Measures:     [facility] Basic  Mobility  Turning from your back to your side while in a flat bed without using bedrails: A little  Moving from lying on your back to sitting on the side of a flat bed without using bedrails: A little  Moving to and from bed to chair (including a wheelchair): A little  Standing up from a chair using your arms (e.g. wheelchair or bedside chair): A little  To walk in hospital room: A little  Climbing 3-5 steps with railing: A little  Basic Mobility - Total Score: 18     Education Documentation  Precautions, taught by Alem Fallon PTA at 8/14/2025 10:04 AM.  Learner: Patient  Readiness: Acceptance  Method: Explanation, Demonstration  Response: Verbalizes Understanding, Demonstrated Understanding  Comment: LVAD management and parts, precautions, safe mobility    Body Mechanics, taught by Alem Fallon PTA at 8/14/2025 10:04 AM.  Learner: Patient  Readiness: Acceptance  Method: Explanation, Demonstration  Response: Verbalizes Understanding, Demonstrated Understanding  Comment: LVAD management and parts, precautions, safe mobility    Mobility Training, taught by Alem Fallon PTA at 8/14/2025 10:04 AM.  Learner: Patient  Readiness: Acceptance  Method: Explanation, Demonstration  Response: Verbalizes Understanding, Demonstrated Understanding  Comment: LVAD management and parts, precautions, safe mobility    Education Comments  No comments found.        OP EDUCATION:       Encounter Problems       Encounter Problems (Active)       Balance       patient will score >24/28 on the Tinetti scale to present as a low risk of falls (Progressing)       Start:  07/15/25    Expected End:  08/16/25               Mobility       Patient will complete the 5xSTS  in <15 sec with no assistive device, no UE support, and close supervision (RPE: 11/20 RPD: 3/10) (Progressing)       Start:  07/15/25    Expected End:  08/16/25            Patient will ambulate >1000ft on the 6MWT with no assistive device and close superivision (RPE:  11/20 RPD: 3/10) (Progressing)       Start:  07/15/25    Expected End:  08/16/25               Mobility       Pt will ambulate >300ft with appropriate form, SBA or less, LRAD, and no LOB for safe DC.  (Progressing)       Start:  08/02/25    Expected End:  08/16/25            Pt will ambulate up/down >3 stairs with/without hand rail with CGA or less to safely access their home upon DC.   (Progressing)       Start:  08/02/25    Expected End:  08/16/25               PT Transfers       Pt will perform bed mobility and sit<>stand transfers with SBA and use of LRAD to safely DC.  (Progressing)       Start:  08/02/25    Expected End:  08/16/25                 LEA Dave

## 2025-08-14 NOTE — PROGRESS NOTES
ADVANCED HEART FAILURE LVAD PROGRESS NOTE      VAD Cardiologist: Sandrine    VAD Coordinator: Anjali Meredith, RN and Alma Delia Capone RN     Type of VAD: HM3  Implant Date: 8/1/25   Reason for VAD: ICM/HFrEF   Intent: Long-Term modifiable (nicotine)      Subjective     HPI:  Thao Evans is a 60 y.o. female with a complex cardiovascular history, including coronary artery disease status post PCI to the LAD in 2015 and Lcx in October 2024 (currently on Plavix), and Stage D systolic heart failure secondary to ischemic cardiomyopathy (ICM/HFrEF) s/p ICD placement, poorly controlled type 2 diabetes mellitus, paroxysmal atrial fibrillation status post DCCV in October 2024 (currently off anticoagulation due to absence of recurrence), hypertension, hyperlipidemia, COPD with recent tobacco cessation (2 months ago), Graves disease, restless legs syndrome, and generalized anxiety and depression.    She was directly admitted to the HF ICU for Clvl-Alyg-azxdql hemodynamic optimization in anticipation of LVAD placement initially scheduled for July 23, which was postponed due to identification of concerning colonic polyps on routine screening colonoscopy; pathology returned benign on July 25.    Now s/p LVAD HM3 implantation on August 1 by Dr. Inman. Postoperatively, she required initial invasive mechanical ventilation, followed by reintubation on the evening of August 3 due to mucous plugging. She is now extubated s/p bronchoscopy and transferred to [facility] 8/11.     Interval Events:   Patient seen and examined at bedside. NAEO. Continues to have non-productive cough, slowly improving. Repeat limited TTE overall unchanged.    NYHA class pre-VAD implant: IV   NYHA class today (08/14/25): II    Plan:   Restart home gapapentin for pain   Increase Coumadin to 2mg   Echo today   C/w VAD education   Increase diuresis to BID     Objective   Vitals:   Vitals:    08/14/25 0124 08/14/25 0600 08/14/25 0730 08/14/25 0742   BP:       Pulse:  80 79 80    Resp: 16 16     Temp: 36.5 °C (97.7 °F) 36.5 °C (97.7 °F) 36.1 °C (97 °F)    TempSrc:   Temporal    SpO2: 95% 92% 93% 94%   Weight: 58.7 kg (129 lb 6.6 oz)      Height:         Wt Readings from Last 5 Encounters:   08/14/25 58.7 kg (129 lb 6.6 oz)   06/20/25 57.2 kg (126 lb)   06/20/25 57.4 kg (126 lb 9.6 oz)   06/17/25 58.2 kg (128 lb 4.9 oz)   05/30/25 56.2 kg (124 lb)     Hemodynamic parameters for last 24 hours:     Intake/Output for last 24 hours:    Intake/Output Summary (Last 24 hours) at 8/14/2025 0935  Last data filed at 8/14/2025 0552  Gross per 24 hour   Intake 720 ml   Output 2800 ml   Net -2080 ml      Physical exam:  Physical Exam  Constitutional:       General: She is not in acute distress.     Appearance: Normal appearance. She is not ill-appearing, toxic-appearing or diaphoretic.   HENT:      Head: Normocephalic and atraumatic.      Mouth/Throat:      Mouth: Mucous membranes are moist.      Pharynx: Oropharynx is clear.     Eyes:      Extraocular Movements: Extraocular movements intact.      Pupils: Pupils are equal, round, and reactive to light.       Cardiovascular:      Comments: LVAD hum heard on auscultation  No BLE edema   JVP below clavicle at 30 degrees, overall euvolemic on exam     Pulmonary:      Effort: Pulmonary effort is normal. No respiratory distress.      Breath sounds: No stridor. Rhonchi present. No wheezing.      Comments: Rhonci in B bases, non productive cough   Abdominal:      General: Abdomen is flat. Bowel sounds are normal. There is no distension.      Palpations: Abdomen is soft. There is no mass.      Tenderness: There is no abdominal tenderness. There is no guarding or rebound.      Hernia: No hernia is present.     Musculoskeletal:         General: Normal range of motion.      Right lower leg: No edema.      Left lower leg: No edema.     Skin:     General: Skin is warm and dry.      Coloration: Skin is not jaundiced.      Findings: No bruising, lesion or  rash.     Neurological:      General: No focal deficit present.      Mental Status: She is alert and oriented to person, place, and time. Mental status is at baseline.      Cranial Nerves: No cranial nerve deficit.      Sensory: No sensory deficit.      Motor: No weakness.     Psychiatric:         Mood and Affect: Mood normal.         Behavior: Behavior normal.        Driveline:     Labs:   CMP:  Recent Labs     08/14/25  0624 08/13/25  0611 08/12/25  0634 08/11/25  0844 08/10/25  0413 08/09/25  0328 08/08/25  0506 08/07/25  1420 08/07/25  0223 08/06/25  0432 08/05/25  0514 08/04/25  1537 08/04/25  0023 08/03/25  1301    136 136 132* 136 134* 136 137   < > 136 135*   < > 135* 134*   K 3.7 4.2 4.5 4.2 4.3 4.1 4.2 4.0   < > 3.7 3.7   < > 4.2 4.3   CL 96* 96* 94* 94* 96* 96* 98 100   < > 96* 96*   < > 95* 95*   CO2 32 34* 33* 29 32 30 31 31   < > 34* 34*   < > 29 33*   ANIONGAP 14 10 14 13 12 12 11 10   < > 10 9*   < > 15 10   BUN 12 12 13 15 16 17 16 19   < > 29* 31*   < > 36* 31*   CREATININE 0.78 0.76 0.77 0.74 0.84 0.85 0.81 0.75   < > 0.83 0.87   < > 0.93 0.92   EGFR 86 89 87 >90 79 78 82 90   < > 80 75   < > 70 71   MG  --   --   --  1.92 2.23 2.05 1.76  --   --  2.09 2.07  --  2.08 2.14    < > = values in this interval not displayed.     Recent Labs     08/14/25  0624 08/13/25  0611 08/12/25  0634 08/11/25  0844 08/11/25  0415 08/10/25  0413 08/09/25  0328 08/08/25  0506 08/07/25  1110 08/07/25  0223 08/06/25  0432 08/05/25  0514   ALBUMIN 3.3* 3.3* 3.5  3.5 3.4 3.3* 3.5 3.2* 3.3*   < > 3.3* 3.3* 3.4   ALT  --   --  6*  --  5* 5* 5* 4*  --  4* 3* 3*   AST  --   --  10  --  10 13 12 13  --  18 17 20   BILITOT  --   --  0.4  --  0.4 0.4 0.4 0.4  --  0.5 0.4 0.5    < > = values in this interval not displayed.     CBC:  Recent Labs     08/14/25  0624 08/13/25  1147 08/11/25  0844 08/10/25  0413 08/09/25  0328 08/08/25  0506 08/07/25  0359 08/06/25  0432   WBC 9.0 9.4 6.0 5.5 6.6 6.7 5.8 6.1   HGB 9.1* 8.8*  "8.2* 8.6* 8.0* 7.6* 7.4* 7.6*   HCT 29.0* 28.0* 25.5* 26.9* 24.1* 22.9* 22.3* 23.3*    424 264 299 253 208 195 164   MCV 98 96 92 97 91 90 88 92     COAG:   Recent Labs     08/14/25  0624 08/13/25  0611 08/12/25  1302 08/12/25  0634 08/11/25  0415 08/10/25  0413 08/09/25  0328 08/08/25  0506 08/02/25  1225 08/02/25  0145 08/01/25  1322   INR 1.7* 1.7* 1.9* 1.8* 1.9* 2.7* 3.7* 3.9*   < > 1.1 1.3*   FIBRINOGEN  --   --   --   --   --   --   --   --   --  318 229    < > = values in this interval not displayed.     ABO:   Recent Labs     08/11/25 0415   ABO O     HEME/ENDO:   Recent Labs     07/31/25  1658 07/15/25  0354 07/14/25  2013 06/05/25  0607 06/02/25  1346 03/13/25  0531   FERRITIN  --   --  224*  --  232*  --    IRONSAT  --   --  24*  --  17*  --    TSH  --   --   --  0.19* 0.20*  --    HGBA1C 7.5* 8.0*  --   --  8.6* 12.3*     CARDIAC:   Recent Labs     08/14/25  0624 08/13/25  0611 08/12/25  0634 08/11/25  0415 08/10/25  0413 08/09/25  0328 08/08/25  0506 08/07/25  0223 07/23/25  1855 07/14/25 2003 06/12/25  1728 06/09/25  1613 06/03/25  1743 06/02/25  1346    208 235 224 254* 257* 269* 295*   < >  --   --   --    < >  --    TROPHS  --   --   --   --   --   --   --   --   --  18 15  --   --  22   BNP  --   --   --   --   --   --   --   --   --  735*  --  1,456*  --  1,292*    < > = values in this interval not displayed.     Recent Labs     08/04/25  0426 07/14/25 2003   CHOL  --  141   LDLCALC  --  78   HDL  --  43.3   TRIG 115 99     TOX:  Recent Labs     06/03/25  1743   AMPHETAMINE Negative     MICRO: No results for input(s): \"ESR\", \"CRP\", \"PROCAL\" in the last 71676 hours.  Susceptibility data from last 90 days.  Collected Specimen Info Organism Amoxicillin/Clavulanate Ampicillin Ampicillin/Sulbactam Cefazolin Cefazolin (uncomplicated UTIs only) Ciprofloxacin Gentamicin Levofloxacin Nitrofurantoin Piperacillin/Tazobactam   08/06/25 Fluid from Lung Candida (Nakaseomyces) glabrata           "   08/06/25 Fluid from Bronchial Washings Mixed Microorganisms Resembling Upper Respiratory Remi              08/05/25 Fluid from SPUTUM Normal throat remi             08/03/25 Fluid from BAL Mixed Microorganisms Resembling Upper Respiratory Remi              06/07/25 Urine from Clean Catch/Voided Escherichia coli  S  R  I  S  S  S  S  S  S  S   06/03/25 Urine from Clean Catch/Voided Escherichia coli  S  R  I  S  S  S  S  S  S  S     Collected Specimen Info Organism Trimethoprim/Sulfamethoxazole   08/06/25 Fluid from Lung Candida (Nakaseomyces) glabrata    08/06/25 Fluid from Bronchial Washings Mixed Microorganisms Resembling Upper Respiratory Remi     08/05/25 Fluid from SPUTUM Normal throat remi    08/03/25 Fluid from BAL Mixed Microorganisms Resembling Upper Respiratory Remi     06/07/25 Urine from Clean Catch/Voided Escherichia coli  S   06/03/25 Urine from Clean Catch/Voided Escherichia coli  S         Imaging:   Imaging  XR chest 1 view  Result Date: 8/13/2025  1.  Unchanged appearance of the bilateral small pleural effusion and findings of pulmonary edema. 2. Bibasilar opacities likely reflect atelectasis/edema. 3. Medical devices as above.   I personally reviewed the images/study and I agree with the findings as stated by Hugo Holliday MD, PGY-2 this study was interpreted at [facility], [place], [place].   MACRO: None   Signed by: Jeremias Alaniz 8/13/2025 7:50 AM Dictation workstation:   [id number]      Cardiology, Vascular, and Other Imaging  Transthoracic Echo (TTE) Limited  Result Date: 8/13/2025   [facility], [address] [place] [address]                Tel [phone] and Fax [phone] TRANSTHORACIC ECHOCARDIOGRAM REPORT  Patient Name:       ANETA Khan Physician:    [id number] Tony Kelly MD Study Date:         8/13/2025           Ordering Provider:    [id number]  BRIAN SCHWARZ MRN/PID:            [id number]            Fellow:               [id number] Justina Lao MD Accession#:         [id number]        Nurse: Date of Birth/Age:  1965 / 60      Sonographer:          Lorrie donaldson RDCS Gender assigned at  F                   Additional Staff: Birth: Height:             157.48 cm           Admit Date: Weight:             59.87 kg            Admission Status:     Inpatient -                                                               Routine BSA / BMI:          1.60 m2 / 24.14     Encounter#:           [id number]                     kg/m2 Blood Pressure:     130/83 mmHg         Department Location:  [place] [address] Study Type:    TRANSTHORACIC ECHO (TTE) LIMITED Diagnosis/ICD: Unspecified systolic (congestive) heart failure (CHF)-I50.20 Indication:    LVAD w/ speed adjustment yesterday CPT Code:      Echo Limited-43123; Doppler Limited-24676; Color Doppler-75913 Patient History: Pertinent History: CAD s/p PCI (LAD; 2015, Lcx; 3/2025), stage C systolic                    HF/ICM/HFrEF s/p ICD, h/o VT with presumed associated                    syncope, poorly controlled DM, pAF (off of DOAC given the                    absence of recurrence), dyslipidemia, essential HTN,                    hypothyroidism, recurrent pleural effusion likely 2/2 HF,                    restriction by PFTs who presented to [facility] HF ICU for                    [place]-guided hemodynamic optimization and LVAD placement. s/p                    LVAD Hm3 8/1/25. Study Detail: The following Echo studies were performed: 2D, M-Mode, Doppler and               color flow. Technically challenging study due to prominent lung               artifact and  body habitus. Definity used as a contrast agent for               endocardial border definition. Total contrast used for this               procedure was 2 mL via IV push.  PHYSICIAN INTERPRETATION: Left Ventricle: Left ventricular ejection fraction is severely decreased by visual estimate at 25%. There is global hypokinesis of the left ventricle with minor regional variations. The left ventricular cavity size is normal. There is normal septal and normal posterior left ventricular wall thickness. Abnormal (paradoxical) septal motion consistent with post-operative status. Left ventricular diastolic filling cannot be determined due to left ventricular assist device. There is no definite left ventricular thrombus visualized. The apical inferior wall is akinetic and appears aneurysmal (clip 81, 100). Left Atrium: The left atrial size is normal. Right Ventricle: The right ventricle is normal in size. There is reduced right ventricular systolic function. A device is visualized in the right ventricle. Right Atrium: The right atrium is normal in size. There is a device visualized in the right atrium. Aortic Valve: The aortic valve is trileaflet. There is no evidence of aortic valve regurgitation. Mitral Valve: The mitral valve is mildly thickened. There is mild to moderate mitral annular calcification. There is mild mitral valve regurgitation. Tricuspid Valve: The tricuspid valve is structurally normal. There is moderate tricuspid regurgitation. The Doppler estimated right ventricular systolic pressure (RVSP) is mildly elevated at 39 mmHg. Pulmonic Valve: The pulmonic valve is structurally normal. There is trace pulmonic valve regurgitation. Pericardium: Small pericardial effusion localized near the right ventricle. The pericardial effusion appears to contain fibrinous material. Aorta: The aortic root is normal. The aortic root appears normal in size and measures 2.90 cm. The Asc Ao is 2.60 cm. There is no dilatation of the  ascending aorta. Systemic Veins: The inferior vena cava appears normal in size, with IVC inspiratory collapse greater than 50%. In comparison to the previous echocardiogram(s): Compared with study dated 8/6/2025, findings are overall similar.  LEFT VENTRICULAR ASSIST DEVICE: LVAD: The patient has a(n) HeartMate 3 LVAD device present. Inflow Cannula: The inflow cannula is visualized in the left ventricular apex. Outflow Cannula: Visualization of the outflow cannula is technically difficult. LVAD Comments: The aortic valve partially opens every 1-2 beats.  CONCLUSIONS:  1. Left ventricular ejection fraction is severely decreased by visual estimate at 25%.  2. There is global hypokinesis of the left ventricle with minor regional variations.  3. Abnormal septal motion consistent with post-operative status.  4. No left ventricular thrombus visualized.  5. The apical inferior wall is akinetic and appears aneurysmal (clip 81, 100).  6. There is reduced right ventricular systolic function.  7. Small pericardial effusion.  8. Moderate tricuspid regurgitation visualized.  9. The Doppler estimated RVSP is mildly elevated at 39 mmHg. 10. Normal aortic root. 11. Compared with study dated 8/6/2025, findings are overall similar. QUANTITATIVE DATA SUMMARY:  2D MEASUREMENTS:          Normal Ranges: Ao Root d:       2.90 cm  (2.0-3.7cm) IVSd:            0.91 cm  (0.6-1.1cm) LVPWd:           0.72 cm  (0.6-1.1cm) LVIDd:           4.17 cm  (3.9-5.9cm) LVIDs:           3.41 cm LV Mass Index:   64 g/m2 LVEDV Index:     54 ml/m2 LV % FS          18.2 %  LEFT ATRIUM:                  Normal Ranges: LA Vol A4C:        40.6 ml    (22+/-6mL/m2) LA Vol A2C:        49.8 ml LA Vol BP:         48.2 ml LA Vol Index A4C:  25.4ml/m2 LA Vol Index A2C:  31.1 ml/m2 LA Vol Index BP:   30.1 ml/m2 LA Area A4C:       14.0 cm2 LA Area A2C:       16.6 cm2 LA Major Axis A4C: 4.1 cm LA Major Axis A2C: 4.7 cm LA Volume Index:   30.1 ml/m2  RIGHT ATRIUM:                  Normal Ranges: RA Vol A4C:        28.9 ml    (8.3-19.5ml) RA Vol Index A4C:  18.1 ml/m2 RA Area A4C:       12.1 cm2 RA Major Axis A4C: 4.3 cm  AORTA MEASUREMENTS:         Normal Ranges: Asc Ao, d:          2.60 cm (2.1-3.4cm)  LV SYSTOLIC FUNCTION:                      Normal Ranges: EF-Visual:      25 % LV EF Reported: 25 %  AORTIC VALVE:          Normal Ranges: LVOT Diameter: 1.88 cm (1.8-2.4cm)  RIGHT VENTRICLE: RV Basal 3.50 cm RV Mid   2.90 cm RV Major 6.8 cm TAPSE:   14.0 mm RV s'    0.08 m/s  TRICUSPID VALVE/RVSP:          Normal Ranges: Peak TR Velocity:     2.99 m/s Est. RA Pressure:     3 RV Syst Pressure:     39       (< 30mmHg) IVC Diam:             1.87 cm  PULMONIC VALVE:         Normal Ranges: PV Accel Time:  84 msec (>120ms)  AORTA: Asc Ao Diam 2.60 cm  [id number] Tony Kelly MD Electronically signed on 8/13/2025 at 3:14:58 PM  ** Final **          Notable Studies:   EKG:  Encounter Date: 07/14/25   ECG 12 lead   Result Value    Ventricular Rate 105    Atrial Rate 105    ME Interval 124    QRS Duration 156    QT Interval 454    QTC Calculation(Bazett) 600    P Axis 5    R Axis -42    T Axis 45    QRS Count 18    Q Onset 205    P Onset 153    P Offset 173    T Offset 432    QTC Fredericia 547    Narrative    Sinus tachycardia  Left axis deviation  Nonspecific intraventricular block  Possible Lateral infarct (cited on or before 12-JUN-2025)  Abnormal ECG  When compared with ECG of 01-AUG-2025 14:08,  Questionable change in QRS duration  Questionable change in initial forces of Anterolateral leads  Confirmed by Viktor Nelson ([id number]) on 8/10/2025 11:40:02 PM       Echo:  Transthoracic Echo (TTE) Limited  Result Date: 8/13/2025   [facility], [address] [place] [address]                Tel [phone] and Fax [phone] TRANSTHORACIC ECHOCARDIOGRAM REPORT  Patient Name:       ANETA Khan Physician:    [id number] Jimenez                                                                Leticia CRISTOBAL Study Date:         8/13/2025           Ordering Provider:    [id number] BRIAN SCHWARZ MRN/PID:            [id number]            Fellow:               [id number] Justina Lao MD Accession#:         [id number]        Nurse: Date of Birth/Age:  1965 / 60      Sonographer:          Lorrie donaldson                                     RDCS Gender assigned at  F                   Additional Staff: Birth: Height:             157.48 cm           Admit Date: Weight:             59.87 kg            Admission Status:     Inpatient -                                                               Routine BSA / BMI:          1.60 m2 / 24.14     Encounter#:           [id number]                     kg/m2 Blood Pressure:     130/83 mmHg         Department Location:  [place] [address] Study Type:    TRANSTHORACIC ECHO (TTE) LIMITED Diagnosis/ICD: Unspecified systolic (congestive) heart failure (CHF)-I50.20 Indication:    LVAD w/ speed adjustment yesterday CPT Code:      Echo Limited-88452; Doppler Limited-63651; Color Doppler-71741 Patient History: Pertinent History: CAD s/p PCI (LAD; 2015, Lcx; 3/2025), stage C systolic                    HF/ICM/HFrEF s/p ICD, h/o VT with presumed associated                    syncope, poorly controlled DM, pAF (off of DOAC given the                    absence of recurrence), dyslipidemia, essential HTN,                    hypothyroidism, recurrent pleural effusion likely 2/2 HF,                    restriction by PFTs who presented to [facility] ICU for                    [place]-guided hemodynamic optimization and LVAD placement. s/p                    LVAD Hm3 8/1/25. Study Detail: The following Echo studies were performed: 2D, M-Mode, Doppler and                color flow. Technically challenging study due to prominent lung               artifact and body habitus. Definity used as a contrast agent for               endocardial border definition. Total contrast used for this               procedure was 2 mL via IV push.  PHYSICIAN INTERPRETATION: Left Ventricle: Left ventricular ejection fraction is severely decreased by visual estimate at 25%. There is global hypokinesis of the left ventricle with minor regional variations. The left ventricular cavity size is normal. There is normal septal and normal posterior left ventricular wall thickness. Abnormal (paradoxical) septal motion consistent with post-operative status. Left ventricular diastolic filling cannot be determined due to left ventricular assist device. There is no definite left ventricular thrombus visualized. The apical inferior wall is akinetic and appears aneurysmal (clip 81, 100). Left Atrium: The left atrial size is normal. Right Ventricle: The right ventricle is normal in size. There is reduced right ventricular systolic function. A device is visualized in the right ventricle. Right Atrium: The right atrium is normal in size. There is a device visualized in the right atrium. Aortic Valve: The aortic valve is trileaflet. There is no evidence of aortic valve regurgitation. Mitral Valve: The mitral valve is mildly thickened. There is mild to moderate mitral annular calcification. There is mild mitral valve regurgitation. Tricuspid Valve: The tricuspid valve is structurally normal. There is moderate tricuspid regurgitation. The Doppler estimated right ventricular systolic pressure (RVSP) is mildly elevated at 39 mmHg. Pulmonic Valve: The pulmonic valve is structurally normal. There is trace pulmonic valve regurgitation. Pericardium: Small pericardial effusion localized near the right ventricle. The pericardial effusion appears to contain fibrinous material. Aorta: The aortic root is normal. The aortic root  appears normal in size and measures 2.90 cm. The Asc Ao is 2.60 cm. There is no dilatation of the ascending aorta. Systemic Veins: The inferior vena cava appears normal in size, with IVC inspiratory collapse greater than 50%. In comparison to the previous echocardiogram(s): Compared with study dated 8/6/2025, findings are overall similar.  LEFT VENTRICULAR ASSIST DEVICE: LVAD: The patient has a(n) HeartMate 3 LVAD device present. Inflow Cannula: The inflow cannula is visualized in the left ventricular apex. Outflow Cannula: Visualization of the outflow cannula is technically difficult. LVAD Comments: The aortic valve partially opens every 1-2 beats.  CONCLUSIONS:  1. Left ventricular ejection fraction is severely decreased by visual estimate at 25%.  2. There is global hypokinesis of the left ventricle with minor regional variations.  3. Abnormal septal motion consistent with post-operative status.  4. No left ventricular thrombus visualized.  5. The apical inferior wall is akinetic and appears aneurysmal (clip 81, 100).  6. There is reduced right ventricular systolic function.  7. Small pericardial effusion.  8. Moderate tricuspid regurgitation visualized.  9. The Doppler estimated RVSP is mildly elevated at 39 mmHg. 10. Normal aortic root. 11. Compared with study dated 8/6/2025, findings are overall similar. QUANTITATIVE DATA SUMMARY:  2D MEASUREMENTS:          Normal Ranges: Ao Root d:       2.90 cm  (2.0-3.7cm) IVSd:            0.91 cm  (0.6-1.1cm) LVPWd:           0.72 cm  (0.6-1.1cm) LVIDd:           4.17 cm  (3.9-5.9cm) LVIDs:           3.41 cm LV Mass Index:   64 g/m2 LVEDV Index:     54 ml/m2 LV % FS          18.2 %  LEFT ATRIUM:                  Normal Ranges: LA Vol A4C:        40.6 ml    (22+/-6mL/m2) LA Vol A2C:        49.8 ml LA Vol BP:         48.2 ml LA Vol Index A4C:  25.4ml/m2 LA Vol Index A2C:  31.1 ml/m2 LA Vol Index BP:   30.1 ml/m2 LA Area A4C:       14.0 cm2 LA Area A2C:       16.6 cm2 LA  Major Axis A4C: 4.1 cm LA Major Axis A2C: 4.7 cm LA Volume Index:   30.1 ml/m2  RIGHT ATRIUM:                 Normal Ranges: RA Vol A4C:        28.9 ml    (8.3-19.5ml) RA Vol Index A4C:  18.1 ml/m2 RA Area A4C:       12.1 cm2 RA Major Axis A4C: 4.3 cm  AORTA MEASUREMENTS:         Normal Ranges: Asc Ao, d:          2.60 cm (2.1-3.4cm)  LV SYSTOLIC FUNCTION:                      Normal Ranges: EF-Visual:      25 % LV EF Reported: 25 %  AORTIC VALVE:          Normal Ranges: LVOT Diameter: 1.88 cm (1.8-2.4cm)  RIGHT VENTRICLE: RV Basal 3.50 cm RV Mid   2.90 cm RV Major 6.8 cm TAPSE:   14.0 mm RV s'    0.08 m/s  TRICUSPID VALVE/RVSP:          Normal Ranges: Peak TR Velocity:     2.99 m/s Est. RA Pressure:     3 RV Syst Pressure:     39       (< 30mmHg) IVC Diam:             1.87 cm  PULMONIC VALVE:         Normal Ranges: PV Accel Time:  84 msec (>120ms)  AORTA: Asc Ao Diam 2.60 cm  [id number] Tony Kelly MD Electronically signed on 8/13/2025 at 3:14:58 PM  ** Final **     Transthoracic Echo (TTE) Limited  Result Date: 8/6/2025   [facility], [address] [place] [address]                Tel [phone] and Fax [phone] TRANSTHORACIC ECHOCARDIOGRAM REPORT  Patient Name:       ANETA Khan Physician:    [id number] Jarvis Melvin MD Study Date: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

## 2025-08-15 ENCOUNTER — DOCUMENTATION (OUTPATIENT)
Dept: TRANSPLANT | Facility: HOSPITAL | Age: 60
End: 2025-08-15
Payer: COMMERCIAL

## 2025-08-15 LAB
ALBUMIN SERPL BCP-MCNC: 3.3 G/DL (ref 3.4–5)
ANION GAP SERPL CALC-SCNC: 9 MMOL/L (ref 10–20)
BUN SERPL-MCNC: 17 MG/DL (ref 6–23)
CALCIUM SERPL-MCNC: 8.6 MG/DL (ref 8.6–10.6)
CHLORIDE SERPL-SCNC: 98 MMOL/L (ref 98–107)
CO2 SERPL-SCNC: 33 MMOL/L (ref 21–32)
CREAT SERPL-MCNC: 0.71 MG/DL (ref 0.5–1.05)
EGFRCR SERPLBLD CKD-EPI 2021: >90 ML/MIN/1.73M*2
ERYTHROCYTE [DISTWIDTH] IN BLOOD BY AUTOMATED COUNT: 16.6 % (ref 11.5–14.5)
GLUCOSE BLD MANUAL STRIP-MCNC: 180 MG/DL (ref 74–99)
GLUCOSE BLD MANUAL STRIP-MCNC: 203 MG/DL (ref 74–99)
GLUCOSE BLD MANUAL STRIP-MCNC: 251 MG/DL (ref 74–99)
GLUCOSE BLD MANUAL STRIP-MCNC: 274 MG/DL (ref 74–99)
GLUCOSE SERPL-MCNC: 181 MG/DL (ref 74–99)
HCT VFR BLD AUTO: 28.4 % (ref 36–46)
HGB BLD-MCNC: 8.5 G/DL (ref 12–16)
INR PPP: 1.6 (ref 0.9–1.1)
LABORATORY COMMENT REPORT: NORMAL
LDH SERPL L TO P-CCNC: 250 U/L (ref 84–246)
MCH RBC QN AUTO: 29.7 PG (ref 26–34)
MCHC RBC AUTO-ENTMCNC: 29.9 G/DL (ref 32–36)
MCV RBC AUTO: 99 FL (ref 80–100)
NRBC BLD-RTO: 0 /100 WBCS (ref 0–0)
PATH REPORT.COMMENTS IMP SPEC: NORMAL
PATH REPORT.FINAL DX SPEC: NORMAL
PATH REPORT.GROSS SPEC: NORMAL
PATH REPORT.RELEVANT HX SPEC: NORMAL
PATH REPORT.TOTAL CANCER: NORMAL
PHOSPHATE SERPL-MCNC: 2.8 MG/DL (ref 2.5–4.9)
PLATELET # BLD AUTO: 409 X10*3/UL (ref 150–450)
POTASSIUM SERPL-SCNC: 4.4 MMOL/L (ref 3.5–5.3)
PROTHROMBIN TIME: 18 SECONDS (ref 9.8–12.4)
RBC # BLD AUTO: 2.86 X10*6/UL (ref 4–5.2)
SODIUM SERPL-SCNC: 136 MMOL/L (ref 136–145)
WBC # BLD AUTO: 11.7 X10*3/UL (ref 4.4–11.3)

## 2025-08-15 PROCEDURE — 99233 SBSQ HOSP IP/OBS HIGH 50: CPT

## 2025-08-15 PROCEDURE — 36415 COLL VENOUS BLD VENIPUNCTURE: CPT | Performed by: REGISTERED NURSE

## 2025-08-15 PROCEDURE — 2500000001 HC RX 250 WO HCPCS SELF ADMINISTERED DRUGS (ALT 637 FOR MEDICARE OP): Performed by: REGISTERED NURSE

## 2025-08-15 PROCEDURE — 2500000002 HC RX 250 W HCPCS SELF ADMINISTERED DRUGS (ALT 637 FOR MEDICARE OP, ALT 636 FOR OP/ED): Performed by: STUDENT IN AN ORGANIZED HEALTH CARE EDUCATION/TRAINING PROGRAM

## 2025-08-15 PROCEDURE — 2500000001 HC RX 250 WO HCPCS SELF ADMINISTERED DRUGS (ALT 637 FOR MEDICARE OP): Performed by: STUDENT IN AN ORGANIZED HEALTH CARE EDUCATION/TRAINING PROGRAM

## 2025-08-15 PROCEDURE — 2500000004 HC RX 250 GENERAL PHARMACY W/ HCPCS (ALT 636 FOR OP/ED): Performed by: STUDENT IN AN ORGANIZED HEALTH CARE EDUCATION/TRAINING PROGRAM

## 2025-08-15 PROCEDURE — 94640 AIRWAY INHALATION TREATMENT: CPT

## 2025-08-15 PROCEDURE — 2500000002 HC RX 250 W HCPCS SELF ADMINISTERED DRUGS (ALT 637 FOR MEDICARE OP, ALT 636 FOR OP/ED): Performed by: NURSE PRACTITIONER

## 2025-08-15 PROCEDURE — 2500000002 HC RX 250 W HCPCS SELF ADMINISTERED DRUGS (ALT 637 FOR MEDICARE OP, ALT 636 FOR OP/ED)

## 2025-08-15 PROCEDURE — 2500000001 HC RX 250 WO HCPCS SELF ADMINISTERED DRUGS (ALT 637 FOR MEDICARE OP)

## 2025-08-15 PROCEDURE — 85027 COMPLETE CBC AUTOMATED: CPT | Performed by: REGISTERED NURSE

## 2025-08-15 PROCEDURE — 82947 ASSAY GLUCOSE BLOOD QUANT: CPT

## 2025-08-15 PROCEDURE — 94669 MECHANICAL CHEST WALL OSCILL: CPT

## 2025-08-15 PROCEDURE — 85610 PROTHROMBIN TIME: CPT | Performed by: REGISTERED NURSE

## 2025-08-15 PROCEDURE — 94668 MNPJ CHEST WALL SBSQ: CPT

## 2025-08-15 PROCEDURE — 97530 THERAPEUTIC ACTIVITIES: CPT | Mod: GP,CQ

## 2025-08-15 PROCEDURE — 2500000001 HC RX 250 WO HCPCS SELF ADMINISTERED DRUGS (ALT 637 FOR MEDICARE OP): Performed by: NURSE PRACTITIONER

## 2025-08-15 PROCEDURE — 2500000005 HC RX 250 GENERAL PHARMACY W/O HCPCS: Performed by: STUDENT IN AN ORGANIZED HEALTH CARE EDUCATION/TRAINING PROGRAM

## 2025-08-15 PROCEDURE — 83615 LACTATE (LD) (LDH) ENZYME: CPT | Performed by: REGISTERED NURSE

## 2025-08-15 PROCEDURE — 1100000001 HC PRIVATE ROOM DAILY

## 2025-08-15 PROCEDURE — 80069 RENAL FUNCTION PANEL: CPT | Performed by: REGISTERED NURSE

## 2025-08-15 RX ORDER — WARFARIN 3 MG/1
3 TABLET ORAL DAILY
Status: DISCONTINUED | OUTPATIENT
Start: 2025-08-15 | End: 2025-08-18 | Stop reason: HOSPADM

## 2025-08-15 RX ORDER — FUROSEMIDE 40 MG/1
40 TABLET ORAL
Status: DISCONTINUED | OUTPATIENT
Start: 2025-08-15 | End: 2025-08-18

## 2025-08-15 RX ORDER — ACETAZOLAMIDE 250 MG/1
250 TABLET ORAL DAILY
Status: COMPLETED | OUTPATIENT
Start: 2025-08-15 | End: 2025-08-15

## 2025-08-15 RX ADMIN — ACETYLCYSTEINE 600 MG: 200 SOLUTION ORAL; RESPIRATORY (INHALATION) at 20:08

## 2025-08-15 RX ADMIN — ACETYLCYSTEINE 600 MG: 200 SOLUTION ORAL; RESPIRATORY (INHALATION) at 08:47

## 2025-08-15 RX ADMIN — LIDOCAINE 4% 1 PATCH: 40 PATCH TOPICAL at 08:58

## 2025-08-15 RX ADMIN — ACETYLCYSTEINE 600 MG: 200 SOLUTION ORAL; RESPIRATORY (INHALATION) at 14:09

## 2025-08-15 RX ADMIN — FUROSEMIDE 40 MG: 40 TABLET ORAL at 17:39

## 2025-08-15 RX ADMIN — ASPIRIN 81 MG: 81 TABLET, CHEWABLE ORAL at 08:57

## 2025-08-15 RX ADMIN — INSULIN LISPRO 2 UNITS: 100 INJECTION, SOLUTION INTRAVENOUS; SUBCUTANEOUS at 17:38

## 2025-08-15 RX ADMIN — PANTOPRAZOLE SODIUM 40 MG: 40 TABLET, DELAYED RELEASE ORAL at 06:23

## 2025-08-15 RX ADMIN — GABAPENTIN 300 MG: 300 CAPSULE ORAL at 20:38

## 2025-08-15 RX ADMIN — OXYCODONE HYDROCHLORIDE 5 MG: 5 TABLET ORAL at 13:14

## 2025-08-15 RX ADMIN — ATORVASTATIN CALCIUM 80 MG: 80 TABLET, FILM COATED ORAL at 08:57

## 2025-08-15 RX ADMIN — ICOSAPENT ETHYL 2 G: 1 CAPSULE ORAL at 08:57

## 2025-08-15 RX ADMIN — OXYCODONE HYDROCHLORIDE 5 MG: 5 TABLET ORAL at 06:26

## 2025-08-15 RX ADMIN — ACETAMINOPHEN 650 MG: 325 TABLET, FILM COATED ORAL at 06:23

## 2025-08-15 RX ADMIN — IPRATROPIUM BROMIDE AND ALBUTEROL SULFATE 3 ML: .5; 3 SOLUTION RESPIRATORY (INHALATION) at 20:07

## 2025-08-15 RX ADMIN — SPIRONOLACTONE 25 MG: 25 TABLET ORAL at 08:57

## 2025-08-15 RX ADMIN — GABAPENTIN 300 MG: 300 CAPSULE ORAL at 13:00

## 2025-08-15 RX ADMIN — ROPINIROLE 1 MG: 1 TABLET, FILM COATED ORAL at 08:57

## 2025-08-15 RX ADMIN — SENNOSIDES, DOCUSATE SODIUM 2 TABLET: 50; 8.6 TABLET, FILM COATED ORAL at 08:57

## 2025-08-15 RX ADMIN — ACETAMINOPHEN 650 MG: 325 TABLET, FILM COATED ORAL at 00:32

## 2025-08-15 RX ADMIN — INSULIN LISPRO 4 UNITS: 100 INJECTION, SOLUTION INTRAVENOUS; SUBCUTANEOUS at 08:18

## 2025-08-15 RX ADMIN — ACETAMINOPHEN 650 MG: 325 TABLET, FILM COATED ORAL at 12:59

## 2025-08-15 RX ADMIN — ACETAMINOPHEN 650 MG: 325 TABLET, FILM COATED ORAL at 18:48

## 2025-08-15 RX ADMIN — ROPINIROLE 1 MG: 1 TABLET, FILM COATED ORAL at 15:38

## 2025-08-15 RX ADMIN — GABAPENTIN 300 MG: 300 CAPSULE ORAL at 06:22

## 2025-08-15 RX ADMIN — IPRATROPIUM BROMIDE AND ALBUTEROL SULFATE 3 ML: .5; 3 SOLUTION RESPIRATORY (INHALATION) at 08:42

## 2025-08-15 RX ADMIN — WARFARIN SODIUM 3 MG: 3 TABLET ORAL at 17:39

## 2025-08-15 RX ADMIN — ROPINIROLE 1 MG: 1 TABLET, FILM COATED ORAL at 20:37

## 2025-08-15 RX ADMIN — ROPINIROLE HYDROCHLORIDE 3 MG: 3 TABLET, FILM COATED ORAL at 20:40

## 2025-08-15 RX ADMIN — ACETAZOLAMIDE 250 MG: 250 TABLET ORAL at 14:31

## 2025-08-15 RX ADMIN — INSULIN LISPRO 6 UNITS: 100 INJECTION, SOLUTION INTRAVENOUS; SUBCUTANEOUS at 12:59

## 2025-08-15 RX ADMIN — SACUBITRIL AND VALSARTAN 1 TABLET: 24; 26 TABLET, FILM COATED ORAL at 20:40

## 2025-08-15 RX ADMIN — ICOSAPENT ETHYL 2 G: 1 CAPSULE ORAL at 17:39

## 2025-08-15 RX ADMIN — IPRATROPIUM BROMIDE AND ALBUTEROL SULFATE 3 ML: .5; 3 SOLUTION RESPIRATORY (INHALATION) at 14:08

## 2025-08-15 RX ADMIN — CITALOPRAM HYDROBROMIDE 40 MG: 40 TABLET ORAL at 08:57

## 2025-08-15 RX ADMIN — SACUBITRIL AND VALSARTAN 1 TABLET: 24; 26 TABLET, FILM COATED ORAL at 08:57

## 2025-08-15 RX ADMIN — GUAIFENESIN AND DEXTROMETHORPHAN HYDROBROMIDE 1 TABLET: 600; 30 TABLET, EXTENDED RELEASE ORAL at 20:37

## 2025-08-15 RX ADMIN — LIDOCAINE 4% 1 PATCH: 40 PATCH TOPICAL at 09:00

## 2025-08-15 RX ADMIN — LEVOTHYROXINE SODIUM 88 MCG: 0.09 TABLET ORAL at 06:22

## 2025-08-15 RX ADMIN — INSULIN GLARGINE 25 UNITS: 100 INJECTION, SOLUTION SUBCUTANEOUS at 08:59

## 2025-08-15 RX ADMIN — FUROSEMIDE 40 MG: 40 TABLET ORAL at 08:57

## 2025-08-15 RX ADMIN — GUAIFENESIN AND DEXTROMETHORPHAN HYDROBROMIDE 1 TABLET: 600; 30 TABLET, EXTENDED RELEASE ORAL at 08:57

## 2025-08-15 ASSESSMENT — PAIN SCALES - GENERAL
PAINLEVEL_OUTOF10: 4
PAINLEVEL_OUTOF10: 5 - MODERATE PAIN
PAINLEVEL_OUTOF10: 5 - MODERATE PAIN
PAINLEVEL_OUTOF10: 6
PAINLEVEL_OUTOF10: 4
PAINLEVEL_OUTOF10: 6
PAINLEVEL_OUTOF10: 5 - MODERATE PAIN

## 2025-08-15 ASSESSMENT — COGNITIVE AND FUNCTIONAL STATUS - GENERAL
WALKING IN HOSPITAL ROOM: A LITTLE
CLIMB 3 TO 5 STEPS WITH RAILING: A LITTLE
MOVING TO AND FROM BED TO CHAIR: A LITTLE
CLIMB 3 TO 5 STEPS WITH RAILING: A LITTLE
DAILY ACTIVITIY SCORE: 24
MOBILITY SCORE: 20
STANDING UP FROM CHAIR USING ARMS: A LITTLE
MOBILITY SCORE: 23
DAILY ACTIVITIY SCORE: 24
CLIMB 3 TO 5 STEPS WITH RAILING: A LITTLE
MOBILITY SCORE: 23

## 2025-08-15 ASSESSMENT — PAIN - FUNCTIONAL ASSESSMENT
PAIN_FUNCTIONAL_ASSESSMENT: 0-10

## 2025-08-15 ASSESSMENT — PAIN DESCRIPTION - ORIENTATION
ORIENTATION: MID
ORIENTATION: MID

## 2025-08-15 ASSESSMENT — PAIN DESCRIPTION - LOCATION
LOCATION: CHEST
LOCATION: CHEST

## 2025-08-15 NOTE — NURSING NOTE
VAD education session held today with the patient. Reviewed information from previous learning sessions.     Reviewed the battery charger functions and how to calibrate a battery. Reviewed what each light means on the battery charger. The MPU was discussed in detail, reviewing the icons on the top and the 3 AA batteries located on the bottom.  Discussed the need to always connect to the MPU when sleeping.    All maintenance discussed with patient. Daily self test, weekly dressing, monthly cleaning of the gold areas on all equipment, and biannual charging of the backup controller.     Test given to patient to complete this weekend. Will plan to continue education with dressing change Monday.

## 2025-08-15 NOTE — PROGRESS NOTES
08/15/25        Transitional Care Coordination Progress Note:   Patient discussed during interdisciplinary rounds.   Team members present: ANIA TCC MD   Plan per Medical/Surgical team: GDMT/speed optimization, LVAD education   Discharge disposition: Home   Status-Inpatient   Payer- [employer]   Potential Barriers: None   ADOD: 8-   Manny Burris RN [facility] [phone]

## 2025-08-15 NOTE — CARE PLAN
Pt stable and safe. Pt was ambulating in hallways throughout the shift.      Problem: Pain - Adult  Goal: Verbalizes/displays adequate comfort level or baseline comfort level  8/15/2025 1803 by Korina Rayo RN  Outcome: Progressing  8/15/2025 1803 by Korina Rayo RN  Outcome: Progressing     Problem: Safety - Adult  Goal: Free from fall injury  8/15/2025 1803 by Korina Rayo RN  Outcome: Progressing  8/15/2025 1803 by Korina Rayo RN  Outcome: Progressing     Problem: Discharge Planning  Goal: Discharge to home or other facility with appropriate resources  8/15/2025 1803 by Korina Rayo RN  Outcome: Progressing  8/15/2025 1803 by Korina Rayo RN  Outcome: Progressing     Problem: Chronic Conditions and Co-morbidities  Goal: Patient's chronic conditions and co-morbidity symptoms are monitored and maintained or improved  8/15/2025 1803 by Korina aRyo RN  Outcome: Progressing  8/15/2025 1803 by Korina Rayo RN  Outcome: Progressing     Problem: Nutrition  Goal: Nutrient intake appropriate for maintaining nutritional needs  8/15/2025 1803 by Korina Rayo RN  Outcome: Progressing  8/15/2025 1803 by Korina Rayo RN  Outcome: Progressing     Problem: Heart Failure  Goal: Improved gas exchange this shift  8/15/2025 1803 by Korina Rayo RN  Outcome: Progressing  8/15/2025 1803 by Korina Rayo RN  Outcome: Progressing  Goal: Improved urinary output this shift  8/15/2025 1803 by Korina Rayo RN  Outcome: Progressing  8/15/2025 1803 by Korina Rayo RN  Outcome: Progressing  Goal: Reduction in peripheral edema within 24 hours  8/15/2025 1803 by Korina Rayo RN  Outcome: Progressing  8/15/2025 1803 by Korina Rayo RN  Outcome: Progressing  Goal: Report improvement of dyspnea/breathlessness this shift  8/15/2025 1803 by Korina Rayo RN  Outcome: Progressing  8/15/2025 1803 by Korina Rayo RN  Outcome: Progressing  Goal: Weight from fluid excess reduced over 2-3 days, then stabilize  8/15/2025 1803 by Korina  ANIA Rayo  Outcome: Progressing  8/15/2025 1803 by Korina Rayo RN  Outcome: Progressing  Goal: Increase self care and/or family involvement in 24 hours  8/15/2025 1803 by Korina Rayo RN  Outcome: Progressing  8/15/2025 1803 by Korina Rayo RN  Outcome: Progressing     Problem: Respiratory  Goal: Minimal/no exertional discomfort or dyspnea this shift  8/15/2025 1803 by Korina Rayo RN  Outcome: Progressing  8/15/2025 1803 by Korina Rayo RN  Outcome: Progressing  Goal: No signs of respiratory distress (eg. Use of accessory muscles. Peds grunting)  8/15/2025 1803 by Korina Rayo RN  Outcome: Progressing  8/15/2025 1803 by Korina Rayo RN  Outcome: Progressing  Goal: Patent airway maintained this shift  8/15/2025 1803 by Korina Rayo RN  Outcome: Progressing  8/15/2025 1803 by Korina Rayo RN  Outcome: Progressing  Goal: Verbalize decreased shortness of breath this shift  8/15/2025 1803 by Korina Rayo RN  Outcome: Progressing  8/15/2025 1803 by Korina Rayo RN  Outcome: Progressing  Goal: Wean oxygen to maintain O2 saturation per order/standard this shift  8/15/2025 1803 by Korina Rayo RN  Outcome: Progressing  8/15/2025 1803 by Korina Rayo RN  Outcome: Progressing  Goal: Increase self care and/or family involvement in next 24 hours  8/15/2025 1803 by Korina Rayo RN  Outcome: Progressing  8/15/2025 1803 by Korina Rayo RN  Outcome: Progressing     Problem: Diabetes  Goal: Achieve decreasing blood glucose levels by end of shift  8/15/2025 1803 by Korina Rayo RN  Outcome: Progressing  8/15/2025 1803 by Korina Rayo RN  Outcome: Progressing  Goal: Increase stability of blood glucose readings by end of shift  8/15/2025 1803 by Korina Rayo RN  Outcome: Progressing  8/15/2025 1803 by Korina Rayo RN  Outcome: Progressing  Goal: Decrease in ketones present in urine by end of shift  8/15/2025 1803 by Korina Rayo RN  Outcome: Progressing  8/15/2025 1803 by Korina Rayo, RN  Outcome:  Progressing  Goal: Maintain electrolyte levels within acceptable range throughout shift  8/15/2025 1803 by Korina Rayo RN  Outcome: Progressing  8/15/2025 1803 by Korina Rayo RN  Outcome: Progressing  Goal: Maintain glucose levels >70mg/dl to <250mg/dl throughout shift  8/15/2025 1803 by Korina Rayo RN  Outcome: Progressing  8/15/2025 1803 by Korina Rayo RN  Outcome: Progressing  Goal: No changes in neurological exam by end of shift  8/15/2025 1803 by Korina Rayo RN  Outcome: Progressing  8/15/2025 1803 by Korina Rayo RN  Outcome: Progressing  Goal: Learn about and adhere to nutrition recommendations by end of shift  8/15/2025 1803 by Korina Rayo RN  Outcome: Progressing  8/15/2025 1803 by Korina Rayo RN  Outcome: Progressing  Goal: Vital signs within normal range for age by end of shift  8/15/2025 1803 by Korina Rayo RN  Outcome: Progressing  8/15/2025 1803 by Koirna Rayo RN  Outcome: Progressing  Goal: Increase self care and/or family involovement by end of shift  8/15/2025 1803 by Korina Rayo RN  Outcome: Progressing  8/15/2025 1803 by Korina Rayo RN  Outcome: Progressing  Goal: Receive DSME education by end of shift  8/15/2025 1803 by Korina Rayo RN  Outcome: Progressing  8/15/2025 1803 by Korina Rayo RN  Outcome: Progressing     Problem: Ventricular Assisted Device (VAD)  Goal: Stable metal status  8/15/2025 1803 by Korina Rayo RN  Outcome: Progressing  8/15/2025 1803 by Korina Rayo RN  Outcome: Progressing  Goal: Pulmonary toileting, incentive spirometry  8/15/2025 1803 by Korina Rayo RN  Outcome: Progressing  8/15/2025 1803 by Korina Rayo RN  Outcome: Progressing  Goal: Nutrition  8/15/2025 1803 by Korina Rayo RN  Outcome: Progressing  8/15/2025 1803 by Korina Rayo RN  Outcome: Progressing  Goal: Mobility/OT/PT  8/15/2025 1803 by Korina Rayo RN  Outcome: Progressing  8/15/2025 1803 by Korina Rayo RN  Outcome: Progressing  Goal: Rubber ball  8/15/2025 1803 by Korina  ANIA Rayo  Outcome: Progressing  8/15/2025 1803 by Korina Rayo RN  Outcome: Progressing  Goal: ROM  8/15/2025 1803 by Korina Rayo RN  Outcome: Progressing  8/15/2025 1803 by Korina Rayo RN  Outcome: Progressing  Goal: Sitting  8/15/2025 1803 by Korina Rayo RN  Outcome: Progressing  8/15/2025 1803 by Korina Rayo RN  Outcome: Progressing  Goal: Walk  8/15/2025 1803 by Korina Rayo RN  Outcome: Progressing  8/15/2025 1803 by Korina Rayo RN  Outcome: Progressing  Goal: Involve in VAD awareness, dressing change  8/15/2025 1803 by Korina Rayo RN  Outcome: Progressing  8/15/2025 1803 by Korina Rayo RN  Outcome: Progressing  Goal: AICD On/Off  8/15/2025 1803 by Korina Rayo RN  Outcome: Progressing  8/15/2025 1803 by Korina Rayo RN  Outcome: Progressing     Problem: Skin  Goal: Decreased wound size/increased tissue granulation at next dressing change  8/15/2025 1803 by Korina Rayo RN  Outcome: Progressing  8/15/2025 1803 by Korina Rayo RN  Outcome: Progressing  Goal: Participates in plan/prevention/treatment measures  8/15/2025 1803 by Korina Rayo RN  Outcome: Progressing  8/15/2025 1803 by Korina Rayo RN  Outcome: Progressing  Goal: Prevent/manage excess moisture  8/15/2025 1803 by Korina Rayo RN  Outcome: Progressing  8/15/2025 1803 by Korina Rayo RN  Outcome: Progressing  Goal: Prevent/minimize sheer/friction injuries  8/15/2025 1803 by Korina Rayo RN  Outcome: Progressing  8/15/2025 1803 by Korina Rayo RN  Outcome: Progressing  Goal: Promote/optimize nutrition  8/15/2025 1803 by Korina Rayo RN  Outcome: Progressing  8/15/2025 1803 by Korina Rayo RN  Outcome: Progressing  Goal: Promote skin healing  8/15/2025 1803 by Korina Rayo RN  Outcome: Progressing  8/15/2025 1803 by Korina Rayo RN  Outcome: Progressing     Problem: Pain  Goal: Takes deep breaths with improved pain control throughout the shift  8/15/2025 1803 by Korina Rayo RN  Outcome: Progressing  8/15/2025 1803 by  Korina Ryao RN  Outcome: Progressing  Goal: Turns in bed with improved pain control throughout the shift  8/15/2025 1803 by Korina Rayo RN  Outcome: Progressing  8/15/2025 1803 by Korina Rayo RN  Outcome: Progressing  Goal: Walks with improved pain control throughout the shift  8/15/2025 1803 by Korina Rayo RN  Outcome: Progressing  8/15/2025 1803 by Korina Rayo RN  Outcome: Progressing  Goal: Performs ADL's with improved pain control throughout shift  8/15/2025 1803 by Korina Rayo RN  Outcome: Progressing  8/15/2025 1803 by Korina Rayo RN  Outcome: Progressing  Goal: Participates in PT with improved pain control throughout the shift  8/15/2025 1803 by Korina Rayo RN  Outcome: Progressing  8/15/2025 1803 by Korina Rayo RN  Outcome: Progressing  Goal: Free from opioid side effects throughout the shift  8/15/2025 1803 by Korina Rayo RN  Outcome: Progressing  8/15/2025 1803 by Korina Rayo RN  Outcome: Progressing  Goal: Free from acute confusion related to pain meds throughout the shift  8/15/2025 1803 by Korina Rayo RN  Outcome: Progressing  8/15/2025 1803 by Korina Rayo RN  Outcome: Progressing     Problem: Fall/Injury  Goal: Not fall by end of shift  8/15/2025 1803 by Korina Rayo RN  Outcome: Progressing  8/15/2025 1803 by Korina Rayo RN  Outcome: Progressing  Goal: Be free from injury by end of the shift  8/15/2025 1803 by Korina Rayo RN  Outcome: Progressing  8/15/2025 1803 by Korina Rayo RN  Outcome: Progressing  Goal: Verbalize understanding of personal risk factors for fall in the hospital  8/15/2025 1803 by Korina Rayo RN  Outcome: Progressing  8/15/2025 1803 by Korina Rayo RN  Outcome: Progressing  Goal: Verbalize understanding of risk factor reduction measures to prevent injury from fall in the home  8/15/2025 1803 by Korina Rayo RN  Outcome: Progressing  8/15/2025 1803 by Korina Rayo RN  Outcome: Progressing  Goal: Use assistive devices by end of the  shift  8/15/2025 1803 by Koirna Rayo, RN  Outcome: Progressing  8/15/2025 1803 by Korina Rayo, RN  Outcome: Progressing  Goal: Pace activities to prevent fatigue by end of the shift  8/15/2025 1803 by Korina Rayo RN  Outcome: Progressing  8/15/2025 1803 by Korina Rayo, RN  Outcome: Progressing

## 2025-08-15 NOTE — PROGRESS NOTES
ADVANCED HEART FAILURE LVAD PROGRESS NOTE      VAD Cardiologist: Sandrine    VAD Coordinator: Anjali Meredith, RN and Alma Delia Capone RN     Type of VAD: HM3  Implant Date: 8/1/25   Reason for VAD: ICM/HFrEF   Intent: Long-Term modifiable (nicotine)      Subjective     HPI:  Thao Evans is a 60 y.o. female with a complex cardiovascular history, including coronary artery disease status post PCI to the LAD in 2015 and Lcx in October 2024 (currently on Plavix), and Stage D systolic heart failure secondary to ischemic cardiomyopathy (ICM/HFrEF) s/p ICD placement, poorly controlled type 2 diabetes mellitus, paroxysmal atrial fibrillation status post DCCV in October 2024 (currently off anticoagulation due to absence of recurrence), hypertension, hyperlipidemia, COPD with recent tobacco cessation (2 months ago), Graves disease, restless legs syndrome, and generalized anxiety and depression.    She was directly admitted to the HF ICU for Ehfx-Qhao-khelmn hemodynamic optimization in anticipation of LVAD placement initially scheduled for July 23, which was postponed due to identification of concerning colonic polyps on routine screening colonoscopy; pathology returned benign on July 25.    Now s/p LVAD HM3 implantation on August 1 by Dr. Inman. Postoperatively, she required initial invasive mechanical ventilation, followed by reintubation on the evening of August 3 due to mucous plugging. She is now extubated s/p bronchoscopy and transferred to [facility] 8/11.     Interval Events:   Patient seen and examined at bedside. NAEO. Continues to have non-productive cough, slowly improving.   INR: 1.6, net -900 ml     NYHA class pre-VAD implant: IV   NYHA class today (08/15/25): II    Plan:   Increase Lasix to 40 BID. Give 1 dose of Diamox 250 mg  Increase Coumadin to 3mg   C/w VAD education     Objective   Vitals:   Vitals:    08/15/25 0335 08/15/25 0738 08/15/25 0842 08/15/25 1209   BP:       Pulse: 99 81  94   Resp:  20  20    Temp: 36.7 °C (98.1 °F) 36.7 °C (98.1 °F)  36.8 °C (98.2 °F)   TempSrc: Temporal Temporal  Temporal   SpO2: 98% 92% 95% 93%   Weight: 59.9 kg (132 lb 0.9 oz)      Height:         Wt Readings from Last 5 Encounters:   08/15/25 59.9 kg (132 lb 0.9 oz)   06/20/25 57.2 kg (126 lb)   06/20/25 57.4 kg (126 lb 9.6 oz)   06/17/25 58.2 kg (128 lb 4.9 oz)   05/30/25 56.2 kg (124 lb)     Hemodynamic parameters for last 24 hours:     Intake/Output for last 24 hours:    Intake/Output Summary (Last 24 hours) at 8/15/2025 1350  Last data filed at 8/15/2025 1209  Gross per 24 hour   Intake 480 ml   Output 1450 ml   Net -970 ml      Physical exam:  Physical Exam  Constitutional:       General: She is not in acute distress.     Appearance: Normal appearance. She is not ill-appearing, toxic-appearing or diaphoretic.   HENT:      Head: Normocephalic and atraumatic.      Mouth/Throat:      Mouth: Mucous membranes are moist.      Pharynx: Oropharynx is clear.     Eyes:      Extraocular Movements: Extraocular movements intact.      Pupils: Pupils are equal, round, and reactive to light.       Cardiovascular:      Comments: LVAD hum heard on auscultation  No BLE edema   JVP below clavicle at 30 degrees, overall euvolemic on exam     Pulmonary:      Effort: Pulmonary effort is normal. No respiratory distress.      Breath sounds: No stridor. Rales present. No wheezing.      Comments: Rhonci in B bases, non productive cough   Abdominal:      General: Abdomen is flat. Bowel sounds are normal. There is no distension.      Palpations: Abdomen is soft. There is no mass.      Tenderness: There is no abdominal tenderness. There is no guarding or rebound.      Hernia: No hernia is present.     Musculoskeletal:         General: Normal range of motion.      Right lower leg: No edema.      Left lower leg: No edema.     Skin:     General: Skin is warm and dry.      Coloration: Skin is not jaundiced.      Findings: No bruising, lesion or rash.      Neurological:      General: No focal deficit present.      Mental Status: She is alert and oriented to person, place, and time. Mental status is at baseline.      Cranial Nerves: No cranial nerve deficit.      Sensory: No sensory deficit.      Motor: No weakness.     Psychiatric:         Mood and Affect: Mood normal.         Behavior: Behavior normal.        Driveline:     Labs:   CMP:  Recent Labs     08/15/25  0647 08/14/25  0624 08/13/25  0611 08/12/25  0634 08/11/25  0844 08/10/25  0413 08/09/25  0328 08/08/25  0506 08/07/25  0223 08/06/25  0432 08/05/25  0514 08/04/25  1537 08/04/25  0023 08/03/25  1301    138 136 136 132* 136 134* 136   < > 136 135*   < > 135* 134*   K 4.4 3.7 4.2 4.5 4.2 4.3 4.1 4.2   < > 3.7 3.7   < > 4.2 4.3   CL 98 96* 96* 94* 94* 96* 96* 98   < > 96* 96*   < > 95* 95*   CO2 33* 32 34* 33* 29 32 30 31   < > 34* 34*   < > 29 33*   ANIONGAP 9* 14 10 14 13 12 12 11   < > 10 9*   < > 15 10   BUN 17 12 12 13 15 16 17 16   < > 29* 31*   < > 36* 31*   CREATININE 0.71 0.78 0.76 0.77 0.74 0.84 0.85 0.81   < > 0.83 0.87   < > 0.93 0.92   EGFR >90 86 89 87 >90 79 78 82   < > 80 75   < > 70 71   MG  --   --   --   --  1.92 2.23 2.05 1.76  --  2.09 2.07  --  2.08 2.14    < > = values in this interval not displayed.     Recent Labs     08/15/25  0647 08/14/25  0624 08/13/25  0611 08/12/25  0634 08/11/25  0844 08/11/25  0415 08/10/25  0413 08/09/25  0328 08/08/25  0506 08/07/25  1110 08/07/25  0223 08/06/25  0432 08/05/25  0514   ALBUMIN 3.3* 3.3* 3.3* 3.5  3.5 3.4 3.3* 3.5 3.2* 3.3*   < > 3.3* 3.3* 3.4   ALT  --   --   --  6*  --  5* 5* 5* 4*  --  4* 3* 3*   AST  --   --   --  10  --  10 13 12 13  --  18 17 20   BILITOT  --   --   --  0.4  --  0.4 0.4 0.4 0.4  --  0.5 0.4 0.5    < > = values in this interval not displayed.     CBC:  Recent Labs     08/15/25  0647 08/14/25  0624 08/13/25  1147 08/11/25  0844 08/10/25  0413 08/09/25  0328 08/08/25  0506 08/07/25  0359   WBC 11.7* 9.0 9.4 6.0  "5.5 6.6 6.7 5.8   HGB 8.5* 9.1* 8.8* 8.2* 8.6* 8.0* 7.6* 7.4*   HCT 28.4* 29.0* 28.0* 25.5* 26.9* 24.1* 22.9* 22.3*    446 424 264 299 253 208 195   MCV 99 98 96 92 97 91 90 88     COAG:   Recent Labs     08/15/25  0647 08/14/25  0624 08/13/25  0611 08/12/25  1302 08/12/25  0634 08/11/25  0415 08/10/25  0413 08/09/25  0328 08/02/25  1225 08/02/25  0145 08/01/25  1322   INR 1.6* 1.7* 1.7* 1.9* 1.8* 1.9* 2.7* 3.7*   < > 1.1 1.3*   FIBRINOGEN  --   --   --   --   --   --   --   --   --  318 229    < > = values in this interval not displayed.     ABO:   Recent Labs     08/11/25 0415   ABO O     HEME/ENDO:   Recent Labs     07/31/25  1658 07/15/25  0354 07/14/25  2013 06/05/25  0607 06/02/25  1346 03/13/25  0531   FERRITIN  --   --  224*  --  232*  --    IRONSAT  --   --  24*  --  17*  --    TSH  --   --   --  0.19* 0.20*  --    HGBA1C 7.5* 8.0*  --   --  8.6* 12.3*     CARDIAC:   Recent Labs     08/15/25  0647 08/14/25  0624 08/13/25  0611 08/12/25  0634 08/11/25  0415 08/10/25  0413 08/09/25  0328 08/08/25  0506 07/23/25  1855 07/14/25 2003 06/12/25  1728 06/09/25  1613 06/03/25  1743 06/02/25  1346   * 210 208 235 224 254* 257* 269*   < >  --   --   --    < >  --    TROPHS  --   --   --   --   --   --   --   --   --  18 15  --   --  22   BNP  --   --   --   --   --   --   --   --   --  735*  --  1,456*  --  1,292*    < > = values in this interval not displayed.     Recent Labs     08/04/25  0426 07/14/25 2003   CHOL  --  141   LDLCALC  --  78   HDL  --  43.3   TRIG 115 99     TOX:  Recent Labs     06/03/25  1743   AMPHETAMINE Negative     MICRO: No results for input(s): \"ESR\", \"CRP\", \"PROCAL\" in the last 50401 hours.  Susceptibility data from last 90 days.  Collected Specimen Info Organism Amoxicillin/Clavulanate Ampicillin Ampicillin/Sulbactam Cefazolin Cefazolin (uncomplicated UTIs only) Ciprofloxacin Gentamicin Levofloxacin Nitrofurantoin Piperacillin/Tazobactam   08/06/25 Fluid from Lung Candida " (Nakaseomyces) glabrata             08/06/25 Fluid from Bronchial Washings Mixed Microorganisms Resembling Upper Respiratory Remi              08/05/25 Fluid from SPUTUM Normal throat remi             08/03/25 Fluid from BAL Mixed Microorganisms Resembling Upper Respiratory Remi              06/07/25 Urine from Clean Catch/Voided Escherichia coli  S  R  I  S  S  S  S  S  S  S   06/03/25 Urine from Clean Catch/Voided Escherichia coli  S  R  I  S  S  S  S  S  S  S     Collected Specimen Info Organism Trimethoprim/Sulfamethoxazole   08/06/25 Fluid from Lung Candida (Nakaseomyces) glabrata    08/06/25 Fluid from Bronchial Washings Mixed Microorganisms Resembling Upper Respiratory Remi     08/05/25 Fluid from SPUTUM Normal throat remi    08/03/25 Fluid from BAL Mixed Microorganisms Resembling Upper Respiratory Remi     06/07/25 Urine from Clean Catch/Voided Escherichia coli  S   06/03/25 Urine from Clean Catch/Voided Escherichia coli  S         Imaging:   Imaging  XR chest 1 view  Result Date: 8/14/2025  Slight improvement of aeration of the both lungs. Stable AICD, left ventricular assistant device. Cardiac silhouette is markedly enlarged. Pulmonary vessels are congested Pulmonary edema/fluid overload of the both lungs. Small to moderate left-sided pleural effusion. Small right-sided pleural effusion. No pneumothorax seen. Bony thorax unremarkable.   MACRO: None   Signed by: Jeremias Alaniz 8/14/2025 4:50 PM Dictation workstation:   [id number]      Cardiology, Vascular, and Other Imaging  Transthoracic Echo (TTE) Limited  Result Date: 8/13/2025   [facility], [address] [place] [address]                Tel [phone] and Fax [phone] TRANSTHORACIC ECHOCARDIOGRAM REPORT  Patient Name:       ANETA Khan Physician:    [id number] Tony Kelly MD Study Date:         8/13/2025           Ordering Provider:    [id number]  BRIAN SCHWARZ MRN/PID:            [id number]            Fellow:               [id number] Justina Lao MD Accession#:         [id number]        Nurse: Date of Birth/Age:  1965 / 60      Sonographer:          Lorrie donaldson RDCS Gender assigned at  F                   Additional Staff: Birth: Height:             157.48 cm           Admit Date: Weight:             59.87 kg            Admission Status:     Inpatient -                                                               Routine BSA / BMI:          1.60 m2 / 24.14     Encounter#:           [id number]                     kg/m2 Blood Pressure:     130/83 mmHg         Department Location:  [place] [address] Study Type:    TRANSTHORACIC ECHO (TTE) LIMITED Diagnosis/ICD: Unspecified systolic (congestive) heart failure (CHF)-I50.20 Indication:    LVAD w/ speed adjustment yesterday CPT Code:      Echo Limited-90894; Doppler Limited-73160; Color Doppler-83323 Patient History: Pertinent History: CAD s/p PCI (LAD; 2015, Lcx; 3/2025), stage C systolic                    HF/ICM/HFrEF s/p ICD, h/o VT with presumed associated                    syncope, poorly controlled DM, pAF (off of DOAC given the                    absence of recurrence), dyslipidemia, essential HTN,                    hypothyroidism, recurrent pleural effusion likely 2/2 HF,                    restriction by PFTs who presented to [facility] HF ICU for                    Pike-guided hemodynamic optimization and LVAD placement. s/p                    LVAD Hm3 8/1/25. Study Detail: The following Echo studies were performed: 2D, M-Mode, Doppler and               color flow. Technically challenging study due to prominent lung               artifact and  body habitus. Definity used as a contrast agent for               endocardial border definition. Total contrast used for this               procedure was 2 mL via IV push.  PHYSICIAN INTERPRETATION: Left Ventricle: Left ventricular ejection fraction is severely decreased by visual estimate at 25%. There is global hypokinesis of the left ventricle with minor regional variations. The left ventricular cavity size is normal. There is normal septal and normal posterior left ventricular wall thickness. Abnormal (paradoxical) septal motion consistent with post-operative status. Left ventricular diastolic filling cannot be determined due to left ventricular assist device. There is no definite left ventricular thrombus visualized. The apical inferior wall is akinetic and appears aneurysmal (clip 81, 100). Left Atrium: The left atrial size is normal. Right Ventricle: The right ventricle is normal in size. There is reduced right ventricular systolic function. A device is visualized in the right ventricle. Right Atrium: The right atrium is normal in size. There is a device visualized in the right atrium. Aortic Valve: The aortic valve is trileaflet. There is no evidence of aortic valve regurgitation. Mitral Valve: The mitral valve is mildly thickened. There is mild to moderate mitral annular calcification. There is mild mitral valve regurgitation. Tricuspid Valve: The tricuspid valve is structurally normal. There is moderate tricuspid regurgitation. The Doppler estimated right ventricular systolic pressure (RVSP) is mildly elevated at 39 mmHg. Pulmonic Valve: The pulmonic valve is structurally normal. There is trace pulmonic valve regurgitation. Pericardium: Small pericardial effusion localized near the right ventricle. The pericardial effusion appears to contain fibrinous material. Aorta: The aortic root is normal. The aortic root appears normal in size and measures 2.90 cm. The Asc Ao is 2.60 cm. There is no dilatation of the  ascending aorta. Systemic Veins: The inferior vena cava appears normal in size, with IVC inspiratory collapse greater than 50%. In comparison to the previous echocardiogram(s): Compared with study dated 8/6/2025, findings are overall similar.  LEFT VENTRICULAR ASSIST DEVICE: LVAD: The patient has a(n) HeartMate 3 LVAD device present. Inflow Cannula: The inflow cannula is visualized in the left ventricular apex. Outflow Cannula: Visualization of the outflow cannula is technically difficult. LVAD Comments: The aortic valve partially opens every 1-2 beats.  CONCLUSIONS:  1. Left ventricular ejection fraction is severely decreased by visual estimate at 25%.  2. There is global hypokinesis of the left ventricle with minor regional variations.  3. Abnormal septal motion consistent with post-operative status.  4. No left ventricular thrombus visualized.  5. The apical inferior wall is akinetic and appears aneurysmal (clip 81, 100).  6. There is reduced right ventricular systolic function.  7. Small pericardial effusion.  8. Moderate tricuspid regurgitation visualized.  9. The Doppler estimated RVSP is mildly elevated at 39 mmHg. 10. Normal aortic root. 11. Compared with study dated 8/6/2025, findings are overall similar. QUANTITATIVE DATA SUMMARY:  2D MEASUREMENTS:          Normal Ranges: Ao Root d:       2.90 cm  (2.0-3.7cm) IVSd:            0.91 cm  (0.6-1.1cm) LVPWd:           0.72 cm  (0.6-1.1cm) LVIDd:           4.17 cm  (3.9-5.9cm) LVIDs:           3.41 cm LV Mass Index:   64 g/m2 LVEDV Index:     54 ml/m2 LV % FS          18.2 %  LEFT ATRIUM:                  Normal Ranges: LA Vol A4C:        40.6 ml    (22+/-6mL/m2) LA Vol A2C:        49.8 ml LA Vol BP:         48.2 ml LA Vol Index A4C:  25.4ml/m2 LA Vol Index A2C:  31.1 ml/m2 LA Vol Index BP:   30.1 ml/m2 LA Area A4C:       14.0 cm2 LA Area A2C:       16.6 cm2 LA Major Axis A4C: 4.1 cm LA Major Axis A2C: 4.7 cm LA Volume Index:   30.1 ml/m2  RIGHT ATRIUM:                  Normal Ranges: RA Vol A4C:        28.9 ml    (8.3-19.5ml) RA Vol Index A4C:  18.1 ml/m2 RA Area A4C:       12.1 cm2 RA Major Axis A4C: 4.3 cm  AORTA MEASUREMENTS:         Normal Ranges: Asc Ao, d:          2.60 cm (2.1-3.4cm)  LV SYSTOLIC FUNCTION:                      Normal Ranges: EF-Visual:      25 % LV EF Reported: 25 %  AORTIC VALVE:          Normal Ranges: LVOT Diameter: 1.88 cm (1.8-2.4cm)  RIGHT VENTRICLE: RV Basal 3.50 cm RV Mid   2.90 cm RV Major 6.8 cm TAPSE:   14.0 mm RV s'    0.08 m/s  TRICUSPID VALVE/RVSP:          Normal Ranges: Peak TR Velocity:     2.99 m/s Est. RA Pressure:     3 RV Syst Pressure:     39       (< 30mmHg) IVC Diam:             1.87 cm  PULMONIC VALVE:         Normal Ranges: PV Accel Time:  84 msec (>120ms)  AORTA: Asc Ao Diam 2.60 cm  [id number] Tony Kelly MD Electronically signed on 8/13/2025 at 3:14:58 PM  ** Final **          Notable Studies:   EKG:  Encounter Date: 07/14/25   ECG 12 lead   Result Value    Ventricular Rate 105    Atrial Rate 105    CA Interval 124    QRS Duration 156    QT Interval 454    QTC Calculation(Bazett) 600    P Axis 5    R Axis -42    T Axis 45    QRS Count 18    Q Onset 205    P Onset 153    P Offset 173    T Offset 432    QTC Fredericia 547    Narrative    Sinus tachycardia  Left axis deviation  Nonspecific intraventricular block  Possible Lateral infarct (cited on or before 12-JUN-2025)  Abnormal ECG  When compared with ECG of 01-AUG-2025 14:08,  Questionable change in QRS duration  Questionable change in initial forces of Anterolateral leads  Confirmed by Viktor Nelson ([id number]) on 8/10/2025 11:40:02 PM       Echo:  Transthoracic Echo (TTE) Limited  Result Date: 8/13/2025   [facility], [address] [place] [address]                Tel [phone] and Fax [phone] TRANSTHORACIC ECHOCARDIOGRAM REPORT  Patient Name:       ANETA Khan Physician:    [id number] Jimenez                                                                Leticia CRISTOBAL Study Date:         8/13/2025           Ordering Provider:    [id number] BRIAN SCHWARZ MRN/PID:            [id number]            Fellow:               [id number] Justina Lao MD Accession#:         [id number]        Nurse: Date of Birth/Age:  1965 / 60      Sonographer:          Lorrie donaldson                                     RDCS Gender assigned at  F                   Additional Staff: Birth: Height:             157.48 cm           Admit Date: Weight:             59.87 kg            Admission Status:     Inpatient -                                                               Routine BSA / BMI:          1.60 m2 / 24.14     Encounter#:           [id number]                     kg/m2 Blood Pressure:     130/83 mmHg         Department Location:  [place] [address] Study Type:    TRANSTHORACIC ECHO (TTE) LIMITED Diagnosis/ICD: Unspecified systolic (congestive) heart failure (CHF)-I50.20 Indication:    LVAD w/ speed adjustment yesterday CPT Code:      Echo Limited-93503; Doppler Limited-25777; Color Doppler-56285 Patient History: Pertinent History: CAD s/p PCI (LAD; 2015, Lcx; 3/2025), stage C systolic                    HF/ICM/HFrEF s/p ICD, h/o VT with presumed associated                    syncope, poorly controlled DM, pAF (off of DOAC given the                    absence of recurrence), dyslipidemia, essential HTN,                    hypothyroidism, recurrent pleural effusion likely 2/2 HF,                    restriction by PFTs who presented to [facility] HF ICU for                    Howe-guided hemodynamic optimization and LVAD placement. s/p                    LVAD Hm3 8/1/25. Study Detail: The following Echo studies were performed: 2D, M-Mode, Doppler and                color flow. Technically challenging study due to prominent lung               artifact and body habitus. Definity used as a contrast agent for               endocardial border definition. Total contrast used for this               procedure was 2 mL via IV push.  PHYSICIAN INTERPRETATION: Left Ventricle: Left ventricular ejection fraction is severely decreased by visual estimate at 25%. There is global hypokinesis of the left ventricle with minor regional variations. The left ventricular cavity size is normal. There is normal septal and normal posterior left ventricular wall thickness. Abnormal (paradoxical) septal motion consistent with post-operative status. Left ventricular diastolic filling cannot be determined due to left ventricular assist device. There is no definite left ventricular thrombus visualized. The apical inferior wall is akinetic and appears aneurysmal (clip 81, 100). Left Atrium: The left atrial size is normal. Right Ventricle: The right ventricle is normal in size. There is reduced right ventricular systolic function. A device is visualized in the right ventricle. Right Atrium: The right atrium is normal in size. There is a device visualized in the right atrium. Aortic Valve: The aortic valve is trileaflet. There is no evidence of aortic valve regurgitation. Mitral Valve: The mitral valve is mildly thickened. There is mild to moderate mitral annular calcification. There is mild mitral valve regurgitation. Tricuspid Valve: The tricuspid valve is structurally normal. There is moderate tricuspid regurgitation. The Doppler estimated right ventricular systolic pressure (RVSP) is mildly elevated at 39 mmHg. Pulmonic Valve: The pulmonic valve is structurally normal. There is trace pulmonic valve regurgitation. Pericardium: Small pericardial effusion localized near the right ventricle. The pericardial effusion appears to contain fibrinous material. Aorta: The aortic root is normal. The aortic root  appears normal in size and measures 2.90 cm. The Asc Ao is 2.60 cm. There is no dilatation of the ascending aorta. Systemic Veins: The inferior vena cava appears normal in size, with IVC inspiratory collapse greater than 50%. In comparison to the previous echocardiogram(s): Compared with study dated 8/6/2025, findings are overall similar.  LEFT VENTRICULAR ASSIST DEVICE: LVAD: The patient has a(n) HeartMate 3 LVAD device present. Inflow Cannula: The inflow cannula is visualized in the left ventricular apex. Outflow Cannula: Visualization of the outflow cannula is technically difficult. LVAD Comments: The aortic valve partially opens every 1-2 beats.  CONCLUSIONS:  1. Left ventricular ejection fraction is severely decreased by visual estimate at 25%.  2. There is global hypokinesis of the left ventricle with minor regional variations.  3. Abnormal septal motion consistent with post-operative status.  4. No left ventricular thrombus visualized.  5. The apical inferior wall is akinetic and appears aneurysmal (clip 81, 100).  6. There is reduced right ventricular systolic function.  7. Small pericardial effusion.  8. Moderate tricuspid regurgitation visualized.  9. The Doppler estimated RVSP is mildly elevated at 39 mmHg. 10. Normal aortic root. 11. Compared with study dated 8/6/2025, findings are overall similar. QUANTITATIVE DATA SUMMARY:  2D MEASUREMENTS:          Normal Ranges: Ao Root d:       2.90 cm  (2.0-3.7cm) IVSd:            0.91 cm  (0.6-1.1cm) LVPWd:           0.72 cm  (0.6-1.1cm) LVIDd:           4.17 cm  (3.9-5.9cm) LVIDs:           3.41 cm LV Mass Index:   64 g/m2 LVEDV Index:     54 ml/m2 LV % FS          18.2 %  LEFT ATRIUM:                  Normal Ranges: LA Vol A4C:        40.6 ml    (22+/-6mL/m2) LA Vol A2C:        49.8 ml LA Vol BP:         48.2 ml LA Vol Index A4C:  25.4ml/m2 LA Vol Index A2C:  31.1 ml/m2 LA Vol Index BP:   30.1 ml/m2 LA Area A4C:       14.0 cm2 LA Area A2C:       16.6 cm2 LA  Major Axis A4C: 4.1 cm LA Major Axis A2C: 4.7 cm LA Volume Index:   30.1 ml/m2  RIGHT ATRIUM:                 Normal Ranges: RA Vol A4C:        28.9 ml    (8.3-19.5ml) RA Vol Index A4C:  18.1 ml/m2 RA Area A4C:       12.1 cm2 RA Major Axis A4C: 4.3 cm  AORTA MEASUREMENTS:         Normal Ranges: Asc Ao, d:          2.60 cm (2.1-3.4cm)  LV SYSTOLIC FUNCTION:                      Normal Ranges: EF-Visual:      25 % LV EF Reported: 25 %  AORTIC VALVE:          Normal Ranges: LVOT Diameter: 1.88 cm (1.8-2.4cm)  RIGHT VENTRICLE: RV Basal 3.50 cm RV Mid   2.90 cm RV Major 6.8 cm TAPSE:   14.0 mm RV s'    0.08 m/s  TRICUSPID VALVE/RVSP:          Normal Ranges: Peak TR Velocity:     2.99 m/s Est. RA Pressure:     3 RV Syst Pressure:     39       (< 30mmHg) IVC Diam:             1.87 cm  PULMONIC VALVE:         Normal Ranges: PV Accel Time:  84 msec (>120ms)  AORTA: Asc Ao Diam 2.60 cm  [id number] Tony Kelly MD Electronically signed on 8/13/2025 at 3:14:58 PM  ** Final **     Transthoracic Echo (TTE) Limited  Result Date: 8/6/2025   [facility], [address] [place] [address]                Tel [phone] and Fax [phone] TRANSTHORACIC ECHOCARDIOGRAM REPORT  Patient Name:       ANETA Khan Physician:    [id number] Jarvis Melvin MD Study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

## 2025-08-15 NOTE — PROGRESS NOTES
Physical Therapy    Physical Therapy Treatment    Patient Name: Thao Evans  MRN: [id number]  Department: [place] [address]  Room: [facility]502-  Today's Date: 8/15/2025  Time Calculation  Start Time: 0921  Stop Time: 0936  Time Calculation (min): 15 min         Assessment/Plan   PT Assessment  PT Assessment Results: Decreased strength, Decreased endurance, Impaired balance, Decreased mobility, Pain  Rehab Prognosis: Excellent  Barriers to Discharge Home: No anticipated barriers  Evaluation/Treatment Tolerance: Patient tolerated treatment well  Medical Staff Made Aware: Yes  End of Session Communication: Bedside nurse  Assessment Comment: Pt offers good effort with therapy, reports B LE fatigue after step ups this date, would benefit from continued strengthening while in house, and remains appropriate for low intensity therapy upon D/C  End of Session Patient Position: Bed, 2 rail up, Alarm off, not on at start of session  PT Plan  Inpatient/Swing Bed or Outpatient: Inpatient  PT Plan  Treatment/Interventions: Bed mobility, Transfer training, Gait training, Stair training, Balance training, Strengthening, Endurance training, Range of motion, Therapeutic exercise, Therapeutic activity, Home exercise program, Positioning, Postural re-education  PT Plan: Ongoing PT  PT Frequency for Current Admission: 6 times per week during this acute inpatient hospitalization  PT Discharge Recommendations: Low intensity level of continued care  Equipment Recommended upon Discharge: Wheeled walker  PT Recommended Transfer Status: Assist x1  PT - OK to Discharge: Yes    PT Visit Info:  PT Received On: 08/15/25  Response to Previous Treatment: Patient with no complaints from previous session.     General Visit Information:   General  Prior to Session Communication: Bedside nurse  Patient Position Received: Bed, 2 rail up, Alarm off, not on at start of session  General Comment: Pt supine in bed, pleasant and agreeable to therapy  Per handoff with  "RN, pt is appropriate for therapy, vitals are stable and pain is controlled. Other concerns prior to tx are: none    Subjective   Precautions:  Precautions  Medical Precautions: Cardiac precautions, Fall precautions  Post-Surgical Precautions: Move in the Tube  Precautions Comment: MAP >65. SpO2 >92%. LVAD driveline intact before and after session     Date/Time Vitals Session Patient Position Pulse Resp SpO2 BP MAP (mmHg)    08/15/25 0842 --  --  --  --  95 %  --  --      Vital Signs Comment: LVAD #'s: pre/post tx, flow=4.7/4.7 speed=5000/5000 power=3.3/3.4 PI= 2.8/2.9     Objective   Pain:  Pain Assessment  Pain Assessment: 0-10  0-10 (Numeric) Pain Score: 4  Pain Type: Chronic pain  Pain Location: Shoulder  Pain Orientation: Right  Pain Interventions: Distraction, Emotional support  Cognition:  Cognition  Overall Cognitive Status: Within Functional Limits  Orientation Level: Oriented X4    Treatments:  Therapeutic Exercise  Therapeutic Exercise Performed: Yes  Therapeutic Exercise Activity 1: Step up to/down from 4\" step 5x with CGA, light L UE support on wall for balance    Therapeutic Activity  Therapeutic Activity Performed: Yes  Therapeutic Activity 1: Pt switching LVAD to battery power and donning bag with SBA    Bed Mobility  Bed Mobility: Yes  Bed Mobility 1  Bed Mobility 1: Supine to sitting, Sitting to supine  Level of Assistance 1: Modified independent    Ambulation/Gait Training  Ambulation/Gait Training Performed: Yes  Ambulation/Gait Training 1  Surface 1: Level tile  Device 1: No device  Assistance 1: Close supervision  Comments/Distance (ft) 1: 220'x1  Transfers  Transfer: Yes  Transfer 1  Transfer From 1: Sit to, Stand to  Transfer to 1: Sit  Technique 1: Sit to stand, Stand to sit  Transfer Level of Assistance 1: Distant supervision    Outcome Measures:     [facility] Basic Mobility  Turning from your back to your side while in a flat bed without using bedrails: None  Moving from lying on your back to " sitting on the side of a flat bed without using bedrails: None  Moving to and from bed to chair (including a wheelchair): A little  Standing up from a chair using your arms (e.g. wheelchair or bedside chair): A little  To walk in hospital room: A little  Climbing 3-5 steps with railing: A little  Basic Mobility - Total Score: 20     Education Documentation  Precautions, taught by Alem Fallon PTA at 8/15/2025 10:20 AM.  Learner: Patient  Readiness: Acceptance  Method: Explanation, Demonstration  Response: Verbalizes Understanding, Demonstrated Understanding  Comment: LVAD, precautions, safe mobility    Body Mechanics, taught by lAem Fallon PTA at 8/15/2025 10:20 AM.  Learner: Patient  Readiness: Acceptance  Method: Explanation, Demonstration  Response: Verbalizes Understanding, Demonstrated Understanding  Comment: LVAD, precautions, safe mobility    Mobility Training, taught by Alem Fallon PTA at 8/15/2025 10:20 AM.  Learner: Patient  Readiness: Acceptance  Method: Explanation, Demonstration  Response: Verbalizes Understanding, Demonstrated Understanding  Comment: LVAD, precautions, safe mobility    Education Comments  No comments found.        OP EDUCATION:       Encounter Problems       Encounter Problems (Active)       Balance       patient will score >24/28 on the Tinetti scale to present as a low risk of falls (Progressing)       Start:  07/15/25    Expected End:  08/16/25               Mobility       Patient will complete the 5xSTS  in <15 sec with no assistive device, no UE support, and close supervision (RPE: 11/20 RPD: 3/10) (Progressing)       Start:  07/15/25    Expected End:  08/16/25            Patient will ambulate >1000ft on the 6MWT with no assistive device and close superivision (RPE: 11/20 RPD: 3/10) (Progressing)       Start:  07/15/25    Expected End:  08/16/25               Mobility       Pt will ambulate >300ft with appropriate form, SBA or less, LRAD, and no LOB for safe DC.   (Progressing)       Start:  08/02/25    Expected End:  08/16/25            Pt will ambulate up/down >3 stairs with/without hand rail with CGA or less to safely access their home upon DC.   (Progressing)       Start:  08/02/25    Expected End:  08/16/25               PT Transfers       Pt will perform bed mobility and sit<>stand transfers with SBA and use of LRAD to safely DC.  (Progressing)       Start:  08/02/25    Expected End:  08/16/25                 LEA Dave

## 2025-08-15 NOTE — PROGRESS NOTES
08/15/25 1217   Rapid Rounds   Attendance Nurse;Care Transitions;Pharmacist   Expected Discharge Disposition Home   Today we still await: Clinical stability   Review at Escalation Rounds Clinically complex

## 2025-08-16 LAB
ALBUMIN SERPL BCP-MCNC: 3.3 G/DL (ref 3.4–5)
ANION GAP SERPL CALC-SCNC: 13 MMOL/L (ref 10–20)
BUN SERPL-MCNC: 16 MG/DL (ref 6–23)
CALCIUM SERPL-MCNC: 8.8 MG/DL (ref 8.6–10.6)
CHLORIDE SERPL-SCNC: 97 MMOL/L (ref 98–107)
CO2 SERPL-SCNC: 29 MMOL/L (ref 21–32)
CREAT SERPL-MCNC: 0.94 MG/DL (ref 0.5–1.05)
EGFRCR SERPLBLD CKD-EPI 2021: 69 ML/MIN/1.73M*2
ERYTHROCYTE [DISTWIDTH] IN BLOOD BY AUTOMATED COUNT: 16.5 % (ref 11.5–14.5)
GLUCOSE BLD MANUAL STRIP-MCNC: 153 MG/DL (ref 74–99)
GLUCOSE BLD MANUAL STRIP-MCNC: 171 MG/DL (ref 74–99)
GLUCOSE BLD MANUAL STRIP-MCNC: 180 MG/DL (ref 74–99)
GLUCOSE BLD MANUAL STRIP-MCNC: 192 MG/DL (ref 74–99)
GLUCOSE SERPL-MCNC: 222 MG/DL (ref 74–99)
HCT VFR BLD AUTO: 28.1 % (ref 36–46)
HGB BLD-MCNC: 8.5 G/DL (ref 12–16)
INR PPP: 1.6 (ref 0.9–1.1)
LDH SERPL L TO P-CCNC: 206 U/L (ref 84–246)
MCH RBC QN AUTO: 30.2 PG (ref 26–34)
MCHC RBC AUTO-ENTMCNC: 30.2 G/DL (ref 32–36)
MCV RBC AUTO: 100 FL (ref 80–100)
NRBC BLD-RTO: 0 /100 WBCS (ref 0–0)
PHOSPHATE SERPL-MCNC: 3.4 MG/DL (ref 2.5–4.9)
PLATELET # BLD AUTO: 379 X10*3/UL (ref 150–450)
POTASSIUM SERPL-SCNC: 4 MMOL/L (ref 3.5–5.3)
PROTHROMBIN TIME: 17.6 SECONDS (ref 9.8–12.4)
RBC # BLD AUTO: 2.81 X10*6/UL (ref 4–5.2)
SODIUM SERPL-SCNC: 135 MMOL/L (ref 136–145)
UFH PPP CHRO-ACNC: 0.1 IU/ML (ref ?–1.1)
UFH PPP CHRO-ACNC: 0.1 IU/ML (ref ?–1.1)
WBC # BLD AUTO: 11.3 X10*3/UL (ref 4.4–11.3)

## 2025-08-16 PROCEDURE — 2500000001 HC RX 250 WO HCPCS SELF ADMINISTERED DRUGS (ALT 637 FOR MEDICARE OP)

## 2025-08-16 PROCEDURE — 2500000004 HC RX 250 GENERAL PHARMACY W/ HCPCS (ALT 636 FOR OP/ED): Performed by: STUDENT IN AN ORGANIZED HEALTH CARE EDUCATION/TRAINING PROGRAM

## 2025-08-16 PROCEDURE — 94669 MECHANICAL CHEST WALL OSCILL: CPT

## 2025-08-16 PROCEDURE — 2500000005 HC RX 250 GENERAL PHARMACY W/O HCPCS: Performed by: STUDENT IN AN ORGANIZED HEALTH CARE EDUCATION/TRAINING PROGRAM

## 2025-08-16 PROCEDURE — 2500000001 HC RX 250 WO HCPCS SELF ADMINISTERED DRUGS (ALT 637 FOR MEDICARE OP): Performed by: REGISTERED NURSE

## 2025-08-16 PROCEDURE — 94640 AIRWAY INHALATION TREATMENT: CPT

## 2025-08-16 PROCEDURE — 2500000004 HC RX 250 GENERAL PHARMACY W/ HCPCS (ALT 636 FOR OP/ED)

## 2025-08-16 PROCEDURE — 85027 COMPLETE CBC AUTOMATED: CPT | Performed by: REGISTERED NURSE

## 2025-08-16 PROCEDURE — 84100 ASSAY OF PHOSPHORUS: CPT | Performed by: REGISTERED NURSE

## 2025-08-16 PROCEDURE — 2500000001 HC RX 250 WO HCPCS SELF ADMINISTERED DRUGS (ALT 637 FOR MEDICARE OP): Performed by: NURSE PRACTITIONER

## 2025-08-16 PROCEDURE — 2500000001 HC RX 250 WO HCPCS SELF ADMINISTERED DRUGS (ALT 637 FOR MEDICARE OP): Performed by: STUDENT IN AN ORGANIZED HEALTH CARE EDUCATION/TRAINING PROGRAM

## 2025-08-16 PROCEDURE — 1100000001 HC PRIVATE ROOM DAILY

## 2025-08-16 PROCEDURE — 94668 MNPJ CHEST WALL SBSQ: CPT

## 2025-08-16 PROCEDURE — 2500000002 HC RX 250 W HCPCS SELF ADMINISTERED DRUGS (ALT 637 FOR MEDICARE OP, ALT 636 FOR OP/ED): Performed by: NURSE PRACTITIONER

## 2025-08-16 PROCEDURE — 85610 PROTHROMBIN TIME: CPT | Performed by: REGISTERED NURSE

## 2025-08-16 PROCEDURE — 99233 SBSQ HOSP IP/OBS HIGH 50: CPT

## 2025-08-16 PROCEDURE — 2500000002 HC RX 250 W HCPCS SELF ADMINISTERED DRUGS (ALT 637 FOR MEDICARE OP, ALT 636 FOR OP/ED): Performed by: STUDENT IN AN ORGANIZED HEALTH CARE EDUCATION/TRAINING PROGRAM

## 2025-08-16 PROCEDURE — 36415 COLL VENOUS BLD VENIPUNCTURE: CPT | Performed by: REGISTERED NURSE

## 2025-08-16 PROCEDURE — 83615 LACTATE (LD) (LDH) ENZYME: CPT | Performed by: REGISTERED NURSE

## 2025-08-16 PROCEDURE — 85520 HEPARIN ASSAY: CPT

## 2025-08-16 PROCEDURE — 2500000002 HC RX 250 W HCPCS SELF ADMINISTERED DRUGS (ALT 637 FOR MEDICARE OP, ALT 636 FOR OP/ED)

## 2025-08-16 PROCEDURE — 82947 ASSAY GLUCOSE BLOOD QUANT: CPT

## 2025-08-16 RX ORDER — HEPARIN SODIUM 10000 [USP'U]/100ML
0-4000 INJECTION, SOLUTION INTRAVENOUS CONTINUOUS
Status: DISCONTINUED | OUTPATIENT
Start: 2025-08-16 | End: 2025-08-18

## 2025-08-16 RX ADMIN — ACETAMINOPHEN 650 MG: 325 TABLET, FILM COATED ORAL at 18:11

## 2025-08-16 RX ADMIN — HEPARIN SODIUM 700 UNITS/HR: 10000 INJECTION, SOLUTION INTRAVENOUS at 10:29

## 2025-08-16 RX ADMIN — CITALOPRAM HYDROBROMIDE 40 MG: 40 TABLET ORAL at 08:16

## 2025-08-16 RX ADMIN — ICOSAPENT ETHYL 2 G: 1 CAPSULE ORAL at 17:33

## 2025-08-16 RX ADMIN — ROPINIROLE 1 MG: 1 TABLET, FILM COATED ORAL at 14:59

## 2025-08-16 RX ADMIN — ACETYLCYSTEINE 600 MG: 200 SOLUTION ORAL; RESPIRATORY (INHALATION) at 08:52

## 2025-08-16 RX ADMIN — INSULIN LISPRO 2 UNITS: 100 INJECTION, SOLUTION INTRAVENOUS; SUBCUTANEOUS at 17:33

## 2025-08-16 RX ADMIN — LIDOCAINE 4% 1 PATCH: 40 PATCH TOPICAL at 08:13

## 2025-08-16 RX ADMIN — ACETAMINOPHEN 650 MG: 325 TABLET, FILM COATED ORAL at 10:39

## 2025-08-16 RX ADMIN — GABAPENTIN 300 MG: 300 CAPSULE ORAL at 14:59

## 2025-08-16 RX ADMIN — ACETAMINOPHEN 650 MG: 325 TABLET, FILM COATED ORAL at 04:39

## 2025-08-16 RX ADMIN — GUAIFENESIN AND DEXTROMETHORPHAN HYDROBROMIDE 1 TABLET: 600; 30 TABLET, EXTENDED RELEASE ORAL at 08:18

## 2025-08-16 RX ADMIN — INSULIN LISPRO 2 UNITS: 100 INJECTION, SOLUTION INTRAVENOUS; SUBCUTANEOUS at 12:45

## 2025-08-16 RX ADMIN — INSULIN GLARGINE 25 UNITS: 100 INJECTION, SOLUTION SUBCUTANEOUS at 10:36

## 2025-08-16 RX ADMIN — ACETYLCYSTEINE 600 MG: 200 SOLUTION ORAL; RESPIRATORY (INHALATION) at 14:32

## 2025-08-16 RX ADMIN — OXYCODONE HYDROCHLORIDE 5 MG: 5 TABLET ORAL at 18:11

## 2025-08-16 RX ADMIN — PANTOPRAZOLE SODIUM 40 MG: 40 TABLET, DELAYED RELEASE ORAL at 08:24

## 2025-08-16 RX ADMIN — GABAPENTIN 300 MG: 300 CAPSULE ORAL at 20:47

## 2025-08-16 RX ADMIN — ROPINIROLE 1 MG: 1 TABLET, FILM COATED ORAL at 08:15

## 2025-08-16 RX ADMIN — IPRATROPIUM BROMIDE AND ALBUTEROL SULFATE 3 ML: .5; 3 SOLUTION RESPIRATORY (INHALATION) at 08:53

## 2025-08-16 RX ADMIN — GUAIFENESIN AND DEXTROMETHORPHAN HYDROBROMIDE 1 TABLET: 600; 30 TABLET, EXTENDED RELEASE ORAL at 20:46

## 2025-08-16 RX ADMIN — GABAPENTIN 300 MG: 300 CAPSULE ORAL at 04:39

## 2025-08-16 RX ADMIN — ATORVASTATIN CALCIUM 80 MG: 80 TABLET, FILM COATED ORAL at 08:11

## 2025-08-16 RX ADMIN — IPRATROPIUM BROMIDE AND ALBUTEROL SULFATE 3 ML: .5; 3 SOLUTION RESPIRATORY (INHALATION) at 14:32

## 2025-08-16 RX ADMIN — HEPARIN SODIUM 8 UNITS/HR: 10000 INJECTION, SOLUTION INTRAVENOUS at 17:11

## 2025-08-16 RX ADMIN — LIDOCAINE 4% 1 PATCH: 40 PATCH TOPICAL at 08:24

## 2025-08-16 RX ADMIN — SACUBITRIL AND VALSARTAN 1 TABLET: 24; 26 TABLET, FILM COATED ORAL at 20:46

## 2025-08-16 RX ADMIN — ROPINIROLE 1 MG: 1 TABLET, FILM COATED ORAL at 20:46

## 2025-08-16 RX ADMIN — SPIRONOLACTONE 25 MG: 25 TABLET ORAL at 08:11

## 2025-08-16 RX ADMIN — ACETYLCYSTEINE 600 MG: 200 SOLUTION ORAL; RESPIRATORY (INHALATION) at 20:01

## 2025-08-16 RX ADMIN — SACUBITRIL AND VALSARTAN 1 TABLET: 24; 26 TABLET, FILM COATED ORAL at 08:11

## 2025-08-16 RX ADMIN — OXYCODONE HYDROCHLORIDE 5 MG: 5 TABLET ORAL at 10:43

## 2025-08-16 RX ADMIN — IPRATROPIUM BROMIDE AND ALBUTEROL SULFATE 3 ML: .5; 3 SOLUTION RESPIRATORY (INHALATION) at 19:59

## 2025-08-16 RX ADMIN — WARFARIN SODIUM 3 MG: 3 TABLET ORAL at 17:33

## 2025-08-16 RX ADMIN — ICOSAPENT ETHYL 2 G: 1 CAPSULE ORAL at 08:12

## 2025-08-16 RX ADMIN — FUROSEMIDE 40 MG: 40 TABLET ORAL at 08:11

## 2025-08-16 RX ADMIN — ASPIRIN 81 MG: 81 TABLET, CHEWABLE ORAL at 08:11

## 2025-08-16 RX ADMIN — FLUTICASONE FUROATE AND VILANTEROL TRIFENATATE 1 PUFF: 100; 25 POWDER RESPIRATORY (INHALATION) at 08:52

## 2025-08-16 RX ADMIN — INSULIN LISPRO 2 UNITS: 100 INJECTION, SOLUTION INTRAVENOUS; SUBCUTANEOUS at 08:10

## 2025-08-16 RX ADMIN — LEVOTHYROXINE SODIUM 88 MCG: 0.09 TABLET ORAL at 04:39

## 2025-08-16 RX ADMIN — FUROSEMIDE 40 MG: 40 TABLET ORAL at 17:33

## 2025-08-16 RX ADMIN — OXYCODONE HYDROCHLORIDE 5 MG: 5 TABLET ORAL at 04:39

## 2025-08-16 RX ADMIN — ROPINIROLE HYDROCHLORIDE 3 MG: 3 TABLET, FILM COATED ORAL at 20:47

## 2025-08-16 ASSESSMENT — PAIN DESCRIPTION - LOCATION
LOCATION: OTHER (COMMENT)
LOCATION: CHEST
LOCATION: INCISION

## 2025-08-16 ASSESSMENT — PAIN SCALES - GENERAL
PAINLEVEL_OUTOF10: 5 - MODERATE PAIN
PAINLEVEL_OUTOF10: 6
PAINLEVEL_OUTOF10: 3

## 2025-08-16 ASSESSMENT — COGNITIVE AND FUNCTIONAL STATUS - GENERAL
MOBILITY SCORE: 23
CLIMB 3 TO 5 STEPS WITH RAILING: A LITTLE
DAILY ACTIVITIY SCORE: 24
CLIMB 3 TO 5 STEPS WITH RAILING: A LITTLE
DAILY ACTIVITIY SCORE: 24
MOBILITY SCORE: 23

## 2025-08-16 ASSESSMENT — PAIN DESCRIPTION - DESCRIPTORS
DESCRIPTORS: ACHING
DESCRIPTORS: ACHING

## 2025-08-16 ASSESSMENT — PAIN DESCRIPTION - ORIENTATION
ORIENTATION: LEFT
ORIENTATION: LEFT
ORIENTATION: MID

## 2025-08-16 ASSESSMENT — PAIN - FUNCTIONAL ASSESSMENT
PAIN_FUNCTIONAL_ASSESSMENT: 0-10

## 2025-08-16 NOTE — PROGRESS NOTES
ADVANCED HEART FAILURE LVAD PROGRESS NOTE      VAD Cardiologist: Sandrnie    VAD Coordinator: Anjali Meredith, RN and Alma Delia Capone RN     Type of VAD: HM3  Implant Date: 8/1/25   Reason for VAD: ICM/HFrEF   Intent: Long-Term modifiable (nicotine)      Subjective     HPI:  Thao Evans is a 60 y.o. female with a complex cardiovascular history, including coronary artery disease status post PCI to the LAD in 2015 and Lcx in October 2024 (currently on Plavix), and Stage D systolic heart failure secondary to ischemic cardiomyopathy (ICM/HFrEF) s/p ICD placement, poorly controlled type 2 diabetes mellitus, paroxysmal atrial fibrillation status post DCCV in October 2024 (currently off anticoagulation due to absence of recurrence), hypertension, hyperlipidemia, COPD with recent tobacco cessation (2 months ago), Graves disease, restless legs syndrome, and generalized anxiety and depression.    She was directly admitted to the HF ICU for Abch-Dngu-yqgnwt hemodynamic optimization in anticipation of LVAD placement initially scheduled for July 23, which was postponed due to identification of concerning colonic polyps on routine screening colonoscopy; pathology returned benign on July 25.    Now s/p LVAD HM3 implantation on August 1 by Dr. Inman. Postoperatively, she required initial invasive mechanical ventilation, followed by reintubation on the evening of August 3 due to mucous plugging. She is now extubated s/p bronchoscopy and transferred to [facility] 8/11.     Interval Events:   Patient seen and examined at bedside. NAEO. Continues to have non-productive cough, slowly improving.   INR: 1.6, net -400 ml     NYHA class pre-VAD implant: IV   NYHA class today (08/16/25): II    Plan:   C/w Lasix to 40 BID.  C/w Coumadin to 3mg, Start low dose heparin drip  C/w VAD education     Objective   Vitals:   Vitals:    08/15/25 2007 08/16/25 0106 08/16/25 0742 08/16/25 0852   BP:       Pulse:  89 106    Resp:  16 16    Temp:  36.5 °C  (97.7 °F) 36.3 °C (97.3 °F)    TempSrc:   Temporal    SpO2: 99% 95% 94% 92%   Weight:  60.7 kg (133 lb 13.1 oz)     Height:         Wt Readings from Last 5 Encounters:   08/16/25 60.7 kg (133 lb 13.1 oz)   06/20/25 57.2 kg (126 lb)   06/20/25 57.4 kg (126 lb 9.6 oz)   06/17/25 58.2 kg (128 lb 4.9 oz)   05/30/25 56.2 kg (124 lb)     Hemodynamic parameters for last 24 hours:     Intake/Output for last 24 hours:    Intake/Output Summary (Last 24 hours) at 8/16/2025 0934  Last data filed at 8/16/2025 0832  Gross per 24 hour   Intake 1050 ml   Output 1350 ml   Net -300 ml      Physical exam:  Physical Exam  Constitutional:       General: She is not in acute distress.     Appearance: Normal appearance. She is not ill-appearing, toxic-appearing or diaphoretic.   HENT:      Head: Normocephalic and atraumatic.      Mouth/Throat:      Mouth: Mucous membranes are moist.      Pharynx: Oropharynx is clear.     Eyes:      Extraocular Movements: Extraocular movements intact.      Pupils: Pupils are equal, round, and reactive to light.       Cardiovascular:      Comments: LVAD hum heard on auscultation  No BLE edema   JVP below clavicle at 30 degrees, overall euvolemic on exam     Pulmonary:      Effort: Pulmonary effort is normal. No respiratory distress.      Breath sounds: No stridor. No wheezing.      Comments: Rhonci in B bases, non productive cough   Abdominal:      General: Abdomen is flat. Bowel sounds are normal. There is no distension.      Palpations: Abdomen is soft. There is no mass.      Tenderness: There is no abdominal tenderness. There is no guarding or rebound.      Hernia: No hernia is present.     Musculoskeletal:         General: Normal range of motion.      Right lower leg: No edema.      Left lower leg: No edema.     Skin:     General: Skin is warm and dry.      Coloration: Skin is not jaundiced.      Findings: No bruising, lesion or rash.     Neurological:      General: No focal deficit present.      Mental  Status: She is alert and oriented to person, place, and time. Mental status is at baseline.      Cranial Nerves: No cranial nerve deficit.      Sensory: No sensory deficit.      Motor: No weakness.     Psychiatric:         Mood and Affect: Mood normal.         Behavior: Behavior normal.        Driveline:     Labs:   CMP:  Recent Labs     08/16/25  0611 08/15/25  0647 08/14/25  0624 08/13/25  0611 08/12/25  0634 08/11/25  0844 08/10/25  0413 08/09/25  0328 08/08/25  0506 08/07/25  0223 08/06/25  0432 08/05/25  0514 08/04/25  1537 08/04/25  0023 08/03/25  1301   * 136 138 136 136 132* 136 134* 136   < > 136 135*   < > 135* 134*   K 4.0 4.4 3.7 4.2 4.5 4.2 4.3 4.1 4.2   < > 3.7 3.7   < > 4.2 4.3   CL 97* 98 96* 96* 94* 94* 96* 96* 98   < > 96* 96*   < > 95* 95*   CO2 29 33* 32 34* 33* 29 32 30 31   < > 34* 34*   < > 29 33*   ANIONGAP 13 9* 14 10 14 13 12 12 11   < > 10 9*   < > 15 10   BUN 16 17 12 12 13 15 16 17 16   < > 29* 31*   < > 36* 31*   CREATININE 0.94 0.71 0.78 0.76 0.77 0.74 0.84 0.85 0.81   < > 0.83 0.87   < > 0.93 0.92   EGFR 69 >90 86 89 87 >90 79 78 82   < > 80 75   < > 70 71   MG  --   --   --   --   --  1.92 2.23 2.05 1.76  --  2.09 2.07  --  2.08 2.14    < > = values in this interval not displayed.     Recent Labs     08/16/25  0611 08/15/25  0647 08/14/25  0624 08/13/25  0611 08/12/25  0634 08/11/25  0844 08/11/25  0415 08/10/25  0413 08/09/25  0328 08/08/25  0506 08/07/25  1110 08/07/25  0223 08/06/25  0432 08/05/25  0514   ALBUMIN 3.3* 3.3* 3.3* 3.3* 3.5  3.5 3.4 3.3* 3.5 3.2* 3.3*   < > 3.3* 3.3* 3.4   ALT  --   --   --   --  6*  --  5* 5* 5* 4*  --  4* 3* 3*   AST  --   --   --   --  10  --  10 13 12 13  --  18 17 20   BILITOT  --   --   --   --  0.4  --  0.4 0.4 0.4 0.4  --  0.5 0.4 0.5    < > = values in this interval not displayed.     CBC:  Recent Labs     08/16/25  0611 08/15/25  0647 08/14/25  0624 08/13/25  1147 08/11/25  0844 08/10/25  0413 08/09/25  0328 08/08/25  0506   WBC 11.3  "11.7* 9.0 9.4 6.0 5.5 6.6 6.7   HGB 8.5* 8.5* 9.1* 8.8* 8.2* 8.6* 8.0* 7.6*   HCT 28.1* 28.4* 29.0* 28.0* 25.5* 26.9* 24.1* 22.9*    409 446 424 264 299 253 208    99 98 96 92 97 91 90     COAG:   Recent Labs     08/16/25  0611 08/15/25  0647 08/14/25  0624 08/13/25  0611 08/12/25  1302 08/12/25  0634 08/11/25  0415 08/10/25  0413 08/02/25  1225 08/02/25  0145 08/01/25  1322   INR 1.6* 1.6* 1.7* 1.7* 1.9* 1.8* 1.9* 2.7*   < > 1.1 1.3*   FIBRINOGEN  --   --   --   --   --   --   --   --   --  318 229    < > = values in this interval not displayed.     ABO:   Recent Labs     08/11/25 0415   ABO O     HEME/ENDO:   Recent Labs     07/31/25  1658 07/15/25  0354 07/14/25  2013 06/05/25  0607 06/02/25  1346 03/13/25  0531   FERRITIN  --   --  224*  --  232*  --    IRONSAT  --   --  24*  --  17*  --    TSH  --   --   --  0.19* 0.20*  --    HGBA1C 7.5* 8.0*  --   --  8.6* 12.3*     CARDIAC:   Recent Labs     08/16/25  0611 08/15/25  0647 08/14/25  0624 08/13/25  0611 08/12/25  0634 08/11/25  0415 08/10/25  0413 08/09/25  0328 07/23/25  1855 07/14/25 2003 06/12/25  1728 06/09/25  1613 06/03/25  1743 06/02/25  1346    250* 210 208 235 224 254* 257*   < >  --   --   --    < >  --    TROPHS  --   --   --   --   --   --   --   --   --  18 15  --   --  22   BNP  --   --   --   --   --   --   --   --   --  735*  --  1,456*  --  1,292*    < > = values in this interval not displayed.     Recent Labs     08/04/25  0426 07/14/25 2003   CHOL  --  141   LDLCALC  --  78   HDL  --  43.3   TRIG 115 99     TOX:  Recent Labs     06/03/25  1743   AMPHETAMINE Negative     MICRO: No results for input(s): \"ESR\", \"CRP\", \"PROCAL\" in the last 73848 hours.  Susceptibility data from last 90 days.  Collected Specimen Info Organism Amoxicillin/Clavulanate Ampicillin Ampicillin/Sulbactam Cefazolin Cefazolin (uncomplicated UTIs only) Ciprofloxacin Gentamicin Levofloxacin Nitrofurantoin Piperacillin/Tazobactam   08/06/25 Fluid from " Lung Candida (Nakaseomyces) glabrata             08/06/25 Fluid from Bronchial Washings Mixed Microorganisms Resembling Upper Respiratory Remi              08/05/25 Fluid from SPUTUM Normal throat remi             08/03/25 Fluid from BAL Mixed Microorganisms Resembling Upper Respiratory Remi              06/07/25 Urine from Clean Catch/Voided Escherichia coli  S  R  I  S  S  S  S  S  S  S   06/03/25 Urine from Clean Catch/Voided Escherichia coli  S  R  I  S  S  S  S  S  S  S     Collected Specimen Info Organism Trimethoprim/Sulfamethoxazole   08/06/25 Fluid from Lung Candida (Nakaseomyces) glabrata    08/06/25 Fluid from Bronchial Washings Mixed Microorganisms Resembling Upper Respiratory Remi     08/05/25 Fluid from SPUTUM Normal throat remi    08/03/25 Fluid from BAL Mixed Microorganisms Resembling Upper Respiratory Remi     06/07/25 Urine from Clean Catch/Voided Escherichia coli  S   06/03/25 Urine from Clean Catch/Voided Escherichia coli  S         Imaging:   Imaging  XR chest 1 view  Result Date: 8/14/2025  Slight improvement of aeration of the both lungs. Stable AICD, left ventricular assistant device. Cardiac silhouette is markedly enlarged. Pulmonary vessels are congested Pulmonary edema/fluid overload of the both lungs. Small to moderate left-sided pleural effusion. Small right-sided pleural effusion. No pneumothorax seen. Bony thorax unremarkable.   MACRO: None   Signed by: Jeremias Alaniz 8/14/2025 4:50 PM Dictation workstation:   [id number]      Cardiology, Vascular, and Other Imaging  No other imaging results found for the past 2 days         Notable Studies:   EKG:  Encounter Date: 07/14/25   ECG 12 lead   Result Value    Ventricular Rate 105    Atrial Rate 105    DC Interval 124    QRS Duration 156    QT Interval 454    QTC Calculation(Bazett) 600    P Axis 5    R Axis -42    T Axis 45    QRS Count 18    Q Onset 205    P Onset 153    P Offset 173    T Offset 432    QTC Fredericia 547    Narrative     Sinus tachycardia  Left axis deviation  Nonspecific intraventricular block  Possible Lateral infarct (cited on or before 12-JUN-2025)  Abnormal ECG  When compared with ECG of 01-AUG-2025 14:08,  Questionable change in QRS duration  Questionable change in initial forces of Anterolateral leads  Confirmed by Viktor Nelson ([id number]) on 8/10/2025 11:40:02 PM       Echo:  Transthoracic Echo (TTE) Limited  Result Date: 8/13/2025   [facility], [address] [place] [address]                Tel [phone] and Fax [phone] TRANSTHORACIC ECHOCARDIOGRAM REPORT  Patient Name:       ANETA Khan Physician:    [id number] Tony Kelly MD Study Date:         8/13/2025           Ordering Provider:    [id number] BRIAN SCHWARZ MRN/PID:            [id number]            Fellow:               [id number] Justina Lao MD Accession#:         [id number]        Nurse: Date of Birth/Age:  1965 / 60      Sonographer:          Lorrie donaldson                                     [facility] Gender assigned at  F                   Additional Staff: Birth: Height:             157.48 cm           Admit Date: Weight:             59.87 kg            Admission Status:     Inpatient -                                                               Routine BSA / BMI:          1.60 m2 / 24.14     Encounter#:           [id number]                     kg/m2 Blood Pressure:     130/83 mmHg         Department Location:  [place] [address] Study Type:    TRANSTHORACIC ECHO (TTE) LIMITED Diagnosis/ICD: Unspecified systolic (congestive) heart failure (CHF)-I50.20 Indication:    LVAD w/ speed adjustment yesterday CPT Code:      Echo Limited-86734;  Doppler Limited-91367; Color Doppler-85119 Patient History: Pertinent History: CAD s/p PCI (LAD; 2015, Lcx; 3/2025), stage C systolic                    HF/ICM/HFrEF s/p ICD, h/o VT with presumed associated                    syncope, poorly controlled DM, pAF (off of DOAC given the                    absence of recurrence), dyslipidemia, essential HTN,                    hypothyroidism, recurrent pleural effusion likely 2/2 HF,                    restriction by PFTs who presented to [facility] ICU for                    Cisco-guided hemodynamic optimization and LVAD placement. s/p                    LVAD Hm3 8/1/25. Study Detail: The following Echo studies were performed: 2D, M-Mode, Doppler and               color flow. Technically challenging study due to prominent lung               artifact and body habitus. Definity used as a contrast agent for               endocardial border definition. Total contrast used for this               procedure was 2 mL via IV push.  PHYSICIAN INTERPRETATION: Left Ventricle: Left ventricular ejection fraction is severely decreased by visual estimate at 25%. There is global hypokinesis of the left ventricle with minor regional variations. The left ventricular cavity size is normal. There is normal septal and normal posterior left ventricular wall thickness. Abnormal (paradoxical) septal motion consistent with post-operative status. Left ventricular diastolic filling cannot be determined due to left ventricular assist device. There is no definite left ventricular thrombus visualized. The apical inferior wall is akinetic and appears aneurysmal (clip 81, 100). Left Atrium: The left atrial size is normal. Right Ventricle: The right ventricle is normal in size. There is reduced right ventricular systolic function. A device is visualized in the right ventricle. Right Atrium: The right atrium is normal in size. There is a device visualized in the right atrium. Aortic Valve: The aortic valve is  trileaflet. There is no evidence of aortic valve regurgitation. Mitral Valve: The mitral valve is mildly thickened. There is mild to moderate mitral annular calcification. There is mild mitral valve regurgitation. Tricuspid Valve: The tricuspid valve is structurally normal. There is moderate tricuspid regurgitation. The Doppler estimated right ventricular systolic pressure (RVSP) is mildly elevated at 39 mmHg. Pulmonic Valve: The pulmonic valve is structurally normal. There is trace pulmonic valve regurgitation. Pericardium: Small pericardial effusion localized near the right ventricle. The pericardial effusion appears to contain fibrinous material. Aorta: The aortic root is normal. The aortic root appears normal in size and measures 2.90 cm. The Asc Ao is 2.60 cm. There is no dilatation of the ascending aorta. Systemic Veins: The inferior vena cava appears normal in size, with IVC inspiratory collapse greater than 50%. In comparison to the previous echocardiogram(s): Compared with study dated 8/6/2025, findings are overall similar.  LEFT VENTRICULAR ASSIST DEVICE: LVAD: The patient has a(n) HeartMate 3 LVAD device present. Inflow Cannula: The inflow cannula is visualized in the left ventricular apex. Outflow Cannula: Visualization of the outflow cannula is technically difficult. LVAD Comments: The aortic valve partially opens every 1-2 beats.  CONCLUSIONS:  1. Left ventricular ejection fraction is severely decreased by visual estimate at 25%.  2. There is global hypokinesis of the left ventricle with minor regional variations.  3. Abnormal septal motion consistent with post-operative status.  4. No left ventricular thrombus visualized.  5. The apical inferior wall is akinetic and appears aneurysmal (clip 81, 100).  6. There is reduced right ventricular systolic function.  7. Small pericardial effusion.  8. Moderate tricuspid regurgitation visualized.  9. The Doppler estimated RVSP is mildly elevated at 39 mmHg. 10.  Normal aortic root. 11. Compared with study dated 8/6/2025, findings are overall similar. QUANTITATIVE DATA SUMMARY:  2D MEASUREMENTS:          Normal Ranges: Ao Root d:       2.90 cm  (2.0-3.7cm) IVSd:            0.91 cm  (0.6-1.1cm) LVPWd:           0.72 cm  (0.6-1.1cm) LVIDd:           4.17 cm  (3.9-5.9cm) LVIDs:           3.41 cm LV Mass Index:   64 g/m2 LVEDV Index:     54 ml/m2 LV % FS          18.2 %  LEFT ATRIUM:                  Normal Ranges: LA Vol A4C:        40.6 ml    (22+/-6mL/m2) LA Vol A2C:        49.8 ml LA Vol BP:         48.2 ml LA Vol Index A4C:  25.4ml/m2 LA Vol Index A2C:  31.1 ml/m2 LA Vol Index BP:   30.1 ml/m2 LA Area A4C:       14.0 cm2 LA Area A2C:       16.6 cm2 LA Major Axis A4C: 4.1 cm LA Major Axis A2C: 4.7 cm LA Volume Index:   30.1 ml/m2  RIGHT ATRIUM:                 Normal Ranges: RA Vol A4C:        28.9 ml    (8.3-19.5ml) RA Vol Index A4C:  18.1 ml/m2 RA Area A4C:       12.1 cm2 RA Major Axis A4C: 4.3 cm  AORTA MEASUREMENTS:         Normal Ranges: Asc Ao, d:          2.60 cm (2.1-3.4cm)  LV SYSTOLIC FUNCTION:                      Normal Ranges: EF-Visual:      25 % LV EF Reported: 25 %  AORTIC VALVE:          Normal Ranges: LVOT Diameter: 1.88 cm (1.8-2.4cm)  RIGHT VENTRICLE: RV Basal 3.50 cm RV Mid   2.90 cm RV Major 6.8 cm TAPSE:   14.0 mm RV s'    0.08 m/s  TRICUSPID VALVE/RVSP:          Normal Ranges: Peak TR Velocity:     2.99 m/s Est. RA Pressure:     3 RV Syst Pressure:     39       (< 30mmHg) IVC Diam:             1.87 cm  PULMONIC VALVE:         Normal Ranges: PV Accel Time:  84 msec (>120ms)  AORTA: Asc Ao Diam 2.60 cm  [id number] Tony Kelly MD Electronically signed on 8/13/2025 at 3:14:58 PM  ** Final **     Transthoracic Echo (TTE) Limited  Result Date: 8/6/2025   [facility] [address] [place] [address]                Tel [phone] and Fax [phone] TRANSTHORACIC ECHOCARDIOGRAM REPORT  Patient Name:       ANETA Khan  Physician:    [id number] Jarvis Melvin MD Study Date:         8/6/2025            Ordering Provider:    [id number] ELADIA GARCIA MRN/PID:            [id number]            Fellow: Accession#:         [id number]        Nurse: Date of Birth/Age:  1965 / 60      Sonographer:          Cristino donaldson RDCS Gender assigned at  F                   Additional Staff: Birth: Height:             157.48 cm           Admit Date: Weight:             68.95 kg            Admission Status:     Inpatient -                                                               Routine BSA / BMI:          1.70 m2 / 27.80     Encounter#:           [id number]                     kg/m2 Blood Pressure:     104/80 mmHg         Department Location:  [address] Study Type:    TRANSTHORACIC ECHO (TTE) LIMITED Diagnosis/ICD: Acute respiratory failure with hypoxia-J96.01 Indication:    s/p LVAD for RV/ LV CPT Code:      Echo Limited-95711; Doppler Limited-93620; Color Doppler-[id number] Patient History: Pertinent History: CAD s/p PCI (LAD; 2015, Lcx; 3/2025), stage C systolic                    HF/ICM/HFrEF s/p ICD, h/o VT with presumed associated                    syncope, poorly controlled DM, pAF (off of DOAC given the                    absence of recurrence), dyslipidemia, essential HTN,                    hypothyroidism, recurrent pleural effusion likely 2/2 HF,                    restriction by PFTs who [place] [facility] [place]                    Albers-guided hemodynamic optimization and LVAD placement. s/p                    LVAD Hm3 8/1/25. Study Detail: The following Echo studies were performed: 2D, M-Mode, Doppler and               color flow. Technically challenging study due to patient lying in               supine position, body habitus, postoperative dressings, poor               acoustic windows and prominent lung  artifact. Optison used as a               contrast agent for endocardial border definition. Total contrast               used for this procedure was 2 mL via IV push.  PHYSICIAN INTERPRETATION: Left Ventricle: Left ventricular ejection fraction is severely decreased by visual estimate at 25%. There is global hypokinesis of the left ventricle with minor regional variations. The left ventricular cavity size is mildly dilated. There is normal septal and normal posterior left ventricular wall thickness. Left ventricular diastolic filling was not assessed. Left Atrium: The left atrial size is normal. Right Ventricle: The right ventricle is mildly enlarged. There is moderately reduced right ventricular systolic function. A device is visualized in the right ventricle. Right Atrium: The right atrium is mildly dilated. There is a device visualized in the right atrium. Aortic Valve: The aortic valve was not well visualized. There is no evidence of aortic valve regurgitation. Mitral Valve: The mitral valve is minimally calcified. There is mild mitral annular calcification. There is mild to moderate mitral valve regurgitation which is posteriorly directed. Tricuspid Valve: The tricuspid valve is structurally normal. There is moderate tricuspid regurgitation. Pulmonic Valve: The pulmonic valve is not well visualized. There is trace pulmonic valve regurgitation. Pericardium: Trivial pericardial effusion. Aorta: The aortic root is normal. Pulmonary Artery: The tricuspid regurgitant velocity is 2.78 m/s, and with an estimated right atrial pressure of 3, the estimated pulmonary artery pressure is borderline elevated with the RVSP at 34 mmHg. Systemic Veins: The inferior vena cava was not well visualized, IVC inspiratory collapse is not well visualized.  LEFT VENTRICULAR ASSIST DEVICE: LVAD: The patient has a(n) HeartMate 3 LVAD device present. Inflow Cannula: The inflow cannula is visualized in the left ventricular apex. Outflow  Cannula: Visualization of the outflow cannula is technically difficult. AV Leaflet Mobility: There is periodic opening of the aortic valve leaflets.  CONCLUSIONS:  1. Left ventricular cavity size is mildly dilated.  2. Left ventricular ejection fraction is severely decreased by visual estimate at 25%.  3. There is global hypokinesis of the left ventricle with minor regional variations.  4. Mildly enlarged right ventricle.  5. There is moderately reduced right ventricular systolic function.  6. Mild to moderate mitral valve regurgitation.  7. Moderate tricuspid regurgitation visualized. QUANTITATIVE DATA SUMMARY:  2D MEASUREMENTS:          Normal Ranges: IVSd:            0.80 cm  (0.6-1.1cm) LVPWd:           0.70 cm  (0.6-1.1cm) LVIDd:           5.50 cm  (3.9-5.9cm) LVIDs:           4.20 cm LV Mass Index:   87 g/m2 LVEDV Index:     20 ml/m2 LV % FS          23.6 %  LV SYSTOLIC FUNCTION:                      Normal Ranges: EF-A4C View:    34 % (>=55%) EF-A2C View:    23 % EF-Biplane:     29 % EF-Visual:      25 % LV EF Reported: 25 %  RIGHT VENTRICLE: TAPSE: 12.2 mm RV s'  0.07 m/s  TRICUSPID VALVE/RVSP:          Normal Ranges: Peak TR Velocity:     2.78 m/s Est. RA Pressure:     3 RV Syst Pressure:     34       (< 30mmHg)  [id number] Jarvis Melvin MD Electronically signed on 8/6/2025 at 10:01:36 AM  ** Final **        Meds:  Scheduled medications  Scheduled Medications[1]  Continuous medications  Continuous Medications[2]  PRN medications  PRN Medications[3]     LOS: 33 days        Assessment/Plan   Assessment & Plan     NEUROLOGICAL  RLS  Anxiety  Depression  - Serial neuro and pain assessments   - Cont home gabapentin at 300mg TID (can be increased to 400 if needed)   - cont. Home citalopram 40mg daily  - cont. Home ropinirole 1mg TID, 3mg nightly  - PO Tylenol PRN for pain  - PRN oxycodone   - Continue working with PT. Seen ambulating today, anticipating low intensity level care at discharge     Physical Status  - Body  mass index is 24.48 kg/m².     Substance Use  - rare ETOH use, tobacco (reports quitting 2mos ago)  - nicotine in urine NEGATIVE on admit, however, increased 3-OH-Cotinin of >2000 (prior level of 668), confirming recent use  - encourage ongoing cessation    CARDIAC  Acute on Chronic Systolic and Diastolic Heart Failure  Ischemic Cardiomyopathy s/p ICD (3/2025)  S/p LVAD 8/2/25  GDMT/VAD Care:    BB: Not started   ARNI/ACEi/ARB: Entresto 24-26mg BID   MRA: Spironolactone 25mg daily   SGLT2i: Jardiance 10mg daily previously held for UTIs, would re-challenge outside of acute setting  RV support: Consider introduction pending next echo  Diuretics: responds well to 40-80 mg IV lasix, transitioned to PO on 8/14 starting with 40 mg daily for goal net slightly negative. Increased to 40 mg BID (8/15)  AC: Coumadin w/ target INR 2-3; ASA (indication: ICM)   RPM: Increased to 5000 8/12  Barriers to transplant: Nicotine   Driveline/dressing change frequency: Weekly   ABO: O  Relevant labs: LDH: 206 8/16/2025:  6:11 AM INR: 1.6 8/16/2025:  6:11 AM BNP: 735 7/14/2025:  8:03 PM     Heart Mate III interrogation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

## 2025-08-16 NOTE — CARE PLAN
Problem: Pain - Adult  Goal: Verbalizes/displays adequate comfort level or baseline comfort level  Outcome: Progressing     Problem: Safety - Adult  Goal: Free from fall injury  Outcome: Progressing     Problem: Discharge Planning  Goal: Discharge to home or other facility with appropriate resources  Outcome: Progressing     Problem: Chronic Conditions and Co-morbidities  Goal: Patient's chronic conditions and co-morbidity symptoms are monitored and maintained or improved  Outcome: Progressing     Problem: Nutrition  Goal: Nutrient intake appropriate for maintaining nutritional needs  Outcome: Progressing     Problem: Heart Failure  Goal: Improved gas exchange this shift  Outcome: Progressing  Goal: Improved urinary output this shift  Outcome: Progressing  Goal: Reduction in peripheral edema within 24 hours  Outcome: Progressing  Goal: Report improvement of dyspnea/breathlessness this shift  Outcome: Progressing  Goal: Weight from fluid excess reduced over 2-3 days, then stabilize  Outcome: Progressing  Goal: Increase self care and/or family involvement in 24 hours  Outcome: Progressing     Problem: Respiratory  Goal: Minimal/no exertional discomfort or dyspnea this shift  Outcome: Progressing  Goal: No signs of respiratory distress (eg. Use of accessory muscles. Peds grunting)  Outcome: Progressing  Goal: Patent airway maintained this shift  Outcome: Progressing  Goal: Verbalize decreased shortness of breath this shift  Outcome: Progressing  Goal: Wean oxygen to maintain O2 saturation per order/standard this shift  Outcome: Progressing  Goal: Increase self care and/or family involvement in next 24 hours  Outcome: Progressing     Problem: Diabetes  Goal: Achieve decreasing blood glucose levels by end of shift  Outcome: Progressing  Goal: Increase stability of blood glucose readings by end of shift  Outcome: Progressing  Goal: Decrease in ketones present in urine by end of shift  Outcome: Progressing  Goal:  Maintain electrolyte levels within acceptable range throughout shift  Outcome: Progressing  Goal: Maintain glucose levels >70mg/dl to <250mg/dl throughout shift  Outcome: Progressing  Goal: No changes in neurological exam by end of shift  Outcome: Progressing  Goal: Learn about and adhere to nutrition recommendations by end of shift  Outcome: Progressing  Goal: Vital signs within normal range for age by end of shift  Outcome: Progressing  Goal: Increase self care and/or family involovement by end of shift  Outcome: Progressing  Goal: Receive DSME education by end of shift  Outcome: Progressing     Problem: Ventricular Assisted Device (VAD)  Goal: Stable metal status  Outcome: Progressing  Goal: Pulmonary toileting, incentive spirometry  Outcome: Progressing  Goal: Nutrition  Outcome: Progressing  Goal: Mobility/OT/PT  Outcome: Progressing  Goal: Rubber ball  Outcome: Progressing  Goal: ROM  Outcome: Progressing  Goal: Sitting  Outcome: Progressing  Goal: Walk  Outcome: Progressing  Goal: Involve in VAD awareness, dressing change  Outcome: Progressing  Goal: AICD On/Off  Outcome: Progressing     Problem: Skin  Goal: Decreased wound size/increased tissue granulation at next dressing change  Outcome: Progressing  Goal: Participates in plan/prevention/treatment measures  Outcome: Progressing  Goal: Prevent/manage excess moisture  Outcome: Progressing  Goal: Prevent/minimize sheer/friction injuries  Outcome: Progressing  Goal: Promote/optimize nutrition  Outcome: Progressing  Goal: Promote skin healing  Outcome: Progressing   The patient's goals for the shift include maps >70    The clinical goals for the shift include remain  hds    Over the shift, the patient remained hds today; maps in the 70's and heparin gtt started; continue to monitor.

## 2025-08-16 NOTE — CARE PLAN
The clinical goals for the shift include Patient will have no complaints of SOB or chest pain during shift    Patient remained HDS throughout shift    Problem: Safety - Adult  Goal: Free from fall injury  Outcome: Progressing

## 2025-08-17 ENCOUNTER — APPOINTMENT (OUTPATIENT)
Dept: RADIOLOGY | Facility: HOSPITAL | Age: 60
DRG: 001 | End: 2025-08-17
Payer: COMMERCIAL

## 2025-08-17 VITALS
WEIGHT: 139.11 LBS | HEART RATE: 81 BPM | SYSTOLIC BLOOD PRESSURE: 81 MMHG | BODY MASS INDEX: 25.6 KG/M2 | TEMPERATURE: 98.1 F | HEIGHT: 62 IN | RESPIRATION RATE: 18 BRPM | DIASTOLIC BLOOD PRESSURE: 56 MMHG | OXYGEN SATURATION: 97 %

## 2025-08-17 LAB
ALBUMIN SERPL BCP-MCNC: 3.3 G/DL (ref 3.4–5)
ANION GAP SERPL CALC-SCNC: 12 MMOL/L (ref 10–20)
BUN SERPL-MCNC: 18 MG/DL (ref 6–23)
CALCIUM SERPL-MCNC: 8.5 MG/DL (ref 8.6–10.6)
CHLORIDE SERPL-SCNC: 96 MMOL/L (ref 98–107)
CO2 SERPL-SCNC: 31 MMOL/L (ref 21–32)
CREAT SERPL-MCNC: 0.88 MG/DL (ref 0.5–1.05)
EGFRCR SERPLBLD CKD-EPI 2021: 74 ML/MIN/1.73M*2
ERYTHROCYTE [DISTWIDTH] IN BLOOD BY AUTOMATED COUNT: 16.5 % (ref 11.5–14.5)
GLUCOSE BLD MANUAL STRIP-MCNC: 153 MG/DL (ref 74–99)
GLUCOSE BLD MANUAL STRIP-MCNC: 191 MG/DL (ref 74–99)
GLUCOSE BLD MANUAL STRIP-MCNC: 264 MG/DL (ref 74–99)
GLUCOSE BLD MANUAL STRIP-MCNC: 288 MG/DL (ref 74–99)
GLUCOSE SERPL-MCNC: 187 MG/DL (ref 74–99)
HCT VFR BLD AUTO: 27.9 % (ref 36–46)
HGB BLD-MCNC: 8.3 G/DL (ref 12–16)
INR PPP: 1.6 (ref 0.9–1.1)
LDH SERPL L TO P-CCNC: 182 U/L (ref 84–246)
MCH RBC QN AUTO: 29.9 PG (ref 26–34)
MCHC RBC AUTO-ENTMCNC: 29.7 G/DL (ref 32–36)
MCV RBC AUTO: 100 FL (ref 80–100)
NRBC BLD-RTO: 0 /100 WBCS (ref 0–0)
PHOSPHATE SERPL-MCNC: 4.6 MG/DL (ref 2.5–4.9)
PLATELET # BLD AUTO: 353 X10*3/UL (ref 150–450)
POTASSIUM SERPL-SCNC: 4.4 MMOL/L (ref 3.5–5.3)
PROTHROMBIN TIME: 17.6 SECONDS (ref 9.8–12.4)
RBC # BLD AUTO: 2.78 X10*6/UL (ref 4–5.2)
SODIUM SERPL-SCNC: 135 MMOL/L (ref 136–145)
UFH PPP CHRO-ACNC: 0.1 IU/ML (ref ?–1.1)
UFH PPP CHRO-ACNC: 0.3 IU/ML (ref ?–1.1)
UFH PPP CHRO-ACNC: 0.3 IU/ML (ref ?–1.1)
WBC # BLD AUTO: 8.6 X10*3/UL (ref 4.4–11.3)

## 2025-08-17 PROCEDURE — 97116 GAIT TRAINING THERAPY: CPT | Mod: GP,CQ

## 2025-08-17 PROCEDURE — 2500000004 HC RX 250 GENERAL PHARMACY W/ HCPCS (ALT 636 FOR OP/ED): Performed by: STUDENT IN AN ORGANIZED HEALTH CARE EDUCATION/TRAINING PROGRAM

## 2025-08-17 PROCEDURE — 94640 AIRWAY INHALATION TREATMENT: CPT

## 2025-08-17 PROCEDURE — 2500000001 HC RX 250 WO HCPCS SELF ADMINISTERED DRUGS (ALT 637 FOR MEDICARE OP): Performed by: STUDENT IN AN ORGANIZED HEALTH CARE EDUCATION/TRAINING PROGRAM

## 2025-08-17 PROCEDURE — 2500000002 HC RX 250 W HCPCS SELF ADMINISTERED DRUGS (ALT 637 FOR MEDICARE OP, ALT 636 FOR OP/ED)

## 2025-08-17 PROCEDURE — 2500000002 HC RX 250 W HCPCS SELF ADMINISTERED DRUGS (ALT 637 FOR MEDICARE OP, ALT 636 FOR OP/ED): Performed by: STUDENT IN AN ORGANIZED HEALTH CARE EDUCATION/TRAINING PROGRAM

## 2025-08-17 PROCEDURE — 2500000005 HC RX 250 GENERAL PHARMACY W/O HCPCS: Performed by: STUDENT IN AN ORGANIZED HEALTH CARE EDUCATION/TRAINING PROGRAM

## 2025-08-17 PROCEDURE — 2500000001 HC RX 250 WO HCPCS SELF ADMINISTERED DRUGS (ALT 637 FOR MEDICARE OP)

## 2025-08-17 PROCEDURE — 94669 MECHANICAL CHEST WALL OSCILL: CPT

## 2025-08-17 PROCEDURE — 2500000002 HC RX 250 W HCPCS SELF ADMINISTERED DRUGS (ALT 637 FOR MEDICARE OP, ALT 636 FOR OP/ED): Performed by: NURSE PRACTITIONER

## 2025-08-17 PROCEDURE — 83615 LACTATE (LD) (LDH) ENZYME: CPT | Performed by: REGISTERED NURSE

## 2025-08-17 PROCEDURE — 2500000004 HC RX 250 GENERAL PHARMACY W/ HCPCS (ALT 636 FOR OP/ED)

## 2025-08-17 PROCEDURE — 85027 COMPLETE CBC AUTOMATED: CPT | Performed by: REGISTERED NURSE

## 2025-08-17 PROCEDURE — 1100000001 HC PRIVATE ROOM DAILY

## 2025-08-17 PROCEDURE — 85520 HEPARIN ASSAY: CPT

## 2025-08-17 PROCEDURE — 80069 RENAL FUNCTION PANEL: CPT | Performed by: REGISTERED NURSE

## 2025-08-17 PROCEDURE — 71045 X-RAY EXAM CHEST 1 VIEW: CPT

## 2025-08-17 PROCEDURE — 99233 SBSQ HOSP IP/OBS HIGH 50: CPT

## 2025-08-17 PROCEDURE — 2500000001 HC RX 250 WO HCPCS SELF ADMINISTERED DRUGS (ALT 637 FOR MEDICARE OP): Performed by: REGISTERED NURSE

## 2025-08-17 PROCEDURE — 97530 THERAPEUTIC ACTIVITIES: CPT | Mod: GP,CQ

## 2025-08-17 PROCEDURE — 85610 PROTHROMBIN TIME: CPT | Performed by: REGISTERED NURSE

## 2025-08-17 PROCEDURE — 2500000001 HC RX 250 WO HCPCS SELF ADMINISTERED DRUGS (ALT 637 FOR MEDICARE OP): Performed by: NURSE PRACTITIONER

## 2025-08-17 PROCEDURE — 82947 ASSAY GLUCOSE BLOOD QUANT: CPT

## 2025-08-17 RX ORDER — FUROSEMIDE 10 MG/ML
20 INJECTION INTRAMUSCULAR; INTRAVENOUS ONCE
Status: COMPLETED | OUTPATIENT
Start: 2025-08-17 | End: 2025-08-17

## 2025-08-17 RX ORDER — FUROSEMIDE 10 MG/ML
40 INJECTION INTRAMUSCULAR; INTRAVENOUS ONCE
Status: DISCONTINUED | OUTPATIENT
Start: 2025-08-17 | End: 2025-08-17

## 2025-08-17 RX ADMIN — LIDOCAINE 4% 1 PATCH: 40 PATCH TOPICAL at 09:00

## 2025-08-17 RX ADMIN — GUAIFENESIN AND DEXTROMETHORPHAN HYDROBROMIDE 1 TABLET: 600; 30 TABLET, EXTENDED RELEASE ORAL at 09:05

## 2025-08-17 RX ADMIN — HEPARIN SODIUM 1000 UNITS/HR: 10000 INJECTION, SOLUTION INTRAVENOUS at 14:11

## 2025-08-17 RX ADMIN — ROPINIROLE HYDROCHLORIDE 3 MG: 3 TABLET, FILM COATED ORAL at 20:51

## 2025-08-17 RX ADMIN — ACETAMINOPHEN 650 MG: 325 TABLET, FILM COATED ORAL at 14:10

## 2025-08-17 RX ADMIN — OXYCODONE HYDROCHLORIDE 5 MG: 5 TABLET ORAL at 14:10

## 2025-08-17 RX ADMIN — CITALOPRAM HYDROBROMIDE 40 MG: 40 TABLET ORAL at 09:06

## 2025-08-17 RX ADMIN — FLUTICASONE FUROATE AND VILANTEROL TRIFENATATE 1 PUFF: 100; 25 POWDER RESPIRATORY (INHALATION) at 10:33

## 2025-08-17 RX ADMIN — FUROSEMIDE 40 MG: 40 TABLET ORAL at 09:05

## 2025-08-17 RX ADMIN — ICOSAPENT ETHYL 2 G: 1 CAPSULE ORAL at 17:41

## 2025-08-17 RX ADMIN — ASPIRIN 81 MG: 81 TABLET, CHEWABLE ORAL at 09:05

## 2025-08-17 RX ADMIN — OXYCODONE HYDROCHLORIDE 5 MG: 5 TABLET ORAL at 06:19

## 2025-08-17 RX ADMIN — IPRATROPIUM BROMIDE AND ALBUTEROL SULFATE 3 ML: .5; 3 SOLUTION RESPIRATORY (INHALATION) at 21:13

## 2025-08-17 RX ADMIN — GABAPENTIN 300 MG: 300 CAPSULE ORAL at 06:17

## 2025-08-17 RX ADMIN — LEVOTHYROXINE SODIUM 88 MCG: 0.09 TABLET ORAL at 06:17

## 2025-08-17 RX ADMIN — GABAPENTIN 300 MG: 300 CAPSULE ORAL at 14:10

## 2025-08-17 RX ADMIN — ICOSAPENT ETHYL 2 G: 1 CAPSULE ORAL at 09:04

## 2025-08-17 RX ADMIN — GABAPENTIN 300 MG: 300 CAPSULE ORAL at 20:49

## 2025-08-17 RX ADMIN — GUAIFENESIN AND DEXTROMETHORPHAN HYDROBROMIDE 1 TABLET: 600; 30 TABLET, EXTENDED RELEASE ORAL at 20:49

## 2025-08-17 RX ADMIN — ACETAMINOPHEN 650 MG: 325 TABLET, FILM COATED ORAL at 06:51

## 2025-08-17 RX ADMIN — ROPINIROLE 1 MG: 1 TABLET, FILM COATED ORAL at 20:49

## 2025-08-17 RX ADMIN — ROPINIROLE 1 MG: 1 TABLET, FILM COATED ORAL at 09:05

## 2025-08-17 RX ADMIN — INSULIN LISPRO 6 UNITS: 100 INJECTION, SOLUTION INTRAVENOUS; SUBCUTANEOUS at 17:41

## 2025-08-17 RX ADMIN — FUROSEMIDE 20 MG: 10 INJECTION, SOLUTION INTRAVENOUS at 15:28

## 2025-08-17 RX ADMIN — SPIRONOLACTONE 25 MG: 25 TABLET ORAL at 09:05

## 2025-08-17 RX ADMIN — ACETYLCYSTEINE 600 MG: 200 SOLUTION ORAL; RESPIRATORY (INHALATION) at 21:14

## 2025-08-17 RX ADMIN — INSULIN GLARGINE 25 UNITS: 100 INJECTION, SOLUTION SUBCUTANEOUS at 09:05

## 2025-08-17 RX ADMIN — ACETAMINOPHEN 650 MG: 325 TABLET, FILM COATED ORAL at 20:49

## 2025-08-17 RX ADMIN — PANTOPRAZOLE SODIUM 40 MG: 40 TABLET, DELAYED RELEASE ORAL at 06:17

## 2025-08-17 RX ADMIN — ATORVASTATIN CALCIUM 80 MG: 80 TABLET, FILM COATED ORAL at 09:05

## 2025-08-17 RX ADMIN — INSULIN LISPRO 6 UNITS: 100 INJECTION, SOLUTION INTRAVENOUS; SUBCUTANEOUS at 12:38

## 2025-08-17 RX ADMIN — SACUBITRIL AND VALSARTAN 1 TABLET: 24; 26 TABLET, FILM COATED ORAL at 20:49

## 2025-08-17 RX ADMIN — IPRATROPIUM BROMIDE AND ALBUTEROL SULFATE 3 ML: .5; 3 SOLUTION RESPIRATORY (INHALATION) at 16:58

## 2025-08-17 RX ADMIN — WARFARIN SODIUM 3 MG: 3 TABLET ORAL at 17:41

## 2025-08-17 RX ADMIN — SACUBITRIL AND VALSARTAN 1 TABLET: 24; 26 TABLET, FILM COATED ORAL at 09:05

## 2025-08-17 RX ADMIN — OXYCODONE HYDROCHLORIDE 5 MG: 5 TABLET ORAL at 20:49

## 2025-08-17 RX ADMIN — INSULIN LISPRO 2 UNITS: 100 INJECTION, SOLUTION INTRAVENOUS; SUBCUTANEOUS at 09:04

## 2025-08-17 RX ADMIN — ROPINIROLE 1 MG: 1 TABLET, FILM COATED ORAL at 14:09

## 2025-08-17 RX ADMIN — ACETYLCYSTEINE 600 MG: 200 SOLUTION ORAL; RESPIRATORY (INHALATION) at 10:33

## 2025-08-17 RX ADMIN — IPRATROPIUM BROMIDE AND ALBUTEROL SULFATE 3 ML: .5; 3 SOLUTION RESPIRATORY (INHALATION) at 10:29

## 2025-08-17 RX ADMIN — ACETYLCYSTEINE 600 MG: 200 SOLUTION ORAL; RESPIRATORY (INHALATION) at 16:58

## 2025-08-17 ASSESSMENT — PAIN DESCRIPTION - LOCATION
LOCATION: ABDOMEN
LOCATION: CHEST
LOCATION: CHEST

## 2025-08-17 ASSESSMENT — COGNITIVE AND FUNCTIONAL STATUS - GENERAL
CLIMB 3 TO 5 STEPS WITH RAILING: A LITTLE
WALKING IN HOSPITAL ROOM: A LITTLE
MOBILITY SCORE: 23
MOBILITY SCORE: 22
CLIMB 3 TO 5 STEPS WITH RAILING: A LITTLE
CLIMB 3 TO 5 STEPS WITH RAILING: A LITTLE
DAILY ACTIVITIY SCORE: 24
MOBILITY SCORE: 23
DAILY ACTIVITIY SCORE: 24

## 2025-08-17 ASSESSMENT — PAIN DESCRIPTION - DESCRIPTORS: DESCRIPTORS: ACHING

## 2025-08-17 ASSESSMENT — PAIN - FUNCTIONAL ASSESSMENT
PAIN_FUNCTIONAL_ASSESSMENT: 0-10

## 2025-08-17 ASSESSMENT — PAIN SCALES - GENERAL
PAINLEVEL_OUTOF10: 4
PAINLEVEL_OUTOF10: 6
PAINLEVEL_OUTOF10: 5 - MODERATE PAIN
PAINLEVEL_OUTOF10: 3
PAINLEVEL_OUTOF10: 6
PAINLEVEL_OUTOF10: 6

## 2025-08-17 ASSESSMENT — PAIN DESCRIPTION - ORIENTATION
ORIENTATION: LEFT
ORIENTATION: MID

## 2025-08-17 NOTE — CARE PLAN
Problem: Pain - Adult  Goal: Verbalizes/displays adequate comfort level or baseline comfort level  Outcome: Progressing     Problem: Safety - Adult  Goal: Free from fall injury  Outcome: Progressing     Problem: Discharge Planning  Goal: Discharge to home or other facility with appropriate resources  Outcome: Progressing     Problem: Chronic Conditions and Co-morbidities  Goal: Patient's chronic conditions and co-morbidity symptoms are monitored and maintained or improved  Outcome: Progressing     Problem: Nutrition  Goal: Nutrient intake appropriate for maintaining nutritional needs  Outcome: Progressing     Problem: Heart Failure  Goal: Improved gas exchange this shift  Outcome: Progressing  Goal: Improved urinary output this shift  Outcome: Progressing  Goal: Reduction in peripheral edema within 24 hours  Outcome: Progressing  Goal: Report improvement of dyspnea/breathlessness this shift  Outcome: Progressing  Goal: Weight from fluid excess reduced over 2-3 days, then stabilize  Outcome: Progressing  Goal: Increase self care and/or family involvement in 24 hours  Outcome: Progressing     Problem: Respiratory  Goal: Minimal/no exertional discomfort or dyspnea this shift  Outcome: Progressing  Goal: No signs of respiratory distress (eg. Use of accessory muscles. Peds grunting)  Outcome: Progressing  Goal: Patent airway maintained this shift  Outcome: Progressing  Goal: Verbalize decreased shortness of breath this shift  Outcome: Progressing  Goal: Wean oxygen to maintain O2 saturation per order/standard this shift  Outcome: Progressing  Goal: Increase self care and/or family involvement in next 24 hours  Outcome: Progressing     Problem: Diabetes  Goal: Achieve decreasing blood glucose levels by end of shift  Outcome: Progressing  Goal: Increase stability of blood glucose readings by end of shift  Outcome: Progressing  Goal: Decrease in ketones present in urine by end of shift  Outcome: Progressing  Goal:  Maintain electrolyte levels within acceptable range throughout shift  Outcome: Progressing  Goal: Maintain glucose levels >70mg/dl to <250mg/dl throughout shift  Outcome: Progressing  Goal: No changes in neurological exam by end of shift  Outcome: Progressing  Goal: Learn about and adhere to nutrition recommendations by end of shift  Outcome: Progressing  Goal: Vital signs within normal range for age by end of shift  Outcome: Progressing  Goal: Increase self care and/or family involovement by end of shift  Outcome: Progressing  Goal: Receive DSME education by end of shift  Outcome: Progressing     Problem: Ventricular Assisted Device (VAD)  Goal: Stable metal status  Outcome: Progressing  Goal: Pulmonary toileting, incentive spirometry  Outcome: Progressing  Goal: Nutrition  Outcome: Progressing  Goal: Mobility/OT/PT  Outcome: Progressing  Goal: Rubber ball  Outcome: Progressing  Goal: ROM  Outcome: Progressing  Goal: Sitting  Outcome: Progressing  Goal: Walk  Outcome: Progressing  Goal: Involve in VAD awareness, dressing change  Outcome: Progressing  Goal: AICD On/Off  Outcome: Progressing     Problem: Skin  Goal: Decreased wound size/increased tissue granulation at next dressing change  Outcome: Progressing  Goal: Participates in plan/prevention/treatment measures  Outcome: Progressing  Goal: Prevent/manage excess moisture  Outcome: Progressing  Goal: Prevent/minimize sheer/friction injuries  Outcome: Progressing  Goal: Promote/optimize nutrition  Outcome: Progressing  Goal: Promote skin healing  Outcome: Progressing   The patient's goals for the shift include maps >70    The clinical goals for the shift include therapeutic on heparin    Over the shift, the patient ambulated in the hallway with therapy; therapeutic on heparin; continue to monitor.

## 2025-08-17 NOTE — PROGRESS NOTES
ADVANCED HEART FAILURE LVAD PROGRESS NOTE      VAD Cardiologist: Sandrine    VAD Coordinator: Anjali Meredith, RN and Alma Delia Capone RN     Type of VAD: HM3  Implant Date: 8/1/25   Reason for VAD: ICM/HFrEF   Intent: Long-Term modifiable (nicotine)      Subjective     HPI:  Thao Evans is a 60 y.o. female with a complex cardiovascular history, including coronary artery disease status post PCI to the LAD in 2015 and Lcx in October 2024 (currently on Plavix), and Stage D systolic heart failure secondary to ischemic cardiomyopathy (ICM/HFrEF) s/p ICD placement, poorly controlled type 2 diabetes mellitus, paroxysmal atrial fibrillation status post DCCV in October 2024 (currently off anticoagulation due to absence of recurrence), hypertension, hyperlipidemia, COPD with recent tobacco cessation (2 months ago), Graves disease, restless legs syndrome, and generalized anxiety and depression.    She was directly admitted to the HF ICU for [place]-[facility] hemodynamic optimization in anticipation of LVAD placement initially scheduled for July 23, which was postponed due to identification of concerning colonic polyps on routine screening colonoscopy; pathology returned benign on July 25.    Now s/p LVAD HM3 implantation on August 1 by Dr. Inman. Postoperatively, she required initial invasive mechanical ventilation, followed by reintubation on the evening of August 3 due to mucous plugging. She is now extubated s/p bronchoscopy and transferred to [facility] 8/11.     Interval Events:   Patient seen and examined at bedside. NAEO. Continues to have non-productive cough, slowly improving. Mild to moderate chest discomfort with cough  INR: 1.6, net -400 ml     NYHA class pre-VAD implant: IV   NYHA class today (08/17/25): II    Plan:   C/w Lasix to 40 BID.  Obtain interval CXR for further evaluation  C/w Coumadin 3mg, anticipate response to increased dose in following days. C/w low dose heparin drip  C/w VAD education     Objective    Vitals:   Vitals:    08/16/25 1959 08/16/25 2335 08/17/25 0339 08/17/25 0746   BP:       Pulse:  86 80 79   Resp:  20 20 16   Temp:  36.6 °C (97.9 °F) 36 °C (96.8 °F) 36.2 °C (97.2 °F)   TempSrc:  Temporal Temporal Temporal   SpO2: 95% 96% 96% 96%   Weight:   63.1 kg (139 lb 1.8 oz)    Height:         Wt Readings from Last 5 Encounters:   08/17/25 63.1 kg (139 lb 1.8 oz)   06/20/25 57.2 kg (126 lb)   06/20/25 57.4 kg (126 lb 9.6 oz)   06/17/25 58.2 kg (128 lb 4.9 oz)   05/30/25 56.2 kg (124 lb)     Hemodynamic parameters for last 24 hours:     Intake/Output for last 24 hours:    Intake/Output Summary (Last 24 hours) at 8/17/2025 1024  Last data filed at 8/17/2025 0628  Gross per 24 hour   Intake 407.22 ml   Output 2150 ml   Net -1742.78 ml      Physical exam:  Physical Exam  Constitutional:       General: She is not in acute distress.     Appearance: Normal appearance. She is not ill-appearing, toxic-appearing or diaphoretic.   HENT:      Head: Normocephalic and atraumatic.      Mouth/Throat:      Mouth: Mucous membranes are moist.      Pharynx: Oropharynx is clear.     Eyes:      Extraocular Movements: Extraocular movements intact.      Pupils: Pupils are equal, round, and reactive to light.       Cardiovascular:      Comments: LVAD hum heard on auscultation  No BLE edema   JVP below clavicle at 30 degrees, overall euvolemic on exam     Pulmonary:      Effort: Pulmonary effort is normal. No respiratory distress.      Breath sounds: No stridor. No wheezing.      Comments: Rhonci in B bases, non productive cough   Abdominal:      General: Abdomen is flat. Bowel sounds are normal. There is no distension.      Palpations: Abdomen is soft. There is no mass.      Tenderness: There is no abdominal tenderness. There is no guarding or rebound.      Hernia: No hernia is present.     Musculoskeletal:         General: Normal range of motion.      Right lower leg: No edema.      Left lower leg: No edema.     Skin:      General: Skin is warm and dry.      Coloration: Skin is not jaundiced.      Findings: No bruising, lesion or rash.     Neurological:      General: No focal deficit present.      Mental Status: She is alert and oriented to person, place, and time. Mental status is at baseline.      Cranial Nerves: No cranial nerve deficit.      Sensory: No sensory deficit.      Motor: No weakness.     Psychiatric:         Mood and Affect: Mood normal.         Behavior: Behavior normal.        Driveline:     Labs:   CMP:  Recent Labs     08/17/25  0253 08/16/25  0611 08/15/25  0647 08/14/25  0624 08/13/25  0611 08/12/25  0634 08/11/25  0844 08/10/25  0413 08/09/25  0328 08/08/25  0506 08/07/25  0223 08/06/25  0432 08/05/25  0514 08/04/25  1537 08/04/25  0023 08/03/25  1301   * 135* 136 138 136 136 132* 136 134* 136   < > 136 135*   < > 135* 134*   K 4.4 4.0 4.4 3.7 4.2 4.5 4.2 4.3 4.1 4.2   < > 3.7 3.7   < > 4.2 4.3   CL 96* 97* 98 96* 96* 94* 94* 96* 96* 98   < > 96* 96*   < > 95* 95*   CO2 31 29 33* 32 34* 33* 29 32 30 31   < > 34* 34*   < > 29 33*   ANIONGAP 12 13 9* 14 10 14 13 12 12 11   < > 10 9*   < > 15 10   BUN 18 16 17 12 12 13 15 16 17 16   < > 29* 31*   < > 36* 31*   CREATININE 0.88 0.94 0.71 0.78 0.76 0.77 0.74 0.84 0.85 0.81   < > 0.83 0.87   < > 0.93 0.92   EGFR 74 69 >90 86 89 87 >90 79 78 82   < > 80 75   < > 70 71   MG  --   --   --   --   --   --  1.92 2.23 2.05 1.76  --  2.09 2.07  --  2.08 2.14    < > = values in this interval not displayed.     Recent Labs     08/17/25  0253 08/16/25  0611 08/15/25  0647 08/14/25  0624 08/13/25  0611 08/12/25  0634 08/11/25  0844 08/11/25  0415 08/10/25  0413 08/09/25  0328 08/08/25  0506 08/07/25  1110 08/07/25  0223 08/06/25  0432 08/05/25  0514   ALBUMIN 3.3* 3.3* 3.3* 3.3* 3.3* 3.5  3.5 3.4 3.3* 3.5 3.2* 3.3*   < > 3.3* 3.3* 3.4   ALT  --   --   --   --   --  6*  --  5* 5* 5* 4*  --  4* 3* 3*   AST  --   --   --   --   --  10  --  10 13 12 13  --  18 17 20   BILITOT   --   --   --   --   --  0.4  --  0.4 0.4 0.4 0.4  --  0.5 0.4 0.5    < > = values in this interval not displayed.     CBC:  Recent Labs     08/17/25  0253 08/16/25  0611 08/15/25  0647 08/14/25  0624 08/13/25  1147 08/11/25  0844 08/10/25  0413 08/09/25  0328   WBC 8.6 11.3 11.7* 9.0 9.4 6.0 5.5 6.6   HGB 8.3* 8.5* 8.5* 9.1* 8.8* 8.2* 8.6* 8.0*   HCT 27.9* 28.1* 28.4* 29.0* 28.0* 25.5* 26.9* 24.1*    379 409 446 424 264 299 253    100 99 98 96 92 97 91     COAG:   Recent Labs     08/17/25  0253 08/16/25  2138 08/16/25  1523 08/16/25  0611 08/15/25  0647 08/14/25  0624 08/13/25  0611 08/12/25  1302 08/12/25  0634 08/11/25  0415 08/02/25  1225 08/02/25  0145 08/01/25  1322   INR 1.6*  --   --  1.6* 1.6* 1.7* 1.7* 1.9* 1.8* 1.9*   < > 1.1 1.3*   HAUF 0.1 0.1 0.1  --   --   --   --   --   --   --   --   --   --    FIBRINOGEN  --   --   --   --   --   --   --   --   --   --   --  318 229    < > = values in this interval not displayed.     ABO:   Recent Labs     08/11/25 0415   ABO O     HEME/ENDO:   Recent Labs     07/31/25  1658 07/15/25  0354 07/14/25  2013 06/05/25  0607 06/02/25  1346 03/13/25  0531   FERRITIN  --   --  224*  --  232*  --    IRONSAT  --   --  24*  --  17*  --    TSH  --   --   --  0.19* 0.20*  --    HGBA1C 7.5* 8.0*  --   --  8.6* 12.3*     CARDIAC:   Recent Labs     08/17/25  0253 08/16/25  0611 08/15/25  0647 08/14/25  0624 08/13/25  0611 08/12/25  0634 08/11/25  0415 08/10/25  0413 07/23/25  1855 07/14/25 2003 06/12/25  1728 06/09/25  1613 06/03/25  1743 06/02/25  1346    206 250* 210 208 235 224 254*   < >  --   --   --    < >  --    TROPHS  --   --   --   --   --   --   --   --   --  18 15  --   --  22   BNP  --   --   --   --   --   --   --   --   --  735*  --  1,456*  --  1,292*    < > = values in this interval not displayed.     Recent Labs     08/04/25  0426 07/14/25 2003   CHOL  --  141   LDLCALC  --  78   HDL  --  43.3   TRIG 115 99     TOX:  Recent Labs      "06/03/25  1743   AMPHETAMINE Negative     MICRO: No results for input(s): \"ESR\", \"CRP\", \"PROCAL\" in the last 50013 hours.  Susceptibility data from last 90 days.  Collected Specimen Info Organism Amoxicillin/Clavulanate Ampicillin Ampicillin/Sulbactam Cefazolin Cefazolin (uncomplicated UTIs only) Ciprofloxacin Gentamicin Levofloxacin Nitrofurantoin Piperacillin/Tazobactam   08/06/25 Fluid from Lung Candida (Nakaseomyces) glabrata             08/06/25 Fluid from Bronchial Washings Mixed Microorganisms Resembling Upper Respiratory Remi              08/05/25 Fluid from SPUTUM Normal throat remi             08/03/25 Fluid from BAL Mixed Microorganisms Resembling Upper Respiratory Remi              06/07/25 Urine from Clean Catch/Voided Escherichia coli  S  R  I  S  S  S  S  S  S  S   06/03/25 Urine from Clean Catch/Voided Escherichia coli  S  R  I  S  S  S  S  S  S  S     Collected Specimen Info Organism Trimethoprim/Sulfamethoxazole   08/06/25 Fluid from Lung Candida (Nakaseomyces) glabrata    08/06/25 Fluid from Bronchial Washings Mixed Microorganisms Resembling Upper Respiratory Remi     08/05/25 Fluid from SPUTUM Normal throat remi    08/03/25 Fluid from BAL Mixed Microorganisms Resembling Upper Respiratory Remi     06/07/25 Urine from Clean Catch/Voided Escherichia coli  S   06/03/25 Urine from Clean Catch/Voided Escherichia coli  S         Imaging:   Imaging  No results found.      Cardiology, Vascular, and Other Imaging  No other imaging results found for the past 2 days         Notable Studies:   EKG:  Encounter Date: 07/14/25   ECG 12 lead   Result Value    Ventricular Rate 105    Atrial Rate 105    NV Interval 124    QRS Duration 156    QT Interval 454    QTC Calculation(Bazett) 600    P Axis 5    R Axis -42    T Axis 45    QRS Count 18    Q Onset 205    P Onset 153    P Offset 173    T Offset 432    QTC Fredericia 547    Narrative    Sinus tachycardia  Left axis deviation  Nonspecific intraventricular " block  Possible Lateral infarct (cited on or before 12-JUN-2025)  Abnormal ECG  When compared with ECG of 01-AUG-2025 14:08,  Questionable change in QRS duration  Questionable change in initial forces of Anterolateral leads  Confirmed by Viktor Nelson ([id number]) on 8/10/2025 11:40:02 PM       Echo:  Transthoracic Echo (TTE) Limited  Result Date: 8/13/2025   [facility], [address] [place] [address]                Tel [phone] and Fax [phone] TRANSTHORACIC ECHOCARDIOGRAM REPORT  Patient Name:       ANETA Khan Physician:    [id number] Tony Kelly MD Study Date:         8/13/2025           Ordering Provider:    [id number] BRIAN SCHWARZ MRN/PID:            [id number]            Fellow:               [id number] Justina Lao MD Accession#:         [id number]        Nurse: Date of Birth/Age:  1965 / 60      Sonographer:          Lorrie donaldson                                     [facility] Gender assigned at  F                   Additional Staff: Birth: Height:             157.48 cm           Admit Date: Weight:             59.87 kg            Admission Status:     Inpatient -                                                               Routine BSA / BMI:          1.60 m2 / 24.14     Encounter#:           [id number]                     kg/m2 Blood Pressure:     130/83 mmHg         Department Location:  [place] [address] Study Type:    TRANSTHORACIC ECHO (TTE) LIMITED Diagnosis/ICD: Unspecified systolic (congestive) heart failure (CHF)-I50.20 Indication:    LVAD w/ speed adjustment yesterday CPT Code:      Echo Limited-28888; Doppler Limited-26740; Color Doppler-87210 Patient History: Pertinent  History: CAD s/p PCI (LAD; 2015, Lcx; 3/2025), stage C systolic                    HF/ICM/HFrEF s/p ICD, h/o VT with presumed associated                    syncope, poorly controlled DM, pAF (off of DOAC given the                    absence of recurrence), dyslipidemia, essential HTN,                    hypothyroidism, recurrent pleural effusion likely 2/2 HF,                    restriction by PFTs who presented to [facility] HF ICU for                    [place]-guided hemodynamic optimization and LVAD placement. s/p                    LVAD Hm3 8/1/25. Study Detail: The following Echo studies were performed: 2D, M-Mode, Doppler and               color flow. Technically challenging study due to prominent lung               artifact and body habitus. Definity used as a contrast agent for               endocardial border definition. Total contrast used for this               procedure was 2 mL via IV push.  PHYSICIAN INTERPRETATION: Left Ventricle: Left ventricular ejection fraction is severely decreased by visual estimate at 25%. There is global hypokinesis of the left ventricle with minor regional variations. The left ventricular cavity size is normal. There is normal septal and normal posterior left ventricular wall thickness. Abnormal (paradoxical) septal motion consistent with post-operative status. Left ventricular diastolic filling cannot be determined due to left ventricular assist device. There is no definite left ventricular thrombus visualized. The apical inferior wall is akinetic and appears aneurysmal (clip 81, 100). Left Atrium: The left atrial size is normal. Right Ventricle: The right ventricle is normal in size. There is reduced right ventricular systolic function. A device is visualized in the right ventricle. Right Atrium: The right atrium is normal in size. There is a device visualized in the right atrium. Aortic Valve: The aortic valve is trileaflet. There is no evidence of aortic valve regurgitation.  Mitral Valve: The mitral valve is mildly thickened. There is mild to moderate mitral annular calcification. There is mild mitral valve regurgitation. Tricuspid Valve: The tricuspid valve is structurally normal. There is moderate tricuspid regurgitation. The Doppler estimated right ventricular systolic pressure (RVSP) is mildly elevated at 39 mmHg. Pulmonic Valve: The pulmonic valve is structurally normal. There is trace pulmonic valve regurgitation. Pericardium: Small pericardial effusion localized near the right ventricle. The pericardial effusion appears to contain fibrinous material. Aorta: The aortic root is normal. The aortic root appears normal in size and measures 2.90 cm. The Asc Ao is 2.60 cm. There is no dilatation of the ascending aorta. Systemic Veins: The inferior vena cava appears normal in size, with IVC inspiratory collapse greater than 50%. In comparison to the previous echocardiogram(s): Compared with study dated 8/6/2025, findings are overall similar.  LEFT VENTRICULAR ASSIST DEVICE: LVAD: The patient has a(n) HeartMate 3 LVAD device present. Inflow Cannula: The inflow cannula is visualized in the left ventricular apex. Outflow Cannula: Visualization of the outflow cannula is technically difficult. LVAD Comments: The aortic valve partially opens every 1-2 beats.  CONCLUSIONS:  1. Left ventricular ejection fraction is severely decreased by visual estimate at 25%.  2. There is global hypokinesis of the left ventricle with minor regional variations.  3. Abnormal septal motion consistent with post-operative status.  4. No left ventricular thrombus visualized.  5. The apical inferior wall is akinetic and appears aneurysmal (clip 81, 100).  6. There is reduced right ventricular systolic function.  7. Small pericardial effusion.  8. Moderate tricuspid regurgitation visualized.  9. The Doppler estimated RVSP is mildly elevated at 39 mmHg. 10. Normal aortic root. 11. Compared with study dated 8/6/2025,  findings are overall similar. QUANTITATIVE DATA SUMMARY:  2D MEASUREMENTS:          Normal Ranges: Ao Root d:       2.90 cm  (2.0-3.7cm) IVSd:            0.91 cm  (0.6-1.1cm) LVPWd:           0.72 cm  (0.6-1.1cm) LVIDd:           4.17 cm  (3.9-5.9cm) LVIDs:           3.41 cm LV Mass Index:   64 g/m2 LVEDV Index:     54 ml/m2 LV % FS          18.2 %  LEFT ATRIUM:                  Normal Ranges: LA Vol A4C:        40.6 ml    (22+/-6mL/m2) LA Vol A2C:        49.8 ml LA Vol BP:         48.2 ml LA Vol Index A4C:  25.4ml/m2 LA Vol Index A2C:  31.1 ml/m2 LA Vol Index BP:   30.1 ml/m2 LA Area A4C:       14.0 cm2 LA Area A2C:       16.6 cm2 LA Major Axis A4C: 4.1 cm LA Major Axis A2C: 4.7 cm LA Volume Index:   30.1 ml/m2  RIGHT ATRIUM:                 Normal Ranges: RA Vol A4C:        28.9 ml    (8.3-19.5ml) RA Vol Index A4C:  18.1 ml/m2 RA Area A4C:       12.1 cm2 RA Major Axis A4C: 4.3 cm  AORTA MEASUREMENTS:         Normal Ranges: Asc Ao, d:          2.60 cm (2.1-3.4cm)  LV SYSTOLIC FUNCTION:                      Normal Ranges: EF-Visual:      25 % LV EF Reported: 25 %  AORTIC VALVE:          Normal Ranges: LVOT Diameter: 1.88 cm (1.8-2.4cm)  RIGHT VENTRICLE: RV Basal 3.50 cm RV Mid   2.90 cm RV Major 6.8 cm TAPSE:   14.0 mm RV s'    0.08 m/s  TRICUSPID VALVE/RVSP:          Normal Ranges: Peak TR Velocity:     2.99 m/s Est. RA Pressure:     3 RV Syst Pressure:     39       (< 30mmHg) IVC Diam:             1.87 cm  PULMONIC VALVE:         Normal Ranges: PV Accel Time:  84 msec (>120ms)  AORTA: Asc Ao Diam 2.60 cm  [id number] Tony Kelly MD Electronically signed on 8/13/2025 at 3:14:58 PM  ** Final **     Transthoracic Echo (TTE) Limited  Result Date: 8/6/2025   [facility], [address] [place] [address]                Tel [phone] and Fax [phone] TRANSTHORACIC ECHOCARDIOGRAM REPORT  Patient Name:       ANETA Khan Physician:    [id number] Jarvis                                                                Olegario CRISTOBAL Study Date:         8/6/2025            Ordering Provider:    [id number] ELADIA GARCIA MRN/PID:            [id number]            Fellow: Accession#:         [id number]        Nurse: Date of Birth/Age:  1965 / 60      Sonographer:          Cristino donaldson RDCS Gender assigned at  F                   Additional Staff: Birth: Height:             157.48 cm           Admit Date: Weight:             68.95 kg            Admission Status:     Inpatient -                                                               Routine BSA / BMI:          1.70 m2 / 27.80     Encounter#:           [id number]                     kg/m2 Blood Pressure:     104/80 mmHg         Department Location:  [address] Study Type:    TRANSTHORACIC ECHO (TTE) LIMITED Diagnosis/ICD: Acute respiratory failure with hypoxia-J96.01 Indication:    s/p LVAD for RV/ LV CPT Code:      Echo Limited-88819; Doppler Limited-82831; Color Doppler-68946 Patient History: Pertinent History: CAD s/p PCI (LAD; 2015, Lcx; 3/2025), stage C systolic                    HF/ICM/HFrEF s/p ICD, h/o VT with presumed associated                    syncope, poorly controlled DM, pAF (off of DOAC given the                    absence of recurrence), dyslipidemia, essential HTN,                    hypothyroidism, recurrent pleural effusion likely 2/2 HF,                    restriction by PFTs who presented to [facility] HF ICU for                    [place]-guided hemodynamic optimization and LVAD placement. s/p                    LVAD Hm3 8/1/25. Study Detail: The following Echo studies were performed: 2D, M-Mode, Doppler and               color flow. Technically challenging study due to patient lying in               supine position, body habitus, postoperative dressings, poor               acoustic windows and prominent lung artifact. Optison used as a               contrast agent for  endocardial border definition. Total contrast               used for this procedure was 2 mL via IV push.  PHYSICIAN INTERPRETATION: Left Ventricle: Left ventricular ejection fraction is severely decreased by visual estimate at 25%. There is global hypokinesis of the left ventricle with minor regional variations. The left ventricular cavity size is mildly dilated. There is normal septal and normal posterior left ventricular wall thickness. Left ventricular diastolic filling was not assessed. Left Atrium: The left atrial size is normal. Right Ventricle: The right ventricle is mildly enlarged. There is moderately reduced right ventricular systolic function. A device is visualized in the right ventricle. Right Atrium: The right atrium is mildly dilated. There is a device visualized in the right atrium. Aortic Valve: The aortic valve was not well visualized. There is no evidence of aortic valve regurgitation. Mitral Valve: The mitral valve is minimally calcified. There is mild mitral annular calcification. There is mild to moderate mitral valve regurgitation which is posteriorly directed. Tricuspid Valve: The tricuspid valve is structurally normal. There is moderate tricuspid regurgitation. Pulmonic Valve: The pulmonic valve is not well visualized. There is trace pulmonic valve regurgitation. Pericardium: Trivial pericardial effusion. Aorta: The aortic root is normal. Pulmonary Artery: The tricuspid regurgitant velocity is 2.78 m/s, and with an estimated right atrial pressure of 3, the estimated pulmonary artery pressure is borderline elevated with the RVSP at 34 mmHg. Systemic Veins: The inferior vena cava was not well visualized, IVC inspiratory collapse is not well visualized.  LEFT VENTRICULAR ASSIST DEVICE: LVAD: The patient has a(n) HeartMate 3 LVAD device present. Inflow Cannula: The inflow cannula is visualized in the left ventricular apex. Outflow Cannula: Visualization of the outflow cannula is technically  difficult. AV Leaflet Mobility: There is periodic opening of the aortic valve leaflets.  CONCLUSIONS:  1. Left ventricular cavity size is mildly dilated.  2. Left ventricular ejection fraction is severely decreased by visual estimate at 25%.  3. There is global hypokinesis of the left ventricle with minor regional variations.  4. Mildly enlarged right ventricle.  5. There is moderately reduced right ventricular systolic function.  6. Mild to moderate mitral valve regurgitation.  7. Moderate tricuspid regurgitation visualized. QUANTITATIVE DATA SUMMARY:  2D MEASUREMENTS:          Normal Ranges: IVSd:            0.80 cm  (0.6-1.1cm) LVPWd:           0.70 cm  (0.6-1.1cm) LVIDd:           5.50 cm  (3.9-5.9cm) LVIDs:           4.20 cm LV Mass Index:   87 g/m2 LVEDV Index:     20 ml/m2 LV % FS          23.6 %  LV SYSTOLIC FUNCTION:                      Normal Ranges: EF-A4C View:    34 % (>=55%) EF-A2C View:    23 % EF-Biplane:     29 % EF-Visual:      25 % LV EF Reported: 25 %  RIGHT VENTRICLE: TAPSE: 12.2 mm RV s'  0.07 m/s  TRICUSPID VALVE/RVSP:          Normal Ranges: Peak TR Velocity:     2.78 m/s Est. RA Pressure:     3 RV Syst Pressure:     34       (< 30mmHg)  [id number] Jarvis Melvin MD Electronically signed on 8/6/2025 at 10:01:36 AM  ** Final **        Meds:  Scheduled medications  Scheduled Medications[1]  Continuous medications  Continuous Medications[2]  PRN medications  PRN Medications[3]     LOS: 34 days        Assessment/Plan   Assessment & Plan     NEUROLOGICAL  RLS  Anxiety  Depression  - Serial neuro and pain assessments   - Cont home gabapentin at 300mg TID (can be increased to 400 if needed)   - cont. Home citalopram 40mg daily  - cont. Home ropinirole 1mg TID, 3mg nightly  - PO Tylenol PRN for pain  - PRN oxycodone   - Continue working with PT. Seen ambulating today, anticipating low intensity level care at discharge     Physical Status  - Body mass index is 25.44 kg/m².     Substance Use  - rare ETOH  use, tobacco (reports quitting 2mos ago)  - nicotine in urine NEGATIVE on admit, however, increased 3-OH-Cotinin of >2000 (prior level of 668), confirming recent use  - encourage ongoing cessation    CARDIAC  Acute on Chronic Systolic and Diastolic Heart Failure  Ischemic Cardiomyopathy s/p ICD (3/2025)  S/p LVAD 8/2/25  GDMT/VAD Care:    BB: Not started   ARNI/ACEi/ARB: Entresto 24-26mg BID   MRA: Spironolactone 25mg daily   SGLT2i: Jardiance 10mg daily previously held for UTIs, would re-challenge outside of acute setting  RV support: Consider introduction pending next echo  Diuretics: responds well to 40-80 mg IV lasix, transitioned to PO on 8/14 starting with 40 mg daily for goal net slightly negative. Increased to 40 mg BID (8/15)  AC: Coumadin w/ target INR 2-3; ASA (indication: ICM)   RPM: Increased to 5000 8/12  Barriers to transplant: Nicotine   Driveline/dressing change frequency: Weekly   ABO: O  Relevant labs: LDH: 182 8/17/2025:  2:53 AM INR: 1.6 8/17/2025:  2:53 AM BNP: 735 7/14/2025:  8:03 PM     Heart Mate III interrogation   Most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

## 2025-08-17 NOTE — CARE PLAN
The patient's goals for the shift include maps >70    The clinical goals for the shift include To be therapeutic on heparin during shift    Patients Maps were greater than 70 during shift. Patient remained HDS. Patient not therapeutic on heparin gtt. Next draw is at 8:30    Problem: Pain - Adult  Goal: Verbalizes/displays adequate comfort level or baseline comfort level  Outcome: Progressing     Problem: Ventricular Assisted Device (VAD)  Goal: ROM  Outcome: Progressing  Goal: Sitting  Outcome: Progressing  Goal: Walk  Outcome: Progressing

## 2025-08-17 NOTE — PROGRESS NOTES
Physical Therapy    Physical Therapy Treatment    Patient Name: Thao Evans  MRN: [id number]  Department: [place] [address]  Room: [address] [place] [address]  Today's Date: 8/17/2025  Time Calculation  Start Time: 1535  Stop Time: 1602  Time Calculation (min): 27 min         Assessment/Plan   PT Assessment  End of Session Communication: Bedside nurse  Assessment Comment: pt demonstrates progerss towards goals requiring decreased assist for transfers. pt demonstrates safe technique during 2nd trial of stair negotiation with no LOB noted  End of Session Patient Position:  (sitting EOB)  PT Plan  Inpatient/Swing Bed or Outpatient: Inpatient  PT Plan  Treatment/Interventions: Bed mobility, Transfer training, Gait training, Stair training, Balance training, Strengthening, Endurance training, Range of motion, Therapeutic exercise, Therapeutic activity, Home exercise program, Positioning, Postural re-education  PT Plan: Ongoing PT  PT Frequency for Current Admission: 6 times per week during this acute inpatient hospitalization  PT Discharge Recommendations: Low intensity level of continued care  Equipment Recommended upon Discharge: Wheeled walker  PT Recommended Transfer Status: Assist x1  PT - OK to Discharge: Yes    PT Visit Info:  PT Received On: 08/17/25     General Visit Information:   General  Prior to Session Communication: Bedside nurse  Patient Position Received: Bed, 2 rail up, Alarm off, not on at start of session  General Comment: Pt supine in bed, pleasant and agreeable to therapy    Subjective   Precautions:  Precautions  Medical Precautions: Cardiac precautions, Fall precautions  Post-Surgical Precautions: Move in the Tube     Date/Time Vitals Session Patient Position Pulse Resp SpO2 BP MAP (mmHg)    08/17/25 1545 --  --  76  18  94 %  --  --     08/17/25 1700 --  --  --  --  93 %  --  --      Vital Signs Comment: LVAD #'s: pre/post tx, flow=4.8/4.7 speed=5000/5000 power=3.1/3.4 PI= 2.8/2.8     Objective   Pain:      Cognition:  Cognition  Orientation Level: Oriented X4    Treatments:  Therapeutic Exercise  Therapeutic Exercise Activity 1: sitting LAQ and hip flexion x20 reps AROM    Bed Mobility 1  Bed Mobility 1: Supine to sitting, Sitting to supine  Level of Assistance 1: Modified independent    Ambulation/Gait Training 1  Surface 1: Level tile  Device 1: No device  Assistance 1: Close supervision  Quality of Gait 1: Wide base of support, Soft knee(s)  Comments/Distance (ft) 1: 330, cues for posture  Transfer 1  Technique 1: Sit to stand, Stand to sit  Transfer Level of Assistance 1: Modified independent    Stairs  Stairs: Yes  Stairs  Rails 1: Right  Curb Step 1: No  Device 1: Railing  Assistance 1: Close supervision  Comment/Number of Steps 1: 2x3, 1 LOB requiring min assist to correct secodnary to misstepping    Outcome Measures:  [facility] Basic Mobility  Turning from your back to your side while in a flat bed without using bedrails: None  Moving from lying on your back to sitting on the side of a flat bed without using bedrails: None  Moving to and from bed to chair (including a wheelchair): None  Standing up from a chair using your arms (e.g. wheelchair or bedside chair): None  To walk in hospital room: A little  Climbing 3-5 steps with railing: A little  Basic Mobility - Total Score: 22    Education Documentation  Mobility Training, taught by Mark Desai PTA at 8/17/2025  5:01 PM.  Learner: Patient  Readiness: Acceptance  Method: Explanation  Response: Verbalizes Understanding  Comment: stairs    Education Comments  No comments found.        OP EDUCATION:       Encounter Problems       Encounter Problems (Active)       Balance       patient will score >24/28 on the Tinetti scale to present as a low risk of falls (Progressing)       Start:  07/15/25    Expected End:  08/16/25               Mobility       Patient will complete the 5xSTS  in <15 sec with no assistive device, no UE support, and close supervision (RPE:  11/20 RPD: 3/10) (Progressing)       Start:  07/15/25    Expected End:  08/16/25            Patient will ambulate >1000ft on the 6MWT with no assistive device and close superivision (RPE: 11/20 RPD: 3/10) (Progressing)       Start:  07/15/25    Expected End:  08/16/25               Mobility       Pt will ambulate >300ft with appropriate form, SBA or less, LRAD, and no LOB for safe DC.  (Progressing)       Start:  08/02/25    Expected End:  08/16/25            Pt will ambulate up/down >3 stairs with/without hand rail with CGA or less to safely access their home upon DC.   (Progressing)       Start:  08/02/25    Expected End:  08/16/25               PT Transfers       Pt will perform bed mobility and sit<>stand transfers with SBA and use of LRAD to safely DC.  (Progressing)       Start:  08/02/25    Expected End:  08/16/25

## 2025-08-18 ENCOUNTER — DOCUMENTATION (OUTPATIENT)
Dept: HOME HEALTH SERVICES | Facility: HOME HEALTH | Age: 60
End: 2025-08-18
Payer: COMMERCIAL

## 2025-08-18 ENCOUNTER — DOCUMENTATION (OUTPATIENT)
Dept: TRANSPLANT | Facility: HOSPITAL | Age: 60
End: 2025-08-18
Payer: COMMERCIAL

## 2025-08-18 ENCOUNTER — DOCUMENTATION (OUTPATIENT)
Dept: TRANSPLANT | Facility: HOSPITAL | Age: 60
End: 2025-08-18

## 2025-08-18 ENCOUNTER — PHARMACY VISIT (OUTPATIENT)
Dept: PHARMACY | Facility: CLINIC | Age: 60
End: 2025-08-18
Payer: COMMERCIAL

## 2025-08-18 ENCOUNTER — APPOINTMENT (OUTPATIENT)
Dept: RADIOLOGY | Facility: HOSPITAL | Age: 60
DRG: 001 | End: 2025-08-18
Payer: COMMERCIAL

## 2025-08-18 VITALS
TEMPERATURE: 97.5 F | BODY MASS INDEX: 26.25 KG/M2 | HEIGHT: 62 IN | WEIGHT: 142.64 LBS | RESPIRATION RATE: 16 BRPM | DIASTOLIC BLOOD PRESSURE: 56 MMHG | SYSTOLIC BLOOD PRESSURE: 81 MMHG | HEART RATE: 82 BPM | OXYGEN SATURATION: 93 %

## 2025-08-18 DIAGNOSIS — I50.20 ACC/AHA STAGE D SYSTOLIC HEART FAILURE: ICD-10-CM

## 2025-08-18 DIAGNOSIS — T82.9XXA COMPLICATION INVOLVING LEFT VENTRICULAR ASSIST DEVICE (LVAD), INITIAL ENCOUNTER: ICD-10-CM

## 2025-08-18 DIAGNOSIS — R57.0 CARDIOGENIC SHOCK (MULTI): ICD-10-CM

## 2025-08-18 DIAGNOSIS — I25.5 ISCHEMIC CARDIOMYOPATHY: ICD-10-CM

## 2025-08-18 DIAGNOSIS — Z79.01 LONG TERM (CURRENT) USE OF ANTICOAGULANTS: ICD-10-CM

## 2025-08-18 DIAGNOSIS — I50.20 HFREF (HEART FAILURE WITH REDUCED EJECTION FRACTION): ICD-10-CM

## 2025-08-18 PROBLEM — N39.0 UTI (URINARY TRACT INFECTION): Status: RESOLVED | Noted: 2025-06-17 | Resolved: 2025-08-18

## 2025-08-18 PROBLEM — T38.0X5A STEROID-INDUCED HYPERGLYCEMIA: Status: RESOLVED | Noted: 2025-03-24 | Resolved: 2025-08-18

## 2025-08-18 PROBLEM — R73.9 STEROID-INDUCED HYPERGLYCEMIA: Status: RESOLVED | Noted: 2025-03-24 | Resolved: 2025-08-18

## 2025-08-18 PROBLEM — I21.3 STEMI (ST ELEVATION MYOCARDIAL INFARCTION) (MULTI): Status: RESOLVED | Noted: 2024-10-08 | Resolved: 2025-08-18

## 2025-08-18 LAB
ALBUMIN SERPL BCP-MCNC: 3.3 G/DL (ref 3.4–5)
ANION GAP SERPL CALC-SCNC: 13 MMOL/L (ref 10–20)
BUN SERPL-MCNC: 20 MG/DL (ref 6–23)
CALCIUM SERPL-MCNC: 8.6 MG/DL (ref 8.6–10.6)
CHLORIDE SERPL-SCNC: 97 MMOL/L (ref 98–107)
CO2 SERPL-SCNC: 29 MMOL/L (ref 21–32)
CREAT SERPL-MCNC: 0.89 MG/DL (ref 0.5–1.05)
EGFRCR SERPLBLD CKD-EPI 2021: 73 ML/MIN/1.73M*2
ERYTHROCYTE [DISTWIDTH] IN BLOOD BY AUTOMATED COUNT: 16.4 % (ref 11.5–14.5)
GLUCOSE BLD MANUAL STRIP-MCNC: 266 MG/DL (ref 74–99)
GLUCOSE SERPL-MCNC: 211 MG/DL (ref 74–99)
HCT VFR BLD AUTO: 26.9 % (ref 36–46)
HGB BLD-MCNC: 8.2 G/DL (ref 12–16)
INR PPP: 2 (ref 0.9–1.1)
LDH SERPL L TO P-CCNC: 195 U/L (ref 84–246)
MCH RBC QN AUTO: 30.6 PG (ref 26–34)
MCHC RBC AUTO-ENTMCNC: 30.5 G/DL (ref 32–36)
MCV RBC AUTO: 100 FL (ref 80–100)
NRBC BLD-RTO: 0 /100 WBCS (ref 0–0)
PHOSPHATE SERPL-MCNC: 4.1 MG/DL (ref 2.5–4.9)
PLATELET # BLD AUTO: 297 X10*3/UL (ref 150–450)
POTASSIUM SERPL-SCNC: 4.7 MMOL/L (ref 3.5–5.3)
PROTHROMBIN TIME: 22.1 SECONDS (ref 9.8–12.4)
RBC # BLD AUTO: 2.68 X10*6/UL (ref 4–5.2)
SODIUM SERPL-SCNC: 134 MMOL/L (ref 136–145)
UFH PPP CHRO-ACNC: 0.2 IU/ML (ref ?–1.1)
WBC # BLD AUTO: 7.4 X10*3/UL (ref 4.4–11.3)

## 2025-08-18 PROCEDURE — 83615 LACTATE (LD) (LDH) ENZYME: CPT | Performed by: REGISTERED NURSE

## 2025-08-18 PROCEDURE — 2500000001 HC RX 250 WO HCPCS SELF ADMINISTERED DRUGS (ALT 637 FOR MEDICARE OP): Performed by: STUDENT IN AN ORGANIZED HEALTH CARE EDUCATION/TRAINING PROGRAM

## 2025-08-18 PROCEDURE — 2500000002 HC RX 250 W HCPCS SELF ADMINISTERED DRUGS (ALT 637 FOR MEDICARE OP, ALT 636 FOR OP/ED): Performed by: STUDENT IN AN ORGANIZED HEALTH CARE EDUCATION/TRAINING PROGRAM

## 2025-08-18 PROCEDURE — RXMED WILLOW AMBULATORY MEDICATION CHARGE

## 2025-08-18 PROCEDURE — 2500000005 HC RX 250 GENERAL PHARMACY W/O HCPCS: Performed by: STUDENT IN AN ORGANIZED HEALTH CARE EDUCATION/TRAINING PROGRAM

## 2025-08-18 PROCEDURE — 36415 COLL VENOUS BLD VENIPUNCTURE: CPT

## 2025-08-18 PROCEDURE — 71045 X-RAY EXAM CHEST 1 VIEW: CPT

## 2025-08-18 PROCEDURE — 94669 MECHANICAL CHEST WALL OSCILL: CPT

## 2025-08-18 PROCEDURE — 2500000001 HC RX 250 WO HCPCS SELF ADMINISTERED DRUGS (ALT 637 FOR MEDICARE OP)

## 2025-08-18 PROCEDURE — 80069 RENAL FUNCTION PANEL: CPT | Performed by: REGISTERED NURSE

## 2025-08-18 PROCEDURE — 85520 HEPARIN ASSAY: CPT

## 2025-08-18 PROCEDURE — 85610 PROTHROMBIN TIME: CPT | Performed by: REGISTERED NURSE

## 2025-08-18 PROCEDURE — 36415 COLL VENOUS BLD VENIPUNCTURE: CPT | Performed by: REGISTERED NURSE

## 2025-08-18 PROCEDURE — 71045 X-RAY EXAM CHEST 1 VIEW: CPT | Performed by: RADIOLOGY

## 2025-08-18 PROCEDURE — 93750 INTERROGATION VAD IN PERSON: CPT | Performed by: REGISTERED NURSE

## 2025-08-18 PROCEDURE — 2500000001 HC RX 250 WO HCPCS SELF ADMINISTERED DRUGS (ALT 637 FOR MEDICARE OP): Performed by: NURSE PRACTITIONER

## 2025-08-18 PROCEDURE — 2500000002 HC RX 250 W HCPCS SELF ADMINISTERED DRUGS (ALT 637 FOR MEDICARE OP, ALT 636 FOR OP/ED): Performed by: NURSE PRACTITIONER

## 2025-08-18 PROCEDURE — 2500000004 HC RX 250 GENERAL PHARMACY W/ HCPCS (ALT 636 FOR OP/ED): Performed by: STUDENT IN AN ORGANIZED HEALTH CARE EDUCATION/TRAINING PROGRAM

## 2025-08-18 PROCEDURE — 94640 AIRWAY INHALATION TREATMENT: CPT

## 2025-08-18 PROCEDURE — 82947 ASSAY GLUCOSE BLOOD QUANT: CPT

## 2025-08-18 PROCEDURE — 2500000001 HC RX 250 WO HCPCS SELF ADMINISTERED DRUGS (ALT 637 FOR MEDICARE OP): Performed by: REGISTERED NURSE

## 2025-08-18 PROCEDURE — 85027 COMPLETE CBC AUTOMATED: CPT | Performed by: REGISTERED NURSE

## 2025-08-18 PROCEDURE — 99239 HOSP IP/OBS DSCHRG MGMT >30: CPT | Performed by: REGISTERED NURSE

## 2025-08-18 RX ORDER — INSULIN GLARGINE-YFGN 100 [IU]/ML
20 INJECTION, SOLUTION SUBCUTANEOUS NIGHTLY
Qty: 15 ML | Refills: 12 | Status: SHIPPED | OUTPATIENT
Start: 2025-08-18

## 2025-08-18 RX ORDER — OXYCODONE HYDROCHLORIDE 5 MG/1
5 TABLET ORAL EVERY 4 HOURS PRN
Qty: 10 TABLET | Refills: 0 | Status: SHIPPED | OUTPATIENT
Start: 2025-08-18 | End: 2025-08-21

## 2025-08-18 RX ORDER — PANTOPRAZOLE SODIUM 40 MG/1
40 TABLET, DELAYED RELEASE ORAL
Qty: 30 TABLET | Refills: 11 | Status: SHIPPED | OUTPATIENT
Start: 2025-08-19 | End: 2026-08-19

## 2025-08-18 RX ORDER — FUROSEMIDE 40 MG/1
40 TABLET ORAL
Status: DISCONTINUED | OUTPATIENT
Start: 2025-08-18 | End: 2025-08-18 | Stop reason: HOSPADM

## 2025-08-18 RX ORDER — SILDENAFIL CITRATE 20 MG/1
20 TABLET ORAL 3 TIMES DAILY
Qty: 90 TABLET | Refills: 11 | Status: SHIPPED | OUTPATIENT
Start: 2025-08-18 | End: 2026-08-18

## 2025-08-18 RX ORDER — SPIRONOLACTONE 25 MG/1
25 TABLET ORAL DAILY
Qty: 30 TABLET | Refills: 1 | Status: SHIPPED | OUTPATIENT
Start: 2025-08-18

## 2025-08-18 RX ORDER — SILDENAFIL CITRATE 20 MG/1
20 TABLET ORAL 3 TIMES DAILY
Status: DISCONTINUED | OUTPATIENT
Start: 2025-08-18 | End: 2025-08-18 | Stop reason: HOSPADM

## 2025-08-18 RX ORDER — NAPROXEN SODIUM 220 MG/1
81 TABLET, FILM COATED ORAL DAILY
Qty: 30 TABLET | Refills: 0 | Status: SHIPPED | OUTPATIENT
Start: 2025-08-18 | End: 2025-09-17

## 2025-08-18 RX ORDER — ICOSAPENT ETHYL 1 G/1
2 CAPSULE ORAL
Qty: 120 CAPSULE | Refills: 11 | Status: SHIPPED | OUTPATIENT
Start: 2025-08-18 | End: 2026-08-18

## 2025-08-18 RX ORDER — SACUBITRIL AND VALSARTAN 24; 26 MG/1; MG/1
1 TABLET ORAL 2 TIMES DAILY
Qty: 60 TABLET | Refills: 1 | Status: SHIPPED | OUTPATIENT
Start: 2025-08-18 | End: 2025-10-17

## 2025-08-18 RX ORDER — WARFARIN 3 MG/1
TABLET ORAL
Qty: 30 TABLET | Refills: 1 | Status: SHIPPED | OUTPATIENT
Start: 2025-08-18 | End: 2026-08-18

## 2025-08-18 RX ADMIN — ASPIRIN 81 MG: 81 TABLET, CHEWABLE ORAL at 09:44

## 2025-08-18 RX ADMIN — OXYCODONE HYDROCHLORIDE 5 MG: 5 TABLET ORAL at 05:51

## 2025-08-18 RX ADMIN — ICOSAPENT ETHYL 2 G: 1 CAPSULE ORAL at 09:45

## 2025-08-18 RX ADMIN — ACETAMINOPHEN 650 MG: 325 TABLET, FILM COATED ORAL at 05:51

## 2025-08-18 RX ADMIN — GUAIFENESIN AND DEXTROMETHORPHAN HYDROBROMIDE 1 TABLET: 600; 30 TABLET, EXTENDED RELEASE ORAL at 09:45

## 2025-08-18 RX ADMIN — ROPINIROLE 1 MG: 1 TABLET, FILM COATED ORAL at 09:45

## 2025-08-18 RX ADMIN — SPIRONOLACTONE 25 MG: 25 TABLET ORAL at 09:45

## 2025-08-18 RX ADMIN — LIDOCAINE 4% 1 PATCH: 40 PATCH TOPICAL at 09:36

## 2025-08-18 RX ADMIN — FUROSEMIDE 40 MG: 40 TABLET ORAL at 11:26

## 2025-08-18 RX ADMIN — LIDOCAINE 4% 1 PATCH: 40 PATCH TOPICAL at 09:43

## 2025-08-18 RX ADMIN — ATORVASTATIN CALCIUM 80 MG: 80 TABLET, FILM COATED ORAL at 09:45

## 2025-08-18 RX ADMIN — FLUTICASONE FUROATE AND VILANTEROL TRIFENATATE 1 PUFF: 100; 25 POWDER RESPIRATORY (INHALATION) at 09:04

## 2025-08-18 RX ADMIN — PANTOPRAZOLE SODIUM 40 MG: 40 TABLET, DELAYED RELEASE ORAL at 05:51

## 2025-08-18 RX ADMIN — SACUBITRIL AND VALSARTAN 1 TABLET: 24; 26 TABLET, FILM COATED ORAL at 09:45

## 2025-08-18 RX ADMIN — LEVOTHYROXINE SODIUM 88 MCG: 0.09 TABLET ORAL at 05:51

## 2025-08-18 RX ADMIN — IPRATROPIUM BROMIDE AND ALBUTEROL SULFATE 3 ML: .5; 3 SOLUTION RESPIRATORY (INHALATION) at 09:04

## 2025-08-18 RX ADMIN — INSULIN LISPRO 6 UNITS: 100 INJECTION, SOLUTION INTRAVENOUS; SUBCUTANEOUS at 09:44

## 2025-08-18 RX ADMIN — CITALOPRAM HYDROBROMIDE 40 MG: 40 TABLET ORAL at 09:45

## 2025-08-18 RX ADMIN — GABAPENTIN 300 MG: 300 CAPSULE ORAL at 05:51

## 2025-08-18 RX ADMIN — ACETYLCYSTEINE 600 MG: 200 SOLUTION ORAL; RESPIRATORY (INHALATION) at 09:04

## 2025-08-18 RX ADMIN — INSULIN GLARGINE 25 UNITS: 100 INJECTION, SOLUTION SUBCUTANEOUS at 09:44

## 2025-08-18 ASSESSMENT — COGNITIVE AND FUNCTIONAL STATUS - GENERAL: CLIMB 3 TO 5 STEPS WITH RAILING: A LITTLE

## 2025-08-18 ASSESSMENT — PAIN SCALES - GENERAL: PAINLEVEL_OUTOF10: 0 - NO PAIN
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
## 2025-08-18 NOTE — CARE PLAN
Problem: Pain - Adult  Goal: Verbalizes/displays adequate comfort level or baseline comfort level  Outcome: Progressing     Problem: Chronic Conditions and Co-morbidities  Goal: Patient's chronic conditions and co-morbidity symptoms are monitored and maintained or improved  Outcome: Progressing     Problem: Heart Failure  Goal: Improved gas exchange this shift  Outcome: Progressing  Goal: Improved urinary output this shift  Outcome: Progressing  Goal: Reduction in peripheral edema within 24 hours  Outcome: Progressing  Goal: Report improvement of dyspnea/breathlessness this shift  Outcome: Progressing  Goal: Weight from fluid excess reduced over 2-3 days, then stabilize  Outcome: Progressing     Problem: Respiratory  Goal: Minimal/no exertional discomfort or dyspnea this shift  Outcome: Progressing  Goal: Increase self care and/or family involvement in next 24 hours  Outcome: Progressing     Problem: Diabetes  Goal: Achieve decreasing blood glucose levels by end of shift  Outcome: Progressing  Goal: Increase stability of blood glucose readings by end of shift  Outcome: Progressing  Goal: Decrease in ketones present in urine by end of shift  Outcome: Progressing  Goal: Maintain electrolyte levels within acceptable range throughout shift  Outcome: Progressing  Goal: Maintain glucose levels >70mg/dl to <250mg/dl throughout shift  Outcome: Progressing  Goal: No changes in neurological exam by end of shift  Outcome: Progressing  Goal: Learn about and adhere to nutrition recommendations by end of shift  Outcome: Progressing  Goal: Vital signs within normal range for age by end of shift  Outcome: Progressing  Goal: Increase self care and/or family involovement by end of shift  Outcome: Progressing  Goal: Receive DSME education by end of shift  Outcome: Progressing     Problem: Ventricular Assisted Device (VAD)  Goal: Stable metal status  Outcome: Progressing  Goal: Pulmonary toileting, incentive spirometry  Outcome:  Progressing  Goal: Nutrition  Outcome: Progressing  Goal: Mobility/OT/PT  Outcome: Progressing  Goal: Rubber ball  Outcome: Progressing  Goal: ROM  Outcome: Progressing  Goal: Sitting  Outcome: Progressing  Goal: Walk  Outcome: Progressing  Goal: Involve in VAD awareness, dressing change  Outcome: Progressing  Goal: AICD On/Off  Outcome: Progressing     Problem: Skin  Goal: Decreased wound size/increased tissue granulation at next dressing change  Outcome: Progressing  Goal: Participates in plan/prevention/treatment measures  Outcome: Progressing  Goal: Prevent/manage excess moisture  Outcome: Progressing  Goal: Prevent/minimize sheer/friction injuries  Outcome: Progressing  Goal: Promote/optimize nutrition  Outcome: Progressing  Goal: Promote skin healing  Outcome: Progressing   The patient's goals for the shift include maps >70    The clinical goals for the shift include therapeutic on heparin    Over the shift, the patient remained hds; inr = 2.0 today; poiv removed with tip intact and 2x2 applied; pt is being discharged to home in stable condition per the HF team; pt received her med[facility] from [facility] pharmacy prior to leaving the floor; discharge instructions reviewed with pt including when her next dose of medications are due; pt left the unit via wheel chair with all her equipment.
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
## 2025-08-18 NOTE — CARE PLAN
The patient's goals for the shift include maps >70    The clinical goals for the shift include Patient to remain therapeutic on heparin    Patient remained HDS throughout shift. Patient to possibly discharge today    Problem: Safety - Adult  Goal: Free from fall injury  Outcome: Progressing

## 2025-08-18 NOTE — NURSING NOTE
VAD Note   Name:  Thao Evans  Admission Date:  2025  7:21 PM  MRN: [id number]  Attending Provider: Sukhdeep Kincaid MD  Room/Bed:  [facility]7/5027-A             Sex: female  : 1965       Age: 60 y.o.    VAD Readmission  Readmission Checklist  Readmission Checklist: Yes  Code Status Reviewed: Yes  Code Status Ordered: Yes  Anticoagulation Goals Entered: Yes  Event Monitor Checked: Yes  Driveline Secure: Yes  Driveline Site Assessed and Photographed: Yes  Dressing Change Mely: Frequent  ICD: Nurse to Check Device Upon Admission: On    HeartMate Serial Numbers  HeartMate Equipment Tracking/Serial Numbers  Battery Charger: [id number]  Battery Clip 1: [id number]  Battery Clip 2: [id number]  Battery Clip 3: [id number]  Battery Clip 4: [id number]  Rechargeable Battery 1: [id number]  Rechargeable Battery 2: [id number]  Rechargeable Battery 3: [id number]  Rechargeable Battery 4: [id number]  Rechargeable Battery 5: [id number]  Rechargeable Battery 6: [id number]  Rechargeable Battery 7: [id number]  Rechargeable Battery 8: [id number]  Go Gear Consolidated Ba  Go Gear Vest: [id number]  Programmed Backup : [id number]  Primary Controller: [id number]  Mobile Power Unit: [id number]  Black Emergency Red Tag Ba  Apil Cuff: [id number]  Outflow graft: [id number]  Modular Cable: [id number]     HeartMate Tracking  HeartMate Equipment Transfer  Transfer From: [place] [id number]  Transfer To: Discharge home  Battery Charger: Yes  Battery Clip 1: Yes  Battery Clip 2: Yes  Battery Clip 3: Yes  Battery Clip 4: Yes  Rechargeable Battery 1: Yes  Rechargeable Battery 2: Yes  Rechargeable Battery 3: Yes  Rechargeable Battery 4: Yes  Rechargeable Battery 5: Yes  Rechargeable Battery 6: Yes  Rechargeable Battery 7: Yes  Rechargeable Battery 8: Yes  Backup Battery 2: No  Go Gear Consolidated Bag: Yes  Go Gear Accessory Kit: Yes  Go Gear Vest: Yes  Programmed Backup : Yes  Primary Controller: Yes  Mobile Power Unit:  Yes  Black Emergency Red Tag Bag: Yes  Shower Bag: No  Apil Cuff: Yes  Outflow graft: Yes  Modular cable: Yes     HeartWare Serial Numbers        HeartWare Tracking        VAD Discharge  Discharge Documentation  HF Post Discharge Appointment Made: Yes  INR Referral and Management Arranged: Yes  INR Range: 2-3  AICD On: Yes  Equipment Checklist/Log Numbers Recorded: Yes    Educations  Education Documentation  Tricuspid Stenosis, taught by Willow Nieves RN at 8/18/2025  2:07 PM.  Learner: Patient  Readiness: Acceptance  Method: Explanation  Response: Verbalizes Understanding, Needs Reinforcement    Left Ventricular Assist Device, taught by Willow Nieves RN at 8/18/2025  2:07 PM.  Learner: Patient  Readiness: Acceptance  Method: Explanation  Response: Verbalizes Understanding, Needs Reinforcement    Ventricular Assist Device, taught by Willow Nieves RN at 8/18/2025  2:07 PM.  Learner: Patient  Readiness: Acceptance  Method: Explanation  Response: Verbalizes Understanding, Needs Reinforcement    Your Heart: Overview, taught by Willow Nieves RN at 8/18/2025  2:07 PM.  Learner: Patient  Readiness: Acceptance  Method: Explanation  Response: Verbalizes Understanding, Needs Reinforcement    VAD : Your Surgery, taught by Willow Nieves RN at 8/18/2025  2:07 PM.  Learner: Patient  Readiness: Acceptance  Method: Explanation  Response: Verbalizes Understanding, Needs Reinforcement    VAD : Why You Need A VAD, taught by Willow Nieves RN at 8/18/2025  2:07 PM.  Learner: Patient  Readiness: Acceptance  Method: Explanation  Response: Verbalizes Understanding, Needs Reinforcement    VAD : Risks And Benefits, taught by Willow Nieves RN at 8/18/2025  2:07 PM.  Learner: Patient  Readiness: Acceptance  Method: Explanation  Response: Verbalizes Understanding, Needs Reinforcement    VAD : How A VAD Works, taught by Willow Nieves RN at 8/18/2025  2:07 PM.  Learner: Patient  Readiness: Acceptance  Method: Explanation  Response: Verbalizes  Understanding, Needs Reinforcement    VAD : Before Your Surgery, taught by Willow Nieves RN at 8/18/2025  2:07 PM.  Learner: Patient  Readiness: Acceptance  Method: Explanation  Response: Verbalizes Understanding, Needs Reinforcement    VAD : Alternatives, taught by Willow Nieves RN at 8/18/2025  2:07 PM.  Learner: Patient  Readiness: Acceptance  Method: Explanation  Response: Verbalizes Understanding, Needs Reinforcement    VAD : After Your Surgery, taught by Willow Nieves RN at 8/18/2025  2:07 PM.  Learner: Patient  Readiness: Acceptance  Method: Explanation  Response: Verbalizes Understanding, Needs Reinforcement    Goal setting, taught by Willow Nieves RN at 8/18/2025  2:07 PM.  Learner: Patient  Readiness: Acceptance  Method: Explanation  Response: Verbalizes Understanding, Needs Reinforcement    Exercise, taught by Willow Nieves RN at 8/18/2025  2:07 PM.  Learner: Patient  Readiness: Acceptance  Method: Explanation  Response: Verbalizes Understanding, Needs Reinforcement    Diet, taught by Willow Nieves RN at 8/18/2025  2:07 PM.  Learner: Patient  Readiness: Acceptance  Method: Explanation  Response: Verbalizes Understanding, Needs Reinforcement    Sexual activity, taught by Willow Nieves RN at 8/18/2025  2:07 PM.  Learner: Patient  Readiness: Acceptance  Method: Explanation  Response: Verbalizes Understanding, Needs Reinforcement    Exercise/rehab, taught by Willow Nieves RN at 8/18/2025  2:07 PM.  Learner: Patient  Readiness: Acceptance  Method: Explanation  Response: Verbalizes Understanding, Needs Reinforcement    Energy management, taught by Willow Nieves RN at 8/18/2025  2:07 PM.  Learner: Patient  Readiness: Acceptance  Method: Explanation  Response: Verbalizes Understanding, Needs Reinforcement    No heavy lifting, taught by Willow Nieves RN at 8/18/2025  2:07 PM.  Learner: Patient  Readiness: Acceptance  Method: Explanation  Response: Verbalizes Understanding, Needs Reinforcement    Abdominal binder,  taught by Willow Nieves RN at 8/18/2025  2:07 PM.  Learner: Patient  Readiness: Acceptance  Method: Explanation  Response: Verbalizes Understanding, Needs Reinforcement    Daily weights, taught by Willow Nieves RN at 8/18/2025  2:07 PM.  Learner: Patient  Readiness: Acceptance  Method: Explanation  Response: Verbalizes Understanding, Needs Reinforcement    Sterile gloves, taught by Willow Nieves RN at 8/18/2025  2:07 PM.  Learner: Patient  Readiness: Acceptance  Method: Explanation  Response: Verbalizes Understanding, Needs Reinforcement    VAD bag, taught by Willow Nieves RN at 8/18/2025  2:07 PM.  Learner: Patient  Readiness: Acceptance  Method: Explanation  Response: Verbalizes Understanding, Needs Reinforcement    Emergency contacts, taught by Willow Nieves RN at 8/18/2025  2:07 PM.  Learner: Patient  Readiness: Acceptance  Method: Explanation  Response: Verbalizes Understanding, Needs Reinforcement    Shower kit, taught by Willow Nieves RN at 8/18/2025  2:07 PM.  Learner: Patient  Readiness: Acceptance  Method: Explanation  Response: Verbalizes Understanding, Needs Reinforcement    Patient pack set-up (HeartWare), taught by Willow Nieves RN at 8/18/2025  2:07 PM.  Learner: Patient  Readiness: Acceptance  Method: Explanation  Response: Verbalizes Understanding, Needs Reinforcement    Go Gear wearable components, taught by Willow Nieves RN at 8/18/2025  2:07 PM.  Learner: Patient  Readiness: Acceptance  Method: Explanation  Response: Verbalizes Understanding, Needs Reinforcement    Battery checks, taught by Willow Nieves RN at 8/18/2025  2:07 PM.  Learner: Patient  Readiness: Acceptance  Method: Explanation  Response: Verbalizes Understanding, Needs Reinforcement     connections, taught by Willow Nieves RN at 8/18/2025  2:07 PM.  Learner: Patient  Readiness: Acceptance  Method: Explanation  Response: Verbalizes Understanding, Needs Reinforcement    Red alarms, taught by Willow Nieves RN at  8/18/2025  2:07 PM.  Learner: Patient  Readiness: Acceptance  Method: Explanation  Response: Verbalizes Understanding, Needs Reinforcement    Yellow alarms, taught by Willow Nieves RN at 8/18/2025  2:07 PM.  Learner: Patient  Readiness: Acceptance  Method: Explanation  Response: Verbalizes Understanding, Needs Reinforcement    Refer to VAD booklet for additional info, taught by Willow Nieves RN at 8/18/2025  2:07 PM.  Learner: Patient  Readiness: Acceptance  Method: Explanation  Response: Verbalizes Understanding, Needs Reinforcement    Monitor supplies and reorder in advance, taught by Willow Nieves RN at 8/18/2025  2:07 PM.  Learner: Patient  Readiness: Acceptance  Method: Explanation  Response: Verbalizes Understanding, Needs Reinforcement    Check equipment for frayed tubing, taught by Willow Nieves RN at 8/18/2025  2:07 PM.  Learner: Patient  Readiness: Acceptance  Method: Explanation  Response: Verbalizes Understanding, Needs Reinforcement    Visualization of site daily with mirror or with caregiver, note any changes, taught by Willow Nieves RN at 8/18/2025  2:07 PM.  Learner: Patient  Readiness: Acceptance  Method: Explanation  Response: Verbalizes Understanding, Needs Reinforcement    Knowledge of solution for cleansing, taught by Willow Nieves RN at 8/18/2025  2:07 PM.  Learner: Patient  Readiness: Acceptance  Method: Explanation  Response: Verbalizes Understanding, Needs Reinforcement    Cleanse area without site contamination, taught by Willow Nieves RN at 8/18/2025  2:07 PM.  Learner: Patient  Readiness: Acceptance  Method: Explanation  Response: Verbalizes Understanding, Needs Reinforcement    Stat lock intact, taught by Willow Nieves RN at 8/18/2025  2:07 PM.  Learner: Patient  Readiness: Acceptance  Method: Explanation  Response: Verbalizes Understanding, Needs Reinforcement    Symptoms to report: incision/wound site, taught by Willow Nieves RN at 8/18/2025  2:07 PM.  Learner: Patient  Readiness:  Acceptance  Method: Explanation  Response: Verbalizes Understanding, Needs Reinforcement    Donning of sterile gloves, taught by Willow Nieves RN at 8/18/2025  2:07 PM.  Learner: Patient  Readiness: Acceptance  Method: Explanation  Response: Verbalizes Understanding, Needs Reinforcement    Understanding sterile technique, taught by Willow Nieves RN at 8/18/2025  2:07 PM.  Learner: Patient  Readiness: Acceptance  Method: Explanation  Response: Verbalizes Understanding, Needs Reinforcement    Shower kit, taught by Willow Nieves RN at 8/18/2025  2:07 PM.  Learner: Patient  Readiness: Acceptance  Method: Explanation  Response: Verbalizes Understanding, Needs Reinforcement    Go Gear wearable components, taught by Willow Nieves RN at 8/18/2025  2:07 PM.  Learner: Patient  Readiness: Acceptance  Method: Explanation  Response: Verbalizes Understanding, Needs Reinforcement    DC Adapter, taught by Willow Nieves RN at 8/18/2025  2:07 PM.  Learner: Patient  Readiness: Acceptance  Method: Explanation  Response: Verbalizes Understanding, Needs Reinforcement    Universal battery charger, taught by Willow Nieves RN at 8/18/2025  2:07 PM.  Learner: Patient  Readiness: Acceptance  Method: Explanation  Response: Verbalizes Understanding, Needs Reinforcement    Power module and display module, taught by Willow Nieves RN at 8/18/2025  2:07 PM.  Learner: Patient  Readiness: Acceptance  Method: Explanation  Response: Verbalizes Understanding, Needs Reinforcement    Batteries and Battery Clips, taught by Willow Nieves RN at 8/18/2025  2:07 PM.  Learner: Patient  Readiness: Acceptance  Method: Explanation  Response: Verbalizes Understanding, Needs Reinforcement    Controller, taught by Willow Nieves RN at 8/18/2025  2:07 PM.  Learner: Patient  Readiness: Acceptance  Method: Explanation  Response: Verbalizes Understanding, Needs Reinforcement    Food diary, taught by Willow Nieves RN at 8/18/2025  2:07 PM.  Learner: Patient  Readiness:  Acceptance  Method: Explanation  Response: Verbalizes Understanding, Needs Reinforcement    High protein high calorie diet, taught by Willow Nieves RN at 8/18/2025  2:07 PM.  Learner: Patient  Readiness: Acceptance  Method: Explanation  Response: Verbalizes Understanding, Needs Reinforcement    Fluid restriction, taught by Willow Nieves RN at 8/18/2025  2:07 PM.  Learner: Patient  Readiness: Acceptance  Method: Explanation  Response: Verbalizes Understanding, Needs Reinforcement    Low sodium diet, taught by Willow Nieves RN at 8/18/2025  2:07 PM.  Learner: Patient  Readiness: Acceptance  Method: Explanation  Response: Verbalizes Understanding, Needs Reinforcement    Post-operative expectations, taught by Willow Nieves RN at 8/18/2025  2:07 PM.  Learner: Patient  Readiness: Acceptance  Method: Explanation  Response: Verbalizes Understanding, Needs Reinforcement    Report to nurse, taught by Willow Nieves RN at 8/18/2025  2:07 PM.  Learner: Patient  Readiness: Acceptance  Method: Explanation  Response: Verbalizes Understanding, Needs Reinforcement    DVT prophylaxis, taught by Willow Nieves RN at 8/18/2025  2:07 PM.  Learner: Patient  Readiness: Acceptance  Method: Explanation  Response: Verbalizes Understanding, Needs Reinforcement    Exercise, taught by Willow Nieves RN at 8/18/2025  2:07 PM.  Learner: Patient  Readiness: Acceptance  Method: Explanation  Response: Verbalizes Understanding, Needs Reinforcement    Blood management, taught by Willow Nieves RN at 8/18/2025  2:07 PM.  Learner: Patient  Readiness: Acceptance  Method: Explanation  Response: Verbalizes Understanding, Needs Reinforcement    Diet, taught by Willow Nieves RN at 8/18/2025  2:07 PM.  Learner: Patient  Readiness: Acceptance  Method: Explanation  Response: Verbalizes Understanding, Needs Reinforcement    Respiratory care, taught by Willow Nieves RN at 8/18/2025  2:07 PM.  Learner: Patient  Readiness: Acceptance  Method: Explanation  Response:  Verbalizes Understanding, Needs Reinforcement    Pain, taught by Willow Nieves RN at 8/18/2025  2:07 PM.  Learner: Patient  Readiness: Acceptance  Method: Explanation  Response: Verbalizes Understanding, Needs Reinforcement    Carotid duplex, taught by Willow Nieves RN at 8/18/2025  2:07 PM.  Learner: Patient  Readiness: Acceptance  Method: Explanation  Response: Verbalizes Understanding, Needs Reinforcement    Ankle-brachial index (JONATHAN), taught by Willow Nieves RN at 8/18/2025  2:07 PM.  Learner: Patient  Readiness: Acceptance  Method: Explanation  Response: Verbalizes Understanding, Needs Reinforcement    Ultrasound, taught by Willow Nieves RN at 8/18/2025  2:07 PM.  Learner: Patient  Readiness: Acceptance  Method: Explanation  Response: Verbalizes Understanding, Needs Reinforcement    Six minute walk, taught by Willow Nieves RN at 8/18/2025  2:07 PM.  Learner: Patient  Readiness: Acceptance  Method: Explanation  Response: Verbalizes Understanding, Needs Reinforcement    Cardiopulmonary stress test, taught by Willow Nieves RN at 8/18/2025  2:07 PM.  Learner: Patient  Readiness: Acceptance  Method: Explanation  Response: Verbalizes Understanding, Needs Reinforcement    PFT's (Pulmonary Function Tests), taught by Willow Nieves RN at 8/18/2025  2:07 PM.  Learner: Patient  Readiness: Acceptance  Method: Explanation  Response: Verbalizes Understanding, Needs Reinforcement    Cardiac catheterization, taught by Willow Nieves RN at 8/18/2025  2:07 PM.  Learner: Patient  Readiness: Acceptance  Method: Explanation  Response: Verbalizes Understanding, Needs Reinforcement    EKG, taught by Willow Nieves RN at 8/18/2025  2:07 PM.  Learner: Patient  Readiness: Acceptance  Method: Explanation  Response: Verbalizes Understanding, Needs Reinforcement    Echocardiogram, taught by Willow Nieves RN at 8/18/2025  2:07 PM.  Learner: Patient  Readiness: Acceptance  Method: Explanation  Response: Verbalizes Understanding, Needs  Reinforcement    Driveline/Cannula care, taught by Willow Nieves RN at 8/18/2025  2:07 PM.  Learner: Patient  Readiness: Acceptance  Method: Explanation  Response: Verbalizes Understanding, Needs Reinforcement    Activity precautions, taught by Willow Nieves RN at 8/18/2025  2:07 PM.  Learner: Patient  Readiness: Acceptance  Method: Explanation  Response: Verbalizes Understanding, Needs Reinforcement    VAD Travel Recommendations, taught by Willow Nieves RN at 8/18/2025  2:07 PM.  Learner: Patient  Readiness: Acceptance  Method: Explanation  Response: Verbalizes Understanding, Needs Reinforcement    VAD Electrical Precautions, taught by Willow Nieves RN at 8/18/2025  2:07 PM.  Learner: Patient  Readiness: Acceptance  Method: Explanation  Response: Verbalizes Understanding, Needs Reinforcement    VAD Support Requirements, taught by Willow Nieves RN at 8/18/2025  2:07 PM.  Learner: Patient  Readiness: Acceptance  Method: Explanation  Response: Verbalizes Understanding, Needs Reinforcement    VAD Emergency Management Procedures, taught by Willow Nieves RN at 8/18/2025  2:07 PM.  Learner: Patient  Readiness: Acceptance  Method: Explanation  Response: Verbalizes Understanding, Needs Reinforcement    VAD Sleep Precautions, taught by Willow Nieves RN at 8/18/2025  2:07 PM.  Learner: Patient  Readiness: Acceptance  Method: Explanation  Response: Verbalizes Understanding, Needs Reinforcement    VAD Water Precautions, taught by Willow Nieves RN at 8/18/2025  2:07 PM.  Learner: Patient  Readiness: Acceptance  Method: Explanation  Response: Verbalizes Understanding, Needs Reinforcement    VAD Driving Precautions, taught by Willow Nieves RN at 8/18/2025  2:07 PM.  Learner: Patient  Readiness: Acceptance  Method: Explanation  Response: Verbalizes Understanding, Needs Reinforcement    KCCQ assessment, taught by Willow Nieves RN at 8/18/2025  2:07 PM.  Learner: Patient  Readiness: Acceptance  Method: Explanation  Response:  Verbalizes Understanding, Needs Reinforcement    EuroQol assessment, taught by Willow Nieves RN at 8/18/2025  2:07 PM.  Learner: Patient  Readiness: Acceptance  Method: Explanation  Response: Verbalizes Understanding, Needs Reinforcement    Pre-op prep, taught by Willow Nieves RN at 8/18/2025  2:07 PM.  Learner: Patient  Readiness: Acceptance  Method: Explanation  Response: Verbalizes Understanding, Needs Reinforcement    Preop work up: VAD, taught by Willow Nieves RN at 8/18/2025  2:07 PM.  Learner: Patient  Readiness: Acceptance  Method: Explanation  Response: Verbalizes Understanding, Needs Reinforcement    Pre-op work-up, taught by Willow Nieves RN at 8/18/2025  2:07 PM.  Learner: Patient  Readiness: Acceptance  Method: Explanation  Response: Verbalizes Understanding, Needs Reinforcement    VAD surgery, taught by Willow Nieves RN at 8/18/2025  2:07 PM.  Learner: Patient  Readiness: Acceptance  Method: Explanation  Response: Verbalizes Understanding, Needs Reinforcement    Precautions, taught by Willow Nieves RN at 8/18/2025  2:07 PM.  Learner: Patient  Readiness: Acceptance  Method: Explanation  Response: Verbalizes Understanding, Needs Reinforcement    Body Mechanics, taught by Willow Nieves RN at 8/18/2025  2:07 PM.  Learner: Patient  Readiness: Acceptance  Method: Explanation  Response: Verbalizes Understanding, Needs Reinforcement    Handouts, taught by Willow Nieves RN at 8/18/2025  2:07 PM.  Learner: Patient  Readiness: Acceptance  Method: Explanation  Response: Verbalizes Understanding, Needs Reinforcement    Mobility Training, taught by Willow Nieves RN at 8/18/2025  2:07 PM.  Learner: Patient  Readiness: Acceptance  Method: Explanation  Response: Verbalizes Understanding, Needs Reinforcement    Handouts, taught by Willow Nieves RN at 8/18/2025  2:07 PM.  Learner: Patient  Readiness: Acceptance  Method: Explanation  Response: Verbalizes Understanding, Needs Reinforcement    Home Exercise Program, taught  by Willow Nieves RN at 8/18/2025  2:07 PM.  Learner: Patient  Readiness: Acceptance  Method: Explanation  Response: Verbalizes Understanding, Needs Reinforcement    Body Mechanics, taught by Willow Nieves RN at 8/18/2025  2:07 PM.  Learner: Patient  Readiness: Acceptance  Method: Explanation  Response: Verbalizes Understanding, Needs Reinforcement    Precautions, taught by Willow Nieves RN at 8/18/2025  2:07 PM.  Learner: Patient  Readiness: Acceptance  Method: Explanation  Response: Verbalizes Understanding, Needs Reinforcement    ADL Training, taught by Willow Nieves RN at 8/18/2025  2:07 PM.  Learner: Patient  Readiness: Acceptance  Method: Explanation  Response: Verbalizes Understanding, Needs Reinforcement    Skin Care/Pressure Ulcer Prevention, taught by Willow Nieves RN at 8/18/2025  2:07 PM.  Learner: Patient  Readiness: Acceptance  Method: Explanation  Response: Verbalizes Understanding, Needs Reinforcement    Activity/Positioning, taught by Willow Nieves RN at 8/18/2025  2:07 PM.  Learner: Patient  Readiness: Acceptance  Method: Explanation  Response: Verbalizes Understanding, Needs Reinforcement    Multidrug-resistant organisms, taught by Willow Nieves RN at 8/18/2025  2:07 PM.  Learner: Patient  Readiness: Acceptance  Method: Explanation  Response: Verbalizes Understanding, Needs Reinforcement    Isolation Precautions, taught by Willow Nieves RN at 8/18/2025  2:07 PM.  Learner: Patient  Readiness: Acceptance  Method: Explanation  Response: Verbalizes Understanding, Needs Reinforcement    Preventing Infections, taught by Willow Nieves RN at 8/18/2025  2:07 PM.  Learner: Patient  Readiness: Acceptance  Method: Explanation  Response: Verbalizes Understanding, Needs Reinforcement    Pain Management, taught by Willow Nieves RN at 8/18/2025  2:07 PM.  Learner: Patient  Readiness: Acceptance  Method: Explanation  Response: Verbalizes Understanding, Needs Reinforcement    Fall Precautions, taught by Willow  ANIA Nieves at 8/18/2025  2:07 PM.  Learner: Patient  Readiness: Acceptance  Method: Explanation  Response: Verbalizes Understanding, Needs Reinforcement    Equipment, taught by Willow Nieves RN at 8/18/2025  2:07 PM.  Learner: Patient  Readiness: Acceptance  Method: Explanation  Response: Verbalizes Understanding, Needs Reinforcement    Orientation to Unit, taught by Willow Nieves RN at 8/18/2025  2:07 PM.  Learner: Patient  Readiness: Acceptance  Method: Explanation  Response: Verbalizes Understanding, Needs Reinforcement    Anticoagulant Therapy Diet, taught by Willow Nieves RN at 8/18/2025  2:07 PM.  Learner: Patient  Readiness: Acceptance  Method: Explanation  Response: Verbalizes Understanding, Needs Reinforcement    Anticoagulant Therapy, taught by Willow iNeves RN at 8/18/2025  2:07 PM.  Learner: Patient  Readiness: Acceptance  Method: Explanation  Response: Verbalizes Understanding, Needs Reinforcement    High Output Heart Failure, taught by Willow Nieves RN at 8/18/2025  2:07 PM.  Learner: Patient  Readiness: Acceptance  Method: Explanation  Response: Verbalizes Understanding, Needs Reinforcement    Heart Failure Exercise Guide, taught by Willow Nieves RN at 8/18/2025  2:07 PM.  Learner: Patient  Readiness: Acceptance  Method: Explanation  Response: Verbalizes Understanding, Needs Reinforcement    Medical Devices for Heart Failure, taught by Willow Nieves RN at 8/18/2025  2:07 PM.  Learner: Patient  Readiness: Acceptance  Method: Explanation  Response: Verbalizes Understanding, Needs Reinforcement    Heart Failure With Reduced Ejection Fraction, taught by Willow Nieves RN at 8/18/2025  2:07 PM.  Learner: Patient  Readiness: Acceptance  Method: Explanation  Response: Verbalizes Understanding, Needs Reinforcement    Heart Failure With Preserved Ejection Fraction, taught by Willow Nieves RN at 8/18/2025  2:07 PM.  Learner: Patient  Readiness: Acceptance  Method: Explanation  Response: Verbalizes Understanding,  Needs Reinforcement    Medicines for Heart Failure With Reduced Ejection Fraction, taught by Willow Nieves RN at 8/18/2025  2:07 PM.  Learner: Patient  Readiness: Acceptance  Method: Explanation  Response: Verbalizes Understanding, Needs Reinforcement    Surgical Procedures for Heart Failure, taught by Willow Nieves RN at 8/18/2025  2:07 PM.  Learner: Patient  Readiness: Acceptance  Method: Explanation  Response: Verbalizes Understanding, Needs Reinforcement    Heart Failure, Adult, taught by Willow Nieves RN at 8/18/2025  2:07 PM.  Learner: Patient  Readiness: Acceptance  Method: Explanation  Response: Verbalizes Understanding, Needs Reinforcement    Heart Failure and Atrial Fibrillation, taught by Willow Nieves RN at 8/18/2025  2:07 PM.  Learner: Patient  Readiness: Acceptance  Method: Explanation  Response: Verbalizes Understanding, Needs Reinforcement    Orthostatic Hypotension, taught by Willow Nieves RN at 8/18/2025  2:07 PM.  Learner: Patient  Readiness: Acceptance  Method: Explanation  Response: Verbalizes Understanding, Needs Reinforcement    Aortic Regurgitation, taught by Willow Nieves RN at 8/18/2025  2:07 PM.  Learner: Patient  Readiness: Acceptance  Method: Explanation  Response: Verbalizes Understanding, Needs Reinforcement    Bradycardia, taught by Willow Nieves RN at 8/18/2025  2:07 PM.  Learner: Patient  Readiness: Acceptance  Method: Explanation  Response: Verbalizes Understanding, Needs Reinforcement    Tricuspid Regurgitation, taught by Willow Nieves RN at 8/18/2025  2:07 PM.  Learner: Patient  Readiness: Acceptance  Method: Explanation  Response: Verbalizes Understanding, Needs Reinforcement    Your Heart: Overview, taught by Willow Nieves RN at 8/18/2025  2:07 PM.  Learner: Patient  Readiness: Acceptance  Method: Explanation  Response: Verbalizes Understanding, Needs Reinforcement    Heart Failure: Your Heart, taught by Willow Nieves RN at 8/18/2025  2:07 PM.  Learner: Patient  Readiness:  Acceptance  Method: Explanation  Response: Verbalizes Understanding, Needs Reinforcement    Heart Failure: Working with Your Doctor, taught by Willow Nieves RN at 8/18/2025  2:07 PM.  Learner: Patient  Readiness: Acceptance  Method: Explanation  Response: Verbalizes Understanding, Needs Reinforcement    Heart Failure: When to Call Your Doctor, taught by Willow Nieves RN at 8/18/2025  2:07 PM.  Learner: Patient  Readiness: Acceptance  Method: Explanation  Response: Verbalizes Understanding, Needs Reinforcement    Heart Failure: What It Is, taught by Willow Nieves RN at 8/18/2025  2:07 PM.  Learner: Patient  Readiness: Acceptance  Method: Explanation  Response: Verbalizes Understanding, Needs Reinforcement    Heart Failure: Water Pills and Fluids, taught by Willow Nieves RN at 8/18/2025  2:07 PM.  Learner: Patient  Readiness: Acceptance  Method: Explanation  Response: Verbalizes Understanding, Needs Reinforcement    Heart Failure: Rest and Exercise, taught by Willow Nieves RN at 8/18/2025  2:07 PM.  Learner: Patient  Readiness: Acceptance  Method: Explanation  Response: Verbalizes Understanding, Needs Reinforcement    Heart Failure: Daily Weight, taught by Willow Nieves RN at 8/18/2025  2:07 PM.  Learner: Patient  Readiness: Acceptance  Method: Explanation  Response: Verbalizes Understanding, Needs Reinforcement    Heart Failure: Blood Pressure, taught by Willow Nieves RN at 8/18/2025  2:07 PM.  Learner: Patient  Readiness: Acceptance  Method: Explanation  Response: Verbalizes Understanding, Needs Reinforcement    Heart Failure: Being Salt Smart, taught by Willow Nieves RN at 8/18/2025  2:07 PM.  Learner: Patient  Readiness: Acceptance  Method: Explanation  Response: Verbalizes Understanding, Needs Reinforcement    Heart Failure: Alcohol and Smoking, taught by Willow Nieves RN at 8/18/2025  2:07 PM.  Learner: Patient  Readiness: Acceptance  Method: Explanation  Response: Verbalizes Understanding, Needs  Reinforcement    Heart Failure And Using A Ventricular Assist Device (VAD): Overview, taught by Willow Nieves RN at 8/18/2025  2:07 PM.  Learner: Patient  Readiness: Acceptance  Method: Explanation  Response: Verbalizes Understanding, Needs Reinforcement    Heart Failure : Full Program, taught by Willow Nieves RN at 8/18/2025  2:07 PM.  Learner: Patient  Readiness: Acceptance  Method: Explanation  Response: Verbalizes Understanding, Needs Reinforcement          Willow Nieves RN  Date: 8/18/2025  Time: 2:53 PM

## 2025-08-19 ENCOUNTER — TELEPHONE (OUTPATIENT)
Dept: CARDIOLOGY | Facility: CLINIC | Age: 60
End: 2025-08-19
Payer: COMMERCIAL

## 2025-08-20 LAB
ACID FAST STN SPEC: NORMAL
ACID FAST STN SPEC: NORMAL
MYCOBACTERIUM SPEC CULT: NORMAL
MYCOBACTERIUM SPEC CULT: NORMAL

## 2025-08-21 ENCOUNTER — TELEPHONE (OUTPATIENT)
Dept: RESPIRATORY THERAPY | Facility: HOSPITAL | Age: 60
End: 2025-08-21
Payer: COMMERCIAL

## 2025-08-21 ENCOUNTER — TELEPHONE (OUTPATIENT)
Dept: TRANSPLANT | Facility: HOSPITAL | Age: 60
End: 2025-08-21
Payer: COMMERCIAL

## 2025-08-21 ENCOUNTER — APPOINTMENT (OUTPATIENT)
Dept: CARDIOLOGY | Facility: HOSPITAL | Age: 60
End: 2025-08-21
Payer: COMMERCIAL

## 2025-08-21 DIAGNOSIS — J96.00 ACUTE RESPIRATORY FAILURE, UNSPECIFIED WHETHER WITH HYPOXIA OR HYPERCAPNIA: ICD-10-CM

## 2025-08-21 DIAGNOSIS — I97.130: ICD-10-CM

## 2025-08-21 DIAGNOSIS — I50.20 ACC/AHA STAGE C SYSTOLIC HEART FAILURE: ICD-10-CM

## 2025-08-21 DIAGNOSIS — R06.02 SHORTNESS OF BREATH: ICD-10-CM

## 2025-08-21 DIAGNOSIS — J43.9 PULMONARY EMPHYSEMA, UNSPECIFIED EMPHYSEMA TYPE (MULTI): ICD-10-CM

## 2025-08-22 ENCOUNTER — ANTICOAGULATION - WARFARIN VISIT (OUTPATIENT)
Dept: PHARMACY | Facility: HOSPITAL | Age: 60
End: 2025-08-22
Payer: COMMERCIAL

## 2025-08-22 DIAGNOSIS — Z79.01 LONG TERM (CURRENT) USE OF ANTICOAGULANTS: Primary | ICD-10-CM

## 2025-08-22 DIAGNOSIS — I25.5 ISCHEMIC CARDIOMYOPATHY: ICD-10-CM

## 2025-08-22 DIAGNOSIS — T82.9XXA COMPLICATION INVOLVING LEFT VENTRICULAR ASSIST DEVICE (LVAD), INITIAL ENCOUNTER: ICD-10-CM

## 2025-08-22 LAB
POC INR: 2.1 (ref 2–3)
POC PROTHROMBIN TIME: 25.2 (ref 9.3–12.5)

## 2025-08-22 PROCEDURE — 99211 OFF/OP EST MAY X REQ PHY/QHP: CPT

## 2025-08-22 PROCEDURE — 85610 PROTHROMBIN TIME: CPT | Mod: QW | Performed by: REGISTERED NURSE

## 2025-08-26 ENCOUNTER — PHARMACY VISIT (OUTPATIENT)
Dept: PHARMACY | Facility: CLINIC | Age: 60
End: 2025-08-26
Payer: COMMERCIAL

## 2025-08-26 ENCOUNTER — TELEPHONE (OUTPATIENT)
Dept: TRANSPLANT | Facility: HOSPITAL | Age: 60
End: 2025-08-26

## 2025-08-26 ENCOUNTER — LAB (OUTPATIENT)
Dept: LAB | Facility: HOSPITAL | Age: 60
End: 2025-08-26
Payer: COMMERCIAL

## 2025-08-26 ENCOUNTER — OFFICE VISIT (OUTPATIENT)
Dept: TRANSPLANT | Facility: HOSPITAL | Age: 60
End: 2025-08-26
Payer: COMMERCIAL

## 2025-08-26 VITALS — BODY MASS INDEX: 29.49 KG/M2 | OXYGEN SATURATION: 99 % | WEIGHT: 150.2 LBS | HEIGHT: 60 IN | TEMPERATURE: 97.9 F

## 2025-08-26 DIAGNOSIS — Z95.811 PRESENCE OF LEFT VENTRICULAR ASSIST DEVICE (LVAD) (MULTI): Primary | ICD-10-CM

## 2025-08-26 DIAGNOSIS — I50.20 HFREF (HEART FAILURE WITH REDUCED EJECTION FRACTION): ICD-10-CM

## 2025-08-26 DIAGNOSIS — I25.5 ISCHEMIC CARDIOMYOPATHY: Primary | ICD-10-CM

## 2025-08-26 DIAGNOSIS — I50.20 ACC/AHA STAGE D SYSTOLIC HEART FAILURE: ICD-10-CM

## 2025-08-26 DIAGNOSIS — I25.5 ISCHEMIC CARDIOMYOPATHY: ICD-10-CM

## 2025-08-26 LAB
ALBUMIN SERPL BCP-MCNC: 3.6 G/DL (ref 3.4–5)
ALP SERPL-CCNC: 108 U/L (ref 33–136)
ALT SERPL W P-5'-P-CCNC: 10 U/L (ref 7–45)
ANION GAP SERPL CALC-SCNC: 11 MMOL/L (ref 10–20)
AST SERPL W P-5'-P-CCNC: 10 U/L (ref 9–39)
BILIRUB SERPL-MCNC: 0.5 MG/DL (ref 0–1.2)
BNP SERPL-MCNC: 600 PG/ML (ref 0–99)
BUN SERPL-MCNC: 11 MG/DL (ref 6–23)
CALCIUM SERPL-MCNC: 9.1 MG/DL (ref 8.6–10.6)
CHLORIDE SERPL-SCNC: 96 MMOL/L (ref 98–107)
CO2 SERPL-SCNC: 36 MMOL/L (ref 21–32)
CREAT SERPL-MCNC: 0.72 MG/DL (ref 0.5–1.05)
EGFRCR SERPLBLD CKD-EPI 2021: >90 ML/MIN/1.73M*2
ERYTHROCYTE [DISTWIDTH] IN BLOOD BY AUTOMATED COUNT: 16.3 % (ref 11.5–14.5)
GLUCOSE SERPL-MCNC: 167 MG/DL (ref 74–99)
HCT VFR BLD AUTO: 31.1 % (ref 36–46)
HGB BLD-MCNC: 9.6 G/DL (ref 12–16)
LDH SERPL L TO P-CCNC: 190 U/L (ref 84–246)
MCH RBC QN AUTO: 30.3 PG (ref 26–34)
MCHC RBC AUTO-ENTMCNC: 30.9 G/DL (ref 32–36)
MCV RBC AUTO: 98 FL (ref 80–100)
NRBC BLD-RTO: 0 /100 WBCS (ref 0–0)
PLATELET # BLD AUTO: 292 X10*3/UL (ref 150–450)
POTASSIUM SERPL-SCNC: 4.3 MMOL/L (ref 3.5–5.3)
PROT SERPL-MCNC: 6.8 G/DL (ref 6.4–8.2)
RBC # BLD AUTO: 3.17 X10*6/UL (ref 4–5.2)
SODIUM SERPL-SCNC: 139 MMOL/L (ref 136–145)
TSH SERPL-ACNC: 24.6 MIU/L (ref 0.44–3.98)
WBC # BLD AUTO: 5.3 X10*3/UL (ref 4.4–11.3)

## 2025-08-26 PROCEDURE — RXMED WILLOW AMBULATORY MEDICATION CHARGE

## 2025-08-26 PROCEDURE — 85027 COMPLETE CBC AUTOMATED: CPT

## 2025-08-26 PROCEDURE — 3048F LDL-C <100 MG/DL: CPT | Performed by: INTERNAL MEDICINE

## 2025-08-26 PROCEDURE — 99215 OFFICE O/P EST HI 40 MIN: CPT | Performed by: INTERNAL MEDICINE

## 2025-08-26 PROCEDURE — 3051F HG A1C>EQUAL 7.0%<8.0%: CPT | Performed by: INTERNAL MEDICINE

## 2025-08-26 PROCEDURE — 83615 LACTATE (LD) (LDH) ENZYME: CPT

## 2025-08-26 PROCEDURE — 99215 OFFICE O/P EST HI 40 MIN: CPT | Mod: 25 | Performed by: INTERNAL MEDICINE

## 2025-08-26 PROCEDURE — 84443 ASSAY THYROID STIM HORMONE: CPT

## 2025-08-26 PROCEDURE — 93750 INTERROGATION VAD IN PERSON: CPT | Performed by: INTERNAL MEDICINE

## 2025-08-26 PROCEDURE — 3008F BODY MASS INDEX DOCD: CPT | Performed by: INTERNAL MEDICINE

## 2025-08-26 PROCEDURE — 80053 COMPREHEN METABOLIC PANEL: CPT

## 2025-08-26 PROCEDURE — 83880 ASSAY OF NATRIURETIC PEPTIDE: CPT

## 2025-08-26 PROCEDURE — 36415 COLL VENOUS BLD VENIPUNCTURE: CPT

## 2025-08-26 PROCEDURE — 1036F TOBACCO NON-USER: CPT | Performed by: INTERNAL MEDICINE

## 2025-08-26 RX ORDER — TRAMADOL HYDROCHLORIDE 50 MG/1
50 TABLET, FILM COATED ORAL EVERY 6 HOURS PRN
Qty: 15 TABLET | Refills: 0 | Status: SHIPPED | OUTPATIENT
Start: 2025-08-26 | End: 2025-09-02

## 2025-08-26 ASSESSMENT — PAIN SCALES - GENERAL: PAINLEVEL_OUTOF10: 0-NO PAIN

## 2025-08-26 ASSESSMENT — ENCOUNTER SYMPTOMS: SHORTNESS OF BREATH: 1

## 2025-08-26 NOTE — DOCUMENTATION CLARIFICATION NOTE
"    PATIENT:               ANTEA BECKER  ACCT #:                  [id number]  MRN:                       [id number]  :                       1965  ADMIT DATE:       2025 7:21 PM  DISCH DATE:        2025 2:45 PM  RESPONDING PROVIDER #:        [id number]          PROVIDER RESPONSE TEXT:    malnutrition    CDI QUERY TEXT:    Clarification    Instruction:    Based on your assessment of the patient and the clinical information, please provide the requested documentation by clicking on the appropriate radio button and enter any additional information if prompted.    Question: Please further clarify this patient nutritional status as    When answering this query, please exercise your independent professional judgment. The fact that a question is being asked, does not imply that any particular answer is desired or expected.    The patient's clinical indicators include:  Clinical Information: Pt is a 61 yo who was was directly admitted to the HF ICU for Kcny-Pkfk-xkemfe hemodynamic optimization in anticipation of LVAD placement initially scheduled for , which was postponed due to identification of concerning colonic polyps on routine screening colonoscopy; pathology returned benign on .    Clinical Indicators:    From Nutrition consult on 25- \"Nutrition Focused Physical Exam Findings:    Subcutaneous Fat Loss:  Orbital Fat Pads: Mild-Moderate (slight dark circles and slight hollowing)  Buccal Fat Pads: Mild-Moderate (flat cheeks, minimal bounce)  Triceps: Severe (negligible fat tissue)  Muscle Wasting:  Temporalis: Mild-Moderate (slight depression)  Pectoralis (Clavicular Region): Mild-Moderate (some protrusion of clavicle)  Deltoid/Trapezius: Mild-Moderate (slight protrusion of acromion process)  Quadriceps: Defer  Gastrocnemius: Severe (minimal muscle definition)  Edema:  Edema Location: non-pitting edema  Physical Findings:  Skin: Positive (B/L breast wonds, surgical site to sternum, surgical " "site to abdomen)\"    \"Malnutrition Diagnosis  Patient has Malnutrition Diagnosis: Yes  Diagnosis Status: New  Malnutrition Diagnosis: Severe malnutrition related to chronic disease or condition  As Evidenced by: pt with only a fair appetite and intake prior to VAD and now with a poor appetite and intake post-VAD; she is down from her usual body weight by 15 percent and has areas of moderate to severe fat and muscle wasting all over her body\"      Treatment: Nutrition Interventions- \"RDN to order Ian bars for patient as well as Magic Cup- 290kcals, 9 grams protein each\"    Risk Factors: ICM, HFrEF, Paroxysmal AFib, COPD  Options provided:  -- I agree with dietician diagnosis of Severe Malnutirtion on 8/8/25.  -- Other - I will add my own diagnosis  -- Refer to Clinical Documentation Reviewer    Query created by: Talia Watkins on 8/25/2025 8:15 AM      Electronically signed by:  BRIAN CALLES-CNP 8/26/2025 8:26 AM          " Composite Graft Text: The defect edges were debeveled with a #15 scalpel blade.  Given the location of the defect, shape of the defect, the proximity to free margins and the fact the defect was full thickness a composite graft was deemed most appropriate.  The defect was outline and then transferred to the donor site.  A full thickness graft was then excised from the donor site. The graft was then placed in the primary defect, oriented appropriately and then sutured into place.  The secondary defect was then repaired using a primary closure.

## 2025-08-26 NOTE — DOCUMENTATION CLARIFICATION NOTE
"    PATIENT:               ANETA BECKER  ACCT #:                  [id number]  MRN:                       [id number]  :                       1965  ADMIT DATE:       2025 7:21 PM  DISCH DATE:        2025 2:45 PM  RESPONDING PROVIDER #:        [id number]          PROVIDER RESPONSE TEXT:    I agree with dietician diagnosis of Severe Malnutrition on 25.    CDI QUERY TEXT:    Clarification    Instruction:    Based on your assessment of the patient and the clinical information, please provide the requested documentation by clicking on the appropriate radio button and enter any additional information if prompted.    Question: Please further clarify this patient nutritional status as    When answering this query, please exercise your independent professional judgment. The fact that a question is being asked, does not imply that any particular answer is desired or expected.    The patient's clinical indicators include:  Clinical Information: Pt is a 61 yo who was was directly admitted to the HF ICU for Itwg-Uvkk-thaipr hemodynamic optimization in anticipation of LVAD placement initially scheduled for , which was postponed due to identification of concerning colonic polyps on routine screening colonoscopy; pathology returned benign on .    Clinical Indicators:    From Nutrition consult on 25- \"Nutrition Focused Physical Exam Findings:    Subcutaneous Fat Loss:  Orbital Fat Pads: Mild-Moderate (slight dark circles and slight hollowing)  Buccal Fat Pads: Mild-Moderate (flat cheeks, minimal bounce)  Triceps: Severe (negligible fat tissue)  Muscle Wasting:  Temporalis: Mild-Moderate (slight depression)  Pectoralis (Clavicular Region): Mild-Moderate (some protrusion of clavicle)  Deltoid/Trapezius: Mild-Moderate (slight protrusion of acromion process)  Quadriceps: Defer  Gastrocnemius: Severe (minimal muscle definition)  Edema:  Edema Location: non-pitting edema  Physical Findings:  Skin: Positive " "(B/L breast wonds, surgical site to sternum, surgical site to abdomen)\"    \"Malnutrition Diagnosis  Patient has Malnutrition Diagnosis: Yes  Diagnosis Status: New  Malnutrition Diagnosis: Severe malnutrition related to chronic disease or condition  As Evidenced by: pt with only a fair appetite and intake prior to VAD and now with a poor appetite and intake post-VAD; she is down from her usual body weight by 15 percent and has areas of moderate to severe fat and muscle wasting all over her body\"      Treatment: Nutrition Interventions- \"RDN to order Ian bars for patient as well as Magic Cup- 290kcals, 9 grams protein each\"    Risk Factors: ICM, HFrEF, Paroxysmal AFib, COPD  Options provided:  -- I agree with dietician diagnosis of Severe Malnutrition on 8/8/25.  -- Other - I will add my own diagnosis  -- Refer to Clinical Documentation Reviewer    Query created by: Talia Watkins on 8/26/2025 9:10 AM      Electronically signed by:  JAMEY SPARKS MD 8/26/2025 9:42 AM          "

## 2025-08-27 ENCOUNTER — APPOINTMENT (OUTPATIENT)
Dept: TRANSPLANT | Facility: HOSPITAL | Age: 60
End: 2025-08-27
Payer: COMMERCIAL

## 2025-08-27 ENCOUNTER — TELEPHONE (OUTPATIENT)
Dept: TRANSPLANT | Facility: HOSPITAL | Age: 60
End: 2025-08-27
Payer: COMMERCIAL

## 2025-08-27 DIAGNOSIS — E05.90 HYPERTHYROIDISM: ICD-10-CM

## 2025-08-27 DIAGNOSIS — E03.9 ACQUIRED HYPOTHYROIDISM: ICD-10-CM

## 2025-08-27 LAB
ACID FAST STN SPEC: NORMAL
ACID FAST STN SPEC: NORMAL
HOLD SPECIMEN: NORMAL
MYCOBACTERIUM SPEC CULT: NORMAL
MYCOBACTERIUM SPEC CULT: NORMAL

## 2025-08-27 RX ORDER — LEVOTHYROXINE SODIUM 100 UG/1
100 TABLET ORAL DAILY
Qty: 30 TABLET | Refills: 11 | Status: SHIPPED | OUTPATIENT
Start: 2025-08-27 | End: 2026-08-27

## 2025-08-28 ENCOUNTER — PHARMACY VISIT (OUTPATIENT)
Dept: PHARMACY | Facility: CLINIC | Age: 60
End: 2025-08-28

## 2025-08-28 DIAGNOSIS — I25.5 ISCHEMIC CARDIOMYOPATHY: ICD-10-CM

## 2025-08-28 PROCEDURE — RXMED WILLOW AMBULATORY MEDICATION CHARGE

## 2025-08-29 ENCOUNTER — ANTICOAGULATION - WARFARIN VISIT (OUTPATIENT)
Dept: PHARMACY | Facility: HOSPITAL | Age: 60
End: 2025-08-29
Payer: COMMERCIAL

## 2025-08-29 DIAGNOSIS — T82.9XXA COMPLICATION INVOLVING LEFT VENTRICULAR ASSIST DEVICE (LVAD), INITIAL ENCOUNTER: ICD-10-CM

## 2025-08-29 DIAGNOSIS — Z79.01 LONG TERM (CURRENT) USE OF ANTICOAGULANTS: Primary | ICD-10-CM

## 2025-08-29 LAB
POC INR: 1.7 (ref 2–3)
POC PROTHROMBIN TIME: 19.9 (ref 9.3–12.5)

## 2025-08-29 PROCEDURE — 99211 OFF/OP EST MAY X REQ PHY/QHP: CPT

## 2025-08-29 PROCEDURE — 85610 PROTHROMBIN TIME: CPT | Mod: QW | Performed by: REGISTERED NURSE

## 2025-09-02 ENCOUNTER — PHARMACY VISIT (OUTPATIENT)
Dept: PHARMACY | Facility: CLINIC | Age: 60
End: 2025-09-02
Payer: COMMERCIAL

## 2025-09-02 RX ORDER — NAPROXEN SODIUM 220 MG/1
81 TABLET, FILM COATED ORAL DAILY
Qty: 30 TABLET | Refills: 0 | Status: SHIPPED | OUTPATIENT
Start: 2025-09-02 | End: 2025-10-02

## 2025-09-03 ENCOUNTER — TELEPHONE (OUTPATIENT)
Dept: RESPIRATORY THERAPY | Facility: HOSPITAL | Age: 60
End: 2025-09-03
Payer: COMMERCIAL

## 2025-09-03 LAB
ACID FAST STN SPEC: NORMAL
MYCOBACTERIUM SPEC CULT: NORMAL

## 2025-09-04 LAB
ACID FAST STN SPEC: NORMAL
MYCOBACTERIUM SPEC CULT: NORMAL

## 2025-09-05 ENCOUNTER — ANTICOAGULATION - WARFARIN VISIT (OUTPATIENT)
Dept: PHARMACY | Facility: HOSPITAL | Age: 60
End: 2025-09-05
Payer: COMMERCIAL

## 2025-09-05 DIAGNOSIS — Z79.01 LONG TERM (CURRENT) USE OF ANTICOAGULANTS: Primary | ICD-10-CM

## 2025-09-05 DIAGNOSIS — T82.9XXA COMPLICATION INVOLVING LEFT VENTRICULAR ASSIST DEVICE (LVAD), INITIAL ENCOUNTER: ICD-10-CM

## 2025-09-05 DIAGNOSIS — I25.5 ISCHEMIC CARDIOMYOPATHY: ICD-10-CM

## 2025-09-05 LAB
POC INR: 2.2 (ref 2–3)
POC PROTHROMBIN TIME: 25.9 (ref 9.3–12.5)

## 2025-09-05 PROCEDURE — 85610 PROTHROMBIN TIME: CPT | Mod: QW | Performed by: REGISTERED NURSE

## 2025-09-05 PROCEDURE — 99211 OFF/OP EST MAY X REQ PHY/QHP: CPT

## 2026-03-11 ENCOUNTER — APPOINTMENT (OUTPATIENT)
Dept: ENDOCRINOLOGY | Facility: CLINIC | Age: 61
End: 2026-03-11
Payer: COMMERCIAL

## (undated) DEVICE — CONNECTOR TOOL: Brand: MODEL 4625 ACUITY® X4 CONNECTOR TOOL

## (undated) DEVICE — RUNTHROUGH NS EXTRA FLOPPY PTCA GUIDEWIRE: Brand: RUNTHROUGH

## (undated) DEVICE — CONNECTOR, STRAIGHT, 0.5 X 0.5 IN

## (undated) DEVICE — TUBING PACK, FX ADULT, WITH ICP CENTRIFUGA

## (undated) DEVICE — DRESSING, MEPILEX, BORDER, HEEL, 8.7 X 9.1 IN

## (undated) DEVICE — BONE WAX, 3.5G ABSORBABLE, OSTENE

## (undated) DEVICE — CONNECTOR, STRAIGHT, 0.375 X 0.375 IN

## (undated) DEVICE — COPILOT BLEEDBACK CONTROL VALVE: Brand: COPILOT

## (undated) DEVICE — Device

## (undated) DEVICE — GOWN, SURGICAL, SMARTGOWN, XLARGE, STERILE

## (undated) DEVICE — CATHETER, URETHRAL, FOLEY, 2 WAY, BARDEX IC, 16 FR, 5 CC, SILICONE

## (undated) DEVICE — HI-TORQUE BALANCE MIDDLEWEIGHT GUIDE WIRE W/HYDROCOAT .014 STRAIGHT TIP 3.0 CM X 190 CM: Brand: HI-TORQUE BALANCE MIDDLEWEIGHT

## (undated) DEVICE — PITCHER, GRADUATE, 32 OZ (1200CC), STERILE

## (undated) DEVICE — TIP, SUCTION, YANKAUER, FLEXIBLE

## (undated) DEVICE — SUTURE, TICRON, 2-0, CV-300, DBL-ARMED, 30, BLUE"

## (undated) DEVICE — APPLICATOR, CHLORAPREP, W/ORANGE TINT, 26ML

## (undated) DEVICE — CATHETER, DRAINAGE, NASOGASTRIC, DOUBLE LUMEN, FUNNEL END, SUMP, SALEM, 18 FR, 48 IN, PVC, STERILE

## (undated) DEVICE — COUNTER, NEEDLE, FOAM BLOCK, POP-N-COUNT, W/BLADEGUARD, W/ADHESIVE 40 COUNT, RED

## (undated) DEVICE — CANNULA NSL CANN NSL L25FT TBNG AD O2 SUP SFT UC

## (undated) DEVICE — NEPTUNE E-SEP SMOKE EVACUATION PENCIL, COATED, 70MM BLADE, PUSH BUTTON SWITCH: Brand: NEPTUNE E-SEP

## (undated) DEVICE — SUTURE, ETHIBOND, XTRA, 30 IN, 0, CT-1, GREEN

## (undated) DEVICE — CLEANER, ELECTROSURGICAL, TIP, 5 X 5 CM, LF

## (undated) DEVICE — PAD, DEFIB, ADULT, RADIOTRAN, PHYSIO, LO: Brand: MEDLINE

## (undated) DEVICE — MICROCOAGULATION TEST, ACT+ TEST CUVETTE

## (undated) DEVICE — DEVICE, DRAIN/TUBE ATTACHMENT, HORIZONTAL, STERILE, LF

## (undated) DEVICE — INSERT, CLAMP, SURGICAL, SOFT/TRACTION, STEALTH, 5 MM

## (undated) DEVICE — CATHETER, URETHRAL, FOLEY, 2 WAY, BARDEX IC, 16 FR, 30 CC, SILVER, LATEX

## (undated) DEVICE — SUCKER LINE, EXTRA, RED VENT

## (undated) DEVICE — OXYGENATOR FX 25, W/HR, ARTERIAL FILTER

## (undated) DEVICE — PUNCH, AORTIC 4MM, BULLET TIP

## (undated) DEVICE — SUTURE, PROLENE, 4-0, 36 IN, RB1, DA, BLUE

## (undated) DEVICE — DRAPE, FLUID WARMER

## (undated) DEVICE — TRAY, SURESTEP, URINE METER, 14FR, SILICONE

## (undated) DEVICE — SURGICAL PROCEDURE KIT 3 W

## (undated) DEVICE — CANNULA, AORTIC ROOT, 12G

## (undated) DEVICE — MICROPUNCTURE INTRODUCER SET SILHOUETTE TRANSITIONLESS PUSH-PLUS DESIGN - STIFFENED CANNULA WITH STAINLESS STEEL WIRE GUIDE: Brand: MICROPUNCTURE

## (undated) DEVICE — CATHETER GUID 6FR L100CM DIA0.071IN NYL SHFT EBU3.5

## (undated) DEVICE — SUTURE, NUROLON, 0, 18 IN, CT1, DETACHABLE, MULTIPACK, BLACK

## (undated) DEVICE — SPONGE, HEMOSTATIC, CELLULOSE, SURGICEL, 2 X 14 IN

## (undated) DEVICE — GUIDEWIRE VASC L260CM 0.035IN J TIP L3MM PTFE FIX COR NAMIC

## (undated) DEVICE — CANNULA, VENOUS, 2-STAGE, 32/40 ROUND

## (undated) DEVICE — CATH BLLN ANGIO 2.25X12MM NC EUPHORIA RX

## (undated) DEVICE — SUTURE, VICRYL 0, TAPER POINT, CT-1 VIOLET 27 INCH

## (undated) DEVICE — CLIPPER, SURGICAL BLADE ASSEMBLY, GENERAL PURPOSE, SINGLE USE

## (undated) DEVICE — BAG, DECANTER

## (undated) DEVICE — ELECTRODE, QUICK-COMBO, EDGE SYSTEM, REDI PACK

## (undated) DEVICE — AGENT HEMOSTATIC SURGIFLOW MATRIX KIT W/THROMBIN

## (undated) DEVICE — INTRODUCER LD L13CM OD6FR SPLITTABLE DIL W/ J TIP GWIRE SYR

## (undated) DEVICE — INFLATION DEVICE KIT: Brand: ENCORE™ 26 ADVANTAGE KIT

## (undated) DEVICE — COLLECTION UNIT, DRAINAGE, THORACIC, SINGLE TUBE, DRY SUCTION, ATS COMPATIBLE, OASIS 3600, LF

## (undated) DEVICE — HOLSTER, ELECTROSURGERY ACCESSORY, STERILE

## (undated) DEVICE — KIT, TOURNIQUET, 7"

## (undated) DEVICE — SHUNT, SENSOR

## (undated) DEVICE — CANNULA, CARDIAC SUMP

## (undated) DEVICE — SUTURE, PROLENE, 5-0, 36 IN, RB1, DA, BLUE

## (undated) DEVICE — SUTURE, P6 STERNUM DOUBLE WIRE, CCS P 2 NEEDLE

## (undated) DEVICE — GLIDESHEATH NITINOL HYDROPHILIC COATED INTRODUCER SHEATH: Brand: GLIDESHEATH

## (undated) DEVICE — COVER, CART, 45 X 27 X 48 IN, CLEAR

## (undated) DEVICE — BONE WAX, 2.5G ABSORBABLE, OSTENE

## (undated) DEVICE — SPONGE, HEMOSTAT, SURGICEL FIBRILLAR, ABS, 4 X 4, LF

## (undated) DEVICE — SUTURE, SURGICAL STEEL, STERNUM 7, 18 IN, KV40, SINGL-WIRE

## (undated) DEVICE — SENSOR, O3 REGIONAL, LARGE

## (undated) DEVICE — SUTURE, PROLENE, 3-0, 36 IN, SH, DA, BLUE

## (undated) DEVICE — INTRODUCER SPLIT SHEATH PRELUDE SNAP 8FR X 13CM

## (undated) DEVICE — DRESSING, ISLAND, TELFA, 4 X 5 IN

## (undated) DEVICE — RETRACTOR, SUTURE, HOLDING, INSERT, OCTOBASE, DISPOSABLE

## (undated) DEVICE — TOWEL, SURGICAL, NEURO, O/R, 16 X 26, BLUE, STERILE

## (undated) DEVICE — SUTURE, PROLENE, 6-0, 24 IN, C-1, BLUE

## (undated) DEVICE — SUTURE, MONOCRYL, 3-0, 18 IN, PS2, UNDYED

## (undated) DEVICE — CATH BLLN ANGIO 2X12MM SC EUPHORA RX

## (undated) DEVICE — DRAPE, SHEET, CARDIOVASCULAR, ANTIMICROBIAL, W/ANESTHESIA SCREEN, IOBAN 2, STERI DRAPE, 107 X 133 IN, DISPOSABLE, FABRIC, BLUE, STERILE

## (undated) DEVICE — DRAPE, MAGENTIC INSTRUMENT, 12X16

## (undated) DEVICE — DRAPE, INSTRUMENT, W/POUCH, STERI DRAPE, 7 X 11 IN, DISPOSABLE, STERILE

## (undated) DEVICE — ATS SUCTION LINE

## (undated) DEVICE — DEVICE TORQ FLRESCNT PNK FOR HEMSTAS VLV

## (undated) DEVICE — CATHETER, THORACIC, STRAIGHT, ADULT, 28 FR, PVC

## (undated) DEVICE — COVER, TABLE, 44 X 75 IN, DISPOSABLE, LF, STERILE

## (undated) DEVICE — SUTURE, ETHIBOND, 5, 30 IN, V40, MULTIPACK, GREEN

## (undated) DEVICE — SUTURE, VICRYL, 2-0, 27 IN, CT-1, VIOLET

## (undated) DEVICE — ELECTRODE, ELECTROSURGICAL, BLADE EXT 4 INCH, INSULATED

## (undated) DEVICE — MARKER, SKIN, DUAL TIP INK W/9 LABEL AND REMOVABLE TIME OUT SLEEVE

## (undated) DEVICE — SUTURE, SILK, 2-0, 30 IN, SH, CONTROL RELEASE, MULTIPACK, BLACK

## (undated) DEVICE — CATHETER COR DIAG JUDKINS L 3.5 CRV 6FR 100CM 0 SIDE H

## (undated) DEVICE — CASSETTE, BLOOD, PLEGIC SET

## (undated) DEVICE — YANKAUER, RIGID, STRAIGHT, OPEN TIP, NO VENT

## (undated) DEVICE — DRESSING, MEPILEX, BORDER, SACRUM, 8.7 X 9.8 IN

## (undated) DEVICE — CATHETER DIAG 6FR L100CM LUMN ID0.056IN JR4 CRV 0 SIDE H

## (undated) DEVICE — SYRINGE, 35 CC, LUER LOCK, MONOJECT, W/O CAP, LF

## (undated) DEVICE — SUTURE, VICRYL, 0, 27 IN, UR-6, VIOLET

## (undated) DEVICE — COVER, TABLE, UHC

## (undated) DEVICE — DRESSING, ADHESIVE, ISLAND, TELFA, 2 X 3.75 IN, LF

## (undated) DEVICE — DRESSING, ADHESIVE, ISLAND, TELFA, 4 X 14 IN

## (undated) DEVICE — BAND COMPR L24CM REG CLR PLAS HEMSTAT EXT HK AND LOOP RETEN

## (undated) DEVICE — PUNCH, AORTIC 4MM

## (undated) DEVICE — SUTURE, PROLENE 4-0, TAPER POINT, SH-1 BLUE 30 INCH

## (undated) DEVICE — BLADE, SAW STERNUM, STERILE

## (undated) DEVICE — ANGIOPLASTY ADD-ON PACK: Brand: MEDLINE INDUSTRIES, INC.

## (undated) DEVICE — MANIFOLD, 4 PORT NEPTUNE STANDARD

## (undated) DEVICE — TUBING PRSS MON L12IN PVC RIG NONEXPANDING M TO FEM CONN

## (undated) DEVICE — COSEAL SURGICAL SEALANT 8ML